# Patient Record
Sex: FEMALE | Race: WHITE | Employment: OTHER | URBAN - NONMETROPOLITAN AREA
[De-identification: names, ages, dates, MRNs, and addresses within clinical notes are randomized per-mention and may not be internally consistent; named-entity substitution may affect disease eponyms.]

---

## 2017-01-09 RX ORDER — ACYCLOVIR 800 MG/1
TABLET ORAL
Qty: 30 TABLET | Refills: 5 | Status: SHIPPED | OUTPATIENT
Start: 2017-01-09 | End: 2017-06-29 | Stop reason: SDUPTHER

## 2017-01-09 RX ORDER — NABUMETONE 500 MG/1
TABLET, FILM COATED ORAL
Qty: 60 TABLET | Refills: 5 | Status: SHIPPED | OUTPATIENT
Start: 2017-01-09 | End: 2017-06-29 | Stop reason: SDUPTHER

## 2017-01-24 ENCOUNTER — HOSPITAL ENCOUNTER (OUTPATIENT)
Dept: PAIN MANAGEMENT | Age: 42
Discharge: HOME | End: 2017-01-24
Payer: MEDICARE

## 2017-01-24 VITALS
HEIGHT: 68 IN | RESPIRATION RATE: 16 BRPM | SYSTOLIC BLOOD PRESSURE: 154 MMHG | OXYGEN SATURATION: 97 % | DIASTOLIC BLOOD PRESSURE: 89 MMHG | TEMPERATURE: 97.9 F | HEART RATE: 91 BPM | WEIGHT: 293 LBS | BODY MASS INDEX: 44.41 KG/M2

## 2017-01-24 DIAGNOSIS — M54.10 BACK PAIN WITH LEFT-SIDED RADICULOPATHY: ICD-10-CM

## 2017-01-24 PROCEDURE — 80307 DRUG TEST PRSMV CHEM ANLYZR: CPT

## 2017-01-24 PROCEDURE — 99214 OFFICE O/P EST MOD 30 MIN: CPT

## 2017-01-24 RX ORDER — OXYCODONE HYDROCHLORIDE 10 MG/1
10 TABLET ORAL 3 TIMES DAILY PRN
Qty: 90 TABLET | Refills: 0 | Status: SHIPPED | OUTPATIENT
Start: 2017-01-28 | End: 2017-03-01 | Stop reason: SDUPTHER

## 2017-01-24 RX ORDER — PREGABALIN 150 MG/1
150 CAPSULE ORAL 3 TIMES DAILY
Qty: 90 CAPSULE | Refills: 2 | Status: SHIPPED | OUTPATIENT
Start: 2017-01-24 | End: 2017-01-24 | Stop reason: CLARIF

## 2017-01-24 RX ORDER — CYCLOBENZAPRINE HCL 10 MG
10 TABLET ORAL 2 TIMES DAILY PRN
Qty: 60 TABLET | Refills: 1 | Status: SHIPPED | OUTPATIENT
Start: 2017-01-24 | End: 2018-03-01 | Stop reason: SDUPTHER

## 2017-01-24 RX ORDER — PREGABALIN 150 MG/1
150 CAPSULE ORAL 3 TIMES DAILY
Qty: 90 CAPSULE | Refills: 2 | Status: SHIPPED | OUTPATIENT
Start: 2017-01-24 | End: 2017-04-20 | Stop reason: SDUPTHER

## 2017-01-24 RX ORDER — CYCLOBENZAPRINE HCL 10 MG
1 TABLET ORAL 2 TIMES DAILY PRN
Refills: 1 | COMMUNITY
Start: 2016-12-17 | End: 2017-01-24 | Stop reason: SDUPTHER

## 2017-01-24 ASSESSMENT — PAIN DESCRIPTION - PROGRESSION: CLINICAL_PROGRESSION: GRADUALLY WORSENING

## 2017-01-24 ASSESSMENT — PAIN DESCRIPTION - DIRECTION: RADIATING_TOWARDS: DOWN RIGHT LEG

## 2017-01-24 ASSESSMENT — PAIN DESCRIPTION - PAIN TYPE: TYPE: CHRONIC PAIN

## 2017-01-24 ASSESSMENT — PAIN SCALES - GENERAL: PAINLEVEL_OUTOF10: 7

## 2017-01-24 ASSESSMENT — PAIN DESCRIPTION - FREQUENCY: FREQUENCY: CONTINUOUS

## 2017-01-24 ASSESSMENT — PAIN DESCRIPTION - LOCATION: LOCATION: BACK;LEG;HIP

## 2017-01-24 ASSESSMENT — PAIN DESCRIPTION - DESCRIPTORS: DESCRIPTORS: SHARP;BURNING;ACHING

## 2017-01-24 ASSESSMENT — PAIN DESCRIPTION - ONSET: ONSET: ON-GOING

## 2017-01-24 ASSESSMENT — ACTIVITIES OF DAILY LIVING (ADL): EFFECT OF PAIN ON DAILY ACTIVITIES: DAILY ACTIVITIES

## 2017-01-24 ASSESSMENT — PAIN DESCRIPTION - ORIENTATION: ORIENTATION: RIGHT;LOWER

## 2017-01-30 ENCOUNTER — OFFICE VISIT (OUTPATIENT)
Dept: OBGYN | Age: 42
End: 2017-01-30
Payer: MEDICARE

## 2017-01-30 VITALS — BODY MASS INDEX: 44.41 KG/M2 | HEIGHT: 68 IN | WEIGHT: 293 LBS

## 2017-01-30 DIAGNOSIS — R10.2 PELVIC PAIN IN FEMALE: ICD-10-CM

## 2017-01-30 DIAGNOSIS — Z87.42 HISTORY OF ENDOMETRIOSIS: ICD-10-CM

## 2017-01-30 DIAGNOSIS — G89.29 CHRONIC BACK PAIN, UNSPECIFIED BACK LOCATION, UNSPECIFIED BACK PAIN LATERALITY: ICD-10-CM

## 2017-01-30 DIAGNOSIS — M54.9 CHRONIC BACK PAIN, UNSPECIFIED BACK LOCATION, UNSPECIFIED BACK PAIN LATERALITY: ICD-10-CM

## 2017-01-30 DIAGNOSIS — N89.8 VAGINAL DISCHARGE: Primary | ICD-10-CM

## 2017-01-30 LAB
AMPHETAMINES, URINE: NEGATIVE NG/ML
BARBITURATES, URINE: NEGATIVE NG/ML
BENZODIAZEPINES, URINE: NEGATIVE NG/ML
CANNABINOIDS, URINE: NEGATIVE NG/ML
COCAINE METABOLITE, URINE: NEGATIVE NG/ML
CREATININE, URINE: 190.1 MG/DL (ref 20–300)
ETHANOL U, QUAN: NEGATIVE %
FENTANYL URINE: NEGATIVE PG/ML
MEPERIDINE, UR: NEGATIVE NG/ML
METHADONE SCREEN, URINE: NEGATIVE NG/ML
OPIATES, URINE: ABNORMAL NG/ML
OPIATES, URINE: NEGATIVE NG/ML
OXYCODONE URINE: POSITIVE
OXYCODONE, UR GC/MS: 1600 NG/ML
OXYCODONE/OXYMORPH, UR: POSITIVE
OXYCODONE/OXYMORPHONE, UR: ABNORMAL NG/ML
OXYMORPHONE, UR GC/MS: 2020 NG/ML
OXYMORPHONE, URINE: POSITIVE
PH, URINE: 5.4 (ref 4.5–8.9)
PHENCYCLIDINE, URINE: NEGATIVE NG/ML
PROPOXYPHENE, URINE: NEGATIVE NG/ML

## 2017-01-30 PROCEDURE — 99213 OFFICE O/P EST LOW 20 MIN: CPT | Performed by: NURSE PRACTITIONER

## 2017-01-30 ASSESSMENT — ENCOUNTER SYMPTOMS
NAUSEA: 0
ABDOMINAL PAIN: 0
SHORTNESS OF BREATH: 0
BACK PAIN: 1
DIARRHEA: 0
CONSTIPATION: 0
WHEEZING: 0
APNEA: 0
TROUBLE SWALLOWING: 0
SORE THROAT: 0

## 2017-01-31 ENCOUNTER — OFFICE VISIT (OUTPATIENT)
Dept: PRIMARY CARE CLINIC | Age: 42
End: 2017-01-31
Payer: MEDICARE

## 2017-01-31 VITALS
HEIGHT: 68 IN | TEMPERATURE: 98.6 F | BODY MASS INDEX: 44.41 KG/M2 | OXYGEN SATURATION: 98 % | HEART RATE: 109 BPM | WEIGHT: 293 LBS | SYSTOLIC BLOOD PRESSURE: 128 MMHG | DIASTOLIC BLOOD PRESSURE: 84 MMHG

## 2017-01-31 DIAGNOSIS — R50.9 FEVER, UNSPECIFIED FEVER CAUSE: ICD-10-CM

## 2017-01-31 DIAGNOSIS — J02.9 SORE THROAT: Primary | ICD-10-CM

## 2017-01-31 DIAGNOSIS — H10.503 BLEPHAROCONJUNCTIVITIS OF BOTH EYES, UNSPECIFIED BLEPHAROCONJUNCTIVITIS TYPE: ICD-10-CM

## 2017-01-31 DIAGNOSIS — J01.20 ACUTE NON-RECURRENT ETHMOIDAL SINUSITIS: ICD-10-CM

## 2017-01-31 DIAGNOSIS — J34.89 SINUS PRESSURE: ICD-10-CM

## 2017-01-31 LAB — S PYO AG THROAT QL: NORMAL

## 2017-01-31 PROCEDURE — 87880 STREP A ASSAY W/OPTIC: CPT | Performed by: PEDIATRICS

## 2017-01-31 PROCEDURE — 99213 OFFICE O/P EST LOW 20 MIN: CPT | Performed by: PEDIATRICS

## 2017-01-31 RX ORDER — TOBRAMYCIN 3 MG/ML
1 SOLUTION/ DROPS OPHTHALMIC EVERY 4 HOURS
Qty: 1 BOTTLE | Refills: 0 | Status: SHIPPED | OUTPATIENT
Start: 2017-01-31 | End: 2017-02-10

## 2017-01-31 RX ORDER — CEFDINIR 300 MG/1
300 CAPSULE ORAL 2 TIMES DAILY
Qty: 20 CAPSULE | Refills: 0 | Status: SHIPPED | OUTPATIENT
Start: 2017-01-31 | End: 2017-02-10

## 2017-01-31 ASSESSMENT — ENCOUNTER SYMPTOMS
DIARRHEA: 0
ABDOMINAL PAIN: 0
VOICE CHANGE: 0
EYE PAIN: 0
WHEEZING: 0
BACK PAIN: 0
NAUSEA: 0
CONSTIPATION: 0
COUGH: 1
VOMITING: 0
SHORTNESS OF BREATH: 0
SORE THROAT: 1
SINUS PRESSURE: 1
EYE DISCHARGE: 1

## 2017-02-03 DIAGNOSIS — A59.01 TRICHOMONAL VAGINITIS: Primary | ICD-10-CM

## 2017-02-03 RX ORDER — METRONIDAZOLE 500 MG/1
TABLET ORAL
Qty: 4 TABLET | Refills: 0 | Status: SHIPPED | OUTPATIENT
Start: 2017-02-03 | End: 2017-03-07 | Stop reason: ALTCHOICE

## 2017-02-03 RX ORDER — FLUCONAZOLE 150 MG/1
TABLET ORAL
Qty: 2 TABLET | Refills: 0 | Status: SHIPPED | OUTPATIENT
Start: 2017-02-03 | End: 2017-03-07 | Stop reason: ALTCHOICE

## 2017-02-17 ENCOUNTER — OFFICE VISIT (OUTPATIENT)
Dept: OBGYN | Age: 42
End: 2017-02-17
Payer: MEDICARE

## 2017-02-17 VITALS
SYSTOLIC BLOOD PRESSURE: 130 MMHG | HEIGHT: 66 IN | WEIGHT: 293 LBS | DIASTOLIC BLOOD PRESSURE: 60 MMHG | BODY MASS INDEX: 47.09 KG/M2

## 2017-02-17 DIAGNOSIS — R10.30 LOWER ABDOMINAL PAIN: Primary | ICD-10-CM

## 2017-02-17 DIAGNOSIS — E66.01 MORBID OBESITY, UNSPECIFIED OBESITY TYPE (HCC): ICD-10-CM

## 2017-02-17 PROCEDURE — 99213 OFFICE O/P EST LOW 20 MIN: CPT | Performed by: OBSTETRICS & GYNECOLOGY

## 2017-02-17 ASSESSMENT — ENCOUNTER SYMPTOMS
EYES NEGATIVE: 1
RESPIRATORY NEGATIVE: 1
ABDOMINAL PAIN: 1
ALLERGIC/IMMUNOLOGIC NEGATIVE: 1

## 2017-02-19 ENCOUNTER — HOSPITAL ENCOUNTER (EMERGENCY)
Age: 42
Discharge: HOME | End: 2017-02-19
Payer: MEDICARE

## 2017-02-19 ENCOUNTER — APPOINTMENT (OUTPATIENT)
Dept: GENERAL RADIOLOGY | Age: 42
End: 2017-02-19
Payer: MEDICARE

## 2017-02-19 VITALS
HEART RATE: 78 BPM | BODY MASS INDEX: 44.41 KG/M2 | DIASTOLIC BLOOD PRESSURE: 78 MMHG | TEMPERATURE: 98 F | HEIGHT: 68 IN | SYSTOLIC BLOOD PRESSURE: 112 MMHG | RESPIRATION RATE: 20 BRPM | OXYGEN SATURATION: 99 % | WEIGHT: 293 LBS

## 2017-02-19 DIAGNOSIS — J40 BRONCHITIS: Primary | ICD-10-CM

## 2017-02-19 PROCEDURE — 99283 EMERGENCY DEPT VISIT LOW MDM: CPT | Performed by: NURSE PRACTITIONER

## 2017-02-19 PROCEDURE — 99283 EMERGENCY DEPT VISIT LOW MDM: CPT

## 2017-02-19 PROCEDURE — 71020 XR CHEST STANDARD TWO VW: CPT

## 2017-02-19 RX ORDER — FLUTICASONE PROPIONATE 50 MCG
2 SPRAY, SUSPENSION (ML) NASAL DAILY
Qty: 1 BOTTLE | Refills: 0 | Status: SHIPPED | OUTPATIENT
Start: 2017-02-19 | End: 2017-03-07

## 2017-02-19 RX ORDER — PREDNISONE 10 MG/1
20 TABLET ORAL 2 TIMES DAILY
Qty: 20 TABLET | Refills: 0 | Status: SHIPPED | OUTPATIENT
Start: 2017-02-19 | End: 2017-02-24

## 2017-02-19 RX ORDER — ALBUTEROL SULFATE 90 UG/1
2 AEROSOL, METERED RESPIRATORY (INHALATION) 4 TIMES DAILY
Qty: 1 INHALER | Refills: 0 | Status: SHIPPED | OUTPATIENT
Start: 2017-02-19 | End: 2017-03-07 | Stop reason: SDUPTHER

## 2017-02-19 RX ORDER — DOXYCYCLINE HYCLATE 100 MG
100 TABLET ORAL 2 TIMES DAILY
Qty: 20 TABLET | Refills: 0 | Status: SHIPPED | OUTPATIENT
Start: 2017-02-19 | End: 2017-03-01

## 2017-02-19 RX ORDER — SUCRALFATE ORAL 1 G/10ML
1 SUSPENSION ORAL 4 TIMES DAILY
Qty: 1200 ML | Refills: 0 | Status: SHIPPED | OUTPATIENT
Start: 2017-02-19 | End: 2017-03-07

## 2017-02-19 RX ORDER — ONDANSETRON 4 MG/1
4 TABLET, ORALLY DISINTEGRATING ORAL EVERY 8 HOURS PRN
Qty: 10 TABLET | Refills: 0 | Status: SHIPPED | OUTPATIENT
Start: 2017-02-19 | End: 2021-09-17

## 2017-02-20 ASSESSMENT — ENCOUNTER SYMPTOMS
EYE DISCHARGE: 0
SORE THROAT: 0
ABDOMINAL PAIN: 0
BACK PAIN: 0
VOMITING: 1
COUGH: 1
SINUS PRESSURE: 1
DIARRHEA: 0
WHEEZING: 0
SHORTNESS OF BREATH: 0
NAUSEA: 0

## 2017-03-02 RX ORDER — OXYCODONE HYDROCHLORIDE 10 MG/1
10 TABLET ORAL 3 TIMES DAILY PRN
Qty: 90 TABLET | Refills: 0 | Status: SHIPPED | OUTPATIENT
Start: 2017-03-03 | End: 2017-03-17 | Stop reason: SDUPTHER

## 2017-03-07 ENCOUNTER — OFFICE VISIT (OUTPATIENT)
Dept: PRIMARY CARE CLINIC | Age: 42
End: 2017-03-07
Payer: MEDICARE

## 2017-03-07 VITALS
OXYGEN SATURATION: 97 % | HEART RATE: 94 BPM | TEMPERATURE: 97.5 F | SYSTOLIC BLOOD PRESSURE: 104 MMHG | BODY MASS INDEX: 44.41 KG/M2 | WEIGHT: 293 LBS | DIASTOLIC BLOOD PRESSURE: 78 MMHG | HEIGHT: 68 IN

## 2017-03-07 DIAGNOSIS — J04.0 LARYNGITIS: ICD-10-CM

## 2017-03-07 DIAGNOSIS — J18.9 PNEUMONIA OF RIGHT LOWER LOBE DUE TO INFECTIOUS ORGANISM: Primary | ICD-10-CM

## 2017-03-07 DIAGNOSIS — J20.9 ACUTE BRONCHITIS, UNSPECIFIED ORGANISM: ICD-10-CM

## 2017-03-07 PROCEDURE — 99213 OFFICE O/P EST LOW 20 MIN: CPT | Performed by: PEDIATRICS

## 2017-03-07 RX ORDER — ALBUTEROL SULFATE 90 UG/1
2 AEROSOL, METERED RESPIRATORY (INHALATION) EVERY 6 HOURS PRN
Qty: 1 INHALER | Refills: 3 | Status: SHIPPED | OUTPATIENT
Start: 2017-03-07 | End: 2019-02-22 | Stop reason: SDUPTHER

## 2017-03-07 RX ORDER — LEVOFLOXACIN 750 MG/1
750 TABLET ORAL DAILY
Qty: 10 TABLET | Refills: 0 | Status: SHIPPED | OUTPATIENT
Start: 2017-03-07 | End: 2017-03-17

## 2017-03-07 ASSESSMENT — ENCOUNTER SYMPTOMS
TROUBLE SWALLOWING: 0
NAUSEA: 0
COUGH: 1
DIARRHEA: 0
VOICE CHANGE: 1
BACK PAIN: 0
SORE THROAT: 1
SINUS PRESSURE: 1
SHORTNESS OF BREATH: 0
ABDOMINAL PAIN: 0
VOMITING: 0
CHEST TIGHTNESS: 0

## 2017-03-10 ENCOUNTER — TELEPHONE (OUTPATIENT)
Dept: PRIMARY CARE CLINIC | Age: 42
End: 2017-03-10

## 2017-03-10 ENCOUNTER — HOSPITAL ENCOUNTER (OUTPATIENT)
Dept: GENERAL RADIOLOGY | Age: 42
Discharge: HOME | End: 2017-03-10
Payer: MEDICARE

## 2017-03-10 ENCOUNTER — OFFICE VISIT (OUTPATIENT)
Dept: PRIMARY CARE CLINIC | Age: 42
End: 2017-03-10
Payer: MEDICARE

## 2017-03-10 VITALS
DIASTOLIC BLOOD PRESSURE: 80 MMHG | OXYGEN SATURATION: 98 % | TEMPERATURE: 96.4 F | WEIGHT: 279 LBS | BODY MASS INDEX: 42.28 KG/M2 | HEART RATE: 120 BPM | SYSTOLIC BLOOD PRESSURE: 118 MMHG | HEIGHT: 68 IN

## 2017-03-10 DIAGNOSIS — R05.9 COUGH: ICD-10-CM

## 2017-03-10 DIAGNOSIS — J18.9 PNEUMONIA DUE TO INFECTIOUS ORGANISM, UNSPECIFIED LATERALITY, UNSPECIFIED PART OF LUNG: ICD-10-CM

## 2017-03-10 DIAGNOSIS — R06.2 WHEEZING: ICD-10-CM

## 2017-03-10 DIAGNOSIS — J40 BRONCHITIS: ICD-10-CM

## 2017-03-10 DIAGNOSIS — J02.9 SORE THROAT: Primary | ICD-10-CM

## 2017-03-10 LAB
INFLUENZA A ANTIBODY: NORMAL
INFLUENZA B ANTIBODY: NORMAL

## 2017-03-10 PROCEDURE — 87804 INFLUENZA ASSAY W/OPTIC: CPT | Performed by: NURSE PRACTITIONER

## 2017-03-10 PROCEDURE — 96372 THER/PROPH/DIAG INJ SC/IM: CPT | Performed by: NURSE PRACTITIONER

## 2017-03-10 PROCEDURE — 71020 XR CHEST STANDARD TWO VW: CPT

## 2017-03-10 PROCEDURE — 99213 OFFICE O/P EST LOW 20 MIN: CPT | Performed by: NURSE PRACTITIONER

## 2017-03-10 RX ORDER — PREDNISONE 10 MG/1
10 TABLET ORAL DAILY
Qty: 42 TABLET | Refills: 0 | Status: SHIPPED | OUTPATIENT
Start: 2017-03-10 | End: 2017-03-20

## 2017-03-10 RX ORDER — DEXAMETHASONE SODIUM PHOSPHATE 4 MG/ML
4 INJECTION, SOLUTION INTRA-ARTICULAR; INTRALESIONAL; INTRAMUSCULAR; INTRAVENOUS; SOFT TISSUE ONCE
Status: COMPLETED | OUTPATIENT
Start: 2017-03-10 | End: 2017-03-10

## 2017-03-10 RX ORDER — NEBULIZER ACCESSORIES
1 KIT MISCELLANEOUS 4 TIMES DAILY PRN
Qty: 1 KIT | Refills: 0 | Status: SHIPPED | OUTPATIENT
Start: 2017-03-10 | End: 2018-01-10

## 2017-03-10 RX ORDER — ALBUTEROL SULFATE 2.5 MG/3ML
2.5 SOLUTION RESPIRATORY (INHALATION) EVERY 6 HOURS PRN
Qty: 120 EACH | Refills: 3 | Status: SHIPPED | OUTPATIENT
Start: 2017-03-10 | End: 2018-01-10

## 2017-03-10 RX ADMIN — DEXAMETHASONE SODIUM PHOSPHATE 4 MG: 4 INJECTION, SOLUTION INTRA-ARTICULAR; INTRALESIONAL; INTRAMUSCULAR; INTRAVENOUS; SOFT TISSUE at 14:26

## 2017-03-10 ASSESSMENT — ENCOUNTER SYMPTOMS
NAUSEA: 0
EYE REDNESS: 0
DIARRHEA: 0
VOMITING: 0
EYE DISCHARGE: 0
ANAL BLEEDING: 0
EYE PAIN: 0
SHORTNESS OF BREATH: 0
SORE THROAT: 1
ABDOMINAL PAIN: 0
CHEST TIGHTNESS: 0
COUGH: 1
CONSTIPATION: 0

## 2017-03-13 ENCOUNTER — TELEPHONE (OUTPATIENT)
Dept: PRIMARY CARE CLINIC | Age: 42
End: 2017-03-13

## 2017-03-13 ASSESSMENT — ENCOUNTER SYMPTOMS
WHEEZING: 1
RHINORRHEA: 1

## 2017-03-17 ENCOUNTER — HOSPITAL ENCOUNTER (OUTPATIENT)
Dept: PAIN MANAGEMENT | Age: 42
Discharge: HOME | End: 2017-03-17
Payer: MEDICARE

## 2017-03-17 VITALS
HEIGHT: 68 IN | TEMPERATURE: 97.6 F | SYSTOLIC BLOOD PRESSURE: 119 MMHG | HEART RATE: 89 BPM | DIASTOLIC BLOOD PRESSURE: 71 MMHG | BODY MASS INDEX: 44.41 KG/M2 | WEIGHT: 293 LBS | RESPIRATION RATE: 18 BRPM | OXYGEN SATURATION: 96 %

## 2017-03-17 DIAGNOSIS — M54.10 BACK PAIN WITH LEFT-SIDED RADICULOPATHY: ICD-10-CM

## 2017-03-17 PROCEDURE — 99214 OFFICE O/P EST MOD 30 MIN: CPT

## 2017-03-17 ASSESSMENT — PAIN SCALES - GENERAL: PAINLEVEL_OUTOF10: 7

## 2017-03-17 ASSESSMENT — PAIN DESCRIPTION - ONSET: ONSET: ON-GOING

## 2017-03-17 ASSESSMENT — PAIN DESCRIPTION - PAIN TYPE: TYPE: CHRONIC PAIN

## 2017-03-17 ASSESSMENT — PAIN DESCRIPTION - LOCATION: LOCATION: BACK

## 2017-03-17 ASSESSMENT — PAIN DESCRIPTION - PROGRESSION: CLINICAL_PROGRESSION: NOT CHANGED

## 2017-03-17 ASSESSMENT — PAIN DESCRIPTION - DESCRIPTORS: DESCRIPTORS: ACHING;CONSTANT

## 2017-03-17 ASSESSMENT — ACTIVITIES OF DAILY LIVING (ADL): EFFECT OF PAIN ON DAILY ACTIVITIES: DAILY CHORES AND ACTIVITIES

## 2017-03-17 ASSESSMENT — PAIN DESCRIPTION - FREQUENCY: FREQUENCY: CONTINUOUS

## 2017-03-17 ASSESSMENT — PAIN DESCRIPTION - ORIENTATION: ORIENTATION: LOWER

## 2017-03-17 ASSESSMENT — PAIN DESCRIPTION - DIRECTION: RADIATING_TOWARDS: DOWN THE RIGHT LEG

## 2017-03-31 DIAGNOSIS — I10 ESSENTIAL HYPERTENSION: ICD-10-CM

## 2017-03-31 RX ORDER — LISINOPRIL 10 MG/1
TABLET ORAL
Qty: 90 TABLET | Refills: 2 | Status: SHIPPED | OUTPATIENT
Start: 2017-03-31 | End: 2017-12-29 | Stop reason: SDUPTHER

## 2017-04-04 ENCOUNTER — OFFICE VISIT (OUTPATIENT)
Dept: GASTROENTEROLOGY | Age: 42
End: 2017-04-04
Payer: MEDICARE

## 2017-04-04 VITALS
BODY MASS INDEX: 44.41 KG/M2 | SYSTOLIC BLOOD PRESSURE: 132 MMHG | HEART RATE: 94 BPM | DIASTOLIC BLOOD PRESSURE: 80 MMHG | OXYGEN SATURATION: 98 % | HEIGHT: 68 IN | WEIGHT: 293 LBS

## 2017-04-04 DIAGNOSIS — R10.31 CHRONIC BILATERAL LOWER ABDOMINAL PAIN: Primary | ICD-10-CM

## 2017-04-04 DIAGNOSIS — K62.5 BRBPR (BRIGHT RED BLOOD PER RECTUM): ICD-10-CM

## 2017-04-04 DIAGNOSIS — M43.22 CERVICAL VERTEBRAL FUSION: ICD-10-CM

## 2017-04-04 DIAGNOSIS — G89.29 CHRONIC BILATERAL LOWER ABDOMINAL PAIN: Primary | ICD-10-CM

## 2017-04-04 DIAGNOSIS — R10.32 CHRONIC BILATERAL LOWER ABDOMINAL PAIN: Primary | ICD-10-CM

## 2017-04-04 DIAGNOSIS — Z80.0 FAMILY HISTORY OF ESOPHAGEAL CANCER: ICD-10-CM

## 2017-04-04 DIAGNOSIS — R13.10 DYSPHAGIA, UNSPECIFIED TYPE: ICD-10-CM

## 2017-04-04 DIAGNOSIS — Z83.71 FAMILY HISTORY OF POLYPS IN THE COLON: ICD-10-CM

## 2017-04-04 PROBLEM — Z83.719 FAMILY HISTORY OF POLYPS IN THE COLON: Status: ACTIVE | Noted: 2017-04-04

## 2017-04-04 PROCEDURE — 99214 OFFICE O/P EST MOD 30 MIN: CPT | Performed by: NURSE PRACTITIONER

## 2017-04-04 ASSESSMENT — ENCOUNTER SYMPTOMS
BACK PAIN: 1
WHEEZING: 0
SHORTNESS OF BREATH: 1
TROUBLE SWALLOWING: 1
VOMITING: 0
NAUSEA: 0
BLOOD IN STOOL: 0
ABDOMINAL DISTENTION: 0
CONSTIPATION: 0
COUGH: 0
ABDOMINAL PAIN: 1
CHOKING: 0
RECTAL PAIN: 0
SORE THROAT: 0
ANAL BLEEDING: 0
PHOTOPHOBIA: 0
DIARRHEA: 0

## 2017-04-10 RX ORDER — TRAZODONE HYDROCHLORIDE 50 MG/1
50 TABLET ORAL NIGHTLY
Qty: 90 TABLET | Refills: 1 | Status: SHIPPED | OUTPATIENT
Start: 2017-04-10 | End: 2017-06-27 | Stop reason: ALTCHOICE

## 2017-04-14 ENCOUNTER — HOSPITAL ENCOUNTER (OUTPATIENT)
Dept: PAIN MANAGEMENT | Age: 42
Discharge: HOME | End: 2017-04-14
Payer: MEDICARE

## 2017-04-14 VITALS — DIASTOLIC BLOOD PRESSURE: 61 MMHG | HEART RATE: 85 BPM | RESPIRATION RATE: 16 BRPM | SYSTOLIC BLOOD PRESSURE: 113 MMHG

## 2017-04-14 VITALS
DIASTOLIC BLOOD PRESSURE: 61 MMHG | WEIGHT: 293 LBS | RESPIRATION RATE: 20 BRPM | SYSTOLIC BLOOD PRESSURE: 147 MMHG | HEART RATE: 88 BPM | TEMPERATURE: 98.4 F | OXYGEN SATURATION: 97 % | HEIGHT: 68 IN | BODY MASS INDEX: 44.41 KG/M2

## 2017-04-14 DIAGNOSIS — M54.10 BACK PAIN WITH LEFT-SIDED RADICULOPATHY: ICD-10-CM

## 2017-04-14 DIAGNOSIS — M51.16 LUMBAR DISC DISEASE WITH RADICULOPATHY: Chronic | ICD-10-CM

## 2017-04-14 DIAGNOSIS — M51.36 LUMBAR DEGENERATIVE DISC DISEASE: ICD-10-CM

## 2017-04-14 PROCEDURE — 3209999900 FLUORO FOR SURGICAL PROCEDURES

## 2017-04-14 PROCEDURE — 62323 NJX INTERLAMINAR LMBR/SAC: CPT

## 2017-04-14 PROCEDURE — 2580000003 HC RX 258

## 2017-04-14 PROCEDURE — 6360000002 HC RX W HCPCS

## 2017-04-14 PROCEDURE — 2500000003 HC RX 250 WO HCPCS

## 2017-04-14 RX ORDER — LIDOCAINE HYDROCHLORIDE 10 MG/ML
INJECTION, SOLUTION EPIDURAL; INFILTRATION; INTRACAUDAL; PERINEURAL
Status: COMPLETED | OUTPATIENT
Start: 2017-04-14 | End: 2017-04-14

## 2017-04-14 RX ORDER — BUPIVACAINE HYDROCHLORIDE 2.5 MG/ML
INJECTION, SOLUTION EPIDURAL; INFILTRATION; INTRACAUDAL
Status: COMPLETED | OUTPATIENT
Start: 2017-04-14 | End: 2017-04-14

## 2017-04-14 RX ORDER — METHYLPREDNISOLONE ACETATE 80 MG/ML
INJECTION, SUSPENSION INTRA-ARTICULAR; INTRALESIONAL; INTRAMUSCULAR; SOFT TISSUE
Status: COMPLETED | OUTPATIENT
Start: 2017-04-14 | End: 2017-04-14

## 2017-04-14 RX ORDER — 0.9 % SODIUM CHLORIDE 0.9 %
VIAL (ML) INJECTION
Status: COMPLETED | OUTPATIENT
Start: 2017-04-14 | End: 2017-04-14

## 2017-04-14 RX ORDER — OXYCODONE HYDROCHLORIDE 10 MG/1
10 TABLET ORAL 2 TIMES DAILY PRN
Qty: 60 TABLET | Refills: 0 | Status: SHIPPED | OUTPATIENT
Start: 2017-05-02 | End: 2017-05-03 | Stop reason: SDUPTHER

## 2017-04-14 RX ADMIN — BUPIVACAINE HYDROCHLORIDE 5 ML: 2.5 INJECTION, SOLUTION EPIDURAL; INFILTRATION; INTRACAUDAL at 11:03

## 2017-04-14 RX ADMIN — Medication 4 ML: at 11:03

## 2017-04-14 RX ADMIN — METHYLPREDNISOLONE ACETATE 80 MG: 80 INJECTION, SUSPENSION INTRA-ARTICULAR; INTRALESIONAL; INTRAMUSCULAR; SOFT TISSUE at 11:03

## 2017-04-14 RX ADMIN — LIDOCAINE HYDROCHLORIDE 3 ML: 10 INJECTION, SOLUTION EPIDURAL; INFILTRATION; INTRACAUDAL; PERINEURAL at 11:02

## 2017-04-14 ASSESSMENT — PAIN - FUNCTIONAL ASSESSMENT
PAIN_FUNCTIONAL_ASSESSMENT: 0-10
PAIN_FUNCTIONAL_ASSESSMENT: 0-10

## 2017-04-14 ASSESSMENT — PAIN DESCRIPTION - DESCRIPTORS: DESCRIPTORS: ACHING;CONSTANT;THROBBING;RADIATING

## 2017-04-14 ASSESSMENT — ACTIVITIES OF DAILY LIVING (ADL): EFFECT OF PAIN ON DAILY ACTIVITIES: DAILY CHORES AND ACTIVITIES

## 2017-04-20 RX ORDER — PREGABALIN 150 MG/1
150 CAPSULE ORAL 3 TIMES DAILY
Qty: 90 CAPSULE | Refills: 2 | Status: SHIPPED | OUTPATIENT
Start: 2017-04-20 | End: 2017-09-06 | Stop reason: SDUPTHER

## 2017-04-27 ENCOUNTER — ANESTHESIA EVENT (OUTPATIENT)
Dept: OPERATING ROOM | Age: 42
End: 2017-04-27

## 2017-05-01 RX ORDER — METOPROLOL SUCCINATE 50 MG/1
50 TABLET, EXTENDED RELEASE ORAL 2 TIMES DAILY
COMMUNITY

## 2017-05-02 ENCOUNTER — ANESTHESIA (OUTPATIENT)
Dept: OPERATING ROOM | Age: 42
End: 2017-05-02
Payer: MEDICARE

## 2017-05-02 ENCOUNTER — HOSPITAL ENCOUNTER (OUTPATIENT)
Age: 42
Setting detail: OUTPATIENT SURGERY
Discharge: HOME | End: 2017-05-02
Attending: INTERNAL MEDICINE | Admitting: INTERNAL MEDICINE
Payer: MEDICARE

## 2017-05-02 ENCOUNTER — HOSPITAL ENCOUNTER (OUTPATIENT)
Age: 42
Setting detail: SPECIMEN
Discharge: HOME | End: 2017-05-02
Payer: MEDICARE

## 2017-05-02 VITALS
DIASTOLIC BLOOD PRESSURE: 63 MMHG | OXYGEN SATURATION: 94 % | HEART RATE: 75 BPM | RESPIRATION RATE: 16 BRPM | TEMPERATURE: 98.3 F | SYSTOLIC BLOOD PRESSURE: 94 MMHG

## 2017-05-02 VITALS — SYSTOLIC BLOOD PRESSURE: 109 MMHG | DIASTOLIC BLOOD PRESSURE: 69 MMHG | OXYGEN SATURATION: 97 %

## 2017-05-02 PROCEDURE — G8907 PT DOC NO EVENTS ON DISCHARG: HCPCS

## 2017-05-02 PROCEDURE — G8918 PT W/O PREOP ORDER IV AB PRO: HCPCS

## 2017-05-02 PROCEDURE — 45378 DIAGNOSTIC COLONOSCOPY: CPT | Performed by: INTERNAL MEDICINE

## 2017-05-02 PROCEDURE — 00810 PR ANESTH,INTESTINE,SCOPE,LOW: CPT | Performed by: NURSE ANESTHETIST, CERTIFIED REGISTERED

## 2017-05-02 PROCEDURE — 88305 TISSUE EXAM BY PATHOLOGIST: CPT

## 2017-05-02 PROCEDURE — 43239 EGD BIOPSY SINGLE/MULTIPLE: CPT | Performed by: INTERNAL MEDICINE

## 2017-05-02 PROCEDURE — 45378 DIAGNOSTIC COLONOSCOPY: CPT

## 2017-05-02 RX ORDER — SODIUM CHLORIDE, SODIUM LACTATE, POTASSIUM CHLORIDE, CALCIUM CHLORIDE 600; 310; 30; 20 MG/100ML; MG/100ML; MG/100ML; MG/100ML
INJECTION, SOLUTION INTRAVENOUS CONTINUOUS
Status: DISCONTINUED | OUTPATIENT
Start: 2017-05-02 | End: 2017-05-02 | Stop reason: HOSPADM

## 2017-05-02 RX ORDER — LIDOCAINE HYDROCHLORIDE 20 MG/ML
INJECTION, SOLUTION EPIDURAL; INFILTRATION; INTRACAUDAL; PERINEURAL PRN
Status: DISCONTINUED | OUTPATIENT
Start: 2017-05-02 | End: 2017-05-02 | Stop reason: SDUPTHER

## 2017-05-02 RX ORDER — OMEPRAZOLE 20 MG/1
20 CAPSULE, DELAYED RELEASE ORAL DAILY
Qty: 30 CAPSULE | Refills: 3 | Status: SHIPPED | OUTPATIENT
Start: 2017-05-02 | End: 2017-05-02 | Stop reason: SDUPTHER

## 2017-05-02 RX ORDER — SODIUM CHLORIDE, SODIUM LACTATE, POTASSIUM CHLORIDE, CALCIUM CHLORIDE 600; 310; 30; 20 MG/100ML; MG/100ML; MG/100ML; MG/100ML
INJECTION, SOLUTION INTRAVENOUS CONTINUOUS PRN
Status: DISCONTINUED | OUTPATIENT
Start: 2017-05-02 | End: 2017-05-02 | Stop reason: SDUPTHER

## 2017-05-02 RX ORDER — PROPOFOL 10 MG/ML
INJECTION, EMULSION INTRAVENOUS PRN
Status: DISCONTINUED | OUTPATIENT
Start: 2017-05-02 | End: 2017-05-02 | Stop reason: SDUPTHER

## 2017-05-02 RX ORDER — OMEPRAZOLE 20 MG/1
CAPSULE, DELAYED RELEASE ORAL
Qty: 90 CAPSULE | Refills: 3 | Status: SHIPPED | OUTPATIENT
Start: 2017-05-02 | End: 2018-06-03 | Stop reason: SDUPTHER

## 2017-05-02 RX ORDER — LIDOCAINE HYDROCHLORIDE 10 MG/ML
1 INJECTION, SOLUTION EPIDURAL; INFILTRATION; INTRACAUDAL; PERINEURAL
Status: DISCONTINUED | OUTPATIENT
Start: 2017-05-02 | End: 2017-05-02 | Stop reason: HOSPADM

## 2017-05-02 RX ADMIN — SODIUM CHLORIDE, SODIUM LACTATE, POTASSIUM CHLORIDE, CALCIUM CHLORIDE: 600; 310; 30; 20 INJECTION, SOLUTION INTRAVENOUS at 11:10

## 2017-05-02 RX ADMIN — SODIUM CHLORIDE, SODIUM LACTATE, POTASSIUM CHLORIDE, CALCIUM CHLORIDE: 600; 310; 30; 20 INJECTION, SOLUTION INTRAVENOUS at 10:47

## 2017-05-02 RX ADMIN — PROPOFOL 200 MG: 10 INJECTION, EMULSION INTRAVENOUS at 11:05

## 2017-05-02 RX ADMIN — PROPOFOL 200 MG: 10 INJECTION, EMULSION INTRAVENOUS at 10:55

## 2017-05-02 RX ADMIN — SODIUM CHLORIDE, SODIUM LACTATE, POTASSIUM CHLORIDE, CALCIUM CHLORIDE: 600; 310; 30; 20 INJECTION, SOLUTION INTRAVENOUS at 09:45

## 2017-05-02 RX ADMIN — LIDOCAINE HYDROCHLORIDE 40 MG: 20 INJECTION, SOLUTION EPIDURAL; INFILTRATION; INTRACAUDAL; PERINEURAL at 10:54

## 2017-05-02 ASSESSMENT — PAIN SCALES - GENERAL
PAINLEVEL_OUTOF10: 0
PAINLEVEL_OUTOF10: 0

## 2017-05-04 RX ORDER — OXYCODONE HYDROCHLORIDE 10 MG/1
10 TABLET ORAL 2 TIMES DAILY PRN
Qty: 60 TABLET | Refills: 0
Start: 2017-05-04 | End: 2017-05-31 | Stop reason: SDUPTHER

## 2017-05-08 ENCOUNTER — APPOINTMENT (OUTPATIENT)
Dept: LAB | Facility: HOSPITAL | Age: 42
End: 2017-05-08

## 2017-05-08 ENCOUNTER — OFFICE VISIT (OUTPATIENT)
Dept: BARIATRICS/WEIGHT MGMT | Facility: CLINIC | Age: 42
End: 2017-05-08

## 2017-05-08 ENCOUNTER — OFFICE VISIT (OUTPATIENT)
Dept: BARIATRICS/WEIGHT MGMT | Facility: HOSPITAL | Age: 42
End: 2017-05-08

## 2017-05-08 VITALS
OXYGEN SATURATION: 99 % | SYSTOLIC BLOOD PRESSURE: 109 MMHG | BODY MASS INDEX: 44.41 KG/M2 | WEIGHT: 293 LBS | HEART RATE: 87 BPM | DIASTOLIC BLOOD PRESSURE: 80 MMHG | HEIGHT: 68 IN | RESPIRATION RATE: 18 BRPM

## 2017-05-08 DIAGNOSIS — R53.83 FATIGUE, UNSPECIFIED TYPE: ICD-10-CM

## 2017-05-08 DIAGNOSIS — R29.818 SUSPECTED SLEEP APNEA: ICD-10-CM

## 2017-05-08 DIAGNOSIS — E66.01 MORBID OBESITY, UNSPECIFIED OBESITY TYPE (HCC): Primary | ICD-10-CM

## 2017-05-08 DIAGNOSIS — I10 HYPERTENSION, WELL CONTROLLED: ICD-10-CM

## 2017-05-08 DIAGNOSIS — Z86.39 HISTORY OF IRON DEFICIENCY: ICD-10-CM

## 2017-05-08 DIAGNOSIS — E55.9 VITAMIN D DEFICIENCY: ICD-10-CM

## 2017-05-08 LAB
25(OH)D3 SERPL-MCNC: 35.9 NG/ML (ref 30–100)
ALBUMIN SERPL-MCNC: 3.7 G/DL (ref 3.5–5)
ALBUMIN/GLOB SERPL: 1.2 G/DL (ref 1.1–2.5)
ALP SERPL-CCNC: 98 U/L (ref 24–120)
ALT SERPL W P-5'-P-CCNC: 44 U/L (ref 0–54)
ANION GAP SERPL CALCULATED.3IONS-SCNC: 11 MMOL/L (ref 4–13)
ARTICHOKE IGE QN: 82 MG/DL (ref 0–99)
AST SERPL-CCNC: 40 U/L (ref 7–45)
BILIRUB SERPL-MCNC: 0.8 MG/DL (ref 0.1–1)
BUN BLD-MCNC: 12 MG/DL (ref 5–21)
BUN/CREAT SERPL: 17.4 (ref 7–25)
CALCIUM SPEC-SCNC: 9.1 MG/DL (ref 8.4–10.4)
CHLORIDE SERPL-SCNC: 105 MMOL/L (ref 98–110)
CHOLEST SERPL-MCNC: 149 MG/DL (ref 130–200)
CO2 SERPL-SCNC: 27 MMOL/L (ref 24–31)
CREAT BLD-MCNC: 0.69 MG/DL (ref 0.5–1.4)
DEPRECATED RDW RBC AUTO: 53.6 FL (ref 40–54)
ERYTHROCYTE [DISTWIDTH] IN BLOOD BY AUTOMATED COUNT: 17.5 % (ref 12–15)
FOLATE SERPL-MCNC: >20 NG/ML
GFR SERPL CREATININE-BSD FRML MDRD: 93 ML/MIN/1.73
GLOBULIN UR ELPH-MCNC: 3 GM/DL
GLUCOSE BLD-MCNC: 121 MG/DL (ref 70–100)
HCT VFR BLD AUTO: 30.9 % (ref 37–47)
HDLC SERPL-MCNC: 44 MG/DL
HGB BLD-MCNC: 10.5 G/DL (ref 12–16)
IRON 24H UR-MRATE: 71 MCG/DL (ref 42–180)
LDLC/HDLC SERPL: 1.91 {RATIO}
MAGNESIUM SERPL-MCNC: 1.7 MG/DL (ref 1.4–2.2)
MCH RBC QN AUTO: 28.7 PG (ref 28–32)
MCHC RBC AUTO-ENTMCNC: 34 G/DL (ref 33–36)
MCV RBC AUTO: 84.4 FL (ref 82–98)
PHOSPHATE SERPL-MCNC: 3.4 MG/DL (ref 2.5–4.5)
PLATELET # BLD AUTO: 275 10*3/MM3 (ref 130–400)
PMV BLD AUTO: 9.8 FL (ref 6–12)
POTASSIUM BLD-SCNC: 3.8 MMOL/L (ref 3.5–5.3)
PROT SERPL-MCNC: 6.7 G/DL (ref 6.3–8.7)
RBC # BLD AUTO: 3.66 10*6/MM3 (ref 4.2–5.4)
SODIUM BLD-SCNC: 143 MMOL/L (ref 135–145)
TRIGL SERPL-MCNC: 105 MG/DL (ref 0–149)
TSH SERPL DL<=0.05 MIU/L-ACNC: 1.68 MIU/ML (ref 0.47–4.68)
VIT B12 BLD-MCNC: 883 PG/ML (ref 239–931)
WBC NRBC COR # BLD: 11.11 10*3/MM3 (ref 4.8–10.8)

## 2017-05-08 PROCEDURE — 80061 LIPID PANEL: CPT | Performed by: NURSE PRACTITIONER

## 2017-05-08 PROCEDURE — 82746 ASSAY OF FOLIC ACID SERUM: CPT | Performed by: NURSE PRACTITIONER

## 2017-05-08 PROCEDURE — 80053 COMPREHEN METABOLIC PANEL: CPT | Performed by: NURSE PRACTITIONER

## 2017-05-08 PROCEDURE — 82306 VITAMIN D 25 HYDROXY: CPT | Performed by: NURSE PRACTITIONER

## 2017-05-08 PROCEDURE — 99204 OFFICE O/P NEW MOD 45 MIN: CPT | Performed by: NURSE PRACTITIONER

## 2017-05-08 PROCEDURE — 85027 COMPLETE CBC AUTOMATED: CPT | Performed by: NURSE PRACTITIONER

## 2017-05-08 PROCEDURE — 36415 COLL VENOUS BLD VENIPUNCTURE: CPT | Performed by: NURSE PRACTITIONER

## 2017-05-08 PROCEDURE — 83735 ASSAY OF MAGNESIUM: CPT | Performed by: NURSE PRACTITIONER

## 2017-05-08 PROCEDURE — 84100 ASSAY OF PHOSPHORUS: CPT | Performed by: NURSE PRACTITIONER

## 2017-05-08 PROCEDURE — 82607 VITAMIN B-12: CPT | Performed by: NURSE PRACTITIONER

## 2017-05-08 PROCEDURE — 83540 ASSAY OF IRON: CPT | Performed by: NURSE PRACTITIONER

## 2017-05-08 PROCEDURE — 84443 ASSAY THYROID STIM HORMONE: CPT | Performed by: NURSE PRACTITIONER

## 2017-05-08 RX ORDER — ONDANSETRON 4 MG/1
TABLET, ORALLY DISINTEGRATING ORAL
Refills: 0 | COMMUNITY
Start: 2017-02-20 | End: 2017-07-13

## 2017-05-08 RX ORDER — OMEPRAZOLE 20 MG/1
20 CAPSULE, DELAYED RELEASE ORAL DAILY
Refills: 3 | COMMUNITY
Start: 2017-05-02

## 2017-05-25 ENCOUNTER — TELEPHONE (OUTPATIENT)
Dept: GASTROENTEROLOGY | Age: 42
End: 2017-05-25

## 2017-05-31 ENCOUNTER — HOSPITAL ENCOUNTER (OUTPATIENT)
Dept: PAIN MANAGEMENT | Age: 42
Discharge: HOME | End: 2017-05-31
Payer: MEDICARE

## 2017-05-31 VITALS
HEIGHT: 67 IN | BODY MASS INDEX: 45.99 KG/M2 | DIASTOLIC BLOOD PRESSURE: 91 MMHG | WEIGHT: 293 LBS | RESPIRATION RATE: 18 BRPM | OXYGEN SATURATION: 97 % | HEART RATE: 84 BPM | SYSTOLIC BLOOD PRESSURE: 132 MMHG | TEMPERATURE: 97.6 F

## 2017-05-31 DIAGNOSIS — M54.10 BACK PAIN WITH LEFT-SIDED RADICULOPATHY: ICD-10-CM

## 2017-05-31 PROCEDURE — G0463 HOSPITAL OUTPT CLINIC VISIT: HCPCS

## 2017-05-31 PROCEDURE — 99213 OFFICE O/P EST LOW 20 MIN: CPT

## 2017-05-31 RX ORDER — OXYCODONE HYDROCHLORIDE 10 MG/1
10 TABLET ORAL 2 TIMES DAILY PRN
Qty: 60 TABLET | Refills: 0 | Status: SHIPPED | OUTPATIENT
Start: 2017-06-02 | End: 2017-06-26 | Stop reason: SDUPTHER

## 2017-05-31 ASSESSMENT — PAIN DESCRIPTION - ORIENTATION: ORIENTATION: RIGHT;LOWER

## 2017-05-31 ASSESSMENT — PAIN DESCRIPTION - ONSET: ONSET: ON-GOING

## 2017-05-31 ASSESSMENT — PAIN DESCRIPTION - LOCATION: LOCATION: BACK;HIP

## 2017-05-31 ASSESSMENT — PAIN SCALES - GENERAL: PAINLEVEL_OUTOF10: 5

## 2017-05-31 ASSESSMENT — PAIN DESCRIPTION - PAIN TYPE: TYPE: CHRONIC PAIN

## 2017-05-31 ASSESSMENT — PAIN DESCRIPTION - FREQUENCY: FREQUENCY: CONTINUOUS

## 2017-05-31 ASSESSMENT — ACTIVITIES OF DAILY LIVING (ADL): EFFECT OF PAIN ON DAILY ACTIVITIES: DAILY ACTIVITIES

## 2017-05-31 ASSESSMENT — PAIN DESCRIPTION - PROGRESSION: CLINICAL_PROGRESSION: NOT CHANGED

## 2017-05-31 ASSESSMENT — PAIN DESCRIPTION - DESCRIPTORS: DESCRIPTORS: ACHING;CONSTANT

## 2017-06-02 ENCOUNTER — RESULTS ENCOUNTER (OUTPATIENT)
Dept: BARIATRICS/WEIGHT MGMT | Facility: CLINIC | Age: 42
End: 2017-06-02

## 2017-06-02 DIAGNOSIS — E66.01 MORBID OBESITY, UNSPECIFIED OBESITY TYPE (HCC): ICD-10-CM

## 2017-06-02 DIAGNOSIS — I10 HYPERTENSION, WELL CONTROLLED: ICD-10-CM

## 2017-06-05 RX ORDER — VENLAFAXINE 75 MG/1
75 TABLET ORAL 2 TIMES DAILY
Qty: 60 TABLET | Refills: 11 | Status: SHIPPED | OUTPATIENT
Start: 2017-06-05 | End: 2018-06-03 | Stop reason: SDUPTHER

## 2017-06-08 ENCOUNTER — OFFICE VISIT (OUTPATIENT)
Dept: BARIATRICS/WEIGHT MGMT | Facility: CLINIC | Age: 42
End: 2017-06-08

## 2017-06-08 VITALS
SYSTOLIC BLOOD PRESSURE: 122 MMHG | BODY MASS INDEX: 48.82 KG/M2 | HEART RATE: 97 BPM | TEMPERATURE: 97.8 F | HEIGHT: 65 IN | HEIGHT: 65 IN | BODY MASS INDEX: 53.88 KG/M2 | DIASTOLIC BLOOD PRESSURE: 75 MMHG | WEIGHT: 293 LBS

## 2017-06-08 DIAGNOSIS — R29.818 SUSPECTED SLEEP APNEA: ICD-10-CM

## 2017-06-08 DIAGNOSIS — Z71.89 PRE-BARIATRIC SURGERY PSYCHOLOGICAL EVALUATION: ICD-10-CM

## 2017-06-08 DIAGNOSIS — E66.01 MORBID OBESITY, UNSPECIFIED OBESITY TYPE (HCC): Primary | ICD-10-CM

## 2017-06-08 DIAGNOSIS — Z86.39 HISTORY OF IRON DEFICIENCY: ICD-10-CM

## 2017-06-08 PROCEDURE — 99213 OFFICE O/P EST LOW 20 MIN: CPT | Performed by: NURSE PRACTITIONER

## 2017-06-08 NOTE — PROGRESS NOTES
"Subjective   Naomi Romo is a 41 y.o. female.     History of Present Illness Naomi Romo is here with morbid obesity and desires to have surgery for weight loss. This is the patient's 2nd visit to the office.  Patient has been given a psych referral today to [facility] for psych clearance prior to bariatric surgery. Patient has been exercising by walking, but not a great amount. She has had a lot of swelling in her legs and feet. She has been hurting in her hips and back as well.  Patient has been making health dietary choices and eating 2 meals per day with protein at each. Patient is unsure of how many protein grams she is getting per day. Patient is drinking 64 ounces of water per day. She has given up sodas over the last month. She had an EGD and colonoscopy on May 2nd with Dr. Gus Valentine, GI, and path report and H. Pylori biopsies were negative. See \"media\" for scanned reports. She is due for a sleep study in July. She will need cardiac and pulmonary (asthma) clearances prior to surgery. Her CBC showed H&H of 10.5 and 30.9. Her iron and folate levels were normal. She does have a history of iron deficiency anemia since the age of 14, but does not see a hematologist. She will have repeat CBC drawn in August and if still low, will send to hematology.         The following portions of the patient's history were reviewed and updated as appropriate: allergies, current medications, past family history, past medical history, past social history, past surgical history and problem list.    Review of Systems   Constitutional: Positive for fatigue. Negative for activity change and appetite change.   Eyes: Negative.    Respiratory: Positive for shortness of breath. Negative for apnea, cough and chest tightness.         Snoring; has sleep study scheduled for July   Cardiovascular: Negative.  Negative for chest pain, palpitations and leg swelling.   Gastrointestinal: Positive for abdominal pain. Negative for anal " bleeding, constipation, nausea and vomiting.   Endocrine: Negative.         Night sweats   Genitourinary: Negative.    Musculoskeletal: Positive for arthralgias, back pain, gait problem, myalgias and neck pain.   Skin: Positive for rash.        In skin folds   Allergic/Immunologic: Negative.    Neurological: Positive for headaches. Negative for seizures and syncope.   Hematological: Bruises/bleeds easily.        Hx of blood transfusion and iron deficiency anemia   Psychiatric/Behavioral: Positive for dysphoric mood and sleep disturbance. Negative for self-injury and suicidal ideas. The patient is nervous/anxious.        Objective   Physical Exam   Constitutional: She is oriented to person, place, and time. Vital signs are normal. She appears well-developed and well-nourished. She is cooperative. No distress.   HENT:   Head: Normocephalic and atraumatic.   Nose: Nose normal.   Mouth/Throat: Oropharynx is clear and moist. No oropharyngeal exudate or tonsillar abscesses.   Eyes: Conjunctivae, EOM and lids are normal. Pupils are equal, round, and reactive to light. Right eye exhibits no discharge. Left eye exhibits no discharge.   Neck: Trachea normal. Neck supple. No JVD present. Carotid bruit is not present. No rigidity. No tracheal deviation present. No thyromegaly present.   Cardiovascular: Normal rate, regular rhythm, S1 normal, S2 normal and normal heart sounds.    Pulmonary/Chest: Effort normal and breath sounds normal. No stridor. No respiratory distress. She has no wheezes. She has no rales.   Abdominal: Soft. Bowel sounds are normal. She exhibits no distension. There is no tenderness.   obese   Musculoskeletal: She exhibits no edema.        Right shoulder: She exhibits normal strength.   Lymphadenopathy:     She has no cervical adenopathy.   Neurological: She is alert and oriented to person, place, and time. She has normal strength. No cranial nerve deficit.   Skin: Skin is warm, dry and intact. No rash noted.    Psychiatric: She has a normal mood and affect. Her speech is normal and behavior is normal.   Alert and oriented x 3   Vitals reviewed.      Assessment/Plan   Naomi was seen today for weight loss, obesity and nutrition counseling.    Diagnoses and all orders for this visit:    Morbid obesity, unspecified obesity type  -     Ambulatory Referral to Psychology    Body mass index 45.0-49.9, adult  -     Ambulatory Referral to Psychology    Suspected sleep apnea    History of iron deficiency    Pre-bariatric surgery psychological evaluation  -     Ambulatory Referral to Psychology          Naomi Romo has struggled some this month. However, she has given up sodas totally.   Patient has gained four pounds.   Handout provided on portion sizes and reading nutrition labels.  Today we discussed healthy changes in lifestyle, diet, and exercise.   They will meet with the dietician today.   Intensive behavioral therapy for obesity was done today.   Goals for this month are: 3 meals per day with protein at each; eat protein first, use smaller plate; exercise as advised.  Referral to psych has been made today. Office will arrange.  Keep sleep study as arranged for next month.   Will need to repeat CBC in August. If H&H still low, will send to hematology.   Will need cardiac and pulmonary clearnaces (asthma) prior to surgery as well. Will make those referrals at next visit.   Follow up in one month for a weight recheck.

## 2017-06-08 NOTE — PATIENT INSTRUCTIONS
Naomi Romo has struggled some this month. However, she has given up sodas totally.   Patient has gained four pounds.   Handout provided on portion sizes and reading nutrition labels.  Today we discussed healthy changes in lifestyle, diet, and exercise.   They will meet with the dietician today.   Intensive behavioral therapy for obesity was done today.   Goals for this month are: 3 meals per day with protein at each; eat protein first, use smaller plate; exercise as advised.  Referral to psych has been made today. Office will arrange.  Keep sleep study as arranged for next month.   Will need to repeat CBC in August. If H&H still low, will send to hematology.   Will need cardiac and pulmonary clearnaces (asthma) prior to surgery as well. Will make those referrals at next visit.   Follow up in one month for a weight recheck.

## 2017-06-14 ENCOUNTER — HOSPITAL ENCOUNTER (OUTPATIENT)
Dept: GENERAL RADIOLOGY | Age: 42
Discharge: HOME | End: 2017-06-14
Payer: MEDICARE

## 2017-06-14 DIAGNOSIS — M51.36 OTHER INTERVERTEBRAL DISC DEGENERATION, LUMBAR REGION: ICD-10-CM

## 2017-06-14 PROCEDURE — 72100 X-RAY EXAM L-S SPINE 2/3 VWS: CPT

## 2017-06-27 ENCOUNTER — OFFICE VISIT (OUTPATIENT)
Dept: PSYCHIATRY | Age: 42
End: 2017-06-27
Payer: MEDICARE

## 2017-06-27 VITALS
SYSTOLIC BLOOD PRESSURE: 133 MMHG | OXYGEN SATURATION: 98 % | WEIGHT: 293 LBS | BODY MASS INDEX: 45.99 KG/M2 | HEIGHT: 67 IN | HEART RATE: 87 BPM | DIASTOLIC BLOOD PRESSURE: 86 MMHG

## 2017-06-27 DIAGNOSIS — F43.20 ADJUSTMENT DISORDER, UNSPECIFIED TYPE: Primary | ICD-10-CM

## 2017-06-27 PROCEDURE — 99214 OFFICE O/P EST MOD 30 MIN: CPT | Performed by: NURSE PRACTITIONER

## 2017-06-27 RX ORDER — NABUMETONE 500 MG/1
500 TABLET, FILM COATED ORAL
COMMUNITY
End: 2017-06-29 | Stop reason: SDUPTHER

## 2017-06-27 RX ORDER — VENLAFAXINE 75 MG/1
75 TABLET ORAL
COMMUNITY
End: 2017-09-06

## 2017-06-27 RX ORDER — OXYCODONE HYDROCHLORIDE 10 MG/1
10 TABLET ORAL 2 TIMES DAILY PRN
Qty: 60 TABLET | Refills: 0 | Status: SHIPPED | OUTPATIENT
Start: 2017-07-02 | End: 2017-07-17 | Stop reason: SDUPTHER

## 2017-06-27 RX ORDER — PREGABALIN 150 MG/1
150 CAPSULE ORAL
COMMUNITY
End: 2017-09-06

## 2017-06-28 DIAGNOSIS — J30.89 ENVIRONMENTAL AND SEASONAL ALLERGIES: Primary | ICD-10-CM

## 2017-06-29 RX ORDER — LEVOCETIRIZINE DIHYDROCHLORIDE 5 MG/1
TABLET, FILM COATED ORAL
Qty: 90 TABLET | Refills: 0 | Status: SHIPPED | OUTPATIENT
Start: 2017-06-29 | End: 2017-10-02 | Stop reason: SDUPTHER

## 2017-06-29 RX ORDER — ACYCLOVIR 800 MG/1
800 TABLET ORAL DAILY
Qty: 30 TABLET | Refills: 2 | Status: SHIPPED | OUTPATIENT
Start: 2017-06-29 | End: 2017-10-09 | Stop reason: SDUPTHER

## 2017-06-29 RX ORDER — NABUMETONE 500 MG/1
500 TABLET, FILM COATED ORAL 2 TIMES DAILY
Qty: 180 TABLET | Refills: 1 | Status: SHIPPED | OUTPATIENT
Start: 2017-06-29 | End: 2018-01-10 | Stop reason: HOSPADM

## 2017-06-29 RX ORDER — NABUMETONE 500 MG/1
500 TABLET, FILM COATED ORAL 2 TIMES DAILY
Qty: 60 TABLET | Refills: 5 | Status: SHIPPED | OUTPATIENT
Start: 2017-06-29 | End: 2017-06-29 | Stop reason: SDUPTHER

## 2017-06-30 ENCOUNTER — RESULTS ENCOUNTER (OUTPATIENT)
Dept: BARIATRICS/WEIGHT MGMT | Facility: CLINIC | Age: 42
End: 2017-06-30

## 2017-06-30 DIAGNOSIS — I10 HYPERTENSION, WELL CONTROLLED: ICD-10-CM

## 2017-06-30 DIAGNOSIS — E66.01 MORBID OBESITY, UNSPECIFIED OBESITY TYPE (HCC): ICD-10-CM

## 2017-07-05 RX ORDER — NORTRIPTYLINE HYDROCHLORIDE 25 MG/1
25 CAPSULE ORAL NIGHTLY
Qty: 90 CAPSULE | Refills: 3 | Status: SHIPPED | OUTPATIENT
Start: 2017-07-05 | End: 2018-01-10 | Stop reason: SDUPTHER

## 2017-07-11 ENCOUNTER — HOSPITAL ENCOUNTER (OUTPATIENT)
Dept: SLEEP MEDICINE | Facility: HOSPITAL | Age: 42
Discharge: HOME | End: 2017-07-11
Admitting: NURSE PRACTITIONER

## 2017-07-11 PROCEDURE — 95810 POLYSOM 6/> YRS 4/> PARAM: CPT

## 2017-07-11 PROCEDURE — 95810 POLYSOM 6/> YRS 4/> PARAM: CPT | Performed by: PSYCHIATRY & NEUROLOGY

## 2017-07-13 ENCOUNTER — OFFICE VISIT (OUTPATIENT)
Dept: BARIATRICS/WEIGHT MGMT | Facility: CLINIC | Age: 42
End: 2017-07-13

## 2017-07-13 VITALS
BODY MASS INDEX: 48.82 KG/M2 | WEIGHT: 293 LBS | RESPIRATION RATE: 18 BRPM | HEART RATE: 95 BPM | DIASTOLIC BLOOD PRESSURE: 86 MMHG | OXYGEN SATURATION: 98 % | HEIGHT: 65 IN | SYSTOLIC BLOOD PRESSURE: 142 MMHG

## 2017-07-13 DIAGNOSIS — I10 HYPERTENSION, WELL CONTROLLED: ICD-10-CM

## 2017-07-13 DIAGNOSIS — Z01.818 PRE-OP EVALUATION: ICD-10-CM

## 2017-07-13 DIAGNOSIS — G47.33 OSA (OBSTRUCTIVE SLEEP APNEA): ICD-10-CM

## 2017-07-13 DIAGNOSIS — E66.01 OBESITY, MORBID, BMI 50 OR HIGHER (HCC): Primary | ICD-10-CM

## 2017-07-13 DIAGNOSIS — J45.909 UNCOMPLICATED ASTHMA, UNSPECIFIED ASTHMA SEVERITY: ICD-10-CM

## 2017-07-13 PROCEDURE — 99213 OFFICE O/P EST LOW 20 MIN: CPT | Performed by: NURSE PRACTITIONER

## 2017-07-13 RX ORDER — LISINOPRIL 10 MG/1
TABLET ORAL
Refills: 2 | COMMUNITY
Start: 2017-06-28 | End: 2018-02-01 | Stop reason: ALTCHOICE

## 2017-07-13 RX ORDER — OXYCODONE HYDROCHLORIDE 10 MG/1
TABLET ORAL
Refills: 0 | COMMUNITY
Start: 2017-07-02 | End: 2017-12-11 | Stop reason: SDUPTHER

## 2017-07-13 RX ORDER — NABUMETONE 500 MG/1
TABLET, FILM COATED ORAL
Refills: 5 | COMMUNITY
Start: 2017-06-29 | End: 2017-11-14

## 2017-07-13 RX ORDER — NORTRIPTYLINE HYDROCHLORIDE 25 MG/1
25 CAPSULE ORAL NIGHTLY
Refills: 3 | COMMUNITY
Start: 2017-07-05

## 2017-07-13 RX ORDER — VENLAFAXINE 75 MG/1
75 TABLET ORAL DAILY
Refills: 11 | COMMUNITY
Start: 2017-07-03

## 2017-07-13 RX ORDER — LEVOCETIRIZINE DIHYDROCHLORIDE 5 MG/1
5 TABLET, FILM COATED ORAL EVERY EVENING
Refills: 0 | COMMUNITY
Start: 2017-06-29 | End: 2021-09-09

## 2017-07-13 RX ORDER — ACYCLOVIR 800 MG/1
TABLET ORAL
Refills: 2 | COMMUNITY
Start: 2017-06-29 | End: 2017-07-13

## 2017-07-13 NOTE — PROGRESS NOTES
"Subjective   Naomi Romo is a 41 y.o. female.     History of Present Illness Naomi Romo is here with morbid obesity and desires to have surgery for weight loss. This is the patient's 3rd visit to the office. Patient has been given a psych referral to [facility] for psych clearance prior to bariatric surgery. She has been seen there, but still need a letter from them. Patient has been execising by walking, lifting her legs and arms for 5 minutes per day. Patient has been making health dietary choices and eating 2 meals per day with protein at each. Patient has been eating \"a lot of salads\".  Patient is unsure of how many protein grams she is getting per day. Patient is drinking 64 ounces of water per day. She had an EGD and colonoscopy on May 2nd with Dr. Gus Valentine, GI, and path report and H. Pylori biopsies were negative. See \"media\" for scanned reports. She has had sleep study done that was positive for DIPAK. She has titration study scheduled for October to obtain sleep machine, etc. She will need cardiac and pulmonary (asthma) clearances prior to surgery. Her CBC showed H&H of 10.5 and 30.9. Her iron and folate levels were normal. She does have a history of iron deficiency anemia since the age of 14, but does not see a hematologist. She will have repeat CBC drawn in August and if still low, will send to hematology.     The following portions of the patient's history were reviewed and updated as appropriate: allergies, current medications, past family history, past medical history, past social history, past surgical history and problem list.    Review of Systems   Constitutional: Positive for appetite change and fatigue. Negative for activity change.   HENT: Positive for sore throat, trouble swallowing and voice change.    Eyes: Negative.    Respiratory: Positive for apnea, cough, shortness of breath and wheezing. Negative for chest tightness.         Hx of asthma; has had positive sleep study; awaiting " titration study in October   Cardiovascular: Positive for leg swelling. Negative for chest pain and palpitations.   Gastrointestinal: Negative.  Negative for abdominal pain, anal bleeding, constipation, nausea and vomiting.        Heartburn   Endocrine: Negative.         Night sweats   Genitourinary: Positive for frequency.   Musculoskeletal: Positive for arthralgias, back pain, myalgias and neck pain.   Skin: Positive for rash.   Allergic/Immunologic: Negative.    Neurological: Positive for numbness and headaches. Negative for seizures and syncope.   Hematological: Bruises/bleeds easily.        Anemia as hx; hx of blood transfusion   Psychiatric/Behavioral: Positive for dysphoric mood and sleep disturbance. Negative for self-injury and suicidal ideas. The patient is nervous/anxious.        Objective   Physical Exam   Constitutional: She is oriented to person, place, and time. Vital signs are normal. She appears well-developed and well-nourished. She is cooperative. No distress.   HENT:   Head: Normocephalic and atraumatic.   Nose: Nose normal.   Mouth/Throat: Oropharynx is clear and moist. No oropharyngeal exudate or tonsillar abscesses.   Eyes: Conjunctivae, EOM and lids are normal. Pupils are equal, round, and reactive to light. Right eye exhibits no discharge. Left eye exhibits no discharge.   Neck: Trachea normal. Neck supple. No JVD present. Carotid bruit is not present. No rigidity. No tracheal deviation present. No thyromegaly present.   Cardiovascular: Normal rate, regular rhythm, S1 normal, S2 normal and normal heart sounds.    Pulmonary/Chest: Effort normal and breath sounds normal. No stridor. No respiratory distress. She has no wheezes. She has no rales.   Abdominal: Soft. Bowel sounds are normal. She exhibits no distension. There is no tenderness.   obese   Musculoskeletal: She exhibits no edema.        Right shoulder: She exhibits normal strength.   Lymphadenopathy:     She has no cervical adenopathy.    Neurological: She is alert and oriented to person, place, and time. She has normal strength. No cranial nerve deficit.   Skin: Skin is warm, dry and intact. No rash noted.   Psychiatric: She has a normal mood and affect. Her speech is normal and behavior is normal.   Alert and oriented x 3   Vitals reviewed.      Assessment/Plan   Naomi was seen today for follow-up, obesity and nutrition counseling.    Diagnoses and all orders for this visit:    Obesity, morbid, BMI 50 or higher  -     Ambulatory Referral to Cardiology  -     Ambulatory Referral to Pulmonology    DIPAK (obstructive sleep apnea)  -     Ambulatory Referral to Pulmonology    Uncomplicated asthma, unspecified asthma severity  -     Ambulatory Referral to Pulmonology    Hypertension, well controlled  -     Ambulatory Referral to Cardiology    Pre-op evaluation  -     Ambulatory Referral to Cardiology  -     Ambulatory Referral to Pulmonology          Naomi Romo has done well this month.   She has given up sodas totally.   Patient has lost 11 pounds.   Handout provided on exercise.   Today we discussed healthy changes in lifestyle, diet, and exercise.   Intensive behavioral therapy for obesity was done today.   Goals for this month are: 3 meals per day with protein at each; eat protein first, use smaller plate; exercise as advised.   Referral to cardiac and pulmonology has been made today.   You have already had a negative CXR in March at [facility].   Keep sleep titration study as arranged for October.  Will need to repeat CBC in August. If H&H still low, will send to hematology.   Will call [facility]anna to obtain psych clearance letter.  Follow up in one month for a weight recheck.

## 2017-07-13 NOTE — PATIENT INSTRUCTIONS
Naomi Romo has done well this month.   She has given up sodas totally.   Patient has lost 11 pounds.   Handout provided on exercise.   Today we discussed healthy changes in lifestyle, diet, and exercise.   Intensive behavioral therapy for obesity was done today.   Goals for this month are: 3 meals per day with protein at each; eat protein first, use smaller plate; exercise as advised.   Referral to cardiac and pulmonology has been made today.   You have already had a negative CXR in March at [facility].   Keep sleep titration study as arranged for October.  Will need to repeat CBC in August. If H&H still low, will send to hematology.   Will call [facility] psych to obtain psych clearance letter.  Follow up in one month for a weight recheck.

## 2017-07-14 ENCOUNTER — TELEPHONE (OUTPATIENT)
Dept: BARIATRICS/WEIGHT MGMT | Facility: CLINIC | Age: 42
End: 2017-07-14

## 2017-07-17 ENCOUNTER — HOSPITAL ENCOUNTER (OUTPATIENT)
Dept: PAIN MANAGEMENT | Age: 42
Discharge: HOME | End: 2017-07-17
Payer: MEDICARE

## 2017-07-17 VITALS
DIASTOLIC BLOOD PRESSURE: 60 MMHG | HEIGHT: 67 IN | RESPIRATION RATE: 20 BRPM | WEIGHT: 293 LBS | SYSTOLIC BLOOD PRESSURE: 99 MMHG | TEMPERATURE: 96 F | BODY MASS INDEX: 45.99 KG/M2 | HEART RATE: 82 BPM | OXYGEN SATURATION: 97 %

## 2017-07-17 DIAGNOSIS — M51.36 LUMBAR DEGENERATIVE DISC DISEASE: ICD-10-CM

## 2017-07-17 DIAGNOSIS — M54.10 BACK PAIN WITH LEFT-SIDED RADICULOPATHY: ICD-10-CM

## 2017-07-17 DIAGNOSIS — M51.16 LUMBAR DISC DISEASE WITH RADICULOPATHY: Chronic | ICD-10-CM

## 2017-07-17 PROCEDURE — 2580000003 HC RX 258

## 2017-07-17 PROCEDURE — 2500000003 HC RX 250 WO HCPCS

## 2017-07-17 PROCEDURE — 62323 NJX INTERLAMINAR LMBR/SAC: CPT

## 2017-07-17 PROCEDURE — 3209999900 FLUORO FOR SURGICAL PROCEDURES

## 2017-07-17 PROCEDURE — 6360000002 HC RX W HCPCS

## 2017-07-17 RX ORDER — BUPIVACAINE HYDROCHLORIDE 2.5 MG/ML
INJECTION, SOLUTION EPIDURAL; INFILTRATION; INTRACAUDAL
Status: COMPLETED | OUTPATIENT
Start: 2017-07-17 | End: 2017-07-17

## 2017-07-17 RX ORDER — 0.9 % SODIUM CHLORIDE 0.9 %
VIAL (ML) INJECTION
Status: COMPLETED | OUTPATIENT
Start: 2017-07-17 | End: 2017-07-17

## 2017-07-17 RX ORDER — LIDOCAINE HYDROCHLORIDE 10 MG/ML
INJECTION, SOLUTION EPIDURAL; INFILTRATION; INTRACAUDAL; PERINEURAL
Status: COMPLETED | OUTPATIENT
Start: 2017-07-17 | End: 2017-07-17

## 2017-07-17 RX ORDER — METHYLPREDNISOLONE ACETATE 80 MG/ML
INJECTION, SUSPENSION INTRA-ARTICULAR; INTRALESIONAL; INTRAMUSCULAR; SOFT TISSUE
Status: COMPLETED | OUTPATIENT
Start: 2017-07-17 | End: 2017-07-17

## 2017-07-17 RX ADMIN — METHYLPREDNISOLONE ACETATE 80 MG: 80 INJECTION, SUSPENSION INTRA-ARTICULAR; INTRALESIONAL; INTRAMUSCULAR; SOFT TISSUE at 13:08

## 2017-07-17 RX ADMIN — Medication 4 ML: at 13:08

## 2017-07-17 RX ADMIN — LIDOCAINE HYDROCHLORIDE 3 ML: 10 INJECTION, SOLUTION EPIDURAL; INFILTRATION; INTRACAUDAL; PERINEURAL at 13:05

## 2017-07-17 RX ADMIN — BUPIVACAINE HYDROCHLORIDE 5 ML: 2.5 INJECTION, SOLUTION EPIDURAL; INFILTRATION; INTRACAUDAL at 13:08

## 2017-07-17 ASSESSMENT — ACTIVITIES OF DAILY LIVING (ADL): EFFECT OF PAIN ON DAILY ACTIVITIES: ADL'S

## 2017-07-17 ASSESSMENT — PAIN - FUNCTIONAL ASSESSMENT: PAIN_FUNCTIONAL_ASSESSMENT: 0-10

## 2017-07-17 ASSESSMENT — PAIN DESCRIPTION - DESCRIPTORS: DESCRIPTORS: ACHING;CONSTANT

## 2017-07-18 ENCOUNTER — TRANSCRIBE ORDERS (OUTPATIENT)
Dept: SLEEP MEDICINE | Facility: HOSPITAL | Age: 42
End: 2017-07-18

## 2017-07-18 DIAGNOSIS — G47.33 OBSTRUCTIVE SLEEP APNEA, ADULT: Primary | ICD-10-CM

## 2017-07-21 ENCOUNTER — OFFICE VISIT (OUTPATIENT)
Dept: CARDIOLOGY | Facility: CLINIC | Age: 42
End: 2017-07-21

## 2017-07-21 VITALS
DIASTOLIC BLOOD PRESSURE: 76 MMHG | OXYGEN SATURATION: 100 % | BODY MASS INDEX: 48.82 KG/M2 | WEIGHT: 293 LBS | SYSTOLIC BLOOD PRESSURE: 110 MMHG | HEIGHT: 65 IN | HEART RATE: 99 BPM

## 2017-07-21 DIAGNOSIS — E66.01 MORBID OBESITY DUE TO EXCESS CALORIES (HCC): Primary | ICD-10-CM

## 2017-07-21 DIAGNOSIS — I10 HYPERTENSION, WELL CONTROLLED: ICD-10-CM

## 2017-07-21 DIAGNOSIS — Z01.818 PREOPERATIVE EVALUATION TO RULE OUT SURGICAL CONTRAINDICATION: ICD-10-CM

## 2017-07-21 PROCEDURE — 93000 ELECTROCARDIOGRAM COMPLETE: CPT | Performed by: INTERNAL MEDICINE

## 2017-07-21 PROCEDURE — 99203 OFFICE O/P NEW LOW 30 MIN: CPT | Performed by: INTERNAL MEDICINE

## 2017-07-21 NOTE — PROGRESS NOTES
Reason for Visit: preoperative evaluation.    HPI:  Naomi Romo is a 41 y.o. female is being seen for consultation today at the request of Dr Cordero.  She is planning on having gastric weight loss surgery in the near future.  She is planning on having the sleeve sometime around December.  She has lost approximately 16 lbs so far.  She has no cardiac history.  She is limited somewhat in her activity due to history of back surgery and other arthritis issues.  She has no cardiac symptoms and denies any chest pain, palpitations, dizziness, syncope, PND, or orthopnea.        Patient Active Problem List   Diagnosis   • Morbid obesity   • Body mass index 45.0-49.9, adult   • Suspected sleep apnea   • Hypertension, well controlled   • Fatigue   • History of iron deficiency   • Vitamin D deficiency       Social History   Substance Use Topics   • Smoking status: Former Smoker     Packs/day: 1.00     Years: 25.00     Types: Cigarettes     Quit date: 2014   • Smokeless tobacco: Never Used   • Alcohol use Yes      Comment: rarely        Family History   Problem Relation Age of Onset   • Diabetes Mother    • Hypertension Mother    • Coronary artery disease Mother    • Cancer Mother    • Cancer Father    • Hypertension Father    • Heart disease Father    • Stroke Father    • Hypertension Sister    • Obesity Sister    • Cancer Brother    • Diabetes Brother    • Diabetes Maternal Grandmother    • Stroke Maternal Grandmother        The following portions of the patient's history were reviewed and updated as appropriate: allergies, current medications, past family history, past medical history, past social history, past surgical history and problem list.      Current Outpatient Prescriptions:   •  ALBUTEROL IN, Inhale., Disp: , Rfl:   •  CALCIUM CARBONATE PO, Take 500 mg by mouth., Disp: , Rfl:   •  carbidopa-levodopa (SINEMET)  MG per tablet, Take 1 tablet by mouth Daily., Disp: , Rfl:   •  Cholecalciferol (VITAMIN D3)  5000 UNITS capsule capsule, Take 5,000 Units by mouth Daily., Disp: , Rfl:   •  CRANBERRY PO, Take  by mouth Daily., Disp: , Rfl:   •  Cyclobenzaprine HCl (FLEXERIL PO), Take 10 mg by mouth., Disp: , Rfl:   •  levocetirizine (XYZAL) 5 MG tablet, TK 1 T PO QPM, Disp: , Rfl: 0  •  lisinopril (PRINIVIL,ZESTRIL) 10 MG tablet, TK 1 T PO QD, Disp: , Rfl: 2  •  metoprolol succinate XL (TOPROL XL) 50 MG 24 hr tablet, Take 50 mg by mouth 2 (Two) Times a Day., Disp: , Rfl:   •  Multiple Vitamin (MULTI VITAMIN PO), Take  by mouth Daily., Disp: , Rfl:   •  nabumetone (RELAFEN) 500 MG tablet, TK 1 T PO BID WF OR MILK, Disp: , Rfl: 5  •  nortriptyline (PAMELOR) 25 MG capsule, TK ONE TO THREE CS PO QHS PRN, Disp: , Rfl: 3  •  omeprazole (priLOSEC) 20 MG capsule, TK ONE C PO QD FIRST THING IN THE MORNING ON  AN EMPTY STOMACH, Disp: , Rfl: 3  •  oxyCODONE (ROXICODONE) 10 MG tablet, TK 1 T PO BID PRN P, Disp: , Rfl: 0  •  Pregabalin (LYRICA PO), Take 150 mg by mouth 2 (Two) Times a Day., Disp: , Rfl:   •  venlafaxine (EFFEXOR) 75 MG tablet, TK 1 T PO BID, Disp: , Rfl: 11    Review of Systems   Constitution: Negative for chills, fever and weight loss.   HENT: Negative for headaches and sore throat.    Eyes: Negative for blurred vision and visual disturbance.   Cardiovascular: Negative for chest pain, dyspnea on exertion, leg swelling, palpitations, paroxysmal nocturnal dyspnea and syncope.   Respiratory: Negative for cough and shortness of breath.    Endocrine: Negative for cold intolerance and polyuria.   Skin: Negative for itching and rash.   Musculoskeletal: Positive for back pain and joint pain. Negative for joint swelling and myalgias.   Gastrointestinal: Negative for abdominal pain, diarrhea and vomiting.   Genitourinary: Negative for dysuria and hematuria.   Neurological: Negative for dizziness and numbness.   Psychiatric/Behavioral: Negative for depression. The patient is not nervous/anxious.    Allergic/Immunologic: Negative  "for hives.       Objective   /76 (BP Location: Left arm, Patient Position: Sitting, Cuff Size: Adult)  Pulse 99  Ht 65\" (165.1 cm)  Wt (!) 308 lb (140 kg)  SpO2 100%  BMI 51.25 kg/m2  Physical Exam   Constitutional: She is oriented to person, place, and time. She appears well-developed and well-nourished.   HENT:   Head: Normocephalic and atraumatic.   Eyes: Conjunctivae and EOM are normal. Pupils are equal, round, and reactive to light.   Neck: Normal range of motion. Neck supple. No JVD present. No thyromegaly present.   Cardiovascular: Normal rate, regular rhythm and normal heart sounds.    No murmur heard.  Pulmonary/Chest: Effort normal and breath sounds normal. She has no wheezes. She has no rales.   Abdominal: Soft. Bowel sounds are normal. She exhibits distension (Obese). There is no tenderness.   Musculoskeletal: Normal range of motion. She exhibits no edema.   Neurological: She is alert and oriented to person, place, and time. Coordination normal.   Skin: Skin is warm and dry. No rash noted.   Psychiatric: She has a normal mood and affect. Her behavior is normal.       ECG 12 Lead  Date/Time: 7/21/2017 10:54 AM  Performed by: ADRIAN SORENSON  Authorized by: ADRIAN SORENSON   Previous ECG: no previous ECG available  Rhythm: sinus rhythm  Rate: normal  Other findings comments: Poor R wave progression  Q waves: III and aVF                ICD-10-CM ICD-9-CM   1. Morbid obesity due to excess calories E66.01 278.01   2. Hypertension, well controlled I10 401.9   3. Preoperative evaluation to rule out surgical contraindication Z01.818 V72.83         Assessment/Plan:  1. Morbid obesity: BMI 51, complaining on having gastric weight loss surgery in the near future.  Continue to  on diet, exercise, weight loss.    2.  Hypertension: Well-controlled on current therapy.    3.  Preoperative evaluation: She is low risk for surgery from a cardiac standpoint.  Functional status is borderline but she has no " cardiac symptoms.  No further testing is indicated prior to surgery.

## 2017-07-28 ENCOUNTER — RESULTS ENCOUNTER (OUTPATIENT)
Dept: BARIATRICS/WEIGHT MGMT | Facility: CLINIC | Age: 42
End: 2017-07-28

## 2017-07-28 DIAGNOSIS — E66.01 MORBID OBESITY, UNSPECIFIED OBESITY TYPE (HCC): ICD-10-CM

## 2017-07-28 DIAGNOSIS — I10 HYPERTENSION, WELL CONTROLLED: ICD-10-CM

## 2017-08-04 ENCOUNTER — OFFICE VISIT (OUTPATIENT)
Dept: NEUROLOGY | Facility: CLINIC | Age: 42
End: 2017-08-04

## 2017-08-04 VITALS
HEIGHT: 65 IN | DIASTOLIC BLOOD PRESSURE: 72 MMHG | HEART RATE: 88 BPM | SYSTOLIC BLOOD PRESSURE: 118 MMHG | WEIGHT: 293 LBS | BODY MASS INDEX: 48.82 KG/M2

## 2017-08-04 DIAGNOSIS — I10 HYPERTENSION, WELL CONTROLLED: ICD-10-CM

## 2017-08-04 DIAGNOSIS — R53.83 FATIGUE, UNSPECIFIED TYPE: ICD-10-CM

## 2017-08-04 DIAGNOSIS — E66.01 MORBID OBESITY DUE TO EXCESS CALORIES (HCC): ICD-10-CM

## 2017-08-04 DIAGNOSIS — G47.61 PERIODIC LIMB MOVEMENT DISORDER (PLMD): ICD-10-CM

## 2017-08-04 DIAGNOSIS — G47.33 OSA (OBSTRUCTIVE SLEEP APNEA): Primary | ICD-10-CM

## 2017-08-04 PROCEDURE — 99213 OFFICE O/P EST LOW 20 MIN: CPT | Performed by: CLINICAL NURSE SPECIALIST

## 2017-08-04 NOTE — PROGRESS NOTES
Subjective     Chief Complaint   Patient presents with   • Sleep Apnea     Not on sleep device yet.        Naomi Bhatia is a 41 y.o. female right handed . She is here for 1st face to face after having polysomnogram. She is undergoing therapy and work up for bariatric surgery. Per patient, hopefully by end of the year. She has history of snoring and daytime somnolence. Garnett score = 16, STOP-BANG= high risk. She is scheduled for titration study in October 2017.     Sleep Apnea   This is a chronic problem. The current episode started more than 1 year ago. The problem occurs daily. The problem has been resolved. Pertinent negatives include no arthralgias, chest pain, fatigue, fever, headaches, myalgias, nausea, vomiting or weakness. Associated symptoms comments: Snores, unrestored sleep, obesity, daytime somnolence. The treatment provided no relief.        Current Outpatient Prescriptions   Medication Sig Dispense Refill   • ALBUTEROL IN Inhale.     • CALCIUM CARBONATE PO Take 500 mg by mouth.     • carbidopa-levodopa (SINEMET)  MG per tablet Take 1 tablet by mouth Daily.     • Cholecalciferol (VITAMIN D3) 5000 UNITS capsule capsule Take 5,000 Units by mouth Daily.     • CRANBERRY PO Take  by mouth Daily.     • Cyclobenzaprine HCl (FLEXERIL PO) Take 10 mg by mouth.     • levocetirizine (XYZAL) 5 MG tablet TK 1 T PO QPM  0   • lisinopril (PRINIVIL,ZESTRIL) 10 MG tablet TK 1 T PO QD  2   • metoprolol succinate XL (TOPROL XL) 50 MG 24 hr tablet Take 50 mg by mouth 2 (Two) Times a Day.     • Multiple Vitamin (MULTI VITAMIN PO) Take  by mouth Daily.     • nabumetone (RELAFEN) 500 MG tablet TK 1 T PO BID WF OR MILK  5   • nortriptyline (PAMELOR) 25 MG capsule TK ONE TO THREE CS PO QHS PRN  3   • omeprazole (priLOSEC) 20 MG capsule TK ONE C PO QD FIRST THING IN THE MORNING ON  AN EMPTY STOMACH  3   • oxyCODONE (ROXICODONE) 10 MG tablet TK 1 T PO BID PRN P  0   • Pregabalin (LYRICA PO) Take 150 mg by mouth 2 (Two)  Times a Day.     • venlafaxine (EFFEXOR) 75 MG tablet TK 1 T PO BID  11     No current facility-administered medications for this visit.        Past Medical History:   Diagnosis Date   • Anemia    • Anxiety    • Arthritis    • Asthma    • Back pain 03/14/2016    with left sided radiculopathy    • DDD (degenerative disc disease), lumbar     Dr. Stuart Zavaleta for pain manangement   • Depression    • Fatigue    • Headache    • History of blood transfusion    • Hypertension    • Joint pain    • Obesity    • RLS (restless legs syndrome)    • Sleep apnea     positive sleep study; titration study in October       Past Surgical History:   Procedure Laterality Date   • ANKLE SURGERY      Right    • APPENDECTOMY  04/19/2015   • BACK SURGERY  2000   • CERVICAL SPINE SURGERY  09/01/2015   • CHOLECYSTECTOMY      1995;lap   • COLONOSCOPY  05/02/2017    normal; do not return until age of 49 Dr. Gus Valentine   • HIP SURGERY  1987    right    • MOUTH SURGERY  1994   • NECK SURGERY     • TOOTH EXTRACTION     • UPPER GASTROINTESTINAL ENDOSCOPY  05/02/2017    Dr. Gus Valentine, negative for h.pylori, negative for Salomon's   • VAGINAL HYSTERECTOMY SALPINGO OOPHORECTOMY  2008    Partial and then had another surgery to remove the rest       family history includes Cancer in her brother, father, and mother; Coronary artery disease in her mother; Diabetes in her brother, maternal grandmother, and mother; Heart disease in her father; Hypertension in her father, mother, and sister; Obesity in her sister; Stroke in her father and maternal grandmother.    Social History   Substance Use Topics   • Smoking status: Former Smoker     Packs/day: 1.00     Years: 25.00     Types: Cigarettes     Quit date: 2014   • Smokeless tobacco: Never Used   • Alcohol use Yes      Comment: rarely        Review of Systems   Constitutional: Negative for fatigue and fever.   HENT: Negative.  Negative for hearing loss, postnasal drip and sneezing.    Eyes: Negative.   "Negative for visual disturbance.   Respiratory: Negative.  Negative for apnea, choking and shortness of breath.    Cardiovascular: Negative.  Negative for chest pain.   Gastrointestinal: Negative.  Negative for constipation, diarrhea, nausea and vomiting.   Endocrine: Negative.    Genitourinary: Negative.  Negative for dysuria and frequency.   Musculoskeletal: Negative for arthralgias, gait problem and myalgias.   Skin: Negative.    Allergic/Immunologic: Negative.    Neurological: Negative.  Negative for dizziness, weakness and headaches.   Hematological: Negative.  Negative for adenopathy.   Psychiatric/Behavioral: Negative.  Negative for agitation, confusion and hallucinations.   All other systems reviewed and are negative.      Objective     /72  Pulse 88  Ht 65\" (165.1 cm)  Wt (!) 303 lb (137 kg)  BMI 50.42 kg/m2, Body mass index is 50.42 kg/(m^2).    Physical Exam   Constitutional: She is oriented to person, place, and time. She appears well-developed and well-nourished.   HENT:   Head: Normocephalic and atraumatic.   Right Ear: Tympanic membrane and external ear normal.   Left Ear: Tympanic membrane and external ear normal.   Nose: Nose normal.   Mouth/Throat: Oropharynx is clear and moist.   Eyes: Conjunctivae, EOM and lids are normal. Pupils are equal, round, and reactive to light.   Neck: Normal range of motion. Neck supple. Carotid bruit is not present.   Cardiovascular: Normal rate, regular rhythm, S1 normal, S2 normal and normal heart sounds.    Pulmonary/Chest: Effort normal and breath sounds normal.   Abdominal: Soft. Bowel sounds are normal.   Musculoskeletal: Normal range of motion.   Neurological: She is alert and oriented to person, place, and time. She has normal strength and normal reflexes. She displays no tremor. No cranial nerve deficit or sensory deficit. She exhibits abnormal muscle tone. She displays a negative Romberg sign. Coordination and gait normal.   Reflex Scores:       " Tricep reflexes are 2+ on the right side and 2+ on the left side.       Bicep reflexes are 2+ on the right side and 2+ on the left side.       Brachioradialis reflexes are 2+ on the right side and 2+ on the left side.       Patellar reflexes are 2+ on the right side and 2+ on the left side.       Achilles reflexes are 2+ on the right side and 2+ on the left side.  Skin: Skin is warm and dry.   Psychiatric: She has a normal mood and affect. Her speech is normal and behavior is normal. Cognition and memory are normal.   Nursing note and vitals reviewed.      Results for orders placed or performed in visit on 05/08/17   CBC (No Diff)   Result Value Ref Range    WBC 11.11 (H) 4.80 - 10.80 10*3/mm3    RBC 3.66 (L) 4.20 - 5.40 10*6/mm3    Hemoglobin 10.5 (L) 12.0 - 16.0 g/dL    Hematocrit 30.9 (L) 37.0 - 47.0 %    MCV 84.4 82.0 - 98.0 fL    MCH 28.7 28.0 - 32.0 pg    MCHC 34.0 33.0 - 36.0 g/dL    RDW 17.5 (H) 12.0 - 15.0 %    RDW-SD 53.6 40.0 - 54.0 fl    MPV 9.8 6.0 - 12.0 fL    Platelets 275 130 - 400 10*3/mm3   Comprehensive Metabolic Panel   Result Value Ref Range    Glucose 121 (H) 70 - 100 mg/dL    BUN 12 5 - 21 mg/dL    Creatinine 0.69 0.50 - 1.40 mg/dL    Sodium 143 135 - 145 mmol/L    Potassium 3.8 3.5 - 5.3 mmol/L    Chloride 105 98 - 110 mmol/L    CO2 27.0 24.0 - 31.0 mmol/L    Calcium 9.1 8.4 - 10.4 mg/dL    Total Protein 6.7 6.3 - 8.7 g/dL    Albumin 3.70 3.50 - 5.00 g/dL    ALT (SGPT) 44 0 - 54 U/L    AST (SGOT) 40 7 - 45 U/L    Alkaline Phosphatase 98 24 - 120 U/L    Total Bilirubin 0.8 0.1 - 1.0 mg/dL    eGFR Non African Amer 93 >60 mL/min/1.73    Globulin 3.0 gm/dL    A/G Ratio 1.2 1.1 - 2.5 g/dL    BUN/Creatinine Ratio 17.4 7.0 - 25.0    Anion Gap 11.0 4.0 - 13.0 mmol/L   Folate   Result Value Ref Range    Folate >20.00 >2.75 ng/mL   Iron   Result Value Ref Range    Iron 71 42 - 180 mcg/dL   Magnesium   Result Value Ref Range    Magnesium 1.7 1.4 - 2.2 mg/dL   Phosphorus   Result Value Ref Range     Phosphorus 3.4 2.5 - 4.5 mg/dL   TSH   Result Value Ref Range    TSH 1.680 0.470 - 4.680 mIU/mL   Vitamin B12   Result Value Ref Range    Vitamin B-12 883 239 - 931 pg/mL   Vitamin D 25 Hydroxy   Result Value Ref Range    25 Hydroxy, Vitamin D 35.9 30.0 - 100.0 ng/ml   Lipid Panel   Result Value Ref Range    Total Cholesterol 149 130 - 200 mg/dL    Triglycerides 105 0 - 149 mg/dL    HDL Cholesterol 44 (L) >=50 mg/dL    LDL Cholesterol  82 0 - 99 mg/dL    LDL/HDL Ratio 1.91       POLYSONOGRAM: IMPRESSION:    Axis A 1: Obstructive sleep apnea G 47.33  Axis A 2: Periodic leg movements G 47.61  Axis B 1: CPAP or BiPAP titration with O2 protocol as indicated.  An in  attended study is preferred.  Axis B 2: Further evaluation and treatment of patient's periodic leg was needs to be watched symptomatically and further defined after use of CPAP or BiPAP  Axis C: Underlying medical problems and medication effects could be contributory.  Weight loss program needs to be reviewed if clinically indicated.  Chronic pain may be contributory.  Patient has possible depression component on questionnaire  Close follow-up with patient's family physician and emphasis on safety to be accomplished.    ASSESSMENT/PLAN    Diagnoses and all orders for this visit:    DIPAK (obstructive sleep apnea)    Morbid obesity due to excess calories    Fatigue, unspecified type    Hypertension, well controlled    Periodic limb movement disorder (PLMD)      MEDICAL DECISION MAKIN. Patient scheduled for titration study in 2017.  2. Counseled on strategies for compliance once she receives equipment.  3. RLS managed by PCP with Sinemet  4. BP managed by PCP  5. ELEVATED bmi -Patient currently being worked up for bariatric surgery and plans to have done by end of the year.  6. FORMER SMOKER, CURRENTLY NOT USING       allergies and all known medications/prescriptions have been reviewed using resources available on this  encounter.    Return in about 4 months (around 12/4/2017).        Ramona Duong, APRN

## 2017-08-04 NOTE — PATIENT INSTRUCTIONS
BMI for Adults  Body mass index (BMI) is a number that is calculated from a person's weight and height. In most adults, the number is used to find how much of an adult's weight is made up of fat. BMI is not as accurate as a direct measure of body fat.  HOW IS BMI CALCULATED?  BMI is calculated by dividing weight in kilograms by height in meters squared. It can also be calculated by dividing weight in pounds by height in inches squared, then multiplying the resulting number by 703. Charts are available to help you find your BMI quickly and easily without doing this calculation.   HOW IS BMI INTERPRETED?  Health care professionals use BMI charts to identify whether an adult is underweight, at a normal weight, or overweight based on the following guidelines:  · Underweight: BMI less than 18.5.  · Normal weight: BMI between 18.5 and 24.9.  · Overweight: BMI between 25 and 29.9.  · Obese: BMI of 30 and above.  BMI is usually interpreted the same for males and females.  Weight includes both fat and muscle, so someone with a muscular build, such as an athlete, may have a BMI that is higher than 24.9. In cases like these, BMI may not accurately depict body fat. To determine if excess body fat is the cause of a BMI of 25 or higher, further assessments may need to be done by a health care provider.  WHY IS BMI A USEFUL TOOL?  BMI is used to identify a possible weight problem that may be related to a medical problem or may increase the risk for medical problems. BMI can also be used to promote changes to reach a healthy weight.     This information is not intended to replace advice given to you by your health care provider. Make sure you discuss any questions you have with your health care provider.     Document Released: 08/29/2005 Document Revised: 01/08/2016 Document Reviewed: 05/15/2015  [employer] Interactive Patient Education ©2017 [employer]    Sleep Apnea  Sleep apnea is a condition that affects breathing. People with  sleep apnea have moments during sleep when their breathing pauses briefly or gets shallow. Sleep apnea can cause these symptoms:  · Trouble staying asleep.  · Sleepiness or tiredness during the day.  · Irritability.  · Loud snoring.  · Morning headaches.  · Trouble concentrating.  · Forgetting things.  · Less interest in sex.  · Being sleepy for no reason.  · Mood swings.  · Personality changes.  · Depression.  · Waking up a lot during the night to pee (urinate).  · Dry mouth.  · Sore throat.  HOME CARE  · Make any changes in your routine that your doctor recommends.  · Eat a healthy, well-balanced diet.  · Take over-the-counter and prescription medicines only as told by your doctor.  · Avoid using alcohol, calming medicines (sedatives), and narcotic medicines.  · Take steps to lose weight if you are overweight.  · If you were given a machine (device) to use while you sleep, use it only as told by your doctor.  · Do not use any tobacco products, such as cigarettes, chewing tobacco, and e-cigarettes. If you need help quitting, ask your doctor.  · Keep all follow-up visits as told by your doctor. This is important.  GET HELF IF:  · The machine that you were given to use during sleep is uncomfortable or does not seem to be working.  · Your symptoms do not get better.  · Your symptoms get worse.  GET HELP RIGHT AWAY IF:  · Your chest hurts.  · You have trouble breathing in enough air (shortness of breath).  · You have an uncomfortable feeling in your back, arms, or stomach.  · You have trouble talking.  · One side of your body feels weak.  · A part of your face is hanging down (drooping).  These symptoms may be an emergency. Do not wait to see if the symptoms will go away. Get medical help right away. Call your local emergency services (911 in the [place] Do not drive yourself to the hospital.     This information is not intended to replace advice given to you by your health care provider. Make sure you discuss any  questions you have with your health care provider.     Document Released: 09/26/2009 Document Revised: 04/10/2017 Document Reviewed: 09/26/2016  [employer] Interactive Patient Education ©2017 [employer]

## 2017-08-07 ENCOUNTER — APPOINTMENT (OUTPATIENT)
Dept: LAB | Facility: HOSPITAL | Age: 42
End: 2017-08-07

## 2017-08-07 ENCOUNTER — OFFICE VISIT (OUTPATIENT)
Dept: BARIATRICS/WEIGHT MGMT | Facility: CLINIC | Age: 42
End: 2017-08-07

## 2017-08-07 ENCOUNTER — TELEPHONE (OUTPATIENT)
Dept: BARIATRICS/WEIGHT MGMT | Facility: CLINIC | Age: 42
End: 2017-08-07

## 2017-08-07 VITALS
HEART RATE: 90 BPM | OXYGEN SATURATION: 99 % | HEIGHT: 65 IN | BODY MASS INDEX: 48.82 KG/M2 | WEIGHT: 293 LBS | DIASTOLIC BLOOD PRESSURE: 84 MMHG | SYSTOLIC BLOOD PRESSURE: 138 MMHG | TEMPERATURE: 97.1 F

## 2017-08-07 DIAGNOSIS — E66.01 OBESITY, MORBID, BMI 50 OR HIGHER (HCC): Primary | ICD-10-CM

## 2017-08-07 DIAGNOSIS — R53.83 FATIGUE, UNSPECIFIED TYPE: ICD-10-CM

## 2017-08-07 DIAGNOSIS — Z86.39 HISTORY OF IRON DEFICIENCY: ICD-10-CM

## 2017-08-07 DIAGNOSIS — D58.2 ABNORMAL HEMOGLOBIN (HCC): Primary | ICD-10-CM

## 2017-08-07 DIAGNOSIS — D58.2 ABNORMAL HEMOGLOBIN (HCC): ICD-10-CM

## 2017-08-07 LAB
DEPRECATED RDW RBC AUTO: 56.8 FL (ref 40–54)
ERYTHROCYTE [DISTWIDTH] IN BLOOD BY AUTOMATED COUNT: 18 % (ref 12–15)
HCT VFR BLD AUTO: 32.5 % (ref 37–47)
HGB BLD-MCNC: 11 G/DL (ref 12–16)
MCH RBC QN AUTO: 29 PG (ref 28–32)
MCHC RBC AUTO-ENTMCNC: 33.8 G/DL (ref 33–36)
MCV RBC AUTO: 85.8 FL (ref 82–98)
PLATELET # BLD AUTO: 290 10*3/MM3 (ref 130–400)
PMV BLD AUTO: 9.9 FL (ref 6–12)
RBC # BLD AUTO: 3.79 10*6/MM3 (ref 4.2–5.4)
WBC NRBC COR # BLD: 10.46 10*3/MM3 (ref 4.8–10.8)

## 2017-08-07 PROCEDURE — 99213 OFFICE O/P EST LOW 20 MIN: CPT | Performed by: NURSE PRACTITIONER

## 2017-08-07 PROCEDURE — 36415 COLL VENOUS BLD VENIPUNCTURE: CPT | Performed by: NURSE PRACTITIONER

## 2017-08-07 PROCEDURE — 85027 COMPLETE CBC AUTOMATED: CPT | Performed by: NURSE PRACTITIONER

## 2017-08-07 RX ORDER — NEOMYCIN/POLYMYXIN B/PRAMOXINE 3.5-10K-1
CREAM (GRAM) TOPICAL DAILY
COMMUNITY
End: 2017-12-21 | Stop reason: HOSPADM

## 2017-08-07 NOTE — TELEPHONE ENCOUNTER
----- Message from IRMA Wilcox sent at 8/7/2017  1:17 PM CDT -----  I tried to call patient, but no answer. I will put a referral into hematology here for further evaluation of her below normal hemoglobin and hematocrit levels. I have made a referral to Dr. Clements, hematologist here at [facility], for further evaluation.  That office should call and arrange. Please, let patient know. Thanks!

## 2017-08-07 NOTE — PATIENT INSTRUCTIONS
Naomi Bhatia has done well this month with healthy changes. Patient has lost 3 pounds. Today we discussed healthy changes in lifestyle, diet, and exercise.   Handout provided on perfect protein. Premier Protein Shake sample provided. May use these as meal replacement.   Intensive behavioral therapy for obesity was done today.   Goals for this month are: 3 meals per day with protein at each; eat protein first, use smaller plate; exercise as advised (try and get 5,000 steps per day and work up to 10,000 steps per day).  Call [facility] for another appt to obtain psych clearance prior to surgery.  Keep appt with pulmonologist for end of this month for clearance.  Keep sleep apnea titration study scheduled for October.  Get CBC re-drawn today. If abnormal, will refer to hematology.   Follow up in one month for a weight recheck.

## 2017-08-07 NOTE — PROGRESS NOTES
"Subjective   Naomi Bhatia is a 41 y.o. female.     History of Present Illness Naomi Romo is here with morbid obesity and desires to have surgery for weight loss. This is the patient's 4th visit to the office. [facility] to [facility]. She has been seen by them, but did not receive clearance from them as of yet. She will need to be re-evaluated and attain clearance prior to surgery. Patient has been execising by walking and doing stretches for  5 minutes per day. However, she just purchased a Fit Bit type device and will begin tracking her steps this month. Her goal is to get at least 5,000 steps per day. Patient has been making health dietary choices and eating 2 meals per day with protein at each. Patient has been eating \"a lot of salads\" with grilled chicken. Patient is unsure of how many protein grams she is getting per day. Patient is drinking 64 ounces of water per day. No soda this month./ She had an EGD and colonoscopy on May 2nd with Dr. Gus Valentine, GI, and path report and H. Pylori biopsies were negative. See \"media\" for scanned reports. She has had sleep study done that was positive for DIPAK. She has titration study scheduled for October to obtain sleep machine, etc. She has had cardiac clearance with Dr. Houston. She is scheduled to see pulmonologist on August 24th.  Her CBC showed H&H of 10.5 and 30.9. Her iron and folate levels were normal. She does have a history of iron deficiency anemia since the age of 14, but does not see a hematologist. She will have repeat CBC done today and if not normal, will refer to hematology for further evaluation.   Vitals:    08/07/17 1105   BP: 138/84   BP Location: Left arm   Patient Position: Sitting   Cuff Size: Adult   Pulse: 90   Temp: 97.1 °F (36.2 °C)   SpO2: 99%   Weight: (!) 309 lb 6.4 oz (140 kg)   Height: 65\" (165.1 cm)       The following portions of the patient's history were reviewed and " updated as appropriate: allergies, current medications, past family history, past medical history, past social history, past surgical history and problem list.    Review of Systems   Constitutional: Positive for fatigue. Negative for activity change and appetite change.   HENT: Positive for trouble swallowing.    Eyes: Negative.    Respiratory: Positive for shortness of breath. Negative for apnea, cough and chest tightness.    Cardiovascular: Positive for leg swelling. Negative for chest pain and palpitations.   Gastrointestinal: Positive for constipation and diarrhea. Negative for abdominal pain, anal bleeding, nausea and vomiting.   Endocrine: Negative.    Genitourinary: Negative.    Musculoskeletal: Positive for arthralgias, back pain, myalgias and neck pain.   Skin: Negative.    Allergic/Immunologic: Negative.    Neurological: Positive for numbness. Negative for seizures and syncope.   Hematological: Bruises/bleeds easily.        Hx of anemia   Psychiatric/Behavioral: Positive for dysphoric mood and sleep disturbance. Negative for self-injury and suicidal ideas. The patient is nervous/anxious.        Objective   Physical Exam   Constitutional: She is oriented to person, place, and time. Vital signs are normal. She appears well-developed and well-nourished. She is cooperative. No distress.   HENT:   Head: Normocephalic and atraumatic.   Nose: Nose normal.   Mouth/Throat: Oropharynx is clear and moist. No oropharyngeal exudate or tonsillar abscesses.   Eyes: Conjunctivae, EOM and lids are normal. Pupils are equal, round, and reactive to light. Right eye exhibits no discharge. Left eye exhibits no discharge.   Neck: Trachea normal. Neck supple. No JVD present. Carotid bruit is not present. No rigidity. No tracheal deviation present. No thyromegaly present.   Cardiovascular: Normal rate, regular rhythm, S1 normal, S2 normal and normal heart sounds.    Pulmonary/Chest: Effort normal and breath sounds normal. No  stridor. No respiratory distress. She has no wheezes. She has no rales.   Abdominal: Soft. Bowel sounds are normal. She exhibits no distension. There is no tenderness.   obese   Musculoskeletal: She exhibits no edema.        Right shoulder: She exhibits normal strength.   Lymphadenopathy:     She has no cervical adenopathy.   Neurological: She is alert and oriented to person, place, and time. She has normal strength. No cranial nerve deficit.   Skin: Skin is warm, dry and intact. No rash noted.   Psychiatric: She has a normal mood and affect. Her speech is normal and behavior is normal.   Alert and oriented x 3   Vitals reviewed.      Assessment/Plan   Naomi was seen today for weight loss and obesity.    Diagnoses and all orders for this visit:    Obesity, morbid, BMI 50 or higher    Abnormal hemoglobin  -     CBC (No Diff)          Naomi Bhatai has done well this month with healthy changes. Patient has lost 3 pounds. Today we discussed healthy changes in lifestyle, diet, and exercise.   Handout provided on perfect protein. Premier Protein Shake sample provided. May use these as meal replacement.   Intensive behavioral therapy for obesity was done today.   Goals for this month are: 3 meals per day with protein at each; eat protein first, use smaller plate; exercise as advised (try and get 5,000 steps per day and work up to 10,000 steps per day).  [facility] to [facility] clearance prior to surgery.  Keep appt with pulmonologist for end of this month for clearance.  Keep sleep apnea titration study scheduled for October.  Get CBC re-drawn today. If abnormal, will refer to hematology.   Follow up in one month for a weight recheck.

## 2017-08-25 ENCOUNTER — RESULTS ENCOUNTER (OUTPATIENT)
Dept: BARIATRICS/WEIGHT MGMT | Facility: CLINIC | Age: 42
End: 2017-08-25

## 2017-08-25 DIAGNOSIS — E66.01 MORBID OBESITY, UNSPECIFIED OBESITY TYPE (HCC): ICD-10-CM

## 2017-08-25 DIAGNOSIS — I10 HYPERTENSION, WELL CONTROLLED: ICD-10-CM

## 2017-08-30 RX ORDER — OXYCODONE HYDROCHLORIDE 10 MG/1
10 TABLET ORAL 2 TIMES DAILY PRN
Qty: 60 TABLET | Refills: 0 | Status: SHIPPED | OUTPATIENT
Start: 2017-08-31 | End: 2017-09-06 | Stop reason: SDUPTHER

## 2017-09-06 ENCOUNTER — HOSPITAL ENCOUNTER (OUTPATIENT)
Dept: PAIN MANAGEMENT | Age: 42
Discharge: HOME | End: 2017-09-06
Payer: MEDICARE

## 2017-09-06 VITALS
RESPIRATION RATE: 18 BRPM | SYSTOLIC BLOOD PRESSURE: 131 MMHG | TEMPERATURE: 95.4 F | HEART RATE: 104 BPM | WEIGHT: 293 LBS | OXYGEN SATURATION: 96 % | DIASTOLIC BLOOD PRESSURE: 89 MMHG | BODY MASS INDEX: 48.82 KG/M2 | HEIGHT: 65 IN

## 2017-09-06 DIAGNOSIS — Z86.39 PERSONAL HISTORY OF NUTRITIONAL DEFICIENCY: Primary | ICD-10-CM

## 2017-09-06 DIAGNOSIS — M54.10 BACK PAIN WITH LEFT-SIDED RADICULOPATHY: ICD-10-CM

## 2017-09-06 PROCEDURE — 99213 OFFICE O/P EST LOW 20 MIN: CPT

## 2017-09-06 RX ORDER — OXYCODONE HYDROCHLORIDE 10 MG/1
10 TABLET ORAL 2 TIMES DAILY PRN
Qty: 60 TABLET | Refills: 0 | Status: SHIPPED | OUTPATIENT
Start: 2017-10-02 | End: 2017-10-19 | Stop reason: SDUPTHER

## 2017-09-06 RX ORDER — PREGABALIN 150 MG/1
150 CAPSULE ORAL 2 TIMES DAILY
Qty: 60 CAPSULE | Refills: 2 | Status: SHIPPED | OUTPATIENT
Start: 2017-09-06 | End: 2018-02-02 | Stop reason: SDUPTHER

## 2017-09-06 ASSESSMENT — PAIN SCALES - GENERAL: PAINLEVEL_OUTOF10: 6

## 2017-09-06 ASSESSMENT — PAIN DESCRIPTION - PAIN TYPE: TYPE: CHRONIC PAIN

## 2017-09-06 ASSESSMENT — PAIN DESCRIPTION - ORIENTATION: ORIENTATION: LOWER;RIGHT

## 2017-09-06 ASSESSMENT — PAIN DESCRIPTION - LOCATION: LOCATION: BACK;HIP

## 2017-09-06 ASSESSMENT — PAIN DESCRIPTION - PROGRESSION: CLINICAL_PROGRESSION: NOT CHANGED

## 2017-09-06 ASSESSMENT — PAIN DESCRIPTION - DIRECTION: RADIATING_TOWARDS: INTO RIGHT HIP

## 2017-09-06 ASSESSMENT — PAIN DESCRIPTION - DESCRIPTORS: DESCRIPTORS: ACHING;CONSTANT

## 2017-09-06 ASSESSMENT — PAIN DESCRIPTION - ONSET: ONSET: ON-GOING

## 2017-09-06 ASSESSMENT — ACTIVITIES OF DAILY LIVING (ADL): EFFECT OF PAIN ON DAILY ACTIVITIES: LIMITS ACTIVITY

## 2017-09-06 ASSESSMENT — PAIN DESCRIPTION - FREQUENCY: FREQUENCY: CONTINUOUS

## 2017-09-07 ENCOUNTER — OFFICE VISIT (OUTPATIENT)
Dept: BARIATRICS/WEIGHT MGMT | Facility: CLINIC | Age: 42
End: 2017-09-07

## 2017-09-07 VITALS
TEMPERATURE: 97.6 F | BODY MASS INDEX: 48.82 KG/M2 | SYSTOLIC BLOOD PRESSURE: 119 MMHG | OXYGEN SATURATION: 100 % | HEIGHT: 65 IN | WEIGHT: 293 LBS | DIASTOLIC BLOOD PRESSURE: 67 MMHG | HEART RATE: 88 BPM

## 2017-09-07 DIAGNOSIS — E66.01 OBESITY, MORBID, BMI 50 OR HIGHER (HCC): Primary | ICD-10-CM

## 2017-09-07 DIAGNOSIS — Z86.39 HISTORY OF IRON DEFICIENCY: ICD-10-CM

## 2017-09-07 DIAGNOSIS — G47.33 OBSTRUCTIVE SLEEP APNEA: ICD-10-CM

## 2017-09-07 PROCEDURE — 99213 OFFICE O/P EST LOW 20 MIN: CPT | Performed by: NURSE PRACTITIONER

## 2017-09-07 NOTE — PATIENT INSTRUCTIONS
Naomi Romo has done okay this month with healthy changes. Patient has gained 4 pounds. Today we discussed healthy changes in lifestyle, diet, and exercise.   Handout provided on weight proofing your home.   Intensive behavioral therapy for obesity was done today.   Goals for this month are: 3 meals per day with protein at each; eat protein first, use smaller plate; exercise as advised.  Keep appt to see Dr. Servando Romo to evaluate anemia/abnormal CBC further.   Try and obtain sleep machine from [employer].  Still need psych clearance either from [facility] or another provider. List provided today.  Follow up in one month for a weight recheck.

## 2017-09-07 NOTE — PROGRESS NOTES
"Subjective   Naomi Romo is a 41 y.o. female.     History of Present Illness Naomi Romo is here with morbid obesity and desires to have surgery for weight loss. This is the patient's 5th visit to the office. Patient has been given a psych referral to [facility] for psych clearance prior to surgery. She has been seen by them, but did not receive clearance from them as of yet. She will need to be re-evaluated and attain clearance prior to surgery. She has not made any headway with that as of yet. Patient has been execising by walking for 15 minutes daily. She has been tracking steps on her Fit Bit. Patient has been making health dietary choices and eating 3 meals per day with protein at each. Patient is getting \"a lot\" of protein per day. Patient is drinking 64 ounces of water per day. No soda in the past 4 months. She had an EGD and colonoscopy on May 2nd with Dr. Gus Valentine, GI, and path report and H. Pylori biopsies were negative. See \"media\" for scanned reports. She has had sleep study done that was positive for DIPAK. She had titration study scheduled for October, but her insurance will not cover the titration study. She is now checking into medical supply company to get a machine possibly in near future.  She has had cardiac clearance with Dr. Houston. She saw pulmonologist at end of last month, but they are telling her that they will not release her clearance until she pays her $40 copay. She has had abnormal CBC and has Hx of anemia. She has been referred to hematology to see them for further evaluation. She has an appt to see them on 9/13/17.     The following portions of the patient's history were reviewed and updated as appropriate: allergies, current medications, past family history, past medical history, past social history, past surgical history and problem list.    Review of Systems   Constitutional: Positive for fatigue. Negative for activity change and appetite change.   HENT: Positive for sore " throat.    Eyes: Negative.    Respiratory: Positive for apnea. Negative for cough, chest tightness and shortness of breath.         Has DIPAK and is trying to obtain sleep machine   Cardiovascular: Negative.  Negative for chest pain, palpitations and leg swelling.   Gastrointestinal: Negative.  Negative for abdominal pain, anal bleeding, constipation, nausea and vomiting.   Endocrine: Negative.    Genitourinary: Negative.    Musculoskeletal: Positive for arthralgias, back pain, myalgias and neck pain.   Skin: Negative.    Allergic/Immunologic: Negative.    Neurological: Positive for numbness and headaches. Negative for seizures and syncope.   Hematological: Bruises/bleeds easily.        Hx of anemia   Psychiatric/Behavioral: Positive for dysphoric mood and sleep disturbance. Negative for self-injury and suicidal ideas. The patient is nervous/anxious.        Objective   Physical Exam   Constitutional: She is oriented to person, place, and time. Vital signs are normal. She appears well-developed and well-nourished. She is cooperative. No distress.   HENT:   Head: Normocephalic and atraumatic.   Nose: Nose normal.   Mouth/Throat: Oropharynx is clear and moist. No oropharyngeal exudate or tonsillar abscesses.   Eyes: Conjunctivae, EOM and lids are normal. Pupils are equal, round, and reactive to light. Right eye exhibits no discharge. Left eye exhibits no discharge.   Neck: Trachea normal. Neck supple. No JVD present. Carotid bruit is not present. No rigidity. No tracheal deviation present. No thyromegaly present.   Cardiovascular: Normal rate, regular rhythm, S1 normal, S2 normal and normal heart sounds.    Pulmonary/Chest: Effort normal and breath sounds normal. No stridor. No respiratory distress. She has no wheezes. She has no rales.   Abdominal: Soft. Bowel sounds are normal. She exhibits no distension. There is no tenderness.   obese   Musculoskeletal: She exhibits no edema.        Right shoulder: She exhibits  normal strength.   Lymphadenopathy:     She has no cervical adenopathy.   Neurological: She is alert and oriented to person, place, and time. She has normal strength. No cranial nerve deficit.   Skin: Skin is warm, dry and intact. No rash noted.   Psychiatric: She has a normal mood and affect. Her speech is normal and behavior is normal.   Alert and oriented x 3   Vitals reviewed.      Assessment/Plan   Naomi was seen today for follow-up, obesity, nutrition counseling and weight loss.    Diagnoses and all orders for this visit:    Obesity, morbid, BMI 50 or higher    History of iron deficiency    Obstructive sleep apnea          Naomi Romo has done okay this month with healthy changes. Patient has gained 4 pounds. Today we discussed healthy changes in lifestyle, diet, and exercise.   Handout provided on weight proofing your home.   Intensive behavioral therapy for obesity was done today.   Goals for this month are: 3 meals per day with protein at each; eat protein first, use smaller plate; exercise as advised.  Keep appt to see Dr. Servando Romo to evaluate anemia/abnormal CBC further.   Try and obtain sleep machine from [facility].  Still need psych clearance either from [facility] or another provider. List provided today.  Follow up in one month for a weight recheck.

## 2017-09-12 PROBLEM — D50.9 IRON DEFICIENCY ANEMIA: Status: ACTIVE | Noted: 2017-09-12

## 2017-09-13 ENCOUNTER — LAB (OUTPATIENT)
Dept: LAB | Facility: HOSPITAL | Age: 42
End: 2017-09-13

## 2017-09-13 ENCOUNTER — CONSULT (OUTPATIENT)
Dept: ONCOLOGY | Facility: CLINIC | Age: 42
End: 2017-09-13

## 2017-09-13 VITALS
HEART RATE: 93 BPM | DIASTOLIC BLOOD PRESSURE: 80 MMHG | SYSTOLIC BLOOD PRESSURE: 128 MMHG | BODY MASS INDEX: 48.82 KG/M2 | WEIGHT: 293 LBS | TEMPERATURE: 98.3 F | RESPIRATION RATE: 18 BRPM | OXYGEN SATURATION: 95 % | HEIGHT: 65 IN

## 2017-09-13 DIAGNOSIS — D64.9 ANEMIA, UNSPECIFIED TYPE: ICD-10-CM

## 2017-09-13 DIAGNOSIS — D64.9 ANEMIA, UNSPECIFIED TYPE: Primary | ICD-10-CM

## 2017-09-13 DIAGNOSIS — Z86.39 PERSONAL HISTORY OF NUTRITIONAL DEFICIENCY: ICD-10-CM

## 2017-09-13 DIAGNOSIS — Z86.39 HISTORY OF IRON DEFICIENCY: Primary | ICD-10-CM

## 2017-09-13 DIAGNOSIS — E66.01 MORBID OBESITY, UNSPECIFIED OBESITY TYPE (HCC): Primary | ICD-10-CM

## 2017-09-13 LAB
ALBUMIN SERPL-MCNC: 3.9 G/DL (ref 3.5–5)
ALBUMIN/GLOB SERPL: 1.3 G/DL (ref 1.1–2.5)
ALP SERPL-CCNC: 94 U/L (ref 24–120)
ALT SERPL W P-5'-P-CCNC: 46 U/L (ref 0–54)
ANION GAP SERPL CALCULATED.3IONS-SCNC: 9 MMOL/L (ref 4–13)
AST SERPL-CCNC: 41 U/L (ref 7–45)
AUTO MIXED CELLS #: 0.4 10*3/MM3 (ref 0.1–2.6)
AUTO MIXED CELLS %: 4.4 % (ref 0.1–24)
BILIRUB SERPL-MCNC: 0.7 MG/DL (ref 0.1–1)
BUN BLD-MCNC: 14 MG/DL (ref 5–21)
BUN/CREAT SERPL: 20
CALCIUM SPEC-SCNC: 8.9 MG/DL (ref 8.4–10.4)
CHLORIDE SERPL-SCNC: 105 MMOL/L (ref 98–110)
CO2 SERPL-SCNC: 27 MMOL/L (ref 24–31)
CREAT BLD-MCNC: 0.7 MG/DL (ref 0.5–1.4)
CYTOLOGIST CVX/VAG CYTO: NORMAL
ERYTHROCYTE [DISTWIDTH] IN BLOOD BY AUTOMATED COUNT: 17.9 % (ref 12–15)
GFR SERPL CREATININE-BSD FRML MDRD: 92 ML/MIN/1.73
GLOBULIN UR ELPH-MCNC: 3 GM/DL
GLUCOSE BLD-MCNC: 141 MG/DL (ref 70–100)
HCT VFR BLD AUTO: 30.4 % (ref 37–47)
HGB BLD-MCNC: 10.8 G/DL (ref 12–16)
HOLD SPECIMEN: NORMAL
IRON 24H UR-MRATE: 53 MCG/DL (ref 42–180)
IRON SATN MFR SERPL: 20 % (ref 20–45)
LYMPHOCYTES # BLD AUTO: 3 10*3/MM3 (ref 0.8–7)
LYMPHOCYTES NFR BLD AUTO: 30.7 % (ref 15–45)
MCH RBC QN AUTO: 29.3 PG (ref 28–32)
MCHC RBC AUTO-ENTMCNC: 35.5 G/DL (ref 33–36)
MCV RBC AUTO: 82.6 FL (ref 82–98)
NEUTROPHILS # BLD AUTO: 6.3 10*3/MM3 (ref 1.5–8.3)
NEUTROPHILS NFR BLD AUTO: 64.9 % (ref 39–78)
PATH INTERP BLD-IMP: NORMAL
PLATELET # BLD AUTO: 257 10*3/MM3 (ref 130–400)
PMV BLD AUTO: 9 FL (ref 6–12)
POTASSIUM BLD-SCNC: 3.9 MMOL/L (ref 3.5–5.3)
PROT SERPL-MCNC: 6.9 G/DL (ref 6.3–8.7)
RBC # BLD AUTO: 3.68 10*6/MM3 (ref 4.2–5.4)
SODIUM BLD-SCNC: 141 MMOL/L (ref 135–145)
TIBC SERPL-MCNC: 271 MCG/DL (ref 225–420)
WBC NRBC COR # BLD: 9.7 10*3/MM3 (ref 4.8–10.8)

## 2017-09-13 PROCEDURE — 82525 ASSAY OF COPPER: CPT | Performed by: INTERNAL MEDICINE

## 2017-09-13 PROCEDURE — 36415 COLL VENOUS BLD VENIPUNCTURE: CPT

## 2017-09-13 PROCEDURE — 82747 ASSAY OF FOLIC ACID RBC: CPT | Performed by: INTERNAL MEDICINE

## 2017-09-13 PROCEDURE — 85025 COMPLETE CBC W/AUTO DIFF WBC: CPT

## 2017-09-13 PROCEDURE — 83550 IRON BINDING TEST: CPT | Performed by: INTERNAL MEDICINE

## 2017-09-13 PROCEDURE — 85060 BLOOD SMEAR INTERPRETATION: CPT | Performed by: INTERNAL MEDICINE

## 2017-09-13 PROCEDURE — 83540 ASSAY OF IRON: CPT | Performed by: INTERNAL MEDICINE

## 2017-09-13 PROCEDURE — 80053 COMPREHEN METABOLIC PANEL: CPT

## 2017-09-13 PROCEDURE — 99205 OFFICE O/P NEW HI 60 MIN: CPT | Performed by: INTERNAL MEDICINE

## 2017-09-13 PROCEDURE — 85014 HEMATOCRIT: CPT | Performed by: INTERNAL MEDICINE

## 2017-09-13 NOTE — PROGRESS NOTES
[facility]  HEMATOLOGY & ONCOLOGY        Subjective     VISIT DIAGNOSIS:   Encounter Diagnosis   Name Primary?   • Anemia, unspecified type Yes       REASON FOR VISIT:     Chief Complaint   Patient presents with   • Anemia     Consults        HEMATOLOGY / ONCOLOGY HISTORY:    No history exists.     [No treatment plan]  Cancer Staging Information:  No matching staging information was found for the patient.      INTERVAL HISTORY  Patient ID: Naomi Bhatia is a 41 y.o. year old female who has had iron deficiency anemia since age 14.  She is currently considering bariatric surgery and  hemoglobin is low.  Hence she is sent to us for evaluation and treatment.  Patient said that she was on iron supplementation in the past, currently has  iron in her multivitamin.  Cause of iron deficiency anemia was not found.  She has had blood transfusion in the past.  Last summer after her spinal surgery she was in the rehabilitation and needed 2 units of packed RBC.  Sometimes notices blood in her stool, she had a colonoscopy 05/02/2017 which was unremarkable and also she had endoscopy April 2017 that showed mild gastritis.  She denies pica symptoms, denies shortness of breath, chest pain, and dizziness.  She does not have her periods anymore she had the total abdominal hysterectomy and bilateral oophorectomy due to fibroid tumor.  Her family history is significant for her mother with anemia which turned into leukemia type unknown.        Review of Systems   Constitutional: Negative.    HENT: Negative.    Eyes: Negative.    Respiratory: Positive for apnea. Negative for cough, choking and shortness of breath.    Cardiovascular: Negative.    Gastrointestinal: Positive for blood in stool. Negative for abdominal distention, abdominal pain, constipation, diarrhea, nausea and vomiting.   Genitourinary: Negative.    Musculoskeletal: Positive for back pain.   Skin: Negative.    Allergic/Immunologic: Negative.     Neurological: Negative.    Hematological: Negative.    Psychiatric/Behavioral: Negative.             Medications:    Current Outpatient Prescriptions   Medication Sig Dispense Refill   • ALBUTEROL IN Inhale.     • CALCIUM CARBONATE PO Take 500 mg by mouth.     • carbidopa-levodopa (SINEMET)  MG per tablet Take 1 tablet by mouth Daily.     • Cholecalciferol (VITAMIN D3) 5000 UNITS capsule capsule Take 5,000 Units by mouth Daily.     • CRANBERRY PO Take  by mouth Daily.     • Cyclobenzaprine HCl (FLEXERIL PO) Take 10 mg by mouth As Needed.     • levocetirizine (XYZAL) 5 MG tablet TK 1 T PO QPM  0   • lisinopril (PRINIVIL,ZESTRIL) 10 MG tablet TK 1 T PO QD  2   • metoprolol succinate XL (TOPROL XL) 50 MG 24 hr tablet Take 50 mg by mouth 2 (Two) Times a Day.     • Multiple Vitamin (MULTI VITAMIN PO) Take  by mouth Daily.     • nabumetone (RELAFEN) 500 MG tablet TK 1 T PO BID WF OR MILK  5   • nortriptyline (PAMELOR) 25 MG capsule TK ONE TO THREE CS PO QHS PRN  3   • Nutritional Supplements (EQ ESTROBLEND MENOPAUSE) tablet Take  by mouth Daily.     • omeprazole (priLOSEC) 20 MG capsule TK ONE C PO QD FIRST THING IN THE MORNING ON  AN EMPTY STOMACH  3   • oxyCODONE (ROXICODONE) 10 MG tablet TK 1 T PO BID PRN P  0   • Pregabalin (LYRICA PO) Take 150 mg by mouth 2 (Two) Times a Day.     • venlafaxine (EFFEXOR) 75 MG tablet TK 1 T PO BID  11     No current facility-administered medications for this visit.        ALLERGIES:    Allergies   Allergen Reactions   • Azithromycin GI Intolerance     Severe Abdominal Pain        Objective      Vitals:    09/13/17 1101   BP: 128/80   Pulse: 93   Resp: 18   Temp: 98.3 °F (36.8 °C)   SpO2: 95%       Current Status 9/13/2017   ECOG score 2       General Appearance: Obese. Patient is awake, alert, oriented and in no acute distress. Patient is welldeveloped, wellnourished, and appears stated age.  HEENT: Normocephalic. Sclerae clear, conjunctiva pink, extraocular movements intact,  pupils, round, reactive to light and  accommodation. Mouth and throat are clear with moist oral mucosa.  NECK: Supple, no jugular venous distention, thyroid not enlarged.  LYMPH: No cervical, supraclavicular, axillary, or inguinal lymphadenopathy.  CHEST: Equal bilateral expansion, AP  diameter normal, resonant percussion note  LUNGS: Good air movement, no rales, rhonchi, rubs or wheezes with auscultation  CARDIO: Regular sinus rhythm, no murmurs, gallops or rubs.  ABDOMEN: Nondistended, soft, No tenderness, no guarding, no rebound, No hepatosplenomegaly. No abdominal masses. Bowel sounds positive. No hernia  GENITALIA: Not examined.  BREASTS: Not examined.  MUSKEL: No joint swelling, decreased motion, or inflammation  EXTREMS: No edema, clubbing, cyanosis, No varicose veins.  NEURO: Grossly nonfocal, Gait is coordinated and smooth, Cognition is preserved.  SKIN: No rashes, no ecchymoses, no petechia.  PSYCH: Oriented to time, place and person. Memory is preserved. Mood and affect appear normal      RECENT LABS:  Lab on 09/13/2017   Component Date Value Ref Range Status   • Glucose 09/13/2017 141* 70 - 100 mg/dL Final   • BUN 09/13/2017 14  5 - 21 mg/dL Final   • Creatinine 09/13/2017 0.70  0.50 - 1.40 mg/dL Final   • Sodium 09/13/2017 141  135 - 145 mmol/L Final   • Potassium 09/13/2017 3.9  3.5 - 5.3 mmol/L Final   • Chloride 09/13/2017 105  98 - 110 mmol/L Final   • CO2 09/13/2017 27.0  24.0 - 31.0 mmol/L Final   • Calcium 09/13/2017 8.9  8.4 - 10.4 mg/dL Final   • Total Protein 09/13/2017 6.9  6.3 - 8.7 g/dL Final   • Albumin 09/13/2017 3.90  3.50 - 5.00 g/dL Final   • ALT (SGPT) 09/13/2017 46  0 - 54 U/L Final   • AST (SGOT) 09/13/2017 41  7 - 45 U/L Final   • Alkaline Phosphatase 09/13/2017 94  24 - 120 U/L Final   • Total Bilirubin 09/13/2017 0.7  0.1 - 1.0 mg/dL Final   • eGFR Non African Amer 09/13/2017 92  >60 mL/min/1.73 Final   • Globulin 09/13/2017 3.0  gm/dL Final   • A/G Ratio 09/13/2017 1.3  1.1 -  2.5 g/dL Final   • BUN/Creatinine Ratio 09/13/2017 20.0    Final   • Anion Gap 09/13/2017 9.0  4.0 - 13.0 mmol/L Final   • WBC 09/13/2017 9.70  4.80 - 10.80 10*3/mm3 Final   • RBC 09/13/2017 3.68* 4.20 - 5.40 10*6/mm3 Final   • Hemoglobin 09/13/2017 10.8* 12.0 - 16.0 g/dL Final   • Hematocrit 09/13/2017 30.4* 37.0 - 47.0 % Final   • MCV 09/13/2017 82.6  82.0 - 98.0 fL Final   • MCH 09/13/2017 29.3  28.0 - 32.0 pg Final   • MCHC 09/13/2017 35.5  33.0 - 36.0 g/dL Final   • RDW 09/13/2017 17.9* 12.0 - 15.0 % Final   • MPV 09/13/2017 9.0  6.0 - 12.0 fL Final   • Platelets 09/13/2017 257  130 - 400 10*3/mm3 Final   • Neutrophil % 09/13/2017 64.9  39.0 - 78.0 % Final   • Lymphocyte % 09/13/2017 30.7  15.0 - 45.0 % Final   • Auto Mixed Cells % 09/13/2017 4.4  0.1 - 24.0 % Final   • Neutrophils, Absolute 09/13/2017 6.30  1.50 - 8.30 10*3/mm3 Final   • Lymphocytes, Absolute 09/13/2017 3.00  0.80 - 7.00 10*3/mm3 Final   • Auto Mixed Cells # 09/13/2017 0.40  0.10 - 2.60 10*3/mm3 Final       RADIOLOGY:  No results found.         Assessment/Plan   41-year-old female history of iron deficiency anemia since age 14, now considering bariatric surgery, she needs her levels optimize prior to surgery.  Patient had recent colonoscopy and EGD negative for active bleed, I am going to check nutritional status, and act accordingly.  Given her family history, will have her undergo bone marrow biopsy to rule out primary bone marrow disease.  Patient given the opportunity to ask questions which was answered to her satisfaction I will see her back in 2 weeks to review.    Time Spent: 60 minutes; greater than  50% of time was spent in patient counseling and care coordination.     Pedro Clements MD    9/13/2017    11:34 AM

## 2017-09-14 LAB
FOLATE BLD-MCNC: >620 NG/ML
FOLATE RBC-MCNC: >2074 NG/ML
HCT VFR BLD AUTO: 29.9 % (ref 34–46.6)

## 2017-09-15 LAB — COPPER SERPL-MCNC: 140 UG/DL (ref 72–166)

## 2017-09-18 ENCOUNTER — HOSPITAL ENCOUNTER (OUTPATIENT)
Dept: CT IMAGING | Facility: HOSPITAL | Age: 42
Discharge: HOME | End: 2017-09-18
Attending: INTERNAL MEDICINE | Admitting: INTERNAL MEDICINE

## 2017-09-18 VITALS
DIASTOLIC BLOOD PRESSURE: 68 MMHG | RESPIRATION RATE: 17 BRPM | SYSTOLIC BLOOD PRESSURE: 111 MMHG | TEMPERATURE: 97 F | BODY MASS INDEX: 48.82 KG/M2 | HEART RATE: 79 BPM | OXYGEN SATURATION: 97 % | WEIGHT: 293 LBS | HEIGHT: 65 IN

## 2017-09-18 DIAGNOSIS — D64.9 ANEMIA, UNSPECIFIED TYPE: ICD-10-CM

## 2017-09-18 DIAGNOSIS — D64.9 ANEMIA: ICD-10-CM

## 2017-09-18 LAB
APTT PPP: 30.3 SECONDS (ref 24.1–34.8)
BASOPHILS # BLD AUTO: 0.04 10*3/MM3 (ref 0–0.2)
BASOPHILS NFR BLD AUTO: 0.4 % (ref 0–2)
DEPRECATED RDW RBC AUTO: 52.9 FL (ref 40–54)
EOSINOPHIL # BLD AUTO: 0.16 10*3/MM3 (ref 0–0.7)
EOSINOPHIL NFR BLD AUTO: 1.6 % (ref 0–4)
ERYTHROCYTE [DISTWIDTH] IN BLOOD BY AUTOMATED COUNT: 17.1 % (ref 12–15)
HCT VFR BLD AUTO: 30.9 % (ref 37–47)
HGB BLD-MCNC: 10.3 G/DL (ref 12–16)
IMM GRANULOCYTES # BLD: 0.04 10*3/MM3 (ref 0–0.03)
IMM GRANULOCYTES NFR BLD: 0.4 % (ref 0–5)
INR PPP: 0.89 (ref 0.91–1.09)
LYMPHOCYTES # BLD AUTO: 3.46 10*3/MM3 (ref 0.72–4.86)
LYMPHOCYTES NFR BLD AUTO: 34.7 % (ref 15–45)
MCH RBC QN AUTO: 28.1 PG (ref 28–32)
MCHC RBC AUTO-ENTMCNC: 33.3 G/DL (ref 33–36)
MCV RBC AUTO: 84.4 FL (ref 82–98)
MONOCYTES # BLD AUTO: 0.51 10*3/MM3 (ref 0.19–1.3)
MONOCYTES NFR BLD AUTO: 5.1 % (ref 4–12)
NEUTROPHILS # BLD AUTO: 5.76 10*3/MM3 (ref 1.87–8.4)
NEUTROPHILS NFR BLD AUTO: 57.8 % (ref 39–78)
PLATELET # BLD AUTO: 238 10*3/MM3 (ref 130–400)
PMV BLD AUTO: 10 FL (ref 6–12)
PROTHROMBIN TIME: 12.3 SECONDS (ref 11.9–14.6)
RBC # BLD AUTO: 3.66 10*6/MM3 (ref 4.2–5.4)
WBC NRBC COR # BLD: 9.97 10*3/MM3 (ref 4.8–10.8)

## 2017-09-18 PROCEDURE — 85025 COMPLETE CBC W/AUTO DIFF WBC: CPT | Performed by: INTERNAL MEDICINE

## 2017-09-18 PROCEDURE — 88264 CHROMOSOME ANALYSIS 20-25: CPT | Performed by: INTERNAL MEDICINE

## 2017-09-18 PROCEDURE — 36415 COLL VENOUS BLD VENIPUNCTURE: CPT

## 2017-09-18 PROCEDURE — 88185 FLOWCYTOMETRY/TC ADD-ON: CPT | Performed by: INTERNAL MEDICINE

## 2017-09-18 PROCEDURE — 88305 TISSUE EXAM BY PATHOLOGIST: CPT | Performed by: INTERNAL MEDICINE

## 2017-09-18 PROCEDURE — 88237 TISSUE CULTURE BONE MARROW: CPT | Performed by: INTERNAL MEDICINE

## 2017-09-18 PROCEDURE — 88184 FLOWCYTOMETRY/ TC 1 MARKER: CPT | Performed by: INTERNAL MEDICINE

## 2017-09-18 PROCEDURE — 88280 CHROMOSOME KARYOTYPE STUDY: CPT | Performed by: INTERNAL MEDICINE

## 2017-09-18 PROCEDURE — 88311 DECALCIFY TISSUE: CPT | Performed by: INTERNAL MEDICINE

## 2017-09-18 PROCEDURE — 85610 PROTHROMBIN TIME: CPT | Performed by: INTERNAL MEDICINE

## 2017-09-18 PROCEDURE — 25010000002 MIDAZOLAM PER 1 MG: Performed by: RADIOLOGY

## 2017-09-18 PROCEDURE — 25010000002 FENTANYL CITRATE (PF) 100 MCG/2ML SOLUTION: Performed by: RADIOLOGY

## 2017-09-18 PROCEDURE — 77012 CT SCAN FOR NEEDLE BIOPSY: CPT

## 2017-09-18 PROCEDURE — 85730 THROMBOPLASTIN TIME PARTIAL: CPT | Performed by: INTERNAL MEDICINE

## 2017-09-18 PROCEDURE — 88313 SPECIAL STAINS GROUP 2: CPT | Performed by: INTERNAL MEDICINE

## 2017-09-18 RX ORDER — FENTANYL CITRATE 50 UG/ML
INJECTION, SOLUTION INTRAMUSCULAR; INTRAVENOUS
Status: COMPLETED | OUTPATIENT
Start: 2017-09-18 | End: 2017-09-18

## 2017-09-18 RX ORDER — SODIUM CHLORIDE 0.9 % (FLUSH) 0.9 %
1-10 SYRINGE (ML) INJECTION AS NEEDED
Status: DISCONTINUED | OUTPATIENT
Start: 2017-09-18 | End: 2017-09-19 | Stop reason: HOSPADM

## 2017-09-18 RX ORDER — LIDOCAINE HYDROCHLORIDE 10 MG/ML
INJECTION, SOLUTION INFILTRATION; PERINEURAL
Status: COMPLETED | OUTPATIENT
Start: 2017-09-18 | End: 2017-09-18

## 2017-09-18 RX ORDER — MIDAZOLAM HYDROCHLORIDE 1 MG/ML
INJECTION INTRAMUSCULAR; INTRAVENOUS
Status: COMPLETED | OUTPATIENT
Start: 2017-09-18 | End: 2017-09-18

## 2017-09-18 RX ADMIN — FENTANYL CITRATE 25 MCG: 50 INJECTION, SOLUTION INTRAMUSCULAR; INTRAVENOUS at 13:29

## 2017-09-18 RX ADMIN — MIDAZOLAM 1 MG: 1 INJECTION INTRAMUSCULAR; INTRAVENOUS at 13:20

## 2017-09-18 RX ADMIN — FENTANYL CITRATE 50 MCG: 50 INJECTION, SOLUTION INTRAMUSCULAR; INTRAVENOUS at 13:20

## 2017-09-18 RX ADMIN — LIDOCAINE HYDROCHLORIDE 10 ML: 10 INJECTION, SOLUTION INFILTRATION; PERINEURAL at 13:21

## 2017-09-18 RX ADMIN — MIDAZOLAM 0.5 MG: 1 INJECTION INTRAMUSCULAR; INTRAVENOUS at 13:29

## 2017-09-21 LAB
CYTO UR: NORMAL
LAB AP CASE REPORT: NORMAL
LAB AP FLOW CYTOMETRY SUMMARY: NORMAL
Lab: NORMAL
PATH REPORT.FINAL DX SPEC: NORMAL
PATH REPORT.GROSS SPEC: NORMAL

## 2017-09-22 ENCOUNTER — RESULTS ENCOUNTER (OUTPATIENT)
Dept: BARIATRICS/WEIGHT MGMT | Facility: CLINIC | Age: 42
End: 2017-09-22

## 2017-09-22 DIAGNOSIS — Z86.39 PERSONAL HISTORY OF NUTRITIONAL DEFICIENCY: Primary | ICD-10-CM

## 2017-09-22 DIAGNOSIS — I10 HYPERTENSION, WELL CONTROLLED: ICD-10-CM

## 2017-09-22 DIAGNOSIS — E66.01 MORBID OBESITY, UNSPECIFIED OBESITY TYPE (HCC): ICD-10-CM

## 2017-09-29 ENCOUNTER — LAB (OUTPATIENT)
Dept: LAB | Facility: HOSPITAL | Age: 42
End: 2017-09-29

## 2017-09-29 ENCOUNTER — OFFICE VISIT (OUTPATIENT)
Dept: ONCOLOGY | Facility: CLINIC | Age: 42
End: 2017-09-29

## 2017-09-29 ENCOUNTER — HOSPITAL ENCOUNTER (OUTPATIENT)
Dept: GENERAL RADIOLOGY | Facility: HOSPITAL | Age: 42
Discharge: HOME | End: 2017-09-29
Attending: INTERNAL MEDICINE | Admitting: INTERNAL MEDICINE

## 2017-09-29 VITALS
HEIGHT: 65 IN | SYSTOLIC BLOOD PRESSURE: 132 MMHG | OXYGEN SATURATION: 97 % | DIASTOLIC BLOOD PRESSURE: 80 MMHG | TEMPERATURE: 98.3 F | BODY MASS INDEX: 48.82 KG/M2 | HEART RATE: 92 BPM | WEIGHT: 293 LBS | RESPIRATION RATE: 16 BRPM

## 2017-09-29 DIAGNOSIS — Z86.39 HISTORY OF IRON DEFICIENCY: Primary | ICD-10-CM

## 2017-09-29 DIAGNOSIS — Z86.39 PERSONAL HISTORY OF NUTRITIONAL DEFICIENCY: ICD-10-CM

## 2017-09-29 DIAGNOSIS — M25.551 PAIN OF RIGHT HIP JOINT: ICD-10-CM

## 2017-09-29 DIAGNOSIS — M25.551 PAIN OF RIGHT HIP JOINT: Primary | ICD-10-CM

## 2017-09-29 PROBLEM — K90.9 MALABSORPTION OF IRON: Status: ACTIVE | Noted: 2017-09-29

## 2017-09-29 PROBLEM — D50.8 IRON DEFICIENCY ANEMIA SECONDARY TO INADEQUATE DIETARY IRON INTAKE: Status: ACTIVE | Noted: 2017-09-12

## 2017-09-29 LAB
ALBUMIN SERPL-MCNC: 4.3 G/DL (ref 3.5–5)
ALBUMIN/GLOB SERPL: 1.4 G/DL (ref 1.1–2.5)
ALP SERPL-CCNC: 102 U/L (ref 24–120)
ALT SERPL W P-5'-P-CCNC: 42 U/L (ref 0–54)
ANION GAP SERPL CALCULATED.3IONS-SCNC: 13 MMOL/L (ref 4–13)
AST SERPL-CCNC: 35 U/L (ref 7–45)
BASOPHILS # BLD AUTO: 0.03 10*3/MM3 (ref 0–0.2)
BASOPHILS NFR BLD AUTO: 0.3 % (ref 0–2)
BILIRUB SERPL-MCNC: 0.6 MG/DL (ref 0.1–1)
BUN BLD-MCNC: 13 MG/DL (ref 5–21)
BUN/CREAT SERPL: 17.3 (ref 7–25)
CALCIUM SPEC-SCNC: 9.2 MG/DL (ref 8.4–10.4)
CHLORIDE SERPL-SCNC: 104 MMOL/L (ref 98–110)
CO2 SERPL-SCNC: 26 MMOL/L (ref 24–31)
CREAT BLD-MCNC: 0.75 MG/DL (ref 0.5–1.4)
DEPRECATED RDW RBC AUTO: 52.7 FL (ref 40–54)
EOSINOPHIL # BLD AUTO: 0.13 10*3/MM3 (ref 0–0.7)
EOSINOPHIL NFR BLD AUTO: 1.3 % (ref 0–4)
ERYTHROCYTE [DISTWIDTH] IN BLOOD BY AUTOMATED COUNT: 17.3 % (ref 12–15)
GFR SERPL CREATININE-BSD FRML MDRD: 85 ML/MIN/1.73
GLOBULIN UR ELPH-MCNC: 3.1 GM/DL
GLUCOSE BLD-MCNC: 117 MG/DL (ref 70–100)
HCT VFR BLD AUTO: 33 % (ref 37–47)
HGB BLD-MCNC: 11.1 G/DL (ref 12–16)
HOLD SPECIMEN: NORMAL
IMM GRANULOCYTES # BLD: 0.06 10*3/MM3 (ref 0–0.03)
IMM GRANULOCYTES NFR BLD: 0.6 % (ref 0–5)
LYMPHOCYTES # BLD AUTO: 3.3 10*3/MM3 (ref 0.72–4.86)
LYMPHOCYTES NFR BLD AUTO: 31.9 % (ref 15–45)
MCH RBC QN AUTO: 28.5 PG (ref 28–32)
MCHC RBC AUTO-ENTMCNC: 33.6 G/DL (ref 33–36)
MCV RBC AUTO: 84.6 FL (ref 82–98)
MONOCYTES # BLD AUTO: 0.55 10*3/MM3 (ref 0.19–1.3)
MONOCYTES NFR BLD AUTO: 5.3 % (ref 4–12)
NEUTROPHILS # BLD AUTO: 6.28 10*3/MM3 (ref 1.87–8.4)
NEUTROPHILS NFR BLD AUTO: 60.6 % (ref 39–78)
PLATELET # BLD AUTO: 287 10*3/MM3 (ref 130–400)
PMV BLD AUTO: 9.8 FL (ref 6–12)
POTASSIUM BLD-SCNC: 4.1 MMOL/L (ref 3.5–5.3)
PROT SERPL-MCNC: 7.4 G/DL (ref 6.3–8.7)
RBC # BLD AUTO: 3.9 10*6/MM3 (ref 4.2–5.4)
SODIUM BLD-SCNC: 143 MMOL/L (ref 135–145)
WBC NRBC COR # BLD: 10.35 10*3/MM3 (ref 4.8–10.8)

## 2017-09-29 PROCEDURE — 73502 X-RAY EXAM HIP UNI 2-3 VIEWS: CPT

## 2017-09-29 PROCEDURE — 85025 COMPLETE CBC W/AUTO DIFF WBC: CPT | Performed by: INTERNAL MEDICINE

## 2017-09-29 PROCEDURE — 99214 OFFICE O/P EST MOD 30 MIN: CPT | Performed by: INTERNAL MEDICINE

## 2017-09-29 PROCEDURE — 80053 COMPREHEN METABOLIC PANEL: CPT | Performed by: INTERNAL MEDICINE

## 2017-09-29 PROCEDURE — 36415 COLL VENOUS BLD VENIPUNCTURE: CPT

## 2017-09-29 RX ORDER — OXYCODONE HYDROCHLORIDE 10 MG/1
TABLET ORAL
COMMUNITY
Start: 2017-10-02 | End: 2017-10-02 | Stop reason: SDUPTHER

## 2017-09-29 RX ORDER — PREGABALIN 150 MG/1
CAPSULE ORAL
COMMUNITY
Start: 2017-09-06 | End: 2017-10-02 | Stop reason: SDUPTHER

## 2017-09-29 NOTE — PROGRESS NOTES
[facility]  HEMATOLOGY & ONCOLOGY    Cancer Staging Information:  No matching staging information was found for the patient.      Subjective     VISIT DIAGNOSIS:   Encounter Diagnosis   Name Primary?   • Pain of right hip joint Yes       REASON FOR VISIT:   No chief complaint on file.       HEMATOLOGY / ONCOLOGY HISTORY:    No history exists.           INTERVAL HISTORY  Patient ID: Naomi Bhatia is a 41 y.o. year old female  with history of iron deficiency anemia since age 14, now considering bariatric surgery, she needs her levels optimize prior to surgery. Patient has been on oral iron for more than a year.  Workup shows iron 53, % saturation of 20, normal folate.  Bone marrow biopsy shows 1+ iron consistent with iron deficiency.  Peripheral blood smear shows microcytic anemia consistent with iron deficiency.  At today's visit she is complaining of right hip pain, she is limping, she has no fever or chills. She broke her right hip when she was 11 years old.    Past Medical History:   Past Medical History:   Diagnosis Date   • Anemia    • Anxiety    • Arthritis    • Asthma    • Back pain 03/14/2016    with left sided radiculopathy    • DDD (degenerative disc disease), lumbar     Dr. Stuart Zavaleta for pain manangement   • Depression    • Fatigue    • Fibromyalgia    • Headache    • History of blood transfusion    • Hypertension    • Iron deficiency anemia 9/12/2017   • Joint pain    • Obesity    • RLS (restless legs syndrome)    • Sleep apnea     positive sleep study; titration study in October     Past Surgical History:   Past Surgical History:   Procedure Laterality Date   • ANKLE SURGERY      Right    • APPENDECTOMY  04/19/2015   • BACK SURGERY  2000   • CERVICAL SPINE SURGERY  09/01/2015   • CHOLECYSTECTOMY      1995;lap   • COLONOSCOPY  05/02/2017    normal; do not return until age of 49 Dr. Gus Valentine   • HIP SURGERY  1987    right    • MOUTH SURGERY  1994   • NECK SURGERY     • TOOTH  EXTRACTION     • UPPER GASTROINTESTINAL ENDOSCOPY  05/02/2017    Dr. Gus Valentine, negative for h.pylori, negative for Salomon's   • VAGINAL HYSTERECTOMY SALPINGO OOPHORECTOMY  2008    Partial and then had another surgery to remove the rest     Social History:   Social History     Social History   • Marital status:      Spouse name: N/A   • Number of children: N/A   • Years of education: N/A     Occupational History   • Not on file.     Social History Main Topics   • Smoking status: Former Smoker     Packs/day: 1.00     Years: 25.00     Types: Cigarettes     Quit date: 2014   • Smokeless tobacco: Never Used   • Alcohol use Yes      Comment: rarely    • Drug use: Yes     Special: Codeine   • Sexual activity: Defer     Other Topics Concern   • Not on file     Social History Narrative     Family History:   Family History   Problem Relation Age of Onset   • Diabetes Mother    • Hypertension Mother    • Coronary artery disease Mother    • Cancer Mother    • Cancer Father    • Hypertension Father    • Heart disease Father    • Stroke Father    • Hypertension Sister    • Obesity Sister    • Cancer Brother    • Diabetes Brother    • Diabetes Maternal Grandmother    • Stroke Maternal Grandmother        Review of Systems   Constitutional: Negative.    HENT: Negative.    Eyes: Negative.    Respiratory: Positive for apnea. Negative for cough, choking and shortness of breath.    Cardiovascular: Negative.    Gastrointestinal: Positive for blood in stool. Negative for abdominal distention, abdominal pain, constipation, diarrhea, nausea and vomiting.   Genitourinary: Negative.    Musculoskeletal: Positive for back pain.        Right hip pain   Skin: Negative.    Allergic/Immunologic: Negative.    Neurological: Negative.    Hematological: Negative.    Psychiatric/Behavioral: Negative.         Performance Status:  Asymptomatic    Medications:    Current Outpatient Prescriptions   Medication Sig Dispense Refill   • ALBUTEROL IN  "Inhale.     • CALCIUM CARBONATE PO Take 500 mg by mouth.     • carbidopa-levodopa (SINEMET)  MG per tablet Take 1 tablet by mouth Daily.     • Cholecalciferol (VITAMIN D3) 5000 UNITS capsule capsule Take 5,000 Units by mouth Daily.     • CRANBERRY PO Take  by mouth Daily.     • Cyclobenzaprine HCl (FLEXERIL PO) Take 10 mg by mouth As Needed.     • levocetirizine (XYZAL) 5 MG tablet TK 1 T PO QPM  0   • lisinopril (PRINIVIL,ZESTRIL) 10 MG tablet TK 1 T PO QD  2   • LYRICA 150 MG capsule TK 1 C PO BID  2   • metoprolol succinate XL (TOPROL XL) 50 MG 24 hr tablet Take 50 mg by mouth 2 (Two) Times a Day.     • Multiple Vitamin (MULTI VITAMIN PO) Take  by mouth Daily.     • nabumetone (RELAFEN) 500 MG tablet TK 1 T PO BID WF OR MILK  5   • nortriptyline (PAMELOR) 25 MG capsule TK ONE TO THREE CS PO QHS PRN  3   • Nutritional Supplements (EQ ESTROBLEND MENOPAUSE) tablet Take  by mouth Daily.     • omeprazole (priLOSEC) 20 MG capsule TK ONE C PO QD FIRST THING IN THE MORNING ON  AN EMPTY STOMACH  3   • oxyCODONE (ROXICODONE) 10 MG tablet TK 1 T PO BID PRN P  0   • [START ON 10/2/2017] oxyCODONE (ROXICODONE) 10 MG tablet Take  by mouth.     • Pregabalin (LYRICA PO) Take 150 mg by mouth 2 (Two) Times a Day.     • pregabalin (LYRICA) 150 MG capsule Take  by mouth.     • venlafaxine (EFFEXOR) 75 MG tablet TK 1 T PO BID  11     No current facility-administered medications for this visit.        ALLERGIES:    Allergies   Allergen Reactions   • Azithromycin GI Intolerance     Severe Abdominal Pain        Objective      Vitals:    09/29/17 1042   BP: 132/80   Pulse: 92   Resp: 16   Temp: 98.3 °F (36.8 °C)   TempSrc: Tympanic   SpO2: 97%   Weight: (!) 316 lb (143 kg)   Height: 65\" (165.1 cm)         Current Status 9/29/2017   ECOG score 0         Physical Exam  General Appearance: Patient is awake, alert, oriented and in no acute distress. Patient is welldeveloped, wellnourished, and appears stated age.  HEENT: Normocephalic. " Sclerae clear, conjunctiva pink, extraocular movements intact, pupils, round, reactive to light and  accommodation. Mouth and throat are clear with moist oral mucosa.  NECK: Supple, no jugular venous distention, thyroid not enlarged.  LYMPH: No cervical, supraclavicular, axillary, or inguinal lymphadenopathy.  CHEST: Equal bilateral expansion, AP  diameter normal, resonant percussion note  LUNGS: Good air movement, no rales, rhonchi, rubs or wheezes with auscultation  CARDIO: Regular sinus rhythm, no murmurs, gallops or rubs.  ABDOMEN: Nondistended, soft, No tenderness, no guarding, no rebound, No hepatosplenomegaly. No abdominal masses. Bowel sounds positive. No hernia  GENITALIA: Not examined.  BREASTS: Not examined.  MUSKEL: No joint swelling, decreased motion, or inflammation  EXTREMS: No edema, clubbing, cyanosis, No varicose veins.  Primary palpation of the right hip medially and anteriorly.  No radiation down the legs of Buttocks.  NEURO: Grossly nonfocal, Gait is coordinated and smooth, Cognition is preserved.  SKIN: No rashes, no ecchymoses, no petechia.  PSYCH: Oriented to time, place and person. Memory is preserved. Mood and affect appear normal  RECENT LABS:  Lab on 09/29/2017   Component Date Value Ref Range Status   • Glucose 09/29/2017 117* 70 - 100 mg/dL Final   • BUN 09/29/2017 13  5 - 21 mg/dL Final   • Creatinine 09/29/2017 0.75  0.50 - 1.40 mg/dL Final   • Sodium 09/29/2017 143  135 - 145 mmol/L Final   • Potassium 09/29/2017 4.1  3.5 - 5.3 mmol/L Final   • Chloride 09/29/2017 104  98 - 110 mmol/L Final   • CO2 09/29/2017 26.0  24.0 - 31.0 mmol/L Final   • Calcium 09/29/2017 9.2  8.4 - 10.4 mg/dL Final   • Total Protein 09/29/2017 7.4  6.3 - 8.7 g/dL Final   • Albumin 09/29/2017 4.30  3.50 - 5.00 g/dL Final   • ALT (SGPT) 09/29/2017 42  0 - 54 U/L Final   • AST (SGOT) 09/29/2017 35  7 - 45 U/L Final   • Alkaline Phosphatase 09/29/2017 102  24 - 120 U/L Final   • Total Bilirubin 09/29/2017 0.6   0.1 - 1.0 mg/dL Final   • eGFR Non African Amer 09/29/2017 85  >60 mL/min/1.73 Final   • Globulin 09/29/2017 3.1  gm/dL Final   • A/G Ratio 09/29/2017 1.4  1.1 - 2.5 g/dL Final   • BUN/Creatinine Ratio 09/29/2017 17.3  7.0 - 25.0 Final   • Anion Gap 09/29/2017 13.0  4.0 - 13.0 mmol/L Final   • WBC 09/29/2017 10.35  4.80 - 10.80 10*3/mm3 Final   • RBC 09/29/2017 3.90* 4.20 - 5.40 10*6/mm3 Final   • Hemoglobin 09/29/2017 11.1* 12.0 - 16.0 g/dL Final   • Hematocrit 09/29/2017 33.0* 37.0 - 47.0 % Final   • MCV 09/29/2017 84.6  82.0 - 98.0 fL Final   • MCH 09/29/2017 28.5  28.0 - 32.0 pg Final   • MCHC 09/29/2017 33.6  33.0 - 36.0 g/dL Final   • RDW 09/29/2017 17.3* 12.0 - 15.0 % Final   • RDW-SD 09/29/2017 52.7  40.0 - 54.0 fl Final   • MPV 09/29/2017 9.8  6.0 - 12.0 fL Final   • Platelets 09/29/2017 287  130 - 400 10*3/mm3 Final   • Neutrophil % 09/29/2017 60.6  39.0 - 78.0 % Final   • Lymphocyte % 09/29/2017 31.9  15.0 - 45.0 % Final   • Monocyte % 09/29/2017 5.3  4.0 - 12.0 % Final   • Eosinophil % 09/29/2017 1.3  0.0 - 4.0 % Final   • Basophil % 09/29/2017 0.3  0.0 - 2.0 % Final   • Immature Grans % 09/29/2017 0.6  0.0 - 5.0 % Final   • Neutrophils, Absolute 09/29/2017 6.28  1.87 - 8.40 10*3/mm3 Final   • Lymphocytes, Absolute 09/29/2017 3.30  0.72 - 4.86 10*3/mm3 Final   • Monocytes, Absolute 09/29/2017 0.55  0.19 - 1.30 10*3/mm3 Final   • Eosinophils, Absolute 09/29/2017 0.13  0.00 - 0.70 10*3/mm3 Final   • Basophils, Absolute 09/29/2017 0.03  0.00 - 0.20 10*3/mm3 Final   • Immature Grans, Absolute 09/29/2017 0.06* 0.00 - 0.03 10*3/mm3 Final       RADIOLOGY:  Ct Guided Biopsy Bone Marrow    Result Date: 9/18/2017  Narrative: CT GUIDED BIOPSY BONE MARROW- 9/18/2017 12:31 PM CDT  HISTORY: anemia since age 14; D64.9-Anemia, unspecified  COMPARISON: None  DOSE LENGTH PRODUCT: 2899 mGy cm. Automated exposure control was also utilized to decrease patient radiation dose.  Procedure: The procedure is discussed with the  patient. Inherent risks of pain, bleeding, infection are described. Patient consents for the CT-guided bone marrow aspiration/biopsy.  She is placed prone on the CT table. The left buttocks is cleansed appropriately. 1% lidocaine is used for local anesthesia. Patient is also administered intravenous conscious sedation with Versed and fentanyl for the procedure. Her vital signs are monitored and remained stable throughout the exam.  The Laurane Vin needle is advanced in the posterior left iliac bone. A bone marrow aspirate is obtained followed by a small bone sample. Patient tolerated procedure well with no immediate post procedure complication.      Impression: 1. CT-guided bone marrow aspiration/biopsy. This report was finalized on 09/18/2017 14:13 by Dr. Brittany Dye MD.           Assessment/Plan  Naomi Bhatia is a 41 y.o. year old female with history of iron deficiency anemia since age 14, now considering bariatric surgery, she needs her levels optimize prior to surgery.     Patient Active Problem List   Diagnosis   • Morbid obesity   • Suspected sleep apnea   • Hypertension, well controlled   • Fatigue   • History of iron deficiency   • Vitamin D deficiency   • Iron deficiency anemia          1.Iron deficiency anemia: Patient has been on oral iron for more than a year.  Workup shows iron 53, % saturation of 20, normal folate.  Bone marrow biopsy shows 1+ iron consistent with iron deficiency.  No sign of leukemia or lymphoma.  Peripheral blood smear shows microcytic anemia consistent with iron deficiency. Obviously the  patient is having difficulty absorbing iron, her bone marrow reserve is very low, her iron is borderline, she will benefit from IV iron therapy.    2.  Right hip pain: Patient limping and also experienced pain on palpation of the hip joint medially and anteriorly.  We will obtain right hip x-ray.        Pedro Clements MD    9/29/2017    11:13 AM

## 2017-09-29 NOTE — PROGRESS NOTES
Please contact patient with result. Normal. She needs to follow-up with PCP for Hip MRI to better evaluate the hip with the pain she is having and her history of screws there.

## 2017-10-02 ENCOUNTER — OFFICE VISIT (OUTPATIENT)
Dept: BARIATRICS/WEIGHT MGMT | Facility: CLINIC | Age: 42
End: 2017-10-02

## 2017-10-02 VITALS
WEIGHT: 293 LBS | OXYGEN SATURATION: 92 % | BODY MASS INDEX: 48.82 KG/M2 | DIASTOLIC BLOOD PRESSURE: 65 MMHG | HEART RATE: 98 BPM | HEIGHT: 65 IN | SYSTOLIC BLOOD PRESSURE: 118 MMHG | TEMPERATURE: 97.1 F

## 2017-10-02 DIAGNOSIS — D50.8 IRON DEFICIENCY ANEMIA SECONDARY TO INADEQUATE DIETARY IRON INTAKE: ICD-10-CM

## 2017-10-02 DIAGNOSIS — E66.01 OBESITY, MORBID, BMI 50 OR HIGHER (HCC): Primary | ICD-10-CM

## 2017-10-02 DIAGNOSIS — G47.33 OSA ON CPAP: ICD-10-CM

## 2017-10-02 DIAGNOSIS — J30.89 ENVIRONMENTAL AND SEASONAL ALLERGIES: ICD-10-CM

## 2017-10-02 DIAGNOSIS — Z99.89 OSA ON CPAP: ICD-10-CM

## 2017-10-02 PROCEDURE — 99213 OFFICE O/P EST LOW 20 MIN: CPT | Performed by: NURSE PRACTITIONER

## 2017-10-02 RX ORDER — LEVOCETIRIZINE DIHYDROCHLORIDE 5 MG/1
TABLET, FILM COATED ORAL
Qty: 90 TABLET | Refills: 1 | Status: SHIPPED | OUTPATIENT
Start: 2017-10-02 | End: 2017-10-03

## 2017-10-02 NOTE — PROGRESS NOTES
"Subjective   Naomi Bhatia is a 41 y.o. female.     History of Present Illness   Naomi Bhatia is here with morbid obesity and desires to have surgery for weight loss. This is the patient's 6th visit to the office.  Patient has been seen by Gemma Teixeira, but has not been cleared as of yet. She will follow up with them or choose a different psych provider to get clearance from. She has seen Dr. Clements, hem/onc, to follow up for abnormal CBC hx. She had a bone marrow aspiration done and patient states that she does not have \"bone marrow cancer\". However, she is starting her on iron infusions twice per week beginning this next week. GI has done her EGD. Patient has had heart clearance. She has seen pulmonologist and letter is in chart. She has now finally gotten her sleep machine and has been using it nightly. Patient has been exercising by walking daily at five minute intervals.  Patient has been making health dietary choices and eating 3 meals per day with protein at each. Patient has been getting \"a lot\" of protein per day. Patient is drinking at least 64 ounces of water per day.   Vitals:    10/02/17 1047   BP: 118/65   BP Location: Right arm   Patient Position: Sitting   Cuff Size: Adult   Pulse: 98   Temp: 97.1 °F (36.2 °C)   SpO2: 92%   Weight: (!) 317 lb (144 kg)   Height: 65\" (165.1 cm)         The following portions of the patient's history were reviewed and updated as appropriate: allergies, current medications, past family history, past medical history, past social history, past surgical history and problem list.    Review of Systems   Constitutional: Positive for unexpected weight change. Negative for activity change, appetite change and fatigue.   Eyes: Negative.    Respiratory: Positive for shortness of breath. Negative for apnea, cough and chest tightness.    Cardiovascular: Positive for leg swelling. Negative for chest pain and palpitations.   Gastrointestinal: Positive for abdominal " pain. Negative for anal bleeding, constipation, nausea and vomiting.   Endocrine: Negative.    Genitourinary: Negative.    Musculoskeletal: Positive for arthralgias, back pain, gait problem and neck pain.   Skin: Negative.    Allergic/Immunologic: Negative.    Neurological: Positive for numbness and headaches. Negative for seizures and syncope.   Hematological: Bruises/bleeds easily.        Hx of anemia and has seen Dr. Clements and will start iron transfusions in near future   Psychiatric/Behavioral: Negative for dysphoric mood, self-injury and sleep disturbance. The patient is nervous/anxious.        Objective   Physical Exam   Constitutional: She is oriented to person, place, and time. Vital signs are normal. She appears well-developed and well-nourished. She is cooperative. No distress.   HENT:   Head: Normocephalic and atraumatic.   Nose: Nose normal.   Mouth/Throat: Oropharynx is clear and moist. No oropharyngeal exudate or tonsillar abscesses.   Eyes: Conjunctivae, EOM and lids are normal. Pupils are equal, round, and reactive to light. Right eye exhibits no discharge. Left eye exhibits no discharge.   Neck: Trachea normal. Neck supple. No JVD present. Carotid bruit is not present. No rigidity. No tracheal deviation present. No thyromegaly present.   Cardiovascular: Normal rate, regular rhythm, S1 normal, S2 normal and normal heart sounds.    Pulmonary/Chest: Effort normal and breath sounds normal. No stridor. No respiratory distress. She has no wheezes. She has no rales.   Abdominal: Soft. Bowel sounds are normal. She exhibits no distension. There is no tenderness.   obese   Musculoskeletal: She exhibits no edema.        Right shoulder: She exhibits normal strength.   Lymphadenopathy:     She has no cervical adenopathy.   Neurological: She is alert and oriented to person, place, and time. She has normal strength. No cranial nerve deficit.   Skin: Skin is warm, dry and intact. No rash noted.   Psychiatric:  She has a normal mood and affect. Her speech is normal and behavior is normal.   Alert and oriented x 3   Vitals reviewed.      Assessment/Plan   Naomi was seen today for weight loss, obesity and nutrition counseling.    Diagnoses and all orders for this visit:    Obesity, morbid, BMI 50 or higher    Iron deficiency anemia secondary to inadequate dietary iron intake    DIPAK on CPAP              Naomi Bhatia has done okay this month with healthy changes. Patient has gained approximately 4 pounds.   Today we discussed healthy changes in lifestyle, diet, and exercise.   Handout provided on Mindful eating.   Intensive behavioral therapy for obesity was done today.   Goals for this month are: 3 meals per day with protein at each; eat protein first, use smaller plate; exercise as advised.   Need to obtain psych clearance prior to seeing bariatric surgeon for potential surgery submission.  Keep scheduled appts for iron deficiency anemia.   Follow up in one month for a weight recheck.

## 2017-10-02 NOTE — PATIENT INSTRUCTIONS
Naomi Bhatia has done okay this month with healthy changes. Patient has gained approximately 4 pounds.   Today we discussed healthy changes in lifestyle, diet, and exercise.   Handout provided on Mindful eating.   Intensive behavioral therapy for obesity was done today.   Goals for this month are: 3 meals per day with protein at each; eat protein first, use smaller plate; exercise as advised.   Need to obtain psych clearance prior to seeing bariatric surgeon for potential surgery submission.  Keep scheduled appts for iron deficiency anemia.   Follow up in one month for a weight recheck.

## 2017-10-03 ENCOUNTER — OFFICE VISIT (OUTPATIENT)
Dept: PRIMARY CARE CLINIC | Age: 42
End: 2017-10-03
Payer: MEDICARE

## 2017-10-03 VITALS
WEIGHT: 293 LBS | SYSTOLIC BLOOD PRESSURE: 128 MMHG | HEIGHT: 65 IN | BODY MASS INDEX: 48.82 KG/M2 | OXYGEN SATURATION: 98 % | HEART RATE: 94 BPM | TEMPERATURE: 97 F | DIASTOLIC BLOOD PRESSURE: 78 MMHG

## 2017-10-03 DIAGNOSIS — M25.551 PAIN OF RIGHT HIP JOINT: ICD-10-CM

## 2017-10-03 DIAGNOSIS — Z87.81 HISTORY OF FRACTURE OF HIP: Primary | ICD-10-CM

## 2017-10-03 PROCEDURE — 99213 OFFICE O/P EST LOW 20 MIN: CPT | Performed by: NURSE PRACTITIONER

## 2017-10-03 ASSESSMENT — ENCOUNTER SYMPTOMS
BLOOD IN STOOL: 0
TROUBLE SWALLOWING: 0
SORE THROAT: 0
EYE DISCHARGE: 0
WHEEZING: 0
RHINORRHEA: 0
DIARRHEA: 0
EYE REDNESS: 0
COUGH: 0
CONSTIPATION: 0
ABDOMINAL PAIN: 0

## 2017-10-03 NOTE — MR AVS SNAPSHOT
your BMI, the greater your risk of heart disease, high blood pressure, type 2 diabetes, stroke, gallstones, arthritis, sleep apnea, and certain cancers. BMI is not perfect. It may overestimate body fat in athletes and people who are more muscular. Even a small weight loss (between 5 and 10 percent of your current weight) by decreasing your calorie intake and becoming more physically active will help lower your risk of developing or worsening diseases associated with obesity. Learn more at: [employer]             Today's Medication Changes          These changes are accurate as of: 10/3/17  4:00 PM.  If you have any questions, ask your nurse or doctor. CHANGE how you take these medications           levocetirizine 5 MG tablet   Commonly known as:  XYZAL   Instructions:  TAKE 1 TABLET BY MOUTH NIGHTLY   Quantity:  90 tablet   Refills:  2   What changed:  Another medication with the same name was removed. Continue taking this medication, and follow the directions you see here. Changed by:  Lilli De Leon, DO               Your Current Medications Are              oxyCODONE HCl (OXY-IR) 10 MG immediate release tablet Take 1 tablet by mouth 2 times daily as needed for Pain (month supply) .  Earliest Fill Date: 10/2/17    pregabalin (LYRICA) 150 MG capsule Take 1 capsule by mouth 2 times daily    nortriptyline (PAMELOR) 25 MG capsule Take 1 capsule by mouth nightly 1-3 tabs at bedtime as needed    acyclovir (ZOVIRAX) 800 MG tablet Take 1 tablet by mouth daily    nabumetone (RELAFEN) 500 MG tablet Take 1 tablet by mouth 2 times daily    venlafaxine (EFFEXOR) 75 MG tablet Take 1 tablet by mouth 2 times daily TAKE 1 TABLET BY MOUTH TWICE DAILY    omeprazole (PRILOSEC) 20 MG delayed release capsule TAKE ONE CAPSULE BY MOUTH EVERY DAY FIRST THING IN THE MORNING ON AN EMPTY STOMACH    lisinopril (PRINIVIL;ZESTRIL) 10 MG tablet TAKE 1 TABLET BY MOUTH EVERY DAY Respiratory Therapy Supplies (NEBULIZER/TUBING/MOUTHPIECE) KIT 1 kit by Does not apply route 4 times daily as needed (cough, wheezing) Dx: J40, J18.9, R05, R06.2    albuterol (PROVENTIL) (2.5 MG/3ML) 0.083% nebulizer solution Take 3 mLs by nebulization every 6 hours as needed for Wheezing    albuterol sulfate HFA (PROVENTIL HFA) 108 (90 BASE) MCG/ACT inhaler Inhale 2 puffs into the lungs every 6 hours as needed for Wheezing    ondansetron (ZOFRAN ODT) 4 MG disintegrating tablet Take 1 tablet by mouth every 8 hours as needed for Nausea or Vomiting    cyclobenzaprine (FLEXERIL) 10 MG tablet Take 1 tablet by mouth 2 times daily as needed for Muscle spasms    carbidopa-levodopa (SINEMET)  MG per tablet Take 1 tablet by mouth nightly as needed (leg spasms)    metoprolol succinate ER (TOPROL-XL) 50 MG XL tablet TAKE 2 TABLETS BY MOUTH DAILY    calcium carbonate (OSCAL) 500 MG TABS tablet Take 500 mg by mouth daily. Multiple Vitamins-Minerals (MULTIVITAMIN & MINERAL PO) Take  by mouth. CRANBERRY Take 500 mg by mouth daily.     Cholecalciferol (VITAMIN D3) 5000 UNITS TABS Take 5,000 Units by mouth daily     levocetirizine (XYZAL) 5 MG tablet TAKE 1 TABLET BY MOUTH NIGHTLY      Allergies              Zithromax [Azithromycin]     Severe abdominal pain          Additional Information        Basic Information     Date Of Birth Sex Race Ethnicity Preferred Language Preferred Written Language    1975 Female White Non-/Non  English English      Problem List as of 10/3/2017  Date Reviewed: 6/27/2017                Lumbar disc disease with radiculopathy (Chronic)    Generalized abdominal pain    Drug-induced constipation    Lumbar disc disease with radiculopathy (Chronic)    Chronic bilateral lower abdominal pain    BRBPR (bright red blood per rectum)    Family history of polyps in the colon    Family history of esophageal cancer    Cervical vertebral fusion Back pain with left-sided radiculopathy (Chronic)    Foot pain    RLS (restless legs syndrome)    Malaise and fatigue    Insomnia      Your Goals as of 10/3/2017 at 4:00 PM                 Lifestyle    Use a nicotine replacement product or medication       Immunizations as of 10/3/2017     Name Date    Influenza Vaccine, unspecified formulation 10/26/2016    Pneumococcal Polysaccharide ([id number]) 11/4/2014      Preventive Care        Date Due    HIV screening is recommended for all people regardless of risk factors  aged 15-65 years at least once (lifetime) who have never been HIV tested. 12/26/1989    Tetanus Combination Vaccine (1 - Tdap) 12/26/1993    Diabetes Screening 12/26/2014    Pap Smear 6/4/2016    Yearly Flu Vaccine (1) 9/1/2017    Cholesterol Screening 3/13/2019    Colonoscopy 5/2/2024            [employer] Signup           Our records indicate that you have an active [employer] account. You can view your After Visit Summary by going to [email or web address] and logging in with your [employer] username and password. If you don't have a [employer] username and password but a parent or guardian has access to your record, the parent or guardian should login with their own [employer] username and password and access your record to view the After Visit Summary. Additional Information  If you have questions, please contact the physician practice where you receive care. Remember, [employer] is NOT to be used for urgent needs. For medical emergencies, dial 911. For questions regarding your [employer] account call [phone]. If you have a clinical question, please call your doctor's office.

## 2017-10-03 NOTE — PROGRESS NOTES
[address]  [place], [address] [place] [address] Rd  Phone [phone]   Fax [phone]      OFFICE VISIT: 10/3/2017    David Douglas is a 42 y.o. female who presents today for her medical conditions/complaints as noted below. David Douglas is c/o of Hip Pain (wants to discuss an MRI)        HPI:     HPI  Hip pain  This is chronic and worsening. She fractured this hip when she was younger. Has pins in it. Pain is increasing and causing decrease rom. Decreased abduction. Had plain xray at [facility] imaging  Result Impression   1. No evidence of acute fracture in the RIGHT hip. 2. Moderate degenerative changes. This report was finalized on 09/29/2017 13:39 by Dr. Frnacoise Rand MD.   Result Narrative   XR HIP W OR WO PELVIS 2-3 VIEW RIGHT- 9/29/2017 12:15 PM CDT    HISTORY: pain in palpation of the medial groin and on walking;  M25.551-Pain in right hip    COMPARISON: None     FINDINGS:   Frontal and lateral views of the right hip were obtained. Cannulated  compression screws are seen in the RIGHT hip. There is no evidence of  acute fracture or dislocation. Moderate joint space narrowing and  subchondral sclerosis are noted. The remaining visualized osseous  structures are intact. No gross soft tissue abnormality is visualized. She is doing work up for gastric surgery. Seeing Dr. Jose Angel Greer for hematology anemia.      Past Medical History:   Diagnosis Date    Anxiety     Arthritis     Back pain with left-sided radiculopathy 3/14/2016    DDD (degenerative disc disease), lumbar     Depression     Esophagitis     Gastritis     Headache     History of blood transfusion     Hypertension     Obesity     RLS (restless legs syndrome)       Past Surgical History:   Procedure Laterality Date    ANKLE SURGERY Right     APPENDECTOMY  4/19/15    BACK SURGERY  2000    CERVICAL SPINE SURGERY N/A 9/1/15    CHOLECYSTECTOMY  1995    HIP SURGERY Right 1987    HYSTERECTOMY      partial 2008, still has

## 2017-10-10 ENCOUNTER — INFUSION (OUTPATIENT)
Dept: ONCOLOGY | Facility: HOSPITAL | Age: 42
End: 2017-10-10

## 2017-10-10 ENCOUNTER — HOSPITAL ENCOUNTER (OUTPATIENT)
Dept: MRI IMAGING | Age: 42
Discharge: HOME | End: 2017-10-10
Payer: MEDICARE

## 2017-10-10 VITALS
BODY MASS INDEX: 45.99 KG/M2 | WEIGHT: 293 LBS | TEMPERATURE: 98.4 F | DIASTOLIC BLOOD PRESSURE: 67 MMHG | HEIGHT: 67 IN | SYSTOLIC BLOOD PRESSURE: 98 MMHG | RESPIRATION RATE: 18 BRPM | HEART RATE: 84 BPM | OXYGEN SATURATION: 96 %

## 2017-10-10 DIAGNOSIS — D50.8 IRON DEFICIENCY ANEMIA SECONDARY TO INADEQUATE DIETARY IRON INTAKE: ICD-10-CM

## 2017-10-10 DIAGNOSIS — Z87.81 HISTORY OF FRACTURE OF HIP: ICD-10-CM

## 2017-10-10 DIAGNOSIS — K90.9 MALABSORPTION OF IRON: Primary | ICD-10-CM

## 2017-10-10 DIAGNOSIS — M25.551 PAIN OF RIGHT HIP JOINT: ICD-10-CM

## 2017-10-10 DIAGNOSIS — K90.9 MALABSORPTION OF IRON: ICD-10-CM

## 2017-10-10 PROCEDURE — 96365 THER/PROPH/DIAG IV INF INIT: CPT

## 2017-10-10 PROCEDURE — 73721 MRI JNT OF LWR EXTRE W/O DYE: CPT

## 2017-10-10 PROCEDURE — 25010000002 IRON SUCROSE PER 1 MG: Performed by: INTERNAL MEDICINE

## 2017-10-10 RX ORDER — FAMOTIDINE 10 MG/ML
20 INJECTION, SOLUTION INTRAVENOUS AS NEEDED
Status: CANCELLED | OUTPATIENT
Start: 2017-10-25

## 2017-10-10 RX ORDER — FAMOTIDINE 10 MG/ML
20 INJECTION, SOLUTION INTRAVENOUS AS NEEDED
Status: CANCELLED | OUTPATIENT
Start: 2017-10-10

## 2017-10-10 RX ORDER — DIPHENHYDRAMINE HYDROCHLORIDE 50 MG/ML
50 INJECTION INTRAMUSCULAR; INTRAVENOUS AS NEEDED
Status: DISCONTINUED | OUTPATIENT
Start: 2017-10-10 | End: 2017-10-10 | Stop reason: HOSPADM

## 2017-10-10 RX ORDER — DIPHENHYDRAMINE HYDROCHLORIDE 50 MG/ML
50 INJECTION INTRAMUSCULAR; INTRAVENOUS AS NEEDED
Status: CANCELLED | OUTPATIENT
Start: 2017-10-24

## 2017-10-10 RX ORDER — DIPHENHYDRAMINE HYDROCHLORIDE 50 MG/ML
50 INJECTION INTRAMUSCULAR; INTRAVENOUS AS NEEDED
Status: CANCELLED | OUTPATIENT
Start: 2017-10-25

## 2017-10-10 RX ORDER — ACYCLOVIR 800 MG/1
800 TABLET ORAL DAILY
COMMUNITY

## 2017-10-10 RX ORDER — SODIUM CHLORIDE 9 MG/ML
250 INJECTION, SOLUTION INTRAVENOUS ONCE
Status: COMPLETED | OUTPATIENT
Start: 2017-10-10 | End: 2017-10-10

## 2017-10-10 RX ORDER — FAMOTIDINE 10 MG/ML
20 INJECTION, SOLUTION INTRAVENOUS AS NEEDED
Status: CANCELLED | OUTPATIENT
Start: 2017-10-18

## 2017-10-10 RX ORDER — SODIUM CHLORIDE 9 MG/ML
250 INJECTION, SOLUTION INTRAVENOUS ONCE
Status: CANCELLED | OUTPATIENT
Start: 2017-10-10

## 2017-10-10 RX ORDER — DIPHENHYDRAMINE HYDROCHLORIDE 50 MG/ML
50 INJECTION INTRAMUSCULAR; INTRAVENOUS AS NEEDED
Status: CANCELLED | OUTPATIENT
Start: 2017-10-11

## 2017-10-10 RX ORDER — FAMOTIDINE 10 MG/ML
20 INJECTION, SOLUTION INTRAVENOUS AS NEEDED
Status: CANCELLED | OUTPATIENT
Start: 2017-11-02

## 2017-10-10 RX ORDER — DIPHENHYDRAMINE HYDROCHLORIDE 50 MG/ML
50 INJECTION INTRAMUSCULAR; INTRAVENOUS AS NEEDED
Status: CANCELLED | OUTPATIENT
Start: 2017-10-18

## 2017-10-10 RX ORDER — FAMOTIDINE 10 MG/ML
20 INJECTION, SOLUTION INTRAVENOUS AS NEEDED
Status: DISCONTINUED | OUTPATIENT
Start: 2017-10-10 | End: 2017-10-10 | Stop reason: HOSPADM

## 2017-10-10 RX ORDER — DIPHENHYDRAMINE HYDROCHLORIDE 50 MG/ML
50 INJECTION INTRAMUSCULAR; INTRAVENOUS AS NEEDED
Status: CANCELLED | OUTPATIENT
Start: 2017-11-02

## 2017-10-10 RX ORDER — FAMOTIDINE 10 MG/ML
20 INJECTION, SOLUTION INTRAVENOUS AS NEEDED
Status: CANCELLED | OUTPATIENT
Start: 2017-10-17

## 2017-10-10 RX ORDER — DIPHENHYDRAMINE HYDROCHLORIDE 50 MG/ML
50 INJECTION INTRAMUSCULAR; INTRAVENOUS AS NEEDED
Status: CANCELLED | OUTPATIENT
Start: 2017-10-31

## 2017-10-10 RX ORDER — SODIUM CHLORIDE 9 MG/ML
250 INJECTION, SOLUTION INTRAVENOUS ONCE
Status: CANCELLED | OUTPATIENT
Start: 2017-10-18

## 2017-10-10 RX ORDER — FAMOTIDINE 10 MG/ML
20 INJECTION, SOLUTION INTRAVENOUS AS NEEDED
Status: CANCELLED | OUTPATIENT
Start: 2017-10-31

## 2017-10-10 RX ORDER — FAMOTIDINE 10 MG/ML
20 INJECTION, SOLUTION INTRAVENOUS AS NEEDED
Status: CANCELLED | OUTPATIENT
Start: 2017-10-11

## 2017-10-10 RX ORDER — ACYCLOVIR 800 MG/1
800 TABLET ORAL DAILY
Qty: 90 TABLET | Refills: 1 | Status: SHIPPED | OUTPATIENT
Start: 2017-10-10 | End: 2018-04-17 | Stop reason: SDUPTHER

## 2017-10-10 RX ORDER — SODIUM CHLORIDE 9 MG/ML
250 INJECTION, SOLUTION INTRAVENOUS ONCE
Status: CANCELLED | OUTPATIENT
Start: 2017-10-11

## 2017-10-10 RX ORDER — SODIUM CHLORIDE 9 MG/ML
250 INJECTION, SOLUTION INTRAVENOUS ONCE
Status: CANCELLED | OUTPATIENT
Start: 2017-11-02

## 2017-10-10 RX ORDER — SODIUM CHLORIDE 9 MG/ML
250 INJECTION, SOLUTION INTRAVENOUS ONCE
Status: CANCELLED | OUTPATIENT
Start: 2017-10-17

## 2017-10-10 RX ORDER — SODIUM CHLORIDE 9 MG/ML
250 INJECTION, SOLUTION INTRAVENOUS ONCE
Status: CANCELLED | OUTPATIENT
Start: 2017-10-24

## 2017-10-10 RX ORDER — SODIUM CHLORIDE 9 MG/ML
250 INJECTION, SOLUTION INTRAVENOUS ONCE
Status: CANCELLED | OUTPATIENT
Start: 2017-10-25

## 2017-10-10 RX ORDER — DIPHENHYDRAMINE HYDROCHLORIDE 50 MG/ML
50 INJECTION INTRAMUSCULAR; INTRAVENOUS AS NEEDED
Status: CANCELLED | OUTPATIENT
Start: 2017-10-17

## 2017-10-10 RX ORDER — DIPHENHYDRAMINE HYDROCHLORIDE 50 MG/ML
50 INJECTION INTRAMUSCULAR; INTRAVENOUS AS NEEDED
Status: CANCELLED | OUTPATIENT
Start: 2017-10-10

## 2017-10-10 RX ORDER — SODIUM CHLORIDE 9 MG/ML
250 INJECTION, SOLUTION INTRAVENOUS ONCE
Status: CANCELLED | OUTPATIENT
Start: 2017-10-31

## 2017-10-10 RX ORDER — FAMOTIDINE 10 MG/ML
20 INJECTION, SOLUTION INTRAVENOUS AS NEEDED
Status: CANCELLED | OUTPATIENT
Start: 2017-10-24

## 2017-10-10 RX ADMIN — SODIUM CHLORIDE 250 ML: 9 INJECTION, SOLUTION INTRAVENOUS at 12:37

## 2017-10-10 RX ADMIN — IRON SUCROSE 200 MG: 20 INJECTION, SOLUTION INTRAVENOUS at 12:37

## 2017-10-11 ENCOUNTER — TELEPHONE (OUTPATIENT)
Dept: PRIMARY CARE CLINIC | Age: 42
End: 2017-10-11

## 2017-10-11 ENCOUNTER — INFUSION (OUTPATIENT)
Dept: ONCOLOGY | Facility: HOSPITAL | Age: 42
End: 2017-10-11

## 2017-10-11 VITALS
OXYGEN SATURATION: 96 % | HEART RATE: 84 BPM | WEIGHT: 293 LBS | DIASTOLIC BLOOD PRESSURE: 81 MMHG | BODY MASS INDEX: 45.99 KG/M2 | TEMPERATURE: 98.4 F | SYSTOLIC BLOOD PRESSURE: 104 MMHG | HEIGHT: 67 IN | RESPIRATION RATE: 20 BRPM

## 2017-10-11 DIAGNOSIS — D50.8 IRON DEFICIENCY ANEMIA SECONDARY TO INADEQUATE DIETARY IRON INTAKE: ICD-10-CM

## 2017-10-11 DIAGNOSIS — M25.551 PAIN OF RIGHT HIP JOINT: Primary | ICD-10-CM

## 2017-10-11 DIAGNOSIS — K90.9 MALABSORPTION OF IRON: Primary | ICD-10-CM

## 2017-10-11 PROCEDURE — 96365 THER/PROPH/DIAG IV INF INIT: CPT

## 2017-10-11 PROCEDURE — 25010000002 IRON SUCROSE PER 1 MG: Performed by: INTERNAL MEDICINE

## 2017-10-11 RX ORDER — DIPHENHYDRAMINE HYDROCHLORIDE 50 MG/ML
50 INJECTION INTRAMUSCULAR; INTRAVENOUS AS NEEDED
Status: DISCONTINUED | OUTPATIENT
Start: 2017-10-11 | End: 2017-10-11 | Stop reason: HOSPADM

## 2017-10-11 RX ORDER — SODIUM CHLORIDE 9 MG/ML
250 INJECTION, SOLUTION INTRAVENOUS ONCE
Status: COMPLETED | OUTPATIENT
Start: 2017-10-11 | End: 2017-10-11

## 2017-10-11 RX ORDER — FAMOTIDINE 10 MG/ML
20 INJECTION, SOLUTION INTRAVENOUS AS NEEDED
Status: DISCONTINUED | OUTPATIENT
Start: 2017-10-11 | End: 2017-10-11 | Stop reason: HOSPADM

## 2017-10-11 RX ADMIN — SODIUM CHLORIDE 250 ML: 9 INJECTION, SOLUTION INTRAVENOUS at 12:05

## 2017-10-11 RX ADMIN — IRON SUCROSE 200 MG: 20 INJECTION, SOLUTION INTRAVENOUS at 12:08

## 2017-10-17 ENCOUNTER — INFUSION (OUTPATIENT)
Dept: ONCOLOGY | Facility: HOSPITAL | Age: 42
End: 2017-10-17

## 2017-10-17 VITALS
TEMPERATURE: 98.3 F | BODY MASS INDEX: 45.99 KG/M2 | WEIGHT: 293 LBS | RESPIRATION RATE: 18 BRPM | SYSTOLIC BLOOD PRESSURE: 112 MMHG | DIASTOLIC BLOOD PRESSURE: 68 MMHG | OXYGEN SATURATION: 98 % | HEART RATE: 83 BPM | HEIGHT: 67 IN

## 2017-10-17 DIAGNOSIS — K90.9 MALABSORPTION OF IRON: Primary | ICD-10-CM

## 2017-10-17 DIAGNOSIS — D50.8 IRON DEFICIENCY ANEMIA SECONDARY TO INADEQUATE DIETARY IRON INTAKE: ICD-10-CM

## 2017-10-17 PROCEDURE — 25010000002 IRON SUCROSE PER 1 MG: Performed by: INTERNAL MEDICINE

## 2017-10-17 PROCEDURE — 96365 THER/PROPH/DIAG IV INF INIT: CPT

## 2017-10-17 RX ORDER — DIPHENHYDRAMINE HYDROCHLORIDE 50 MG/ML
50 INJECTION INTRAMUSCULAR; INTRAVENOUS AS NEEDED
Status: DISCONTINUED | OUTPATIENT
Start: 2017-10-17 | End: 2017-10-17 | Stop reason: HOSPADM

## 2017-10-17 RX ORDER — SODIUM CHLORIDE 9 MG/ML
250 INJECTION, SOLUTION INTRAVENOUS ONCE
Status: COMPLETED | OUTPATIENT
Start: 2017-10-17 | End: 2017-10-17

## 2017-10-17 RX ORDER — FAMOTIDINE 10 MG/ML
20 INJECTION, SOLUTION INTRAVENOUS AS NEEDED
Status: DISCONTINUED | OUTPATIENT
Start: 2017-10-17 | End: 2017-10-17 | Stop reason: HOSPADM

## 2017-10-17 RX ADMIN — IRON SUCROSE 200 MG: 20 INJECTION, SOLUTION INTRAVENOUS at 11:24

## 2017-10-17 RX ADMIN — SODIUM CHLORIDE 250 ML: 0.9 INJECTION, SOLUTION INTRAVENOUS at 11:23

## 2017-10-18 ENCOUNTER — INFUSION (OUTPATIENT)
Dept: ONCOLOGY | Facility: HOSPITAL | Age: 42
End: 2017-10-18

## 2017-10-18 VITALS
RESPIRATION RATE: 18 BRPM | SYSTOLIC BLOOD PRESSURE: 132 MMHG | HEART RATE: 86 BPM | TEMPERATURE: 98.3 F | DIASTOLIC BLOOD PRESSURE: 66 MMHG | BODY MASS INDEX: 45.99 KG/M2 | WEIGHT: 293 LBS | HEIGHT: 67 IN | OXYGEN SATURATION: 98 %

## 2017-10-18 DIAGNOSIS — K90.9 MALABSORPTION OF IRON: Primary | ICD-10-CM

## 2017-10-18 DIAGNOSIS — D50.8 IRON DEFICIENCY ANEMIA SECONDARY TO INADEQUATE DIETARY IRON INTAKE: ICD-10-CM

## 2017-10-18 PROCEDURE — 25010000002 IRON SUCROSE PER 1 MG: Performed by: INTERNAL MEDICINE

## 2017-10-18 PROCEDURE — 96365 THER/PROPH/DIAG IV INF INIT: CPT

## 2017-10-18 RX ORDER — DIPHENHYDRAMINE HYDROCHLORIDE 50 MG/ML
50 INJECTION INTRAMUSCULAR; INTRAVENOUS AS NEEDED
Status: DISCONTINUED | OUTPATIENT
Start: 2017-10-18 | End: 2017-10-18 | Stop reason: HOSPADM

## 2017-10-18 RX ORDER — SODIUM CHLORIDE 9 MG/ML
250 INJECTION, SOLUTION INTRAVENOUS ONCE
Status: COMPLETED | OUTPATIENT
Start: 2017-10-18 | End: 2017-10-18

## 2017-10-18 RX ORDER — FAMOTIDINE 10 MG/ML
20 INJECTION, SOLUTION INTRAVENOUS AS NEEDED
Status: DISCONTINUED | OUTPATIENT
Start: 2017-10-18 | End: 2017-10-18 | Stop reason: HOSPADM

## 2017-10-18 RX ADMIN — IRON SUCROSE 200 MG: 20 INJECTION, SOLUTION INTRAVENOUS at 12:05

## 2017-10-18 RX ADMIN — SODIUM CHLORIDE 250 ML: 9 INJECTION, SOLUTION INTRAVENOUS at 12:04

## 2017-10-19 ENCOUNTER — HOSPITAL ENCOUNTER (OUTPATIENT)
Dept: PAIN MANAGEMENT | Age: 42
Discharge: HOME | End: 2017-10-19
Payer: MEDICARE

## 2017-10-19 VITALS
TEMPERATURE: 96.9 F | HEIGHT: 65 IN | BODY MASS INDEX: 48.82 KG/M2 | HEART RATE: 78 BPM | RESPIRATION RATE: 18 BRPM | WEIGHT: 293 LBS | SYSTOLIC BLOOD PRESSURE: 125 MMHG | DIASTOLIC BLOOD PRESSURE: 73 MMHG | OXYGEN SATURATION: 97 %

## 2017-10-19 VITALS
DIASTOLIC BLOOD PRESSURE: 57 MMHG | HEART RATE: 77 BPM | SYSTOLIC BLOOD PRESSURE: 101 MMHG | OXYGEN SATURATION: 97 % | RESPIRATION RATE: 16 BRPM

## 2017-10-19 DIAGNOSIS — M54.10 BACK PAIN WITH LEFT-SIDED RADICULOPATHY: ICD-10-CM

## 2017-10-19 DIAGNOSIS — M51.16 DISPLACEMENT OF LUMBAR DISC WITH RADICULOPATHY: Chronic | ICD-10-CM

## 2017-10-19 DIAGNOSIS — M51.36 LUMBAR DEGENERATIVE DISC DISEASE: ICD-10-CM

## 2017-10-19 PROCEDURE — 3209999900 FLUORO FOR SURGICAL PROCEDURES

## 2017-10-19 PROCEDURE — 62323 NJX INTERLAMINAR LMBR/SAC: CPT

## 2017-10-19 PROCEDURE — 6360000002 HC RX W HCPCS

## 2017-10-19 PROCEDURE — 2580000003 HC RX 258

## 2017-10-19 PROCEDURE — 2500000003 HC RX 250 WO HCPCS

## 2017-10-19 RX ORDER — LIDOCAINE HYDROCHLORIDE 10 MG/ML
INJECTION, SOLUTION EPIDURAL; INFILTRATION; INTRACAUDAL; PERINEURAL
Status: COMPLETED | OUTPATIENT
Start: 2017-10-19 | End: 2017-10-19

## 2017-10-19 RX ORDER — 0.9 % SODIUM CHLORIDE 0.9 %
VIAL (ML) INJECTION
Status: COMPLETED | OUTPATIENT
Start: 2017-10-19 | End: 2017-10-19

## 2017-10-19 RX ORDER — METHYLPREDNISOLONE ACETATE 80 MG/ML
INJECTION, SUSPENSION INTRA-ARTICULAR; INTRALESIONAL; INTRAMUSCULAR; SOFT TISSUE
Status: COMPLETED | OUTPATIENT
Start: 2017-10-19 | End: 2017-10-19

## 2017-10-19 RX ORDER — BUPIVACAINE HYDROCHLORIDE 2.5 MG/ML
INJECTION, SOLUTION EPIDURAL; INFILTRATION; INTRACAUDAL
Status: COMPLETED | OUTPATIENT
Start: 2017-10-19 | End: 2017-10-19

## 2017-10-19 RX ORDER — OXYCODONE HYDROCHLORIDE 10 MG/1
10 TABLET ORAL 2 TIMES DAILY PRN
Qty: 60 TABLET | Refills: 0 | Status: SHIPPED | OUTPATIENT
Start: 2017-11-01 | End: 2017-11-27 | Stop reason: SDUPTHER

## 2017-10-19 RX ADMIN — Medication 4 ML: at 11:16

## 2017-10-19 RX ADMIN — METHYLPREDNISOLONE ACETATE 80 MG: 80 INJECTION, SUSPENSION INTRA-ARTICULAR; INTRALESIONAL; INTRAMUSCULAR; SOFT TISSUE at 11:17

## 2017-10-19 RX ADMIN — BUPIVACAINE HYDROCHLORIDE 5 ML: 2.5 INJECTION, SOLUTION EPIDURAL; INFILTRATION; INTRACAUDAL at 11:16

## 2017-10-19 RX ADMIN — LIDOCAINE HYDROCHLORIDE 5 ML: 10 INJECTION, SOLUTION EPIDURAL; INFILTRATION; INTRACAUDAL; PERINEURAL at 11:12

## 2017-10-19 ASSESSMENT — PAIN - FUNCTIONAL ASSESSMENT: PAIN_FUNCTIONAL_ASSESSMENT: 0-10

## 2017-10-19 ASSESSMENT — PAIN DESCRIPTION - DESCRIPTORS: DESCRIPTORS: ACHING;CONSTANT;THROBBING;RADIATING;NUMBNESS

## 2017-10-19 ASSESSMENT — ACTIVITIES OF DAILY LIVING (ADL): EFFECT OF PAIN ON DAILY ACTIVITIES: DAILY CHORES AND ACTIVITIES

## 2017-10-19 NOTE — PROGRESS NOTES
Procedure:  Level of Consciousness: [x]Alert [x]Oriented []Disoriented []Lethargic  Anxiety Level: [x]Calm []Anxious []Depressed []Other  Skin: [x]Warm [x]Dry []Cool []Moist []Intact []Other  Cardiovascular: []Palpitations: [x]Never []Occasionally []Frequently  Chest Pain: [x]No []Yes  Respiratory:  [x]Unlabored []Labored []Cough ([] Productive []Unproductive)  HCG Required: [x]No []Yes   Results: []Negative []Positive  Knowledge Level:        [x]Patient/Other verbalized understanding of pre-procedure instructions. [x]Assessment of post-op care needs (transportation, responsible caregiver)        [x]Able to discuss health care problems and how to deal with it.   Factors that Affect Teaching:        Language Barrier: [x]No []Yes - why:        Hearing Loss:        [x]No []Yes            Corrective Device:  []Yes []No        Vision Loss:           [x]No []Yes            Corrective Device:  []Yes []No        Memory Loss:       [x]No []Yes            []Short Term []Long Term  Motivational Level:  [x]Asks Questions                  []Extremely Anxious       [x]Seems Interested               []Seems Uninterested                  [x]Denies need for Education  Risk for Injury:  [x]Patient oriented to person, place and time  []History of frequent falls/loss of balance  Nutritional:  []Change in appetite   []Weight Gain   []Weight Loss  Functional:  []Requires assistance with ADL's      Nursing Admission Record    Current Issues / Kameron Muse / ER Visits:  No    Percentage of Pain Relief after Last Procedure:  75 %    How long lasted:  2 months    Radiology exams received during the last 12 months: Yes       When oct                                              Where [facility]       Imaging on chart: Yes         Imaging records requested: No  MRI exams received in the past 2 years:  Yes  Physical therapy during the last 6 months: No       When: na                                             Where na  Labs during the last 12 months: Yes    Education Provided:  [x] Review of Tyree Hung  [] Agreement Review  [] Compliance Issues Discussed    [] Cognitive Behavior Needs [x] Exercise [] Review of Test [] Financial Issues  [x] Tobacco/Alcohol Use [x] Teaching [] New Patient [] Picture Obtained    Physician Plan:  [] Outgoing Referral  [] Pharmacy Consult  [] Test Ordered   [] Obtained Test Results / Consult Notes  [] UDS due at next visit, verified per EPIC      [] Suspected Physical Abuse or Suicide Risk assessed - IF YES COMPLETE QUESTIONS BELOW    If any of the following questions are answered yes - contact attending physician for referral:    Has been considering harming self to escape stress, pain problems? [] YES  [x] NO  Has a suicide plan? [] YES  [x] NO  Has attempted suicide in the past?   [] YES  [x] NO  Has a close friend or family member who committed suicide? [] YES  [x] NO    Patient Referred To :      Additional Notes:    Assessment Completed by:  Electronically signed by Pierre Underwood RN on 10/19/2017 at 10:46 AM

## 2017-10-19 NOTE — PROCEDURES
DATE: 10/19/2017      REASON FOR VISIT: Lumbar Degenerative Disc Disease, Lumbar Radiculopathy  PROCEDURE: Lumbar Epidural Steroid Injection. [] Moderate Sedation    DESCRIPTION OF PROCEDURE:            After obtaining informed consent, the patient was taken to the procedure room, positioned prone, and sterilely prepped. The procedure was performed under fluoroscopic guidance. First 5 ml of 1% Xylocaine was used at L3 - 4 for local anesthesia. A 19-gauge GEO'Supptead needle was advanced to the epidural space. The was confirmed on the fluoroscope with an injection of [x] 2ml   Contrast.  Then, [x] 5ml of 0.25% Marcaine, [x] 4ml of Normal Saline, and [x] 80 mg of Depo Medrol was gently injected. There were no complications. DIAGNOSES:  [] Low Back Pain  [x] *Lumbar Radiculopathy  [x] *Lumbar Degenerative Disc Disease  [] *Lumbar Spinal Stenosis  [] *Lumbar Postlaminectomy Syndrome  [] Other    MRI LOWER EXTREMITY RIGHT W JT WO CONTRAST 10/10/2017 3:42 PM   HISTORY: Z87.81   COMPARISON: None    TECHNIQUE: Multiplanar, multisequential MRI images of the right hip   were obtained. Additional coronal STIR and T1 images of the pelvis   were obtained.     FINDINGS:     Susceptibility is seen from the dynamic compression screws within the   proximal right femur and right femoral head. This also limits   evaluation of the right hip joint. No marrow signal abnormality is   seen. No definite joint effusion is seen. The sacroiliac joints and pubic   symphysis are normal in appearance. There is moderate atrophy of the pelvic muscles. No abnormal   intramuscular signal is noted. No tears are seen. The remaining soft   tissues are unremarkable. Limited evaluation of the intrapelvic soft tissues demonstrates no   gross abnormality; although note is made that the exam is not tailored   for evaluation of the intrapelvic soft tissues.        Impression   1.  Slightly limited evaluation of the right hip due to metallic

## 2017-10-20 ENCOUNTER — RESULTS ENCOUNTER (OUTPATIENT)
Dept: BARIATRICS/WEIGHT MGMT | Facility: CLINIC | Age: 42
End: 2017-10-20

## 2017-10-20 DIAGNOSIS — E66.01 MORBID OBESITY, UNSPECIFIED OBESITY TYPE (HCC): ICD-10-CM

## 2017-10-20 DIAGNOSIS — I10 HYPERTENSION, WELL CONTROLLED: ICD-10-CM

## 2017-10-24 ENCOUNTER — INFUSION (OUTPATIENT)
Dept: ONCOLOGY | Facility: HOSPITAL | Age: 42
End: 2017-10-24

## 2017-10-24 VITALS
HEART RATE: 86 BPM | BODY MASS INDEX: 45.99 KG/M2 | WEIGHT: 293 LBS | TEMPERATURE: 96.3 F | DIASTOLIC BLOOD PRESSURE: 77 MMHG | SYSTOLIC BLOOD PRESSURE: 124 MMHG | OXYGEN SATURATION: 97 % | HEIGHT: 67 IN | RESPIRATION RATE: 18 BRPM

## 2017-10-24 DIAGNOSIS — K90.9 MALABSORPTION OF IRON: Primary | ICD-10-CM

## 2017-10-24 DIAGNOSIS — D50.8 IRON DEFICIENCY ANEMIA SECONDARY TO INADEQUATE DIETARY IRON INTAKE: ICD-10-CM

## 2017-10-24 PROCEDURE — 96365 THER/PROPH/DIAG IV INF INIT: CPT

## 2017-10-24 PROCEDURE — 25010000002 IRON SUCROSE PER 1 MG: Performed by: INTERNAL MEDICINE

## 2017-10-24 RX ORDER — SODIUM CHLORIDE 9 MG/ML
250 INJECTION, SOLUTION INTRAVENOUS ONCE
Status: COMPLETED | OUTPATIENT
Start: 2017-10-24 | End: 2017-10-24

## 2017-10-24 RX ADMIN — IRON SUCROSE 200 MG: 20 INJECTION, SOLUTION INTRAVENOUS at 11:51

## 2017-10-24 RX ADMIN — SODIUM CHLORIDE 250 ML: 9 INJECTION, SOLUTION INTRAVENOUS at 11:48

## 2017-10-25 ENCOUNTER — INFUSION (OUTPATIENT)
Dept: ONCOLOGY | Facility: HOSPITAL | Age: 42
End: 2017-10-25

## 2017-10-25 VITALS
TEMPERATURE: 97.8 F | RESPIRATION RATE: 18 BRPM | OXYGEN SATURATION: 100 % | WEIGHT: 293 LBS | DIASTOLIC BLOOD PRESSURE: 72 MMHG | HEART RATE: 85 BPM | BODY MASS INDEX: 45.99 KG/M2 | HEIGHT: 67 IN | SYSTOLIC BLOOD PRESSURE: 116 MMHG

## 2017-10-25 DIAGNOSIS — K90.9 MALABSORPTION OF IRON: Primary | ICD-10-CM

## 2017-10-25 DIAGNOSIS — D50.8 IRON DEFICIENCY ANEMIA SECONDARY TO INADEQUATE DIETARY IRON INTAKE: ICD-10-CM

## 2017-10-25 PROCEDURE — 96365 THER/PROPH/DIAG IV INF INIT: CPT

## 2017-10-25 PROCEDURE — 25010000002 IRON SUCROSE PER 1 MG: Performed by: INTERNAL MEDICINE

## 2017-10-25 RX ORDER — DIPHENHYDRAMINE HYDROCHLORIDE 50 MG/ML
50 INJECTION INTRAMUSCULAR; INTRAVENOUS AS NEEDED
Status: DISCONTINUED | OUTPATIENT
Start: 2017-10-25 | End: 2017-10-25 | Stop reason: HOSPADM

## 2017-10-25 RX ORDER — SODIUM CHLORIDE 9 MG/ML
250 INJECTION, SOLUTION INTRAVENOUS ONCE
Status: COMPLETED | OUTPATIENT
Start: 2017-10-25 | End: 2017-10-25

## 2017-10-25 RX ORDER — FAMOTIDINE 10 MG/ML
20 INJECTION, SOLUTION INTRAVENOUS AS NEEDED
Status: DISCONTINUED | OUTPATIENT
Start: 2017-10-25 | End: 2017-10-25 | Stop reason: HOSPADM

## 2017-10-25 RX ADMIN — SODIUM CHLORIDE 250 ML: 9 INJECTION, SOLUTION INTRAVENOUS at 12:51

## 2017-10-25 RX ADMIN — IRON SUCROSE 200 MG: 20 INJECTION, SOLUTION INTRAVENOUS at 12:51

## 2017-10-30 ENCOUNTER — TELEPHONE (OUTPATIENT)
Dept: PRIMARY CARE CLINIC | Age: 42
End: 2017-10-30

## 2017-10-30 ENCOUNTER — OFFICE VISIT (OUTPATIENT)
Dept: BARIATRICS/WEIGHT MGMT | Facility: CLINIC | Age: 42
End: 2017-10-30

## 2017-10-30 ENCOUNTER — HOSPITAL ENCOUNTER (OUTPATIENT)
Dept: WOMENS IMAGING | Age: 42
Discharge: HOME | End: 2017-10-30
Payer: MEDICARE

## 2017-10-30 VITALS
HEIGHT: 65 IN | WEIGHT: 293 LBS | SYSTOLIC BLOOD PRESSURE: 122 MMHG | BODY MASS INDEX: 48.82 KG/M2 | TEMPERATURE: 97.1 F | HEART RATE: 87 BPM | OXYGEN SATURATION: 100 % | DIASTOLIC BLOOD PRESSURE: 80 MMHG

## 2017-10-30 DIAGNOSIS — Z12.31 ENCOUNTER FOR SCREENING MAMMOGRAM FOR MALIGNANT NEOPLASM OF BREAST: ICD-10-CM

## 2017-10-30 DIAGNOSIS — E66.01 OBESITY, MORBID, BMI 50 OR HIGHER (HCC): Primary | ICD-10-CM

## 2017-10-30 DIAGNOSIS — D50.8 IRON DEFICIENCY ANEMIA SECONDARY TO INADEQUATE DIETARY IRON INTAKE: ICD-10-CM

## 2017-10-30 DIAGNOSIS — Z86.39 PERSONAL HISTORY OF NUTRITIONAL DEFICIENCY: Primary | ICD-10-CM

## 2017-10-30 PROBLEM — R29.818 SUSPECTED SLEEP APNEA: Status: RESOLVED | Noted: 2017-05-08 | Resolved: 2017-10-30

## 2017-10-30 PROCEDURE — 77063 BREAST TOMOSYNTHESIS BI: CPT

## 2017-10-30 PROCEDURE — 99212 OFFICE O/P EST SF 10 MIN: CPT | Performed by: NURSE PRACTITIONER

## 2017-10-30 NOTE — PROGRESS NOTES
"Subjective   Naomi Bhatia is a 41 y.o. female.     History of Present Illness   Naomi Bhatia is here with morbid obesity and desires to have surgery for weight loss. This is the patient's 7th visit to the office.  Patient has seen [facility], but needs re-evaluation. She has that appt on November 10th. Patient has not been exercising this past month as she is now needing a right hip replacement. Patient has been making health dietary choices and eating 3 meals per day with protein at each. Patient has been getting sources of protein in every day. Patient is drinking 64 ounces of water per day. She had her EGD done with GI. She has been cleared by cardiology and pulmonary specialists. She is seeing hematologist here at [facility] and she is now receiving iron transfusions.   Vitals:    10/30/17 1301   BP: 122/80   BP Location: Right arm   Patient Position: Sitting   Cuff Size: Adult   Pulse: 87   Temp: 97.1 °F (36.2 °C)   SpO2: 100%   Weight: (!) 320 lb 9.6 oz (145 kg)   Height: 65\" (165.1 cm)         The following portions of the patient's history were reviewed and updated as appropriate: allergies, current medications, past family history, past medical history, past social history, past surgical history and problem list.    Review of Systems   Constitutional: Negative for activity change, appetite change and fatigue.   HENT: Positive for hearing loss and trouble swallowing.    Eyes: Negative.    Respiratory: Negative.  Negative for apnea, cough, chest tightness and shortness of breath.    Cardiovascular: Positive for leg swelling. Negative for chest pain and palpitations.   Gastrointestinal: Negative.  Negative for abdominal pain, anal bleeding, constipation, nausea and vomiting.   Endocrine: Negative.         Night sweats   Genitourinary: Negative.    Musculoskeletal: Positive for arthralgias, back pain, myalgias and neck pain.   Skin: Negative.    Allergic/Immunologic: Negative.    Neurological: " Positive for numbness and headaches. Negative for seizures and syncope.   Hematological: Bruises/bleeds easily.        Hx of anemia and is receiving iron transfusions   Psychiatric/Behavioral: Positive for dysphoric mood. Negative for self-injury, sleep disturbance and suicidal ideas. The patient is nervous/anxious.        Objective   Physical Exam   Constitutional: She is oriented to person, place, and time. Vital signs are normal. She appears well-developed and well-nourished. She is cooperative. No distress.   HENT:   Head: Normocephalic and atraumatic.   Nose: Nose normal.   Mouth/Throat: Oropharynx is clear and moist. No oropharyngeal exudate or tonsillar abscesses.   Eyes: Conjunctivae, EOM and lids are normal. Pupils are equal, round, and reactive to light. Right eye exhibits no discharge. Left eye exhibits no discharge.   Neck: Trachea normal. Neck supple. No JVD present. Carotid bruit is not present. No rigidity. No tracheal deviation present. No thyromegaly present.   Cardiovascular: Normal rate, regular rhythm, S1 normal, S2 normal and normal heart sounds.    Pulmonary/Chest: Effort normal and breath sounds normal. No stridor. No respiratory distress. She has no wheezes. She has no rales.   Abdominal: Soft. Bowel sounds are normal. She exhibits no distension. There is no tenderness.   obese   Musculoskeletal: She exhibits no edema.        Right shoulder: She exhibits normal strength.   Lymphadenopathy:     She has no cervical adenopathy.   Neurological: She is alert and oriented to person, place, and time. She has normal strength. No cranial nerve deficit.   Skin: Skin is warm, dry and intact. No rash noted.   Psychiatric: She has a normal mood and affect. Her speech is normal and behavior is normal.   Alert and oriented x 3   Vitals reviewed.      Assessment/Plan   Naomi was seen today for follow-up, obesity, nutrition counseling and weight loss.    Diagnoses and all orders for this visit:    Obesity,  morbid, BMI 50 or higher    Iron deficiency anemia secondary to inadequate dietary iron intake          Naomi Bhatia has struggled some this month with healthy changes. Patient has gained 3 pounds due to inactivity from right hip pain.  Today we discussed healthy changes in lifestyle, diet, and exercise.   Intensive behavioral therapy for obesity was done today.   Goals for this month are: 3 meals per day with protein at each; eat protein first, use smaller plate; exercise as advised.  Keep psych appt for re-evaluation on 11/10/17.   Keep scheduled appt with hematologist and future iron infusions.  Follow up in one month for a weight recheck.

## 2017-10-31 ENCOUNTER — APPOINTMENT (OUTPATIENT)
Dept: ONCOLOGY | Facility: HOSPITAL | Age: 42
End: 2017-10-31
Attending: INTERNAL MEDICINE

## 2017-10-31 ENCOUNTER — INFUSION (OUTPATIENT)
Dept: ONCOLOGY | Facility: HOSPITAL | Age: 42
End: 2017-10-31

## 2017-10-31 VITALS
BODY MASS INDEX: 45.99 KG/M2 | WEIGHT: 293 LBS | HEIGHT: 67 IN | DIASTOLIC BLOOD PRESSURE: 65 MMHG | RESPIRATION RATE: 20 BRPM | SYSTOLIC BLOOD PRESSURE: 107 MMHG | OXYGEN SATURATION: 97 % | HEART RATE: 88 BPM | TEMPERATURE: 97.6 F

## 2017-10-31 DIAGNOSIS — D50.8 IRON DEFICIENCY ANEMIA SECONDARY TO INADEQUATE DIETARY IRON INTAKE: ICD-10-CM

## 2017-10-31 DIAGNOSIS — K90.9 MALABSORPTION OF IRON: Primary | ICD-10-CM

## 2017-10-31 PROCEDURE — G0463 HOSPITAL OUTPT CLINIC VISIT: HCPCS

## 2017-10-31 RX ORDER — FAMOTIDINE 10 MG/ML
20 INJECTION, SOLUTION INTRAVENOUS AS NEEDED
Status: CANCELLED | OUTPATIENT
Start: 2017-11-01

## 2017-10-31 RX ORDER — DIPHENHYDRAMINE HYDROCHLORIDE 50 MG/ML
50 INJECTION INTRAMUSCULAR; INTRAVENOUS AS NEEDED
Status: CANCELLED | OUTPATIENT
Start: 2017-11-01

## 2017-10-31 RX ORDER — DIPHENHYDRAMINE HYDROCHLORIDE 50 MG/ML
50 INJECTION INTRAMUSCULAR; INTRAVENOUS AS NEEDED
Status: DISCONTINUED | OUTPATIENT
Start: 2017-10-31 | End: 2017-10-31 | Stop reason: HOSPADM

## 2017-10-31 RX ORDER — SODIUM CHLORIDE 9 MG/ML
250 INJECTION, SOLUTION INTRAVENOUS ONCE
Status: DISCONTINUED | OUTPATIENT
Start: 2017-10-31 | End: 2017-10-31 | Stop reason: HOSPADM

## 2017-10-31 RX ORDER — FAMOTIDINE 10 MG/ML
20 INJECTION, SOLUTION INTRAVENOUS AS NEEDED
Status: DISCONTINUED | OUTPATIENT
Start: 2017-10-31 | End: 2017-10-31 | Stop reason: HOSPADM

## 2017-10-31 RX ORDER — SODIUM CHLORIDE 9 MG/ML
250 INJECTION, SOLUTION INTRAVENOUS ONCE
Status: CANCELLED | OUTPATIENT
Start: 2017-11-01

## 2017-11-01 ENCOUNTER — LAB (OUTPATIENT)
Dept: LAB | Facility: HOSPITAL | Age: 42
End: 2017-11-01

## 2017-11-01 ENCOUNTER — TELEPHONE (OUTPATIENT)
Dept: ONCOLOGY | Facility: HOSPITAL | Age: 42
End: 2017-11-01

## 2017-11-01 ENCOUNTER — INFUSION (OUTPATIENT)
Dept: ONCOLOGY | Facility: HOSPITAL | Age: 42
End: 2017-11-01

## 2017-11-01 ENCOUNTER — OFFICE VISIT (OUTPATIENT)
Dept: ONCOLOGY | Facility: CLINIC | Age: 42
End: 2017-11-01

## 2017-11-01 VITALS
OXYGEN SATURATION: 99 % | TEMPERATURE: 98.1 F | WEIGHT: 293 LBS | RESPIRATION RATE: 18 BRPM | BODY MASS INDEX: 45.99 KG/M2 | HEART RATE: 79 BPM | HEIGHT: 67 IN | SYSTOLIC BLOOD PRESSURE: 119 MMHG | DIASTOLIC BLOOD PRESSURE: 77 MMHG

## 2017-11-01 VITALS
HEART RATE: 88 BPM | DIASTOLIC BLOOD PRESSURE: 76 MMHG | SYSTOLIC BLOOD PRESSURE: 118 MMHG | HEIGHT: 67 IN | WEIGHT: 293 LBS | BODY MASS INDEX: 45.99 KG/M2 | OXYGEN SATURATION: 96 % | TEMPERATURE: 98.1 F | RESPIRATION RATE: 16 BRPM

## 2017-11-01 DIAGNOSIS — D50.8 IRON DEFICIENCY ANEMIA SECONDARY TO INADEQUATE DIETARY IRON INTAKE: ICD-10-CM

## 2017-11-01 DIAGNOSIS — D50.8 OTHER IRON DEFICIENCY ANEMIA: Primary | ICD-10-CM

## 2017-11-01 DIAGNOSIS — Z86.39 PERSONAL HISTORY OF NUTRITIONAL DEFICIENCY: ICD-10-CM

## 2017-11-01 DIAGNOSIS — K90.9 MALABSORPTION OF IRON: Primary | ICD-10-CM

## 2017-11-01 DIAGNOSIS — Z86.39 HISTORY OF IRON DEFICIENCY: Primary | ICD-10-CM

## 2017-11-01 LAB
ALBUMIN SERPL-MCNC: 3.9 G/DL (ref 3.5–5)
ALBUMIN/GLOB SERPL: 1.1 G/DL (ref 1.1–2.5)
ALP SERPL-CCNC: 110 U/L (ref 24–120)
ALT SERPL W P-5'-P-CCNC: 57 U/L (ref 0–54)
ANION GAP SERPL CALCULATED.3IONS-SCNC: 8 MMOL/L (ref 4–13)
AST SERPL-CCNC: 40 U/L (ref 7–45)
AUTO MIXED CELLS #: 0.7 10*3/MM3 (ref 0.1–2.6)
AUTO MIXED CELLS %: 5.4 % (ref 0.1–24)
BILIRUB SERPL-MCNC: 0.8 MG/DL (ref 0.1–1)
BUN BLD-MCNC: 18 MG/DL (ref 5–21)
BUN/CREAT SERPL: 25.7
CALCIUM SPEC-SCNC: 9.3 MG/DL (ref 8.4–10.4)
CHLORIDE SERPL-SCNC: 102 MMOL/L (ref 98–110)
CO2 SERPL-SCNC: 29 MMOL/L (ref 24–31)
CREAT BLD-MCNC: 0.7 MG/DL (ref 0.5–1.4)
ERYTHROCYTE [DISTWIDTH] IN BLOOD BY AUTOMATED COUNT: 18.8 % (ref 12–15)
GFR SERPL CREATININE-BSD FRML MDRD: 92 ML/MIN/1.73
GLOBULIN UR ELPH-MCNC: 3.4 GM/DL
GLUCOSE BLD-MCNC: 134 MG/DL (ref 70–100)
HCT VFR BLD AUTO: 33.1 % (ref 37–47)
HGB BLD-MCNC: 11.6 G/DL (ref 12–16)
HOLD SPECIMEN: NORMAL
LYMPHOCYTES # BLD AUTO: 3.3 10*3/MM3 (ref 0.8–7)
LYMPHOCYTES NFR BLD AUTO: 25.3 % (ref 15–45)
MCH RBC QN AUTO: 29.2 PG (ref 28–32)
MCHC RBC AUTO-ENTMCNC: 35 G/DL (ref 33–36)
MCV RBC AUTO: 83.4 FL (ref 82–98)
NEUTROPHILS # BLD AUTO: 8.9 10*3/MM3 (ref 1.5–8.3)
NEUTROPHILS NFR BLD AUTO: 69.3 % (ref 39–78)
PLATELET # BLD AUTO: 260 10*3/MM3 (ref 130–400)
PMV BLD AUTO: 8.7 FL (ref 6–12)
POTASSIUM BLD-SCNC: 4.2 MMOL/L (ref 3.5–5.3)
PROT SERPL-MCNC: 7.3 G/DL (ref 6.3–8.7)
RBC # BLD AUTO: 3.97 10*6/MM3 (ref 4.2–5.4)
SODIUM BLD-SCNC: 139 MMOL/L (ref 135–145)
WBC NRBC COR # BLD: 12.9 10*3/MM3 (ref 4.8–10.8)

## 2017-11-01 PROCEDURE — 80053 COMPREHEN METABOLIC PANEL: CPT

## 2017-11-01 PROCEDURE — 36415 COLL VENOUS BLD VENIPUNCTURE: CPT

## 2017-11-01 PROCEDURE — 25010000002 IRON SUCROSE PER 1 MG: Performed by: INTERNAL MEDICINE

## 2017-11-01 PROCEDURE — 99214 OFFICE O/P EST MOD 30 MIN: CPT | Performed by: INTERNAL MEDICINE

## 2017-11-01 PROCEDURE — 85025 COMPLETE CBC W/AUTO DIFF WBC: CPT

## 2017-11-01 PROCEDURE — 96365 THER/PROPH/DIAG IV INF INIT: CPT

## 2017-11-01 RX ORDER — FAMOTIDINE 10 MG/ML
20 INJECTION, SOLUTION INTRAVENOUS AS NEEDED
Status: ACTIVE | OUTPATIENT
Start: 2017-11-01

## 2017-11-01 RX ORDER — SODIUM CHLORIDE 9 MG/ML
250 INJECTION, SOLUTION INTRAVENOUS ONCE
Status: COMPLETED | OUTPATIENT
Start: 2017-11-01 | End: 2017-11-01

## 2017-11-01 RX ORDER — DIPHENHYDRAMINE HYDROCHLORIDE 50 MG/ML
50 INJECTION INTRAMUSCULAR; INTRAVENOUS AS NEEDED
Status: ACTIVE | OUTPATIENT
Start: 2017-11-01

## 2017-11-01 RX ADMIN — IRON SUCROSE 200 MG: 20 INJECTION, SOLUTION INTRAVENOUS at 12:08

## 2017-11-01 RX ADMIN — SODIUM CHLORIDE 250 ML: 9 INJECTION, SOLUTION INTRAVENOUS at 11:56

## 2017-11-01 NOTE — PROGRESS NOTES
[facility]  HEMATOLOGY & ONCOLOGY    Cancer Staging Information:  No matching staging information was found for the patient.      Subjective     VISIT DIAGNOSIS:   Encounter Diagnosis   Name Primary?   • Other iron deficiency anemia Yes       REASON FOR VISIT:     Chief Complaint   Patient presents with   • Anemia     Follow-up        HEMATOLOGY / ONCOLOGY HISTORY:    No history exists.           INTERVAL HISTORY  Patient ID: Naomi Bhatia is a 41 y.o. year old female on IV iron for anemia that is doing well. They were having difficulty getting IV assess hence she did not get infused yesterday as as originally planned. She reports tiredness improving. Denies BRPPR, Melena, or hematuria.    Past Medical History:   Past Medical History:   Diagnosis Date   • Anemia    • Anxiety    • Arthritis    • Asthma    • Back pain 03/14/2016    with left sided radiculopathy    • DDD (degenerative disc disease), lumbar     Dr. Stuart Zavaleta for pain manangement   • Depression    • Fatigue    • Fibromyalgia    • Headache    • History of blood transfusion    • Hypertension    • Iron deficiency anemia 9/12/2017   • Iron deficiency anemia secondary to inadequate dietary iron intake 9/12/2017   • Joint pain    • Obesity    • RLS (restless legs syndrome)    • Sleep apnea     positive sleep study; titration study in October     Past Surgical History:   Past Surgical History:   Procedure Laterality Date   • ANKLE SURGERY      Right    • APPENDECTOMY  04/19/2015   • BACK SURGERY  2000   • CERVICAL SPINE SURGERY  09/01/2015   • CHOLECYSTECTOMY      1995;lap   • COLONOSCOPY  05/02/2017    normal; do not return until age of 49 Dr. Gus Valentine   • HIP SURGERY  1987    right    • MOUTH SURGERY  1994   • NECK SURGERY     • TOOTH EXTRACTION     • UPPER GASTROINTESTINAL ENDOSCOPY  05/02/2017    Dr. Gus Valentine, negative for h.pylori, negative for Salomon's   • VAGINAL HYSTERECTOMY SALPINGO OOPHORECTOMY  2008    Partial and then  had another surgery to remove the rest     Social History:   Social History     Social History   • Marital status:      Spouse name: N/A   • Number of children: N/A   • Years of education: N/A     Occupational History   • Not on file.     Social History Main Topics   • Smoking status: Former Smoker     Packs/day: 1.00     Years: 25.00     Types: Cigarettes     Quit date: 2014   • Smokeless tobacco: Never Used   • Alcohol use Yes      Comment: rarely    • Drug use: No   • Sexual activity: Defer     Other Topics Concern   • Not on file     Social History Narrative     Family History:   Family History   Problem Relation Age of Onset   • Diabetes Mother    • Hypertension Mother    • Coronary artery disease Mother    • Cancer Mother    • Cancer Father    • Hypertension Father    • Heart disease Father    • Stroke Father    • Hypertension Sister    • Obesity Sister    • Cancer Brother    • Diabetes Brother    • Diabetes Maternal Grandmother    • Stroke Maternal Grandmother        Review of Systems   Constitutional: Negative.    HENT: Negative.    Eyes: Negative.    Respiratory: Positive for apnea. Negative for cough, choking and shortness of breath.    Cardiovascular: Negative.    Gastrointestinal: Positive for blood in stool. Negative for abdominal distention, abdominal pain, constipation, diarrhea, nausea and vomiting.   Genitourinary: Negative.    Musculoskeletal: Positive for back pain.        Right hip pain   Skin: Negative.    Allergic/Immunologic: Negative.    Neurological: Negative.    Hematological: Negative.    Psychiatric/Behavioral: Negative.         Performance Status:  Asymptomatic    Medications:    Current Outpatient Prescriptions   Medication Sig Dispense Refill   • acyclovir (ZOVIRAX) 800 MG tablet Take 800 mg by mouth 2 (Two) Times a Day.     • ALBUTEROL IN Inhale.     • CALCIUM CARBONATE PO Take 500 mg by mouth.     • carbidopa-levodopa (SINEMET)  MG per tablet Take 1 tablet by mouth  "Daily.     • Cholecalciferol (VITAMIN D3) 5000 UNITS capsule capsule Take 5,000 Units by mouth Daily.     • CRANBERRY PO Take  by mouth Daily.     • Cyclobenzaprine HCl (FLEXERIL PO) Take 10 mg by mouth As Needed.     • levocetirizine (XYZAL) 5 MG tablet TK 1 T PO QPM  0   • lisinopril (PRINIVIL,ZESTRIL) 10 MG tablet TK 1 T PO QD  2   • LYRICA 150 MG capsule TK 1 C PO BID  2   • metoprolol succinate XL (TOPROL XL) 50 MG 24 hr tablet Take 50 mg by mouth 2 (Two) Times a Day.     • Multiple Vitamin (MULTI VITAMIN PO) Take  by mouth Daily.     • nabumetone (RELAFEN) 500 MG tablet TK 1 T PO BID WF OR MILK  5   • nortriptyline (PAMELOR) 25 MG capsule TK ONE TO THREE CS PO QHS PRN  3   • Nutritional Supplements (EQ ESTROBLEND MENOPAUSE) tablet Take  by mouth Daily.     • omeprazole (priLOSEC) 20 MG capsule TK ONE C PO QD FIRST THING IN THE MORNING ON  AN EMPTY STOMACH  3   • oxyCODONE (ROXICODONE) 10 MG tablet TK 1 T PO BID PRN P  0   • venlafaxine (EFFEXOR) 75 MG tablet TK 1 T PO BID  11     No current facility-administered medications for this visit.        ALLERGIES:    Allergies   Allergen Reactions   • Azithromycin GI Intolerance     Severe Abdominal Pain        Objective      Vitals:    11/01/17 1051   BP: 118/76   Pulse: 88   Resp: 16   Temp: 98.1 °F (36.7 °C)   TempSrc: Tympanic   SpO2: 96%   Weight: (!) 325 lb 14.4 oz (148 kg)   Height: 67\" (170.2 cm)         Current Status 9/29/2017   ECOG score 0   Some recent data might be hidden         Physical Exam  General Appearance: Patient is awake, alert, oriented and in no acute distress. Patient is welldeveloped, wellnourished, and appears stated age.  HEENT: Normocephalic. Sclerae clear, conjunctiva pink, extraocular movements intact, pupils, round, reactive to light and  accommodation. Mouth and throat are clear with moist oral mucosa.  NECK: Supple, no jugular venous distention, thyroid not enlarged.  LYMPH: No cervical, supraclavicular, axillary, or inguinal " lymphadenopathy.  CHEST: Equal bilateral expansion, AP  diameter normal, resonant percussion note  LUNGS: Good air movement, no rales, rhonchi, rubs or wheezes with auscultation  CARDIO: Regular sinus rhythm, no murmurs, gallops or rubs.  ABDOMEN: Nondistended, soft, No tenderness, no guarding, no rebound, No hepatosplenomegaly. No abdominal masses. Bowel sounds positive. No hernia  GENITALIA: Not examined.  BREASTS: Not examined.  MUSKEL: No joint swelling, decreased motion, or inflammation  EXTREMS: No edema, clubbing, cyanosis, No varicose veins.  NEURO: Grossly nonfocal, Gait is coordinated and smooth, Cognition is preserved.  SKIN: No rashes, no ecchymoses, no petechia.  PSYCH: Oriented to time, place and person. Memory is preserved. Mood and affect appear normal  RECENT LABS:  Lab on 11/01/2017   Component Date Value Ref Range Status   • Glucose 11/01/2017 134* 70 - 100 mg/dL Final   • BUN 11/01/2017 18  5 - 21 mg/dL Final   • Creatinine 11/01/2017 0.70  0.50 - 1.40 mg/dL Final   • Sodium 11/01/2017 139  135 - 145 mmol/L Final   • Potassium 11/01/2017 4.2  3.5 - 5.3 mmol/L Final   • Chloride 11/01/2017 102  98 - 110 mmol/L Final   • CO2 11/01/2017 29.0  24.0 - 31.0 mmol/L Final   • Calcium 11/01/2017 9.3  8.4 - 10.4 mg/dL Final   • Total Protein 11/01/2017 7.3  6.3 - 8.7 g/dL Final   • Albumin 11/01/2017 3.90  3.50 - 5.00 g/dL Final   • ALT (SGPT) 11/01/2017 57* 0 - 54 U/L Final   • AST (SGOT) 11/01/2017 40  7 - 45 U/L Final   • Alkaline Phosphatase 11/01/2017 110  24 - 120 U/L Final   • Total Bilirubin 11/01/2017 0.8  0.1 - 1.0 mg/dL Final   • eGFR Non African Amer 11/01/2017 92  >60 mL/min/1.73 Final   • Globulin 11/01/2017 3.4  gm/dL Final   • A/G Ratio 11/01/2017 1.1  1.1 - 2.5 g/dL Final   • BUN/Creatinine Ratio 11/01/2017 25.7*   Final   • Anion Gap 11/01/2017 8.0  4.0 - 13.0 mmol/L Final   • WBC 11/01/2017 12.90* 4.80 - 10.80 10*3/mm3 Final   • RBC 11/01/2017 3.97* 4.20 - 5.40 10*6/mm3 Final   •  Hemoglobin 11/01/2017 11.6* 12.0 - 16.0 g/dL Final   • Hematocrit 11/01/2017 33.1* 37.0 - 47.0 % Final   • MCV 11/01/2017 83.4  82.0 - 98.0 fL Final   • MCH 11/01/2017 29.2  28.0 - 32.0 pg Final   • MCHC 11/01/2017 35.0  33.0 - 36.0 g/dL Final   • RDW 11/01/2017 18.8* 12.0 - 15.0 % Final   • MPV 11/01/2017 8.7  6.0 - 12.0 fL Final   • Platelets 11/01/2017 260  130 - 400 10*3/mm3 Final   • Neutrophil % 11/01/2017 69.3  39.0 - 78.0 % Final   • Lymphocyte % 11/01/2017 25.3  15.0 - 45.0 % Final   • Auto Mixed Cells % 11/01/2017 5.4  0.1 - 24.0 % Final   • Neutrophils, Absolute 11/01/2017 8.90* 1.50 - 8.30 10*3/mm3 Final   • Lymphocytes, Absolute 11/01/2017 3.30  0.80 - 7.00 10*3/mm3 Final   • Auto Mixed Cells # 11/01/2017 0.70  0.10 - 2.60 10*3/mm3 Final       RADIOLOGY:  No results found.         Assessment/Plan  Naomi Bhatia is a 41 y.o. year old female h/o obesity found to have anemia while being evaluated for bariatric surgery currently on iv iron that is doing well.    Patient Active Problem List   Diagnosis   • Morbid obesity   • Hypertension, well controlled   • Fatigue   • History of iron deficiency   • Vitamin D deficiency   • Iron deficiency anemia secondary to inadequate dietary iron intake   • Malabsorption of iron          1.Anemia: Suspect from heavy menstrual bleed. Patient had recent colonoscopy and EGD negative for active bleed. She will need total of 16 doses of IV venofar.     2. Difficult IV assess: Could not get a vein assess yesterday even with US . Will place a port. Surgical referral made.    3. Obesity: Being considered for bariatrics sx. Leukocytosis is reactive. Anemia due to heavy menstrual bleed. Also pt will get egd/c-scope to r/o gi blood loss.    4. Leukocytosis: Suspect reactive, also obesity could cause elevation in wbc. Bone marrow with no dyspoietic changes and no circulating blasts. She should be good to go for her surgery from hematologic standpoint.    5. Hip pain: I  obtained hip xray which showed no evidence of fracture, however she does have screws in her right hip. Pt will need hip replacement. To be done after her bariatric surgery.             Pedro Clements MD    11/1/2017    11:14 AM

## 2017-11-01 NOTE — PROGRESS NOTES
Subjective     HISTORY OF PRESENT ILLNESS:     History of Present Illness  ***    Past Medical History, Past Surgical History, Social History, Family History have been reviewed and are without significant changes except as mentioned.    Review of Systems   A comprehensive 14 point review of systems was performed and was negative except as mentioned.    Medications:  The current medication list was reviewed in the EMR    ALLERGIES:    Allergies   Allergen Reactions   • Azithromycin GI Intolerance     Severe Abdominal Pain        Objective      There were no vitals filed for this visit.  Current Status 9/29/2017   ECOG score 0   Some recent data might be hidden       Physical Exam  ***    RECENT LABS:  Hematology WBC   Date Value Ref Range Status   09/29/2017 10.35 4.80 - 10.80 10*3/mm3 Final     RBC   Date Value Ref Range Status   09/29/2017 3.90 (L) 4.20 - 5.40 10*6/mm3 Final     Hemoglobin   Date Value Ref Range Status   09/29/2017 11.1 (L) 12.0 - 16.0 g/dL Final     Hematocrit   Date Value Ref Range Status   09/29/2017 33.0 (L) 37.0 - 47.0 % Final     Platelets   Date Value Ref Range Status   09/29/2017 287 130 - 400 10*3/mm3 Final              Assessment/Plan   ***                  11/1/2017      CC:

## 2017-11-01 NOTE — CODE DOCUMENTATION
Spoke with Jen with vascular access team, Nursing staff and vascular access team were unable to start a peripheral line yesterday.  Called Dr. Clements's office and spoke with Cande RN.  Vascular access team unable to place PICC due to poor access.  Suggested a central line or port placement.  Marcelina MARRERO

## 2017-11-02 ENCOUNTER — ANESTHESIA EVENT (OUTPATIENT)
Dept: OPERATING ROOM | Age: 42
End: 2017-11-02

## 2017-11-02 RX ORDER — METOPROLOL SUCCINATE 50 MG/1
TABLET, EXTENDED RELEASE ORAL
Qty: 180 TABLET | Refills: 3 | Status: SHIPPED | OUTPATIENT
Start: 2017-11-02 | End: 2018-01-10 | Stop reason: SDUPTHER

## 2017-11-06 ENCOUNTER — HOSPITAL ENCOUNTER (OUTPATIENT)
Dept: LAB | Age: 42
Discharge: HOME | End: 2017-11-06
Payer: MEDICARE

## 2017-11-06 ENCOUNTER — HOSPITAL ENCOUNTER (OUTPATIENT)
Dept: NON INVASIVE DIAGNOSTICS | Age: 42
Discharge: HOME | End: 2017-11-06
Payer: MEDICARE

## 2017-11-06 ENCOUNTER — HOSPITAL ENCOUNTER (OUTPATIENT)
Dept: GENERAL RADIOLOGY | Age: 42
Discharge: HOME | End: 2017-11-06
Payer: MEDICARE

## 2017-11-06 ENCOUNTER — HOSPITAL ENCOUNTER (OUTPATIENT)
Dept: PREADMISSION TESTING | Age: 42
Setting detail: OUTPATIENT SURGERY
Discharge: HOME | End: 2017-11-06

## 2017-11-06 DIAGNOSIS — D64.9 ANEMIA, UNSPECIFIED TYPE: ICD-10-CM

## 2017-11-06 LAB
ALBUMIN SERPL-MCNC: 4 G/DL (ref 3.5–5.2)
ALP BLD-CCNC: 106 U/L (ref 35–104)
ALT SERPL-CCNC: 30 U/L (ref 5–33)
ANION GAP SERPL CALCULATED.3IONS-SCNC: 14 MMOL/L (ref 7–19)
AST SERPL-CCNC: 29 U/L (ref 5–32)
BASOPHILS ABSOLUTE: 0.1 K/UL (ref 0–0.2)
BASOPHILS RELATIVE PERCENT: 0.4 % (ref 0–1)
BILIRUB SERPL-MCNC: 0.6 MG/DL (ref 0.2–1.2)
BUN BLDV-MCNC: 13 MG/DL (ref 6–20)
CALCIUM SERPL-MCNC: 9 MG/DL (ref 8.6–10)
CHLORIDE BLD-SCNC: 101 MMOL/L (ref 98–111)
CO2: 27 MMOL/L (ref 22–29)
CREAT SERPL-MCNC: 0.7 MG/DL (ref 0.5–0.9)
EOSINOPHILS ABSOLUTE: 0.2 K/UL (ref 0–0.6)
EOSINOPHILS RELATIVE PERCENT: 1.6 % (ref 0–5)
GFR NON-AFRICAN AMERICAN: >60
GLUCOSE BLD-MCNC: 89 MG/DL (ref 74–109)
HCT VFR BLD CALC: 32.6 % (ref 37–47)
HEMOGLOBIN: 10.9 G/DL (ref 12–16)
LYMPHOCYTES ABSOLUTE: 4 K/UL (ref 1.1–4.5)
LYMPHOCYTES RELATIVE PERCENT: 33.9 % (ref 20–40)
MCH RBC QN AUTO: 29.9 PG (ref 27–31)
MCHC RBC AUTO-ENTMCNC: 33.4 G/DL (ref 33–37)
MCV RBC AUTO: 89.3 FL (ref 81–99)
MONOCYTES ABSOLUTE: 0.5 K/UL (ref 0–0.9)
MONOCYTES RELATIVE PERCENT: 4.4 % (ref 0–10)
NEUTROPHILS ABSOLUTE: 6.9 K/UL (ref 1.5–7.5)
NEUTROPHILS RELATIVE PERCENT: 59.1 % (ref 50–65)
PDW BLD-RTO: 18.8 % (ref 11.5–14.5)
PLATELET # BLD: 266 K/UL (ref 130–400)
PMV BLD AUTO: 9.8 FL (ref 9.4–12.3)
POTASSIUM SERPL-SCNC: 3.8 MMOL/L (ref 3.5–5)
RBC # BLD: 3.65 M/UL (ref 4.2–5.4)
SODIUM BLD-SCNC: 142 MMOL/L (ref 136–145)
TOTAL PROTEIN: 7 G/DL (ref 6.6–8.7)
WBC # BLD: 11.7 K/UL (ref 4.8–10.8)

## 2017-11-06 PROCEDURE — 71020 XR CHEST STANDARD TWO VW: CPT

## 2017-11-06 PROCEDURE — 93005 ELECTROCARDIOGRAM TRACING: CPT

## 2017-11-07 ENCOUNTER — HOSPITAL ENCOUNTER (OUTPATIENT)
Age: 42
Setting detail: OUTPATIENT SURGERY
Discharge: HOME | End: 2017-11-07
Attending: SPECIALIST | Admitting: SPECIALIST

## 2017-11-07 ENCOUNTER — ANESTHESIA (OUTPATIENT)
Dept: OPERATING ROOM | Age: 42
End: 2017-11-07
Payer: MEDICARE

## 2017-11-07 ENCOUNTER — HOSPITAL ENCOUNTER (OUTPATIENT)
Dept: GENERAL RADIOLOGY | Age: 42
Discharge: HOME | End: 2017-11-07
Payer: MEDICARE

## 2017-11-07 VITALS
HEIGHT: 67 IN | TEMPERATURE: 98.6 F | OXYGEN SATURATION: 92 % | HEART RATE: 75 BPM | WEIGHT: 293 LBS | RESPIRATION RATE: 16 BRPM | DIASTOLIC BLOOD PRESSURE: 61 MMHG | BODY MASS INDEX: 45.99 KG/M2 | SYSTOLIC BLOOD PRESSURE: 93 MMHG

## 2017-11-07 VITALS — SYSTOLIC BLOOD PRESSURE: 111 MMHG | DIASTOLIC BLOOD PRESSURE: 73 MMHG | OXYGEN SATURATION: 95 %

## 2017-11-07 DIAGNOSIS — D50.9 IRON DEFICIENCY ANEMIA, UNSPECIFIED IRON DEFICIENCY ANEMIA TYPE: ICD-10-CM

## 2017-11-07 DIAGNOSIS — D50.9 IRON DEFICIENCY ANEMIA, UNSPECIFIED IRON DEFICIENCY ANEMIA TYPE: Primary | ICD-10-CM

## 2017-11-07 PROCEDURE — 71010 XR CHEST PORTABLE: CPT

## 2017-11-07 PROCEDURE — C1788 PORT, INDWELLING, IMP: HCPCS | Performed by: SPECIALIST

## 2017-11-07 PROCEDURE — 36561 INSERT TUNNELED CV CATH: CPT

## 2017-11-07 PROCEDURE — G8918 PT W/O PREOP ORDER IV AB PRO: HCPCS

## 2017-11-07 PROCEDURE — 00532 ANES ACCESS CTR VENOUS CRCJ: CPT | Performed by: NURSE ANESTHETIST, CERTIFIED REGISTERED

## 2017-11-07 PROCEDURE — G8907 PT DOC NO EVENTS ON DISCHARG: HCPCS

## 2017-11-07 DEVICE — PORT VASC ACCS SGL LUMN DETACHED SIL CATH 9.6 FR SMRT PRT: Type: IMPLANTABLE DEVICE | Site: CHEST | Status: FUNCTIONAL

## 2017-11-07 RX ORDER — DIPHENHYDRAMINE HYDROCHLORIDE 50 MG/ML
12.5 INJECTION INTRAMUSCULAR; INTRAVENOUS
Status: DISCONTINUED | OUTPATIENT
Start: 2017-11-07 | End: 2017-11-07 | Stop reason: HOSPADM

## 2017-11-07 RX ORDER — MEPERIDINE HYDROCHLORIDE 25 MG/ML
12.5 INJECTION INTRAMUSCULAR; INTRAVENOUS; SUBCUTANEOUS EVERY 5 MIN PRN
Status: DISCONTINUED | OUTPATIENT
Start: 2017-11-07 | End: 2017-11-07 | Stop reason: HOSPADM

## 2017-11-07 RX ORDER — HYDROMORPHONE HCL 110MG/55ML
0.5 PATIENT CONTROLLED ANALGESIA SYRINGE INTRAVENOUS EVERY 5 MIN PRN
Status: DISCONTINUED | OUTPATIENT
Start: 2017-11-07 | End: 2017-11-07 | Stop reason: HOSPADM

## 2017-11-07 RX ORDER — PROMETHAZINE HYDROCHLORIDE 25 MG/ML
12.5 INJECTION, SOLUTION INTRAMUSCULAR; INTRAVENOUS
Status: DISCONTINUED | OUTPATIENT
Start: 2017-11-07 | End: 2017-11-07 | Stop reason: HOSPADM

## 2017-11-07 RX ORDER — CEFAZOLIN SODIUM 1 G/3ML
INJECTION, POWDER, FOR SOLUTION INTRAMUSCULAR; INTRAVENOUS PRN
Status: DISCONTINUED | OUTPATIENT
Start: 2017-11-07 | End: 2017-11-07 | Stop reason: SDUPTHER

## 2017-11-07 RX ORDER — HYDROCODONE BITARTRATE AND ACETAMINOPHEN 7.5; 325 MG/1; MG/1
1 TABLET ORAL EVERY 4 HOURS PRN
Qty: 30 TABLET | Refills: 0
Start: 2017-11-07 | End: 2017-11-14

## 2017-11-07 RX ORDER — OXYCODONE HYDROCHLORIDE AND ACETAMINOPHEN 5; 325 MG/1; MG/1
2 TABLET ORAL PRN
Status: DISCONTINUED | OUTPATIENT
Start: 2017-11-07 | End: 2017-11-07 | Stop reason: HOSPADM

## 2017-11-07 RX ORDER — ONDANSETRON 2 MG/ML
4 INJECTION INTRAMUSCULAR; INTRAVENOUS
Status: DISCONTINUED | OUTPATIENT
Start: 2017-11-07 | End: 2017-11-07 | Stop reason: HOSPADM

## 2017-11-07 RX ORDER — FENTANYL CITRATE 50 UG/ML
50 INJECTION, SOLUTION INTRAMUSCULAR; INTRAVENOUS EVERY 5 MIN PRN
Status: DISCONTINUED | OUTPATIENT
Start: 2017-11-07 | End: 2017-11-07 | Stop reason: HOSPADM

## 2017-11-07 RX ORDER — SODIUM CHLORIDE, SODIUM LACTATE, POTASSIUM CHLORIDE, CALCIUM CHLORIDE 600; 310; 30; 20 MG/100ML; MG/100ML; MG/100ML; MG/100ML
INJECTION, SOLUTION INTRAVENOUS CONTINUOUS
Status: DISCONTINUED | OUTPATIENT
Start: 2017-11-07 | End: 2017-11-07 | Stop reason: HOSPADM

## 2017-11-07 RX ORDER — MIDAZOLAM HYDROCHLORIDE 1 MG/ML
INJECTION INTRAMUSCULAR; INTRAVENOUS PRN
Status: DISCONTINUED | OUTPATIENT
Start: 2017-11-07 | End: 2017-11-07 | Stop reason: SDUPTHER

## 2017-11-07 RX ORDER — FENTANYL CITRATE 50 UG/ML
INJECTION, SOLUTION INTRAMUSCULAR; INTRAVENOUS PRN
Status: DISCONTINUED | OUTPATIENT
Start: 2017-11-07 | End: 2017-11-07 | Stop reason: SDUPTHER

## 2017-11-07 RX ORDER — LIDOCAINE HYDROCHLORIDE 10 MG/ML
INJECTION, SOLUTION INFILTRATION; PERINEURAL PRN
Status: DISCONTINUED | OUTPATIENT
Start: 2017-11-07 | End: 2017-11-07 | Stop reason: HOSPADM

## 2017-11-07 RX ORDER — LABETALOL HYDROCHLORIDE 5 MG/ML
5 INJECTION, SOLUTION INTRAVENOUS EVERY 10 MIN PRN
Status: DISCONTINUED | OUTPATIENT
Start: 2017-11-07 | End: 2017-11-07 | Stop reason: HOSPADM

## 2017-11-07 RX ORDER — PROPOFOL 10 MG/ML
INJECTION, EMULSION INTRAVENOUS PRN
Status: DISCONTINUED | OUTPATIENT
Start: 2017-11-07 | End: 2017-11-07 | Stop reason: SDUPTHER

## 2017-11-07 RX ORDER — LIDOCAINE HYDROCHLORIDE 10 MG/ML
1 INJECTION, SOLUTION EPIDURAL; INFILTRATION; INTRACAUDAL; PERINEURAL
Status: DISCONTINUED | OUTPATIENT
Start: 2017-11-07 | End: 2017-11-07 | Stop reason: HOSPADM

## 2017-11-07 RX ORDER — HYDROMORPHONE HCL 110MG/55ML
0.25 PATIENT CONTROLLED ANALGESIA SYRINGE INTRAVENOUS EVERY 5 MIN PRN
Status: DISCONTINUED | OUTPATIENT
Start: 2017-11-07 | End: 2017-11-07 | Stop reason: HOSPADM

## 2017-11-07 RX ORDER — OXYCODONE HYDROCHLORIDE AND ACETAMINOPHEN 5; 325 MG/1; MG/1
1 TABLET ORAL PRN
Status: DISCONTINUED | OUTPATIENT
Start: 2017-11-07 | End: 2017-11-07 | Stop reason: HOSPADM

## 2017-11-07 RX ORDER — HYDRALAZINE HYDROCHLORIDE 20 MG/ML
5 INJECTION INTRAMUSCULAR; INTRAVENOUS EVERY 10 MIN PRN
Status: DISCONTINUED | OUTPATIENT
Start: 2017-11-07 | End: 2017-11-07 | Stop reason: HOSPADM

## 2017-11-07 RX ADMIN — MIDAZOLAM HYDROCHLORIDE 2 MG: 1 INJECTION INTRAMUSCULAR; INTRAVENOUS at 13:59

## 2017-11-07 RX ADMIN — FENTANYL CITRATE 50 MCG: 50 INJECTION, SOLUTION INTRAMUSCULAR; INTRAVENOUS at 14:13

## 2017-11-07 RX ADMIN — PROPOFOL 200 MG: 10 INJECTION, EMULSION INTRAVENOUS at 14:06

## 2017-11-07 RX ADMIN — FENTANYL CITRATE 50 MCG: 50 INJECTION, SOLUTION INTRAMUSCULAR; INTRAVENOUS at 14:03

## 2017-11-07 RX ADMIN — CEFAZOLIN SODIUM 1000 MG: 1 INJECTION, POWDER, FOR SOLUTION INTRAMUSCULAR; INTRAVENOUS at 13:59

## 2017-11-07 RX ADMIN — PROPOFOL 30 MG: 10 INJECTION, EMULSION INTRAVENOUS at 14:21

## 2017-11-07 ASSESSMENT — PAIN SCALES - GENERAL: PAINLEVEL_OUTOF10: 0

## 2017-11-07 ASSESSMENT — PAIN DESCRIPTION - FREQUENCY: FREQUENCY: CONTINUOUS

## 2017-11-07 NOTE — OP NOTE
Operative Note  [facility]          Pre- op Dx: iron deficiency anemia    Post-op Dx:  Same    Procedure: Port placement    Surgeon:  Jake Thomson M.D. Anesthesia:  MAC        Description of Procedure: The patient was brought to the operating room, positioned supine, prepped and draped sterilely. Lidocaine was injected for local anesthesia. The subclavian vein was accessed, wire passed and fluoro was used to confirm placement. The pocket was developed. The catheter was tunneled, cut to size, secured to the port and port was sutured into place. The sheath was passed, the catheter fed and fluoro confirmed proper positioning. Good flush and backflow confirmed. Wounds were closed with 3-0 and 4-0 vicryl suture. Dressing placed, patient awakened and transferred to recovery room in stable condition. At end of procedure, all counts were correct.         Jake Thomson M.D.

## 2017-11-07 NOTE — ANESTHESIA POSTPROCEDURE EVALUATION
Department of Anesthesiology  Postprocedure Note    Patient: Nano Aponte  MRN: [id number]  YOB: 1975  Date of evaluation: 11/7/2017  Time:  2:41 PM     Procedure Summary     Date:  11/07/17 Room / Location:  [facility] OR  / [facility] OR    Anesthesia Start:  1359 Anesthesia Stop:      Procedure:  PORT INSERTION (Left Chest) Diagnosis:  (ANEMIA)    Surgeon:  Clarice Yancey MD Responsible Provider:  Veena Joshi CRNA    Anesthesia Type:  MAC ASA Status:  3          Anesthesia Type: MAC    Martha Phase I:      Martha Phase II:      Last vitals: Reviewed and per EMR flowsheets.        Anesthesia Post Evaluation    Patient location during evaluation: bedside  Patient participation: complete - patient participated  Level of consciousness: awake and alert  Pain score: 0  Airway patency: patent  Nausea & Vomiting: no nausea and no vomiting  Complications: no  Cardiovascular status: blood pressure returned to baseline  Respiratory status: acceptable  Hydration status: euvolemic

## 2017-11-07 NOTE — ANESTHESIA PRE PROCEDURE
(2.5 MG/3ML) 0.083% nebulizer solution Take 3 mLs by nebulization every 6 hours as needed for Wheezing 3/10/17   Irma BooASHLEE   albuterol sulfate HFA (PROVENTIL HFA) 108 (90 BASE) MCG/ACT inhaler Inhale 2 puffs into the lungs every 6 hours as needed for Wheezing 3/7/17   B Melvin Tamez DO   ondansetron (ZOFRAN ODT) 4 MG disintegrating tablet Take 1 tablet by mouth every 8 hours as needed for Nausea or Vomiting 2/19/17   Yanna CrimesASHLEE   cyclobenzaprine (FLEXERIL) 10 MG tablet Take 1 tablet by mouth 2 times daily as needed for Muscle spasms 1/24/17   Kathleen Donnelly MD   carbidopa-levodopa (SINEMET)  MG per tablet Take 1 tablet by mouth nightly as needed (leg spasms) 1/24/17   Kathleen Donnelly MD   levocetirizine (XYZAL) 5 MG tablet TAKE 1 TABLET BY MOUTH NIGHTLY 9/8/14   OMAR Tamez DO   calcium carbonate (OSCAL) 500 MG TABS tablet Take 500 mg by mouth daily. Historical Provider, MD   Multiple Vitamins-Minerals (MULTIVITAMIN & MINERAL PO) Take  by mouth. Historical Provider, MD   CRANBERRY Take 500 mg by mouth daily. Historical Provider, MD   Cholecalciferol (VITAMIN D3) 5000 UNITS TABS Take 5,000 Units by mouth daily     Historical Provider, MD       Current medications:    Current Facility-Administered Medications   Medication Dose Route Frequency Provider Last Rate Last Dose    lactated ringers infusion   Intravenous Continuous Karmenaspen Newsome CRNA        lidocaine PF 1 % injection 1 mL  1 mL Intradermal Once PRN Stephenie Turk CRNA           Allergies:     Allergies   Allergen Reactions    Zithromax [Azithromycin]      Severe abdominal pain        Problem List:    Patient Active Problem List   Diagnosis Code    Malaise and fatigue R53.81, R53.83    Insomnia G47.00    RLS (restless legs syndrome) G25.81    Foot pain M79.673    DDD (degenerative disc disease), lumbar M51.36    Back pain with left-sided radiculopathy M54.10    Chronic bilateral lower abdominal pain R10.31, G89.29, R10.32    BRBPR (bright red blood per rectum) K62.5    Family history of polyps in the colon Z83.71    Family history of esophageal cancer Z80.0    Cervical vertebral fusion M43.22    Lumbar disc disease with radiculopathy M51.16    Generalized abdominal pain R10.84    Drug-induced constipation K59.03    Lumbar disc disease with radiculopathy M51.16    Displacement of lumbar disc with radiculopathy M51.16       Past Medical History:        Diagnosis Date    Anxiety     Arthritis     Back pain with left-sided radiculopathy 3/14/2016    DDD (degenerative disc disease), lumbar     Depression     Esophagitis     Gastritis     Headache(784.0)     History of blood transfusion     Hypertension     Obesity     RLS (restless legs syndrome)     Sleep apnea        Past Surgical History:        Procedure Laterality Date    ANKLE SURGERY Right     APPENDECTOMY  4/19/15    BACK SURGERY  2000    CERVICAL SPINE SURGERY N/A 9/1/15    CHOLECYSTECTOMY  1995    HIP SURGERY Right 1987    HYSTERECTOMY      partial 2008, still has ovaries and cervix    LITHOTRIPSY  1995/2000    MOUTH SURGERY  1994    NECK SURGERY      MA COLONOSCOPY FLX DX W/COLLJ SPEC WHEN PFRMD N/A 5/2/2017    Dr Tiffanie Mccollum, 7 yr (age 47) recall    MA EGD TRANSORAL BIOPSY SINGLE/MULTIPLE N/A 5/2/2017    Dr ABHIJIT Baltazar-Gastritis/gastropathy    SALPINGO-OOPHORECTOMY      TOOTH EXTRACTION         Social History:    Social History   Substance Use Topics    Smoking status: Former Smoker     Packs/day: 1.00     Years: 25.00     Types: Cigarettes     Quit date: 9/19/2013    Smokeless tobacco: Never Used    Alcohol use Yes      Comment: rarely                                Counseling given: Not Answered      Vital Signs (Current): There were no vitals filed for this visit.                                            BP Readings from Last 3 Encounters:   10/19/17 (!) 101/57   10/19/17 125/73   10/03/17 128/78       NPO Status: Time of last liquid consumption: 2000                        Time of last solid consumption: 2000                        Date of last liquid consumption: 11/06/17                        Date of last solid food consumption: 11/06/17    BMI:   Wt Readings from Last 3 Encounters:   10/19/17 (!) 320 lb (145.2 kg)   10/03/17 (!) 320 lb (145.2 kg)   09/06/17 (!) 309 lb (140.2 kg)     There is no height or weight on file to calculate BMI.    CBC:   Lab Results   Component Value Date    WBC 11.7 11/06/2017    RBC 3.65 11/06/2017    HGB 10.9 11/06/2017    HCT 32.6 11/06/2017    MCV 89.3 11/06/2017    RDW 18.8 11/06/2017     11/06/2017       CMP:   Lab Results   Component Value Date     11/06/2017    K 3.8 11/06/2017     11/06/2017    CO2 27 11/06/2017    BUN 13 11/06/2017    CREATININE 0.7 11/06/2017    LABGLOM >60 11/06/2017    GLUCOSE 89 11/06/2017    PROT 7.0 11/06/2017    CALCIUM 9.0 11/06/2017    BILITOT 0.6 11/06/2017    ALKPHOS 106 11/06/2017    AST 29 11/06/2017    ALT 30 11/06/2017       POC Tests: No results for input(s): POCGLU, POCNA, POCK, POCCL, POCBUN, POCHEMO, POCHCT in the last 72 hours.     Coags:   Lab Results   Component Value Date    PROTIME 12.7 08/17/2015    INR 0.98 08/17/2015       HCG (If Applicable):   Lab Results   Component Value Date    PREGTESTUR NEGATIVE 09/19/2014        ABGs: No results found for: PHART, PO2ART, PAN8OOH, QCZ0LBW, BEART, Q1OYASJL     Type & Screen (If Applicable):  No results found for: LABABO, [address]    Anesthesia Evaluation  Patient summary reviewed and Nursing notes reviewed  Airway:   TM distance: <3 FB   Neck ROM: full  Mouth opening: < 3 FB Dental:    (+) upper dentures      Pulmonary:normal exam    (+) sleep apnea: on CPAP,                             Cardiovascular:    (+) hypertension:,          Beta Blocker:  Dose within 24 Hrs         Neuro/Psych:   (+) neuromuscular disease:, headaches:, psychiatric history:            GI/Hepatic/Renal: (+) GERD: no interval change,           Endo/Other: negative ROS                    Abdominal:           Vascular:                                      Anesthesia Plan      MAC     ASA 3       Induction: intravenous. Anesthetic plan and risks discussed with patient. Plan discussed with CRNA.                   Christos Addison CRNA   11/7/2017

## 2017-11-08 ENCOUNTER — TELEPHONE (OUTPATIENT)
Dept: ONCOLOGY | Facility: CLINIC | Age: 42
End: 2017-11-08

## 2017-11-08 DIAGNOSIS — D50.8 IRON DEFICIENCY ANEMIA SECONDARY TO INADEQUATE DIETARY IRON INTAKE: ICD-10-CM

## 2017-11-08 DIAGNOSIS — K90.9 MALABSORPTION OF IRON: ICD-10-CM

## 2017-11-08 RX ORDER — FAMOTIDINE 10 MG/ML
20 INJECTION, SOLUTION INTRAVENOUS AS NEEDED
Status: CANCELLED | OUTPATIENT
Start: 2017-12-07

## 2017-11-08 RX ORDER — FAMOTIDINE 10 MG/ML
20 INJECTION, SOLUTION INTRAVENOUS AS NEEDED
Status: CANCELLED | OUTPATIENT
Start: 2017-11-14

## 2017-11-08 RX ORDER — FAMOTIDINE 10 MG/ML
20 INJECTION, SOLUTION INTRAVENOUS AS NEEDED
Status: CANCELLED | OUTPATIENT
Start: 2017-12-13

## 2017-11-08 RX ORDER — SODIUM CHLORIDE 9 MG/ML
250 INJECTION, SOLUTION INTRAVENOUS ONCE
Status: CANCELLED | OUTPATIENT
Start: 2017-11-29

## 2017-11-08 RX ORDER — DIPHENHYDRAMINE HYDROCHLORIDE 50 MG/ML
50 INJECTION INTRAMUSCULAR; INTRAVENOUS AS NEEDED
Status: CANCELLED | OUTPATIENT
Start: 2017-11-21

## 2017-11-08 RX ORDER — SODIUM CHLORIDE 9 MG/ML
250 INJECTION, SOLUTION INTRAVENOUS ONCE
Status: CANCELLED | OUTPATIENT
Start: 2017-12-05

## 2017-11-08 RX ORDER — DIPHENHYDRAMINE HYDROCHLORIDE 50 MG/ML
50 INJECTION INTRAMUSCULAR; INTRAVENOUS AS NEEDED
Status: CANCELLED | OUTPATIENT
Start: 2017-11-29

## 2017-11-08 RX ORDER — SODIUM CHLORIDE 9 MG/ML
250 INJECTION, SOLUTION INTRAVENOUS ONCE
Status: CANCELLED | OUTPATIENT
Start: 2017-11-22

## 2017-11-08 RX ORDER — SODIUM CHLORIDE 9 MG/ML
250 INJECTION, SOLUTION INTRAVENOUS ONCE
Status: CANCELLED | OUTPATIENT
Start: 2017-11-15

## 2017-11-08 RX ORDER — DIPHENHYDRAMINE HYDROCHLORIDE 50 MG/ML
50 INJECTION INTRAMUSCULAR; INTRAVENOUS AS NEEDED
Status: CANCELLED | OUTPATIENT
Start: 2017-11-15

## 2017-11-08 RX ORDER — DIPHENHYDRAMINE HYDROCHLORIDE 50 MG/ML
50 INJECTION INTRAMUSCULAR; INTRAVENOUS AS NEEDED
Status: CANCELLED | OUTPATIENT
Start: 2017-11-22

## 2017-11-08 RX ORDER — SODIUM CHLORIDE 9 MG/ML
250 INJECTION, SOLUTION INTRAVENOUS ONCE
Status: CANCELLED | OUTPATIENT
Start: 2017-11-21

## 2017-11-08 RX ORDER — SODIUM CHLORIDE 9 MG/ML
250 INJECTION, SOLUTION INTRAVENOUS ONCE
Status: CANCELLED | OUTPATIENT
Start: 2017-12-13

## 2017-11-08 RX ORDER — DIPHENHYDRAMINE HYDROCHLORIDE 50 MG/ML
50 INJECTION INTRAMUSCULAR; INTRAVENOUS AS NEEDED
Status: CANCELLED | OUTPATIENT
Start: 2017-12-07

## 2017-11-08 RX ORDER — FAMOTIDINE 10 MG/ML
20 INJECTION, SOLUTION INTRAVENOUS AS NEEDED
Status: CANCELLED | OUTPATIENT
Start: 2017-11-28

## 2017-11-08 RX ORDER — FAMOTIDINE 10 MG/ML
20 INJECTION, SOLUTION INTRAVENOUS AS NEEDED
Status: CANCELLED | OUTPATIENT
Start: 2017-11-15

## 2017-11-08 RX ORDER — FAMOTIDINE 10 MG/ML
20 INJECTION, SOLUTION INTRAVENOUS AS NEEDED
Status: CANCELLED | OUTPATIENT
Start: 2017-11-22

## 2017-11-08 RX ORDER — SODIUM CHLORIDE 9 MG/ML
250 INJECTION, SOLUTION INTRAVENOUS ONCE
Status: CANCELLED | OUTPATIENT
Start: 2017-11-28

## 2017-11-08 RX ORDER — DIPHENHYDRAMINE HYDROCHLORIDE 50 MG/ML
50 INJECTION INTRAMUSCULAR; INTRAVENOUS AS NEEDED
Status: CANCELLED | OUTPATIENT
Start: 2017-12-13

## 2017-11-08 RX ORDER — SODIUM CHLORIDE 9 MG/ML
250 INJECTION, SOLUTION INTRAVENOUS ONCE
Status: CANCELLED | OUTPATIENT
Start: 2017-12-07

## 2017-11-08 RX ORDER — DIPHENHYDRAMINE HYDROCHLORIDE 50 MG/ML
50 INJECTION INTRAMUSCULAR; INTRAVENOUS AS NEEDED
Status: CANCELLED | OUTPATIENT
Start: 2017-12-05

## 2017-11-08 RX ORDER — DIPHENHYDRAMINE HYDROCHLORIDE 50 MG/ML
50 INJECTION INTRAMUSCULAR; INTRAVENOUS AS NEEDED
Status: CANCELLED | OUTPATIENT
Start: 2017-11-28

## 2017-11-08 RX ORDER — FAMOTIDINE 10 MG/ML
20 INJECTION, SOLUTION INTRAVENOUS AS NEEDED
Status: CANCELLED | OUTPATIENT
Start: 2017-12-05

## 2017-11-08 RX ORDER — FAMOTIDINE 10 MG/ML
20 INJECTION, SOLUTION INTRAVENOUS AS NEEDED
Status: CANCELLED | OUTPATIENT
Start: 2017-11-29

## 2017-11-08 RX ORDER — FAMOTIDINE 10 MG/ML
20 INJECTION, SOLUTION INTRAVENOUS AS NEEDED
Status: CANCELLED | OUTPATIENT
Start: 2017-11-21

## 2017-11-08 RX ORDER — DIPHENHYDRAMINE HYDROCHLORIDE 50 MG/ML
50 INJECTION INTRAMUSCULAR; INTRAVENOUS AS NEEDED
Status: CANCELLED | OUTPATIENT
Start: 2017-11-14

## 2017-11-08 RX ORDER — SODIUM CHLORIDE 9 MG/ML
250 INJECTION, SOLUTION INTRAVENOUS ONCE
Status: CANCELLED | OUTPATIENT
Start: 2017-11-14

## 2017-11-08 NOTE — TELEPHONE ENCOUNTER
Received call from patient, she reported that her port was placed yesterday and she is now ready to yousuf her Venofer iron infusions. Will yousuf and contact her with time and dates.

## 2017-11-10 ENCOUNTER — OFFICE VISIT (OUTPATIENT)
Dept: PSYCHIATRY | Age: 42
End: 2017-11-10
Payer: MEDICARE

## 2017-11-10 VITALS
BODY MASS INDEX: 45.99 KG/M2 | OXYGEN SATURATION: 96 % | SYSTOLIC BLOOD PRESSURE: 138 MMHG | DIASTOLIC BLOOD PRESSURE: 86 MMHG | WEIGHT: 293 LBS | HEART RATE: 90 BPM | HEIGHT: 67 IN

## 2017-11-10 DIAGNOSIS — E66.01 MORBID OBESITY WITH BMI OF 50.0-59.9, ADULT (HCC): ICD-10-CM

## 2017-11-10 DIAGNOSIS — E66.01 MORBID OBESITY DUE TO EXCESS CALORIES (HCC): Primary | ICD-10-CM

## 2017-11-10 PROCEDURE — 99999 PR OFFICE/OUTPT VISIT,PROCEDURE ONLY: CPT | Performed by: NURSE PRACTITIONER

## 2017-11-10 PROCEDURE — 90832 PSYTX W PT 30 MINUTES: CPT | Performed by: NURSE PRACTITIONER

## 2017-11-10 NOTE — PROGRESS NOTES
11/10/2017 9:57 AM   Progress Note        Eva Mike 1975  Psychotherapy Time Spent: 23 min      Psychotherapy Topics: family, health, financial    Surgery reassessment  Subjective: The patient is a 42 y.o.   female who is here for a routine office visit. Last seen by this writer on 6/27/17 for Bariatric Surgery Reassessment. Dx: Morbid Obesity related to excess calories    Continues in therapy with 7 yo son. Going well. Depression: Decreased depression after divorce. \"I wanted to shout to the [place] I got me back! I got my own last name back too\"  She feels getting saved has also helped her. Anxiety: Denies. Continues with compulsive behaviors with hangers and clothes. Sleep: Better. Using CPAP now. Looks more rested today than at last appt. Appetite: Stopped pop 6 months ago, trying to focus on eating protein first. Has gained 12 lbs since last appt. \"I'm less mobile now. \"  Substance Use: Denies  Pain: 5 out of 10, in pain management with Dr. Bernard Earing [employer] now and is enjoying it also involved in a [employer] activity on Wednesday nights. Financial stressors continue. Has gotten help from her friend, [address] , [place] to [employer], [address]. Has paid off her furniture which has freed up some money. Next month taking a class from [employer] for money management. Once she finishes class she will be able to get help from them. They help pay for utilities and food. She knew about it because she has gotten help from them in the past.     We discussed a big concern at last appt was financial- that the food needed for her new lifestyle can be more expensive than she may be able to afford. She agrees. \"I have kolby. \"    Friend has been cleaning her house. \"I'm not able to do much because of weight and I'm not very mobile. \"  Pt plans to clean more starting in January after surgery. Friend will come in and do heavy cleaning.   \"A new year. A new me. \"    Patient reports side effects as follows: No meds from this office. No evidence of EPS, no cogwheeling or abnormal motor movements. Absent  suicidal ideation. Reports compliance with medications as good . Review of Systems - Iron infusions, port inserted for these, pain management, needs hip replacement      Current Meds:    Prior to Admission medications    Medication Sig Start Date End Date Taking? Authorizing Provider   HYDROcodone-acetaminophen (NORCO) 7.5-325 MG per tablet Take 1 tablet by mouth every 4 hours as needed for Pain . 11/7/17 11/14/17  Oumou Giles MD   metoprolol succinate (TOPROL XL) 50 MG extended release tablet TAKE 2 TABLETS BY MOUTH DAILY 11/2/17   B Saud Allis, DO   oxyCODONE HCl (OXY-IR) 10 MG immediate release tablet Take 1 tablet by mouth 2 times daily as needed for Pain (month supply) .  Earliest Fill Date: 11/1/17 11/1/17   Phillip Ormond, MD   diclofenac 2 % SOLN Place 2 Squirts onto the skin 4 times daily as needed (as needed for right shoulder pain) 10/19/17   Phillip Ormond, MD   acyclovir (ZOVIRAX) 800 MG tablet TAKE 1 TABLET BY MOUTH DAILY 10/10/17   ASHLEE Clark   pregabalin (LYRICA) 150 MG capsule Take 1 capsule by mouth 2 times daily 9/6/17   ASHLEE Herron   nortriptyline (PAMELOR) 25 MG capsule Take 1 capsule by mouth nightly 1-3 tabs at bedtime as needed 7/5/17   ASHLEE Herron   nabumetone (RELAFEN) 500 MG tablet Take 1 tablet by mouth 2 times daily 6/29/17   ASHLEE Moya   venlafaxine (EFFEXOR) 75 MG tablet Take 1 tablet by mouth 2 times daily TAKE 1 TABLET BY MOUTH TWICE DAILY 6/5/17   B Saud Allis, DO   omeprazole (PRILOSEC) 20 MG delayed release capsule TAKE ONE CAPSULE BY MOUTH EVERY DAY FIRST THING IN THE MORNING ON AN EMPTY STOMACH 5/2/17   Nurys Perez MD   lisinopril (PRINIVIL;ZESTRIL) 10 MG tablet TAKE 1 TABLET BY MOUTH EVERY DAY 3/31/17   B Saud Allis, DO   Respiratory Therapy Supplies (NEBULIZER/TUBING/MOUTHPIECE) KIT 1 kit by Does not apply route 4 times daily as needed (cough, wheezing) Dx: Z56, J18.9, R05, R06.2 3/10/17   Therbrien Machuca Boo, APRN   albuterol (PROVENTIL) (2.5 MG/3ML) 0.083% nebulizer solution Take 3 mLs by nebulization every 6 hours as needed for Wheezing 3/10/17   Elkereece Jarrell REECE Boo, APRN   albuterol sulfate HFA (PROVENTIL HFA) 108 (90 BASE) MCG/ACT inhaler Inhale 2 puffs into the lungs every 6 hours as needed for Wheezing 3/7/17   B Love Joanne, DO   ondansetron (ZOFRAN ODT) 4 MG disintegrating tablet Take 1 tablet by mouth every 8 hours as needed for Nausea or Vomiting 2/19/17   ASHLEE Mcdaniel   cyclobenzaprine (FLEXERIL) 10 MG tablet Take 1 tablet by mouth 2 times daily as needed for Muscle spasms 1/24/17   Harsha Boothe MD   carbidopa-levodopa (SINEMET)  MG per tablet Take 1 tablet by mouth nightly as needed (leg spasms) 1/24/17   Harsha Boothe MD   levocetirizine (XYZAL) 5 MG tablet TAKE 1 TABLET BY MOUTH NIGHTLY 9/8/14   B Love Joanne, DO   calcium carbonate (OSCAL) 500 MG TABS tablet Take 500 mg by mouth daily. Historical Provider, MD   Multiple Vitamins-Minerals (MULTIVITAMIN & MINERAL PO) Take  by mouth. Historical Provider, MD   CRANBERRY Take 500 mg by mouth daily. Historical Provider, MD   Cholecalciferol (VITAMIN D3) 5000 UNITS TABS Take 5,000 Units by mouth daily     Historical Provider, MD     MSE:  Patient is  A & O x3. Appearance:  well-appearing appropriately dressed for season and age. Cognition:  Recent memory intact , remote memory intact , good fund of knowledge, average  intelligence level. Speech:  normal  Language: Naming: intact;  Word Finding: intact  Conversation no evidence of delusions, paranoid, persecutory, grandiose, ideas of reference, thought broadcasting, thought insertion and delusions of control  Behavior:  Cooperative and Good eye contact  Mood: happy  Affect: incongruent with mood  Thought Content: no evidence of overt psychosis, delusional thought or suicidal /homicidal ideation or plan  Thought Process: linear, goal directed and coherent  Judgement Insight:  normal and appropriate  Gait and Station: uses cane, slow to rise, difficulty picking up items    Musculoskeletal: WNL    Assesment: See above   Lafirmas Ratel report reviewed per  req. Plan: Pt is stable psychologically for bariatric surgery. Send approval fax to bariatric office. 1. The risks, benefits, side effects, indications, contraindications, and adverse effects of the medications have been discussed. not applicable   2. The pt has verbalized understanding and has capacity to give informed consent. 3. The Raimundos Ratel report has been reviewed according to [facility] regulations. 4. Supportive therapy offered. 5. Follow up: None needed. 6. The patient has been advised to call with any problems.   7. Controlled substance Treatment Plan: No Rx

## 2017-11-14 ENCOUNTER — OFFICE VISIT (OUTPATIENT)
Dept: BARIATRICS/WEIGHT MGMT | Facility: CLINIC | Age: 42
End: 2017-11-14

## 2017-11-14 ENCOUNTER — INFUSION (OUTPATIENT)
Dept: ONCOLOGY | Facility: HOSPITAL | Age: 42
End: 2017-11-14

## 2017-11-14 VITALS
OXYGEN SATURATION: 100 % | WEIGHT: 293 LBS | HEIGHT: 67 IN | RESPIRATION RATE: 18 BRPM | TEMPERATURE: 97.9 F | HEART RATE: 88 BPM | SYSTOLIC BLOOD PRESSURE: 146 MMHG | BODY MASS INDEX: 45.99 KG/M2 | DIASTOLIC BLOOD PRESSURE: 81 MMHG

## 2017-11-14 VITALS
HEART RATE: 85 BPM | OXYGEN SATURATION: 99 % | TEMPERATURE: 98.1 F | WEIGHT: 293 LBS | SYSTOLIC BLOOD PRESSURE: 113 MMHG | BODY MASS INDEX: 48.82 KG/M2 | HEIGHT: 65 IN | DIASTOLIC BLOOD PRESSURE: 83 MMHG

## 2017-11-14 DIAGNOSIS — K90.9 MALABSORPTION OF IRON: Primary | ICD-10-CM

## 2017-11-14 DIAGNOSIS — D50.9 IRON DEFICIENCY ANEMIA, UNSPECIFIED IRON DEFICIENCY ANEMIA TYPE: ICD-10-CM

## 2017-11-14 DIAGNOSIS — D50.8 IRON DEFICIENCY ANEMIA SECONDARY TO INADEQUATE DIETARY IRON INTAKE: ICD-10-CM

## 2017-11-14 DIAGNOSIS — E66.01 MORBID OBESITY (HCC): ICD-10-CM

## 2017-11-14 DIAGNOSIS — K90.9 MALABSORPTION OF IRON: ICD-10-CM

## 2017-11-14 DIAGNOSIS — K90.9 IRON MALABSORPTION: ICD-10-CM

## 2017-11-14 DIAGNOSIS — E66.01 OBESITY, CLASS III, BMI 40-49.9 (MORBID OBESITY) (HCC): Primary | ICD-10-CM

## 2017-11-14 PROBLEM — E66.813 OBESITY, CLASS III, BMI 40-49.9 (MORBID OBESITY): Status: ACTIVE | Noted: 2017-11-14

## 2017-11-14 PROCEDURE — 25010000002 IRON SUCROSE PER 1 MG: Performed by: INTERNAL MEDICINE

## 2017-11-14 PROCEDURE — 96365 THER/PROPH/DIAG IV INF INIT: CPT

## 2017-11-14 PROCEDURE — 25010000002 HEPARIN FLUSH (PORCINE) 100 UNIT/ML SOLUTION: Performed by: INTERNAL MEDICINE

## 2017-11-14 PROCEDURE — 99213 OFFICE O/P EST LOW 20 MIN: CPT | Performed by: SURGERY

## 2017-11-14 RX ORDER — SODIUM CHLORIDE 0.9 % (FLUSH) 0.9 %
10 SYRINGE (ML) INJECTION AS NEEDED
Status: DISCONTINUED | OUTPATIENT
Start: 2017-11-14 | End: 2017-11-14 | Stop reason: HOSPADM

## 2017-11-14 RX ORDER — HYDROCODONE BITARTRATE AND ACETAMINOPHEN 7.5; 325 MG/1; MG/1
1 TABLET ORAL EVERY 6 HOURS PRN
Refills: 0 | COMMUNITY
Start: 2017-11-07 | End: 2017-12-21 | Stop reason: HOSPADM

## 2017-11-14 RX ORDER — DIPHENHYDRAMINE HYDROCHLORIDE 50 MG/ML
50 INJECTION INTRAMUSCULAR; INTRAVENOUS AS NEEDED
Status: DISCONTINUED | OUTPATIENT
Start: 2017-11-14 | End: 2017-11-14 | Stop reason: HOSPADM

## 2017-11-14 RX ORDER — SCOLOPAMINE TRANSDERMAL SYSTEM 1 MG/1
1 PATCH, EXTENDED RELEASE TRANSDERMAL ONCE
Status: CANCELLED | OUTPATIENT
Start: 2017-11-14 | End: 2017-11-14

## 2017-11-14 RX ORDER — SODIUM CHLORIDE 9 MG/ML
250 INJECTION, SOLUTION INTRAVENOUS ONCE
Status: COMPLETED | OUTPATIENT
Start: 2017-11-14 | End: 2017-11-14

## 2017-11-14 RX ORDER — SODIUM CHLORIDE 0.9 % (FLUSH) 0.9 %
10 SYRINGE (ML) INJECTION AS NEEDED
Status: CANCELLED | OUTPATIENT
Start: 2017-11-14

## 2017-11-14 RX ORDER — FAMOTIDINE 10 MG/ML
20 INJECTION, SOLUTION INTRAVENOUS AS NEEDED
Status: DISCONTINUED | OUTPATIENT
Start: 2017-11-14 | End: 2017-11-14 | Stop reason: HOSPADM

## 2017-11-14 RX ADMIN — IRON SUCROSE 200 MG: 20 INJECTION, SOLUTION INTRAVENOUS at 11:43

## 2017-11-14 RX ADMIN — SODIUM CHLORIDE 250 ML: 0.9 INJECTION, SOLUTION INTRAVENOUS at 11:43

## 2017-11-14 RX ADMIN — Medication 500 UNITS: at 12:58

## 2017-11-14 RX ADMIN — Medication 10 ML: at 12:58

## 2017-11-14 NOTE — PROGRESS NOTES
Patient Care Team:  Brett Sharp DO as PCP - General (Pediatrics)  IRMA Wilcox as Referring Physician (Family Medicine)  Barney Houston MD as Cardiologist (Cardiology)    Reason for Visit:  Surgical Weight loss    Subjective     Patient is a 41 y.o. female presents with morbid obesity and her Body mass index is 53.45 kg/(m^2).     She is here for discussion of surgical weight loss options.  She stated she has been with the disease of obesity for year(s).  She stated she suffers from degenerative disc disease, joint pain, hypertension and sleep apnea due to her weight gain.  She stated that weight loss helps alleviate these symptoms.   She stated that she has tried multiple diet regimens including participating in a medically supervised weight loss program to help with weight loss.  She stated that she has attempted these conservative methods for weight loss without maintaining long term success.  Today she would like to discuss surgical weight loss options such as the Laparoscopic Sleeve Gastrectomy or the Laparoscopic R - Y Gastric Bypass.     Review of Systems  General ROS: positive for  - weight gain  Respiratory ROS: positive for - shortness of breath  Cardiovascular ROS: positive for - edema  Gastrointestinal ROS: no abdominal pain, change in bowel habits, or black or bloody stools  Musculoskeletal ROS: positive for - joint pain and pain in back - lower, hip - both sides and leg - lower  Neurological ROS: no TIA or stroke symptoms  positive for - headaches and numbness/tingling    History  Past Medical History:   Diagnosis Date   • Anemia    • Anxiety    • Arthritis    • Asthma    • Back pain 03/14/2016    with left sided radiculopathy    • DDD (degenerative disc disease), lumbar     Dr. Stuart Zavaleta for pain manangement   • Depression    • Fatigue    • Fibromyalgia    • Headache    • History of blood transfusion    • Hypertension    • Iron deficiency anemia 9/12/2017   • Iron deficiency anemia  secondary to inadequate dietary iron intake 9/12/2017   • Joint pain    • Obesity    • RLS (restless legs syndrome)    • Sleep apnea     positive sleep study; titration study in October     Past Surgical History:   Procedure Laterality Date   • ANKLE SURGERY      Right    • APPENDECTOMY  04/19/2015   • BACK SURGERY  2000   • CERVICAL SPINE SURGERY  09/01/2015   • CHOLECYSTECTOMY      1995;lap   • COLONOSCOPY  05/02/2017    normal; do not return until age of 49 Dr. Gus Valentine   • HIP SURGERY  1987    right    • INSERTION CENTRAL VENOUS ACCESS DEVICE W/ SUBCUTANEOUS PORT  11/07/2017    Dr Anel Gamboa (Smart Port-Power injectable Port) Cat [id number] Lot [id number]    • MOUTH SURGERY  1994   • NECK SURGERY     • TOOTH EXTRACTION     • UPPER GASTROINTESTINAL ENDOSCOPY  05/02/2017    Dr. Gus Valentine, negative for h.pylori, negative for Salomon's   • VAGINAL HYSTERECTOMY SALPINGO OOPHORECTOMY  2008    Partial and then had another surgery to remove the rest     Family History   Problem Relation Age of Onset   • Diabetes Mother    • Hypertension Mother    • Coronary artery disease Mother    • Cancer Mother    • Cancer Father    • Hypertension Father    • Heart disease Father    • Stroke Father    • Hypertension Sister    • Obesity Sister    • Cancer Brother    • Diabetes Brother    • Diabetes Maternal Grandmother    • Stroke Maternal Grandmother      Social History   Substance Use Topics   • Smoking status: Former Smoker     Packs/day: 1.00     Years: 25.00     Types: Cigarettes     Quit date: 2014   • Smokeless tobacco: Never Used   • Alcohol use Yes      Comment: rarely        (Not in a hospital admission)  Allergies:  Azithromycin    Objective     Vital Signs  Temp:  [98.1 °F (36.7 °C)] 98.1 °F (36.7 °C)  Heart Rate:  [85] 85  BP: (113)/(83) 113/83  Body mass index is 53.45 kg/(m^2).  Last 3 weights    11/14/17  1016   Weight: (!) 321 lb 3.2 oz (146 kg)       Physical Exam:     HEENT: extra ocular movement  intact  Respiratory: appears well, vitals normal, no respiratory distress, acyanotic, normal RR, chest clear to auscultation bilaterally and left venous port palpable, chest clear, no wheezing, crepitations, rhonchi, normal symmetric air entry  Cardiovascular: Regular rate and rhythm, S1, S2 normal, no murmur, click, rub or gallop  GI: Soft, non-tender, normal bowel sounds; no bruits, organomegaly or masses.  Abnormal shape: obese  Musculoskeletal: inspection - no abnormality  Neurologic: alert, oriented, normal speech, no focal findings or movement disorder noted       Results Review:   I reviewed the patient's new clinical results.        Assessment/Plan   Encounter Diagnoses   Name Primary?   • Obesity, Class III, BMI 40-49.9 (morbid obesity) Yes   • Morbid obesity    • Malabsorption of iron        1.  I believe this patient will be a good candidate for weight loss surgery.    She has chosen laparoscopic sleeve gastrectomy. I agree with this decision.  I have discussed the Adelfo - Y Gastric Bypass, laparoscopic sleeve gastrectomy and the Laparoscopic Gastric Band procedures to provide the alternatives which includes non surgical weight loss options as well.  We discussed the benefits of the surgeries including the benefit of weight loss and the possible reversal of co-morbid conditions associated with morbid obesity.  We discussed the complications and risks which include the risk of perforation, leakage,bleeding, intra-abdominal organ injury, stenosis or ulcerations, the risk of deep vein thrombosis formation leading to possible pulmonary embolisms, the risk of death.  I explained to the patient the possibility that this procedure may not be performed laparoscopically and may require being converted to an opened procedure or aborted due to abnormal anatomy.  Also postoperatively there is a risk of increased GERD symptoms after this procedure.  She had a previous upper endoscopy due to difficulty with swallowing.   Her results were within normal limits.    Upon completion of our discussion and addressing and answering her questions to her satisfation, informed consent was obtained.   She will be scheduled accordingly for a laparoscopic Sleeve gastrectomy procedure.        I discussed the patients findings and my recommendations with patient.     Dr. Yifan Cordero MD [facility]    11/14/17  10:26 AM  Patient Care Team:  Brett Sharp DO as PCP - General (Pediatrics)  IRMA Wilcox as Referring Physician (Family Medicine)  Barney Houston MD as Cardiologist (Cardiology)

## 2017-11-15 ENCOUNTER — INFUSION (OUTPATIENT)
Dept: ONCOLOGY | Facility: HOSPITAL | Age: 42
End: 2017-11-15

## 2017-11-15 VITALS
RESPIRATION RATE: 18 BRPM | BODY MASS INDEX: 45.99 KG/M2 | HEIGHT: 67 IN | OXYGEN SATURATION: 96 % | HEART RATE: 83 BPM | TEMPERATURE: 98.1 F | SYSTOLIC BLOOD PRESSURE: 128 MMHG | DIASTOLIC BLOOD PRESSURE: 70 MMHG | WEIGHT: 293 LBS

## 2017-11-15 DIAGNOSIS — D50.8 IRON DEFICIENCY ANEMIA SECONDARY TO INADEQUATE DIETARY IRON INTAKE: ICD-10-CM

## 2017-11-15 DIAGNOSIS — K90.9 MALABSORPTION OF IRON: Primary | ICD-10-CM

## 2017-11-15 PROCEDURE — 25010000002 HEPARIN FLUSH (PORCINE) 100 UNIT/ML SOLUTION: Performed by: INTERNAL MEDICINE

## 2017-11-15 PROCEDURE — 96365 THER/PROPH/DIAG IV INF INIT: CPT

## 2017-11-15 PROCEDURE — 25010000002 IRON SUCROSE PER 1 MG: Performed by: INTERNAL MEDICINE

## 2017-11-15 RX ORDER — FAMOTIDINE 10 MG/ML
20 INJECTION, SOLUTION INTRAVENOUS AS NEEDED
Status: DISCONTINUED | OUTPATIENT
Start: 2017-11-15 | End: 2017-11-15 | Stop reason: HOSPADM

## 2017-11-15 RX ORDER — SODIUM CHLORIDE 0.9 % (FLUSH) 0.9 %
10 SYRINGE (ML) INJECTION AS NEEDED
Status: DISCONTINUED | OUTPATIENT
Start: 2017-11-15 | End: 2017-11-15 | Stop reason: HOSPADM

## 2017-11-15 RX ORDER — DIPHENHYDRAMINE HYDROCHLORIDE 50 MG/ML
50 INJECTION INTRAMUSCULAR; INTRAVENOUS AS NEEDED
Status: DISCONTINUED | OUTPATIENT
Start: 2017-11-15 | End: 2017-11-15 | Stop reason: HOSPADM

## 2017-11-15 RX ORDER — SODIUM CHLORIDE 9 MG/ML
250 INJECTION, SOLUTION INTRAVENOUS ONCE
Status: COMPLETED | OUTPATIENT
Start: 2017-11-15 | End: 2017-11-15

## 2017-11-15 RX ORDER — SODIUM CHLORIDE 0.9 % (FLUSH) 0.9 %
10 SYRINGE (ML) INJECTION AS NEEDED
Status: CANCELLED | OUTPATIENT
Start: 2017-11-15

## 2017-11-15 RX ADMIN — IRON SUCROSE 200 MG: 20 INJECTION, SOLUTION INTRAVENOUS at 12:39

## 2017-11-15 RX ADMIN — SODIUM CHLORIDE 250 ML: 9 INJECTION, SOLUTION INTRAVENOUS at 12:20

## 2017-11-15 RX ADMIN — Medication 500 UNITS: at 14:11

## 2017-11-15 RX ADMIN — Medication 10 ML: at 14:10

## 2017-11-17 ENCOUNTER — RESULTS ENCOUNTER (OUTPATIENT)
Dept: BARIATRICS/WEIGHT MGMT | Facility: CLINIC | Age: 42
End: 2017-11-17

## 2017-11-17 DIAGNOSIS — E66.01 MORBID OBESITY, UNSPECIFIED OBESITY TYPE (HCC): ICD-10-CM

## 2017-11-17 DIAGNOSIS — I10 HYPERTENSION, WELL CONTROLLED: ICD-10-CM

## 2017-11-21 ENCOUNTER — INFUSION (OUTPATIENT)
Dept: ONCOLOGY | Facility: HOSPITAL | Age: 42
End: 2017-11-21

## 2017-11-21 VITALS
WEIGHT: 293 LBS | RESPIRATION RATE: 20 BRPM | HEIGHT: 67 IN | BODY MASS INDEX: 45.99 KG/M2 | TEMPERATURE: 98.4 F | OXYGEN SATURATION: 99 % | DIASTOLIC BLOOD PRESSURE: 52 MMHG | HEART RATE: 90 BPM | SYSTOLIC BLOOD PRESSURE: 109 MMHG

## 2017-11-21 DIAGNOSIS — D50.8 IRON DEFICIENCY ANEMIA SECONDARY TO INADEQUATE DIETARY IRON INTAKE: ICD-10-CM

## 2017-11-21 DIAGNOSIS — K90.9 MALABSORPTION OF IRON: Primary | ICD-10-CM

## 2017-11-21 PROCEDURE — 25010000002 IRON SUCROSE PER 1 MG: Performed by: INTERNAL MEDICINE

## 2017-11-21 PROCEDURE — 25010000002 HEPARIN FLUSH (PORCINE) 100 UNIT/ML SOLUTION: Performed by: INTERNAL MEDICINE

## 2017-11-21 PROCEDURE — 96365 THER/PROPH/DIAG IV INF INIT: CPT

## 2017-11-21 RX ORDER — SODIUM CHLORIDE 9 MG/ML
250 INJECTION, SOLUTION INTRAVENOUS ONCE
Status: COMPLETED | OUTPATIENT
Start: 2017-11-21 | End: 2017-11-21

## 2017-11-21 RX ORDER — DIPHENHYDRAMINE HYDROCHLORIDE 50 MG/ML
50 INJECTION INTRAMUSCULAR; INTRAVENOUS AS NEEDED
Status: DISCONTINUED | OUTPATIENT
Start: 2017-11-21 | End: 2017-11-21 | Stop reason: HOSPADM

## 2017-11-21 RX ORDER — SODIUM CHLORIDE 0.9 % (FLUSH) 0.9 %
10 SYRINGE (ML) INJECTION AS NEEDED
Status: DISCONTINUED | OUTPATIENT
Start: 2017-11-21 | End: 2017-11-21 | Stop reason: HOSPADM

## 2017-11-21 RX ORDER — FAMOTIDINE 10 MG/ML
20 INJECTION, SOLUTION INTRAVENOUS AS NEEDED
Status: DISCONTINUED | OUTPATIENT
Start: 2017-11-21 | End: 2017-11-21 | Stop reason: HOSPADM

## 2017-11-21 RX ORDER — SODIUM CHLORIDE 0.9 % (FLUSH) 0.9 %
10 SYRINGE (ML) INJECTION AS NEEDED
Status: CANCELLED | OUTPATIENT
Start: 2017-11-21

## 2017-11-21 RX ADMIN — Medication 500 UNITS: at 12:41

## 2017-11-21 RX ADMIN — IRON SUCROSE 200 MG: 20 INJECTION, SOLUTION INTRAVENOUS at 11:14

## 2017-11-21 RX ADMIN — SODIUM CHLORIDE 250 ML: 9 INJECTION, SOLUTION INTRAVENOUS at 11:12

## 2017-11-21 RX ADMIN — Medication 10 ML: at 12:40

## 2017-11-22 ENCOUNTER — INFUSION (OUTPATIENT)
Dept: ONCOLOGY | Facility: HOSPITAL | Age: 42
End: 2017-11-22

## 2017-11-22 VITALS
HEART RATE: 91 BPM | WEIGHT: 293 LBS | SYSTOLIC BLOOD PRESSURE: 141 MMHG | DIASTOLIC BLOOD PRESSURE: 64 MMHG | HEIGHT: 67 IN | BODY MASS INDEX: 45.99 KG/M2 | RESPIRATION RATE: 18 BRPM | TEMPERATURE: 97.8 F | OXYGEN SATURATION: 97 %

## 2017-11-22 DIAGNOSIS — K90.9 MALABSORPTION OF IRON: Primary | ICD-10-CM

## 2017-11-22 DIAGNOSIS — D50.8 IRON DEFICIENCY ANEMIA SECONDARY TO INADEQUATE DIETARY IRON INTAKE: ICD-10-CM

## 2017-11-22 PROCEDURE — 25010000002 HEPARIN FLUSH (PORCINE) 100 UNIT/ML SOLUTION: Performed by: INTERNAL MEDICINE

## 2017-11-22 PROCEDURE — 25010000002 IRON SUCROSE PER 1 MG: Performed by: INTERNAL MEDICINE

## 2017-11-22 PROCEDURE — 96365 THER/PROPH/DIAG IV INF INIT: CPT

## 2017-11-22 RX ORDER — SODIUM CHLORIDE 0.9 % (FLUSH) 0.9 %
10 SYRINGE (ML) INJECTION AS NEEDED
Status: CANCELLED | OUTPATIENT
Start: 2017-11-22

## 2017-11-22 RX ORDER — SODIUM CHLORIDE 0.9 % (FLUSH) 0.9 %
10 SYRINGE (ML) INJECTION AS NEEDED
Status: DISCONTINUED | OUTPATIENT
Start: 2017-11-22 | End: 2017-11-22 | Stop reason: HOSPADM

## 2017-11-22 RX ORDER — DIPHENHYDRAMINE HYDROCHLORIDE 50 MG/ML
50 INJECTION INTRAMUSCULAR; INTRAVENOUS AS NEEDED
Status: DISCONTINUED | OUTPATIENT
Start: 2017-11-22 | End: 2017-11-22 | Stop reason: HOSPADM

## 2017-11-22 RX ORDER — SODIUM CHLORIDE 9 MG/ML
250 INJECTION, SOLUTION INTRAVENOUS ONCE
Status: DISCONTINUED | OUTPATIENT
Start: 2017-11-22 | End: 2017-11-22 | Stop reason: HOSPADM

## 2017-11-22 RX ORDER — FAMOTIDINE 10 MG/ML
20 INJECTION, SOLUTION INTRAVENOUS AS NEEDED
Status: DISCONTINUED | OUTPATIENT
Start: 2017-11-22 | End: 2017-11-22 | Stop reason: HOSPADM

## 2017-11-22 RX ORDER — SODIUM CHLORIDE 9 MG/ML
250 INJECTION, SOLUTION INTRAVENOUS ONCE
Status: COMPLETED | OUTPATIENT
Start: 2017-11-22 | End: 2017-11-22

## 2017-11-22 RX ADMIN — Medication 500 UNITS: at 12:31

## 2017-11-22 RX ADMIN — Medication 10 ML: at 12:31

## 2017-11-22 RX ADMIN — IRON SUCROSE 200 MG: 20 INJECTION, SOLUTION INTRAVENOUS at 11:27

## 2017-11-22 RX ADMIN — SODIUM CHLORIDE 250 ML: 9 INJECTION, SOLUTION INTRAVENOUS at 11:11

## 2017-11-28 ENCOUNTER — INFUSION (OUTPATIENT)
Dept: ONCOLOGY | Facility: HOSPITAL | Age: 42
End: 2017-11-28

## 2017-11-28 VITALS
DIASTOLIC BLOOD PRESSURE: 60 MMHG | HEIGHT: 67 IN | TEMPERATURE: 97.5 F | OXYGEN SATURATION: 98 % | BODY MASS INDEX: 45.99 KG/M2 | WEIGHT: 293 LBS | RESPIRATION RATE: 18 BRPM | SYSTOLIC BLOOD PRESSURE: 114 MMHG | HEART RATE: 82 BPM

## 2017-11-28 DIAGNOSIS — D50.8 IRON DEFICIENCY ANEMIA SECONDARY TO INADEQUATE DIETARY IRON INTAKE: ICD-10-CM

## 2017-11-28 DIAGNOSIS — K90.9 MALABSORPTION OF IRON: Primary | ICD-10-CM

## 2017-11-28 PROCEDURE — 25010000002 HEPARIN FLUSH (PORCINE) 100 UNIT/ML SOLUTION: Performed by: INTERNAL MEDICINE

## 2017-11-28 PROCEDURE — 96365 THER/PROPH/DIAG IV INF INIT: CPT

## 2017-11-28 PROCEDURE — 25010000002 IRON SUCROSE PER 1 MG: Performed by: INTERNAL MEDICINE

## 2017-11-28 RX ORDER — FAMOTIDINE 10 MG/ML
20 INJECTION, SOLUTION INTRAVENOUS AS NEEDED
Status: DISCONTINUED | OUTPATIENT
Start: 2017-11-28 | End: 2017-11-28 | Stop reason: HOSPADM

## 2017-11-28 RX ORDER — SODIUM CHLORIDE 0.9 % (FLUSH) 0.9 %
10 SYRINGE (ML) INJECTION AS NEEDED
Status: DISCONTINUED | OUTPATIENT
Start: 2017-11-28 | End: 2017-11-28 | Stop reason: HOSPADM

## 2017-11-28 RX ORDER — SODIUM CHLORIDE 0.9 % (FLUSH) 0.9 %
10 SYRINGE (ML) INJECTION AS NEEDED
Status: CANCELLED | OUTPATIENT
Start: 2017-11-28

## 2017-11-28 RX ORDER — SODIUM CHLORIDE 9 MG/ML
250 INJECTION, SOLUTION INTRAVENOUS ONCE
Status: COMPLETED | OUTPATIENT
Start: 2017-11-28 | End: 2017-11-28

## 2017-11-28 RX ORDER — DIPHENHYDRAMINE HYDROCHLORIDE 50 MG/ML
50 INJECTION INTRAMUSCULAR; INTRAVENOUS AS NEEDED
Status: DISCONTINUED | OUTPATIENT
Start: 2017-11-28 | End: 2017-11-28 | Stop reason: HOSPADM

## 2017-11-28 RX ADMIN — Medication 10 ML: at 13:14

## 2017-11-28 RX ADMIN — Medication 500 UNITS: at 13:14

## 2017-11-28 RX ADMIN — IRON SUCROSE 200 MG: 20 INJECTION, SOLUTION INTRAVENOUS at 12:04

## 2017-11-28 RX ADMIN — SODIUM CHLORIDE 250 ML: 9 INJECTION, SOLUTION INTRAVENOUS at 11:27

## 2017-11-29 ENCOUNTER — INFUSION (OUTPATIENT)
Dept: ONCOLOGY | Facility: HOSPITAL | Age: 42
End: 2017-11-29

## 2017-11-29 VITALS
TEMPERATURE: 97.4 F | WEIGHT: 293 LBS | SYSTOLIC BLOOD PRESSURE: 120 MMHG | HEART RATE: 83 BPM | RESPIRATION RATE: 18 BRPM | HEIGHT: 67 IN | BODY MASS INDEX: 45.99 KG/M2 | DIASTOLIC BLOOD PRESSURE: 69 MMHG | OXYGEN SATURATION: 99 %

## 2017-11-29 DIAGNOSIS — K90.9 MALABSORPTION OF IRON: Primary | ICD-10-CM

## 2017-11-29 DIAGNOSIS — D50.8 IRON DEFICIENCY ANEMIA SECONDARY TO INADEQUATE DIETARY IRON INTAKE: ICD-10-CM

## 2017-11-29 PROCEDURE — 25010000002 HEPARIN FLUSH (PORCINE) 100 UNIT/ML SOLUTION: Performed by: INTERNAL MEDICINE

## 2017-11-29 PROCEDURE — 96365 THER/PROPH/DIAG IV INF INIT: CPT

## 2017-11-29 PROCEDURE — 25010000002 IRON SUCROSE PER 1 MG: Performed by: INTERNAL MEDICINE

## 2017-11-29 RX ORDER — SODIUM CHLORIDE 0.9 % (FLUSH) 0.9 %
10 SYRINGE (ML) INJECTION AS NEEDED
Status: CANCELLED | OUTPATIENT
Start: 2017-11-29

## 2017-11-29 RX ORDER — SODIUM CHLORIDE 9 MG/ML
250 INJECTION, SOLUTION INTRAVENOUS ONCE
Status: DISCONTINUED | OUTPATIENT
Start: 2017-11-29 | End: 2017-11-29 | Stop reason: HOSPADM

## 2017-11-29 RX ORDER — FAMOTIDINE 10 MG/ML
20 INJECTION, SOLUTION INTRAVENOUS AS NEEDED
Status: DISCONTINUED | OUTPATIENT
Start: 2017-11-29 | End: 2017-11-29 | Stop reason: HOSPADM

## 2017-11-29 RX ORDER — OXYCODONE HYDROCHLORIDE 10 MG/1
10 TABLET ORAL 2 TIMES DAILY PRN
Qty: 45 TABLET | Refills: 0 | Status: SHIPPED | OUTPATIENT
Start: 2017-12-01 | End: 2017-12-14 | Stop reason: SDUPTHER

## 2017-11-29 RX ORDER — DIPHENHYDRAMINE HYDROCHLORIDE 50 MG/ML
50 INJECTION INTRAMUSCULAR; INTRAVENOUS AS NEEDED
Status: DISCONTINUED | OUTPATIENT
Start: 2017-11-29 | End: 2017-11-29 | Stop reason: HOSPADM

## 2017-11-29 RX ORDER — SODIUM CHLORIDE 9 MG/ML
250 INJECTION, SOLUTION INTRAVENOUS ONCE
Status: COMPLETED | OUTPATIENT
Start: 2017-11-29 | End: 2017-11-29

## 2017-11-29 RX ORDER — SODIUM CHLORIDE 0.9 % (FLUSH) 0.9 %
10 SYRINGE (ML) INJECTION AS NEEDED
Status: DISCONTINUED | OUTPATIENT
Start: 2017-11-29 | End: 2017-11-29 | Stop reason: HOSPADM

## 2017-11-29 RX ADMIN — Medication 500 UNITS: at 12:31

## 2017-11-29 RX ADMIN — IRON SUCROSE 200 MG: 20 INJECTION, SOLUTION INTRAVENOUS at 11:20

## 2017-11-29 RX ADMIN — Medication 10 ML: at 12:30

## 2017-11-29 RX ADMIN — SODIUM CHLORIDE 250 ML: 9 INJECTION, SOLUTION INTRAVENOUS at 11:04

## 2017-12-05 ENCOUNTER — APPOINTMENT (OUTPATIENT)
Dept: ONCOLOGY | Facility: HOSPITAL | Age: 42
End: 2017-12-05

## 2017-12-06 ENCOUNTER — INFUSION (OUTPATIENT)
Dept: ONCOLOGY | Facility: HOSPITAL | Age: 42
End: 2017-12-06

## 2017-12-06 VITALS
OXYGEN SATURATION: 99 % | TEMPERATURE: 98.2 F | RESPIRATION RATE: 22 BRPM | DIASTOLIC BLOOD PRESSURE: 70 MMHG | SYSTOLIC BLOOD PRESSURE: 107 MMHG | HEIGHT: 67 IN | HEART RATE: 82 BPM | WEIGHT: 293 LBS | BODY MASS INDEX: 45.99 KG/M2

## 2017-12-06 DIAGNOSIS — K90.9 MALABSORPTION OF IRON: Primary | ICD-10-CM

## 2017-12-06 DIAGNOSIS — D50.8 IRON DEFICIENCY ANEMIA SECONDARY TO INADEQUATE DIETARY IRON INTAKE: ICD-10-CM

## 2017-12-06 PROCEDURE — 96365 THER/PROPH/DIAG IV INF INIT: CPT

## 2017-12-06 PROCEDURE — 25010000002 HEPARIN FLUSH (PORCINE) 100 UNIT/ML SOLUTION: Performed by: INTERNAL MEDICINE

## 2017-12-06 PROCEDURE — 25010000002 IRON SUCROSE PER 1 MG: Performed by: INTERNAL MEDICINE

## 2017-12-06 RX ORDER — SODIUM CHLORIDE 0.9 % (FLUSH) 0.9 %
10 SYRINGE (ML) INJECTION AS NEEDED
Status: CANCELLED | OUTPATIENT
Start: 2017-12-06

## 2017-12-06 RX ORDER — OXYCODONE HYDROCHLORIDE 10 MG/1
5 TABLET ORAL 3 TIMES DAILY PRN
COMMUNITY
Start: 2017-12-01 | End: 2021-02-22

## 2017-12-06 RX ORDER — DIPHENHYDRAMINE HYDROCHLORIDE 50 MG/ML
50 INJECTION INTRAMUSCULAR; INTRAVENOUS AS NEEDED
Status: DISCONTINUED | OUTPATIENT
Start: 2017-12-06 | End: 2017-12-06 | Stop reason: HOSPADM

## 2017-12-06 RX ORDER — SODIUM CHLORIDE 0.9 % (FLUSH) 0.9 %
10 SYRINGE (ML) INJECTION AS NEEDED
Status: DISCONTINUED | OUTPATIENT
Start: 2017-12-06 | End: 2017-12-06 | Stop reason: HOSPADM

## 2017-12-06 RX ORDER — SODIUM CHLORIDE 9 MG/ML
250 INJECTION, SOLUTION INTRAVENOUS ONCE
Status: COMPLETED | OUTPATIENT
Start: 2017-12-06 | End: 2017-12-06

## 2017-12-06 RX ORDER — FAMOTIDINE 10 MG/ML
20 INJECTION, SOLUTION INTRAVENOUS AS NEEDED
Status: DISCONTINUED | OUTPATIENT
Start: 2017-12-06 | End: 2017-12-06 | Stop reason: HOSPADM

## 2017-12-06 RX ADMIN — IRON SUCROSE 200 MG: 20 INJECTION, SOLUTION INTRAVENOUS at 12:01

## 2017-12-06 RX ADMIN — Medication 10 ML: at 13:05

## 2017-12-06 RX ADMIN — Medication 500 UNITS: at 13:06

## 2017-12-06 RX ADMIN — SODIUM CHLORIDE 250 ML: 9 INJECTION, SOLUTION INTRAVENOUS at 12:01

## 2017-12-07 ENCOUNTER — APPOINTMENT (OUTPATIENT)
Dept: ONCOLOGY | Facility: HOSPITAL | Age: 42
End: 2017-12-07

## 2017-12-07 NOTE — DISCHARGE INSTRUCTIONS
DAY OF SURGERY INSTRUCTIONS        YOUR SURGEON: Dr. Yifan Cordero    PROCEDURE: laparoscopic gastric sleeve    DATE OF SURGERY: Wednesday, 12/20/17    ARRIVAL TIME: AS DIRECTED BY OFFICE    DAY OF SURGERY TAKE ONLY THESE MEDICATIONS: Take Metoprolol XL 50 mg ONLY, w/ sip of water;   Bring your CPAP.    DO NOT TAKE LISINOPRIL THE MORNING OF SURGERY            BEFORE YOU COME TO THE HOSPITAL  (Pre-op instructions)  • Do not eat, drink, smoke or chew gum after midnight the night before surgery.  This also includes no mints.  • Morning of surgery take only the medicines you have been instructed with a sip of water unless otherwise instructed  by your physician.  • Do not shave, wear makeup or dark nail polish.  • Remove all jewelry including rings.  • Leave anything you consider valuable at home.  • Leave your suitcase in the car until after your surgery.  • Bring the following with you if applicable:  o Picture ID and insurance, Medicare or Medicaid cards  o Co-pay/deductible required by insurance (cash, check, credit card)  o Copy of advance directive, living will or power-of- documents if not brought to PAT  o CPAP or BIPAP mask and tubing  o Relaxation aids (MP3 player, book, magazine)  • On the day of surgery check in at registration located at the main entrance of the hospital.       Outpatient Surgery Guidelines, Adult  Outpatient procedures are those for which the person having the procedure is allowed to go home the same day as the procedure. Various procedures are done on an outpatient basis. You should follow some general guidelines if you will be having an outpatient procedure.  LET YOUR HEALTH CARE PROVIDER KNOW ABOUT:  · Any allergies you have.  · All medicines you are taking, including vitamins, herbs, eye drops, creams, and over-the-counter medicines.  · Previous problems you or members of your family have had with the use of anesthetics.  · Any blood disorders you have.  · Previous surgeries  you have had.  · Medical conditions you have.  RISKS AND COMPLICATIONS  Your health care provider will discuss possible risks and complications with you before surgery. Common risks and complications include:    · Problems due to the use of anesthetics.  · Blood loss and replacement (does not apply to minor surgical procedures).  · Temporary increase in pain due to surgery.  · Uncorrected pain or problems that the surgery was meant to correct.  · Infection.  · New damage.  BEFORE THE PROCEDURE  · Ask your health care provider about changing or stopping your regular medicines. You may need to stop taking certain medicines in the days or weeks before the procedure.  · Stop smoking at least 2 weeks before surgery. This lowers your risk for complications during and after surgery. Ask your health care provider for help with this if needed.  · Eat your usual meals and a light supper the day before surgery. Continue fluid intake. Do not drink alcohol.  · Do not eat or drink after midnight the night before your surgery.   · Arrange for someone to take you home and to stay with you for 24 hours after the procedure. Medicine given for your procedure may affect your ability to drive or to care for yourself.  · Call your health care provider's office if you develop an illness or problem that may prevent you from safely having your procedure.  AFTER THE PROCEDURE  After surgery, you will be taken to a recovery area, where your progress will be monitored. If there are no complications, you will be allowed to go home when you are awake, stable, and taking fluids well. You may have numbness around the surgical site. Healing will take some time. You will have tenderness at the surgical site and may have some swelling and bruising. You may also have some nausea.  HOME CARE INSTRUCTIONS  · Do not drive for 24 hours, or as directed by your health care provider. Do not drive while taking prescription pain medicines.  · Do not drink  alcohol for 24 hours.  · Do not make important decisions or sign legal documents for 24 hours.  · You may resume a normal diet and activities as directed.  · Do not lift anything heavier than 10 pounds (4.5 kg) or play contact sports until your health care provider says it is okay.  · Change your bandages (dressings) as directed.  · Only take over-the-counter or prescription medicines as directed by your health care provider.  · Follow up with your health care provider as directed.  SEEK MEDICAL CARE IF:  · You have increased bleeding (more than a small spot) from the surgical site.  · You have redness, swelling, or increasing pain in the wound.  · You see pus coming from the wound.  · You have a fever.  · You notice a bad smell coming from the wound or dressing.  · You feel lightheaded or faint.  · You develop a rash.  · You have trouble breathing.  · You develop allergies.  MAKE SURE YOU:  · Understand these instructions.  · Will watch your condition.  · Will get help right away if you are not doing well or get worse.     This information is not intended to replace advice given to you by your health care provider. Make sure you discuss any questions you have with your health care provider.     Document Released: 09/12/2002 Document Revised: 05/03/2016 Document Reviewed: 05/22/2014  [employer] Interactive Patient Education ©2016 [employer]       Fall Prevention in Hospitals, Adult  As a hospital patient, your condition and the treatments you receive can increase your risk for falls. Some additional risk factors for falls in a hospital include:  · Being in an unfamiliar environment.  · Being on bed rest.  · Your surgery.  · Taking certain medicines.  · Your tubing requirements, such as intravenous (IV) therapy or catheters.  It is important that you learn how to decrease fall risks while at the hospital. Below are important tips that can help prevent falls.  SAFETY TIPS FOR PREVENTING FALLS  Talk about your risk of  falling.  · Ask your health care provider why you are at risk for falling. Is it your medicine, illness, tubing placement, or something else?  · Make a plan with your health care provider to keep you safe from falls.  · Ask your health care provider or pharmacist about side effects of your medicines. Some medicines can make you dizzy or affect your coordination.  Ask for help.  · Ask for help before getting out of bed. You may need to press your call button.  · Ask for assistance in getting safely to the toilet.  · Ask for a walker or cane to be put at your bedside. Ask that most of the side rails on your bed be placed up before your health care provider leaves the room.  · Ask family or friends to sit with you.  · Ask for things that are out of your reach, such as your glasses, hearing aids, telephone, bedside table, or call button.  Follow these tips to avoid falling:  · Stay lying or seated, rather than standing, while waiting for help.  · Wear rubber-soled slippers or shoes whenever you walk in the hospital.  · Avoid quick, sudden movements.  ¨ Change positions slowly.  ¨ Sit on the side of your bed before standing.  ¨ Stand up slowly and wait before you start to walk.  · Let your health care provider know if there is a spill on the floor.  · Pay careful attention to the medical equipment, electrical cords, and tubes around you.  · When you need help, use your call button by your bed or in the bathroom. Wait for one of your health care providers to help you.  · If you feel dizzy or unsure of your footing, return to bed and wait for assistance.  · Avoid being distracted by the TV, telephone, or another person in your room.  · Do not lean or support yourself on rolling objects, such as IV poles or bedside tables.     This information is not intended to replace advice given to you by your health care provider. Make sure you discuss any questions you have with your health care provider.     Document Released:  12/15/2001 Document Revised: 01/08/2016 Document Reviewed: 08/25/2013  [employer] Interactive Patient Education ©2016 [employer]   .       [facility]  CHG 4% Patient Instruction Sheet    Preparing the Skin Before Surgery  Preparing or “prepping” skin before surgery can reduce the risk of infection at the surgical site. To make the process easier,[facility] has chosen 4% Chlorhexidine Gluconate (CHG) antiseptic solution.   The steps below outline the prepping process and should be carefully followed.                                                                                                                                                      Prep the skin at the following time(s):                                                      We recommend you shower the night before surgery, and again the morning of surgery with the 4% CHG antiseptic solution using  half of the bottle and a cloth each time.  Dress in clean clothes/sleepwear after showering.  See instructions below for application.          Do not apply any lotions or moisturizers.       Do not shave the area to be prepped for at least 2 days prior to surgery.    Clipping the hair may be done immediately prior to your surgery at the hospital    if needed.    Directions:  Thoroughly rinse your body with water.  Apply 4% CHG to cloth and wash skin gently, paying special attention to the operative site.  Rinse again thoroughly.  Once you have begun using this product do not apply anything else to your skin. If itching or redness persists, rinse affected areas and discontinue use.    When using this product:  • Keep out of eyes, ears, and mouth.  • If solution should contact these areas, rinse out promptly and thoroughly with water.  • For external use only.  • Do not use in genital area, on your face or head.    PATIENT/FAMILY/RESPONSIBLE PARTY VERBALIZES UNDERSTANDING OF ABOVE EDUCATION.  COPY OF PAIN SCALE GIVEN AND REVIEWED WITH VERBALIZED  UNDERSTANDING.

## 2017-12-08 ENCOUNTER — TREATMENT (OUTPATIENT)
Dept: BARIATRICS/WEIGHT MGMT | Facility: CLINIC | Age: 42
End: 2017-12-08

## 2017-12-08 ENCOUNTER — APPOINTMENT (OUTPATIENT)
Dept: PREADMISSION TESTING | Facility: HOSPITAL | Age: 42
End: 2017-12-08

## 2017-12-08 ENCOUNTER — INFUSION (OUTPATIENT)
Dept: ONCOLOGY | Facility: HOSPITAL | Age: 42
End: 2017-12-08

## 2017-12-08 ENCOUNTER — APPOINTMENT (OUTPATIENT)
Dept: LAB | Facility: HOSPITAL | Age: 42
End: 2017-12-08

## 2017-12-08 VITALS — WEIGHT: 293 LBS | BODY MASS INDEX: 48.82 KG/M2 | HEIGHT: 65 IN

## 2017-12-08 VITALS
BODY MASS INDEX: 48.82 KG/M2 | HEIGHT: 65 IN | RESPIRATION RATE: 18 BRPM | OXYGEN SATURATION: 98 % | DIASTOLIC BLOOD PRESSURE: 75 MMHG | HEART RATE: 84 BPM | SYSTOLIC BLOOD PRESSURE: 137 MMHG | WEIGHT: 293 LBS | TEMPERATURE: 98 F

## 2017-12-08 DIAGNOSIS — D50.8 IRON DEFICIENCY ANEMIA SECONDARY TO INADEQUATE DIETARY IRON INTAKE: ICD-10-CM

## 2017-12-08 DIAGNOSIS — K90.9 MALABSORPTION OF IRON: Primary | ICD-10-CM

## 2017-12-08 LAB
ABO GROUP BLD: NORMAL
ALBUMIN SERPL-MCNC: 4.4 G/DL (ref 3.5–5)
ALBUMIN/GLOB SERPL: 1.2 G/DL (ref 1.1–2.5)
ALP SERPL-CCNC: 113 U/L (ref 24–120)
ALT SERPL W P-5'-P-CCNC: 50 U/L (ref 0–54)
ANION GAP SERPL CALCULATED.3IONS-SCNC: 10 MMOL/L (ref 4–13)
APTT PPP: 31.5 SECONDS (ref 24.1–34.8)
AST SERPL-CCNC: 52 U/L (ref 7–45)
AUTO MIXED CELLS #: 0.5 10*3/MM3 (ref 0.1–2.6)
AUTO MIXED CELLS %: 4.6 % (ref 0.1–24)
BILIRUB SERPL-MCNC: 1.3 MG/DL (ref 0.1–1)
BLD GP AB SCN SERPL QL: NEGATIVE
BUN BLD-MCNC: 13 MG/DL (ref 5–21)
BUN/CREAT SERPL: 18.8
CALCIUM SPEC-SCNC: 9.8 MG/DL (ref 8.4–10.4)
CHLORIDE SERPL-SCNC: 101 MMOL/L (ref 98–110)
CO2 SERPL-SCNC: 26 MMOL/L (ref 24–31)
CREAT BLD-MCNC: 0.69 MG/DL (ref 0.5–1.4)
ERYTHROCYTE [DISTWIDTH] IN BLOOD BY AUTOMATED COUNT: 17.4 % (ref 12–15)
GFR SERPL CREATININE-BSD FRML MDRD: 93 ML/MIN/1.73
GLOBULIN UR ELPH-MCNC: 3.6 GM/DL
GLUCOSE BLD-MCNC: 103 MG/DL (ref 70–100)
HCT VFR BLD AUTO: 33.9 % (ref 37–47)
HGB BLD-MCNC: 12 G/DL (ref 12–16)
INR PPP: 0.95 (ref 0.91–1.09)
LYMPHOCYTES # BLD AUTO: 2 10*3/MM3 (ref 0.8–7)
LYMPHOCYTES NFR BLD AUTO: 20.2 % (ref 15–45)
MCH RBC QN AUTO: 30.1 PG (ref 28–32)
MCHC RBC AUTO-ENTMCNC: 35.4 G/DL (ref 33–36)
MCV RBC AUTO: 85 FL (ref 82–98)
NEUTROPHILS # BLD AUTO: 7.4 10*3/MM3 (ref 1.5–8.3)
NEUTROPHILS NFR BLD AUTO: 75.2 % (ref 39–78)
PLATELET # BLD AUTO: 242 10*3/MM3 (ref 130–400)
PMV BLD AUTO: 8.5 FL (ref 6–12)
POTASSIUM BLD-SCNC: 3.7 MMOL/L (ref 3.5–5.3)
PROT SERPL-MCNC: 8 G/DL (ref 6.3–8.7)
PROTHROMBIN TIME: 13 SECONDS (ref 11.9–14.6)
RBC # BLD AUTO: 3.99 10*6/MM3 (ref 4.2–5.4)
RH BLD: POSITIVE
SODIUM BLD-SCNC: 137 MMOL/L (ref 135–145)
WBC NRBC COR # BLD: 9.9 10*3/MM3 (ref 4.8–10.8)

## 2017-12-08 PROCEDURE — 25010000002 HEPARIN FLUSH (PORCINE) 100 UNIT/ML SOLUTION: Performed by: INTERNAL MEDICINE

## 2017-12-08 PROCEDURE — 86901 BLOOD TYPING SEROLOGIC RH(D): CPT | Performed by: SURGERY

## 2017-12-08 PROCEDURE — 96365 THER/PROPH/DIAG IV INF INIT: CPT

## 2017-12-08 PROCEDURE — 85730 THROMBOPLASTIN TIME PARTIAL: CPT | Performed by: SURGERY

## 2017-12-08 PROCEDURE — 36415 COLL VENOUS BLD VENIPUNCTURE: CPT | Performed by: SURGERY

## 2017-12-08 PROCEDURE — 85025 COMPLETE CBC W/AUTO DIFF WBC: CPT | Performed by: SURGERY

## 2017-12-08 PROCEDURE — 86900 BLOOD TYPING SEROLOGIC ABO: CPT | Performed by: SURGERY

## 2017-12-08 PROCEDURE — 25010000002 IRON SUCROSE PER 1 MG: Performed by: INTERNAL MEDICINE

## 2017-12-08 PROCEDURE — 80053 COMPREHEN METABOLIC PANEL: CPT | Performed by: SURGERY

## 2017-12-08 PROCEDURE — 85610 PROTHROMBIN TIME: CPT | Performed by: SURGERY

## 2017-12-08 PROCEDURE — 86850 RBC ANTIBODY SCREEN: CPT | Performed by: SURGERY

## 2017-12-08 RX ORDER — SODIUM CHLORIDE 0.9 % (FLUSH) 0.9 %
10 SYRINGE (ML) INJECTION AS NEEDED
Status: CANCELLED | OUTPATIENT
Start: 2017-12-08

## 2017-12-08 RX ORDER — SODIUM CHLORIDE 9 MG/ML
250 INJECTION, SOLUTION INTRAVENOUS ONCE
Status: COMPLETED | OUTPATIENT
Start: 2017-12-08 | End: 2017-12-08

## 2017-12-08 RX ORDER — SODIUM CHLORIDE 9 MG/ML
250 INJECTION, SOLUTION INTRAVENOUS ONCE
Status: DISCONTINUED | OUTPATIENT
Start: 2017-12-08 | End: 2017-12-08 | Stop reason: HOSPADM

## 2017-12-08 RX ORDER — FAMOTIDINE 10 MG/ML
20 INJECTION, SOLUTION INTRAVENOUS AS NEEDED
Status: DISCONTINUED | OUTPATIENT
Start: 2017-12-08 | End: 2017-12-08 | Stop reason: HOSPADM

## 2017-12-08 RX ORDER — DIPHENHYDRAMINE HYDROCHLORIDE 50 MG/ML
50 INJECTION INTRAMUSCULAR; INTRAVENOUS AS NEEDED
Status: DISCONTINUED | OUTPATIENT
Start: 2017-12-08 | End: 2017-12-08 | Stop reason: HOSPADM

## 2017-12-08 RX ORDER — SODIUM CHLORIDE 0.9 % (FLUSH) 0.9 %
10 SYRINGE (ML) INJECTION AS NEEDED
Status: DISCONTINUED | OUTPATIENT
Start: 2017-12-08 | End: 2017-12-08 | Stop reason: HOSPADM

## 2017-12-08 RX ADMIN — SODIUM CHLORIDE 250 ML: 9 INJECTION, SOLUTION INTRAVENOUS at 12:35

## 2017-12-08 RX ADMIN — IRON SUCROSE 200 MG: 20 INJECTION, SOLUTION INTRAVENOUS at 12:41

## 2017-12-08 RX ADMIN — Medication 500 UNITS: at 14:01

## 2017-12-08 RX ADMIN — Medication 10 ML: at 14:01

## 2017-12-08 NOTE — PROGRESS NOTES
Date: 12-08-17    [facility]   Information and Education Class for Pre and Post     Surgery Care, Diets and Daily Living.       Surgery Type: Sleeve Gastrectomy Consent on File    Weight: 313.2lb      Diet Stages Form given including diet stages and surgery dates/times   Picture taken & Picture Consent on File  Weight Loss Surgery Patient Contract on File      Patient has signed/agreed with the Diet Stage & Medication discontinue sheet:       1) Medications have been review by [facility]-there are no other Medications except for the following that this patient will need to discontinue prior to Bariatric Surgery.      2) Patient informed to stop NSAID one week prior to surgery, No Estrogen two weeks prior to surgery and patient instructed not to take her Lisinopril the morning of surgery.         3) Dr Cordero recommends that this patient take 2 extra strength Tylenol (1000mg) along with 8 ounces of Sugar Free Gatorade, G2 or Powerade Zero (no red or pink in color) the morning of surgery.      4) During Boot camp-Patient also met with Outpatient Surgery Staff regarding Their other medications & prep for surgery.      Patient also received BA Surgery Post 1 week  & 1 month follow up appointment.     [unfilled]  11:14 AM

## 2017-12-11 ENCOUNTER — OFFICE VISIT (OUTPATIENT)
Dept: NEUROLOGY | Facility: CLINIC | Age: 42
End: 2017-12-11

## 2017-12-11 VITALS
WEIGHT: 293 LBS | SYSTOLIC BLOOD PRESSURE: 130 MMHG | HEIGHT: 65 IN | DIASTOLIC BLOOD PRESSURE: 70 MMHG | BODY MASS INDEX: 48.82 KG/M2 | HEART RATE: 78 BPM

## 2017-12-11 DIAGNOSIS — E66.01 OBESITY, CLASS III, BMI 40-49.9 (MORBID OBESITY) (HCC): ICD-10-CM

## 2017-12-11 DIAGNOSIS — I10 HYPERTENSION, WELL CONTROLLED: ICD-10-CM

## 2017-12-11 DIAGNOSIS — G47.33 OSA ON CPAP: Primary | ICD-10-CM

## 2017-12-11 DIAGNOSIS — Z99.89 OSA ON CPAP: Primary | ICD-10-CM

## 2017-12-11 PROCEDURE — 99213 OFFICE O/P EST LOW 20 MIN: CPT | Performed by: CLINICAL NURSE SPECIALIST

## 2017-12-11 NOTE — PROGRESS NOTES
Subjective     Chief Complaint   Patient presents with   • Sleep Apnea       Naomi Bhatia is a 41 y.o. female right handed . She is here fo face to face after having polysomnogram. he has history of snoring and daytime somnolenceShe was last seen 8/2017. She was to have titration study but instead is using autopap. She reports improved quality sleep. She wears full face mask. She reports compliance. She is scheduled for bariatric surgery 12/20/17.   S. Augusta score = 7(improved from 16) , STOP-BANG= intermediate.     Sleep Apnea   This is a chronic problem. The current episode started more than 1 year ago. The problem occurs daily. The problem has been resolved. Pertinent negatives include no arthralgias, chest pain, fatigue, fever, headaches, myalgias, nausea, vomiting or weakness. Associated symptoms comments: Snores, unrestored sleep, obesity, daytime somnolence. The treatment provided no relief.        Current Outpatient Prescriptions   Medication Sig Dispense Refill   • acyclovir (ZOVIRAX) 800 MG tablet Take 800 mg by mouth 2 (Two) Times a Day.     • ALBUTEROL IN Inhale 1 puff As Needed.     • CALCIUM CARBONATE PO Take 500 mg by mouth Daily.     • carbidopa-levodopa (SINEMET)  MG per tablet Take 1 tablet by mouth Daily.     • Cholecalciferol (VITAMIN D3) 5000 UNITS capsule capsule Take 5,000 Units by mouth Daily.     • CRANBERRY PO Take  by mouth Daily.     • Cyclobenzaprine HCl (FLEXERIL PO) Take 10 mg by mouth Daily As Needed.     • HYDROcodone-acetaminophen (NORCO) 7.5-325 MG per tablet Take 1 tablet by mouth Every 6 (Six) Hours As Needed.  0   • levocetirizine (XYZAL) 5 MG tablet TK 1 T PO QPM  0   • lisinopril (PRINIVIL,ZESTRIL) 10 MG tablet TK 1 T PO QD  2   • LYRICA 150 MG capsule TK 1 C PO BID  2   • metoprolol succinate XL (TOPROL XL) 50 MG 24 hr tablet Take 50 mg by mouth 2 (Two) Times a Day.     • Multiple Vitamin (MULTI VITAMIN PO) Take  by mouth Daily.     • NON FORMULARY Iron Infusions  twice a week     • nortriptyline (PAMELOR) 25 MG capsule TK ONE TO THREE CS PO QHS PRN  3   • Nutritional Supplements (EQ ESTROBLEND MENOPAUSE) tablet Take  by mouth Daily.     • omeprazole (priLOSEC) 20 MG capsule TK ONE C PO QD FIRST THING IN THE MORNING ON  AN EMPTY STOMACH  3   • oxyCODONE (ROXICODONE) 10 MG tablet Take 10 mg by mouth 2 (Two) Times a Day As Needed.     • venlafaxine (EFFEXOR) 75 MG tablet TK 1 T PO BID  11     No current facility-administered medications for this visit.      Facility-Administered Medications Ordered in Other Visits   Medication Dose Route Frequency Provider Last Rate Last Dose   • diphenhydrAMINE (BENADRYL) injection 50 mg  50 mg Intravenous PRN Pedro Clements MD       • famotidine (PEPCID) injection 20 mg  20 mg Intravenous PRN Pedro Clements MD       • hydrocortisone sodium succinate (Solu-CORTEF) injection 100 mg  100 mg Intravenous PRN Pedro Clements MD           Past Medical History:   Diagnosis Date   • Anemia    • Anxiety    • Arthritis    • Asthma    • Back pain 03/14/2016    with left sided radiculopathy    • DDD (degenerative disc disease), lumbar     Dr. Stuart Zavaleta for pain manangement   • Depression    • Fatigue    • Fibromyalgia    • GERD (gastroesophageal reflux disease)    • Headache    • History of blood transfusion    • Hypertension    • Iron deficiency anemia 9/12/2017   • Iron deficiency anemia secondary to inadequate dietary iron intake 9/12/2017   • Joint pain    • Obesity    • RLS (restless legs syndrome)    • Sleep apnea     positive sleep study; titration study in October       Past Surgical History:   Procedure Laterality Date   • ANKLE SURGERY      Right    • APPENDECTOMY  04/19/2015   • BACK SURGERY  2000   • CERVICAL SPINE SURGERY  09/01/2015   • CHOLECYSTECTOMY      1995;lap   • COLONOSCOPY  05/02/2017    normal; do not return until age of 49 Dr. Gus Valentine   • HIP SURGERY  1987    right    • INSERTION CENTRAL  "VENOUS ACCESS DEVICE W/ SUBCUTANEOUS PORT  11/07/2017    Dr Anel Gamboa (Smart Port-Power injectable Port) Cat NO#[id number] Lot [id number]    • MOUTH SURGERY  1994   • NECK SURGERY     • TOOTH EXTRACTION     • UPPER GASTROINTESTINAL ENDOSCOPY  05/02/2017    Dr. Gus Valentine, negative for h.pylori, negative for Salomon's   • VAGINAL HYSTERECTOMY SALPINGO OOPHORECTOMY  2008    Partial and then had another surgery to remove the rest       family history includes Cancer in her brother, father, and mother; Coronary artery disease in her mother; Diabetes in her brother, maternal grandmother, and mother; Heart disease in her father; Hypertension in her father, mother, and sister; Obesity in her sister; Stroke in her father and maternal grandmother.    Social History   Substance Use Topics   • Smoking status: Former Smoker     Packs/day: 1.00     Years: 25.00     Types: Cigarettes     Quit date: 2014   • Smokeless tobacco: Never Used   • Alcohol use Yes      Comment: rarely        Review of Systems   Constitutional: Negative for fatigue and fever.   HENT: Negative.  Negative for hearing loss, postnasal drip and sneezing.    Eyes: Negative.  Negative for visual disturbance.   Respiratory: Negative.  Negative for apnea, choking and shortness of breath.    Cardiovascular: Negative.  Negative for chest pain.   Gastrointestinal: Negative.  Negative for constipation, diarrhea, nausea and vomiting.   Endocrine: Negative.    Genitourinary: Negative.  Negative for dysuria and frequency.   Musculoskeletal: Negative for arthralgias, gait problem and myalgias.   Skin: Negative.    Allergic/Immunologic: Negative.    Neurological: Negative.  Negative for dizziness, weakness and headaches.   Hematological: Negative.  Negative for adenopathy.   Psychiatric/Behavioral: Negative.  Negative for agitation, confusion and hallucinations.   All other systems reviewed and are negative.      Objective     /70  Pulse 78  Ht 165.1 cm (65\")  Wt " (!) 142 kg (313 lb)  LMP  (LMP Unknown)  BMI 52.09 kg/m2, Body mass index is 52.09 kg/(m^2).    Physical Exam   Constitutional: She is oriented to person, place, and time. She appears well-developed and well-nourished.   HENT:   Head: Normocephalic and atraumatic.   Right Ear: Tympanic membrane and external ear normal.   Left Ear: Tympanic membrane and external ear normal.   Nose: Nose normal.   Mouth/Throat: Oropharynx is clear and moist.   Eyes: Conjunctivae, EOM and lids are normal. Pupils are equal, round, and reactive to light.   Neck: Normal range of motion. Neck supple. Carotid bruit is not present.   Cardiovascular: Normal rate, regular rhythm, S1 normal, S2 normal and normal heart sounds.    Pulmonary/Chest: Effort normal and breath sounds normal.   Abdominal: Soft. Bowel sounds are normal.   Musculoskeletal: Normal range of motion.   Neurological: She is alert and oriented to person, place, and time. She has normal strength and normal reflexes. She displays no tremor. No cranial nerve deficit or sensory deficit. She exhibits abnormal muscle tone. She displays a negative Romberg sign. Coordination and gait normal.   Reflex Scores:       Tricep reflexes are 2+ on the right side and 2+ on the left side.       Bicep reflexes are 2+ on the right side and 2+ on the left side.       Brachioradialis reflexes are 2+ on the right side and 2+ on the left side.       Patellar reflexes are 2+ on the right side and 2+ on the left side.       Achilles reflexes are 2+ on the right side and 2+ on the left side.  Skin: Skin is warm and dry.   Psychiatric: She has a normal mood and affect. Her speech is normal and behavior is normal. Cognition and memory are normal.   Nursing note and vitals reviewed.      Results for orders placed or performed in visit on 11/14/17   Comprehensive metabolic panel   Result Value Ref Range    Glucose 103 (H) 70 - 100 mg/dL    BUN 13 5 - 21 mg/dL    Creatinine 0.69 0.50 - 1.40 mg/dL    Sodium  137 135 - 145 mmol/L    Potassium 3.7 3.5 - 5.3 mmol/L    Chloride 101 98 - 110 mmol/L    CO2 26.0 24.0 - 31.0 mmol/L    Calcium 9.8 8.4 - 10.4 mg/dL    Total Protein 8.0 6.3 - 8.7 g/dL    Albumin 4.40 3.50 - 5.00 g/dL    ALT (SGPT) 50 0 - 54 U/L    AST (SGOT) 52 (H) 7 - 45 U/L    Alkaline Phosphatase 113 24 - 120 U/L    Total Bilirubin 1.3 (H) 0.1 - 1.0 mg/dL    eGFR Non African Amer 93 >60 mL/min/1.73    Globulin 3.6 gm/dL    A/G Ratio 1.2 1.1 - 2.5 g/dL    BUN/Creatinine Ratio 18.8      Anion Gap 10.0 4.0 - 13.0 mmol/L   APTT   Result Value Ref Range    PTT 31.5 24.1 - 34.8 seconds   Protime-INR   Result Value Ref Range    Protime 13.0 11.9 - 14.6 Seconds    INR 0.95 0.91 - 1.09   CBC Auto Differential   Result Value Ref Range    WBC 9.90 4.80 - 10.80 10*3/mm3    RBC 3.99 (L) 4.20 - 5.40 10*6/mm3    Hemoglobin 12.0 12.0 - 16.0 g/dL    Hematocrit 33.9 (L) 37.0 - 47.0 %    MCV 85.0 82.0 - 98.0 fL    MCH 30.1 28.0 - 32.0 pg    MCHC 35.4 33.0 - 36.0 g/dL    RDW 17.4 (H) 12.0 - 15.0 %    MPV 8.5 6.0 - 12.0 fL    Platelets 242 130 - 400 10*3/mm3    Neutrophil % 75.2 39.0 - 78.0 %    Lymphocyte % 20.2 15.0 - 45.0 %    Auto Mixed Cells % 4.6 0.1 - 24.0 %    Neutrophils, Absolute 7.40 1.50 - 8.30 10*3/mm3    Lymphocytes, Absolute 2.00 0.80 - 7.00 10*3/mm3    Auto Mixed Cells # 0.50 0.10 - 2.60 10*3/mm3   Type and screen   Result Value Ref Range    ABO Type B     RH type Positive     Antibody Screen Negative       POLYSOMNOGRAM:FINDINGS ON STUDY:  Time in bed 400.2 minutes.  Total sleep time 293 minutes.  Sleep efficiency 73%.  AHI 10.2.  PLM index 54.9.  LowSpO2 is 84%.  Sleep latency is 30 minutes.  REM is not achieved.  Stage I 20.6%.  Stage II 74.6%.  Stage III 4.8%.  Patient spent 3.1 minutes less than 90% oxygen saturation.  Patient was supine the entire time.  Mean pulse rate was 83.9 bpm with highest pulse rate during sleep 95 bpm.      IMPRESSION:    Axis A 1: Obstructive sleep apnea G 47.33  Axis A 2: Periodic  leg movements G 47.61  Axis B 1: CPAP or BiPAP titration with O2 protocol as indicated.  An in  attended study is preferred.  Axis B 2: Further evaluation and treatment of patient's periodic leg was needs to be watched symptomatically and further defined after use of CPAP or BiPAP  Axis C: Underlying medical problems and medication effects could be contributory.  Weight loss program needs to be reviewed if clinically indicated.  Chronic pain may be contributory.  Patient has possible depression component on questionnaire  Close follow-up with patient's family physician and emphasis on safety to be accomplished.       ASSESSMENT/PLAN    Diagnoses and all orders for this visit:    DIPAK on CPAP    Obesity, Class III, BMI 40-49.9 (morbid obesity)    Hypertension, well controlled    MEDICAL DECISION MAKIN. Obtain compliance download from [employer]  2. Counseled on strategies for compliance once she receives equipment.  3. RLS managed by PCP with Sinemet  4. BP managed by PCP  5. ELEVATED bmi -Patient scheduled for bariatric surgery 17. Patient's BMI is above normal parameters. Follow-up plan includes:  no follow-up required.  6. FORMER SMOKER, CURRENTLY NOT USING   7. Patient may need to be re-tested for DIPAK after significant weight loss.       allergies and all known medications/prescriptions have been reviewed using resources available on this encounter.    Return in about 3 months (around 3/11/2018).        IRMA Burk

## 2017-12-11 NOTE — PATIENT INSTRUCTIONS
Sleep Apnea  Sleep apnea is a condition that affects breathing. People with sleep apnea have moments during sleep when their breathing pauses briefly or gets shallow. Sleep apnea can cause these symptoms:  · Trouble staying asleep.  · Sleepiness or tiredness during the day.  · Irritability.  · Loud snoring.  · Morning headaches.  · Trouble concentrating.  · Forgetting things.  · Less interest in sex.  · Being sleepy for no reason.  · Mood swings.  · Personality changes.  · Depression.  · Waking up a lot during the night to pee (urinate).  · Dry mouth.  · Sore throat.  HOME CARE  · Make any changes in your routine that your doctor recommends.  · Eat a healthy, well-balanced diet.  · Take over-the-counter and prescription medicines only as told by your doctor.  · Avoid using alcohol, calming medicines (sedatives), and narcotic medicines.  · Take steps to lose weight if you are overweight.  · If you were given a machine (device) to use while you sleep, use it only as told by your doctor.  · Do not use any tobacco products, such as cigarettes, chewing tobacco, and e-cigarettes. If you need help quitting, ask your doctor.  · Keep all follow-up visits as told by your doctor. This is important.  GET HELF IF:  · The machine that you were given to use during sleep is uncomfortable or does not seem to be working.  · Your symptoms do not get better.  · Your symptoms get worse.  GET HELP RIGHT AWAY IF:  · Your chest hurts.  · You have trouble breathing in enough air (shortness of breath).  · You have an uncomfortable feeling in your back, arms, or stomach.  · You have trouble talking.  · One side of your body feels weak.  · A part of your face is hanging down (drooping).  These symptoms may be an emergency. Do not wait to see if the symptoms will go away. Get medical help right away. Call your local emergency services (911 in the [place] Do not drive yourself to the hospital.     This information is not intended to replace  advice given to you by your health care provider. Make sure you discuss any questions you have with your health care provider.     Document Released: 09/26/2009 Document Revised: 04/10/2017 Document Reviewed: 09/26/2016  [employer] Patient Education ©2017 [employer]  BMI for Adults  Body mass index (BMI) is a number that is calculated from a person's weight and height. In most adults, the number is used to find how much of an adult's weight is made up of fat. BMI is not as accurate as a direct measure of body fat.  HOW IS BMI CALCULATED?  BMI is calculated by dividing weight in kilograms by height in meters squared. It can also be calculated by dividing weight in pounds by height in inches squared, then multiplying the resulting number by 703. Charts are available to help you find your BMI quickly and easily without doing this calculation.   HOW IS BMI INTERPRETED?  Health care professionals use BMI charts to identify whether an adult is underweight, at a normal weight, or overweight based on the following guidelines:  · Underweight: BMI less than 18.5.  · Normal weight: BMI between 18.5 and 24.9.  · Overweight: BMI between 25 and 29.9.  · Obese: BMI of 30 and above.  BMI is usually interpreted the same for males and females.  Weight includes both fat and muscle, so someone with a muscular build, such as an athlete, may have a BMI that is higher than 24.9. In cases like these, BMI may not accurately depict body fat. To determine if excess body fat is the cause of a BMI of 25 or higher, further assessments may need to be done by a health care provider.  WHY IS BMI A USEFUL TOOL?  BMI is used to identify a possible weight problem that may be related to a medical problem or may increase the risk for medical problems. BMI can also be used to promote changes to reach a healthy weight.     This information is not intended to replace advice given to you by your health care provider. Make sure you discuss any  questions you have with your health care provider.     Document Released: 08/29/2005 Document Revised: 01/08/2016 Document Reviewed: 05/15/2015  [employer] Interactive Patient Education ©2017 [employer]

## 2017-12-12 ENCOUNTER — APPOINTMENT (OUTPATIENT)
Dept: ONCOLOGY | Facility: HOSPITAL | Age: 42
End: 2017-12-12

## 2017-12-13 ENCOUNTER — TELEPHONE (OUTPATIENT)
Dept: BARIATRICS/WEIGHT MGMT | Facility: CLINIC | Age: 42
End: 2017-12-13

## 2017-12-13 NOTE — TELEPHONE ENCOUNTER
Pt states that she knows that she is to be off of her Estrogen prior to surgery.  However, questions if it is ok for her to take black cohosh?    She is scheduled for a Gastric Sleeve on 12-20-17.     Told patient-I would need to send a note to Dr Cordero to check to see if ok or not.           Patient notified

## 2017-12-14 ENCOUNTER — HOSPITAL ENCOUNTER (OUTPATIENT)
Dept: PAIN MANAGEMENT | Age: 42
Discharge: HOME | End: 2017-12-14
Payer: MEDICARE

## 2017-12-14 VITALS
HEIGHT: 65 IN | TEMPERATURE: 96.3 F | HEART RATE: 104 BPM | RESPIRATION RATE: 18 BRPM | BODY MASS INDEX: 48.82 KG/M2 | DIASTOLIC BLOOD PRESSURE: 82 MMHG | SYSTOLIC BLOOD PRESSURE: 136 MMHG | OXYGEN SATURATION: 98 % | WEIGHT: 293 LBS

## 2017-12-14 DIAGNOSIS — M54.10 BACK PAIN WITH LEFT-SIDED RADICULOPATHY: ICD-10-CM

## 2017-12-14 PROCEDURE — 99213 OFFICE O/P EST LOW 20 MIN: CPT

## 2017-12-14 PROCEDURE — 80307 DRUG TEST PRSMV CHEM ANLYZR: CPT

## 2017-12-14 RX ORDER — OXYCODONE HYDROCHLORIDE 10 MG/1
10 TABLET ORAL 2 TIMES DAILY PRN
Qty: 45 TABLET | Refills: 0 | Status: SHIPPED | OUTPATIENT
Start: 2018-01-03 | End: 2018-02-02 | Stop reason: SDUPTHER

## 2017-12-14 ASSESSMENT — PAIN DESCRIPTION - ORIENTATION: ORIENTATION: LOWER;RIGHT

## 2017-12-14 ASSESSMENT — PAIN DESCRIPTION - LOCATION: LOCATION: BACK;HIP

## 2017-12-14 ASSESSMENT — PAIN DESCRIPTION - PAIN TYPE: TYPE: CHRONIC PAIN

## 2017-12-14 ASSESSMENT — PAIN DESCRIPTION - DESCRIPTORS: DESCRIPTORS: ACHING;CONSTANT;THROBBING

## 2017-12-14 ASSESSMENT — PAIN DESCRIPTION - ONSET: ONSET: ON-GOING

## 2017-12-14 ASSESSMENT — ACTIVITIES OF DAILY LIVING (ADL): EFFECT OF PAIN ON DAILY ACTIVITIES: LIMITS ACTIVITIES

## 2017-12-14 ASSESSMENT — PAIN SCALES - GENERAL: PAINLEVEL_OUTOF10: 7

## 2017-12-14 ASSESSMENT — PAIN DESCRIPTION - PROGRESSION: CLINICAL_PROGRESSION: NOT CHANGED

## 2017-12-14 ASSESSMENT — PAIN DESCRIPTION - DIRECTION: RADIATING_TOWARDS: DOWN RIGHT LEG TO FOOT

## 2017-12-14 ASSESSMENT — PAIN DESCRIPTION - FREQUENCY: FREQUENCY: CONTINUOUS

## 2017-12-14 NOTE — PROGRESS NOTES
Debra Hirsch/Chanel  Patient Pain Assessment  Progress Note      Chief Complaint   Patient presents with    Lower Back Pain     radiates down bilateral legs     Pain Assessment  Pain Assessment: 0-10  Pain Level: 7  Pain Type: Chronic pain  Pain Location: Back, Hip  Pain Orientation: Lower, Right  Pain Radiating Towards: down right leg to foot  Pain Descriptors: Aching, Constant, Throbbing  Pain Frequency: Continuous  Pain Onset: On-going  Clinical Progression: Not changed   Effect of Pain on Daily Activities: limits activities     [x]  Issues of Concern:    Patient reports she is having gastric sleeve surgery on 12/20/17. Patient also planning on having Right Hip replacement in February 2017 with Dr. Lianna Steven in [facility], [address] [place] [address] [address] [x]  Reports current medication is helping, but continues to have on-going pain    [x]  Reports current pain medication increases ability to do activities of daily living     [x]  Current narcotic medications    [x]  Discussed possible medication side effects, risk of tolerance and/or dependence, alternative treatments    [x]  Encouraged to set goals of decreasing daily narcotic intake    [x]  Discussed effects of long term narcotic use      [x]  Injection options discussed; not applicable, surgery planned (see above note)     []Yes [x]No  Current medication side effects, comment if applicable:     []Yes [x]No   Acute bladder or bowel changes    Previous Procedure / Percentage of pain control / Imaging / PT History:  Percentage of Pain Relief after Last Procedure: 75 %  How long lasted: 2 months     Radiology exams received during the last 12 months: Lumbar xray   When June 2017                                              Where [facility]  Imaging on chart: Yes    MRI exams received in the past 2 years: No  Physical therapy during the last 6 months: No    BMI: Body mass index is 53.25 kg/m².     [x]  Nutrient rich, low fat, low carbohydrate diet discussed   [x]  Positive effect of weight includes Cancer in her brother, father, and mother; Colon Polyps in her mother; Diabetes in her mother and another family member; Esophageal Cancer in her brother; Heart Disease in her father and mother; High Blood Pressure in her father, mother, sister, and another family member. Allergies:  Zithromax [azithromycin]     Medications:  Current Outpatient Prescriptions   Medication Sig Dispense Refill    oxyCODONE HCl (OXY-IR) 10 MG immediate release tablet Take 1 tablet by mouth 2 times daily as needed for Pain (month supply - decreased by Dr. Mary Jo Rand) .  Earliest Fill Date: 12/1/17 45 tablet 0    carbidopa-levodopa (SINEMET)  MG per tablet Take 1 tablet by mouth nightly as needed (leg spasms) 90 tablet 1    metoprolol succinate (TOPROL XL) 50 MG extended release tablet TAKE 2 TABLETS BY MOUTH DAILY 180 tablet 3    diclofenac 2 % SOLN Place 2 Squirts onto the skin 4 times daily as needed (as needed for right shoulder pain) 1 Bottle 5    acyclovir (ZOVIRAX) 800 MG tablet TAKE 1 TABLET BY MOUTH DAILY 90 tablet 1    pregabalin (LYRICA) 150 MG capsule Take 1 capsule by mouth 2 times daily 60 capsule 2    nortriptyline (PAMELOR) 25 MG capsule Take 1 capsule by mouth nightly 1-3 tabs at bedtime as needed 90 capsule 3    nabumetone (RELAFEN) 500 MG tablet Take 1 tablet by mouth 2 times daily 180 tablet 1    venlafaxine (EFFEXOR) 75 MG tablet Take 1 tablet by mouth 2 times daily TAKE 1 TABLET BY MOUTH TWICE DAILY 60 tablet 11    omeprazole (PRILOSEC) 20 MG delayed release capsule TAKE ONE CAPSULE BY MOUTH EVERY DAY FIRST THING IN THE MORNING ON AN EMPTY STOMACH 90 capsule 3    lisinopril (PRINIVIL;ZESTRIL) 10 MG tablet TAKE 1 TABLET BY MOUTH EVERY DAY 90 tablet 2    Respiratory Therapy Supplies (NEBULIZER/TUBING/MOUTHPIECE) KIT 1 kit by Does not apply route 4 times daily as needed (cough, wheezing) Dx: J40, J18.9, R05, R06.2 1 kit 0    albuterol (PROVENTIL) (2.5 MG/3ML) 0.083% nebulizer solution Take 3 mLs

## 2017-12-14 NOTE — PROGRESS NOTES
Nursing Admission Record    Current Issues / Falls / ER Visits:  Patient having gastric sleeve surgery on 12/20/17. Patient also planning on having hip replacement surgery on right in February 2017 with Dr. Damian Jaimes in [place], [place]. Percentage of Pain Relief after Last Procedure:  75 %    How long lasted:  2 months    Radiology exams received during the last 12 months: Yes Lumbar xray        When June 2017                                              Where [facility]       Imaging on chart: Yes         Imaging records requested: No  MRI exams received in the past 2 years:  No  Physical therapy during the last 6 months: No       When: na                                             Where  [facility]  Labs during the last 12 months: Yes    Education Provided:  [x] Review of Bettyann Rides  [] Agreement Review  [] Compliance Issues Discussed    [] Cognitive Behavior Needs [x] Exercise [] Review of Test [] Financial Issues  [x] Tobacco/Alcohol Use [x] Teaching [] New Patient [] Picture Obtained    Physician Plan:  [] Outgoing Referral  [] Pharmacy Consult  [] Test Ordered   [] Obtained Test Results / Consult Notes  [] UDS due at next visit, verified per EPIC      [] Suspected Physical Abuse or Suicide Risk assessed - IF YES COMPLETE QUESTIONS BELOW    If any of the following questions are answered yes - contact attending physician for referral:    Has been considering harming self to escape stress, pain problems? [] YES  [] NO  Has a suicide plan? [] YES  [] NO  Has attempted suicide in the past?   [] YES  [] NO  Has a close friend or family member who committed suicide? [] YES  [] NO    Patient Referred To :      Additional Notes:    Assessment Completed by:  Electronically signed by Jose Cruz Jhaveri RN on 12/14/2017 at 11:25 AM

## 2017-12-15 ENCOUNTER — INFUSION (OUTPATIENT)
Dept: ONCOLOGY | Facility: HOSPITAL | Age: 42
End: 2017-12-15

## 2017-12-15 ENCOUNTER — RESULTS ENCOUNTER (OUTPATIENT)
Dept: BARIATRICS/WEIGHT MGMT | Facility: CLINIC | Age: 42
End: 2017-12-15

## 2017-12-15 VITALS
WEIGHT: 293 LBS | RESPIRATION RATE: 20 BRPM | TEMPERATURE: 98.5 F | DIASTOLIC BLOOD PRESSURE: 69 MMHG | BODY MASS INDEX: 45.99 KG/M2 | SYSTOLIC BLOOD PRESSURE: 115 MMHG | OXYGEN SATURATION: 98 % | HEART RATE: 97 BPM | HEIGHT: 67 IN

## 2017-12-15 DIAGNOSIS — D50.8 IRON DEFICIENCY ANEMIA SECONDARY TO INADEQUATE DIETARY IRON INTAKE: ICD-10-CM

## 2017-12-15 DIAGNOSIS — I10 HYPERTENSION, WELL CONTROLLED: ICD-10-CM

## 2017-12-15 DIAGNOSIS — K90.9 MALABSORPTION OF IRON: Primary | ICD-10-CM

## 2017-12-15 DIAGNOSIS — E66.01 MORBID OBESITY, UNSPECIFIED OBESITY TYPE (HCC): ICD-10-CM

## 2017-12-15 PROCEDURE — 25010000002 IRON SUCROSE PER 1 MG: Performed by: INTERNAL MEDICINE

## 2017-12-15 PROCEDURE — 96365 THER/PROPH/DIAG IV INF INIT: CPT

## 2017-12-15 PROCEDURE — 25010000002 HEPARIN FLUSH (PORCINE) 100 UNIT/ML SOLUTION: Performed by: INTERNAL MEDICINE

## 2017-12-15 RX ORDER — SODIUM CHLORIDE 9 MG/ML
250 INJECTION, SOLUTION INTRAVENOUS ONCE
Status: COMPLETED | OUTPATIENT
Start: 2017-12-15 | End: 2017-12-15

## 2017-12-15 RX ORDER — SODIUM CHLORIDE 0.9 % (FLUSH) 0.9 %
10 SYRINGE (ML) INJECTION AS NEEDED
Status: CANCELLED | OUTPATIENT
Start: 2017-12-15

## 2017-12-15 RX ORDER — SODIUM CHLORIDE 0.9 % (FLUSH) 0.9 %
10 SYRINGE (ML) INJECTION AS NEEDED
Status: DISCONTINUED | OUTPATIENT
Start: 2017-12-15 | End: 2017-12-15 | Stop reason: HOSPADM

## 2017-12-15 RX ORDER — DIPHENHYDRAMINE HYDROCHLORIDE 50 MG/ML
50 INJECTION INTRAMUSCULAR; INTRAVENOUS AS NEEDED
Status: DISCONTINUED | OUTPATIENT
Start: 2017-12-15 | End: 2017-12-15 | Stop reason: HOSPADM

## 2017-12-15 RX ORDER — FAMOTIDINE 10 MG/ML
20 INJECTION, SOLUTION INTRAVENOUS AS NEEDED
Status: DISCONTINUED | OUTPATIENT
Start: 2017-12-15 | End: 2017-12-15 | Stop reason: HOSPADM

## 2017-12-15 RX ADMIN — Medication 500 UNITS: at 12:38

## 2017-12-15 RX ADMIN — IRON SUCROSE 200 MG: 20 INJECTION, SOLUTION INTRAVENOUS at 11:39

## 2017-12-15 RX ADMIN — SODIUM CHLORIDE 250 ML: 9 INJECTION, SOLUTION INTRAVENOUS at 11:39

## 2017-12-18 ENCOUNTER — TRANSCRIBE ORDERS (OUTPATIENT)
Dept: ADMINISTRATIVE | Facility: HOSPITAL | Age: 42
End: 2017-12-18

## 2017-12-18 ENCOUNTER — TRANSCRIBE ORDERS (OUTPATIENT)
Dept: BARIATRICS/WEIGHT MGMT | Facility: CLINIC | Age: 42
End: 2017-12-18

## 2017-12-18 ENCOUNTER — HOSPITAL ENCOUNTER (OUTPATIENT)
Dept: GENERAL RADIOLOGY | Facility: HOSPITAL | Age: 42
Discharge: HOME | End: 2017-12-18
Attending: SURGERY

## 2017-12-18 DIAGNOSIS — E66.01 MORBID OBESITY (HCC): Primary | ICD-10-CM

## 2017-12-18 DIAGNOSIS — E66.01 MORBID OBESITY (HCC): ICD-10-CM

## 2017-12-18 PROCEDURE — 71020 HC CHEST PA AND LATERAL: CPT

## 2017-12-19 ENCOUNTER — ANESTHESIA EVENT (OUTPATIENT)
Dept: PERIOP | Facility: HOSPITAL | Age: 42
End: 2017-12-19

## 2017-12-20 ENCOUNTER — ANESTHESIA (OUTPATIENT)
Dept: PERIOP | Facility: HOSPITAL | Age: 42
End: 2017-12-20

## 2017-12-20 ENCOUNTER — HOSPITAL ENCOUNTER (INPATIENT)
Facility: HOSPITAL | Age: 42
LOS: 1 days | Discharge: HOME | End: 2017-12-21
Attending: SURGERY | Admitting: SURGERY

## 2017-12-20 DIAGNOSIS — Z98.84 STATUS POST LAPAROSCOPIC SLEEVE GASTRECTOMY: Primary | ICD-10-CM

## 2017-12-20 DIAGNOSIS — E66.01 OBESITY, CLASS III, BMI 40-49.9 (MORBID OBESITY) (HCC): ICD-10-CM

## 2017-12-20 DIAGNOSIS — E66.01 MORBID OBESITY (HCC): ICD-10-CM

## 2017-12-20 LAB
ABO GROUP BLD: NORMAL
BLD GP AB SCN SERPL QL: NEGATIVE
GLUCOSE BLDC GLUCOMTR-MCNC: 152 MG/DL (ref 70–130)
GLUCOSE BLDC GLUCOMTR-MCNC: 184 MG/DL (ref 70–130)
RH BLD: POSITIVE

## 2017-12-20 PROCEDURE — 88307 TISSUE EXAM BY PATHOLOGIST: CPT | Performed by: SURGERY

## 2017-12-20 PROCEDURE — 43775 LAP SLEEVE GASTRECTOMY: CPT | Performed by: SURGERY

## 2017-12-20 PROCEDURE — 25010000002 ONDANSETRON PER 1 MG: Performed by: ANESTHESIOLOGY

## 2017-12-20 PROCEDURE — 25010000002 CEFAZOLIN PER 500 MG: Performed by: SURGERY

## 2017-12-20 PROCEDURE — 82962 GLUCOSE BLOOD TEST: CPT

## 2017-12-20 PROCEDURE — 25010000002 FENTANYL CITRATE (PF) 100 MCG/2ML SOLUTION: Performed by: ANESTHESIOLOGY

## 2017-12-20 PROCEDURE — 25010000002 FENTANYL CITRATE (PF) 100 MCG/2ML SOLUTION: Performed by: NURSE ANESTHETIST, CERTIFIED REGISTERED

## 2017-12-20 PROCEDURE — 25010000002 NEOSTIGMINE PER 0.5 MG: Performed by: NURSE ANESTHETIST, CERTIFIED REGISTERED

## 2017-12-20 PROCEDURE — 25010000002 HYDROMORPHONE PER 4 MG: Performed by: NURSE ANESTHETIST, CERTIFIED REGISTERED

## 2017-12-20 PROCEDURE — 25010000002 METOCLOPRAMIDE PER 10 MG: Performed by: SURGERY

## 2017-12-20 PROCEDURE — 25010000002 ONDANSETRON PER 1 MG: Performed by: NURSE ANESTHETIST, CERTIFIED REGISTERED

## 2017-12-20 PROCEDURE — 25010000002 ENOXAPARIN PER 10 MG: Performed by: SURGERY

## 2017-12-20 PROCEDURE — 86900 BLOOD TYPING SEROLOGIC ABO: CPT | Performed by: SURGERY

## 2017-12-20 PROCEDURE — 25010000002 METOCLOPRAMIDE PER 10 MG: Performed by: ANESTHESIOLOGY

## 2017-12-20 PROCEDURE — 63710000001 INSULIN LISPRO (HUMAN) PER 5 UNITS: Performed by: SURGERY

## 2017-12-20 PROCEDURE — 25010000002 DEXAMETHASONE PER 1 MG: Performed by: ANESTHESIOLOGY

## 2017-12-20 PROCEDURE — 86901 BLOOD TYPING SEROLOGIC RH(D): CPT | Performed by: SURGERY

## 2017-12-20 PROCEDURE — 25010000002 ONDANSETRON PER 1 MG: Performed by: SURGERY

## 2017-12-20 PROCEDURE — 25010000002 DEXAMETHASONE PER 1 MG: Performed by: NURSE ANESTHETIST, CERTIFIED REGISTERED

## 2017-12-20 PROCEDURE — 25010000002 KETOROLAC TROMETHAMINE PER 15 MG: Performed by: SURGERY

## 2017-12-20 PROCEDURE — 25010000002 MIDAZOLAM PER 1 MG: Performed by: ANESTHESIOLOGY

## 2017-12-20 PROCEDURE — 0DB64Z3 EXCISION OF STOMACH, PERCUTANEOUS ENDOSCOPIC APPROACH, VERTICAL: ICD-10-PCS | Performed by: SURGERY

## 2017-12-20 PROCEDURE — 25010000002 SUCCINYLCHOLINE PER 20 MG: Performed by: NURSE ANESTHETIST, CERTIFIED REGISTERED

## 2017-12-20 PROCEDURE — 25010000002 PROPOFOL 10 MG/ML EMULSION: Performed by: NURSE ANESTHETIST, CERTIFIED REGISTERED

## 2017-12-20 PROCEDURE — 86850 RBC ANTIBODY SCREEN: CPT | Performed by: SURGERY

## 2017-12-20 PROCEDURE — 25010000002 KETOROLAC TROMETHAMINE PER 15 MG: Performed by: NURSE ANESTHETIST, CERTIFIED REGISTERED

## 2017-12-20 PROCEDURE — 25010000003 CEFAZOLIN PER 500 MG: Performed by: SURGERY

## 2017-12-20 DEVICE — MESH STPL LN SEAMGUARD FLX60 BIOABS WHT/BLU/GRN/GLD/BLK: Type: IMPLANTABLE DEVICE | Site: STOMACH | Status: FUNCTIONAL

## 2017-12-20 RX ORDER — NALOXONE HCL 0.4 MG/ML
0.04 VIAL (ML) INJECTION AS NEEDED
Status: DISCONTINUED | OUTPATIENT
Start: 2017-12-20 | End: 2017-12-20 | Stop reason: HOSPADM

## 2017-12-20 RX ORDER — METOCLOPRAMIDE HYDROCHLORIDE 5 MG/ML
10 INJECTION INTRAMUSCULAR; INTRAVENOUS EVERY 8 HOURS
Status: DISCONTINUED | OUTPATIENT
Start: 2017-12-20 | End: 2017-12-21 | Stop reason: HOSPADM

## 2017-12-20 RX ORDER — PHENYLEPHRINE HCL IN 0.9% NACL 0.8MG/10ML
SYRINGE (ML) INTRAVENOUS AS NEEDED
Status: DISCONTINUED | OUTPATIENT
Start: 2017-12-20 | End: 2017-12-20 | Stop reason: SURG

## 2017-12-20 RX ORDER — SODIUM CHLORIDE, SODIUM LACTATE, POTASSIUM CHLORIDE, CALCIUM CHLORIDE 600; 310; 30; 20 MG/100ML; MG/100ML; MG/100ML; MG/100ML
50 INJECTION, SOLUTION INTRAVENOUS CONTINUOUS
Status: DISCONTINUED | OUTPATIENT
Start: 2017-12-20 | End: 2017-12-21 | Stop reason: HOSPADM

## 2017-12-20 RX ORDER — NALOXONE HCL 0.4 MG/ML
0.1 VIAL (ML) INJECTION
Status: DISCONTINUED | OUTPATIENT
Start: 2017-12-20 | End: 2017-12-21 | Stop reason: HOSPADM

## 2017-12-20 RX ORDER — CEFAZOLIN SODIUM 2 G/100ML
2 INJECTION, SOLUTION INTRAVENOUS EVERY 8 HOURS
Status: COMPLETED | OUTPATIENT
Start: 2017-12-20 | End: 2017-12-21

## 2017-12-20 RX ORDER — LEVALBUTEROL 1.25 MG/.5ML
1.25 SOLUTION, CONCENTRATE RESPIRATORY (INHALATION) EVERY 8 HOURS PRN
Status: DISCONTINUED | OUTPATIENT
Start: 2017-12-20 | End: 2017-12-21 | Stop reason: HOSPADM

## 2017-12-20 RX ORDER — SIMETHICONE 80 MG
40 TABLET,CHEWABLE ORAL 4 TIMES DAILY PRN
Status: DISCONTINUED | OUTPATIENT
Start: 2017-12-20 | End: 2017-12-21 | Stop reason: HOSPADM

## 2017-12-20 RX ORDER — HYDROMORPHONE HCL 110MG/55ML
PATIENT CONTROLLED ANALGESIA SYRINGE INTRAVENOUS AS NEEDED
Status: DISCONTINUED | OUTPATIENT
Start: 2017-12-20 | End: 2017-12-20 | Stop reason: SURG

## 2017-12-20 RX ORDER — SODIUM CHLORIDE, SODIUM LACTATE, POTASSIUM CHLORIDE, CALCIUM CHLORIDE 600; 310; 30; 20 MG/100ML; MG/100ML; MG/100ML; MG/100ML
100 INJECTION, SOLUTION INTRAVENOUS CONTINUOUS
Status: DISCONTINUED | OUTPATIENT
Start: 2017-12-20 | End: 2017-12-20 | Stop reason: HOSPADM

## 2017-12-20 RX ORDER — SODIUM CHLORIDE 0.9 % (FLUSH) 0.9 %
1-10 SYRINGE (ML) INJECTION AS NEEDED
Status: DISCONTINUED | OUTPATIENT
Start: 2017-12-20 | End: 2017-12-21 | Stop reason: HOSPADM

## 2017-12-20 RX ORDER — LIDOCAINE HYDROCHLORIDE 20 MG/ML
INJECTION, SOLUTION INFILTRATION; PERINEURAL AS NEEDED
Status: DISCONTINUED | OUTPATIENT
Start: 2017-12-20 | End: 2017-12-20 | Stop reason: SURG

## 2017-12-20 RX ORDER — SODIUM CHLORIDE, SODIUM LACTATE, POTASSIUM CHLORIDE, CALCIUM CHLORIDE 600; 310; 30; 20 MG/100ML; MG/100ML; MG/100ML; MG/100ML
30 INJECTION, SOLUTION INTRAVENOUS CONTINUOUS
Status: DISCONTINUED | OUTPATIENT
Start: 2017-12-20 | End: 2017-12-20 | Stop reason: HOSPADM

## 2017-12-20 RX ORDER — MIDAZOLAM HYDROCHLORIDE 1 MG/ML
2 INJECTION INTRAMUSCULAR; INTRAVENOUS
Status: DISCONTINUED | OUTPATIENT
Start: 2017-12-20 | End: 2017-12-20 | Stop reason: HOSPADM

## 2017-12-20 RX ORDER — GABAPENTIN 250 MG/5ML
250 SOLUTION ORAL EVERY 8 HOURS SCHEDULED
Status: DISCONTINUED | OUTPATIENT
Start: 2017-12-20 | End: 2017-12-21 | Stop reason: HOSPADM

## 2017-12-20 RX ORDER — DEXAMETHASONE SODIUM PHOSPHATE 4 MG/ML
4 INJECTION, SOLUTION INTRA-ARTICULAR; INTRALESIONAL; INTRAMUSCULAR; INTRAVENOUS; SOFT TISSUE ONCE AS NEEDED
Status: COMPLETED | OUTPATIENT
Start: 2017-12-20 | End: 2017-12-20

## 2017-12-20 RX ORDER — SODIUM CHLORIDE 0.9 % (FLUSH) 0.9 %
3 SYRINGE (ML) INJECTION AS NEEDED
Status: DISCONTINUED | OUTPATIENT
Start: 2017-12-20 | End: 2017-12-20 | Stop reason: HOSPADM

## 2017-12-20 RX ORDER — HYDROMORPHONE HCL 110MG/55ML
0.5 PATIENT CONTROLLED ANALGESIA SYRINGE INTRAVENOUS
Status: DISCONTINUED | OUTPATIENT
Start: 2017-12-20 | End: 2017-12-21

## 2017-12-20 RX ORDER — HYDRALAZINE HYDROCHLORIDE 20 MG/ML
5 INJECTION INTRAMUSCULAR; INTRAVENOUS
Status: DISCONTINUED | OUTPATIENT
Start: 2017-12-20 | End: 2017-12-20 | Stop reason: HOSPADM

## 2017-12-20 RX ORDER — ROCURONIUM BROMIDE 10 MG/ML
INJECTION, SOLUTION INTRAVENOUS AS NEEDED
Status: DISCONTINUED | OUTPATIENT
Start: 2017-12-20 | End: 2017-12-20 | Stop reason: SURG

## 2017-12-20 RX ORDER — SODIUM CHLORIDE, SODIUM LACTATE, POTASSIUM CHLORIDE, CALCIUM CHLORIDE 600; 310; 30; 20 MG/100ML; MG/100ML; MG/100ML; MG/100ML
1000 INJECTION, SOLUTION INTRAVENOUS CONTINUOUS
Status: DISCONTINUED | OUTPATIENT
Start: 2017-12-20 | End: 2017-12-20 | Stop reason: HOSPADM

## 2017-12-20 RX ORDER — SODIUM CHLORIDE 0.9 % (FLUSH) 0.9 %
1-10 SYRINGE (ML) INJECTION AS NEEDED
Status: DISCONTINUED | OUTPATIENT
Start: 2017-12-20 | End: 2017-12-20 | Stop reason: HOSPADM

## 2017-12-20 RX ORDER — PROPOFOL 10 MG/ML
VIAL (ML) INTRAVENOUS AS NEEDED
Status: DISCONTINUED | OUTPATIENT
Start: 2017-12-20 | End: 2017-12-20 | Stop reason: SURG

## 2017-12-20 RX ORDER — MEPERIDINE HYDROCHLORIDE 25 MG/ML
12.5 INJECTION INTRAMUSCULAR; INTRAVENOUS; SUBCUTANEOUS
Status: DISCONTINUED | OUTPATIENT
Start: 2017-12-20 | End: 2017-12-20 | Stop reason: HOSPADM

## 2017-12-20 RX ORDER — LABETALOL HYDROCHLORIDE 5 MG/ML
5 INJECTION, SOLUTION INTRAVENOUS
Status: DISCONTINUED | OUTPATIENT
Start: 2017-12-20 | End: 2017-12-20 | Stop reason: HOSPADM

## 2017-12-20 RX ORDER — IPRATROPIUM BROMIDE AND ALBUTEROL SULFATE 2.5; .5 MG/3ML; MG/3ML
3 SOLUTION RESPIRATORY (INHALATION) ONCE AS NEEDED
Status: DISCONTINUED | OUTPATIENT
Start: 2017-12-20 | End: 2017-12-20 | Stop reason: HOSPADM

## 2017-12-20 RX ORDER — SCOLOPAMINE TRANSDERMAL SYSTEM 1 MG/1
1 PATCH, EXTENDED RELEASE TRANSDERMAL ONCE
Status: DISCONTINUED | OUTPATIENT
Start: 2017-12-20 | End: 2017-12-21 | Stop reason: HOSPADM

## 2017-12-20 RX ORDER — METOPROLOL SUCCINATE 50 MG/1
50 TABLET, EXTENDED RELEASE ORAL
Status: DISCONTINUED | OUTPATIENT
Start: 2017-12-21 | End: 2017-12-21 | Stop reason: HOSPADM

## 2017-12-20 RX ORDER — KETOROLAC TROMETHAMINE 30 MG/ML
INJECTION, SOLUTION INTRAMUSCULAR; INTRAVENOUS AS NEEDED
Status: DISCONTINUED | OUTPATIENT
Start: 2017-12-20 | End: 2017-12-20 | Stop reason: SURG

## 2017-12-20 RX ORDER — MORPHINE SULFATE 2 MG/ML
2 INJECTION, SOLUTION INTRAMUSCULAR; INTRAVENOUS AS NEEDED
Status: DISCONTINUED | OUTPATIENT
Start: 2017-12-20 | End: 2017-12-20 | Stop reason: HOSPADM

## 2017-12-20 RX ORDER — ONDANSETRON 2 MG/ML
4 INJECTION INTRAMUSCULAR; INTRAVENOUS EVERY 6 HOURS
Status: DISCONTINUED | OUTPATIENT
Start: 2017-12-20 | End: 2017-12-21 | Stop reason: HOSPADM

## 2017-12-20 RX ORDER — KETOROLAC TROMETHAMINE 30 MG/ML
30 INJECTION, SOLUTION INTRAMUSCULAR; INTRAVENOUS EVERY 6 HOURS PRN
Status: DISCONTINUED | OUTPATIENT
Start: 2017-12-20 | End: 2017-12-21 | Stop reason: HOSPADM

## 2017-12-20 RX ORDER — VASOPRESSIN 20 U/ML
INJECTION PARENTERAL AS NEEDED
Status: DISCONTINUED | OUTPATIENT
Start: 2017-12-20 | End: 2017-12-20 | Stop reason: SURG

## 2017-12-20 RX ORDER — ONDANSETRON 2 MG/ML
INJECTION INTRAMUSCULAR; INTRAVENOUS AS NEEDED
Status: DISCONTINUED | OUTPATIENT
Start: 2017-12-20 | End: 2017-12-20 | Stop reason: SURG

## 2017-12-20 RX ORDER — FAMOTIDINE 10 MG/ML
20 INJECTION, SOLUTION INTRAVENOUS EVERY 12 HOURS SCHEDULED
Status: DISCONTINUED | OUTPATIENT
Start: 2017-12-20 | End: 2017-12-21 | Stop reason: HOSPADM

## 2017-12-20 RX ORDER — SUCCINYLCHOLINE CHLORIDE 20 MG/ML
INJECTION INTRAMUSCULAR; INTRAVENOUS AS NEEDED
Status: DISCONTINUED | OUTPATIENT
Start: 2017-12-20 | End: 2017-12-20 | Stop reason: SURG

## 2017-12-20 RX ORDER — FENTANYL CITRATE 50 UG/ML
50 INJECTION, SOLUTION INTRAMUSCULAR; INTRAVENOUS ONCE
Status: COMPLETED | OUTPATIENT
Start: 2017-12-20 | End: 2017-12-20

## 2017-12-20 RX ORDER — LIDOCAINE HYDROCHLORIDE 40 MG/ML
SOLUTION TOPICAL AS NEEDED
Status: DISCONTINUED | OUTPATIENT
Start: 2017-12-20 | End: 2017-12-20 | Stop reason: SURG

## 2017-12-20 RX ORDER — PROMETHAZINE HYDROCHLORIDE 25 MG/ML
12.5 INJECTION, SOLUTION INTRAMUSCULAR; INTRAVENOUS EVERY 6 HOURS PRN
Status: DISCONTINUED | OUTPATIENT
Start: 2017-12-20 | End: 2017-12-21 | Stop reason: HOSPADM

## 2017-12-20 RX ORDER — LISINOPRIL 10 MG/1
10 TABLET ORAL
Status: DISCONTINUED | OUTPATIENT
Start: 2017-12-20 | End: 2017-12-21 | Stop reason: HOSPADM

## 2017-12-20 RX ORDER — DEXAMETHASONE SODIUM PHOSPHATE 4 MG/ML
INJECTION, SOLUTION INTRA-ARTICULAR; INTRALESIONAL; INTRAMUSCULAR; INTRAVENOUS; SOFT TISSUE AS NEEDED
Status: DISCONTINUED | OUTPATIENT
Start: 2017-12-20 | End: 2017-12-20 | Stop reason: SURG

## 2017-12-20 RX ORDER — ONDANSETRON 2 MG/ML
4 INJECTION INTRAMUSCULAR; INTRAVENOUS AS NEEDED
Status: DISCONTINUED | OUTPATIENT
Start: 2017-12-20 | End: 2017-12-20 | Stop reason: HOSPADM

## 2017-12-20 RX ORDER — FLUMAZENIL 0.1 MG/ML
0.2 INJECTION INTRAVENOUS AS NEEDED
Status: DISCONTINUED | OUTPATIENT
Start: 2017-12-20 | End: 2017-12-20 | Stop reason: HOSPADM

## 2017-12-20 RX ORDER — METOCLOPRAMIDE HYDROCHLORIDE 5 MG/ML
5 INJECTION INTRAMUSCULAR; INTRAVENOUS
Status: DISCONTINUED | OUTPATIENT
Start: 2017-12-20 | End: 2017-12-20 | Stop reason: HOSPADM

## 2017-12-20 RX ORDER — MIDAZOLAM HYDROCHLORIDE 1 MG/ML
1 INJECTION INTRAMUSCULAR; INTRAVENOUS
Status: DISCONTINUED | OUTPATIENT
Start: 2017-12-20 | End: 2017-12-20 | Stop reason: HOSPADM

## 2017-12-20 RX ORDER — HYDRALAZINE HYDROCHLORIDE 20 MG/ML
10 INJECTION INTRAMUSCULAR; INTRAVENOUS EVERY 4 HOURS PRN
Status: DISCONTINUED | OUTPATIENT
Start: 2017-12-20 | End: 2017-12-21 | Stop reason: HOSPADM

## 2017-12-20 RX ORDER — FENTANYL CITRATE 50 UG/ML
INJECTION, SOLUTION INTRAMUSCULAR; INTRAVENOUS AS NEEDED
Status: DISCONTINUED | OUTPATIENT
Start: 2017-12-20 | End: 2017-12-20 | Stop reason: SURG

## 2017-12-20 RX ORDER — GLYCOPYRROLATE 0.2 MG/ML
INJECTION INTRAMUSCULAR; INTRAVENOUS AS NEEDED
Status: DISCONTINUED | OUTPATIENT
Start: 2017-12-20 | End: 2017-12-20 | Stop reason: SURG

## 2017-12-20 RX ADMIN — DEXAMETHASONE SODIUM PHOSPHATE 4 MG: 4 INJECTION, SOLUTION INTRA-ARTICULAR; INTRALESIONAL; INTRAMUSCULAR; INTRAVENOUS; SOFT TISSUE at 11:32

## 2017-12-20 RX ADMIN — VASOPRESSIN 1 UNITS: 20 INJECTION INTRAVENOUS at 13:20

## 2017-12-20 RX ADMIN — PROPOFOL 200 MG: 10 INJECTION, EMULSION INTRAVENOUS at 12:49

## 2017-12-20 RX ADMIN — ONDANSETRON 4 MG: 2 INJECTION INTRAMUSCULAR; INTRAVENOUS at 21:52

## 2017-12-20 RX ADMIN — KETOROLAC TROMETHAMINE 30 MG: 30 INJECTION, SOLUTION INTRAMUSCULAR at 14:20

## 2017-12-20 RX ADMIN — VASOPRESSIN 1 UNITS: 20 INJECTION INTRAVENOUS at 13:25

## 2017-12-20 RX ADMIN — MIDAZOLAM HYDROCHLORIDE 2 MG: 1 INJECTION, SOLUTION INTRAMUSCULAR; INTRAVENOUS at 11:32

## 2017-12-20 RX ADMIN — CEFAZOLIN 3 G: 1 INJECTION, POWDER, FOR SOLUTION INTRAVENOUS at 12:54

## 2017-12-20 RX ADMIN — KETOROLAC TROMETHAMINE 30 MG: 30 INJECTION, SOLUTION INTRAMUSCULAR at 14:19

## 2017-12-20 RX ADMIN — INSULIN LISPRO 2 UNITS: 100 INJECTION, SOLUTION INTRAVENOUS; SUBCUTANEOUS at 21:51

## 2017-12-20 RX ADMIN — Medication 160 MCG: at 13:14

## 2017-12-20 RX ADMIN — ONDANSETRON 4 MG: 2 INJECTION, SOLUTION INTRAMUSCULAR; INTRAVENOUS at 14:55

## 2017-12-20 RX ADMIN — SODIUM CHLORIDE, POTASSIUM CHLORIDE, SODIUM LACTATE AND CALCIUM CHLORIDE 140 ML/HR: 600; 310; 30; 20 INJECTION, SOLUTION INTRAVENOUS at 23:11

## 2017-12-20 RX ADMIN — FAMOTIDINE 20 MG: 10 INJECTION, SOLUTION INTRAVENOUS at 21:40

## 2017-12-20 RX ADMIN — SCOPOLAMINE 1 PATCH: 1 PATCH, EXTENDED RELEASE TRANSDERMAL at 11:30

## 2017-12-20 RX ADMIN — CARBIDOPA AND LEVODOPA 1 TABLET: 25; 100 TABLET ORAL at 16:52

## 2017-12-20 RX ADMIN — FENTANYL CITRATE 50 MCG: 50 INJECTION, SOLUTION INTRAMUSCULAR; INTRAVENOUS at 12:53

## 2017-12-20 RX ADMIN — SODIUM CHLORIDE, POTASSIUM CHLORIDE, SODIUM LACTATE AND CALCIUM CHLORIDE 1000 ML: 600; 310; 30; 20 INJECTION, SOLUTION INTRAVENOUS at 10:48

## 2017-12-20 RX ADMIN — LISINOPRIL 10 MG: 10 TABLET ORAL at 16:52

## 2017-12-20 RX ADMIN — HYDROMORPHONE HYDROCHLORIDE 1 MG: 2 INJECTION, SOLUTION INTRAMUSCULAR; INTRAVENOUS; SUBCUTANEOUS at 13:45

## 2017-12-20 RX ADMIN — SODIUM CHLORIDE, POTASSIUM CHLORIDE, SODIUM LACTATE AND CALCIUM CHLORIDE 30 ML/HR: 600; 310; 30; 20 INJECTION, SOLUTION INTRAVENOUS at 10:46

## 2017-12-20 RX ADMIN — SUCCINYLCHOLINE CHLORIDE 200 MG: 20 INJECTION, SOLUTION INTRAMUSCULAR; INTRAVENOUS at 12:50

## 2017-12-20 RX ADMIN — Medication 3 MG: at 14:00

## 2017-12-20 RX ADMIN — ROCURONIUM BROMIDE 30 MG: 10 INJECTION INTRAVENOUS at 12:57

## 2017-12-20 RX ADMIN — ENOXAPARIN SODIUM 40 MG: 40 INJECTION SUBCUTANEOUS at 11:30

## 2017-12-20 RX ADMIN — ROCURONIUM BROMIDE 10 MG: 10 INJECTION INTRAVENOUS at 12:49

## 2017-12-20 RX ADMIN — LIDOCAINE HYDROCHLORIDE 60 MG: 20 INJECTION, SOLUTION INFILTRATION; PERINEURAL at 12:48

## 2017-12-20 RX ADMIN — KETOROLAC TROMETHAMINE 30 MG: 30 INJECTION, SOLUTION INTRAMUSCULAR at 21:51

## 2017-12-20 RX ADMIN — LIDOCAINE HYDROCHLORIDE 1 EACH: 40 SOLUTION TOPICAL at 12:51

## 2017-12-20 RX ADMIN — METRONIDAZOLE 500 MG: 500 INJECTION, SOLUTION INTRAVENOUS at 11:54

## 2017-12-20 RX ADMIN — ONDANSETRON HYDROCHLORIDE 4 MG: 2 SOLUTION INTRAMUSCULAR; INTRAVENOUS at 13:04

## 2017-12-20 RX ADMIN — ONDANSETRON 4 MG: 2 INJECTION INTRAMUSCULAR; INTRAVENOUS at 16:27

## 2017-12-20 RX ADMIN — GABAPENTIN 250 MG: 250 SOLUTION ORAL at 21:40

## 2017-12-20 RX ADMIN — FENTANYL CITRATE 50 MCG: 50 INJECTION, SOLUTION INTRAMUSCULAR; INTRAVENOUS at 12:43

## 2017-12-20 RX ADMIN — SODIUM CHLORIDE, POTASSIUM CHLORIDE, SODIUM LACTATE AND CALCIUM CHLORIDE 140 ML/HR: 600; 310; 30; 20 INJECTION, SOLUTION INTRAVENOUS at 16:06

## 2017-12-20 RX ADMIN — FENTANYL CITRATE 50 MCG: 50 INJECTION, SOLUTION INTRAMUSCULAR; INTRAVENOUS at 11:53

## 2017-12-20 RX ADMIN — Medication 160 MCG: at 13:09

## 2017-12-20 RX ADMIN — METOCLOPRAMIDE 10 MG: 5 INJECTION, SOLUTION INTRAMUSCULAR; INTRAVENOUS at 16:27

## 2017-12-20 RX ADMIN — GLYCOPYRROLATE 0.4 MG: 0.2 INJECTION, SOLUTION INTRAMUSCULAR; INTRAVENOUS at 14:00

## 2017-12-20 RX ADMIN — METOCLOPRAMIDE 5 MG: 5 INJECTION, SOLUTION INTRAMUSCULAR; INTRAVENOUS at 15:07

## 2017-12-20 RX ADMIN — DEXAMETHASONE SODIUM PHOSPHATE 4 MG: 4 INJECTION, SOLUTION INTRAMUSCULAR; INTRAVENOUS at 13:04

## 2017-12-20 RX ADMIN — FENTANYL CITRATE 100 MCG: 50 INJECTION, SOLUTION INTRAMUSCULAR; INTRAVENOUS at 12:47

## 2017-12-20 RX ADMIN — GABAPENTIN 250 MG: 250 SOLUTION ORAL at 16:27

## 2017-12-20 RX ADMIN — DEXTROSE MONOHYDRATE 2 G: 50 INJECTION, SOLUTION INTRAVENOUS at 21:26

## 2017-12-20 RX ADMIN — HYDROMORPHONE HYDROCHLORIDE 1 MG: 2 INJECTION, SOLUTION INTRAMUSCULAR; INTRAVENOUS; SUBCUTANEOUS at 13:54

## 2017-12-20 RX ADMIN — HYDROCODONE BITARTRATE AND ACETAMINOPHEN 10 ML: 7.5; 325 SOLUTION ORAL at 19:21

## 2017-12-20 RX ADMIN — FENTANYL CITRATE 50 MCG: 50 INJECTION, SOLUTION INTRAMUSCULAR; INTRAVENOUS at 13:07

## 2017-12-20 NOTE — OP NOTE
Laparoscopic Sleeve Gastrectomy     Pre operative diagnosis: Morbid Obesity     Post op diagnosis: as above    Indications: The patient was admitted to the hospital with history of morbid obesity.   she is scheduled for a Laparoscopic Sleeve Gastrectomy.       Surgeon: Yifan Cordero MD     Assistants: Lilly Alcantara Anne    Anesthesia: General endotracheal anesthesia    ASA Class: 4          Procedure Details       After the patient was explained the alternatives, benefits, complications, and risks of the laparoscopic sleeve gastrectomy informed consent was provided.  The patient was brought to the or suite after receiving preoperative antibiotics medications and anticoagulation.  The patient was placed under general anesthesia.  The patient was then prepped and draped in standard fashion.  We performed a surgical Timeout.  A 40 Yi bougie was placed into the oropharynx by anesthesia and guided down into the stomach followed by placement to suction.  I then proceeded to locally anesthetize a left subcostal incision site for placement of a Verse Needle.  I placed saline to confirm position and then connected the tubing to create pneumo.  Once the pressure reached 15 mmHg I proceeded placement of my left 5 mm trocar with camera assist into the abdominal cavity.  I noted adhesions in the periumbilical location and took them down with harmonic.  I the placed in the  periumbilical location a 12 mm incision followed by placement of a 12 mm trocar into the abdominal cavity under direct visualization.    A 15 mm incision in the right upper quadrant was made for placement of a 15 mm trocar under direct visualization.  A 12 mm incision was made inferior and lateral to this site for placement of a 12 mm trocar under direct visualization into the abdominal cavity.  A 5 mm incision was placed in the subxiphoid location for placement of a 5 mm trocar under direct visualization.  I removed this trocar and replaced it with a  Vikki liver retractor.   Note that all skin wounds were locally anesthetized prior to making incisions for placement of the trochars.  I requested the patient be placed in reverse Trendelenburg.      The bougie was then placed to suction after positioning under direct visualization.  I dissected the fat pad near the angle of Hiss away from the diaphragm.   I created a window into the lesser sack bluntly then proceeded with ligating the greater curvature vessels starting at my most distal ligation site 4-6 cm from the pylorus.  I continued proximally along the greater curvature ligating the vessels as well as a short gastrics.  This was all accomplished with the Harmonic device.  Once completion of ligation of the vessels was obtained, I dissected away attachments found posteriorly to the stomach.  Care was made to assure no injury to the pancreas. Completion of my dissection was obtained once visualization of the posterior left crural muscle seen.  The sleeve gastrectomy was then created by firing of the stapler device to create the sleeve gastrectomy.  I utilized seam guard covering over the staplers cartridge.  My most distal staple line was approximately 4-6 cm from the pylorus and I continued proximally along the greater curvature assuring that the width from the angularis incisura was at least 3 cm.  My final proximal stapler was at least 1-2 cm from the GE junction. I then proceeded with my leak test.  This was performed by placing the patient in Trendelenburg and irrigating around the sleeve gastrectomy saline. I assessed for leaks by submerging the staple line under saline and then requested anesthesia to insufflate the bougie with air. I then assessed for air bubbles along the staple line.  Once there was no evidence of leakage. I then requested that the bougie be placed off suction and then to break its seal for easy removal.  The fluid from the abdominal cavity was suctioned away.  The bougie was then  removed under direct visualization off suction and only after the suction seal was broken with air under direct visualization.  I then assessed the staple line to observe for hemostasis.  I placed surgicel snow on the staple line. I placed a simple 3-0 Vicryl imbricating suture at the most proximal portion of the sleeve gastrectomy incorporating omentum with this stitch.  I then proceeded to remove the resected portion of the stomach through the 15 mm trocar/port assist site under direct visualization.  I reassessed for hemostasis, and once this was confirmed proceeded to closing wound site's Fascial with suture.  Closing the 15 mm trocar and the 12 mm supraumbilical trocar sites with a Erick Moore device using an 0 Vicryl suture. The liver retractor was removed under direct visualization as well as the pneumoperitoneum and remaining trochars.  I re-locally anesthetized skin wounds prior to closure.  Skin wounds were closed with 4-0 Monocryl.  Of note prior to closing skin wounds all lap pads and instruments were accounted for at the end of the procedure.    Black staples: 1  Green staples: 6    Findings: adhesions    Estimated Blood Loss:  50 mL           Drains: none           Total IV Fluids: 800 ml           Specimens:   ID Type Source Tests Collected by Time Destination   A : Fundus of Stomach Tissue Stomach TISSUE EXAM Yifan Cordero MD 12/20/2017 1312               Implants:   Implant Name Type Inv. Item Serial No.  Lot No. LRB No. Used   STAPLELINE SEAMGUARD ECHELON FLX [id number] - [id number] Implant STAPLELINE SEAMGUARD ECHELON FLX [id number]   GRACIE SIDHU AND  [id number] N/A 7              Complications:  none           Disposition: PACU - hemodynamically stable.           Condition: stable

## 2017-12-20 NOTE — ANESTHESIA POSTPROCEDURE EVALUATION
Patient: Naomi Bhatia    Procedure Summary     Date Anesthesia Start Anesthesia Stop Room / Location    12/20/17 1243 1430 [facility] OR 04 / [facility]       Procedure Diagnosis Surgeon Provider    GASTRIC SLEEVE LAPAROSCOPIC (N/A Abdomen) Morbid obesity; Obesity, Class III, BMI 40-49.9 (morbid obesity)  (Morbid obesity [E66.01]; Obesity, Class III, BMI 40-49.9 (morbid obesity) [E66.01]) MD Matthias Albright CRNA          Anesthesia Type: general  Last vitals  BP   132/67 (12/20/17 1515)   Temp   97.8 °F (36.6 °C) (12/20/17 1515)   Pulse   80 (12/20/17 1515)   Resp   14 (12/20/17 1515)     SpO2   94 % (12/20/17 1515)     Post Anesthesia Care and Evaluation    Patient location during evaluation: PACU  Patient participation: complete - patient participated  Level of consciousness: awake and alert  Pain management: adequate  Airway patency: patent  Anesthetic complications: No anesthetic complications  PONV Status: controlled  Cardiovascular status: acceptable and hemodynamically stable  Respiratory status: acceptable  Hydration status: acceptable    Comments: Patient discharged from PACU prior to anesthesia evaluation based on Topher Score.  For details, see RN note.     /67 (BP Location: Right arm, Patient Position: Lying)  Pulse 80  Temp 97.8 °F (36.6 °C) (Temporal Artery )   Resp 14  LMP  (LMP Unknown)  SpO2 94%

## 2017-12-20 NOTE — ANESTHESIA PREPROCEDURE EVALUATION
Anesthesia Evaluation     no history of anesthetic complications:  NPO Solid Status: > 8 hours  NPO Liquid Status: > 4 hours     Airway   Mallampati: II  TM distance: >3 FB  Neck ROM: full  Dental    (+) upper dentures        Pulmonary - normal exam    breath sounds clear to auscultation  (+) sleep apnea,   (-) COPD, asthma (Patient denies), recent URI, not a smoker  Cardiovascular   Exercise tolerance: poor (<4 METS) (Limited secondary to pain, SOB.  Denies chest pain.)    ECG reviewed  Patient on routine beta blocker and Beta blocker given within 24 hours of surgery  Rhythm: regular  Rate: normal    (+) hypertension,   (-) pacemaker, past MI, angina, cardiac stents, CABG      Neuro/Psych  (+) psychiatric history Anxiety and Depression,    (-) seizures, TIA, CVA  GI/Hepatic/Renal/Endo    (+) morbid obesity, GERD,   (-) liver disease, no renal disease, diabetes, hypothyroidism, hyperthyroidism    Musculoskeletal     (+) back pain,       ROS comment: DDD  Abdominal   (+) obese,    Substance History      OB/GYN          Other        ROS/Med Hx Other: Restless Leg Syndrome  Fibromyalgia  Iron deficient anemia, receives iron transfusions      Phys Exam Other: Port on upper left chest                                Anesthesia Plan    ASA 3     general     intravenous induction   Anesthetic plan and risks discussed with patient.

## 2017-12-20 NOTE — PLAN OF CARE
Problem: Perioperative Period (Adult)  Goal: Signs and Symptoms of Listed Potential Problems Will be Absent or Manageable (Perioperative Period)  Outcome: Ongoing (interventions implemented as appropriate)   12/20/17 1120   Perioperative Period   Problems Assessed (Perioperative Period) pain;hypothermia;hypoxia/hypoxemia;perioperative injury;situational response   Problems Present (Perioperative Period) situational response

## 2017-12-20 NOTE — ANESTHESIA PROCEDURE NOTES
Airway  Urgency: elective    Airway not difficult    General Information and Staff    Patient location during procedure: OR  CRNA: CHAN RAY    Indications and Patient Condition  Indications for airway management: airway protection    Preoxygenated: yes  Mask difficulty assessment: 1 - vent by mask    Final Airway Details  Final airway type: endotracheal airway      Successful airway: ETT    Successful intubation technique: direct laryngoscopy  Endotracheal tube insertion site: oral  Blade: Eitan  Blade size: 3.5.  ETT size: 7.5 mm  Cormack-Lehane Classification: grade I - full view of glottis  Placement verified by: chest auscultation and capnometry   Measured from: lips  ETT to lips (cm): 21  Number of attempts at approach: 1

## 2017-12-20 NOTE — PLAN OF CARE
Problem: Patient Care Overview (Adult)  Goal: Plan of Care Review  Outcome: Ongoing (interventions implemented as appropriate)   12/20/17 1527   Coping/Psychosocial Response Interventions   Plan Of Care Reviewed With patient   Patient Care Overview   Progress improving   Outcome Evaluation   Outcome Summary/Follow up Plan denies pain, vital signs stable, pacu discharge critera        Problem: Perioperative Period (Adult)  Goal: Signs and Symptoms of Listed Potential Problems Will be Absent or Manageable (Perioperative Period)  Outcome: Ongoing (interventions implemented as appropriate)

## 2017-12-20 NOTE — PLAN OF CARE
Problem: Patient Care Overview (Adult)  Goal: Plan of Care Review  Outcome: Ongoing (interventions implemented as appropriate)   12/20/17 1243   Coping/Psychosocial Response Interventions   Plan Of Care Reviewed With patient   Patient Care Overview   Progress improving   Outcome Evaluation   Outcome Summary/Follow up Plan less anxious

## 2017-12-20 NOTE — H&P
Patient Care Team:  Brett Sharp DO as PCP - General (Pediatrics)  IRMA Wilcox as Referring Physician (Family Medicine)  Barney Houston MD as Cardiologist (Cardiology)    Reason for Visit:  Surgical Weight loss    Subjective     Patient is a 41 y.o. female presents with morbid obesity and her There is no height or weight on file to calculate BMI.     She is here for discussion of surgical weight loss options.  She stated she has been with the disease of obesity for year(s).  She stated she suffers from degenerative disc disease, joint pain, hypertension and sleep apnea due to her weight gain.  She stated that weight loss helps alleviate these symptoms. She stated that she has tried multiple diet regimens including participating in a medically supervised weight loss program to help with weight loss.  She stated that she has attempted these conservative methods for weight loss without maintaining long term success.  Today she would like to discuss surgical weight loss options such as the Laparoscopic Sleeve Gastrectomy.      Review of Systems  General ROS: positive for  - weight gain  Respiratory ROS: positive for - shortness of breath  Cardiovascular ROS: positive for - edema  Gastrointestinal ROS: no abdominal pain, change in bowel habits, or black or bloody stools  Musculoskeletal ROS: positive for - joint pain and pain in back - lower, hip - both sides and leg - lower  Neurological ROS: no TIA or stroke symptoms  positive for - headaches and numbness/tingling      History  Past Medical History:   Diagnosis Date   • Anemia    • Anxiety    • Arthritis    • Asthma    • Back pain 03/14/2016    with left sided radiculopathy    • DDD (degenerative disc disease), lumbar     Dr. Stuart Zavaleta for pain manangement   • Depression    • Fatigue    • Fibromyalgia    • GERD (gastroesophageal reflux disease)    • Headache    • History of blood transfusion    • Hypertension    • Iron deficiency anemia 9/12/2017    • Iron deficiency anemia secondary to inadequate dietary iron intake 9/12/2017   • Joint pain    • Obesity    • RLS (restless legs syndrome)    • Sleep apnea     positive sleep study; titration study in October     Past Surgical History:   Procedure Laterality Date   • ANKLE SURGERY      Right    • APPENDECTOMY  04/19/2015   • BACK SURGERY  2000   • CERVICAL SPINE SURGERY  09/01/2015   • CHOLECYSTECTOMY      1995;lap   • COLONOSCOPY  05/02/2017    normal; do not return until age of 49 Dr. Gus Valentine   • HIP SURGERY  1987    right    • INSERTION CENTRAL VENOUS ACCESS DEVICE W/ SUBCUTANEOUS PORT  11/07/2017    Dr Anel Gamboa (Smart Port-Power injectable Port) Cat [id number] Lot [id number]    • MOUTH SURGERY  1994   • NECK SURGERY     • TOOTH EXTRACTION     • UPPER GASTROINTESTINAL ENDOSCOPY  05/02/2017    Dr. Gus Valentine, negative for h.pylori, negative for Salomon's   • VAGINAL HYSTERECTOMY SALPINGO OOPHORECTOMY  2008    Partial and then had another surgery to remove the rest     Family History   Problem Relation Age of Onset   • Diabetes Mother    • Hypertension Mother    • Coronary artery disease Mother    • Cancer Mother    • Cancer Father    • Hypertension Father    • Heart disease Father    • Stroke Father    • Hypertension Sister    • Obesity Sister    • Cancer Brother    • Diabetes Brother    • Diabetes Maternal Grandmother    • Stroke Maternal Grandmother      Social History   Substance Use Topics   • Smoking status: Former Smoker     Packs/day: 1.00     Years: 25.00     Types: Cigarettes     Quit date: 2014   • Smokeless tobacco: Never Used   • Alcohol use Yes      Comment: rarely      Prescriptions Prior to Admission   Medication Sig Dispense Refill Last Dose   • acyclovir (ZOVIRAX) 800 MG tablet Take 800 mg by mouth 2 (Two) Times a Day.   12/19/2017 at 1930   • ALBUTEROL IN Inhale 1 puff As Needed.   11/22/2017   • CALCIUM CARBONATE PO Take 500 mg by mouth Daily.   12/19/2017 at 1930   •  carbidopa-levodopa (SINEMET)  MG per tablet Take 1 tablet by mouth Daily.   12/19/2017 at 1930   • Cholecalciferol (VITAMIN D3) 5000 UNITS capsule capsule Take 5,000 Units by mouth Daily.   12/19/2017 at 0800   • CRANBERRY PO Take  by mouth Daily.   12/19/2017 at 1930   • Cyclobenzaprine HCl (FLEXERIL PO) Take 10 mg by mouth Daily As Needed.   11/22/2017   • HYDROcodone-acetaminophen (NORCO) 7.5-325 MG per tablet Take 1 tablet by mouth Every 6 (Six) Hours As Needed.  0 11/22/2017   • levocetirizine (XYZAL) 5 MG tablet TK 1 T PO QPM  0 12/19/2017 at 1930   • lisinopril (PRINIVIL,ZESTRIL) 10 MG tablet TK 1 T PO QD  2 12/19/2017 at 1930   • LYRICA 150 MG capsule TK 1 C PO BID  2 12/13/2017   • metoprolol succinate XL (TOPROL XL) 50 MG 24 hr tablet Take 50 mg by mouth 2 (Two) Times a Day.   12/20/2017 at 0730   • Multiple Vitamin (MULTI VITAMIN PO) Take  by mouth Daily.   12/19/2017 at 0800   • NON FORMULARY Iron Infusions twice a week   Taking   • nortriptyline (PAMELOR) 25 MG capsule TK ONE TO THREE CS PO QHS PRN  3 12/13/2017   • Nutritional Supplements (EQ ESTROBLEND MENOPAUSE) tablet Take  by mouth Daily.   12/6/2017   • omeprazole (priLOSEC) 20 MG capsule TK ONE C PO QD FIRST THING IN THE MORNING ON  AN EMPTY STOMACH  3 12/19/2017 at 0800   • oxyCODONE (ROXICODONE) 10 MG tablet Take 10 mg by mouth 2 (Two) Times a Day As Needed.   12/19/2017 at 1200   • venlafaxine (EFFEXOR) 75 MG tablet TK 1 T PO BID  11 12/19/2017 at 1930     Allergies:  Azithromycin and Tegaderm ag mesh [silver]    Objective     Vital Signs  Temp:  [97.9 °F (36.6 °C)] 97.9 °F (36.6 °C)  Heart Rate:  [93] 93  Resp:  [18] 18  BP: (132)/(82) 132/82  There is no height or weight on file to calculate BMI.  There were no vitals filed for this visit.    Physical Exam:     HEENT: extra ocular movement intact  Respiratory: appears well, vitals normal, no respiratory distress, acyanotic, normal RR, chest clear, no wheezing, crepitations, rhonchi,  normal symmetric air entry  GI: Soft, non-tender, normal bowel sounds; no bruits, organomegaly or masses.  Musculoskeletal: inspection - no abnormality  Neurologic: alert, oriented, normal speech, no focal findings or movement disorder noted       Results Review:   I reviewed the patient's new clinical results.        Assessment/Plan   Patient Active Problem List   Diagnosis   • Morbid obesity   • Hypertension, well controlled   • Fatigue   • History of iron deficiency   • Vitamin D deficiency   • Iron deficiency anemia secondary to inadequate dietary iron intake   • Malabsorption of iron   • Obesity, Class III, BMI 40-49.9 (morbid obesity)     I believe this patient will be a good candidate for weight loss surgery.    She has chosen laparoscopic sleeve gastrectomy. I agree with this decision.  I have discussed the Adelfo - Y Gastric Bypass, laparoscopic sleeve gastrectomy and the Laparoscopic Gastric Band procedures to provide the alternatives which includes non surgical weight loss options as well.  We discussed the benefits of the surgeries including the benefit of weight loss and the possible reversal of co-morbid conditions associated with morbid obesity.  We discussed the complications and risks which include the risk of perforation, leakage,bleeding, intra-abdominal organ injury, stenosis or ulcerations, the risk of deep vein thrombosis formation leading to possible pulmonary embolisms, the risk of death.  I explained to the patient the possibility that this procedure may not be performed laparoscopically and may require being converted to an opened procedure or aborted due to abnormal anatomy.  Also postoperatively there is a risk of increased GERD symptoms after this procedure.  Upon completion of our discussion and addressing and answering her questions to her satisfation, informed consent was obtained.   She will be scheduled accordingly for a laparoscopic Sleeve gastrectomy procedure.      I discussed the  patients findings and my recommendations with patient.     Dr. Yifan Cordero MD FACS    12/20/17  11:49 AM  Patient Care Team:  Brett Sharp DO as PCP - General (Pediatrics)  IRMA Wilcox as Referring Physician (Family Medicine)  Barney Houston MD as Cardiologist (Cardiology)

## 2017-12-20 NOTE — BRIEF OP NOTE
GASTRIC SLEEVE LAPAROSCOPIC  Progress Note    Naomi Bhatia  12/20/2017    Pre-op Diagnosis:   Morbid obesity [E66.01]  Obesity, Class III, BMI 40-49.9 (morbid obesity) [E66.01]       Post-Op Diagnosis Codes:     * Morbid obesity [E66.01]     * Obesity, Class III, BMI 40-49.9 (morbid obesity) [E66.01]    Procedure/CPT® Codes:      Procedure(s):  GASTRIC SLEEVE LAPAROSCOPIC    Surgeon(s):  Yifan Cordero MD    Anesthesia: General    Staff:   Circulator: Norm Quintana RN  Scrub Person: Martine Suarez  Assistant: Maricruz Isaac    Estimated Blood Loss: 50 mL    Urine Voided: * No values recorded between 12/20/2017 12:43 PM and 12/20/2017  2:25 PM *    Specimens:                  ID Type Source Tests Collected by Time Destination   A : Fundus of Stomach Tissue Stomach TISSUE EXAM Yifan Cordero MD 12/20/2017 1312           Findings: mild adhesions    Complications: none      Yifan Cordero MD     Date: 12/20/2017  Time: 2:49 PM

## 2017-12-21 ENCOUNTER — TELEPHONE (OUTPATIENT)
Dept: PAIN MANAGEMENT | Age: 42
End: 2017-12-21

## 2017-12-21 VITALS
RESPIRATION RATE: 16 BRPM | WEIGHT: 293 LBS | SYSTOLIC BLOOD PRESSURE: 118 MMHG | TEMPERATURE: 99.6 F | HEART RATE: 102 BPM | OXYGEN SATURATION: 100 % | BODY MASS INDEX: 48.82 KG/M2 | HEIGHT: 65 IN | DIASTOLIC BLOOD PRESSURE: 52 MMHG

## 2017-12-21 PROBLEM — Z98.84 STATUS POST LAPAROSCOPIC SLEEVE GASTRECTOMY: Status: ACTIVE | Noted: 2017-12-20

## 2017-12-21 LAB
GLUCOSE BLDC GLUCOMTR-MCNC: 121 MG/DL (ref 70–130)
GLUCOSE BLDC GLUCOMTR-MCNC: 125 MG/DL (ref 70–130)

## 2017-12-21 PROCEDURE — 25010000002 KETOROLAC TROMETHAMINE PER 15 MG: Performed by: SURGERY

## 2017-12-21 PROCEDURE — 25010000002 METOCLOPRAMIDE PER 10 MG: Performed by: SURGERY

## 2017-12-21 PROCEDURE — 25010000002 DIPHENHYDRAMINE PER 50 MG: Performed by: NURSE PRACTITIONER

## 2017-12-21 PROCEDURE — 25010000002 ENOXAPARIN PER 10 MG: Performed by: SURGERY

## 2017-12-21 PROCEDURE — 82962 GLUCOSE BLOOD TEST: CPT

## 2017-12-21 PROCEDURE — 25010000002 CEFAZOLIN PER 500 MG: Performed by: SURGERY

## 2017-12-21 PROCEDURE — 25010000002 ONDANSETRON PER 1 MG: Performed by: SURGERY

## 2017-12-21 RX ORDER — DIPHENHYDRAMINE HYDROCHLORIDE 50 MG/ML
12.5 INJECTION INTRAMUSCULAR; INTRAVENOUS EVERY 4 HOURS PRN
Status: DISCONTINUED | OUTPATIENT
Start: 2017-12-21 | End: 2017-12-21 | Stop reason: HOSPADM

## 2017-12-21 RX ORDER — ONDANSETRON HYDROCHLORIDE 4 MG/5ML
4 SOLUTION ORAL ONCE
Qty: 100 ML | Refills: 0 | Status: SHIPPED | OUTPATIENT
Start: 2017-12-21 | End: 2017-12-21

## 2017-12-21 RX ORDER — SIMETHICONE 80 MG
40 TABLET,CHEWABLE ORAL EVERY 6 HOURS PRN
Qty: 30 TABLET | Refills: 1 | Status: SHIPPED | OUTPATIENT
Start: 2017-12-21 | End: 2019-10-25

## 2017-12-21 RX ADMIN — ENOXAPARIN SODIUM 40 MG: 40 INJECTION SUBCUTANEOUS at 08:29

## 2017-12-21 RX ADMIN — FAMOTIDINE 20 MG: 10 INJECTION, SOLUTION INTRAVENOUS at 08:29

## 2017-12-21 RX ADMIN — CARBIDOPA AND LEVODOPA 1 TABLET: 25; 100 TABLET ORAL at 08:28

## 2017-12-21 RX ADMIN — ONDANSETRON 4 MG: 2 INJECTION INTRAMUSCULAR; INTRAVENOUS at 03:53

## 2017-12-21 RX ADMIN — KETOROLAC TROMETHAMINE 30 MG: 30 INJECTION, SOLUTION INTRAMUSCULAR at 15:16

## 2017-12-21 RX ADMIN — SODIUM CHLORIDE, POTASSIUM CHLORIDE, SODIUM LACTATE AND CALCIUM CHLORIDE 140 ML/HR: 600; 310; 30; 20 INJECTION, SOLUTION INTRAVENOUS at 05:07

## 2017-12-21 RX ADMIN — DEXTROSE MONOHYDRATE 2 G: 50 INJECTION, SOLUTION INTRAVENOUS at 05:07

## 2017-12-21 RX ADMIN — METOCLOPRAMIDE 10 MG: 5 INJECTION, SOLUTION INTRAMUSCULAR; INTRAVENOUS at 08:29

## 2017-12-21 RX ADMIN — GABAPENTIN 250 MG: 250 SOLUTION ORAL at 05:07

## 2017-12-21 RX ADMIN — ONDANSETRON 4 MG: 2 INJECTION INTRAMUSCULAR; INTRAVENOUS at 09:40

## 2017-12-21 RX ADMIN — HYDROCODONE BITARTRATE AND ACETAMINOPHEN 10 ML: 7.5; 325 SOLUTION ORAL at 00:21

## 2017-12-21 RX ADMIN — DIPHENHYDRAMINE HYDROCHLORIDE 12.5 MG: 50 INJECTION, SOLUTION INTRAMUSCULAR; INTRAVENOUS at 15:12

## 2017-12-21 RX ADMIN — DIPHENHYDRAMINE HYDROCHLORIDE 12.5 MG: 50 INJECTION, SOLUTION INTRAMUSCULAR; INTRAVENOUS at 03:54

## 2017-12-21 RX ADMIN — METOCLOPRAMIDE 10 MG: 5 INJECTION, SOLUTION INTRAMUSCULAR; INTRAVENOUS at 00:14

## 2017-12-21 NOTE — TELEPHONE ENCOUNTER
Call received from Dr. Sofie Lam stating that he has done a bariatric surgery on the patient and would like to prescribe Hydrocodone elixir 7.5/325 in 15 ml for a total of 350 ml with no refill. Spoke with Dr. Shahid Capellan and he states ok for patient to fill script.   Call returned to Dr. Arcelia Green

## 2017-12-21 NOTE — DISCHARGE SUMMARY
"  Date of Discharge:  12/21/2017    Discharge Diagnosis: Morbid obesity [E66.01]  Obesity, Class III, BMI 40-49.9 (morbid obesity) [E66.01]  Obesity, Class III, BMI 40-49.9 (morbid obesity) [E66.01]    Presenting Problem/History of Present Illness  Morbid obesity [E66.01]  Obesity, Class III, BMI 40-49.9 (morbid obesity) [E66.01]  Obesity, Class III, BMI 40-49.9 (morbid obesity) [E66.01]       Hospital Course  Patient is a 41 y.o. female presented with morbid obesity.  She status post lap scopic sleeve gastrectomy.  Postoperatively the patient has done well.  She is been ambulating without difficulty.  Her pain was adequately controlled.  She continued to tolerate ice chips and sips.  Her nausea improved throughout her hospital course.  She was advanced to stage I without difficulty.  She continued to ambulate and void.  She is been using the incentive spirometer as well.  She was discharged home on postoperative day 1 in stable condition.     Procedures Performed  Procedure(s):  GASTRIC SLEEVE LAPAROSCOPIC       Consults:   Consults     No orders found for last 30 day(s).            Condition on Discharge:  Stable    Vital Signs  /65 (BP Location: Right arm, Patient Position: Lying)  Pulse 98  Temp 99.4 °F (37.4 °C) (Oral)   Resp 16  Ht 165.1 cm (65\")  Wt (!) 146 kg (321 lb)  LMP  (LMP Unknown)  SpO2 100%  BMI 53.42 kg/m2    Physical Exam:  General Appearance: alert, appears stated age and cooperative  Lungs: clear to auscultation, respirations regular, respirations even and respirations unlabored  Heart: regular rhythm & normal rate, normal S1, S2, no murmur, no fermin, no rub and no click  Abdomen: normal bowel sounds, no masses, no hepatomegaly, no splenomegaly, no guarding and no rebound tenderness  Extremities: moves extremities well, no edema, no cyanosis and no redness    Discharge Disposition  Home or Self Care    Discharge Medications   Naomi Bhatia   Home Medication Instructions " [facility]:[id number]    Printed on:12/21/17 4796   Medication Information                      acyclovir (ZOVIRAX) 800 MG tablet  Take 800 mg by mouth 2 (Two) Times a Day.             ALBUTEROL IN  Inhale 1 puff As Needed.             carbidopa-levodopa (SINEMET)  MG per tablet  Take 1 tablet by mouth Daily.             CRANBERRY PO  Take  by mouth Daily.             Cyclobenzaprine HCl (FLEXERIL PO)  Take 10 mg by mouth Daily As Needed.             HYDROcodone-acetaminophen (HYCET) 7.5-325 MG/15ML solution  Take 10 mL by mouth Every 6 (Six) Hours As Needed for Moderate Pain  for up to 9 days.             levocetirizine (XYZAL) 5 MG tablet  TK 1 T PO QPM             lisinopril (PRINIVIL,ZESTRIL) 10 MG tablet  TK 1 T PO QD             LYRICA 150 MG capsule  TK 1 C PO BID             metoprolol succinate XL (TOPROL XL) 50 MG 24 hr tablet  Take 50 mg by mouth 2 (Two) Times a Day.             nortriptyline (PAMELOR) 25 MG capsule  TK ONE TO THREE CS PO QHS PRN             omeprazole (priLOSEC) 20 MG capsule  TK ONE C PO QD FIRST THING IN THE MORNING ON  AN EMPTY STOMACH             ondansetron (ZOFRAN) 4 MG/5ML solution  Take 5 mL by mouth 1 (One) Time for 1 dose.             oxyCODONE (ROXICODONE) 10 MG tablet  Take 10 mg by mouth 2 (Two) Times a Day As Needed.             simethicone (GAS-X) 80 MG chewable tablet  Chew 0.5 tablets Every 6 (Six) Hours As Needed for Flatulence (belching).             venlafaxine (EFFEXOR) 75 MG tablet  TK 1 T PO BID                 Discharge Diet:   Diet Instructions     Diet:       Diet Texture / Consistency:  Bariatric   Select stage:  Stage 1 Clear Liq. Sugar Free (no carbonation, no straw)                 Activity at Discharge:   Activity Instructions     Ambulate at Least 4 Times Per Day, Increase Distance Daily           Discharge Activity: Driving Restriction       No Driving for 10 days and no longer taking narcotics.       No Lifting, Pushing, Pulling Tugging More Than 25  lbs. For 3 Weeks       No lifting greater than 15 pounds for 4 weeks                 Follow-up Appointments  Future Appointments  Date Time Provider Department Center   12/28/2017 9:45 AM Yifan Cordero MD [facility] None   1/3/2018 9:30 AM  [facility]  [facility]   1/3/2018 10:00 AM Pedro Clements MD [facility]   1/22/2018 9:30 AM Yifan Cordero MD [facility] None   3/13/2018 10:40 AM IRMA [facility] None     Additional Instructions for the Follow-ups that You Need to Schedule     Bathing Instructions    As directed    Patient May Shower on Post-Op Day 2.  No Tub Bath for 2 Weeks.   Order Comments:  Patient May Shower on Post-Op Day 2. No Tub Bath for 2 Weeks.            Discharge Follow-up with Specified Provider:    As directed    Follow Up Details:  1-2 Weeks                     Test Results Pending at Discharge   Order Current Status    Tissue Pathology Exam - Tissue, Stomach In process           Yifan Cordero MD  12/21/17  2:33 PM

## 2017-12-21 NOTE — PLAN OF CARE
Problem: Patient Care Overview (Adult)  Goal: Plan of Care Review  Outcome: Ongoing (interventions implemented as appropriate)   12/21/17 0344   Coping/Psychosocial Response Interventions   Plan Of Care Reviewed With patient   Patient Care Overview   Progress no change   Outcome Evaluation   Outcome Summary/Follow up Plan Pt c/o abd pain. Medicated with prn meds. Walked in hallway this shift. Sleeps with c-pap. NPO x sips/chips Cont. pulse ox in progress. Lap x 5 with dermabond and abd binder.in place. C/o itching. Notified  Received order for benadryl. Continue to moniter.        Problem: Perioperative Period (Adult)  Goal: Signs and Symptoms of Listed Potential Problems Will be Absent or Manageable (Perioperative Period)  Outcome: Ongoing (interventions implemented as appropriate)

## 2017-12-22 ENCOUNTER — TELEPHONE (OUTPATIENT)
Dept: BARIATRICS/WEIGHT MGMT | Facility: CLINIC | Age: 42
End: 2017-12-22

## 2017-12-22 LAB
CYTO UR: NORMAL
LAB AP CASE REPORT: NORMAL
Lab: NORMAL
PATH REPORT.FINAL DX SPEC: NORMAL
PATH REPORT.GROSS SPEC: NORMAL

## 2017-12-22 NOTE — TELEPHONE ENCOUNTER
PH: [phone]    Patient doing: pretty good       PROCEDURE INFORMATION:   Date of Procedure: 12-20-17  Follow up appointment: 12-28-17    1. Are you tolerating liquids? yes  Reason:     2. How many ounces of liquid are you getting per day? About 32 ounces so far     3. Are you ambulating well? yes    4. Do you have nausea or vomiting? Ok after taking med as needed     5. How are your wounds? Had friend check them-look good     6. Do you have any concerns? None       Reminder weight restrictions 10 lb for week 1-2 & 15 lbs for weeks 3-4 (this includes any type of pushing, pulling, tugging or lifting).      Dr Cordero notified           [unfilled]  1:51 PM

## 2017-12-22 NOTE — PAYOR COMM NOTE
"DC HOME 12-21-17  [id number]  UR PHONE       Isa Bhatia (41 y.o. Female)     Date of Birth Social Security Number Address Home Phone MRN    1975  [address] [place] [address] [phone] [id number]    Congregational Marital Status          Rastafarian        Admission Date Admission Type Admitting Provider Attending Provider Department, Room/Bed    12/20/17 Elective Yifan Cordero MD  [facility] 3C, 371/1    Discharge Date Discharge Disposition Discharge Destination        12/21/2017 Home or Self Care             Attending Provider: (none)    Allergies:  Azithromycin, Tegaderm Ag Mesh [Silver]    Isolation:  None   Infection:  None   Code Status:  Prior    Ht:  165.1 cm (65\")   Wt:  146 kg (321 lb)    Admission Cmt:  None   Principal Problem:  None                Active Insurance as of 12/20/2017     Primary Coverage     Payor Plan Insurance Group Employer/Plan Group    [facility] MEDICARE REPLACEMENT [facility] MEDICARE ADVANTAGE [id number]     Payor Plan Address Payor Plan Phone Number Effective From Effective To    [address] [phone] 3/1/2016     [place] [address]       Subscriber Name Subscriber Birth Date Member ID       ISA BHATIA 1975 [id number]                 Emergency Contacts      (Rel.) Home Phone Work Phone Mobile Phone    Amaya Mcguire (Daughter) [phone] -- --               Operative/Procedure Notes (all)      Yifan Cordero MD at 12/20/2017  2:49 PM  Version 1 of 1         GASTRIC SLEEVE LAPAROSCOPIC  Progress Note    Isa Bhatia  12/20/2017    Pre-op Diagnosis:   Morbid obesity [E66.01]  Obesity, Class III, BMI 40-49.9 (morbid obesity) [E66.01]       Post-Op Diagnosis Codes:     * Morbid obesity [E66.01]     * Obesity, Class III, BMI 40-49.9 (morbid obesity) [E66.01]    Procedure/CPT® Codes:      Procedure(s):  GASTRIC SLEEVE LAPAROSCOPIC    Surgeon(s):  Yifan Cordero, " MD    Anesthesia: General    Staff:   Circulator: Norm Quintana RN  Scrub Person: Martine Ashraf; Lilly Suarez  Assistant: Maricruz Isaac    Estimated Blood Loss: 50 mL    Urine Voided: * No values recorded between 12/20/2017 12:43 PM and 12/20/2017  2:25 PM *    Specimens:                  ID Type Source Tests Collected by Time Destination   A : Fundus of Stomach Tissue Stomach TISSUE EXAM Yifan Cordero MD 12/20/2017 1312           Findings: mild adhesions    Complications: none      Yifan Cordero MD     Date: 12/20/2017  Time: 2:49 PM         Electronically signed by Yifan Cordero MD at 12/20/2017  2:49 PM      Yifan Cordero MD at 12/20/2017  2:49 PM  Version 1 of 1         Laparoscopic Sleeve Gastrectomy     Pre operative diagnosis: Morbid Obesity     Post op diagnosis: as above    Indications: The patient was admitted to the hospital with history of morbid obesity.   she is scheduled for a Laparoscopic Sleeve Gastrectomy.       Surgeon: Yifan Cordero MD     Assistants: Lilly Alcantara Anne    Anesthesia: General endotracheal anesthesia    ASA Class: 4          Procedure Details       After the patient was explained the alternatives, benefits, complications, and risks of the laparoscopic sleeve gastrectomy informed consent was provided.  The patient was brought to the or suite after receiving preoperative antibiotics medications and anticoagulation.  The patient was placed under general anesthesia.  The patient was then prepped and draped in standard fashion.  We performed a surgical Timeout.  A 40 Welsh bougie was placed into the oropharynx by anesthesia and guided down into the stomach followed by placement to suction.  I then proceeded to locally anesthetize a left subcostal incision site for placement of a Verse Needle.  I placed saline to confirm position and then connected the tubing to create pneumo.  Once the pressure reached 15 mmHg I proceeded placement of my left 5 mm trocar  with camera assist into the abdominal cavity.  I noted adhesions in the periumbilical location and took them down with harmonic.  I the placed in the  periumbilical location a 12 mm incision followed by placement of a 12 mm trocar into the abdominal cavity under direct visualization.    A 15 mm incision in the right upper quadrant was made for placement of a 15 mm trocar under direct visualization.  A 12 mm incision was made inferior and lateral to this site for placement of a 12 mm trocar under direct visualization into the abdominal cavity.  A 5 mm incision was placed in the subxiphoid location for placement of a 5 mm trocar under direct visualization.  I removed this trocar and replaced it with a Vikki liver retractor.   Note that all skin wounds were locally anesthetized prior to making incisions for placement of the trochars.  I requested the patient be placed in reverse Trendelenburg.      The bougie was then placed to suction after positioning under direct visualization.  I dissected the fat pad near the angle of Hiss away from the diaphragm.   I created a window into the lesser sack bluntly then proceeded with ligating the greater curvature vessels starting at my most distal ligation site 4-6 cm from the pylorus.  I continued proximally along the greater curvature ligating the vessels as well as a short gastrics.  This was all accomplished with the Harmonic device.  Once completion of ligation of the vessels was obtained, I dissected away attachments found posteriorly to the stomach.  Care was made to assure no injury to the pancreas. Completion of my dissection was obtained once visualization of the posterior left crural muscle seen.  The sleeve gastrectomy was then created by firing of the stapler device to create the sleeve gastrectomy.  I utilized seam guard covering over the staplers cartridge.  My most distal staple line was approximately 4-6 cm from the pylorus and I continued proximally along the  greater curvature assuring that the width from the angularis incisura was at least 3 cm.  My final proximal stapler was at least 1-2 cm from the GE junction. I then proceeded with my leak test.  This was performed by placing the patient in Trendelenburg and irrigating around the sleeve gastrectomy saline. I assessed for leaks by submerging the staple line under saline and then requested anesthesia to insufflate the bougie with air. I then assessed for air bubbles along the staple line.  Once there was no evidence of leakage. I then requested that the bougie be placed off suction and then to break its seal for easy removal.  The fluid from the abdominal cavity was suctioned away.  The bougie was then removed under direct visualization off suction and only after the suction seal was broken with air under direct visualization.  I then assessed the staple line to observe for hemostasis.  I placed surgicel snow on the staple line. I placed a simple 3-0 Vicryl imbricating suture at the most proximal portion of the sleeve gastrectomy incorporating omentum with this stitch.  I then proceeded to remove the resected portion of the stomach through the 15 mm trocar/port assist site under direct visualization.  I reassessed for hemostasis, and once this was confirmed proceeded to closing wound site's Fascial with suture.  Closing the 15 mm trocar and the 12 mm supraumbilical trocar sites with a Erick Moore device using an 0 Vicryl suture. The liver retractor was removed under direct visualization as well as the pneumoperitoneum and remaining trochars.  I re-locally anesthetized skin wounds prior to closure.  Skin wounds were closed with 4-0 Monocryl.  Of note prior to closing skin wounds all lap pads and instruments were accounted for at the end of the procedure.    Black staples: 1  Green staples: 6    Findings: adhesions    Estimated Blood Loss:  50 mL           Drains: none           Total IV Fluids: 800 ml            Specimens:   ID Type Source Tests Collected by Time Destination   A : Fundus of Stomach Tissue Stomach TISSUE EXAM Yifan Cordero MD 12/20/2017 1312               Implants:   Implant Name Type Inv. Item Serial No.  Lot No. LRB No. Used   STAPLELINE SEAMGUARD ECHELON FLX [id number] - [id number] Implant STAPLELINE SEAMGUARD ECHELON FLX [id number]   [employer] [id number] N/A 7              Complications:  none           Disposition: PACU - hemodynamically stable.           Condition: stable     Electronically signed by Yifan Cordero MD at 12/20/2017  2:51 PM           Discharge Summary      Yifan Cordero MD at 12/21/2017  2:33 PM            Date of Discharge:  12/21/2017    Discharge Diagnosis: Morbid obesity [E66.01]  Obesity, Class III, BMI 40-49.9 (morbid obesity) [E66.01]  Obesity, Class III, BMI 40-49.9 (morbid obesity) [E66.01]    Presenting Problem/History of Present Illness  Morbid obesity [E66.01]  Obesity, Class III, BMI 40-49.9 (morbid obesity) [E66.01]  Obesity, Class III, BMI 40-49.9 (morbid obesity) [E66.01]       Hospital Course  Patient is a 41 y.o. female presented with morbid obesity.  She status post lap scopic sleeve gastrectomy.  Postoperatively the patient has done well.  She is been ambulating without difficulty.  Her pain was adequately controlled.  She continued to tolerate ice chips and sips.  Her nausea improved throughout her hospital course.  She was advanced to stage I without difficulty.  She continued to ambulate and void.  She is been using the incentive spirometer as well.  She was discharged home on postoperative day 1 in stable condition.     Procedures Performed  Procedure(s):  GASTRIC SLEEVE LAPAROSCOPIC       Consults:   Consults     No orders found for last 30 day(s).            Condition on Discharge:  Stable    Vital Signs  /65 (BP Location: Right arm, Patient Position: Lying)  Pulse 98  Temp 99.4 °F (37.4 °C) (Oral)   Resp 16  Ht  "165.1 cm (65\")  Wt (!) 146 kg (321 lb)  LMP  (LMP Unknown)  SpO2 100%  BMI 53.42 kg/m2    Physical Exam:  General Appearance: alert, appears stated age and cooperative  Lungs: clear to auscultation, respirations regular, respirations even and respirations unlabored  Heart: regular rhythm & normal rate, normal S1, S2, no murmur, no fermin, no rub and no click  Abdomen: normal bowel sounds, no masses, no hepatomegaly, no splenomegaly, no guarding and no rebound tenderness  Extremities: moves extremities well, no edema, no cyanosis and no redness    Discharge Disposition  Home or Self Care    Discharge Medications   Naomi Bhatia   Home Medication Instructions LAURA:[id number]    Printed on:12/21/17 7220   Medication Information                      acyclovir (ZOVIRAX) 800 MG tablet  Take 800 mg by mouth 2 (Two) Times a Day.             ALBUTEROL IN  Inhale 1 puff As Needed.             carbidopa-levodopa (SINEMET)  MG per tablet  Take 1 tablet by mouth Daily.             CRANBERRY PO  Take  by mouth Daily.             Cyclobenzaprine HCl (FLEXERIL PO)  Take 10 mg by mouth Daily As Needed.             HYDROcodone-acetaminophen (HYCET) 7.5-325 MG/15ML solution  Take 10 mL by mouth Every 6 (Six) Hours As Needed for Moderate Pain  for up to 9 days.             levocetirizine (XYZAL) 5 MG tablet  TK 1 T PO QPM             lisinopril (PRINIVIL,ZESTRIL) 10 MG tablet  TK 1 T PO QD             LYRICA 150 MG capsule  TK 1 C PO BID             metoprolol succinate XL (TOPROL XL) 50 MG 24 hr tablet  Take 50 mg by mouth 2 (Two) Times a Day.             nortriptyline (PAMELOR) 25 MG capsule  TK ONE TO THREE CS PO QHS PRN             omeprazole (priLOSEC) 20 MG capsule  TK ONE C PO QD FIRST THING IN THE MORNING ON  AN EMPTY STOMACH             ondansetron (ZOFRAN) 4 MG/5ML solution  Take 5 mL by mouth 1 (One) Time for 1 dose.             oxyCODONE (ROXICODONE) 10 MG tablet  Take 10 mg by mouth 2 (Two) Times a Day " As Needed.             simethicone (GAS-X) 80 MG chewable tablet  Chew 0.5 tablets Every 6 (Six) Hours As Needed for Flatulence (belching).             venlafaxine (EFFEXOR) 75 MG tablet  TK 1 T PO BID                 Discharge Diet:   Diet Instructions     Diet:       Diet Texture / Consistency:  Bariatric   Select stage:  Stage 1 Clear Liq. Sugar Free (no carbonation, no straw)                 Activity at Discharge:   Activity Instructions     Ambulate at Least 4 Times Per Day, Increase Distance Daily           Discharge Activity: Driving Restriction       No Driving for 10 days and no longer taking narcotics.       No Lifting, Pushing, Pulling Tugging More Than 25 lbs. For 3 Weeks       No lifting greater than 15 pounds for 4 weeks                 Follow-up Appointments  Future Appointments  Date Time Provider Department Center   12/28/2017 9:45 AM Yifan Cordero MD [facility] None   1/3/2018 9:30 AM [facility] LAB [facility] PAD   1/3/2018 10:00 AM Pedro Clements MD [facility] PAD PAD   1/22/2018 9:30 AM Yifan Cordero MD [facility] None   3/13/2018 10:40 AM [facility] P[facility] None     Additional Instructions for the Follow-ups that You Need to Schedule     Bathing Instructions    As directed    Patient May Shower on Post-Op Day 2.  No Tub Bath for 2 Weeks.   Order Comments:  Patient May Shower on Post-Op Day 2. No Tub Bath for 2 Weeks.            Discharge Follow-up with Specified Provider:    As directed    Follow Up Details:  1-2 Weeks                     Test Results Pending at Discharge   Order Current Status    Tissue Pathology Exam - Tissue, Stomach In process           Yifan Cordero MD  12/21/17  2:33 PM             Electronically signed by Yifan Cordero MD at 12/21/2017  2:34 PM

## 2017-12-27 LAB
AMPHETAMINES, URINE: NEGATIVE NG/ML
BARBITURATES, URINE: NEGATIVE NG/ML
BENZODIAZEPINES, URINE: NEGATIVE NG/ML
CANNABINOIDS, URINE: NEGATIVE NG/ML
COCAINE METABOLITE, URINE: NEGATIVE NG/ML
CREATININE, URINE: 213.8 MG/DL (ref 20–300)
ETHANOL U, QUAN: NEGATIVE %
FENTANYL URINE: NEGATIVE PG/ML
MEPERIDINE, UR: NEGATIVE NG/ML
METHADONE SCREEN, URINE: NEGATIVE NG/ML
OPIATES, URINE: NEGATIVE NG/ML
OXYCODONE URINE: POSITIVE
OXYCODONE, UR GC/MS: 266 NG/ML
OXYCODONE/OXYMORPH, UR: POSITIVE
OXYCODONE/OXYMORPHONE, UR: ABNORMAL NG/ML
OXYMORPHONE, UR GC/MS: 886 NG/ML
OXYMORPHONE, URINE: POSITIVE
PH, URINE: 6 (ref 4.5–8.9)
PHENCYCLIDINE, URINE: NEGATIVE NG/ML
PROPOXYPHENE, URINE: NEGATIVE NG/ML

## 2017-12-28 ENCOUNTER — OFFICE VISIT (OUTPATIENT)
Dept: BARIATRICS/WEIGHT MGMT | Facility: CLINIC | Age: 42
End: 2017-12-28

## 2017-12-28 VITALS
HEIGHT: 65 IN | HEART RATE: 96 BPM | TEMPERATURE: 97.7 F | OXYGEN SATURATION: 97 % | DIASTOLIC BLOOD PRESSURE: 84 MMHG | SYSTOLIC BLOOD PRESSURE: 125 MMHG | BODY MASS INDEX: 48.72 KG/M2 | WEIGHT: 292.4 LBS

## 2017-12-28 DIAGNOSIS — E66.01 OBESITY, CLASS III, BMI 40-49.9 (MORBID OBESITY) (HCC): ICD-10-CM

## 2017-12-28 DIAGNOSIS — Z98.84 STATUS POST LAPAROSCOPIC SLEEVE GASTRECTOMY: Primary | ICD-10-CM

## 2017-12-28 PROCEDURE — 99024 POSTOP FOLLOW-UP VISIT: CPT | Performed by: SURGERY

## 2017-12-28 NOTE — PROGRESS NOTES
"Subjective   Naomi Bhatia is a 42 y.o. female.     Naomi is post op one week from her sleeve gastrectomy procedure.  She is currently on her stage II diet.  She states she is consuming approximately 60 ounces of fluid daily.  She is having 3 meals a day as well.  She is walking regularly.  She denies vomiting.  And she is been tolerating advancement of her diet as well as protein shakes    Review Of Systems:  General ROS: positive for  - night sweats and sleep disturbance  Respiratory ROS: no cough, shortness of breath, or wheezing  Cardiovascular ROS: no chest pain or dyspnea on exertion  Gastrointestinal ROS: no abdominal pain, change in bowel habits, or black or bloody stools  positive for - nausea     The following portions of the patient's history were reviewed and updated as appropriate: allergies, current medications, past medical history, past surgical history and problem list.    Objective   /84 (BP Location: Right arm, Patient Position: Sitting, Cuff Size: Adult)  Pulse 96  Temp 97.7 °F (36.5 °C)  Ht 165.1 cm (65\")  Wt 133 kg (292 lb 6.4 oz)  LMP  (LMP Unknown)  SpO2 97%  BMI 48.66 kg/m2    General Appearance:  awake, alert, oriented, in no acute distress  Lungs:  Normal expansion.  Clear to auscultation.  No rales, rhonchi, or wheezing.  Heart:  Heart sounds are normal.  Regular rate and rhythm without murmur, gallop or rub.  Abdomen:  Soft, non-tender, normal bowel sounds; no bruits, organomegaly or masses.  Abnormal shape: obese  Extremities: Extremities warm to touch, pink, with no edema.  Wounds: clean, dry, intact    Assessment/Plan     Encounter Diagnoses   Name Primary?   • Status post laparoscopic sleeve gastrectomy Yes   • Obesity, Class III, BMI 40-49.9 (morbid obesity)      1.  Status post lap scopic sleeve gastrectomy postop 1 week - The patient and I discussed their weight restrictions which is defined as avoid lifting greater than 15 lbs for at least 4 weeks to allow " healing of her tissue.  I also discussed the avoidance of driving or operating a motor vehicle if she requires, needs taking pain medication, or has a distracting injury or pain because of the risk of injuring herself or others.  I told advance her diet slowly and according to our recommendations.  She is to follow-up with our program in 3 weeks or as needed.    12/28/17  5:39 PM  Patient Care Team:  Brett Sharp DO as PCP - General (Pediatrics)  [facility] as Referring Physician (Family Medicine)  Barney Houston MD as Cardiologist (Cardiology)  Yifan Cordero MD FACS

## 2017-12-29 DIAGNOSIS — I10 ESSENTIAL HYPERTENSION: ICD-10-CM

## 2017-12-29 RX ORDER — LISINOPRIL 10 MG/1
TABLET ORAL
Qty: 90 TABLET | Refills: 3 | Status: SHIPPED | OUTPATIENT
Start: 2017-12-29 | End: 2018-01-10

## 2018-01-02 RX ORDER — NABUMETONE 500 MG/1
500 TABLET, FILM COATED ORAL 2 TIMES DAILY
Qty: 180 TABLET | Refills: 3 | Status: SHIPPED | OUTPATIENT
Start: 2018-01-02 | End: 2018-01-10

## 2018-01-02 NOTE — TELEPHONE ENCOUNTER
Patient last seen 12/14/17    Requested Prescriptions     Pending Prescriptions Disp Refills    nabumetone (RELAFEN) 500 MG tablet [Pharmacy Med Name: NABUMETONE 500MG TABLETS] 180 tablet 3     Sig: Take 1 tablet by mouth 2 times daily

## 2018-01-03 ENCOUNTER — OFFICE VISIT (OUTPATIENT)
Dept: ONCOLOGY | Facility: CLINIC | Age: 43
End: 2018-01-03

## 2018-01-03 ENCOUNTER — LAB (OUTPATIENT)
Dept: LAB | Facility: HOSPITAL | Age: 43
End: 2018-01-03

## 2018-01-03 VITALS
WEIGHT: 289.9 LBS | OXYGEN SATURATION: 93 % | DIASTOLIC BLOOD PRESSURE: 78 MMHG | HEIGHT: 65 IN | TEMPERATURE: 97.5 F | HEART RATE: 106 BPM | RESPIRATION RATE: 18 BRPM | SYSTOLIC BLOOD PRESSURE: 130 MMHG | BODY MASS INDEX: 48.3 KG/M2

## 2018-01-03 DIAGNOSIS — Z86.39 HISTORY OF IRON DEFICIENCY: Primary | ICD-10-CM

## 2018-01-03 DIAGNOSIS — E53.8 B12 DEFICIENCY: ICD-10-CM

## 2018-01-03 DIAGNOSIS — D50.8 OTHER IRON DEFICIENCY ANEMIA: Primary | ICD-10-CM

## 2018-01-03 DIAGNOSIS — D50.9 IRON DEFICIENCY ANEMIA, UNSPECIFIED IRON DEFICIENCY ANEMIA TYPE: Primary | ICD-10-CM

## 2018-01-03 LAB
ALBUMIN SERPL-MCNC: 4.2 G/DL (ref 3.5–5)
ALBUMIN/GLOB SERPL: 1.3 G/DL (ref 1.1–2.5)
ALP SERPL-CCNC: 90 U/L (ref 24–120)
ALT SERPL W P-5'-P-CCNC: 80 U/L (ref 0–54)
ANION GAP SERPL CALCULATED.3IONS-SCNC: 10 MMOL/L (ref 4–13)
AST SERPL-CCNC: 71 U/L (ref 7–45)
AUTO MIXED CELLS #: 0.6 10*3/MM3 (ref 0.1–2.6)
AUTO MIXED CELLS %: 7.2 % (ref 0.1–24)
BILIRUB SERPL-MCNC: 1.4 MG/DL (ref 0.1–1)
BUN BLD-MCNC: 7 MG/DL (ref 5–21)
BUN/CREAT SERPL: 10
CALCIUM SPEC-SCNC: 9.9 MG/DL (ref 8.4–10.4)
CHLORIDE SERPL-SCNC: 100 MMOL/L (ref 98–110)
CO2 SERPL-SCNC: 28 MMOL/L (ref 24–31)
CREAT BLD-MCNC: 0.7 MG/DL (ref 0.5–1.4)
ERYTHROCYTE [DISTWIDTH] IN BLOOD BY AUTOMATED COUNT: 17.2 % (ref 12–15)
GFR SERPL CREATININE-BSD FRML MDRD: 91 ML/MIN/1.73
GLOBULIN UR ELPH-MCNC: 3.3 GM/DL
GLUCOSE BLD-MCNC: 127 MG/DL (ref 70–100)
HCT VFR BLD AUTO: 35.4 % (ref 37–47)
HGB BLD-MCNC: 12.7 G/DL (ref 12–16)
HOLD SPECIMEN: NORMAL
IRON 24H UR-MRATE: 69 MCG/DL (ref 42–180)
IRON SATN MFR SERPL: 29 % (ref 20–45)
LYMPHOCYTES # BLD AUTO: 2.5 10*3/MM3 (ref 0.8–7)
LYMPHOCYTES NFR BLD AUTO: 28.3 % (ref 15–45)
MCH RBC QN AUTO: 30.9 PG (ref 28–32)
MCHC RBC AUTO-ENTMCNC: 35.9 G/DL (ref 33–36)
MCV RBC AUTO: 86.1 FL (ref 82–98)
NEUTROPHILS # BLD AUTO: 5.9 10*3/MM3 (ref 1.5–8.3)
NEUTROPHILS NFR BLD AUTO: 64.5 % (ref 39–78)
PLATELET # BLD AUTO: 331 10*3/MM3 (ref 130–400)
PMV BLD AUTO: 9.3 FL (ref 6–12)
POTASSIUM BLD-SCNC: 3.8 MMOL/L (ref 3.5–5.3)
PROT SERPL-MCNC: 7.5 G/DL (ref 6.3–8.7)
RBC # BLD AUTO: 4.11 10*6/MM3 (ref 4.2–5.4)
SODIUM BLD-SCNC: 138 MMOL/L (ref 135–145)
TIBC SERPL-MCNC: 235 MCG/DL (ref 225–420)
VIT B12 BLD-MCNC: 923 PG/ML (ref 239–931)
WBC NRBC COR # BLD: 9 10*3/MM3 (ref 4.8–10.8)

## 2018-01-03 PROCEDURE — 83550 IRON BINDING TEST: CPT | Performed by: INTERNAL MEDICINE

## 2018-01-03 PROCEDURE — 82607 VITAMIN B-12: CPT | Performed by: INTERNAL MEDICINE

## 2018-01-03 PROCEDURE — 83540 ASSAY OF IRON: CPT | Performed by: INTERNAL MEDICINE

## 2018-01-03 PROCEDURE — 85025 COMPLETE CBC W/AUTO DIFF WBC: CPT | Performed by: INTERNAL MEDICINE

## 2018-01-03 PROCEDURE — 80053 COMPREHEN METABOLIC PANEL: CPT | Performed by: INTERNAL MEDICINE

## 2018-01-03 PROCEDURE — 36415 COLL VENOUS BLD VENIPUNCTURE: CPT

## 2018-01-03 PROCEDURE — 99213 OFFICE O/P EST LOW 20 MIN: CPT | Performed by: INTERNAL MEDICINE

## 2018-01-03 NOTE — PROGRESS NOTES
[facility]  HEMATOLOGY & ONCOLOGY    Cancer Staging Information:  No matching staging information was found for the patient.      Subjective     VISIT DIAGNOSIS:   Encounter Diagnoses   Name Primary?   • Iron deficiency anemia, unspecified iron deficiency anemia type Yes   • B12 deficiency        REASON FOR VISIT:     Chief Complaint   Patient presents with   • Anemia      She is here for f/u visit today. She has had Gatric Sleeve procedure prformed since last visit per Dr Cordero Dec 2017        HEMATOLOGY / ONCOLOGY HISTORY:    No history exists.           INTERVAL HISTORY  Patient ID: Naomi Bhatia is a 42 y.o. year old female with her obesity, anemia status post Venofer infusions, appreciate to follow-up.   -- Underwent gastric sleeve on 12/20/2017 doing well.  --No issues or consent today.        Past Medical History:   Past Medical History:   Diagnosis Date   • Anemia    • Anxiety    • Arthritis    • Asthma    • Back pain 03/14/2016    with left sided radiculopathy    • DDD (degenerative disc disease), lumbar     Dr. Stuart Zavaleta for pain manangement   • Depression    • Fatigue    • Fibromyalgia    • GERD (gastroesophageal reflux disease)    • Headache    • History of blood transfusion    • Hypertension    • Iron deficiency anemia 9/12/2017   • Iron deficiency anemia secondary to inadequate dietary iron intake 9/12/2017   • Joint pain    • Obesity    • RLS (restless legs syndrome)    • Sleep apnea     positive sleep study; titration study in October     Past Surgical History:   Past Surgical History:   Procedure Laterality Date   • ANKLE SURGERY      Right    • APPENDECTOMY  04/19/2015   • BACK SURGERY  2000   • CERVICAL SPINE SURGERY  09/01/2015   • CHOLECYSTECTOMY      1995;lap   • COLONOSCOPY  05/02/2017    normal; do not return until age of 49 Dr. Gus Valentine   • GASTRIC SLEEVE LAPAROSCOPIC N/A 12/20/2017    Procedure: GASTRIC SLEEVE LAPAROSCOPIC;  Surgeon: Yifan Cordero MD;   Location: [facility] OR;  Service:    • HIP SURGERY  1987    right    • INSERTION CENTRAL VENOUS ACCESS DEVICE W/ SUBCUTANEOUS PORT  11/07/2017    Dr Anel Gamboa (Smart Port-Power injectable Port) Cat NO#[id number] Lot [id number]    • MOUTH SURGERY  1994   • NECK SURGERY     • TOOTH EXTRACTION     • UPPER GASTROINTESTINAL ENDOSCOPY  05/02/2017    Dr. Gus Valentine, negative for h.pylori, negative for Salomon's   • VAGINAL HYSTERECTOMY SALPINGO OOPHORECTOMY  2008    Partial and then had another surgery to remove the rest     Social History:   Social History     Social History   • Marital status:      Spouse name: N/A   • Number of children: N/A   • Years of education: N/A     Occupational History   • Not on file.     Social History Main Topics   • Smoking status: Former Smoker     Packs/day: 1.00     Years: 25.00     Types: Cigarettes     Quit date: 2014   • Smokeless tobacco: Never Used   • Alcohol use Yes      Comment: rarely    • Drug use: No      Comment: Codeine in Prescribed Medications only    • Sexual activity: Defer     Other Topics Concern   • Not on file     Social History Narrative     Family History:   Family History   Problem Relation Age of Onset   • Diabetes Mother    • Hypertension Mother    • Coronary artery disease Mother    • Cancer Mother    • Cancer Father    • Hypertension Father    • Heart disease Father    • Stroke Father    • Hypertension Sister    • Obesity Sister    • Cancer Brother    • Diabetes Brother    • Diabetes Maternal Grandmother    • Stroke Maternal Grandmother        Review of Systems   Constitutional: Negative.    HENT: Negative.    Eyes: Negative.    Respiratory: Positive for apnea. Negative for cough, choking and shortness of breath.    Cardiovascular: Negative.    Gastrointestinal: Negative for abdominal distention, abdominal pain, blood in stool, constipation, diarrhea, nausea and vomiting.   Genitourinary: Negative.    Musculoskeletal: Positive for back pain.        Right  hip pain   Skin: Negative.    Allergic/Immunologic: Negative.    Neurological: Negative.    Hematological: Negative.    Psychiatric/Behavioral: Negative.         Performance Status:  Asymptomatic    Medications:    Current Outpatient Prescriptions   Medication Sig Dispense Refill   • acyclovir (ZOVIRAX) 800 MG tablet Take 800 mg by mouth Daily.     • ALBUTEROL IN Inhale 1 puff As Needed.     • carbidopa-levodopa (SINEMET)  MG per tablet Take 1 tablet by mouth Daily.     • CRANBERRY PO Take  by mouth Daily.     • Cyclobenzaprine HCl (FLEXERIL PO) Take 10 mg by mouth Daily As Needed.     • levocetirizine (XYZAL) 5 MG tablet TK 1 T PO QPM  0   • LYRICA 150 MG capsule TK 1 C PO BID  2   • metoprolol succinate XL (TOPROL XL) 50 MG 24 hr tablet Take 50 mg by mouth 2 (Two) Times a Day.     • nortriptyline (PAMELOR) 25 MG capsule TK ONE TO THREE CS PO QHS PRN  3   • omeprazole (priLOSEC) 20 MG capsule TK ONE C PO QD FIRST THING IN THE MORNING ON  AN EMPTY STOMACH  3   • oxyCODONE (ROXICODONE) 10 MG tablet Take 10 mg by mouth 2 (Two) Times a Day As Needed.     • simethicone (GAS-X) 80 MG chewable tablet Chew 0.5 tablets Every 6 (Six) Hours As Needed for Flatulence (belching). 30 tablet 1   • venlafaxine (EFFEXOR) 75 MG tablet daily  11   • lisinopril (PRINIVIL,ZESTRIL) 10 MG tablet TK 1 T PO QD  2     No current facility-administered medications for this visit.      Facility-Administered Medications Ordered in Other Visits   Medication Dose Route Frequency Provider Last Rate Last Dose   • diphenhydrAMINE (BENADRYL) injection 50 mg  50 mg Intravenous PRN Pedro Clements MD       • famotidine (PEPCID) injection 20 mg  20 mg Intravenous PRN Pedro Clements MD       • hydrocortisone sodium succinate (Solu-CORTEF) injection 100 mg  100 mg Intravenous PRN Pedro Celments MD           ALLERGIES:    Allergies   Allergen Reactions   • Azithromycin GI Intolerance     Severe Abdominal Pain    •  "Tegaderm Ag Mesh [Silver] Other (See Comments)     Redness, blisters       Objective      Vitals:    01/03/18 0952   BP: 130/78   Pulse: 106   Resp: 18   Temp: 97.5 °F (36.4 °C)   TempSrc: Tympanic   SpO2: 93%   Weight: 131 kg (289 lb 14.4 oz)   Height: 165.1 cm (65\")         Current Status 1/3/2018   ECOG score 2         Physical Exam    General Appearance: Patient is awake, alert, oriented and in no acute distress. Patient is welldeveloped, wellnourished, and appears stated age.  HEENT: Normocephalic. Sclerae clear, conjunctiva pink, extraocular movements intact, pupils, round, reactive to light and  accommodation. Mouth and throat are clear with moist oral mucosa.  NECK: Supple, no jugular venous distention, thyroid not enlarged.  LYMPH: No cervical, supraclavicular, axillary, or inguinal lymphadenopathy.  CHEST: Equal bilateral expansion, AP  diameter normal, resonant percussion note  LUNGS: Good air movement, no rales, rhonchi, rubs or wheezes with auscultation  CARDIO: Regular sinus rhythm, no murmurs, gallops or rubs.  ABDOMEN: Nondistended, soft, No tenderness, no guarding, no rebound, No hepatosplenomegaly. No abdominal masses. Bowel sounds positive. No hernia  GENITALIA: Not examined.  BREASTS: Not examined.  MUSKEL: No joint swelling, decreased motion, or inflammation  EXTREMS: No edema, clubbing, cyanosis, No varicose veins.  NEURO: Grossly nonfocal, Gait is coordinated and smooth, Cognition is preserved.  SKIN: No rashes, no ecchymoses, no petechia.  PSYCH: Oriented to time, place and person. Memory is preserved. Mood and affect appear normal  RECENT LABS:  Office Visit on 01/03/2018   Component Date Value Ref Range Status   • Iron 01/03/2018 69  42 - 180 mcg/dL Final   • TIBC 01/03/2018 235  225 - 420 mcg/dL Final   • Iron Saturation 01/03/2018 29  20 - 45 % Final   • Vitamin B-12 01/03/2018 923  239 - 931 pg/mL Final   Lab on 01/03/2018   Component Date Value Ref Range Status   • Extra Tube 01/03/2018 " Hold for add-ons.   Final    Auto resulted.   Orders Only on 01/03/2018   Component Date Value Ref Range Status   • Glucose 01/03/2018 127* 70 - 100 mg/dL Final   • BUN 01/03/2018 7  5 - 21 mg/dL Final   • Creatinine 01/03/2018 0.70  0.50 - 1.40 mg/dL Final   • Sodium 01/03/2018 138  135 - 145 mmol/L Final   • Potassium 01/03/2018 3.8  3.5 - 5.3 mmol/L Final   • Chloride 01/03/2018 100  98 - 110 mmol/L Final   • CO2 01/03/2018 28.0  24.0 - 31.0 mmol/L Final   • Calcium 01/03/2018 9.9  8.4 - 10.4 mg/dL Final   • Total Protein 01/03/2018 7.5  6.3 - 8.7 g/dL Final   • Albumin 01/03/2018 4.20  3.50 - 5.00 g/dL Final   • ALT (SGPT) 01/03/2018 80* 0 - 54 U/L Final   • AST (SGOT) 01/03/2018 71* 7 - 45 U/L Final   • Alkaline Phosphatase 01/03/2018 90  24 - 120 U/L Final   • Total Bilirubin 01/03/2018 1.4* 0.1 - 1.0 mg/dL Final   • eGFR Non African Amer 01/03/2018 91  >60 mL/min/1.73 Final   • Globulin 01/03/2018 3.3  gm/dL Final   • A/G Ratio 01/03/2018 1.3  1.1 - 2.5 g/dL Final   • BUN/Creatinine Ratio 01/03/2018 10.0    Final   • Anion Gap 01/03/2018 10.0  4.0 - 13.0 mmol/L Final   • WBC 01/03/2018 9.00  4.80 - 10.80 10*3/mm3 Final   • RBC 01/03/2018 4.11* 4.20 - 5.40 10*6/mm3 Final   • Hemoglobin 01/03/2018 12.7  12.0 - 16.0 g/dL Final   • Hematocrit 01/03/2018 35.4* 37.0 - 47.0 % Final   • MCV 01/03/2018 86.1  82.0 - 98.0 fL Final   • MCH 01/03/2018 30.9  28.0 - 32.0 pg Final   • MCHC 01/03/2018 35.9  33.0 - 36.0 g/dL Final   • RDW 01/03/2018 17.2* 12.0 - 15.0 % Final   • MPV 01/03/2018 9.3  6.0 - 12.0 fL Final   • Platelets 01/03/2018 331  130 - 400 10*3/mm3 Final   • Neutrophil % 01/03/2018 64.5  39.0 - 78.0 % Final   • Lymphocyte % 01/03/2018 28.3  15.0 - 45.0 % Final   • Auto Mixed Cells % 01/03/2018 7.2  0.1 - 24.0 % Final   • Neutrophils, Absolute 01/03/2018 5.90  1.50 - 8.30 10*3/mm3 Final   • Lymphocytes, Absolute 01/03/2018 2.50  0.80 - 7.00 10*3/mm3 Final   • Auto Mixed Cells # 01/03/2018 0.60  0.10 - 2.60  10*3/mm3 Final       RADIOLOGY:  Xr Chest 2 Vw    Result Date: 12/18/2017  Narrative: EXAMINATION: XR CHEST 2 VW- 12/18/2017 13:08 CST  HISTORY: Preoperative evaluation for bariatric procedure, hypertension  Comparison: None  Findings: A left approach Port-A-Cath is in place with tip in the mid SVC. Heart and mediastinal contours appear normal. There is elevation the right hemidiaphragm. The lungs appear clear without focal consolidation or effusion. No pneumothorax. Pulmonary vasculature appears normal. Surgical clips are seen in the right upper quadrant. Postsurgical changes are seen in the lower cervical spine.      Impression: Impression: No evidence of acute cardiopulmonary process. This report was finalized on 12/18/2017 13:09 by Dr. Lj Montelongo MD.           Assessment/Plan  Naomi Bhatia is a 42 y.o. year old female h/o obesity found to have anemia while being evaluated for bariatric surgery , As per cc seen    Patient Active Problem List   Diagnosis   • Morbid obesity   • Hypertension, well controlled   • Fatigue   • History of iron deficiency   • Vitamin D deficiency   • Iron deficiency anemia secondary to inadequate dietary iron intake   • Malabsorption of iron   • Obesity, Class III, BMI 40-49.9 (morbid obesity)   • Status post laparoscopic sleeve gastrectomy          1.Anemia: Has had hysterectomy. Patient had recent colonoscopy and EGD negative for active bleed.   --Status post venofer infusion, hemoglobin 12.7 today. Advices ferrous sulfateoral is able to tolerate. RTC in 3 months with cbc, iron panel and ferritin.    2.  Obesity: s/p gastric sleeve doing well.    3. Leukocytosis: Suspect reactive, also obesity could cause elevation in wbc. Bone marrow with no dyspoietic changes and no circulating blasts.     5. Hip pain: I obtained hip xray which showed no evidence of fracture, however she does have screws in her right hip. Pt will need hip replacement. To be done after her bariatric  surgery.             Pedro Clements MD    1/3/2018    11:55 AM

## 2018-01-08 ENCOUNTER — OFFICE VISIT (OUTPATIENT)
Dept: BARIATRICS/WEIGHT MGMT | Facility: CLINIC | Age: 43
End: 2018-01-08

## 2018-01-08 ENCOUNTER — LAB REQUISITION (OUTPATIENT)
Dept: LAB | Facility: HOSPITAL | Age: 43
End: 2018-01-08

## 2018-01-08 VITALS
WEIGHT: 287 LBS | HEART RATE: 94 BPM | BODY MASS INDEX: 47.82 KG/M2 | RESPIRATION RATE: 18 BRPM | OXYGEN SATURATION: 98 % | HEIGHT: 65 IN | TEMPERATURE: 98.2 F | DIASTOLIC BLOOD PRESSURE: 75 MMHG | SYSTOLIC BLOOD PRESSURE: 123 MMHG

## 2018-01-08 DIAGNOSIS — Z98.84 STATUS POST LAPAROSCOPIC SLEEVE GASTRECTOMY: Primary | ICD-10-CM

## 2018-01-08 DIAGNOSIS — Z00.00 ENCOUNTER FOR GENERAL ADULT MEDICAL EXAMINATION WITHOUT ABNORMAL FINDINGS: ICD-10-CM

## 2018-01-08 DIAGNOSIS — L08.9 LOCALIZED BACTERIAL SKIN INFECTION: ICD-10-CM

## 2018-01-08 DIAGNOSIS — B96.89 LOCALIZED BACTERIAL SKIN INFECTION: ICD-10-CM

## 2018-01-08 PROCEDURE — 87070 CULTURE OTHR SPECIMN AEROBIC: CPT | Performed by: NURSE PRACTITIONER

## 2018-01-08 PROCEDURE — 87205 SMEAR GRAM STAIN: CPT | Performed by: NURSE PRACTITIONER

## 2018-01-08 PROCEDURE — 87147 CULTURE TYPE IMMUNOLOGIC: CPT | Performed by: NURSE PRACTITIONER

## 2018-01-08 PROCEDURE — 99024 POSTOP FOLLOW-UP VISIT: CPT | Performed by: NURSE PRACTITIONER

## 2018-01-08 RX ORDER — SULFAMETHOXAZOLE AND TRIMETHOPRIM 800; 160 MG/1; MG/1
1 TABLET ORAL 2 TIMES DAILY
Qty: 14 TABLET | Refills: 0 | Status: SHIPPED | OUTPATIENT
Start: 2018-01-08 | End: 2018-02-01 | Stop reason: ALTCHOICE

## 2018-01-08 RX ORDER — NABUMETONE 500 MG/1
TABLET, FILM COATED ORAL
Refills: 1 | COMMUNITY
Start: 2017-12-31 | End: 2018-02-01 | Stop reason: ALTCHOICE

## 2018-01-08 RX ORDER — CYCLOBENZAPRINE HCL 10 MG
TABLET ORAL
Refills: 1 | COMMUNITY
Start: 2018-01-03 | End: 2018-02-01 | Stop reason: SDUPTHER

## 2018-01-08 RX ORDER — FLUCONAZOLE 150 MG/1
150 TABLET ORAL DAILY
Qty: 2 TABLET | Refills: 0 | Status: SHIPPED | OUTPATIENT
Start: 2018-01-08 | End: 2018-02-01 | Stop reason: ALTCHOICE

## 2018-01-08 NOTE — PROGRESS NOTES
"Subjective   Naomi Bhatia is a 42 y.o. female.     History of Present Illness   Naomi Bhatia is post sleeve gastrectomy bariatric surgery on 12/20/17 with Dr. Yifan Cordero. She is 3 weeks post op. She is here today to have one of her incisions assessed. The very top incision (just beneath bra line) is red, irritated, and has \"green yuck\" coming out of it. She states that this started a few days ago and the green drainage started yesterday. She does not believe that she has had any fever. Patient is able to get 64 ounces of water in per day. Patient is able to get 20 grams of protein in per day. Patient does not have any complaints of reflux, dysphagia, vomiting, or abdominal pain.   Vitals:    01/08/18 1048   BP: 123/75   BP Location: Right arm   Patient Position: Sitting   Cuff Size: Adult   Pulse: 94   Resp: 18   Temp: 98.2 °F (36.8 °C)   TempSrc: Oral   SpO2: 98%   Weight: 130 kg (287 lb)   Height: 165.1 cm (65\")         The following portions of the patient's history were reviewed and updated as appropriate: allergies, current medications, past family history, past medical history, past social history, past surgical history and problem list.    Review of Systems   Constitutional: Negative for activity change, appetite change and fatigue.   Eyes: Negative.    Respiratory: Negative.  Negative for apnea, cough, chest tightness and shortness of breath.    Cardiovascular: Negative.  Negative for chest pain, palpitations and leg swelling.   Gastrointestinal: Positive for nausea. Negative for abdominal pain, anal bleeding, constipation and vomiting.   Endocrine: Negative.    Genitourinary: Negative.    Musculoskeletal: Positive for arthralgias and neck pain. Negative for back pain.   Skin: Positive for wound.   Allergic/Immunologic: Negative.    Neurological: Positive for headaches. Negative for seizures and syncope.   Hematological: Negative.  Does not bruise/bleed easily.        Hx of anemia "   Psychiatric/Behavioral: Positive for dysphoric mood and sleep disturbance. Negative for self-injury and suicidal ideas. The patient is nervous/anxious.        Objective   Physical Exam   Constitutional: She is oriented to person, place, and time. Vital signs are normal. She appears well-developed and well-nourished. She is cooperative. No distress.   HENT:   Head: Normocephalic and atraumatic.   Nose: Nose normal.   Mouth/Throat: Oropharynx is clear and moist. No oropharyngeal exudate or tonsillar abscesses.   Eyes: Conjunctivae, EOM and lids are normal. Pupils are equal, round, and reactive to light. Right eye exhibits no discharge. Left eye exhibits no discharge.   Neck: Trachea normal. Neck supple. No JVD present. Carotid bruit is not present. No rigidity. No tracheal deviation present. No thyromegaly present.   Cardiovascular: Normal rate, regular rhythm, S1 normal, S2 normal and normal heart sounds.    Pulmonary/Chest: Effort normal and breath sounds normal. No stridor. No respiratory distress. She has no wheezes. She has no rales.   Abdominal: Soft. Bowel sounds are normal. She exhibits no distension. There is no tenderness. There is no rebound and no guarding. No hernia.   obese   Musculoskeletal: She exhibits no edema.        Right shoulder: She exhibits normal strength.   Lymphadenopathy:     She has no cervical adenopathy.   Neurological: She is alert and oriented to person, place, and time. She has normal strength. No cranial nerve deficit.   Skin: Skin is warm, dry and intact. No rash noted. There is erythema.   Very top post op incision (wear bra line crosses) to abdomen with erythema and yellow/green drainage noted; no warmth; wound was cultured and sent to lab for processing; area cleaned and dressing applied.    Psychiatric: She has a normal mood and affect. Her speech is normal and behavior is normal.   Alert and oriented x 3   Vitals reviewed.      Assessment/Plan   Naomi was seen today for  wound check and obesity.    Diagnoses and all orders for this visit:    Status post laparoscopic sleeve gastrectomy  -     Wound Culture - Wound, Abdominal Wall    Localized bacterial skin infection  -     Wound Culture - Wound, Abdominal Wall    Other orders  -     sulfamethoxazole-trimethoprim (BACTRIM DS,SEPTRA DS) 800-160 MG per tablet; Take 1 tablet by mouth 2 (Two) Times a Day.  -     mupirocin (BACTROBAN) 2 % ointment; Apply  topically 3 (Three) Times a Day.  -     fluconazole (DIFLUCAN) 150 MG tablet; Take 1 tablet by mouth Daily.              For localized skin infection of upper post op incision, take the Bactrim antibiotic as directed. Take twice daily with plenty of water for next 7 days. Apply antibiotic ointment as directed. Diflucan has been prescribed due to hx of yeast infection with any antibiotic usage. Take as directed.   Wound culture has been performed and will be sent to lab for processing. Will call you with results once they are back. Keep appt with Dr. Cordero for next week as all ready scheduled. Call office if needs anything prior.

## 2018-01-08 NOTE — PATIENT INSTRUCTIONS
For localized skin infection of upper post op incision, take the Bactrim antibiotic as directed. Take twice daily with plenty of water for next 7 days. Apply antibiotic ointment as directed. Diflucan has been prescribed due to hx of yeast infection with any antibiotic usage. Take as directed.   Wound culture has been performed and will be sent to lab for processing. Will call you with results once they are back. Keep appt with Dr. Cordero for next week as all ready scheduled. Call office if needs anything prior.

## 2018-01-10 ENCOUNTER — OFFICE VISIT (OUTPATIENT)
Dept: PRIMARY CARE CLINIC | Age: 43
End: 2018-01-10
Payer: MEDICARE

## 2018-01-10 VITALS
HEART RATE: 95 BPM | SYSTOLIC BLOOD PRESSURE: 136 MMHG | OXYGEN SATURATION: 96 % | DIASTOLIC BLOOD PRESSURE: 86 MMHG | BODY MASS INDEX: 44.89 KG/M2 | WEIGHT: 286 LBS | HEIGHT: 67 IN | TEMPERATURE: 96.1 F

## 2018-01-10 DIAGNOSIS — I10 ESSENTIAL HYPERTENSION: ICD-10-CM

## 2018-01-10 DIAGNOSIS — G47.00 INSOMNIA, UNSPECIFIED TYPE: ICD-10-CM

## 2018-01-10 DIAGNOSIS — Z98.890 HISTORY OF GASTRIC SURGERY: Primary | ICD-10-CM

## 2018-01-10 PROCEDURE — 1111F DSCHRG MED/CURRENT MED MERGE: CPT | Performed by: NURSE PRACTITIONER

## 2018-01-10 PROCEDURE — 99214 OFFICE O/P EST MOD 30 MIN: CPT | Performed by: NURSE PRACTITIONER

## 2018-01-10 RX ORDER — NORTRIPTYLINE HYDROCHLORIDE 25 MG/1
25-50 CAPSULE ORAL NIGHTLY
Qty: 180 CAPSULE | Refills: 0 | Status: SHIPPED | OUTPATIENT
Start: 2018-01-10 | End: 2018-02-26

## 2018-01-10 RX ORDER — METOPROLOL SUCCINATE 50 MG/1
75 TABLET, EXTENDED RELEASE ORAL DAILY
Qty: 180 TABLET | Refills: 3
Start: 2018-01-10 | End: 2018-08-06

## 2018-01-10 NOTE — PROGRESS NOTES
Complaint   Patient presents with    Follow-Up from Hospital     here for follow up after gastric sleeve. has lost 44lbs since surgery on 12/20/17. History of Present illness - Follow up of Hospital diagnosis(es):   Pt seeing Dr. Salvador Gross at [facility] for her anemia  Patient states is doing well since surgery, does have an incision she is concerned about was cultured 2 days ago. This was done by Dr Jennifer Davalos. Needs to discuss Relafen. Labs drawn on 01/03/2018 at [facility] [place]   Hemoglobin and hematocrit 12.7/35.4, WBC 9, RBCs 4.11, platelet count 952  Chemistry panel shows glucose of 127, BUN 7, creatinine 0.70, sodium 138, potassium 3.8. ALT 80 AST 71 with a total bilirubin of 1.4.  Alkaline phosphatase normal 90  B-12 923       Non face to face  following discharge, date last encounter closed (first attempt may have been earlier): *No documented post hospital discharge outreach found in the last 14 days *No documented post hospital discharge outreach found in the last 14 days    Call initiated 2 business days of discharge: *No response recorded in the last 14 days     Interval history/Current status:     Physical Exam:  General Appearance: alert and oriented to person, place and time, well developed and well- nourished, in no acute distress  Skin: warm and dry, no rash or erythema  Head: normocephalic and atraumatic  Eyes: pupils equal, round, and reactive to light, extraocular eye movements intact, conjunctivae normal  ENT: tympanic membrane, external ear and ear canal normal bilaterally, nose without deformity, nasal mucosa and turbinates normal without polyps  Neck: supple and non-tender without mass, no thyromegaly or thyroid nodules, no cervical lymphadenopathy  Pulmonary/Chest: clear to auscultation bilaterally- no wheezes, rales or rhonchi, normal air movement, no respiratory distress  Cardiovascular: normal rate, regular rhythm, normal S1 and S2, no murmurs, rubs, clicks, or gallops, distal pulses intact, no carotid bruits  Abdomen: soft, non-tender, non-distended, normal bowel sounds, no masses or organomegaly  5 incisions on abdomen. Small midline incision with mild erythema. No drainage. Extremities: no cyanosis, clubbing or edema  Musculoskeletal: normal range of motion, no joint swelling, deformity or tenderness  Neurologic: reflexes normal and symmetric, no cranial nerve deficit, gait, coordination and speech normal    Assessment/Plan:  Evelyn STANTON was seen today for follow-up from hospital.    Diagnoses and all orders for this visit:    History of gastric surgery    Class 3 obesity due to excess calories with body mass index (BMI) of 40.0 to 44.9 in adult, unspecified whether serious comorbidity present (HCC)    Insomnia, unspecified type    Essential hypertension  -     metoprolol succinate (TOPROL XL) 50 MG extended release tablet; Take 1.5 tablets by mouth daily    Other orders  -     nortriptyline (PAMELOR) 25 MG capsule;  Take 1-2 capsules by mouth nightly          Medical Decision Making: straightforward

## 2018-01-11 RX ORDER — FERROUS SULFATE 325(65) MG
325 TABLET ORAL
Qty: 30 TABLET | Refills: 2 | Status: SHIPPED | OUTPATIENT
Start: 2018-01-11 | End: 2018-09-17 | Stop reason: ALTCHOICE

## 2018-01-12 ENCOUNTER — RESULTS ENCOUNTER (OUTPATIENT)
Dept: BARIATRICS/WEIGHT MGMT | Facility: CLINIC | Age: 43
End: 2018-01-12

## 2018-01-12 DIAGNOSIS — I10 HYPERTENSION, WELL CONTROLLED: ICD-10-CM

## 2018-01-12 DIAGNOSIS — E66.01 MORBID OBESITY, UNSPECIFIED OBESITY TYPE (HCC): ICD-10-CM

## 2018-01-13 LAB
BACTERIA SPEC AEROBE CULT: ABNORMAL
BACTERIA SPEC AEROBE CULT: ABNORMAL
GRAM STN SPEC: ABNORMAL
GRAM STN SPEC: ABNORMAL

## 2018-01-17 ENCOUNTER — TELEPHONE (OUTPATIENT)
Dept: ONCOLOGY | Facility: CLINIC | Age: 43
End: 2018-01-17

## 2018-01-17 DIAGNOSIS — I10 ESSENTIAL HYPERTENSION: ICD-10-CM

## 2018-01-17 NOTE — TELEPHONE ENCOUNTER
Received call from patient, she states she had been reading material regarding flushing her port. She states that it has been 6 weeks since her last iron infusion, and she feels that she is needing a port flush now. I did inform her that we do flush the ports q 8 weeks, and we would be more than happy to gilbert her for a port flush in the next couple of weeks. The call was transferred to  to gilbert the apt.

## 2018-02-01 ENCOUNTER — OFFICE VISIT (OUTPATIENT)
Dept: BARIATRICS/WEIGHT MGMT | Facility: CLINIC | Age: 43
End: 2018-02-01

## 2018-02-01 VITALS
BODY MASS INDEX: 46.52 KG/M2 | OXYGEN SATURATION: 98 % | WEIGHT: 279.2 LBS | HEART RATE: 89 BPM | DIASTOLIC BLOOD PRESSURE: 73 MMHG | TEMPERATURE: 98.7 F | SYSTOLIC BLOOD PRESSURE: 127 MMHG | HEIGHT: 65 IN

## 2018-02-01 DIAGNOSIS — E66.01 OBESITY, CLASS III, BMI 40-49.9 (MORBID OBESITY) (HCC): ICD-10-CM

## 2018-02-01 DIAGNOSIS — E66.01 MORBID OBESITY (HCC): ICD-10-CM

## 2018-02-01 DIAGNOSIS — Z98.84 STATUS POST LAPAROSCOPIC SLEEVE GASTRECTOMY: Primary | ICD-10-CM

## 2018-02-01 PROCEDURE — 99024 POSTOP FOLLOW-UP VISIT: CPT | Performed by: SURGERY

## 2018-02-01 NOTE — PROGRESS NOTES
"Subjective   Naomi Bhatia is a 42 y.o. female.     Naomi is post op 1 month from her sleeve gastrectomy procedure.  She is currently on her regular diet.  She states she is consuming at least 64 ounces of fluid and 30 g protein daily.  Her portion sizes of less than a half a cup and she consumes 4 portions daily.  She is walking regularly.    Review Of Systems:  General ROS: positive for  - fatigue  Respiratory ROS: no cough, shortness of breath, or wheezing  Cardiovascular ROS: no chest pain or dyspnea on exertion  Gastrointestinal ROS: no abdominal pain, change in bowel habits, or black or bloody stools    The following portions of the patient's history were reviewed and updated as appropriate: allergies, current medications, past medical history, past surgical history and problem list.    Objective   /73 (BP Location: Left arm, Patient Position: Sitting, Cuff Size: Adult)  Pulse 89  Temp 98.7 °F (37.1 °C)  Ht 165.1 cm (65\")  Wt 127 kg (279 lb 3.2 oz)  LMP  (LMP Unknown)  SpO2 98%  BMI 46.46 kg/m2    General Appearance:  awake, alert, oriented, in no acute distress  Lungs:  Normal expansion.  Clear to auscultation.  No rales, rhonchi, or wheezing.  Heart:  Heart sounds are normal.  Regular rate and rhythm without murmur, gallop or rub.  Abdomen:  Soft, non-tender, normal bowel sounds; no bruits, organomegaly or masses.  Abnormal shape: obese  Extremities: Extremities warm to touch, pink, with no edema.  Wounds: clean, dry, intact    Assessment/Plan     Encounter Diagnoses   Name Primary?   • Status post laparoscopic sleeve gastrectomy Yes   • Obesity, Class III, BMI 40-49.9 (morbid obesity)    • Morbid obesity        The patient and I discussed the importance of changing behavior for consistent and successful weight loss.  We first reviewed again the definition of a meal which is defined as portion sizes less than a half a cup and those portions incorporating a protein.  Specifically the " protein should fill half of that volume.  I also explained that the patient should be attempting to consume 4-5 meals as defined above.  This should also be mindful of adequate hydration and incorporation of a regular exercise regimen.  I recommended that they begin their multivitamins as directed.    02/01/18  10:55 AM  Patient Care Team:  Brett Sharp DO as PCP - General (Pediatrics)  IRMA Wilcox as Referring Physician (Family Medicine)  Barney Houston MD as Cardiologist (Cardiology)  Yifan Cordero MD FACS

## 2018-02-02 ENCOUNTER — HOSPITAL ENCOUNTER (OUTPATIENT)
Dept: PAIN MANAGEMENT | Age: 43
Discharge: HOME | End: 2018-02-02
Payer: MEDICARE

## 2018-02-02 VITALS
HEART RATE: 70 BPM | RESPIRATION RATE: 20 BRPM | SYSTOLIC BLOOD PRESSURE: 125 MMHG | HEIGHT: 67 IN | OXYGEN SATURATION: 98 % | TEMPERATURE: 96.7 F | DIASTOLIC BLOOD PRESSURE: 72 MMHG | BODY MASS INDEX: 44.1 KG/M2 | WEIGHT: 281 LBS

## 2018-02-02 DIAGNOSIS — M51.16 LUMBAR DISC DISEASE WITH RADICULOPATHY: Chronic | ICD-10-CM

## 2018-02-02 DIAGNOSIS — M51.36 LUMBAR DEGENERATIVE DISC DISEASE: ICD-10-CM

## 2018-02-02 DIAGNOSIS — M54.10 BACK PAIN WITH LEFT-SIDED RADICULOPATHY: ICD-10-CM

## 2018-02-02 PROCEDURE — 3209999900 FLUORO FOR SURGICAL PROCEDURES

## 2018-02-02 PROCEDURE — 6360000002 HC RX W HCPCS

## 2018-02-02 PROCEDURE — 62323 NJX INTERLAMINAR LMBR/SAC: CPT

## 2018-02-02 PROCEDURE — 2500000003 HC RX 250 WO HCPCS

## 2018-02-02 PROCEDURE — 2580000003 HC RX 258

## 2018-02-02 RX ORDER — LIDOCAINE HYDROCHLORIDE 10 MG/ML
INJECTION, SOLUTION EPIDURAL; INFILTRATION; INTRACAUDAL; PERINEURAL
Status: COMPLETED | OUTPATIENT
Start: 2018-02-02 | End: 2018-02-02

## 2018-02-02 RX ORDER — BUPIVACAINE HYDROCHLORIDE 2.5 MG/ML
INJECTION, SOLUTION EPIDURAL; INFILTRATION; INTRACAUDAL
Status: COMPLETED | OUTPATIENT
Start: 2018-02-02 | End: 2018-02-02

## 2018-02-02 RX ORDER — OXYCODONE HYDROCHLORIDE 10 MG/1
10 TABLET ORAL 2 TIMES DAILY PRN
Qty: 45 TABLET | Refills: 0 | Status: SHIPPED | OUTPATIENT
Start: 2018-02-02 | End: 2018-03-01 | Stop reason: SDUPTHER

## 2018-02-02 RX ORDER — 0.9 % SODIUM CHLORIDE 0.9 %
VIAL (ML) INJECTION
Status: COMPLETED | OUTPATIENT
Start: 2018-02-02 | End: 2018-02-02

## 2018-02-02 RX ORDER — METHYLPREDNISOLONE ACETATE 80 MG/ML
INJECTION, SUSPENSION INTRA-ARTICULAR; INTRALESIONAL; INTRAMUSCULAR; SOFT TISSUE
Status: COMPLETED | OUTPATIENT
Start: 2018-02-02 | End: 2018-02-02

## 2018-02-02 RX ORDER — PREGABALIN 150 MG/1
150 CAPSULE ORAL 2 TIMES DAILY
Qty: 60 CAPSULE | Refills: 2 | Status: SHIPPED | OUTPATIENT
Start: 2018-02-02 | End: 2018-05-10 | Stop reason: SDUPTHER

## 2018-02-02 RX ADMIN — BUPIVACAINE HYDROCHLORIDE 5 ML: 2.5 INJECTION, SOLUTION EPIDURAL; INFILTRATION; INTRACAUDAL at 13:35

## 2018-02-02 RX ADMIN — LIDOCAINE HYDROCHLORIDE 5 ML: 10 INJECTION, SOLUTION EPIDURAL; INFILTRATION; INTRACAUDAL; PERINEURAL at 13:34

## 2018-02-02 RX ADMIN — Medication 4 ML: at 13:36

## 2018-02-02 RX ADMIN — METHYLPREDNISOLONE ACETATE 80 MG: 80 INJECTION, SUSPENSION INTRA-ARTICULAR; INTRALESIONAL; INTRAMUSCULAR; SOFT TISSUE at 13:36

## 2018-02-02 ASSESSMENT — PAIN DESCRIPTION - DESCRIPTORS: DESCRIPTORS: ACHING;CONSTANT;THROBBING

## 2018-02-02 ASSESSMENT — PAIN - FUNCTIONAL ASSESSMENT: PAIN_FUNCTIONAL_ASSESSMENT: 0-10

## 2018-02-02 ASSESSMENT — ACTIVITIES OF DAILY LIVING (ADL): EFFECT OF PAIN ON DAILY ACTIVITIES: DAILY CHORES AND ACTIVITIES

## 2018-02-02 NOTE — PROCEDURES
[]Extremely Anxious       [x]Seems Interested               []Seems Uninterested                  [x]Denies need for Education  Risk for Injury:  [x]Patient oriented to person, place and time  []History of frequent falls/loss of balance  Nutritional:  []Change in appetite   []Weight Gain   []Weight Loss  Functional:      Nursing Admission Record   Current Issues / Falls / ER Visits:  No   Percentage of Pain Relief after Last Procedure:  80 %    How long lasted:  2 months   Radiology exams received during the last 12 months: Yes       [facility]       Imaging on chart: Yes         Imaging records requested: Yes  MRI exams received in the past 2 years:  No  Physical therapy during the last 6 months: No       Labs during the last 12 months: Yes       COMMENTS:  Pt c/o low back pain that radiates down bilateral legs to the feet. Pain score today is 7/10. Patient feels that she gained 80% relief from last injections that lasted her 2 months. Patient recently underwent Gastric Sleeve and has lost 53 pounds. Patient has been scheduled for hip arthroplasty in April, informed patient about the risk of receiving cortisone injections close to her having an Arthroplasty. Informed patient about Cortisone slowing down the healing process. Discussed the importance of designated daily exercise with a focus on torso strengthening. Patient states that she plans on beginning to walk after joint replacement. Informed patient that low impact exercise will be more beneficial for her after joint replacement. Discussed the importance of decreasing daily narcotics. Patient states that she has stopped drinking pop and drinks only flavored water. Will continue with the lumbar epidural today. Discussed risks and complications with the patient.            PLAN:  [x] Will return to office in  3 month(s)for  [] Planned Procedure  [] Office Visit  [x] Prescriptions were given today   [] No prescriptions needed today  [x] Patient is to call with any questions or concerns which may arise prior to the next office visit. [x] LESI  []   []   []   []   []               [x] Over 50% of today's appointment was given to discussion, evaluation and counseling.

## 2018-02-08 ENCOUNTER — OFFICE VISIT (OUTPATIENT)
Dept: PRIMARY CARE CLINIC | Age: 43
End: 2018-02-08
Payer: MEDICARE

## 2018-02-08 ENCOUNTER — INFUSION (OUTPATIENT)
Dept: ONCOLOGY | Facility: CLINIC | Age: 43
End: 2018-02-08

## 2018-02-08 VITALS
HEIGHT: 67 IN | BODY MASS INDEX: 44.26 KG/M2 | SYSTOLIC BLOOD PRESSURE: 120 MMHG | WEIGHT: 282 LBS | TEMPERATURE: 98 F | HEART RATE: 85 BPM | DIASTOLIC BLOOD PRESSURE: 82 MMHG | OXYGEN SATURATION: 98 %

## 2018-02-08 DIAGNOSIS — F51.01 PRIMARY INSOMNIA: ICD-10-CM

## 2018-02-08 DIAGNOSIS — I10 ESSENTIAL HYPERTENSION: Primary | ICD-10-CM

## 2018-02-08 DIAGNOSIS — K90.9 MALABSORPTION OF IRON: Primary | ICD-10-CM

## 2018-02-08 DIAGNOSIS — D50.8 IRON DEFICIENCY ANEMIA SECONDARY TO INADEQUATE DIETARY IRON INTAKE: ICD-10-CM

## 2018-02-08 PROCEDURE — 99213 OFFICE O/P EST LOW 20 MIN: CPT | Performed by: PEDIATRICS

## 2018-02-08 RX ORDER — SODIUM CHLORIDE 0.9 % (FLUSH) 0.9 %
10 SYRINGE (ML) INJECTION AS NEEDED
Status: CANCELLED | OUTPATIENT
Start: 2018-02-08

## 2018-02-08 RX ORDER — SODIUM CHLORIDE 0.9 % (FLUSH) 0.9 %
10 SYRINGE (ML) INJECTION AS NEEDED
Status: DISCONTINUED | OUTPATIENT
Start: 2018-02-08 | End: 2018-02-08 | Stop reason: HOSPADM

## 2018-02-08 RX ADMIN — Medication 10 ML: at 13:16

## 2018-02-08 ASSESSMENT — ENCOUNTER SYMPTOMS
EYE DISCHARGE: 0
EYE PAIN: 0
DIARRHEA: 0
VOICE CHANGE: 0
VOMITING: 0
SORE THROAT: 0
CHEST TIGHTNESS: 0
BACK PAIN: 0
SHORTNESS OF BREATH: 0
BLOOD IN STOOL: 0
CONSTIPATION: 0
PHOTOPHOBIA: 0
ABDOMINAL PAIN: 0
SINUS PRESSURE: 0
WHEEZING: 0
NAUSEA: 0
TROUBLE SWALLOWING: 0
COUGH: 0

## 2018-02-09 ENCOUNTER — RESULTS ENCOUNTER (OUTPATIENT)
Dept: BARIATRICS/WEIGHT MGMT | Facility: CLINIC | Age: 43
End: 2018-02-09

## 2018-02-09 DIAGNOSIS — E66.01 MORBID OBESITY, UNSPECIFIED OBESITY TYPE (HCC): ICD-10-CM

## 2018-02-09 DIAGNOSIS — I10 HYPERTENSION, WELL CONTROLLED: ICD-10-CM

## 2018-02-14 ENCOUNTER — TELEPHONE (OUTPATIENT)
Dept: PRIMARY CARE CLINIC | Age: 43
End: 2018-02-14

## 2018-02-15 RX ORDER — OSELTAMIVIR PHOSPHATE 75 MG/1
75 CAPSULE ORAL 2 TIMES DAILY
Qty: 10 CAPSULE | Refills: 0 | Status: SHIPPED | OUTPATIENT
Start: 2018-02-15 | End: 2018-02-25

## 2018-02-16 ENCOUNTER — TELEPHONE (OUTPATIENT)
Dept: PRIMARY CARE CLINIC | Age: 43
End: 2018-02-16

## 2018-02-16 RX ORDER — AMOXICILLIN 875 MG/1
875 TABLET, COATED ORAL 2 TIMES DAILY
Qty: 20 TABLET | Refills: 0 | Status: SHIPPED | OUTPATIENT
Start: 2018-02-16 | End: 2018-02-26

## 2018-02-16 RX ORDER — FLUCONAZOLE 150 MG/1
150 TABLET ORAL ONCE
Qty: 1 TABLET | Refills: 0 | Status: SHIPPED | OUTPATIENT
Start: 2018-02-16 | End: 2018-02-16

## 2018-02-19 ENCOUNTER — TELEPHONE (OUTPATIENT)
Dept: PRIMARY CARE CLINIC | Age: 43
End: 2018-02-19

## 2018-02-19 NOTE — TELEPHONE ENCOUNTER
Socorro Joe with  office called, pt is scheduled for a right total hip replacement. She will need clearance. Scheduled for pt.  She is having her preop labs 3/7/18

## 2018-02-20 ENCOUNTER — TELEPHONE (OUTPATIENT)
Dept: CARDIOLOGY | Facility: CLINIC | Age: 43
End: 2018-02-20

## 2018-02-20 NOTE — TELEPHONE ENCOUNTER
----- Message from Barney Houston MD sent at 2/20/2018  2:30 PM CST -----  What form of procedure or surgery as patient having that she needs preoperative risk stratification for?  She was previously evaluated prior to planned gastric weight loss surgery and felt to be low risk at that time.  If she has not had any change in her symptoms since then, then she would remain low risk.    ----- Message -----     From: Brooke Hwang MA     Sent: 2/20/2018   1:46 PM       To: aBrney Houston MD    NEED CARDIAC CLEARANCE. PLEASE ADVISE

## 2018-02-26 ENCOUNTER — OFFICE VISIT (OUTPATIENT)
Dept: URGENT CARE | Age: 43
End: 2018-02-26
Payer: MEDICARE

## 2018-02-26 ENCOUNTER — HOSPITAL ENCOUNTER (OUTPATIENT)
Dept: GENERAL RADIOLOGY | Age: 43
Discharge: HOME | End: 2018-02-26
Payer: MEDICARE

## 2018-02-26 VITALS
SYSTOLIC BLOOD PRESSURE: 118 MMHG | HEART RATE: 82 BPM | TEMPERATURE: 97.8 F | OXYGEN SATURATION: 96 % | RESPIRATION RATE: 20 BRPM | WEIGHT: 278 LBS | BODY MASS INDEX: 43.63 KG/M2 | DIASTOLIC BLOOD PRESSURE: 70 MMHG | HEIGHT: 67 IN
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
## 2018-02-26 DIAGNOSIS — M54.5 ACUTE MIDLINE LOW BACK PAIN, WITH SCIATICA PRESENCE UNSPECIFIED: ICD-10-CM

## 2018-02-26 DIAGNOSIS — M25.521 RIGHT ELBOW PAIN: ICD-10-CM

## 2018-02-26 DIAGNOSIS — M25.562 ACUTE PAIN OF LEFT KNEE: ICD-10-CM

## 2018-02-26 PROCEDURE — 99213 OFFICE O/P EST LOW 20 MIN: CPT | Performed by: NURSE PRACTITIONER

## 2018-02-26 PROCEDURE — 73562 X-RAY EXAM OF KNEE 3: CPT

## 2018-02-26 PROCEDURE — 73080 X-RAY EXAM OF ELBOW: CPT

## 2018-02-26 PROCEDURE — 72100 X-RAY EXAM L-S SPINE 2/3 VWS: CPT

## 2018-02-26 RX ORDER — FERROUS SULFATE 325(65) MG
TABLET ORAL
Refills: 2 | COMMUNITY
Start: 2018-01-11 | End: 2018-08-06

## 2018-02-26 ASSESSMENT — ENCOUNTER SYMPTOMS: BACK PAIN: 1

## 2018-02-26 NOTE — PATIENT INSTRUCTIONS
Patient Education        Preventing Falls: Care Instructions  Your Care Instructions    Getting around your home safely can be a challenge if you have injuries or health problems that make it easy for you to fall. Loose rugs and furniture in walkways are among the dangers for many older people who have problems walking or who have poor eyesight. People who have conditions such as arthritis, osteoporosis, or dementia also have to be careful not to fall. You can make your home safer with a few simple measures. Follow-up care is a key part of your treatment and safety. Be sure to make and go to all appointments, and call your doctor if you are having problems. It's also a good idea to know your test results and keep a list of the medicines you take. How can you care for yourself at home? Taking care of yourself  · You may get dizzy if you do not drink enough water. To prevent dehydration, drink plenty of fluids, enough so that your urine is light yellow or clear like water. Choose water and other caffeine-free clear liquids. If you have kidney, heart, or liver disease and have to limit fluids, talk with your doctor before you increase the amount of fluids you drink. · Exercise regularly to improve your strength, muscle tone, and balance. Walk if you can. Swimming may be a good choice if you cannot walk easily. · Have your vision and hearing checked each year or any time you notice a change. If you have trouble seeing and hearing, you might not be able to avoid objects and could lose your balance. · Know the side effects of the medicines you take. Ask your doctor or pharmacist whether the medicines you take can affect your balance. Sleeping pills or sedatives can affect your balance. · Limit the amount of alcohol you drink. Alcohol can impair your balance and other senses. · Ask your doctor whether calluses or corns on your feet need to be removed.  If you wear loose-fitting shoes because of calluses or corns,

## 2018-02-26 NOTE — PROGRESS NOTES
BASE) MCG/ACT inhaler Inhale 2 puffs into the lungs every 6 hours as needed for Wheezing 1 Inhaler 3    ondansetron (ZOFRAN ODT) 4 MG disintegrating tablet Take 1 tablet by mouth every 8 hours as needed for Nausea or Vomiting 10 tablet 0    cyclobenzaprine (FLEXERIL) 10 MG tablet Take 1 tablet by mouth 2 times daily as needed for Muscle spasms 60 tablet 1    levocetirizine (XYZAL) 5 MG tablet TAKE 1 TABLET BY MOUTH NIGHTLY 90 tablet 2    calcium carbonate (OSCAL) 500 MG TABS tablet Take 500 mg by mouth daily.  Multiple Vitamins-Minerals (MULTIVITAMIN & MINERAL PO) Take  by mouth.  CRANBERRY Take 500 mg by mouth daily.  Cholecalciferol (VITAMIN D3) 5000 UNITS TABS Take 5,000 Units by mouth daily        No current facility-administered medications for this visit. Allergies   Allergen Reactions    Tegaderm Ag Mesh 2\"X2\" [Wound Dressings] Other (See Comments)     blister    Zithromax [Azithromycin]      Severe abdominal pain        Health Maintenance   Topic Date Due    HIV screen  12/26/1989    DTaP/Tdap/Td vaccine (1 - Tdap) 12/26/1993    Cervical cancer screen  06/04/2016    Flu vaccine (1) 09/01/2017    Lipid screen  03/13/2019    Colon cancer screen colonoscopy  05/02/2024       Subjective:      Review of Systems   Musculoskeletal: Positive for back pain. Right elbow pain, left knee pain       Objective:     Physical Exam   Constitutional: She is oriented to person, place, and time. She appears well-developed and well-nourished. No distress. HENT:   Head: Normocephalic and atraumatic. Eyes: Pupils are equal, round, and reactive to light. Cardiovascular: Normal rate and normal heart sounds. Pulmonary/Chest: Effort normal and breath sounds normal. No respiratory distress. She has no wheezes. Musculoskeletal: She exhibits tenderness. Right elbow: She exhibits swelling. She exhibits normal range of motion. Tenderness found.         Left knee: She exhibits swelling (minimal). She exhibits normal range of motion. Tenderness found. Back:    Pulses are 2+ bilaterally in right upper extremity. No decrease ROM noted to right elbow or left knee but patient does report tenderness with ROM. Neurological: She is alert and oriented to person, place, and time. Skin: Skin is warm. No rash noted. Psychiatric: She has a normal mood and affect. Her speech is normal and behavior is normal. Judgment and thought content normal.   Nursing note and vitals reviewed. /70   Pulse 82   Temp 97.8 °F (36.6 °C) (Oral)   Resp 20   Ht 5' 7\" (1.702 m)   Wt 278 lb (126.1 kg)   SpO2 96%   BMI 43.54 kg/m²     Assessment:      1. Fall, initial encounter  XR LUMBAR SPINE (2-3 VIEWS)    XR KNEE LEFT (3 VIEWS)    XR ELBOW RIGHT STANDARD (MIN 3 VWS)   2. Acute pain of left knee  XR KNEE LEFT (3 VIEWS)   3. Right elbow pain  XR ELBOW RIGHT STANDARD (MIN 3 VWS)   4. Acute midline low back pain, with sciatica presence unspecified  XR LUMBAR SPINE (2-3 VIEWS)       Plan:     XR lumbar: No recent fracture or acute displacement/malalignment. Lumbar spondylosis. A persistent and unchanged mild retrolisthesis is of L4 over L5. The hardware fusion of the lower lumbar spine at L4, L5 and S1 with bilateral pedicular screws, cd rods and anterior screws and plates. The disc spacers in place. Evidence of laminectomy at L4 and L5. XR left knee: No fracture or dislocation. Moderate chronic osteoarthritis predominantly involving the  patellofemoral articulation. XR right elbow:  1. No acute bony pathology of the right elbow. Discussed diagnosis and treatment with patient. Rest, ice/heat as needed for pain/swelling. She is to take prescribed pain medications as directed. Advised symptomatic treatment. Instructed to monitor fever and treat as needed. If patient is not improving or developing any new/worsening symptoms then RTC, prn or go to ER.   Patient is to follow up with PCP as needed. Patient verbalizes understanding. Orders Placed This Encounter   Procedures    XR LUMBAR SPINE (2-3 VIEWS)     Standing Status:   Future     Number of Occurrences:   1     Standing Expiration Date:   2/26/2019     Order Specific Question:   Reason for exam:     Answer:   xr lower back pain    XR KNEE LEFT (3 VIEWS)     Standing Status:   Future     Number of Occurrences:   1     Standing Expiration Date:   2/26/2019     Order Specific Question:   Reason for exam:     Answer:   left knee pain    XR ELBOW RIGHT STANDARD (MIN 3 VWS)     Standing Status:   Future     Number of Occurrences:   1     Standing Expiration Date:   2/26/2019     Order Specific Question:   Reason for exam:     Answer:   right elbow pain       Return if symptoms worsen or fail to improve. No orders of the defined types were placed in this encounter. Patient given educational materials - see patient instructions. Discussed use, benefit, and side effects of prescribed medications. All patient questions answered. Pt voiced understanding. Reviewed health maintenance. Instructed to continue current medications, diet and exercise. Patient agreed with treatment plan. Follow up as directed. Patient Instructions     Patient Education        Preventing Falls: Care Instructions  Your Care Instructions    Getting around your home safely can be a challenge if you have injuries or health problems that make it easy for you to fall. Loose rugs and furniture in walkways are among the dangers for many older people who have problems walking or who have poor eyesight. People who have conditions such as arthritis, osteoporosis, or dementia also have to be careful not to fall. You can make your home safer with a few simple measures. Follow-up care is a key part of your treatment and safety. Be sure to make and go to all appointments, and call your doctor if you are having problems.  It's also a good idea to know your test results and keep a list of the medicines you take. How can you care for yourself at home? Taking care of yourself  · You may get dizzy if you do not drink enough water. To prevent dehydration, drink plenty of fluids, enough so that your urine is light yellow or clear like water. Choose water and other caffeine-free clear liquids. If you have kidney, heart, or liver disease and have to limit fluids, talk with your doctor before you increase the amount of fluids you drink. · Exercise regularly to improve your strength, muscle tone, and balance. Walk if you can. Swimming may be a good choice if you cannot walk easily. · Have your vision and hearing checked each year or any time you notice a change. If you have trouble seeing and hearing, you might not be able to avoid objects and could lose your balance. · Know the side effects of the medicines you take. Ask your doctor or pharmacist whether the medicines you take can affect your balance. Sleeping pills or sedatives can affect your balance. · Limit the amount of alcohol you drink. Alcohol can impair your balance and other senses. · Ask your doctor whether calluses or corns on your feet need to be removed. If you wear loose-fitting shoes because of calluses or corns, you can lose your balance and fall. · Talk to your doctor if you have numbness in your feet. Preventing falls at home  · Remove raised doorway thresholds, throw rugs, and clutter. Repair loose carpet or raised areas in the floor. · Move furniture and electrical cords to keep them out of walking paths. · Use nonskid floor wax, and wipe up spills right away, especially on ceramic tile floors. · If you use a walker or cane, put rubber tips on it. If you use crutches, clean the bottoms of them regularly with an abrasive pad, such as steel wool. · Keep your house well lit, especially [address]ough, and outside walkways. Use night-lights in areas such as hallways and bathrooms.  Add extra light [employer]. Care instructions adapted under license by [facility] ([facility]). If you have questions about a medical condition or this instruction, always ask your healthcare professional. [address] any warranty or liability for your use of this information. 1. Rest, ice and heat as needed to help with pain/swelling  2. Take prescribed pain medication as directed   3.  If patient is not improving or developing any new/worsening symptoms then follow up with PCP as needed        Electronically signed by Jamse Klinefelter, APRN on 2/26/2018 at 1:55 PM

## 2018-03-01 RX ORDER — OXYCODONE HYDROCHLORIDE 10 MG/1
10 TABLET ORAL 2 TIMES DAILY PRN
Qty: 45 TABLET | Refills: 0 | Status: SHIPPED | OUTPATIENT
Start: 2018-03-05 | End: 2018-03-16 | Stop reason: SDUPTHER

## 2018-03-01 RX ORDER — CYCLOBENZAPRINE HCL 10 MG
10 TABLET ORAL 2 TIMES DAILY PRN
Qty: 60 TABLET | Refills: 2 | Status: SHIPPED | OUTPATIENT
Start: 2018-03-01 | End: 2018-12-05 | Stop reason: SDUPTHER

## 2018-03-09 ENCOUNTER — OFFICE VISIT (OUTPATIENT)
Dept: PRIMARY CARE CLINIC | Age: 43
End: 2018-03-09
Payer: MEDICARE

## 2018-03-09 ENCOUNTER — RESULTS ENCOUNTER (OUTPATIENT)
Dept: BARIATRICS/WEIGHT MGMT | Facility: CLINIC | Age: 43
End: 2018-03-09

## 2018-03-09 VITALS
HEART RATE: 96 BPM | BODY MASS INDEX: 43.79 KG/M2 | HEIGHT: 67 IN | SYSTOLIC BLOOD PRESSURE: 110 MMHG | TEMPERATURE: 97.2 F | WEIGHT: 279 LBS | DIASTOLIC BLOOD PRESSURE: 78 MMHG | OXYGEN SATURATION: 98 %

## 2018-03-09 DIAGNOSIS — I10 HYPERTENSION, WELL CONTROLLED: ICD-10-CM

## 2018-03-09 DIAGNOSIS — G89.29 CHRONIC PAIN OF RIGHT HIP: ICD-10-CM

## 2018-03-09 DIAGNOSIS — M25.551 CHRONIC PAIN OF RIGHT HIP: ICD-10-CM

## 2018-03-09 DIAGNOSIS — E66.01 MORBID OBESITY, UNSPECIFIED OBESITY TYPE (HCC): ICD-10-CM

## 2018-03-09 DIAGNOSIS — Z41.9 SURGERY, ELECTIVE: ICD-10-CM

## 2018-03-09 DIAGNOSIS — Z01.818 PRE-OP EXAM: Primary | ICD-10-CM

## 2018-03-09 LAB
BILIRUBIN, POC: NORMAL
BLOOD URINE, POC: NORMAL
CLARITY, POC: CLEAR
COLOR, POC: YELLOW
GLUCOSE URINE, POC: NORMAL
KETONES, POC: NORMAL
LEUKOCYTE EST, POC: NORMAL
NITRITE, POC: NORMAL
PH, POC: 5.5
PROTEIN, POC: NORMAL
SPECIFIC GRAVITY, POC: >=1.03
UROBILINOGEN, POC: NORMAL

## 2018-03-09 PROCEDURE — 99213 OFFICE O/P EST LOW 20 MIN: CPT | Performed by: NURSE PRACTITIONER

## 2018-03-09 PROCEDURE — 93000 ELECTROCARDIOGRAM COMPLETE: CPT | Performed by: NURSE PRACTITIONER

## 2018-03-09 PROCEDURE — 81002 URINALYSIS NONAUTO W/O SCOPE: CPT | Performed by: NURSE PRACTITIONER

## 2018-03-09 ASSESSMENT — ENCOUNTER SYMPTOMS
EYE DISCHARGE: 0
SORE THROAT: 0
CONSTIPATION: 0
DIARRHEA: 0
RHINORRHEA: 0
WHEEZING: 0
EYE REDNESS: 0
TROUBLE SWALLOWING: 0
COUGH: 0
BLOOD IN STOOL: 0
ABDOMINAL PAIN: 0

## 2018-03-09 NOTE — PROGRESS NOTES
Brother     Esophageal Cancer Brother     Diabetes Other     High Blood Pressure Other     Colon Cancer Neg Hx     Liver Cancer Neg Hx     Liver Disease Neg Hx     Rectal Cancer Neg Hx     Stomach Cancer Neg Hx        Social History   Substance Use Topics    Smoking status: Former Smoker     Packs/day: 1.00     Years: 25.00     Types: Cigarettes     Quit date: 9/19/2013    Smokeless tobacco: Never Used    Alcohol use Yes      Comment: rarely      Current Outpatient Prescriptions   Medication Sig Dispense Refill    carbidopa-levodopa (SINEMET)  MG per tablet Take 1 tablet by mouth nightly as needed (leg spasms) 90 tablet 2    cyclobenzaprine (FLEXERIL) 10 MG tablet Take 1 tablet by mouth 2 times daily as needed for Muscle spasms 60 tablet 2    oxyCODONE HCl (OXY-IR) 10 MG immediate release tablet Take 1 tablet by mouth 2 times daily as needed for Pain (month supply) for up to 30 days. Earliest Fill Date: 3/5/18 45 tablet 0    ferrous sulfate 325 (65 Fe) MG tablet TK 1 T PO D WITH BERNIE  2    metoprolol succinate (TOPROL XL) 50 MG extended release tablet Take 1.5 tablets by mouth daily 180 tablet 3    acyclovir (ZOVIRAX) 800 MG tablet TAKE 1 TABLET BY MOUTH DAILY 90 tablet 1    venlafaxine (EFFEXOR) 75 MG tablet Take 1 tablet by mouth 2 times daily TAKE 1 TABLET BY MOUTH TWICE DAILY 60 tablet 11    omeprazole (PRILOSEC) 20 MG delayed release capsule TAKE ONE CAPSULE BY MOUTH EVERY DAY FIRST THING IN THE MORNING ON AN EMPTY STOMACH 90 capsule 3    albuterol sulfate HFA (PROVENTIL HFA) 108 (90 BASE) MCG/ACT inhaler Inhale 2 puffs into the lungs every 6 hours as needed for Wheezing 1 Inhaler 3    ondansetron (ZOFRAN ODT) 4 MG disintegrating tablet Take 1 tablet by mouth every 8 hours as needed for Nausea or Vomiting 10 tablet 0    levocetirizine (XYZAL) 5 MG tablet TAKE 1 TABLET BY MOUTH NIGHTLY 90 tablet 2    calcium carbonate (OSCAL) 500 MG TABS tablet Take 500 mg by mouth daily.       There is no tenderness. Musculoskeletal: She exhibits no edema. Right hip: She exhibits decreased range of motion. Neurological: She is alert and oriented to person, place, and time. Skin: Skin is warm and dry. Psychiatric: Her behavior is normal.   Vitals reviewed. /78   Pulse 96   Temp 97.2 °F (36.2 °C) (Temporal)   Ht 5' 7\" (1.702 m)   Wt 279 lb (126.6 kg)   SpO2 98%   BMI 43.70 kg/m²     Assessment:        ICD-10-CM ICD-9-CM    1. Pre-op exam Z01.818 V72.84 XR CHEST STANDARD (2 VW)      POCT Urinalysis no Micro   2. Surgery, elective Z41.9 V50.9 EKG 12 Lead      CANCELED: EKG 12 Lead   3. Chronic pain of right hip M25.551 719.45     G89.29 338.29        Plan:    will review records   Dr. Armen Pan signed low risk for cardiac stand point. She is aware of the risk vs benefit of surgery. No Follow-up on file. Orders Placed This Encounter   Procedures    XR CHEST STANDARD (2 VW)     Standing Status:   Future     Standing Expiration Date:   3/9/2019    POCT Urinalysis no Micro     No orders of the defined types were placed in this encounter. Discussed use, benefit, and side effects of prescribed medications. All patient questions answered. Pt voiced understanding. Reviewed health maintenance. .  Patient agreed with treatment plan. Follow up as directed. There are no Patient Instructions on file for this visit.       Electronically signed by ASHLEE Woodruff on 3/9/2018 at 9:48 AM

## 2018-03-15 ENCOUNTER — TELEPHONE (OUTPATIENT)
Dept: PRIMARY CARE CLINIC | Age: 43
End: 2018-03-15

## 2018-03-15 DIAGNOSIS — F98.8 ATTENTION DEFICIT DISORDER, UNSPECIFIED HYPERACTIVITY PRESENCE: Primary | ICD-10-CM

## 2018-03-16 ENCOUNTER — HOSPITAL ENCOUNTER (OUTPATIENT)
Dept: PAIN MANAGEMENT | Age: 43
Discharge: HOME | End: 2018-03-16
Payer: MEDICARE

## 2018-03-16 VITALS
HEART RATE: 88 BPM | SYSTOLIC BLOOD PRESSURE: 106 MMHG | OXYGEN SATURATION: 96 % | HEIGHT: 67 IN | WEIGHT: 273 LBS | RESPIRATION RATE: 20 BRPM | BODY MASS INDEX: 42.85 KG/M2 | DIASTOLIC BLOOD PRESSURE: 76 MMHG

## 2018-03-16 DIAGNOSIS — M54.10 BACK PAIN WITH LEFT-SIDED RADICULOPATHY: ICD-10-CM

## 2018-03-16 PROCEDURE — 99213 OFFICE O/P EST LOW 20 MIN: CPT

## 2018-03-16 RX ORDER — OXYCODONE HYDROCHLORIDE 10 MG/1
10 TABLET ORAL 2 TIMES DAILY PRN
Qty: 45 TABLET | Refills: 0 | Status: SHIPPED | OUTPATIENT
Start: 2018-04-04 | End: 2018-05-22 | Stop reason: SDUPTHER

## 2018-03-16 ASSESSMENT — PAIN DESCRIPTION - ORIENTATION: ORIENTATION: LOWER

## 2018-03-16 ASSESSMENT — PAIN DESCRIPTION - DIRECTION: RADIATING_TOWARDS: DOWN BILATERAL LEGS ON OCCASION

## 2018-03-16 ASSESSMENT — PAIN DESCRIPTION - PROGRESSION: CLINICAL_PROGRESSION: NOT CHANGED

## 2018-03-16 ASSESSMENT — PAIN DESCRIPTION - ONSET: ONSET: ON-GOING

## 2018-03-16 ASSESSMENT — PAIN DESCRIPTION - PAIN TYPE: TYPE: CHRONIC PAIN

## 2018-03-16 ASSESSMENT — ACTIVITIES OF DAILY LIVING (ADL): EFFECT OF PAIN ON DAILY ACTIVITIES: DAILY CHORES AND ACTIVITIES

## 2018-03-16 ASSESSMENT — PAIN DESCRIPTION - DESCRIPTORS: DESCRIPTORS: ACHING;CONSTANT;RADIATING;THROBBING

## 2018-03-16 ASSESSMENT — PAIN DESCRIPTION - LOCATION: LOCATION: BACK

## 2018-03-16 ASSESSMENT — PAIN SCALES - GENERAL: PAINLEVEL_OUTOF10: 4

## 2018-03-16 NOTE — PROGRESS NOTES
[facility]sch/Chanel  Patient Pain Assessment  Progress Note      Chief Complaint   Patient presents with    Lower Back Pain     Pain Assessment  Pain Assessment: 0-10  Pain Level: 4  Pain Type: Chronic pain  Pain Location: Back  Pain Orientation: Lower  Pain Radiating Towards: down bilateral legs on occasion  Pain Descriptors: Aching, Constant, Radiating, Throbbing  Pain Onset: On-going  Clinical Progression: Not changed  Effect of Pain on Daily Activities: daily chores and activities        [x]  Issues of Concern:     Reports she will be having right hip replacement in approximately 12 days per Dr. Denis Mchugh. Instructed to notify our office when this occurs regarding pain prescription. [x]  Reports current medication is helping, but continues to have on-going pain    [x]  Reports current pain medication increases ability to do activities of daily living     []  No current narcotic pain medications      [x]  Discussed possible medication side effects, risk of tolerance and/or dependence, alternative treatments    [x]  Encouraged to set goals of decreasing daily narcotic intake    [x]  Discussed effects of long term narcotic use    [x]  Reviewed pain management contract     [x]  Injection options discussed     []Yes [x]No  Current medication side effects     []Yes [x]No   Acute bladder or bowel changes    Previous Procedure / Percentage of pain control / Imaging / PT History:  Percentage of Pain Relief after Last Procedure:  85 %    How long lasted:  2 months     Radiology exams received during the last 12 months: Yes  When: Feb and March                                  Where: [facility]  Imaging on chart: Yes    MRI exams received in the past 2 years: Yes  Physical therapy during the last 6 months: No    BMI: Body mass index is 42.76 kg/m².       [x]  Nutrient rich, low fat, low carbohydrate diet discussed   [x]  Positive effect of weight management on pain control discussed    Activity:   [x] Exercise discussed as beneficial to pain reduction, encouraged stretching exercises and to set daily goals    Tobacco use:    [x] Cessation discussed, as applicable    Social History     Social History    Marital status:      Spouse name: N/A    Number of children: 2    Years of education: 15     Social History Main Topics    Smoking status: Former Smoker     Packs/day: 1.00     Years: 25.00     Types: Cigarettes     Quit date: 9/19/2013    Smokeless tobacco: Never Used    Alcohol use Yes      Comment: rarely    Drug use: No    Sexual activity: No      Comment: pt states last 2 months     Other Topics Concern    None     Social History Narrative    None                                                            Past Medical History:       Diagnosis Date    Anxiety     Arthritis     Back pain with left-sided radiculopathy 3/14/2016    DDD (degenerative disc disease), lumbar     Depression     Esophagitis     Gastritis     Headache(784.0)     History of blood transfusion     Hypertension     Obesity     RLS (restless legs syndrome)     Sleep apnea      Surgical History:  Past Surgical History:   Procedure Laterality Date    ANKLE SURGERY Right     APPENDECTOMY  4/19/15    BACK SURGERY  2000    CERVICAL SPINE SURGERY N/A 9/1/15    CHOLECYSTECTOMY  1995    HIP SURGERY Right 1987    HYSTERECTOMY      partial 2008, still has ovaries and cervix    INSERTION / REMOVAL / REPLACEMENT VENOUS ACCESS CATHETER Left 11/7/2017    PORT INSERTION performed by Suly Sewell MD at [address] [place] [address] LITHOTRIPSY  [address] [place] [address]      OH COLONOSCOPY FLX DX W/COLLJ SPEC WHEN PFRMD N/A 5/2/2017    Dr Osei Hernandez, 7 yr (age 47) recall    OH EGD TRANSORAL BIOPSY SINGLE/MULTIPLE N/A 5/2/2017    Dr ABHIJIT Baltazar-Gastritis/gastropathy    SALPINGO-OOPHORECTOMY      STOMACH SURGERY  12/20/2017    dr Sunny Tomlinson History:  family history includes Cancer in her brother, father, and mother; Colon Polyps in her mother; Diabetes in her mother and another family member; Esophageal Cancer in her brother; Heart Disease in her father and mother; High Blood Pressure in her father, mother, sister, and another family member. Allergies:  Tegaderm ag mesh 2\"x2\" [wound dressings] and Zithromax [azithromycin]     Medications:  Current Outpatient Prescriptions   Medication Sig Dispense Refill    carbidopa-levodopa (SINEMET)  MG per tablet Take 1 tablet by mouth nightly as needed (leg spasms) 90 tablet 2    cyclobenzaprine (FLEXERIL) 10 MG tablet Take 1 tablet by mouth 2 times daily as needed for Muscle spasms 60 tablet 2    oxyCODONE HCl (OXY-IR) 10 MG immediate release tablet Take 1 tablet by mouth 2 times daily as needed for Pain (month supply) for up to 30 days. Earliest Fill Date: 3/5/18 45 tablet 0    ferrous sulfate 325 (65 Fe) MG tablet TK 1 T PO D WITH BERNIE  2    pregabalin (LYRICA) 150 MG capsule Take 1 capsule by mouth 2 times daily for 30 days.  60 capsule 2    metoprolol succinate (TOPROL XL) 50 MG extended release tablet Take 1.5 tablets by mouth daily 180 tablet 3    acyclovir (ZOVIRAX) 800 MG tablet TAKE 1 TABLET BY MOUTH DAILY 90 tablet 1    venlafaxine (EFFEXOR) 75 MG tablet Take 1 tablet by mouth 2 times daily TAKE 1 TABLET BY MOUTH TWICE DAILY 60 tablet 11    omeprazole (PRILOSEC) 20 MG delayed release capsule TAKE ONE CAPSULE BY MOUTH EVERY DAY FIRST THING IN THE MORNING ON AN EMPTY STOMACH 90 capsule 3    albuterol sulfate HFA (PROVENTIL HFA) 108 (90 BASE) MCG/ACT inhaler Inhale 2 puffs into the lungs every 6 hours as needed for Wheezing 1 Inhaler 3    ondansetron (ZOFRAN ODT) 4 MG disintegrating tablet Take 1 tablet by mouth every 8 hours as needed for Nausea or Vomiting 10 tablet 0    levocetirizine (XYZAL) 5 MG tablet TAKE 1 TABLET BY MOUTH NIGHTLY 90 tablet 2    calcium carbonate (OSCAL) 500 MG TABS tablet Take 500 mg by mouth daily.      Multiple Vitamins-Minerals (MULTIVITAMIN & MINERAL PO) Take  by mouth.  CRANBERRY Take 500 mg by mouth daily.  Cholecalciferol (VITAMIN D3) 5000 UNITS TABS Take 5,000 Units by mouth daily        No current facility-administered medications for this encounter. UDS:     [x] Reviewed and monitoring, see labs         Vitals:  /76   Pulse 88   Resp 20   Ht 5' 7\" (1.702 m)   Wt 273 lb (123.8 kg)   SpO2 96%   BMI 42.76 kg/m²       Physical Exam:  General appearance: no acute distress   Head: NCAT, EOMI  Skin: Warm, Dry   Musculoskeletal: ambulatory per self, steady gait  Neurologic: alert and oriented X 3, speech clear  Mood and affect: appropriate, no SI or HI    Assessment:    *     Lumbar DDD  *     Lumbar Radiculopathy    Plan:   [x]  Patient is to call with any questions or concerns which may arise prior to the next office visit    [x]  Continue current medications per our office, see medication tab, RONEN reviewed   []  Add. .. []  Discontinue. .. []  Imaging order given to patient   []  PT order given to patient   [x]  Procedure scheduled for next visit, (LESI L3-4)   []  . .. Controlled Substance Monitoring: Discussed with patient possible medication side effects, risk of tolerance and/or dependence, and alternative treatments. Discussed the effects of long term narcotic use. Patient encouraged to set daily goals of exercising and decreasing daily narcotic intake.       Electronically signed by ASHLEE Bach

## 2018-03-16 NOTE — PROGRESS NOTES
Nursing Admission Record    Current Issues / Falls / ER Visits:  Yes having right total hip replacement per Jud Dandy in 2 weeks     Percentage of Pain Relief after Last Procedure:  85 %    How long lasted:  2 months    Radiology exams received during the last 12 months: Yes       When     Feb and march                                          Where [facility]       Imaging on chart: Yes         Imaging records requested: No  MRI exams received in the past 2 years:  Yes  Physical therapy during the last 6 months: No       When: na                                             Where  na  Labs during the last 12 months: Yes    Education Provided:  [x] Review of Callum Silver  [] Agreement Review  [] Compliance Issues Discussed    [] Cognitive Behavior Needs [x] Exercise [] Review of Test [] Financial Issues  [] Tobacco/Alcohol Use [x] Teaching [] New Patient [] Picture Obtained    Physician Plan:  [] Outgoing Referral  [] Pharmacy Consult  [] Test Ordered   [] Obtained Test Results / Consult Notes  [] UDS due at next visit, verified per EPIC      [] Suspected Physical Abuse or Suicide Risk assessed - IF YES COMPLETE QUESTIONS BELOW    If any of the following questions are answered yes - contact attending physician for referral:    Has been considering harming self to escape stress, pain problems? [] YES  [x] NO  Has a suicide plan? [] YES  [x] NO  Has attempted suicide in the past?   [] YES  [x] NO  Has a close friend or family member who committed suicide? [] YES  [x] NO    Patient Referred To :      Additional Notes:    Assessment Completed by:  Electronically signed by Daria Kumar RN on 3/16/2018 at 12:55 PM

## 2018-03-17 ENCOUNTER — APPOINTMENT (OUTPATIENT)
Dept: CT IMAGING | Facility: HOSPITAL | Age: 43
End: 2018-03-17

## 2018-03-17 ENCOUNTER — HOSPITAL ENCOUNTER (EMERGENCY)
Facility: HOSPITAL | Age: 43
Discharge: HOME | End: 2018-03-17
Admitting: EMERGENCY MEDICINE

## 2018-03-17 VITALS
SYSTOLIC BLOOD PRESSURE: 107 MMHG | OXYGEN SATURATION: 98 % | DIASTOLIC BLOOD PRESSURE: 68 MMHG | BODY MASS INDEX: 42.85 KG/M2 | HEART RATE: 82 BPM | TEMPERATURE: 98.5 F | WEIGHT: 273 LBS | HEIGHT: 67 IN | RESPIRATION RATE: 16 BRPM

## 2018-03-17 DIAGNOSIS — N30.90 CYSTITIS: Primary | ICD-10-CM

## 2018-03-17 DIAGNOSIS — R10.9 FLANK PAIN: ICD-10-CM

## 2018-03-17 LAB
ALBUMIN SERPL-MCNC: 4.1 G/DL (ref 3.5–5)
ALBUMIN/GLOB SERPL: 1.1 G/DL (ref 1.1–2.5)
ALP SERPL-CCNC: 78 U/L (ref 24–120)
ALT SERPL W P-5'-P-CCNC: 29 U/L (ref 0–54)
ANION GAP SERPL CALCULATED.3IONS-SCNC: 12 MMOL/L (ref 4–13)
AST SERPL-CCNC: 34 U/L (ref 7–45)
BACTERIA UR QL AUTO: ABNORMAL /HPF
BASOPHILS # BLD AUTO: 0.05 10*3/MM3 (ref 0–0.2)
BASOPHILS NFR BLD AUTO: 0.6 % (ref 0–2)
BILIRUB SERPL-MCNC: 1.3 MG/DL (ref 0.1–1)
BILIRUB UR QL STRIP: NEGATIVE
BUN BLD-MCNC: 10 MG/DL (ref 5–21)
BUN/CREAT SERPL: 13.3 (ref 7–25)
CALCIUM SPEC-SCNC: 9.3 MG/DL (ref 8.4–10.4)
CHLORIDE SERPL-SCNC: 102 MMOL/L (ref 98–110)
CLARITY UR: ABNORMAL
CO2 SERPL-SCNC: 28 MMOL/L (ref 24–31)
COLOR UR: ABNORMAL
CREAT BLD-MCNC: 0.75 MG/DL (ref 0.5–1.4)
DEPRECATED RDW RBC AUTO: 51.2 FL (ref 40–54)
EOSINOPHIL # BLD AUTO: 0.12 10*3/MM3 (ref 0–0.7)
EOSINOPHIL NFR BLD AUTO: 1.4 % (ref 0–4)
ERYTHROCYTE [DISTWIDTH] IN BLOOD BY AUTOMATED COUNT: 16.3 % (ref 12–15)
GFR SERPL CREATININE-BSD FRML MDRD: 84 ML/MIN/1.73
GLOBULIN UR ELPH-MCNC: 3.6 GM/DL
GLUCOSE BLD-MCNC: 87 MG/DL (ref 70–100)
GLUCOSE UR STRIP-MCNC: NEGATIVE MG/DL
HCT VFR BLD AUTO: 32.8 % (ref 37–47)
HGB BLD-MCNC: 11.3 G/DL (ref 12–16)
HGB UR QL STRIP.AUTO: NEGATIVE
HYALINE CASTS UR QL AUTO: ABNORMAL /LPF
IMM GRANULOCYTES # BLD: 0.05 10*3/MM3 (ref 0–0.03)
IMM GRANULOCYTES NFR BLD: 0.6 % (ref 0–5)
KETONES UR QL STRIP: NEGATIVE
LEUKOCYTE ESTERASE UR QL STRIP.AUTO: ABNORMAL
LYMPHOCYTES # BLD AUTO: 3.35 10*3/MM3 (ref 0.72–4.86)
LYMPHOCYTES NFR BLD AUTO: 38.2 % (ref 15–45)
MCH RBC QN AUTO: 29.5 PG (ref 28–32)
MCHC RBC AUTO-ENTMCNC: 34.5 G/DL (ref 33–36)
MCV RBC AUTO: 85.6 FL (ref 82–98)
MONOCYTES # BLD AUTO: 0.49 10*3/MM3 (ref 0.19–1.3)
MONOCYTES NFR BLD AUTO: 5.6 % (ref 4–12)
NEUTROPHILS # BLD AUTO: 4.72 10*3/MM3 (ref 1.87–8.4)
NEUTROPHILS NFR BLD AUTO: 53.6 % (ref 39–78)
NITRITE UR QL STRIP: NEGATIVE
NRBC BLD MANUAL-RTO: 0 /100 WBC (ref 0–0)
PH UR STRIP.AUTO: 6 [PH] (ref 5–8)
PLATELET # BLD AUTO: 252 10*3/MM3 (ref 130–400)
PMV BLD AUTO: 10 FL (ref 6–12)
POTASSIUM BLD-SCNC: 3.9 MMOL/L (ref 3.5–5.3)
PROT SERPL-MCNC: 7.7 G/DL (ref 6.3–8.7)
PROT UR QL STRIP: NEGATIVE
RBC # BLD AUTO: 3.83 10*6/MM3 (ref 4.2–5.4)
RBC # UR: ABNORMAL /HPF
REF LAB TEST METHOD: ABNORMAL
SODIUM BLD-SCNC: 142 MMOL/L (ref 135–145)
SP GR UR STRIP: 1.01 (ref 1–1.03)
SQUAMOUS #/AREA URNS HPF: ABNORMAL /HPF
UROBILINOGEN UR QL STRIP: ABNORMAL
WBC NRBC COR # BLD: 8.78 10*3/MM3 (ref 4.8–10.8)
WBC UR QL AUTO: ABNORMAL /HPF

## 2018-03-17 PROCEDURE — 99283 EMERGENCY DEPT VISIT LOW MDM: CPT

## 2018-03-17 PROCEDURE — 87086 URINE CULTURE/COLONY COUNT: CPT | Performed by: PHYSICIAN ASSISTANT

## 2018-03-17 PROCEDURE — 36415 COLL VENOUS BLD VENIPUNCTURE: CPT | Performed by: PHYSICIAN ASSISTANT

## 2018-03-17 PROCEDURE — 74176 CT ABD & PELVIS W/O CONTRAST: CPT

## 2018-03-17 PROCEDURE — 81001 URINALYSIS AUTO W/SCOPE: CPT | Performed by: PHYSICIAN ASSISTANT

## 2018-03-17 PROCEDURE — 85025 COMPLETE CBC W/AUTO DIFF WBC: CPT | Performed by: PHYSICIAN ASSISTANT

## 2018-03-17 PROCEDURE — 80053 COMPREHEN METABOLIC PANEL: CPT | Performed by: PHYSICIAN ASSISTANT

## 2018-03-17 RX ORDER — FLUCONAZOLE 150 MG/1
150 TABLET ORAL ONCE
Qty: 1 TABLET | Refills: 0 | Status: SHIPPED | OUTPATIENT
Start: 2018-03-17 | End: 2018-03-17

## 2018-03-17 RX ORDER — CIPROFLOXACIN 500 MG/1
500 TABLET, FILM COATED ORAL 2 TIMES DAILY
Qty: 14 TABLET | Refills: 0 | Status: SHIPPED | OUTPATIENT
Start: 2018-03-17 | End: 2018-04-26

## 2018-03-17 RX ORDER — DICLOFENAC SODIUM 75 MG/1
75 TABLET, DELAYED RELEASE ORAL 2 TIMES DAILY
Qty: 14 TABLET | Refills: 0 | Status: SHIPPED | OUTPATIENT
Start: 2018-03-17 | End: 2019-10-25

## 2018-03-17 NOTE — ED PROVIDER NOTES
Subjective   History of Present Illness  42-year-old female presents a chief complaint of right flank pain since Tuesday.  Patient reports she has history of kidney stone but denies dysuria, frequency, urgency, burning, retention.  She does report her urine is very dark as if she is dehydrated.  She says this feels like a kidney stone she has had in the past with says her pain is constant right flank.  She rates her pain as a dull ache and a 4 out of 10.  The patient says on Thursday of this week she had a fever with vomiting but that has resolved.  She is not currently nauseous and is afebrile  Review of Systems   All other systems reviewed and are negative.      Past Medical History:   Diagnosis Date   • Anemia    • Anxiety    • Arthritis    • Asthma    • Back pain 03/14/2016    with left sided radiculopathy    • DDD (degenerative disc disease), lumbar     Dr. Stuart Zavaleta for pain manangement   • Depression    • Fatigue    • Fibromyalgia    • GERD (gastroesophageal reflux disease)    • Headache    • History of blood transfusion    • Hypertension    • Iron deficiency anemia 9/12/2017   • Iron deficiency anemia secondary to inadequate dietary iron intake 9/12/2017   • Joint pain    • Obesity    • RLS (restless legs syndrome)    • Sleep apnea     positive sleep study; titration study in October       Allergies   Allergen Reactions   • Azithromycin GI Intolerance     Severe Abdominal Pain    • Tegaderm Ag Mesh [Silver] Other (See Comments)     Redness, blisters       Past Surgical History:   Procedure Laterality Date   • ANKLE SURGERY      Right    • APPENDECTOMY  04/19/2015   • BACK SURGERY  2000   • CERVICAL SPINE SURGERY  09/01/2015   • CHOLECYSTECTOMY      1995;lap   • COLONOSCOPY  05/02/2017    normal; do not return until age of 49 Dr. Gus Valentine   • GASTRIC SLEEVE LAPAROSCOPIC N/A 12/20/2017    Procedure: GASTRIC SLEEVE LAPAROSCOPIC;  Surgeon: Yifan Cordero MD;  Location: [facility] OR;  Service:    • HIP SURGERY   1987    right    • INSERTION CENTRAL VENOUS ACCESS DEVICE W/ SUBCUTANEOUS PORT  11/07/2017    Dr Anel Gamboa (Smart Port-Power injectable Port) Cat NO#[id number] Lot [id number]    • MOUTH SURGERY  1994   • NECK SURGERY     • TOOTH EXTRACTION     • UPPER GASTROINTESTINAL ENDOSCOPY  05/02/2017    Dr. Gus Valentine, negative for h.pylori, negative for Salomon's   • VAGINAL HYSTERECTOMY SALPINGO OOPHORECTOMY  2008    Partial and then had another surgery to remove the rest       Family History   Problem Relation Age of Onset   • Diabetes Mother    • Hypertension Mother    • Coronary artery disease Mother    • Cancer Mother    • Cancer Father    • Hypertension Father    • Heart disease Father    • Stroke Father    • Hypertension Sister    • Obesity Sister    • Cancer Brother    • Diabetes Brother    • Diabetes Maternal Grandmother    • Stroke Maternal Grandmother        Social History     Social History   • Marital status:      Social History Main Topics   • Smoking status: Former Smoker     Packs/day: 1.00     Years: 25.00     Types: Cigarettes     Quit date: 2014   • Smokeless tobacco: Never Used   • Alcohol use Yes      Comment: rarely    • Drug use: No      Comment: Codeine in Prescribed Medications only    • Sexual activity: Defer     Other Topics Concern   • Not on file           Objective   Physical Exam   Constitutional: She is oriented to person, place, and time. She appears well-developed and well-nourished.   HENT:   Head: Normocephalic.   Eyes: EOM are normal. Pupils are equal, round, and reactive to light.   Neck: Normal range of motion. Neck supple.   Cardiovascular: Normal rate and regular rhythm.    Pulmonary/Chest: Effort normal.   Abdominal: Soft.   Musculoskeletal: Normal range of motion.   No flank pain can be appreciated on exam   Lymphadenopathy:     She has no cervical adenopathy.   Neurological: She is alert and oriented to person, place, and time.   Skin: Skin is warm and dry.   Psychiatric:  She has a normal mood and affect. Her behavior is normal.   Nursing note and vitals reviewed.      Procedures         ED Course  ED Course                  MDM  Number of Diagnoses or Management Options  Diagnosis management comments: Stable, negative CT, will treat for UTI and dc in stable condition        Amount and/or Complexity of Data Reviewed  Clinical lab tests: reviewed and ordered  Tests in the radiology section of CPT®: ordered and reviewed  Tests in the medicine section of CPT®: ordered and reviewed    Risk of Complications, Morbidity, and/or Mortality  Presenting problems: moderate  Diagnostic procedures: moderate  Management options: moderate    Patient Progress  Patient progress: stable      Final diagnoses:   Cystitis   Flank pain            Marcio Mondragon PA-C  03/17/18 1946

## 2018-03-17 NOTE — ED NOTES
Rt side flank pain since Tuesday. Pt reports a hx of kidney stones, Pt denies pain with urination. Pt reports dark colored urine.     Liz Spivey  03/17/18 [id number]

## 2018-03-19 LAB
BACTERIA SPEC AEROBE CULT: ABNORMAL
BACTERIA SPEC AEROBE CULT: ABNORMAL

## 2018-03-20 ENCOUNTER — OFFICE VISIT (OUTPATIENT)
Dept: BARIATRICS/WEIGHT MGMT | Facility: CLINIC | Age: 43
End: 2018-03-20

## 2018-03-20 VITALS
OXYGEN SATURATION: 100 % | SYSTOLIC BLOOD PRESSURE: 127 MMHG | BODY MASS INDEX: 46.42 KG/M2 | HEIGHT: 65 IN | TEMPERATURE: 98.4 F | WEIGHT: 278.6 LBS | DIASTOLIC BLOOD PRESSURE: 61 MMHG | HEART RATE: 84 BPM

## 2018-03-20 DIAGNOSIS — Z98.84 STATUS POST LAPAROSCOPIC SLEEVE GASTRECTOMY: Primary | ICD-10-CM

## 2018-03-20 DIAGNOSIS — M54.50 ACUTE RIGHT-SIDED LOW BACK PAIN WITHOUT SCIATICA: ICD-10-CM

## 2018-03-20 PROCEDURE — 99024 POSTOP FOLLOW-UP VISIT: CPT | Performed by: SURGERY

## 2018-03-20 NOTE — PROGRESS NOTES
"Subjective   Naomi Bhatia is a 42 y.o. female.     Naomi is post op 3 months from her sleeve gastrectomy.  She is currently on her regular diet.  She states in terms of her GI status she is doing well however she has developed acute onset of right lower back/flank pain that radiates to the left.  She went to the emergency department and was worked up and found to have a UTI.  CT scan showed no acute process.    Review Of Systems:  General ROS: positive for  - night sweats  Respiratory ROS: no cough, shortness of breath, or wheezing  Cardiovascular ROS: no chest pain or dyspnea on exertion  Gastrointestinal ROS: no abdominal pain, change in bowel habits, or black or bloody stools  positive for - diarrhea and nausea/vomiting  Musculoskeletal ROS: positive for - joint pain and pain in back - both sides, lower  Neurological ROS: no TIA or stroke symptoms  positive for - headaches    The following portions of the patient's history were reviewed and updated as appropriate: allergies, current medications, past medical history, past surgical history and problem list.    Objective   /61 (BP Location: Right arm, Patient Position: Sitting, Cuff Size: Adult)   Pulse 84   Temp 98.4 °F (36.9 °C)   Ht 165.1 cm (65\")   Wt 126 kg (278 lb 9.6 oz)   LMP  (LMP Unknown)   SpO2 100%   BMI 46.36 kg/m²     General Appearance:  awake, alert, oriented, in no acute distress  Back:  positive findings: Tenderness in the right lumbar region and mid left lumbar region  Lungs:  Normal expansion.  Clear to auscultation.  No rales, rhonchi, or wheezing.  Heart:  Heart sounds are normal.  Regular rate and rhythm without murmur, gallop or rub.  Abdomen:  Soft, non-tender, normal bowel sounds; no bruits, organomegaly or masses.  Abnormal shape: obese  Extremities: Extremities warm to touch, pink, with no edema.  Wounds: clean, dry, intact    Assessment/Plan     Encounter Diagnoses   Name Primary?   • Status post laparoscopic " sleeve gastrectomy Yes   • Acute right-sided low back pain without sciatica      1.  Acute lower back pain without symptoms of sciatica - I recommended that she follow-up with her spine doctor.  She is also scheduled to have a hip replaced this month and I have recommended that she inform her orthopedic surgeon of her recent UTI and current symptoms of lower back pain.  She is been given a course of antibiotics for UTI of note.  Have also recommended that she may take her pain medication as recommended by her ER doctor as needed during this acute incident.  However long-term of explained the use of NSAIDs can increase the risk of ulcerations and gastric problems.          03/20/18  12:35 PM  Patient Care Team:  Brett Sharp DO as PCP - General (Pediatrics)  IRMA Wilcox as Referring Physician (Family Medicine)  Barney Houston MD as Cardiologist (Cardiology)  Yifan Cordero MD FACS

## 2018-03-23 DIAGNOSIS — D50.8 OTHER IRON DEFICIENCY ANEMIA: Primary | ICD-10-CM

## 2018-03-26 ENCOUNTER — LAB (OUTPATIENT)
Dept: LAB | Facility: HOSPITAL | Age: 43
End: 2018-03-26

## 2018-03-26 ENCOUNTER — OFFICE VISIT (OUTPATIENT)
Dept: ONCOLOGY | Facility: CLINIC | Age: 43
End: 2018-03-26

## 2018-03-26 VITALS
OXYGEN SATURATION: 96 % | SYSTOLIC BLOOD PRESSURE: 138 MMHG | BODY MASS INDEX: 47.13 KG/M2 | TEMPERATURE: 98 F | WEIGHT: 282.9 LBS | RESPIRATION RATE: 18 BRPM | HEART RATE: 81 BPM | DIASTOLIC BLOOD PRESSURE: 78 MMHG | HEIGHT: 65 IN

## 2018-03-26 DIAGNOSIS — J30.89 ENVIRONMENTAL AND SEASONAL ALLERGIES: ICD-10-CM

## 2018-03-26 DIAGNOSIS — D50.8 OTHER IRON DEFICIENCY ANEMIA: Primary | ICD-10-CM

## 2018-03-26 DIAGNOSIS — D50.9 IRON DEFICIENCY ANEMIA, UNSPECIFIED IRON DEFICIENCY ANEMIA TYPE: ICD-10-CM

## 2018-03-26 DIAGNOSIS — D72.829 LEUKOCYTOSIS, UNSPECIFIED TYPE: ICD-10-CM

## 2018-03-26 DIAGNOSIS — D50.8 OTHER IRON DEFICIENCY ANEMIA: ICD-10-CM

## 2018-03-26 LAB
ALBUMIN SERPL-MCNC: 3.8 G/DL (ref 3.5–5)
ALBUMIN/GLOB SERPL: 1.2 G/DL (ref 1.1–2.5)
ALP SERPL-CCNC: 69 U/L (ref 24–120)
ALT SERPL W P-5'-P-CCNC: 25 U/L (ref 0–54)
ANION GAP SERPL CALCULATED.3IONS-SCNC: 8 MMOL/L (ref 4–13)
AST SERPL-CCNC: 33 U/L (ref 7–45)
BASOPHILS # BLD AUTO: 0.05 10*3/MM3 (ref 0–0.2)
BASOPHILS NFR BLD AUTO: 0.7 % (ref 0–2)
BILIRUB SERPL-MCNC: 0.5 MG/DL (ref 0.1–1)
BUN BLD-MCNC: 14 MG/DL (ref 5–21)
BUN/CREAT SERPL: 18.4 (ref 7–25)
CALCIUM SPEC-SCNC: 9 MG/DL (ref 8.4–10.4)
CHLORIDE SERPL-SCNC: 103 MMOL/L (ref 98–110)
CO2 SERPL-SCNC: 33 MMOL/L (ref 24–31)
CREAT BLD-MCNC: 0.76 MG/DL (ref 0.5–1.4)
DEPRECATED RDW RBC AUTO: 54.1 FL (ref 40–54)
EOSINOPHIL # BLD AUTO: 0.08 10*3/MM3 (ref 0–0.7)
EOSINOPHIL NFR BLD AUTO: 1.1 % (ref 0–4)
ERYTHROCYTE [DISTWIDTH] IN BLOOD BY AUTOMATED COUNT: 16.6 % (ref 12–15)
FERRITIN SERPL-MCNC: 918 NG/ML (ref 6.24–137)
GFR SERPL CREATININE-BSD FRML MDRD: 83 ML/MIN/1.73
GLOBULIN UR ELPH-MCNC: 3.3 GM/DL
GLUCOSE BLD-MCNC: 80 MG/DL (ref 70–100)
HCT VFR BLD AUTO: 31.5 % (ref 37–47)
HGB BLD-MCNC: 10.9 G/DL (ref 12–16)
HOLD SPECIMEN: NORMAL
IMM GRANULOCYTES # BLD: 0.04 10*3/MM3 (ref 0–0.03)
IMM GRANULOCYTES NFR BLD: 0.5 % (ref 0–5)
IRON 24H UR-MRATE: 75 MCG/DL (ref 42–180)
IRON SATN MFR SERPL: 28 % (ref 20–45)
LYMPHOCYTES # BLD AUTO: 2.26 10*3/MM3 (ref 0.72–4.86)
LYMPHOCYTES NFR BLD AUTO: 29.9 % (ref 15–45)
MCH RBC QN AUTO: 30.7 PG (ref 28–32)
MCHC RBC AUTO-ENTMCNC: 34.6 G/DL (ref 33–36)
MCV RBC AUTO: 88.7 FL (ref 82–98)
MONOCYTES # BLD AUTO: 0.4 10*3/MM3 (ref 0.19–1.3)
MONOCYTES NFR BLD AUTO: 5.3 % (ref 4–12)
NEUTROPHILS # BLD AUTO: 4.72 10*3/MM3 (ref 1.87–8.4)
NEUTROPHILS NFR BLD AUTO: 62.5 % (ref 39–78)
NRBC BLD MANUAL-RTO: 0 /100 WBC (ref 0–0)
PLATELET # BLD AUTO: 248 10*3/MM3 (ref 130–400)
PMV BLD AUTO: 9.9 FL (ref 6–12)
POTASSIUM BLD-SCNC: 3.6 MMOL/L (ref 3.5–5.3)
PROT SERPL-MCNC: 7.1 G/DL (ref 6.3–8.7)
RBC # BLD AUTO: 3.55 10*6/MM3 (ref 4.2–5.4)
SODIUM BLD-SCNC: 144 MMOL/L (ref 135–145)
TIBC SERPL-MCNC: 267 MCG/DL (ref 225–420)
WBC NRBC COR # BLD: 7.55 10*3/MM3 (ref 4.8–10.8)

## 2018-03-26 PROCEDURE — 36415 COLL VENOUS BLD VENIPUNCTURE: CPT

## 2018-03-26 PROCEDURE — 83550 IRON BINDING TEST: CPT | Performed by: INTERNAL MEDICINE

## 2018-03-26 PROCEDURE — 99214 OFFICE O/P EST MOD 30 MIN: CPT | Performed by: INTERNAL MEDICINE

## 2018-03-26 PROCEDURE — 83540 ASSAY OF IRON: CPT | Performed by: INTERNAL MEDICINE

## 2018-03-26 PROCEDURE — 80053 COMPREHEN METABOLIC PANEL: CPT | Performed by: INTERNAL MEDICINE

## 2018-03-26 PROCEDURE — 82728 ASSAY OF FERRITIN: CPT | Performed by: INTERNAL MEDICINE

## 2018-03-26 PROCEDURE — 85025 COMPLETE CBC W/AUTO DIFF WBC: CPT | Performed by: INTERNAL MEDICINE

## 2018-03-26 NOTE — PROGRESS NOTES
[facility]  HEMATOLOGY & ONCOLOGY    Cancer Staging Information:  No matching staging information was found for the patient.      Subjective     VISIT DIAGNOSIS:   Encounter Diagnoses   Name Primary?   • Other iron deficiency anemia Yes   • Leukocytosis, unspecified type        REASON FOR VISIT:     Chief Complaint   Patient presents with   • Follow-up     Anemia: She is here for f/u visit, and to review her lab work. She has planned right hip replacement on Wed this week per Dr Mcclain, [facility].         HEMATOLOGY / ONCOLOGY HISTORY:    No history exists.           INTERVAL HISTORY  Patient ID: Naomi Bhatia is a 42 y.o. year old female with her obesity, anemia status post Venofer infusions, appreciate to follow-up.   -- Underwent gastric sleeve on 12/20/2017 doing well.  --No issues or consent today.        Past Medical History:   Past Medical History:   Diagnosis Date   • Anemia    • Anxiety    • Arthritis    • Asthma    • Back pain 03/14/2016    with left sided radiculopathy    • DDD (degenerative disc disease), lumbar     Dr. Staurt Zavaleta for pain manangement   • Depression    • Fatigue    • Fibromyalgia    • GERD (gastroesophageal reflux disease)    • Headache    • History of blood transfusion    • Hypertension    • Iron deficiency anemia 9/12/2017   • Iron deficiency anemia secondary to inadequate dietary iron intake 9/12/2017   • Joint pain    • Obesity    • RLS (restless legs syndrome)    • Sleep apnea     positive sleep study; titration study in October     Past Surgical History:   Past Surgical History:   Procedure Laterality Date   • ANKLE SURGERY      Right    • APPENDECTOMY  04/19/2015   • BACK SURGERY  2000   • CERVICAL SPINE SURGERY  09/01/2015   • CHOLECYSTECTOMY      1995;lap   • COLONOSCOPY  05/02/2017    normal; do not return until age of 49 Dr. Gus Valentine   • GASTRIC SLEEVE LAPAROSCOPIC N/A 12/20/2017    Procedure: GASTRIC SLEEVE LAPAROSCOPIC;  Surgeon: Yifan Cordero MD;   Location: [facility] OR;  Service:    • HIP SURGERY  1987    right    • INSERTION CENTRAL VENOUS ACCESS DEVICE W/ SUBCUTANEOUS PORT  11/07/2017    Dr Anel Gamboa (Smart Port-Power injectable Port) Cat NO#[id number] Lot [id number]    • MOUTH SURGERY  1994   • NECK SURGERY     • TOOTH EXTRACTION     • UPPER GASTROINTESTINAL ENDOSCOPY  05/02/2017    Dr. Gus Valentine, negative for h.pylori, negative for Salomon's   • VAGINAL HYSTERECTOMY SALPINGO OOPHORECTOMY  2008    Partial and then had another surgery to remove the rest     Social History:   Social History     Social History   • Marital status:      Spouse name: N/A   • Number of children: N/A   • Years of education: N/A     Occupational History   • Not on file.     Social History Main Topics   • Smoking status: Former Smoker     Packs/day: 1.00     Years: 25.00     Types: Cigarettes     Quit date: 2014   • Smokeless tobacco: Never Used   • Alcohol use Yes      Comment: rarely    • Drug use: No      Comment: Codeine in Prescribed Medications only    • Sexual activity: Defer     Other Topics Concern   • Not on file     Social History Narrative   • No narrative on file     Family History:   Family History   Problem Relation Age of Onset   • Diabetes Mother    • Hypertension Mother    • Coronary artery disease Mother    • Cancer Mother    • Cancer Father    • Hypertension Father    • Heart disease Father    • Stroke Father    • Hypertension Sister    • Obesity Sister    • Cancer Brother    • Diabetes Brother    • Diabetes Maternal Grandmother    • Stroke Maternal Grandmother        Review of Systems   Constitutional: Negative.    HENT: Negative.    Eyes: Negative.    Respiratory: Positive for apnea. Negative for cough, choking and shortness of breath.    Cardiovascular: Negative.    Gastrointestinal: Negative for abdominal distention, abdominal pain, blood in stool, constipation, diarrhea, nausea and vomiting.   Genitourinary: Negative.    Musculoskeletal: Positive for  back pain.        Right hip pain   Skin: Negative.    Allergic/Immunologic: Negative.    Neurological: Negative.    Hematological: Negative.    Psychiatric/Behavioral: Negative.         Performance Status:  Asymptomatic    Medications:    Current Outpatient Prescriptions   Medication Sig Dispense Refill   • acyclovir (ZOVIRAX) 800 MG tablet Take 800 mg by mouth Daily.     • ALBUTEROL IN Inhale 1 puff As Needed.     • Calcium Citrate-Vitamin D (CALCIUM CITRATE + D3 PO) Take  by mouth Daily.     • carbidopa-levodopa (SINEMET)  MG per tablet Take 1 tablet by mouth Daily.     • ciprofloxacin (CIPRO) 500 MG tablet Take 1 tablet by mouth 2 (Two) Times a Day. 14 tablet 0   • CRANBERRY PO Take  by mouth Daily.     • Cyanocobalamin (VITAMIN B-12 CR PO) Take  by mouth Daily.     • Cyclobenzaprine HCl (FLEXERIL PO) Take 10 mg by mouth Daily As Needed.     • diclofenac (VOLTAREN) 75 MG EC tablet Take 1 tablet by mouth 2 (Two) Times a Day. 14 tablet 0   • ferrous sulfate 325 (65 FE) MG tablet Take 1 tablet by mouth Daily With Breakfast. 30 tablet 2   • levocetirizine (XYZAL) 5 MG tablet TK 1 T PO QPM  0   • LYRICA 150 MG capsule TK 1 C PO BID  2   • metoprolol succinate XL (TOPROL XL) 50 MG 24 hr tablet Take 50 mg by mouth 2 (Two) Times a Day.     • Multiple Vitamin (MULTI VITAMIN PO) Take  by mouth Daily.     • nortriptyline (PAMELOR) 25 MG capsule 2 tablets at night as needed  3   • omeprazole (priLOSEC) 20 MG capsule TK ONE C PO QD FIRST THING IN THE MORNING ON  AN EMPTY STOMACH  3   • oxyCODONE (ROXICODONE) 10 MG tablet Take 10 mg by mouth 2 (Two) Times a Day As Needed.     • simethicone (GAS-X) 80 MG chewable tablet Chew 0.5 tablets Every 6 (Six) Hours As Needed for Flatulence (belching). 30 tablet 1   • venlafaxine (EFFEXOR) 75 MG tablet bid  11     No current facility-administered medications for this visit.      Facility-Administered Medications Ordered in Other Visits   Medication Dose Route Frequency Provider  "Last Rate Last Dose   • diphenhydrAMINE (BENADRYL) injection 50 mg  50 mg Intravenous PRN Pedro Clements MD       • famotidine (PEPCID) injection 20 mg  20 mg Intravenous PRN Pedro Clements MD       • hydrocortisone sodium succinate (Solu-CORTEF) injection 100 mg  100 mg Intravenous PRN Pedro Clements MD           ALLERGIES:    Allergies   Allergen Reactions   • Azithromycin GI Intolerance     Severe Abdominal Pain    • Tegaderm Ag Mesh [Silver] Other (See Comments)     Redness, blisters       Objective      Vitals:    03/26/18 0919   BP: 138/78   Pulse: 81   Resp: 18   Temp: 98 °F (36.7 °C)   TempSrc: Tympanic   SpO2: 96%   Weight: 128 kg (282 lb 14.4 oz)   Height: 165.1 cm (65\")         Current Status 3/26/2018   ECOG score 1         Physical Exam    General Appearance: Patient is awake, alert, oriented and in no acute distress. Patient is welldeveloped, wellnourished, and appears stated age.  HEENT: Normocephalic. Sclerae clear, conjunctiva pink, extraocular movements intact, pupils, round, reactive to light and  accommodation. Mouth and throat are clear with moist oral mucosa.  NECK: Supple, no jugular venous distention, thyroid not enlarged.  LYMPH: No cervical, supraclavicular, axillary, or inguinal lymphadenopathy.  CHEST: Equal bilateral expansion, AP  diameter normal, resonant percussion note  LUNGS: Good air movement, no rales, rhonchi, rubs or wheezes with auscultation  CARDIO: Regular sinus rhythm, no murmurs, gallops or rubs.  ABDOMEN: Nondistended, soft, No tenderness, no guarding, no rebound, No hepatosplenomegaly. No abdominal masses. Bowel sounds positive. No hernia  GENITALIA: Not examined.  BREASTS: Not examined.  MUSKEL: No joint swelling, decreased motion, or inflammation  EXTREMS: No edema, clubbing, cyanosis, No varicose veins.  NEURO: Grossly nonfocal, Gait is coordinated and smooth, Cognition is preserved.  SKIN: No rashes, no ecchymoses, no " petechia.  PSYCH: Oriented to time, place and person. Memory is preserved. Mood and affect appear normal  RECENT LABS:  Lab on 03/26/2018   Component Date Value Ref Range Status   • Glucose 03/26/2018 80  70 - 100 mg/dL Final   • BUN 03/26/2018 14  5 - 21 mg/dL Final   • Creatinine 03/26/2018 0.76  0.50 - 1.40 mg/dL Final   • Sodium 03/26/2018 144  135 - 145 mmol/L Final   • Potassium 03/26/2018 3.6  3.5 - 5.3 mmol/L Final   • Chloride 03/26/2018 103  98 - 110 mmol/L Final   • CO2 03/26/2018 33.0* 24.0 - 31.0 mmol/L Final   • Calcium 03/26/2018 9.0  8.4 - 10.4 mg/dL Final   • Total Protein 03/26/2018 7.1  6.3 - 8.7 g/dL Final   • Albumin 03/26/2018 3.80  3.50 - 5.00 g/dL Final   • ALT (SGPT) 03/26/2018 25  0 - 54 U/L Final   • AST (SGOT) 03/26/2018 33  7 - 45 U/L Final   • Alkaline Phosphatase 03/26/2018 69  24 - 120 U/L Final   • Total Bilirubin 03/26/2018 0.5  0.1 - 1.0 mg/dL Final   • eGFR Non African Amer 03/26/2018 83  >60 mL/min/1.73 Final   • Globulin 03/26/2018 3.3  gm/dL Final   • A/G Ratio 03/26/2018 1.2  1.1 - 2.5 g/dL Final   • BUN/Creatinine Ratio 03/26/2018 18.4  7.0 - 25.0 Final   • Anion Gap 03/26/2018 8.0  4.0 - 13.0 mmol/L Final   • WBC 03/26/2018 7.55  4.80 - 10.80 10*3/mm3 Final   • RBC 03/26/2018 3.55* 4.20 - 5.40 10*6/mm3 Final   • Hemoglobin 03/26/2018 10.9* 12.0 - 16.0 g/dL Final   • Hematocrit 03/26/2018 31.5* 37.0 - 47.0 % Final   • MCV 03/26/2018 88.7  82.0 - 98.0 fL Final   • MCH 03/26/2018 30.7  28.0 - 32.0 pg Final   • MCHC 03/26/2018 34.6  33.0 - 36.0 g/dL Final   • RDW 03/26/2018 16.6* 12.0 - 15.0 % Final   • RDW-SD 03/26/2018 54.1* 40.0 - 54.0 fl Final   • MPV 03/26/2018 9.9  6.0 - 12.0 fL Final   • Platelets 03/26/2018 248  130 - 400 10*3/mm3 Final   • Neutrophil % 03/26/2018 62.5  39.0 - 78.0 % Final   • Lymphocyte % 03/26/2018 29.9  15.0 - 45.0 % Final   • Monocyte % 03/26/2018 5.3  4.0 - 12.0 % Final   • Eosinophil % 03/26/2018 1.1  0.0 - 4.0 % Final   • Basophil % 03/26/2018 0.7   0.0 - 2.0 % Final   • Immature Grans % 03/26/2018 0.5  0.0 - 5.0 % Final   • Neutrophils, Absolute 03/26/2018 4.72  1.87 - 8.40 10*3/mm3 Final   • Lymphocytes, Absolute 03/26/2018 2.26  0.72 - 4.86 10*3/mm3 Final   • Monocytes, Absolute 03/26/2018 0.40  0.19 - 1.30 10*3/mm3 Final   • Eosinophils, Absolute 03/26/2018 0.08  0.00 - 0.70 10*3/mm3 Final   • Basophils, Absolute 03/26/2018 0.05  0.00 - 0.20 10*3/mm3 Final   • Immature Grans, Absolute 03/26/2018 0.04* 0.00 - 0.03 10*3/mm3 Final   • nRBC 03/26/2018 0.0  0.0 - 0.0 /100 WBC Final       RADIOLOGY:  Ct Abdomen Pelvis Without Contrast    Result Date: 3/17/2018  Narrative: EXAMINATION: CT ABDOMEN PELVIS WO CONTRAST-  3/17/2018 12:07 PM CDT  HISTORY: Right flank pain  COMPARISON:None  TECHNIQUE:  Radiation dose equals DLP 1152 mGy-cm.  Automated exposure control dose reduction technique was implemented.  Thin section axial imaging was obtained. 2-D sagittal and coronal reconstruction images were generated.  Intravenous contrast was not administered.  Oral contrast was not ingested.  FINDINGS:  There is right hemidiaphragm elevation with linear atelectatic change in the right lung base. Lung bases are otherwise clear.  The gallbladder surgically absent. There are no focal hepatic lesions. There is no biliary ductal dilatation.  There is mild to moderate splenomegaly. The spleen measures 15 cm in [id number] length.  There are no adrenal masses.  There are no pancreatic lesions.  There there is a 2 to 3 mm nonobstructing calculus identified in the lower pole left kidney. No additional urinary tract calculi observed. Evaluation of the left kidney lower pole is suboptimal with streak artifact from lumbar spine hardware obscuring detail.  There is no pelvocaliectasis or hydroureter. There is no ureterolithiasis.  Urinary bladder is minimally distended. There is no focal bladder wall abnormality.  Hysterectomy changes observed. There are no adnexal masses.  Stool and  gas identified in the colon which is not dilated. There is no CT evidence of diverticular disease. Appendix is surgically absent.  Small bowel is not dilated. The duodenal sweep imaged appropriately.  Changes from gastric surgery identified. Stomach is not dilated.  There are small para-aortic and mesenteric lymph nodes. There are no pathologically enlarged nodes.  There is no ascites or pneumoperitoneum.  Changes from posterior and interbody fusion observed at L4-L5 and L5-S1. Changes from right femoral neck pinning observed.      Impression: 1. No CT evidence of acute intra-abdominal/pelvic pathological process. 2. 2 to 3 mm nonobstructing calculus lower pole left kidney. 3. Splenomegaly. 4. Nonacute findings, described above. This report was finalized on 03/17/2018 12:13 by Dr. Juanjose Leiva MD.           Assessment/Plan  aNomi Bhatia is a 42 y.o. year old female h/o obesity found to have anemia while being evaluated for bariatric surgery , As per cc seen    Patient Active Problem List   Diagnosis   • Morbid obesity   • Hypertension, well controlled   • Fatigue   • History of iron deficiency   • Vitamin D deficiency   • Iron deficiency anemia secondary to inadequate dietary iron intake   • Malabsorption of iron   • Obesity, Class III, BMI 40-49.9 (morbid obesity)   • Status post laparoscopic sleeve gastrectomy   • Acute low back pain without sciatica          1.Anemia: Has had hysterectomy. Patient had recent colonoscopy and EGD negative for active bleed.   --Status post venofer infusion, hemoglobin 12.7 today. Advices ferrous sulfateoral is able to tolerate. RTC in -- takinf oral iron. Hg down. Will resume venofer after her surgery.  2.  Obesity: s/p gastric sleeve doing well. She has lost 50lbs    3. Leukocytosis: Suspect obesity/reactive, also obesity could cause elevation in wbc. Bone marrow with no dyspoietic changes and no circulating blasts.   4. UTI: recently seen in the er for back pain. UA with  2+ leuk esterase, wbc though not clean catch. On antibiotics    5. Hip pain: scheduled for hip sx in 2 days             Pedro Clements MD    3/26/2018    9:44 AM

## 2018-03-27 RX ORDER — LEVOCETIRIZINE DIHYDROCHLORIDE 5 MG/1
5 TABLET, FILM COATED ORAL NIGHTLY
Qty: 90 TABLET | Refills: 3 | Status: SHIPPED | OUTPATIENT
Start: 2018-03-27 | End: 2019-03-18 | Stop reason: SDUPTHER

## 2018-04-06 ENCOUNTER — RESULTS ENCOUNTER (OUTPATIENT)
Dept: BARIATRICS/WEIGHT MGMT | Facility: CLINIC | Age: 43
End: 2018-04-06

## 2018-04-06 DIAGNOSIS — I10 HYPERTENSION, WELL CONTROLLED: ICD-10-CM

## 2018-04-06 DIAGNOSIS — E66.01 MORBID OBESITY, UNSPECIFIED OBESITY TYPE (HCC): ICD-10-CM

## 2018-04-07 LAB
ANION GAP SERPL CALCULATED.3IONS-SCNC: 10 MMOL/L (ref 7–19)
BASOPHILS ABSOLUTE: 0.1 K/UL (ref 0–0.2)
BASOPHILS RELATIVE PERCENT: 0.7 % (ref 0–1)
BUN BLDV-MCNC: 15 MG/DL (ref 6–20)
CALCIUM SERPL-MCNC: 9.1 MG/DL (ref 8.6–10)
CHLORIDE BLD-SCNC: 100 MMOL/L (ref 98–111)
CO2: 28 MMOL/L (ref 22–29)
CREAT SERPL-MCNC: 0.7 MG/DL (ref 0.5–0.9)
EOSINOPHILS ABSOLUTE: 0.3 K/UL (ref 0–0.6)
EOSINOPHILS RELATIVE PERCENT: 3.2 % (ref 0–5)
GFR NON-AFRICAN AMERICAN: >60
GLUCOSE BLD-MCNC: 76 MG/DL (ref 74–109)
HCT VFR BLD CALC: 27.9 % (ref 37–47)
HEMOGLOBIN: 8.9 G/DL (ref 12–16)
LYMPHOCYTES ABSOLUTE: 3.7 K/UL (ref 1.1–4.5)
LYMPHOCYTES RELATIVE PERCENT: 37.6 % (ref 20–40)
MCH RBC QN AUTO: 29.9 PG (ref 27–31)
MCHC RBC AUTO-ENTMCNC: 31.9 G/DL (ref 33–37)
MCV RBC AUTO: 93.6 FL (ref 81–99)
MONOCYTES ABSOLUTE: 0.6 K/UL (ref 0–0.9)
MONOCYTES RELATIVE PERCENT: 5.8 % (ref 0–10)
NEUTROPHILS ABSOLUTE: 5.1 K/UL (ref 1.5–7.5)
NEUTROPHILS RELATIVE PERCENT: 52.1 % (ref 50–65)
PDW BLD-RTO: 17.9 % (ref 11.5–14.5)
PLATELET # BLD: 294 K/UL (ref 130–400)
PMV BLD AUTO: 9.1 FL (ref 9.4–12.3)
POTASSIUM SERPL-SCNC: 4.1 MMOL/L (ref 3.5–5)
RBC # BLD: 2.98 M/UL (ref 4.2–5.4)
SODIUM BLD-SCNC: 138 MMOL/L (ref 136–145)
WBC # BLD: 9.7 K/UL (ref 4.8–10.8)

## 2018-04-09 DIAGNOSIS — K90.9 MALABSORPTION OF IRON: ICD-10-CM

## 2018-04-09 DIAGNOSIS — D50.8 IRON DEFICIENCY ANEMIA SECONDARY TO INADEQUATE DIETARY IRON INTAKE: ICD-10-CM

## 2018-04-11 RX ORDER — SODIUM CHLORIDE 9 MG/ML
250 INJECTION, SOLUTION INTRAVENOUS ONCE
Status: CANCELLED | OUTPATIENT
Start: 2018-04-30

## 2018-04-11 RX ORDER — DIPHENHYDRAMINE HYDROCHLORIDE 50 MG/ML
50 INJECTION INTRAMUSCULAR; INTRAVENOUS AS NEEDED
Status: CANCELLED | OUTPATIENT
Start: 2018-04-20

## 2018-04-11 RX ORDER — DIPHENHYDRAMINE HYDROCHLORIDE 50 MG/ML
50 INJECTION INTRAMUSCULAR; INTRAVENOUS AS NEEDED
Status: CANCELLED | OUTPATIENT
Start: 2018-04-30

## 2018-04-11 RX ORDER — DIPHENHYDRAMINE HYDROCHLORIDE 50 MG/ML
50 INJECTION INTRAMUSCULAR; INTRAVENOUS AS NEEDED
Status: CANCELLED | OUTPATIENT
Start: 2018-05-07

## 2018-04-11 RX ORDER — MEPERIDINE HYDROCHLORIDE 50 MG/ML
25 INJECTION INTRAMUSCULAR; INTRAVENOUS; SUBCUTANEOUS
Status: CANCELLED | OUTPATIENT
Start: 2018-04-30

## 2018-04-11 RX ORDER — SODIUM CHLORIDE 9 MG/ML
250 INJECTION, SOLUTION INTRAVENOUS ONCE
Status: CANCELLED | OUTPATIENT
Start: 2018-04-13

## 2018-04-11 RX ORDER — DIPHENHYDRAMINE HYDROCHLORIDE 50 MG/ML
50 INJECTION INTRAMUSCULAR; INTRAVENOUS AS NEEDED
Status: CANCELLED | OUTPATIENT
Start: 2018-05-04

## 2018-04-11 RX ORDER — MEPERIDINE HYDROCHLORIDE 50 MG/ML
25 INJECTION INTRAMUSCULAR; INTRAVENOUS; SUBCUTANEOUS
Status: CANCELLED | OUTPATIENT
Start: 2018-04-13

## 2018-04-11 RX ORDER — SODIUM CHLORIDE 9 MG/ML
250 INJECTION, SOLUTION INTRAVENOUS ONCE
Status: CANCELLED | OUTPATIENT
Start: 2018-04-23

## 2018-04-11 RX ORDER — SODIUM CHLORIDE 9 MG/ML
250 INJECTION, SOLUTION INTRAVENOUS ONCE
Status: CANCELLED | OUTPATIENT
Start: 2018-04-20

## 2018-04-11 RX ORDER — SODIUM CHLORIDE 9 MG/ML
250 INJECTION, SOLUTION INTRAVENOUS ONCE
Status: CANCELLED | OUTPATIENT
Start: 2018-05-07

## 2018-04-11 RX ORDER — FAMOTIDINE 10 MG/ML
20 INJECTION, SOLUTION INTRAVENOUS AS NEEDED
Status: CANCELLED | OUTPATIENT
Start: 2018-04-23

## 2018-04-11 RX ORDER — FAMOTIDINE 10 MG/ML
20 INJECTION, SOLUTION INTRAVENOUS AS NEEDED
Status: CANCELLED | OUTPATIENT
Start: 2018-04-30

## 2018-04-11 RX ORDER — FAMOTIDINE 10 MG/ML
20 INJECTION, SOLUTION INTRAVENOUS AS NEEDED
Status: CANCELLED | OUTPATIENT
Start: 2018-04-20

## 2018-04-11 RX ORDER — FAMOTIDINE 10 MG/ML
20 INJECTION, SOLUTION INTRAVENOUS AS NEEDED
Status: CANCELLED | OUTPATIENT
Start: 2018-04-16

## 2018-04-11 RX ORDER — FAMOTIDINE 10 MG/ML
20 INJECTION, SOLUTION INTRAVENOUS AS NEEDED
Status: CANCELLED | OUTPATIENT
Start: 2018-05-07

## 2018-04-11 RX ORDER — FAMOTIDINE 10 MG/ML
20 INJECTION, SOLUTION INTRAVENOUS AS NEEDED
Status: CANCELLED | OUTPATIENT
Start: 2018-04-26

## 2018-04-11 RX ORDER — MEPERIDINE HYDROCHLORIDE 50 MG/ML
25 INJECTION INTRAMUSCULAR; INTRAVENOUS; SUBCUTANEOUS
Status: CANCELLED | OUTPATIENT
Start: 2018-04-26

## 2018-04-11 RX ORDER — DIPHENHYDRAMINE HYDROCHLORIDE 50 MG/ML
50 INJECTION INTRAMUSCULAR; INTRAVENOUS AS NEEDED
Status: CANCELLED | OUTPATIENT
Start: 2018-04-13

## 2018-04-11 RX ORDER — SODIUM CHLORIDE 9 MG/ML
250 INJECTION, SOLUTION INTRAVENOUS ONCE
Status: CANCELLED | OUTPATIENT
Start: 2018-04-26

## 2018-04-11 RX ORDER — MEPERIDINE HYDROCHLORIDE 50 MG/ML
25 INJECTION INTRAMUSCULAR; INTRAVENOUS; SUBCUTANEOUS
Status: CANCELLED | OUTPATIENT
Start: 2018-05-04

## 2018-04-11 RX ORDER — MEPERIDINE HYDROCHLORIDE 50 MG/ML
25 INJECTION INTRAMUSCULAR; INTRAVENOUS; SUBCUTANEOUS
Status: CANCELLED | OUTPATIENT
Start: 2018-04-20

## 2018-04-11 RX ORDER — FAMOTIDINE 10 MG/ML
20 INJECTION, SOLUTION INTRAVENOUS AS NEEDED
Status: CANCELLED | OUTPATIENT
Start: 2018-04-13

## 2018-04-11 RX ORDER — DIPHENHYDRAMINE HYDROCHLORIDE 50 MG/ML
50 INJECTION INTRAMUSCULAR; INTRAVENOUS AS NEEDED
Status: CANCELLED | OUTPATIENT
Start: 2018-04-23

## 2018-04-11 RX ORDER — MEPERIDINE HYDROCHLORIDE 50 MG/ML
25 INJECTION INTRAMUSCULAR; INTRAVENOUS; SUBCUTANEOUS
Status: CANCELLED | OUTPATIENT
Start: 2018-04-16

## 2018-04-11 RX ORDER — MEPERIDINE HYDROCHLORIDE 50 MG/ML
25 INJECTION INTRAMUSCULAR; INTRAVENOUS; SUBCUTANEOUS
Status: CANCELLED | OUTPATIENT
Start: 2018-05-07

## 2018-04-11 RX ORDER — DIPHENHYDRAMINE HYDROCHLORIDE 50 MG/ML
50 INJECTION INTRAMUSCULAR; INTRAVENOUS AS NEEDED
Status: CANCELLED | OUTPATIENT
Start: 2018-04-16

## 2018-04-11 RX ORDER — FAMOTIDINE 10 MG/ML
20 INJECTION, SOLUTION INTRAVENOUS AS NEEDED
Status: CANCELLED | OUTPATIENT
Start: 2018-05-04

## 2018-04-11 RX ORDER — SODIUM CHLORIDE 9 MG/ML
250 INJECTION, SOLUTION INTRAVENOUS ONCE
Status: CANCELLED | OUTPATIENT
Start: 2018-05-04

## 2018-04-11 RX ORDER — SODIUM CHLORIDE 9 MG/ML
250 INJECTION, SOLUTION INTRAVENOUS ONCE
Status: CANCELLED | OUTPATIENT
Start: 2018-04-16

## 2018-04-11 RX ORDER — DIPHENHYDRAMINE HYDROCHLORIDE 50 MG/ML
50 INJECTION INTRAMUSCULAR; INTRAVENOUS AS NEEDED
Status: CANCELLED | OUTPATIENT
Start: 2018-04-26

## 2018-04-11 RX ORDER — MEPERIDINE HYDROCHLORIDE 50 MG/ML
25 INJECTION INTRAMUSCULAR; INTRAVENOUS; SUBCUTANEOUS
Status: CANCELLED | OUTPATIENT
Start: 2018-04-23

## 2018-04-13 ENCOUNTER — INFUSION (OUTPATIENT)
Dept: ONCOLOGY | Facility: HOSPITAL | Age: 43
End: 2018-04-13

## 2018-04-13 VITALS
RESPIRATION RATE: 18 BRPM | TEMPERATURE: 98.2 F | WEIGHT: 283 LBS | SYSTOLIC BLOOD PRESSURE: 107 MMHG | DIASTOLIC BLOOD PRESSURE: 69 MMHG | HEIGHT: 67 IN | BODY MASS INDEX: 44.42 KG/M2 | OXYGEN SATURATION: 97 % | HEART RATE: 87 BPM

## 2018-04-13 DIAGNOSIS — D50.8 IRON DEFICIENCY ANEMIA SECONDARY TO INADEQUATE DIETARY IRON INTAKE: ICD-10-CM

## 2018-04-13 DIAGNOSIS — K90.9 MALABSORPTION OF IRON: Primary | ICD-10-CM

## 2018-04-13 PROCEDURE — 25010000002 IRON SUCROSE PER 1 MG: Performed by: INTERNAL MEDICINE

## 2018-04-13 PROCEDURE — 25010000002 HEPARIN FLUSH (PORCINE) 100 UNIT/ML SOLUTION: Performed by: INTERNAL MEDICINE

## 2018-04-13 PROCEDURE — 96365 THER/PROPH/DIAG IV INF INIT: CPT

## 2018-04-13 RX ORDER — MEPERIDINE HYDROCHLORIDE 25 MG/ML
25 INJECTION INTRAMUSCULAR; INTRAVENOUS; SUBCUTANEOUS
Status: DISCONTINUED | OUTPATIENT
Start: 2018-04-13 | End: 2018-04-13 | Stop reason: HOSPADM

## 2018-04-13 RX ORDER — SODIUM CHLORIDE 9 MG/ML
250 INJECTION, SOLUTION INTRAVENOUS ONCE
Status: COMPLETED | OUTPATIENT
Start: 2018-04-13 | End: 2018-04-13

## 2018-04-13 RX ORDER — SODIUM CHLORIDE 0.9 % (FLUSH) 0.9 %
10 SYRINGE (ML) INJECTION AS NEEDED
Status: CANCELLED | OUTPATIENT
Start: 2018-04-13

## 2018-04-13 RX ORDER — SODIUM CHLORIDE 0.9 % (FLUSH) 0.9 %
10 SYRINGE (ML) INJECTION AS NEEDED
Status: DISCONTINUED | OUTPATIENT
Start: 2018-04-13 | End: 2018-04-13 | Stop reason: HOSPADM

## 2018-04-13 RX ORDER — FAMOTIDINE 10 MG/ML
20 INJECTION, SOLUTION INTRAVENOUS AS NEEDED
Status: DISCONTINUED | OUTPATIENT
Start: 2018-04-13 | End: 2018-04-13 | Stop reason: HOSPADM

## 2018-04-13 RX ORDER — DIPHENHYDRAMINE HYDROCHLORIDE 50 MG/ML
50 INJECTION INTRAMUSCULAR; INTRAVENOUS AS NEEDED
Status: DISCONTINUED | OUTPATIENT
Start: 2018-04-13 | End: 2018-04-13 | Stop reason: HOSPADM

## 2018-04-13 RX ADMIN — Medication 500 UNITS: at 11:31

## 2018-04-13 RX ADMIN — SODIUM CHLORIDE 250 ML: 9 INJECTION, SOLUTION INTRAVENOUS at 10:42

## 2018-04-13 RX ADMIN — Medication 10 ML: at 11:31

## 2018-04-13 RX ADMIN — IRON SUCROSE 200 MG: 20 INJECTION, SOLUTION INTRAVENOUS at 10:42

## 2018-04-16 ENCOUNTER — INFUSION (OUTPATIENT)
Dept: ONCOLOGY | Facility: HOSPITAL | Age: 43
End: 2018-04-16

## 2018-04-16 VITALS
HEART RATE: 79 BPM | WEIGHT: 287 LBS | DIASTOLIC BLOOD PRESSURE: 57 MMHG | HEIGHT: 67 IN | SYSTOLIC BLOOD PRESSURE: 108 MMHG | TEMPERATURE: 97.5 F | BODY MASS INDEX: 45.04 KG/M2 | RESPIRATION RATE: 18 BRPM | OXYGEN SATURATION: 100 %

## 2018-04-16 DIAGNOSIS — D50.8 IRON DEFICIENCY ANEMIA SECONDARY TO INADEQUATE DIETARY IRON INTAKE: ICD-10-CM

## 2018-04-16 DIAGNOSIS — K90.9 MALABSORPTION OF IRON: Primary | ICD-10-CM

## 2018-04-16 PROCEDURE — 25010000002 IRON SUCROSE PER 1 MG: Performed by: INTERNAL MEDICINE

## 2018-04-16 PROCEDURE — 25010000002 HEPARIN FLUSH (PORCINE) 100 UNIT/ML SOLUTION: Performed by: INTERNAL MEDICINE

## 2018-04-16 PROCEDURE — 96365 THER/PROPH/DIAG IV INF INIT: CPT

## 2018-04-16 RX ORDER — DIPHENHYDRAMINE HYDROCHLORIDE 50 MG/ML
50 INJECTION INTRAMUSCULAR; INTRAVENOUS AS NEEDED
Status: DISCONTINUED | OUTPATIENT
Start: 2018-04-16 | End: 2018-04-16 | Stop reason: HOSPADM

## 2018-04-16 RX ORDER — SODIUM CHLORIDE 0.9 % (FLUSH) 0.9 %
10 SYRINGE (ML) INJECTION AS NEEDED
Status: CANCELLED | OUTPATIENT
Start: 2018-04-16

## 2018-04-16 RX ORDER — SODIUM CHLORIDE 9 MG/ML
250 INJECTION, SOLUTION INTRAVENOUS ONCE
Status: COMPLETED | OUTPATIENT
Start: 2018-04-16 | End: 2018-04-16

## 2018-04-16 RX ORDER — NORTRIPTYLINE HYDROCHLORIDE 25 MG/1
CAPSULE ORAL
Qty: 180 CAPSULE | Refills: 0 | OUTPATIENT
Start: 2018-04-16

## 2018-04-16 RX ORDER — SODIUM CHLORIDE 0.9 % (FLUSH) 0.9 %
10 SYRINGE (ML) INJECTION AS NEEDED
Status: DISCONTINUED | OUTPATIENT
Start: 2018-04-16 | End: 2018-04-16 | Stop reason: HOSPADM

## 2018-04-16 RX ORDER — FAMOTIDINE 10 MG/ML
20 INJECTION, SOLUTION INTRAVENOUS AS NEEDED
Status: DISCONTINUED | OUTPATIENT
Start: 2018-04-16 | End: 2018-04-16 | Stop reason: HOSPADM

## 2018-04-16 RX ORDER — MEPERIDINE HYDROCHLORIDE 25 MG/ML
25 INJECTION INTRAMUSCULAR; INTRAVENOUS; SUBCUTANEOUS
Status: DISCONTINUED | OUTPATIENT
Start: 2018-04-16 | End: 2018-04-16 | Stop reason: HOSPADM

## 2018-04-16 RX ADMIN — Medication 500 UNITS: at 11:46

## 2018-04-16 RX ADMIN — SODIUM CHLORIDE 250 ML: 9 INJECTION, SOLUTION INTRAVENOUS at 10:55

## 2018-04-16 RX ADMIN — IRON SUCROSE 200 MG: 20 INJECTION, SOLUTION INTRAVENOUS at 10:59

## 2018-04-16 RX ADMIN — Medication 10 ML: at 11:46

## 2018-04-17 ENCOUNTER — OFFICE VISIT (OUTPATIENT)
Dept: PSYCHIATRY | Age: 43
End: 2018-04-17
Payer: MEDICARE

## 2018-04-17 VITALS
OXYGEN SATURATION: 99 % | HEART RATE: 88 BPM | BODY MASS INDEX: 44.8 KG/M2 | DIASTOLIC BLOOD PRESSURE: 71 MMHG | SYSTOLIC BLOOD PRESSURE: 101 MMHG | WEIGHT: 285.4 LBS | HEIGHT: 67 IN

## 2018-04-17 DIAGNOSIS — F41.9 ANXIETY: Primary | ICD-10-CM

## 2018-04-17 PROCEDURE — 90791 PSYCH DIAGNOSTIC EVALUATION: CPT | Performed by: NURSE PRACTITIONER

## 2018-04-17 PROCEDURE — 99999 PR OFFICE/OUTPT VISIT,PROCEDURE ONLY: CPT | Performed by: NURSE PRACTITIONER

## 2018-04-17 RX ORDER — ASPIRIN 325 MG
325 TABLET, DELAYED RELEASE (ENTERIC COATED) ORAL 2 TIMES DAILY
Refills: 0 | COMMUNITY
Start: 2018-03-30 | End: 2018-08-06

## 2018-04-17 RX ORDER — OXYCODONE AND ACETAMINOPHEN 10; 325 MG/1; MG/1
10-325 TABLET ORAL 3 TIMES DAILY
Refills: 0 | COMMUNITY
Start: 2018-04-06 | End: 2018-08-06

## 2018-04-17 RX ORDER — ACYCLOVIR 800 MG/1
800 TABLET ORAL DAILY
Qty: 90 TABLET | Refills: 1 | Status: SHIPPED | OUTPATIENT
Start: 2018-04-17 | End: 2018-06-03 | Stop reason: SDUPTHER

## 2018-04-17 RX ORDER — DOCUSATE SODIUM 100 MG/1
100 CAPSULE ORAL 2 TIMES DAILY
Refills: 0 | COMMUNITY
Start: 2018-03-30 | End: 2018-08-21 | Stop reason: ALTCHOICE

## 2018-04-17 RX ORDER — POLYETHYLENE GLYCOL 3350 17 G/17G
527 POWDER, FOR SOLUTION ORAL DAILY
Refills: 0 | COMMUNITY
Start: 2018-03-30 | End: 2019-02-06 | Stop reason: SDUPTHER

## 2018-04-19 ENCOUNTER — TELEPHONE (OUTPATIENT)
Dept: PAIN MANAGEMENT | Age: 43
End: 2018-04-19

## 2018-04-19 ENCOUNTER — TELEPHONE (OUTPATIENT)
Dept: PRIMARY CARE CLINIC | Age: 43
End: 2018-04-19

## 2018-04-20 ENCOUNTER — INFUSION (OUTPATIENT)
Dept: ONCOLOGY | Facility: HOSPITAL | Age: 43
End: 2018-04-20

## 2018-04-20 VITALS
HEIGHT: 67 IN | BODY MASS INDEX: 45.08 KG/M2 | DIASTOLIC BLOOD PRESSURE: 75 MMHG | TEMPERATURE: 97.6 F | HEART RATE: 77 BPM | SYSTOLIC BLOOD PRESSURE: 117 MMHG | RESPIRATION RATE: 18 BRPM | WEIGHT: 287.2 LBS | OXYGEN SATURATION: 100 %

## 2018-04-20 DIAGNOSIS — K90.9 MALABSORPTION OF IRON: Primary | ICD-10-CM

## 2018-04-20 DIAGNOSIS — D50.8 IRON DEFICIENCY ANEMIA SECONDARY TO INADEQUATE DIETARY IRON INTAKE: ICD-10-CM

## 2018-04-20 PROCEDURE — 25010000002 HEPARIN FLUSH (PORCINE) 100 UNIT/ML SOLUTION: Performed by: INTERNAL MEDICINE

## 2018-04-20 PROCEDURE — 96365 THER/PROPH/DIAG IV INF INIT: CPT

## 2018-04-20 PROCEDURE — 25010000002 IRON SUCROSE PER 1 MG: Performed by: INTERNAL MEDICINE

## 2018-04-20 RX ORDER — FAMOTIDINE 10 MG/ML
20 INJECTION, SOLUTION INTRAVENOUS AS NEEDED
Status: DISCONTINUED | OUTPATIENT
Start: 2018-04-20 | End: 2018-04-20 | Stop reason: HOSPADM

## 2018-04-20 RX ORDER — SODIUM CHLORIDE 0.9 % (FLUSH) 0.9 %
10 SYRINGE (ML) INJECTION AS NEEDED
Status: CANCELLED | OUTPATIENT
Start: 2018-04-20

## 2018-04-20 RX ORDER — SODIUM CHLORIDE 0.9 % (FLUSH) 0.9 %
10 SYRINGE (ML) INJECTION AS NEEDED
Status: DISCONTINUED | OUTPATIENT
Start: 2018-04-20 | End: 2018-04-20 | Stop reason: HOSPADM

## 2018-04-20 RX ORDER — MEPERIDINE HYDROCHLORIDE 25 MG/ML
25 INJECTION INTRAMUSCULAR; INTRAVENOUS; SUBCUTANEOUS
Status: DISCONTINUED | OUTPATIENT
Start: 2018-04-20 | End: 2018-04-20 | Stop reason: HOSPADM

## 2018-04-20 RX ORDER — NORTRIPTYLINE HYDROCHLORIDE 25 MG/1
25-50 CAPSULE ORAL NIGHTLY
Qty: 180 CAPSULE | Refills: 3 | Status: SHIPPED | OUTPATIENT
Start: 2018-04-20 | End: 2019-04-21 | Stop reason: SDUPTHER

## 2018-04-20 RX ORDER — SODIUM CHLORIDE 9 MG/ML
250 INJECTION, SOLUTION INTRAVENOUS ONCE
Status: COMPLETED | OUTPATIENT
Start: 2018-04-20 | End: 2018-04-20

## 2018-04-20 RX ORDER — DIPHENHYDRAMINE HYDROCHLORIDE 50 MG/ML
50 INJECTION INTRAMUSCULAR; INTRAVENOUS AS NEEDED
Status: DISCONTINUED | OUTPATIENT
Start: 2018-04-20 | End: 2018-04-20 | Stop reason: HOSPADM

## 2018-04-20 RX ADMIN — Medication 500 UNITS: at 12:15

## 2018-04-20 RX ADMIN — Medication 10 ML: at 12:15

## 2018-04-20 RX ADMIN — IRON SUCROSE 200 MG: 20 INJECTION, SOLUTION INTRAVENOUS at 11:09

## 2018-04-20 RX ADMIN — SODIUM CHLORIDE 250 ML: 9 INJECTION, SOLUTION INTRAVENOUS at 11:09

## 2018-04-23 ENCOUNTER — INFUSION (OUTPATIENT)
Dept: ONCOLOGY | Facility: HOSPITAL | Age: 43
End: 2018-04-23

## 2018-04-23 VITALS
HEIGHT: 67 IN | SYSTOLIC BLOOD PRESSURE: 119 MMHG | TEMPERATURE: 98.1 F | DIASTOLIC BLOOD PRESSURE: 60 MMHG | RESPIRATION RATE: 18 BRPM | HEART RATE: 76 BPM | BODY MASS INDEX: 44.57 KG/M2 | WEIGHT: 284 LBS | OXYGEN SATURATION: 100 %

## 2018-04-23 DIAGNOSIS — K90.9 MALABSORPTION OF IRON: Primary | ICD-10-CM

## 2018-04-23 DIAGNOSIS — D50.9 IRON DEFICIENCY ANEMIA, UNSPECIFIED IRON DEFICIENCY ANEMIA TYPE: Primary | ICD-10-CM

## 2018-04-23 DIAGNOSIS — D50.8 IRON DEFICIENCY ANEMIA SECONDARY TO INADEQUATE DIETARY IRON INTAKE: ICD-10-CM

## 2018-04-23 PROCEDURE — 25010000002 IRON SUCROSE PER 1 MG: Performed by: INTERNAL MEDICINE

## 2018-04-23 PROCEDURE — 96365 THER/PROPH/DIAG IV INF INIT: CPT

## 2018-04-23 PROCEDURE — 25010000002 HEPARIN FLUSH (PORCINE) 100 UNIT/ML SOLUTION: Performed by: INTERNAL MEDICINE

## 2018-04-23 RX ORDER — DIPHENHYDRAMINE HYDROCHLORIDE 50 MG/ML
50 INJECTION INTRAMUSCULAR; INTRAVENOUS AS NEEDED
Status: DISCONTINUED | OUTPATIENT
Start: 2018-04-23 | End: 2018-04-23 | Stop reason: HOSPADM

## 2018-04-23 RX ORDER — SODIUM CHLORIDE 9 MG/ML
250 INJECTION, SOLUTION INTRAVENOUS ONCE
Status: COMPLETED | OUTPATIENT
Start: 2018-04-23 | End: 2018-04-23

## 2018-04-23 RX ORDER — MEPERIDINE HYDROCHLORIDE 25 MG/ML
25 INJECTION INTRAMUSCULAR; INTRAVENOUS; SUBCUTANEOUS
Status: DISCONTINUED | OUTPATIENT
Start: 2018-04-23 | End: 2018-04-23 | Stop reason: HOSPADM

## 2018-04-23 RX ORDER — FAMOTIDINE 10 MG/ML
20 INJECTION, SOLUTION INTRAVENOUS AS NEEDED
Status: DISCONTINUED | OUTPATIENT
Start: 2018-04-23 | End: 2018-04-23 | Stop reason: HOSPADM

## 2018-04-23 RX ORDER — SODIUM CHLORIDE 0.9 % (FLUSH) 0.9 %
10 SYRINGE (ML) INJECTION AS NEEDED
Status: CANCELLED | OUTPATIENT
Start: 2018-04-23

## 2018-04-23 RX ORDER — SODIUM CHLORIDE 0.9 % (FLUSH) 0.9 %
10 SYRINGE (ML) INJECTION AS NEEDED
Status: DISCONTINUED | OUTPATIENT
Start: 2018-04-23 | End: 2018-04-23 | Stop reason: HOSPADM

## 2018-04-23 RX ADMIN — SODIUM CHLORIDE 250 ML: 9 INJECTION, SOLUTION INTRAVENOUS at 10:31

## 2018-04-23 RX ADMIN — Medication 500 UNITS: at 11:16

## 2018-04-23 RX ADMIN — Medication 10 ML: at 11:16

## 2018-04-23 RX ADMIN — IRON SUCROSE 200 MG: 20 INJECTION, SOLUTION INTRAVENOUS at 10:36

## 2018-04-25 ENCOUNTER — TELEPHONE (OUTPATIENT)
Dept: ONCOLOGY | Facility: CLINIC | Age: 43
End: 2018-04-25

## 2018-04-25 NOTE — TELEPHONE ENCOUNTER
Received call from patient, requesting to move her [facility] txt apt from Friday to tomorrow when she does have a f/u apt with Dr Clements to discuss her progress and review her lab work. She request to make apt's on same day due to the cost of gas.   Call placed and checked with Luc  [facility], she was able to move the txt apt from Friday 4/27/18 to Thursday tomorrow 4/26/18.  Patient notified we were able to make her apts on the same day. Verified apt times with patient, she v/u.

## 2018-04-26 ENCOUNTER — LAB (OUTPATIENT)
Dept: LAB | Facility: HOSPITAL | Age: 43
End: 2018-04-26

## 2018-04-26 ENCOUNTER — INFUSION (OUTPATIENT)
Dept: ONCOLOGY | Facility: HOSPITAL | Age: 43
End: 2018-04-26

## 2018-04-26 ENCOUNTER — TELEPHONE (OUTPATIENT)
Dept: ONCOLOGY | Facility: CLINIC | Age: 43
End: 2018-04-26

## 2018-04-26 ENCOUNTER — OFFICE VISIT (OUTPATIENT)
Dept: ONCOLOGY | Facility: CLINIC | Age: 43
End: 2018-04-26

## 2018-04-26 VITALS
DIASTOLIC BLOOD PRESSURE: 70 MMHG | WEIGHT: 275 LBS | TEMPERATURE: 97 F | RESPIRATION RATE: 16 BRPM | SYSTOLIC BLOOD PRESSURE: 141 MMHG | HEART RATE: 95 BPM | BODY MASS INDEX: 43.16 KG/M2 | HEIGHT: 67 IN | OXYGEN SATURATION: 99 %

## 2018-04-26 VITALS
WEIGHT: 274.7 LBS | BODY MASS INDEX: 43.11 KG/M2 | OXYGEN SATURATION: 93 % | HEIGHT: 67 IN | HEART RATE: 92 BPM | SYSTOLIC BLOOD PRESSURE: 142 MMHG | RESPIRATION RATE: 16 BRPM | TEMPERATURE: 97.4 F | DIASTOLIC BLOOD PRESSURE: 84 MMHG

## 2018-04-26 DIAGNOSIS — D50.8 IRON DEFICIENCY ANEMIA SECONDARY TO INADEQUATE DIETARY IRON INTAKE: ICD-10-CM

## 2018-04-26 DIAGNOSIS — D50.8 IRON DEFICIENCY ANEMIA SECONDARY TO INADEQUATE DIETARY IRON INTAKE: Primary | ICD-10-CM

## 2018-04-26 DIAGNOSIS — E55.9 VITAMIN D DEFICIENCY: Primary | ICD-10-CM

## 2018-04-26 DIAGNOSIS — D50.9 IRON DEFICIENCY ANEMIA, UNSPECIFIED IRON DEFICIENCY ANEMIA TYPE: ICD-10-CM

## 2018-04-26 DIAGNOSIS — K90.9 MALABSORPTION OF IRON: Primary | ICD-10-CM

## 2018-04-26 DIAGNOSIS — K90.9 MALABSORPTION OF IRON: ICD-10-CM

## 2018-04-26 LAB
ALBUMIN SERPL-MCNC: 4.4 G/DL (ref 3.5–5)
ALBUMIN/GLOB SERPL: 1.3 G/DL (ref 1.1–2.5)
ALP SERPL-CCNC: 103 U/L (ref 24–120)
ALT SERPL W P-5'-P-CCNC: 21 U/L (ref 0–54)
ANION GAP SERPL CALCULATED.3IONS-SCNC: 10 MMOL/L (ref 4–13)
AST SERPL-CCNC: 46 U/L (ref 7–45)
BASOPHILS # BLD AUTO: 0.05 10*3/MM3 (ref 0–0.2)
BASOPHILS NFR BLD AUTO: 0.7 % (ref 0–2)
BILIRUB SERPL-MCNC: 1 MG/DL (ref 0.1–1)
BUN BLD-MCNC: 11 MG/DL (ref 5–21)
BUN/CREAT SERPL: 13.9 (ref 7–25)
CALCIUM SPEC-SCNC: 9.7 MG/DL (ref 8.4–10.4)
CHLORIDE SERPL-SCNC: 103 MMOL/L (ref 98–110)
CO2 SERPL-SCNC: 30 MMOL/L (ref 24–31)
CREAT BLD-MCNC: 0.79 MG/DL (ref 0.5–1.4)
DEPRECATED RDW RBC AUTO: 51.3 FL (ref 40–54)
EOSINOPHIL # BLD AUTO: 0.07 10*3/MM3 (ref 0–0.7)
EOSINOPHIL NFR BLD AUTO: 0.9 % (ref 0–4)
ERYTHROCYTE [DISTWIDTH] IN BLOOD BY AUTOMATED COUNT: 16.6 % (ref 12–15)
GFR SERPL CREATININE-BSD FRML MDRD: 79 ML/MIN/1.73
GLOBULIN UR ELPH-MCNC: 3.3 GM/DL
GLUCOSE BLD-MCNC: 97 MG/DL (ref 70–100)
HCT VFR BLD AUTO: 32.6 % (ref 37–47)
HGB BLD-MCNC: 11.5 G/DL (ref 12–16)
HOLD SPECIMEN: NORMAL
HOLD SPECIMEN: NORMAL
IMM GRANULOCYTES # BLD: 0.03 10*3/MM3 (ref 0–0.03)
IMM GRANULOCYTES NFR BLD: 0.4 % (ref 0–5)
LYMPHOCYTES # BLD AUTO: 1.98 10*3/MM3 (ref 0.72–4.86)
LYMPHOCYTES NFR BLD AUTO: 26.1 % (ref 15–45)
MCH RBC QN AUTO: 29.9 PG (ref 28–32)
MCHC RBC AUTO-ENTMCNC: 35.3 G/DL (ref 33–36)
MCV RBC AUTO: 84.7 FL (ref 82–98)
MONOCYTES # BLD AUTO: 0.31 10*3/MM3 (ref 0.19–1.3)
MONOCYTES NFR BLD AUTO: 4.1 % (ref 4–12)
NEUTROPHILS # BLD AUTO: 5.14 10*3/MM3 (ref 1.87–8.4)
NEUTROPHILS NFR BLD AUTO: 67.8 % (ref 39–78)
NRBC BLD MANUAL-RTO: 0 /100 WBC (ref 0–0)
PLATELET # BLD AUTO: 243 10*3/MM3 (ref 130–400)
PMV BLD AUTO: 8.8 FL (ref 6–12)
POTASSIUM BLD-SCNC: 3.6 MMOL/L (ref 3.5–5.3)
PROT SERPL-MCNC: 7.7 G/DL (ref 6.3–8.7)
RBC # BLD AUTO: 3.85 10*6/MM3 (ref 4.2–5.4)
SODIUM BLD-SCNC: 143 MMOL/L (ref 135–145)
WBC NRBC COR # BLD: 7.58 10*3/MM3 (ref 4.8–10.8)

## 2018-04-26 PROCEDURE — 25010000002 HEPARIN FLUSH (PORCINE) 100 UNIT/ML SOLUTION: Performed by: INTERNAL MEDICINE

## 2018-04-26 PROCEDURE — 96365 THER/PROPH/DIAG IV INF INIT: CPT

## 2018-04-26 PROCEDURE — 85025 COMPLETE CBC W/AUTO DIFF WBC: CPT

## 2018-04-26 PROCEDURE — 25010000002 IRON SUCROSE PER 1 MG: Performed by: INTERNAL MEDICINE

## 2018-04-26 PROCEDURE — 80053 COMPREHEN METABOLIC PANEL: CPT

## 2018-04-26 PROCEDURE — 36415 COLL VENOUS BLD VENIPUNCTURE: CPT

## 2018-04-26 PROCEDURE — 99214 OFFICE O/P EST MOD 30 MIN: CPT | Performed by: INTERNAL MEDICINE

## 2018-04-26 RX ORDER — SODIUM CHLORIDE 9 MG/ML
250 INJECTION, SOLUTION INTRAVENOUS ONCE
Status: CANCELLED | OUTPATIENT
Start: 2018-06-01

## 2018-04-26 RX ORDER — FAMOTIDINE 10 MG/ML
20 INJECTION, SOLUTION INTRAVENOUS AS NEEDED
Status: CANCELLED | OUTPATIENT
Start: 2018-05-14

## 2018-04-26 RX ORDER — MEPERIDINE HYDROCHLORIDE 50 MG/ML
25 INJECTION INTRAMUSCULAR; INTRAVENOUS; SUBCUTANEOUS
Status: CANCELLED | OUTPATIENT
Start: 2018-06-04

## 2018-04-26 RX ORDER — DIPHENHYDRAMINE HYDROCHLORIDE 50 MG/ML
50 INJECTION INTRAMUSCULAR; INTRAVENOUS AS NEEDED
Status: CANCELLED | OUTPATIENT
Start: 2018-06-01

## 2018-04-26 RX ORDER — FAMOTIDINE 10 MG/ML
20 INJECTION, SOLUTION INTRAVENOUS AS NEEDED
Status: CANCELLED | OUTPATIENT
Start: 2018-05-11

## 2018-04-26 RX ORDER — FAMOTIDINE 10 MG/ML
20 INJECTION, SOLUTION INTRAVENOUS AS NEEDED
Status: CANCELLED | OUTPATIENT
Start: 2018-06-01

## 2018-04-26 RX ORDER — FAMOTIDINE 10 MG/ML
20 INJECTION, SOLUTION INTRAVENOUS AS NEEDED
Status: CANCELLED | OUTPATIENT
Start: 2018-05-25

## 2018-04-26 RX ORDER — SODIUM CHLORIDE 0.9 % (FLUSH) 0.9 %
10 SYRINGE (ML) INJECTION AS NEEDED
Status: CANCELLED | OUTPATIENT
Start: 2018-04-26

## 2018-04-26 RX ORDER — DIPHENHYDRAMINE HYDROCHLORIDE 50 MG/ML
50 INJECTION INTRAMUSCULAR; INTRAVENOUS AS NEEDED
Status: CANCELLED | OUTPATIENT
Start: 2018-05-14

## 2018-04-26 RX ORDER — FAMOTIDINE 10 MG/ML
20 INJECTION, SOLUTION INTRAVENOUS AS NEEDED
Status: CANCELLED | OUTPATIENT
Start: 2018-05-18

## 2018-04-26 RX ORDER — SODIUM CHLORIDE 0.9 % (FLUSH) 0.9 %
10 SYRINGE (ML) INJECTION AS NEEDED
Status: DISCONTINUED | OUTPATIENT
Start: 2018-04-26 | End: 2018-04-26 | Stop reason: HOSPADM

## 2018-04-26 RX ORDER — MEPERIDINE HYDROCHLORIDE 50 MG/ML
25 INJECTION INTRAMUSCULAR; INTRAVENOUS; SUBCUTANEOUS
Status: CANCELLED | OUTPATIENT
Start: 2018-06-01

## 2018-04-26 RX ORDER — SODIUM CHLORIDE 9 MG/ML
250 INJECTION, SOLUTION INTRAVENOUS ONCE
Status: COMPLETED | OUTPATIENT
Start: 2018-04-26 | End: 2018-04-26

## 2018-04-26 RX ORDER — DIPHENHYDRAMINE HYDROCHLORIDE 50 MG/ML
50 INJECTION INTRAMUSCULAR; INTRAVENOUS AS NEEDED
Status: CANCELLED | OUTPATIENT
Start: 2018-05-18

## 2018-04-26 RX ORDER — FAMOTIDINE 10 MG/ML
20 INJECTION, SOLUTION INTRAVENOUS AS NEEDED
Status: CANCELLED | OUTPATIENT
Start: 2018-06-04

## 2018-04-26 RX ORDER — SODIUM CHLORIDE 9 MG/ML
250 INJECTION, SOLUTION INTRAVENOUS ONCE
Status: CANCELLED | OUTPATIENT
Start: 2018-05-25

## 2018-04-26 RX ORDER — FAMOTIDINE 10 MG/ML
20 INJECTION, SOLUTION INTRAVENOUS AS NEEDED
Status: DISCONTINUED | OUTPATIENT
Start: 2018-04-26 | End: 2018-04-26 | Stop reason: HOSPADM

## 2018-04-26 RX ORDER — SODIUM CHLORIDE 9 MG/ML
250 INJECTION, SOLUTION INTRAVENOUS ONCE
Status: CANCELLED | OUTPATIENT
Start: 2018-05-30

## 2018-04-26 RX ORDER — MEPERIDINE HYDROCHLORIDE 50 MG/ML
25 INJECTION INTRAMUSCULAR; INTRAVENOUS; SUBCUTANEOUS
Status: CANCELLED | OUTPATIENT
Start: 2018-05-21

## 2018-04-26 RX ORDER — MEPERIDINE HYDROCHLORIDE 25 MG/ML
25 INJECTION INTRAMUSCULAR; INTRAVENOUS; SUBCUTANEOUS
Status: DISCONTINUED | OUTPATIENT
Start: 2018-04-26 | End: 2018-04-26 | Stop reason: HOSPADM

## 2018-04-26 RX ORDER — DIPHENHYDRAMINE HYDROCHLORIDE 50 MG/ML
50 INJECTION INTRAMUSCULAR; INTRAVENOUS AS NEEDED
Status: DISCONTINUED | OUTPATIENT
Start: 2018-04-26 | End: 2018-04-26 | Stop reason: HOSPADM

## 2018-04-26 RX ORDER — DIPHENHYDRAMINE HYDROCHLORIDE 50 MG/ML
50 INJECTION INTRAMUSCULAR; INTRAVENOUS AS NEEDED
Status: CANCELLED | OUTPATIENT
Start: 2018-05-25

## 2018-04-26 RX ORDER — MEPERIDINE HYDROCHLORIDE 50 MG/ML
25 INJECTION INTRAMUSCULAR; INTRAVENOUS; SUBCUTANEOUS
Status: CANCELLED | OUTPATIENT
Start: 2018-05-30

## 2018-04-26 RX ORDER — SODIUM CHLORIDE 9 MG/ML
250 INJECTION, SOLUTION INTRAVENOUS ONCE
Status: CANCELLED | OUTPATIENT
Start: 2018-05-18

## 2018-04-26 RX ORDER — DIPHENHYDRAMINE HYDROCHLORIDE 50 MG/ML
50 INJECTION INTRAMUSCULAR; INTRAVENOUS AS NEEDED
Status: CANCELLED | OUTPATIENT
Start: 2018-06-04

## 2018-04-26 RX ORDER — DIPHENHYDRAMINE HYDROCHLORIDE 50 MG/ML
50 INJECTION INTRAMUSCULAR; INTRAVENOUS AS NEEDED
Status: CANCELLED | OUTPATIENT
Start: 2018-05-30

## 2018-04-26 RX ORDER — DIPHENHYDRAMINE HYDROCHLORIDE 50 MG/ML
50 INJECTION INTRAMUSCULAR; INTRAVENOUS AS NEEDED
Status: CANCELLED | OUTPATIENT
Start: 2018-05-11

## 2018-04-26 RX ORDER — SODIUM CHLORIDE 9 MG/ML
250 INJECTION, SOLUTION INTRAVENOUS ONCE
Status: CANCELLED | OUTPATIENT
Start: 2018-05-11

## 2018-04-26 RX ORDER — SODIUM CHLORIDE 9 MG/ML
250 INJECTION, SOLUTION INTRAVENOUS ONCE
Status: CANCELLED | OUTPATIENT
Start: 2018-05-14

## 2018-04-26 RX ORDER — MEPERIDINE HYDROCHLORIDE 50 MG/ML
25 INJECTION INTRAMUSCULAR; INTRAVENOUS; SUBCUTANEOUS
Status: CANCELLED | OUTPATIENT
Start: 2018-05-18

## 2018-04-26 RX ORDER — SODIUM CHLORIDE 9 MG/ML
250 INJECTION, SOLUTION INTRAVENOUS ONCE
Status: CANCELLED | OUTPATIENT
Start: 2018-05-21

## 2018-04-26 RX ORDER — MEPERIDINE HYDROCHLORIDE 50 MG/ML
25 INJECTION INTRAMUSCULAR; INTRAVENOUS; SUBCUTANEOUS
Status: CANCELLED | OUTPATIENT
Start: 2018-05-11

## 2018-04-26 RX ORDER — MEPERIDINE HYDROCHLORIDE 50 MG/ML
25 INJECTION INTRAMUSCULAR; INTRAVENOUS; SUBCUTANEOUS
Status: CANCELLED | OUTPATIENT
Start: 2018-05-14

## 2018-04-26 RX ORDER — SODIUM CHLORIDE 9 MG/ML
250 INJECTION, SOLUTION INTRAVENOUS ONCE
Status: CANCELLED | OUTPATIENT
Start: 2018-06-04

## 2018-04-26 RX ORDER — FAMOTIDINE 10 MG/ML
20 INJECTION, SOLUTION INTRAVENOUS AS NEEDED
Status: CANCELLED | OUTPATIENT
Start: 2018-05-30

## 2018-04-26 RX ORDER — DIPHENHYDRAMINE HYDROCHLORIDE 50 MG/ML
50 INJECTION INTRAMUSCULAR; INTRAVENOUS AS NEEDED
Status: CANCELLED | OUTPATIENT
Start: 2018-05-21

## 2018-04-26 RX ORDER — MEPERIDINE HYDROCHLORIDE 50 MG/ML
25 INJECTION INTRAMUSCULAR; INTRAVENOUS; SUBCUTANEOUS
Status: CANCELLED | OUTPATIENT
Start: 2018-05-25

## 2018-04-26 RX ORDER — FAMOTIDINE 10 MG/ML
20 INJECTION, SOLUTION INTRAVENOUS AS NEEDED
Status: CANCELLED | OUTPATIENT
Start: 2018-05-21

## 2018-04-26 RX ADMIN — Medication 10 ML: at 10:32

## 2018-04-26 RX ADMIN — Medication 500 UNITS: at 10:34

## 2018-04-26 RX ADMIN — IRON SUCROSE 200 MG: 20 INJECTION, SOLUTION INTRAVENOUS at 09:13

## 2018-04-26 RX ADMIN — SODIUM CHLORIDE 250 ML: 9 INJECTION, SOLUTION INTRAVENOUS at 09:13

## 2018-04-26 NOTE — PROGRESS NOTES
[facility]  HEMATOLOGY & ONCOLOGY    Cancer Staging Information:  No matching staging information was found for the patient.      Subjective     VISIT DIAGNOSIS:   No diagnosis found.    REASON FOR VISIT:     No chief complaint on file.       HEMATOLOGY / ONCOLOGY HISTORY:    No history exists.           INTERVAL HISTORY  Patient ID: Naomi Bhatia is a 42 y.o. year old female with her obesity, anemia status post Venofer infusions, appreciate to follow-up.   -- Underwent gastric sleeve on 12/20/2017 doing well.  --had right hip sx, it was not replaced. She has to wait 4 weeks and re-eval for the possibility of right hip replacement  --her mood is a little down due to her mother is admitted for pneumonia and she has bone cancer.        Past Medical History:   Past Medical History:   Diagnosis Date   • Anemia    • Anxiety    • Arthritis    • Asthma    • Back pain 03/14/2016    with left sided radiculopathy    • DDD (degenerative disc disease), lumbar     Dr. Stuart Zavaleta for pain manangement   • Depression    • Fatigue    • Fibromyalgia    • GERD (gastroesophageal reflux disease)    • Headache    • History of blood transfusion    • Hypertension    • Iron deficiency anemia 9/12/2017   • Iron deficiency anemia secondary to inadequate dietary iron intake 9/12/2017   • Joint pain    • Obesity    • RLS (restless legs syndrome)    • Sleep apnea     positive sleep study; titration study in October     Past Surgical History:   Past Surgical History:   Procedure Laterality Date   • ANKLE SURGERY      Right    • APPENDECTOMY  04/19/2015   • BACK SURGERY  2000   • CERVICAL SPINE SURGERY  09/01/2015   • CHOLECYSTECTOMY      1995;lap   • COLONOSCOPY  05/02/2017    normal; do not return until age of 49 Dr. Gus Valentine   • GASTRIC SLEEVE LAPAROSCOPIC N/A 12/20/2017    Procedure: GASTRIC SLEEVE LAPAROSCOPIC;  Surgeon: Yifan Cordero MD;  Location: [facility];  Service:    • HIP SURGERY  1987    right    •  INSERTION CENTRAL VENOUS ACCESS DEVICE W/ SUBCUTANEOUS PORT  11/07/2017    Dr Anel Gamboa (Smart Port-Power injectable Port) Cat NO#[id number] Lot [id number]    • MOUTH SURGERY  1994   • NECK SURGERY     • TOOTH EXTRACTION     • UPPER GASTROINTESTINAL ENDOSCOPY  05/02/2017    Dr. Gus Valentine, negative for h.pylori, negative for Salomon's   • VAGINAL HYSTERECTOMY SALPINGO OOPHORECTOMY  2008    Partial and then had another surgery to remove the rest     Social History:   Social History     Social History   • Marital status:      Spouse name: N/A   • Number of children: N/A   • Years of education: N/A     Occupational History   • Not on file.     Social History Main Topics   • Smoking status: Former Smoker     Packs/day: 1.00     Years: 25.00     Types: Cigarettes     Quit date: 2014   • Smokeless tobacco: Never Used   • Alcohol use Yes      Comment: rarely    • Drug use: No      Comment: Codeine in Prescribed Medications only    • Sexual activity: Defer     Other Topics Concern   • Not on file     Social History Narrative   • No narrative on file     Family History:   Family History   Problem Relation Age of Onset   • Diabetes Mother    • Hypertension Mother    • Coronary artery disease Mother    • Cancer Mother    • Cancer Father    • Hypertension Father    • Heart disease Father    • Stroke Father    • Hypertension Sister    • Obesity Sister    • Cancer Brother    • Diabetes Brother    • Diabetes Maternal Grandmother    • Stroke Maternal Grandmother        Review of Systems   Constitutional: Negative.    HENT: Negative.    Eyes: Negative.    Respiratory: Positive for apnea. Negative for cough, choking and shortness of breath.    Cardiovascular: Negative.    Gastrointestinal: Negative for abdominal distention, abdominal pain, blood in stool, constipation, diarrhea, nausea and vomiting.   Genitourinary: Negative.    Musculoskeletal: Positive for back pain.        Right hip pain   Skin: Negative.     Allergic/Immunologic: Negative.    Neurological: Negative.    Hematological: Negative.    Psychiatric/Behavioral: Negative.         Performance Status:  Asymptomatic    Medications:    Current Outpatient Prescriptions   Medication Sig Dispense Refill   • acyclovir (ZOVIRAX) 800 MG tablet Take 800 mg by mouth Daily.     • ALBUTEROL IN Inhale 1 puff As Needed.     • Calcium Citrate-Vitamin D (CALCIUM CITRATE + D3 PO) Take  by mouth Daily.     • carbidopa-levodopa (SINEMET)  MG per tablet Take 1 tablet by mouth Daily.     • CRANBERRY PO Take  by mouth Daily.     • Cyanocobalamin (VITAMIN B-12 CR PO) Take  by mouth Daily.     • Cyclobenzaprine HCl (FLEXERIL PO) Take 10 mg by mouth Daily As Needed.     • diclofenac (VOLTAREN) 75 MG EC tablet Take 1 tablet by mouth 2 (Two) Times a Day. 14 tablet 0   • ferrous sulfate 325 (65 FE) MG tablet Take 1 tablet by mouth Daily With Breakfast. 30 tablet 2   • levocetirizine (XYZAL) 5 MG tablet TK 1 T PO QPM  0   • LYRICA 150 MG capsule TK 1 C PO BID  2   • metoprolol succinate XL (TOPROL XL) 50 MG 24 hr tablet Take 50 mg by mouth 2 (Two) Times a Day.     • Multiple Vitamin (MULTI VITAMIN PO) Take  by mouth Daily.     • nortriptyline (PAMELOR) 25 MG capsule 2 tablets at night as needed  3   • omeprazole (priLOSEC) 20 MG capsule TK ONE C PO QD FIRST THING IN THE MORNING ON  AN EMPTY STOMACH  3   • oxyCODONE (ROXICODONE) 10 MG tablet Take 10 mg by mouth 2 (Two) Times a Day As Needed.     • simethicone (GAS-X) 80 MG chewable tablet Chew 0.5 tablets Every 6 (Six) Hours As Needed for Flatulence (belching). 30 tablet 1   • venlafaxine (EFFEXOR) 75 MG tablet bid  11     No current facility-administered medications for this visit.      Facility-Administered Medications Ordered in Other Visits   Medication Dose Route Frequency Provider Last Rate Last Dose   • diphenhydrAMINE (BENADRYL) injection 50 mg  50 mg Intravenous PRN Pedro Clements MD       • diphenhydrAMINE  "(BENADRYL) injection 50 mg  50 mg Intravenous PRN Pedro Clements MD       • famotidine (PEPCID) injection 20 mg  20 mg Intravenous PRN Pedro Clements MD       • famotidine (PEPCID) injection 20 mg  20 mg Intravenous PRN Pedro Clements MD       • heparin flush (porcine) 100 UNIT/ML injection 500 Units  500 Units Intravenous PRN Pedro Clements MD   500 Units at 04/26/18 1034   • hydrocortisone sodium succinate (Solu-CORTEF) injection 100 mg  100 mg Intravenous PRN Pedro Clements MD       • hydrocortisone sodium succinate (Solu-CORTEF) injection 100 mg  100 mg Intravenous PRN Pedro Clements MD       • meperidine (DEMEROL) injection 25 mg  25 mg Intravenous Q20 Min PRN Pedro Clements MD       • sodium chloride 0.9 % flush 10 mL  10 mL Intravenous PRN Pedro Clements MD   10 mL at 04/26/18 1032       ALLERGIES:    Allergies   Allergen Reactions   • Azithromycin GI Intolerance     Severe Abdominal Pain    • Tegaderm Ag Mesh [Silver] Other (See Comments)     Redness, blisters       Objective      Vitals:    04/26/18 0824   BP: 142/84   Pulse: 92   Resp: 16   Temp: 97.4 °F (36.3 °C)   TempSrc: Tympanic   SpO2: 93%   Weight: 125 kg (274 lb 11.2 oz)   Height: 170.2 cm (67.01\")         Current Status 4/26/2018   ECOG score 0         Physical Exam    General Appearance: Patient is awake, alert, oriented and in no acute distress. Patient is welldeveloped, wellnourished, and appears stated age.  HEENT: Normocephalic. Sclerae clear, conjunctiva pink, extraocular movements intact, pupils, round, reactive to light and  accommodation. Mouth and throat are clear with moist oral mucosa.  NECK: Supple, no jugular venous distention, thyroid not enlarged.  LYMPH: No cervical, supraclavicular, axillary, or inguinal lymphadenopathy.  CHEST: Equal bilateral expansion, AP  diameter normal, resonant percussion note  LUNGS: Good air movement, no " rales, rhonchi, rubs or wheezes with auscultation  CARDIO: Regular sinus rhythm, no murmurs, gallops or rubs.  ABDOMEN: Nondistended, soft, No tenderness, no guarding, no rebound, No hepatosplenomegaly. No abdominal masses. Bowel sounds positive. No hernia  GENITALIA: Not examined.  BREASTS: Not examined.  MUSKEL: No joint swelling, decreased motion, or inflammation  EXTREMS: No edema, clubbing, cyanosis, No varicose veins.  NEURO: Grossly nonfocal, Gait is coordinated and smooth, Cognition is preserved.  SKIN: No rashes, no ecchymoses, no petechia.  PSYCH: Oriented to time, place and person. Memory is preserved. Mood and affect appear normal  RECENT LABS:  Lab on 04/26/2018   Component Date Value Ref Range Status   • Glucose 04/26/2018 97  70 - 100 mg/dL Final   • BUN 04/26/2018 11  5 - 21 mg/dL Final   • Creatinine 04/26/2018 0.79  0.50 - 1.40 mg/dL Final   • Sodium 04/26/2018 143  135 - 145 mmol/L Final   • Potassium 04/26/2018 3.6  3.5 - 5.3 mmol/L Final   • Chloride 04/26/2018 103  98 - 110 mmol/L Final   • CO2 04/26/2018 30.0  24.0 - 31.0 mmol/L Final   • Calcium 04/26/2018 9.7  8.4 - 10.4 mg/dL Final   • Total Protein 04/26/2018 7.7  6.3 - 8.7 g/dL Final   • Albumin 04/26/2018 4.40  3.50 - 5.00 g/dL Final   • ALT (SGPT) 04/26/2018 21  0 - 54 U/L Final   • AST (SGOT) 04/26/2018 46* 7 - 45 U/L Final   • Alkaline Phosphatase 04/26/2018 103  24 - 120 U/L Final   • Total Bilirubin 04/26/2018 1.0  0.1 - 1.0 mg/dL Final   • eGFR Non African Amer 04/26/2018 79  >60 mL/min/1.73 Final   • Globulin 04/26/2018 3.3  gm/dL Final   • A/G Ratio 04/26/2018 1.3  1.1 - 2.5 g/dL Final   • BUN/Creatinine Ratio 04/26/2018 13.9  7.0 - 25.0 Final   • Anion Gap 04/26/2018 10.0  4.0 - 13.0 mmol/L Final   • WBC 04/26/2018 7.58  4.80 - 10.80 10*3/mm3 Final   • RBC 04/26/2018 3.85* 4.20 - 5.40 10*6/mm3 Final   • Hemoglobin 04/26/2018 11.5* 12.0 - 16.0 g/dL Final   • Hematocrit 04/26/2018 32.6* 37.0 - 47.0 % Final   • MCV 04/26/2018 84.7   82.0 - 98.0 fL Final   • MCH 04/26/2018 29.9  28.0 - 32.0 pg Final   • MCHC 04/26/2018 35.3  33.0 - 36.0 g/dL Final   • RDW 04/26/2018 16.6* 12.0 - 15.0 % Final   • RDW-SD 04/26/2018 51.3  40.0 - 54.0 fl Final   • MPV 04/26/2018 8.8  6.0 - 12.0 fL Final   • Platelets 04/26/2018 243  130 - 400 10*3/mm3 Final   • Neutrophil % 04/26/2018 67.8  39.0 - 78.0 % Final   • Lymphocyte % 04/26/2018 26.1  15.0 - 45.0 % Final   • Monocyte % 04/26/2018 4.1  4.0 - 12.0 % Final   • Eosinophil % 04/26/2018 0.9  0.0 - 4.0 % Final   • Basophil % 04/26/2018 0.7  0.0 - 2.0 % Final   • Immature Grans % 04/26/2018 0.4  0.0 - 5.0 % Final   • Neutrophils, Absolute 04/26/2018 5.14  1.87 - 8.40 10*3/mm3 Final   • Lymphocytes, Absolute 04/26/2018 1.98  0.72 - 4.86 10*3/mm3 Final   • Monocytes, Absolute 04/26/2018 0.31  0.19 - 1.30 10*3/mm3 Final   • Eosinophils, Absolute 04/26/2018 0.07  0.00 - 0.70 10*3/mm3 Final   • Basophils, Absolute 04/26/2018 0.05  0.00 - 0.20 10*3/mm3 Final   • Immature Grans, Absolute 04/26/2018 0.03  0.00 - 0.03 10*3/mm3 Final   • nRBC 04/26/2018 0.0  0.0 - 0.0 /100 WBC Final   • Extra Tube 04/26/2018 Hold for add-ons.   Final   • Extra Tube 04/26/2018 Hold for add-ons.   Final       RADIOLOGY:  No results found.         Assessment/Plan  Naomi Bhatia is a 42 y.o. year old female h/o obesity found to have anemia while being evaluated for bariatric surgery , As per cc seen    Patient Active Problem List   Diagnosis   • Morbid obesity   • Hypertension, well controlled   • Fatigue   • History of iron deficiency   • Vitamin D deficiency   • Iron deficiency anemia secondary to inadequate dietary iron intake   • Malabsorption of iron   • Obesity, Class III, BMI 40-49.9 (morbid obesity)   • Status post laparoscopic sleeve gastrectomy   • Acute low back pain without sciatica          1.Anemia: Has had hysterectomy. Patient had recent colonoscopy and EGD negative for active bleed.   --Status post venofer infusion,  hemoglobin 12.7 today. Advices ferrous sulfateoral is able to tolerate. RTC in -- takinf oral iron. Hg down. Will resume venofer Hg 11.5 today    2.  Obesity: s/p gastric sleeve doing well. She has lost 50lbs    3. Leukocytosis: Suspect obesity/reactive, also obesity could cause elevation in wbc. Bone marrow with no dyspoietic changes and no circulating blasts.   --currently resolved with iron infusion    4. HTN: on metoprolol    5. Hip pain: s/p sx and screw removal. Possible right hip replacement planned in future  6. Chronic pain syn: on flexiril, lyrica  7. Gerd: on omeprazole  8. Depression: on effexor, nortriptyline               Markiaivettei Keyla Clements MD    4/26/2018    11:08 AM

## 2018-04-26 NOTE — TELEPHONE ENCOUNTER
----- Message from Pedro Clements MD sent at 4/26/2018  8:44 AM CDT -----  She will get additional 8 doses after 5/4 infusion. tnx

## 2018-04-26 NOTE — TELEPHONE ENCOUNTER
Notified Naomi that Dr. Clements wants her to get 8 more infusions added on to what is already scheduled.  Patient verbalized understanding.

## 2018-04-27 ENCOUNTER — APPOINTMENT (OUTPATIENT)
Dept: ONCOLOGY | Facility: HOSPITAL | Age: 43
End: 2018-04-27

## 2018-04-30 ENCOUNTER — INFUSION (OUTPATIENT)
Dept: ONCOLOGY | Facility: HOSPITAL | Age: 43
End: 2018-04-30

## 2018-04-30 VITALS
DIASTOLIC BLOOD PRESSURE: 68 MMHG | TEMPERATURE: 98 F | HEART RATE: 91 BPM | WEIGHT: 265.5 LBS | HEIGHT: 67 IN | OXYGEN SATURATION: 99 % | SYSTOLIC BLOOD PRESSURE: 114 MMHG | RESPIRATION RATE: 20 BRPM | BODY MASS INDEX: 41.67 KG/M2

## 2018-04-30 DIAGNOSIS — D50.8 IRON DEFICIENCY ANEMIA SECONDARY TO INADEQUATE DIETARY IRON INTAKE: ICD-10-CM

## 2018-04-30 DIAGNOSIS — K90.9 MALABSORPTION OF IRON: Primary | ICD-10-CM

## 2018-04-30 PROCEDURE — 96365 THER/PROPH/DIAG IV INF INIT: CPT

## 2018-04-30 PROCEDURE — 25010000002 IRON SUCROSE PER 1 MG: Performed by: INTERNAL MEDICINE

## 2018-04-30 PROCEDURE — 25010000002 HEPARIN FLUSH (PORCINE) 100 UNIT/ML SOLUTION: Performed by: INTERNAL MEDICINE

## 2018-04-30 RX ORDER — FAMOTIDINE 10 MG/ML
20 INJECTION, SOLUTION INTRAVENOUS AS NEEDED
Status: DISCONTINUED | OUTPATIENT
Start: 2018-04-30 | End: 2018-04-30 | Stop reason: HOSPADM

## 2018-04-30 RX ORDER — SODIUM CHLORIDE 9 MG/ML
250 INJECTION, SOLUTION INTRAVENOUS ONCE
Status: COMPLETED | OUTPATIENT
Start: 2018-04-30 | End: 2018-04-30

## 2018-04-30 RX ORDER — SODIUM CHLORIDE 0.9 % (FLUSH) 0.9 %
10 SYRINGE (ML) INJECTION AS NEEDED
Status: CANCELLED | OUTPATIENT
Start: 2018-04-30

## 2018-04-30 RX ORDER — MEPERIDINE HYDROCHLORIDE 50 MG/ML
25 INJECTION INTRAMUSCULAR; INTRAVENOUS; SUBCUTANEOUS
Status: DISCONTINUED | OUTPATIENT
Start: 2018-04-30 | End: 2018-04-30 | Stop reason: HOSPADM

## 2018-04-30 RX ORDER — DIPHENHYDRAMINE HYDROCHLORIDE 50 MG/ML
50 INJECTION INTRAMUSCULAR; INTRAVENOUS AS NEEDED
Status: DISCONTINUED | OUTPATIENT
Start: 2018-04-30 | End: 2018-04-30 | Stop reason: HOSPADM

## 2018-04-30 RX ORDER — SODIUM CHLORIDE 0.9 % (FLUSH) 0.9 %
10 SYRINGE (ML) INJECTION AS NEEDED
Status: DISCONTINUED | OUTPATIENT
Start: 2018-04-30 | End: 2018-04-30 | Stop reason: HOSPADM

## 2018-04-30 RX ADMIN — IRON SUCROSE 200 MG: 20 INJECTION, SOLUTION INTRAVENOUS at 12:39

## 2018-04-30 RX ADMIN — Medication 500 UNITS: at 13:19

## 2018-04-30 RX ADMIN — SODIUM CHLORIDE 250 ML: 9 INJECTION, SOLUTION INTRAVENOUS at 12:35

## 2018-05-02 ENCOUNTER — HOSPITAL ENCOUNTER (OUTPATIENT)
Dept: CT IMAGING | Age: 43
Discharge: HOME | End: 2018-05-02
Payer: MEDICARE

## 2018-05-02 DIAGNOSIS — M16.11 PRIMARY OSTEOARTHRITIS OF RIGHT HIP: ICD-10-CM

## 2018-05-02 PROCEDURE — 73700 CT LOWER EXTREMITY W/O DYE: CPT

## 2018-05-04 ENCOUNTER — INFUSION (OUTPATIENT)
Dept: ONCOLOGY | Facility: HOSPITAL | Age: 43
End: 2018-05-04

## 2018-05-04 VITALS
RESPIRATION RATE: 20 BRPM | BODY MASS INDEX: 41.75 KG/M2 | WEIGHT: 266 LBS | SYSTOLIC BLOOD PRESSURE: 143 MMHG | HEIGHT: 67 IN | OXYGEN SATURATION: 96 % | HEART RATE: 86 BPM | DIASTOLIC BLOOD PRESSURE: 81 MMHG | TEMPERATURE: 98.5 F

## 2018-05-04 DIAGNOSIS — K90.9 MALABSORPTION OF IRON: Primary | ICD-10-CM

## 2018-05-04 DIAGNOSIS — D50.8 IRON DEFICIENCY ANEMIA SECONDARY TO INADEQUATE DIETARY IRON INTAKE: ICD-10-CM

## 2018-05-04 PROCEDURE — 25010000002 HEPARIN FLUSH (PORCINE) 100 UNIT/ML SOLUTION: Performed by: INTERNAL MEDICINE

## 2018-05-04 PROCEDURE — 25010000002 IRON SUCROSE PER 1 MG: Performed by: INTERNAL MEDICINE

## 2018-05-04 PROCEDURE — 96365 THER/PROPH/DIAG IV INF INIT: CPT

## 2018-05-04 RX ORDER — SODIUM CHLORIDE 0.9 % (FLUSH) 0.9 %
10 SYRINGE (ML) INJECTION AS NEEDED
Status: DISCONTINUED | OUTPATIENT
Start: 2018-05-04 | End: 2018-05-04 | Stop reason: HOSPADM

## 2018-05-04 RX ORDER — FAMOTIDINE 10 MG/ML
20 INJECTION, SOLUTION INTRAVENOUS AS NEEDED
Status: DISCONTINUED | OUTPATIENT
Start: 2018-05-04 | End: 2018-05-04 | Stop reason: HOSPADM

## 2018-05-04 RX ORDER — MEPERIDINE HYDROCHLORIDE 25 MG/ML
25 INJECTION INTRAMUSCULAR; INTRAVENOUS; SUBCUTANEOUS
Status: DISCONTINUED | OUTPATIENT
Start: 2018-05-04 | End: 2018-05-04 | Stop reason: HOSPADM

## 2018-05-04 RX ORDER — SODIUM CHLORIDE 9 MG/ML
250 INJECTION, SOLUTION INTRAVENOUS ONCE
Status: COMPLETED | OUTPATIENT
Start: 2018-05-04 | End: 2018-05-04

## 2018-05-04 RX ORDER — SODIUM CHLORIDE 0.9 % (FLUSH) 0.9 %
10 SYRINGE (ML) INJECTION AS NEEDED
Status: CANCELLED | OUTPATIENT
Start: 2018-05-04

## 2018-05-04 RX ORDER — DIPHENHYDRAMINE HYDROCHLORIDE 50 MG/ML
50 INJECTION INTRAMUSCULAR; INTRAVENOUS AS NEEDED
Status: DISCONTINUED | OUTPATIENT
Start: 2018-05-04 | End: 2018-05-04 | Stop reason: HOSPADM

## 2018-05-04 RX ADMIN — SODIUM CHLORIDE 250 ML: 9 INJECTION, SOLUTION INTRAVENOUS at 11:00

## 2018-05-04 RX ADMIN — Medication 500 UNITS: at 11:49

## 2018-05-04 RX ADMIN — IRON SUCROSE 200 MG: 20 INJECTION, SOLUTION INTRAVENOUS at 11:05

## 2018-05-07 ENCOUNTER — INFUSION (OUTPATIENT)
Dept: ONCOLOGY | Facility: HOSPITAL | Age: 43
End: 2018-05-07

## 2018-05-07 VITALS
BODY MASS INDEX: 41.75 KG/M2 | TEMPERATURE: 97.6 F | HEIGHT: 67 IN | RESPIRATION RATE: 20 BRPM | OXYGEN SATURATION: 99 % | HEART RATE: 85 BPM | SYSTOLIC BLOOD PRESSURE: 106 MMHG | WEIGHT: 266 LBS | DIASTOLIC BLOOD PRESSURE: 66 MMHG

## 2018-05-07 DIAGNOSIS — D50.8 IRON DEFICIENCY ANEMIA SECONDARY TO INADEQUATE DIETARY IRON INTAKE: ICD-10-CM

## 2018-05-07 DIAGNOSIS — K90.9 MALABSORPTION OF IRON: Primary | ICD-10-CM

## 2018-05-07 PROCEDURE — 96365 THER/PROPH/DIAG IV INF INIT: CPT

## 2018-05-07 PROCEDURE — 25010000002 HEPARIN FLUSH (PORCINE) 100 UNIT/ML SOLUTION: Performed by: INTERNAL MEDICINE

## 2018-05-07 PROCEDURE — 25010000002 IRON SUCROSE PER 1 MG: Performed by: INTERNAL MEDICINE

## 2018-05-07 RX ORDER — FAMOTIDINE 10 MG/ML
20 INJECTION, SOLUTION INTRAVENOUS AS NEEDED
Status: DISCONTINUED | OUTPATIENT
Start: 2018-05-07 | End: 2018-05-07 | Stop reason: HOSPADM

## 2018-05-07 RX ORDER — DIPHENHYDRAMINE HYDROCHLORIDE 50 MG/ML
50 INJECTION INTRAMUSCULAR; INTRAVENOUS AS NEEDED
Status: DISCONTINUED | OUTPATIENT
Start: 2018-05-07 | End: 2018-05-07 | Stop reason: HOSPADM

## 2018-05-07 RX ORDER — SODIUM CHLORIDE 0.9 % (FLUSH) 0.9 %
10 SYRINGE (ML) INJECTION AS NEEDED
Status: DISCONTINUED | OUTPATIENT
Start: 2018-05-07 | End: 2018-05-07 | Stop reason: HOSPADM

## 2018-05-07 RX ORDER — SODIUM CHLORIDE 9 MG/ML
250 INJECTION, SOLUTION INTRAVENOUS ONCE
Status: COMPLETED | OUTPATIENT
Start: 2018-05-07 | End: 2018-05-07

## 2018-05-07 RX ORDER — MEPERIDINE HYDROCHLORIDE 25 MG/ML
25 INJECTION INTRAMUSCULAR; INTRAVENOUS; SUBCUTANEOUS
Status: DISCONTINUED | OUTPATIENT
Start: 2018-05-07 | End: 2018-05-07 | Stop reason: HOSPADM

## 2018-05-07 RX ORDER — SODIUM CHLORIDE 0.9 % (FLUSH) 0.9 %
10 SYRINGE (ML) INJECTION AS NEEDED
Status: CANCELLED | OUTPATIENT
Start: 2018-05-07

## 2018-05-07 RX ADMIN — IRON SUCROSE 200 MG: 20 INJECTION, SOLUTION INTRAVENOUS at 11:35

## 2018-05-07 RX ADMIN — SODIUM CHLORIDE 250 ML: 9 INJECTION, SOLUTION INTRAVENOUS at 11:35

## 2018-05-07 RX ADMIN — Medication 500 UNITS: at 12:19

## 2018-05-07 NOTE — ACP (ADVANCE CARE PLANNING)
Date of [facility] ACP interview: 5/7/2018  Location of interview: Outpatient Oncology  First Steps ACP Facilitator: Veto De Anda Jr.  Referral Source: Patient request  Present for facilitation: Patient    SUMMARY OF ADVANCE CARE PLANNING DISCUSSION:  Naomi presents for [facility] facilitation. We reviewed purpose and goals for advance care planning.    I reviewed with Naomi qualities to consider when choosing a healthcare agent. Naomi had selected Amaya Mcguire as her healthcare agent. I encouraged Naomi to discuss her preferences for future care with healthcare agents and others close to her.    Naomi describes past experiences dealing with friends or family who have been seriously ill: Mother From these experiences Naomi learned to get better prepared through planning for unforeseen events in life.    Naomi identified the following as most important to living well: if her medical status reduced to a point that controlling pain would be key.    Goals of medical care for severe, permanent brain injury were explored: Yes     Education materials were provided on: Advance Care Planning    Each section of the advance care/living will document was reviewed and discussed.    Advance care/living will document finished during this facilitation? yes    Time spent on preparation, facilitation and documentation was under 30 minutes.    RECOMMENDATIONS/FOLLOW-UP:  None at this time    CONSULT/NOTE ROUTED  Copy of AD was given to nurse for HMI and patient chart    Veto De Anda Jr.

## 2018-05-11 ENCOUNTER — INFUSION (OUTPATIENT)
Dept: ONCOLOGY | Facility: HOSPITAL | Age: 43
End: 2018-05-11

## 2018-05-11 VITALS
TEMPERATURE: 97.6 F | HEIGHT: 67 IN | WEIGHT: 262 LBS | BODY MASS INDEX: 41.12 KG/M2 | OXYGEN SATURATION: 96 % | RESPIRATION RATE: 18 BRPM | HEART RATE: 97 BPM | SYSTOLIC BLOOD PRESSURE: 116 MMHG | DIASTOLIC BLOOD PRESSURE: 60 MMHG

## 2018-05-11 DIAGNOSIS — D50.8 IRON DEFICIENCY ANEMIA SECONDARY TO INADEQUATE DIETARY IRON INTAKE: ICD-10-CM

## 2018-05-11 DIAGNOSIS — K90.9 MALABSORPTION OF IRON: Primary | ICD-10-CM

## 2018-05-11 PROCEDURE — 25010000002 HEPARIN FLUSH (PORCINE) 100 UNIT/ML SOLUTION: Performed by: INTERNAL MEDICINE

## 2018-05-11 PROCEDURE — 25010000002 IRON SUCROSE PER 1 MG: Performed by: INTERNAL MEDICINE

## 2018-05-11 PROCEDURE — 96365 THER/PROPH/DIAG IV INF INIT: CPT

## 2018-05-11 RX ORDER — MEPERIDINE HYDROCHLORIDE 50 MG/ML
25 INJECTION INTRAMUSCULAR; INTRAVENOUS; SUBCUTANEOUS
Status: DISCONTINUED | OUTPATIENT
Start: 2018-05-11 | End: 2018-05-11 | Stop reason: HOSPADM

## 2018-05-11 RX ORDER — SODIUM CHLORIDE 9 MG/ML
250 INJECTION, SOLUTION INTRAVENOUS ONCE
Status: COMPLETED | OUTPATIENT
Start: 2018-05-11 | End: 2018-05-11

## 2018-05-11 RX ORDER — PREGABALIN 150 MG/1
150 CAPSULE ORAL 2 TIMES DAILY
Qty: 60 CAPSULE | Refills: 2 | OUTPATIENT
Start: 2018-05-11 | End: 2018-06-05 | Stop reason: SDUPTHER

## 2018-05-11 RX ORDER — SODIUM CHLORIDE 0.9 % (FLUSH) 0.9 %
10 SYRINGE (ML) INJECTION AS NEEDED
Status: CANCELLED | OUTPATIENT
Start: 2018-05-11

## 2018-05-11 RX ORDER — DIPHENHYDRAMINE HYDROCHLORIDE 50 MG/ML
50 INJECTION INTRAMUSCULAR; INTRAVENOUS AS NEEDED
Status: DISCONTINUED | OUTPATIENT
Start: 2018-05-11 | End: 2018-05-11 | Stop reason: HOSPADM

## 2018-05-11 RX ORDER — FAMOTIDINE 10 MG/ML
20 INJECTION, SOLUTION INTRAVENOUS AS NEEDED
Status: DISCONTINUED | OUTPATIENT
Start: 2018-05-11 | End: 2018-05-11 | Stop reason: HOSPADM

## 2018-05-11 RX ORDER — SODIUM CHLORIDE 0.9 % (FLUSH) 0.9 %
10 SYRINGE (ML) INJECTION AS NEEDED
Status: DISCONTINUED | OUTPATIENT
Start: 2018-05-11 | End: 2018-05-11 | Stop reason: HOSPADM

## 2018-05-11 RX ADMIN — SODIUM CHLORIDE 250 ML: 9 INJECTION, SOLUTION INTRAVENOUS at 10:40

## 2018-05-11 RX ADMIN — IRON SUCROSE 200 MG: 20 INJECTION, SOLUTION INTRAVENOUS at 10:42

## 2018-05-11 RX ADMIN — Medication 500 UNITS: at 11:31

## 2018-05-11 RX ADMIN — Medication 10 ML: at 11:31

## 2018-05-14 ENCOUNTER — INFUSION (OUTPATIENT)
Dept: ONCOLOGY | Facility: HOSPITAL | Age: 43
End: 2018-05-14

## 2018-05-14 VITALS
TEMPERATURE: 98.6 F | OXYGEN SATURATION: 100 % | SYSTOLIC BLOOD PRESSURE: 104 MMHG | RESPIRATION RATE: 18 BRPM | WEIGHT: 269 LBS | DIASTOLIC BLOOD PRESSURE: 67 MMHG | BODY MASS INDEX: 42.22 KG/M2 | HEART RATE: 83 BPM | HEIGHT: 67 IN

## 2018-05-14 DIAGNOSIS — D50.8 IRON DEFICIENCY ANEMIA SECONDARY TO INADEQUATE DIETARY IRON INTAKE: ICD-10-CM

## 2018-05-14 DIAGNOSIS — K90.9 MALABSORPTION OF IRON: Primary | ICD-10-CM

## 2018-05-14 PROCEDURE — 25010000002 IRON SUCROSE PER 1 MG: Performed by: INTERNAL MEDICINE

## 2018-05-14 PROCEDURE — 25010000002 HEPARIN FLUSH (PORCINE) 100 UNIT/ML SOLUTION: Performed by: INTERNAL MEDICINE

## 2018-05-14 PROCEDURE — 96365 THER/PROPH/DIAG IV INF INIT: CPT

## 2018-05-14 RX ORDER — SODIUM CHLORIDE 0.9 % (FLUSH) 0.9 %
10 SYRINGE (ML) INJECTION AS NEEDED
Status: CANCELLED | OUTPATIENT
Start: 2018-05-14

## 2018-05-14 RX ORDER — FAMOTIDINE 10 MG/ML
20 INJECTION, SOLUTION INTRAVENOUS AS NEEDED
Status: DISCONTINUED | OUTPATIENT
Start: 2018-05-14 | End: 2018-05-14 | Stop reason: HOSPADM

## 2018-05-14 RX ORDER — SODIUM CHLORIDE 9 MG/ML
250 INJECTION, SOLUTION INTRAVENOUS ONCE
Status: COMPLETED | OUTPATIENT
Start: 2018-05-14 | End: 2018-05-14

## 2018-05-14 RX ORDER — SODIUM CHLORIDE 0.9 % (FLUSH) 0.9 %
10 SYRINGE (ML) INJECTION AS NEEDED
Status: DISCONTINUED | OUTPATIENT
Start: 2018-05-14 | End: 2018-05-14 | Stop reason: HOSPADM

## 2018-05-14 RX ORDER — MEPERIDINE HYDROCHLORIDE 50 MG/ML
25 INJECTION INTRAMUSCULAR; INTRAVENOUS; SUBCUTANEOUS
Status: DISCONTINUED | OUTPATIENT
Start: 2018-05-14 | End: 2018-05-14 | Stop reason: HOSPADM

## 2018-05-14 RX ORDER — DIPHENHYDRAMINE HYDROCHLORIDE 50 MG/ML
50 INJECTION INTRAMUSCULAR; INTRAVENOUS AS NEEDED
Status: DISCONTINUED | OUTPATIENT
Start: 2018-05-14 | End: 2018-05-14 | Stop reason: HOSPADM

## 2018-05-14 RX ADMIN — Medication 500 UNITS: at 11:49

## 2018-05-14 RX ADMIN — Medication 10 ML: at 11:49

## 2018-05-14 RX ADMIN — IRON SUCROSE 200 MG: 20 INJECTION, SOLUTION INTRAVENOUS at 10:39

## 2018-05-14 RX ADMIN — SODIUM CHLORIDE 250 ML: 9 INJECTION, SOLUTION INTRAVENOUS at 10:35

## 2018-05-18 ENCOUNTER — INFUSION (OUTPATIENT)
Dept: ONCOLOGY | Facility: HOSPITAL | Age: 43
End: 2018-05-18

## 2018-05-18 VITALS
DIASTOLIC BLOOD PRESSURE: 64 MMHG | RESPIRATION RATE: 18 BRPM | BODY MASS INDEX: 42.35 KG/M2 | TEMPERATURE: 96.7 F | HEIGHT: 67 IN | HEART RATE: 80 BPM | WEIGHT: 269.8 LBS | SYSTOLIC BLOOD PRESSURE: 107 MMHG | OXYGEN SATURATION: 98 %

## 2018-05-18 DIAGNOSIS — D50.8 IRON DEFICIENCY ANEMIA SECONDARY TO INADEQUATE DIETARY IRON INTAKE: ICD-10-CM

## 2018-05-18 DIAGNOSIS — K90.9 MALABSORPTION OF IRON: Primary | ICD-10-CM

## 2018-05-18 PROCEDURE — 25010000002 IRON SUCROSE PER 1 MG: Performed by: INTERNAL MEDICINE

## 2018-05-18 PROCEDURE — 25010000002 HEPARIN FLUSH (PORCINE) 100 UNIT/ML SOLUTION: Performed by: INTERNAL MEDICINE

## 2018-05-18 PROCEDURE — 96365 THER/PROPH/DIAG IV INF INIT: CPT

## 2018-05-18 RX ORDER — SODIUM CHLORIDE 9 MG/ML
250 INJECTION, SOLUTION INTRAVENOUS ONCE
Status: COMPLETED | OUTPATIENT
Start: 2018-05-18 | End: 2018-05-18

## 2018-05-18 RX ORDER — SODIUM CHLORIDE 0.9 % (FLUSH) 0.9 %
10 SYRINGE (ML) INJECTION AS NEEDED
Status: DISCONTINUED | OUTPATIENT
Start: 2018-05-18 | End: 2018-05-18 | Stop reason: HOSPADM

## 2018-05-18 RX ORDER — MEPERIDINE HYDROCHLORIDE 50 MG/ML
25 INJECTION INTRAMUSCULAR; INTRAVENOUS; SUBCUTANEOUS
Status: DISCONTINUED | OUTPATIENT
Start: 2018-05-18 | End: 2018-05-18 | Stop reason: HOSPADM

## 2018-05-18 RX ORDER — SODIUM CHLORIDE 0.9 % (FLUSH) 0.9 %
10 SYRINGE (ML) INJECTION AS NEEDED
Status: CANCELLED | OUTPATIENT
Start: 2018-05-18

## 2018-05-18 RX ORDER — FAMOTIDINE 10 MG/ML
20 INJECTION, SOLUTION INTRAVENOUS AS NEEDED
Status: DISCONTINUED | OUTPATIENT
Start: 2018-05-18 | End: 2018-05-18 | Stop reason: HOSPADM

## 2018-05-18 RX ORDER — DIPHENHYDRAMINE HYDROCHLORIDE 50 MG/ML
50 INJECTION INTRAMUSCULAR; INTRAVENOUS AS NEEDED
Status: DISCONTINUED | OUTPATIENT
Start: 2018-05-18 | End: 2018-05-18 | Stop reason: HOSPADM

## 2018-05-18 RX ADMIN — IRON SUCROSE 200 MG: 20 INJECTION, SOLUTION INTRAVENOUS at 10:36

## 2018-05-18 RX ADMIN — SODIUM CHLORIDE 250 ML: 9 INJECTION, SOLUTION INTRAVENOUS at 10:36

## 2018-05-18 RX ADMIN — Medication 500 UNITS: at 11:38

## 2018-05-18 RX ADMIN — Medication 10 ML: at 11:38

## 2018-05-21 ENCOUNTER — INFUSION (OUTPATIENT)
Dept: ONCOLOGY | Facility: HOSPITAL | Age: 43
End: 2018-05-21

## 2018-05-21 VITALS
DIASTOLIC BLOOD PRESSURE: 66 MMHG | OXYGEN SATURATION: 100 % | TEMPERATURE: 96.8 F | BODY MASS INDEX: 42.69 KG/M2 | RESPIRATION RATE: 20 BRPM | HEART RATE: 89 BPM | SYSTOLIC BLOOD PRESSURE: 119 MMHG | HEIGHT: 67 IN | WEIGHT: 272 LBS

## 2018-05-21 DIAGNOSIS — K90.9 MALABSORPTION OF IRON: Primary | ICD-10-CM

## 2018-05-21 DIAGNOSIS — D50.8 IRON DEFICIENCY ANEMIA SECONDARY TO INADEQUATE DIETARY IRON INTAKE: ICD-10-CM

## 2018-05-21 PROCEDURE — 25010000002 HEPARIN FLUSH (PORCINE) 100 UNIT/ML SOLUTION: Performed by: INTERNAL MEDICINE

## 2018-05-21 PROCEDURE — 96365 THER/PROPH/DIAG IV INF INIT: CPT

## 2018-05-21 PROCEDURE — 25010000002 IRON SUCROSE PER 1 MG: Performed by: INTERNAL MEDICINE

## 2018-05-21 RX ORDER — SODIUM CHLORIDE 0.9 % (FLUSH) 0.9 %
10 SYRINGE (ML) INJECTION AS NEEDED
Status: DISCONTINUED | OUTPATIENT
Start: 2018-05-21 | End: 2018-05-21 | Stop reason: HOSPADM

## 2018-05-21 RX ORDER — MEPERIDINE HYDROCHLORIDE 50 MG/ML
25 INJECTION INTRAMUSCULAR; INTRAVENOUS; SUBCUTANEOUS
Status: DISCONTINUED | OUTPATIENT
Start: 2018-05-21 | End: 2018-05-21 | Stop reason: HOSPADM

## 2018-05-21 RX ORDER — FAMOTIDINE 10 MG/ML
20 INJECTION, SOLUTION INTRAVENOUS AS NEEDED
Status: DISCONTINUED | OUTPATIENT
Start: 2018-05-21 | End: 2018-05-21 | Stop reason: HOSPADM

## 2018-05-21 RX ORDER — DIPHENHYDRAMINE HYDROCHLORIDE 50 MG/ML
50 INJECTION INTRAMUSCULAR; INTRAVENOUS AS NEEDED
Status: DISCONTINUED | OUTPATIENT
Start: 2018-05-21 | End: 2018-05-21 | Stop reason: HOSPADM

## 2018-05-21 RX ORDER — SODIUM CHLORIDE 0.9 % (FLUSH) 0.9 %
10 SYRINGE (ML) INJECTION AS NEEDED
Status: CANCELLED | OUTPATIENT
Start: 2018-05-21

## 2018-05-21 RX ORDER — SODIUM CHLORIDE 9 MG/ML
250 INJECTION, SOLUTION INTRAVENOUS ONCE
Status: COMPLETED | OUTPATIENT
Start: 2018-05-21 | End: 2018-05-21

## 2018-05-21 RX ADMIN — IRON SUCROSE 200 MG: 20 INJECTION, SOLUTION INTRAVENOUS at 10:55

## 2018-05-21 RX ADMIN — SODIUM CHLORIDE 250 ML: 9 INJECTION, SOLUTION INTRAVENOUS at 10:54

## 2018-05-21 RX ADMIN — Medication 500 UNITS: at 11:37

## 2018-05-21 RX ADMIN — Medication 10 ML: at 11:37

## 2018-05-21 NOTE — PROGRESS NOTES
Patient arrives for Venofer.  Patient infusion initiated, infused per order/protocol, and patient tolerated well w/o incidence.  Port flushed well w/o incidence and removed following tx.  Patient discharged in stable condition.  ADONAY Barros RN

## 2018-05-22 RX ORDER — OXYCODONE HYDROCHLORIDE 10 MG/1
10 TABLET ORAL 2 TIMES DAILY PRN
Qty: 45 TABLET | Refills: 0 | Status: SHIPPED | OUTPATIENT
Start: 2018-05-23 | End: 2018-06-19 | Stop reason: SDUPTHER

## 2018-05-25 ENCOUNTER — INFUSION (OUTPATIENT)
Dept: ONCOLOGY | Facility: HOSPITAL | Age: 43
End: 2018-05-25

## 2018-05-25 VITALS
HEART RATE: 80 BPM | BODY MASS INDEX: 43 KG/M2 | DIASTOLIC BLOOD PRESSURE: 66 MMHG | RESPIRATION RATE: 18 BRPM | HEIGHT: 67 IN | OXYGEN SATURATION: 100 % | SYSTOLIC BLOOD PRESSURE: 102 MMHG | TEMPERATURE: 97.7 F | WEIGHT: 274 LBS

## 2018-05-25 DIAGNOSIS — K90.9 MALABSORPTION OF IRON: Primary | ICD-10-CM

## 2018-05-25 DIAGNOSIS — D50.8 IRON DEFICIENCY ANEMIA SECONDARY TO INADEQUATE DIETARY IRON INTAKE: ICD-10-CM

## 2018-05-25 PROCEDURE — 25010000002 IRON SUCROSE PER 1 MG: Performed by: INTERNAL MEDICINE

## 2018-05-25 PROCEDURE — 25010000002 HEPARIN FLUSH (PORCINE) 100 UNIT/ML SOLUTION: Performed by: INTERNAL MEDICINE

## 2018-05-25 PROCEDURE — 96365 THER/PROPH/DIAG IV INF INIT: CPT

## 2018-05-25 RX ORDER — DIPHENHYDRAMINE HYDROCHLORIDE 50 MG/ML
50 INJECTION INTRAMUSCULAR; INTRAVENOUS AS NEEDED
Status: DISCONTINUED | OUTPATIENT
Start: 2018-05-25 | End: 2018-05-25 | Stop reason: HOSPADM

## 2018-05-25 RX ORDER — SODIUM CHLORIDE 9 MG/ML
250 INJECTION, SOLUTION INTRAVENOUS ONCE
Status: COMPLETED | OUTPATIENT
Start: 2018-05-25 | End: 2018-05-25

## 2018-05-25 RX ORDER — FAMOTIDINE 10 MG/ML
20 INJECTION, SOLUTION INTRAVENOUS AS NEEDED
Status: DISCONTINUED | OUTPATIENT
Start: 2018-05-25 | End: 2018-05-25 | Stop reason: HOSPADM

## 2018-05-25 RX ORDER — SODIUM CHLORIDE 0.9 % (FLUSH) 0.9 %
10 SYRINGE (ML) INJECTION AS NEEDED
Status: CANCELLED | OUTPATIENT
Start: 2018-05-25

## 2018-05-25 RX ORDER — MEPERIDINE HYDROCHLORIDE 50 MG/ML
25 INJECTION INTRAMUSCULAR; INTRAVENOUS; SUBCUTANEOUS
Status: DISCONTINUED | OUTPATIENT
Start: 2018-05-25 | End: 2018-05-25 | Stop reason: HOSPADM

## 2018-05-25 RX ORDER — SODIUM CHLORIDE 0.9 % (FLUSH) 0.9 %
10 SYRINGE (ML) INJECTION AS NEEDED
Status: DISCONTINUED | OUTPATIENT
Start: 2018-05-25 | End: 2018-05-25 | Stop reason: HOSPADM

## 2018-05-25 RX ADMIN — Medication 500 UNITS: at 11:36

## 2018-05-25 RX ADMIN — Medication 10 ML: at 11:36

## 2018-05-25 RX ADMIN — IRON SUCROSE 200 MG: 20 INJECTION, SOLUTION INTRAVENOUS at 10:39

## 2018-05-25 RX ADMIN — SODIUM CHLORIDE 250 ML: 9 INJECTION, SOLUTION INTRAVENOUS at 10:28

## 2018-05-29 ENCOUNTER — DOCUMENTATION (OUTPATIENT)
Dept: ONCOLOGY | Facility: CLINIC | Age: 43
End: 2018-05-29

## 2018-05-29 ENCOUNTER — APPOINTMENT (OUTPATIENT)
Dept: ONCOLOGY | Facility: HOSPITAL | Age: 43
End: 2018-05-29

## 2018-05-30 ENCOUNTER — INFUSION (OUTPATIENT)
Dept: ONCOLOGY | Facility: HOSPITAL | Age: 43
End: 2018-05-30

## 2018-05-30 VITALS
TEMPERATURE: 98.4 F | BODY MASS INDEX: 42.85 KG/M2 | SYSTOLIC BLOOD PRESSURE: 111 MMHG | OXYGEN SATURATION: 100 % | RESPIRATION RATE: 20 BRPM | WEIGHT: 273 LBS | HEIGHT: 67 IN | DIASTOLIC BLOOD PRESSURE: 65 MMHG | HEART RATE: 95 BPM

## 2018-05-30 DIAGNOSIS — D50.8 IRON DEFICIENCY ANEMIA SECONDARY TO INADEQUATE DIETARY IRON INTAKE: ICD-10-CM

## 2018-05-30 DIAGNOSIS — K90.9 MALABSORPTION OF IRON: Primary | ICD-10-CM

## 2018-05-30 PROCEDURE — 25010000002 IRON SUCROSE PER 1 MG: Performed by: INTERNAL MEDICINE

## 2018-05-30 PROCEDURE — 96365 THER/PROPH/DIAG IV INF INIT: CPT

## 2018-05-30 PROCEDURE — 25010000002 HEPARIN FLUSH (PORCINE) 100 UNIT/ML SOLUTION: Performed by: INTERNAL MEDICINE

## 2018-05-30 RX ORDER — SODIUM CHLORIDE 9 MG/ML
250 INJECTION, SOLUTION INTRAVENOUS ONCE
Status: COMPLETED | OUTPATIENT
Start: 2018-05-30 | End: 2018-05-30

## 2018-05-30 RX ORDER — DIPHENHYDRAMINE HYDROCHLORIDE 50 MG/ML
50 INJECTION INTRAMUSCULAR; INTRAVENOUS AS NEEDED
Status: DISCONTINUED | OUTPATIENT
Start: 2018-05-30 | End: 2018-05-30 | Stop reason: HOSPADM

## 2018-05-30 RX ORDER — SODIUM CHLORIDE 0.9 % (FLUSH) 0.9 %
10 SYRINGE (ML) INJECTION AS NEEDED
Status: CANCELLED | OUTPATIENT
Start: 2018-05-30

## 2018-05-30 RX ORDER — SODIUM CHLORIDE 0.9 % (FLUSH) 0.9 %
10 SYRINGE (ML) INJECTION AS NEEDED
Status: DISCONTINUED | OUTPATIENT
Start: 2018-05-30 | End: 2018-05-30 | Stop reason: HOSPADM

## 2018-05-30 RX ORDER — FAMOTIDINE 10 MG/ML
20 INJECTION, SOLUTION INTRAVENOUS AS NEEDED
Status: DISCONTINUED | OUTPATIENT
Start: 2018-05-30 | End: 2018-05-30 | Stop reason: HOSPADM

## 2018-05-30 RX ORDER — MEPERIDINE HYDROCHLORIDE 50 MG/ML
25 INJECTION INTRAMUSCULAR; INTRAVENOUS; SUBCUTANEOUS
Status: DISCONTINUED | OUTPATIENT
Start: 2018-05-30 | End: 2018-05-30 | Stop reason: HOSPADM

## 2018-05-30 RX ADMIN — SODIUM CHLORIDE 250 ML: 9 INJECTION, SOLUTION INTRAVENOUS at 15:00

## 2018-05-30 RX ADMIN — Medication 500 UNITS: at 15:51

## 2018-05-30 RX ADMIN — IRON SUCROSE 200 MG: 20 INJECTION, SOLUTION INTRAVENOUS at 15:01

## 2018-06-01 ENCOUNTER — INFUSION (OUTPATIENT)
Dept: ONCOLOGY | Facility: HOSPITAL | Age: 43
End: 2018-06-01

## 2018-06-01 VITALS
TEMPERATURE: 96.2 F | OXYGEN SATURATION: 100 % | DIASTOLIC BLOOD PRESSURE: 64 MMHG | HEART RATE: 87 BPM | RESPIRATION RATE: 20 BRPM | HEIGHT: 67 IN | SYSTOLIC BLOOD PRESSURE: 101 MMHG | WEIGHT: 272 LBS | BODY MASS INDEX: 42.69 KG/M2

## 2018-06-01 DIAGNOSIS — K90.9 MALABSORPTION OF IRON: Primary | ICD-10-CM

## 2018-06-01 DIAGNOSIS — D50.8 IRON DEFICIENCY ANEMIA SECONDARY TO INADEQUATE DIETARY IRON INTAKE: ICD-10-CM

## 2018-06-01 PROCEDURE — 25010000002 IRON SUCROSE PER 1 MG: Performed by: INTERNAL MEDICINE

## 2018-06-01 PROCEDURE — 96365 THER/PROPH/DIAG IV INF INIT: CPT

## 2018-06-01 PROCEDURE — 25010000002 HEPARIN FLUSH (PORCINE) 100 UNIT/ML SOLUTION: Performed by: INTERNAL MEDICINE

## 2018-06-01 RX ORDER — MEPERIDINE HYDROCHLORIDE 50 MG/ML
25 INJECTION INTRAMUSCULAR; INTRAVENOUS; SUBCUTANEOUS
Status: DISCONTINUED | OUTPATIENT
Start: 2018-06-01 | End: 2018-06-01 | Stop reason: HOSPADM

## 2018-06-01 RX ORDER — FAMOTIDINE 10 MG/ML
20 INJECTION, SOLUTION INTRAVENOUS AS NEEDED
Status: DISCONTINUED | OUTPATIENT
Start: 2018-06-01 | End: 2018-06-01 | Stop reason: HOSPADM

## 2018-06-01 RX ORDER — SODIUM CHLORIDE 0.9 % (FLUSH) 0.9 %
10 SYRINGE (ML) INJECTION AS NEEDED
Status: DISCONTINUED | OUTPATIENT
Start: 2018-06-01 | End: 2018-06-01 | Stop reason: HOSPADM

## 2018-06-01 RX ORDER — SODIUM CHLORIDE 0.9 % (FLUSH) 0.9 %
10 SYRINGE (ML) INJECTION AS NEEDED
Status: CANCELLED | OUTPATIENT
Start: 2018-06-01

## 2018-06-01 RX ORDER — SODIUM CHLORIDE 9 MG/ML
250 INJECTION, SOLUTION INTRAVENOUS ONCE
Status: COMPLETED | OUTPATIENT
Start: 2018-06-01 | End: 2018-06-01

## 2018-06-01 RX ORDER — DIPHENHYDRAMINE HYDROCHLORIDE 50 MG/ML
50 INJECTION INTRAMUSCULAR; INTRAVENOUS AS NEEDED
Status: DISCONTINUED | OUTPATIENT
Start: 2018-06-01 | End: 2018-06-01 | Stop reason: HOSPADM

## 2018-06-01 RX ADMIN — SODIUM CHLORIDE 250 ML: 9 INJECTION, SOLUTION INTRAVENOUS at 10:40

## 2018-06-01 RX ADMIN — Medication 500 UNITS: at 11:27

## 2018-06-01 RX ADMIN — IRON SUCROSE 200 MG: 20 INJECTION, SOLUTION INTRAVENOUS at 10:43

## 2018-06-04 ENCOUNTER — INFUSION (OUTPATIENT)
Dept: ONCOLOGY | Facility: HOSPITAL | Age: 43
End: 2018-06-04

## 2018-06-04 ENCOUNTER — OFFICE VISIT (OUTPATIENT)
Dept: BARIATRICS/WEIGHT MGMT | Facility: CLINIC | Age: 43
End: 2018-06-04

## 2018-06-04 VITALS
TEMPERATURE: 97.4 F | HEART RATE: 84 BPM | WEIGHT: 272 LBS | BODY MASS INDEX: 42.69 KG/M2 | DIASTOLIC BLOOD PRESSURE: 66 MMHG | SYSTOLIC BLOOD PRESSURE: 118 MMHG | HEIGHT: 67 IN | RESPIRATION RATE: 18 BRPM | OXYGEN SATURATION: 100 %

## 2018-06-04 VITALS
WEIGHT: 274 LBS | SYSTOLIC BLOOD PRESSURE: 115 MMHG | HEART RATE: 79 BPM | HEIGHT: 65 IN | DIASTOLIC BLOOD PRESSURE: 76 MMHG | BODY MASS INDEX: 45.65 KG/M2 | OXYGEN SATURATION: 100 % | TEMPERATURE: 98.4 F

## 2018-06-04 DIAGNOSIS — D50.8 IRON DEFICIENCY ANEMIA SECONDARY TO INADEQUATE DIETARY IRON INTAKE: ICD-10-CM

## 2018-06-04 DIAGNOSIS — Z98.84 STATUS POST LAPAROSCOPIC SLEEVE GASTRECTOMY: ICD-10-CM

## 2018-06-04 DIAGNOSIS — K90.9 MALABSORPTION OF IRON: Primary | ICD-10-CM

## 2018-06-04 DIAGNOSIS — E66.01 OBESITY, CLASS III, BMI 40-49.9 (MORBID OBESITY) (HCC): Primary | ICD-10-CM

## 2018-06-04 PROCEDURE — 96365 THER/PROPH/DIAG IV INF INIT: CPT

## 2018-06-04 PROCEDURE — 99213 OFFICE O/P EST LOW 20 MIN: CPT | Performed by: SURGERY

## 2018-06-04 PROCEDURE — 25010000002 HEPARIN FLUSH (PORCINE) 100 UNIT/ML SOLUTION: Performed by: INTERNAL MEDICINE

## 2018-06-04 PROCEDURE — 25010000002 IRON SUCROSE PER 1 MG: Performed by: INTERNAL MEDICINE

## 2018-06-04 RX ORDER — MEPERIDINE HYDROCHLORIDE 50 MG/ML
25 INJECTION INTRAMUSCULAR; INTRAVENOUS; SUBCUTANEOUS
Status: DISCONTINUED | OUTPATIENT
Start: 2018-06-04 | End: 2018-06-04 | Stop reason: HOSPADM

## 2018-06-04 RX ORDER — DIPHENHYDRAMINE HYDROCHLORIDE 50 MG/ML
50 INJECTION INTRAMUSCULAR; INTRAVENOUS AS NEEDED
Status: DISCONTINUED | OUTPATIENT
Start: 2018-06-04 | End: 2018-06-04 | Stop reason: HOSPADM

## 2018-06-04 RX ORDER — FAMOTIDINE 10 MG/ML
20 INJECTION, SOLUTION INTRAVENOUS AS NEEDED
Status: DISCONTINUED | OUTPATIENT
Start: 2018-06-04 | End: 2018-06-04 | Stop reason: HOSPADM

## 2018-06-04 RX ORDER — SODIUM CHLORIDE 0.9 % (FLUSH) 0.9 %
10 SYRINGE (ML) INJECTION AS NEEDED
Status: DISCONTINUED | OUTPATIENT
Start: 2018-06-04 | End: 2018-06-04 | Stop reason: HOSPADM

## 2018-06-04 RX ORDER — SODIUM CHLORIDE 9 MG/ML
250 INJECTION, SOLUTION INTRAVENOUS ONCE
Status: COMPLETED | OUTPATIENT
Start: 2018-06-04 | End: 2018-06-04

## 2018-06-04 RX ORDER — METOPROLOL SUCCINATE 50 MG/1
50 TABLET, EXTENDED RELEASE ORAL DAILY
Qty: 180 TABLET | Refills: 3 | Status: SHIPPED | OUTPATIENT
Start: 2018-06-04 | End: 2018-09-24 | Stop reason: SDUPTHER

## 2018-06-04 RX ORDER — ACYCLOVIR 800 MG/1
800 TABLET ORAL DAILY
Qty: 90 TABLET | Refills: 1 | Status: SHIPPED | OUTPATIENT
Start: 2018-06-04 | End: 2019-01-07 | Stop reason: SDUPTHER

## 2018-06-04 RX ORDER — SODIUM CHLORIDE 0.9 % (FLUSH) 0.9 %
10 SYRINGE (ML) INJECTION AS NEEDED
Status: CANCELLED | OUTPATIENT
Start: 2018-06-04

## 2018-06-04 RX ORDER — VENLAFAXINE 75 MG/1
75 TABLET ORAL 2 TIMES DAILY
Qty: 60 TABLET | Refills: 11 | Status: SHIPPED | OUTPATIENT
Start: 2018-06-04 | End: 2018-09-14 | Stop reason: SDUPTHER

## 2018-06-04 RX ADMIN — Medication 500 UNITS: at 11:51

## 2018-06-04 RX ADMIN — SODIUM CHLORIDE 250 ML: 9 INJECTION, SOLUTION INTRAVENOUS at 10:51

## 2018-06-04 RX ADMIN — IRON SUCROSE 200 MG: 20 INJECTION, SOLUTION INTRAVENOUS at 10:51

## 2018-06-04 RX ADMIN — Medication 10 ML: at 11:51

## 2018-06-04 NOTE — PROGRESS NOTES
"Subjective   Naomi Bhatia is a 42 y.o. female.     Naomi is post op 6 months from her sleeve gastrectomy procedure.  She is currently on her regular diet.  She states she is doing fine overall.  She is consuming 60+ ounces of fluid daily and eating 4 meals a day.  She is measuring these meals and be portion sizes of less than a half a cup.  She is incorporating protein with each of those meals.    Review Of Systems:  General ROS: negative  Gastrointestinal ROS: no abdominal pain, change in bowel habits, or black or bloody stools    The following portions of the patient's history were reviewed and updated as appropriate: allergies, current medications, past medical history, past surgical history and problem list.    Objective   /76 (BP Location: Right arm, Patient Position: Sitting, Cuff Size: Adult)   Pulse 79   Temp 98.4 °F (36.9 °C)   Ht 165.1 cm (65\")   Wt 124 kg (274 lb)   LMP  (LMP Unknown)   SpO2 100%   BMI 45.60 kg/m²     General Appearance:  awake, alert, oriented, in no acute distress  Abdomen:  Soft, non-tender, normal bowel sounds; no bruits, organomegaly or masses.  Abnormal shape: obese  Wounds: clean, dry, intact    Assessment/Plan     Encounter Diagnoses   Name Primary?   • Obesity, Class III, BMI 40-49.9 (morbid obesity) Yes   • Status post laparoscopic sleeve gastrectomy        Naomi Bhatia and I discussed the importance of changing behavior for consistent and successful weight loss.  We first reviewed again the definition of a meal which is defined as portion sizes less than a half a cup and those portions incorporating a protein.  Specifically the protein should fill half of that volume.  I also explained that she should attempt to consume 4 meals as defined above daily and to avoid snacking or grazing.  She should also be mindful of adequate hydration by consuming at least 64 oz of water daily and incorporation of a regular exercise regimen.   I discussed the " importance of taking her multivitamins as directed.  She is to return to our office 6 months or as needed.      06/04/18  4:30 PM  Patient Care Team:  Brett Sharp DO as PCP - General (Pediatrics)  IRMA Wilcox as Referring Physician (Family Medicine)  Barney Houston MD as Cardiologist (Cardiology)  Yifan Cordero MD FACS

## 2018-06-05 ENCOUNTER — HOSPITAL ENCOUNTER (OUTPATIENT)
Dept: PAIN MANAGEMENT | Age: 43
Discharge: HOME | End: 2018-06-05
Payer: MEDICARE

## 2018-06-05 VITALS
HEIGHT: 67 IN | OXYGEN SATURATION: 95 % | TEMPERATURE: 97.2 F | DIASTOLIC BLOOD PRESSURE: 56 MMHG | RESPIRATION RATE: 20 BRPM | BODY MASS INDEX: 43 KG/M2 | SYSTOLIC BLOOD PRESSURE: 100 MMHG | HEART RATE: 83 BPM | WEIGHT: 274 LBS

## 2018-06-05 DIAGNOSIS — M51.36 LUMBAR DEGENERATIVE DISC DISEASE: ICD-10-CM

## 2018-06-05 DIAGNOSIS — M54.10 BACK PAIN WITH LEFT-SIDED RADICULOPATHY: ICD-10-CM

## 2018-06-05 DIAGNOSIS — M51.16 LUMBAR DISC DISEASE WITH RADICULOPATHY: Primary | ICD-10-CM

## 2018-06-05 DIAGNOSIS — M51.16 LUMBAR DISC DISEASE WITH RADICULOPATHY: Chronic | ICD-10-CM

## 2018-06-05 PROCEDURE — 2500000003 HC RX 250 WO HCPCS

## 2018-06-05 PROCEDURE — 62323 NJX INTERLAMINAR LMBR/SAC: CPT

## 2018-06-05 PROCEDURE — 3209999900 FLUORO FOR SURGICAL PROCEDURES

## 2018-06-05 PROCEDURE — 2580000003 HC RX 258

## 2018-06-05 PROCEDURE — 6360000002 HC RX W HCPCS

## 2018-06-05 RX ORDER — LIDOCAINE HYDROCHLORIDE 10 MG/ML
INJECTION, SOLUTION EPIDURAL; INFILTRATION; INTRACAUDAL; PERINEURAL
Status: COMPLETED | OUTPATIENT
Start: 2018-06-05 | End: 2018-06-05

## 2018-06-05 RX ORDER — BUPIVACAINE HYDROCHLORIDE 2.5 MG/ML
INJECTION, SOLUTION EPIDURAL; INFILTRATION; INTRACAUDAL
Status: COMPLETED | OUTPATIENT
Start: 2018-06-05 | End: 2018-06-05

## 2018-06-05 RX ORDER — 0.9 % SODIUM CHLORIDE 0.9 %
VIAL (ML) INJECTION
Status: COMPLETED | OUTPATIENT
Start: 2018-06-05 | End: 2018-06-05

## 2018-06-05 RX ORDER — METHYLPREDNISOLONE ACETATE 80 MG/ML
INJECTION, SUSPENSION INTRA-ARTICULAR; INTRALESIONAL; INTRAMUSCULAR; SOFT TISSUE
Status: COMPLETED | OUTPATIENT
Start: 2018-06-05 | End: 2018-06-05

## 2018-06-05 RX ADMIN — METHYLPREDNISOLONE ACETATE 80 MG: 80 INJECTION, SUSPENSION INTRA-ARTICULAR; INTRALESIONAL; INTRAMUSCULAR; SOFT TISSUE at 08:42

## 2018-06-05 RX ADMIN — LIDOCAINE HYDROCHLORIDE 3 ML: 10 INJECTION, SOLUTION EPIDURAL; INFILTRATION; INTRACAUDAL; PERINEURAL at 08:39

## 2018-06-05 RX ADMIN — BUPIVACAINE HYDROCHLORIDE 5 ML: 2.5 INJECTION, SOLUTION EPIDURAL; INFILTRATION; INTRACAUDAL at 08:41

## 2018-06-05 RX ADMIN — Medication 4 ML: at 08:42

## 2018-06-05 ASSESSMENT — ACTIVITIES OF DAILY LIVING (ADL): EFFECT OF PAIN ON DAILY ACTIVITIES: DAILY CHORES AND ACTIVITIES

## 2018-06-05 ASSESSMENT — PAIN - FUNCTIONAL ASSESSMENT
PAIN_FUNCTIONAL_ASSESSMENT: 0-10

## 2018-06-05 ASSESSMENT — PAIN DESCRIPTION - DESCRIPTORS: DESCRIPTORS: ACHING;CONSTANT;RADIATING;THROBBING

## 2018-06-06 RX ORDER — PREGABALIN 150 MG/1
150 CAPSULE ORAL 2 TIMES DAILY
Qty: 180 CAPSULE | Refills: 0 | OUTPATIENT
Start: 2018-06-06 | End: 2018-09-12 | Stop reason: SDUPTHER

## 2018-06-08 ENCOUNTER — LAB (OUTPATIENT)
Dept: LAB | Facility: HOSPITAL | Age: 43
End: 2018-06-08

## 2018-06-08 DIAGNOSIS — D50.8 IRON DEFICIENCY ANEMIA SECONDARY TO INADEQUATE DIETARY IRON INTAKE: ICD-10-CM

## 2018-06-08 DIAGNOSIS — Z86.39 HISTORY OF IRON DEFICIENCY: Primary | ICD-10-CM

## 2018-06-08 DIAGNOSIS — D50.8 IRON DEFICIENCY ANEMIA SECONDARY TO INADEQUATE DIETARY IRON INTAKE: Primary | ICD-10-CM

## 2018-06-08 LAB
BASOPHILS # BLD AUTO: 0.08 10*3/MM3 (ref 0–0.2)
BASOPHILS NFR BLD AUTO: 0.5 % (ref 0–2)
DEPRECATED RDW RBC AUTO: 57.5 FL (ref 40–54)
EOSINOPHIL # BLD AUTO: 0.07 10*3/MM3 (ref 0–0.7)
EOSINOPHIL NFR BLD AUTO: 0.5 % (ref 0–4)
ERYTHROCYTE [DISTWIDTH] IN BLOOD BY AUTOMATED COUNT: 18.1 % (ref 12–15)
FERRITIN SERPL-MCNC: 1590 NG/ML (ref 6.24–137)
HCT VFR BLD AUTO: 35.5 % (ref 37–47)
HGB BLD-MCNC: 12.6 G/DL (ref 12–16)
HOLD SPECIMEN: NORMAL
HOLD SPECIMEN: NORMAL
IMM GRANULOCYTES # BLD: 0.14 10*3/MM3 (ref 0–0.03)
IMM GRANULOCYTES NFR BLD: 0.9 % (ref 0–5)
IRON 24H UR-MRATE: 104 MCG/DL (ref 42–180)
IRON SATN MFR SERPL: 38 % (ref 20–45)
LYMPHOCYTES # BLD AUTO: 3.94 10*3/MM3 (ref 0.72–4.86)
LYMPHOCYTES NFR BLD AUTO: 25.7 % (ref 15–45)
MCH RBC QN AUTO: 30.7 PG (ref 28–32)
MCHC RBC AUTO-ENTMCNC: 35.5 G/DL (ref 33–36)
MCV RBC AUTO: 86.6 FL (ref 82–98)
MONOCYTES # BLD AUTO: 0.84 10*3/MM3 (ref 0.19–1.3)
MONOCYTES NFR BLD AUTO: 5.5 % (ref 4–12)
NEUTROPHILS # BLD AUTO: 10.28 10*3/MM3 (ref 1.87–8.4)
NEUTROPHILS NFR BLD AUTO: 66.9 % (ref 39–78)
NRBC BLD MANUAL-RTO: 0 /100 WBC (ref 0–0)
PLATELET # BLD AUTO: 259 10*3/MM3 (ref 130–400)
PMV BLD AUTO: 9.6 FL (ref 6–12)
RBC # BLD AUTO: 4.1 10*6/MM3 (ref 4.2–5.4)
TIBC SERPL-MCNC: 277 MCG/DL (ref 225–420)
WBC NRBC COR # BLD: 15.35 10*3/MM3 (ref 4.8–10.8)

## 2018-06-08 PROCEDURE — 85025 COMPLETE CBC W/AUTO DIFF WBC: CPT

## 2018-06-08 PROCEDURE — 83550 IRON BINDING TEST: CPT

## 2018-06-08 PROCEDURE — 82728 ASSAY OF FERRITIN: CPT

## 2018-06-08 PROCEDURE — 83540 ASSAY OF IRON: CPT

## 2018-06-08 PROCEDURE — 36415 COLL VENOUS BLD VENIPUNCTURE: CPT

## 2018-06-19 ENCOUNTER — OFFICE VISIT (OUTPATIENT)
Dept: PSYCHIATRY | Age: 43
End: 2018-06-19
Payer: MEDICARE

## 2018-06-19 VITALS
HEART RATE: 81 BPM | BODY MASS INDEX: 42.11 KG/M2 | SYSTOLIC BLOOD PRESSURE: 103 MMHG | WEIGHT: 268.3 LBS | HEIGHT: 67 IN | DIASTOLIC BLOOD PRESSURE: 70 MMHG | OXYGEN SATURATION: 100 %

## 2018-06-19 DIAGNOSIS — M51.16 LUMBAR DISC DISEASE WITH RADICULOPATHY: Primary | ICD-10-CM

## 2018-06-19 DIAGNOSIS — F32.89 OTHER DEPRESSION: Primary | ICD-10-CM

## 2018-06-19 PROBLEM — F32.A DEPRESSION: Status: ACTIVE | Noted: 2018-06-19

## 2018-06-19 PROCEDURE — 90791 PSYCH DIAGNOSTIC EVALUATION: CPT | Performed by: PSYCHOLOGIST

## 2018-06-20 RX ORDER — OXYCODONE HYDROCHLORIDE 10 MG/1
10 TABLET ORAL 2 TIMES DAILY PRN
Qty: 45 TABLET | Refills: 0 | Status: SHIPPED | OUTPATIENT
Start: 2018-06-22 | End: 2018-07-16 | Stop reason: SDUPTHER

## 2018-06-25 DIAGNOSIS — E66.01 MORBID OBESITY (HCC): Primary | ICD-10-CM

## 2018-06-26 ENCOUNTER — LAB (OUTPATIENT)
Dept: LAB | Facility: HOSPITAL | Age: 43
End: 2018-06-26

## 2018-06-26 ENCOUNTER — OFFICE VISIT (OUTPATIENT)
Dept: ONCOLOGY | Facility: CLINIC | Age: 43
End: 2018-06-26

## 2018-06-26 VITALS
OXYGEN SATURATION: 95 % | WEIGHT: 273.4 LBS | DIASTOLIC BLOOD PRESSURE: 70 MMHG | RESPIRATION RATE: 18 BRPM | HEIGHT: 65 IN | HEART RATE: 86 BPM | TEMPERATURE: 97.7 F | BODY MASS INDEX: 45.55 KG/M2 | SYSTOLIC BLOOD PRESSURE: 120 MMHG

## 2018-06-26 DIAGNOSIS — D50.8 OTHER IRON DEFICIENCY ANEMIA: ICD-10-CM

## 2018-06-26 DIAGNOSIS — D50.8 OTHER IRON DEFICIENCY ANEMIA: Primary | ICD-10-CM

## 2018-06-26 DIAGNOSIS — D50.8 IRON DEFICIENCY ANEMIA SECONDARY TO INADEQUATE DIETARY IRON INTAKE: Primary | ICD-10-CM

## 2018-06-26 DIAGNOSIS — D72.829 LEUKOCYTOSIS, UNSPECIFIED TYPE: ICD-10-CM

## 2018-06-26 LAB
ALBUMIN SERPL-MCNC: 4.1 G/DL (ref 3.5–5)
ALBUMIN/GLOB SERPL: 1.3 G/DL (ref 1.1–2.5)
ALP SERPL-CCNC: 91 U/L (ref 24–120)
ALT SERPL W P-5'-P-CCNC: 27 U/L (ref 0–54)
ANION GAP SERPL CALCULATED.3IONS-SCNC: 10 MMOL/L (ref 4–13)
AST SERPL-CCNC: 29 U/L (ref 7–45)
BASOPHILS # BLD AUTO: 0.05 10*3/MM3 (ref 0–0.2)
BASOPHILS NFR BLD AUTO: 0.6 % (ref 0–2)
BILIRUB SERPL-MCNC: 0.8 MG/DL (ref 0.1–1)
BUN BLD-MCNC: 15 MG/DL (ref 5–21)
BUN/CREAT SERPL: 20.3 (ref 7–25)
CALCIUM SPEC-SCNC: 9.4 MG/DL (ref 8.4–10.4)
CHLORIDE SERPL-SCNC: 102 MMOL/L (ref 98–110)
CO2 SERPL-SCNC: 30 MMOL/L (ref 24–31)
CREAT BLD-MCNC: 0.74 MG/DL (ref 0.5–1.4)
DEPRECATED RDW RBC AUTO: 56.9 FL (ref 40–54)
EOSINOPHIL # BLD AUTO: 0.13 10*3/MM3 (ref 0–0.7)
EOSINOPHIL NFR BLD AUTO: 1.4 % (ref 0–4)
ERYTHROCYTE [DISTWIDTH] IN BLOOD BY AUTOMATED COUNT: 17.5 % (ref 12–15)
GFR SERPL CREATININE-BSD FRML MDRD: 86 ML/MIN/1.73
GLOBULIN UR ELPH-MCNC: 3.1 GM/DL
GLUCOSE BLD-MCNC: 133 MG/DL (ref 70–100)
HCT VFR BLD AUTO: 34.1 % (ref 37–47)
HGB BLD-MCNC: 11.9 G/DL (ref 12–16)
HOLD SPECIMEN: NORMAL
HOLD SPECIMEN: NORMAL
IMM GRANULOCYTES # BLD: 0.07 10*3/MM3 (ref 0–0.03)
IMM GRANULOCYTES NFR BLD: 0.8 % (ref 0–5)
IRON 24H UR-MRATE: 107 MCG/DL (ref 42–180)
IRON SATN MFR SERPL: 40 % (ref 20–45)
LYMPHOCYTES # BLD AUTO: 2.52 10*3/MM3 (ref 0.72–4.86)
LYMPHOCYTES NFR BLD AUTO: 28.1 % (ref 15–45)
MCH RBC QN AUTO: 31 PG (ref 28–32)
MCHC RBC AUTO-ENTMCNC: 34.9 G/DL (ref 33–36)
MCV RBC AUTO: 88.8 FL (ref 82–98)
MONOCYTES # BLD AUTO: 0.34 10*3/MM3 (ref 0.19–1.3)
MONOCYTES NFR BLD AUTO: 3.8 % (ref 4–12)
NEUTROPHILS # BLD AUTO: 5.87 10*3/MM3 (ref 1.87–8.4)
NEUTROPHILS NFR BLD AUTO: 65.3 % (ref 39–78)
NRBC BLD MANUAL-RTO: 0 /100 WBC (ref 0–0)
PLATELET # BLD AUTO: 222 10*3/MM3 (ref 130–400)
PMV BLD AUTO: 9.3 FL (ref 6–12)
POTASSIUM BLD-SCNC: 3.9 MMOL/L (ref 3.5–5.3)
PROT SERPL-MCNC: 7.2 G/DL (ref 6.3–8.7)
RBC # BLD AUTO: 3.84 10*6/MM3 (ref 4.2–5.4)
SODIUM BLD-SCNC: 142 MMOL/L (ref 135–145)
TIBC SERPL-MCNC: 266 MCG/DL (ref 225–420)
WBC NRBC COR # BLD: 8.98 10*3/MM3 (ref 4.8–10.8)

## 2018-06-26 PROCEDURE — 85025 COMPLETE CBC W/AUTO DIFF WBC: CPT | Performed by: INTERNAL MEDICINE

## 2018-06-26 PROCEDURE — 83540 ASSAY OF IRON: CPT

## 2018-06-26 PROCEDURE — 99214 OFFICE O/P EST MOD 30 MIN: CPT | Performed by: INTERNAL MEDICINE

## 2018-06-26 PROCEDURE — 36415 COLL VENOUS BLD VENIPUNCTURE: CPT

## 2018-06-26 PROCEDURE — 80053 COMPREHEN METABOLIC PANEL: CPT | Performed by: INTERNAL MEDICINE

## 2018-06-26 PROCEDURE — 83550 IRON BINDING TEST: CPT

## 2018-06-26 NOTE — PROGRESS NOTES
[facility]  HEMATOLOGY & ONCOLOGY    Cancer Staging Information:  No matching staging information was found for the patient.      Subjective     VISIT DIAGNOSIS:   Encounter Diagnosis   Name Primary?   • Other iron deficiency anemia Yes       REASON FOR VISIT:     Chief Complaint   Patient presents with   • Follow-up     MOY: She is here for f/u visit today post her completion of her gilbert IV iron txts. She c/o feeling very drained, weak and tired today. Patient presents to office in w/c today due to fatigue and weakness.         HEMATOLOGY / ONCOLOGY HISTORY:    No history exists.           INTERVAL HISTORY  Patient ID: Naomi Bhatia is a 42 y.o. year old female with her obesity, anemia status post Venofer infusions, appreciate to follow-up.   -- Underwent gastric sleeve on 12/20/2017 doing well.  --had right hip sx, it was not replaced. She has to wait 4 weeks and re-eval for the possibility of right hip replacement  --her mood is a little down due to her mother is admitted for pneumonia and she has bone cancer.  --6/26/18: since the lat time she was seen, her mom passed away an she broke he hip. She will be getting total hip replacement.        Past Medical History:   Past Medical History:   Diagnosis Date   • Anemia    • Anxiety    • Arthritis    • Asthma    • Back pain 03/14/2016    with left sided radiculopathy    • DDD (degenerative disc disease), lumbar     Dr. Stuart Zavaleta for pain manangement   • Depression    • Fatigue    • Fibromyalgia    • GERD (gastroesophageal reflux disease)    • Headache    • History of blood transfusion    • Hypertension    • Iron deficiency anemia 9/12/2017   • Iron deficiency anemia secondary to inadequate dietary iron intake 9/12/2017   • Joint pain    • Obesity    • RLS (restless legs syndrome)    • Sleep apnea     positive sleep study; titration study in October     Past Surgical History:   Past Surgical History:   Procedure Laterality Date   • ANKLE  SURGERY      Right    • APPENDECTOMY  04/19/2015   • BACK SURGERY  2000   • CERVICAL SPINE SURGERY  09/01/2015   • CHOLECYSTECTOMY      1995;lap   • COLONOSCOPY  05/02/2017    normal; do not return until age of 49 Dr. Gus Valentine   • GASTRIC SLEEVE LAPAROSCOPIC N/A 12/20/2017    Procedure: GASTRIC SLEEVE LAPAROSCOPIC;  Surgeon: Yifan Cordero MD;  Location: [facility];  Service:    • HIP SURGERY  1987    right    • INSERTION CENTRAL VENOUS ACCESS DEVICE W/ SUBCUTANEOUS PORT  11/07/2017    Dr Anel Gamboa (Smart Port-Power injectable Port) Cat NO#[id number] Lot [id number]    • MOUTH SURGERY  1994   • NECK SURGERY     • TOOTH EXTRACTION     • UPPER GASTROINTESTINAL ENDOSCOPY  05/02/2017    Dr. Gus Valentine, negative for h.pylori, negative for Salomon's   • VAGINAL HYSTERECTOMY SALPINGO OOPHORECTOMY  2008    Partial and then had another surgery to remove the rest     Social History:   Social History     Social History   • Marital status:      Spouse name: N/A   • Number of children: N/A   • Years of education: N/A     Occupational History   • Not on file.     Social History Main Topics   • Smoking status: Former Smoker     Packs/day: 1.00     Years: 25.00     Types: Cigarettes     Quit date: 2014   • Smokeless tobacco: Never Used   • Alcohol use Yes      Comment: rarely    • Drug use: No      Comment: Codeine in Prescribed Medications only    • Sexual activity: Defer     Other Topics Concern   • Not on file     Social History Narrative   • No narrative on file     Family History:   Family History   Problem Relation Age of Onset   • Diabetes Mother    • Hypertension Mother    • Coronary artery disease Mother    • Cancer Mother    • Cancer Father    • Hypertension Father    • Heart disease Father    • Stroke Father    • Hypertension Sister    • Obesity Sister    • Cancer Brother    • Diabetes Brother    • Diabetes Maternal Grandmother    • Stroke Maternal Grandmother        Review of Systems   Constitutional:  Negative.    HENT: Negative.    Eyes: Negative.    Respiratory: Positive for apnea. Negative for cough, choking and shortness of breath.    Cardiovascular: Negative.    Gastrointestinal: Negative for abdominal distention, abdominal pain, blood in stool, constipation, diarrhea, nausea and vomiting.   Genitourinary: Negative.    Musculoskeletal: Positive for back pain.        Right hip pain   Skin: Negative.    Allergic/Immunologic: Negative.    Neurological: Negative.    Hematological: Negative.    Psychiatric/Behavioral: Negative.         Performance Status:  Asymptomatic    Medications:    Current Outpatient Prescriptions   Medication Sig Dispense Refill   • acyclovir (ZOVIRAX) 800 MG tablet Take 800 mg by mouth Daily.     • ALBUTEROL IN Inhale 1 puff As Needed.     • Calcium Citrate-Vitamin D (CALCIUM CITRATE + D3 PO) Take  by mouth Daily.     • carbidopa-levodopa (SINEMET)  MG per tablet Take 1 tablet by mouth Daily.     • CRANBERRY PO Take  by mouth Daily.     • Cyanocobalamin (VITAMIN B-12 CR PO) Take  by mouth Daily.     • Cyclobenzaprine HCl (FLEXERIL PO) Take 10 mg by mouth Daily As Needed.     • levocetirizine (XYZAL) 5 MG tablet TK 1 T PO QPM  0   • LYRICA 150 MG capsule TK 1 C PO BID  2   • metoprolol succinate XL (TOPROL XL) 50 MG 24 hr tablet Take 50 mg by mouth 2 (Two) Times a Day.     • Multiple Vitamin (MULTI VITAMIN PO) Take  by mouth Daily.     • nortriptyline (PAMELOR) 25 MG capsule 2 tablets at night as needed  3   • omeprazole (priLOSEC) 20 MG capsule TK ONE C PO QD FIRST THING IN THE MORNING ON  AN EMPTY STOMACH  3   • oxyCODONE (ROXICODONE) 10 MG tablet Take 10 mg by mouth 2 (Two) Times a Day As Needed.     • simethicone (GAS-X) 80 MG chewable tablet Chew 0.5 tablets Every 6 (Six) Hours As Needed for Flatulence (belching). 30 tablet 1   • venlafaxine (EFFEXOR) 75 MG tablet bid  11   • diclofenac (VOLTAREN) 75 MG EC tablet Take 1 tablet by mouth 2 (Two) Times a Day. 14 tablet 0   •  "ferrous sulfate 325 (65 FE) MG tablet Take 1 tablet by mouth Daily With Breakfast. 30 tablet 2     No current facility-administered medications for this visit.      Facility-Administered Medications Ordered in Other Visits   Medication Dose Route Frequency Provider Last Rate Last Dose   • diphenhydrAMINE (BENADRYL) injection 50 mg  50 mg Intravenous PRN Pedro Clements MD       • famotidine (PEPCID) injection 20 mg  20 mg Intravenous PRN Pedro Clements MD       • hydrocortisone sodium succinate (Solu-CORTEF) injection 100 mg  100 mg Intravenous PRN Pedro Clements MD           ALLERGIES:    Allergies   Allergen Reactions   • Azithromycin GI Intolerance     Severe Abdominal Pain    • Tegaderm Ag Mesh [Silver] Other (See Comments)     Redness, blisters       Objective      Vitals:    06/26/18 0826   BP: 120/70   Pulse: 86   Resp: 18   Temp: 97.7 °F (36.5 °C)   TempSrc: Tympanic   SpO2: 95%   Weight: 124 kg (273 lb 6.4 oz)   Height: 165.1 cm (65\")         Current Status 4/26/2018   ECOG score 0         Physical Exam    General Appearance: Patient is awake, alert, oriented and in no acute distress. Patient is welldeveloped, wellnourished, and appears stated age.  HEENT: Normocephalic. Sclerae clear, conjunctiva pink, extraocular movements intact, pupils, round, reactive to light and  accommodation. Mouth and throat are clear with moist oral mucosa.  NECK: Supple, no jugular venous distention, thyroid not enlarged.  LYMPH: No cervical, supraclavicular, axillary, or inguinal lymphadenopathy.  CHEST: Equal bilateral expansion, AP  diameter normal, resonant percussion note  LUNGS: Good air movement, no rales, rhonchi, rubs or wheezes with auscultation  CARDIO: Regular sinus rhythm, no murmurs, gallops or rubs.  ABDOMEN: Nondistended, soft, No tenderness, no guarding, no rebound, No hepatosplenomegaly. No abdominal masses. Bowel sounds positive. No hernia  GENITALIA: Not examined.  BREASTS: " Not examined.  MUSKEL: No joint swelling, decreased motion, or inflammation  EXTREMS: No edema, clubbing, cyanosis, No varicose veins.  NEURO: Grossly nonfocal, Gait is coordinated and smooth, Cognition is preserved.  SKIN: No rashes, no ecchymoses, no petechia.  PSYCH: Oriented to time, place and person. Memory is preserved. Mood and affect appear normal  RECENT LABS:  Orders Only on 06/25/2018   Component Date Value Ref Range Status   • Glucose 06/26/2018 133* 70 - 100 mg/dL Final   • BUN 06/26/2018 15  5 - 21 mg/dL Final   • Creatinine 06/26/2018 0.74  0.50 - 1.40 mg/dL Final   • Sodium 06/26/2018 142  135 - 145 mmol/L Final   • Potassium 06/26/2018 3.9  3.5 - 5.3 mmol/L Final   • Chloride 06/26/2018 102  98 - 110 mmol/L Final   • CO2 06/26/2018 30.0  24.0 - 31.0 mmol/L Final   • Calcium 06/26/2018 9.4  8.4 - 10.4 mg/dL Final   • Total Protein 06/26/2018 7.2  6.3 - 8.7 g/dL Final   • Albumin 06/26/2018 4.10  3.50 - 5.00 g/dL Final   • ALT (SGPT) 06/26/2018 27  0 - 54 U/L Final   • AST (SGOT) 06/26/2018 29  7 - 45 U/L Final   • Alkaline Phosphatase 06/26/2018 91  24 - 120 U/L Final   • Total Bilirubin 06/26/2018 0.8  0.1 - 1.0 mg/dL Final   • eGFR Non African Amer 06/26/2018 86  >60 mL/min/1.73 Final   • Globulin 06/26/2018 3.1  gm/dL Final   • A/G Ratio 06/26/2018 1.3  1.1 - 2.5 g/dL Final   • BUN/Creatinine Ratio 06/26/2018 20.3  7.0 - 25.0 Final   • Anion Gap 06/26/2018 10.0  4.0 - 13.0 mmol/L Final   • WBC 06/26/2018 8.98  4.80 - 10.80 10*3/mm3 Final   • RBC 06/26/2018 3.84* 4.20 - 5.40 10*6/mm3 Final   • Hemoglobin 06/26/2018 11.9* 12.0 - 16.0 g/dL Final   • Hematocrit 06/26/2018 34.1* 37.0 - 47.0 % Final   • MCV 06/26/2018 88.8  82.0 - 98.0 fL Final   • MCH 06/26/2018 31.0  28.0 - 32.0 pg Final   • MCHC 06/26/2018 34.9  33.0 - 36.0 g/dL Final   • RDW 06/26/2018 17.5* 12.0 - 15.0 % Final   • RDW-SD 06/26/2018 56.9* 40.0 - 54.0 fl Final   • MPV 06/26/2018 9.3  6.0 - 12.0 fL Final   • Platelets 06/26/2018 222   130 - 400 10*3/mm3 Final   • Neutrophil % 06/26/2018 65.3  39.0 - 78.0 % Final   • Lymphocyte % 06/26/2018 28.1  15.0 - 45.0 % Final   • Monocyte % 06/26/2018 3.8* 4.0 - 12.0 % Final   • Eosinophil % 06/26/2018 1.4  0.0 - 4.0 % Final   • Basophil % 06/26/2018 0.6  0.0 - 2.0 % Final   • Immature Grans % 06/26/2018 0.8  0.0 - 5.0 % Final   • Neutrophils, Absolute 06/26/2018 5.87  1.87 - 8.40 10*3/mm3 Final   • Lymphocytes, Absolute 06/26/2018 2.52  0.72 - 4.86 10*3/mm3 Final   • Monocytes, Absolute 06/26/2018 0.34  0.19 - 1.30 10*3/mm3 Final   • Eosinophils, Absolute 06/26/2018 0.13  0.00 - 0.70 10*3/mm3 Final   • Basophils, Absolute 06/26/2018 0.05  0.00 - 0.20 10*3/mm3 Final   • Immature Grans, Absolute 06/26/2018 0.07* 0.00 - 0.03 10*3/mm3 Final   • nRBC 06/26/2018 0.0  0.0 - 0.0 /100 WBC Final       RADIOLOGY:  No results found.         Assessment/Plan  Naomi Bhatia is a 42 y.o. year old female h/o obesity found to have anemia while being evaluated for bariatric surgery , As per cc seen    Patient Active Problem List   Diagnosis   • Morbid obesity   • Hypertension, well controlled   • Fatigue   • History of iron deficiency   • Vitamin D deficiency   • Iron deficiency anemia secondary to inadequate dietary iron intake   • Malabsorption of iron   • Obesity, Class III, BMI 40-49.9 (morbid obesity)   • Status post laparoscopic sleeve gastrectomy   • Acute low back pain without sciatica          1.Anemia: Has had hysterectomy. Patient had recent colonoscopy and EGD negative for active bleed.   --Status post venofer infusion, hemoglobin 11.9. Iron on 6/8/was 104, ferritin, 1590..  --rtc in 6months    2.  Obesity: s/p gastric sleeve doing well. She has lost 50lbs    3. Leukocytosis: Suspect obesity/reactive, also obesity could cause elevation in wbc. Bone marrow with no dyspoietic changes and no circulating blasts.   --currently resolved with iron infusion    4. HTN: on metoprolol    5. Hip pain: s/p sx and  screw removal, now broken right hip total replacement planned in future  6. Chronic pain syn: on flexiril, lyrica  7. Gerd: on omeprazole  8. Depression: on effexor, nortriptyline               Pedro Clements MD    6/26/2018    9:02 AM

## 2018-07-16 ENCOUNTER — OFFICE VISIT (OUTPATIENT)
Dept: BARIATRICS/WEIGHT MGMT | Facility: CLINIC | Age: 43
End: 2018-07-16

## 2018-07-16 VITALS
HEART RATE: 86 BPM | TEMPERATURE: 98.7 F | WEIGHT: 277.4 LBS | HEIGHT: 65 IN | OXYGEN SATURATION: 96 % | DIASTOLIC BLOOD PRESSURE: 71 MMHG | BODY MASS INDEX: 46.22 KG/M2 | SYSTOLIC BLOOD PRESSURE: 116 MMHG

## 2018-07-16 DIAGNOSIS — Z98.84 STATUS POST LAPAROSCOPIC SLEEVE GASTRECTOMY: ICD-10-CM

## 2018-07-16 DIAGNOSIS — M51.16 LUMBAR DISC DISEASE WITH RADICULOPATHY: ICD-10-CM

## 2018-07-16 DIAGNOSIS — E66.01 OBESITY, CLASS III, BMI 40-49.9 (MORBID OBESITY) (HCC): Primary | ICD-10-CM

## 2018-07-16 PROCEDURE — 99213 OFFICE O/P EST LOW 20 MIN: CPT | Performed by: SURGERY

## 2018-07-16 NOTE — PROGRESS NOTES
"Subjective   Naomi Bhatia is a 42 y.o. female.     Naomi is post op 7 months from her sleeve gastrectomy procedure.  She is currently on her regular diet.  States she is doing well overall.  She has struggled with food options in the past few days.  She is measuring between a half a cup and a cup portion sizes depending on types of food.  She eats 3-4 times a day not consistently.  She drinks at least 64 ounces of fluid and 40 g protein daily.  She exercises 2 times a week in the form of swimming or walking.    Review Of Systems:  General ROS: positive for  - fatigue, sleep disturbance and weight gain  Respiratory ROS: positive for - cough  Cardiovascular ROS: no chest pain or dyspnea on exertion  Gastrointestinal ROS: positive for - abdominal pain, diarrhea and it appears from her description the diarrhea causes the abdominal pain    The following portions of the patient's history were reviewed and updated as appropriate: allergies, current medications, past medical history, past surgical history and problem list.    Objective   /71 (BP Location: Left arm, Patient Position: Sitting, Cuff Size: Adult)   Pulse 86   Temp 98.7 °F (37.1 °C)   Ht 165.1 cm (65\")   Wt 126 kg (277 lb 6.4 oz)   LMP  (LMP Unknown)   SpO2 96%   BMI 46.16 kg/m²     General Appearance:  awake, alert, oriented, in no acute distress  Abdomen:  Soft, non-tender, normal bowel sounds; no bruits, organomegaly or masses.  Abnormal shape: obese  Extremities: Extremities warm to touch, pink, with no edema.  Wounds: clean, dry, intact    Assessment/Plan     Encounter Diagnoses   Name Primary?   • Obesity, Class III, BMI 40-49.9 (morbid obesity) (CMS/HCC) Yes   • Status post laparoscopic sleeve gastrectomy        Naomi Bhatia and I discussed the importance of changing behavior for consistent and successful weight loss.  We first reviewed again the definition of a meal which is defined as portion sizes less than a half a cup " and those portions incorporating a protein.  Specifically the protein should fill half of that volume.  I also explained that she should attempt to consume 4 meals as defined above daily and to avoid snacking or grazing.  She should also be mindful of adequate hydration by consuming at least 64 oz of water daily and incorporation of a regular exercise regimen.   I discussed the importance of taking her multivitamins as directed.  She is to return to our office 3 or 6 months or as needed.        07/16/18  2:49 PM  Patient Care Team:  Brett Sharp DO as PCP - General (Pediatrics)  IRMA Wilcox as Referring Physician (Family Medicine)  Barney Houston MD as Cardiologist (Cardiology)  Yifan Cordero MD FACS

## 2018-07-17 RX ORDER — OXYCODONE HYDROCHLORIDE 10 MG/1
10 TABLET ORAL 2 TIMES DAILY PRN
Qty: 45 TABLET | Refills: 0 | Status: SHIPPED | OUTPATIENT
Start: 2018-07-22 | End: 2018-08-06 | Stop reason: SDUPTHER

## 2018-07-24 ENCOUNTER — OFFICE VISIT (OUTPATIENT)
Dept: PSYCHIATRY | Age: 43
End: 2018-07-24
Payer: MEDICARE

## 2018-07-24 VITALS
OXYGEN SATURATION: 99 % | DIASTOLIC BLOOD PRESSURE: 82 MMHG | WEIGHT: 276 LBS | HEART RATE: 89 BPM | SYSTOLIC BLOOD PRESSURE: 125 MMHG | BODY MASS INDEX: 43.32 KG/M2 | HEIGHT: 67 IN

## 2018-07-24 DIAGNOSIS — F32.1 MODERATE SINGLE CURRENT EPISODE OF MAJOR DEPRESSIVE DISORDER (HCC): Primary | ICD-10-CM

## 2018-07-24 PROCEDURE — 96101 PR PSYCHOLOGIC TESTING BY PSYCH/PHYS: CPT | Performed by: PSYCHOLOGIST

## 2018-07-24 NOTE — PROGRESS NOTES
Behavioral Health Consultation  Korey Alexander Psy.D.  2018  3:14 PM      Time spent with Patient:  2.5 hours in office, additional 2.5 hours scoring, interpreting, writing report   This is patient's second neuropsych appointment  Reason for Consult:  depression, anxiety and possible ADHD   Referring Provider: No referring provider defined for this encounter. Feedback given to PCP. S:  Patient presented for testing appointment. All psych and neuropsych measures were completed. Patient was reminded about purpose of test result appointment which was scheduled for next month. Patient reports significant physical pain related to hip and is possibly having total hip replacement next month. She also reports significant depression and is grieving the sudden death of her mother who  in May. O:  MSE:    Appearance    alert, cooperative  Appetite normal  Sleep disturbance No  Fatigue Yes  Loss of pleasure Yes  Impulsive behavior No  Speech    normal rate and normal volume  Mood    Euthymic   Affect    normal affect  Thought Content    intact  Thought Process    linear  Associations    logical connections  Attention/Concentration    intact  Morbid ideation No  Suicide Assessment    no suicidal ideation      A:  Full report to follow, but this profile does not seem consistent with ADHD diagnosis. Patient does have slightly slowed processing speed (PSI = 85) but this could be related to her significant physical pain, depression, or pain medication. Her working memory ([address] = 105) and most executive functioning skills are Average. Patient does report significant depression that was present before the sudden death of her mother, so I do not believe depressive symptoms are explained alone by bereavement. She was very tearful and emotional today discussing related symptoms.      Diagnosis:    Major depressive disorder; recurrent and moderate      Diagnosis Date    Anxiety     Arthritis     Back pain with left-sided radiculopathy 3/14/2016    DDD (degenerative disc disease), lumbar     Depression     Esophagitis     Gastritis     Headache(784.0)     History of blood transfusion     Hypertension     Obesity     RLS (restless legs syndrome)     Sleep apnea      Problems with primary support group and Problems with access to health care services    Plan:  Pt interventions:  Completed all assessment measures, scheduled test result appointment       Pt Behavioral Change Plan:

## 2018-08-06 ENCOUNTER — HOSPITAL ENCOUNTER (OUTPATIENT)
Dept: PAIN MANAGEMENT | Age: 43
Discharge: HOME | End: 2018-08-06
Payer: MEDICARE

## 2018-08-06 VITALS
SYSTOLIC BLOOD PRESSURE: 115 MMHG | BODY MASS INDEX: 43.32 KG/M2 | DIASTOLIC BLOOD PRESSURE: 71 MMHG | OXYGEN SATURATION: 96 % | WEIGHT: 276 LBS | HEIGHT: 67 IN | TEMPERATURE: 98.6 F | RESPIRATION RATE: 18 BRPM | HEART RATE: 79 BPM

## 2018-08-06 DIAGNOSIS — M79.18 MYOFACIAL MUSCLE PAIN: ICD-10-CM

## 2018-08-06 DIAGNOSIS — Z98.1 HISTORY OF LUMBAR SPINAL FUSION: ICD-10-CM

## 2018-08-06 DIAGNOSIS — M51.16 LUMBAR DISC DISEASE WITH RADICULOPATHY: ICD-10-CM

## 2018-08-06 DIAGNOSIS — M54.10 BACK PAIN WITH LEFT-SIDED RADICULOPATHY: ICD-10-CM

## 2018-08-06 DIAGNOSIS — Z79.899 HIGH RISK MEDICATIONS (NOT ANTICOAGULANTS) LONG-TERM USE: ICD-10-CM

## 2018-08-06 PROCEDURE — 99214 OFFICE O/P EST MOD 30 MIN: CPT | Performed by: NURSE PRACTITIONER

## 2018-08-06 PROCEDURE — 99214 OFFICE O/P EST MOD 30 MIN: CPT

## 2018-08-06 ASSESSMENT — PAIN DESCRIPTION - ONSET: ONSET: ON-GOING

## 2018-08-06 ASSESSMENT — ENCOUNTER SYMPTOMS
BACK PAIN: 1
BLURRED VISION: 0
SORE THROAT: 0
WHEEZING: 0
SHORTNESS OF BREATH: 0
CONSTIPATION: 0

## 2018-08-06 ASSESSMENT — PAIN DESCRIPTION - LOCATION: LOCATION: BACK

## 2018-08-06 ASSESSMENT — PAIN DESCRIPTION - PROGRESSION: CLINICAL_PROGRESSION: NOT CHANGED

## 2018-08-06 ASSESSMENT — PAIN DESCRIPTION - FREQUENCY: FREQUENCY: CONTINUOUS

## 2018-08-06 ASSESSMENT — PAIN DESCRIPTION - ORIENTATION: ORIENTATION: LOWER

## 2018-08-06 ASSESSMENT — PAIN SCALES - GENERAL: PAINLEVEL_OUTOF10: 7

## 2018-08-06 ASSESSMENT — PAIN DESCRIPTION - PAIN TYPE: TYPE: CHRONIC PAIN

## 2018-08-06 ASSESSMENT — PAIN DESCRIPTION - DIRECTION: RADIATING_TOWARDS: RADIATES INTO BILATERAL LOWER EXTREMITIES

## 2018-08-06 ASSESSMENT — ACTIVITIES OF DAILY LIVING (ADL): EFFECT OF PAIN ON DAILY ACTIVITIES: LIMITS ACTIVITY

## 2018-08-06 NOTE — PROGRESS NOTES
1994    NECK SURGERY      GA COLONOSCOPY FLX DX W/COLLJ SPEC WHEN PFRMD N/A 5/2/2017    Dr Percilla Harada, 7 yr (age 47) recall    GA EGD TRANSORAL BIOPSY SINGLE/MULTIPLE N/A 5/2/2017    Dr ABHIJIT Baltazar-Gastritis/gastropathy    SALPINGO-OOPHORECTOMY      STOMACH SURGERY  12/20/2017    dr Ching Massey History  family history includes Cancer in her brother, father, and mother; Colon Polyps in her mother; Diabetes in her mother and another family member; Esophageal Cancer in her brother; Heart Disease in her father and mother; High Blood Pressure in her father, mother, sister, and another family member. Social History    Social History   Substance Use Topics    Smoking status: Former Smoker     Packs/day: 1.00     Years: 25.00     Types: Cigarettes     Quit date: 9/19/2013    Smokeless tobacco: Never Used    Alcohol use Yes      Comment: rarely       Allergies  Tegaderm ag mesh 2\"x2\" [wound dressings] and Zithromax [azithromycin]     Current Medications  Current Outpatient Prescriptions   Medication Sig Dispense Refill    Cobalamine Combinations (FOLTRATE PO) vitamin B12-folic acid   daily      oxyCODONE HCl (OXY-IR) 10 MG immediate release tablet Take 1 tablet by mouth 2 times daily as needed for Pain (month supply) for up to 30 days. Nadene Moritz Date: 7/22/18 45 tablet 0    pregabalin (LYRICA) 150 MG capsule Take 1 capsule by mouth 2 times daily for 90 days. 90 day supply.  180 capsule 0    omeprazole (PRILOSEC) 20 MG delayed release capsule TAKE 1 CAPSULE BY MOUTH EVERY DAY IN MORNING ON AN EMPTY STOMACH 30 capsule 1    venlafaxine (EFFEXOR) 75 MG tablet Take 1 tablet by mouth 2 times daily 60 tablet 11    metoprolol succinate (TOPROL XL) 50 MG extended release tablet Take 1 tablet by mouth daily 180 tablet 3    acyclovir (ZOVIRAX) 800 MG tablet Take 1 tablet by mouth daily 90 tablet 1    nortriptyline (PAMELOR) 25 MG capsule Take 1-2 capsules by mouth nightly 180 capsule 3    polyethylene glycol (GLYCOLAX) powder Take 527 g by mouth daily  0    DOCQLACE 100 MG capsule Take 100 mg by mouth 2 times daily  0    levocetirizine (XYZAL) 5 MG tablet Take 1 tablet by mouth nightly 90 tablet 3    carbidopa-levodopa (SINEMET)  MG per tablet Take 1 tablet by mouth nightly as needed (leg spasms) 90 tablet 2    cyclobenzaprine (FLEXERIL) 10 MG tablet Take 1 tablet by mouth 2 times daily as needed for Muscle spasms 60 tablet 2    albuterol sulfate HFA (PROVENTIL HFA) 108 (90 BASE) MCG/ACT inhaler Inhale 2 puffs into the lungs every 6 hours as needed for Wheezing 1 Inhaler 3    ondansetron (ZOFRAN ODT) 4 MG disintegrating tablet Take 1 tablet by mouth every 8 hours as needed for Nausea or Vomiting 10 tablet 0    calcium carbonate (OSCAL) 500 MG TABS tablet Take 500 mg by mouth daily.  Multiple Vitamins-Minerals (MULTIVITAMIN & MINERAL PO) Take  by mouth.  CRANBERRY Take 500 mg by mouth daily.  Cholecalciferol (VITAMIN D3) 5000 UNITS TABS Take 5,000 Units by mouth daily        No current facility-administered medications for this encounter. Review of Systems:  Review of Systems   Constitutional: Negative for chills and fever. HENT: Negative for nosebleeds and sore throat. Eyes: Negative for blurred vision. Respiratory: Negative for shortness of breath and wheezing. Cardiovascular: Negative for chest pain. Gastrointestinal: Negative for constipation. Genitourinary: Negative for dysuria. Musculoskeletal: Positive for back pain and myalgias. Skin: Negative for rash. Neurological: Negative for seizures and loss of consciousness. Endo/Heme/Allergies: Does not bruise/bleed easily. Psychiatric/Behavioral: Negative for depression ( currently seeing mental health- no SI ideas noted) and suicidal ideas.        14 point ROS negative besides that noted in HPI    Physical Exam:    Vitals:    08/06/18 1335   BP: 115/71   Pulse: 79   Resp: 18

## 2018-08-07 RX ORDER — OXYCODONE HYDROCHLORIDE 10 MG/1
10 TABLET ORAL 2 TIMES DAILY PRN
Qty: 45 TABLET | Refills: 0 | Status: SHIPPED | OUTPATIENT
Start: 2018-08-24 | End: 2018-09-12 | Stop reason: SDUPTHER

## 2018-08-21 ENCOUNTER — OFFICE VISIT (OUTPATIENT)
Dept: PRIMARY CARE CLINIC | Age: 43
End: 2018-08-21
Payer: MEDICARE

## 2018-08-21 VITALS
TEMPERATURE: 97.4 F | HEART RATE: 85 BPM | WEIGHT: 275 LBS | DIASTOLIC BLOOD PRESSURE: 76 MMHG | BODY MASS INDEX: 43.16 KG/M2 | HEIGHT: 67 IN | OXYGEN SATURATION: 98 % | SYSTOLIC BLOOD PRESSURE: 110 MMHG

## 2018-08-21 DIAGNOSIS — F33.1 MODERATE EPISODE OF RECURRENT MAJOR DEPRESSIVE DISORDER (HCC): ICD-10-CM

## 2018-08-21 DIAGNOSIS — L82.1 SEBORRHEIC KERATOSIS: ICD-10-CM

## 2018-08-21 DIAGNOSIS — K21.9 GASTROESOPHAGEAL REFLUX DISEASE, ESOPHAGITIS PRESENCE NOT SPECIFIED: Primary | ICD-10-CM

## 2018-08-21 PROCEDURE — 99214 OFFICE O/P EST MOD 30 MIN: CPT | Performed by: PEDIATRICS

## 2018-08-21 RX ORDER — OMEPRAZOLE 20 MG/1
20 CAPSULE, DELAYED RELEASE ORAL DAILY
Qty: 90 CAPSULE | Refills: 3 | Status: SHIPPED | OUTPATIENT
Start: 2018-08-21 | End: 2019-10-09 | Stop reason: SDUPTHER

## 2018-08-21 ASSESSMENT — ENCOUNTER SYMPTOMS
WHEEZING: 0
EYE DISCHARGE: 0
ABDOMINAL PAIN: 0
CONSTIPATION: 0
NAUSEA: 0
RESPIRATORY NEGATIVE: 1
SORE THROAT: 0
SINUS PRESSURE: 0
ALLERGIC/IMMUNOLOGIC NEGATIVE: 1
BACK PAIN: 1
EYES NEGATIVE: 1
SHORTNESS OF BREATH: 0
GASTROINTESTINAL NEGATIVE: 1
CHEST TIGHTNESS: 0
VOMITING: 0
COUGH: 0
DIARRHEA: 0

## 2018-08-21 NOTE — PROGRESS NOTES
delayed release capsule TAKE 1 CAPSULE BY MOUTH EVERY DAY IN MORNING ON AN EMPTY STOMACH 30 capsule 1    venlafaxine (EFFEXOR) 75 MG tablet Take 1 tablet by mouth 2 times daily 60 tablet 11    metoprolol succinate (TOPROL XL) 50 MG extended release tablet Take 1 tablet by mouth daily (Patient taking differently: Take 50 mg by mouth 2 times daily ) 180 tablet 3    acyclovir (ZOVIRAX) 800 MG tablet Take 1 tablet by mouth daily 90 tablet 1    nortriptyline (PAMELOR) 25 MG capsule Take 1-2 capsules by mouth nightly 180 capsule 3    polyethylene glycol (GLYCOLAX) powder Take 527 g by mouth daily  0    levocetirizine (XYZAL) 5 MG tablet Take 1 tablet by mouth nightly 90 tablet 3    carbidopa-levodopa (SINEMET)  MG per tablet Take 1 tablet by mouth nightly as needed (leg spasms) 90 tablet 2    cyclobenzaprine (FLEXERIL) 10 MG tablet Take 1 tablet by mouth 2 times daily as needed for Muscle spasms 60 tablet 2    albuterol sulfate HFA (PROVENTIL HFA) 108 (90 BASE) MCG/ACT inhaler Inhale 2 puffs into the lungs every 6 hours as needed for Wheezing 1 Inhaler 3    ondansetron (ZOFRAN ODT) 4 MG disintegrating tablet Take 1 tablet by mouth every 8 hours as needed for Nausea or Vomiting 10 tablet 0    calcium carbonate (OSCAL) 500 MG TABS tablet Take 500 mg by mouth daily.  Multiple Vitamins-Minerals (MULTIVITAMIN & MINERAL PO) Take  by mouth.  CRANBERRY Take 500 mg by mouth daily.  Cholecalciferol (VITAMIN D3) 5000 UNITS TABS Take 5,000 Units by mouth daily        No current facility-administered medications for this visit.         Allergies: Tegaderm ag mesh 2\"x2\" [wound dressings] and Zithromax [azithromycin]     Past Medical History:   Diagnosis Date    Anxiety     Arthritis     Back pain with left-sided radiculopathy 3/14/2016    DDD (degenerative disc disease), lumbar     Depression     Esophagitis     Gastritis     Headache(784.0)     History of blood transfusion     Hypertension     swelling and myalgias. Skin: Negative. Negative for rash. Allergic/Immunologic: Negative. Neurological: Negative. Negative for dizziness, weakness and headaches. Hematological: Negative. Negative for adenopathy. Does not bruise/bleed easily. Psychiatric/Behavioral: Positive for decreased concentration, dysphoric mood and sleep disturbance. Negative for suicidal ideas. The patient is not nervous/anxious. Physical Exam   Constitutional: She is oriented to person, place, and time. She appears well-developed and well-nourished. HENT:   Head: Normocephalic. Right Ear: Tympanic membrane normal.   Left Ear: Tympanic membrane normal.   Eyes: Pupils are equal, round, and reactive to light. Conjunctivae are normal.   Neck: Normal range of motion. Neck supple. Cardiovascular: Normal rate and regular rhythm. Pulmonary/Chest: Effort normal.   Abdominal: Soft. Bowel sounds are normal. There is no tenderness. Musculoskeletal:        Right hip: She exhibits decreased range of motion, decreased strength and tenderness. Neurological: She is alert and oriented to person, place, and time. She has normal reflexes. Skin: Skin is warm and dry. There are several sk lesions on arms bilaterally. Psychiatric: Her behavior is normal.   Vitals reviewed. ASSESSMENT      ICD-10-CM ICD-9-CM    1. Gastroesophageal reflux disease, esophagitis presence not specified K21.9 530.81 omeprazole (PRILOSEC) 20 MG delayed release capsule   2. Moderate episode of recurrent major depressive disorder (HCC) F33.1 296.32    3. Seborrheic keratosis L82.1 702.19        PLAN      ICD-10-CM ICD-9-CM    1. Gastroesophageal reflux disease, esophagitis presence not specified K21.9 530.81 omeprazole (PRILOSEC) 20 MG delayed release capsule   2.  Moderate episode of recurrent major depressive disorder (HCC) F33.1 296.32 Discussed her medications  Recommended that she follow up with psych and if they do not accomplish control

## 2018-08-24 ENCOUNTER — INFUSION (OUTPATIENT)
Dept: ONCOLOGY | Facility: CLINIC | Age: 43
End: 2018-08-24

## 2018-08-24 DIAGNOSIS — K90.9 MALABSORPTION OF IRON: Primary | ICD-10-CM

## 2018-08-24 DIAGNOSIS — D50.8 IRON DEFICIENCY ANEMIA SECONDARY TO INADEQUATE DIETARY IRON INTAKE: ICD-10-CM

## 2018-08-24 DIAGNOSIS — D50.9 IRON DEFICIENCY ANEMIA, UNSPECIFIED IRON DEFICIENCY ANEMIA TYPE: Primary | ICD-10-CM

## 2018-08-24 RX ORDER — SODIUM CHLORIDE 0.9 % (FLUSH) 0.9 %
10 SYRINGE (ML) INJECTION AS NEEDED
Status: CANCELLED | OUTPATIENT
Start: 2018-08-24

## 2018-08-24 RX ORDER — SODIUM CHLORIDE 0.9 % (FLUSH) 0.9 %
10 SYRINGE (ML) INJECTION AS NEEDED
Status: DISCONTINUED | OUTPATIENT
Start: 2018-08-24 | End: 2018-08-24 | Stop reason: HOSPADM

## 2018-08-24 RX ADMIN — Medication 10 ML: at 08:36

## 2018-08-27 ENCOUNTER — LAB (OUTPATIENT)
Dept: LAB | Facility: HOSPITAL | Age: 43
End: 2018-08-27

## 2018-08-27 DIAGNOSIS — D50.9 IRON DEFICIENCY ANEMIA, UNSPECIFIED IRON DEFICIENCY ANEMIA TYPE: ICD-10-CM

## 2018-08-27 PROCEDURE — 83540 ASSAY OF IRON: CPT

## 2018-08-27 PROCEDURE — 82728 ASSAY OF FERRITIN: CPT

## 2018-08-27 PROCEDURE — 83550 IRON BINDING TEST: CPT

## 2018-08-27 PROCEDURE — 36415 COLL VENOUS BLD VENIPUNCTURE: CPT

## 2018-09-06 ENCOUNTER — TELEPHONE (OUTPATIENT)
Dept: NEUROLOGY | Facility: CLINIC | Age: 43
End: 2018-09-06

## 2018-09-12 ENCOUNTER — HOSPITAL ENCOUNTER (OUTPATIENT)
Dept: PAIN MANAGEMENT | Age: 43
Discharge: HOME | End: 2018-09-12
Payer: MEDICARE

## 2018-09-12 VITALS
HEART RATE: 72 BPM | SYSTOLIC BLOOD PRESSURE: 118 MMHG | RESPIRATION RATE: 20 BRPM | TEMPERATURE: 97.2 F | DIASTOLIC BLOOD PRESSURE: 73 MMHG | OXYGEN SATURATION: 99 %

## 2018-09-12 DIAGNOSIS — M51.16 LUMBAR DISC DISEASE WITH RADICULOPATHY: ICD-10-CM

## 2018-09-12 DIAGNOSIS — M51.36 LUMBAR DEGENERATIVE DISC DISEASE: ICD-10-CM

## 2018-09-12 DIAGNOSIS — M54.10 BACK PAIN WITH LEFT-SIDED RADICULOPATHY: ICD-10-CM

## 2018-09-12 PROCEDURE — 3209999900 FLUORO FOR SURGICAL PROCEDURES

## 2018-09-12 PROCEDURE — 2500000003 HC RX 250 WO HCPCS

## 2018-09-12 PROCEDURE — 62323 NJX INTERLAMINAR LMBR/SAC: CPT

## 2018-09-12 PROCEDURE — 6360000002 HC RX W HCPCS

## 2018-09-12 PROCEDURE — 2580000003 HC RX 258

## 2018-09-12 RX ORDER — 0.9 % SODIUM CHLORIDE 0.9 %
VIAL (ML) INJECTION
Status: COMPLETED | OUTPATIENT
Start: 2018-09-12 | End: 2018-09-12

## 2018-09-12 RX ORDER — PREGABALIN 150 MG/1
150 CAPSULE ORAL 2 TIMES DAILY
Qty: 180 CAPSULE | Refills: 0 | Status: SHIPPED | OUTPATIENT
Start: 2018-09-12 | End: 2018-12-05 | Stop reason: SDUPTHER

## 2018-09-12 RX ORDER — LIDOCAINE HYDROCHLORIDE 10 MG/ML
INJECTION, SOLUTION EPIDURAL; INFILTRATION; INTRACAUDAL; PERINEURAL
Status: COMPLETED | OUTPATIENT
Start: 2018-09-12 | End: 2018-09-12

## 2018-09-12 RX ORDER — METHYLPREDNISOLONE ACETATE 80 MG/ML
INJECTION, SUSPENSION INTRA-ARTICULAR; INTRALESIONAL; INTRAMUSCULAR; SOFT TISSUE
Status: COMPLETED | OUTPATIENT
Start: 2018-09-12 | End: 2018-09-12

## 2018-09-12 RX ADMIN — METHYLPREDNISOLONE ACETATE 80 MG: 80 INJECTION, SUSPENSION INTRA-ARTICULAR; INTRALESIONAL; INTRAMUSCULAR; SOFT TISSUE at 09:43

## 2018-09-12 RX ADMIN — Medication 5 ML: at 09:43

## 2018-09-12 RX ADMIN — LIDOCAINE HYDROCHLORIDE 5 ML: 10 INJECTION, SOLUTION EPIDURAL; INFILTRATION; INTRACAUDAL; PERINEURAL at 09:43

## 2018-09-12 ASSESSMENT — PAIN - FUNCTIONAL ASSESSMENT
PAIN_FUNCTIONAL_ASSESSMENT: 0-10
PAIN_FUNCTIONAL_ASSESSMENT: 0-10

## 2018-09-12 NOTE — PROGRESS NOTES
Procedure:  Level of Consciousness: [x]Alert []Oriented []Disoriented []Lethargic  Anxiety Level: [x]Calm []Anxious []Depressed []Other  Skin: [x]Warm [x]Dry []Cool []Moist []Intact []Other  Cardiovascular: []Palpitations: [x]Never []Occasionally []Frequently  Chest Pain: [x]No []Yes  Respiratory:  [x]Unlabored []Labored []Cough ([] Productive []Unproductive)  HCG Required: [x]No []Yes   Results: []Negative []Positive  Knowledge Level:        [x]Patient/Other verbalized understanding of pre-procedure instructions. [x]Assessment of post-op care needs (transportation, responsible caregiver)        [x]Able to discuss health care problems and how to deal with it.   Factors that Affect Teaching:        Language Barrier: [x]No []Yes - why:        Hearing Loss:        [x]No []Yes            Corrective Device:  []Yes []No        Vision Loss:           [x]No []Yes            Corrective Device:  []Yes []No        Memory Loss:       [x]No []Yes            []Short Term []Long Term  Motivational Level:  [x]Asks Questions                  []Extremely Anxious       [x]Seems Interested               []Seems Uninterested                  [x]Denies need for Education  Risk for Injury:  [x]Patient oriented to person, place and time  []History of frequent falls/loss of balance  Nutritional:  []Change in appetite   []Weight Gain   []Weight Loss  Functional:  []Requires assistance with ADL's

## 2018-09-13 RX ORDER — OXYCODONE HYDROCHLORIDE 10 MG/1
10 TABLET ORAL 2 TIMES DAILY PRN
Qty: 45 TABLET | Refills: 0 | Status: SHIPPED | OUTPATIENT
Start: 2018-09-23 | End: 2018-10-22 | Stop reason: SDUPTHER

## 2018-09-14 ENCOUNTER — OFFICE VISIT (OUTPATIENT)
Dept: PSYCHIATRY | Age: 43
End: 2018-09-14
Payer: MEDICARE

## 2018-09-14 ENCOUNTER — TELEPHONE (OUTPATIENT)
Dept: BARIATRICS/WEIGHT MGMT | Facility: CLINIC | Age: 43
End: 2018-09-14

## 2018-09-14 ENCOUNTER — TELEPHONE (OUTPATIENT)
Dept: PAIN MANAGEMENT | Age: 43
End: 2018-09-14

## 2018-09-14 VITALS
WEIGHT: 283.3 LBS | DIASTOLIC BLOOD PRESSURE: 78 MMHG | OXYGEN SATURATION: 100 % | HEART RATE: 73 BPM | SYSTOLIC BLOOD PRESSURE: 123 MMHG | BODY MASS INDEX: 44.46 KG/M2 | HEIGHT: 67 IN

## 2018-09-14 DIAGNOSIS — F33.1 MAJOR DEPRESSIVE DISORDER, RECURRENT EPISODE, MODERATE (HCC): Primary | ICD-10-CM

## 2018-09-14 PROCEDURE — 99214 OFFICE O/P EST MOD 30 MIN: CPT | Performed by: NURSE PRACTITIONER

## 2018-09-14 RX ORDER — VENLAFAXINE 75 MG/1
75 TABLET ORAL 3 TIMES DAILY
Qty: 90 TABLET | Refills: 1 | Status: SHIPPED | OUTPATIENT
Start: 2018-09-14 | End: 2018-11-14 | Stop reason: SDUPTHER

## 2018-09-14 NOTE — TELEPHONE ENCOUNTER
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 Surgeon will not do hip surgery until she looses more weight. Was told to call to see about getting a prescription for medication to curb her appetite. She is just not in a good place and is really wanting to know about getting rx to help her. Says she knows she is screwing up but she is depressed and doesn't need someone pointing their finger at her and telling her everything she does is wrong.

## 2018-09-14 NOTE — PATIENT INSTRUCTIONS
rinse your eyes with water. Ask your doctor or pharmacist how to safely handle and dispose of a broken capsule. While taking atomoxetine, your doctor will need to check your progress at regular visits. Your heart rate, blood pressure, height and weight may also need to be checked often. Store at room temperature away from moisture and heat. What happens if I miss a dose? Take the missed dose as soon as you remember. Skip the missed dose if it is almost time for your next scheduled dose. Do not take extra medicine to make up the missed dose. What happens if I overdose? Seek emergency medical attention or call the Poison Help line at 1-194.660.3156. Overdose symptoms may include drowsiness, dizziness, stomach problems, tremors, or unusual behavior. What should I avoid while taking atomoxetine? Avoid using or handling an open or broken capsule. If the medicine from inside the capsule gets in your eyes, rinse them thoroughly with water and call your doctor. Atomoxetine may impair your thinking or reactions. Be careful if you drive or do anything that requires you to be alert. What are the possible side effects of atomoxetine? Get emergency medical help if you have signs of an allergic reaction: hives; difficult breathing; swelling of your face, lips, tongue, or throat. Report any new or worsening symptoms to your doctor, such as: anxiety, panic attacks, trouble sleeping, or if you feel impulsive, irritable, agitated, hostile, aggressive, restless, hyperactive (mentally or physically), depressed, or have thoughts about suicide or hurting yourself. Atomoxetine can affect growth in children. Tell your doctor if your child is not growing at a normal rate while using this medicine.   Stop using this medicine and call your doctor at once if you have:  · signs of heart problems --chest pain, trouble breathing, feeling like you might pass out;  · signs of psychosis --hallucinations (seeing or hearing things that outside of the [place] are appropriate, unless specifically indicated otherwise. [facility] drug information does not endorse drugs, diagnose patients or recommend therapy. [facility] drug information is an informational resource designed to assist licensed healthcare practitioners in caring for their patients and/or to serve consumers viewing this service as a supplement to, and not a substitute for, the expertise, skill, knowledge and judgment of healthcare practitioners. The absence of a warning for a given drug or drug combination in no way should be construed to indicate that the drug or drug combination is safe, effective or appropriate for any given patient. [facility] does not assume any responsibility for any aspect of healthcare administered with the aid of information [facility] provides. The information contained herein is not intended to cover all possible uses, directions, precautions, warnings, drug interactions, allergic reactions, or adverse effects. If you have questions about the drugs you are taking, check with your doctor, nurse or pharmacist.  Copyright 5968-9797 [address]: 12.01. Revision date: 9/1/2017. Care instructions adapted under license by [facility] ([facility]). If you have questions about a medical condition or this instruction, always ask your healthcare professional. [place] [address] any warranty or liability for your use of this information.

## 2018-09-14 NOTE — PROGRESS NOTES
Progress Note    9/14/2018          Gilma Kapadia 1975     Time in: 1110   Time out: 5480       Chief Complaint   Patient presents with    Psychiatric Evaluation     \"I just cannot stay focused on my diet and I struggle with my son. \"    Depression     \"I need to be seen for ADHD medications. \"       History obtained via chart review Patient    Subjective:  Patient is a 36 y.o. Year old female who presents needing medication management for the problems of   Patient Active Problem List    Diagnosis Date Noted    Myofacial muscle pain 08/06/2018    History of lumbar spinal fusion 08/06/2018    High risk medications (not anticoagulants) long-term use 08/06/2018    Moderate episode of recurrent major depressive disorder ([facility] 06/19/2018    Lumbar disc disease with radiculopathy 06/05/2018    Lumbar disc disease with radiculopathy 02/02/2018    Displacement of lumbar disc with radiculopathy 10/19/2017    Lumbar disc disease with radiculopathy 07/17/2017    Generalized abdominal pain     Drug-induced constipation     Lumbar disc disease with radiculopathy 04/14/2017    Chronic bilateral lower abdominal pain 04/04/2017    BRBPR (bright red blood per rectum) 04/04/2017    Family history of polyps in the colon 04/04/2017    Family history of esophageal cancer 04/04/2017    Cervical vertebral fusion 04/04/2017    Back pain with left-sided radiculopathy 03/14/2016    DDD (degenerative disc disease), lumbar     Foot pain 04/21/2015    RLS (restless legs syndrome)     Malaise and fatigue 09/05/2013    Insomnia 09/05/2013   . Honeykate Keith is not reporting any side effects from prescribed medications and is  medication complaint. Today Evelyn ROMY states, \"I wake up every night and all I take is the Amitriptyline. I don't have nightmares. I don't remember my dreams any more. I no longer have the energy to fight with my son. \"  Depression \"10\"  Anxiety: \"10. \"     \"I would like to not have so much time worry about everything and I want to lose weight. \"     Has son who is 15years old. He sees a counselor. Psychosocial Stressors: At risk for anxiety/stress issues, At risk for depression, At risk for social isolation and At risk for pain issues    Review of Systems - Review of Systems - Negative except listed  History obtained from chart review  General ROS: positive for  - fatigue, malaise, sleep disturbance and weight gain  Musculoskeletal ROS: positive for - muscle pain    Objective:     Family History   Problem Relation Age of Onset    Diabetes Mother     High Blood Pressure Mother     Colon Polyps Mother     Heart Disease Mother     Cancer Mother     Depression Mother     Cancer Father     High Blood Pressure Father     Heart Disease Father     Stroke Father     High Blood Pressure Sister     Depression Sister     Anxiety Disorder Sister     Cancer Brother     Esophageal Cancer Brother     Anxiety Disorder Brother     Diabetes Brother     ADHD Brother     Depression Brother     Other Other         has history of suicide in family maternal great uncle     Social History     Social History    Marital status:      Spouse name: N/A    Number of children: 2    Years of education: 15     Occupational History     Disabled          Social History Main Topics    Smoking status: Former Smoker     Packs/day: 1.00     Years: 25.00     Types: Cigarettes     Quit date: 9/19/2013    Smokeless tobacco: Never Used      Comment: quite 3 years ago    Alcohol use Yes      Comment: rarely    Drug use: No    Sexual activity: No      Comment: pt states last 2 months     Other Topics Concern    None     Social History Narrative        Has been seen at [facility] by Barbara Qureshi NP this past year of medication assessment.         She has seen a therapist in the past.         She had ADHD tested by Braulio Molina, PsyD  This past year        Has been on Lexapro, Wellbutrin, Cymbalta-this was bad. Labs: reviewed No results for input(s): WBC, HGB, HCT, MCV, PLT in the last 720 hours. No results found for: LABA1C  No results found for: EAG     Lab Results   Component Value Date     04/07/2018    K 4.1 04/07/2018     04/07/2018    CO2 28 04/07/2018    BUN 15 04/07/2018    CREATININE 0.7 04/07/2018    GLUCOSE 76 04/07/2018    CALCIUM 9.1 04/07/2018    PROT 7.0 11/06/2017    LABALBU 4.0 11/06/2017    BILITOT 0.6 11/06/2017    ALKPHOS 106 (H) 11/06/2017    AST 29 11/06/2017    ALT 30 11/06/2017    LABGLOM >60 04/07/2018         /78 (Site: Left Lower Arm, Position: Sitting, Cuff Size: Medium Adult)   Pulse 73   Ht 5' 7\" (1.702 m)   Wt 283 lb 4.8 oz (128.5 kg)   SpO2 100%   BMI 44.37 kg/m²       Current Meds:    Current Outpatient Prescriptions   Medication Sig Dispense Refill    [START ON 9/23/2018] oxyCODONE HCl (OXY-IR) 10 MG immediate release tablet Take 1 tablet by mouth 2 times daily as needed for Pain for up to 30 days. . 45 tablet 0    pregabalin (LYRICA) 150 MG capsule Take 1 capsule by mouth 2 times daily for 90 days. 90 day supply.  180 capsule 0    omeprazole (PRILOSEC) 20 MG delayed release capsule Take 1 capsule by mouth Daily 90 capsule 3    Cobalamine Combinations (FOLTRATE PO) vitamin B12-folic acid   daily      venlafaxine (EFFEXOR) 75 MG tablet Take 1 tablet by mouth 2 times daily 60 tablet 11    metoprolol succinate (TOPROL XL) 50 MG extended release tablet Take 1 tablet by mouth daily (Patient taking differently: Take 50 mg by mouth 2 times daily ) 180 tablet 3    acyclovir (ZOVIRAX) 800 MG tablet Take 1 tablet by mouth daily 90 tablet 1    nortriptyline (PAMELOR) 25 MG capsule Take 1-2 capsules by mouth nightly 180 capsule 3    polyethylene glycol (GLYCOLAX) powder Take 527 g by mouth daily  0    levocetirizine (XYZAL) 5 MG tablet Take 1 tablet by mouth nightly 90 tablet 3  carbidopa-levodopa (SINEMET)  MG per tablet Take 1 tablet by mouth nightly as needed (leg spasms) 90 tablet 2    cyclobenzaprine (FLEXERIL) 10 MG tablet Take 1 tablet by mouth 2 times daily as needed for Muscle spasms 60 tablet 2    albuterol sulfate HFA (PROVENTIL HFA) 108 (90 BASE) MCG/ACT inhaler Inhale 2 puffs into the lungs every 6 hours as needed for Wheezing 1 Inhaler 3    ondansetron (ZOFRAN ODT) 4 MG disintegrating tablet Take 1 tablet by mouth every 8 hours as needed for Nausea or Vomiting 10 tablet 0    calcium carbonate (OSCAL) 500 MG TABS tablet Take 500 mg by mouth daily.  Multiple Vitamins-Minerals (MULTIVITAMIN & MINERAL PO) Take  by mouth.  CRANBERRY Take 500 mg by mouth daily.  Cholecalciferol (VITAMIN D3) 5000 UNITS TABS Take 5,000 Units by mouth daily        No current facility-administered medications for this visit. MSE:  General Appearance: positive findings: overweight, moderate distress, pale. Appears older than stated age.      Mood & Affect: sad  and depressed, crying at times    Orientation:  person, place, time/date, situation, day of week, month of year and year    Speech: Slow, Monotonous and Soft    Thought Process: concrete    Thought Content: normal    Hallucinations: Absent    Delusions: Absent    Suicidal: denies suicidal ideation    Homicidal: Negative for homicidal ideation       Memory: recent:  good and remote:  good    Estimated Intelligence: average    Attention/Concentration: 5-letter word backwards    Judgment (everyday activities & social situations): limited  Insight (concerning psychiatric condition): limited    Gait and station: uses cane assist.       Assessment:   Patient Active Problem List    Diagnosis Date Noted    Myofacial muscle pain 08/06/2018    History of lumbar spinal fusion 08/06/2018    High risk medications (not anticoagulants) long-term use 08/06/2018    [facility] of [facility] 06/19/2018    Lumbar disc disease with radiculopathy 06/05/2018    Lumbar disc disease with radiculopathy 02/02/2018    Displacement of lumbar disc with radiculopathy 10/19/2017    Lumbar disc disease with radiculopathy 07/17/2017    Generalized abdominal pain     Drug-induced constipation     Lumbar disc disease with radiculopathy 04/14/2017    Chronic bilateral lower abdominal pain 04/04/2017    BRBPR (bright red blood per rectum) 04/04/2017    Family history of polyps in the colon 04/04/2017    Family history of esophageal cancer 04/04/2017    Cervical vertebral fusion 04/04/2017    Back pain with left-sided radiculopathy 03/14/2016    DDD (degenerative disc disease), lumbar     Foot pain 04/21/2015    RLS (restless legs syndrome)     Malaise and fatigue 09/05/2013    Insomnia 09/05/2013         Plan:  1. The risks, benefits, side effects, indications, contraindications, and adverse effects of the medications have been discussed. 2. Soto STANTON  has verbalized understanding and has capacity to give informed consent. 3. The Sujey Tripp report has been reviewed according to [facility] regulations. 4. Would benefit from individual therapy  5. Follow up in 1 month  6. The patient has been advised to call with any problems. 7.The above listed medications have been continued, changes in meds and other orders/labs as follows:     Start Effexor 75 mg daily to equal 225 mg daily.

## 2018-09-17 ENCOUNTER — OFFICE VISIT (OUTPATIENT)
Dept: BARIATRICS/WEIGHT MGMT | Facility: CLINIC | Age: 43
End: 2018-09-17

## 2018-09-17 ENCOUNTER — OFFICE VISIT (OUTPATIENT)
Dept: PSYCHIATRY | Age: 43
End: 2018-09-17
Payer: MEDICARE

## 2018-09-17 VITALS
HEART RATE: 82 BPM | SYSTOLIC BLOOD PRESSURE: 123 MMHG | WEIGHT: 283.6 LBS | HEIGHT: 65 IN | TEMPERATURE: 98.3 F | OXYGEN SATURATION: 100 % | DIASTOLIC BLOOD PRESSURE: 79 MMHG | BODY MASS INDEX: 47.25 KG/M2

## 2018-09-17 DIAGNOSIS — F33.1 MODERATE EPISODE OF RECURRENT MAJOR DEPRESSIVE DISORDER (HCC): Primary | ICD-10-CM

## 2018-09-17 DIAGNOSIS — E66.01 OBESITY, CLASS III, BMI 40-49.9 (MORBID OBESITY) (HCC): Primary | ICD-10-CM

## 2018-09-17 PROCEDURE — 90832 PSYTX W PT 30 MINUTES: CPT | Performed by: PSYCHOLOGIST

## 2018-09-17 PROCEDURE — 99213 OFFICE O/P EST LOW 20 MIN: CPT | Performed by: SURGERY

## 2018-09-17 NOTE — TELEPHONE ENCOUNTER
Is important that the patient understands that she will not be judged.  She should follow-up with our program accordingly to help accomplish continued weight loss.  I would like to see her as soon as possible to help get her back on track so that she can achieve her goals of weight loss and eventual orthopedic intervention.

## 2018-09-17 NOTE — TELEPHONE ENCOUNTER
Called 11:09am left message for the patient to return a call to our office.    Patient returned call expressed that Dr Cordero would like to see her to discuss weight loss.  Explained that our program is here to help the patients in anyway that we can and that we do not by any means  patients due to weight loss issues.     Patient will try to  come in today at 3pm.     Patient expressed that her daughter also had surgery and has lost 90 lbs and that a lot of people are telling her that she needs to do the same thing that she is doing.  Patient mentioned that she is not able to exercise and stuff due to needing her hip repair.  She also mentioned that she had also lost a family member in March.      Explained to patient that it is not right that others  or compare her to anyone as that everyone's medical issues, history and DNA make up are different.

## 2018-09-17 NOTE — PROGRESS NOTES
"Subjective   Naomi Bhatia is a 42 y.o. female.     Naomi is post op 8 months from her sleeve gastrectomy procedure.  She is currently on her regular diet.  She states she eats off and on all day and does not measure her meals size.  It can be between a cup to more at a time.  She does state she consumes 64 ounces of fluid and 40-60 g of protein daily.  She is really exercising at this time.  She is concerned because she is gaining weight and her behavior choices have not been consistent with our recommendations.  She is not consuming multivitamins as well.  She is hoping to have medications such as phentermine to be added to her daily routine due to her difficulty with weight loss.    Review Of Systems:  General ROS: positive for  - fatigue and weight gain  Gastrointestinal ROS: positive for - diarrhea  negative for - abdominal pain, gas/bloating, heartburn or nausea/vomiting    The following portions of the patient's history were reviewed and updated as appropriate: allergies, current medications, past medical history, past surgical history and problem list.    Objective   /79 (BP Location: Right arm, Patient Position: Sitting, Cuff Size: Adult)   Pulse 82   Temp 98.3 °F (36.8 °C)   Ht 165.1 cm (65\")   Wt 129 kg (283 lb 9.6 oz)   LMP  (LMP Unknown)   SpO2 100%   BMI 47.19 kg/m²     General Appearance:  awake, alert, oriented, in no acute distress  Abdomen:  Soft, non-tender, normal bowel sounds; no bruits, organomegaly or masses.  Abnormal shape: obese  Wounds: clean, dry, intact    Assessment/Plan     Encounter Diagnoses   Name Primary?   • Obesity, Class III, BMI 40-49.9 (morbid obesity) (CMS/HCC) Yes       Naomi Bhatia and I discussed the importance of changing behavior for consistent and successful weight loss.  We first reviewed again the definition of a meal which is defined as portion sizes less than a half a cup and those portions incorporating a protein.  Specifically the " protein should fill half of that volume.  I also explained that she should attempt to consume 4 meals as defined above daily and to avoid snacking or grazing.  She should also be mindful of adequate hydration by consuming at least 64 oz of water daily and incorporation of a regular exercise regimen.   With regards to medical supplementation I recommended that the first thing she should do is applied the appropriate behavior as described above before consideration of alternative medications.  She admits to being depressed and is currently seeing a psychologist for this at this time.  I discussed the importance of taking her multivitamins as directed.  She is to return to our office in one month or as needed.    09/17/18  4:17 PM  Patient Care Team:  Brett Sharp DO as PCP - General (Pediatrics)  Naomi Braun APRN as Referring Physician (Family Medicine)  Barney Houston MD as Cardiologist (Cardiology)  Yifan Cordero MD FACS

## 2018-09-17 NOTE — TELEPHONE ENCOUNTER
S/P Sleeve 12-20-17  Last seen in office on 07-16-18 by Dr Cordero  Next appointment is on 10-18-18 with MEDHAT SOLIS

## 2018-09-17 NOTE — PROGRESS NOTES
0    omeprazole (PRILOSEC) 20 MG delayed release capsule Take 1 capsule by mouth Daily 90 capsule 3    Cobalamine Combinations (FOLTRATE PO) vitamin B12-folic acid   daily      metoprolol succinate (TOPROL XL) 50 MG extended release tablet Take 1 tablet by mouth daily (Patient taking differently: Take 50 mg by mouth 2 times daily ) 180 tablet 3    acyclovir (ZOVIRAX) 800 MG tablet Take 1 tablet by mouth daily 90 tablet 1    nortriptyline (PAMELOR) 25 MG capsule Take 1-2 capsules by mouth nightly 180 capsule 3    polyethylene glycol (GLYCOLAX) powder Take 527 g by mouth daily  0    levocetirizine (XYZAL) 5 MG tablet Take 1 tablet by mouth nightly 90 tablet 3    carbidopa-levodopa (SINEMET)  MG per tablet Take 1 tablet by mouth nightly as needed (leg spasms) 90 tablet 2    cyclobenzaprine (FLEXERIL) 10 MG tablet Take 1 tablet by mouth 2 times daily as needed for Muscle spasms 60 tablet 2    albuterol sulfate HFA (PROVENTIL HFA) 108 (90 BASE) MCG/ACT inhaler Inhale 2 puffs into the lungs every 6 hours as needed for Wheezing 1 Inhaler 3    ondansetron (ZOFRAN ODT) 4 MG disintegrating tablet Take 1 tablet by mouth every 8 hours as needed for Nausea or Vomiting 10 tablet 0    calcium carbonate (OSCAL) 500 MG TABS tablet Take 500 mg by mouth daily.  Multiple Vitamins-Minerals (MULTIVITAMIN & MINERAL PO) Take  by mouth.  CRANBERRY Take 500 mg by mouth daily.  Cholecalciferol (VITAMIN D3) 5000 UNITS TABS Take 5,000 Units by mouth daily        No current facility-administered medications for this visit.         Social History:   Social History     Social History    Marital status:      Spouse name: N/A    Number of children: 2    Years of education: 15     Occupational History     Disabled          Social History Main Topics    Smoking status: Former Smoker     Packs/day: 1.00     Years: 25.00     Types: Cigarettes     Quit date: 9/19/2013    Smokeless

## 2018-09-24 ENCOUNTER — OFFICE VISIT (OUTPATIENT)
Dept: NEUROLOGY | Facility: CLINIC | Age: 43
End: 2018-09-24

## 2018-09-24 VITALS
SYSTOLIC BLOOD PRESSURE: 128 MMHG | BODY MASS INDEX: 46.48 KG/M2 | WEIGHT: 279 LBS | HEIGHT: 65 IN | DIASTOLIC BLOOD PRESSURE: 78 MMHG | HEART RATE: 80 BPM

## 2018-09-24 DIAGNOSIS — G47.33 OSA ON CPAP: Primary | ICD-10-CM

## 2018-09-24 DIAGNOSIS — R53.83 FATIGUE, UNSPECIFIED TYPE: ICD-10-CM

## 2018-09-24 DIAGNOSIS — E66.01 MORBID OBESITY (HCC): ICD-10-CM

## 2018-09-24 DIAGNOSIS — Z99.89 OSA ON CPAP: Primary | ICD-10-CM

## 2018-09-24 DIAGNOSIS — Z98.84 STATUS POST LAPAROSCOPIC SLEEVE GASTRECTOMY: ICD-10-CM

## 2018-09-24 PROCEDURE — 99213 OFFICE O/P EST LOW 20 MIN: CPT | Performed by: NURSE PRACTITIONER

## 2018-09-24 RX ORDER — METOPROLOL SUCCINATE 50 MG/1
50 TABLET, EXTENDED RELEASE ORAL 2 TIMES DAILY
Qty: 180 TABLET | Refills: 3 | Status: SHIPPED | OUTPATIENT
Start: 2018-09-24 | End: 2019-07-16 | Stop reason: SDUPTHER
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
## 2018-09-24 NOTE — PATIENT INSTRUCTIONS

## 2018-10-18 ENCOUNTER — INFUSION (OUTPATIENT)
Dept: ONCOLOGY | Facility: CLINIC | Age: 43
End: 2018-10-18

## 2018-10-18 DIAGNOSIS — K90.9 MALABSORPTION OF IRON: Primary | ICD-10-CM

## 2018-10-18 DIAGNOSIS — D50.8 IRON DEFICIENCY ANEMIA SECONDARY TO INADEQUATE DIETARY IRON INTAKE: ICD-10-CM

## 2018-10-18 RX ORDER — SODIUM CHLORIDE 0.9 % (FLUSH) 0.9 %
10 SYRINGE (ML) INJECTION AS NEEDED
Status: CANCELLED | OUTPATIENT
Start: 2018-10-18

## 2018-10-18 RX ORDER — SODIUM CHLORIDE 0.9 % (FLUSH) 0.9 %
10 SYRINGE (ML) INJECTION AS NEEDED
Status: DISCONTINUED | OUTPATIENT
Start: 2018-10-18 | End: 2018-10-18 | Stop reason: HOSPADM

## 2018-10-18 RX ADMIN — Medication 10 ML: at 09:43

## 2018-10-22 DIAGNOSIS — M51.16 LUMBAR DISC DISEASE WITH RADICULOPATHY: ICD-10-CM

## 2018-10-23 RX ORDER — OXYCODONE HYDROCHLORIDE 10 MG/1
10 TABLET ORAL 2 TIMES DAILY PRN
Qty: 45 TABLET | Refills: 0 | Status: SHIPPED | OUTPATIENT
Start: 2018-10-24 | End: 2018-11-20 | Stop reason: SDUPTHER

## 2018-11-14 RX ORDER — VENLAFAXINE 75 MG/1
TABLET ORAL
Qty: 90 TABLET | Refills: 5 | Status: SHIPPED | OUTPATIENT
Start: 2018-11-14 | End: 2019-02-18 | Stop reason: SDUPTHER

## 2018-11-20 DIAGNOSIS — M51.16 LUMBAR DISC DISEASE WITH RADICULOPATHY: ICD-10-CM

## 2018-11-21 RX ORDER — OXYCODONE HYDROCHLORIDE 10 MG/1
10 TABLET ORAL 2 TIMES DAILY PRN
Qty: 45 TABLET | Refills: 0 | Status: SHIPPED | OUTPATIENT
Start: 2018-11-23 | End: 2018-12-05 | Stop reason: SDUPTHER

## 2018-12-05 ENCOUNTER — HOSPITAL ENCOUNTER (OUTPATIENT)
Dept: PAIN MANAGEMENT | Age: 43
Discharge: HOME | End: 2018-12-05
Payer: MEDICARE

## 2018-12-05 VITALS
HEART RATE: 89 BPM | DIASTOLIC BLOOD PRESSURE: 76 MMHG | TEMPERATURE: 98.8 F | RESPIRATION RATE: 18 BRPM | BODY MASS INDEX: 45.52 KG/M2 | HEIGHT: 67 IN | OXYGEN SATURATION: 97 % | WEIGHT: 290 LBS | SYSTOLIC BLOOD PRESSURE: 104 MMHG

## 2018-12-05 DIAGNOSIS — M51.16 LUMBAR DISC DISEASE WITH RADICULOPATHY: ICD-10-CM

## 2018-12-05 DIAGNOSIS — M54.10 BACK PAIN WITH LEFT-SIDED RADICULOPATHY: ICD-10-CM

## 2018-12-05 PROCEDURE — 99213 OFFICE O/P EST LOW 20 MIN: CPT

## 2018-12-05 PROCEDURE — 99214 OFFICE O/P EST MOD 30 MIN: CPT | Performed by: NURSE PRACTITIONER

## 2018-12-05 ASSESSMENT — PAIN SCALES - GENERAL: PAINLEVEL_OUTOF10: 6

## 2018-12-05 ASSESSMENT — ENCOUNTER SYMPTOMS
CONSTIPATION: 0
BOWEL INCONTINENCE: 0
WHEEZING: 0
SORE THROAT: 0
SHORTNESS OF BREATH: 0
BACK PAIN: 1

## 2018-12-05 ASSESSMENT — PAIN DESCRIPTION - LOCATION: LOCATION: BACK

## 2018-12-05 ASSESSMENT — PAIN DESCRIPTION - DIRECTION: RADIATING_TOWARDS: INTO BILATERAL LOWER EXTREMITIES

## 2018-12-05 ASSESSMENT — PAIN DESCRIPTION - PROGRESSION: CLINICAL_PROGRESSION: NOT CHANGED

## 2018-12-05 ASSESSMENT — PAIN DESCRIPTION - FREQUENCY: FREQUENCY: CONTINUOUS

## 2018-12-05 ASSESSMENT — PAIN DESCRIPTION - PAIN TYPE: TYPE: CHRONIC PAIN

## 2018-12-05 ASSESSMENT — ACTIVITIES OF DAILY LIVING (ADL): EFFECT OF PAIN ON DAILY ACTIVITIES: LIMITS ACTIVITY

## 2018-12-05 ASSESSMENT — PAIN DESCRIPTION - ONSET: ONSET: ON-GOING

## 2018-12-05 ASSESSMENT — PAIN DESCRIPTION - ORIENTATION: ORIENTATION: LOWER

## 2018-12-05 NOTE — PROGRESS NOTES
are 2+ on the right side and 2+ on the left side. Patellar reflexes are 2+ on the right side and 2+ on the left side. Achilles reflexes are 2+ on the right side and 2+ on the left side. Bilateral upper arm strength 5/5. Bilateral lower leg strength 4/5. Negative foot drop   Skin: Skin is warm and dry. She is not diaphoretic. Psychiatric: She has a normal mood and affect. Her behavior is normal. Judgment and thought content normal.   Nursing note and vitals reviewed. Recent Imaging:  EXAM#     TYPE/EXAM                       RESULT                                 [id number] MRI/MRI-SPINE LUMBAR W/O   W/ C                                                EXAMINATION:  MRI-SPINE LUMBAR WITHOUT AND WITH CONTRAST 4/7/2015 7:09       AM               HISTORY: ] Low back pain that radiates proximally and distally.       Bilateral leg pain and paresthesia.              COMPARISON: 4/19/2012.               TECHNIQUE: Multiplanar imaging was performed.               FINDINGS:  The bone marrow is not fat replaced which may be due to       cigarette smoking or other causes for chronic anemia. 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               At L5-S1, there is severe disc narrowing and disc degeneration with       diffuse disc bulging partially covered by spurring. The disc bulging       is more prominent centrally and to the left of midline. There is       moderate facet arthropathy left greater than right at this level.       There is no significant central spinal canal narrowing. There is       severe left-sided foraminal stenosis.              At L4-5, there is disc narrowing and disc degeneration with moderate       disc bulging. There is diffuse endplate spurring. There is moderate       facet arthropathy. These factors produce mild to moderate central       spinal canal narrowing. There is moderate bilateral inferior recess       foraminal stenosis.                At L3-4, the disc is well maintained. There is mild to moderate facet       arthropathy. There is no spinal or foraminal narrowing.               At L2-3, the disc is well maintained. There is mild facet arthropathy.       There is no spinal or foraminal narrowing.               At L1-L2, there is disc narrowing with mild to moderate disc bulging.       There is mild facet arthropathy. There is mild central spinal canal       narrowing. There is mild disc bulging extending into the inferior       recess region of the foramina bilaterally.               The T11-T12 disc space is narrowed. There is disc bulging. There is a       probable central and left paracentral disc herniation at this level.       There were no axial images obtained. There is a hemangioma at T11.               There is no abnormal enhancement. There appears to have been prior       laminotomy at L5.                     PAGE 1               Signed Report                    (CONTINUED)                                               Name: [facility]                          Phys: Kelly VAUGHN MD                 [id number] [facility]                        : 1975 Age: 35      Sex: Kena Sawant [id number]                         Acct: [de-identified]      Loc: CLINICAL                                                 Exam Date: 2015 Status: REG REF  [phone]                             Radiology No: [id number]                                                  Unit No: [id number]                 EXAM#     TYPE/EXAM                       RESULT                                 [id number] MRI/MRI-SPINE LUMBAR W/O   W/ C                                                IMPRESSION:         1. Degenerative changes at multiple levels, as described.       2.  The bone marrow is not fat replaced which may be due to cigarette       smoking or other causes for chronic anemia.               Edited by Brennon Rajput                                      ** REPORT SIGNED IN OTHER VENDOR SYSTEM 04/07/2015 **                      Reported ByKatiana Ferrari MD         Active Problems:    DDD (degenerative disc disease), lumbar    Lumbar disc disease with radiculopathy    Myofascial muscle pain    History of lumbar spinal fusion    High risk medications (not anticoagulants) long-term use  Resolved Problems:    * No resolved hospital problems. *      Plan:  1. Continue OXY IR 10 mg #45 for 1 month and Lyrica 150 twice a day- via with Dr. Patience Burns- oxycodone may be short-term and/or changed in future visits, patient is made aware and agrees to proceed forward. 2. Schedule LESI L2-3 Dr. Patience Burns without sedation. 3. Continue in follow-up by Dr. Ezequiel Christiansen for gastric sleeve surgery recovery- Dec 2017 recovering  4. Continue home stretches and exercises  5. Continue following up with mental health  6. Discussed ROMERO and any change in neurological status may need emergency treatment. 7. Weight loss is a goal.     [x] Will return to the office in   [] 1 month   [] 4 - 6 weeks   [] 2 months   [] 3 months for:  [] Planned Procedure   [x] Procedure Follow-up   [] Office Visit  [] Medication Changes    Discussion: Discussed examfindings and plan of care with patient. Patient agreed with POC and questions were asked and answered. Patient continues with low back pain and radicular right greater than left pain. History of \"360\" fusion Dr. Jag Corbin. Patient uses OxyIR 10 mg #45 and Lyrica 150 mg twice a day for daily activities and range of motion. She continues to seen mental health. Continues following Dr. Ezequiel Christiansen for gastric sleeve surgery 2017. Current weight 290 pounds weight loss is ago. Patient needs right hip surgery at some point in time    Activity: discussed exercise as beneficial to pain reduction, encouraged stretching exercisewith a focus on torso strengthening.     Education Provided:  [x] Review of Mendez Burton [x] Agreement Review [] ORT Score  [x] Review of Test  [x]

## 2018-12-06 RX ORDER — CYCLOBENZAPRINE HCL 10 MG
10 TABLET ORAL 2 TIMES DAILY PRN
Qty: 60 TABLET | Refills: 2 | Status: SHIPPED | OUTPATIENT
Start: 2018-12-06 | End: 2019-04-11 | Stop reason: SDUPTHER

## 2018-12-06 RX ORDER — OXYCODONE HYDROCHLORIDE 10 MG/1
10 TABLET ORAL 2 TIMES DAILY PRN
Qty: 45 TABLET | Refills: 0 | Status: SHIPPED | OUTPATIENT
Start: 2018-12-24 | End: 2019-01-22 | Stop reason: SDUPTHER

## 2018-12-06 RX ORDER — PREGABALIN 150 MG/1
150 CAPSULE ORAL 2 TIMES DAILY
Qty: 180 CAPSULE | Refills: 0 | Status: SHIPPED | OUTPATIENT
Start: 2018-12-12 | End: 2019-03-11 | Stop reason: SDUPTHER

## 2018-12-21 ENCOUNTER — HOSPITAL ENCOUNTER (OUTPATIENT)
Dept: PAIN MANAGEMENT | Age: 43
Discharge: HOME | End: 2018-12-21
Payer: MEDICARE

## 2018-12-21 VITALS
TEMPERATURE: 97.3 F | BODY MASS INDEX: 45.52 KG/M2 | SYSTOLIC BLOOD PRESSURE: 101 MMHG | HEART RATE: 78 BPM | WEIGHT: 290 LBS | RESPIRATION RATE: 18 BRPM | DIASTOLIC BLOOD PRESSURE: 62 MMHG | OXYGEN SATURATION: 99 % | HEIGHT: 67 IN

## 2018-12-21 DIAGNOSIS — M47.816 LUMBAR FACET JOINT SYNDROME: ICD-10-CM

## 2018-12-21 PROCEDURE — 3209999900 FLUORO FOR SURGICAL PROCEDURES

## 2018-12-21 PROCEDURE — 62323 NJX INTERLAMINAR LMBR/SAC: CPT

## 2018-12-21 PROCEDURE — 2500000003 HC RX 250 WO HCPCS

## 2018-12-21 PROCEDURE — 2580000003 HC RX 258

## 2018-12-21 PROCEDURE — 6360000002 HC RX W HCPCS

## 2018-12-21 RX ORDER — LIDOCAINE HYDROCHLORIDE 10 MG/ML
INJECTION, SOLUTION EPIDURAL; INFILTRATION; INTRACAUDAL; PERINEURAL
Status: COMPLETED | OUTPATIENT
Start: 2018-12-21 | End: 2018-12-21

## 2018-12-21 RX ORDER — METHYLPREDNISOLONE ACETATE 80 MG/ML
INJECTION, SUSPENSION INTRA-ARTICULAR; INTRALESIONAL; INTRAMUSCULAR; SOFT TISSUE
Status: COMPLETED | OUTPATIENT
Start: 2018-12-21 | End: 2018-12-21

## 2018-12-21 RX ORDER — 0.9 % SODIUM CHLORIDE 0.9 %
VIAL (ML) INJECTION
Status: COMPLETED | OUTPATIENT
Start: 2018-12-21 | End: 2018-12-21

## 2018-12-21 RX ADMIN — Medication 5 ML: at 09:52

## 2018-12-21 RX ADMIN — METHYLPREDNISOLONE ACETATE 80 MG: 80 INJECTION, SUSPENSION INTRA-ARTICULAR; INTRALESIONAL; INTRAMUSCULAR; SOFT TISSUE at 09:52

## 2018-12-21 RX ADMIN — LIDOCAINE HYDROCHLORIDE 5 ML: 10 INJECTION, SOLUTION EPIDURAL; INFILTRATION; INTRACAUDAL; PERINEURAL at 09:51

## 2018-12-21 ASSESSMENT — PAIN DESCRIPTION - PAIN TYPE: TYPE: CHRONIC PAIN

## 2018-12-21 ASSESSMENT — PAIN DESCRIPTION - PROGRESSION: CLINICAL_PROGRESSION: NOT CHANGED

## 2018-12-21 ASSESSMENT — PAIN SCALES - GENERAL: PAINLEVEL_OUTOF10: 7

## 2018-12-21 ASSESSMENT — PAIN DESCRIPTION - ONSET: ONSET: ON-GOING

## 2018-12-21 ASSESSMENT — PAIN DESCRIPTION - FREQUENCY: FREQUENCY: CONTINUOUS

## 2018-12-21 ASSESSMENT — PAIN - FUNCTIONAL ASSESSMENT: PAIN_FUNCTIONAL_ASSESSMENT: 0-10

## 2018-12-21 ASSESSMENT — PAIN DESCRIPTION - LOCATION: LOCATION: BACK;LEG

## 2018-12-21 ASSESSMENT — PAIN DESCRIPTION - DIRECTION: RADIATING_TOWARDS: LOWER BACK PAIN THAT RADIATES DOWN THE LEGS.

## 2018-12-21 ASSESSMENT — PAIN DESCRIPTION - ORIENTATION: ORIENTATION: RIGHT;LEFT;LOWER

## 2018-12-27 ENCOUNTER — APPOINTMENT (OUTPATIENT)
Dept: LAB | Facility: HOSPITAL | Age: 43
End: 2018-12-27

## 2018-12-27 ENCOUNTER — OFFICE VISIT (OUTPATIENT)
Dept: ONCOLOGY | Facility: CLINIC | Age: 43
End: 2018-12-27

## 2018-12-27 VITALS
HEIGHT: 65 IN | WEIGHT: 290.4 LBS | TEMPERATURE: 97.8 F | RESPIRATION RATE: 18 BRPM | HEART RATE: 93 BPM | OXYGEN SATURATION: 97 % | DIASTOLIC BLOOD PRESSURE: 74 MMHG | SYSTOLIC BLOOD PRESSURE: 134 MMHG | BODY MASS INDEX: 48.38 KG/M2

## 2018-12-27 DIAGNOSIS — K90.9 MALABSORPTION OF IRON: Primary | ICD-10-CM

## 2018-12-27 DIAGNOSIS — D50.8 IRON DEFICIENCY ANEMIA SECONDARY TO INADEQUATE DIETARY IRON INTAKE: Primary | ICD-10-CM

## 2018-12-27 DIAGNOSIS — D58.2 ABNORMAL HEMOGLOBIN (HCC): ICD-10-CM

## 2018-12-27 LAB
ALBUMIN SERPL-MCNC: 4 G/DL (ref 3.5–5)
ALBUMIN/GLOB SERPL: 1.3 G/DL (ref 1.1–2.5)
ALP SERPL-CCNC: 85 U/L (ref 24–120)
ALT SERPL W P-5'-P-CCNC: 16 U/L (ref 0–54)
ANION GAP SERPL CALCULATED.3IONS-SCNC: 10 MMOL/L (ref 4–13)
AST SERPL-CCNC: 24 U/L (ref 7–45)
BASOPHILS # BLD AUTO: 0.07 10*3/MM3 (ref 0–0.2)
BASOPHILS NFR BLD AUTO: 0.6 % (ref 0–2)
BILIRUB SERPL-MCNC: 0.5 MG/DL (ref 0.1–1)
BUN BLD-MCNC: 9 MG/DL (ref 5–21)
BUN/CREAT SERPL: 16.1 (ref 7–25)
CALCIUM SPEC-SCNC: 8.9 MG/DL (ref 8.4–10.4)
CHLORIDE SERPL-SCNC: 101 MMOL/L (ref 98–110)
CO2 SERPL-SCNC: 29 MMOL/L (ref 24–31)
CREAT BLD-MCNC: 0.56 MG/DL (ref 0.5–1.4)
DEPRECATED RDW RBC AUTO: 50.7 FL (ref 40–54)
EOSINOPHIL # BLD AUTO: 0.1 10*3/MM3 (ref 0–0.7)
EOSINOPHIL NFR BLD AUTO: 0.9 % (ref 0–4)
ERYTHROCYTE [DISTWIDTH] IN BLOOD BY AUTOMATED COUNT: 16.3 % (ref 12–15)
FERRITIN SERPL-MCNC: 977 NG/ML (ref 6.24–137)
GFR SERPL CREATININE-BSD FRML MDRD: 118 ML/MIN/1.73
GLOBULIN UR ELPH-MCNC: 3.2 GM/DL
GLUCOSE BLD-MCNC: 152 MG/DL (ref 70–100)
HCT VFR BLD AUTO: 33.5 % (ref 37–47)
HGB BLD-MCNC: 11.7 G/DL (ref 12–16)
HOLD SPECIMEN: NORMAL
HOLD SPECIMEN: NORMAL
IMM GRANULOCYTES # BLD AUTO: 0.1 10*3/MM3 (ref 0–0.03)
IMM GRANULOCYTES NFR BLD AUTO: 0.9 % (ref 0–5)
IRON 24H UR-MRATE: 69 MCG/DL (ref 42–180)
IRON SATN MFR SERPL: 29 % (ref 20–45)
LYMPHOCYTES # BLD AUTO: 2.51 10*3/MM3 (ref 0.72–4.86)
LYMPHOCYTES NFR BLD AUTO: 23.2 % (ref 15–45)
MCH RBC QN AUTO: 30.2 PG (ref 28–32)
MCHC RBC AUTO-ENTMCNC: 34.9 G/DL (ref 33–36)
MCV RBC AUTO: 86.6 FL (ref 82–98)
MONOCYTES # BLD AUTO: 0.55 10*3/MM3 (ref 0.19–1.3)
MONOCYTES NFR BLD AUTO: 5.1 % (ref 4–12)
NEUTROPHILS # BLD AUTO: 7.51 10*3/MM3 (ref 1.87–8.4)
NEUTROPHILS NFR BLD AUTO: 69.3 % (ref 39–78)
NRBC BLD AUTO-RTO: 0 /100 WBC (ref 0–0)
PLATELET # BLD AUTO: 226 10*3/MM3 (ref 130–400)
PMV BLD AUTO: 9.5 FL (ref 6–12)
POTASSIUM BLD-SCNC: 3.8 MMOL/L (ref 3.5–5.3)
PROT SERPL-MCNC: 7.2 G/DL (ref 6.3–8.7)
RBC # BLD AUTO: 3.87 10*6/MM3 (ref 4.2–5.4)
SODIUM BLD-SCNC: 140 MMOL/L (ref 135–145)
TIBC SERPL-MCNC: 234 MCG/DL (ref 225–420)
WBC NRBC COR # BLD: 10.84 10*3/MM3 (ref 4.8–10.8)

## 2018-12-27 PROCEDURE — 85025 COMPLETE CBC W/AUTO DIFF WBC: CPT | Performed by: INTERNAL MEDICINE

## 2018-12-27 PROCEDURE — 80053 COMPREHEN METABOLIC PANEL: CPT | Performed by: INTERNAL MEDICINE

## 2018-12-27 PROCEDURE — 82728 ASSAY OF FERRITIN: CPT | Performed by: INTERNAL MEDICINE

## 2018-12-27 PROCEDURE — 36415 COLL VENOUS BLD VENIPUNCTURE: CPT | Performed by: INTERNAL MEDICINE

## 2018-12-27 PROCEDURE — 83550 IRON BINDING TEST: CPT | Performed by: INTERNAL MEDICINE

## 2018-12-27 PROCEDURE — 99214 OFFICE O/P EST MOD 30 MIN: CPT | Performed by: INTERNAL MEDICINE

## 2018-12-27 PROCEDURE — 83540 ASSAY OF IRON: CPT | Performed by: INTERNAL MEDICINE

## 2018-12-27 NOTE — PROGRESS NOTES
[facility]  HEMATOLOGY & ONCOLOGY    Cancer Staging Information:  No matching staging information was found for the patient.      Subjective     VISIT DIAGNOSIS:   No diagnosis found.    REASON FOR VISIT:     Chief Complaint   Patient presents with   • Anemia     She is here for 6 month f/u visit today and to review her lab work   • Fatigue     She has c/o constant fatigue and weakness, last IV iron txts were over 6 months ago, she is currently using MV with Iron.        HEMATOLOGY / ONCOLOGY HISTORY:    No history exists.           INTERVAL HISTORY  Patient ID: Naomi Bhatia is a 43 y.o. year old female with her obesity, anemia status post Venofer infusions, appreciate to follow-up.   -- Underwent gastric sleeve on 12/20/2017 doing well.  --had right hip sx, it was not replaced. She has to wait 4 weeks and re-eval for the possibility of right hip replacement  --her mood is a little down due to her mother is admitted for pneumonia and she has bone cancer.  --6/26/18: since the lat time she was seen, her mom passed away an she broke he hip. She will be getting total hip replacement.        Past Medical History:   Past Medical History:   Diagnosis Date   • Anemia    • Anxiety    • Arthritis    • Asthma    • Back pain 03/14/2016    with left sided radiculopathy    • DDD (degenerative disc disease), lumbar     Dr. Stuart Zavaleta for pain manangement   • Depression    • Fatigue    • Fibromyalgia    • GERD (gastroesophageal reflux disease)    • Headache    • History of blood transfusion    • Hypertension    • Iron deficiency anemia 9/12/2017   • Iron deficiency anemia secondary to inadequate dietary iron intake 9/12/2017   • Joint pain    • Obesity    • RLS (restless legs syndrome)    • Sleep apnea     positive sleep study; titration study in October     Past Surgical History:   Past Surgical History:   Procedure Laterality Date   • ANKLE SURGERY      Right    • APPENDECTOMY  04/19/2015   • BACK  SURGERY     • CERVICAL SPINE SURGERY  2015   • CHOLECYSTECTOMY      ;lap   • COLONOSCOPY  2017    normal; do not return until age of 49 Dr. Gus Valentine   • GASTRIC SLEEVE LAPAROSCOPIC N/A 2017    Procedure: GASTRIC SLEEVE LAPAROSCOPIC;  Surgeon: Yifan Cordero MD;  Location: [facility];  Service:    • HIP SURGERY  1987    right    • INSERTION CENTRAL VENOUS ACCESS DEVICE W/ SUBCUTANEOUS PORT  2017    Dr Anel Gamboa (Smart Port-Power injectable Port) Cat [id number] Lot [id number]    • MOUTH SURGERY     • NECK SURGERY     • TOOTH EXTRACTION     • UPPER GASTROINTESTINAL ENDOSCOPY  2017    Dr. Gus Valentine, negative for h.pylori, negative for Salomon's   • VAGINAL HYSTERECTOMY SALPINGO OOPHORECTOMY  2008    Partial and then had another surgery to remove the rest     Social History:   Social History     Socioeconomic History   • Marital status:      Spouse name: Not on file   • Number of children: Not on file   • Years of education: Not on file   • Highest education level: Not on file   Social Needs   • Financial resource strain: Not on file   • Food insecurity - worry: Not on file   • Food insecurity - inability: Not on file   • Transportation needs - medical: Not on file   • Transportation needs - non-medical: Not on file   Occupational History   • Not on file   Tobacco Use   • Smoking status: Former Smoker     Packs/day: 1.00     Years: 25.00     Pack years: 25.00     Types: Cigarettes     Last attempt to quit: 2014     Years since quittin.9   • Smokeless tobacco: Never Used   Substance and Sexual Activity   • Alcohol use: Yes     Comment: rarely    • Drug use: No     Comment: Codeine in Prescribed Medications only    • Sexual activity: Defer     Birth control/protection: Surgical   Other Topics Concern   • Not on file   Social History Narrative   • Not on file     Family History:   Family History   Problem Relation Age of Onset   • Diabetes Mother    •  Hypertension Mother    • Coronary artery disease Mother    • Cancer Mother    • Cancer Father    • Hypertension Father    • Heart disease Father    • Stroke Father    • Hypertension Sister    • Obesity Sister    • Cancer Brother    • Diabetes Brother    • Diabetes Maternal Grandmother    • Stroke Maternal Grandmother        Review of Systems   Constitutional: Negative.    HENT: Negative.    Eyes: Negative.    Respiratory: Positive for apnea. Negative for cough, choking and shortness of breath.    Cardiovascular: Negative.    Gastrointestinal: Negative for abdominal distention, abdominal pain, blood in stool, constipation, diarrhea, nausea and vomiting.   Genitourinary: Negative.    Musculoskeletal: Positive for back pain.        Right hip pain   Skin: Negative.    Allergic/Immunologic: Negative.    Neurological: Negative.    Hematological: Negative.    Psychiatric/Behavioral: Negative.         Performance Status:  Asymptomatic    Medications:    Current Outpatient Medications   Medication Sig Dispense Refill   • acyclovir (ZOVIRAX) 800 MG tablet Take 800 mg by mouth Daily.     • ALBUTEROL IN Inhale 1 puff As Needed.     • Calcium Citrate-Vitamin D (CALCIUM CITRATE + D3 PO) Take  by mouth Daily.     • carbidopa-levodopa (SINEMET)  MG per tablet Take 1 tablet by mouth Daily.     • CRANBERRY PO Take  by mouth Daily.     • Cyanocobalamin (VITAMIN B-12 CR PO) Take  by mouth Daily.     • Cyclobenzaprine HCl (FLEXERIL PO) Take 10 mg by mouth Daily As Needed.     • levocetirizine (XYZAL) 5 MG tablet TK 1 T PO QPM  0   • LYRICA 150 MG capsule TK 1 C PO BID  2   • metoprolol succinate XL (TOPROL XL) 50 MG 24 hr tablet Take 50 mg by mouth 2 (Two) Times a Day.     • Multiple Vitamin (MULTI VITAMIN PO) Take  by mouth Daily.     • nortriptyline (PAMELOR) 25 MG capsule 2 tablets at night as needed  3   • omeprazole (priLOSEC) 20 MG capsule TK ONE C PO QD FIRST THING IN THE MORNING ON  AN EMPTY STOMACH  3   • oxyCODONE  "(ROXICODONE) 10 MG tablet Take 10 mg by mouth 2 (Two) Times a Day As Needed.     • simethicone (GAS-X) 80 MG chewable tablet Chew 0.5 tablets Every 6 (Six) Hours As Needed for Flatulence (belching). 30 tablet 1   • venlafaxine (EFFEXOR) 75 MG tablet three  times daily  11   • diclofenac (VOLTAREN) 75 MG EC tablet Take 1 tablet by mouth 2 (Two) Times a Day. 14 tablet 0     No current facility-administered medications for this visit.      Facility-Administered Medications Ordered in Other Visits   Medication Dose Route Frequency Provider Last Rate Last Dose   • diphenhydrAMINE (BENADRYL) injection 50 mg  50 mg Intravenous PRN Pedro Clements MD       • famotidine (PEPCID) injection 20 mg  20 mg Intravenous PRN Pedro Clements MD       • hydrocortisone sodium succinate (Solu-CORTEF) injection 100 mg  100 mg Intravenous PRN Pedro Clements MD           ALLERGIES:    Allergies   Allergen Reactions   • Azithromycin GI Intolerance     Severe Abdominal Pain    • Tegaderm Ag Mesh [Silver] Other (See Comments)     Redness, blisters       Objective      Vitals:    12/27/18 0901   BP: 134/74   Pulse: 93   Resp: 18   Temp: 97.8 °F (36.6 °C)   TempSrc: Tympanic   SpO2: 97%   Weight: 132 kg (290 lb 6.4 oz)   Height: 165.1 cm (65\")   PainSc:   4   PainLoc: Generalized  Comment: c/o over all body pain associated with fibromyalgi         Current Status 12/27/2018   ECOG score 2         Physical Exam    General Appearance: Patient is awake, alert, oriented and in no acute distress. Patient is welldeveloped, wellnourished, and appears stated age.  HEENT: Normocephalic. Sclerae clear, conjunctiva pink, extraocular movements intact, pupils, round, reactive to light and  accommodation. Mouth and throat are clear with moist oral mucosa.  NECK: Supple, no jugular venous distention, thyroid not enlarged.  LYMPH: No cervical, supraclavicular, axillary, or inguinal lymphadenopathy.  CHEST: Equal bilateral " expansion, AP  diameter normal, resonant percussion note  LUNGS: Good air movement, no rales, rhonchi, rubs or wheezes with auscultation  CARDIO: Regular sinus rhythm, no murmurs, gallops or rubs.  ABDOMEN: Nondistended, soft, No tenderness, no guarding, no rebound, No hepatosplenomegaly. No abdominal masses. Bowel sounds positive. No hernia  GENITALIA: Not examined.  BREASTS: Not examined.  MUSKEL: No joint swelling, decreased motion, or inflammation  EXTREMS: No edema, clubbing, cyanosis, No varicose veins.  NEURO: Grossly nonfocal, Gait is coordinated and smooth, Cognition is preserved.  SKIN: No rashes, no ecchymoses, no petechia.  PSYCH: Oriented to time, place and person. Memory is preserved. Mood and affect appear normal  RECENT LABS:  Orders Only on 12/27/2018   Component Date Value Ref Range Status   • Glucose 12/27/2018 152* 70 - 100 mg/dL Final   • BUN 12/27/2018 9  5 - 21 mg/dL Final   • Creatinine 12/27/2018 0.56  0.50 - 1.40 mg/dL Final   • Sodium 12/27/2018 140  135 - 145 mmol/L Final   • Potassium 12/27/2018 3.8  3.5 - 5.3 mmol/L Final   • Chloride 12/27/2018 101  98 - 110 mmol/L Final   • CO2 12/27/2018 29.0  24.0 - 31.0 mmol/L Final   • Calcium 12/27/2018 8.9  8.4 - 10.4 mg/dL Final   • Total Protein 12/27/2018 7.2  6.3 - 8.7 g/dL Final   • Albumin 12/27/2018 4.00  3.50 - 5.00 g/dL Final   • ALT (SGPT) 12/27/2018 16  0 - 54 U/L Final   • AST (SGOT) 12/27/2018 24  7 - 45 U/L Final   • Alkaline Phosphatase 12/27/2018 85  24 - 120 U/L Final   • Total Bilirubin 12/27/2018 0.5  0.1 - 1.0 mg/dL Final   • eGFR Non African Amer 12/27/2018 118  >60 mL/min/1.73 Final   • Globulin 12/27/2018 3.2  gm/dL Final   • A/G Ratio 12/27/2018 1.3  1.1 - 2.5 g/dL Final   • BUN/Creatinine Ratio 12/27/2018 16.1  7.0 - 25.0 Final   • Anion Gap 12/27/2018 10.0  4.0 - 13.0 mmol/L Final   • WBC 12/27/2018 10.84* 4.80 - 10.80 10*3/mm3 Final   • RBC 12/27/2018 3.87* 4.20 - 5.40 10*6/mm3 Final   • Hemoglobin 12/27/2018 11.7*  12.0 - 16.0 g/dL Final   • Hematocrit 12/27/2018 33.5* 37.0 - 47.0 % Final   • MCV 12/27/2018 86.6  82.0 - 98.0 fL Final   • MCH 12/27/2018 30.2  28.0 - 32.0 pg Final   • MCHC 12/27/2018 34.9  33.0 - 36.0 g/dL Final   • RDW 12/27/2018 16.3* 12.0 - 15.0 % Final   • RDW-SD 12/27/2018 50.7  40.0 - 54.0 fl Final   • MPV 12/27/2018 9.5  6.0 - 12.0 fL Final   • Platelets 12/27/2018 226  130 - 400 10*3/mm3 Final   • Neutrophil % 12/27/2018 69.3  39.0 - 78.0 % Final   • Lymphocyte % 12/27/2018 23.2  15.0 - 45.0 % Final   • Monocyte % 12/27/2018 5.1  4.0 - 12.0 % Final   • Eosinophil % 12/27/2018 0.9  0.0 - 4.0 % Final   • Basophil % 12/27/2018 0.6  0.0 - 2.0 % Final   • Immature Grans % 12/27/2018 0.9  0.0 - 5.0 % Final   • Neutrophils, Absolute 12/27/2018 7.51  1.87 - 8.40 10*3/mm3 Final   • Lymphocytes, Absolute 12/27/2018 2.51  0.72 - 4.86 10*3/mm3 Final   • Monocytes, Absolute 12/27/2018 0.55  0.19 - 1.30 10*3/mm3 Final   • Eosinophils, Absolute 12/27/2018 0.10  0.00 - 0.70 10*3/mm3 Final   • Basophils, Absolute 12/27/2018 0.07  0.00 - 0.20 10*3/mm3 Final   • Immature Grans, Absolute 12/27/2018 0.10* 0.00 - 0.03 10*3/mm3 Final   • nRBC 12/27/2018 0.0  0.0 - 0.0 /100 WBC Final       RADIOLOGY:  No results found.         Assessment/Plan  Naomi Bhatia is a 43 y.o. year old female h/o obesity found to have anemia while being evaluated for bariatric surgery , As per cc seen    Patient Active Problem List   Diagnosis   • Morbid obesity (CMS/HCC)   • Hypertension, well controlled   • Fatigue   • History of iron deficiency   • Vitamin D deficiency   • Iron deficiency anemia secondary to inadequate dietary iron intake   • Malabsorption of iron   • Obesity, Class III, BMI 40-49.9 (morbid obesity) (CMS/HCC)   • Status post laparoscopic sleeve gastrectomy   • Acute low back pain without sciatica   • DIPAK on CPAP          1.Anemia: Has had hysterectomy. Patient had recent colonoscopy and EGD negative for active bleed.    --Status post venofer infusion, hemoglobin 11.7. Iron profile pending  --advice pt to have hg and iron profile checked at  PCP in 3 months and have the result faxed to me.  --rtc in 6months    2.  Obesity: s/p gastric sleeve doing well. She has lost 50lbs    3. Leukocytosis: Suspect obesity/reactive, also obesity could cause elevation in wbc. Bone marrow with no dyspoietic changes and no circulating blasts.   --currently resolved with iron infusion    4. HTN: on metoprolol    5. Hip pain: s/p sx and screw removal, now broken right hip total replacement planned in future. She was told to lose 50lbs before the sx.    6. Chronic pain syn: on flexiril, lyrica  7. Gerd: on omeprazole  8. Depression: on effexor, nortriptyline               Markiajonathon Clements MD    12/27/2018    9:25 AM

## 2019-01-03 ENCOUNTER — TELEPHONE (OUTPATIENT)
Dept: PAIN MANAGEMENT | Age: 44
End: 2019-01-03

## 2019-01-03 DIAGNOSIS — M54.10 BACK PAIN WITH LEFT-SIDED RADICULOPATHY: ICD-10-CM

## 2019-01-04 ENCOUNTER — TELEPHONE (OUTPATIENT)
Dept: ONCOLOGY | Facility: CLINIC | Age: 44
End: 2019-01-04

## 2019-01-04 NOTE — TELEPHONE ENCOUNTER
Discussed iron study results with Dr. Clements and Naomi's continued complaints of being tired all the time.  Dr. Clements stated that Naomi does not need any iron at this time lab work was good but if she is staying tired to have her go to  her PCP and have her thyroid level checked.  Notified Naomi of this.

## 2019-01-07 RX ORDER — ACYCLOVIR 800 MG/1
800 TABLET ORAL DAILY
Qty: 90 TABLET | Refills: 1 | Status: SHIPPED | OUTPATIENT
Start: 2019-01-07 | End: 2019-07-16 | Stop reason: SDUPTHER

## 2019-01-18 ENCOUNTER — INFUSION (OUTPATIENT)
Dept: ONCOLOGY | Facility: CLINIC | Age: 44
End: 2019-01-18

## 2019-01-18 DIAGNOSIS — K90.9 MALABSORPTION OF IRON: Primary | ICD-10-CM

## 2019-01-18 DIAGNOSIS — D50.8 IRON DEFICIENCY ANEMIA SECONDARY TO INADEQUATE DIETARY IRON INTAKE: ICD-10-CM

## 2019-01-18 RX ORDER — SODIUM CHLORIDE 0.9 % (FLUSH) 0.9 %
10 SYRINGE (ML) INJECTION AS NEEDED
Status: DISCONTINUED | OUTPATIENT
Start: 2019-01-18 | End: 2019-01-18 | Stop reason: HOSPADM

## 2019-01-18 RX ORDER — SODIUM CHLORIDE 0.9 % (FLUSH) 0.9 %
10 SYRINGE (ML) INJECTION AS NEEDED
Status: CANCELLED | OUTPATIENT
Start: 2019-01-18

## 2019-01-18 RX ADMIN — Medication 10 ML: at 09:12

## 2019-01-22 ENCOUNTER — TELEPHONE (OUTPATIENT)
Dept: BARIATRICS/WEIGHT MGMT | Facility: CLINIC | Age: 44
End: 2019-01-22

## 2019-01-22 DIAGNOSIS — M51.16 LUMBAR DISC DISEASE WITH RADICULOPATHY: ICD-10-CM

## 2019-01-22 RX ORDER — OXYCODONE HYDROCHLORIDE 10 MG/1
10 TABLET ORAL 2 TIMES DAILY PRN
Qty: 45 TABLET | Refills: 0 | Status: SHIPPED | OUTPATIENT
Start: 2019-01-25 | End: 2019-02-26 | Stop reason: SDUPTHER

## 2019-01-22 NOTE — TELEPHONE ENCOUNTER
Patient returned a call regarding scheduling a followup appointment. She said she has stopped coming because she is not losing weight and needs guidance. However, appt was made for February to see Dr. Cordero.

## 2019-01-22 NOTE — TELEPHONE ENCOUNTER
BA Appointment     Called left message re rescheduling patients appointment from 10/2018.  She is currently 13 months S/P Gastric Sleeve.     Patient called back scheduled apt for 02-18-19

## 2019-02-06 ENCOUNTER — HOSPITAL ENCOUNTER (OUTPATIENT)
Dept: PAIN MANAGEMENT | Age: 44
Discharge: HOME | End: 2019-02-06
Payer: MEDICARE

## 2019-02-06 VITALS
TEMPERATURE: 97.4 F | SYSTOLIC BLOOD PRESSURE: 122 MMHG | HEART RATE: 84 BPM | RESPIRATION RATE: 16 BRPM | DIASTOLIC BLOOD PRESSURE: 84 MMHG | OXYGEN SATURATION: 96 %

## 2019-02-06 DIAGNOSIS — G56.03 CARPAL TUNNEL SYNDROME ON BOTH SIDES: ICD-10-CM

## 2019-02-06 DIAGNOSIS — K59.00 CONSTIPATION, UNSPECIFIED CONSTIPATION TYPE: Primary | ICD-10-CM

## 2019-02-06 PROCEDURE — 99213 OFFICE O/P EST LOW 20 MIN: CPT

## 2019-02-06 PROCEDURE — 99214 OFFICE O/P EST MOD 30 MIN: CPT | Performed by: NURSE PRACTITIONER

## 2019-02-06 ASSESSMENT — ENCOUNTER SYMPTOMS
CONSTIPATION: 0
BACK PAIN: 1
WHEEZING: 0
SHORTNESS OF BREATH: 0
BOWEL INCONTINENCE: 0
SORE THROAT: 0

## 2019-02-06 ASSESSMENT — PAIN DESCRIPTION - DESCRIPTORS: DESCRIPTORS: BURNING;ACHING;SHARP

## 2019-02-06 ASSESSMENT — PAIN DESCRIPTION - LOCATION: LOCATION: HIP;NECK

## 2019-02-06 ASSESSMENT — PAIN DESCRIPTION - FREQUENCY: FREQUENCY: CONTINUOUS

## 2019-02-06 ASSESSMENT — PAIN - FUNCTIONAL ASSESSMENT: PAIN_FUNCTIONAL_ASSESSMENT: PREVENTS OR INTERFERES SOME ACTIVE ACTIVITIES AND ADLS

## 2019-02-06 ASSESSMENT — PAIN DESCRIPTION - PROGRESSION: CLINICAL_PROGRESSION: GRADUALLY WORSENING

## 2019-02-06 ASSESSMENT — PAIN SCALES - GENERAL: PAINLEVEL_OUTOF10: 6

## 2019-02-06 ASSESSMENT — PAIN DESCRIPTION - PAIN TYPE: TYPE: CHRONIC PAIN

## 2019-02-06 ASSESSMENT — PAIN DESCRIPTION - ONSET: ONSET: ON-GOING

## 2019-02-07 ASSESSMENT — ENCOUNTER SYMPTOMS
NAUSEA: 1
EYE PAIN: 0
PHOTOPHOBIA: 0
EYE REDNESS: 0

## 2019-02-12 RX ORDER — POLYETHYLENE GLYCOL 3350 17 G/17G
17 POWDER, FOR SOLUTION ORAL DAILY
Qty: 1 BOTTLE | Refills: 1 | Status: SHIPPED | OUTPATIENT
Start: 2019-02-12 | End: 2020-09-24

## 2019-02-18 DIAGNOSIS — M51.16 LUMBAR DISC DISEASE WITH RADICULOPATHY: ICD-10-CM

## 2019-02-18 RX ORDER — VENLAFAXINE 75 MG/1
TABLET ORAL
Qty: 90 TABLET | Refills: 11 | Status: SHIPPED | OUTPATIENT
Start: 2019-02-18 | End: 2020-01-13

## 2019-02-22 ENCOUNTER — OFFICE VISIT (OUTPATIENT)
Dept: PRIMARY CARE CLINIC | Age: 44
End: 2019-02-22
Payer: MEDICARE

## 2019-02-22 ENCOUNTER — TELEPHONE (OUTPATIENT)
Dept: PRIMARY CARE CLINIC | Age: 44
End: 2019-02-22

## 2019-02-22 VITALS
HEIGHT: 67 IN | BODY MASS INDEX: 45.99 KG/M2 | WEIGHT: 293 LBS | DIASTOLIC BLOOD PRESSURE: 68 MMHG | SYSTOLIC BLOOD PRESSURE: 118 MMHG | HEART RATE: 87 BPM | TEMPERATURE: 98.2 F

## 2019-02-22 DIAGNOSIS — F33.1 MODERATE EPISODE OF RECURRENT MAJOR DEPRESSIVE DISORDER (HCC): ICD-10-CM

## 2019-02-22 DIAGNOSIS — G47.33 OSA ON CPAP: ICD-10-CM

## 2019-02-22 DIAGNOSIS — M15.9 PRIMARY OSTEOARTHRITIS INVOLVING MULTIPLE JOINTS: ICD-10-CM

## 2019-02-22 DIAGNOSIS — Z01.818 PRE-OP EXAM: Primary | ICD-10-CM

## 2019-02-22 DIAGNOSIS — I10 ESSENTIAL HYPERTENSION: ICD-10-CM

## 2019-02-22 DIAGNOSIS — E61.1 IRON DEFICIENCY: ICD-10-CM

## 2019-02-22 DIAGNOSIS — J45.20 MILD INTERMITTENT ASTHMA WITHOUT COMPLICATION: ICD-10-CM

## 2019-02-22 DIAGNOSIS — R53.83 FATIGUE, UNSPECIFIED TYPE: ICD-10-CM

## 2019-02-22 DIAGNOSIS — Z99.89 OSA ON CPAP: ICD-10-CM

## 2019-02-22 DIAGNOSIS — Z01.818 PRE-OP EXAM: ICD-10-CM

## 2019-02-22 LAB
ALBUMIN SERPL-MCNC: 4 G/DL (ref 3.5–5.2)
ALP BLD-CCNC: 106 U/L (ref 35–104)
ALT SERPL-CCNC: 17 U/L (ref 5–33)
ANION GAP SERPL CALCULATED.3IONS-SCNC: 12 MMOL/L (ref 7–19)
AST SERPL-CCNC: 20 U/L (ref 5–32)
BASOPHILS ABSOLUTE: 0.1 K/UL (ref 0–0.2)
BASOPHILS RELATIVE PERCENT: 0.7 % (ref 0–1)
BILIRUB SERPL-MCNC: 0.5 MG/DL (ref 0.2–1.2)
BUN BLDV-MCNC: 14 MG/DL (ref 6–20)
CALCIUM SERPL-MCNC: 9.4 MG/DL (ref 8.6–10)
CHLORIDE BLD-SCNC: 100 MMOL/L (ref 98–111)
CHOLESTEROL, TOTAL: 183 MG/DL (ref 160–199)
CO2: 28 MMOL/L (ref 22–29)
CREAT SERPL-MCNC: 0.6 MG/DL (ref 0.5–0.9)
CREATININE URINE: 118 MG/DL (ref 4.2–622)
EOSINOPHILS ABSOLUTE: 0.1 K/UL (ref 0–0.6)
EOSINOPHILS RELATIVE PERCENT: 1 % (ref 0–5)
FERRITIN: 1697 NG/ML (ref 13–150)
GFR NON-AFRICAN AMERICAN: >60
GLUCOSE BLD-MCNC: 103 MG/DL (ref 74–109)
HCT VFR BLD CALC: 38 % (ref 37–47)
HDLC SERPL-MCNC: 47 MG/DL (ref 65–121)
HEMOGLOBIN: 12 G/DL (ref 12–16)
IRON SATURATION: 28 % (ref 14–50)
IRON: 63 UG/DL (ref 37–145)
LDL CHOLESTEROL CALCULATED: 116 MG/DL
LYMPHOCYTES ABSOLUTE: 2.6 K/UL (ref 1.1–4.5)
LYMPHOCYTES RELATIVE PERCENT: 21.5 % (ref 20–40)
MCH RBC QN AUTO: 30.4 PG (ref 27–31)
MCHC RBC AUTO-ENTMCNC: 31.6 G/DL (ref 33–37)
MCV RBC AUTO: 96.2 FL (ref 81–99)
MICROALBUMIN UR-MCNC: <1.2 MG/DL (ref 0–19)
MICROALBUMIN/CREAT UR-RTO: NORMAL MG/G
MONOCYTES ABSOLUTE: 0.6 K/UL (ref 0–0.9)
MONOCYTES RELATIVE PERCENT: 5.2 % (ref 0–10)
NEUTROPHILS ABSOLUTE: 8.5 K/UL (ref 1.5–7.5)
NEUTROPHILS RELATIVE PERCENT: 70.9 % (ref 50–65)
PDW BLD-RTO: 17.4 % (ref 11.5–14.5)
PLATELET # BLD: 267 K/UL (ref 130–400)
PMV BLD AUTO: 9.5 FL (ref 9.4–12.3)
POTASSIUM SERPL-SCNC: 4.2 MMOL/L (ref 3.5–5)
RBC # BLD: 3.95 M/UL (ref 4.2–5.4)
SODIUM BLD-SCNC: 140 MMOL/L (ref 136–145)
T4 FREE: 1.1 NG/DL (ref 0.9–1.7)
TOTAL IRON BINDING CAPACITY: 222 UG/DL (ref 250–400)
TOTAL PROTEIN: 7.6 G/DL (ref 6.6–8.7)
TRIGL SERPL-MCNC: 100 MG/DL (ref 0–149)
TSH SERPL DL<=0.05 MIU/L-ACNC: 2.54 UIU/ML (ref 0.27–4.2)
WBC # BLD: 11.9 K/UL (ref 4.8–10.8)

## 2019-02-22 PROCEDURE — 93000 ELECTROCARDIOGRAM COMPLETE: CPT | Performed by: PEDIATRICS

## 2019-02-22 PROCEDURE — 99214 OFFICE O/P EST MOD 30 MIN: CPT | Performed by: PEDIATRICS

## 2019-02-22 RX ORDER — ALBUTEROL SULFATE 90 UG/1
2 AEROSOL, METERED RESPIRATORY (INHALATION) EVERY 6 HOURS PRN
Qty: 1 INHALER | Refills: 3 | Status: SHIPPED | OUTPATIENT
Start: 2019-02-22 | End: 2021-05-13 | Stop reason: SDUPTHER

## 2019-02-22 ASSESSMENT — ENCOUNTER SYMPTOMS
ALLERGIC/IMMUNOLOGIC NEGATIVE: 1
SINUS PRESSURE: 0
EYE DISCHARGE: 0
CONSTIPATION: 0
DIARRHEA: 0
VOMITING: 0
CHEST TIGHTNESS: 0
RESPIRATORY NEGATIVE: 1
EYES NEGATIVE: 1
BACK PAIN: 1
COUGH: 0
SHORTNESS OF BREATH: 0
WHEEZING: 0
NAUSEA: 0
GASTROINTESTINAL NEGATIVE: 1
SORE THROAT: 0
ABDOMINAL PAIN: 0

## 2019-02-26 DIAGNOSIS — M51.16 LUMBAR DISC DISEASE WITH RADICULOPATHY: ICD-10-CM

## 2019-02-28 ENCOUNTER — TELEPHONE (OUTPATIENT)
Dept: PRIMARY CARE CLINIC | Age: 44
End: 2019-02-28

## 2019-02-28 ENCOUNTER — OFFICE VISIT (OUTPATIENT)
Dept: URGENT CARE | Age: 44
End: 2019-02-28
Payer: MEDICARE

## 2019-02-28 VITALS
WEIGHT: 293 LBS | TEMPERATURE: 96.4 F | HEIGHT: 68 IN | HEART RATE: 102 BPM | OXYGEN SATURATION: 99 % | DIASTOLIC BLOOD PRESSURE: 81 MMHG | RESPIRATION RATE: 18 BRPM | BODY MASS INDEX: 44.41 KG/M2 | SYSTOLIC BLOOD PRESSURE: 138 MMHG

## 2019-02-28 DIAGNOSIS — H66.91 RIGHT ACUTE OTITIS MEDIA: ICD-10-CM

## 2019-02-28 DIAGNOSIS — J02.9 SORE THROAT: ICD-10-CM

## 2019-02-28 DIAGNOSIS — R09.81 HEAD CONGESTION: ICD-10-CM

## 2019-02-28 DIAGNOSIS — J06.9 VIRAL URI WITH COUGH: Primary | ICD-10-CM

## 2019-02-28 LAB
INFLUENZA A ANTIBODY: NEGATIVE
INFLUENZA B ANTIBODY: NEGATIVE
S PYO AG THROAT QL: NORMAL

## 2019-02-28 PROCEDURE — 87880 STREP A ASSAY W/OPTIC: CPT | Performed by: NURSE PRACTITIONER

## 2019-02-28 PROCEDURE — 87804 INFLUENZA ASSAY W/OPTIC: CPT | Performed by: NURSE PRACTITIONER

## 2019-02-28 PROCEDURE — 99213 OFFICE O/P EST LOW 20 MIN: CPT | Performed by: NURSE PRACTITIONER

## 2019-02-28 RX ORDER — AMOXICILLIN 875 MG/1
875 TABLET, COATED ORAL 2 TIMES DAILY
Qty: 20 TABLET | Refills: 0 | Status: SHIPPED | OUTPATIENT
Start: 2019-02-28 | End: 2019-03-10

## 2019-02-28 RX ORDER — FLUCONAZOLE 150 MG/1
150 TABLET ORAL ONCE
Qty: 1 TABLET | Refills: 0 | Status: SHIPPED | OUTPATIENT
Start: 2019-02-28 | End: 2019-02-28

## 2019-02-28 RX ORDER — OXYCODONE HYDROCHLORIDE 10 MG/1
10 TABLET ORAL 2 TIMES DAILY PRN
Qty: 45 TABLET | Refills: 0 | Status: SHIPPED | OUTPATIENT
Start: 2019-02-28 | End: 2019-04-18 | Stop reason: ALTCHOICE

## 2019-02-28 RX ORDER — BENZONATATE 100 MG/1
100 CAPSULE ORAL 3 TIMES DAILY PRN
Qty: 21 CAPSULE | Refills: 0 | Status: SHIPPED | OUTPATIENT
Start: 2019-02-28 | End: 2019-03-07

## 2019-02-28 ASSESSMENT — ENCOUNTER SYMPTOMS
VOMITING: 0
SORE THROAT: 1
NAUSEA: 0
COUGH: 1
SINUS PAIN: 1
DIARRHEA: 0

## 2019-03-11 DIAGNOSIS — M51.16 LUMBAR DISC DISEASE WITH RADICULOPATHY: ICD-10-CM

## 2019-03-12 RX ORDER — PREGABALIN 150 MG/1
150 CAPSULE ORAL 2 TIMES DAILY
Qty: 180 CAPSULE | Refills: 0 | Status: SHIPPED | OUTPATIENT
Start: 2019-03-12 | End: 2019-08-21 | Stop reason: DRUGHIGH

## 2019-03-18 DIAGNOSIS — J30.89 ENVIRONMENTAL AND SEASONAL ALLERGIES: ICD-10-CM

## 2019-03-18 RX ORDER — LEVOCETIRIZINE DIHYDROCHLORIDE 5 MG/1
5 TABLET, FILM COATED ORAL NIGHTLY
Qty: 90 TABLET | Refills: 3 | Status: SHIPPED | OUTPATIENT
Start: 2019-03-18 | End: 2019-04-11 | Stop reason: SDUPTHER

## 2019-04-02 ENCOUNTER — LAB REQUISITION (OUTPATIENT)
Dept: LAB | Facility: HOSPITAL | Age: 44
End: 2019-04-02

## 2019-04-02 DIAGNOSIS — Z00.00 ENCOUNTER FOR GENERAL ADULT MEDICAL EXAMINATION WITHOUT ABNORMAL FINDINGS: ICD-10-CM

## 2019-04-02 LAB
25(OH)D3 SERPL-MCNC: 30.3 NG/ML (ref 30–100)
ALBUMIN SERPL-MCNC: 3.2 G/DL (ref 3.5–5)
ALBUMIN/GLOB SERPL: 1.2 G/DL (ref 1.1–2.5)
ALP SERPL-CCNC: 91 U/L (ref 24–120)
ALT SERPL W P-5'-P-CCNC: 20 U/L (ref 0–54)
ANION GAP SERPL CALCULATED.3IONS-SCNC: 9 MMOL/L (ref 4–13)
ARTICHOKE IGE QN: 97 MG/DL (ref 0–99)
AST SERPL-CCNC: 23 U/L (ref 7–45)
BASOPHILS # BLD AUTO: 0.05 10*3/MM3 (ref 0–0.2)
BASOPHILS NFR BLD AUTO: 0.8 % (ref 0–2)
BILIRUB SERPL-MCNC: 0.6 MG/DL (ref 0.1–1)
BUN BLD-MCNC: 16 MG/DL (ref 5–21)
BUN/CREAT SERPL: 21.1 (ref 7–25)
CALCIUM SPEC-SCNC: 8.7 MG/DL (ref 8.4–10.4)
CHLORIDE SERPL-SCNC: 103 MMOL/L (ref 98–110)
CHOLEST SERPL-MCNC: 154 MG/DL (ref 130–200)
CO2 SERPL-SCNC: 28 MMOL/L (ref 24–31)
CREAT BLD-MCNC: 0.76 MG/DL (ref 0.5–1.4)
DEPRECATED RDW RBC AUTO: 58.5 FL (ref 40–54)
EOSINOPHIL # BLD AUTO: 0.35 10*3/MM3 (ref 0–0.7)
EOSINOPHIL NFR BLD AUTO: 5.9 % (ref 0–4)
ERYTHROCYTE [DISTWIDTH] IN BLOOD BY AUTOMATED COUNT: 17.4 % (ref 12–15)
GFR SERPL CREATININE-BSD FRML MDRD: 83 ML/MIN/1.73
GLOBULIN UR ELPH-MCNC: 2.6 GM/DL
GLUCOSE BLD-MCNC: 84 MG/DL (ref 70–100)
HBA1C MFR BLD: 4.1 %
HCT VFR BLD AUTO: 28.1 % (ref 37–47)
HDLC SERPL-MCNC: 42 MG/DL
HGB BLD-MCNC: 9 G/DL (ref 12–16)
IMM GRANULOCYTES # BLD AUTO: 0.04 10*3/MM3 (ref 0–0.05)
IMM GRANULOCYTES NFR BLD AUTO: 0.7 % (ref 0–5)
LDLC/HDLC SERPL: 2.11 {RATIO}
LYMPHOCYTES # BLD AUTO: 2.05 10*3/MM3 (ref 0.72–4.86)
LYMPHOCYTES NFR BLD AUTO: 34.3 % (ref 15–45)
MAGNESIUM SERPL-MCNC: 1.9 MG/DL (ref 1.4–2.2)
MCH RBC QN AUTO: 29.5 PG (ref 28–32)
MCHC RBC AUTO-ENTMCNC: 32 G/DL (ref 33–36)
MCV RBC AUTO: 92.1 FL (ref 82–98)
MONOCYTES # BLD AUTO: 0.33 10*3/MM3 (ref 0.19–1.3)
MONOCYTES NFR BLD AUTO: 5.5 % (ref 4–12)
NEUTROPHILS # BLD AUTO: 3.16 10*3/MM3 (ref 1.87–8.4)
NEUTROPHILS NFR BLD AUTO: 52.8 % (ref 39–78)
NRBC BLD AUTO-RTO: 0 /100 WBC (ref 0–0)
PLATELET # BLD AUTO: 282 10*3/MM3 (ref 130–400)
PMV BLD AUTO: 9.9 FL (ref 6–12)
POTASSIUM BLD-SCNC: 4 MMOL/L (ref 3.5–5.3)
PREALB SERPL-MCNC: 14.1 MG/DL (ref 18–36)
PROT SERPL-MCNC: 5.8 G/DL (ref 6.3–8.7)
RBC # BLD AUTO: 3.05 10*6/MM3 (ref 4.2–5.4)
SODIUM BLD-SCNC: 140 MMOL/L (ref 135–145)
T4 FREE SERPL-MCNC: 1.37 NG/DL (ref 0.78–2.19)
TRIGL SERPL-MCNC: 116 MG/DL (ref 0–149)
TSH SERPL DL<=0.05 MIU/L-ACNC: 4.95 MIU/ML (ref 0.47–4.68)
VIT B12 BLD-MCNC: 922 PG/ML (ref 239–931)
WBC NRBC COR # BLD: 5.98 10*3/MM3 (ref 4.8–10.8)

## 2019-04-02 PROCEDURE — 84134 ASSAY OF PREALBUMIN: CPT | Performed by: INTERNAL MEDICINE

## 2019-04-02 PROCEDURE — 84439 ASSAY OF FREE THYROXINE: CPT | Performed by: INTERNAL MEDICINE

## 2019-04-02 PROCEDURE — 80061 LIPID PANEL: CPT | Performed by: INTERNAL MEDICINE

## 2019-04-02 PROCEDURE — 84443 ASSAY THYROID STIM HORMONE: CPT | Performed by: INTERNAL MEDICINE

## 2019-04-02 PROCEDURE — 85025 COMPLETE CBC W/AUTO DIFF WBC: CPT | Performed by: INTERNAL MEDICINE

## 2019-04-02 PROCEDURE — 83735 ASSAY OF MAGNESIUM: CPT | Performed by: INTERNAL MEDICINE

## 2019-04-02 PROCEDURE — 36415 COLL VENOUS BLD VENIPUNCTURE: CPT | Performed by: INTERNAL MEDICINE

## 2019-04-02 PROCEDURE — 82607 VITAMIN B-12: CPT | Performed by: INTERNAL MEDICINE

## 2019-04-02 PROCEDURE — 82306 VITAMIN D 25 HYDROXY: CPT | Performed by: INTERNAL MEDICINE

## 2019-04-02 PROCEDURE — 80053 COMPREHEN METABOLIC PANEL: CPT | Performed by: INTERNAL MEDICINE

## 2019-04-02 PROCEDURE — 83036 HEMOGLOBIN GLYCOSYLATED A1C: CPT | Performed by: INTERNAL MEDICINE

## 2019-04-08 ENCOUNTER — TELEPHONE (OUTPATIENT)
Dept: PRIMARY CARE CLINIC | Age: 44
End: 2019-04-08

## 2019-04-11 ENCOUNTER — PATIENT MESSAGE (OUTPATIENT)
Dept: PRIMARY CARE CLINIC | Age: 44
End: 2019-04-11

## 2019-04-11 DIAGNOSIS — J30.89 ENVIRONMENTAL AND SEASONAL ALLERGIES: ICD-10-CM

## 2019-04-11 RX ORDER — LEVOCETIRIZINE DIHYDROCHLORIDE 5 MG/1
5 TABLET, FILM COATED ORAL NIGHTLY
Qty: 90 TABLET | Refills: 3 | Status: SHIPPED | OUTPATIENT
Start: 2019-04-11 | End: 2020-01-06

## 2019-04-11 NOTE — TELEPHONE ENCOUNTER
Patient called nurse line requesting that she have a refill for her Oxy IR. She stated that she had a total hip replacement and she did not fill the medication from the surgeon because what she receives from pain management works better. She was given a script for Oxy IR 10 mg #45 tablets at the end of February. Chart note indicates that this script is for one time. Spoke with patient and she states that she has not been released from her surgeon yet. I let her know that Elvie wants the surgeon to manage acute pain from the surgery. She will check with the surgeon for further medication at this time. She is scheduled to return to this office in May. If needing to some in for sooner appointment after surgery follow up, she can be scheduled for this if opening available.

## 2019-04-11 NOTE — TELEPHONE ENCOUNTER
Received fax from pharmacy requesting refill on pts medication(s). Pt was last seen in office on 2/22/2019  and has a follow up scheduled for 4/15/2019. Will send request to  Dr. Clint Dean  for authorization.      Requested Prescriptions     Pending Prescriptions Disp Refills    levocetirizine (XYZAL) 5 MG tablet 90 tablet 3     Sig: Take 1 tablet by mouth nightly

## 2019-04-12 ENCOUNTER — INFUSION (OUTPATIENT)
Dept: ONCOLOGY | Facility: CLINIC | Age: 44
End: 2019-04-12

## 2019-04-12 DIAGNOSIS — K90.9 MALABSORPTION OF IRON: Primary | ICD-10-CM

## 2019-04-12 DIAGNOSIS — D50.8 IRON DEFICIENCY ANEMIA SECONDARY TO INADEQUATE DIETARY IRON INTAKE: ICD-10-CM

## 2019-04-12 RX ORDER — SODIUM CHLORIDE 0.9 % (FLUSH) 0.9 %
10 SYRINGE (ML) INJECTION AS NEEDED
Status: DISCONTINUED | OUTPATIENT
Start: 2019-04-12 | End: 2019-04-12 | Stop reason: HOSPADM

## 2019-04-12 RX ORDER — CYCLOBENZAPRINE HCL 10 MG
10 TABLET ORAL 2 TIMES DAILY PRN
Qty: 60 TABLET | Refills: 2 | Status: SHIPPED | OUTPATIENT
Start: 2019-04-12 | End: 2019-07-08 | Stop reason: SDUPTHER

## 2019-04-12 RX ORDER — SODIUM CHLORIDE 0.9 % (FLUSH) 0.9 %
10 SYRINGE (ML) INJECTION AS NEEDED
Status: CANCELLED | OUTPATIENT
Start: 2019-04-12

## 2019-04-12 RX ADMIN — Medication 10 ML: at 09:56

## 2019-04-15 ENCOUNTER — TELEPHONE (OUTPATIENT)
Dept: PAIN MANAGEMENT | Age: 44
End: 2019-04-15

## 2019-04-15 NOTE — TELEPHONE ENCOUNTER
Patient called stating that she had spoken with her surgeon regarding pain med management until she is released. She stated that the surgeon will only write a supplemental amount of medication and will not write any further pain meds for her. He has not released her but wants pain management to resume her pain scripts. Had spoken with Husam Dias the other day and will try to bring patient in for a sooner appointment and get her some resolution to the problem.

## 2019-04-16 ENCOUNTER — TELEPHONE (OUTPATIENT)
Dept: PAIN MANAGEMENT | Age: 44
End: 2019-04-16

## 2019-04-18 ENCOUNTER — HOSPITAL ENCOUNTER (OUTPATIENT)
Dept: PAIN MANAGEMENT | Age: 44
Discharge: HOME | End: 2019-04-18
Payer: MEDICARE

## 2019-04-18 VITALS
HEART RATE: 90 BPM | DIASTOLIC BLOOD PRESSURE: 78 MMHG | BODY MASS INDEX: 44.41 KG/M2 | WEIGHT: 293 LBS | HEIGHT: 68 IN | RESPIRATION RATE: 18 BRPM | TEMPERATURE: 97.8 F | SYSTOLIC BLOOD PRESSURE: 123 MMHG | OXYGEN SATURATION: 97 %

## 2019-04-18 DIAGNOSIS — Z98.890 STATUS POST HIP SURGERY: ICD-10-CM

## 2019-04-18 DIAGNOSIS — M25.551 PAIN OF RIGHT HIP JOINT: Primary | ICD-10-CM

## 2019-04-18 DIAGNOSIS — Z51.89 ENCOUNTER FOR MEDICATION ADJUSTMENT: ICD-10-CM

## 2019-04-18 DIAGNOSIS — M54.10 BACK PAIN WITH LEFT-SIDED RADICULOPATHY: ICD-10-CM

## 2019-04-18 PROCEDURE — 99214 OFFICE O/P EST MOD 30 MIN: CPT | Performed by: NURSE PRACTITIONER

## 2019-04-18 PROCEDURE — 99213 OFFICE O/P EST LOW 20 MIN: CPT

## 2019-04-18 RX ORDER — MELOXICAM 15 MG/1
15 TABLET ORAL DAILY
COMMUNITY
End: 2019-06-05 | Stop reason: SDUPTHER

## 2019-04-18 RX ORDER — OXYCODONE HYDROCHLORIDE 5 MG/1
5 TABLET ORAL 3 TIMES DAILY PRN
Qty: 9 TABLET | Refills: 0 | Status: SHIPPED | OUTPATIENT
Start: 2019-04-18 | End: 2019-04-18 | Stop reason: SDUPTHER

## 2019-04-18 ASSESSMENT — PAIN - FUNCTIONAL ASSESSMENT: PAIN_FUNCTIONAL_ASSESSMENT: PREVENTS OR INTERFERES SOME ACTIVE ACTIVITIES AND ADLS

## 2019-04-18 ASSESSMENT — PAIN DESCRIPTION - PROGRESSION
CLINICAL_PROGRESSION: GRADUALLY IMPROVING
CLINICAL_PROGRESSION_2: NOT CHANGED

## 2019-04-18 ASSESSMENT — ENCOUNTER SYMPTOMS
CONSTIPATION: 0
CHEST TIGHTNESS: 0
RECTAL PAIN: 0
SORE THROAT: 0
BACK PAIN: 1
EYE REDNESS: 0
WHEEZING: 0
SHORTNESS OF BREATH: 0
EYE PAIN: 0

## 2019-04-18 ASSESSMENT — ACTIVITIES OF DAILY LIVING (ADL): EFFECT OF PAIN ON DAILY ACTIVITIES: LIMITS ACTIVITY

## 2019-04-18 ASSESSMENT — PAIN DESCRIPTION - ONSET
ONSET: ON-GOING
ONSET_2: ON-GOING

## 2019-04-18 ASSESSMENT — PAIN DESCRIPTION - PAIN TYPE
TYPE_2: CHRONIC PAIN
TYPE: SURGICAL PAIN;ACUTE PAIN

## 2019-04-18 ASSESSMENT — PAIN DESCRIPTION - ORIENTATION
ORIENTATION: RIGHT
ORIENTATION_2: LOWER

## 2019-04-18 ASSESSMENT — PAIN DESCRIPTION - LOCATION
LOCATION_2: BACK
LOCATION: HIP

## 2019-04-18 ASSESSMENT — PAIN DESCRIPTION - DESCRIPTORS
DESCRIPTORS: CONSTANT;DULL
DESCRIPTORS_2: CONSTANT;ACHING

## 2019-04-18 ASSESSMENT — PAIN DESCRIPTION - DIRECTION: RADIATING_TOWARDS_2: INTO BILATERAL LOWER EXTREMITIES

## 2019-04-18 ASSESSMENT — PAIN DESCRIPTION - INTENSITY: RATING_2: 8

## 2019-04-18 ASSESSMENT — PAIN DESCRIPTION - FREQUENCY: FREQUENCY: CONTINUOUS

## 2019-04-18 ASSESSMENT — PAIN SCALES - GENERAL: PAINLEVEL_OUTOF10: 8

## 2019-04-18 ASSESSMENT — PAIN DESCRIPTION - DURATION: DURATION_2: CONTINUOUS

## 2019-04-18 NOTE — PROGRESS NOTES
[facility] SURGICAL [facility] Physical & Pain Medicine  Office Note    Patient Name: Marissa Montaño    MR #: [id number]    Account #: [de-identified]    : 1975    Age: 39 y.o. Sex: female    Date: 2019    PCP: Hayes Miranda DO    Chief Complaint:   Chief Complaint   Patient presents with    Lower Back Pain    Hip Pain     Right       History of Present Illness:     Marissa Montaño is a 39 y.o. female who presents to the office for to discuss medication. Patient had a right hip total replacement by Dr. Tiffanie Ng-  - total posterior hip ( hospital for 2 days).  pt bent over on the toilet and felt a pull-went to ER and images done, admitted for 2 weeks. Pt was sent to [facility] rehab and now currently on home health. Patient received a prescription for Tramadol (supplement for 5 days) and Roxicodone ( current Rx 5mg 1-2 tabs PO q 4 hr- #40) for post surgical pain. Patient called phone nurse stating surgeon will only write a supplemental amount of medication and will not write any further pain medications. Patient filled Tramadol prescription but did not fill Roxicodone. Patient brought in prescription today of post op Roxicodone to be destroyed. I discussed this with Dr. Lael Fothergill reports still seen in surgeon. I discussed we will continue writing pain management medications at this time no change-extremely possible at titration of current chronic pain medication may be possible. Past Visit HPI:  2019  Marissa Montaño is a 39 y.o. female who presents to the office for f/u LESI L2-L3 on 18. She states that Procedure helped her 75% and it is still working.  Patient's main complaint today is her rt hip ( has seen Dr. Florencio Henson, she fell last year fx rt hip, seeing a specialist Monday), Pt reports primary pain is chronic bilateral carpal tunnel pain- \"bilateral wrist pain- hx of Carpal Tunnel\"- she is also having neck pain (neck surgery - [facility]- arthritis), and pain in lateral ankles. 2018- past visit. Rafaela Duran is a 36 y.o. female who presents to the office for a follow up procedure. Patient had a lumbar epidural L2-3 injection in 2018. Patient reports she received 75% relief for 2 months from injection. Patient brought her pain medication in original bottle to appointment today. Pt remains taking OxyIR 10mg BID PRN #45 and Lyrica 150mg bid. Pt reports has tried Norco, Percocet, Tramadol, and Gabapentin with no effectiveness. Pt reports current med regimen and injections allows daily activity and ROMs. Opioids Prescribed? Oxycodone  10mg BID PRN #45    Was medication brought to appointment today? Yes    Current PE    Past PE    Annual Screens:    ORT Score: 3    PHQ-9 Score: 11    Current Pain Assessment  Pain Assessment  Pain Assessment: 0-10  Pain Level: 8  Patient's Stated Pain Goal: No pain  Pain Type: Surgical pain, Acute pain  Pain Location: Hip  Pain Orientation: Right  Pain Radiating Towards: radiates into right groin  Pain Descriptors: Constant, Dull  Pain Frequency: Continuous  Pain Onset: On-going  Clinical Progression: Gradually improving  Effect of Pain on Daily Activities: limits activity  Functional Pain Assessment: Prevents or interferes some active activities and ADLs  Non-Pharmaceutical Pain Intervention(s): Repositioned, Rest  Multiple Pain Sites: Yes    Employment: Disabled    Previous Injury: Yes 2000    Previous Physical Therapy In the last 6 months? Yes    Did Physical Therapy make the pain better? Yes     MRI in the two last year? No  OF:  see images below    CT scan in last 12 months? Yes CT Lower Etremity Right WO Contrast 2018    X-ray last 12 months? No    Nerve Conduction Study / EMG? No    Injections in the past? Yes    Did the injections help relieve the pain?  Yes    Past Medical Histoy  Past Medical History:   Diagnosis Date    Anemia     has had iron infusion    Anxiety     Arthritis     Back pain with left-sided radiculopathy 3/14/2016    DDD (degenerative disc disease), lumbar     Depression     Esophagitis     Fibromyalgia     Gastritis     Headache(784.0)     History of blood transfusion     Hypertension     Obesity     RLS (restless legs syndrome)     Sleep apnea     uses CPAP machine       Surgery History  Past Surgical History:   Procedure Laterality Date    ANKLE SURGERY Right     APPENDECTOMY  4/19/15    BACK SURGERY  2000    CERVICAL SPINE SURGERY N/A 9/1/15    CHOLECYSTECTOMY  1995    HIP SURGERY Right 1987    HIP SURGERY  2018    HYSTERECTOMY      partial , still has ovaries and cervix    INSERTION / REMOVAL / REPLACEMENT VENOUS ACCESS CATHETER Left 2017    PORT INSERTION performed by Jeane Yadav MD at [address] [place] [address] LITHOTRIPSY  [address] [place] [address]      with hardware placed    NC COLONOSCOPY FLX DX W/COLLJ SPEC WHEN PFRMD N/A 2017    Dr Andrei Martinez, 7 yr (age 47) recall    NC EGD TRANSORAL BIOPSY SINGLE/MULTIPLE N/A 2017    Dr ABHIJIT Baltazar-Gastritis/gastropathy    SALPINGO-OOPHORECTOMY      STOMACH SURGERY  2017    dr Irma Kat Right         Family History  family history includes ADHD in her brother; Anxiety Disorder in her brother and sister; Cancer in her brother, father, and mother; Colon Polyps in her mother; Depression in her brother, mother, and sister; Diabetes in her brother and mother; Esophageal Cancer in her brother; Heart Disease in her father and mother; High Blood Pressure in her father, mother, and sister; Other in an other family member; Stroke in her father.     Social History    Social History     Tobacco Use    Smoking status: Former Smoker     Packs/day: 1.00     Years: 25.00     Pack years: 25.00     Types: Cigarettes     Last attempt to quit: 2013     Years since quittin.5    Smokeless tobacco: Never Used   Substance Use Topics    Alcohol use: Yes     Comment: rarely       Allergies  Silver; Tegaderm ag mesh 2\"x2\" [wound dressings]; and Zithromax [azithromycin]     Current Medications  Current Outpatient Medications   Medication Sig Dispense Refill    Calcium-Vitamin D 600-200 MG-UNIT TABS Take 1 tablet by mouth daily      meloxicam (MOBIC) 15 MG tablet Take 15 mg by mouth daily      [START ON 4/22/2019] oxyCODONE (ROXICODONE) 5 MG immediate release tablet Take 1 tablet by mouth 3 times daily as needed for Pain for up to 30 days. 90 tablet 0    cyclobenzaprine (FLEXERIL) 10 MG tablet Take 1 tablet by mouth 2 times daily as needed for Muscle spasms 60 tablet 2    levocetirizine (XYZAL) 5 MG tablet Take 1 tablet by mouth nightly 90 tablet 3    pregabalin (LYRICA) 150 MG capsule Take 1 capsule by mouth 2 times daily for 90 days.  90 day supply 180 capsule 0    carbidopa-levodopa (SINEMET)  MG per tablet Take 1 tablet by mouth nightly as needed (leg spasms) 90 tablet 2    albuterol sulfate HFA (PROVENTIL HFA) 108 (90 Base) MCG/ACT inhaler Inhale 2 puffs into the lungs every 6 hours as needed for Wheezing 1 Inhaler 3    venlafaxine (EFFEXOR) 75 MG tablet 1 QAM and 2 QHS 90 tablet 11    polyethylene glycol (GLYCOLAX) powder Take 17 g by mouth daily 1 Bottle 1    acyclovir (ZOVIRAX) 800 MG tablet Take 1 tablet by mouth daily 90 tablet 1    metoprolol succinate (TOPROL XL) 50 MG extended release tablet Take 1 tablet by mouth 2 times daily 180 tablet 3    omeprazole (PRILOSEC) 20 MG delayed release capsule Take 1 capsule by mouth Daily 90 capsule 3    Cobalamine Combinations (FOLTRATE PO) vitamin B12-folic acid   daily      nortriptyline (PAMELOR) 25 MG capsule Take 1-2 capsules by mouth nightly 180 capsule 3    ondansetron (ZOFRAN ODT) 4 MG disintegrating tablet Take 1 tablet by mouth every 8 hours as needed for Nausea or Vomiting 10 tablet 0    Multiple Vitamins-Minerals (MULTIVITAMIN & MINERAL PO) Take  by mouth.  CRANBERRY Take 500 mg by mouth daily.  Cholecalciferol (VITAMIN D3) 5000 UNITS TABS Take 5,000 Units by mouth daily        No current facility-administered medications for this encounter. Review of Systems:  Review of Systems   Constitutional: Negative for chills, diaphoresis and fever. HENT: Negative for nosebleeds and sore throat. Eyes: Negative for pain and redness. Respiratory: Negative for chest tightness, shortness of breath and wheezing. Cardiovascular: Negative for chest pain. Gastrointestinal: Negative for constipation and rectal pain. Genitourinary: Negative for dysuria and frequency. Musculoskeletal: Positive for arthralgias ( Right hip surgical scar healing), back pain, gait problem ( rolling walker) and myalgias. Skin: Negative for rash. Neurological: Negative for tremors and seizures. Psychiatric/Behavioral: Negative for hallucinations and suicidal ideas. The patient is not hyperactive. 14 point ROS negative besides that noted in HPI    Vitals:    04/18/19 1356   BP: 123/78   Pulse: 90   Resp: 18   Temp: 97.8 °F (36.6 °C)   TempSrc: Oral   SpO2: 97%   Weight: (!) 318 lb (144.2 kg)   Height: 5' 8\" (1.727 m)       Body mass index is 48.35 kg/m². Physical Exam   Constitutional: She is oriented to person, place, and time. She appears well-developed and well-nourished. No distress. HENT:   Head: Normocephalic and atraumatic. Right Ear: External ear normal.   Left Ear: External ear normal.   Nose: Nose normal.   Eyes: Pupils are equal, round, and reactive to light. Conjunctivae and EOM are normal. Right eye exhibits no discharge. Left eye exhibits no discharge. Neck: Normal range of motion. Neck supple. Cardiovascular: Normal rate and regular rhythm. Pulmonary/Chest: Effort normal and breath sounds normal. No stridor. She has no wheezes. Abdominal: Soft. Bowel sounds are normal. There is no tenderness. mild retrolisthesis is of L4 over L5 which is  stable since the previous study. The vertebral body heights are normal. The alignment from L1 through  L4 is normal.  There is hardware fusion of the L4, L5 and S1 with bilateral pedicular  screws, cd rods and the anterior screws and plates. There are disc  spacers in place. There is evidence of laminectomy at L4-L5. The remaining posterior  processes are normal.  Chronic degenerative changes are seen in the form of osteophytes and  narrowing of the disc spaces.     Impression  No recent fracture or acute displacement/malalignment. Lumbar spondylosis. A persistent and unchanged mild retrolisthesis is of L4 over L5. The hardware fusion of the lower lumbar spine at L4, L5 and S1 with  bilateral pedicular screws, cd rods and anterior screws and plates. The disc spacers in place. Evidence of laminectomy at L4 and L5. The above findings are recorded on a digital voice clip in PACS. Signed by Dr Claudette Chua on 2/26/2018 1:29 PM              Assessment:  Principal Problem:    Encounter for medication adjustment  Active Problems:    Lumbar disc disease with radiculopathy    History of lumbar spinal fusion    High risk medications (not anticoagulants) long-term use    Status post hip surgery  Resolved Problems:    * No resolved hospital problems. *      Plan:  1.restart OXY IR 10 mg #45 and refill Lyrica 150 twice a day when due- via with Dr. Blossom Díaz  2. Will discuss repeat  LESI L2-3 when released from surgery  3. Continue  Dr. Lara for gastric sleeve surgery recovery- Dec 2017 recovering  4. Continue home stretches and exercises- Home health for post right surgery  5. Continue following up with mental health- Depression- no suicide  6. Discussed ROMERO and any change in neurological status may need emergency     treatment. 7. Weight loss is a goal-318 today.    8. Bilateral wrist injections- L>R injections when released from surgery       Discussion: Discussed transcription/translation of spoken language to printed tach. Electronic translation of spoken language may be erroneous, or at times, nonsensical words or phrases may be inadvertently transcribed.  Although, I have reviewed the note for such errors, some may still exist.    Education Provided by Nurse  Review of [place] / Agreement Review / PEG Calculated / PHQ-9 Reviewed / Compliance Issues Discussed / Cognitive Behavior Needs / Exercise / Review of Test / Financial Issues / Teaching / Smoking Cessation / Tobacco/Alcohol Use   Electronically signed by Yash Bartlett RN on 4/18/19 at 2:50 PM    Providers Plan   Outgoing Referral / Pharmacy Consult / Test ordered / Obtained Test Results / Consults    New/HP Patient Picture Obtained / Prescriptions ordered-1

## 2019-04-19 RX ORDER — OXYCODONE HYDROCHLORIDE 5 MG/1
5 TABLET ORAL 3 TIMES DAILY PRN
Qty: 90 TABLET | Refills: 0 | Status: SHIPPED | OUTPATIENT
Start: 2019-04-22 | End: 2019-05-24 | Stop reason: SDUPTHER

## 2019-04-21 PROBLEM — Z51.89 ENCOUNTER FOR MEDICATION ADJUSTMENT: Status: ACTIVE | Noted: 2019-04-21

## 2019-04-22 RX ORDER — NORTRIPTYLINE HYDROCHLORIDE 25 MG/1
CAPSULE ORAL
Qty: 180 CAPSULE | Refills: 3 | Status: SHIPPED | OUTPATIENT
Start: 2019-04-22 | End: 2020-01-13

## 2019-05-14 ENCOUNTER — TELEPHONE (OUTPATIENT)
Dept: PAIN MANAGEMENT | Age: 44
End: 2019-05-14

## 2019-05-16 ENCOUNTER — TELEPHONE (OUTPATIENT)
Dept: PAIN MANAGEMENT | Age: 44
End: 2019-05-16

## 2019-05-16 NOTE — TELEPHONE ENCOUNTER
Spoke with patient regarding scheduling her for lumbar epidural.  Patient states that she has not been completely discharged from her surgeon. I let her know that Lauryn Jordan will not schedule her unless cleared by the surgeon. I instructed her to call the surgeon and ask if it is medically feasible for her to have an epidural injection and that if the surgeon will provide documentation stating that she is cleared for injections, we can schedule her. If we do not receive a clearance from him, injections will not be scheduled. Patient verbalized understanding.

## 2019-05-24 DIAGNOSIS — M25.551 PAIN OF RIGHT HIP JOINT: ICD-10-CM

## 2019-05-28 RX ORDER — OXYCODONE HYDROCHLORIDE 5 MG/1
5 TABLET ORAL 3 TIMES DAILY PRN
Qty: 90 TABLET | Refills: 0 | Status: SHIPPED | OUTPATIENT
Start: 2019-05-28 | End: 2019-06-21 | Stop reason: SDUPTHER

## 2019-06-05 ENCOUNTER — OFFICE VISIT (OUTPATIENT)
Dept: PRIMARY CARE CLINIC | Age: 44
End: 2019-06-05
Payer: MEDICARE

## 2019-06-05 VITALS
HEIGHT: 67 IN | WEIGHT: 293 LBS | DIASTOLIC BLOOD PRESSURE: 80 MMHG | TEMPERATURE: 98.3 F | HEART RATE: 96 BPM | OXYGEN SATURATION: 98 % | SYSTOLIC BLOOD PRESSURE: 130 MMHG | BODY MASS INDEX: 45.99 KG/M2

## 2019-06-05 DIAGNOSIS — T14.8XXA BLISTER: ICD-10-CM

## 2019-06-05 DIAGNOSIS — K59.00 CONSTIPATION, UNSPECIFIED CONSTIPATION TYPE: ICD-10-CM

## 2019-06-05 DIAGNOSIS — M15.9 PRIMARY OSTEOARTHRITIS INVOLVING MULTIPLE JOINTS: Primary | ICD-10-CM

## 2019-06-05 PROCEDURE — 99213 OFFICE O/P EST LOW 20 MIN: CPT | Performed by: PEDIATRICS

## 2019-06-05 RX ORDER — TRIAMCINOLONE ACETONIDE 1 MG/G
CREAM TOPICAL
Qty: 80 G | Refills: 3 | Status: SHIPPED | OUTPATIENT
Start: 2019-06-05 | End: 2021-04-26

## 2019-06-05 RX ORDER — POLYETHYLENE GLYCOL 3350 17 G/17G
17 POWDER, FOR SOLUTION ORAL DAILY
Qty: 3 BOTTLE | Refills: 3 | Status: SHIPPED | OUTPATIENT
Start: 2019-06-05 | End: 2019-07-05

## 2019-06-05 RX ORDER — POLYETHYLENE GLYCOL 3350 17 G/17G
17 POWDER, FOR SOLUTION ORAL DAILY
Qty: 1 BOTTLE | Refills: 2 | Status: CANCELLED | OUTPATIENT
Start: 2019-06-05

## 2019-06-05 RX ORDER — MELOXICAM 15 MG/1
15 TABLET ORAL DAILY
Qty: 90 TABLET | Refills: 3 | Status: SHIPPED | OUTPATIENT
Start: 2019-06-05 | End: 2020-05-28

## 2019-06-05 ASSESSMENT — ENCOUNTER SYMPTOMS
ALLERGIC/IMMUNOLOGIC NEGATIVE: 1
SINUS PRESSURE: 0
COUGH: 0
SHORTNESS OF BREATH: 0
VOMITING: 0
NAUSEA: 0
WHEEZING: 0
EYES NEGATIVE: 1
EYE DISCHARGE: 0
DIARRHEA: 0
CONSTIPATION: 0
ABDOMINAL PAIN: 0
SORE THROAT: 0
RESPIRATORY NEGATIVE: 1
CHEST TIGHTNESS: 0
BACK PAIN: 1
GASTROINTESTINAL NEGATIVE: 1

## 2019-06-05 NOTE — PATIENT INSTRUCTIONS
Patient Education        Constipation: Care Instructions  Your Care Instructions    Constipation means that you have a hard time passing stools (bowel movements). People pass stools from 3 times a day to once every 3 days. What is normal for you may be different. Constipation may occur with pain in the rectum and cramping. The pain may get worse when you try to pass stools. Sometimes there are small amounts of bright red blood on toilet paper or the surface of stools. This is because of enlarged veins near the rectum (hemorrhoids). A few changes in your diet and lifestyle may help you avoid ongoing constipation. Your doctor may also prescribe medicine to help loosen your stool. Some medicines can cause constipation. These include pain medicines and antidepressants. Tell your doctor about all the medicines you take. Your doctor may want to make a medicine change to ease your symptoms. Follow-up care is a key part of your treatment and safety. Be sure to make and go to all appointments, and call your doctor if you are having problems. It's also a good idea to know your test results and keep a list of the medicines you take. How can you care for yourself at home? · Drink plenty of fluids, enough so that your urine is light yellow or clear like water. If you have kidney, heart, or liver disease and have to limit fluids, talk with your doctor before you increase the amount of fluids you drink. · Include high-fiber foods in your diet each day. These include fruits, vegetables, beans, and whole grains. · Get at least 30 minutes of exercise on most days of the week. Walking is a good choice. You also may want to do other activities, such as running, swimming, cycling, or playing tennis or team sports. · Take a fiber supplement, such as Citrucel or Metamucil, every day. Read and follow all instructions on the label. · Schedule time each day for a bowel movement. A daily routine may help.  Take your time having your (Feldene). Take NSAIDS exactly as prescribed. Call your doctor if you think you are having a problem with your medicine. If you are taking over-the-counter medicine, read and follow all instructions on the label. Follow-up care is a key part of your treatment and safety. Be sure to make and go to all appointments, and call your doctor if you are having problems. It's also a good idea to know your test results and keep a list of the medicines you take. What should you know about NSAIDs? · Do not use an over-the-counter NSAID for longer than 10 days. Talk to your doctor first.  · The most common side effects from NSAIDs are stomachaches, heartburn, and nausea. NSAIDs may irritate the stomach lining. If the medicine upsets your stomach, you can try taking it with food. But if that doesn't help, talk with your doctor to make sure it's not a more serious problem, such as a stomach ulcer or bleeding in the stomach or intestines. · Using NSAIDs may:  ? Lead to high blood pressure. ? Make symptoms of heart failure worse. ? Raise the risk of heart attack, stroke, kidney damage, and skin reactions. · Your risks are greater if you take NSAIDs at higher doses or for longer than the label says. People who are older than 72 or who have heart, stomach, or intestinal disease have a higher risk for problems. Aspirin  Aspirin is not like other NSAIDs. It can help certain people lower their risk of a heart attack or stroke. But taking aspirin isn't right for everyone, because it can cause serious bleeding. Talk to your doctor before you start taking aspirin every day. You and your doctor can decide if aspirin is a good choice for you based on your risk of a heart attack or stroke and your risk of serious bleeding. If you have a low risk of a heart attack or stroke, the benefits of aspirin probably won't outweigh the risk of bleeding.   · If you use other NSAIDs a lot, aspirin may not work as well to prevent heart attack and stroke. · If you take aspirin every day for your heart, talk with your doctor before you take other NSAIDs. · Do not give aspirin to anyone younger than 20. It has been linked to Reye syndrome, a serious illness. When should you call for help? Call 911 anytime you think you may need emergency care. For example, call if:    · You passed out (lost consciousness).     · You vomit blood or what looks like coffee grounds.     · You pass maroon or very bloody stools.    Call your doctor now or seek immediate medical care if:    · Your stools are black and tarlike or have streaks of blood.    Watch closely for changes in your health, and be sure to contact your doctor if you have any problems. Where can you learn more? Go to https://[employer]. org and sign in to your LoveIt account. Enter A328 in the "[employer] box to learn more about \"Nonsteroidal Anti-Inflammatory Drugs (NSAIDs): Care Instructions. \"     If you do not have an account, please click on the \"Sign Up Now\" link. Current as of: Skylar 3, 2018  Content Version: 12.0  © 4504-8498 [employer]wise, Incorporated. Care instructions adapted under license by [facility] ([facility]). If you have questions about a medical condition or this instruction, always ask your healthcare professional. [address] any warranty or liability for your use of this information.

## 2019-06-05 NOTE — PROGRESS NOTES
[address] [place] [address], [address]  Phone [phone]   Fax [phone]      OFFICE VISIT: 2019    Ivette Moore-: 1975      HPI  Reason For Visit:  Ivette Ambriz is a 39 y.o. Health Maintenance    Other (Left middle toe- Patient states she has a \"growth\" on that toe and it is bothering her) and Medication Refill (Miralax and Patient wants you to refill her Mobic if you will. Her surgeon in [place] normally fills that)    Patient presents with complaints of her left middle toe having a growth on the toe. She states this is bothering her. She would like this evaluated and removed if possible. She also complains of osteoarthritis type pains. She typically is prescribed meloxicam by her [address]. She would like for us to prescribe this if possible  Most recent BUN and creatinine are 14 and 0.6 from 2019 with a GFR greater than 60. No history of gastric ulcers or GI bleeding. She just had a hip replacement this past march. She also has trouble with chronic constipation. She takes MiraLAX for this. She would like prescription for this as well     height is 5' 7\" (1.702 m) and weight is 311 lb 12.8 oz (141.4 kg) (abnormal). Her temporal temperature is 98.3 °F (36.8 °C). Her blood pressure is 130/80 and her pulse is 96. Her oxygen saturation is 98%. Body mass index is 48.83 kg/m². I have reviewed the following with the Ms. Moore   Lab Review  Office Visit on 2019   Component Date Value    Strep A Ag 2019 None Detected     Influenza A Ab 2019 negative     Influenza B Ab 2019 negative    Orders Only on 2019   Component Date Value    WBC 2019 11.9*    RBC 2019 3.95*    Hemoglobin 2019 12.0     Hematocrit 2019 38.0     MCV 2019 96.2     MCH 2019 30.4     MCHC 2019 31.6*    RDW 2019 17.4*    Platelets  267     MPV 2019 9.5  Neutrophils % 02/22/2019 70.9*    Lymphocytes % 02/22/2019 21.5     Monocytes % 02/22/2019 5.2     Eosinophils % 02/22/2019 1.0     Basophils % 02/22/2019 0.7     Neutrophils # 02/22/2019 8.5*    Lymphocytes # 02/22/2019 2.6     Monocytes # 02/22/2019 0.60     Eosinophils # 02/22/2019 0.10     Basophils # 02/22/2019 0.10     Sodium 02/22/2019 140     Potassium 02/22/2019 4.2     Chloride 02/22/2019 100     CO2 02/22/2019 28     Anion Gap 02/22/2019 12     Glucose 02/22/2019 103     BUN 02/22/2019 14     CREATININE 02/22/2019 0.6     GFR Non- 02/22/2019 >60     Calcium 02/22/2019 9.4     Total Protein 02/22/2019 7.6     Alb 02/22/2019 4.0     Total Bilirubin 02/22/2019 0.5     Alkaline Phosphatase 02/22/2019 106*    ALT 02/22/2019 17     AST 02/22/2019 20     Microalbumin, Random Uri* 02/22/2019 <1.20     Creatinine, Ur 02/22/2019 118.0     Microalbumin Creatinine * 02/22/2019 see below     T4 Free 02/22/2019 1.1     TSH 02/22/2019 2.540     Cholesterol, Total 02/22/2019 183     Triglycerides 02/22/2019 100     HDL 02/22/2019 47*    LDL Calculated 02/22/2019 116     Iron 02/22/2019 63     TIBC 02/22/2019 222*    Iron Saturation 02/22/2019 28     Ferritin 02/22/2019 1,697.0*     Copies of these are in the chart. Current Outpatient Medications   Medication Sig Dispense Refill    meloxicam (MOBIC) 15 MG tablet Take 1 tablet by mouth daily 90 tablet 3    polyethylene glycol (GLYCOLAX) powder Take 17 g by mouth daily 3 Bottle 3    triamcinolone (KENALOG) 0.1 % cream Apply topically 2 times daily. 80 g 3    oxyCODONE (ROXICODONE) 5 MG immediate release tablet Take 1 tablet by mouth 3 times daily as needed for Pain for up to 30 days.  90 tablet 0    nortriptyline (PAMELOR) 25 MG capsule TAKE 1 TO 2 CAPSULES BY MOUTH EVERY NIGHT 180 capsule 3    Calcium-Vitamin D 600-200 MG-UNIT TABS Take 1 tablet by mouth daily      cyclobenzaprine (FLEXERIL) 10 MG tablet chills, fatigue and fever. HENT: Negative. Negative for congestion, ear discharge, ear pain, postnasal drip, sinus pressure and sore throat. Eyes: Negative. Negative for discharge and visual disturbance. Respiratory: Negative. Negative for cough, chest tightness, shortness of breath and wheezing. Cardiovascular: Negative. Negative for chest pain, palpitations and leg swelling. Gastrointestinal: Negative. Negative for abdominal pain, constipation, diarrhea, nausea and vomiting. GERD is well controlled on meds   Endocrine: Negative. Genitourinary: Negative. Negative for difficulty urinating, dysuria and menstrual problem. Musculoskeletal: Positive for arthralgias (right hip. better since surgery. going through PT) and back pain. Negative for joint swelling and myalgias. Skin: Negative. Negative for rash. Allergic/Immunologic: Negative. Neurological: Negative. Negative for dizziness, weakness and headaches. Hematological: Negative. Negative for adenopathy. Does not bruise/bleed easily. Psychiatric/Behavioral: Positive for decreased concentration, dysphoric mood and sleep disturbance. Negative for suicidal ideas. The patient is not nervous/anxious. Physical Exam   Constitutional: She is oriented to person, place, and time. She appears well-developed and well-nourished. HENT:   Head: Normocephalic. Right Ear: Tympanic membrane normal.   Left Ear: Tympanic membrane normal.   Eyes: Pupils are equal, round, and reactive to light. Conjunctivae are normal.   Neck: Normal range of motion. Neck supple. Cardiovascular: Normal rate and regular rhythm. Pulmonary/Chest: Effort normal.   Abdominal: Soft. Bowel sounds are normal. There is no tenderness. Musculoskeletal:        Right hip: She exhibits decreased range of motion, decreased strength and tenderness. Neurological: She is alert and oriented to person, place, and time. She has normal reflexes.    Skin: Skin is

## 2019-06-06 ENCOUNTER — TELEPHONE (OUTPATIENT)
Dept: PAIN MANAGEMENT | Age: 44
End: 2019-06-06

## 2019-06-06 NOTE — TELEPHONE ENCOUNTER
Call placed to patient to schedule for lumbar epidural.  Spoke to [facility] and he stated for L2-L3. Patient scheduled for 6/28/19.   Will see Elvie in the office on 6/21/19

## 2019-06-21 ENCOUNTER — HOSPITAL ENCOUNTER (OUTPATIENT)
Dept: PAIN MANAGEMENT | Age: 44
Discharge: HOME | End: 2019-06-21
Payer: MEDICARE

## 2019-06-21 VITALS
BODY MASS INDEX: 45.99 KG/M2 | DIASTOLIC BLOOD PRESSURE: 74 MMHG | RESPIRATION RATE: 18 BRPM | HEIGHT: 67 IN | WEIGHT: 293 LBS | TEMPERATURE: 97.7 F | OXYGEN SATURATION: 98 % | SYSTOLIC BLOOD PRESSURE: 119 MMHG | HEART RATE: 85 BPM

## 2019-06-21 DIAGNOSIS — M54.16 CHRONIC LUMBAR RADICULOPATHY: Primary | ICD-10-CM

## 2019-06-21 DIAGNOSIS — Z98.1 HISTORY OF LUMBAR SPINAL FUSION: Primary | ICD-10-CM

## 2019-06-21 DIAGNOSIS — M51.16 LUMBAR DISC DISEASE WITH RADICULOPATHY: Chronic | ICD-10-CM

## 2019-06-21 DIAGNOSIS — Z98.890 HISTORY OF HIP SURGERY: ICD-10-CM

## 2019-06-21 DIAGNOSIS — M25.551 PAIN OF RIGHT HIP JOINT: ICD-10-CM

## 2019-06-21 PROCEDURE — 99213 OFFICE O/P EST LOW 20 MIN: CPT

## 2019-06-21 PROCEDURE — 99214 OFFICE O/P EST MOD 30 MIN: CPT | Performed by: NURSE PRACTITIONER

## 2019-06-21 RX ORDER — PREGABALIN 100 MG/1
100 CAPSULE ORAL 3 TIMES DAILY
Qty: 180 CAPSULE | Refills: 0 | Status: SHIPPED | OUTPATIENT
Start: 2019-06-14 | End: 2019-09-11 | Stop reason: SDUPTHER

## 2019-06-21 ASSESSMENT — PAIN DESCRIPTION - FREQUENCY: FREQUENCY: CONTINUOUS

## 2019-06-21 ASSESSMENT — ENCOUNTER SYMPTOMS
WHEEZING: 0
ABDOMINAL PAIN: 1
SORE THROAT: 0
BACK PAIN: 1
SHORTNESS OF BREATH: 0
EYE REDNESS: 0
EYE PAIN: 0
CONSTIPATION: 0

## 2019-06-21 ASSESSMENT — ACTIVITIES OF DAILY LIVING (ADL): EFFECT OF PAIN ON DAILY ACTIVITIES: LIMITS ACTIVITIES

## 2019-06-21 ASSESSMENT — PAIN DESCRIPTION - PROGRESSION: CLINICAL_PROGRESSION: NOT CHANGED

## 2019-06-21 ASSESSMENT — PAIN DESCRIPTION - PAIN TYPE: TYPE: CHRONIC PAIN

## 2019-06-21 ASSESSMENT — PAIN DESCRIPTION - DESCRIPTORS: DESCRIPTORS: ACHING;CONSTANT

## 2019-06-21 ASSESSMENT — PAIN DESCRIPTION - ONSET: ONSET: ON-GOING

## 2019-06-21 ASSESSMENT — PAIN - FUNCTIONAL ASSESSMENT: PAIN_FUNCTIONAL_ASSESSMENT: PREVENTS OR INTERFERES SOME ACTIVE ACTIVITIES AND ADLS

## 2019-06-21 ASSESSMENT — PAIN DESCRIPTION - ORIENTATION: ORIENTATION: RIGHT;LEFT

## 2019-06-21 ASSESSMENT — PAIN SCALES - GENERAL: PAINLEVEL_OUTOF10: 8

## 2019-06-21 ASSESSMENT — PAIN DESCRIPTION - LOCATION: LOCATION: BACK;KNEE;FOOT

## 2019-06-21 NOTE — PROGRESS NOTES
[facility] Physical & Pain Medicine  Office Note    Patient Name: Maryjo Uribe    MR #: [id number]    Account #: [de-identified]    : 1975    Age: 39 y.o. Sex: female    Date: 2019    PCP: Steven Peters [place],[address], DO    Chief Complaint:   Chief Complaint   Patient presents with    Lower Back Pain     radiates down bilateral legs    Foot Pain     bilateral    Knee Pain     bilateral       History of Present Illness:     Maryjo Uribe is a 39 y.o. female who presents to the office for follow up chronic lower back pain and bilateral radicular leg pain. Pt with upcoming LESI Friday. Pt reports medications oxy IR and lyrica provides enough relief for ADLs and ROMs. NO rolling walker today. Pt has been released from orthopedics- right hip surgery. Pt is eager for LESI and would like bilateral wrist injections for bilateral carpal tunnel. Pt with hx chronic bilateral knee pain- wears left knee brace. May need knee injections in future. Current weight 325- this is a 7 lb weight gain. I have counseled on primary goal of weight loss. Pt discussed decrease lyrica - 100mg tid- for efficacy and possible weight loss.      Past Visit HPI: I have read last pain management note    Current PE.6    Past PE    ORT Score: 8    PHQ-9 Score: 3    Current Pain Assessment:   Pain Assessment  Pain Assessment: 0-10  Pain Level: 8  Patient's Stated Pain Goal: No pain  Pain Type: Chronic pain  Pain Location: Back, Knee, Foot  Pain Orientation: Right, Left  Pain Radiating Towards: low back down bilateral legs and feet  Pain Descriptors: Aching, Constant  Pain Frequency: Continuous  Pain Onset: On-going  Clinical Progression: Not changed  Effect of Pain on Daily Activities: limits activities  Functional Pain Assessment: Prevents or interferes some active activities and ADLs  Non-Pharmaceutical Pain Intervention(s): Rest  Response to Pain Intervention: Patient Satisfied  Multiple Pain Sites: Yes    Employment: na    Past Medical Histoy  Past Medical History:   Diagnosis Date    Anemia     has had iron infusion    Anxiety     Arthritis     Back pain with left-sided radiculopathy 3/14/2016    DDD (degenerative disc disease), lumbar     Depression     Esophagitis     Fibromyalgia     Gastritis     Headache(784.0)     History of blood transfusion     Hypertension     Obesity     RLS (restless legs syndrome)     Sleep apnea     uses CPAP machine       Surgery History  Past Surgical History:   Procedure Laterality Date    ANKLE SURGERY Right     APPENDECTOMY  4/19/15    BACK SURGERY  2000    CERVICAL SPINE SURGERY N/A 9/1/15    CHOLECYSTECTOMY  1995    HIP SURGERY Right 1987    HIP SURGERY  03/28/2018    HYSTERECTOMY      partial 2008, still has ovaries and cervix    INSERTION / REMOVAL / REPLACEMENT VENOUS ACCESS CATHETER Left 11/7/2017    PORT INSERTION performed by Chriss Mcknight MD at [address] [place] [address] LITHOTRIPSY  [address] [place] [address]      with hardware placed    TX COLONOSCOPY FLX DX W/COLLJ SPEC WHEN PFRMD N/A 5/2/2017    Dr Dev Pat, 7 yr (age 47) recall    TX EGD TRANSORAL BIOPSY SINGLE/MULTIPLE N/A 5/2/2017    Dr ABHIJIT Baltazar-Gastritis/gastropathy    SALPINGO-OOPHORECTOMY      STOMACH SURGERY  12/20/2017    dr Estefany Pinto Right 2019        Family History  family history includes ADHD in her brother; Anxiety Disorder in her brother and sister; Cancer in her brother, father, and mother; Colon Polyps in her mother; Depression in her brother, mother, and sister; Diabetes in her brother and mother; Esophageal Cancer in her brother; Heart Disease in her father and mother; High Blood Pressure in her father, mother, and sister; Other in an other family member; Stroke in her father.     Social History    Social History     Tobacco Use    Smoking status: Former Smoker     Packs/day: 1.00     Years: 25.00     Pack years: 25.00     Types: Cigarettes     Last attempt to quit: 2013     Years since quittin.7    Smokeless tobacco: Never Used   Substance Use Topics    Alcohol use: Yes     Comment: rarely       Allergies  Silver; Tegaderm ag mesh 2\"x2\" [wound dressings]; and Zithromax [azithromycin]     Current Medications  Current Outpatient Medications   Medication Sig Dispense Refill    pregabalin (LYRICA) 100 MG capsule Take 1 capsule by mouth 3 times daily for 180 days. 180 capsule 0    oxyCODONE (ROXICODONE) 5 MG immediate release tablet Take 1 tablet by mouth 3 times daily as needed for Pain for up to 30 days. May fill 19 90 tablet 0    meloxicam (MOBIC) 15 MG tablet Take 1 tablet by mouth daily 90 tablet 3    polyethylene glycol (GLYCOLAX) powder Take 17 g by mouth daily 3 Bottle 3    triamcinolone (KENALOG) 0.1 % cream Apply topically 2 times daily. 80 g 3    nortriptyline (PAMELOR) 25 MG capsule TAKE 1 TO 2 CAPSULES BY MOUTH EVERY NIGHT 180 capsule 3    Calcium-Vitamin D 600-200 MG-UNIT TABS Take 1 tablet by mouth daily      cyclobenzaprine (FLEXERIL) 10 MG tablet Take 1 tablet by mouth 2 times daily as needed for Muscle spasms 60 tablet 2    levocetirizine (XYZAL) 5 MG tablet Take 1 tablet by mouth nightly 90 tablet 3    pregabalin (LYRICA) 150 MG capsule Take 1 capsule by mouth 2 times daily for 90 days.  90 day supply 180 capsule 0    carbidopa-levodopa (SINEMET)  MG per tablet Take 1 tablet by mouth nightly as needed (leg spasms) 90 tablet 2    albuterol sulfate HFA (PROVENTIL HFA) 108 (90 Base) MCG/ACT inhaler Inhale 2 puffs into the lungs every 6 hours as needed for Wheezing 1 Inhaler 3    venlafaxine (EFFEXOR) 75 MG tablet 1 QAM and 2 QHS 90 tablet 11    polyethylene glycol (GLYCOLAX) powder Take 17 g by mouth daily 1 Bottle 1    acyclovir (ZOVIRAX) 800 MG tablet Take 1 tablet by mouth daily 90 tablet 1    metoprolol succinate (TOPROL XL) 50 MG extended release normal. Right eye exhibits no discharge. Left eye exhibits no discharge. Neck: Normal range of motion. Neck supple. Cardiovascular: Normal rate and regular rhythm. Pulmonary/Chest: Effort normal and breath sounds normal. No stridor. She has no wheezes. Abdominal: Soft. Bowel sounds are normal. There is no tenderness. There is no guarding. Musculoskeletal: She exhibits tenderness. Right wrist: She exhibits tenderness. Left wrist: She exhibits tenderness. Right hip: She exhibits tenderness. Lumbar back: She exhibits decreased range of motion ( Lumbar flexion extension tenderness) and tenderness. Back:    Neurological: She is alert and oriented to person, place, and time. She exhibits normal muscle tone. She displays no seizure activity. Gait abnormal. Coordination normal.   Reflex Scores:       Tricep reflexes are 2+ on the right side and 2+ on the left side. Bicep reflexes are 2+ on the right side and 2+ on the left side. Brachioradialis reflexes are 2+ on the right side and 2+ on the left side. Patellar reflexes are 2+ on the left side. Achilles reflexes are 2+ on the left side. Right hip surgical procedure healing    Skin: Skin is warm and dry. She is not diaphoretic. No erythema. No pallor. Psychiatric: She has a normal mood and affect. Her behavior is normal. Judgment and thought content normal. She is not aggressive and not hyperactive. She expresses no homicidal and no suicidal ideation. Nursing note and vitals reviewed.       LAST UDS: 12/6/2018 compliant    Labs:  Lab Results   Component Value Date     02/22/2019    K 4.2 02/22/2019     02/22/2019    CO2 28 02/22/2019    BUN 14 02/22/2019    CREATININE 0.6 02/22/2019    GLUCOSE 103 02/22/2019    CALCIUM 9.4 02/22/2019        Lab Results   Component Value Date    WBC 11.9 (H) 02/22/2019    HGB 12.0 02/22/2019    HCT 38.0 02/22/2019    MCV 96.2 02/22/2019     02/22/2019 Recent Imaging:    Examination. XR LUMBAR SPINE (2-3 VIEWS)  History: The patient fell and complains of back pain. The frontal and lateral views of the lumbar spine are compared with  the previous study dated 6/14/2017. There is no evidence of recent fracture or compression. There is a persistent mild retrolisthesis is of L4 over L5 which is  stable since the previous study. The vertebral body heights are normal. The alignment from L1 through  L4 is normal.  There is hardware fusion of the L4, L5 and S1 with bilateral pedicular  screws, cd rods and the anterior screws and plates. There are disc  spacers in place. There is evidence of laminectomy at L4-L5. The remaining posterior  processes are normal.  Chronic degenerative changes are seen in the form of osteophytes and  narrowing of the disc spaces.     Impression  No recent fracture or acute displacement/malalignment. Lumbar spondylosis. A persistent and unchanged mild retrolisthesis is of L4 over L5. The hardware fusion of the lower lumbar spine at L4, L5 and S1 with  bilateral pedicular screws, cd rods and anterior screws and plates. The disc spacers in place. Evidence of laminectomy at L4 and L5. The above findings are recorded on a digital voice clip in PACS. Signed by Dr Armando Koch on 2/26/2018 1:29 PM        Principal Problem:    Lumbar disc disease with radiculopathy  Active Problems:    Myofascial muscle pain    History of lumbar spinal fusion    Carpal tunnel syndrome on both sides    History of hip surgery  Resolved Problems:    * No resolved hospital problems. *      Plan:  1. Weight loss remains primary goal- currently 325lbs  2. Gastric sleeve surgery 2017 Dr. Beryle Overland- may need follow up  3. Continue mental health follow-up  4. LESI 2-3 Friday upcoming. 5. Would like Bilateral wrist injections- hx Carpal tunnel. Discussed and ordered with information  6 continue OxY IR 5mg TID #90- pt with effectiveness ROMs and ADLs.  Continue Lyrica 100mg TID. Discussed weight loss is primary goal- weight currently 325- recovering from right hip surgery. 7. We discussed weight loss exercises and BMI. 8. Pt would like to research PT for weight loss. 9. Pt instructed to seek medical attention for any change in neurological status. Discussion: Discussed exam findings and plan of care with patient. Patient agreed with POC and questions were asked and answered. Activity: discussed exercise as beneficial to pain reduction, encouraged stretching exercisewith a focus on torso strengthening. Education Provided by Provider:  Review of [place] / Agreement Review / ORT Calculated / PHQ-9 Reviewed / Compliance Issues Discussed / Cognitive Behavior Needs / Exercise / Review of Test / Financial Issues / Teaching / Smoking Cessation / Tobacco/Alcohol Use    Controlled Substance Monitoring:   Discussed with patient possible medication side effects, risk of tolerance, dependenceand alternative treatments. Discussed the growing epidemic in the [place] with the overprescribing and at times the abuse of narcotics. Discussed the detrimental effects of long term narcotic use in younger patients. Patientencouraged to set daily goals of exercising and if on narcotics decreasing daily narcotic intake. Discussed with the patient about the development of hyperalgesia with long term narcotic intake. CC:  DO Diandra Moran, ASHLEE, 6/27/2019 at 9:25 AM    EMR dragon/transcription disclaimer: Much of this encounter note is electronic transcription/translation of spoken language to printed tach. Electronic translation of spoken language may be erroneous, or at times, nonsensical words or phrases may be inadvertently transcribed.  Although, I have reviewed the note for such errors, some may still exist.

## 2019-06-21 NOTE — PROGRESS NOTES
Nursing Admission Record    Current Issues / Falls / ER Visits:  No    Percentage of Pain Relief after Last Procedure:  na %    How long lasted:  na     Opioids Prescribed: Yes Oxycodone 5mg TID PRN #90    Was Medication Brought to Appt: Yes  Counted tablets #21 tablets to today's count, Filled on 5/28/19    Hx of any Neck/Back Surgeries? Yes neck and back    Is Patient currently taking a blood thinner?  no    MRI exams received in the past 2 years:  Yes Right hip       When 2017                           Where [address] on chart: Yes         Imaging records requested: No    CT exam received during the last 12 months: No        X-ray exam received during the last 12 months: No         Nerve Conduction Study/EMG:  Yes upper and lower extremities       [facility] No       Imaging records requested: No    Physical therapy during the last 6 months: Yes       When: Current               Where [facility] during the last 12 months: Yes       When: 02/2019                 Where [facility]    PEG Score: 7.6    Last PEG Score: 8    Annual ORT Score: 3    Annual PHQ Score: 11    Last UDS Results: 12/6/18 compliant    Education Provided:  [x] Review of [place]  [] Agreement Review  [x] PEG Score Calculated [] PHQ Score Calculated [] ORT Score Calculated    [] Compliance Issues Discussed [] Cognitive Behavior Needs [x] Exercise [] Review of Test [] Financial Issues  [x] Tobacco/Alcohol Use Reviewed [x] Teaching [] New Patient [] Picture Obtained    Physician Plan:  [] Outgoing Referral  [] Pharmacy Consult  [] Test Ordered [x] Prescription Ordered/Changed [] Blood Thinner Request Form  [] Obtained Test Results / Consult Notes  [] UDS due at next visit, verified per EPIC      [] Suspected Physical Abuse or Suicide Risk assessed - IF YES COMPLETE QUESTIONS BELOW    If any of the following questions are answered yes - contact attending physician for referral:    Has been considering harming self to escape stress, pain problems? [] YES  [] NO  Has a suicide plan? [] YES  [] NO  Has attempted suicide in the past?   [] YES  [] NO  Has a close friend or family member who committed suicide?   [] YES  [] NO    Assessment Completed by:  Electronically signed by Judithe Mcburney, RN on 6/21/2019 at 1:24 PM

## 2019-06-25 RX ORDER — OXYCODONE HYDROCHLORIDE 5 MG/1
5 TABLET ORAL 3 TIMES DAILY PRN
Qty: 90 TABLET | Refills: 0 | Status: SHIPPED | OUTPATIENT
Start: 2019-06-27 | End: 2019-07-22 | Stop reason: SDUPTHER

## 2019-06-26 DIAGNOSIS — D50.8 IRON DEFICIENCY ANEMIA SECONDARY TO INADEQUATE DIETARY IRON INTAKE: Primary | ICD-10-CM

## 2019-06-27 ENCOUNTER — APPOINTMENT (OUTPATIENT)
Dept: LAB | Facility: HOSPITAL | Age: 44
End: 2019-06-27

## 2019-06-27 ENCOUNTER — TELEPHONE (OUTPATIENT)
Dept: PRIMARY CARE CLINIC | Age: 44
End: 2019-06-27

## 2019-06-27 DIAGNOSIS — M15.9 PRIMARY OSTEOARTHRITIS INVOLVING MULTIPLE JOINTS: Primary | ICD-10-CM

## 2019-06-28 ENCOUNTER — HOSPITAL ENCOUNTER (OUTPATIENT)
Dept: PAIN MANAGEMENT | Age: 44
Discharge: HOME | End: 2019-06-28
Payer: MEDICARE

## 2019-06-28 VITALS
HEART RATE: 74 BPM | DIASTOLIC BLOOD PRESSURE: 80 MMHG | RESPIRATION RATE: 18 BRPM | OXYGEN SATURATION: 93 % | SYSTOLIC BLOOD PRESSURE: 139 MMHG | TEMPERATURE: 97 F

## 2019-06-28 DIAGNOSIS — M47.816 LUMBAR FACET JOINT SYNDROME: ICD-10-CM

## 2019-06-28 DIAGNOSIS — M15.9 PRIMARY OSTEOARTHRITIS INVOLVING MULTIPLE JOINTS: ICD-10-CM

## 2019-06-28 LAB
RHEUMATOID FACTOR: 13 IU/ML
SEDIMENTATION RATE, ERYTHROCYTE: 28 MM/HR (ref 0–20)

## 2019-06-28 PROCEDURE — 3209999900 FLUORO FOR SURGICAL PROCEDURES

## 2019-06-28 PROCEDURE — 6360000002 HC RX W HCPCS

## 2019-06-28 PROCEDURE — 2580000003 HC RX 258

## 2019-06-28 PROCEDURE — 2500000003 HC RX 250 WO HCPCS

## 2019-06-28 PROCEDURE — 62323 NJX INTERLAMINAR LMBR/SAC: CPT

## 2019-06-28 RX ORDER — 0.9 % SODIUM CHLORIDE 0.9 %
VIAL (ML) INJECTION
Status: COMPLETED | OUTPATIENT
Start: 2019-06-28 | End: 2019-06-28

## 2019-06-28 RX ORDER — LIDOCAINE HYDROCHLORIDE 10 MG/ML
INJECTION, SOLUTION EPIDURAL; INFILTRATION; INTRACAUDAL; PERINEURAL
Status: COMPLETED | OUTPATIENT
Start: 2019-06-28 | End: 2019-06-28

## 2019-06-28 RX ORDER — METHYLPREDNISOLONE ACETATE 80 MG/ML
INJECTION, SUSPENSION INTRA-ARTICULAR; INTRALESIONAL; INTRAMUSCULAR; SOFT TISSUE
Status: COMPLETED | OUTPATIENT
Start: 2019-06-28 | End: 2019-06-28

## 2019-06-28 RX ADMIN — METHYLPREDNISOLONE ACETATE 80 MG: 80 INJECTION, SUSPENSION INTRA-ARTICULAR; INTRALESIONAL; INTRAMUSCULAR; SOFT TISSUE at 11:47

## 2019-06-28 RX ADMIN — LIDOCAINE HYDROCHLORIDE 5 ML: 10 INJECTION, SOLUTION EPIDURAL; INFILTRATION; INTRACAUDAL; PERINEURAL at 11:47

## 2019-06-28 RX ADMIN — Medication 5 ML: at 11:47

## 2019-06-28 ASSESSMENT — PAIN SCALES - GENERAL: PAINLEVEL_OUTOF10: 5

## 2019-06-28 ASSESSMENT — PAIN - FUNCTIONAL ASSESSMENT: PAIN_FUNCTIONAL_ASSESSMENT: 0-10

## 2019-06-28 NOTE — PROGRESS NOTES
Procedure:  Level of Consciousness: []Alert [x]Oriented []Disoriented []Lethargic  Anxiety Level: [x]Calm []Anxious []Depressed []Other  Skin: [x]Warm [x]Dry []Cool []Moist []Intact []Other  Cardiovascular: [x]Palpitations: [x]Never []Occasionally []Frequently  Chest Pain: [x]No []Yes  Respiratory:  [x]Unlabored []Labored []Cough ([] Productive []Unproductive)  HCG Required: [x]No []Yes   Results: []Negative []Positive  Knowledge Level:        [x]Patient/Other verbalized understanding of pre-procedure instructions. [x]Assessment of post-op care needs (transportation, responsible caregiver)        [x]Able to discuss health care problems and how to deal with it.   Factors that Affect Teaching:        Language Barrier: [x]No []Yes - why:        Hearing Loss:        [x]No []Yes            Corrective Device:  []Yes [x]No        Vision Loss:           [x]No []Yes            Corrective Device:  []Yes [x]No        Memory Loss:       [x]No []Yes            []Short Term []Long Term  Motivational Level:  [x]Asks Questions                  []Extremely Anxious       [x]Seems Interested               []Seems Uninterested                  []Denies need for Education  Risk for Injury:  [x]Patient oriented to person, place and time  []History of frequent falls/loss of balance  Nutritional:  []Change in appetite   []Weight Gain   []Weight Loss  Functional:  []Requires assistance with ADL's

## 2019-06-30 LAB — ANA IGG, ELISA: NORMAL

## 2019-07-01 ENCOUNTER — TELEPHONE (OUTPATIENT)
Dept: PRIMARY CARE CLINIC | Age: 44
End: 2019-07-01

## 2019-07-01 DIAGNOSIS — R70.0 ELEVATED SEDIMENTATION RATE: Primary | ICD-10-CM

## 2019-07-01 NOTE — TELEPHONE ENCOUNTER
----- Message from ASHLEE Huffman sent at 7/1/2019  8:20 AM CDT -----  Please call patient and let them know results.    Negative JACQUES

## 2019-07-02 ENCOUNTER — TELEPHONE (OUTPATIENT)
Dept: PAIN MANAGEMENT | Age: 44
End: 2019-07-02

## 2019-07-09 RX ORDER — CYCLOBENZAPRINE HCL 10 MG
10 TABLET ORAL 2 TIMES DAILY PRN
Qty: 60 TABLET | Refills: 2 | Status: SHIPPED | OUTPATIENT
Start: 2019-07-09 | End: 2020-05-14 | Stop reason: SDUPTHER

## 2019-07-10 ENCOUNTER — APPOINTMENT (OUTPATIENT)
Dept: LAB | Facility: HOSPITAL | Age: 44
End: 2019-07-10

## 2019-07-16 ENCOUNTER — OFFICE VISIT (OUTPATIENT)
Dept: ONCOLOGY | Facility: CLINIC | Age: 44
End: 2019-07-16

## 2019-07-16 ENCOUNTER — APPOINTMENT (OUTPATIENT)
Dept: LAB | Facility: HOSPITAL | Age: 44
End: 2019-07-16

## 2019-07-16 ENCOUNTER — TELEPHONE (OUTPATIENT)
Dept: PRIMARY CARE CLINIC | Age: 44
End: 2019-07-16

## 2019-07-16 VITALS
HEART RATE: 83 BPM | OXYGEN SATURATION: 96 % | TEMPERATURE: 98.6 F | SYSTOLIC BLOOD PRESSURE: 128 MMHG | WEIGHT: 293 LBS | DIASTOLIC BLOOD PRESSURE: 78 MMHG | BODY MASS INDEX: 48.82 KG/M2 | HEIGHT: 65 IN | RESPIRATION RATE: 18 BRPM

## 2019-07-16 DIAGNOSIS — D50.8 IRON DEFICIENCY ANEMIA SECONDARY TO INADEQUATE DIETARY IRON INTAKE: ICD-10-CM

## 2019-07-16 DIAGNOSIS — K90.9 MALABSORPTION OF IRON: Primary | ICD-10-CM

## 2019-07-16 DIAGNOSIS — D50.8 IRON DEFICIENCY ANEMIA SECONDARY TO INADEQUATE DIETARY IRON INTAKE: Primary | ICD-10-CM

## 2019-07-16 LAB
ALBUMIN SERPL-MCNC: 4.4 G/DL (ref 3.5–5)
ALBUMIN/GLOB SERPL: 1.3 G/DL (ref 1.1–2.5)
ALP SERPL-CCNC: 106 U/L (ref 24–120)
ALT SERPL W P-5'-P-CCNC: 28 U/L (ref 0–54)
ANION GAP SERPL CALCULATED.3IONS-SCNC: 10 MMOL/L (ref 4–13)
AST SERPL-CCNC: 31 U/L (ref 7–45)
BASOPHILS # BLD AUTO: 0.06 10*3/MM3 (ref 0–0.2)
BASOPHILS NFR BLD AUTO: 0.7 % (ref 0–2)
BILIRUB SERPL-MCNC: 0.8 MG/DL (ref 0.1–1)
BUN BLD-MCNC: 12 MG/DL (ref 5–21)
BUN/CREAT SERPL: 17.4 (ref 7–25)
CALCIUM SPEC-SCNC: 9.6 MG/DL (ref 8.4–10.4)
CHLORIDE SERPL-SCNC: 105 MMOL/L (ref 98–110)
CO2 SERPL-SCNC: 27 MMOL/L (ref 24–31)
CREAT BLD-MCNC: 0.69 MG/DL (ref 0.5–1.4)
DEPRECATED RDW RBC AUTO: 53 FL (ref 40–54)
EOSINOPHIL # BLD AUTO: 0.14 10*3/MM3 (ref 0–0.7)
EOSINOPHIL NFR BLD AUTO: 1.5 % (ref 0–4)
ERYTHROCYTE [DISTWIDTH] IN BLOOD BY AUTOMATED COUNT: 16.8 % (ref 12–15)
FERRITIN SERPL-MCNC: 957 NG/ML (ref 6.24–137)
FOLATE SERPL-MCNC: >20 NG/ML (ref 4.78–24.2)
GFR SERPL CREATININE-BSD FRML MDRD: 92 ML/MIN/1.73
GLOBULIN UR ELPH-MCNC: 3.4 GM/DL
GLUCOSE BLD-MCNC: 137 MG/DL (ref 70–100)
HCT VFR BLD AUTO: 35.2 % (ref 37–47)
HGB BLD-MCNC: 12.2 G/DL (ref 12–16)
HOLD SPECIMEN: NORMAL
IMM GRANULOCYTES # BLD AUTO: 0.05 10*3/MM3 (ref 0–0.05)
IMM GRANULOCYTES NFR BLD AUTO: 0.5 % (ref 0–5)
IRON 24H UR-MRATE: 76 MCG/DL (ref 42–180)
IRON SATN MFR SERPL: 32 % (ref 20–45)
LYMPHOCYTES # BLD AUTO: 1.75 10*3/MM3 (ref 0.72–4.86)
LYMPHOCYTES NFR BLD AUTO: 19.2 % (ref 15–45)
MCH RBC QN AUTO: 29.7 PG (ref 28–32)
MCHC RBC AUTO-ENTMCNC: 34.7 G/DL (ref 33–36)
MCV RBC AUTO: 85.6 FL (ref 82–98)
MONOCYTES # BLD AUTO: 0.46 10*3/MM3 (ref 0.19–1.3)
MONOCYTES NFR BLD AUTO: 5 % (ref 4–12)
NEUTROPHILS # BLD AUTO: 6.65 10*3/MM3 (ref 1.87–8.4)
NEUTROPHILS NFR BLD AUTO: 73.1 % (ref 39–78)
NRBC BLD AUTO-RTO: 0 /100 WBC (ref 0–0.2)
PLATELET # BLD AUTO: 238 10*3/MM3 (ref 130–400)
PMV BLD AUTO: 9.3 FL (ref 6–12)
POTASSIUM BLD-SCNC: 3.9 MMOL/L (ref 3.5–5.3)
PROT SERPL-MCNC: 7.8 G/DL (ref 6.3–8.7)
RBC # BLD AUTO: 4.11 10*6/MM3 (ref 4.2–5.4)
SODIUM BLD-SCNC: 142 MMOL/L (ref 135–145)
TIBC SERPL-MCNC: 237 MCG/DL (ref 225–420)
VIT B12 BLD-MCNC: 891 PG/ML (ref 239–931)
WBC NRBC COR # BLD: 9.11 10*3/MM3 (ref 4.8–10.8)

## 2019-07-16 PROCEDURE — 83540 ASSAY OF IRON: CPT | Performed by: INTERNAL MEDICINE

## 2019-07-16 PROCEDURE — 80053 COMPREHEN METABOLIC PANEL: CPT | Performed by: INTERNAL MEDICINE

## 2019-07-16 PROCEDURE — 82607 VITAMIN B-12: CPT | Performed by: INTERNAL MEDICINE

## 2019-07-16 PROCEDURE — 82728 ASSAY OF FERRITIN: CPT | Performed by: INTERNAL MEDICINE

## 2019-07-16 PROCEDURE — 83550 IRON BINDING TEST: CPT | Performed by: INTERNAL MEDICINE

## 2019-07-16 PROCEDURE — 82746 ASSAY OF FOLIC ACID SERUM: CPT | Performed by: INTERNAL MEDICINE

## 2019-07-16 PROCEDURE — 36415 COLL VENOUS BLD VENIPUNCTURE: CPT | Performed by: INTERNAL MEDICINE

## 2019-07-16 PROCEDURE — 85025 COMPLETE CBC W/AUTO DIFF WBC: CPT | Performed by: INTERNAL MEDICINE

## 2019-07-16 PROCEDURE — 99214 OFFICE O/P EST MOD 30 MIN: CPT | Performed by: INTERNAL MEDICINE

## 2019-07-16 RX ORDER — OXYCODONE HYDROCHLORIDE AND ACETAMINOPHEN 5; 325 MG/1; MG/1
1 TABLET ORAL EVERY 8 HOURS PRN
COMMUNITY
End: 2019-10-25

## 2019-07-16 RX ORDER — METOPROLOL SUCCINATE 50 MG/1
50 TABLET, EXTENDED RELEASE ORAL 2 TIMES DAILY
Qty: 180 TABLET | Refills: 3 | Status: SHIPPED | OUTPATIENT
Start: 2019-07-16 | End: 2020-07-06

## 2019-07-16 RX ORDER — ACYCLOVIR 800 MG/1
800 TABLET ORAL DAILY
Qty: 90 TABLET | Refills: 3 | Status: SHIPPED | OUTPATIENT
Start: 2019-07-16 | End: 2020-07-06

## 2019-07-16 RX ORDER — SODIUM CHLORIDE 0.9 % (FLUSH) 0.9 %
10 SYRINGE (ML) INJECTION AS NEEDED
Status: DISCONTINUED | OUTPATIENT
Start: 2019-07-16 | End: 2019-07-16 | Stop reason: HOSPADM

## 2019-07-16 RX ORDER — SODIUM CHLORIDE 0.9 % (FLUSH) 0.9 %
10 SYRINGE (ML) INJECTION AS NEEDED
Status: CANCELLED | OUTPATIENT
Start: 2019-07-16

## 2019-07-16 RX ADMIN — Medication 10 ML: at 09:09

## 2019-07-16 NOTE — PROGRESS NOTES
[facility]  HEMATOLOGY & ONCOLOGY    Cancer Staging Information:  No matching staging information was found for the patient.      Subjective     VISIT DIAGNOSIS:   No diagnosis found.    REASON FOR VISIT:     Chief Complaint   Patient presents with   • Anemia     She is here for f/u visit today to review her lab work, she has had right hip replacement since last visit (provider in [place], [place])   • DEPRESSION SCREENING     Initial Depression Screening  (see chart)        HEMATOLOGY / ONCOLOGY HISTORY:    No history exists.           INTERVAL HISTORY  Patient ID: Naomi Bhatia is a 43 y.o. year old female with her obesity, anemia status post Venofer infusions, appreciate to follow-up.   -- Underwent gastric sleeve on 12/20/2017 doing well.  --had right hip sx, it was not replaced. She has to wait 4 weeks and re-eval for the possibility of right hip replacement  --her mood is a little down due to her mother is admitted for pneumonia and she has bone cancer.  --6/26/18: since the lat time she was seen, her mom passed away an she broke he hip. She will be getting total hip replacement.  7/16/19: here for f/u. No issues. S/p hip sx with good recovery. Does not f/u with bariatric surgeon. She is not on any vitamins. denies sob, cp, dizziness, n/v, abdominal pain, dizziness, focal deficit.         Past Medical History:   Past Medical History:   Diagnosis Date   • Anemia    • Anxiety    • Arthritis    • Asthma    • Back pain 03/14/2016    with left sided radiculopathy    • DDD (degenerative disc disease), lumbar     Dr. Stuart Zavaleta for pain manangement   • Depression    • Fatigue    • Fibromyalgia    • GERD (gastroesophageal reflux disease)    • Headache    • History of blood transfusion    • Hypertension    • Iron deficiency anemia 9/12/2017   • Iron deficiency anemia secondary to inadequate dietary iron intake 9/12/2017   • Joint pain    • Obesity    • RLS (restless legs syndrome)    • Sleep  apnea     positive sleep study; titration study in October     Past Surgical History:   Past Surgical History:   Procedure Laterality Date   • ANKLE SURGERY      Right    • APPENDECTOMY  2015   • BACK SURGERY     • CERVICAL SPINE SURGERY  2015   • CHOLECYSTECTOMY      ;lap   • COLONOSCOPY  2017    normal; do not return until age of 49 Dr. Gus Valentine   • GASTRIC SLEEVE LAPAROSCOPIC N/A 2017    Procedure: GASTRIC SLEEVE LAPAROSCOPIC;  Surgeon: Yifan Cordero MD;  Location: [facility];  Service:    • HIP ARTHROPLASTY      Right  2019   • HIP SURGERY  1987    right    • INSERTION CENTRAL VENOUS ACCESS DEVICE W/ SUBCUTANEOUS PORT  2017    Dr Anel Gamboa (Smart Port-Power injectable Port) Cat NO#[id number] Lot [id number]    • MOUTH SURGERY     • NECK SURGERY     • TOOTH EXTRACTION     • UPPER GASTROINTESTINAL ENDOSCOPY  2017    Dr. Gus Valentine, negative for h.pylori, negative for Salomon's   • VAGINAL HYSTERECTOMY SALPINGO OOPHORECTOMY  2008    Partial and then had another surgery to remove the rest     Social History:   Social History     Socioeconomic History   • Marital status:      Spouse name: Not on file   • Number of children: Not on file   • Years of education: Not on file   • Highest education level: Not on file   Tobacco Use   • Smoking status: Former Smoker     Packs/day: 1.00     Years: 25.00     Pack years: 25.00     Types: Cigarettes     Last attempt to quit:      Years since quittin.5   • Smokeless tobacco: Never Used   Substance and Sexual Activity   • Alcohol use: Yes     Comment: rarely    • Drug use: No     Types: Codeine     Comment: Codeine in Prescribed Medications only    • Sexual activity: Defer     Birth control/protection: Surgical     Family History:   Family History   Problem Relation Age of Onset   • Diabetes Mother    • Hypertension Mother    • Coronary artery disease Mother    • Cancer Mother    • Cancer Father    • Hypertension  Father    • Heart disease Father    • Stroke Father    • Hypertension Sister    • Obesity Sister    • Cancer Brother    • Diabetes Brother    • Diabetes Maternal Grandmother    • Stroke Maternal Grandmother        Review of Systems   Constitutional: Negative.    HENT: Negative.    Eyes: Negative.    Respiratory: Positive for apnea. Negative for cough, choking and shortness of breath.    Cardiovascular: Negative.    Gastrointestinal: Negative for abdominal distention, abdominal pain, blood in stool, constipation, diarrhea, nausea and vomiting.   Genitourinary: Negative.    Musculoskeletal: Positive for back pain.        Right hip pain   Skin: Negative.    Allergic/Immunologic: Negative.    Neurological: Negative.    Hematological: Negative.    Psychiatric/Behavioral: Negative.         Performance Status:  Asymptomatic    Medications:    Current Outpatient Medications   Medication Sig Dispense Refill   • acyclovir (ZOVIRAX) 800 MG tablet Take 800 mg by mouth Daily.     • ALBUTEROL IN Inhale 1 puff As Needed.     • Calcium Citrate-Vitamin D (CALCIUM CITRATE + D3 PO) Take  by mouth Daily.     • carbidopa-levodopa (SINEMET)  MG per tablet Take 1 tablet by mouth Daily.     • CRANBERRY PO Take  by mouth Daily.     • Cyanocobalamin (VITAMIN B-12 CR PO) Take  by mouth Daily.     • Cyclobenzaprine HCl (FLEXERIL PO) Take 10 mg by mouth Daily As Needed.     • levocetirizine (XYZAL) 5 MG tablet TK 1 T PO QPM  0   • LYRICA 150 MG capsule TK 1 C PO BID  2   • metoprolol succinate XL (TOPROL XL) 50 MG 24 hr tablet Take 50 mg by mouth 2 (Two) Times a Day.     • Multiple Vitamin (MULTI VITAMIN PO) Take  by mouth Daily.     • nortriptyline (PAMELOR) 25 MG capsule 2 tablets at night as needed  3   • omeprazole (priLOSEC) 20 MG capsule TK ONE C PO QD FIRST THING IN THE MORNING ON  AN EMPTY STOMACH  3   • oxyCODONE-acetaminophen (PERCOCET) 5-325 MG per tablet Take 1 tablet by mouth Every 8 (Eight) Hours As Needed.     • simethicone  "(GAS-X) 80 MG chewable tablet Chew 0.5 tablets Every 6 (Six) Hours As Needed for Flatulence (belching). 30 tablet 1   • venlafaxine (EFFEXOR) 75 MG tablet three  times daily  11   • diclofenac (VOLTAREN) 75 MG EC tablet Take 1 tablet by mouth 2 (Two) Times a Day. 14 tablet 0   • oxyCODONE (ROXICODONE) 10 MG tablet Take 10 mg by mouth 2 (Two) Times a Day As Needed.       No current facility-administered medications for this visit.      Facility-Administered Medications Ordered in Other Visits   Medication Dose Route Frequency Provider Last Rate Last Dose   • diphenhydrAMINE (BENADRYL) injection 50 mg  50 mg Intravenous PRN Pedro Clements MD       • famotidine (PEPCID) injection 20 mg  20 mg Intravenous PRN Pedro Clements MD       • hydrocortisone sodium succinate (Solu-CORTEF) injection 100 mg  100 mg Intravenous PRN Pedro Clements MD           ALLERGIES:    Allergies   Allergen Reactions   • Azithromycin GI Intolerance     Severe Abdominal Pain    • Tegaderm Ag Mesh [Silver] Other (See Comments)     Redness, blisters       Objective      Vitals:    07/16/19 0829   BP: 128/78   Pulse: 83   Resp: 18   Temp: 98.6 °F (37 °C)   TempSrc: Tympanic   SpO2: 96%   Weight: (!) 143 kg (314 lb 9.6 oz)   Height: 165.1 cm (65\")   PainSc: 6  Comment: generalized, back, shoulders, neck         Current Status 7/16/2019   ECOG score 2         Physical Examremains the same  General Appearance: Obese. Patient is awake, alert, oriented and in no acute distress. Patient is welldeveloped, wellnourished, and appears stated age.  HEENT: Normocephalic. Sclerae clear, conjunctiva pink, extraocular movements intact, pupils, round, reactive to light and  accommodation. Mouth and throat are clear with moist oral mucosa.  NECK: Supple, no jugular venous distention, thyroid not enlarged.  LYMPH: No cervical, supraclavicular, axillary, or inguinal lymphadenopathy.  CHEST: Equal bilateral expansion, AP  diameter " normal, resonant percussion note  LUNGS: Good air movement, no rales, rhonchi, rubs or wheezes with auscultation  CARDIO: Regular sinus rhythm, no murmurs, gallops or rubs.  ABDOMEN: Nondistended, soft, No tenderness, no guarding, no rebound, No hepatosplenomegaly. No abdominal masses. Bowel sounds positive. No hernia  GENITALIA: Not examined.  BREASTS: Not examined.  MUSKEL: No joint swelling, decreased motion, or inflammation  EXTREMS: No edema, clubbing, cyanosis, No varicose veins.  NEURO: Grossly nonfocal, Gait is coordinated and smooth, Cognition is preserved.  SKIN: No rashes, no ecchymoses, no petechia.  PSYCH: Oriented to time, place and person. Memory is preserved. Mood and affect appear normal  RECENT LABS:  No visits with results within 7 Day(s) from this visit.   Latest known visit with results is:   Orders Only on 06/26/2019   Component Date Value Ref Range Status   • Glucose 07/16/2019 137* 70 - 100 mg/dL Final   • BUN 07/16/2019 12  5 - 21 mg/dL Final   • Creatinine 07/16/2019 0.69  0.50 - 1.40 mg/dL Final   • Sodium 07/16/2019 142  135 - 145 mmol/L Final   • Potassium 07/16/2019 3.9  3.5 - 5.3 mmol/L Final   • Chloride 07/16/2019 105  98 - 110 mmol/L Final   • CO2 07/16/2019 27.0  24.0 - 31.0 mmol/L Final   • Calcium 07/16/2019 9.6  8.4 - 10.4 mg/dL Final   • Total Protein 07/16/2019 7.8  6.3 - 8.7 g/dL Final   • Albumin 07/16/2019 4.40  3.50 - 5.00 g/dL Final   • ALT (SGPT) 07/16/2019 28  0 - 54 U/L Final   • AST (SGOT) 07/16/2019 31  7 - 45 U/L Final   • Alkaline Phosphatase 07/16/2019 106  24 - 120 U/L Final   • Total Bilirubin 07/16/2019 0.8  0.1 - 1.0 mg/dL Final   • eGFR Non African Amer 07/16/2019 92  >60 mL/min/1.73 Final   • Globulin 07/16/2019 3.4  gm/dL Final   • A/G Ratio 07/16/2019 1.3  1.1 - 2.5 g/dL Final   • BUN/Creatinine Ratio 07/16/2019 17.4  7.0 - 25.0 Final   • Anion Gap 07/16/2019 10.0  4.0 - 13.0 mmol/L Final   • WBC 07/16/2019 9.11  4.80 - 10.80 10*3/mm3 Final   • RBC  07/16/2019 4.11* 4.20 - 5.40 10*6/mm3 Final   • Hemoglobin 07/16/2019 12.2  12.0 - 16.0 g/dL Final   • Hematocrit 07/16/2019 35.2* 37.0 - 47.0 % Final   • MCV 07/16/2019 85.6  82.0 - 98.0 fL Final   • MCH 07/16/2019 29.7  28.0 - 32.0 pg Final   • MCHC 07/16/2019 34.7  33.0 - 36.0 g/dL Final   • RDW 07/16/2019 16.8* 12.0 - 15.0 % Final   • RDW-SD 07/16/2019 53.0  40.0 - 54.0 fl Final   • MPV 07/16/2019 9.3  6.0 - 12.0 fL Final   • Platelets 07/16/2019 238  130 - 400 10*3/mm3 Final   • Neutrophil % 07/16/2019 73.1  39.0 - 78.0 % Final   • Lymphocyte % 07/16/2019 19.2  15.0 - 45.0 % Final   • Monocyte % 07/16/2019 5.0  4.0 - 12.0 % Final   • Eosinophil % 07/16/2019 1.5  0.0 - 4.0 % Final   • Basophil % 07/16/2019 0.7  0.0 - 2.0 % Final   • Immature Grans % 07/16/2019 0.5  0.0 - 5.0 % Final   • Neutrophils, Absolute 07/16/2019 6.65  1.87 - 8.40 10*3/mm3 Final   • Lymphocytes, Absolute 07/16/2019 1.75  0.72 - 4.86 10*3/mm3 Final   • Monocytes, Absolute 07/16/2019 0.46  0.19 - 1.30 10*3/mm3 Final   • Eosinophils, Absolute 07/16/2019 0.14  0.00 - 0.70 10*3/mm3 Final   • Basophils, Absolute 07/16/2019 0.06  0.00 - 0.20 10*3/mm3 Final   • Immature Grans, Absolute 07/16/2019 0.05  0.00 - 0.05 10*3/mm3 Final   • nRBC 07/16/2019 0.0  0.0 - 0.2 /100 WBC Final       RADIOLOGY:  No results found.         Assessment/Plan  Naomi Chica Sriram is a 43 y.o. year old female h/o obesity found to have anemia while being evaluated for bariatric surgery ,here for f/u that is stable.    Patient Active Problem List   Diagnosis   • Morbid obesity (CMS/HCC)   • Hypertension, well controlled   • Fatigue   • History of iron deficiency   • Vitamin D deficiency   • Iron deficiency anemia secondary to inadequate dietary iron intake   • Malabsorption of iron   • Obesity, Class III, BMI 40-49.9 (morbid obesity) (CMS/HCC)   • Status post laparoscopic sleeve gastrectomy   • Acute low back pain without sciatica   • DIPAK on CPAP   • Abnormal hemoglobin  (CMS/HCC)          1.Anemia: Has had hysterectomy. Patient had recent colonoscopy and EGD negative for active bleed.   --Status post venofer infusion,  --labs reviewed with pt wbc 9.11, Hg 12.2, plt 238.  --rtc in 3months with cbc, cmp, iron profile, b12, folate    2.  Obesity: s/p gastric sleeve doing well. She has lost 50lbs    3. Leukocytosis: Suspect obesity/reactive, also obesity could cause elevation in wbc. Bone marrow with no dyspoietic changes and no circulating blasts.   --currently resolved with iron infusion    4. HTN: on metoprolol    5. Hip pain: s/p sx and screw removal, now broken right hip total replacement planned in future. She was told to lose 50lbs before the sx.    6. Chronic pain syn: on flexiril, lyrica  7. Gerd: on omeprazole  8. Depression: on effexor, nortriptyline         Markiageli Keyla Clements MD    7/16/2019    8:50 AM

## 2019-07-16 NOTE — TELEPHONE ENCOUNTER
Received fax from pharmacy requesting refill on pts medication(s). Pt was last seen in office on 6/5/2019  and has a follow up scheduled for 8/21/2019. Will send request to  Dr. Keli Tripp  for patient.      Requested Prescriptions     Pending Prescriptions Disp Refills    acyclovir (ZOVIRAX) 800 MG tablet [Pharmacy Med Name: ACYCLOVIR 800MG TABLETS] 90 tablet 0     Sig: TAKE 1 TABLET BY MOUTH DAILY

## 2019-07-17 ENCOUNTER — HOSPITAL ENCOUNTER (OUTPATIENT)
Dept: PAIN MANAGEMENT | Age: 44
Discharge: HOME | End: 2019-07-17
Payer: MEDICARE

## 2019-07-17 VITALS
DIASTOLIC BLOOD PRESSURE: 81 MMHG | OXYGEN SATURATION: 96 % | RESPIRATION RATE: 16 BRPM | HEART RATE: 75 BPM | TEMPERATURE: 97.2 F | SYSTOLIC BLOOD PRESSURE: 139 MMHG

## 2019-07-17 DIAGNOSIS — M54.10 BACK PAIN WITH LEFT-SIDED RADICULOPATHY: ICD-10-CM

## 2019-07-17 PROCEDURE — 20526 THER INJECTION CARP TUNNEL: CPT | Performed by: NURSE PRACTITIONER

## 2019-07-17 PROCEDURE — 6360000002 HC RX W HCPCS

## 2019-07-17 PROCEDURE — 20526 THER INJECTION CARP TUNNEL: CPT

## 2019-07-17 PROCEDURE — 2500000003 HC RX 250 WO HCPCS

## 2019-07-17 PROCEDURE — 76942 ECHO GUIDE FOR BIOPSY: CPT | Performed by: NURSE PRACTITIONER

## 2019-07-17 RX ORDER — TRIAMCINOLONE ACETONIDE 40 MG/ML
80 INJECTION, SUSPENSION INTRA-ARTICULAR; INTRAMUSCULAR ONCE
Status: DISCONTINUED | OUTPATIENT
Start: 2019-07-17 | End: 2019-07-19 | Stop reason: HOSPADM

## 2019-07-17 RX ORDER — BUPIVACAINE HYDROCHLORIDE 5 MG/ML
2 INJECTION, SOLUTION EPIDURAL; INTRACAUDAL ONCE
Status: DISCONTINUED | OUTPATIENT
Start: 2019-07-17 | End: 2019-07-19 | Stop reason: HOSPADM

## 2019-07-17 RX ORDER — LIDOCAINE HYDROCHLORIDE 10 MG/ML
2 INJECTION, SOLUTION EPIDURAL; INFILTRATION; INTRACAUDAL; PERINEURAL ONCE
Status: DISCONTINUED | OUTPATIENT
Start: 2019-07-17 | End: 2019-07-19 | Stop reason: HOSPADM

## 2019-07-17 NOTE — TELEPHONE ENCOUNTER
I will call in the am and check status of this referral. Their office was closed for the whole week and I make sure she has an appointment.

## 2019-07-18 ENCOUNTER — TELEPHONE (OUTPATIENT)
Dept: PRIMARY CARE CLINIC | Age: 44
End: 2019-07-18

## 2019-07-18 DIAGNOSIS — F41.9 ANXIETY: Primary | ICD-10-CM

## 2019-07-18 DIAGNOSIS — F32.A DEPRESSION, UNSPECIFIED DEPRESSION TYPE: ICD-10-CM

## 2019-07-18 NOTE — TELEPHONE ENCOUNTER
Patient wants a referral to someone for her anxiety and depression. She does not want 4 Chris. What about Dr. Trevin Suárez practice or do you have anyone else in mind? Routed to Dr. Samantha Pollock to approve.

## 2019-07-22 ENCOUNTER — TELEPHONE (OUTPATIENT)
Dept: PAIN MANAGEMENT | Age: 44
End: 2019-07-22

## 2019-07-22 DIAGNOSIS — M54.16 CHRONIC LUMBAR RADICULOPATHY: ICD-10-CM

## 2019-07-23 RX ORDER — OXYCODONE HYDROCHLORIDE 5 MG/1
5 TABLET ORAL 3 TIMES DAILY PRN
Qty: 90 TABLET | Refills: 0 | Status: SHIPPED | OUTPATIENT
Start: 2019-07-27 | End: 2019-08-29 | Stop reason: SDUPTHER

## 2019-08-21 ENCOUNTER — OFFICE VISIT (OUTPATIENT)
Dept: PRIMARY CARE CLINIC | Age: 44
End: 2019-08-21
Payer: MEDICARE

## 2019-08-21 VITALS
WEIGHT: 293 LBS | SYSTOLIC BLOOD PRESSURE: 130 MMHG | BODY MASS INDEX: 45.99 KG/M2 | TEMPERATURE: 98.7 F | HEART RATE: 74 BPM | DIASTOLIC BLOOD PRESSURE: 70 MMHG | OXYGEN SATURATION: 98 % | HEIGHT: 67 IN

## 2019-08-21 DIAGNOSIS — M17.12 PRIMARY OSTEOARTHRITIS OF LEFT KNEE: ICD-10-CM

## 2019-08-21 DIAGNOSIS — E66.01 MORBID OBESITY (HCC): ICD-10-CM

## 2019-08-21 DIAGNOSIS — Z98.1 HISTORY OF LUMBAR SPINAL FUSION: ICD-10-CM

## 2019-08-21 DIAGNOSIS — M54.2 CERVICALGIA: Primary | ICD-10-CM

## 2019-08-21 DIAGNOSIS — Z00.00 VISIT FOR PREVENTIVE HEALTH EXAMINATION: ICD-10-CM

## 2019-08-21 DIAGNOSIS — M17.11 PRIMARY OSTEOARTHRITIS OF RIGHT KNEE: ICD-10-CM

## 2019-08-21 DIAGNOSIS — M54.10 BACK PAIN WITH LEFT-SIDED RADICULOPATHY: Chronic | ICD-10-CM

## 2019-08-21 DIAGNOSIS — R63.5 WEIGHT GAIN: ICD-10-CM

## 2019-08-21 PROCEDURE — 99214 OFFICE O/P EST MOD 30 MIN: CPT | Performed by: PEDIATRICS

## 2019-08-21 ASSESSMENT — ENCOUNTER SYMPTOMS
RESPIRATORY NEGATIVE: 1
VOMITING: 0
NAUSEA: 0
GASTROINTESTINAL NEGATIVE: 1
CONSTIPATION: 0
WHEEZING: 0
EYES NEGATIVE: 1
DIARRHEA: 0
SINUS PRESSURE: 0
SHORTNESS OF BREATH: 0
SORE THROAT: 0
ALLERGIC/IMMUNOLOGIC NEGATIVE: 1
CHEST TIGHTNESS: 0
BACK PAIN: 1
EYE DISCHARGE: 0
ABDOMINAL PAIN: 0
COUGH: 0

## 2019-08-21 NOTE — PROGRESS NOTES
and weight is 328 lb 12.8 oz (149.1 kg) (abnormal). Her temporal temperature is 98.7 °F (37.1 °C). Her blood pressure is 130/70 and her pulse is 74. Her oxygen saturation is 98%. Body mass index is 51.5 kg/m². I have reviewed the following with the Ms. Moore   Lab Review  Orders Only on 06/28/2019   Component Date Value    JACQUES Ab, IgG MARIA A 06/28/2019 None Detected     Rheumatoid Factor 06/28/2019 13.0     Sed Rate 06/28/2019 28*   Office Visit on 02/28/2019   Component Date Value    Strep A Ag 02/28/2019 None Detected     Influenza A Ab 02/28/2019 negative     Influenza B Ab 02/28/2019 negative    Orders Only on 02/22/2019   Component Date Value    WBC 02/22/2019 11.9*    RBC 02/22/2019 3.95*    Hemoglobin 02/22/2019 12.0     Hematocrit 02/22/2019 38.0     MCV 02/22/2019 96.2     MCH 02/22/2019 30.4     MCHC 02/22/2019 31.6*    RDW 02/22/2019 17.4*    Platelets 16/67/4023 267     MPV 02/22/2019 9.5     Neutrophils % 02/22/2019 70.9*    Lymphocytes % 02/22/2019 21.5     Monocytes % 02/22/2019 5.2     Eosinophils % 02/22/2019 1.0     Basophils % 02/22/2019 0.7     Neutrophils # 02/22/2019 8.5*    Lymphocytes # 02/22/2019 2.6     Monocytes # 02/22/2019 0.60     Eosinophils # 02/22/2019 0.10     Basophils # 02/22/2019 0.10     Sodium 02/22/2019 140     Potassium 02/22/2019 4.2     Chloride 02/22/2019 100     CO2 02/22/2019 28     Anion Gap 02/22/2019 12     Glucose 02/22/2019 103     BUN 02/22/2019 14     CREATININE 02/22/2019 0.6     GFR Non- 02/22/2019 >60     Calcium 02/22/2019 9.4     Total Protein 02/22/2019 7.6     Alb 02/22/2019 4.0     Total Bilirubin 02/22/2019 0.5     Alkaline Phosphatase 02/22/2019 106*    ALT 02/22/2019 17     AST 02/22/2019 20     Microalbumin, Random Uri* 02/22/2019 <1.20     Creatinine, Ur 02/22/2019 118.0     Microalbumin Creatinine * 02/22/2019 see below     T4 Free 02/22/2019 1.1     TSH 02/22/2019 2.540     cervix    INSERTION / REMOVAL / REPLACEMENT VENOUS ACCESS CATHETER Left 2017    PORT INSERTION performed by Kai Fay MD at [address] [place] [address] LITHOTRIPSY  [address] [place] [address]      with hardware placed    IN COLONOSCOPY FLX DX W/COLLJ SPEC WHEN PFRMD N/A 2017    Dr Juan C Salas, 7 yr (age 47) recall    IN EGD TRANSORAL BIOPSY SINGLE/MULTIPLE N/A 2017    Dr ABHIJIT Baltazar-Gastritis/gastropathy    SALPINGO-OOPHORECTOMY      STOMACH SURGERY  2017    dr Natalia Ortiz Right 2019       Social History     Tobacco Use    Smoking status: Former Smoker     Packs/day: 1.00     Years: 25.00     Pack years: 25.00     Types: Cigarettes     Last attempt to quit: 2013     Years since quittin.9    Smokeless tobacco: Never Used   Substance Use Topics    Alcohol use: Yes     Comment: rarely        Review of Systems   Constitutional: Negative. Negative for appetite change, chills, fatigue and fever. HENT: Negative. Negative for congestion, ear discharge, ear pain, postnasal drip, sinus pressure and sore throat. Eyes: Negative. Negative for discharge and visual disturbance. Respiratory: Negative. Negative for cough, chest tightness, shortness of breath and wheezing. Cardiovascular: Negative. Negative for chest pain, palpitations and leg swelling. Gastrointestinal: Negative. Negative for abdominal pain, constipation, diarrhea, nausea and vomiting. GERD is well controlled on meds   Endocrine: Negative. Genitourinary: Negative. Negative for difficulty urinating, dysuria and menstrual problem. Musculoskeletal: Positive for arthralgias (right hip. better since surgery. going through PT) and back pain. Negative for joint swelling and myalgias. Skin: Negative. Negative for rash. Allergic/Immunologic: Negative. Neurological: Negative. Negative for dizziness, weakness and headaches.

## 2019-08-26 ENCOUNTER — OFFICE VISIT (OUTPATIENT)
Dept: URGENT CARE | Age: 44
End: 2019-08-26
Payer: MEDICARE

## 2019-08-26 VITALS
DIASTOLIC BLOOD PRESSURE: 86 MMHG | SYSTOLIC BLOOD PRESSURE: 125 MMHG | TEMPERATURE: 97.6 F | WEIGHT: 293 LBS | RESPIRATION RATE: 18 BRPM | HEIGHT: 67 IN | OXYGEN SATURATION: 98 % | HEART RATE: 96 BPM | BODY MASS INDEX: 45.99 KG/M2

## 2019-08-26 DIAGNOSIS — J02.9 SORE THROAT: Primary | ICD-10-CM

## 2019-08-26 DIAGNOSIS — J06.9 URI, ACUTE: ICD-10-CM

## 2019-08-26 LAB — S PYO AG THROAT QL: NORMAL

## 2019-08-26 PROCEDURE — 87880 STREP A ASSAY W/OPTIC: CPT | Performed by: NURSE PRACTITIONER

## 2019-08-26 PROCEDURE — 99213 OFFICE O/P EST LOW 20 MIN: CPT | Performed by: NURSE PRACTITIONER

## 2019-08-26 RX ORDER — FLUTICASONE PROPIONATE 50 MCG
1 SPRAY, SUSPENSION (ML) NASAL DAILY
Qty: 2 BOTTLE | Refills: 1 | Status: SHIPPED | OUTPATIENT
Start: 2019-08-26 | End: 2021-03-30

## 2019-08-26 ASSESSMENT — ENCOUNTER SYMPTOMS
STRIDOR: 0
SHORTNESS OF BREATH: 0
SINUS PRESSURE: 0
TROUBLE SWALLOWING: 0
CHEST TIGHTNESS: 0
VOICE CHANGE: 0
WHEEZING: 0
RHINORRHEA: 0
SORE THROAT: 0
EYES NEGATIVE: 1
GASTROINTESTINAL NEGATIVE: 1
CHOKING: 0
ALLERGIC/IMMUNOLOGIC NEGATIVE: 1
COUGH: 1
COLOR CHANGE: 0

## 2019-08-26 NOTE — PROGRESS NOTES
Trachea normal and normal range of motion. Carotid bruit is not present. No thyroid mass present. Cardiovascular: Normal rate, regular rhythm and normal heart sounds. Pulmonary/Chest: Effort normal and breath sounds normal.   Abdominal: Soft. Normal appearance and bowel sounds are normal.   Musculoskeletal: Normal range of motion. Lymphadenopathy:        Head (right side): No submental, no submandibular, no tonsillar, no preauricular and no occipital adenopathy present. Head (left side): No submental, no submandibular, no tonsillar, no preauricular, no posterior auricular and no occipital adenopathy present. She has no cervical adenopathy. Neurological: She is alert and oriented to person, place, and time. She has normal strength. She displays a negative Romberg sign. Skin: Skin is warm and intact. Capillary refill takes less than 2 seconds. Psychiatric: She has a normal mood and affect. Her speech is normal and behavior is normal. Judgment and thought content normal. Cognition and memory are normal.   Nursing note and vitals reviewed. /86   Pulse 96   Temp 97.6 °F (36.4 °C) (Oral)   Resp 18   Ht 5' 7\" (1.702 m)   Wt (!) 320 lb (145.2 kg)   SpO2 98%   BMI 50.12 kg/m²     Assessment:          Diagnosis Orders   1. Sore throat  POCT rapid strep A   2. URI, acute         Plan:      Orders Placed This Encounter   Procedures    POCT rapid strep A        No follow-ups on file. Orders Placed This Encounter   Procedures    POCT rapid strep A     Orders Placed This Encounter   Medications    fluticasone (FLONASE) 50 MCG/ACT nasal spray     Si spray by Each Nostril route daily     Dispense:  2 Bottle     Refill:  1       Patient given educationalmaterials - see patient instructions. Discussed use, benefit, and side effectsof prescribed medications. All patient questions answered. Pt voiced understanding. Reviewed health maintenance.   Instructed to continue current

## 2019-08-27 DIAGNOSIS — Z00.00 VISIT FOR PREVENTIVE HEALTH EXAMINATION: ICD-10-CM

## 2019-08-27 LAB
ALBUMIN SERPL-MCNC: 4.2 G/DL (ref 3.5–5.2)
ALP BLD-CCNC: 101 U/L (ref 35–104)
ALT SERPL-CCNC: 19 U/L (ref 5–33)
ANION GAP SERPL CALCULATED.3IONS-SCNC: 17 MMOL/L (ref 7–19)
AST SERPL-CCNC: 26 U/L (ref 5–32)
BASOPHILS ABSOLUTE: 0.1 K/UL (ref 0–0.2)
BASOPHILS RELATIVE PERCENT: 0.7 % (ref 0–1)
BILIRUB SERPL-MCNC: 0.9 MG/DL (ref 0.2–1.2)
BUN BLDV-MCNC: 15 MG/DL (ref 6–20)
CALCIUM SERPL-MCNC: 9.9 MG/DL (ref 8.6–10)
CHLORIDE BLD-SCNC: 101 MMOL/L (ref 98–111)
CHOLESTEROL, TOTAL: 166 MG/DL (ref 160–199)
CO2: 22 MMOL/L (ref 22–29)
CREAT SERPL-MCNC: 0.8 MG/DL (ref 0.5–0.9)
CREATININE URINE: 325.4 MG/DL (ref 4.2–622)
EOSINOPHILS ABSOLUTE: 0.1 K/UL (ref 0–0.6)
EOSINOPHILS RELATIVE PERCENT: 1.3 % (ref 0–5)
GFR NON-AFRICAN AMERICAN: >60
GLUCOSE BLD-MCNC: 109 MG/DL (ref 74–109)
HBA1C MFR BLD: 4.1 % (ref 4–6)
HCT VFR BLD CALC: 35.7 % (ref 37–47)
HDLC SERPL-MCNC: 57 MG/DL (ref 65–121)
HEMOGLOBIN: 11.9 G/DL (ref 12–16)
IMMATURE GRANULOCYTES #: 0.1 K/UL
LDL CHOLESTEROL CALCULATED: 94 MG/DL
LYMPHOCYTES ABSOLUTE: 1.7 K/UL (ref 1.1–4.5)
LYMPHOCYTES RELATIVE PERCENT: 18.2 % (ref 20–40)
MCH RBC QN AUTO: 31.1 PG (ref 27–31)
MCHC RBC AUTO-ENTMCNC: 33.3 G/DL (ref 33–37)
MCV RBC AUTO: 93.2 FL (ref 81–99)
MICROALBUMIN UR-MCNC: 4.9 MG/DL (ref 0–19)
MICROALBUMIN/CREAT UR-RTO: 15.1 MG/G
MONOCYTES ABSOLUTE: 0.5 K/UL (ref 0–0.9)
MONOCYTES RELATIVE PERCENT: 4.9 % (ref 0–10)
NEUTROPHILS ABSOLUTE: 6.9 K/UL (ref 1.5–7.5)
NEUTROPHILS RELATIVE PERCENT: 74.2 % (ref 50–65)
PDW BLD-RTO: 17.4 % (ref 11.5–14.5)
PLATELET # BLD: 263 K/UL (ref 130–400)
PMV BLD AUTO: 9.7 FL (ref 9.4–12.3)
POTASSIUM SERPL-SCNC: 4.1 MMOL/L (ref 3.5–5)
RBC # BLD: 3.83 M/UL (ref 4.2–5.4)
SODIUM BLD-SCNC: 140 MMOL/L (ref 136–145)
T4 FREE: 1.4 NG/DL (ref 0.9–1.7)
TOTAL PROTEIN: 7.9 G/DL (ref 6.6–8.7)
TRIGL SERPL-MCNC: 77 MG/DL (ref 0–149)
TSH SERPL DL<=0.05 MIU/L-ACNC: 2.56 UIU/ML (ref 0.27–4.2)
WBC # BLD: 9.2 K/UL (ref 4.8–10.8)

## 2019-08-28 ENCOUNTER — TELEPHONE (OUTPATIENT)
Dept: PRIMARY CARE CLINIC | Age: 44
End: 2019-08-28

## 2019-08-29 DIAGNOSIS — M54.16 CHRONIC LUMBAR RADICULOPATHY: ICD-10-CM

## 2019-08-30 RX ORDER — OXYCODONE HYDROCHLORIDE 5 MG/1
5 TABLET ORAL 3 TIMES DAILY PRN
Qty: 90 TABLET | Refills: 0 | Status: SHIPPED | OUTPATIENT
Start: 2019-08-30 | End: 2019-09-11 | Stop reason: SDUPTHER

## 2019-09-06 ENCOUNTER — OFFICE VISIT (OUTPATIENT)
Dept: ONCOLOGY | Facility: CLINIC | Age: 44
End: 2019-09-06

## 2019-09-06 DIAGNOSIS — K90.9 MALABSORPTION OF IRON: Primary | ICD-10-CM

## 2019-09-06 DIAGNOSIS — D50.8 IRON DEFICIENCY ANEMIA SECONDARY TO INADEQUATE DIETARY IRON INTAKE: ICD-10-CM

## 2019-09-06 PROCEDURE — 96523 IRRIG DRUG DELIVERY DEVICE: CPT | Performed by: INTERNAL MEDICINE

## 2019-09-06 RX ORDER — SODIUM CHLORIDE 0.9 % (FLUSH) 0.9 %
10 SYRINGE (ML) INJECTION AS NEEDED
Status: CANCELLED | OUTPATIENT
Start: 2019-09-06

## 2019-09-06 RX ORDER — SODIUM CHLORIDE 0.9 % (FLUSH) 0.9 %
10 SYRINGE (ML) INJECTION AS NEEDED
Status: DISCONTINUED | OUTPATIENT
Start: 2019-09-06 | End: 2019-10-16 | Stop reason: HOSPADM

## 2019-09-06 RX ADMIN — Medication 10 ML: at 11:20

## 2019-09-11 ENCOUNTER — HOSPITAL ENCOUNTER (OUTPATIENT)
Dept: PAIN MANAGEMENT | Age: 44
Discharge: HOME | End: 2019-09-11
Payer: MEDICARE

## 2019-09-11 VITALS
HEART RATE: 86 BPM | SYSTOLIC BLOOD PRESSURE: 140 MMHG | TEMPERATURE: 97.2 F | OXYGEN SATURATION: 99 % | BODY MASS INDEX: 44.41 KG/M2 | WEIGHT: 293 LBS | DIASTOLIC BLOOD PRESSURE: 92 MMHG | RESPIRATION RATE: 20 BRPM | HEIGHT: 68 IN

## 2019-09-11 DIAGNOSIS — M54.16 CHRONIC LUMBAR RADICULOPATHY: ICD-10-CM

## 2019-09-11 DIAGNOSIS — M51.16 LUMBAR DISC DISEASE WITH RADICULOPATHY: Primary | Chronic | ICD-10-CM

## 2019-09-11 PROCEDURE — 99214 OFFICE O/P EST MOD 30 MIN: CPT | Performed by: NURSE PRACTITIONER

## 2019-09-11 PROCEDURE — 99213 OFFICE O/P EST LOW 20 MIN: CPT

## 2019-09-11 RX ORDER — PREGABALIN 100 MG/1
100 CAPSULE ORAL 2 TIMES DAILY
Qty: 180 CAPSULE | Refills: 0 | Status: SHIPPED | OUTPATIENT
Start: 2019-09-20 | End: 2020-03-11

## 2019-09-11 ASSESSMENT — ENCOUNTER SYMPTOMS
SHORTNESS OF BREATH: 0
CONSTIPATION: 0
BACK PAIN: 1
EYE PAIN: 0
EYE REDNESS: 0
SORE THROAT: 0
ABDOMINAL PAIN: 0
WHEEZING: 0

## 2019-09-11 ASSESSMENT — PAIN DESCRIPTION - ONSET: ONSET: ON-GOING

## 2019-09-11 ASSESSMENT — PAIN DESCRIPTION - LOCATION: LOCATION: BACK;HEAD

## 2019-09-11 ASSESSMENT — PAIN - FUNCTIONAL ASSESSMENT: PAIN_FUNCTIONAL_ASSESSMENT: PREVENTS OR INTERFERES SOME ACTIVE ACTIVITIES AND ADLS

## 2019-09-11 ASSESSMENT — PAIN SCALES - GENERAL: PAINLEVEL_OUTOF10: 7

## 2019-09-11 ASSESSMENT — PAIN DESCRIPTION - PAIN TYPE: TYPE: CHRONIC PAIN

## 2019-09-11 ASSESSMENT — PAIN DESCRIPTION - DESCRIPTORS: DESCRIPTORS: ACHING

## 2019-09-11 ASSESSMENT — PAIN DESCRIPTION - FREQUENCY: FREQUENCY: CONTINUOUS

## 2019-09-11 ASSESSMENT — PAIN DESCRIPTION - PROGRESSION: CLINICAL_PROGRESSION: GRADUALLY IMPROVING

## 2019-09-11 NOTE — PROGRESS NOTES
Surgical History:   Procedure Laterality Date    ANKLE SURGERY Right     APPENDECTOMY  4/19/15    BACK SURGERY      CERVICAL SPINE SURGERY N/A 9/1/15    CHOLECYSTECTOMY  1995    HIP SURGERY Right 1987    HIP SURGERY  2018    HYSTERECTOMY      partial , still has ovaries and cervix    INSERTION / REMOVAL / REPLACEMENT VENOUS ACCESS CATHETER Left 2017    PORT INSERTION performed by Jose Francisco Haro MD at [address] [place] [address] LITHOTRIPSY  [address] [place] [address]      with hardware placed    TX COLONOSCOPY FLX DX W/COLLJ SPEC WHEN PFRMD N/A 2017    Dr Kameron Sewell, 7 yr (age 47) recall    TX EGD TRANSORAL BIOPSY SINGLE/MULTIPLE N/A 2017    Dr ABHIJIT Baltazar-Gastritis/gastropathy    SALPINGO-OOPHORECTOMY      STOMACH SURGERY  2017    dr Michelle Clark Right         Family History  family history includes ADHD in her brother; Anxiety Disorder in her brother and sister; Cancer in her brother, father, and mother; Colon Polyps in her mother; Depression in her brother, mother, and sister; Diabetes in her brother and mother; Esophageal Cancer in her brother; Heart Disease in her father and mother; High Blood Pressure in her father, mother, and sister; Other in an other family member; Stroke in her father.     Social History    Social History     Tobacco Use    Smoking status: Former Smoker     Packs/day: 1.00     Years: 25.00     Pack years: 25.00     Types: Cigarettes     Last attempt to quit: 2013     Years since quittin.9    Smokeless tobacco: Never Used   Substance Use Topics    Alcohol use: Yes     Comment: rarely       Allergies  Silver; Tegaderm ag mesh 2\"x2\" [wound dressings]; and Zithromax [azithromycin]     Current Medications  Current Outpatient Medications   Medication Sig Dispense Refill    [START ON 2019] pregabalin (LYRICA) 100 MG capsule Take 1 capsule by mouth 2 times daily for 90 Musculoskeletal: She exhibits tenderness. Right wrist: She exhibits tenderness. Left wrist: She exhibits tenderness. Lumbar back: She exhibits decreased range of motion and tenderness. Back:    Neurological: She is alert and oriented to person, place, and time. She exhibits normal muscle tone. She displays no seizure activity. Gait abnormal. Coordination normal.   Reflex Scores:       Tricep reflexes are 2+ on the right side and 2+ on the left side. Bicep reflexes are 2+ on the right side and 2+ on the left side. Brachioradialis reflexes are 2+ on the right side and 2+ on the left side. Patellar reflexes are 2+ on the right side and 2+ on the left side. Achilles reflexes are 2+ on the right side and 2+ on the left side. Bilateral lower leg strength essentially equal recovering from right hip surgery  Diminished L2-3 dermatome bilateral leg pain   Skin: Skin is warm and dry. She is not diaphoretic. Psychiatric: She has a normal mood and affect. Her behavior is normal. Judgment and thought content normal. She is not aggressive and not hyperactive. She expresses no homicidal and no suicidal ideation. Alert and verbal   Nursing note and vitals reviewed. LAST UDS: 12/06/2018 compliant    Labs:  Lab Results   Component Value Date     08/27/2019    K 4.1 08/27/2019     08/27/2019    CO2 22 08/27/2019    BUN 15 08/27/2019    CREATININE 0.8 08/27/2019    GLUCOSE 109 08/27/2019    CALCIUM 9.9 08/27/2019        Lab Results   Component Value Date    WBC 9.2 08/27/2019    HGB 11.9 (L) 08/27/2019    HCT 35.7 (L) 08/27/2019    MCV 93.2 08/27/2019     08/27/2019       Recent Imaging:  Examination. XR LUMBAR SPINE (2-3 VIEWS)   History: The patient fell and complains of back pain. The frontal and lateral views of the lumbar spine are compared with   the previous study dated 6/14/2017. There is no evidence of recent fracture or compression.    There

## 2019-09-13 RX ORDER — OXYCODONE HYDROCHLORIDE 5 MG/1
5 TABLET ORAL 3 TIMES DAILY PRN
Qty: 90 TABLET | Refills: 0 | Status: SHIPPED | OUTPATIENT
Start: 2019-09-29 | End: 2019-10-23 | Stop reason: SDUPTHER

## 2019-10-09 DIAGNOSIS — K21.9 GASTROESOPHAGEAL REFLUX DISEASE, ESOPHAGITIS PRESENCE NOT SPECIFIED: ICD-10-CM

## 2019-10-09 RX ORDER — OMEPRAZOLE 20 MG/1
20 CAPSULE, DELAYED RELEASE ORAL DAILY
Qty: 90 CAPSULE | Refills: 3 | Status: SHIPPED | OUTPATIENT
Start: 2019-10-09 | End: 2020-10-01

## 2019-10-10 ENCOUNTER — HOSPITAL ENCOUNTER (OUTPATIENT)
Dept: WOMENS IMAGING | Age: 44
Discharge: HOME | End: 2019-10-10
Payer: MEDICARE

## 2019-10-10 ENCOUNTER — TELEPHONE (OUTPATIENT)
Dept: PRIMARY CARE CLINIC | Age: 44
End: 2019-10-10

## 2019-10-10 DIAGNOSIS — Z12.31 ENCOUNTER FOR SCREENING MAMMOGRAM FOR MALIGNANT NEOPLASM OF BREAST: ICD-10-CM

## 2019-10-10 PROCEDURE — 77063 BREAST TOMOSYNTHESIS BI: CPT

## 2019-10-11 ENCOUNTER — HOSPITAL ENCOUNTER (OUTPATIENT)
Dept: PAIN MANAGEMENT | Age: 44
Discharge: HOME | End: 2019-10-11
Payer: MEDICARE

## 2019-10-11 VITALS
HEART RATE: 72 BPM | WEIGHT: 293 LBS | SYSTOLIC BLOOD PRESSURE: 123 MMHG | BODY MASS INDEX: 44.41 KG/M2 | HEIGHT: 68 IN | DIASTOLIC BLOOD PRESSURE: 76 MMHG | OXYGEN SATURATION: 96 % | TEMPERATURE: 96.4 F | RESPIRATION RATE: 16 BRPM

## 2019-10-11 DIAGNOSIS — R52 PAIN MANAGEMENT: ICD-10-CM

## 2019-10-11 PROCEDURE — 2500000003 HC RX 250 WO HCPCS

## 2019-10-11 PROCEDURE — 2580000003 HC RX 258

## 2019-10-11 PROCEDURE — 62323 NJX INTERLAMINAR LMBR/SAC: CPT

## 2019-10-11 PROCEDURE — 6360000002 HC RX W HCPCS

## 2019-10-11 PROCEDURE — 3209999900 FLUORO FOR SURGICAL PROCEDURES

## 2019-10-11 RX ORDER — LIDOCAINE HYDROCHLORIDE 10 MG/ML
INJECTION, SOLUTION EPIDURAL; INFILTRATION; INTRACAUDAL; PERINEURAL
Status: COMPLETED | OUTPATIENT
Start: 2019-10-11 | End: 2019-10-11

## 2019-10-11 RX ORDER — 0.9 % SODIUM CHLORIDE 0.9 %
VIAL (ML) INJECTION
Status: COMPLETED | OUTPATIENT
Start: 2019-10-11 | End: 2019-10-11

## 2019-10-11 RX ORDER — METHYLPREDNISOLONE ACETATE 80 MG/ML
INJECTION, SUSPENSION INTRA-ARTICULAR; INTRALESIONAL; INTRAMUSCULAR; SOFT TISSUE
Status: COMPLETED | OUTPATIENT
Start: 2019-10-11 | End: 2019-10-11

## 2019-10-11 RX ADMIN — Medication 5 ML: at 10:54

## 2019-10-11 RX ADMIN — METHYLPREDNISOLONE ACETATE 80 MG: 80 INJECTION, SUSPENSION INTRA-ARTICULAR; INTRALESIONAL; INTRAMUSCULAR; SOFT TISSUE at 10:54

## 2019-10-11 RX ADMIN — LIDOCAINE HYDROCHLORIDE 5 ML: 10 INJECTION, SOLUTION EPIDURAL; INFILTRATION; INTRACAUDAL; PERINEURAL at 10:53

## 2019-10-11 ASSESSMENT — PAIN - FUNCTIONAL ASSESSMENT
PAIN_FUNCTIONAL_ASSESSMENT: 0-10
PAIN_FUNCTIONAL_ASSESSMENT: 0-10
PAIN_FUNCTIONAL_ASSESSMENT: PREVENTS OR INTERFERES SOME ACTIVE ACTIVITIES AND ADLS

## 2019-10-11 ASSESSMENT — PAIN DESCRIPTION - DESCRIPTORS: DESCRIPTORS: ACHING;CONSTANT;RADIATING

## 2019-10-15 ENCOUNTER — TELEPHONE (OUTPATIENT)
Dept: PRIMARY CARE CLINIC | Age: 44
End: 2019-10-15

## 2019-10-15 DIAGNOSIS — M54.10 BACK PAIN WITH LEFT-SIDED RADICULOPATHY: ICD-10-CM

## 2019-10-15 DIAGNOSIS — M54.2 CERVICALGIA: Primary | ICD-10-CM

## 2019-10-18 DIAGNOSIS — K90.9 MALABSORPTION OF IRON: Primary | ICD-10-CM

## 2019-10-23 ENCOUNTER — HOSPITAL ENCOUNTER (OUTPATIENT)
Dept: PAIN MANAGEMENT | Age: 44
Discharge: HOME | End: 2019-10-23
Payer: MEDICARE

## 2019-10-23 VITALS
TEMPERATURE: 96.8 F | RESPIRATION RATE: 18 BRPM | DIASTOLIC BLOOD PRESSURE: 95 MMHG | HEART RATE: 78 BPM | SYSTOLIC BLOOD PRESSURE: 148 MMHG | OXYGEN SATURATION: 98 %

## 2019-10-23 DIAGNOSIS — M54.16 CHRONIC LUMBAR RADICULOPATHY: ICD-10-CM

## 2019-10-23 PROCEDURE — 20526 THER INJECTION CARP TUNNEL: CPT

## 2019-10-23 PROCEDURE — 6360000002 HC RX W HCPCS

## 2019-10-23 PROCEDURE — 20526 THER INJECTION CARP TUNNEL: CPT | Performed by: NURSE PRACTITIONER

## 2019-10-23 PROCEDURE — 2500000003 HC RX 250 WO HCPCS

## 2019-10-23 RX ORDER — BUPIVACAINE HYDROCHLORIDE 5 MG/ML
2 INJECTION, SOLUTION EPIDURAL; INTRACAUDAL ONCE
Status: DISCONTINUED | OUTPATIENT
Start: 2019-10-23 | End: 2019-10-25 | Stop reason: HOSPADM

## 2019-10-23 RX ORDER — TRIAMCINOLONE ACETONIDE 40 MG/ML
80 INJECTION, SUSPENSION INTRA-ARTICULAR; INTRAMUSCULAR ONCE
Status: DISCONTINUED | OUTPATIENT
Start: 2019-10-23 | End: 2019-10-25 | Stop reason: HOSPADM

## 2019-10-23 RX ORDER — LIDOCAINE HYDROCHLORIDE 10 MG/ML
2 INJECTION, SOLUTION EPIDURAL; INFILTRATION; INTRACAUDAL; PERINEURAL ONCE
Status: DISCONTINUED | OUTPATIENT
Start: 2019-10-23 | End: 2019-10-25 | Stop reason: HOSPADM

## 2019-10-25 ENCOUNTER — TELEPHONE (OUTPATIENT)
Dept: PAIN MANAGEMENT | Age: 44
End: 2019-10-25

## 2019-10-25 ENCOUNTER — APPOINTMENT (OUTPATIENT)
Dept: LAB | Facility: HOSPITAL | Age: 44
End: 2019-10-25

## 2019-10-25 ENCOUNTER — OFFICE VISIT (OUTPATIENT)
Dept: ONCOLOGY | Facility: CLINIC | Age: 44
End: 2019-10-25

## 2019-10-25 VITALS
TEMPERATURE: 97.3 F | HEIGHT: 65 IN | BODY MASS INDEX: 48.82 KG/M2 | DIASTOLIC BLOOD PRESSURE: 79 MMHG | WEIGHT: 293 LBS | RESPIRATION RATE: 16 BRPM | OXYGEN SATURATION: 98 % | HEART RATE: 80 BPM | SYSTOLIC BLOOD PRESSURE: 122 MMHG

## 2019-10-25 DIAGNOSIS — D50.8 IRON DEFICIENCY ANEMIA SECONDARY TO INADEQUATE DIETARY IRON INTAKE: Primary | ICD-10-CM

## 2019-10-25 DIAGNOSIS — D50.8 IRON DEFICIENCY ANEMIA SECONDARY TO INADEQUATE DIETARY IRON INTAKE: ICD-10-CM

## 2019-10-25 DIAGNOSIS — K90.9 MALABSORPTION OF IRON: ICD-10-CM

## 2019-10-25 DIAGNOSIS — M54.10 BACK PAIN WITH LEFT-SIDED RADICULOPATHY: ICD-10-CM

## 2019-10-25 DIAGNOSIS — E53.8 B12 DEFICIENCY: Primary | ICD-10-CM

## 2019-10-25 PROBLEM — M51.16 HERNIATION OF LUMBAR INTERVERTEBRAL DISC WITH RADICULOPATHY: Status: ACTIVE | Noted: 2017-04-14

## 2019-10-25 PROBLEM — M79.7 FIBROMYALGIA: Status: ACTIVE | Noted: 2018-05-07

## 2019-10-25 PROBLEM — Z79.899 HIGH RISK MEDICATIONS (NOT ANTICOAGULANTS) LONG-TERM USE: Status: ACTIVE | Noted: 2018-08-06

## 2019-10-25 PROBLEM — Z98.1 HISTORY OF LUMBAR SPINAL FUSION: Status: ACTIVE | Noted: 2018-08-06

## 2019-10-25 PROBLEM — M79.18 MYOFASCIAL MUSCLE PAIN: Status: ACTIVE | Noted: 2018-08-06

## 2019-10-25 PROBLEM — G56.03 CARPAL TUNNEL SYNDROME ON BOTH SIDES: Status: ACTIVE | Noted: 2019-02-06

## 2019-10-25 PROBLEM — Z98.890 OTHER SPECIFIED POSTPROCEDURAL STATES: Status: ACTIVE | Noted: 2019-04-18

## 2019-10-25 PROBLEM — I10 HYPERTENSIVE DISORDER: Status: ACTIVE | Noted: 2018-05-07

## 2019-10-25 PROBLEM — Z83.719 FAMILY HISTORY OF POLYPS IN THE COLON: Status: ACTIVE | Noted: 2017-04-04

## 2019-10-25 PROBLEM — D64.9 ANEMIA: Status: ACTIVE | Noted: 2018-05-07

## 2019-10-25 PROBLEM — Z80.0 FAMILY HISTORY OF ESOPHAGEAL CANCER: Status: ACTIVE | Noted: 2017-04-04

## 2019-10-25 PROBLEM — E66.01 MORBID OBESITY: Status: ACTIVE | Noted: 2018-05-07

## 2019-10-25 PROBLEM — F33.1 MODERATE EPISODE OF RECURRENT MAJOR DEPRESSIVE DISORDER (HCC): Status: ACTIVE | Noted: 2018-06-19

## 2019-10-25 PROBLEM — K62.5 BRBPR (BRIGHT RED BLOOD PER RECTUM): Status: ACTIVE | Noted: 2017-04-04

## 2019-10-25 PROBLEM — M43.22 CERVICAL VERTEBRAL FUSION: Status: ACTIVE | Noted: 2017-04-04

## 2019-10-25 PROBLEM — Z51.89 ENCOUNTER FOR MEDICATION ADJUSTMENT: Status: ACTIVE | Noted: 2019-04-21

## 2019-10-25 PROBLEM — M51.369 DDD (DEGENERATIVE DISC DISEASE), LUMBAR: Status: ACTIVE | Noted: 2019-10-25

## 2019-10-25 PROBLEM — R10.84 GENERALIZED ABDOMINAL PAIN: Status: ACTIVE | Noted: 2017-04-04

## 2019-10-25 PROBLEM — G89.4 CHRONIC PAIN DISORDER: Status: ACTIVE | Noted: 2018-05-07

## 2019-10-25 PROBLEM — K59.03 DRUG-INDUCED CONSTIPATION: Status: ACTIVE | Noted: 2019-10-25

## 2019-10-25 PROBLEM — F39 MOOD DISORDER (HCC): Status: ACTIVE | Noted: 2018-05-07

## 2019-10-25 PROBLEM — Z83.71 FAMILY HISTORY OF POLYPS IN THE COLON: Status: ACTIVE | Noted: 2017-04-04

## 2019-10-25 PROBLEM — G25.81 RLS (RESTLESS LEGS SYNDROME): Status: ACTIVE | Noted: 2019-10-25

## 2019-10-25 PROBLEM — M51.36 DDD (DEGENERATIVE DISC DISEASE), LUMBAR: Status: ACTIVE | Noted: 2019-10-25

## 2019-10-25 LAB
ALBUMIN SERPL-MCNC: 4.4 G/DL (ref 3.5–5.2)
ALBUMIN/GLOB SERPL: 1.5 G/DL
ALP SERPL-CCNC: 84 U/L (ref 39–117)
ALT SERPL W P-5'-P-CCNC: 37 U/L (ref 1–33)
ANION GAP SERPL CALCULATED.3IONS-SCNC: 12 MMOL/L (ref 5–15)
AST SERPL-CCNC: 24 U/L (ref 1–32)
BASOPHILS # BLD AUTO: 0.04 10*3/MM3 (ref 0–0.2)
BASOPHILS NFR BLD AUTO: 0.4 % (ref 0–1.5)
BILIRUB SERPL-MCNC: 0.6 MG/DL (ref 0.2–1.2)
BUN BLD-MCNC: 15 MG/DL (ref 6–20)
BUN/CREAT SERPL: 25.9 (ref 7–25)
CALCIUM SPEC-SCNC: 9.6 MG/DL (ref 8.6–10.5)
CHLORIDE SERPL-SCNC: 101 MMOL/L (ref 98–107)
CO2 SERPL-SCNC: 29 MMOL/L (ref 22–29)
CREAT BLD-MCNC: 0.58 MG/DL (ref 0.57–1)
DEPRECATED RDW RBC AUTO: 52.5 FL (ref 37–54)
EOSINOPHIL # BLD AUTO: 0.03 10*3/MM3 (ref 0–0.4)
EOSINOPHIL NFR BLD AUTO: 0.3 % (ref 0.3–6.2)
ERYTHROCYTE [DISTWIDTH] IN BLOOD BY AUTOMATED COUNT: 16.1 % (ref 12.3–15.4)
FERRITIN SERPL-MCNC: 1469 NG/ML (ref 13–150)
GFR SERPL CREATININE-BSD FRML MDRD: 113 ML/MIN/1.73
GLOBULIN UR ELPH-MCNC: 3 GM/DL
GLUCOSE BLD-MCNC: 101 MG/DL (ref 65–99)
HCT VFR BLD AUTO: 36.3 % (ref 34–46.6)
HGB BLD-MCNC: 12.3 G/DL (ref 12–15.9)
HOLD SPECIMEN: NORMAL
IMM GRANULOCYTES # BLD AUTO: 0.07 10*3/MM3 (ref 0–0.05)
IMM GRANULOCYTES NFR BLD AUTO: 0.7 % (ref 0–0.5)
IRON 24H UR-MRATE: 102 MCG/DL (ref 37–145)
IRON SATN MFR SERPL: 38 % (ref 20–50)
LYMPHOCYTES # BLD AUTO: 1.66 10*3/MM3 (ref 0.7–3.1)
LYMPHOCYTES NFR BLD AUTO: 15.6 % (ref 19.6–45.3)
MCH RBC QN AUTO: 30 PG (ref 26.6–33)
MCHC RBC AUTO-ENTMCNC: 33.9 G/DL (ref 31.5–35.7)
MCV RBC AUTO: 88.5 FL (ref 79–97)
MONOCYTES # BLD AUTO: 0.53 10*3/MM3 (ref 0.1–0.9)
MONOCYTES NFR BLD AUTO: 5 % (ref 5–12)
NEUTROPHILS # BLD AUTO: 8.32 10*3/MM3 (ref 1.7–7)
NEUTROPHILS NFR BLD AUTO: 78 % (ref 42.7–76)
NRBC BLD AUTO-RTO: 0 /100 WBC (ref 0–0.2)
PLATELET # BLD AUTO: 279 10*3/MM3 (ref 140–450)
PMV BLD AUTO: 9.8 FL (ref 6–12)
POTASSIUM BLD-SCNC: 4 MMOL/L (ref 3.5–5.2)
PROT SERPL-MCNC: 7.4 G/DL (ref 6–8.5)
RBC # BLD AUTO: 4.1 10*6/MM3 (ref 3.77–5.28)
SODIUM BLD-SCNC: 142 MMOL/L (ref 136–145)
TIBC SERPL-MCNC: 270 MCG/DL (ref 298–536)
TRANSFERRIN SERPL-MCNC: 181 MG/DL (ref 200–360)
WBC NRBC COR # BLD: 10.65 10*3/MM3 (ref 3.4–10.8)

## 2019-10-25 PROCEDURE — 83540 ASSAY OF IRON: CPT | Performed by: INTERNAL MEDICINE

## 2019-10-25 PROCEDURE — 82728 ASSAY OF FERRITIN: CPT | Performed by: INTERNAL MEDICINE

## 2019-10-25 PROCEDURE — 99214 OFFICE O/P EST MOD 30 MIN: CPT | Performed by: INTERNAL MEDICINE

## 2019-10-25 PROCEDURE — 80053 COMPREHEN METABOLIC PANEL: CPT | Performed by: INTERNAL MEDICINE

## 2019-10-25 PROCEDURE — 85025 COMPLETE CBC W/AUTO DIFF WBC: CPT | Performed by: INTERNAL MEDICINE

## 2019-10-25 PROCEDURE — 84466 ASSAY OF TRANSFERRIN: CPT | Performed by: INTERNAL MEDICINE

## 2019-10-25 PROCEDURE — 36415 COLL VENOUS BLD VENIPUNCTURE: CPT | Performed by: INTERNAL MEDICINE

## 2019-10-25 RX ORDER — SODIUM CHLORIDE 0.9 % (FLUSH) 0.9 %
10 SYRINGE (ML) INJECTION AS NEEDED
Status: DISCONTINUED | OUTPATIENT
Start: 2019-10-25 | End: 2020-01-31

## 2019-10-25 RX ORDER — SODIUM CHLORIDE 0.9 % (FLUSH) 0.9 %
10 SYRINGE (ML) INJECTION AS NEEDED
Status: CANCELLED | OUTPATIENT
Start: 2019-10-25

## 2019-10-25 RX ADMIN — Medication 10 ML: at 11:27

## 2019-10-25 NOTE — PROGRESS NOTES
[facility]  HEMATOLOGY & ONCOLOGY    Cancer Staging Information:  No matching staging information was found for the patient.      Subjective     VISIT DIAGNOSIS:   No diagnosis found.    REASON FOR VISIT:     Chief Complaint   Patient presents with   • iron deficiency anemia     3 mo follow up         HEMATOLOGY / ONCOLOGY HISTORY:    No history exists.           INTERVAL HISTORY  Patient ID: Naomi Bhatia is a 43 y.o. year old female with her obesity, anemia status post Venofer infusions, appreciate to follow-up.   -- Underwent gastric sleeve on 12/20/2017 doing well.  --had right hip sx, it was not replaced. She has to wait 4 weeks and re-eval for the possibility of right hip replacement  --her mood is a little down due to her mother is admitted for pneumonia and she has bone cancer.  --6/26/18: since the lat time she was seen, her mom passed away an she broke he hip. She will be getting total hip replacement.  10/25/19: here for f/u. No issues. Need port flushed today  - Does not f/u with bariatric surgeon. She is not on any vitamins. denies sob, cp, dizziness, n/v, abdominal pain, dizziness, focal deficit.         Past Medical History:   Past Medical History:   Diagnosis Date   • Anemia    • Anxiety    • Arthritis    • Asthma    • Back pain 03/14/2016    with left sided radiculopathy    • DDD (degenerative disc disease), lumbar     Dr. Stuart Zavaleta for pain manangement   • Depression    • Fatigue    • Fibromyalgia    • GERD (gastroesophageal reflux disease)    • Headache    • History of blood transfusion    • Hypertension    • Iron deficiency anemia 9/12/2017   • Iron deficiency anemia secondary to inadequate dietary iron intake 9/12/2017   • Joint pain    • Obesity    • RLS (restless legs syndrome)    • Sleep apnea     positive sleep study; titration study in October     Past Surgical History:   Past Surgical History:   Procedure Laterality Date   • ANKLE SURGERY      Right    •  APPENDECTOMY  2015   • BACK SURGERY     • CERVICAL SPINE SURGERY  2015   • CHOLECYSTECTOMY      ;lap   • COLONOSCOPY  2017    normal; do not return until age of 49 Dr. Gus Valentine   • GASTRIC SLEEVE LAPAROSCOPIC N/A 2017    Procedure: GASTRIC SLEEVE LAPAROSCOPIC;  Surgeon: Yifan Cordero MD;  Location: [facility];  Service:    • HIP ARTHROPLASTY      Right  2019   • HIP SURGERY  1987    right    • INSERTION CENTRAL VENOUS ACCESS DEVICE W/ SUBCUTANEOUS PORT  2017    Dr Anel Gamboa (Smart Port-Power injectable Port) Cat [id number] Lot [id number]    • MOUTH SURGERY     • NECK SURGERY     • TOOTH EXTRACTION     • UPPER GASTROINTESTINAL ENDOSCOPY  2017    Dr. Gus Valentine, negative for h.pylori, negative for Salomon's   • VAGINAL HYSTERECTOMY SALPINGO OOPHORECTOMY      Partial and then had another surgery to remove the rest     Social History:   Social History     Socioeconomic History   • Marital status: Single     Spouse name: Not on file   • Number of children: Not on file   • Years of education: Not on file   • Highest education level: Not on file   Tobacco Use   • Smoking status: Former Smoker     Packs/day: 1.00     Years: 25.00     Pack years: 25.00     Types: Cigarettes     Last attempt to quit:      Years since quittin.8   • Smokeless tobacco: Never Used   Substance and Sexual Activity   • Alcohol use: Yes     Comment: rarely    • Drug use: No     Types: Codeine     Comment: Codeine in Prescribed Medications only    • Sexual activity: Defer     Birth control/protection: Surgical     Family History:   Family History   Problem Relation Age of Onset   • Diabetes Mother    • Hypertension Mother    • Coronary artery disease Mother    • Cancer Mother    • Cancer Father    • Hypertension Father    • Heart disease Father    • Stroke Father    • Hypertension Sister    • Obesity Sister    • Cancer Brother    • Diabetes Brother    • Diabetes Maternal Grandmother     • Stroke Maternal Grandmother        Review of Systems   Constitutional: Negative.    HENT: Negative.    Eyes: Negative.    Respiratory: Positive for apnea. Negative for cough, choking and shortness of breath.    Cardiovascular: Negative.    Gastrointestinal: Negative for abdominal distention, abdominal pain, blood in stool, constipation, diarrhea, nausea and vomiting.   Genitourinary: Negative.    Musculoskeletal: Positive for back pain.        Right hip pain   Skin: Negative.    Allergic/Immunologic: Negative.    Neurological: Negative.    Hematological: Negative.    Psychiatric/Behavioral: Negative.         Performance Status:  Asymptomatic    Medications:    Current Outpatient Medications   Medication Sig Dispense Refill   • acyclovir (ZOVIRAX) 800 MG tablet Take 800 mg by mouth Daily.     • ALBUTEROL IN Inhale 1 puff As Needed.     • Calcium Citrate-Vitamin D (CALCIUM CITRATE + D3 PO) Take  by mouth Daily.     • carbidopa-levodopa (SINEMET)  MG per tablet Take 1 tablet by mouth Daily.     • CRANBERRY PO Take  by mouth Daily.     • Cyanocobalamin (VITAMIN B-12 CR PO) Take  by mouth Daily.     • Cyclobenzaprine HCl (FLEXERIL PO) Take 10 mg by mouth Daily As Needed.     • levocetirizine (XYZAL) 5 MG tablet TK 1 T PO QPM  0   • LYRICA 150 MG capsule TK 1 C PO BID  2   • metoprolol succinate XL (TOPROL XL) 50 MG 24 hr tablet Take 50 mg by mouth 2 (Two) Times a Day.     • Multiple Vitamin (MULTI VITAMIN PO) Take  by mouth Daily.     • nortriptyline (PAMELOR) 25 MG capsule 2 tablets at night as needed  3   • omeprazole (priLOSEC) 20 MG capsule TK ONE C PO QD FIRST THING IN THE MORNING ON  AN EMPTY STOMACH  3   • oxyCODONE (ROXICODONE) 10 MG tablet Take 10 mg by mouth 2 (Two) Times a Day As Needed.     • venlafaxine (EFFEXOR) 75 MG tablet three  times daily  11     No current facility-administered medications for this visit.      Facility-Administered Medications Ordered in Other Visits   Medication Dose Route  "Frequency Provider Last Rate Last Dose   • diphenhydrAMINE (BENADRYL) injection 50 mg  50 mg Intravenous PRN Pedro Clements MD       • famotidine (PEPCID) injection 20 mg  20 mg Intravenous PRN Pedro Clements MD       • hydrocortisone sodium succinate (Solu-CORTEF) injection 100 mg  100 mg Intravenous PRN Pedro Clements MD           ALLERGIES:    Allergies   Allergen Reactions   • Azithromycin GI Intolerance     Severe Abdominal Pain   Severe abdominal pain    • Silver Other (See Comments)     Redness, blisters  blister  Redness, blisters         Objective      Vitals:    10/25/19 1002   BP: 122/79   Pulse: 80   Resp: 16   Temp: 97.3 °F (36.3 °C)   SpO2: 98%   Weight: (!) 141 kg (311 lb 4.8 oz)   Height: 165.1 cm (65\")   PainSc:   6   PainLoc: Generalized  Comment: patient said all over body, chronic pain         Current Status 10/25/2019   ECOG score 0         Physical Examremains the same  General Appearance: Obese. Patient is awake, alert, oriented and in no acute distress. Patient is welldeveloped, wellnourished, and appears stated age.  HEENT: Normocephalic. Sclerae clear, conjunctiva pink, extraocular movements intact, pupils, round, reactive to light and  accommodation. Mouth and throat are clear with moist oral mucosa.  NECK: Supple, no jugular venous distention, thyroid not enlarged.  LYMPH: No cervical, supraclavicular, axillary, or inguinal lymphadenopathy.  CHEST: Equal bilateral expansion, AP  diameter normal, resonant percussion note  LUNGS: Good air movement, no rales, rhonchi, rubs or wheezes with auscultation  CARDIO: Regular sinus rhythm, no murmurs, gallops or rubs.  ABDOMEN: Nondistended, soft, No tenderness, no guarding, no rebound, No hepatosplenomegaly. No abdominal masses. Bowel sounds positive. No hernia  GENITALIA: Not examined.  BREASTS: Not examined.  MUSKEL: No joint swelling, decreased motion, or inflammation  EXTREMS: No edema, clubbing, cyanosis, " No varicose veins.  NEURO: Grossly nonfocal, Gait is coordinated and smooth, Cognition is preserved.  SKIN: No rashes, no ecchymoses, no petechia.  PSYCH: Oriented to time, place and person. Memory is preserved. Mood and affect appear normal  RECENT LABS:  No visits with results within 7 Day(s) from this visit.   Latest known visit with results is:   Orders Only on 10/18/2019   Component Date Value Ref Range Status   • WBC 10/25/2019 10.65  3.40 - 10.80 10*3/mm3 Final   • RBC 10/25/2019 4.10  3.77 - 5.28 10*6/mm3 Final   • Hemoglobin 10/25/2019 12.3  12.0 - 15.9 g/dL Final   • Hematocrit 10/25/2019 36.3  34.0 - 46.6 % Final   • MCV 10/25/2019 88.5  79.0 - 97.0 fL Final   • MCH 10/25/2019 30.0  26.6 - 33.0 pg Final   • MCHC 10/25/2019 33.9  31.5 - 35.7 g/dL Final   • RDW 10/25/2019 16.1* 12.3 - 15.4 % Final   • RDW-SD 10/25/2019 52.5  37.0 - 54.0 fl Final   • MPV 10/25/2019 9.8  6.0 - 12.0 fL Final   • Platelets 10/25/2019 279  140 - 450 10*3/mm3 Final   • Neutrophil % 10/25/2019 78.0* 42.7 - 76.0 % Final   • Lymphocyte % 10/25/2019 15.6* 19.6 - 45.3 % Final   • Monocyte % 10/25/2019 5.0  5.0 - 12.0 % Final   • Eosinophil % 10/25/2019 0.3  0.3 - 6.2 % Final   • Basophil % 10/25/2019 0.4  0.0 - 1.5 % Final   • Immature Grans % 10/25/2019 0.7* 0.0 - 0.5 % Final   • Neutrophils, Absolute 10/25/2019 8.32* 1.70 - 7.00 10*3/mm3 Final   • Lymphocytes, Absolute 10/25/2019 1.66  0.70 - 3.10 10*3/mm3 Final   • Monocytes, Absolute 10/25/2019 0.53  0.10 - 0.90 10*3/mm3 Final   • Eosinophils, Absolute 10/25/2019 0.03  0.00 - 0.40 10*3/mm3 Final   • Basophils, Absolute 10/25/2019 0.04  0.00 - 0.20 10*3/mm3 Final   • Immature Grans, Absolute 10/25/2019 0.07* 0.00 - 0.05 10*3/mm3 Final   • nRBC 10/25/2019 0.0  0.0 - 0.2 /100 WBC Final       RADIOLOGY:  No results found.         Assessment/Plan  Naomi Bhatia is a 43 y.o. year old female h/o obesity found to have anemia while being evaluated for bariatric surgery ,here for f/u  that is stable.    Patient Active Problem List   Diagnosis   • Morbid obesity (CMS/HCC)   • Hypertension, well controlled   • Fatigue   • History of iron deficiency   • Vitamin D deficiency   • Iron deficiency anemia secondary to inadequate dietary iron intake   • Malabsorption of iron   • Obesity, Class III, BMI 40-49.9 (morbid obesity) (CMS/HCC)   • Status post laparoscopic sleeve gastrectomy   • Acute low back pain without sciatica   • DIPAK on CPAP   • Abnormal hemoglobin (CMS/HCC)   • Anemia   • BRBPR (bright red blood per rectum)   • Carpal tunnel syndrome on both sides   • Cervical vertebral fusion   • Generalized abdominal pain   • Cobalamin deficiency   • DDD (degenerative disc disease), lumbar   • Drug-induced constipation   • Encounter for medication adjustment   • Family history of esophageal cancer   • Family history of polyps in the colon   • Back pain with left-sided radiculopathy   • Chronic pain disorder   • Fibromyalgia   • Foot pain   • Herniation of lumbar intervertebral disc with radiculopathy   • High risk medications (not anticoagulants) long-term use   • Other specified postprocedural states   • History of lumbar spinal fusion   • Hypertensive disorder   • Insomnia   • Malaise and fatigue   • Moderate episode of recurrent major depressive disorder (CMS/HCC)   • Mood disorder (CMS/HCC)   • Myofascial muscle pain   • RLS (restless legs syndrome)   • Morbid obesity (CMS/HCC)   • Folic acid deficiency          1.Anemia: Has had hysterectomy. Patient had recent colonoscopy and EGD negative for active bleed.   --Status post venofer infusion,  --labs reviewed with pt wbc 10.65, Hg 12.3, plt 279.  --rtc in 3months with cbc, cmp, iron profile, b12, folate    2.  Obesity: s/p gastric sleeve doing well. She has lost 50lbs    3. Leukocytosis: Suspect obesity/reactive, also obesity could cause elevation in wbc. Bone marrow with no dyspoietic changes and no circulating blasts.   --currently resolved with  iron infusion    4. HTN: on metoprolol    5. Hip pain: s/p sx and screw removal, now broken right hip total replacement planned in future. She was told to lose 50lbs before the sx.    6. Chronic pain syn: on flexiril, lyrica  7. Gerd: on omeprazole  8. Depression: on effexor, nortriptyline         Pedro Clements MD    10/25/2019    10:20 AM

## 2019-10-27 RX ORDER — OXYCODONE HYDROCHLORIDE 5 MG/1
5 TABLET ORAL 3 TIMES DAILY PRN
Qty: 90 TABLET | Refills: 0 | Status: SHIPPED | OUTPATIENT
Start: 2019-10-29 | End: 2019-11-25 | Stop reason: SDUPTHER

## 2019-10-30 ENCOUNTER — TELEPHONE (OUTPATIENT)
Dept: PRIMARY CARE CLINIC | Age: 44
End: 2019-10-30

## 2019-10-30 DIAGNOSIS — M54.10 BACK PAIN WITH LEFT-SIDED RADICULOPATHY: Primary | ICD-10-CM

## 2019-11-25 DIAGNOSIS — M54.16 CHRONIC LUMBAR RADICULOPATHY: ICD-10-CM

## 2019-11-26 RX ORDER — OXYCODONE HYDROCHLORIDE 5 MG/1
5 TABLET ORAL 3 TIMES DAILY PRN
Qty: 90 TABLET | Refills: 0 | Status: SHIPPED | OUTPATIENT
Start: 2019-12-28 | End: 2020-01-28 | Stop reason: SDUPTHER

## 2019-11-26 RX ORDER — OXYCODONE HYDROCHLORIDE 5 MG/1
5 TABLET ORAL 3 TIMES DAILY PRN
Qty: 90 TABLET | Refills: 0 | Status: SHIPPED | OUTPATIENT
Start: 2019-11-28 | End: 2020-01-24

## 2019-12-09 ENCOUNTER — HOSPITAL ENCOUNTER (OUTPATIENT)
Dept: PAIN MANAGEMENT | Age: 44
Discharge: HOME | End: 2019-12-09
Payer: MEDICARE

## 2019-12-09 VITALS
OXYGEN SATURATION: 98 % | SYSTOLIC BLOOD PRESSURE: 116 MMHG | HEIGHT: 68 IN | HEART RATE: 98 BPM | RESPIRATION RATE: 20 BRPM | BODY MASS INDEX: 44.41 KG/M2 | WEIGHT: 293 LBS | DIASTOLIC BLOOD PRESSURE: 81 MMHG | TEMPERATURE: 97.7 F

## 2019-12-09 DIAGNOSIS — Z98.1 HISTORY OF LUMBAR SPINAL FUSION: Primary | ICD-10-CM

## 2019-12-09 DIAGNOSIS — R52 PAIN MANAGEMENT: ICD-10-CM

## 2019-12-09 PROCEDURE — 99214 OFFICE O/P EST MOD 30 MIN: CPT | Performed by: NURSE PRACTITIONER

## 2019-12-09 PROCEDURE — 99213 OFFICE O/P EST LOW 20 MIN: CPT

## 2019-12-09 RX ORDER — PREGABALIN 100 MG/1
100 CAPSULE ORAL 2 TIMES DAILY
Qty: 180 CAPSULE | Refills: 0 | Status: SHIPPED | OUTPATIENT
Start: 2019-12-09 | End: 2020-05-14

## 2019-12-09 ASSESSMENT — PAIN DESCRIPTION - FREQUENCY: FREQUENCY: CONTINUOUS

## 2019-12-09 ASSESSMENT — ENCOUNTER SYMPTOMS
BACK PAIN: 1
TROUBLE SWALLOWING: 0
DIARRHEA: 0
CONSTIPATION: 0
EYE ITCHING: 1
WHEEZING: 0
COUGH: 1
EYE DISCHARGE: 1
SORE THROAT: 0
SHORTNESS OF BREATH: 0

## 2019-12-09 ASSESSMENT — PAIN SCALES - GENERAL: PAINLEVEL_OUTOF10: 5

## 2019-12-09 ASSESSMENT — PAIN DESCRIPTION - ONSET: ONSET: ON-GOING

## 2019-12-09 ASSESSMENT — PAIN DESCRIPTION - PROGRESSION: CLINICAL_PROGRESSION: GRADUALLY IMPROVING

## 2019-12-09 ASSESSMENT — PAIN DESCRIPTION - DESCRIPTORS: DESCRIPTORS: ACHING

## 2019-12-09 ASSESSMENT — PAIN - FUNCTIONAL ASSESSMENT: PAIN_FUNCTIONAL_ASSESSMENT: PREVENTS OR INTERFERES SOME ACTIVE ACTIVITIES AND ADLS

## 2019-12-09 ASSESSMENT — PAIN DESCRIPTION - LOCATION: LOCATION: BACK

## 2019-12-09 ASSESSMENT — PAIN DESCRIPTION - PAIN TYPE: TYPE: CHRONIC PAIN

## 2019-12-18 ENCOUNTER — CLINICAL SUPPORT (OUTPATIENT)
Dept: ONCOLOGY | Facility: CLINIC | Age: 44
End: 2019-12-18

## 2019-12-18 DIAGNOSIS — K90.9 MALABSORPTION OF IRON: Primary | ICD-10-CM

## 2019-12-18 DIAGNOSIS — D50.8 IRON DEFICIENCY ANEMIA SECONDARY TO INADEQUATE DIETARY IRON INTAKE: ICD-10-CM

## 2019-12-18 PROCEDURE — 99211 OFF/OP EST MAY X REQ PHY/QHP: CPT | Performed by: INTERNAL MEDICINE

## 2019-12-18 RX ORDER — HEPARIN SODIUM (PORCINE) LOCK FLUSH IV SOLN 100 UNIT/ML 100 UNIT/ML
500 SOLUTION INTRAVENOUS AS NEEDED
Status: CANCELLED | OUTPATIENT
Start: 2019-12-18

## 2019-12-18 RX ORDER — SODIUM CHLORIDE 0.9 % (FLUSH) 0.9 %
10 SYRINGE (ML) INJECTION AS NEEDED
Status: DISCONTINUED | OUTPATIENT
Start: 2019-12-18 | End: 2019-12-18 | Stop reason: HOSPADM

## 2019-12-18 RX ORDER — SODIUM CHLORIDE 0.9 % (FLUSH) 0.9 %
10 SYRINGE (ML) INJECTION AS NEEDED
Status: CANCELLED | OUTPATIENT
Start: 2019-12-18

## 2019-12-18 RX ADMIN — Medication 10 ML: at 10:15

## 2019-12-20 ENCOUNTER — APPOINTMENT (OUTPATIENT)
Dept: GENERAL RADIOLOGY | Age: 44
End: 2019-12-20
Payer: MEDICARE

## 2019-12-20 ENCOUNTER — HOSPITAL ENCOUNTER (EMERGENCY)
Age: 44
Discharge: HOME | End: 2019-12-20
Payer: MEDICARE

## 2019-12-20 VITALS
HEART RATE: 85 BPM | WEIGHT: 293 LBS | RESPIRATION RATE: 19 BRPM | OXYGEN SATURATION: 94 % | BODY MASS INDEX: 45.99 KG/M2 | SYSTOLIC BLOOD PRESSURE: 128 MMHG | HEIGHT: 67 IN | DIASTOLIC BLOOD PRESSURE: 89 MMHG | TEMPERATURE: 98 F

## 2019-12-20 DIAGNOSIS — M25.551 RIGHT HIP PAIN: Primary | ICD-10-CM

## 2019-12-20 PROCEDURE — 6370000000 HC RX 637 (ALT 250 FOR IP): Performed by: NURSE PRACTITIONER

## 2019-12-20 PROCEDURE — 73502 X-RAY EXAM HIP UNI 2-3 VIEWS: CPT

## 2019-12-20 PROCEDURE — 99283 EMERGENCY DEPT VISIT LOW MDM: CPT

## 2019-12-20 RX ORDER — OXYCODONE AND ACETAMINOPHEN 7.5; 325 MG/1; MG/1
1 TABLET ORAL ONCE
Status: COMPLETED | OUTPATIENT
Start: 2019-12-20 | End: 2019-12-20

## 2019-12-20 RX ADMIN — OXYCODONE HYDROCHLORIDE AND ACETAMINOPHEN 1 TABLET: 7.5; 325 TABLET ORAL at 19:12

## 2019-12-20 ASSESSMENT — PAIN DESCRIPTION - ORIENTATION: ORIENTATION: RIGHT

## 2019-12-20 ASSESSMENT — PAIN SCALES - GENERAL
PAINLEVEL_OUTOF10: 8
PAINLEVEL_OUTOF10: 8

## 2019-12-20 ASSESSMENT — PAIN DESCRIPTION - LOCATION: LOCATION: HIP

## 2020-01-06 NOTE — TELEPHONE ENCOUNTER
Received fax from pharmacy requesting refill on pts medication(s). Pt was last seen in office on 8/21/2019  and has a follow up scheduled for 2/21/2020. Will send request to  Dr. Eron Mills  for patient.      Requested Prescriptions     Pending Prescriptions Disp Refills    levocetirizine (XYZAL) 5 MG tablet [Pharmacy Med Name: LEVOCETIRIZINE 5MG TABLETS] 90 tablet 3     Sig: TAKE 1 TABLET BY MOUTH EVERY NIGHT

## 2020-01-07 RX ORDER — LEVOCETIRIZINE DIHYDROCHLORIDE 5 MG/1
5 TABLET, FILM COATED ORAL NIGHTLY
Qty: 90 TABLET | Refills: 3 | Status: SHIPPED | OUTPATIENT
Start: 2020-01-07 | End: 2020-12-30 | Stop reason: SDUPTHER

## 2020-01-09 ENCOUNTER — OFFICE VISIT (OUTPATIENT)
Dept: PRIMARY CARE CLINIC | Age: 45
End: 2020-01-09
Payer: MEDICARE

## 2020-01-09 VITALS
HEART RATE: 99 BPM | WEIGHT: 293 LBS | DIASTOLIC BLOOD PRESSURE: 89 MMHG | BODY MASS INDEX: 45.99 KG/M2 | OXYGEN SATURATION: 98 % | SYSTOLIC BLOOD PRESSURE: 138 MMHG | HEIGHT: 67 IN | TEMPERATURE: 98.6 F

## 2020-01-09 PROCEDURE — 99213 OFFICE O/P EST LOW 20 MIN: CPT | Performed by: NURSE PRACTITIONER

## 2020-01-09 ASSESSMENT — ENCOUNTER SYMPTOMS
COUGH: 0
SINUS PRESSURE: 0
SHORTNESS OF BREATH: 0
NAUSEA: 0
SORE THROAT: 0
DIARRHEA: 0
CONSTIPATION: 0
VOMITING: 0
BACK PAIN: 1
RHINORRHEA: 0
TROUBLE SWALLOWING: 0
ABDOMINAL PAIN: 0

## 2020-01-09 NOTE — PATIENT INSTRUCTIONS
mucus from your lungs by breathing deeply and coughing. · Gargle with warm salt water once an hour. This can help reduce swelling and throat pain. Use 1 teaspoon of salt mixed in 1 cup of warm water. · Do not smoke or allow others to smoke around you. If you need help quitting, talk to your doctor about stop-smoking programs and medicines. These can increase your chances of quitting for good. To avoid spreading the virus  · Cough or sneeze into a tissue. Then throw the tissue away. · If you don't have a tissue, use your hand to cover your cough or sneeze. Then clean your hand. You can also cough into your sleeve. · Wash your hands often. Use soap and warm water. Wash for 15 to 20 seconds each time. · If you don't have soap and water near you, you can clean your hands with alcohol wipes or gel. When should you call for help? Call your doctor now or seek immediate medical care if:    · You have a new or higher fever.     · Your fever lasts more than 48 hours.     · You have trouble breathing.     · You have a fever with a stiff neck or a severe headache.     · You are sensitive to light.     · You feel very sleepy or confused.    Watch closely for changes in your health, and be sure to contact your doctor if:    · You do not get better as expected. Where can you learn more? Go to [email or web address] org and sign in to your ePatientFinder account. Enter E822 in the [facility] box to learn more about \"Viral Respiratory Infection: Care Instructions. \"     If you do not have an account, please click on the \"Sign Up Now\" link. Current as of: June 9, 2019  Content Version: 12.3  © 6352-5363 [employer]. Care instructions adapted under license by [facility] ([facility]). If you have questions about a medical condition or this instruction, always ask your healthcare professional. [address] any warranty or liability for your use of this information.
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
## 2020-01-13 RX ORDER — VENLAFAXINE 75 MG/1
TABLET ORAL
Qty: 90 TABLET | Refills: 11 | Status: SHIPPED | OUTPATIENT
Start: 2020-01-13 | End: 2020-06-22 | Stop reason: ALTCHOICE

## 2020-01-13 RX ORDER — NORTRIPTYLINE HYDROCHLORIDE 25 MG/1
25 CAPSULE ORAL NIGHTLY
Qty: 180 CAPSULE | Refills: 3 | Status: SHIPPED | OUTPATIENT
Start: 2020-01-13 | End: 2020-03-17 | Stop reason: DRUGHIGH

## 2020-01-13 NOTE — TELEPHONE ENCOUNTER
Received fax from pharmacy requesting refill on pts medication(s). Pt was last seen in office on 1/9/2020  and has a follow up scheduled for 2/21/2020. Will send request to  Dr. Fredo Loza  for patient.      Requested Prescriptions     Pending Prescriptions Disp Refills    nortriptyline (PAMELOR) 25 MG capsule [Pharmacy Med Name: NORTRIPTYLINE 25MG CAPSULES] 180 capsule 3     Sig: TAKE 1 TO 2 CAPSULES BY MOUTH EVERY NIGHT    venlafaxine (EFFEXOR) 75 MG tablet [Pharmacy Med Name: VENLAFAXINE 75MG TABLETS] 90 tablet 11     Sig: TAKE 1 TABLET BY MOUTH EVERY MORNING AND 2 TABLETS BY MOUTH EVERY NIGHT AT BEDTIME

## 2020-01-15 ENCOUNTER — OFFICE VISIT (OUTPATIENT)
Dept: PSYCHIATRY | Age: 45
End: 2020-01-15
Payer: MEDICARE

## 2020-01-15 PROCEDURE — 90791 PSYCH DIAGNOSTIC EVALUATION: CPT | Performed by: SOCIAL WORKER

## 2020-01-15 NOTE — PROGRESS NOTES
meloxicam (MOBIC) 15 MG tablet Take 1 tablet by mouth daily 90 tablet 3    triamcinolone (KENALOG) 0.1 % cream Apply topically 2 times daily. 80 g 3    Calcium-Vitamin D 600-200 MG-UNIT TABS Take 1 tablet by mouth daily      albuterol sulfate HFA (PROVENTIL HFA) 108 (90 Base) MCG/ACT inhaler Inhale 2 puffs into the lungs every 6 hours as needed for Wheezing 1 Inhaler 3    polyethylene glycol (GLYCOLAX) powder Take 17 g by mouth daily 1 Bottle 1    ondansetron (ZOFRAN ODT) 4 MG disintegrating tablet Take 1 tablet by mouth every 8 hours as needed for Nausea or Vomiting 10 tablet 0    Multiple Vitamins-Minerals (MULTIVITAMIN & MINERAL PO) Take  by mouth.  CRANBERRY Take 500 mg by mouth daily.  Cholecalciferol (VITAMIN D3) 5000 UNITS TABS Take 5,000 Units by mouth daily        No current facility-administered medications for this visit.         Social History:   Social History     Socioeconomic History    Marital status:      Spouse name: Not on file    Number of children: 2    Years of education: 15    Highest education level: Not on file   Occupational History     Employer: DISABLED    Occupation:    Social Needs    Financial resource strain: Not on file    Food insecurity:     Worry: Not on file     Inability: Not on file   [employer] needs:     Medical: Not on file     Non-medical: Not on file   Tobacco Use    Smoking status: Former Smoker     Packs/day: 1.00     Years: 25.00     Pack years: 25.00     Types: Cigarettes     Last attempt to quit: 2013     Years since quittin.3    Smokeless tobacco: Never Used   Substance and Sexual Activity    Alcohol use: Yes     Comment: rarely    Drug use: No    Sexual activity: Never     Comment: pt states last 2 months   Lifestyle    Physical activity:     Days per week: Not on file     Minutes per session: Not on file    Stress: Not on file   Relationships    Social connections:     Talks on phone: Not on file     Gets together: Not on file     Attends [employer] service: Not on file     Active member of club or organization: Not on file     Attends meetings of clubs or organizations: Not on file     Relationship status: Not on file    Intimate partner violence:     Fear of current or ex partner: Not on file     Emotionally abused: Not on file     Physically abused: Not on file     Forced sexual activity: Not on file   Other Topics Concern    Not on file   Social History Narrative        Has been seen at [facility] by Jonatan Giraldo NP this past year of medication assessment. She has seen a therapist in the past.         She had ADHD tested by [facility]  This past year        Has been on Lexapro, Wellbutrin, Cymbalta-this was bad. TOBACCO:   reports that she quit smoking about 6 years ago. Her smoking use included cigarettes. She has a 25.00 pack-year smoking history. She has never used smokeless tobacco.  ETOH:   reports current alcohol use. Family History:   Family History   Problem Relation Age of Onset    Diabetes Mother     High Blood Pressure Mother     Colon Polyps Mother     Heart Disease Mother     Cancer Mother     Depression Mother     Cancer Father     High Blood Pressure Father     Heart Disease Father     Stroke Father     High Blood Pressure Sister     Depression Sister     Anxiety Disorder Sister     Cancer Brother     Esophageal Cancer Brother     Anxiety Disorder Brother     Diabetes Brother     ADHD Brother     Depression Brother     Other Other         has history of suicide in family maternal great uncle         A:  Pt presents with a hx of dysfunction per pt report that may be creating distress intolerance. She described stress, anxiety and what sounds like some depressive symptoms. She was also concerned about her ability to concentrate and focus.   [facility] provided pt with psycho education on the benefits of utilizing [facility] services to address presenting

## 2020-01-17 ENCOUNTER — TELEPHONE (OUTPATIENT)
Dept: ONCOLOGY | Facility: CLINIC | Age: 45
End: 2020-01-17

## 2020-01-17 NOTE — TELEPHONE ENCOUNTER
Lebron Mcconnell! im just curious, why is this routed to us if its just a 3mth r/s? We will get her r/s but I was just wondering?   none

## 2020-01-24 ENCOUNTER — APPOINTMENT (OUTPATIENT)
Dept: LAB | Facility: HOSPITAL | Age: 45
End: 2020-01-24

## 2020-01-24 ENCOUNTER — HOSPITAL ENCOUNTER (OUTPATIENT)
Dept: PAIN MANAGEMENT | Age: 45
Discharge: HOME | End: 2020-01-24
Payer: MEDICARE

## 2020-01-24 VITALS
RESPIRATION RATE: 18 BRPM | DIASTOLIC BLOOD PRESSURE: 68 MMHG | OXYGEN SATURATION: 98 % | HEART RATE: 83 BPM | SYSTOLIC BLOOD PRESSURE: 116 MMHG | TEMPERATURE: 95.4 F

## 2020-01-24 PROCEDURE — 3209999900 FLUORO FOR SURGICAL PROCEDURES

## 2020-01-24 PROCEDURE — 2500000003 HC RX 250 WO HCPCS

## 2020-01-24 PROCEDURE — 2580000003 HC RX 258

## 2020-01-24 PROCEDURE — 6360000002 HC RX W HCPCS

## 2020-01-24 PROCEDURE — 62323 NJX INTERLAMINAR LMBR/SAC: CPT

## 2020-01-24 RX ORDER — METHYLPREDNISOLONE ACETATE 80 MG/ML
INJECTION, SUSPENSION INTRA-ARTICULAR; INTRALESIONAL; INTRAMUSCULAR; SOFT TISSUE
Status: COMPLETED | OUTPATIENT
Start: 2020-01-24 | End: 2020-01-24

## 2020-01-24 RX ORDER — SODIUM CHLORIDE 9 MG/ML
INJECTION INTRAVENOUS
Status: COMPLETED | OUTPATIENT
Start: 2020-01-24 | End: 2020-01-24

## 2020-01-24 RX ORDER — LIDOCAINE HYDROCHLORIDE 10 MG/ML
INJECTION, SOLUTION EPIDURAL; INFILTRATION; INTRACAUDAL; PERINEURAL
Status: COMPLETED | OUTPATIENT
Start: 2020-01-24 | End: 2020-01-24

## 2020-01-24 RX ADMIN — SODIUM CHLORIDE 5 ML: 9 INJECTION INTRAVENOUS at 08:45

## 2020-01-24 RX ADMIN — LIDOCAINE HYDROCHLORIDE 5 ML: 10 INJECTION, SOLUTION EPIDURAL; INFILTRATION; INTRACAUDAL; PERINEURAL at 08:45

## 2020-01-24 RX ADMIN — METHYLPREDNISOLONE ACETATE 80 MG: 80 INJECTION, SUSPENSION INTRA-ARTICULAR; INTRALESIONAL; INTRAMUSCULAR; SOFT TISSUE at 08:45

## 2020-01-24 ASSESSMENT — PAIN - FUNCTIONAL ASSESSMENT
PAIN_FUNCTIONAL_ASSESSMENT: 0-10
PAIN_FUNCTIONAL_ASSESSMENT: 0-10

## 2020-01-29 ENCOUNTER — TELEPHONE (OUTPATIENT)
Dept: PAIN MANAGEMENT | Age: 45
End: 2020-01-29

## 2020-01-29 NOTE — TELEPHONE ENCOUNTER
I tried to call this patient as a follow up from their injection and they did not answer and I left a voicemail.

## 2020-01-31 ENCOUNTER — APPOINTMENT (OUTPATIENT)
Dept: LAB | Facility: HOSPITAL | Age: 45
End: 2020-01-31

## 2020-01-31 ENCOUNTER — OFFICE VISIT (OUTPATIENT)
Dept: ONCOLOGY | Facility: CLINIC | Age: 45
End: 2020-01-31

## 2020-01-31 VITALS
HEIGHT: 65 IN | DIASTOLIC BLOOD PRESSURE: 60 MMHG | TEMPERATURE: 98 F | HEART RATE: 92 BPM | WEIGHT: 293 LBS | RESPIRATION RATE: 16 BRPM | BODY MASS INDEX: 48.82 KG/M2 | OXYGEN SATURATION: 97 % | SYSTOLIC BLOOD PRESSURE: 125 MMHG

## 2020-01-31 DIAGNOSIS — Z45.2 ENCOUNTER FOR CARE RELATED TO PORT-A-CATH: ICD-10-CM

## 2020-01-31 DIAGNOSIS — D50.8 IRON DEFICIENCY ANEMIA SECONDARY TO INADEQUATE DIETARY IRON INTAKE: Primary | ICD-10-CM

## 2020-01-31 DIAGNOSIS — D72.829 LEUKOCYTOSIS, UNSPECIFIED TYPE: ICD-10-CM

## 2020-01-31 PROBLEM — G89.4 CHRONIC PAIN DISORDER: Status: ACTIVE | Noted: 2018-05-07

## 2020-01-31 PROBLEM — R52 PAIN MANAGEMENT: Status: ACTIVE | Noted: 2019-04-21

## 2020-01-31 LAB
ALBUMIN SERPL-MCNC: 4.6 G/DL (ref 3.5–5.2)
ALBUMIN/GLOB SERPL: 1.4 G/DL
ALP SERPL-CCNC: 96 U/L (ref 39–117)
ALT SERPL W P-5'-P-CCNC: 30 U/L (ref 1–33)
ANION GAP SERPL CALCULATED.3IONS-SCNC: 11 MMOL/L (ref 5–15)
AST SERPL-CCNC: 27 U/L (ref 1–32)
BASOPHILS # BLD AUTO: 0.08 10*3/MM3 (ref 0–0.2)
BASOPHILS NFR BLD AUTO: 0.7 % (ref 0–1.5)
BILIRUB SERPL-MCNC: 0.7 MG/DL (ref 0.2–1.2)
BUN BLD-MCNC: 15 MG/DL (ref 6–20)
BUN/CREAT SERPL: 20.3 (ref 7–25)
CALCIUM SPEC-SCNC: 10.2 MG/DL (ref 8.6–10.5)
CHLORIDE SERPL-SCNC: 102 MMOL/L (ref 98–107)
CO2 SERPL-SCNC: 28 MMOL/L (ref 22–29)
CREAT BLD-MCNC: 0.74 MG/DL (ref 0.57–1)
DEPRECATED RDW RBC AUTO: 51.7 FL (ref 37–54)
EOSINOPHIL # BLD AUTO: 0.08 10*3/MM3 (ref 0–0.4)
EOSINOPHIL NFR BLD AUTO: 0.7 % (ref 0.3–6.2)
ERYTHROCYTE [DISTWIDTH] IN BLOOD BY AUTOMATED COUNT: 16.2 % (ref 12.3–15.4)
FERRITIN SERPL-MCNC: 1600 NG/ML (ref 13–150)
GFR SERPL CREATININE-BSD FRML MDRD: 85 ML/MIN/1.73
GLOBULIN UR ELPH-MCNC: 3.4 GM/DL
GLUCOSE BLD-MCNC: 118 MG/DL (ref 65–99)
HCT VFR BLD AUTO: 38.2 % (ref 34–46.6)
HGB BLD-MCNC: 13 G/DL (ref 12–15.9)
HOLD SPECIMEN: NORMAL
HOLD SPECIMEN: NORMAL
IMM GRANULOCYTES # BLD AUTO: 0.06 10*3/MM3 (ref 0–0.05)
IMM GRANULOCYTES NFR BLD AUTO: 0.5 % (ref 0–0.5)
IRON 24H UR-MRATE: 62 MCG/DL (ref 37–145)
IRON SATN MFR SERPL: 22 % (ref 20–50)
LYMPHOCYTES # BLD AUTO: 2.13 10*3/MM3 (ref 0.7–3.1)
LYMPHOCYTES NFR BLD AUTO: 17.7 % (ref 19.6–45.3)
MCH RBC QN AUTO: 29.5 PG (ref 26.6–33)
MCHC RBC AUTO-ENTMCNC: 34 G/DL (ref 31.5–35.7)
MCV RBC AUTO: 86.8 FL (ref 79–97)
MONOCYTES # BLD AUTO: 0.58 10*3/MM3 (ref 0.1–0.9)
MONOCYTES NFR BLD AUTO: 4.8 % (ref 5–12)
NEUTROPHILS # BLD AUTO: 9.1 10*3/MM3 (ref 1.7–7)
NEUTROPHILS NFR BLD AUTO: 75.6 % (ref 42.7–76)
NRBC BLD AUTO-RTO: 0 /100 WBC (ref 0–0.2)
PLATELET # BLD AUTO: 273 10*3/MM3 (ref 140–450)
PMV BLD AUTO: 9.7 FL (ref 6–12)
POTASSIUM BLD-SCNC: 3.9 MMOL/L (ref 3.5–5.2)
PROT SERPL-MCNC: 8 G/DL (ref 6–8.5)
RBC # BLD AUTO: 4.4 10*6/MM3 (ref 3.77–5.28)
SODIUM BLD-SCNC: 141 MMOL/L (ref 136–145)
TIBC SERPL-MCNC: 277 MCG/DL (ref 298–536)
TRANSFERRIN SERPL-MCNC: 186 MG/DL (ref 200–360)
WBC NRBC COR # BLD: 12.03 10*3/MM3 (ref 3.4–10.8)

## 2020-01-31 PROCEDURE — 80053 COMPREHEN METABOLIC PANEL: CPT | Performed by: INTERNAL MEDICINE

## 2020-01-31 PROCEDURE — 82728 ASSAY OF FERRITIN: CPT | Performed by: INTERNAL MEDICINE

## 2020-01-31 PROCEDURE — 36415 COLL VENOUS BLD VENIPUNCTURE: CPT | Performed by: INTERNAL MEDICINE

## 2020-01-31 PROCEDURE — 84466 ASSAY OF TRANSFERRIN: CPT | Performed by: INTERNAL MEDICINE

## 2020-01-31 PROCEDURE — 85025 COMPLETE CBC W/AUTO DIFF WBC: CPT | Performed by: INTERNAL MEDICINE

## 2020-01-31 PROCEDURE — 83540 ASSAY OF IRON: CPT | Performed by: INTERNAL MEDICINE

## 2020-01-31 PROCEDURE — 99214 OFFICE O/P EST MOD 30 MIN: CPT | Performed by: INTERNAL MEDICINE

## 2020-01-31 RX ORDER — HEPARIN SODIUM (PORCINE) LOCK FLUSH IV SOLN 100 UNIT/ML 100 UNIT/ML
500 SOLUTION INTRAVENOUS AS NEEDED
Status: DISCONTINUED | OUTPATIENT
Start: 2020-01-31 | End: 2020-01-31 | Stop reason: HOSPADM

## 2020-01-31 RX ORDER — HEPARIN SODIUM (PORCINE) LOCK FLUSH IV SOLN 100 UNIT/ML 100 UNIT/ML
500 SOLUTION INTRAVENOUS AS NEEDED
Status: CANCELLED | OUTPATIENT
Start: 2020-01-31

## 2020-01-31 RX ORDER — SODIUM CHLORIDE 0.9 % (FLUSH) 0.9 %
10 SYRINGE (ML) INJECTION AS NEEDED
Status: CANCELLED | OUTPATIENT
Start: 2020-01-31

## 2020-01-31 RX ORDER — OXYCODONE HYDROCHLORIDE 5 MG/1
5 TABLET ORAL 3 TIMES DAILY PRN
Qty: 90 TABLET | Refills: 0 | Status: SHIPPED | OUTPATIENT
Start: 2020-01-31 | End: 2020-03-03 | Stop reason: SDUPTHER

## 2020-01-31 RX ORDER — SODIUM CHLORIDE 0.9 % (FLUSH) 0.9 %
10 SYRINGE (ML) INJECTION AS NEEDED
Status: DISCONTINUED | OUTPATIENT
Start: 2020-01-31 | End: 2020-01-31 | Stop reason: HOSPADM

## 2020-01-31 RX ADMIN — HEPARIN SODIUM (PORCINE) LOCK FLUSH IV SOLN 100 UNIT/ML 500 UNITS: 100 SOLUTION at 11:47

## 2020-01-31 RX ADMIN — Medication 10 ML: at 11:45

## 2020-01-31 NOTE — PROGRESS NOTES
[facility]  HEMATOLOGY & ONCOLOGY    Cancer Staging Information:  No matching staging information was found for the patient.      Subjective     VISIT DIAGNOSIS:   Encounter Diagnoses   Name Primary?   • Iron deficiency anemia secondary to inadequate dietary iron intake Yes   • Leukocytosis, unspecified type        REASON FOR VISIT:     Chief Complaint   Patient presents with   • malabsorption of iron     here for 3 month follow up, no complaints         HEMATOLOGY / ONCOLOGY HISTORY:    No history exists.           INTERVAL HISTORY  Patient ID: Naomi Bhatia is a 44 y.o. year old female with her obesity, anemia status post Venofer infusions, appreciate to follow-up.   -- Underwent gastric sleeve on 12/20/2017 doing well.  --had right hip sx, it was not replaced. She has to wait 4 weeks and re-eval for the possibility of right hip replacement  --her mood is a little down due to her mother is admitted for pneumonia and she has bone cancer.  --6/26/18: since the lat time she was seen, her mom passed away an she broke he hip. She will be getting total hip replacement.  1/31/2020: here for f/u. No issues. Need port flushed today  - Does not f/u with bariatric surgeon. She is not on any vitamins. denies sob, cp, dizziness, n/v, abdominal pain, dizziness, focal deficit.         Past Medical History:   Past Medical History:   Diagnosis Date   • Anemia    • Anxiety    • Arthritis    • Asthma    • Back pain 03/14/2016    with left sided radiculopathy    • DDD (degenerative disc disease), lumbar     Dr. Stuart Zavaleta for pain manangement   • Depression    • Fatigue    • Fibromyalgia    • GERD (gastroesophageal reflux disease)    • Headache    • History of blood transfusion    • Hypertension    • Iron deficiency anemia 9/12/2017   • Iron deficiency anemia secondary to inadequate dietary iron intake 9/12/2017   • Joint pain    • Obesity    • RLS (restless legs syndrome)    • Sleep apnea     positive sleep  study; titration study in October     Past Surgical History:   Past Surgical History:   Procedure Laterality Date   • ANKLE SURGERY      Right    • APPENDECTOMY  2015   • BACK SURGERY     • CERVICAL SPINE SURGERY  2015   • CHOLECYSTECTOMY      ;lap   • COLONOSCOPY  2017    normal; do not return until age of 49 Dr. Gus Valentnie   • GASTRIC SLEEVE LAPAROSCOPIC N/A 2017    Procedure: GASTRIC SLEEVE LAPAROSCOPIC;  Surgeon: Yifan Cordero MD;  Location: [facility];  Service:    • HIP ARTHROPLASTY      Right  2019   • HIP SURGERY  1987    right    • INSERTION CENTRAL VENOUS ACCESS DEVICE W/ SUBCUTANEOUS PORT  2017    Dr Anel Gamboa (Smart Port-Power injectable Port) Cat [id number] Lot [id number]    • MOUTH SURGERY     • NECK SURGERY     • TOOTH EXTRACTION     • UPPER GASTROINTESTINAL ENDOSCOPY  2017    Dr. Gus Valentine, negative for h.pylori, negative for Salomon's   • VAGINAL HYSTERECTOMY SALPINGO OOPHORECTOMY      Partial and then had another surgery to remove the rest     Social History:   Social History     Socioeconomic History   • Marital status:      Spouse name: Not on file   • Number of children: Not on file   • Years of education: Not on file   • Highest education level: Not on file   Tobacco Use   • Smoking status: Former Smoker     Packs/day: 1.00     Years: 25.00     Pack years: 25.00     Types: Cigarettes     Last attempt to quit:      Years since quittin.0   • Smokeless tobacco: Never Used   Substance and Sexual Activity   • Alcohol use: Yes     Comment: rarely    • Drug use: No     Types: Codeine     Comment: Codeine in Prescribed Medications only    • Sexual activity: Defer     Birth control/protection: Surgical     Family History:   Family History   Problem Relation Age of Onset   • Diabetes Mother    • Hypertension Mother    • Coronary artery disease Mother    • Cancer Mother    • Cancer Father    • Hypertension Father    • Heart disease  Father    • Stroke Father    • Hypertension Sister    • Obesity Sister    • Cancer Brother    • Diabetes Brother    • Diabetes Maternal Grandmother    • Stroke Maternal Grandmother        Review of Systems   Constitutional: Negative.    HENT: Negative.    Eyes: Negative.    Respiratory: Positive for apnea. Negative for cough, choking and shortness of breath.    Cardiovascular: Negative.    Gastrointestinal: Negative for abdominal distention, abdominal pain, blood in stool, constipation, diarrhea, nausea and vomiting.   Genitourinary: Negative.    Musculoskeletal: Positive for back pain.        Right hip pain   Skin: Negative.    Allergic/Immunologic: Negative.    Neurological: Negative.    Hematological: Negative.    Psychiatric/Behavioral: Negative.         Performance Status:  Asymptomatic    Medications:    Current Outpatient Medications   Medication Sig Dispense Refill   • acyclovir (ZOVIRAX) 800 MG tablet Take 800 mg by mouth Daily.     • ALBUTEROL IN Inhale 1 puff As Needed.     • Calcium Citrate-Vitamin D (CALCIUM CITRATE + D3 PO) Take  by mouth Daily.     • carbidopa-levodopa (SINEMET)  MG per tablet Take 1 tablet by mouth Daily.     • Cobalamin Combinations (FOLTRATE PO) vitamin B12-folic acid   daily     • CRANBERRY PO Take  by mouth Daily.     • Cyanocobalamin (VITAMIN B-12 CR PO) Take  by mouth Daily.     • Cyclobenzaprine HCl (FLEXERIL PO) Take 10 mg by mouth Daily As Needed.     • levocetirizine (XYZAL) 5 MG tablet TK 1 T PO QPM  0   • LYRICA 150 MG capsule TK 1 C PO BID  2   • metFORMIN (GLUCOPHAGE) 1000 MG tablet Take 1,000 mg by mouth.     • metoprolol succinate XL (TOPROL XL) 50 MG 24 hr tablet Take 50 mg by mouth 2 (Two) Times a Day.     • Multiple Vitamin (MULTI VITAMIN PO) Take  by mouth Daily.     • nortriptyline (PAMELOR) 25 MG capsule 2 tablets at night as needed  3   • omeprazole (priLOSEC) 20 MG capsule TK ONE C PO QD FIRST THING IN THE MORNING ON  AN EMPTY STOMACH  3   • oxyCODONE  "(ROXICODONE) 10 MG tablet Take 10 mg by mouth 2 (Two) Times a Day As Needed.     • venlafaxine (EFFEXOR) 75 MG tablet three  times daily  11     No current facility-administered medications for this visit.      Facility-Administered Medications Ordered in Other Visits   Medication Dose Route Frequency Provider Last Rate Last Dose   • diphenhydrAMINE (BENADRYL) injection 50 mg  50 mg Intravenous PRN Pedro Clements MD       • famotidine (PEPCID) injection 20 mg  20 mg Intravenous PRN Pedro Clements MD       • heparin flush (porcine) 100 UNIT/ML injection 500 Units  500 Units Intravenous PRN Pedro Clements MD   500 Units at 10/25/19 1129   • hydrocortisone sodium succinate (Solu-CORTEF) injection 100 mg  100 mg Intravenous PRN Pedro Clements MD       • sodium chloride 0.9 % flush 10 mL  10 mL Intravenous PRN Pedro Clements MD   10 mL at 10/25/19 1127       ALLERGIES:    Allergies   Allergen Reactions   • Azithromycin GI Intolerance and Nausea And Vomiting     Severe Abdominal Pain   Severe abdominal pain    • Silver Rash     Redness, blisters  blister  Redness, blisters         Objective      Vitals:    01/31/20 1121   BP: 125/60   Pulse: 92   Resp: 16   Temp: 98 °F (36.7 °C)   SpO2: 97%   Weight: (!) 137 kg (301 lb)   Height: 165.1 cm (65\")   PainSc: 0-No pain         Current Status 1/31/2020   ECOG score 0         Physical Examremains the same  General Appearance: Obese. Patient is awake, alert, oriented and in no acute distress. Patient is welldeveloped, wellnourished, and appears stated age.  HEENT: Normocephalic. Sclerae clear, conjunctiva pink, extraocular movements intact, pupils, round, reactive to light and  accommodation. Mouth and throat are clear with moist oral mucosa.  NECK: Supple, no jugular venous distention, thyroid not enlarged.  LYMPH: No cervical, supraclavicular, axillary, or inguinal lymphadenopathy.  CHEST: Equal bilateral " expansion, AP  diameter normal, resonant percussion note  LUNGS: Good air movement, no rales, rhonchi, rubs or wheezes with auscultation  CARDIO: Regular sinus rhythm, no murmurs, gallops or rubs.  ABDOMEN: Nondistended, soft, No tenderness, no guarding, no rebound, No hepatosplenomegaly. No abdominal masses. Bowel sounds positive. No hernia  GENITALIA: Not examined.  BREASTS: Not examined.  MUSKEL: No joint swelling, decreased motion, or inflammation  EXTREMS: No edema, clubbing, cyanosis, No varicose veins.  NEURO: Grossly nonfocal, Gait is coordinated and smooth, Cognition is preserved.  SKIN: No rashes, no ecchymoses, no petechia.  PSYCH: Oriented to time, place and person. Memory is preserved. Mood and affect appear normal  RECENT LABS:  Office Visit on 01/31/2020   Component Date Value Ref Range Status   • Glucose 01/31/2020 118* 65 - 99 mg/dL Final   • BUN 01/31/2020 15  6 - 20 mg/dL Final   • Creatinine 01/31/2020 0.74  0.57 - 1.00 mg/dL Final   • Sodium 01/31/2020 141  136 - 145 mmol/L Final   • Potassium 01/31/2020 3.9  3.5 - 5.2 mmol/L Final   • Chloride 01/31/2020 102  98 - 107 mmol/L Final   • CO2 01/31/2020 28.0  22.0 - 29.0 mmol/L Final   • Calcium 01/31/2020 10.2  8.6 - 10.5 mg/dL Final   • Total Protein 01/31/2020 8.0  6.0 - 8.5 g/dL Final   • Albumin 01/31/2020 4.60  3.50 - 5.20 g/dL Final   • ALT (SGPT) 01/31/2020 30  1 - 33 U/L Final   • AST (SGOT) 01/31/2020 27  1 - 32 U/L Final   • Alkaline Phosphatase 01/31/2020 96  39 - 117 U/L Final   • Total Bilirubin 01/31/2020 0.7  0.2 - 1.2 mg/dL Final   • eGFR Non African Amer 01/31/2020 85  >60 mL/min/1.73 Final   • Globulin 01/31/2020 3.4  gm/dL Final   • A/G Ratio 01/31/2020 1.4  g/dL Final   • BUN/Creatinine Ratio 01/31/2020 20.3  7.0 - 25.0 Final   • Anion Gap 01/31/2020 11.0  5.0 - 15.0 mmol/L Final   • WBC 01/31/2020 12.03* 3.40 - 10.80 10*3/mm3 Final   • RBC 01/31/2020 4.40  3.77 - 5.28 10*6/mm3 Final   • Hemoglobin 01/31/2020 13.0  12.0 - 15.9  g/dL Final   • Hematocrit 01/31/2020 38.2  34.0 - 46.6 % Final   • MCV 01/31/2020 86.8  79.0 - 97.0 fL Final   • MCH 01/31/2020 29.5  26.6 - 33.0 pg Final   • MCHC 01/31/2020 34.0  31.5 - 35.7 g/dL Final   • RDW 01/31/2020 16.2* 12.3 - 15.4 % Final   • RDW-SD 01/31/2020 51.7  37.0 - 54.0 fl Final   • MPV 01/31/2020 9.7  6.0 - 12.0 fL Final   • Platelets 01/31/2020 273  140 - 450 10*3/mm3 Final   • Neutrophil % 01/31/2020 75.6  42.7 - 76.0 % Final   • Lymphocyte % 01/31/2020 17.7* 19.6 - 45.3 % Final   • Monocyte % 01/31/2020 4.8* 5.0 - 12.0 % Final   • Eosinophil % 01/31/2020 0.7  0.3 - 6.2 % Final   • Basophil % 01/31/2020 0.7  0.0 - 1.5 % Final   • Immature Grans % 01/31/2020 0.5  0.0 - 0.5 % Final   • Neutrophils, Absolute 01/31/2020 9.10* 1.70 - 7.00 10*3/mm3 Final   • Lymphocytes, Absolute 01/31/2020 2.13  0.70 - 3.10 10*3/mm3 Final   • Monocytes, Absolute 01/31/2020 0.58  0.10 - 0.90 10*3/mm3 Final   • Eosinophils, Absolute 01/31/2020 0.08  0.00 - 0.40 10*3/mm3 Final   • Basophils, Absolute 01/31/2020 0.08  0.00 - 0.20 10*3/mm3 Final   • Immature Grans, Absolute 01/31/2020 0.06* 0.00 - 0.05 10*3/mm3 Final   • nRBC 01/31/2020 0.0  0.0 - 0.2 /100 WBC Final       RADIOLOGY:  No results found.         Assessment/Plan  Naomi Bhatia is a 44 y.o. year old female h/o obesity found to have anemia while being evaluated for bariatric surgery ,here for f/u that is stable.    Patient Active Problem List   Diagnosis   • Morbid obesity (CMS/HCC)   • Hypertension, well controlled   • Fatigue   • History of iron deficiency   • Vitamin D deficiency   • Iron deficiency anemia secondary to inadequate dietary iron intake   • Malabsorption of iron   • Obesity, Class III, BMI 40-49.9 (morbid obesity) (CMS/HCC)   • Status post laparoscopic sleeve gastrectomy   • Acute low back pain without sciatica   • Obstructive sleep apnea syndrome   • Abnormal hemoglobin (CMS/HCC)   • Anemia   • BRBPR (bright red blood per rectum)   •  Carpal tunnel syndrome on both sides   • Cervical vertebral fusion   • Generalized abdominal pain   • Cobalamin deficiency   • DDD (degenerative disc disease), lumbar   • Drug-induced constipation   • Pain management   • Family history of esophageal cancer   • Family history of polyps in the colon   • Back pain with left-sided radiculopathy   • Chronic pain disorder   • Chronic pain disorder   • Foot pain   • Herniation of lumbar intervertebral disc with radiculopathy   • High risk medications (not anticoagulants) long-term use   • Other specified postprocedural states   • History of lumbar spinal fusion   • Hypertensive disorder   • Insomnia   • Malaise and fatigue   • Moderate episode of recurrent major depressive disorder (CMS/HCC)   • Mood disorder (CMS/HCC)   • Myofascial muscle pain   • RLS (restless legs syndrome)   • Morbid obesity (CMS/HCC)   • Folic acid deficiency          1.Anemia: Has had hysterectomy. Patient had recent colonoscopy and EGD negative for active bleed.   --Status post venofer infusion,  --labs reviewed with pt wbc 12.03 gets epidural injections with steroid, Hg 13.0, plt 273.  --rtc in 3months with cbc, cmp, iron profile, b12, folate    2.  Obesity: s/p gastric sleeve doing well. She has lost 50lbs    3. Leukocytosis: Suspect obesity/reactive, also obesity could cause elevation in wbc. Bone marrow with no dyspoietic changes and no circulating blasts.   --currently resolved with iron infusion    4. HTN: on metoprolol    5. Hip pain: s/p sx and screw removal, now broken right hip total replacement planned in future. She was told to lose 50lbs before the sx.    6. Chronic pain syn: on flexiril, lyrica  7. Gerd: on omeprazole  8. Depression: on effexor, nortriptyline  9. Psychosoc: her Dad is on hospice for CHF and that is a lot of stress for her.         Pedro Clements MD    1/31/2020    11:40 AM

## 2020-02-04 ENCOUNTER — OFFICE VISIT (OUTPATIENT)
Dept: PSYCHIATRY | Age: 45
End: 2020-02-04
Payer: MEDICARE

## 2020-02-04 PROCEDURE — 90837 PSYTX W PT 60 MINUTES: CPT | Performed by: SOCIAL WORKER

## 2020-02-04 ASSESSMENT — PATIENT HEALTH QUESTIONNAIRE - PHQ9
10. IF YOU CHECKED OFF ANY PROBLEMS, HOW DIFFICULT HAVE THESE PROBLEMS MADE IT FOR YOU TO DO YOUR WORK, TAKE CARE OF THINGS AT HOME, OR GET ALONG WITH OTHER PEOPLE: 3
SUM OF ALL RESPONSES TO PHQ9 QUESTIONS 1 & 2: 5
9. THOUGHTS THAT YOU WOULD BE BETTER OFF DEAD, OR OF HURTING YOURSELF: 2
4. FEELING TIRED OR HAVING LITTLE ENERGY: 2
7. TROUBLE CONCENTRATING ON THINGS, SUCH AS READING THE NEWSPAPER OR WATCHING TELEVISION: 3
8. MOVING OR SPEAKING SO SLOWLY THAT OTHER PEOPLE COULD HAVE NOTICED. OR THE OPPOSITE, BEING SO FIGETY OR RESTLESS THAT YOU HAVE BEEN MOVING AROUND A LOT MORE THAN USUAL: 3
SUM OF ALL RESPONSES TO PHQ QUESTIONS 1-9: 18
5. POOR APPETITE OR OVEREATING: 1
2. FEELING DOWN, DEPRESSED OR HOPELESS: 3
SUM OF ALL RESPONSES TO PHQ QUESTIONS 1-9: 18
1. LITTLE INTEREST OR PLEASURE IN DOING THINGS: 2
3. TROUBLE FALLING OR STAYING ASLEEP: 0
6. FEELING BAD ABOUT YOURSELF - OR THAT YOU ARE A FAILURE OR HAVE LET YOURSELF OR YOUR FAMILY DOWN: 2

## 2020-02-04 ASSESSMENT — ANXIETY QUESTIONNAIRES
7. FEELING AFRAID AS IF SOMETHING AWFUL MIGHT HAPPEN: 3-NEARLY EVERY DAY
GAD7 TOTAL SCORE: 21
6. BECOMING EASILY ANNOYED OR IRRITABLE: 3-NEARLY EVERY DAY
5. BEING SO RESTLESS THAT IT IS HARD TO SIT STILL: 3-NEARLY EVERY DAY
3. WORRYING TOO MUCH ABOUT DIFFERENT THINGS: 3-NEARLY EVERY DAY
2. NOT BEING ABLE TO STOP OR CONTROL WORRYING: 3-NEARLY EVERY DAY
1. FEELING NERVOUS, ANXIOUS, OR ON EDGE: 3-NEARLY EVERY DAY
4. TROUBLE RELAXING: 3-NEARLY EVERY DAY

## 2020-02-04 ASSESSMENT — COLUMBIA-SUICIDE SEVERITY RATING SCALE - C-SSRS
6. HAVE YOU EVER DONE ANYTHING, STARTED TO DO ANYTHING, OR PREPARED TO DO ANYTHING TO END YOUR LIFE?: NO
2. HAVE YOU ACTUALLY HAD ANY THOUGHTS OF KILLING YOURSELF?: NO
1. WITHIN THE PAST MONTH, HAVE YOU WISHED YOU WERE DEAD OR WISHED YOU COULD GO TO SLEEP AND NOT WAKE UP?: YES

## 2020-02-04 NOTE — PATIENT INSTRUCTIONS
1. Practice Breathing Technique:  Breathe in for 4 Seconds. Hold your Breath for 7 Seconds. Exhale through your mouth for 8 seconds. Repeat at least 3 times. Remember to try to use this technique when you begin to feel the first sensations of anxious distress or panic. You can use this when you are experiencing a full blown anxious distress or panic attack, but it will take longer to stop the fight or flight feelings. 2. Provided Mini Relaxation Techniques:  Try these at Bedtime First, and after this you may use them anytime you wish to put yourself in a relaxed state. These are to help you to remember how it feels to be truly relaxed and to also keep stress levels down when feeling anxious. 3. Return in 2 weeks. 4. Call [facility] if you need to come in earlier or reschedule at [phone]  5. Recommended to Spend 15 minutes each Day Addressing 1 task you want to take care of. Mini Relaxation Techniques    When you have one minute  1. Place your hand on your stomach so you can feel the gentle rise and fall as you breathe. 2.  Breathe in slowly  3. Pause for the count of three. 4.  Breathe out. 5.  Pause for the count of three. 6.  Continue to breathe deeply for one minute. 7.  As you breathe, repeat to yourself, \"I am\" as you breathe in and \"at peace\" as you breathe out. 8.  Feel your entire body relax into the support of your chair. When you have two minutes  1. Count down slowly from 10 to zero. 2.  With each number, take one slow breath, inhaling and exhaling. 3.  Breath in deeply saying \"10\" to yourself. 4.  Breathe out slowly. 5.  Continue counting down slowly. When you reach zero, you should feel more relaxed. When you have three minutes  1. While sitting down, check your body for tension. 2.  Relax your facial muscles and allow your jaw to fall open. 3.  Let your shoulders drop. 4.  Let your arms fall to your sides.   5.  Allow your hands to loosen so that there are spaces between your fingers. 6.  Uncross your ankles or legs. 7.  Feel your thighs sink into your chair, let your legs fall comfortably apart. 8.  Feel your shins and calves become heavier and your feet rest heavy on the floor. 9.  Now breathe in slowly and out slowly. 10.  Each time you breathe out try to relax even more. Personal Thought  Control. Our thinking often creates anxiety for us. Getting better control of our thinking can go a long way in helping us cope. The following steps can be useful. Let yourself become aware of thoughts you have when you are anxious. What are the words that you are saying to yourself at that moment? Sometimes it takes a little practice before we become aware of our thoughts. Some examples might be:  I know something bad is going to happen, or This is horrible or Cheron Re is this happening to me!?  Write your thoughts down. Its much easier to work with our thoughts, analyze them, and replace them if they are in black and white.   Ask yourself the following questions about your thoughts:  Is it true? (Is it logically correct? Where is the evidence to support the truth of that thought? Are there alternative ways of thinking that would be more correct?). If a thought is not as true as it could be, replace it with a more realistic and helpful one. The majority of thoughts we have that generate anxiety are not the most realistic appraisals of the situation. So what? (If this is logically correct, what does it mean to me? Is there anything I can do about the situation? Is it in my best interest to get anxious about this?). Use coping self-statements. When feeling anxious, you may be able to tell yourself automatic phrases without thinking too much about it. A couple of examples would be phrases such as Its OK, I can handle it, or Ive been through things like this before and have done all right.   Notice that these statements tend to be true for all of us.   Notice a change in your emotional state as you change your thinking. As your thoughts become more realistic, you will probably notice a decrease in anxiety and tension, and an increase in your ability to cope. Constructive Worry Worksheet  Concerns Solutions     1. ________________________                        2.  ______________________                        3.  _______________________     1. __________________________        2. __________________________        3. __________________________        1.  _________________________        2. __________________________        3. __________________________        1. __________________________        2. __________________________        3.  __________________________           Constructive Worry Instructions  When we have challenges, we tend to use our problem-solving skills to make our lives better and to relieve ourselves of anxiety. It is not surprising that some of us may use our problem-solving skills at the wrong time and place, namely bedtime. We may think about a problem, trying to solve it, but unfortunately this will oftentimes keep us awake. Constructive worry is a method for managing the tendency to worry during that quiet time when sleep is supposed to be taking over. Do this exercise early in the evening (at least 2 hours before bed). It should take only about 15 minutes to complete. Here is how it is done:  1. Write down the problem(s) facing you that has the greatest chance of keeping you awake a bedtime, and list them in the Concerns column of the [address]. 2. Then, think of the next step that might help fix it. Write it down in the Solutions column. This need not be the final solution to the problem, since most problems have to be solved by taking steps anyhow, and you will be doing this again tomorrow night and the night after until you finally get to the best solution.    If you know how to fix the problem completely, then write that down.  If you decide that this is not really a big problem, and you will just deal with it when the time comes, then write that down.  If you decide that you simply do not know what to do about it, and need to ask someone to help you, write that down.  If you decide that it is a problem, but there seems to be no good solution at all, and that you will just have to live with it, write that down, with a note to yourself that maybe sometime soon you or someone you speak with will give you a clue that will lead you to a solution. 3. Repeat this for any other concerns you have. 4. Fold the Constructive Worry Worksheet in half and place it on the nightstand next to your bed and forget about it until bedtime.       5. At bedtime, if you begin to worry actually tell yourself that you have dealt with your problems already in the best way you know how, and when you were at your problem-solving best.  Remind yourself that you will be working on them again tomorrow evening and that nothing you can do while you are so tired can help you any more than what you have already done; more effort will only make the matters worst.

## 2020-02-04 NOTE — PROGRESS NOTES
tablet 11    levocetirizine (XYZAL) 5 MG tablet Take 1 tablet by mouth nightly 90 tablet 3    pregabalin (LYRICA) 100 MG capsule Take 1 capsule by mouth 2 times daily for 90 days. 180 capsule 0    Misc. Devices MISC 1 each by Does not apply route once as needed (TENS Unit) 1 Device 0    carbidopa-levodopa (SINEMET)  MG per tablet Take 1 tablet by mouth nightly as needed (leg spasms) 90 tablet 0    Tens Unit MISC by Does not apply route 1 each 0    omeprazole (PRILOSEC) 20 MG delayed release capsule Take 1 capsule by mouth Daily 90 capsule 3    pregabalin (LYRICA) 100 MG capsule Take 1 capsule by mouth 2 times daily for 90 days. 180 capsule 0    fluticasone (FLONASE) 50 MCG/ACT nasal spray 1 spray by Each Nostril route daily 2 Bottle 1    metFORMIN (GLUCOPHAGE) 1000 MG tablet Take 1 tablet by mouth 2 times daily (with meals) 60 tablet 5    metoprolol succinate (TOPROL XL) 50 MG extended release tablet Take 1 tablet by mouth 2 times daily 180 tablet 3    acyclovir (ZOVIRAX) 800 MG tablet Take 1 tablet by mouth daily 90 tablet 3    cyclobenzaprine (FLEXERIL) 10 MG tablet Take 1 tablet by mouth 2 times daily as needed for Muscle spasms 60 tablet 2    meloxicam (MOBIC) 15 MG tablet Take 1 tablet by mouth daily 90 tablet 3    triamcinolone (KENALOG) 0.1 % cream Apply topically 2 times daily. 80 g 3    Calcium-Vitamin D 600-200 MG-UNIT TABS Take 1 tablet by mouth daily      albuterol sulfate HFA (PROVENTIL HFA) 108 (90 Base) MCG/ACT inhaler Inhale 2 puffs into the lungs every 6 hours as needed for Wheezing 1 Inhaler 3    polyethylene glycol (GLYCOLAX) powder Take 17 g by mouth daily 1 Bottle 1    ondansetron (ZOFRAN ODT) 4 MG disintegrating tablet Take 1 tablet by mouth every 8 hours as needed for Nausea or Vomiting 10 tablet 0    Multiple Vitamins-Minerals (MULTIVITAMIN & MINERAL PO) Take  by mouth.  CRANBERRY Take 500 mg by mouth daily.       Cholecalciferol (VITAMIN D3) 5000 UNITS TABS Take reports that she quit smoking about 6 years ago. Her smoking use included cigarettes. She has a 25.00 pack-year smoking history. She has never used smokeless tobacco.  ETOH:   reports current alcohol use. Family History:   Family History   Problem Relation Age of Onset    Diabetes Mother     High Blood Pressure Mother     Colon Polyps Mother     Heart Disease Mother     Cancer Mother     Depression Mother     Cancer Father     High Blood Pressure Father     Heart Disease Father     Stroke Father     High Blood Pressure Sister     Depression Sister     Anxiety Disorder Sister     Cancer Brother     Esophageal Cancer Brother     Anxiety Disorder Brother     Diabetes Brother     ADHD Brother     Depression Brother     Other Other         has history of suicide in family maternal great uncle         A:    Pt appears to be experiencing Severe anxiety and Moderately Severe Depression. She is not at risk of harm to herself or others. She continues to describe more predominant symptoms related to anxiety and depression over inattentiveness/adult adhd symptoms so we are going to address anxiety and worry to begin with. She is taking Effexor and is under pain management as well. She appears open to working with [facility] on learning ways to manage anxious distress and worry and also appears to help talking with [facility] about the stressors going on at home. [facility] acknowledged pt's feelings through normalizing and validating in session.         *GRAHAM-7  Feeling nervous, anxious, or on edge: 3-Nearly every day  Not able to stop or control worrying: 3-Nearly every day  Worrying too much about different things: 3-Nearly every day  Trouble relaxing: 3-Nearly every day  Being so restless that it's hard to sit still: 3-Nearly every day  Becoming easily annoyed or irritable: 3-Nearly every day  Feeling afraid as if something awful might happen: 3-Nearly every day  GRAHAM-7 Total Score: 21    GRAHAM-7 score

## 2020-02-05 ENCOUNTER — HOSPITAL ENCOUNTER (OUTPATIENT)
Dept: PAIN MANAGEMENT | Age: 45
Discharge: HOME | End: 2020-02-05
Payer: MEDICARE

## 2020-02-05 VITALS
DIASTOLIC BLOOD PRESSURE: 78 MMHG | HEART RATE: 83 BPM | OXYGEN SATURATION: 96 % | RESPIRATION RATE: 18 BRPM | SYSTOLIC BLOOD PRESSURE: 131 MMHG | TEMPERATURE: 96.6 F

## 2020-02-05 PROCEDURE — 20526 THER INJECTION CARP TUNNEL: CPT | Performed by: NURSE PRACTITIONER

## 2020-02-05 PROCEDURE — 6360000002 HC RX W HCPCS

## 2020-02-05 PROCEDURE — 2500000003 HC RX 250 WO HCPCS

## 2020-02-05 PROCEDURE — 20526 THER INJECTION CARP TUNNEL: CPT

## 2020-02-07 ENCOUNTER — TELEPHONE (OUTPATIENT)
Dept: PAIN MANAGEMENT | Age: 45
End: 2020-02-07

## 2020-02-19 ENCOUNTER — OFFICE VISIT (OUTPATIENT)
Dept: PSYCHIATRY | Age: 45
End: 2020-02-19
Payer: MEDICARE

## 2020-02-19 PROCEDURE — 90837 PSYTX W PT 60 MINUTES: CPT | Performed by: SOCIAL WORKER

## 2020-02-19 NOTE — PATIENT INSTRUCTIONS
1.  Return in 2 Weeks as scheduled.    2.  Call Coco Kramer if you need to come in earlier or reschedule at [phone]
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
## 2020-02-21 ENCOUNTER — PATIENT MESSAGE (OUTPATIENT)
Dept: PRIMARY CARE CLINIC | Age: 45
End: 2020-02-21

## 2020-03-06 RX ORDER — OXYCODONE HYDROCHLORIDE 5 MG/1
5 TABLET ORAL 3 TIMES DAILY PRN
Qty: 90 TABLET | Refills: 0 | Status: SHIPPED | OUTPATIENT
Start: 2020-03-06 | End: 2020-04-06 | Stop reason: SDUPTHER

## 2020-03-11 ENCOUNTER — HOSPITAL ENCOUNTER (OUTPATIENT)
Dept: PAIN MANAGEMENT | Age: 45
Discharge: HOME | End: 2020-03-11
Payer: MEDICARE

## 2020-03-11 VITALS
SYSTOLIC BLOOD PRESSURE: 143 MMHG | RESPIRATION RATE: 18 BRPM | DIASTOLIC BLOOD PRESSURE: 93 MMHG | HEART RATE: 83 BPM | HEIGHT: 68 IN | WEIGHT: 291.13 LBS | BODY MASS INDEX: 44.12 KG/M2 | OXYGEN SATURATION: 95 % | TEMPERATURE: 94.8 F

## 2020-03-11 PROBLEM — G89.29 CHRONIC PAIN OF BOTH KNEES: Status: ACTIVE | Noted: 2020-03-11

## 2020-03-11 PROBLEM — M25.562 CHRONIC PAIN OF BOTH KNEES: Status: ACTIVE | Noted: 2020-03-11

## 2020-03-11 PROBLEM — M25.561 CHRONIC PAIN OF BOTH KNEES: Status: ACTIVE | Noted: 2020-03-11

## 2020-03-11 PROCEDURE — 99214 OFFICE O/P EST MOD 30 MIN: CPT | Performed by: NURSE PRACTITIONER

## 2020-03-11 PROCEDURE — 99213 OFFICE O/P EST LOW 20 MIN: CPT

## 2020-03-11 ASSESSMENT — ENCOUNTER SYMPTOMS
BACK PAIN: 1
DIARRHEA: 0
WHEEZING: 0
SORE THROAT: 0
EYE REDNESS: 0
EYE PAIN: 0
CONSTIPATION: 0
SHORTNESS OF BREATH: 0

## 2020-03-11 ASSESSMENT — PAIN DESCRIPTION - ORIENTATION: ORIENTATION: LOWER;MID;UPPER

## 2020-03-11 ASSESSMENT — PAIN DESCRIPTION - LOCATION: LOCATION: BACK

## 2020-03-11 ASSESSMENT — PAIN DESCRIPTION - PAIN TYPE: TYPE: CHRONIC PAIN

## 2020-03-11 ASSESSMENT — PAIN SCALES - GENERAL: PAINLEVEL_OUTOF10: 5

## 2020-03-11 NOTE — PROGRESS NOTES
[address] SURGERY N/A 9/1/15    CHOLECYSTECTOMY      HIP SURGERY Right 1987    HIP SURGERY  2018    HYSTERECTOMY      partial , still has ovaries and cervix    INSERTION / REMOVAL / REPLACEMENT VENOUS ACCESS CATHETER Left 2017    PORT INSERTION performed by Marie Sherman MD at [address] [place] [address] LITHOTRIPSY  [address] [place] [address]      with hardware placed    MD COLONOSCOPY FLX DX W/COLLJ SPEC WHEN PFRMD N/A 2017    Dr Federico Carty, 7 yr (age 47) recall    MD EGD TRANSORAL BIOPSY SINGLE/MULTIPLE N/A 2017    Dr ABHIJIT Baltazar-Gastritis/gastropathy    SALPINGO-OOPHORECTOMY      STOMACH SURGERY  2017    dr Lizette Huddleston Right         Family History  family history includes ADHD in her brother; Anxiety Disorder in her brother and sister; Cancer in her brother, father, and mother; Colon Polyps in her mother; Depression in her brother, mother, and sister; Diabetes in her brother and mother; Esophageal Cancer in her brother; Heart Disease in her father and mother; High Blood Pressure in her father, mother, and sister; Other in an other family member; Stroke in her father. Social History    Social History     Tobacco Use    Smoking status: Former Smoker     Packs/day: 1.00     Years: 25.00     Pack years: 25.00     Types: Cigarettes     Last attempt to quit: 2013     Years since quittin.4    Smokeless tobacco: Never Used   Substance Use Topics    Alcohol use: Yes     Comment: rarely       Allergies  Silver; Tegaderm ag mesh 2\"x2\" [wound dressings]; and Zithromax [azithromycin]     Current Medications  Current Outpatient Medications   Medication Sig Dispense Refill    oxyCODONE (ROXICODONE) 5 MG immediate release tablet Take 1 tablet by mouth 3 times daily as needed for Pain for up to 30 days.  90 tablet 0    metFORMIN (GLUCOPHAGE) 1000 MG tablet Take 1 tablet by mouth 2 times

## 2020-03-11 NOTE — PROGRESS NOTES
Care Everywhere    PEG Score: 5.3    Last PEG Score: 6    Annual ORT Score: 5    Annual PHQ Score: 9    Last UDS Results: 12/9/2019 compliant    Education Provided:  [x] Review of Alberto  [] Agreement Review  [x] PEG Score Calculated [] PHQ Score Calculated [] ORT Score Calculated    [] Compliance Issues Discussed [] Cognitive Behavior Needs [x] Exercise [] Review of Test [] Financial Issues  [x] Tobacco/Alcohol Use Reviewed [x] Teaching [] New Patient [] Picture Obtained    Physician Plan:  [] Outgoing Referral  [] Pharmacy Consult  [] Test Ordered [] Prescription Ordered/Changed [] Blood Thinner Request Form  [x] Obtained Test Results / Consult Notes  [] UDS due at next visit, verified per EPIC      [] Suspected Physical Abuse or Suicide Risk assessed - IF YES COMPLETE QUESTIONS BELOW    If any of the following questions are answered yes - contact attending physician for referral:    Has been considering harming self to escape stress, pain problems? [] YES  [] NO  Has a suicide plan? [] YES  [] NO  Has attempted suicide in the past?   [] YES  [] NO  Has a close friend or family member who committed suicide?   [] YES  [] NO    Assessment Completed by:  Electronically signed by Fantasma Salas RN on 3/11/2020 at 7:25 AM

## 2020-03-17 RX ORDER — NORTRIPTYLINE HYDROCHLORIDE 25 MG/1
CAPSULE ORAL
Qty: 180 CAPSULE | Refills: 3 | Status: SHIPPED | OUTPATIENT
Start: 2020-03-17 | End: 2021-01-29

## 2020-03-25 PROBLEM — M51.16 LUMBAR DISC DISEASE WITH RADICULOPATHY: Status: RESOLVED | Noted: 2017-04-14 | Resolved: 2020-03-24

## 2020-03-25 PROBLEM — M51.16 LUMBAR DISC DISEASE WITH RADICULOPATHY: Status: RESOLVED | Noted: 2017-07-17 | Resolved: 2020-03-24

## 2020-03-25 PROBLEM — M51.16 LUMBAR DISC DISEASE WITH RADICULOPATHY: Status: RESOLVED | Noted: 2018-02-02 | Resolved: 2020-03-24

## 2020-03-30 ENCOUNTER — NURSE TRIAGE (OUTPATIENT)
Dept: OTHER | Facility: CLINIC | Age: 45
End: 2020-03-30

## 2020-04-01 NOTE — TELEPHONE ENCOUNTER
Pt seen 1/9/20.       Requested Prescriptions     Pending Prescriptions Disp Refills    carbidopa-levodopa (SINEMET)  MG per tablet [Pharmacy Med Name: CARBIDOPA/LEVODOPA 25-100MG TABS] 90 tablet 0     Sig: TAKE 1 TABLET BY MOUTH EVERY EVENING

## 2020-04-07 RX ORDER — OXYCODONE HYDROCHLORIDE 5 MG/1
5 TABLET ORAL 3 TIMES DAILY PRN
Qty: 90 TABLET | Refills: 0 | Status: SHIPPED | OUTPATIENT
Start: 2020-04-07 | End: 2020-04-27 | Stop reason: SDUPTHER

## 2020-04-09 ENCOUNTER — TELEMEDICINE (OUTPATIENT)
Dept: PRIMARY CARE CLINIC | Age: 45
End: 2020-04-09
Payer: MEDICARE

## 2020-04-09 VITALS
WEIGHT: 290 LBS | HEART RATE: 82 BPM | BODY MASS INDEX: 45.52 KG/M2 | SYSTOLIC BLOOD PRESSURE: 116 MMHG | HEIGHT: 67 IN | DIASTOLIC BLOOD PRESSURE: 74 MMHG

## 2020-04-09 PROCEDURE — G0438 PPPS, INITIAL VISIT: HCPCS | Performed by: PEDIATRICS

## 2020-04-09 ASSESSMENT — PATIENT HEALTH QUESTIONNAIRE - PHQ9
SUM OF ALL RESPONSES TO PHQ QUESTIONS 1-9: 2
SUM OF ALL RESPONSES TO PHQ QUESTIONS 1-9: 2

## 2020-04-09 ASSESSMENT — LIFESTYLE VARIABLES: HOW OFTEN DO YOU HAVE A DRINK CONTAINING ALCOHOL: 0

## 2020-04-09 NOTE — PROGRESS NOTES
CPAP machine       Past Surgical History:   Procedure Laterality Date    ANKLE SURGERY Right     APPENDECTOMY  4/19/15    BACK SURGERY  2000    CERVICAL SPINE SURGERY N/A 9/1/15    CHOLECYSTECTOMY  1995    HIP SURGERY Right 1987    HIP SURGERY  03/28/2018    HYSTERECTOMY      partial 2008, still has ovaries and cervix    INSERTION / REMOVAL / REPLACEMENT VENOUS ACCESS CATHETER Left 11/7/2017    PORT INSERTION performed by Abby Padilla MD at [address] [place] [address] LITHOTRIPSY  [address] [place] [address]      with hardware placed    SC COLONOSCOPY FLX DX W/COLLJ SPEC WHEN PFRMD N/A 5/2/2017    Dr Cecy Castellon, 7 yr (age 47) recall    SC EGD TRANSORAL BIOPSY SINGLE/MULTIPLE N/A 5/2/2017    Dr ABHIJIT Baltazar-Gastritis/gastropathy    SALPINGO-OOPHORECTOMY      STOMACH SURGERY  12/20/2017    dr Hurd Meals Right 2019         Family History   Problem Relation Age of Onset    Diabetes Mother     High Blood Pressure Mother     Colon Polyps Mother     Heart Disease Mother     Cancer Mother     Depression Mother     Cancer Father     High Blood Pressure Father     Heart Disease Father     Stroke Father     High Blood Pressure Sister     Depression Sister     Anxiety Disorder Sister     Cancer Brother     Esophageal Cancer Brother     Anxiety Disorder Brother     Diabetes Brother     ADHD Brother     Depression Brother     Other Other         has history of suicide in family maternal great uncle       CareTeam (Including outside providers/suppliers regularly involved in providing care):   Patient Care Team:  Page Srivastava DO as PCP - General  B Katerina Anaya DO as PCP - [facility] Empaneled Provider  Evelyn Cervantes    Wt Readings from Last 3 Encounters:   04/09/20 290 lb (131.5 kg)   03/11/20 291 lb 2 oz (132.1 kg)   01/09/20 (!) 313 lb (142 kg)     Vitals:    04/09/20 1110   BP: 116/74   Pulse: 82   Weight: 290 lb (131.5 kg)

## 2020-04-09 NOTE — PATIENT INSTRUCTIONS
heart-healthy diet is one that limits sodium , certain types of fat , and cholesterol . This type of diet is recommended for:   People with any form of cardiovascular disease (eg, coronary heart disease , peripheral vascular disease , previous heart attack , previous stroke )   People with risk factors for cardiovascular disease, such as high blood pressure , high cholesterol , or diabetes   Anyone who wants to lower their risk of developing cardiovascular disease   Sodium    Sodium is a mineral found in many foods. In general, most people consume much more sodium than they need. Diets high in sodium can increase blood pressure and lead to edema (water retention). On a heart-healthy diet, you should consume no more than 2,300 mg (milligrams) of sodium per dayabout the amount in one teaspoon of table salt. The foods highest in sodium include table salt (about 50% sodium), processed foods, convenience foods, and preserved foods. Cholesterol    Cholesterol is a fat-like, waxy substance in your blood. Our bodies make some cholesterol. It is also found in animal products, with the highest amounts in fatty meat, egg yolks, whole milk, cheese, shellfish, and organ meats. On a heart-healthy diet, you should limit your cholesterol intake to less than 200 mg per day. It is normal and important to have some cholesterol in your bloodstream. But too much cholesterol can cause plaque to build up within your arteries, which can eventually lead to a heart attack or stroke. The two types of cholesterol that are most commonly referred to are:   Low-density lipoprotein (LDL) cholesterol  Also known as bad cholesterol, this is the cholesterol that tends to build up along your arteries. Bad cholesterol levels are increased by eating fats that are saturated or hydrogenated. Optimal level of this cholesterol is less than 100. Over 130 starts to get risky for heart disease.    High-density lipoprotein (HDL) cholesterol  Also known as and cholesterol amounts. For products low in sodium, look for sodium free, very low sodium, low sodium, no added salt, and unsalted   Skip the salt when cooking or at the table; if food needs more flavor, get creative and try out different herbs and spices. Garlic and onion also add substantial flavor to foods. Trim any visible fat off meat and poultry before cooking, and drain the fat off after mixon. Use cooking methods that require little or no added fat, such as grilling, boiling, baking, poaching, broiling, roasting, steaming, stir-frying, and sauting. Avoid fast food and convenience food. They tend to be high in saturated and trans fat and have a lot of added salt. Talk to a registered dietitian for individualized diet advice. Last Reviewed: March 2011 Sondra Hsieh MS, MPH, RD   Updated: 3/29/2011   ·     Heart-Healthy Diet   Sodium, Fat, and Cholesterol Controlled Diet       What Is a Heart Healthy Diet? A heart-healthy diet is one that limits sodium , certain types of fat , and cholesterol . This type of diet is recommended for:   People with any form of cardiovascular disease (eg, coronary heart disease , peripheral vascular disease , previous heart attack , previous stroke )   People with risk factors for cardiovascular disease, such as high blood pressure , high cholesterol , or diabetes   Anyone who wants to lower their risk of developing cardiovascular disease   Sodium    Sodium is a mineral found in many foods. In general, most people consume much more sodium than they need. Diets high in sodium can increase blood pressure and lead to edema (water retention). On a heart-healthy diet, you should consume no more than 2,300 mg (milligrams) of sodium per dayabout the amount in one teaspoon of table salt. The foods highest in sodium include table salt (about 50% sodium), processed foods, convenience foods, and preserved foods.    Cholesterol    Cholesterol is a fat-like, waxy and LDL cholesterol   Omega-3 fatty acids  particularly those found in fatty fish (such as salmon, trout, tuna, mackerel, herring, and sardines); can decrease risk of arrhythmias, decrease triglyceride levels, and slightly lower blood pressure   The fats that you want to limit are:   Saturated fat  found in animal products, many fast foods, and a few vegetables; increases total blood cholesterol, including LDL levels   Animal fats that are saturated include: butter, lard, whole-milk dairy products, meat fat, and poultry skin   Vegetable fats that are saturated include: hydrogenated shortening, palm oil, coconut oil, cocoa butter   Hydrogenated or trans fat  found in margarine and vegetable shortening, most shelf stable snack foods, and fried foods; increases LDL and decreases HDL     It is generally recommended that you limit your total fat for the day to less than 30% of your total calories. If you follow an 1800-calorie heart healthy diet, for example, this would mean 60 grams of fat or less per day. Saturated fat and trans fat in your diet raises your blood cholesterol the most, much more than dietary cholesterol does. For this reason, on a heart-healthy diet, less than 7% of your calories should come from saturated fat and ideally 0% from trans fat. On an 1800-calorie diet, this translates into less than 14 grams of saturated fat per day, leaving 46 grams of fat to come from mono- and polyunsaturated fats.    Food Choices on a Heart Healthy Diet   Food Category   Foods Recommended   Foods to Avoid   Grains   Breads and rolls without salted tops Most dry and cooked cereals Unsalted crackers and breadsticks Low-sodium or homemade breadcrumbs or stuffing All rice and pastas   Breads, rolls, and crackers with salted tops High-fat baked goods (eg, muffins, donuts, pastries) Quick breads, self-rising flour, and biscuit mixes Regular bread crumbs Instant hot cereals Commercially prepared rice, pasta, or stuffing mixes Salted snack foods Canned olives Meat tenderizers, seasoning salt, and most flavored vinegars   Beverages   Low-sodium carbonated beverages Tea and coffee in moderation Soy milk   Commercially softened water   Suggestions   Make whole grains, fruits, and vegetables the base of your diet. Choose heart-healthy fats such as canola, olive, and flaxseed oil, and foods high in heart-healthy fats, such as nuts, seeds, soybeans, tofu, and fish. Eat fish at least twice per week; the fish highest in omega-3 fatty acids and lowest in mercury include salmon, herring, mackerel, sardines, and canned chunk light tuna. If you eat fish less than twice per week or have high triglycerides, talk to your doctor about taking fish oil supplements. Read food labels. For products low in fat and cholesterol, look for fat free, low-fat, cholesterol free, saturated fat free, and trans fat freeAlso scan the Nutrition Facts Label, which lists saturated fat, trans fat, and cholesterol amounts. For products low in sodium, look for sodium free, very low sodium, low sodium, no added salt, and unsalted   Skip the salt when cooking or at the table; if food needs more flavor, get creative and try out different herbs and spices. Garlic and onion also add substantial flavor to foods. Trim any visible fat off meat and poultry before cooking, and drain the fat off after mixon. Use cooking methods that require little or no added fat, such as grilling, boiling, baking, poaching, broiling, roasting, steaming, stir-frying, and sauting. Avoid fast food and convenience food. They tend to be high in saturated and trans fat and have a lot of added salt. Talk to a registered dietitian for individualized diet advice.       Last Reviewed: March 2011 Ernestina Henry MS, MPH, RD   Updated: 3/29/2011   ·   Patient information: Weight loss treatments    INTRODUCTION -- Obesity is a major international problem, and Americans are among the heaviest result, you may eat more cake. A positive thought for the same event could be, \"Well, I ate cake when it was not on my plan. Now I should do something to get back on track. \" A positive approach is much more likely to be successful than a negative one. Reduce stress -- Although stress is a part of everyday life, it can trigger uncontrolled eating in some people. It is important to find a way to get through these difficult times without eating or by eating low-calorie food, like raw vegetables. It may be helpful to imagine a relaxing place that allows you to temporarily escape from stress. With deep breaths and closed eyes, you can imagine this relaxing place for a few minutes. Self-help programs -- Self-help programs like [employer]®, Overeaters Anonymous®, and [employer] [place] [employer]© work for some people. As with all weight loss programs, you are most likely to be successful with these plans if you make long-term changes in how you eat. CHOOSING A DIET -- A calorie is a unit of energy found in food. Your body needs calories to function. The goal of any diet is to burn up more calories than you eat. How quickly you lose weight depends upon several factors, such as your age, gender, and starting weight. Older people have a slower metabolism than young people, so they lose weight more slowly. Men lose more weight than women of similar height and weight when dieting because they use more energy. People who are extremely overweight lose weight more quickly than those who are only mildly overweight. Try not to drink alcohol or drinks with added sugar, and most sweets (candy, cakes, cookies), since they rarely contain important nutrients. Portion-controlled diets -- One simple way to diet is to buy packaged foods, like frozen low-calorie meals or meal-replacement canned drinks.  A typical meal plan for one day may include:  A meal-replacement drink or breakfast bar for breakfast   A meal-replacement drink or a frozen low-calorie (250 to 350 calories) meal for lunch   A frozen low-calorie meal or other prepackaged, calorie-controlled meal, along with extra vegetables for dinner  This would give you 1000 to 1500 calories per day. Low-fat diet -- To reduce the amount of fat in your diet, you can:  Eat low-fat foods. Low-fat foods are those that contain less than 30 percent of calories from fat. Fat is listed on the food facts label (figure 1). Count fat grams. For a 1500 calorie diet, this would mean about 45 g or fewer of fat per day. Low-carbohydrate diet -- Low- and very-low-carbohydrate diets (eg, Atkins diet, [employer]) have become popular ways to lose weight quickly. With a very-low-carbohydrate diet, you eat between 0 and 60 grams of carbohydrates per day (a standard diet contains 200 to 300 grams of carbohydrates)   With a low-carbohydrate diet, you eat between 60 and 130 grams of carbohydrates per day  Carbohydrates are found in fruits, vegetables, and grains (including breads, rice, pasta, and cereal), alcoholic beverages, and in dairy products. Meat and fish do not contain carbohydrates. Side effects of very-low-carbohydrate diets can include constipation, headache, bad breath, muscle cramps, diarrhea, and weakness. Mediterranean diet -- The term \"Mediterranean diet\" refers to a way of eating that is common in olive[place]growing regions around the [facility]. Although there is some variation in Mediterranean diets, there are some similarities. Most Mediterranean diets include:  A high level of monounsaturated fats (from olive or canola oil, walnuts, pecans, almonds) and a low level of saturated fats (from butter)   A high amount of vegetables, fruits, legumes, and grains (7 to 10 servings of fruits and vegetables per day)   A moderate amount of milk and dairy products, mostly in the form of cheese. Use low-fat dairy products (skim milk, fat-free yogurt, low-fat cheese). leakage of stool, or oily stools. These problems are more likely when you take orlistat with a high-fat meal (if more than 30 percent of calories in the meal are from fat). Side effects usually improve as you learn to avoid high-fat foods. Severe liver injury has been reported rarely in patients taking orlistat, but it is not known if orlistat caused the liver problems. Diet supplements -- Diet supplements are widely used by people who are trying to lose weight, although the safety and efficacy of these supplements are often unproven. A few of the more common diet supplements are discussed below; none of these are recommended because they have not been studied carefully, and there is no proof they are safe or effective. Chitosan and wheat dextrin are ineffective for weight loss, and their use is not recommended. Ephedra, a compound related to ephedrine, is no longer available in the [place] due to safety concerns. Many nonprescription diet pills previously contained ephedra. Although some studies have shown that ephedra helps with weight loss, there can be serious side effects (psychiatric symptoms, palpitations, and stomach upset), including death. There are not enough data about the safety and efficacy of chromium, ginseng, glucomannan, green tea, hydroxycitric acid, L carnitine, psyllium, pyruvate supplements, Laurie wort, and conjugated linoleic acid. Two supplements from [facility], [address] [place] (also known as the Sonny Galvan 15 pill) and Herbathin dietary supplement, have been shown to contain prescription drugs. Hoodia gordonii is a dietary supplement derived from a plant in [place]. It is not recommended because there is no proof that it is safe or effective. Bitter orange (Citrus aurantium) can increase your heart rate and blood pressure and is not recommended.   SHOULD I HAVE SURGERY TO LOSE WEIGHT? -- Weight loss surgery is recommended ONLY for people with one of the banding (LAGB), or lap banding, is a surgery that uses an adjustable band around the opening to the stomach (figure 1). This reduces the amount of food that you can eat at one time. Lap banding is done through small incisions, with a laparoscope. The band can be adjusted after surgery, allowing you to eat more or less food. Adjustments to the size and tightness of the band are made by using a needle to add or remove fluid from a port (a small container under the skin that is connected to the band). Adding fluid to the band makes it tighter which restricts the amount of food you can eat and may help you to lose more weight. Lap banding is a popular choice because it is relatively simple to perform, can be adjusted or removed, and has a low risk of serious complications immediately after surgery. However, weight loss with the lap band depends on your ability to follow the program closely. You will need to prepare nutritious meals that [facility] with\" the band, not against it. For example, the lap band will not work well if you eat or drink a large amount of liquid calories (like ice cream). The band will not help you to feel full when you eat/drink liquid calories. Weight loss ranges from 45 to 75 percent after two years. As an example, a person who is 120 pounds overweight could expect to lose approximately 54 to 90 pounds in the two years after lap banding. Gastric bypass -- Jody-en-Y gastric bypass, also called gastric bypass, helps you to lose weight by reducing the amount of food you can eat and reducing the number of calories and nutrients you absorb from the food you eat. To perform gastric bypass, a surgeon creates a small stomach pouch by dividing the stomach and attaching it to the small intestine. This helps you to lose weight in two ways: The smaller stomach can hold less food than before surgery. This causes you to feel full after eating a very small amount of food or liquid.  Over time, the pouch might problems you had before surgery. Some of the more common early surgical complications (one to six weeks after surgery) include:  Bleeding   Infection   Blockage or tear in the bowels   Need for further surgery  Important medical complications after surgery can include blood clots in the legs or lungs, heart attack, pneumonia, and urinary tract infection. Complications are less likely when surgery is performed in centers that are experienced in weight loss surgery. In general, centers with experience in weight loss surgery have:  Board-certified doctors and surgeons   A team of support staff (dietitians, counselors, nurses)   Long-term follow-up after surgery   Hospital staff experienced with the care of weight loss patients. This includes nurses who are trained in the care of patients immediately after surgery and anesthesiologists who are experienced in caring for the morbidly obese. EFFECTIVENESS OF WEIGHT LOSS SURGERY -- The goal of weight loss surgery is to reduce the risk of illness or death associated with obesity. Weight loss surgery can also help you to feel and look better, reduce the amount of money you spend on medicines, and cut down on sick days. As an example, weight loss surgery can improve health problems related to obesity (diabetes, high blood pressure, high cholesterol, sleep apnea) to the point that you need less or no medicine. Finally, weight loss surgery might reduce your risk of developing heart disease, cancer, and certain infections. AFTER WEIGHT LOSS SURGERY -- You will need to stay in the hospital until your team feels that it is safe for you to leave (on average, one to three days). Do not drive if you are taking prescription pain medicine. Begin exercising as soon as possible once you have healed; most weight loss centers will design an exercise program for you. Once you are home, it is important to eat and drink exactly what your doctor and dietitian recommend.  You will see your doctor, nurse, and dietitian on a regular basis after surgery to monitor your health, diet, and weight loss. You will be able to slowly increase how much you eat over time, although it will always be important to:  Eat small, frequent meals and not skip meals   Chew your food slowly and completely   Avoid eating while \"distracted\" (such as eating while watching TV)   Stop eating when you feel full   Drink liquids at least 30 minutes before or after eating   Avoid foods high in fat or sugar   Take vitamin supplements, as recommended  It can take several months to learn to listen to your body so that you know when you are hungry and when you are full. You may dislike foods you previously loved, and you may begin to prefer new foods. This can be a frustrating process for some people, so talk to your dietitian if you are having trouble. It usually takes between one and two years to lose weight after surgery. After reaching their goal weight, some people have plastic surgery (called \"body contouring\") to remove excess skin from the body, particularly in the abdominal area. Before you decide to have weight loss surgery, you must commit to staying healthy for life. This includes following up with your healthcare team, exercising most days of the week, and eating a sensible diet every day. It can be difficult to develop new eating and exercise habits after weight loss surgery, and you will have to work hard to stick to your goals. Recovering from surgery and losing weight can be stressful and emotional, and it is important to have the support of family and friends. Working with a , therapist, or support group can help you through the ups and downs. WHERE TO GET MORE INFORMATION -- Your healthcare provider is the best source of information for questions and concerns related to your medical problem.   This article will be updated as needed every four months on our Web site

## 2020-04-15 ENCOUNTER — INFUSION (OUTPATIENT)
Dept: ONCOLOGY | Facility: CLINIC | Age: 45
End: 2020-04-15

## 2020-04-15 DIAGNOSIS — Z45.2 ENCOUNTER FOR CARE RELATED TO PORT-A-CATH: Primary | ICD-10-CM

## 2020-04-15 RX ORDER — SODIUM CHLORIDE 0.9 % (FLUSH) 0.9 %
10 SYRINGE (ML) INJECTION AS NEEDED
Status: DISCONTINUED | OUTPATIENT
Start: 2020-04-15 | End: 2020-04-15 | Stop reason: HOSPADM

## 2020-04-15 RX ORDER — HEPARIN SODIUM (PORCINE) LOCK FLUSH IV SOLN 100 UNIT/ML 100 UNIT/ML
500 SOLUTION INTRAVENOUS AS NEEDED
Status: CANCELLED | OUTPATIENT
Start: 2020-04-15

## 2020-04-15 RX ORDER — HEPARIN SODIUM (PORCINE) LOCK FLUSH IV SOLN 100 UNIT/ML 100 UNIT/ML
500 SOLUTION INTRAVENOUS AS NEEDED
Status: DISCONTINUED | OUTPATIENT
Start: 2020-04-15 | End: 2020-04-15 | Stop reason: HOSPADM

## 2020-04-15 RX ORDER — SODIUM CHLORIDE 0.9 % (FLUSH) 0.9 %
10 SYRINGE (ML) INJECTION AS NEEDED
Status: CANCELLED | OUTPATIENT
Start: 2020-04-15

## 2020-04-15 RX ADMIN — Medication 10 ML: at 11:43

## 2020-04-15 RX ADMIN — HEPARIN SODIUM (PORCINE) LOCK FLUSH IV SOLN 100 UNIT/ML 500 UNITS: 100 SOLUTION at 11:43

## 2020-04-27 RX ORDER — OXYCODONE HYDROCHLORIDE 5 MG/1
5 TABLET ORAL 3 TIMES DAILY PRN
Qty: 90 TABLET | Refills: 0 | Status: SHIPPED | OUTPATIENT
Start: 2020-05-07 | End: 2020-05-14 | Stop reason: SDUPTHER

## 2020-05-01 ENCOUNTER — OFFICE VISIT (OUTPATIENT)
Dept: ONCOLOGY | Facility: CLINIC | Age: 45
End: 2020-05-01

## 2020-05-01 ENCOUNTER — TELEPHONE (OUTPATIENT)
Dept: ONCOLOGY | Facility: CLINIC | Age: 45
End: 2020-05-01

## 2020-05-01 ENCOUNTER — LAB (OUTPATIENT)
Dept: LAB | Facility: HOSPITAL | Age: 45
End: 2020-05-01

## 2020-05-01 VITALS
HEIGHT: 65 IN | WEIGHT: 293 LBS | TEMPERATURE: 96.4 F | HEART RATE: 86 BPM | RESPIRATION RATE: 16 BRPM | SYSTOLIC BLOOD PRESSURE: 128 MMHG | BODY MASS INDEX: 48.82 KG/M2 | OXYGEN SATURATION: 98 % | DIASTOLIC BLOOD PRESSURE: 82 MMHG

## 2020-05-01 DIAGNOSIS — D50.8 IRON DEFICIENCY ANEMIA SECONDARY TO INADEQUATE DIETARY IRON INTAKE: Primary | ICD-10-CM

## 2020-05-01 DIAGNOSIS — M43.22 CERVICAL VERTEBRAL FUSION: ICD-10-CM

## 2020-05-01 DIAGNOSIS — K90.9 MALABSORPTION OF IRON: Primary | ICD-10-CM

## 2020-05-01 DIAGNOSIS — D50.8 IRON DEFICIENCY ANEMIA SECONDARY TO INADEQUATE DIETARY IRON INTAKE: ICD-10-CM

## 2020-05-01 LAB
ALBUMIN SERPL-MCNC: 4.5 G/DL (ref 3.5–5.2)
ALBUMIN/GLOB SERPL: 1.6 G/DL
ALP SERPL-CCNC: 85 U/L (ref 39–117)
ALT SERPL W P-5'-P-CCNC: 27 U/L (ref 1–33)
ANION GAP SERPL CALCULATED.3IONS-SCNC: 12 MMOL/L (ref 5–15)
AST SERPL-CCNC: 31 U/L (ref 1–32)
BASOPHILS # BLD AUTO: 0.07 10*3/MM3 (ref 0–0.2)
BASOPHILS NFR BLD AUTO: 0.7 % (ref 0–1.5)
BILIRUB SERPL-MCNC: 0.7 MG/DL (ref 0.2–1.2)
BUN BLD-MCNC: 12 MG/DL (ref 6–20)
BUN/CREAT SERPL: 18.8 (ref 7–25)
CALCIUM SPEC-SCNC: 10 MG/DL (ref 8.6–10.5)
CHLORIDE SERPL-SCNC: 97 MMOL/L (ref 98–107)
CO2 SERPL-SCNC: 29 MMOL/L (ref 22–29)
CREAT BLD-MCNC: 0.64 MG/DL (ref 0.57–1)
DEPRECATED RDW RBC AUTO: 54.7 FL (ref 37–54)
EOSINOPHIL # BLD AUTO: 0.13 10*3/MM3 (ref 0–0.4)
EOSINOPHIL NFR BLD AUTO: 1.2 % (ref 0.3–6.2)
ERYTHROCYTE [DISTWIDTH] IN BLOOD BY AUTOMATED COUNT: 16.8 % (ref 12.3–15.4)
FERRITIN SERPL-MCNC: 1461 NG/ML (ref 13–150)
GFR SERPL CREATININE-BSD FRML MDRD: 100 ML/MIN/1.73
GLOBULIN UR ELPH-MCNC: 2.8 GM/DL
GLUCOSE BLD-MCNC: 100 MG/DL (ref 65–99)
HCT VFR BLD AUTO: 33.2 % (ref 34–46.6)
HGB BLD-MCNC: 11.6 G/DL (ref 12–15.9)
HOLD SPECIMEN: NORMAL
HOLD SPECIMEN: NORMAL
IMM GRANULOCYTES # BLD AUTO: 0.11 10*3/MM3 (ref 0–0.05)
IMM GRANULOCYTES NFR BLD AUTO: 1 % (ref 0–0.5)
IRON 24H UR-MRATE: 74 MCG/DL (ref 37–145)
IRON SATN MFR SERPL: 28 % (ref 20–50)
LYMPHOCYTES # BLD AUTO: 2.05 10*3/MM3 (ref 0.7–3.1)
LYMPHOCYTES NFR BLD AUTO: 19.5 % (ref 19.6–45.3)
MCH RBC QN AUTO: 31 PG (ref 26.6–33)
MCHC RBC AUTO-ENTMCNC: 34.9 G/DL (ref 31.5–35.7)
MCV RBC AUTO: 88.8 FL (ref 79–97)
MONOCYTES # BLD AUTO: 0.55 10*3/MM3 (ref 0.1–0.9)
MONOCYTES NFR BLD AUTO: 5.2 % (ref 5–12)
NEUTROPHILS # BLD AUTO: 7.6 10*3/MM3 (ref 1.7–7)
NEUTROPHILS NFR BLD AUTO: 72.4 % (ref 42.7–76)
NRBC BLD AUTO-RTO: 0 /100 WBC (ref 0–0.2)
PLATELET # BLD AUTO: 285 10*3/MM3 (ref 140–450)
PMV BLD AUTO: 9 FL (ref 6–12)
POTASSIUM BLD-SCNC: 4.3 MMOL/L (ref 3.5–5.2)
PROT SERPL-MCNC: 7.3 G/DL (ref 6–8.5)
RBC # BLD AUTO: 3.74 10*6/MM3 (ref 3.77–5.28)
SODIUM BLD-SCNC: 138 MMOL/L (ref 136–145)
TIBC SERPL-MCNC: 265 MCG/DL (ref 298–536)
TRANSFERRIN SERPL-MCNC: 178 MG/DL (ref 200–360)
WBC NRBC COR # BLD: 10.51 10*3/MM3 (ref 3.4–10.8)

## 2020-05-01 PROCEDURE — 85025 COMPLETE CBC W/AUTO DIFF WBC: CPT

## 2020-05-01 PROCEDURE — 80053 COMPREHEN METABOLIC PANEL: CPT

## 2020-05-01 PROCEDURE — 36415 COLL VENOUS BLD VENIPUNCTURE: CPT

## 2020-05-01 PROCEDURE — 99214 OFFICE O/P EST MOD 30 MIN: CPT | Performed by: INTERNAL MEDICINE

## 2020-05-01 PROCEDURE — 82728 ASSAY OF FERRITIN: CPT

## 2020-05-01 PROCEDURE — 83540 ASSAY OF IRON: CPT

## 2020-05-01 PROCEDURE — 84466 ASSAY OF TRANSFERRIN: CPT

## 2020-05-01 RX ORDER — MELOXICAM 15 MG/1
15 TABLET ORAL DAILY
COMMUNITY
End: 2021-02-02

## 2020-05-01 NOTE — PROGRESS NOTES
[facility]  HEMATOLOGY & ONCOLOGY    Cancer Staging Information:  No matching staging information was found for the patient.      Subjective     VISIT DIAGNOSIS:   No diagnosis found.    REASON FOR VISIT:     Chief Complaint   Patient presents with   • MOY     Here for f/u        HEMATOLOGY / ONCOLOGY HISTORY:    No history exists.           INTERVAL HISTORY  Patient ID: Naomi Bhatia is a 44 y.o. year old female with her obesity, anemia status post Venofer infusions, appreciate to follow-up.   -- Underwent gastric sleeve on 12/20/2017 doing well.  --had right hip sx, it was not replaced. She has to wait 4 weeks and re-eval for the possibility of right hip replacement  --her mood is a little down due to her mother is admitted for pneumonia and she has bone cancer.  --6/26/18: since the lat time she was seen, her mom passed away an she broke he hip. She will be getting total hip replacement.  5/1/2020: here for f/u. Her Dad is still in Hospice, not worse.  - Does not f/u with bariatric surgeon. She is not on any vitamins. denies sob, cp, dizziness, n/v, abdominal pain, dizziness, focal deficit.         Past Medical History:   Past Medical History:   Diagnosis Date   • Anemia    • Anxiety    • Arthritis    • Asthma    • Back pain 03/14/2016    with left sided radiculopathy    • DDD (degenerative disc disease), lumbar     Dr. Stuart Zavaleta for pain manangement   • Depression    • Encounter for care related to Port-a-Cath 1/31/2020   • Fatigue    • Fibromyalgia    • GERD (gastroesophageal reflux disease)    • Headache    • History of blood transfusion    • Hypertension    • Iron deficiency anemia 9/12/2017   • Iron deficiency anemia secondary to inadequate dietary iron intake 9/12/2017   • Joint pain    • Obesity    • RLS (restless legs syndrome)    • Sleep apnea     positive sleep study; titration study in October     Past Surgical History:   Past Surgical History:   Procedure Laterality Date   •  ANKLE SURGERY      Right    • APPENDECTOMY  2015   • BACK SURGERY     • CERVICAL SPINE SURGERY  2015   • CHOLECYSTECTOMY      ;lap   • COLONOSCOPY  2017    normal; do not return until age of 49 Dr. Gus Valentnie   • GASTRIC SLEEVE LAPAROSCOPIC N/A 2017    Procedure: GASTRIC SLEEVE LAPAROSCOPIC;  Surgeon: Yifan Cordero MD;  Location: [facility];  Service:    • HIP ARTHROPLASTY      Right  2019   • HIP SURGERY  1987    right    • INSERTION CENTRAL VENOUS ACCESS DEVICE W/ SUBCUTANEOUS PORT  2017    Dr Anel Gamboa (Smart Port-Power injectable Port) Cat NO#[id number] Lot [id number]    • MOUTH SURGERY     • NECK SURGERY     • TOOTH EXTRACTION     • UPPER GASTROINTESTINAL ENDOSCOPY  2017    Dr. Gus Valentine, negative for h.pylori, negative for Salomon's   • VAGINAL HYSTERECTOMY SALPINGO OOPHORECTOMY  2008    Partial and then had another surgery to remove the rest     Social History:   Social History     Socioeconomic History   • Marital status:      Spouse name: Not on file   • Number of children: Not on file   • Years of education: Not on file   • Highest education level: Not on file   Tobacco Use   • Smoking status: Former Smoker     Packs/day: 1.00     Years: 25.00     Pack years: 25.00     Types: Cigarettes     Last attempt to quit:      Years since quittin.3   • Smokeless tobacco: Never Used   Substance and Sexual Activity   • Alcohol use: Yes     Comment: rarely    • Drug use: No     Types: Codeine     Comment: Codeine in Prescribed Medications only    • Sexual activity: Defer     Birth control/protection: Surgical     Family History:   Family History   Problem Relation Age of Onset   • Diabetes Mother    • Hypertension Mother    • Coronary artery disease Mother    • Cancer Mother    • Cancer Father    • Hypertension Father    • Heart disease Father    • Stroke Father    • Hypertension Sister    • Obesity Sister    • Cancer Brother    • Diabetes Brother    •  Diabetes Maternal Grandmother    • Stroke Maternal Grandmother        Review of Systems   Constitutional: Negative.    HENT: Negative.    Eyes: Negative.    Respiratory: Positive for apnea. Negative for cough, choking and shortness of breath.    Cardiovascular: Negative.    Gastrointestinal: Negative for abdominal distention, abdominal pain, blood in stool, constipation, diarrhea, nausea and vomiting.   Genitourinary: Negative.    Musculoskeletal: Positive for back pain.        Right hip pain   Skin: Negative.    Allergic/Immunologic: Negative.    Neurological: Negative.    Hematological: Negative.    Psychiatric/Behavioral: Negative.         Performance Status:  Asymptomatic    Medications:    Current Outpatient Medications   Medication Sig Dispense Refill   • acyclovir (ZOVIRAX) 800 MG tablet Take 800 mg by mouth Daily.     • ALBUTEROL IN Inhale 1 puff As Needed.     • Calcium Citrate-Vitamin D (CALCIUM CITRATE + D3 PO) Take  by mouth Daily.     • carbidopa-levodopa (SINEMET)  MG per tablet Take 1 tablet by mouth Daily.     • Cobalamin Combinations (FOLTRATE PO) vitamin B12-folic acid   daily     • CRANBERRY PO Take  by mouth Daily.     • Cyanocobalamin (VITAMIN B-12 CR PO) Take  by mouth Daily.     • Cyclobenzaprine HCl (FLEXERIL PO) Take 10 mg by mouth Daily As Needed.     • levocetirizine (XYZAL) 5 MG tablet TK 1 T PO QPM  0   • LYRICA 150 MG capsule TK 1 C PO BID  2   • meloxicam (MOBIC) 15 MG tablet Take 15 mg by mouth Daily.     • metFORMIN (GLUCOPHAGE) 1000 MG tablet Take 1,000 mg by mouth.     • metoprolol succinate XL (TOPROL XL) 50 MG 24 hr tablet Take 50 mg by mouth 2 (Two) Times a Day.     • Multiple Vitamin (MULTI VITAMIN PO) Take  by mouth Daily.     • nortriptyline (PAMELOR) 25 MG capsule 2 tablets at night as needed  3   • omeprazole (priLOSEC) 20 MG capsule TK ONE C PO QD FIRST THING IN THE MORNING ON  AN EMPTY STOMACH  3   • oxyCODONE (ROXICODONE) 10 MG tablet Take 10 mg by mouth 2 (Two)  "Times a Day As Needed.     • venlafaxine (EFFEXOR) 75 MG tablet three  times daily  11     No current facility-administered medications for this visit.      Facility-Administered Medications Ordered in Other Visits   Medication Dose Route Frequency Provider Last Rate Last Dose   • diphenhydrAMINE (BENADRYL) injection 50 mg  50 mg Intravenous PRN Pedro Clements MD       • famotidine (PEPCID) injection 20 mg  20 mg Intravenous PRN Pedro Clements MD       • hydrocortisone sodium succinate (Solu-CORTEF) injection 100 mg  100 mg Intravenous PRN Pedro Clements MD           ALLERGIES:    Allergies   Allergen Reactions   • Azithromycin GI Intolerance and Nausea And Vomiting     Severe Abdominal Pain   Severe abdominal pain    • Silver Rash     Redness, blisters  blister  Redness, blisters         Objective      Vitals:    05/01/20 1002   BP: 128/82   Pulse: 86   Resp: 16   Temp: 96.4 °F (35.8 °C)   TempSrc: Temporal   SpO2: 98%   Weight: 133 kg (293 lb 4.8 oz)   Height: 165.1 cm (65\")   PainSc:   6   PainLoc: Back         Current Status 5/1/2020   ECOG score 0         Physical Examremains the same  General Appearance: Obese. Patient is awake, alert, oriented and in no acute distress. Patient is welldeveloped, wellnourished, and appears stated age.  HEENT: Normocephalic. Sclerae clear, conjunctiva pink, extraocular movements intact, pupils, round, reactive to light and  accommodation. Mouth and throat are clear with moist oral mucosa.  NECK: Supple, no jugular venous distention, thyroid not enlarged.  LYMPH: No cervical, supraclavicular, axillary, or inguinal lymphadenopathy.  CHEST: Equal bilateral expansion, AP  diameter normal, resonant percussion note  LUNGS: Good air movement, no rales, rhonchi, rubs or wheezes with auscultation  CARDIO: Regular sinus rhythm, no murmurs, gallops or rubs.  ABDOMEN: Nondistended, soft, No tenderness, no guarding, no rebound, No hepatosplenomegaly. No " abdominal masses. Bowel sounds positive. No hernia  GENITALIA: Not examined.  BREASTS: Not examined.  MUSKEL: No joint swelling, decreased motion, or inflammation  EXTREMS: No edema, clubbing, cyanosis, No varicose veins.  NEURO: Grossly nonfocal, Gait is coordinated and smooth, Cognition is preserved.  SKIN: No rashes, no ecchymoses, no petechia.  PSYCH: Oriented to time, place and person. Memory is preserved. Mood and affect appear normal  RECENT LABS:  Lab on 05/01/2020   Component Date Value Ref Range Status   • WBC 05/01/2020 10.51  3.40 - 10.80 10*3/mm3 Final   • RBC 05/01/2020 3.74* 3.77 - 5.28 10*6/mm3 Final   • Hemoglobin 05/01/2020 11.6* 12.0 - 15.9 g/dL Final   • Hematocrit 05/01/2020 33.2* 34.0 - 46.6 % Final   • MCV 05/01/2020 88.8  79.0 - 97.0 fL Final   • MCH 05/01/2020 31.0  26.6 - 33.0 pg Final   • MCHC 05/01/2020 34.9  31.5 - 35.7 g/dL Final   • RDW 05/01/2020 16.8* 12.3 - 15.4 % Final   • RDW-SD 05/01/2020 54.7* 37.0 - 54.0 fl Final   • MPV 05/01/2020 9.0  6.0 - 12.0 fL Final   • Platelets 05/01/2020 285  140 - 450 10*3/mm3 Final   • Neutrophil % 05/01/2020 72.4  42.7 - 76.0 % Final   • Lymphocyte % 05/01/2020 19.5* 19.6 - 45.3 % Final   • Monocyte % 05/01/2020 5.2  5.0 - 12.0 % Final   • Eosinophil % 05/01/2020 1.2  0.3 - 6.2 % Final   • Basophil % 05/01/2020 0.7  0.0 - 1.5 % Final   • Immature Grans % 05/01/2020 1.0* 0.0 - 0.5 % Final   • Neutrophils, Absolute 05/01/2020 7.60* 1.70 - 7.00 10*3/mm3 Final   • Lymphocytes, Absolute 05/01/2020 2.05  0.70 - 3.10 10*3/mm3 Final   • Monocytes, Absolute 05/01/2020 0.55  0.10 - 0.90 10*3/mm3 Final   • Eosinophils, Absolute 05/01/2020 0.13  0.00 - 0.40 10*3/mm3 Final   • Basophils, Absolute 05/01/2020 0.07  0.00 - 0.20 10*3/mm3 Final   • Immature Grans, Absolute 05/01/2020 0.11* 0.00 - 0.05 10*3/mm3 Final   • nRBC 05/01/2020 0.0  0.0 - 0.2 /100 WBC Final       RADIOLOGY:  No results found.         Assessment/Plan  Naomi Bhatia is a 44 y.o. year  old female h/o obesity found to have anemia while being evaluated for bariatric surgery ,here for f/u that is stable.    Patient Active Problem List   Diagnosis   • Morbid obesity (CMS/HCC)   • Hypertension, well controlled   • Fatigue   • History of iron deficiency   • Vitamin D deficiency   • Iron deficiency anemia secondary to inadequate dietary iron intake   • Malabsorption of iron   • Obesity, Class III, BMI 40-49.9 (morbid obesity) (CMS/HCC)   • Status post laparoscopic sleeve gastrectomy   • Acute low back pain without sciatica   • Obstructive sleep apnea syndrome   • Abnormal hemoglobin (CMS/HCC)   • Anemia   • BRBPR (bright red blood per rectum)   • Carpal tunnel syndrome on both sides   • Cervical vertebral fusion   • Generalized abdominal pain   • Cobalamin deficiency   • DDD (degenerative disc disease), lumbar   • Drug-induced constipation   • Pain management   • Family history of esophageal cancer   • Family history of polyps in the colon   • Back pain with left-sided radiculopathy   • Chronic pain disorder   • Chronic pain disorder   • Foot pain   • Herniation of lumbar intervertebral disc with radiculopathy   • High risk medications (not anticoagulants) long-term use   • Other specified postprocedural states   • History of lumbar spinal fusion   • Hypertensive disorder   • Insomnia   • Malaise and fatigue   • Moderate episode of recurrent major depressive disorder (CMS/HCC)   • Mood disorder (CMS/HCC)   • Myofascial muscle pain   • RLS (restless legs syndrome)   • Morbid obesity (CMS/HCC)   • Folic acid deficiency   • Encounter for care related to Port-a-Cath          1.Anemia: Has had hysterectomy. Patient had recent colonoscopy and EGD negative for active bleed.   --Status post venofer infusion,  --labs reviewed with pt wbc 10.51, Hg 11.6, plt 285. Iron profile pending, will act accordingly  --rtc in 3months with cbc, cmp, iron profile, ferritin    2.  Obesity: s/p gastric sleeve doing well. She has  lost 50lbs    3. Leukocytosis: Suspect obesity/reactive, also obesity could cause elevation in wbc. Bone marrow with no dyspoietic changes and no circulating blasts.   --currently resolved with iron infusion    4. HTN: on metoprolol    5. Hip pain: s/p sx and screw removal, now broken right hip total replacement planned in future. She was told to lose 50lbs before the sx.    6. Chronic pain syn: on flexiril, lyrica  7. Gerd: on omeprazole  8. Depression: on effexor, nortriptyline  9. Psychosoc: her Dad is on hospice for CHF and that is a lot of stress for her.         Pedro Clements MD    5/1/2020    10:26

## 2020-05-01 NOTE — TELEPHONE ENCOUNTER
Called and instructed patient on iron saturation level on 28% and that she does not need any IV iron at this time.  Patient v/u.

## 2020-05-01 NOTE — TELEPHONE ENCOUNTER
----- Message from Pedro Clements MD sent at 5/1/2020 10:57 AM CDT -----  Please contact patient with result. Does not need iron therapy

## 2020-05-14 ENCOUNTER — HOSPITAL ENCOUNTER (OUTPATIENT)
Dept: PAIN MANAGEMENT | Age: 45
Discharge: HOME | End: 2020-05-14
Payer: MEDICARE

## 2020-05-14 VITALS
SYSTOLIC BLOOD PRESSURE: 141 MMHG | TEMPERATURE: 96.2 F | DIASTOLIC BLOOD PRESSURE: 89 MMHG | BODY MASS INDEX: 44.1 KG/M2 | HEIGHT: 68 IN | WEIGHT: 291 LBS | RESPIRATION RATE: 18 BRPM | HEART RATE: 87 BPM | OXYGEN SATURATION: 98 %

## 2020-05-14 PROCEDURE — 99213 OFFICE O/P EST LOW 20 MIN: CPT | Performed by: NURSE PRACTITIONER

## 2020-05-14 PROCEDURE — 99215 OFFICE O/P EST HI 40 MIN: CPT

## 2020-05-14 RX ORDER — PREGABALIN 100 MG/1
100 CAPSULE ORAL 2 TIMES DAILY
Qty: 60 CAPSULE | Refills: 2 | Status: SHIPPED | OUTPATIENT
Start: 2020-05-14 | End: 2020-08-18 | Stop reason: SDUPTHER

## 2020-05-14 RX ORDER — CYCLOBENZAPRINE HCL 10 MG
10 TABLET ORAL 2 TIMES DAILY PRN
Qty: 60 TABLET | Refills: 2 | Status: SHIPPED | OUTPATIENT
Start: 2020-05-14 | End: 2020-08-18 | Stop reason: SDUPTHER

## 2020-05-14 ASSESSMENT — PAIN DESCRIPTION - ORIENTATION: ORIENTATION: LOWER

## 2020-05-14 ASSESSMENT — ENCOUNTER SYMPTOMS
BACK PAIN: 1
CONSTIPATION: 0
BOWEL INCONTINENCE: 0

## 2020-05-14 ASSESSMENT — PAIN DESCRIPTION - PAIN TYPE: TYPE: CHRONIC PAIN

## 2020-05-14 ASSESSMENT — PAIN SCALES - GENERAL: PAINLEVEL_OUTOF10: 6

## 2020-05-14 ASSESSMENT — PAIN DESCRIPTION - LOCATION: LOCATION: BACK

## 2020-05-14 NOTE — H&P
needed for Muscle spasms 60 tablet 2    pregabalin (LYRICA) 100 MG capsule Take 1 capsule by mouth 2 times daily for 90 days. 60 capsule 2    oxyCODONE (ROXICODONE) 5 MG immediate release tablet Take 1 tablet by mouth 3 times daily as needed for Pain for up to 30 days. (may fill 5/7/20) 90 tablet 0    carbidopa-levodopa (SINEMET)  MG per tablet Take 1 tablet by mouth nightly 90 tablet 3    nortriptyline (PAMELOR) 25 MG capsule TAKE 1 TO 2 CAPSULES BY MOUTH EVERY NIGHT 180 capsule 3    metFORMIN (GLUCOPHAGE) 1000 MG tablet Take 1 tablet by mouth 2 times daily (with meals) 60 tablet 11    venlafaxine (EFFEXOR) 75 MG tablet TAKE 1 TABLET BY MOUTH EVERY MORNING AND 2 TABLETS BY MOUTH EVERY NIGHT AT BEDTIME 90 tablet 11    levocetirizine (XYZAL) 5 MG tablet Take 1 tablet by mouth nightly 90 tablet 3    Misc. Devices MISC 1 each by Does not apply route once as needed (TENS Unit) 1 Device 0    Tens Unit MISC by Does not apply route 1 each 0    omeprazole (PRILOSEC) 20 MG delayed release capsule Take 1 capsule by mouth Daily 90 capsule 3    fluticasone (FLONASE) 50 MCG/ACT nasal spray 1 spray by Each Nostril route daily (Patient not taking: Reported on 4/9/2020) 2 Bottle 1    metoprolol succinate (TOPROL XL) 50 MG extended release tablet Take 1 tablet by mouth 2 times daily 180 tablet 3    acyclovir (ZOVIRAX) 800 MG tablet Take 1 tablet by mouth daily 90 tablet 3    meloxicam (MOBIC) 15 MG tablet Take 1 tablet by mouth daily 90 tablet 3    triamcinolone (KENALOG) 0.1 % cream Apply topically 2 times daily.  (Patient not taking: Reported on 4/9/2020) 80 g 3    Calcium-Vitamin D 600-200 MG-UNIT TABS Take 1 tablet by mouth daily      albuterol sulfate HFA (PROVENTIL HFA) 108 (90 Base) MCG/ACT inhaler Inhale 2 puffs into the lungs every 6 hours as needed for Wheezing 1 Inhaler 3    polyethylene glycol (GLYCOLAX) powder Take 17 g by mouth daily 1 Bottle 1    ondansetron (ZOFRAN ODT) 4 MG disintegrating distension. Tenderness: There is no abdominal tenderness. Musculoskeletal:      Lumbar back: She exhibits decreased range of motion, tenderness, pain and spasm. Skin:     General: Skin is warm and dry. Neurological:      Mental Status: She is alert and oriented to person, place, and time. Cranial Nerves: No cranial nerve deficit. Psychiatric:         Behavior: Behavior normal.         Thought Content: Thought content normal.         Judgment: Judgment normal.         Diagnostics:    MRI exams received in the past 2 years:  No       When na                                              Where na       Imaging on chart: No         Imaging records requested: No     CT exam received during the last 12 months: No       When na                                              Where na       Imaging on chart: No         Imaging records requested: No     X-ray exam received during the last 12 months: Yes XR Right Hip/Pelvis       When 12/2019                                              Where [facility]       Imaging on chart: Yes         Imaging records requested: No     Nerve Conduction Study/EMG:  Yes       When 2015                                              Where [facility]       Imaging on chart: Yes         Imaging records requested: No     Physical therapy: Yes       When: 2020                                                      Where [address] [facility],[address]     Behavior Health No       When: na                                               Where  na     Labs  Yes       When: 1/2020                                             Where  [facility]    Most recent imaging, testing, and response to counseling and/or rehabilitation were reviewed with patient.   [x] Yes  [] No    LABS:     Lab Results   Component Value Date     08/27/2019    K 4.1 08/27/2019     08/27/2019    CO2 22 08/27/2019    BUN 15 08/27/2019    CREATININE 0.8 08/27/2019    GLUCOSE 109 08/27/2019    CALCIUM 9.9 08/27/2019        Lab Results   Component Value Date    WBC 9.2 08/27/2019    HGB 11.9 (L) 08/27/2019    HCT 35.7 (L) 08/27/2019    MCV 93.2 08/27/2019     08/27/2019       Assessment:                                                                                                                                        Principal Problem:    Back pain with left-sided radiculopathy  Active Problems:    Displacement of lumbar disc with radiculopathy    History of lumbar spinal fusion    Carpal tunnel syndrome on both sides    Chronic pain of both knees  Resolved Problems:    * No resolved hospital problems. *      PLAN:  1. Continue Flexeril myofascial pain and Lyrica bar radiculopathy with refill sent at the time of the appointment  2. Schedule LESI L2-L3 under fluoroscopy with Dr. Armin Winchester as patient has had good results from this in the past  3. It is okay with this provider if patient contacts PCP regarding something for anxiety (benzodiazepine) due to the stress of dying father who is on hospice. 4. Patient to follow-up post procedure or sooner if needed    Discussion: Discussed exam findings and plan of care with patient. Patient agreed with POC and questions were asked and answered. Activity: discussed exercise as beneficial to pain reduction, encouraged stretching exercise with a focus on torso strengthening. Goals:  Pain Management Goals of Therapy:   [] Resolution in pain  [x] Decrease in pain level  [x] Improvement in ADL's  [x] Increase in activities with less pain  [] Decrease in medication      Controlled substance monitoring:    [x] Discussed medication side effects, risk of tolerance and/or dependence, and/or alternative treatment  [] Discussed the detrimental effects of long term narcotic use in younger patients especially women of childbearing years.   [x] No signs and symptoms of potential drug abuse or diversion were identified  [] Signs of potential drug abuse or diversion were

## 2020-05-15 RX ORDER — OXYCODONE HYDROCHLORIDE 5 MG/1
5 TABLET ORAL 3 TIMES DAILY PRN
Qty: 90 TABLET | Refills: 0 | Status: SHIPPED | OUTPATIENT
Start: 2020-06-06 | End: 2020-07-08 | Stop reason: SDUPTHER

## 2020-05-22 ENCOUNTER — HOSPITAL ENCOUNTER (OUTPATIENT)
Dept: PAIN MANAGEMENT | Age: 45
Discharge: HOME | End: 2020-05-22
Payer: MEDICARE

## 2020-05-22 VITALS
DIASTOLIC BLOOD PRESSURE: 86 MMHG | OXYGEN SATURATION: 99 % | HEART RATE: 90 BPM | RESPIRATION RATE: 18 BRPM | SYSTOLIC BLOOD PRESSURE: 150 MMHG | TEMPERATURE: 98.6 F

## 2020-05-22 VITALS
HEART RATE: 81 BPM | OXYGEN SATURATION: 100 % | RESPIRATION RATE: 18 BRPM | SYSTOLIC BLOOD PRESSURE: 111 MMHG | DIASTOLIC BLOOD PRESSURE: 70 MMHG

## 2020-05-22 PROCEDURE — 2580000003 HC RX 258

## 2020-05-22 PROCEDURE — 62323 NJX INTERLAMINAR LMBR/SAC: CPT

## 2020-05-22 PROCEDURE — 6360000002 HC RX W HCPCS

## 2020-05-22 PROCEDURE — 3209999900 FLUORO FOR SURGICAL PROCEDURES

## 2020-05-22 PROCEDURE — 2500000003 HC RX 250 WO HCPCS

## 2020-05-22 RX ORDER — METHYLPREDNISOLONE ACETATE 80 MG/ML
INJECTION, SUSPENSION INTRA-ARTICULAR; INTRALESIONAL; INTRAMUSCULAR; SOFT TISSUE
Status: COMPLETED | OUTPATIENT
Start: 2020-05-22 | End: 2020-05-22

## 2020-05-22 RX ORDER — LIDOCAINE HYDROCHLORIDE 10 MG/ML
INJECTION, SOLUTION EPIDURAL; INFILTRATION; INTRACAUDAL; PERINEURAL
Status: COMPLETED | OUTPATIENT
Start: 2020-05-22 | End: 2020-05-22

## 2020-05-22 RX ORDER — SODIUM CHLORIDE 9 MG/ML
INJECTION INTRAVENOUS
Status: COMPLETED | OUTPATIENT
Start: 2020-05-22 | End: 2020-05-22

## 2020-05-22 RX ADMIN — SODIUM CHLORIDE 5 ML: 9 INJECTION INTRAVENOUS at 10:15

## 2020-05-22 RX ADMIN — METHYLPREDNISOLONE ACETATE 80 MG: 80 INJECTION, SUSPENSION INTRA-ARTICULAR; INTRALESIONAL; INTRAMUSCULAR; SOFT TISSUE at 10:16

## 2020-05-22 RX ADMIN — LIDOCAINE HYDROCHLORIDE 5 ML: 10 INJECTION, SOLUTION EPIDURAL; INFILTRATION; INTRACAUDAL; PERINEURAL at 10:15

## 2020-05-22 NOTE — INTERVAL H&P NOTE
Update History & Physical    The patient's History and Physical  was reviewed with the patient and I examined the patient. There was  NO CHANGE:[id number]}. The surgical site was confirmed by the patient and me. Plan: The risks, benefits, expected outcome, and alternative to the recommended procedure have been discussed with the patient. Patient understands and wants to proceed with the procedure.      Electronically signed by Miguel Olivarez on 5/22/2020 at 10:11 AM

## 2020-05-27 ENCOUNTER — TELEPHONE (OUTPATIENT)
Dept: PAIN MANAGEMENT | Age: 45
End: 2020-05-27

## 2020-05-28 RX ORDER — MELOXICAM 15 MG/1
15 TABLET ORAL DAILY
Qty: 90 TABLET | Refills: 3 | Status: SHIPPED | OUTPATIENT
Start: 2020-05-28 | End: 2021-02-25

## 2020-05-28 NOTE — TELEPHONE ENCOUNTER
Received fax from pharmacy requesting refill on pts medication(s). Pt was last seen in office on 1/9/2020  and has a follow up scheduled for Visit date not found. Will send request to  Dr. Stefani Means  for patient.      Requested Prescriptions     Pending Prescriptions Disp Refills    meloxicam (MOBIC) 15 MG tablet [Pharmacy Med Name: MELOXICAM 15MG TABLETS] 90 tablet 3     Sig: TAKE 1 TABLET BY MOUTH DAILY

## 2020-05-29 ENCOUNTER — APPOINTMENT (OUTPATIENT)
Dept: GENERAL RADIOLOGY | Age: 45
End: 2020-05-29
Payer: MEDICARE

## 2020-05-29 ENCOUNTER — HOSPITAL ENCOUNTER (EMERGENCY)
Age: 45
Discharge: HOME | End: 2020-05-29
Attending: EMERGENCY MEDICINE
Payer: MEDICARE

## 2020-05-29 VITALS
WEIGHT: 291 LBS | BODY MASS INDEX: 44.1 KG/M2 | HEIGHT: 68 IN | TEMPERATURE: 98.7 F | RESPIRATION RATE: 20 BRPM | DIASTOLIC BLOOD PRESSURE: 76 MMHG | SYSTOLIC BLOOD PRESSURE: 123 MMHG | HEART RATE: 78 BPM | OXYGEN SATURATION: 98 %

## 2020-05-29 PROCEDURE — 99283 EMERGENCY DEPT VISIT LOW MDM: CPT

## 2020-05-29 PROCEDURE — 73610 X-RAY EXAM OF ANKLE: CPT

## 2020-05-29 PROCEDURE — 73590 X-RAY EXAM OF LOWER LEG: CPT

## 2020-05-29 PROCEDURE — 73552 X-RAY EXAM OF FEMUR 2/>: CPT

## 2020-05-29 PROCEDURE — 73630 X-RAY EXAM OF FOOT: CPT

## 2020-05-29 PROCEDURE — 73502 X-RAY EXAM HIP UNI 2-3 VIEWS: CPT

## 2020-05-29 PROCEDURE — 73560 X-RAY EXAM OF KNEE 1 OR 2: CPT

## 2020-05-29 PROCEDURE — 29515 APPLICATION SHORT LEG SPLINT: CPT

## 2020-05-29 ASSESSMENT — ENCOUNTER SYMPTOMS
EYE PAIN: 0
ABDOMINAL PAIN: 0
BACK PAIN: 0
SHORTNESS OF BREATH: 0

## 2020-05-29 NOTE — ED PROVIDER NOTES
 Esophagitis     Fibromyalgia     Gastritis     Headache(784.0)     History of blood transfusion     Hypertension     Obesity     RLS (restless legs syndrome)     Sleep apnea     uses CPAP machine         SURGICAL HISTORY       Past Surgical History:   Procedure Laterality Date    ANKLE SURGERY Right     APPENDECTOMY  4/19/15    BACK SURGERY  2000    CERVICAL SPINE SURGERY N/A 9/1/15    CHOLECYSTECTOMY  1995    HIP SURGERY Right 1987    HIP SURGERY  03/28/2018    HYSTERECTOMY      partial 2008, still has ovaries and cervix    INSERTION / REMOVAL / REPLACEMENT VENOUS ACCESS CATHETER Left 11/7/2017    PORT INSERTION performed by Sabrina Alejandro MD at [address] [place] [address] LITHOTRIPSY  [address] [place] [address]      with hardware placed    [facility] COLONOSCOPY FLX DX W/COLLJ SPEC WHEN PFRMD N/A 5/2/2017    Dr Ashlyn Mccracken, 7 yr (age 47) recall    [facility] EGD TRANSORAL BIOPSY SINGLE/MULTIPLE N/A 5/2/2017    Dr ABHIJIT Baltazar-Gastritis/gastropathy    SALPINGO-OOPHORECTOMY      STOMACH SURGERY  12/20/2017    dr Peacock Plan Right 2019         CURRENT MEDICATIONS       Previous Medications    ACYCLOVIR (ZOVIRAX) 800 MG TABLET    Take 1 tablet by mouth daily    ALBUTEROL SULFATE HFA (PROVENTIL HFA) 108 (90 BASE) MCG/ACT INHALER    Inhale 2 puffs into the lungs every 6 hours as needed for Wheezing    CALCIUM-VITAMIN D 600-200 MG-UNIT TABS    Take 1 tablet by mouth daily    CARBIDOPA-LEVODOPA (SINEMET)  MG PER TABLET    Take 1 tablet by mouth nightly    CHOLECALCIFEROL (VITAMIN D3) 5000 UNITS TABS    Take 5,000 Units by mouth daily     CRANBERRY    Take 500 mg by mouth daily.     CYCLOBENZAPRINE (FLEXERIL) 10 MG TABLET    Take 1 tablet by mouth 2 times daily as needed for Muscle spasms    FLUTICASONE (FLONASE) 50 MCG/ACT NASAL SPRAY    1 spray by Each Nostril route daily    LEVOCETIRIZINE (XYZAL) 5 MG TABLET    Take 1 tablet by mouth nightly person, place, and time. Psychiatric:         Behavior: Behavior normal.         DIAGNOSTIC RESULTS     RADIOLOGY:   Non-plain film images such as CT, Ultrasound and MRI are read by the radiologist. [facility] images are visualized and preliminarily interpreted by the emergency physician with the below findings:      Interpretation per the Radiologist below, if available at the time of this note:    XR FOOT RIGHT (MIN 3 VIEWS)   Final Result   1. Transversely oriented acute nondisplaced fracture of the right   proximal fifth metatarsal.    2. Moderate calcaneal spurring. Arthritic change of the midfoot. Signed by Dr Bree Ayala on 5/29/2020 2:00 PM      XR ANKLE RIGHT (MIN 3 VIEWS)   Final Result   1. Acute fracture at the base of the fifth metatarsal, nondisplaced. 2. No evidence of acute ankle fracture. Old fracture of the lateral   malleolus with plate fixation and corticated screw fixation. 3. Calcaneal spurring and enthesopathy. 4. Soft tissue swelling of the lower leg, ankle and foot. Signed by Dr Liudmila Brito on 5/29/2020 1:57 PM      XR TIBIA FIBULA RIGHT (2 VIEWS)   Final Result   1. Old injuries of the right fibula with postoperative changes of the   distal fibula. No acute osseous pathology identified. Signed by Dr Bree Ayala on 5/29/2020 1:56 PM      XR KNEE RIGHT (1-2 VIEWS)   Final Result   Negative AP and lateral right knee   Signed by Dr Sheryle Sola on 5/29/2020 2:07 PM      XR FEMUR RIGHT (MIN 2 VIEWS)   Final Result   1. Right hip prosthesis appropriate alignment. No acute bony injury of   the right femur. Lucency at the tip of the femoral component of the   hip prosthesis could be postoperative or may indicate early hardware   loosening. Signed by Dr Bree Aayla on 5/29/2020 1:58 PM      XR HIP 2-3 VW W PELVIS RIGHT   Final Result   Right hip prosthesis is present and is intact.    Signed by Dr Sheryle Sola on 5/29/2020 1:56 PM            EMERGENCY DEPARTMENT

## 2020-06-01 ENCOUNTER — TELEPHONE (OUTPATIENT)
Dept: PRIMARY CARE CLINIC | Age: 45
End: 2020-06-01

## 2020-06-01 ENCOUNTER — PATIENT MESSAGE (OUTPATIENT)
Dept: PRIMARY CARE CLINIC | Age: 45
End: 2020-06-01

## 2020-06-01 NOTE — TELEPHONE ENCOUNTER
From: Sanjiv Duran  To: [address], DO  Sent: 6/1/2020 10:17 AM CDT  Subject: Visit Follow-Up Question    I'm so sorry to bother you again, but a knee walker isn't covered by Medicare or Medicaid. So, I guess we should change it to a wheelchair with the attachments that adjust up and down when I need to elevate my legs.

## 2020-06-04 ENCOUNTER — TELEPHONE (OUTPATIENT)
Dept: PAIN MANAGEMENT | Age: 45
End: 2020-06-04

## 2020-06-04 DIAGNOSIS — M54.16 CHRONIC LUMBAR RADICULOPATHY: ICD-10-CM

## 2020-06-15 ENCOUNTER — INFUSION (OUTPATIENT)
Dept: ONCOLOGY | Facility: CLINIC | Age: 45
End: 2020-06-15

## 2020-06-15 DIAGNOSIS — Z45.2 ENCOUNTER FOR CARE RELATED TO PORT-A-CATH: Primary | ICD-10-CM

## 2020-06-15 RX ORDER — HEPARIN SODIUM (PORCINE) LOCK FLUSH IV SOLN 100 UNIT/ML 100 UNIT/ML
500 SOLUTION INTRAVENOUS AS NEEDED
Status: CANCELLED | OUTPATIENT
Start: 2020-06-15

## 2020-06-15 RX ORDER — SODIUM CHLORIDE 0.9 % (FLUSH) 0.9 %
10 SYRINGE (ML) INJECTION AS NEEDED
Status: DISCONTINUED | OUTPATIENT
Start: 2020-06-15 | End: 2020-06-15 | Stop reason: HOSPADM

## 2020-06-15 RX ORDER — HEPARIN SODIUM (PORCINE) LOCK FLUSH IV SOLN 100 UNIT/ML 100 UNIT/ML
500 SOLUTION INTRAVENOUS AS NEEDED
Status: DISCONTINUED | OUTPATIENT
Start: 2020-06-15 | End: 2020-06-15 | Stop reason: HOSPADM

## 2020-06-15 RX ORDER — SODIUM CHLORIDE 0.9 % (FLUSH) 0.9 %
10 SYRINGE (ML) INJECTION AS NEEDED
Status: CANCELLED | OUTPATIENT
Start: 2020-06-15

## 2020-06-15 RX ADMIN — Medication 10 ML: at 11:27

## 2020-06-15 RX ADMIN — HEPARIN SODIUM (PORCINE) LOCK FLUSH IV SOLN 100 UNIT/ML 500 UNITS: 100 SOLUTION at 11:28

## 2020-06-22 ENCOUNTER — TELEMEDICINE (OUTPATIENT)
Dept: PRIMARY CARE CLINIC | Age: 45
End: 2020-06-22
Payer: MEDICARE

## 2020-06-22 PROCEDURE — 99213 OFFICE O/P EST LOW 20 MIN: CPT | Performed by: PEDIATRICS

## 2020-06-22 RX ORDER — HYDROXYZINE HYDROCHLORIDE 25 MG/1
25 TABLET, FILM COATED ORAL EVERY 8 HOURS PRN
Qty: 60 TABLET | Refills: 0 | Status: SHIPPED | OUTPATIENT
Start: 2020-06-22 | End: 2020-07-22

## 2020-06-22 RX ORDER — VENLAFAXINE HYDROCHLORIDE 150 MG/1
150 CAPSULE, EXTENDED RELEASE ORAL DAILY
Qty: 30 CAPSULE | Refills: 5 | Status: SHIPPED | OUTPATIENT
Start: 2020-06-22 | End: 2020-10-15

## 2020-06-22 ASSESSMENT — ENCOUNTER SYMPTOMS
DIARRHEA: 0
ABDOMINAL PAIN: 0
EYE DISCHARGE: 0
COUGH: 0
SINUS PRESSURE: 0
CONSTIPATION: 0
BACK PAIN: 0
GASTROINTESTINAL NEGATIVE: 1
NAUSEA: 0
SHORTNESS OF BREATH: 0
RESPIRATORY NEGATIVE: 1
VOMITING: 0
WHEEZING: 0
SORE THROAT: 0
EYES NEGATIVE: 1
ALLERGIC/IMMUNOLOGIC NEGATIVE: 1
CHEST TIGHTNESS: 0

## 2020-06-22 NOTE — PATIENT INSTRUCTIONS
Patient Education        Learning About Anxiety Disorders  What are anxiety disorders? Anxiety disorders are a type of medical problem. They cause severe anxiety. When you feel anxious, you feel that something bad is about to happen. This feeling interferes with your life. These disorders include:  · Generalized anxiety disorder. You feel worried and stressed about many everyday events and activities. This goes on for several months and disrupts your life on most days. · Panic disorder. You have repeated panic attacks. A panic attack is a sudden, intense fear or anxiety. It may make you feel short of breath. Your heart may pound. · Social anxiety disorder. You feel very anxious about what you will say or do in front of people. For example, you may be scared to talk or eat in public. This problem affects your daily life. · Phobias. You are very scared of a specific object, situation, or activity. For example, you may fear spiders, high places, or small spaces. What are the symptoms? Generalized anxiety disorder  Symptoms may include:  · Feeling worried and stressed about many things almost every day. · Feeling tired or irritable. You may have a hard time concentrating. · Having headaches or muscle aches. · Having a hard time getting to sleep or staying asleep. Panic disorder  You may have repeated panic attacks when there is no reason for feeling afraid. You may change your daily activities because you worry that you will have another attack. Symptoms may include:  · Intense fear, terror, or anxiety. · Trouble breathing or very fast breathing. · Chest pain or tightness. · A heartbeat that races or is not regular. Social anxiety disorder  Symptoms may include:  · Fear about a social situation, such as eating in front of others or speaking in public. You may worry a lot. Or you may be afraid that something bad will happen. · Anxiety that can cause you to blush, sweat, and feel shaky.   · A heartbeat that is faster than normal.  · A hard time focusing. Phobias  Symptoms may include:  · More fear than most people of being around an object, being in a situation, or doing an activity. You might also be stressed about the chance of being around the thing you fear. · Worry about losing control, panicking, fainting, or having physical symptoms like a faster heartbeat when you are around the situation or object. How are these disorders treated? Anxiety disorders can be treated with medicines or counseling. A combination of both may be used. Medicines may include:  · Antidepressants. These may help your symptoms by keeping chemicals in your brain in balance. · Benzodiazepines. These may give you short-term relief of your symptoms. Some people use cognitive-behavioral therapy. A therapist helps you learn to change stressful or bad thoughts into helpful thoughts. Lead a healthy lifestyle  A healthy lifestyle may help you feel better. · Get at least 30 minutes of exercise on most days of the week. Walking is a good choice. · Eat a healthy diet. Include fruits, vegetables, lean proteins, and whole grains in your diet each day. · Try to go to bed at the same time every night. Try for 8 hours of sleep a night. · Find ways to manage stress. Try relaxation exercises. · Avoid alcohol and illegal drugs. Follow-up care is a key part of your treatment and safety. Be sure to make and go to all appointments, and call your doctor if you are having problems. It's also a good idea to know your test results and keep a list of the medicines you take. Where can you learn more? Go to https://rick.Fugoo. org and sign in to your [employer] account. Enter R077 in the [facility] box to learn more about \"Learning About Anxiety Disorders. \"     If you do not have an account, please click on the \"Sign Up Now\" link.   Current as of: January 31, 2020               Content Version: 12.5  © 6263-6136 Healthwise, Incorporated. Care instructions adapted under license by [facility] ([facility]). If you have questions about a medical condition or this instruction, always ask your healthcare professional. [address] any warranty or liability for your use of this information. Patient Education        Anxiety Disorder: Care Instructions  Your Care Instructions     Anxiety is a normal reaction to stress. Difficult situations can cause you to have symptoms such as sweaty palms and a nervous feeling. In an anxiety disorder, the symptoms are far more severe. Constant worry, muscle tension, trouble sleeping, nausea and diarrhea, and other symptoms can make normal daily activities difficult or impossible. These symptoms may occur for no reason, and they can affect your work, school, or social life. Medicines, counseling, and self-care can all help. Follow-up care is a key part of your treatment and safety. Be sure to make and go to all appointments, and call your doctor if you are having problems. It's also a good idea to know your test results and keep a list of the medicines you take. How can you care for yourself at home? · Take medicines exactly as directed. Call your doctor if you think you are having a problem with your medicine. · Go to your counseling sessions and follow-up appointments. · Recognize and accept your anxiety. Then, when you are in a situation that makes you anxious, say to yourself, \"This is not an emergency. I feel uncomfortable, but I am not in danger. I can keep going even if I feel anxious. \"  · Be kind to your body:  ? Relieve tension with exercise or a massage. ? Get enough rest.  ? Avoid alcohol, caffeine, nicotine, and illegal drugs. They can increase your anxiety level and cause sleep problems. ? Learn and do relaxation techniques. See below for more about these techniques. · Engage your mind. Get out and do something you enjoy.  Go to a funny movie, or take a information. Patient Education        Learning About Guided Imagery for Stress  What are guided imagery and stress? Stress is what you feel when you have to handle more than you are used to. A lot of things can cause stress. You may feel stress when you go on a job interview, take a test, or run a race. This kind of short-term stress is normal and even useful. It can help you if you need to work hard or react quickly. Stress also can last a long time. Long-term stress is caused by stressful situations or events. Examples of long-term stress include long-term health problems, ongoing problems at work, and conflicts in your family. Long-term stress can harm your health. Guided imagery is a technique of directed thoughts and suggestions that guide your mind toward a relaxed, focused state. This technique helps you use your mind to take you to a calm, peaceful place. You can use it to relax, which can lower blood pressure and reduce other problems related to stress. How does guided imagery help to relieve stress? Because of the way the mind and body are connected, guided imagery can make you feel like you are experiencing something just by imagining it. You can achieve a relaxed state when you imagine all the details of a safe, comfortable place, such as a beach or a garden. This relaxed state may help you feel more in control of your emotions and thought processes. Feeling in control may improve your attitude, health, and sense of well-being. How do you do guided imagery? You can use a smartphone luis enrique or a video to lead you through the process. You use all of your senses in guided imagery. For example, if you want a tropical setting, you can imagine the warm breeze on your skin, the bright blue of the water, the sound of the surf, the sweet scent of tropical flowers, and the taste of coconut. Imagining those things can make you actually feel like you're there.   But you don't have to imagine the tropics

## 2020-06-22 NOTE — PROGRESS NOTES
[address] [facility], [address]  Phone [phone]   Fax [phone]      OFFICE VISIT: 2020    Jayesh Moore-: 1975      HPI  Reason For Visit:  Jayesh Maradiaga is a 38 y.o. Anxiety    She is presenting via video conference via [address] [place] [address] 95. She notes that she is having problems with anxiety. She is wanting something to help.       vitals were not taken for this visit. There is no height or weight on file to calculate BMI. I have reviewed the following with the Ms. Gordon Conklin   Lab Review  No visits with results within 6 Month(s) from this visit.    Latest known visit with results is:   Orders Only on 2019   Component Date Value    WBC 2019 9.2     RBC 2019 3.83*    Hemoglobin 2019 11.9*    Hematocrit 2019 35.7*    MCV 2019 93.2     MCH 2019 31.1*    MCHC 2019 33.3     RDW 2019 17.4*    Platelets  263     MPV 2019 9.7     Neutrophils % 2019 74.2*    Lymphocytes % 2019 18.2*    Monocytes % 2019 4.9     Eosinophils % 2019 1.3     Basophils % 2019 0.7     Neutrophils Absolute 2019 6.9     Immature Granulocytes # 2019 0.1     Lymphocytes Absolute 2019 1.7     Monocytes Absolute 2019 0.50     Eosinophils Absolute 2019 0.10     Basophils Absolute 2019 0.10     Sodium 2019 140     Potassium 2019 4.1     Chloride 2019 101     CO2 2019 22     Anion Gap 2019 17     Glucose 2019 109     BUN 2019 15     CREATININE 2019 0.8     GFR Non- 2019 >60     Calcium 2019 9.9     Total Protein 2019 7.9     Alb 2019 4.2     Total Bilirubin 2019 0.9     Alkaline Phosphatase 2019 101     ALT 2019 19     AST 2019 26     Microalbumin, Random Uri* 2019 4.90     Creatinine, Ur 2019 325.4     Microalbumin Creatinine * 08/27/2019 15.1     T4 Free 08/27/2019 1.4     TSH 08/27/2019 2.560     Cholesterol, Total 08/27/2019 166     Triglycerides 08/27/2019 77     HDL 08/27/2019 57*    LDL Calculated 08/27/2019 94     Hemoglobin A1C 08/27/2019 4.1      Copies of these are in the chart. Current Outpatient Medications   Medication Sig Dispense Refill    venlafaxine (EFFEXOR XR) 150 MG extended release capsule Take 1 capsule by mouth daily 30 capsule 5    hydrOXYzine (ATARAX) 25 MG tablet Take 1 tablet by mouth every 8 hours as needed for Anxiety 60 tablet 0    Misc. Devices MISC 1 each by Does not apply route once as needed (Portable Knee Walker) 1 Device 0    meloxicam (MOBIC) 15 MG tablet TAKE 1 TABLET BY MOUTH DAILY 90 tablet 3    oxyCODONE (ROXICODONE) 5 MG immediate release tablet Take 1 tablet by mouth 3 times daily as needed for Pain for up to 30 days. (may fill 6/6/20) 90 tablet 0    cyclobenzaprine (FLEXERIL) 10 MG tablet Take 1 tablet by mouth 2 times daily as needed for Muscle spasms 60 tablet 2    pregabalin (LYRICA) 100 MG capsule Take 1 capsule by mouth 2 times daily for 90 days. 60 capsule 2    carbidopa-levodopa (SINEMET)  MG per tablet Take 1 tablet by mouth nightly 90 tablet 3    nortriptyline (PAMELOR) 25 MG capsule TAKE 1 TO 2 CAPSULES BY MOUTH EVERY NIGHT 180 capsule 3    metFORMIN (GLUCOPHAGE) 1000 MG tablet Take 1 tablet by mouth 2 times daily (with meals) 60 tablet 11    levocetirizine (XYZAL) 5 MG tablet Take 1 tablet by mouth nightly 90 tablet 3    Misc.  Devices MISC 1 each by Does not apply route once as needed (TENS Unit) 1 Device 0    Tens Unit MISC by Does not apply route 1 each 0    omeprazole (PRILOSEC) 20 MG delayed release capsule Take 1 capsule by mouth Daily 90 capsule 3    fluticasone (FLONASE) 50 MCG/ACT nasal spray 1 spray by Each Nostril route daily (Patient not taking: Reported on 4/9/2020) 2 Bottle 1    metoprolol succinate (TOPROL XL) 50 MG extended release tablet Take 1 tablet by mouth 2 times daily 180 tablet 3    acyclovir (ZOVIRAX) 800 MG tablet Take 1 tablet by mouth daily 90 tablet 3    triamcinolone (KENALOG) 0.1 % cream Apply topically 2 times daily. (Patient not taking: Reported on 4/9/2020) 80 g 3    Calcium-Vitamin D 600-200 MG-UNIT TABS Take 1 tablet by mouth daily      albuterol sulfate HFA (PROVENTIL HFA) 108 (90 Base) MCG/ACT inhaler Inhale 2 puffs into the lungs every 6 hours as needed for Wheezing 1 Inhaler 3    polyethylene glycol (GLYCOLAX) powder Take 17 g by mouth daily 1 Bottle 1    ondansetron (ZOFRAN ODT) 4 MG disintegrating tablet Take 1 tablet by mouth every 8 hours as needed for Nausea or Vomiting 10 tablet 0    Multiple Vitamins-Minerals (MULTIVITAMIN & MINERAL PO) Take  by mouth.  CRANBERRY Take 500 mg by mouth daily.  Cholecalciferol (VITAMIN D3) 5000 UNITS TABS Take 5,000 Units by mouth daily        No current facility-administered medications for this visit.         Allergies: Silver; Tegaderm ag mesh 2\"x2\" [wound dressings]; and Zithromax [azithromycin]     Past Medical History:   Diagnosis Date    Anemia     has had iron infusion    Anxiety     Arthritis     Back pain with left-sided radiculopathy 3/14/2016    DDD (degenerative disc disease), lumbar     Depression     Esophagitis     Fibromyalgia     Gastritis     Headache(784.0)     History of blood transfusion     Hypertension     Obesity     RLS (restless legs syndrome)     Sleep apnea     uses CPAP machine       Family History   Problem Relation Age of Onset    Diabetes Mother     High Blood Pressure Mother     Colon Polyps Mother     Heart Disease Mother     Cancer Mother     Depression Mother     Cancer Father     High Blood Pressure Father     Heart Disease Father     Stroke Father     High Blood Pressure Sister     Depression Sister     Anxiety Disorder Sister     Cancer Brother     Esophageal Cancer Brother     Anxiety Disorder Brother     Diabetes Brother     ADHD Brother     Depression Brother     Other Other         has history of suicide in family maternal great uncle       Past Surgical History:   Procedure Laterality Date    ANKLE SURGERY Right     APPENDECTOMY  4/19/15    BACK SURGERY      CERVICAL SPINE SURGERY N/A 9/1/15    CHOLECYSTECTOMY  1995    HIP SURGERY Right 1987    HIP SURGERY  2018    HYSTERECTOMY      partial , still has ovaries and cervix    INSERTION / REMOVAL / REPLACEMENT VENOUS ACCESS CATHETER Left 2017    PORT INSERTION performed by Jaylyn Gaitan MD at [address] [place] [address] LITHOTRIPSY  [address] [place] [address]      with hardware placed    OK COLONOSCOPY FLX DX W/COLLJ SPEC WHEN PFRMD N/A 2017    Dr Nay Styles, 7 yr (age 47) recall    OK EGD TRANSORAL BIOPSY SINGLE/MULTIPLE N/A 2017    Dr ABHIJIT Baltazar-Gastritis/gastropathy    SALPINGO-OOPHORECTOMY     [facility]  2017    dr Marin Sickle Right 2019       Social History     Tobacco Use    Smoking status: Former Smoker     Packs/day: 1.00     Years: 25.00     Pack years: 25.00     Types: Cigarettes     Last attempt to quit: 2013     Years since quittin.7    Smokeless tobacco: Never Used   Substance Use Topics    Alcohol use: Yes     Comment: rarely        Review of Systems   Constitutional: Negative. Negative for appetite change, chills, fatigue and fever. HENT: Negative. Negative for congestion, ear discharge, ear pain, postnasal drip, sinus pressure and sore throat. Eyes: Negative. Negative for discharge and visual disturbance. Respiratory: Negative. Negative for cough, chest tightness, shortness of breath and wheezing. Cardiovascular: Negative. Negative for chest pain, palpitations and leg swelling. Gastrointestinal: Negative.   Negative for abdominal pain, constipation, diarrhea, nausea and concerns as mentioned above. A caregiver was present when appropriate. Due to this being a TeleHealth encounter (During [facility]- public health emergency), evaluation of the following organ systems was limited: Vitals/Constitutional/EENT/Resp/CV/GI//MS/Neuro/Skin/Heme-Lymph-Imm. Pursuant to the emergency declaration under the [address] [place] [address], [address] [place] [address] authority and the [employer] Act, this Virtual Visit was conducted with patient's (and/or legal guardian's) consent, to reduce the patient's risk of exposure to COVID-19 and provide necessary medical care. The patient (and/or legal guardian) has also been advised to contact this office for worsening conditions or problems, and seek emergency medical treatment and/or call 911 if deemed necessary. Patient identification was verified at the start of the visit: Yes    Total time spent for this encounter: 15m    Services were provided through a video synchronous discussion virtually to substitute for in-person clinic visit. Patient and provider were located at their individual homes. --MORA Munguia DO on 6/22/2020 at 3:01 PM    An electronic signature was used to authenticate this note.

## 2020-06-30 ENCOUNTER — APPOINTMENT (OUTPATIENT)
Dept: CT IMAGING | Age: 45
End: 2020-06-30
Payer: MEDICARE

## 2020-06-30 ENCOUNTER — APPOINTMENT (OUTPATIENT)
Dept: GENERAL RADIOLOGY | Age: 45
End: 2020-06-30
Payer: MEDICARE

## 2020-06-30 ENCOUNTER — HOSPITAL ENCOUNTER (EMERGENCY)
Age: 45
Discharge: HOME | End: 2020-06-30
Payer: MEDICARE

## 2020-06-30 VITALS
SYSTOLIC BLOOD PRESSURE: 121 MMHG | DIASTOLIC BLOOD PRESSURE: 84 MMHG | OXYGEN SATURATION: 95 % | TEMPERATURE: 96.9 F | RESPIRATION RATE: 18 BRPM | HEIGHT: 68 IN | BODY MASS INDEX: 44.1 KG/M2 | WEIGHT: 291 LBS | HEART RATE: 103 BPM

## 2020-06-30 PROCEDURE — 73502 X-RAY EXAM HIP UNI 2-3 VIEWS: CPT

## 2020-06-30 PROCEDURE — 99284 EMERGENCY DEPT VISIT MOD MDM: CPT

## 2020-06-30 PROCEDURE — 72131 CT LUMBAR SPINE W/O DYE: CPT

## 2020-06-30 PROCEDURE — 72125 CT NECK SPINE W/O DYE: CPT

## 2020-06-30 PROCEDURE — 96372 THER/PROPH/DIAG INJ SC/IM: CPT

## 2020-06-30 PROCEDURE — 6360000002 HC RX W HCPCS: Performed by: PHYSICIAN ASSISTANT

## 2020-06-30 PROCEDURE — 70450 CT HEAD/BRAIN W/O DYE: CPT

## 2020-06-30 PROCEDURE — 72128 CT CHEST SPINE W/O DYE: CPT

## 2020-06-30 RX ORDER — HYDROMORPHONE HYDROCHLORIDE 1 MG/ML
1 INJECTION, SOLUTION INTRAMUSCULAR; INTRAVENOUS; SUBCUTANEOUS ONCE
Status: COMPLETED | OUTPATIENT
Start: 2020-06-30 | End: 2020-06-30

## 2020-06-30 RX ADMIN — HYDROMORPHONE HYDROCHLORIDE 1 MG: 1 INJECTION, SOLUTION INTRAMUSCULAR; INTRAVENOUS; SUBCUTANEOUS at 12:36

## 2020-06-30 ASSESSMENT — ENCOUNTER SYMPTOMS
BACK PAIN: 1
ABDOMINAL PAIN: 0
COLOR CHANGE: 0
EYE DISCHARGE: 0
COUGH: 0
SORE THROAT: 0
APNEA: 0
RHINORRHEA: 0
SHORTNESS OF BREATH: 0
PHOTOPHOBIA: 0
NAUSEA: 0
ABDOMINAL DISTENTION: 0
EYE PAIN: 0

## 2020-06-30 ASSESSMENT — PAIN SCALES - GENERAL: PAINLEVEL_OUTOF10: 9

## 2020-06-30 NOTE — ED PROVIDER NOTES
[address] [facility]  eMERGENCYdEPARTMENT eNCOUnter      Pt Name: Carlton Wakefield  MRN: [id number]  [place] 1975  Date of evaluation: 6/30/2020  Provider:ZULLY Flanagan    CHIEF COMPLAINT       Chief Complaint   Patient presents with    Fall     fell backwards in w/c yesterday and c/o HA and back pain         HISTORY OF PRESENT ILLNESS  (Location/Symptom, Timing/Onset, Context/Setting, Quality, Duration, Modifying Factors, Severity.)   Carlton Wakefield is a 38 y.o. female who presents to the emergency department with fall yesterday she has a right distal fifth metatarsal fracture in a boot is in a wheelchair currently she was reclining backward and fell back out of her wheelchair. She denies LOC this happened around 9 AM witnessed by her 10year-old granddaughter she has chronic pain problems degenerative disc disease history of prior cervical fusion and cage around lumbar spine this is back in 2013 she is postmenopausal and prone to osteopenia she denies any LOC or vision changes. She goes to pain management here with Yuri Olivia is pending. Prosthesis compromise fracture and brain bleed are rule outs today. Jair Astudillo request #[id number]    Active cumulative morphine equivalent 23    Looks like she takes pregabalin oxycodone 5 baseline    HPI    Nursing Notes were reviewed and I agree. REVIEW OF SYSTEMS    (2-9 systems for level 4, 10 or more for level 5)     Review of Systems   Constitutional: Negative for activity change, appetite change, chills and fever. HENT: Negative for congestion, postnasal drip, rhinorrhea and sore throat. Eyes: Negative for photophobia, pain, discharge and visual disturbance. Respiratory: Negative for apnea, cough and shortness of breath. Cardiovascular: Negative for chest pain and leg swelling. Gastrointestinal: Negative for abdominal distention, abdominal pain and nausea. Genitourinary: Negative for vaginal bleeding.    Musculoskeletal: Positive for arthralgias, back pain and gait problem. Negative for joint swelling, neck pain and neck stiffness. Skin: Negative for color change and rash. Neurological: Positive for headaches. Negative for dizziness, syncope and facial asymmetry. Hematological: Negative for adenopathy. Does not bruise/bleed easily. Psychiatric/Behavioral: Negative for agitation, behavioral problems and confusion. Except as noted above the remainder of the review of systems was reviewed and negative.        PAST MEDICAL HISTORY     Past Medical History:   Diagnosis Date    Anemia     has had iron infusion    Anxiety     Arthritis     Back pain with left-sided radiculopathy 3/14/2016    DDD (degenerative disc disease), lumbar     Depression     Esophagitis     Fibromyalgia     Gastritis     Headache(784.0)     History of blood transfusion     Hypertension     Obesity     RLS (restless legs syndrome)     Sleep apnea     uses CPAP machine         SURGICAL HISTORY       Past Surgical History:   Procedure Laterality Date    ANKLE SURGERY Right     APPENDECTOMY  4/19/15    BACK SURGERY  2000    CERVICAL SPINE SURGERY N/A 9/1/15    CHOLECYSTECTOMY  1995    HIP SURGERY Right 1987    HIP SURGERY  03/28/2018    HYSTERECTOMY      partial 2008, still has ovaries and cervix    INSERTION / REMOVAL / REPLACEMENT VENOUS ACCESS CATHETER Left 11/7/2017    PORT INSERTION performed by Alejandrina Johnston MD at [address] [place] [address] LITHOTRIPSY  1995/2000 [facility]      with hardware placed    SC COLONOSCOPY FLX DX W/COLLJ SPEC WHEN PFRMD N/A 5/2/2017    Dr Penelope Escalante, 7 yr (age 47) recall    SC EGD TRANSORAL BIOPSY SINGLE/MULTIPLE N/A 5/2/2017    Dr ABHIJIT Baltazar-Gastritis/gastropathy    SALPINGO-OOPHORECTOMY      STOMACH SURGERY  12/20/2017    dr Adeel Bee Right 2019         CURRENT MEDICATIONS       Discharge Medication List as of 6/30/2020 12:37 PM CONTINUE these medications which have NOT CHANGED    Details   venlafaxine (EFFEXOR XR) 150 MG extended release capsule Take 1 capsule by mouth daily, Disp-30 capsule, R-5Normal      hydrOXYzine (ATARAX) 25 MG tablet Take 1 tablet by mouth every 8 hours as needed for Anxiety, Disp-60 tablet, R-0Normal      Misc. Devices MISC ONCE PRN Starting Mon 6/1/2020, Until Mon 6/1/2020, For 1 dose, Disp-1 Device, R-0, Print      meloxicam (MOBIC) 15 MG tablet TAKE 1 TABLET BY MOUTH DAILY, Disp-90 tablet, R-3Normal      oxyCODONE (ROXICODONE) 5 MG immediate release tablet Take 1 tablet by mouth 3 times daily as needed for Pain for up to 30 days. (may fill 6/6/20), Disp-90 tablet, R-0Normal      cyclobenzaprine (FLEXERIL) 10 MG tablet Take 1 tablet by mouth 2 times daily as needed for Muscle spasms, Disp-60 tablet, R-2Normal      pregabalin (LYRICA) 100 MG capsule Take 1 capsule by mouth 2 times daily for 90 days. , Disp-60 capsule, R-2Normal      carbidopa-levodopa (SINEMET)  MG per tablet Take 1 tablet by mouth nightly, Disp-90 tablet, R-3Normal      nortriptyline (PAMELOR) 25 MG capsule TAKE 1 TO 2 CAPSULES BY MOUTH EVERY NIGHT, Disp-180 capsule, R-3Normal      metFORMIN (GLUCOPHAGE) 1000 MG tablet Take 1 tablet by mouth 2 times daily (with meals), Disp-60 tablet, R-11Normal      levocetirizine (XYZAL) 5 MG tablet Take 1 tablet by mouth nightly, Disp-90 tablet, R-3Normal      Misc.  Devices MISC ONCE PRN Starting Wed 10/30/2019, Until Thu 1/9/2020, For 1 dose, Disp-1 Device, R-0, Print      Tens Unit MISC Starting Tue 10/15/2019, Disp-1 each, R-0, NO PRINT      omeprazole (PRILOSEC) 20 MG delayed release capsule Take 1 capsule by mouth Daily, Disp-90 capsule, R-3Normal      fluticasone (FLONASE) 50 MCG/ACT nasal spray 1 spray by Each Nostril route daily, Disp-2 Bottle, R-1Normal      metoprolol succinate (TOPROL XL) 50 MG extended release tablet Take 1 tablet by mouth 2 times daily, Disp-180 tablet, R-3Normal acyclovir (ZOVIRAX) 800 MG tablet Take 1 tablet by mouth daily, Disp-90 tablet, R-3Normal      triamcinolone (KENALOG) 0.1 % cream Apply topically 2 times daily. , Disp-80 g, R-3, Normal      Calcium-Vitamin D 600-200 MG-UNIT TABS Take 1 tablet by mouth dailyHistorical Med      albuterol sulfate HFA (PROVENTIL HFA) 108 (90 Base) MCG/ACT inhaler Inhale 2 puffs into the lungs every 6 hours as needed for Wheezing, Disp-1 Inhaler, R-3Normal      polyethylene glycol (GLYCOLAX) powder Take 17 g by mouth daily, Disp-1 Bottle, R-1Normal      ondansetron (ZOFRAN ODT) 4 MG disintegrating tablet Take 1 tablet by mouth every 8 hours as needed for Nausea or Vomiting, Disp-10 tablet, R-0      Multiple Vitamins-Minerals (MULTIVITAMIN & MINERAL PO) Take  by mouth. CRANBERRY Take 500 mg by mouth daily.       Cholecalciferol (VITAMIN D3) 5000 UNITS TABS Take 5,000 Units by mouth daily              ALLERGIES     Silver; Tegaderm ag mesh 2\"x2\" [wound dressings]; and Zithromax [azithromycin]    FAMILY HISTORY       Family History   Problem Relation Age of Onset    Diabetes Mother     High Blood Pressure Mother     Colon Polyps Mother     Heart Disease Mother     Cancer Mother     Depression Mother     Cancer Father     High Blood Pressure Father     Heart Disease Father     Stroke Father     High Blood Pressure Sister     Depression Sister     Anxiety Disorder Sister     Cancer Brother     Esophageal Cancer Brother     Anxiety Disorder Brother     Diabetes Brother     ADHD Brother     Depression Brother     Other Other         has history of suicide in family maternal great uncle          SOCIAL HISTORY       Social History     Socioeconomic History    Marital status:      Spouse name: None    Number of children: 2    Years of education: 15    Highest education level: None   Occupational History     Employer: DISABLED    Occupation:    Social Needs    Financial resource strain: None    Food insecurity     Worry: None     Inability: None    Transportation needs     Medical: None     Non-medical: None   Tobacco Use    Smoking status: Former Smoker     Packs/day: 1.00     Years: 25.00     Pack years: 25.00     Types: Cigarettes     Last attempt to quit: 2013     Years since quittin.7    Smokeless tobacco: Never Used   Substance and Sexual Activity    Alcohol use: Yes     Comment: rarely    Drug use: No    Sexual activity: Never     Comment: pt states last 2 months   Lifestyle    Physical activity     Days per week: None     Minutes per session: None    Stress: None   Relationships    Social connections     Talks on phone: None     Gets together: None     Attends Yarsani service: None     Active member of club or organization: None     Attends meetings of clubs or organizations: None     Relationship status: None    Intimate partner violence     Fear of current or ex partner: None     Emotionally abused: None     Physically abused: None     Forced sexual activity: None   Other Topics Concern    None   Social History Narrative        Has been [facility] at [facility] by Edita Cr NP this past year of medication assessment. She has seen a therapist in the past.         She had ADHD tested by [facility] Valencia  This past year        Has been on Lexapro, Wellbutrin, Cymbalta-this was bad. SCREENINGS    [place] Coma Scale  Eye Opening: Spontaneous  Best Verbal Response: Oriented  Best Motor Response: Obeys commands  [place] Coma Scale Score: 15      PHYSICAL EXAM    (up to 7 forlevel 4, 8 or more for level 5)     ED Triage Vitals [20 1028]   BP Temp Temp src Pulse Resp SpO2 Height Weight   121/84 96.9 °F (36.1 °C) -- 103 18 95 % 5' 8\" (1.727 m) 291 lb (132 kg)       Physical Exam  Vitals signs and nursing note reviewed. Constitutional:       General: She is in acute distress. Appearance: She is well-developed. She is obese. She is not diaphoretic. HENT:      Head: Normocephalic. Right Ear: Tympanic membrane, ear canal and external ear normal.      Left Ear: Tympanic membrane, ear canal and external ear normal.      Nose: Nose normal.      Mouth/Throat:      Mouth: Mucous membranes are moist.      Pharynx: No oropharyngeal exudate. Eyes:      General:         Right eye: No discharge. Left eye: No discharge. Pupils: Pupils are equal, round, and reactive to light. Neck:      Musculoskeletal: Normal range of motion and neck supple. Thyroid: No thyromegaly. Cardiovascular:      Rate and Rhythm: Normal rate and regular rhythm. Pulses: Normal pulses. Heart sounds: Normal heart sounds. No murmur. No friction rub. Pulmonary:      Effort: Pulmonary effort is normal. No respiratory distress. Breath sounds: Normal breath sounds. No stridor. No wheezing. Abdominal:      General: Abdomen is flat. Bowel sounds are normal. There is no distension. Palpations: Abdomen is soft. Tenderness: There is no abdominal tenderness. Musculoskeletal:         General: Tenderness and signs of injury present. Arms:         Legs:    Skin:     General: Skin is warm and dry. Capillary Refill: Capillary refill takes less than 2 seconds. Findings: No rash. Neurological:      General: No focal deficit present. Mental Status: She is alert and oriented to person, place, and time. Mental status is at baseline. Cranial Nerves: No cranial nerve deficit. Sensory: No sensory deficit. Coordination: Coordination normal.   Psychiatric:         Mood and Affect: Mood normal.         Behavior: Behavior normal.         Thought Content:  Thought content normal.         Judgment: Judgment normal.           DIAGNOSTIC RESULTS     RADIOLOGY:   Non-plain film images such as CT, Ultrasound and MRI are read by the radiologist. Plain radiographic images are visualized and preliminarilyinterpreted by No att. providers found with the below findings:      Interpretation per the Radiologist below, if available at the time of this note:    XR HIP 2-3 VW W PELVIS RIGHT   Final Result   Impression:    1. No visualized fracture or joint subluxation. 2. Right hip arthroplasty without hardware complication. Signed by Dr Syd Boyce on 6/30/2020 12:14 PM      CT Lumbar Spine WO Contrast   Final Result   1. Degenerative and postsurgical changes with no evidence of acute   fracture. Signed by Dr Alea Thornton on 6/30/2020 11:54 AM      CT Thoracic Spine WO Contrast   Final Result   No evidence of acute osseous injury in the thoracic spine. Signed by Dr Syd Boyce on 6/30/2020 11:54 AM      CT Cervical Spine WO Contrast   Final Result   1. No acute bony abnormality. Signed by Dr Alea Thornton on 6/30/2020 11:44 AM      CT Head WO Contrast   Final Result   1. No acute intracranial process. Signed by Dr Jannie Turner on 6/30/2020 11:44 AM          LABS:  Labs Reviewed - No data to display    All other labs were within normal range or notreturned as of this dictation. RE-ASSESSMENT        EMERGENCY DEPARTMENT COURSE and DIFFERENTIAL DIAGNOSIS/MDM:   Vitals:    Vitals:    06/30/20 1028 06/30/20 1135   BP: 121/84    Pulse: 103 103   Resp: 18    Temp: 96.9 °F (36.1 °C)    SpO2: 95%    Weight: 291 lb (132 kg)    Height: 5' 8\" (1.727 m)        MDM  No acute findings today this patient has chronic pain which we will attempt to treat I would advise her to follow-up with her PCP and pain management for any persistent issues at this time there is no emergency for surgery no indication for orthopedic consult initially the trauma was last night typically we give 72 hours before we encourage referral.  She is to return to ER if anything should change. PROCEDURES:    Procedures      FINAL IMPRESSION      1. Fall, initial encounter    2.  Closed head injury, initial encounter          DISPOSITION/PLAN   DISPOSITION Decision To Discharge 06/30/2020 12:20:26 PM      PATIENT REFERRED TO:  [facility]  [facility]  [phone]    If symptoms worsen    B Matilde Ortiz DO  [address] [facility]       As needed      DISCHARGE MEDICATIONS:  Discharge Medication List as of 6/30/2020 12:37 PM          (Please note that portions of this note were completed with a voice recognition program.  Efforts were made to edit the dictations but occasionallywords are mis-transcribed.)    Agusto Ibarra [address], [place]  07/01/20 1214

## 2020-07-05 ENCOUNTER — PATIENT MESSAGE (OUTPATIENT)
Dept: PRIMARY CARE CLINIC | Age: 45
End: 2020-07-05

## 2020-07-06 RX ORDER — METOPROLOL SUCCINATE 50 MG/1
50 TABLET, EXTENDED RELEASE ORAL 2 TIMES DAILY
Qty: 180 TABLET | Refills: 3 | Status: SHIPPED | OUTPATIENT
Start: 2020-07-06 | End: 2021-06-14

## 2020-07-06 RX ORDER — ACYCLOVIR 800 MG/1
800 TABLET ORAL DAILY
Qty: 90 TABLET | Refills: 3 | Status: SHIPPED | OUTPATIENT
Start: 2020-07-06 | End: 2020-09-24

## 2020-07-06 NOTE — TELEPHONE ENCOUNTER
Effexor XR 75 mg  Tab 1 p.o. daily in addition to 150 mg to make up  225 mg daily  Dispense #30 with 11 refills

## 2020-07-06 NOTE — TELEPHONE ENCOUNTER
From: Sheila Crum  To: Alejandrina Ho DO  Sent: 7/5/2020 2:58 PM CDT  Subject: Prescription Question    Hello,  I ran out of my Venlafaxine 75mg tablets. I was taking one every morning with my 150 mg capsule because the 150 mg capsule is not enough. I need a script sent to [facility] on [address] please. Thank you.

## 2020-07-06 NOTE — TELEPHONE ENCOUNTER
Received fax from pharmacy requesting refill on pts medication(s). Pt was last seen in office on 1/9/2020  and has a follow up scheduled for 7/22/2020. Will send request to  Dr. Kylah Subramanian  for authorization.      Requested Prescriptions     Pending Prescriptions Disp Refills    metoprolol succinate (TOPROL XL) 50 MG extended release tablet [Pharmacy Med Name: METOPROLOL ER SUCCINATE 50MG TABS] 180 tablet 3     Sig: Take 1 tablet by mouth 2 times daily    acyclovir (ZOVIRAX) 800 MG tablet [Pharmacy Med Name: ACYCLOVIR 800MG TABLETS] 90 tablet 3     Sig: Take 1 tablet by mouth daily

## 2020-07-08 RX ORDER — VENLAFAXINE HYDROCHLORIDE 75 MG/1
75 CAPSULE, EXTENDED RELEASE ORAL DAILY
Qty: 30 CAPSULE | Refills: 11 | Status: SHIPPED | OUTPATIENT
Start: 2020-07-08 | End: 2021-06-14

## 2020-07-10 RX ORDER — OXYCODONE HYDROCHLORIDE 5 MG/1
5 TABLET ORAL 3 TIMES DAILY PRN
Qty: 90 TABLET | Refills: 0 | Status: SHIPPED | OUTPATIENT
Start: 2020-07-10 | End: 2020-08-10 | Stop reason: SDUPTHER

## 2020-07-30 DIAGNOSIS — D50.8 IRON DEFICIENCY ANEMIA SECONDARY TO INADEQUATE DIETARY IRON INTAKE: Primary | ICD-10-CM

## 2020-07-31 ENCOUNTER — APPOINTMENT (OUTPATIENT)
Dept: LAB | Facility: HOSPITAL | Age: 45
End: 2020-07-31

## 2020-08-05 ENCOUNTER — TELEPHONE (OUTPATIENT)
Dept: ONCOLOGY | Facility: CLINIC | Age: 45
End: 2020-08-05

## 2020-08-11 RX ORDER — OXYCODONE HYDROCHLORIDE 5 MG/1
5 TABLET ORAL 3 TIMES DAILY PRN
Qty: 90 TABLET | Refills: 0 | Status: SHIPPED | OUTPATIENT
Start: 2020-08-11 | End: 2020-08-18 | Stop reason: SDUPTHER

## 2020-08-17 ENCOUNTER — PATIENT MESSAGE (OUTPATIENT)
Dept: PRIMARY CARE CLINIC | Age: 45
End: 2020-08-17

## 2020-08-18 ENCOUNTER — HOSPITAL ENCOUNTER (OUTPATIENT)
Dept: PAIN MANAGEMENT | Age: 45
Discharge: HOME | End: 2020-08-18
Payer: MEDICARE

## 2020-08-18 ENCOUNTER — TELEPHONE (OUTPATIENT)
Dept: PRIMARY CARE CLINIC | Age: 45
End: 2020-08-18

## 2020-08-18 ENCOUNTER — HOSPITAL ENCOUNTER (OUTPATIENT)
Dept: GENERAL RADIOLOGY | Age: 45
Discharge: HOME | End: 2020-08-18
Payer: MEDICARE

## 2020-08-18 VITALS
WEIGHT: 292 LBS | TEMPERATURE: 98.8 F | SYSTOLIC BLOOD PRESSURE: 144 MMHG | OXYGEN SATURATION: 98 % | BODY MASS INDEX: 44.25 KG/M2 | HEART RATE: 88 BPM | DIASTOLIC BLOOD PRESSURE: 92 MMHG | HEIGHT: 68 IN
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
## 2020-08-18 PROCEDURE — 73130 X-RAY EXAM OF HAND: CPT

## 2020-08-18 PROCEDURE — 99214 OFFICE O/P EST MOD 30 MIN: CPT | Performed by: NURSE PRACTITIONER

## 2020-08-18 PROCEDURE — 99214 OFFICE O/P EST MOD 30 MIN: CPT

## 2020-08-18 PROCEDURE — 70260 X-RAY EXAM OF SKULL: CPT

## 2020-08-18 PROCEDURE — 73030 X-RAY EXAM OF SHOULDER: CPT

## 2020-08-18 RX ORDER — PREGABALIN 100 MG/1
100 CAPSULE ORAL 2 TIMES DAILY
Qty: 60 CAPSULE | Refills: 2 | Status: SHIPPED | OUTPATIENT
Start: 2020-08-18 | End: 2020-10-23 | Stop reason: SDUPTHER

## 2020-08-18 RX ORDER — CYCLOBENZAPRINE HCL 10 MG
10 TABLET ORAL 2 TIMES DAILY PRN
Qty: 60 TABLET | Refills: 2 | Status: SHIPPED | OUTPATIENT
Start: 2020-08-18 | End: 2021-03-31

## 2020-08-18 ASSESSMENT — ENCOUNTER SYMPTOMS
CONSTIPATION: 0
BACK PAIN: 1
BOWEL INCONTINENCE: 0

## 2020-08-18 ASSESSMENT — PAIN SCALES - GENERAL: PAINLEVEL_OUTOF10: 9

## 2020-08-18 ASSESSMENT — PAIN DESCRIPTION - PAIN TYPE: TYPE: CHRONIC PAIN

## 2020-08-18 ASSESSMENT — PAIN DESCRIPTION - LOCATION: LOCATION: BACK;NECK

## 2020-08-18 ASSESSMENT — PAIN DESCRIPTION - ORIENTATION: ORIENTATION: LOWER;MID;UPPER

## 2020-08-18 NOTE — PROGRESS NOTES
Nursing Admission Record    Current Issues / Falls / ER Visits: Follow up procedure    Was Percentage of Pain Relief after Lumbar Epidural L2-3 on 5/22/2020:  60 %    How long lasted:  6 weeks Yes  Were you able to decrease pain medication after treatment? Yes  Were you able to increase activity after treatment? Yes  Did you have any adverse reactions to treatment? No    Opioids Prescribed: No    Was Medication Brought to Appt: N/A    Hx of any Neck/Back Surgeries? Yes, Neck Surgery & Back Surgery    Is Patient currently taking a blood thinner? None    MRI exams received in the past 2 years:  No       When na                                              Where na       Imaging on chart: No         Imaging records requested: No    CT exam received during the last 12 months: Yes CT lumbar, Thoracic, Cervical Spine       When 6/2020                                              Where [facility]       Imaging on chart: Yes         Imaging records requested: No    X-ray exam received during the last 12 months: Yes XR Right Hip/Pelvis       When 6/2020                                              Where [facility]       Imaging on chart: Yes         Imaging records requested: No    Nerve Conduction Study/EMG:  Yes       When 2015                                              Where [facility]       Imaging on chart: Yes         Imaging records requested:  No     Physical therapy: Yes       When: 2020                                                      Where  [facility] [address] [facility] No       When: na                                               Where  na    Labs  Yes       When: 8/2019                                             Where  [facility]    PEG Score: 9    Last PEG Score: 8    Annual ORT Score: 3    Annual PHQ Score: 15    Last UDS Results: 5/14/2020 compliant    Education Provided:  [x] Review of Kailey Bautista  [] Agreement Review  [x] PEG Score Calculated [] PHQ Score Calculated [] ORT Score Calculated [] Compliance Issues Discussed [] Cognitive Behavior Needs [x] Exercise [] Review of Test [] Financial Issues  [x] Tobacco/Alcohol Use Reviewed [x] Teaching [] New Patient [] Picture Obtained    Physician Plan:  [x] Outgoing Referral  [] Pharmacy Consult  [] Test Ordered [x] Prescription Ordered/Changed [] Blood Thinner Request Form  [] Obtained Test Results / Consult Notes  [] UDS due at next visit, verified per EPIC      [] Suspected Physical Abuse or Suicide Risk assessed - IF YES COMPLETE QUESTIONS BELOW    If any of the following questions are answered yes - contact attending physician for referral:    Has been considering harming self to escape stress, pain problems? [] YES  [] NO  Has a suicide plan? [] YES  [] NO  Has attempted suicide in the past?   [] YES  [] NO  Has a close friend or family member who committed suicide?   [] YES  [] NO    Assessment Completed by:  Electronically signed by Sin Peña RN on 8/18/2020 at 8:00 AM

## 2020-08-18 NOTE — TELEPHONE ENCOUNTER
----- Message from ASHLEE Guerrero sent at 8/18/2020  4:47 PM CDT -----  Xray shoulder no acute injury  Mild arthritis noted
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
## 2020-08-19 ENCOUNTER — TELEPHONE (OUTPATIENT)
Dept: PRIMARY CARE CLINIC | Age: 45
End: 2020-08-19

## 2020-08-19 NOTE — TELEPHONE ENCOUNTER
----- Message from Letty De Santiago, DO sent at 8/18/2020  6:30 PM CDT -----  There is a bony abnormality in the wrist, but this does not appear to be a fracture. If you continue to have wrist pain, we may consider doing a CT scan of the wrist to better delineate.   If this is not painful, we do not need to pursue further

## 2020-08-19 NOTE — TELEPHONE ENCOUNTER
Call placed to pt with results. Pt wanted to know if she would go ahead and get an xray done of her neck because she has been doing Physical Therapy but they won't touch her now until she finds out if her hardware in her neck from previous surgery is all in tact. Pt was also wanting an xray of her left knee. She would like to go ahead and do the CT of her right wrist because it is very bothersome to her. I will send this to Dr Ingrid Motta for authorization. I also scheduled pt a VV for Friday morning with Dr Ingrid Motta regarding these falls.

## 2020-08-21 ENCOUNTER — TELEMEDICINE (OUTPATIENT)
Dept: PRIMARY CARE CLINIC | Age: 45
End: 2020-08-21
Payer: MEDICARE

## 2020-08-21 PROCEDURE — 99214 OFFICE O/P EST MOD 30 MIN: CPT | Performed by: PEDIATRICS

## 2020-08-21 ASSESSMENT — ENCOUNTER SYMPTOMS
ABDOMINAL PAIN: 0
EYE DISCHARGE: 0
SINUS PRESSURE: 0
ALLERGIC/IMMUNOLOGIC NEGATIVE: 1
VOMITING: 0
GASTROINTESTINAL NEGATIVE: 1
CONSTIPATION: 0
NAUSEA: 0
SHORTNESS OF BREATH: 0
WHEEZING: 0
DIARRHEA: 0
RESPIRATORY NEGATIVE: 1
SORE THROAT: 0
EYES NEGATIVE: 1
CHEST TIGHTNESS: 0
COUGH: 0
BACK PAIN: 0

## 2020-08-21 NOTE — PROGRESS NOTES
[address] [facility], [address]  Phone [phone]   Fax [phone]      OFFICE VISIT: 2020    Mary Lujan Oscar-: 1975      HPI  Reason For Visit:  Mary Lujan is a 38 y.o. Dizziness    Pt presents via [facility] video conference  She is complaining of dizzy spells  She notes that she feels numb. (mentally numb)  It is hard to describe. She fell down stairs. It felt like she was moving in slow motion. She was some lightheaded before the fall. It was a weird feeling. She can't really explain it . Like I'm in a cloud or something. \"I start falling and I think oh well, here I go again. \"    Medication Review:  No significant changes in medications. No appreciated rhythm changes. She does not monitor her BP, but she goes to Dr. Seb Escudero often. She does not check blood sugar. She does have chronic pain and is on chronic narcotic medications. She is taking some otc vitamins, but not anything out of ordinary or suspicious. vitals were not taken for this visit. There is no height or weight on file to calculate BMI. I have reviewed the following with the Ms[facility]n Tara Fair   Lab Review  No visits with results within 6 Month(s) from this visit.    Latest known visit with results is:   Orders Only on 2019   Component Date Value    WBC 2019 9.2     RBC 2019 3.83*    Hemoglobin 2019 11.9*    Hematocrit 2019 35.7*    MCV 2019 93.2     MCH 2019 31.1*    MCHC 2019 33.3     RDW 2019 17.4*    Platelets  263     MPV 2019 9.7     Neutrophils % 2019 74.2*    Lymphocytes % 2019 18.2*    Monocytes % 2019 4.9     Eosinophils % 2019 1.3     Basophils % 2019 0.7     Neutrophils Absolute 2019 6.9     Immature Granulocytes # 2019 0.1     Lymphocytes Absolute 2019 1.7     Monocytes Absolute 2019 0.50     Eosinophils Absolute 08/27/2019 0.10     Basophils Absolute 08/27/2019 0.10     Sodium 08/27/2019 140     Potassium 08/27/2019 4.1     Chloride 08/27/2019 101     CO2 08/27/2019 22     Anion Gap 08/27/2019 17     Glucose 08/27/2019 109     BUN 08/27/2019 15     CREATININE 08/27/2019 0.8     GFR Non- 08/27/2019 >60     Calcium 08/27/2019 9.9     Total Protein 08/27/2019 7.9     Alb 08/27/2019 4.2     Total Bilirubin 08/27/2019 0.9     Alkaline Phosphatase 08/27/2019 101     ALT 08/27/2019 19     AST 08/27/2019 26     Microalbumin, Random Uri* 08/27/2019 4.90     Creatinine, Ur 08/27/2019 325.4     Microalbumin Creatinine * 08/27/2019 15.1     T4 Free 08/27/2019 1.4     TSH 08/27/2019 2.560     Cholesterol, Total 08/27/2019 166     Triglycerides 08/27/2019 77     HDL 08/27/2019 57*    LDL Calculated 08/27/2019 94     Hemoglobin A1C 08/27/2019 4.1      Copies of these are in the chart. Current Outpatient Medications   Medication Sig Dispense Refill    cyclobenzaprine (FLEXERIL) 10 MG tablet Take 1 tablet by mouth 2 times daily as needed for Muscle spasms 60 tablet 2    pregabalin (LYRICA) 100 MG capsule Take 1 capsule by mouth 2 times daily for 90 days. 60 capsule 2    oxyCODONE (ROXICODONE) 5 MG immediate release tablet Take 1 tablet by mouth 3 times daily as needed for Pain for up to 30 days. 90 tablet 0    venlafaxine (EFFEXOR XR) 75 MG extended release capsule Take 1 capsule by mouth daily Take 1 capsule daily in addition to 150 mg to make up  225 mg daily 30 capsule 11    metoprolol succinate (TOPROL XL) 50 MG extended release tablet Take 1 tablet by mouth 2 times daily 180 tablet 3    acyclovir (ZOVIRAX) 800 MG tablet Take 1 tablet by mouth daily 90 tablet 3    venlafaxine (EFFEXOR XR) 150 MG extended release capsule Take 1 capsule by mouth daily 30 capsule 5    Misc.  Devices MISC 1 each by Does not apply route once as needed (Portable Knee Walker) 1 Device 0    meloxicam (MOBIC) 15 MG tablet TAKE 1 TABLET BY MOUTH DAILY 90 tablet 3    carbidopa-levodopa (SINEMET)  MG per tablet Take 1 tablet by mouth nightly 90 tablet 3    nortriptyline (PAMELOR) 25 MG capsule TAKE 1 TO 2 CAPSULES BY MOUTH EVERY NIGHT 180 capsule 3    metFORMIN (GLUCOPHAGE) 1000 MG tablet Take 1 tablet by mouth 2 times daily (with meals) 60 tablet 11    levocetirizine (XYZAL) 5 MG tablet Take 1 tablet by mouth nightly 90 tablet 3    Misc. Devices MISC 1 each by Does not apply route once as needed (TENS Unit) 1 Device 0    Tens Unit MISC by Does not apply route 1 each 0    omeprazole (PRILOSEC) 20 MG delayed release capsule Take 1 capsule by mouth Daily 90 capsule 3    fluticasone (FLONASE) 50 MCG/ACT nasal spray 1 spray by Each Nostril route daily (Patient not taking: Reported on 4/9/2020) 2 Bottle 1    triamcinolone (KENALOG) 0.1 % cream Apply topically 2 times daily. (Patient not taking: Reported on 4/9/2020) 80 g 3    Calcium-Vitamin D 600-200 MG-UNIT TABS Take 1 tablet by mouth daily      albuterol sulfate HFA (PROVENTIL HFA) 108 (90 Base) MCG/ACT inhaler Inhale 2 puffs into the lungs every 6 hours as needed for Wheezing 1 Inhaler 3    polyethylene glycol (GLYCOLAX) powder Take 17 g by mouth daily 1 Bottle 1    ondansetron (ZOFRAN ODT) 4 MG disintegrating tablet Take 1 tablet by mouth every 8 hours as needed for Nausea or Vomiting 10 tablet 0    Multiple Vitamins-Minerals (MULTIVITAMIN & MINERAL PO) Take  by mouth.  CRANBERRY Take 500 mg by mouth daily.  Cholecalciferol (VITAMIN D3) 5000 UNITS TABS Take 5,000 Units by mouth daily        No current facility-administered medications for this visit.         Allergies: Silver; Tegaderm ag mesh 2\"x2\" [wound dressings]; and Zithromax [azithromycin]     Past Medical History:   Diagnosis Date    Anemia     has had iron infusion    Anxiety     Arthritis     Back pain with left-sided radiculopathy 3/14/2016    DDD (degenerative disc disease), lumbar     Depression     Esophagitis     Fibromyalgia     Gastritis     Headache(784.0)     History of blood transfusion     Hypertension     Obesity     RLS (restless legs syndrome)     Sleep apnea     uses CPAP machine       Family History   Problem Relation Age of Onset    Diabetes Mother     High Blood Pressure Mother     Colon Polyps Mother     Heart Disease Mother     Cancer Mother     Depression Mother     Cancer Father     High Blood Pressure Father     Heart Disease Father     Stroke Father     High Blood Pressure Sister     Depression Sister     Anxiety Disorder Sister     Cancer Brother     Esophageal Cancer Brother     Anxiety Disorder Brother     Diabetes Brother     ADHD Brother     Depression Brother     Other Other         has history of suicide in family maternal great uncle       Past Surgical History:   Procedure Laterality Date    ANKLE SURGERY Right     APPENDECTOMY  4/19/15    BACK SURGERY      CERVICAL SPINE SURGERY N/A 9/1/15    CHOLECYSTECTOMY  1995    HIP SURGERY Right 1987    HIP SURGERY  2018    HYSTERECTOMY      partial , still has ovaries and cervix    INSERTION / REMOVAL / REPLACEMENT VENOUS ACCESS CATHETER Left 2017    PORT INSERTION performed by Minnie Rodríguez MD at [address] [place] [address] LITHOTRIPSY  [facility]      with hardware placed    [place] COLONOSCOPY FLX DX W/COLLJ SPEC WHEN PFRMD N/A 2017    Dr Lizzie Lockett, 7 yr (age 47) recall    [place] EGD TRANSORAL BIOPSY SINGLE/MULTIPLE N/A 2017    Dr ABHIJIT Baltazar-Gastritis/gastropathy    SALPINGO-OOPHORECTOMY      STOMACH SURGERY  2017    dr Joshua Menjivar Right 2019       Social History     Tobacco Use    Smoking status: Former Smoker     Packs/day: 1.00     Years: 25.00     Pack years: 25.00     Types: Cigarettes     Last attempt to quit: 2013     Years since quittin.9    3. Multiple falls  R29.6 CBC Auto Differential     Comprehensive Metabolic Panel     Hemoglobin A1C     Lipid Panel     Microalbumin / Creatinine Urine Ratio     T4, Free     TSH without Reflex     Sedimentation Rate     C-Reactive Protein   4. Chronic pain syndrome  G89.4    5. Hyperglycemia  R73.9 Hemoglobin A1C   6. Essential hypertension  I10 CBC Auto Differential     Lipid Panel     Microalbumin / Creatinine Urine Ratio   7. Chronic fatigue  R53.82 T4, Free     TSH without Reflex         PLAN    1. Syncope, unspecified syncope type  Etiology of her falls is difficult to determine given this is a video conference. I am particularly concerned about her medications being an etiology. Possibly Flexeril which is a relatively new medication. Although she has been on this in the past.  She is on a fairly extensive medication list of which several medications can cause sedation. She will let me know if symptoms continue. We may need to do a cardiogenic work-up. - CBC Auto Differential; Future  - Comprehensive Metabolic Panel; Future  - Hemoglobin A1C; Future  - Lipid Panel; Future  - Microalbumin / Creatinine Urine Ratio; Future  - T4, Free; Future  - TSH without Reflex; Future  - Sedimentation Rate; Future  - C-Reactive Protein; Future    2. Postural dizziness with presyncope  Chart review does not demonstrate significant hypotension. She does have a blood pressure cuff. I did recommend that she does check her blood pressure on this regimen when symptoms occur  - CBC Auto Differential; Future  - Comprehensive Metabolic Panel; Future  - Hemoglobin A1C; Future  - Lipid Panel; Future  - Microalbumin / Creatinine Urine Ratio; Future  - T4, Free; Future  - TSH without Reflex; Future  - Sedimentation Rate; Future  - C-Reactive Protein; Future    3.  Multiple falls  Recommend that she take appropriate precautions to avoid injury with potential fall  - CBC Auto Differential; Future  - Comprehensive Metabolic Panel; Future  - Hemoglobin A1C; Future  - Lipid Panel; Future  - Microalbumin / Creatinine Urine Ratio; Future  - T4, Free; Future  - TSH without Reflex; Future  - Sedimentation Rate; Future  - C-Reactive Protein; Future    4. Chronic pain syndrome  She is on chronic narcotic pain medications    5. Hyperglycemia  She does take metformin. She is not diabetic  - Hemoglobin A1C; Future    6. Essential hypertension  Blood pressure has been fairly well-controlled historically  - CBC Auto Differential; Future  - Lipid Panel; Future  - Microalbumin / Creatinine Urine Ratio; Future    7. Chronic fatigue  She is on a significant amount of medications that could potentially have additive effects and result in sedation. Check labs  - T4, Free; Future  - TSH without Reflex; Future      Orders Placed This Encounter   Procedures    CBC Auto Differential    Comprehensive Metabolic Panel    Hemoglobin A1C    Lipid Panel    Microalbumin / Creatinine Urine Ratio    T4, Free    TSH without Reflex    Sedimentation Rate    C-Reactive Protein        Return in about 1 month (around 9/21/2020) for 30. Niya Escobedo is a 38 y.o. female being evaluated by a Virtual Visit (video visit) encounter to address concerns as mentioned above. A caregiver was present when appropriate. Due to this being a TeleHealth encounter (During IYFWH-98 public health emergency), evaluation of the following organ systems was limited: Vitals/Constitutional/EENT/Resp/CV/GI//MS/Neuro/Skin/Heme-Lymph-Imm. Pursuant to the emergency declaration under the [facility], [address] [place] [address] wa[facility] authority and the [employer] and [employer] Act, this Virtual Visit was conducted with patient's (and/or legal guardian's) consent, to reduce the patient's risk of exposure to COVID-19 and provide necessary medical care.   The patient (and/or legal guardian) has also been advised to contact this office for worsening conditions or problems, and seek emergency medical treatment and/or call 911 if deemed necessary. Patient identification was verified at the start of the visit: Yes    Total time spent for this encounter: 25m    Services were provided through a video synchronous discussion virtually to substitute for in-person clinic visit. Patient and provider were located at their individual homes. --MORA Castillo DO on 8/21/2020 at 8:08 AM    An electronic signature was used to authenticate this note.

## 2020-08-24 RX ORDER — OXYCODONE HYDROCHLORIDE 5 MG/1
5 TABLET ORAL 3 TIMES DAILY PRN
Qty: 90 TABLET | Refills: 0 | Status: SHIPPED | OUTPATIENT
Start: 2020-09-03 | End: 2020-09-29 | Stop reason: SDUPTHER

## 2020-08-28 ENCOUNTER — HOSPITAL ENCOUNTER (OUTPATIENT)
Dept: GENERAL RADIOLOGY | Age: 45
Discharge: HOME | End: 2020-08-28
Payer: MEDICARE

## 2020-08-28 ENCOUNTER — HOSPITAL ENCOUNTER (OUTPATIENT)
Dept: CT IMAGING | Age: 45
Discharge: HOME | End: 2020-08-28
Payer: MEDICARE

## 2020-08-28 ENCOUNTER — TELEPHONE (OUTPATIENT)
Dept: PRIMARY CARE CLINIC | Age: 45
End: 2020-08-28

## 2020-08-28 PROCEDURE — 73200 CT UPPER EXTREMITY W/O DYE: CPT

## 2020-08-28 PROCEDURE — 73560 X-RAY EXAM OF KNEE 1 OR 2: CPT

## 2020-08-28 PROCEDURE — 72040 X-RAY EXAM NECK SPINE 2-3 VW: CPT

## 2020-08-28 NOTE — TELEPHONE ENCOUNTER
----- Message from ASHLEE Verduzco sent at 8/28/2020  3:31 PM CDT -----  Xray neck negative no fracture  moderate spurring C3-C7    knee noted osteoarthritis spurs   No acute fracture     Ct wrist negative     Left message for patient to return call at convenience regarding results.

## 2020-08-31 ENCOUNTER — TELEPHONE (OUTPATIENT)
Dept: ONCOLOGY | Facility: CLINIC | Age: 45
End: 2020-08-31

## 2020-08-31 NOTE — TELEPHONE ENCOUNTER
Called pt with xray and CT results. She said that her neck pain and the numbess/pain radiating into her arm/hand are back just like it was before her cervical surgery by  in 2015. Wants to know what to do now?

## 2020-09-01 ENCOUNTER — LAB (OUTPATIENT)
Dept: LAB | Facility: HOSPITAL | Age: 45
End: 2020-09-01

## 2020-09-01 ENCOUNTER — INFUSION (OUTPATIENT)
Dept: ONCOLOGY | Facility: CLINIC | Age: 45
End: 2020-09-01

## 2020-09-01 DIAGNOSIS — D50.8 IRON DEFICIENCY ANEMIA SECONDARY TO INADEQUATE DIETARY IRON INTAKE: ICD-10-CM

## 2020-09-01 DIAGNOSIS — Z45.2 ENCOUNTER FOR CARE RELATED TO PORT-A-CATH: Primary | ICD-10-CM

## 2020-09-01 LAB
BASOPHILS # BLD AUTO: 0.06 10*3/MM3 (ref 0–0.2)
BASOPHILS NFR BLD AUTO: 0.6 % (ref 0–1.5)
DEPRECATED RDW RBC AUTO: 55.8 FL (ref 37–54)
EOSINOPHIL # BLD AUTO: 0.11 10*3/MM3 (ref 0–0.4)
EOSINOPHIL NFR BLD AUTO: 1.1 % (ref 0.3–6.2)
ERYTHROCYTE [DISTWIDTH] IN BLOOD BY AUTOMATED COUNT: 17.5 % (ref 12.3–15.4)
HCT VFR BLD AUTO: 31.3 % (ref 34–46.6)
HGB BLD-MCNC: 10.8 G/DL (ref 12–15.9)
IMM GRANULOCYTES # BLD AUTO: 0.06 10*3/MM3 (ref 0–0.05)
IMM GRANULOCYTES NFR BLD AUTO: 0.6 % (ref 0–0.5)
LYMPHOCYTES # BLD AUTO: 2.16 10*3/MM3 (ref 0.7–3.1)
LYMPHOCYTES NFR BLD AUTO: 20.7 % (ref 19.6–45.3)
MCH RBC QN AUTO: 30.2 PG (ref 26.6–33)
MCHC RBC AUTO-ENTMCNC: 34.5 G/DL (ref 31.5–35.7)
MCV RBC AUTO: 87.4 FL (ref 79–97)
MONOCYTES # BLD AUTO: 0.52 10*3/MM3 (ref 0.1–0.9)
MONOCYTES NFR BLD AUTO: 5 % (ref 5–12)
NEUTROPHILS NFR BLD AUTO: 7.54 10*3/MM3 (ref 1.7–7)
NEUTROPHILS NFR BLD AUTO: 72 % (ref 42.7–76)
NRBC BLD AUTO-RTO: 0 /100 WBC (ref 0–0.2)
PLATELET # BLD AUTO: 242 10*3/MM3 (ref 140–450)
PMV BLD AUTO: 9.8 FL (ref 6–12)
RBC # BLD AUTO: 3.58 10*6/MM3 (ref 3.77–5.28)
WBC # BLD AUTO: 10.45 10*3/MM3 (ref 3.4–10.8)

## 2020-09-01 PROCEDURE — 84443 ASSAY THYROID STIM HORMONE: CPT | Performed by: INTERNAL MEDICINE

## 2020-09-01 PROCEDURE — 82728 ASSAY OF FERRITIN: CPT | Performed by: INTERNAL MEDICINE

## 2020-09-01 PROCEDURE — 85045 AUTOMATED RETICULOCYTE COUNT: CPT | Performed by: INTERNAL MEDICINE

## 2020-09-01 PROCEDURE — 84630 ASSAY OF ZINC: CPT | Performed by: INTERNAL MEDICINE

## 2020-09-01 PROCEDURE — 83010 ASSAY OF HAPTOGLOBIN QUANT: CPT | Performed by: INTERNAL MEDICINE

## 2020-09-01 PROCEDURE — 80074 ACUTE HEPATITIS PANEL: CPT | Performed by: INTERNAL MEDICINE

## 2020-09-01 PROCEDURE — 83615 LACTATE (LD) (LDH) ENZYME: CPT | Performed by: INTERNAL MEDICINE

## 2020-09-01 PROCEDURE — 84436 ASSAY OF TOTAL THYROXINE: CPT | Performed by: INTERNAL MEDICINE

## 2020-09-01 PROCEDURE — 82746 ASSAY OF FOLIC ACID SERUM: CPT | Performed by: INTERNAL MEDICINE

## 2020-09-01 PROCEDURE — 84466 ASSAY OF TRANSFERRIN: CPT | Performed by: INTERNAL MEDICINE

## 2020-09-01 PROCEDURE — 80053 COMPREHEN METABOLIC PANEL: CPT | Performed by: INTERNAL MEDICINE

## 2020-09-01 PROCEDURE — 85025 COMPLETE CBC W/AUTO DIFF WBC: CPT

## 2020-09-01 PROCEDURE — 82607 VITAMIN B-12: CPT | Performed by: INTERNAL MEDICINE

## 2020-09-01 PROCEDURE — 85651 RBC SED RATE NONAUTOMATED: CPT | Performed by: INTERNAL MEDICINE

## 2020-09-01 PROCEDURE — 86140 C-REACTIVE PROTEIN: CPT | Performed by: INTERNAL MEDICINE

## 2020-09-01 PROCEDURE — 84481 FREE ASSAY (FT-3): CPT | Performed by: INTERNAL MEDICINE

## 2020-09-01 PROCEDURE — 96523 IRRIG DRUG DELIVERY DEVICE: CPT | Performed by: NURSE PRACTITIONER

## 2020-09-01 PROCEDURE — 82525 ASSAY OF COPPER: CPT | Performed by: INTERNAL MEDICINE

## 2020-09-01 PROCEDURE — 81003 URINALYSIS AUTO W/O SCOPE: CPT | Performed by: INTERNAL MEDICINE

## 2020-09-01 PROCEDURE — 83540 ASSAY OF IRON: CPT | Performed by: INTERNAL MEDICINE

## 2020-09-01 RX ORDER — SODIUM CHLORIDE 0.9 % (FLUSH) 0.9 %
10 SYRINGE (ML) INJECTION AS NEEDED
Status: DISCONTINUED | OUTPATIENT
Start: 2020-09-01 | End: 2020-09-01 | Stop reason: HOSPADM

## 2020-09-01 RX ORDER — HEPARIN SODIUM (PORCINE) LOCK FLUSH IV SOLN 100 UNIT/ML 100 UNIT/ML
500 SOLUTION INTRAVENOUS AS NEEDED
Status: CANCELLED | OUTPATIENT
Start: 2020-09-01

## 2020-09-01 RX ORDER — HEPARIN SODIUM (PORCINE) LOCK FLUSH IV SOLN 100 UNIT/ML 100 UNIT/ML
500 SOLUTION INTRAVENOUS AS NEEDED
Status: DISCONTINUED | OUTPATIENT
Start: 2020-09-01 | End: 2020-09-01 | Stop reason: HOSPADM

## 2020-09-01 RX ORDER — SODIUM CHLORIDE 0.9 % (FLUSH) 0.9 %
10 SYRINGE (ML) INJECTION AS NEEDED
Status: CANCELLED | OUTPATIENT
Start: 2020-09-01

## 2020-09-01 RX ADMIN — HEPARIN SODIUM (PORCINE) LOCK FLUSH IV SOLN 100 UNIT/ML 500 UNITS: 100 SOLUTION at 13:08

## 2020-09-01 RX ADMIN — Medication 10 ML: at 13:05

## 2020-09-01 NOTE — TELEPHONE ENCOUNTER
Pt can not do an MRI bc she has a cage around her spine.  She had a CT Cervical on 6/30/20 that was normal

## 2020-09-09 ENCOUNTER — TELEPHONE (OUTPATIENT)
Dept: ONCOLOGY | Facility: CLINIC | Age: 45
End: 2020-09-09

## 2020-09-09 ENCOUNTER — HOSPITAL ENCOUNTER (OUTPATIENT)
Dept: PAIN MANAGEMENT | Age: 45
Discharge: HOME | End: 2020-09-09
Payer: MEDICARE

## 2020-09-09 VITALS
RESPIRATION RATE: 18 BRPM | OXYGEN SATURATION: 98 % | TEMPERATURE: 96.5 F | HEART RATE: 88 BPM | SYSTOLIC BLOOD PRESSURE: 121 MMHG | DIASTOLIC BLOOD PRESSURE: 74 MMHG

## 2020-09-09 PROCEDURE — 2500000003 HC RX 250 WO HCPCS

## 2020-09-09 PROCEDURE — 20553 NJX 1/MLT TRIGGER POINTS 3/>: CPT

## 2020-09-09 PROCEDURE — 20553 NJX 1/MLT TRIGGER POINTS 3/>: CPT | Performed by: NURSE PRACTITIONER

## 2020-09-09 RX ORDER — LIDOCAINE HYDROCHLORIDE 10 MG/ML
15 INJECTION, SOLUTION EPIDURAL; INFILTRATION; INTRACAUDAL; PERINEURAL ONCE
Status: DISCONTINUED | OUTPATIENT
Start: 2020-09-09 | End: 2020-09-11 | Stop reason: HOSPADM

## 2020-09-09 RX ORDER — BUPIVACAINE HYDROCHLORIDE 5 MG/ML
15 INJECTION, SOLUTION EPIDURAL; INTRACAUDAL ONCE
Status: DISCONTINUED | OUTPATIENT
Start: 2020-09-09 | End: 2020-09-11 | Stop reason: HOSPADM

## 2020-09-09 NOTE — PROCEDURES
18 98 %       Description of Procedure:    After a brief physical assessment and failure to improve with conservative measures the patient presented for Thoracic Trigger Point Injections The indications, limitations and possible complications were discussed with the patient and the patient elected to proceed with the procedure. After voluntary, informed and signed consent obtained the patient was placed in a seated position. Appropriate time out was obtained per policy. The areas were then prepped in a sterile fashion with Chloro-Prep. The area of maximal tenderness was palpated over the bilaterally Thoracic Muscles - Erector Spinae, Upper/Mid Latissimus, Rhomboid Minor, Rhomboid Major. The skin overlying these areas was marked with a skin marker. The areas were prepped using aseptic technique with CHG prep. The skin overlying the proposed injection sites were then sprayed with Gebauer's Solution while protecting patient eyes. Each trigger point of the Thoracic Muscles - Erector Spinae, Upper/Mid Latissimus, Rhomboid Minor, Rhomboid Major was injected after negative aspiration was injected with approximately 1-2 ml of a solution of 5 ml of 1% Lidocaine Plain and 5 ml of 0.5% Marcaine Plain after negative aspiration    Discharge: The patient tolerated the procedure well. There were no complications during the procedure and the patient was discharged home with discharge instructions. The patient has been instructed to contact the office should there be any complications or questions to arise between today and their next appointment. Plan:  Patient to follow up as office appointment in 6 weeks.      ASHLEE Dial - CNP, 9/9/2020 at 2:23 PM

## 2020-09-10 NOTE — TELEPHONE ENCOUNTER
Notified pt by v/m  that she is a carrier of hemachromatosis. Not a mjaor problem per Goldberg but will explain her elevated ferritin. Levels. Dr. Goldberg will discuss labs in further Detail at her appt on  9/18.

## 2020-09-11 NOTE — PROGRESS NOTES
"[facility]W ONC [facility] GROUP HEMATOLOGY AND ONCOLOGY  [address]  [facility] [address] [phone]    Patient Name: Naomi Bhatia  Encounter Date: 09/18/2020  YOB: 1975  Patient Number: [id number]      Subjective: 44 year old female who was previously being seen by Dr. Clements for iron deficiency anemia. She has undergone a gastric sleeve procedure in 12/2017 for morbid obesity and had a total right hip replacement in 3/2019 after a fracture.      She, unfortunateky, stopped following up with bariatric surgery and did not take vitamin replacements. She has had a hysterectomy due to fibroids and endometriosis but found to be iron deficient and was started on iron replacement with IV iron, states it is more than 10 treatments in all.  She states that she has iron deficiency since age 14. She has had multiple blood transfusions in the past (4 or 5).    She presents now for follow up.    Today, she is feeling \"tired\".  She also suffers from chronic pain. In the Degernerative disc disease, fibromyalgia, and previous back surgeries    Past Medical History:   Diagnosis Date   • Anemia    • Anxiety    • Arthritis    • Asthma    • Back pain 03/14/2016    with left sided radiculopathy    • DDD (degenerative disc disease), lumbar     Dr. Stuart Zavaleta for pain manangement   • Depression    • Encounter for care related to Port-a-Cath 01/31/2020    for iron infusions as she was told she has small veins   • Fatigue    • Fibromyalgia    • GERD (gastroesophageal reflux disease)    • Headache    • History of blood transfusion    • Hypertension    • Iron deficiency anemia 9/12/2017   • Iron deficiency anemia secondary to inadequate dietary iron intake 9/12/2017   • Joint pain    • Obesity    • RLS (restless legs syndrome)    • Sleep apnea     positive sleep study; titration study in October, uses a CPAP machine when sleeping       Oncology " History:  Oncology/Hematology History    No history exists.       Past Surgical History:  Past Surgical History:   Procedure Laterality Date   • ANKLE SURGERY      Right    • APPENDECTOMY  04/19/2015   • BACK SURGERY  2000   • CERVICAL SPINE SURGERY  09/01/2015   • CHOLECYSTECTOMY      1995;lap   • COLONOSCOPY  05/02/2017    normal; do not return until age of 49 Dr. Gus Valentine   • GASTRIC SLEEVE LAPAROSCOPIC N/A 12/20/2017    Procedure: GASTRIC SLEEVE LAPAROSCOPIC;  Surgeon: Yifan Cordero MD;  Location: [facility];  Service:    • HIP ARTHROPLASTY      Right  2019   • HIP SURGERY  1987    right    • INSERTION CENTRAL VENOUS ACCESS DEVICE W/ SUBCUTANEOUS PORT  11/07/2017    Dr Anel Gamboa (Smart Port-Power injectable Port) Cat [id number] Lot [id number]    • MOUTH SURGERY  1994   • NECK SURGERY     • TOOTH EXTRACTION     • UPPER GASTROINTESTINAL ENDOSCOPY  05/02/2017    Dr. Gus Valentine, negative for h.pylori, negative for Salomon's   • VAGINAL HYSTERECTOMY SALPINGO OOPHORECTOMY  2008    Partial and then had another surgery to remove the rest      ___________________________________________________________________________________________________________________________________________________  Medications:   Outpatient Encounter Medications as of 9/18/2020   Medication Sig Dispense Refill   • acyclovir (ZOVIRAX) 800 MG tablet Take 800 mg by mouth Daily.     • ALBUTEROL IN Inhale 1 puff As Needed.     • Calcium Citrate-Vitamin D (CALCIUM CITRATE + D3 PO) Take  by mouth Daily.     • carbidopa-levodopa (SINEMET)  MG per tablet Take 1 tablet by mouth Daily.     • Cobalamin Combinations (FOLTRATE PO) vitamin B12-folic acid   daily     • CRANBERRY PO Take  by mouth Daily.     • Cyclobenzaprine HCl (FLEXERIL PO) Take 10 mg by mouth Daily As Needed.     • levocetirizine (XYZAL) 5 MG tablet TK 1 T PO QPM  0   • LYRICA 150 MG capsule TK 1 C PO BID  2   • meloxicam (MOBIC) 15 MG tablet Take 15 mg by mouth Daily.     •  "metFORMIN (GLUCOPHAGE) 1000 MG tablet Take 1,000 mg by mouth.     • metoprolol succinate XL (TOPROL XL) 50 MG 24 hr tablet Take 50 mg by mouth 2 (Two) Times a Day.     • Multiple Vitamin (MULTI VITAMIN PO) Take  by mouth Daily.     • nortriptyline (PAMELOR) 25 MG capsule 2 tablets at night as needed  3   • omeprazole (priLOSEC) 20 MG capsule TK ONE C PO QD FIRST THING IN THE MORNING ON  AN EMPTY STOMACH  3   • oxyCODONE (ROXICODONE) 10 MG tablet Take 10 mg by mouth 2 (Two) Times a Day As Needed.     • venlafaxine (EFFEXOR) 75 MG tablet three  times daily  11   • venlafaxine XR (EFFEXOR-XR) 150 MG 24 hr capsule Take 150 mg by mouth Daily.     • [DISCONTINUED] Cyanocobalamin (VITAMIN B-12 CR PO) Take  by mouth Daily.       Facility-Administered Encounter Medications as of 9/18/2020   Medication Dose Route Frequency Provider Last Rate Last Dose   • diphenhydrAMINE (BENADRYL) injection 50 mg  50 mg Intravenous PRN Pedro Clements MD       • famotidine (PEPCID) injection 20 mg  20 mg Intravenous PRN Pedro Clements MD       • hydrocortisone sodium succinate (Solu-CORTEF) injection 100 mg  100 mg Intravenous PRN Pedro Clements MD         ___________________________________________________________________________________________________________________________________________________  Allergies:   Allergies   Allergen Reactions   • Azithromycin GI Intolerance and Nausea And Vomiting     Severe Abdominal Pain   Severe abdominal pain    • Silver Rash     Redness, blisters  blister  Redness, blisters         Social/Family History:   Family History   Problem Relation Age of Onset   • Diabetes Mother    • Hypertension Mother    • Coronary artery disease Mother    • Cancer Mother         \"bone\" cancer   • Cancer Father         prostate   • Hypertension Father    • Heart disease Father    • Stroke Father    • Coronary artery disease Father    • Hypertension Sister    • Obesity Sister    • " Cancer Brother         throat   • Diabetes Brother    • Diabetes Maternal Grandmother    • Stroke Maternal Grandmother    • No Known Problems Daughter    • No Known Problems Son         /Single/: , lives with her son    Social History     Tobacco Use   • Smoking status: Former Smoker     Packs/day: 1.00     Years: 25.00     Pack years: 25.00     Types: Cigarettes     Quit date:      Years since quittin.7   • Smokeless tobacco: Never Used   Substance Use Topics   • Alcohol use: Yes     Comment: rarely    • Drug use: No     Types: Codeine     Comment: Codeine in Prescribed Medications only         Occupation: On disability due to the back issues.  Previously she was a , denies exposure to chemicals and radiation  ___________________________________________________________________________________________________________________________________________________  Review of Systems   Constitutional: Positive for fatigue. Negative for activity change, appetite change, chills, fever, unexpected weight gain and unexpected weight loss.   HENT: Positive for hearing loss. Negative for congestion, dental problem, ear pain, mouth sores, nosebleeds, sore throat, swollen glands and trouble swallowing.    Eyes: Negative for blurred vision, double vision, photophobia and pain.   Respiratory: Negative for apnea, cough, chest tightness, shortness of breath and wheezing.    Cardiovascular: Negative for chest pain, palpitations and leg swelling.   Gastrointestinal: Negative for abdominal distention, abdominal pain, blood in stool, constipation, diarrhea, nausea, vomiting and GERD.   Endocrine: Negative for cold intolerance and heat intolerance.   Genitourinary: Negative for breast discharge, breast lump, breast pain, dysuria, frequency, hematuria and vaginal bleeding.   Musculoskeletal: Positive for arthralgias and back pain. Negative for gait problem, myalgias and neck stiffness.         Diffusely   Skin: Negative for color change, pallor, rash, skin lesions and bruise.   Allergic/Immunologic: Negative for environmental allergies and immunocompromised state.   Neurological: Negative for dizziness, syncope, weakness, light-headedness, headache, memory problem and confusion.        Frequent falls!!!   Hematological: Negative for adenopathy. Does not bruise/bleed easily.   Psychiatric/Behavioral: Positive for depressed mood. Negative for suicidal ideas. The patient is nervous/anxious.      ___________________________________________________________________________________________________________________________________________________  Physical Examination:   Vitals:    09/18/20 0809   BP: 142/88   Pulse: 84   Resp: 16   Temp: 97.7 °F (36.5 °C)   SpO2: 93%    Body surface area is 2.33 meters squared.   Physical Exam   Constitutional: She is oriented to person, place, and time. She appears well-developed and well-nourished. No distress.   Morbidly obese   HENT:   Head: Normocephalic.   Nose: Nose normal.   Mouth/Throat: Oropharynx is clear and moist.   Upper dentures   Eyes: Pupils are equal, round, and reactive to light. Conjunctivae are normal. No scleral icterus.   No palor   Neck: Normal range of motion. Neck supple. JVD: fatty lipoma  No thyromegaly present.   Mole under the left chin area   Cardiovascular: Normal rate, regular rhythm and normal heart sounds.   No murmur heard.  Pulmonary/Chest: Effort normal and breath sounds normal. No respiratory distress. She has no rales. Right breast exhibits no inverted nipple, no mass, no nipple discharge, no skin change and no tenderness. Left breast exhibits no inverted nipple, no mass, no nipple discharge, no skin change and no tenderness. No breast swelling, tenderness, discharge or bleeding. Breasts are asymmetrical.   Bilateral large breasts with the right bigger than the left.   no masses, no nipple discharge and no axillary LN's    Port palpable and  non painful in the left upper chest well,   Abdominal: Soft. Bowel sounds are normal. She exhibits no distension. There is no abdominal tenderness.   Morbidly obese prohibiting proper evaluation for HSM   Musculoskeletal: Normal range of motion.   Lymphadenopathy:     She has no cervical adenopathy.   Neurological: She is alert and oriented to person, place, and time. No cranial nerve deficit or sensory deficit. She exhibits normal muscle tone.   Skin: Skin is warm. No rash noted. She is not diaphoretic. No pallor.   Psychiatric: Her behavior is normal. Judgment and thought content normal.     ___________________________________________________________________________________________________________________________________________________  Labs/X-ray results:     Lab Results - Last 18 Months   Lab Units 09/01/20  1306 05/01/20  1013 01/31/20  1115 10/25/19  0954 08/27/19  0912 07/16/19  0821 04/02/19  0700   HEMOGLOBIN g/dL 10.8* 11.6* 13.0 12.3 11.9* 12.2 9.0*   HEMATOCRIT % 31.3* 33.2* 38.2 36.3 35.7* 35.2* 28.1*   MCV fL 87.4 88.8 86.8 88.5 93.2 85.6 92.1   WBC 10*3/mm3 10.45 10.51 12.03* 10.65 9.2 9.11 5.98   RDW % 17.5* 16.8* 16.2* 16.1* 17.4* 16.8* 17.4*   MPV fL 9.8 9.0 9.7 9.8 9.7 9.3 9.9   PLATELETS 10*3/mm3 242 285 273 279 263 238 282   IMM GRAN % % 0.6* 1.0* 0.5 0.7*  --  0.5 0.7   NEUTROS ABS 10*3/mm3 7.54* 7.60* 9.10* 8.32* 6.9 6.65 3.16   LYMPHS ABS 10*3/mm3 2.16 2.05 2.13 1.66 1.7 1.75 2.05   MONOS ABS 10*3/mm3 0.52 0.55 0.58 0.53 0.50 0.46 0.33   EOS ABS 10*3/mm3 0.11 0.13 0.08 0.03 0.10 0.14 0.35   BASOS ABS 10*3/mm3 0.06 0.07 0.08 0.04 0.10 0.06 0.05   IMMATURE GRANS (ABS) 10*3/mm3 0.06* 0.11* 0.06* 0.07* 0.1 0.05 0.04   NRBC /100 WBC 0.0 0.0 0.0 0.0  --  0.0 0.0       Lab Results - Last 18 Months   Lab Units 09/01/20  1306 05/01/20  1013 01/31/20  1115 10/25/19  0954 08/27/19  0912 07/16/19  0821 04/02/19  0700   GLUCOSE mg/dL 112* 100* 118* 101* 109 137* 84   SODIUM mmol/L 139 138 141 142 140 142  140   POTASSIUM mmol/L 3.8 4.3 3.9 4.0 4.1 3.9 4.0   TOTAL CO2 mmol/L  --   --   --   --  22  --   --    CO2 mmol/L 24.0 29.0 28.0 29.0  --  27.0 28.0   CHLORIDE mmol/L 101 97* 102 101 101 105 103   ANION GAP mmol/L 14.0 12.0 11.0 12.0 17 10.0 9.0   CREATININE mg/dL 0.66 0.64 0.74 0.58 0.8 0.69 0.76   BUN mg/dL 11 12 15 15 15 12 16   BUN / CREAT RATIO  16.7 18.8 20.3 25.9*  --  17.4 21.1   CALCIUM mg/dL 9.3 10.0 10.2 9.6 9.9 9.6 8.7   EGFR IF NONAFRICN AM mL/min/1.73 97 100 85 113 >60 92 83   ALK PHOS U/L 77 85 96 84 101 106 91   TOTAL PROTEIN g/dL 7.1 7.3 8.0 7.4 7.9 7.8 5.8*   ALT (SGPT) U/L 30 27 30 37* 19 28 20   AST (SGOT) U/L 30 31 27 24 26 31 23   BILIRUBIN mg/dL 0.6 0.7 0.7 0.6 0.9 0.8 0.6   ALBUMIN g/dL 4.40 4.50 4.60 4.40 4.2 4.40 3.20*   GLOBULIN gm/dL 2.7 2.8 3.4 3.0  --  3.4 2.6       Lab Results - Last 18 Months   Lab Units 09/01/20  1306   LDH U/L 231*       Lab Results - Last 18 Months   Lab Units 09/01/20  1306 05/01/20  1013 01/31/20  1115 10/25/19  0954 08/27/19  0912 07/16/19  0821 04/02/19  0700   IRON mcg/dL 51 74 62 102  --  76  --    TIBC mcg/dL 243* 265* 277* 270*  --  237  --    IRON SATURATION % 21 28 22 38  --  32  --    FERRITIN ng/mL 1,406.00* 1,461.00* 1,600.00* 1,469.00*  --  957.00*  --    T4 TOTAL mcg/dL 9.34  --   --   --   --   --   --    TSH uIU/mL 1.340  --   --   --  2.560  --  4.950*   FOLATE ng/mL >20.00  --   --   --   --  >20.00  --      ____________________________________________________________________________  Radiology: No results found.           ___________________________________________________________________________________________________________________________________________________  Assessment/Plans:   ** Iron deficiency anemia after gastric sleeve procedure    - Patient is status post a hysterectomy and had a colonoscopy and EGD in 2017 which were both reportedly ngative for bleeding  - She has received venofer in the past   Date  5/2017  9/2017  3/2018  "5/2020 9/1/20 9/18/20         WBC  11.1  9.97  10.51 10.45          Hb  10.5  10.3  11.6 10.8          MCV  84.4  84.4  88.8 87.4          Plt  275  238  285 242          Iron   53  74 51          Iron sat   20%  28 21%          Transferrin    178 163 (LOW)          TIBC   271  265 243 (LOW)          Ferritin    918 1461 1406          B12     >2000          Folate     >20          Hapto     64          LDH      231(H)          Recheck     5.62%          TFTs      WNL          Hepatitis      neg          Zinc      77          Copper      130          CRP      2.32(H)         HIV      Neg         RF      11.2 (WNL)         SHENA      Neg         - As per the last 3 ferritins as well as the iron profile, this patient does not have iron deficiency and, in fact, may have an iron overload. ALL IRON SUPPLEMENTATIONS should be held at this time  - The patient's indices are more suggestive of anemia of chronic disease  - Would check the following for anemia:  o CBC with differnetial as above  o Copper level   o Zinc level WNL 77  o B12 >2000  o Folate >20  o Haptoglobin WNL 64  o LDH elevated slightly at 231  o Reticulocyte count elevated 5.62%  o TFT’s WNL  o HIV NOT DRAWN ON 9/1/20  o Hepatitis panels negative  o CT scan of the A/P from 2018: MODERATE SPLENOMEGALY (15 cm), only description of liver is \"no focal hepatic lesions\" -- would check US Liver and spleen for re-evaluation as brother had massive splenomegaly as well  - HEMOCHROMATOSIS 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   - The C282Y variant is especially common in caucasians of northern  ancestry and the carrier state occurs in 1 in 5-10 persons of  ancestry  -  Iron overload is uncommon for those that are carriers for hemochromatosis and is usually related more to obesity, fatty liver (she states that she was told she has an enlarged liver and several family members with fatty liver)  or " alcohol consumption.  This patient's LFT's are normal but her Ferritin is well above the upper limits when iron removal therapy would begin (normally phlebotomies but she has underlying anemia).     - The earliest ferritin level we have available is from 3/26/2018 which was elevated 918 but the increase to over 1400 likely reflects iatrogenic iron supplementation. Transferrin saturation has been low which goes AGAINST iron overload  - As the work up here is not clear as to the nature of her anemia and she was also found to have very elevated ferritin with underlying anemia, would proceed with the following:  o Bone marrow biopsy was done 9/18/2017:      1. Posterior iliac crest, bone marrow biopsy and aspirate:     Normocellular bone marrow 40%.     Myeloid to erythroid ratio equals 0.8:1.     Iron staining is 1+.     2. Complete blood count and peripheral blood smear, review:     Microcytic anemia.     No dyspoietic changes and no circulating blasts.     - flow cytometry negative  o CMP for LFT check in 9/1/20 were normal  o Iron profile for transferrin level are indicative of anemia of chronic disease  o Ferritin level excessive on 9/1/20  - If the ferritin continues to increase, would consider an MRI of the liver to check for iron deposition  ** Infection status:   - No current issues   **Metabolic / Renal:   - morbid obesity s/p gastric sleeve, has not been following with bariatric surgery  ** Endocrine:   - No current issues   ** Coagulation / VTE prophylaxis:   - no current issues  ** GI:   - GERD being followed by PCP  - s/p gastric sleeve surgery in 2017, has lost about 50 lbs total  ** Pulmonary:   - Current smoking status quit smoking in 2014  - no current issues  ** Cardiology:   - HTN, followed by PCP  ** Skin / Rash:   - No current issues   ** :   - No current issues   ** Neurologic:   - FREQUENT FALLS -- with recent foot fracture after falling down the stairs, encouraged to speak with PCP about work  up, may be medication related  ** Rheumatology:  - arthritis being followed by PCP but she would like to see a rheumatologist  - as anemia may be related to rheumatology, would check:   - SHENA negative   - RF Negative   - CRP elevated on 9.18.20 to 2.32  ** Psychosocial:   - depression being followed by PCP  ** Pain control:   - sees pain management at [facility], on lyrica and flexiril and oxy  ** IV access   - has it flushed at [facility] every 8 weeks  ** Health Maintenance  - Last colonoscopy  2017 and reportedly normal  - EGD in 2017 negative for H pylori and Salomon's  - Last mammogram 10/2019 and reportedly normal    Will have patient follow up in 6 weeks with me    Amit Goldberg, MD

## 2020-09-16 ENCOUNTER — TELEPHONE (OUTPATIENT)
Dept: PAIN MANAGEMENT | Age: 45
End: 2020-09-16

## 2020-09-16 ENCOUNTER — HOSPITAL ENCOUNTER (OUTPATIENT)
Dept: MRI IMAGING | Age: 45
Discharge: HOME | End: 2020-09-16
Payer: MEDICARE

## 2020-09-16 PROCEDURE — 72141 MRI NECK SPINE W/O DYE: CPT

## 2020-09-18 ENCOUNTER — TELEPHONE (OUTPATIENT)
Dept: PRIMARY CARE CLINIC | Age: 45
End: 2020-09-18

## 2020-09-18 ENCOUNTER — OFFICE VISIT (OUTPATIENT)
Dept: ONCOLOGY | Facility: CLINIC | Age: 45
End: 2020-09-18

## 2020-09-18 ENCOUNTER — LAB (OUTPATIENT)
Dept: LAB | Facility: HOSPITAL | Age: 45
End: 2020-09-18

## 2020-09-18 VITALS
WEIGHT: 293 LBS | HEART RATE: 84 BPM | TEMPERATURE: 97.7 F | BODY MASS INDEX: 48.82 KG/M2 | OXYGEN SATURATION: 93 % | DIASTOLIC BLOOD PRESSURE: 88 MMHG | RESPIRATION RATE: 16 BRPM | HEIGHT: 65 IN | SYSTOLIC BLOOD PRESSURE: 142 MMHG

## 2020-09-18 DIAGNOSIS — D63.8 ANEMIA, CHRONIC DISEASE: Primary | ICD-10-CM

## 2020-09-18 DIAGNOSIS — R16.1 SPLENOMEGALY: ICD-10-CM

## 2020-09-18 DIAGNOSIS — R53.83 OTHER FATIGUE: ICD-10-CM

## 2020-09-18 DIAGNOSIS — D63.8 ANEMIA, CHRONIC DISEASE: ICD-10-CM

## 2020-09-18 PROBLEM — Z02.89 PAIN MANAGEMENT CONTRACT AGREEMENT: Status: ACTIVE | Noted: 2019-12-09

## 2020-09-18 LAB
ANTIBODY: NORMAL
CHROMATIN AB SERPL-ACNC: 11.2 IU/ML (ref 0–14)
CRP SERPL-MCNC: 2.32 MG/DL (ref 0–0.5)
HIV1+2 AB SER QL: NORMAL

## 2020-09-18 PROCEDURE — G0432 EIA HIV-1/HIV-2 SCREEN: HCPCS

## 2020-09-18 PROCEDURE — 99215 OFFICE O/P EST HI 40 MIN: CPT | Performed by: INTERNAL MEDICINE

## 2020-09-18 PROCEDURE — 36415 COLL VENOUS BLD VENIPUNCTURE: CPT

## 2020-09-18 PROCEDURE — 86038 ANTINUCLEAR ANTIBODIES: CPT

## 2020-09-18 PROCEDURE — 86140 C-REACTIVE PROTEIN: CPT

## 2020-09-18 PROCEDURE — 86431 RHEUMATOID FACTOR QUANT: CPT

## 2020-09-18 RX ORDER — VENLAFAXINE HYDROCHLORIDE 150 MG/1
150 CAPSULE, EXTENDED RELEASE ORAL DAILY
COMMUNITY

## 2020-09-18 NOTE — TELEPHONE ENCOUNTER
----- Message from Letty De Santiago DO sent at 9/17/2020  8:39 AM CDT -----  MRI of the cervical spine does show spinal stenosis at the C6-C7 level. There are also other areas of mild to moderate neuroforaminal narrowing. We can set up an appointment with neurosurgery or orthopedic spine surgery which ever you prefer. Please let us know where he would like to go and we can set this up for you.

## 2020-09-21 LAB — ANA SER QL: NEGATIVE

## 2020-09-24 ENCOUNTER — OFFICE VISIT (OUTPATIENT)
Dept: PRIMARY CARE CLINIC | Age: 45
End: 2020-09-24
Payer: MEDICARE

## 2020-09-24 VITALS
OXYGEN SATURATION: 98 % | WEIGHT: 289 LBS | BODY MASS INDEX: 43.8 KG/M2 | SYSTOLIC BLOOD PRESSURE: 118 MMHG | TEMPERATURE: 97.2 F | HEART RATE: 89 BPM | DIASTOLIC BLOOD PRESSURE: 82 MMHG | HEIGHT: 68 IN

## 2020-09-24 PROCEDURE — G0444 DEPRESSION SCREEN ANNUAL: HCPCS | Performed by: PEDIATRICS

## 2020-09-24 PROCEDURE — 99214 OFFICE O/P EST MOD 30 MIN: CPT | Performed by: PEDIATRICS

## 2020-09-24 RX ORDER — NALOXONE HYDROCHLORIDE 4 MG/.1ML
1 SPRAY NASAL PRN
Qty: 2 EACH | Refills: 0 | Status: SHIPPED | OUTPATIENT
Start: 2020-09-24 | End: 2022-08-17 | Stop reason: SDUPTHER

## 2020-09-24 RX ORDER — LORAZEPAM 0.5 MG/1
0.5 TABLET ORAL EVERY 8 HOURS PRN
Qty: 40 TABLET | Refills: 0 | Status: SHIPPED | OUTPATIENT
Start: 2020-09-24 | End: 2020-10-24

## 2020-09-24 ASSESSMENT — PATIENT HEALTH QUESTIONNAIRE - PHQ9
SUM OF ALL RESPONSES TO PHQ9 QUESTIONS 1 & 2: 3
5. POOR APPETITE OR OVEREATING: 0
2. FEELING DOWN, DEPRESSED OR HOPELESS: 2
3. TROUBLE FALLING OR STAYING ASLEEP: 0
SUM OF ALL RESPONSES TO PHQ QUESTIONS 1-9: 5
9. THOUGHTS THAT YOU WOULD BE BETTER OFF DEAD, OR OF HURTING YOURSELF: 0
1. LITTLE INTEREST OR PLEASURE IN DOING THINGS: 1
4. FEELING TIRED OR HAVING LITTLE ENERGY: 1
SUM OF ALL RESPONSES TO PHQ QUESTIONS 1-9: 5
8. MOVING OR SPEAKING SO SLOWLY THAT OTHER PEOPLE COULD HAVE NOTICED. OR THE OPPOSITE, BEING SO FIGETY OR RESTLESS THAT YOU HAVE BEEN MOVING AROUND A LOT MORE THAN USUAL: 0
7. TROUBLE CONCENTRATING ON THINGS, SUCH AS READING THE NEWSPAPER OR WATCHING TELEVISION: 1
10. IF YOU CHECKED OFF ANY PROBLEMS, HOW DIFFICULT HAVE THESE PROBLEMS MADE IT FOR YOU TO DO YOUR WORK, TAKE CARE OF THINGS AT HOME, OR GET ALONG WITH OTHER PEOPLE: 1
6. FEELING BAD ABOUT YOURSELF - OR THAT YOU ARE A FAILURE OR HAVE LET YOURSELF OR YOUR FAMILY DOWN: 0

## 2020-09-24 ASSESSMENT — ENCOUNTER SYMPTOMS
ALLERGIC/IMMUNOLOGIC NEGATIVE: 1
NAUSEA: 0
CHEST TIGHTNESS: 0
VOMITING: 0
RESPIRATORY NEGATIVE: 1
ABDOMINAL PAIN: 0
WHEEZING: 0
SINUS PRESSURE: 0
BACK PAIN: 0
COUGH: 0
GASTROINTESTINAL NEGATIVE: 1
DIARRHEA: 0
SHORTNESS OF BREATH: 0
SORE THROAT: 0
EYES NEGATIVE: 1
EYE DISCHARGE: 0
CONSTIPATION: 0

## 2020-09-24 NOTE — PROGRESS NOTES
[address] [facility], [address]  Phone [phone]   Fax [phone]      OFFICE VISIT: 2020    Roberto Alcala Oscar-: 1975      HPI  Reason For Visit:  Roberto Alcala is a 38 y.o. Anxiety (father passed away last week, she is feeling overwhelmed. ) and Discuss Labs (has labs done 20at [facility] oncology would like to discuss)    Patient presents for routine annual wellness evaluation. Present concerns:  Anxiety and depression  She states that her father passed away last week and she is having difficulty dealing with his death. She is presently taking Effexor  mg daily. She does not have anything for breakthrough symptoms. She has tried hydroxyzine, but this did not help at all. The medication is helping some, but family issues since her fathers death have made life more difficult. This has helped and she does not want to change. She does follow with pain management. She is on Chronic opioid therapy. Active cumulative morphine equivalent is 23  She does not notice any sleepiness at all with these medications. I will agree to Rx some lorazepam if she is very cautious with it   We discussed the additive effects of the medication with opiates and respiratory suppression. She does not have a Narcan Rx. We will give her one today. RONEN was reviewed today per office protocol. Report shows No discrepancies. Fill pattern is consistent from single provider(s) at single pharmacy(s). Request #[id number]    We reviewed her extensive studies performed at [facility] [place].  She is pending a liver US. He is carrier for hemachromatosis. Iron levels are normal  She is anemic. She has not been checked for blood in stools. We will give her a fit test today. height is 5' 8\" (1.727 m) and weight is 289 lb (131.1 kg). Her temporal temperature is 97.2 °F (36.2 °C). Her blood pressure is 118/82 and her pulse is 89. Her oxygen saturation is 98%.       Body mass index is 43.94 kg/m². I have reviewed the following with the Ms. Gab Mcmahon   Lab Review  No visits with results within 6 Month(s) from this visit. Latest known visit with results is:   Orders Only on 08/27/2019   Component Date Value    WBC 08/27/2019 9.2     RBC 08/27/2019 3.83*    Hemoglobin 08/27/2019 11.9*    Hematocrit 08/27/2019 35.7*    MCV 08/27/2019 93.2     MCH 08/27/2019 31.1*    MCHC 08/27/2019 33.3     RDW 08/27/2019 17.4*    Platelets 20/52/0757 263     MPV 08/27/2019 9.7     Neutrophils % 08/27/2019 74.2*    Lymphocytes % 08/27/2019 18.2*    Monocytes % 08/27/2019 4.9     Eosinophils % 08/27/2019 1.3     Basophils % 08/27/2019 0.7     Neutrophils Absolute 08/27/2019 6.9     Immature Granulocytes # 08/27/2019 0.1     Lymphocytes Absolute 08/27/2019 1.7     Monocytes Absolute 08/27/2019 0.50     Eosinophils Absolute 08/27/2019 0.10     Basophils Absolute 08/27/2019 0.10     Sodium 08/27/2019 140     Potassium 08/27/2019 4.1     Chloride 08/27/2019 101     CO2 08/27/2019 22     Anion Gap 08/27/2019 17     Glucose 08/27/2019 109     BUN 08/27/2019 15     CREATININE 08/27/2019 0.8     GFR Non- 08/27/2019 >60     Calcium 08/27/2019 9.9     Total Protein 08/27/2019 7.9     Alb 08/27/2019 4.2     Total Bilirubin 08/27/2019 0.9     Alkaline Phosphatase 08/27/2019 101     ALT 08/27/2019 19     AST 08/27/2019 26     Microalbumin, Random Uri* 08/27/2019 4.90     Creatinine, Ur 08/27/2019 325.4     Microalbumin Creatinine * 08/27/2019 15.1     T4 Free 08/27/2019 1.4     TSH 08/27/2019 2.560     Cholesterol, Total 08/27/2019 166     Triglycerides 08/27/2019 77     HDL 08/27/2019 57*    LDL Calculated 08/27/2019 94     Hemoglobin A1C 08/27/2019 4.1      Copies of these are in the chart.     Current Outpatient Medications   Medication Sig Dispense Refill    LORazepam (ATIVAN) 0.5 MG tablet Take 1 tablet by mouth every 8 hours as needed for Anxiety for up to 30 days. 40 tablet 0    naloxone (NARCAN) 4 MG/0.1ML LIQD nasal spray 1 spray by Nasal route as needed for Opioid Reversal 2 each 0    oxyCODONE (ROXICODONE) 5 MG immediate release tablet Take 1 tablet by mouth 3 times daily as needed for Pain for up to 30 days. May fill 9/3/2020- early refill due to fall 90 tablet 0    cyclobenzaprine (FLEXERIL) 10 MG tablet Take 1 tablet by mouth 2 times daily as needed for Muscle spasms 60 tablet 2    pregabalin (LYRICA) 100 MG capsule Take 1 capsule by mouth 2 times daily for 90 days. 60 capsule 2    venlafaxine (EFFEXOR XR) 75 MG extended release capsule Take 1 capsule by mouth daily Take 1 capsule daily in addition to 150 mg to make up  225 mg daily 30 capsule 11    metoprolol succinate (TOPROL XL) 50 MG extended release tablet Take 1 tablet by mouth 2 times daily 180 tablet 3    venlafaxine (EFFEXOR XR) 150 MG extended release capsule Take 1 capsule by mouth daily 30 capsule 5    meloxicam (MOBIC) 15 MG tablet TAKE 1 TABLET BY MOUTH DAILY 90 tablet 3    carbidopa-levodopa (SINEMET)  MG per tablet Take 1 tablet by mouth nightly 90 tablet 3    nortriptyline (PAMELOR) 25 MG capsule TAKE 1 TO 2 CAPSULES BY MOUTH EVERY NIGHT 180 capsule 3    metFORMIN (GLUCOPHAGE) 1000 MG tablet Take 1 tablet by mouth 2 times daily (with meals) 60 tablet 11    levocetirizine (XYZAL) 5 MG tablet Take 1 tablet by mouth nightly 90 tablet 3    Tens Unit MISC by Does not apply route 1 each 0    omeprazole (PRILOSEC) 20 MG delayed release capsule Take 1 capsule by mouth Daily 90 capsule 3    fluticasone (FLONASE) 50 MCG/ACT nasal spray 1 spray by Each Nostril route daily 2 Bottle 1    triamcinolone (KENALOG) 0.1 % cream Apply topically 2 times daily.  80 g 3    Calcium-Vitamin D 600-200 MG-UNIT TABS Take 1 tablet by mouth daily      albuterol sulfate HFA (PROVENTIL HFA) 108 (90 Base) MCG/ACT inhaler Inhale 2 puffs into the lungs every 6 hours as needed for PORT INSERTION performed by Asif Beal MD at [address] [place] [address] LITHOTRIPSY  [facility]      with hardware placed    [place] COLONOSCOPY FLX DX W/COLLJ SPEC WHEN PFRMD N/A 2017    Dr Bobby Prado, 7 yr (age 47) recall    [place] EGD TRANSORAL BIOPSY SINGLE/MULTIPLE N/A 2017    Dr ABHIJIT Baltazar-Gastritis/gastropathy    SALPINGO-OOPHORECTOMY      STOMACH SURGERY  2017    dr Alisha Moreno Right 2019       Social History     Tobacco Use    Smoking status: Former Smoker     Packs/day: 1.00     Years: 25.00     Pack years: 25.00     Types: Cigarettes     Last attempt to quit: 2013     Years since quittin.0    Smokeless tobacco: Never Used   Substance Use Topics    Alcohol use: Yes     Comment: rarely        Review of Systems   Constitutional: Negative. Negative for appetite change, chills, fatigue and fever. HENT: Negative. Negative for congestion, ear discharge, ear pain, postnasal drip, sinus pressure and sore throat. Eyes: Negative. Negative for discharge and visual disturbance. Respiratory: Negative. Negative for cough, chest tightness, shortness of breath and wheezing. Cardiovascular: Negative. Negative for chest pain, palpitations and leg swelling. Gastrointestinal: Negative. Negative for abdominal pain, constipation, diarrhea, nausea and vomiting. GERD is well controlled on meds   Endocrine: Negative. Genitourinary: Negative. Negative for difficulty urinating, dysuria and menstrual problem. Musculoskeletal: Negative for arthralgias ( ), back pain, joint swelling and myalgias. Skin: Negative. Negative for rash. Allergic/Immunologic: Negative. Neurological: Negative. Negative for dizziness, weakness and headaches. Hematological: Negative. Negative for adenopathy. Does not bruise/bleed easily.    Psychiatric/Behavioral: Positive for decreased concentration, dysphoric mood and sleep disturbance. Negative for suicidal ideas. The patient is nervous/anxious (much worse since her father's death). Physical Exam  Vitals signs reviewed. Constitutional:       Appearance: She is well-developed. She is obese. HENT:      Head: Normocephalic. Right Ear: Tympanic membrane, ear canal and external ear normal.      Left Ear: Tympanic membrane, ear canal and external ear normal.      Nose: Nose normal.      Mouth/Throat:      Mouth: Mucous membranes are moist.      Pharynx: Oropharynx is clear. Eyes:      Extraocular Movements: Extraocular movements intact. Conjunctiva/sclera: Conjunctivae normal.      Pupils: Pupils are equal, round, and reactive to light. Neck:      Musculoskeletal: Normal range of motion and neck supple. Cardiovascular:      Rate and Rhythm: Normal rate and regular rhythm. Pulmonary:      Effort: Pulmonary effort is normal.   Abdominal:      General: Bowel sounds are normal.      Palpations: Abdomen is soft. Tenderness: There is no abdominal tenderness. Musculoskeletal:      Right hip: She exhibits decreased range of motion, decreased strength and tenderness. Skin:     General: Skin is warm and dry. Neurological:      Mental Status: She is alert and oriented to person, place, and time. Deep Tendon Reflexes: Reflexes are normal and symmetric. Psychiatric:         Behavior: Behavior normal.             ASSESSMENT      ICD-10-CM    1. Chronic, continuous use of opioids  F11.90 naloxone (NARCAN) 4 MG/0.1ML LIQD nasal spray   2. Anxiety  F41.9 LORazepam (ATIVAN) 0.5 MG tablet     naloxone (NARCAN) 4 MG/0.1ML LIQD nasal spray   3. Moderate episode of recurrent major depressive disorder (HCC)  F33.1    4. Anemia, unspecified type  D64.9    5. Primary insomnia  F51.01          PLAN    1.  Chronic, continuous use of opioids  I am going to treat her with some as needed lorazepam.  We did discuss that this may potentially interact with her

## 2020-09-24 NOTE — PATIENT INSTRUCTIONS
Patient Education        naloxone (nasal)  Pronunciation:  na LOX one  Brand:  Narcan  What is the most important information I should know about naloxone nasal?  In an emergency situation it may not be possible to tell your caregivers about your health conditions. Make sure any doctor caring for you afterward knows you have received this medicine. What is naloxone nasal?  Naloxone blocks or reverses the effects of opioid medication, including extreme drowsiness, slowed breathing, or loss of consciousness. An opioid is sometimes called a narcotic. Naloxone nasal (for use in the nose) is used to treat a narcotic overdose in an emergency situation. This medicine should not be used in place of emergency medical care for an overdose. Naloxone nasal is also used to help diagnose whether a person has used an overdose of an opioid. Naloxone nasal may also be used for purposes not listed in this medication guide. What should I discuss with my healthcare provider before using naloxone nasal?  You should not be treated with naloxone if you are allergic to it. If possible before you receive naloxone, tell your doctor if you have heart disease. It is not known whether this medicine will harm an unborn baby. Tell your doctor if you are pregnant. It is not known whether naloxone nasal passes into breast milk or if it could harm a nursing baby. Tell your doctor if you are breast-feeding a baby. In an emergency situation it may not be possible to tell your caregivers if you are pregnant or breast-feeding. Make sure any doctor caring for your pregnancy or your baby knows you have received this medicine. How should I use naloxone nasal?  Follow all directions on your prescription label. Do not use this medicine in larger or smaller amounts or for longer than recommended.   This medicine may be given by a healthcare provider, emergency medical provider, or a family member or caregiver who is trained to properly give naloxone nasal.  Naloxone nasal should be sprayed into the nose while the person is lying on his or her back. If you are a caregiver or family member giving naloxone nasal to another person, read all instructions when you first get this medicine. Read all caregiver information, medication guides, and instruction sheets provided with this medicine. Ask your doctor or pharmacist if you have any questions. Be sure you know how to recognize the signs of an opioid overdose in the person you are caring for. Overdose symptoms may include:   · slowed breathing, or no breathing;  · very small or pinpoint pupils in the eyes;  · slow heartbeats; or  · extreme drowsiness, especially if you are unable to wake the person from sleep. Even if you are not sure an opioid overdose has occurred, if the person is not breathing or is unresponsive, give naloxone nasal right away and then seek emergency medical care. Do not assume that an overdose episode has ended if symptoms improve. You must get emergency help after giving naloxone nasal. You may need to perform CPR (cardiopulmonary resuscitation) on the person while you are waiting for emergency help to arrive. After giving this medicine, stay with the person and watch for continued signs of overdose. You may need to give another dose every 2 to 3 minutes until emergency help arrives. Follow all medication instructions carefully. Each single-use spray pump contains enough naloxone nasal for one use only. Throw away after one use, even if there is still some medicine left in the pump after using a dose. Store at room temperature away from moisture and heat. Keep each spray pump in the box until you are ready to give a dose. Do not use this medicine if the expiration date on the label has passed. What happens if I miss a dose? Because you will receive naloxone in an emergency situation, you are not likely to miss a dose. What happens if I overdose?   Since each spray pump contains only enough naloxone nasal for one dose, an overdose is unlikely to occur. What should I avoid while taking naloxone nasal?  Avoid leaving a person alone after giving him or her a dose of naloxone nasal. An overdose can impair a person's thinking or reactions. What are the possible side effects of naloxone nasal?  Get emergency medical help if you have signs of an allergic reaction: hives; difficult breathing; swelling of your face, lips, tongue, or throat. Because naloxone nasal reverses opioid effects, this medicine may cause sudden withdrawal symptoms such as:  · nausea, vomiting, diarrhea, stomach pain;  · fever, sweating, body aches, weakness;  · tremors or shivering, fast heart rate, pounding heartbeats, increased blood pressure;  · feeling nervous, restless, or irritable;  · goosebumps, shivering;  · runny nose, yawning; or  · (in babies younger than 3weeks old) seizures, crying, stiffness, overactive reflexes. This is not a complete list of side effects and others may occur. Call your doctor for medical advice about side effects. You may report side effects to FDA at 4-407-FDA-7944. What other drugs will affect naloxone nasal?  Other drugs may interact with naloxone, including prescription and over-the-counter medicines, vitamins, and herbal products. Tell your doctor about all your current medicines and any medicine you start or stop using. Where can I get more information? Your pharmacist can provide more information about naloxone nasal.  Remember, keep this and all other medicines out of the reach of children, never share your medicines with others, and use this medication only for the indication prescribed. Every effort has been made to ensure that the information provided by [address] is accurate, up-to-date, and complete, but no guarantee is made to that effect. Drug information contained herein may be time sensitive.  [facility] information has been compiled for use overdose, it can save your life. Naloxone comes in a rescue kit you can carry with you. You may hear it called a Narcan kit for short. The rescue kit may contain:  · The medicine. · Syringes and needles. · A nasal adapter for the syringes. · A separate nasal spray device. Your doctor can give you a prescription for a rescue kit and show you how to use it. In some places you can buy Narcan kits without a prescription. When is naloxone used? Naloxone is used when a person shows signs of an opioid overdose. A person may have overdosed if he or she is:  · Sleepy or hard to wake up. · Confused. · Not breathing normally. Make sure your family and friends know about these signs of an overdose. If someone appears to have overdosed, Hermes Liu. A drug overdose is an emergency. How do you use it? If you overdose, you may not be able to give yourself the medicine. So it's very important that your friends and family know how and when to give it to you. Rescue kits come with instructions. There are two ways to give the medicine. Many rescue kits can be used for either method. The methods are:  · Injection with needle and syringe. ? Training may be needed in order to use this method correctly. ? Some rescue kits come with automatic injectors that don't require special training. ? The medicine can be injected through clothing. · Nasal spray. ? Many rescue kits come with a nasal adapter. It attaches to the syringe and turns the medicine into a mist.  ? The mist is sprayed into the nose of a person who has overdosed. The person needs to be lying down when the mist is sprayed. Overdose symptoms may return a few minutes after the first dose from the rescue kit. If that happens, a second dose is needed. Rescue kits come with two doses for that reason. Keep your rescue kit with you always. You never know when you might need it.   If you think you or someone else may have overdosed but you're not sure, use the kit anyway. Aside from going through withdrawal, which may be uncomfortable, there is no downside to using this medicine. Always go to the emergency room after using naloxone. Doctors will want to make sure the overdose has been reversed. Follow-up care is a key part of your treatment and safety. Be sure to make and go to all appointments, and call your doctor if you are having problems. It's also a good idea to know your test results and keep a list of the medicines you take. Where can you learn more? Go to https://WiseStamppepiceweb.Trunk Show. org and sign in to your [email or web address] account. Enter G710 in the uMix.TV box to learn more about \"Learning About Naloxone for Opioid Overdose. \"     If you do not have an account, please click on the \"Sign Up Now\" link. Current as of: August 22, 2019               Content Version: 12.5  © 2006-2020 [employer]. Care instructions adapted under license by [employer] ([facility]). If you have questions about a medical condition or this instruction, always ask your healthcare professional. [address] any warranty or liability for your use of this information. Patient Education         How and When to Give Naloxone (03:11)  Your health professional recommends that you watch this short online health video. Naloxone can stop an opioid overdose. Here's how to give it--just in case. How to watch the video    Scan the QR code   OR Visit the website    https://hwi. se/r/K6r18g6lhc9xs   Current as of: August 22, 2019               Content Version: 12.5  © 2006-2020 [employer]. Care instructions adapted under license by [employer] [facility]). If you have questions about a medical condition or this instruction, always ask your healthcare professional. [address] any warranty or liability for your use of this information.

## 2020-09-30 ENCOUNTER — HOSPITAL ENCOUNTER (OUTPATIENT)
Dept: NEUROLOGY | Age: 45
Discharge: HOME | End: 2020-09-30
Payer: MEDICARE

## 2020-09-30 PROCEDURE — 95909 NRV CNDJ TST 5-6 STUDIES: CPT

## 2020-09-30 PROCEDURE — 95886 MUSC TEST DONE W/N TEST COMP: CPT | Performed by: PSYCHIATRY & NEUROLOGY

## 2020-09-30 PROCEDURE — 95909 NRV CNDJ TST 5-6 STUDIES: CPT | Performed by: PSYCHIATRY & NEUROLOGY

## 2020-09-30 PROCEDURE — 95886 MUSC TEST DONE W/N TEST COMP: CPT

## 2020-10-01 RX ORDER — OMEPRAZOLE 20 MG/1
20 CAPSULE, DELAYED RELEASE ORAL DAILY
Qty: 90 CAPSULE | Refills: 3 | Status: SHIPPED | OUTPATIENT
Start: 2020-10-01 | End: 2021-07-26

## 2020-10-01 NOTE — TELEPHONE ENCOUNTER
Received fax from pharmacy requesting refill on pts medication(s). Pt was last seen in office on 9/24/2020  and has a follow up scheduled for 10/23/2020. Will send request to  Dr. Neal Cee  for authorization.      Requested Prescriptions     Pending Prescriptions Disp Refills    omeprazole (PRILOSEC) 20 MG delayed release capsule [Pharmacy Med Name: OMEPRAZOLE 20MG CAPSULES] 90 capsule 3     Sig: Take 1 capsule by mouth Daily

## 2020-10-01 NOTE — PROGRESS NOTES
Let her know that she has carpal tunnel syndrome of the right wrist.  We can refer her to orthopedic surgery for definitive treatment if she likes

## 2020-10-02 ENCOUNTER — TELEPHONE (OUTPATIENT)
Dept: PRIMARY CARE CLINIC | Age: 45
End: 2020-10-02

## 2020-10-02 RX ORDER — OXYCODONE HYDROCHLORIDE 5 MG/1
5 TABLET ORAL 3 TIMES DAILY PRN
Qty: 90 TABLET | Refills: 0 | Status: SHIPPED | OUTPATIENT
Start: 2020-10-03 | End: 2020-10-27 | Stop reason: SDUPTHER

## 2020-10-02 NOTE — TELEPHONE ENCOUNTER
Called patient, spoke with: Patient regarding the results of the patients most recent EMG. I advised Patient of Dr. Sachi Singleton recommendations. Patient did voice understanding.     Requested referral to be sent to  Dr. Doris Ramirez [facility]  Fax: [phone]

## 2020-10-02 NOTE — TELEPHONE ENCOUNTER
likely constipated as she has not had enough oral intake.  Advise MiraLAX 17 g daily with increasing water intake   Progress Notes   B Marlene Valente DO (Physician) [facility]      Let her know that she has carpal tunnel syndrome of the right wrist.  We can refer her to orthopedic surgery for definitive treatment if she likes

## 2020-10-02 NOTE — TELEPHONE ENCOUNTER
----- Message from [address] [facility], DO sent at 10/1/2020  6:28 PM CDT -----      ----- Message -----  From: Nitin Chiang  Sent: 10/1/2020  11:26 AM CDT  To: [address] [facility], DO

## 2020-10-08 ENCOUNTER — TELEPHONE (OUTPATIENT)
Dept: PRIMARY CARE CLINIC | Age: 45
End: 2020-10-08

## 2020-10-08 NOTE — TELEPHONE ENCOUNTER
Pt called and Lm that she would like Carpal Tunnel wrist braces to At [address] for both wrists. She states that she has been having a lot of pain and numbness. They only did the NCS on her right wrist, but she said that she knows she has it in both. She is awaiting surgery on the right one.

## 2020-10-09 NOTE — TELEPHONE ENCOUNTER
LM for pt that braces were ordered and sent to At [facility] through [address] to [phone]

## 2020-10-12 ENCOUNTER — HOSPITAL ENCOUNTER (OUTPATIENT)
Dept: ULTRASOUND IMAGING | Facility: HOSPITAL | Age: 45
Discharge: HOME | End: 2020-10-12
Admitting: INTERNAL MEDICINE

## 2020-10-12 DIAGNOSIS — R16.1 SPLENOMEGALY: ICD-10-CM

## 2020-10-12 PROCEDURE — 76700 US EXAM ABDOM COMPLETE: CPT

## 2020-10-15 RX ORDER — VENLAFAXINE HYDROCHLORIDE 150 MG/1
150 CAPSULE, EXTENDED RELEASE ORAL DAILY
Qty: 90 CAPSULE | Refills: 3 | Status: SHIPPED | OUTPATIENT
Start: 2020-10-15 | End: 2021-07-26

## 2020-10-15 NOTE — TELEPHONE ENCOUNTER
Received fax from pharmacy requesting refill on pts medication(s). Pt was last seen in office on 9/24/2020  and has a follow up scheduled for 10/23/2020. Will send request to  Dr. Bry Clark  for patient.      Requested Prescriptions     Pending Prescriptions Disp Refills    venlafaxine (EFFEXOR XR) 150 MG extended release capsule [Pharmacy Med Name: VENLAFAXINE 150MG ER CAPSULES] 90 capsule      Sig: TAKE 1 CAPSULE BY MOUTH DAILY

## 2020-10-23 ENCOUNTER — VIRTUAL VISIT (OUTPATIENT)
Dept: PRIMARY CARE CLINIC | Age: 45
End: 2020-10-23
Payer: MEDICARE

## 2020-10-23 PROCEDURE — 99213 OFFICE O/P EST LOW 20 MIN: CPT | Performed by: PEDIATRICS

## 2020-10-23 RX ORDER — PREGABALIN 100 MG/1
100 CAPSULE ORAL 2 TIMES DAILY
Qty: 60 CAPSULE | Refills: 2 | Status: SHIPPED | OUTPATIENT
Start: 2020-10-23 | End: 2021-03-08 | Stop reason: SDUPTHER

## 2020-10-23 ASSESSMENT — ENCOUNTER SYMPTOMS
SHORTNESS OF BREATH: 0
VOMITING: 0
EYES NEGATIVE: 1
WHEEZING: 0
SINUS PRESSURE: 0
SORE THROAT: 0
CHEST TIGHTNESS: 0
COUGH: 0
EYE DISCHARGE: 0
ALLERGIC/IMMUNOLOGIC NEGATIVE: 1
DIARRHEA: 0
GASTROINTESTINAL NEGATIVE: 1
NAUSEA: 0
BACK PAIN: 0
ABDOMINAL PAIN: 0
CONSTIPATION: 0
RESPIRATORY NEGATIVE: 1

## 2020-10-23 NOTE — PROGRESS NOTES
[address] [facility], [address]  Phone [phone]   Fax [phone]      OFFICE VISIT: 10/23/2020    Sandra Moore-: 1975      HPI  Reason For Visit:  Sandra Snowden is a 38 y.o. Anxiety and Insomnia    Carpal Tunnel  She is scheduled to have surgery on R hand. She did have a shot in her wrists. This did not help much at all. She believes that she is still taking Lyrica  She is also taking meloxicam 15 mg daily. She also has some oxycodone 5 mg prescribed by Dr. Demian Franco  She is also wearing a cock-up splint bilaterally      Insomnia:  She notes that her carpal tunnel syndrome is keeping her awake at night. She does take Flexeril at bedtime. She did have a rough night last night due to being hot and cold throughout the night. Anxiety and Depression:  She is still taking Effexor  mg daily. She has not had to take any of the lorazepam as of yet. vitals were not taken for this visit. There is no height or weight on file to calculate BMI. I have reviewed the following with the Ms. Daryl Cheng   Lab Review  No visits with results within 6 Month(s) from this visit.    Latest known visit with results is:   Orders Only on 2019   Component Date Value    WBC 2019 9.2     RBC 2019 3.83*    Hemoglobin 2019 11.9*    Hematocrit 2019 35.7*    MCV 2019 93.2     MCH 2019 31.1*    MCHC 2019 33.3     RDW 2019 17.4*    Platelets  263     MPV 2019 9.7     Neutrophils % 2019 74.2*    Lymphocytes % 2019 18.2*    Monocytes % 2019 4.9     Eosinophils % 2019 1.3     Basophils % 2019 0.7     Neutrophils Absolute 2019 6.9     Immature Granulocytes # 2019 0.1     Lymphocytes Absolute 2019 1.7     Monocytes Absolute 2019 0.50     Eosinophils Absolute 2019 0.10     Basophils Absolute 2019 0.10     Sodium 08/27/2019 140     Potassium 08/27/2019 4.1     Chloride 08/27/2019 101     CO2 08/27/2019 22     Anion Gap 08/27/2019 17     Glucose 08/27/2019 109     BUN 08/27/2019 15     CREATININE 08/27/2019 0.8     GFR Non- 08/27/2019 >60     Calcium 08/27/2019 9.9     Total Protein 08/27/2019 7.9     Alb 08/27/2019 4.2     Total Bilirubin 08/27/2019 0.9     Alkaline Phosphatase 08/27/2019 101     ALT 08/27/2019 19     AST 08/27/2019 26     Microalbumin, Random Uri* 08/27/2019 4.90     Creatinine, Ur 08/27/2019 325.4     Microalbumin Creatinine * 08/27/2019 15.1     T4 Free 08/27/2019 1.4     TSH 08/27/2019 2.560     Cholesterol, Total 08/27/2019 166     Triglycerides 08/27/2019 77     HDL 08/27/2019 57*    LDL Calculated 08/27/2019 94     Hemoglobin A1C 08/27/2019 4.1      Copies of these are in the chart. Current Outpatient Medications   Medication Sig Dispense Refill    venlafaxine (EFFEXOR XR) 150 MG extended release capsule Take 1 capsule by mouth daily 90 capsule 3    Misc. Devices (WRIST BRACE) MISC Left and right carpal tunnel braces DX: Marlon Carpal Tunnel 2 each 0    oxyCODONE (ROXICODONE) 5 MG immediate release tablet Take 1 tablet by mouth 3 times daily as needed for Pain for up to 30 days. May fill 10/3/20 90 tablet 0    omeprazole (PRILOSEC) 20 MG delayed release capsule Take 1 capsule by mouth Daily 90 capsule 3    LORazepam (ATIVAN) 0.5 MG tablet Take 1 tablet by mouth every 8 hours as needed for Anxiety for up to 30 days. 40 tablet 0    naloxone (NARCAN) 4 MG/0.1ML LIQD nasal spray 1 spray by Nasal route as needed for Opioid Reversal 2 each 0    cyclobenzaprine (FLEXERIL) 10 MG tablet Take 1 tablet by mouth 2 times daily as needed for Muscle spasms 60 tablet 2    pregabalin (LYRICA) 100 MG capsule Take 1 capsule by mouth 2 times daily for 90 days.  60 capsule 2    venlafaxine (EFFEXOR XR) 75 MG extended release capsule Take 1 capsule by mouth daily Take 1 capsule daily in addition to 150 mg to make up  225 mg daily 30 capsule 11    metoprolol succinate (TOPROL XL) 50 MG extended release tablet Take 1 tablet by mouth 2 times daily 180 tablet 3    meloxicam (MOBIC) 15 MG tablet TAKE 1 TABLET BY MOUTH DAILY 90 tablet 3    carbidopa-levodopa (SINEMET)  MG per tablet Take 1 tablet by mouth nightly 90 tablet 3    nortriptyline (PAMELOR) 25 MG capsule TAKE 1 TO 2 CAPSULES BY MOUTH EVERY NIGHT 180 capsule 3    metFORMIN (GLUCOPHAGE) 1000 MG tablet Take 1 tablet by mouth 2 times daily (with meals) 60 tablet 11    levocetirizine (XYZAL) 5 MG tablet Take 1 tablet by mouth nightly 90 tablet 3    Tens Unit MISC by Does not apply route 1 each 0    fluticasone (FLONASE) 50 MCG/ACT nasal spray 1 spray by Each Nostril route daily 2 Bottle 1    triamcinolone (KENALOG) 0.1 % cream Apply topically 2 times daily. 80 g 3    Calcium-Vitamin D 600-200 MG-UNIT TABS Take 1 tablet by mouth daily      albuterol sulfate HFA (PROVENTIL HFA) 108 (90 Base) MCG/ACT inhaler Inhale 2 puffs into the lungs every 6 hours as needed for Wheezing 1 Inhaler 3    ondansetron (ZOFRAN ODT) 4 MG disintegrating tablet Take 1 tablet by mouth every 8 hours as needed for Nausea or Vomiting 10 tablet 0    Multiple Vitamins-Minerals (MULTIVITAMIN & MINERAL PO) Take  by mouth.  CRANBERRY Take 500 mg by mouth daily.  Cholecalciferol (VITAMIN D3) 5000 UNITS TABS Take 5,000 Units by mouth daily        No current facility-administered medications for this visit.         Allergies: Silver; Tegaderm ag mesh 2\"x2\" [wound dressings]; and Zithromax [azithromycin]     Past Medical History:   Diagnosis Date    Anemia     has had iron infusion    Anxiety     Arthritis     Back pain with left-sided radiculopathy 3/14/2016    DDD (degenerative disc disease), lumbar     Depression     Esophagitis     Fibromyalgia     Gastritis     Headache(784.0)     History of blood transfusion     Hypertension     Obesity     RLS (restless legs syndrome)     Sleep apnea     uses CPAP machine       Family History   Problem Relation Age of Onset    Diabetes Mother     High Blood Pressure Mother     Colon Polyps Mother     Heart Disease Mother     Cancer Mother     Depression Mother     Cancer Father     High Blood Pressure Father     Heart Disease Father     Stroke Father     High Blood Pressure Sister     Depression Sister     Anxiety Disorder Sister     Cancer Brother     Esophageal Cancer Brother     Anxiety Disorder Brother     Diabetes Brother     ADHD Brother     Depression Brother     Other Other         has history of suicide in family maternal great uncle       Past Surgical History:   Procedure Laterality Date    ANKLE SURGERY Right     APPENDECTOMY  4/19/15    BACK SURGERY      CERVICAL SPINE SURGERY N/A 9/1/15    CHOLECYSTECTOMY  1995    HIP SURGERY Right 1987    HIP SURGERY  2018    HYSTERECTOMY      partial , still has ovaries and cervix    INSERTION / REMOVAL / REPLACEMENT VENOUS ACCESS CATHETER Left 2017    PORT INSERTION [facility] by Tyron Lesch[facility] at [facility] OR    LITHOTRIPSY  [facility]      with hardware placed    [place] COLONOSCOPY FLX DX W/COLLJ SPEC WHEN PFRMD N/A 2017    Dr Maddison Raya, 7 yr (age 47) recall    [place] EGD TRANSORAL BIOPSY SINGLE/MULTIPLE N/A 2017    Dr ABHIJIT Baltazar-Gastritis/gastropathy    SALPINGO-OOPHORECTOMY      STOMACH SURGERY  2017    dr Nigel Farias Right 2019       Social History     Tobacco Use    Smoking status: Former Smoker     Packs/day: 1.00     Years: 25.00     Pack years: 25.00     Types: Cigarettes     Last attempt to quit: 2013     Years since quittin.0    Smokeless tobacco: Never Used   Substance Use Topics    Alcohol use: Yes     Comment: rarely        Review of Systems   Constitutional: needed from an insomnia standpoint. She can also take Flexeril as this has made her sleepy in the past as well. If we treat the underlying carpal tunnel syndrome, this may be the only thing that we really have to do to really facilitate her to sleep better as this is the primary inhibitor of her sleep      3. Bilateral carpal tunnel syndrome  She is going to check and see if she still has Lyrica 100 mg. If she does, she will begin taking this on a twice daily schedule. This may at least take some of the edge off of her carpal tunnel syndrome and allow her to sleep at night      No orders of the defined types were placed in this encounter. Return in about 3 months (around 1/23/2021) for 30. Tad Navarro is a 38 y.o. female being evaluated by a Virtual Visit (video visit) encounter to address concerns as mentioned above. A caregiver was present when appropriate. Due to this being a TeleHealth encounter ([facility]), evaluation of the following organ systems was limited: Vitals/Constitutional/EENT/Resp/CV/GI//MS/Neuro/Skin/Heme-Lymph-Imm. Pursuant to the emergency declaration under the [facility], [address] [place] [address] authority and the [employer] Act, this Virtual Visit was conducted with patient's (and/or legal guardian's) consent, to reduce the patient's risk of exposure to COVID-19 and provide necessary medical care. The patient (and/or legal guardian) has also been advised to contact this office for worsening conditions or problems, and seek emergency medical treatment and/or call 911 if deemed necessary. Patient identification was verified at the start of the visit: Yes    Total time spent for this encounter: 15m    Services were provided through a video synchronous discussion virtually to substitute for in-person clinic visit.  Patient and provider were located at their

## 2020-10-23 NOTE — TELEPHONE ENCOUNTER
Requested Prescriptions     Pending Prescriptions Disp Refills    pregabalin (LYRICA) 100 MG capsule 60 capsule 2     Sig: Take 1 capsule by mouth 2 times daily for 90 days.

## 2020-10-26 ENCOUNTER — TELEPHONE (OUTPATIENT)
Dept: PAIN MANAGEMENT | Age: 45
End: 2020-10-26

## 2020-10-26 NOTE — TELEPHONE ENCOUNTER
Patient called nurse line regarding upcoming surgery. She is going to have surgery on 11/3/20 at [facility] in [facility] for carpal tunnel to her right wrist.  She will later on have the left wrist done. She is asking about pain medication post op. I let her know that she can fill any scripts from the surgeon, and to let us know what medication she has received from them. She is aware that any pain medication prescribed by pain management will be held at that time while she is on post op meds.

## 2020-10-27 NOTE — TELEPHONE ENCOUNTER
Pt having surgery 11/3/20 pt informed to hold this script while taking meds from surgeon. Will add days to refill on next script. Pt informed if she does not fill script from surgeon she must take ours as prescribed. details… detailed exam normal strength/no calf tenderness

## 2020-10-30 RX ORDER — OXYCODONE HYDROCHLORIDE 5 MG/1
5 TABLET ORAL 3 TIMES DAILY PRN
Qty: 90 TABLET | Refills: 0 | Status: SHIPPED | OUTPATIENT
Start: 2020-11-02 | End: 2020-12-02 | Stop reason: SDUPTHER

## 2020-11-02 ENCOUNTER — HOSPITAL ENCOUNTER (OUTPATIENT)
Dept: PHYSICAL THERAPY | Age: 45
Setting detail: THERAPIES SERIES
Discharge: HOME | End: 2020-11-02
Payer: MEDICARE

## 2020-11-02 PROCEDURE — 97162 PT EVAL MOD COMPLEX 30 MIN: CPT

## 2020-11-02 NOTE — PROGRESS NOTES
Initial Assessment  Date: 2020  Patient Name: Emma Mcgrath  MRN: [id number]  : 1975     Treatment Diagnosis: impaired balance, possible BPPV right horizontal canal         Subjective   General  Chart Reviewed: Yes  Patient assessed for rehabilitation services?: Yes  Response To Previous Treatment: Not applicable  Family / Caregiver Present: No  Referring Practitioner: River Erwin  Referral Date : 20  Diagnosis: W10. 8XXS    Fall on from stairs, comments for vestibular therapy  General Comment  Comments: Patient states she has not been prescribed any medication for the dizziness. She is scheduled to CTS tomorrow and advised her to notify MD that she is receiving vestibular therapy. PT Visit Information  Onset Date: 20  PT Insurance Information: [facility] Medicare, [id number] Medicaid  Total # of Visits Approved: 1  Total # of Visits to Date: 1  Progress Note Due Date: 20  Subjective  Subjective: Patient states she had a fall in August and she was going to go down her stairs and she turned from closing a baby gait and she fell. She says that she had what felt like \"out of body experience. \"  She says that she didn't have any major injuries from the fall. She says she has no fractures from the event. She says she doesn't recall any dizziness during the fall, but she says that she does have intermitten dizziness. She says that she has decreased balance and dizziness sometimes with increased activity including rolling in bed both directions. She says forward bending and then sitting up it increases her pain. She says that she is not currently having therapy at any other facility.   Pain Screening  Patient Currently in Pain: Yes(she has chronic pain in her neck and back and has seen pain management for these, currently 7/10 in neck)  Vital Signs  Patient Currently in Pain: Yes(she has chronic pain in her neck and back and has seen pain management for these, currently 710 in neck) Orientation  Orientation  Overall Orientation Status: Within Normal Limits    Social/Functional History  Social/Functional History  Lives With: Son(17 year old child)  ADL Assistance: Independent(increased dizziness standing on one leg and has to sit to get dressed)  Homemaking Assistance: (has home health aid that comes 3x/week to help with household tasks)  Active : Yes  Occupation: On disability  IADL Comments: Says that she avoids looking out the window due to increased dizziness possible with this activity, uses a scooter at grocery    Objective          AROM RLE (degrees)  RLE AROM: WFL  AROM LLE (degrees)  LLE AROM : WFL  Spine  Cervical: flex 100%, ext 100%, sidebending 100% bilaterally, rotaiton 50% bilaterally *pain with all motions    Strength RLE  Comment: 5/5 throughout  Strength LLE  Comment: 5/5 throughout  Strength RUE  Comment: 4-4+/5  Strength LUE  Comment: 4- 4+/5        Sensation  Overall Sensation Status: WNL        Subjective  Patient experiences spells of vertigo?: (reports dizziness and imbalance, but no spinning sensation reported)  Symptoms worse with:  Movement of the visual environment;Self motion  Oculomotor Examination  Oculomotor Examination: Yes  Gaze Holding Nystagmus: No  Smooth Pursuits: WNL  Positional Testing  Right [place]: Negative  Left [place]: Negative  Right Roll Test: Vertigo(reported slight dizziness for about 5-10 seconds, no nystagmus noted)  Left Roll Test: Negative          Exercises  Exercise 1: cone weaves  Exercise 2: figure 8's  Exercise 3: box step  Exercise 4: ambulation with horizontal and vertical head turns  Exercise 5: 360 degree circles cw/ccw  Exercise 6: forward bend with cone weave  Exercise 7: seated horizontal and vertical gaze stabilizaiton  Exercise 8: seated horizontal and vertical advanced gaze stabilizaiton  Exercise 9: static standing with crom in front of fish picture horizontal and vertical  Exercise 10: static stance on foam pads  Exercise 11: pertebations in all directions  Exercise 12: canalith for right horizontal canal                      Assessment   Conditions Requiring Skilled Therapeutic Intervention  Body structures, Functions, Activity limitations: Decreased functional mobility ; Decreased ADL status; Vestibular Impairment;Decreased high-level IADLs;Decreased balance  Assessment: Patient has regular occurrance of dizzines and imbalance reported with recent fall in August.  She has chronic neck and back pain as well. Patient did have dizziness reported slightly with right horizontal canal testing, but no nystagmus present and no dizziness or nystagmus in any other test position. She has decreased balance and is in fall risk category. She will benefit from skilled PT to increase balance and function. Treatment Diagnosis: impaired balance, possible BPPV right horizontal canal  Prognosis: Good  Decision Making: Medium Complexity  History: fibromyalgia, chronic neck and back pain  Exam: decreased balance, fall risk,         Plan   Plan  Times per week: 2x/week  Plan weeks: 6 weeks  Current Treatment Recommendations: Strengthening, Balance Training, Functional Mobility Training, Positioning, Modalities, Manual Therapy - Joint Manipulation, Endurance Training, Safety Education & Training, Manual Therapy - Soft Tissue Mobilization, Neuromuscular Re-education, Home Exercise Program            Goals  Short term goals  Time Frame for Short term goals: 4 Weeks  Short term goal 1:  Increase balance to have no sway with static stance  Short term goal 2: Patient to have no lob with pertebations in all directions  Short term goal 3: Patient to have no feeling of dizzines with rigiht horizontal positional testing  Short term goal 4: Patient to be independent with HEP  Long term goals  Time Frame for Long term goals : 6 Weeks  Long term goal 1: Patient to have decreased report of dizziness by 50%  Long term goal 2: Patient to report decreased dizziness with rolling in bed  Long term goal 3: Patient to demo increased function by scoring <= 30% impairment on Dizziness Handicap Inventory  Long term goal 4: Patient to demo increased balance by scoring >= 19/24 on DGI  Patient Goals   Patient goals : decrease dizziness and increase balance       Therapy Time   Individual Concurrent Group Co-treatment   Time In 1253         Time Out 1335         Minutes [facility], PT      Electronically signed by Flavio Brweer PT on 11/2/2020 at 2:40 PM

## 2020-11-05 ENCOUNTER — HOSPITAL ENCOUNTER (OUTPATIENT)
Dept: PHYSICAL THERAPY | Age: 45
Discharge: HOME | End: 2020-11-05

## 2020-11-06 ENCOUNTER — TELEPHONE (OUTPATIENT)
Dept: ONCOLOGY | Facility: CLINIC | Age: 45
End: 2020-11-06

## 2020-11-06 ENCOUNTER — LAB (OUTPATIENT)
Dept: LAB | Facility: HOSPITAL | Age: 45
End: 2020-11-06

## 2020-11-06 ENCOUNTER — OFFICE VISIT (OUTPATIENT)
Dept: ONCOLOGY | Facility: CLINIC | Age: 45
End: 2020-11-06

## 2020-11-06 VITALS
OXYGEN SATURATION: 98 % | TEMPERATURE: 98.6 F | BODY MASS INDEX: 48.57 KG/M2 | RESPIRATION RATE: 16 BRPM | SYSTOLIC BLOOD PRESSURE: 138 MMHG | HEART RATE: 80 BPM | HEIGHT: 65 IN | WEIGHT: 291.5 LBS | DIASTOLIC BLOOD PRESSURE: 78 MMHG

## 2020-11-06 DIAGNOSIS — D64.9 ANEMIA, UNSPECIFIED TYPE: Primary | ICD-10-CM

## 2020-11-06 DIAGNOSIS — M19.90 ARTHRITIS: ICD-10-CM

## 2020-11-06 DIAGNOSIS — D64.9 ANEMIA, UNSPECIFIED TYPE: ICD-10-CM

## 2020-11-06 LAB
BASOPHILS # BLD AUTO: 0.05 10*3/MM3 (ref 0–0.2)
BASOPHILS NFR BLD AUTO: 0.5 % (ref 0–1.5)
DEPRECATED RDW RBC AUTO: 50.9 FL (ref 37–54)
EOSINOPHIL # BLD AUTO: 0.12 10*3/MM3 (ref 0–0.4)
EOSINOPHIL NFR BLD AUTO: 1.3 % (ref 0.3–6.2)
ERYTHROCYTE [DISTWIDTH] IN BLOOD BY AUTOMATED COUNT: 16 % (ref 12.3–15.4)
FERRITIN SERPL-MCNC: 1793 NG/ML (ref 13–150)
HCT VFR BLD AUTO: 34.9 % (ref 34–46.6)
HGB BLD-MCNC: 12 G/DL (ref 12–15.9)
IMM GRANULOCYTES # BLD AUTO: 0.05 10*3/MM3 (ref 0–0.05)
IMM GRANULOCYTES NFR BLD AUTO: 0.5 % (ref 0–0.5)
IRON 24H UR-MRATE: 50 MCG/DL (ref 37–145)
IRON SATN MFR SERPL: 20 % (ref 20–50)
LYMPHOCYTES # BLD AUTO: 2.21 10*3/MM3 (ref 0.7–3.1)
LYMPHOCYTES NFR BLD AUTO: 23.4 % (ref 19.6–45.3)
MCH RBC QN AUTO: 29.9 PG (ref 26.6–33)
MCHC RBC AUTO-ENTMCNC: 34.4 G/DL (ref 31.5–35.7)
MCV RBC AUTO: 86.8 FL (ref 79–97)
MONOCYTES # BLD AUTO: 0.48 10*3/MM3 (ref 0.1–0.9)
MONOCYTES NFR BLD AUTO: 5.1 % (ref 5–12)
NEUTROPHILS NFR BLD AUTO: 6.55 10*3/MM3 (ref 1.7–7)
NEUTROPHILS NFR BLD AUTO: 69.2 % (ref 42.7–76)
NRBC BLD AUTO-RTO: 0 /100 WBC (ref 0–0.2)
PLATELET # BLD AUTO: 280 10*3/MM3 (ref 140–450)
PMV BLD AUTO: 9.6 FL (ref 6–12)
RBC # BLD AUTO: 4.02 10*6/MM3 (ref 3.77–5.28)
TIBC SERPL-MCNC: 249 MCG/DL (ref 298–536)
TRANSFERRIN SERPL-MCNC: 167 MG/DL (ref 200–360)
WBC # BLD AUTO: 9.46 10*3/MM3 (ref 3.4–10.8)

## 2020-11-06 PROCEDURE — 83540 ASSAY OF IRON: CPT

## 2020-11-06 PROCEDURE — 85025 COMPLETE CBC W/AUTO DIFF WBC: CPT

## 2020-11-06 PROCEDURE — 82728 ASSAY OF FERRITIN: CPT

## 2020-11-06 PROCEDURE — 36415 COLL VENOUS BLD VENIPUNCTURE: CPT

## 2020-11-06 PROCEDURE — 99214 OFFICE O/P EST MOD 30 MIN: CPT | Performed by: INTERNAL MEDICINE

## 2020-11-06 PROCEDURE — 84466 ASSAY OF TRANSFERRIN: CPT

## 2020-11-06 NOTE — TELEPHONE ENCOUNTER
Called patient to discuss the findings of the increasing ferritin and the need for phlebotomy.  The patient has just now recovered from mild anemia and therefore we will do half units phlebotomy at a time with check a ferritin and CBC 2 weeks after the phlebotomy.  The patient understands and agrees with the treatment plan

## 2020-11-10 ENCOUNTER — OFFICE VISIT (OUTPATIENT)
Dept: PRIMARY CARE CLINIC | Age: 45
End: 2020-11-10
Payer: MEDICARE

## 2020-11-10 VITALS
BODY MASS INDEX: 43.8 KG/M2 | TEMPERATURE: 98 F | HEART RATE: 76 BPM | DIASTOLIC BLOOD PRESSURE: 80 MMHG | WEIGHT: 289 LBS | OXYGEN SATURATION: 98 % | HEIGHT: 68 IN | SYSTOLIC BLOOD PRESSURE: 132 MMHG

## 2020-11-10 PROCEDURE — 99214 OFFICE O/P EST MOD 30 MIN: CPT | Performed by: NURSE PRACTITIONER

## 2020-11-10 ASSESSMENT — ENCOUNTER SYMPTOMS
DIARRHEA: 0
CONSTIPATION: 0
BLOOD IN STOOL: 0
SORE THROAT: 1
EYE DISCHARGE: 0
COUGH: 1
TROUBLE SWALLOWING: 0
WHEEZING: 0
ABDOMINAL PAIN: 0
EYE REDNESS: 0
RHINORRHEA: 1

## 2020-11-10 NOTE — PROGRESS NOTES
[address], [address]  Phone [phone]   Fax [phone]      OFFICE VISIT: 11/10/2020    Lizabeth Lowery is a 38 y.o. female who presents today for her medical conditions/complaints as noted below. Lizabeth Lowery isc/o of Covid Testing (daughter positive); Pharyngitis; and Nasal Congestion        :     HPI  Covid Testing (daughter positive); Pharyngitis; and Nasal Congestion  No increase in sob. Symptoms started yesterday.      Past Medical History:   Diagnosis Date    Anemia     has had iron infusion    Anxiety     Arthritis     Back pain with left-sided radiculopathy 3/14/2016    DDD (degenerative disc disease), lumbar     Depression     Esophagitis     Fibromyalgia     Gastritis     Headache(784.0)     History of blood transfusion     Hypertension     Obesity     RLS (restless legs syndrome)     Sleep apnea     uses CPAP machine      Past Surgical History:   Procedure Laterality Date    ANKLE SURGERY Right     APPENDECTOMY  4/19/15    BACK SURGERY  2000    CERVICAL SPINE SURGERY N/A 9/1/15    CHOLECYSTECTOMY  1995    HIP SURGERY Right 1987    HIP SURGERY  03/28/2018    HYSTERECTOMY      partial 2008, still has ovaries and cervix    INSERTION / REMOVAL / REPLACEMENT VENOUS ACCESS CATHETER Left 11/7/2017    PORT INSERTION performed by Yvette Talamantes MD at [address] [place] [address] LITHOTRIPSY  1995/2000 [facility]      with hardware placed    MT COLONOSCOPY FLX DX W/COLLJ SPEC WHEN PFRMD N/A 5/2/2017    Dr Audra Lee, 7 yr (age 47) recall    MT EGD TRANSORAL BIOPSY SINGLE/MULTIPLE N/A 5/2/2017    Dr ABHIJIT Baltazar-Gastritis/gastropathy    SALPINGO-OOPHORECTOMY      STOMACH SURGERY  12/20/2017    dr Dulce Peres Right 2019       Family History   Problem Relation Age of Onset    Diabetes Mother     High Blood Pressure Mother     Colon Polyps Mother     Heart Disease Mother     Cancer Mother     Depression Mother     Cancer Father     High Blood Pressure Father     Heart Disease Father     Stroke Father     High Blood Pressure Sister     Depression Sister     Anxiety Disorder Sister     Cancer Brother     Esophageal Cancer Brother     Anxiety Disorder Brother     Diabetes Brother     ADHD Brother     Depression Brother     Other Other         has history of suicide in family maternal great uncle       Social History     Tobacco Use    Smoking status: Former Smoker     Packs/day: 1.00     Years: 25.00     Pack years: 25.00     Types: Cigarettes     Last attempt to quit: 2013     Years since quittin.1    Smokeless tobacco: Never Used   Substance Use Topics    Alcohol use: Yes     Comment: rarely      Current Outpatient Medications   Medication Sig Dispense Refill    oxyCODONE (ROXICODONE) 5 MG immediate release tablet Take 1 tablet by mouth 3 times daily as needed for Pain for up to 30 days. May fill 20 90 tablet 0    pregabalin (LYRICA) 100 MG capsule Take 1 capsule by mouth 2 times daily for 90 days. 60 capsule 2    venlafaxine (EFFEXOR XR) 150 MG extended release capsule Take 1 capsule by mouth daily 90 capsule 3    Misc. Devices (WRIST BRACE) MISC Left and right carpal tunnel braces DX:  Marlon Carpal Tunnel 2 each 0    omeprazole (PRILOSEC) 20 MG delayed release capsule Take 1 capsule by mouth Daily 90 capsule 3    naloxone (NARCAN) 4 MG/0.1ML LIQD nasal spray 1 spray by Nasal route as needed for Opioid Reversal 2 each 0    cyclobenzaprine (FLEXERIL) 10 MG tablet Take 1 tablet by mouth 2 times daily as needed for Muscle spasms 60 tablet 2    venlafaxine (EFFEXOR XR) 75 MG extended release capsule Take 1 capsule by mouth daily Take 1 capsule daily in addition to 150 mg to make up  225 mg daily 30 capsule 11    metoprolol succinate (TOPROL XL) 50 MG extended release tablet Take 1 tablet by mouth 2 times daily 180 tablet 3    meloxicam (MOBIC) 15 MG tablet TAKE 1 TABLET BY MOUTH DAILY 90 tablet 3    carbidopa-levodopa (SINEMET)  MG per tablet Take 1 tablet by mouth nightly 90 tablet 3    nortriptyline (PAMELOR) 25 MG capsule TAKE 1 TO 2 CAPSULES BY MOUTH EVERY NIGHT 180 capsule 3    metFORMIN (GLUCOPHAGE) 1000 MG tablet Take 1 tablet by mouth 2 times daily (with meals) 60 tablet 11    levocetirizine (XYZAL) 5 MG tablet Take 1 tablet by mouth nightly 90 tablet 3    Tens Unit MISC by Does not apply route 1 each 0    fluticasone (FLONASE) 50 MCG/ACT nasal spray 1 spray by Each Nostril route daily 2 Bottle 1    triamcinolone (KENALOG) 0.1 % cream Apply topically 2 times daily. 80 g 3    Calcium-Vitamin D 600-200 MG-UNIT TABS Take 1 tablet by mouth daily      albuterol sulfate HFA (PROVENTIL HFA) 108 (90 Base) MCG/ACT inhaler Inhale 2 puffs into the lungs every 6 hours as needed for Wheezing 1 Inhaler 3    ondansetron (ZOFRAN ODT) 4 MG disintegrating tablet Take 1 tablet by mouth every 8 hours as needed for Nausea or Vomiting 10 tablet 0    Multiple Vitamins-Minerals (MULTIVITAMIN & MINERAL PO) Take  by mouth.  CRANBERRY Take 500 mg by mouth daily.  Cholecalciferol (VITAMIN D3) 5000 UNITS TABS Take 5,000 Units by mouth daily        No current facility-administered medications for this visit.       Allergies   Allergen Reactions    Silver Other (See Comments)     Redness, blisters    Tegaderm Ag Mesh 2\"X2\" [Wound Dressings] Other (See Comments)     blister    Zithromax [Azithromycin]      Severe abdominal pain        Health Maintenance   Topic Date Due    HIV screen  12/26/1989    DTaP/Tdap/Td vaccine (1 - Tdap) 12/26/1993    Cervical cancer screen  06/04/2016    Flu vaccine (1) 09/01/2020    Annual Wellness Visit (AWV)  04/10/2021    Diabetes screen  08/27/2022    Colon cancer screen colonoscopy  05/02/2024    Lipid screen  08/27/2024    Hepatitis A vaccine  Aged Out    Hepatitis B vaccine  Aged Out    Hib vaccine  Aged C/ Brent Nilda 19 Meningococcal (ACWY) vaccine  Aged Out    Pneumococcal 0-64 years Vaccine  Aged Out        :     Review of Systems   Constitutional: Positive for appetite change, fatigue and fever. Negative for unexpected weight change. HENT: Positive for congestion, rhinorrhea and sore throat. Negative for trouble swallowing. Eyes: Negative for discharge and redness. Respiratory: Positive for cough. Negative for wheezing. Cardiovascular: Negative for chest pain. Gastrointestinal: Negative for abdominal pain, blood in stool, constipation and diarrhea. Genitourinary: Negative for dysuria. Skin: Negative for rash. Neurological: Negative for weakness. Hematological: Negative for adenopathy.       :     Physical Exam  Vitals signs reviewed. Constitutional:       Appearance: She is well-developed. HENT:      Head: Normocephalic. Mouth/Throat:      Pharynx: Posterior oropharyngeal erythema present. Eyes:      Conjunctiva/sclera: Conjunctivae normal.   Neck:      Musculoskeletal: Normal range of motion and neck supple. Cardiovascular:      Rate and Rhythm: Normal rate and regular rhythm. Heart sounds: Normal heart sounds. Pulmonary:      Effort: Pulmonary effort is normal.      Breath sounds: Normal breath sounds. Abdominal:      General: Bowel sounds are normal.      Palpations: Abdomen is soft. Tenderness: There is no abdominal tenderness. Musculoskeletal: Normal range of motion. Skin:     General: Skin is warm and dry. Neurological:      Mental Status: She is alert and oriented to person, place, and time. Psychiatric:         Behavior: Behavior normal.       /80 (Site: Right Upper Arm, Position: Sitting, Cuff Size: Large Adult)   Pulse 76   Temp 98 °F (36.7 °C) (Temporal)   Ht 5' 8\" (1.727 m)   Wt 289 lb (131.1 kg)   SpO2 98%   BMI 43.94 kg/m²     :        ICD-10-CM    1. Exposure to COVID-19 virus  Z20.828 COVID-19   2.  Pharyngitis, unspecified etiology  J02.9 COVID-19   3. Viral upper respiratory tract infection  J06.9 COVID-19   4. Fever, unspecified fever cause  R50.9 COVID-19       :    strep neg    Return if symptoms worsen or fail to improve. Orders Placed This Encounter   Procedures    COVID-19     Scheduling Instructions:      1) Due to current limited availability of the COVID-19 test, tests will be prioritized based on responses to questions above. Testing may be delayed due to volume. 2) Print and instruct patient to adhere to CDC home isolation program. (Link Above)              3) Set up or refer patient for a monitoring program.              4) Have patient sign up for and leverage [facility] (if not previously done). Order Specific Question:   Is this test for diagnosis or screening? Answer:   Diagnosis of ill patient     Order Specific Question:   Symptomatic for COVID-19 as defined by CDC? Answer:   Yes     Order Specific Question:   Date of Symptom Onset     Answer:   11/8/2020     Order Specific Question:   Hospitalized for COVID-19? Answer:   No     Order Specific Question:   Admitted to ICU for COVID-19? Answer:   No     Order Specific Question:   Employed in healthcare setting? Answer:   No     Order Specific Question:   Resident in a congregate (group) care setting? Answer:   No     Order Specific Question:   Pregnant? Answer:   No    COVID-19    COVID-19     No orders of the defined types were placed in this encounter. Discussed use, benefit, and side effectsof prescribed medications. All patient questions answered. Pt voiced understanding. Reviewed health maintenance. .  Patient agreed with treatment plan. Follow up asdirected. Patient Instructions   Advance Care Planning  People with COVID-19 may have no symptoms, mild symptoms, such as fever, cough, and shortness of breath or they may have more severe illness, developing severe and fatal pneumonia.   As a result, Advance Care becoming sick with COVID-19, it is still recommended that people sick with COVID-19 limit contact with animals until more information is known about the virus. When possible, have another member of your household care for your animals while you are sick. If you are sick with COVID-19, avoid contact with your pet, including petting, snuggling, being kissed or licked, and sharing food. If you must care for your pet or be around animals while you are sick, wash your hands before and after you interact with pets and wear a facemask. Call ahead before visiting your doctor  If you have a medical appointment, call the healthcare provider and tell them that you have or may have COVID-19. This will help the healthcare providers office take steps to keep other people from getting infected or exposed. Wear a facemask  You should wear a facemask when you are around other people (e.g., sharing a room or vehicle) or pets and before you enter a healthcare providers office. If you are not able to wear a facemask (for example, because it causes trouble breathing), then people who live with you should not stay in the same room with you, or they should wear a facemask if they enter your room. Cover your coughs and sneezes  Cover your mouth and nose with a tissue when you cough or sneeze. Throw used tissues in a lined trash can. Immediately wash your hands with soap and water for at least 20 seconds or, if soap and water are not available, clean your hands with an alcohol-based hand  that contains at least 60% alcohol. Clean your hands often  Wash your hands often with soap and water for at least 20 seconds, especially after blowing your nose, coughing, or sneezing; going to the bathroom; and before eating or preparing food. If soap and water are not readily available, use an alcohol-based hand  with at least 60% alcohol, covering all surfaces of your hands and rubbing them together until they feel dry.   Soap and water are the best option if hands are visibly dirty. Avoid touching your eyes, nose, and mouth with unwashed hands. Avoid sharing personal household items  You should not share dishes, drinking glasses, cups, eating utensils, towels, or bedding with other people or pets in your home. After using these items, they should be washed thoroughly with soap and water. Clean all high-touch surfaces everyday  High touch surfaces include counters, tabletops, doorknobs, bathroom fixtures, toilets, phones, keyboards, tablets, and bedside tables. Also, clean any surfaces that may have blood, stool, or body fluids on them. Use a household cleaning spray or wipe, according to the label instructions. Labels contain instructions for safe and effective use of the cleaning product including precautions you should take when applying the product, such as wearing gloves and making sure you have good ventilation during use of the product. Monitor your symptoms  Seek prompt medical attention if your illness is worsening (e.g., difficulty breathing). Before seeking care, call your healthcare provider and tell them that you have, or are being evaluated for, COVID-19. Put on a facemask before you enter the facility. These steps will help the healthcare providers office to keep other people in the office or waiting room from getting infected or exposed. Ask your healthcare provider to call the local or state health department. Persons who are placed under active monitoring or facilitated self-monitoring should follow instructions provided by their local health department or occupational health professionals, as appropriate. When working with your local health department check their available hours. If you have a medical emergency and need to call 911, notify the dispatch personnel that you have, or are being evaluated for COVID-19. If possible, put on a facemask before emergency medical services arrive.   Discontinuing home isolation  Patients with confirmed COVID-19 should remain under home isolation precautions until the risk of secondary transmission to others is thought to be low. The decision to discontinue home isolation precautions should be made on a case-by-case basis, in consultation with healthcare providers and state and local health departments.               Electronically signed by ASHLEE Vaz on11/10/2020 at 4:50 PM

## 2020-11-10 NOTE — PATIENT INSTRUCTIONS

## 2020-11-14 LAB — SARS-COV-2, NAA: NOT DETECTED

## 2020-11-16 ENCOUNTER — TELEPHONE (OUTPATIENT)
Dept: PRIMARY CARE CLINIC | Age: 45
End: 2020-11-16

## 2020-11-16 NOTE — TELEPHONE ENCOUNTER
Called patient, spoke with: Patient regarding the results of the patients most recent lab. I advised Patient of Be Guerita recommendations.    Patient did voice understanding

## 2020-11-20 ENCOUNTER — CLINICAL SUPPORT (OUTPATIENT)
Dept: ONCOLOGY | Facility: CLINIC | Age: 45
End: 2020-11-20

## 2020-11-20 ENCOUNTER — LAB (OUTPATIENT)
Dept: LAB | Facility: HOSPITAL | Age: 45
End: 2020-11-20

## 2020-11-20 VITALS
HEART RATE: 83 BPM | RESPIRATION RATE: 16 BRPM | BODY MASS INDEX: 48.51 KG/M2 | SYSTOLIC BLOOD PRESSURE: 148 MMHG | OXYGEN SATURATION: 96 % | HEIGHT: 65 IN | DIASTOLIC BLOOD PRESSURE: 84 MMHG

## 2020-11-20 DIAGNOSIS — Z45.2 ENCOUNTER FOR CARE RELATED TO PORT-A-CATH: Primary | ICD-10-CM

## 2020-11-20 DIAGNOSIS — D64.9 ANEMIA, UNSPECIFIED TYPE: ICD-10-CM

## 2020-11-20 LAB
BASOPHILS # BLD AUTO: 0.06 10*3/MM3 (ref 0–0.2)
BASOPHILS NFR BLD AUTO: 0.7 % (ref 0–1.5)
DEPRECATED RDW RBC AUTO: 50.8 FL (ref 37–54)
EOSINOPHIL # BLD AUTO: 0.1 10*3/MM3 (ref 0–0.4)
EOSINOPHIL NFR BLD AUTO: 1.2 % (ref 0.3–6.2)
ERYTHROCYTE [DISTWIDTH] IN BLOOD BY AUTOMATED COUNT: 15.9 % (ref 12.3–15.4)
FERRITIN SERPL-MCNC: 1663 NG/ML (ref 13–150)
HCT VFR BLD AUTO: 34.1 % (ref 34–46.6)
HGB BLD-MCNC: 11.9 G/DL (ref 12–15.9)
IMM GRANULOCYTES # BLD AUTO: 0.04 10*3/MM3 (ref 0–0.05)
IMM GRANULOCYTES NFR BLD AUTO: 0.5 % (ref 0–0.5)
IRON 24H UR-MRATE: 60 MCG/DL (ref 37–145)
IRON SATN MFR SERPL: 24 % (ref 20–50)
LYMPHOCYTES # BLD AUTO: 2.08 10*3/MM3 (ref 0.7–3.1)
LYMPHOCYTES NFR BLD AUTO: 25.2 % (ref 19.6–45.3)
MCH RBC QN AUTO: 30.5 PG (ref 26.6–33)
MCHC RBC AUTO-ENTMCNC: 34.9 G/DL (ref 31.5–35.7)
MCV RBC AUTO: 87.4 FL (ref 79–97)
MONOCYTES # BLD AUTO: 0.34 10*3/MM3 (ref 0.1–0.9)
MONOCYTES NFR BLD AUTO: 4.1 % (ref 5–12)
NEUTROPHILS NFR BLD AUTO: 5.62 10*3/MM3 (ref 1.7–7)
NEUTROPHILS NFR BLD AUTO: 68.3 % (ref 42.7–76)
NRBC BLD AUTO-RTO: 0 /100 WBC (ref 0–0.2)
PLATELET # BLD AUTO: 265 10*3/MM3 (ref 140–450)
PMV BLD AUTO: 10 FL (ref 6–12)
RBC # BLD AUTO: 3.9 10*6/MM3 (ref 3.77–5.28)
TIBC SERPL-MCNC: 253 MCG/DL (ref 298–536)
TRANSFERRIN SERPL-MCNC: 170 MG/DL (ref 200–360)
WBC # BLD AUTO: 8.24 10*3/MM3 (ref 3.4–10.8)

## 2020-11-20 PROCEDURE — 82728 ASSAY OF FERRITIN: CPT

## 2020-11-20 PROCEDURE — 84466 ASSAY OF TRANSFERRIN: CPT

## 2020-11-20 PROCEDURE — 85025 COMPLETE CBC W/AUTO DIFF WBC: CPT

## 2020-11-20 PROCEDURE — 36415 COLL VENOUS BLD VENIPUNCTURE: CPT

## 2020-11-20 PROCEDURE — 99195 PHLEBOTOMY: CPT | Performed by: INTERNAL MEDICINE

## 2020-11-20 PROCEDURE — 83540 ASSAY OF IRON: CPT

## 2020-11-20 RX ORDER — HEPARIN SODIUM (PORCINE) LOCK FLUSH IV SOLN 100 UNIT/ML 100 UNIT/ML
500 SOLUTION INTRAVENOUS AS NEEDED
Status: CANCELLED | OUTPATIENT
Start: 2020-11-20

## 2020-11-20 RX ORDER — SODIUM CHLORIDE 0.9 % (FLUSH) 0.9 %
10 SYRINGE (ML) INJECTION AS NEEDED
Status: DISCONTINUED | OUTPATIENT
Start: 2020-11-20 | End: 2020-11-20 | Stop reason: HOSPADM

## 2020-11-20 RX ORDER — SODIUM CHLORIDE 0.9 % (FLUSH) 0.9 %
10 SYRINGE (ML) INJECTION AS NEEDED
Status: CANCELLED | OUTPATIENT
Start: 2020-11-20

## 2020-11-20 RX ORDER — HEPARIN SODIUM (PORCINE) LOCK FLUSH IV SOLN 100 UNIT/ML 100 UNIT/ML
500 SOLUTION INTRAVENOUS AS NEEDED
Status: DISCONTINUED | OUTPATIENT
Start: 2020-11-20 | End: 2020-11-20 | Stop reason: HOSPADM

## 2020-11-20 RX ADMIN — HEPARIN SODIUM (PORCINE) LOCK FLUSH IV SOLN 100 UNIT/ML 500 UNITS: 100 SOLUTION at 11:23

## 2020-11-20 RX ADMIN — Medication 10 ML: at 11:22

## 2020-11-20 NOTE — PROGRESS NOTES
I have reviewed the notes, assessments, and/or procedures performed by Heavenly Kohler, I concur with her/his documentation of Naomi Bhatia.

## 2020-11-20 NOTE — PROGRESS NOTES
Patient here for 250 ml phlebotomy for elevated ferritin level per Dr. Stevenson's order.  Patient denies any problems today.  Skin warm, dry, and color WNL.  Discussed with Naomi that she needs to make sure she eats before coming in to have a phlebotomy done.  Stated that she has not eaten anything today.  Given water to drink and patient is eating a candy bar.  250 ml phlebotomy performed.  Patient stated that she felt a little weak.  Transported by wheelchair to meet her daughter at her vehicle.  Patient will return in 2 weeks for repeat labs.

## 2020-12-04 ENCOUNTER — LAB (OUTPATIENT)
Dept: LAB | Facility: HOSPITAL | Age: 45
End: 2020-12-04

## 2020-12-04 DIAGNOSIS — D64.9 ANEMIA, UNSPECIFIED TYPE: ICD-10-CM

## 2020-12-04 LAB
BASOPHILS # BLD AUTO: 0.06 10*3/MM3 (ref 0–0.2)
BASOPHILS NFR BLD AUTO: 0.7 % (ref 0–1.5)
DEPRECATED RDW RBC AUTO: 50.5 FL (ref 37–54)
EOSINOPHIL # BLD AUTO: 0.11 10*3/MM3 (ref 0–0.4)
EOSINOPHIL NFR BLD AUTO: 1.3 % (ref 0.3–6.2)
ERYTHROCYTE [DISTWIDTH] IN BLOOD BY AUTOMATED COUNT: 15.9 % (ref 12.3–15.4)
FERRITIN SERPL-MCNC: 1489 NG/ML (ref 13–150)
HCT VFR BLD AUTO: 33.7 % (ref 34–46.6)
HGB BLD-MCNC: 11.4 G/DL (ref 12–15.9)
IMM GRANULOCYTES # BLD AUTO: 0.05 10*3/MM3 (ref 0–0.05)
IMM GRANULOCYTES NFR BLD AUTO: 0.6 % (ref 0–0.5)
IRON 24H UR-MRATE: 57 MCG/DL (ref 37–145)
IRON SATN MFR SERPL: 23 % (ref 20–50)
LYMPHOCYTES # BLD AUTO: 1.69 10*3/MM3 (ref 0.7–3.1)
LYMPHOCYTES NFR BLD AUTO: 20.6 % (ref 19.6–45.3)
MCH RBC QN AUTO: 30 PG (ref 26.6–33)
MCHC RBC AUTO-ENTMCNC: 33.8 G/DL (ref 31.5–35.7)
MCV RBC AUTO: 88.7 FL (ref 79–97)
MONOCYTES # BLD AUTO: 0.37 10*3/MM3 (ref 0.1–0.9)
MONOCYTES NFR BLD AUTO: 4.5 % (ref 5–12)
NEUTROPHILS NFR BLD AUTO: 5.93 10*3/MM3 (ref 1.7–7)
NEUTROPHILS NFR BLD AUTO: 72.3 % (ref 42.7–76)
NRBC BLD AUTO-RTO: 0 /100 WBC (ref 0–0.2)
PLATELET # BLD AUTO: 261 10*3/MM3 (ref 140–450)
PMV BLD AUTO: 9.9 FL (ref 6–12)
RBC # BLD AUTO: 3.8 10*6/MM3 (ref 3.77–5.28)
TIBC SERPL-MCNC: 250 MCG/DL (ref 298–536)
TRANSFERRIN SERPL-MCNC: 168 MG/DL (ref 200–360)
WBC # BLD AUTO: 8.21 10*3/MM3 (ref 3.4–10.8)

## 2020-12-04 PROCEDURE — 82728 ASSAY OF FERRITIN: CPT

## 2020-12-04 PROCEDURE — 85025 COMPLETE CBC W/AUTO DIFF WBC: CPT

## 2020-12-04 PROCEDURE — 36415 COLL VENOUS BLD VENIPUNCTURE: CPT

## 2020-12-04 PROCEDURE — 83540 ASSAY OF IRON: CPT

## 2020-12-04 PROCEDURE — 84466 ASSAY OF TRANSFERRIN: CPT

## 2020-12-04 RX ORDER — OXYCODONE HYDROCHLORIDE 5 MG/1
5 TABLET ORAL 3 TIMES DAILY PRN
Qty: 90 TABLET | Refills: 0 | Status: SHIPPED | OUTPATIENT
Start: 2020-12-04 | End: 2020-12-30 | Stop reason: SDUPTHER

## 2020-12-08 NOTE — PROGRESS NOTES
Goals  Short term goals  Time Frame for Short term goals: 4 Weeks  Short term goal 1:  Increase balance to have no sway with static stance- NOT MET  Short term goal 2: Patient to have no lob with pertebations in all directions- NOT MET  Short term goal 3: Patient to have no feeling of dizzines with rigiht horizontal positional testing- NOT MET  Short term goal 4: Patient to be independent with HEP- NOT MET    Long term goals  Time Frame for Long term goals : 6 Weeks  Long term goal 1: Patient to have decreased report of dizziness by 50%- NOT MET  Long term goal 2: Patient to report decreased dizziness with rolling in bed- NOT MET  Long term goal 3: Patient to demo increased function by scoring <= 30% impairment on Dizziness Handicap Inventory- NOT MET  Long term goal 4: Patient to demo increased balance by scoring >= 19/24 on DGI- NOT MET         Electronically signed by Colonel Bon PT on 12/8/2020 at 7:47 AM

## 2020-12-15 ENCOUNTER — TELEPHONE (OUTPATIENT)
Dept: ONCOLOGY | Facility: CLINIC | Age: 45
End: 2020-12-15

## 2020-12-15 ENCOUNTER — PATIENT MESSAGE (OUTPATIENT)
Dept: PRIMARY CARE CLINIC | Age: 45
End: 2020-12-15

## 2020-12-15 DIAGNOSIS — K08.129 LOSS OF TEETH DUE TO PERIODONTAL DISEASE, UNSPECIFIED EDENTULISM CLASS: Primary | ICD-10-CM

## 2020-12-15 NOTE — TELEPHONE ENCOUNTER
Reviewed lab results with Wendy SOLIS.  Will not do phlebotomy at this time since she is becoming anemic.  Notified Naomi of this and will recheck lab work at next appointment on 1/5/2020.

## 2020-12-28 NOTE — TELEPHONE ENCOUNTER
Received fax from pharmacy requesting refill on pts medication(s). Pt was last seen in office on 11/10/2020  and has a follow up scheduled for 1/22/2021. Will send request to  Dr. Lenin Andrade  for authorization.      Requested Prescriptions     Pending Prescriptions Disp Refills    carbidopa-levodopa (SINEMET)  MG per tablet [Pharmacy Med Name: CARBIDOPA/LEVODOPA 25-100MG TABS] 90 tablet 3     Sig: TAKE 1 TABLET BY MOUTH EVERY NIGHT

## 2020-12-30 DIAGNOSIS — M54.16 CHRONIC LUMBAR RADICULOPATHY: ICD-10-CM

## 2020-12-30 RX ORDER — LEVOCETIRIZINE DIHYDROCHLORIDE 5 MG/1
5 TABLET, FILM COATED ORAL NIGHTLY
Qty: 90 TABLET | Refills: 3 | Status: SHIPPED | OUTPATIENT
Start: 2020-12-30 | End: 2021-02-22

## 2020-12-30 NOTE — TELEPHONE ENCOUNTER
Received fax from pharmacy requesting refill on pts medication(s). Pt was last seen in office on 11/10/2020  and has a follow up scheduled for 1/22/2021. Will send request to  Dr. Simone La  for patient.      Requested Prescriptions     Pending Prescriptions Disp Refills    levocetirizine (XYZAL) 5 MG tablet 90 tablet 3     Sig: Take 1 tablet by mouth nightly

## 2021-01-05 ENCOUNTER — LAB (OUTPATIENT)
Dept: LAB | Facility: HOSPITAL | Age: 46
End: 2021-01-05

## 2021-01-05 ENCOUNTER — OFFICE VISIT (OUTPATIENT)
Dept: ONCOLOGY | Facility: CLINIC | Age: 46
End: 2021-01-05

## 2021-01-05 VITALS
OXYGEN SATURATION: 98 % | TEMPERATURE: 98 F | RESPIRATION RATE: 16 BRPM | DIASTOLIC BLOOD PRESSURE: 84 MMHG | SYSTOLIC BLOOD PRESSURE: 144 MMHG | HEIGHT: 65 IN | BODY MASS INDEX: 48.82 KG/M2 | HEART RATE: 80 BPM | WEIGHT: 293 LBS

## 2021-01-05 DIAGNOSIS — E83.110 HEMOCHROMATOSIS, HEREDITARY (HCC): ICD-10-CM

## 2021-01-05 DIAGNOSIS — D64.9 ANEMIA, UNSPECIFIED TYPE: Primary | ICD-10-CM

## 2021-01-05 DIAGNOSIS — E83.19 IRON OVERLOAD: ICD-10-CM

## 2021-01-05 LAB
ALBUMIN SERPL-MCNC: 4.1 G/DL (ref 3.5–5.2)
ALBUMIN/GLOB SERPL: 1.3 G/DL
ALP SERPL-CCNC: 97 U/L (ref 39–117)
ALT SERPL W P-5'-P-CCNC: 20 U/L (ref 1–33)
ANION GAP SERPL CALCULATED.3IONS-SCNC: 11 MMOL/L (ref 5–15)
AST SERPL-CCNC: 28 U/L (ref 1–32)
BASOPHILS # BLD AUTO: 0.06 10*3/MM3 (ref 0–0.2)
BASOPHILS NFR BLD AUTO: 0.7 % (ref 0–1.5)
BILIRUB SERPL-MCNC: 0.6 MG/DL (ref 0–1.2)
BUN SERPL-MCNC: 9 MG/DL (ref 6–20)
BUN/CREAT SERPL: 14.5 (ref 7–25)
CALCIUM SPEC-SCNC: 9.3 MG/DL (ref 8.6–10.5)
CHLORIDE SERPL-SCNC: 102 MMOL/L (ref 98–107)
CO2 SERPL-SCNC: 28 MMOL/L (ref 22–29)
CREAT SERPL-MCNC: 0.62 MG/DL (ref 0.57–1)
CRP SERPL-MCNC: 2.21 MG/DL (ref 0–0.5)
DEPRECATED RDW RBC AUTO: 50.8 FL (ref 37–54)
EOSINOPHIL # BLD AUTO: 0.09 10*3/MM3 (ref 0–0.4)
EOSINOPHIL NFR BLD AUTO: 1 % (ref 0.3–6.2)
ERYTHROCYTE [DISTWIDTH] IN BLOOD BY AUTOMATED COUNT: 15.9 % (ref 12.3–15.4)
FERRITIN SERPL-MCNC: 1355 NG/ML (ref 13–150)
GFR SERPL CREATININE-BSD FRML MDRD: 104 ML/MIN/1.73
GLOBULIN UR ELPH-MCNC: 3.2 GM/DL
GLUCOSE SERPL-MCNC: 155 MG/DL (ref 65–99)
HCT VFR BLD AUTO: 33.5 % (ref 34–46.6)
HGB BLD-MCNC: 11.3 G/DL (ref 12–15.9)
HOLD SPECIMEN: NORMAL
IMM GRANULOCYTES # BLD AUTO: 0.05 10*3/MM3 (ref 0–0.05)
IMM GRANULOCYTES NFR BLD AUTO: 0.5 % (ref 0–0.5)
IRON 24H UR-MRATE: 54 MCG/DL (ref 37–145)
IRON SATN MFR SERPL: 23 % (ref 20–50)
LYMPHOCYTES # BLD AUTO: 1.58 10*3/MM3 (ref 0.7–3.1)
LYMPHOCYTES NFR BLD AUTO: 17.4 % (ref 19.6–45.3)
MCH RBC QN AUTO: 29.4 PG (ref 26.6–33)
MCHC RBC AUTO-ENTMCNC: 33.7 G/DL (ref 31.5–35.7)
MCV RBC AUTO: 87.2 FL (ref 79–97)
MONOCYTES # BLD AUTO: 0.38 10*3/MM3 (ref 0.1–0.9)
MONOCYTES NFR BLD AUTO: 4.2 % (ref 5–12)
NEUTROPHILS NFR BLD AUTO: 6.94 10*3/MM3 (ref 1.7–7)
NEUTROPHILS NFR BLD AUTO: 76.2 % (ref 42.7–76)
NRBC BLD AUTO-RTO: 0 /100 WBC (ref 0–0.2)
PLATELET # BLD AUTO: 239 10*3/MM3 (ref 140–450)
PMV BLD AUTO: 9.4 FL (ref 6–12)
POTASSIUM SERPL-SCNC: 3.6 MMOL/L (ref 3.5–5.2)
PROT SERPL-MCNC: 7.3 G/DL (ref 6–8.5)
RBC # BLD AUTO: 3.84 10*6/MM3 (ref 3.77–5.28)
RETICS # AUTO: 0.17 10*6/MM3 (ref 0.02–0.13)
RETICS/RBC NFR AUTO: 4.53 % (ref 0.7–1.9)
SODIUM SERPL-SCNC: 141 MMOL/L (ref 136–145)
TIBC SERPL-MCNC: 235 MCG/DL (ref 298–536)
TRANSFERRIN SERPL-MCNC: 158 MG/DL (ref 200–360)
WBC # BLD AUTO: 9.1 10*3/MM3 (ref 3.4–10.8)

## 2021-01-05 PROCEDURE — 85045 AUTOMATED RETICULOCYTE COUNT: CPT

## 2021-01-05 PROCEDURE — 84466 ASSAY OF TRANSFERRIN: CPT

## 2021-01-05 PROCEDURE — 83540 ASSAY OF IRON: CPT

## 2021-01-05 PROCEDURE — 86140 C-REACTIVE PROTEIN: CPT

## 2021-01-05 PROCEDURE — 36415 COLL VENOUS BLD VENIPUNCTURE: CPT

## 2021-01-05 PROCEDURE — 80053 COMPREHEN METABOLIC PANEL: CPT

## 2021-01-05 PROCEDURE — 99213 OFFICE O/P EST LOW 20 MIN: CPT | Performed by: INTERNAL MEDICINE

## 2021-01-05 PROCEDURE — 82728 ASSAY OF FERRITIN: CPT

## 2021-01-05 PROCEDURE — 85025 COMPLETE CBC W/AUTO DIFF WBC: CPT

## 2021-01-05 PROCEDURE — 99195 PHLEBOTOMY: CPT | Performed by: INTERNAL MEDICINE

## 2021-01-05 RX ORDER — OXYCODONE HYDROCHLORIDE 5 MG/1
5 TABLET ORAL 3 TIMES DAILY PRN
Qty: 90 TABLET | Refills: 0 | Status: SHIPPED | OUTPATIENT
Start: 2021-01-05 | End: 2021-01-27 | Stop reason: SDUPTHER

## 2021-01-11 NOTE — TELEPHONE ENCOUNTER
From: Juanito Santos  To: [address] [place],[address], [place]  Sent: 12/15/2020 4:55 PM CST  Subject: Prescription Question    Hello, I hope you all are doing well! I'm needing to get a prescription written for me to get dentures please. The only way my Medicaid Waiver insurance will pay for them is if my PCP writes a script saying I need them. I was seen at [facility] last week and was diagnosed with gum disease. I need to have the 8 remaining teeth extracted and have a full upper and lower denture. Please call me about this when you get this. Thank you, and God bless!

## 2021-01-19 ENCOUNTER — CLINICAL SUPPORT (OUTPATIENT)
Dept: ONCOLOGY | Facility: CLINIC | Age: 46
End: 2021-01-19

## 2021-01-19 ENCOUNTER — LAB (OUTPATIENT)
Dept: LAB | Facility: HOSPITAL | Age: 46
End: 2021-01-19

## 2021-01-19 VITALS
DIASTOLIC BLOOD PRESSURE: 88 MMHG | OXYGEN SATURATION: 97 % | RESPIRATION RATE: 16 BRPM | SYSTOLIC BLOOD PRESSURE: 142 MMHG | HEART RATE: 72 BPM | TEMPERATURE: 98.5 F

## 2021-01-19 DIAGNOSIS — E83.110 HEMOCHROMATOSIS, HEREDITARY (HCC): ICD-10-CM

## 2021-01-19 DIAGNOSIS — D64.9 ANEMIA, UNSPECIFIED TYPE: ICD-10-CM

## 2021-01-19 DIAGNOSIS — E83.110 HEREDITARY HEMOCHROMATOSIS (HCC): ICD-10-CM

## 2021-01-19 DIAGNOSIS — E83.19 IRON OVERLOAD: ICD-10-CM

## 2021-01-19 LAB
BASOPHILS # BLD AUTO: 0.07 10*3/MM3 (ref 0–0.2)
BASOPHILS NFR BLD AUTO: 0.8 % (ref 0–1.5)
DEPRECATED RDW RBC AUTO: 51.4 FL (ref 37–54)
EOSINOPHIL # BLD AUTO: 0.1 10*3/MM3 (ref 0–0.4)
EOSINOPHIL NFR BLD AUTO: 1.2 % (ref 0.3–6.2)
ERYTHROCYTE [DISTWIDTH] IN BLOOD BY AUTOMATED COUNT: 16.4 % (ref 12.3–15.4)
FERRITIN SERPL-MCNC: 1259 NG/ML (ref 13–150)
HCT VFR BLD AUTO: 33 % (ref 34–46.6)
HGB BLD-MCNC: 11.6 G/DL (ref 12–15.9)
IMM GRANULOCYTES # BLD AUTO: 0.05 10*3/MM3 (ref 0–0.05)
IMM GRANULOCYTES NFR BLD AUTO: 0.6 % (ref 0–0.5)
LYMPHOCYTES # BLD AUTO: 1.88 10*3/MM3 (ref 0.7–3.1)
LYMPHOCYTES NFR BLD AUTO: 21.6 % (ref 19.6–45.3)
MCH RBC QN AUTO: 30.2 PG (ref 26.6–33)
MCHC RBC AUTO-ENTMCNC: 35.2 G/DL (ref 31.5–35.7)
MCV RBC AUTO: 85.9 FL (ref 79–97)
MONOCYTES # BLD AUTO: 0.43 10*3/MM3 (ref 0.1–0.9)
MONOCYTES NFR BLD AUTO: 4.9 % (ref 5–12)
NEUTROPHILS NFR BLD AUTO: 6.16 10*3/MM3 (ref 1.7–7)
NEUTROPHILS NFR BLD AUTO: 70.9 % (ref 42.7–76)
NRBC BLD AUTO-RTO: 0 /100 WBC (ref 0–0.2)
PLATELET # BLD AUTO: 275 10*3/MM3 (ref 140–450)
PMV BLD AUTO: 9.9 FL (ref 6–12)
RBC # BLD AUTO: 3.84 10*6/MM3 (ref 3.77–5.28)
WBC # BLD AUTO: 8.69 10*3/MM3 (ref 3.4–10.8)

## 2021-01-19 PROCEDURE — 85025 COMPLETE CBC W/AUTO DIFF WBC: CPT

## 2021-01-19 PROCEDURE — 82728 ASSAY OF FERRITIN: CPT

## 2021-01-19 PROCEDURE — 99195 PHLEBOTOMY: CPT | Performed by: NURSE PRACTITIONER

## 2021-01-19 PROCEDURE — 36415 COLL VENOUS BLD VENIPUNCTURE: CPT

## 2021-01-19 NOTE — PROGRESS NOTES
Patient here for lab evaluation and possible phlebotomy due to hemochromatosis.  States that she is feeling good today.  No c/o voiced.  Skin w/d to touch.  Color wnl.  Eating and drinking well.  Parameters set for 250ml phlebotomy for Hgb >11 until ferritin <500 per Dr Goldberg's orders.  Reviewed labs with patient, Hgb 11.6, Hct 33, Ferritin 1259.  250ml phlebotomy done per left CVAD without difficulty.  Patient tolerated well.  Denies any weakness or dizziness upon standing.  Will return in two weeks for same.  Instructed to call with any problems.  Patient v/u.

## 2021-01-22 ENCOUNTER — OFFICE VISIT (OUTPATIENT)
Dept: PRIMARY CARE CLINIC | Age: 46
End: 2021-01-22
Payer: MEDICARE

## 2021-01-22 VITALS
HEIGHT: 68 IN | BODY MASS INDEX: 44.41 KG/M2 | SYSTOLIC BLOOD PRESSURE: 122 MMHG | DIASTOLIC BLOOD PRESSURE: 80 MMHG | HEART RATE: 87 BPM | OXYGEN SATURATION: 99 % | WEIGHT: 293 LBS | TEMPERATURE: 96.9 F

## 2021-01-22 DIAGNOSIS — R53.83 FATIGUE, UNSPECIFIED TYPE: ICD-10-CM

## 2021-01-22 DIAGNOSIS — H10.501 BLEPHAROCONJUNCTIVITIS OF RIGHT EYE, UNSPECIFIED BLEPHAROCONJUNCTIVITIS TYPE: Primary | ICD-10-CM

## 2021-01-22 DIAGNOSIS — H10.13 ALLERGIC CONJUNCTIVITIS OF BOTH EYES: ICD-10-CM

## 2021-01-22 DIAGNOSIS — F51.01 PRIMARY INSOMNIA: ICD-10-CM

## 2021-01-22 DIAGNOSIS — I10 ESSENTIAL HYPERTENSION: ICD-10-CM

## 2021-01-22 DIAGNOSIS — F32.A ANXIETY AND DEPRESSION: ICD-10-CM

## 2021-01-22 DIAGNOSIS — G56.01 CARPAL TUNNEL SYNDROME, RIGHT: ICD-10-CM

## 2021-01-22 DIAGNOSIS — F41.9 ANXIETY AND DEPRESSION: ICD-10-CM

## 2021-01-22 DIAGNOSIS — R79.89 ELEVATED FERRITIN: ICD-10-CM

## 2021-01-22 PROCEDURE — G8427 DOCREV CUR MEDS BY ELIG CLIN: HCPCS | Performed by: PEDIATRICS

## 2021-01-22 PROCEDURE — G8484 FLU IMMUNIZE NO ADMIN: HCPCS | Performed by: PEDIATRICS

## 2021-01-22 PROCEDURE — 99214 OFFICE O/P EST MOD 30 MIN: CPT | Performed by: PEDIATRICS

## 2021-01-22 PROCEDURE — G8417 CALC BMI ABV UP PARAM F/U: HCPCS | Performed by: PEDIATRICS

## 2021-01-22 PROCEDURE — 1036F TOBACCO NON-USER: CPT | Performed by: PEDIATRICS

## 2021-01-22 RX ORDER — TRIAMCINOLONE ACETONIDE 0.25 MG/G
CREAM TOPICAL
Qty: 30 G | Refills: 1 | Status: SHIPPED | OUTPATIENT
Start: 2021-01-22 | End: 2021-03-30

## 2021-01-22 RX ORDER — OLOPATADINE HYDROCHLORIDE 2 MG/ML
1 SOLUTION/ DROPS OPHTHALMIC DAILY
Qty: 1 BOTTLE | Refills: 2 | Status: SHIPPED | OUTPATIENT
Start: 2021-01-22

## 2021-01-22 ASSESSMENT — ENCOUNTER SYMPTOMS
COUGH: 0
CHEST TIGHTNESS: 0
VOMITING: 0
RESPIRATORY NEGATIVE: 1
SORE THROAT: 0
ABDOMINAL PAIN: 0
DIARRHEA: 0
SINUS PRESSURE: 0
NAUSEA: 0
GASTROINTESTINAL NEGATIVE: 1
EYES NEGATIVE: 1
EYE DISCHARGE: 0
SHORTNESS OF BREATH: 0
WHEEZING: 0
BACK PAIN: 0
CONSTIPATION: 0
ALLERGIC/IMMUNOLOGIC NEGATIVE: 1

## 2021-01-22 NOTE — PROGRESS NOTES
[facility]  [address] [place], [address]  Phone [phone]   Fax [phone]      OFFICE VISIT: 2021    Eddie Luis Oscar-: 1975      HPI  Reason For Visit:  Eddie Luis is a 54 y.o.     3 Month Follow-Up and Eye Drainage (eye lashes are matted on right eye every morning, a think liquid come from her eye)    Patient presents on routine treatment follow-up for multiple health issues. This is an in-house visit. Present concerns:  She is having some eye drainage in her right eye. This is been going on for several days. She notes that she has matting in the mornings. There are no other family members who have similar symptoms. The sclera of her eye is not overly affected      Carpal tunnel syndrome right hand:  She has been seeing Dr. Zackery Nelson for this. She was also scheduled for surgery on her carpal tunnel at last evaluation on 10/23/2020. This is doing better. Insomnia:  Medications   Nortriptyline 50mg nightly      Anxiety and depression:  Medication:   Effexor  mg daily   Nortriptyline 25 mg 2 tablets at bedtime  Symptoms: this regimen is effective at managing her anxiety. Polycythemia:  She is getting serial blood draws/ phlebotomy. She is following with Dr. Taylor Marley at [facility]  She has no elevation of liver enzymes. She did have a hemochromatosis gene assay and she was noted to be a carrier of gene. She should not have symptoms with this genetic abnormality. She had a liver US and this showed fatty infiltration. She had been on iron infusions in the past and now she is told that she has too much iron. She was getting these iron infusions 2-3x per week.   Iron levels are normal.      Hypertension:   BP today was   BP Readings from Last 1 Encounters:   21 122/80      Recent BP readings:    BP Readings from Last 3 Encounters:   21 122/80   11/10/20 132/80   20 118/82     Medication   toprol XL 50mg daily  Medication Muscle spasms 60 tablet 2    venlafaxine (EFFEXOR XR) 75 MG extended release capsule Take 1 capsule by mouth daily Take 1 capsule daily in addition to 150 mg to make up  225 mg daily 30 capsule 11    metoprolol succinate (TOPROL XL) 50 MG extended release tablet Take 1 tablet by mouth 2 times daily 180 tablet 3    meloxicam (MOBIC) 15 MG tablet TAKE 1 TABLET BY MOUTH DAILY 90 tablet 3    nortriptyline (PAMELOR) 25 MG capsule TAKE 1 TO 2 CAPSULES BY MOUTH EVERY NIGHT 180 capsule 3    metFORMIN (GLUCOPHAGE) 1000 MG tablet Take 1 tablet by mouth 2 times daily (with meals) 60 tablet 11    Tens Unit MISC by Does not apply route 1 each 0    fluticasone (FLONASE) 50 MCG/ACT nasal spray 1 spray by Each Nostril route daily 2 Bottle 1    triamcinolone (KENALOG) 0.1 % cream Apply topically 2 times daily. 80 g 3    Calcium-Vitamin D 600-200 MG-UNIT TABS Take 1 tablet by mouth daily      albuterol sulfate HFA (PROVENTIL HFA) 108 (90 Base) MCG/ACT inhaler Inhale 2 puffs into the lungs every 6 hours as needed for Wheezing 1 Inhaler 3    ondansetron (ZOFRAN ODT) 4 MG disintegrating tablet Take 1 tablet by mouth every 8 hours as needed for Nausea or Vomiting 10 tablet 0    Multiple Vitamins-Minerals (MULTIVITAMIN & MINERAL PO) Take  by mouth.  CRANBERRY Take 500 mg by mouth daily.  Cholecalciferol (VITAMIN D3) 5000 UNITS TABS Take 5,000 Units by mouth daily        No current facility-administered medications for this visit.         Allergies: Silver, Tegaderm ag mesh 2\"x2\" [wound dressings], and Zithromax [azithromycin]     Past Medical History:   Diagnosis Date    Anemia     has had iron infusion    Anxiety     Arthritis     Back pain with left-sided radiculopathy 3/14/2016    DDD (degenerative disc disease), lumbar     Depression     Esophagitis     Fibromyalgia     Gastritis     Headache(784.0)     History of blood transfusion     Hypertension     Obesity     RLS (restless legs syndrome)     Sleep apnea     uses CPAP machine       Family History   Problem Relation Age of Onset    Diabetes Mother     High Blood Pressure Mother     Colon Polyps Mother     Heart Disease Mother     Cancer Mother     Depression Mother     Cancer Father     High Blood Pressure Father     Heart Disease Father     Stroke Father     High Blood Pressure Sister     Depression Sister     Anxiety Disorder Sister     Cancer Brother     Esophageal Cancer Brother     Anxiety Disorder Brother     Diabetes Brother     ADHD Brother     Depression Brother     Other Other         has history of suicide in family maternal great uncle       Past Surgical History:   Procedure Laterality Date    ANKLE SURGERY Right     APPENDECTOMY  4/19/15    BACK SURGERY      CERVICAL SPINE SURGERY N/A 9/1/15    CHOLECYSTECTOMY      HIP SURGERY Right 1987    HIP SURGERY  2018    HYSTERECTOMY      partial , still has ovaries and cervix    INSERTION / REMOVAL / REPLACEMENT VENOUS ACCESS CATHETER Left 2017    PORT INSERTION performed by Amalia [facility]o [facility] at [facility]    LITHOTRIPSY  2001 [facility]      with hardware placed    NV COLONOSCOPY FLX DX W/COLLJ SPEC WHEN PFRMD N/A 2017    Dr Alessandra Sahni, 7 yr (age 47) recall    NV EGD TRANSORAL BIOPSY SINGLE/MULTIPLE N/A 2017    Dr ABHIJIT Baltazar-Gastritis/gastropathy    SALPINGO-OOPHORECTOMY      STOMACH SURGERY  2017    dr Iven Barthel Right 2019       Social History     Tobacco Use    Smoking status: Former Smoker     Packs/day: 1.00     Years: 25.00     Pack years: 25.00     Types: Cigarettes     Quit date: 2013     Years since quittin.3    Smokeless tobacco: Never Used   Substance Use Topics    Alcohol use: Yes     Comment: rarely        Review of Systems   Constitutional: Negative. Negative for appetite change, chills, fatigue and fever.    HENT: Negative. Negative for congestion, ear discharge, ear pain, postnasal drip, sinus pressure and sore throat. Eyes: Negative. Negative for discharge and visual disturbance. Respiratory: Negative. Negative for cough, chest tightness, shortness of breath and wheezing. Cardiovascular: Negative. Negative for chest pain, palpitations and leg swelling. Gastrointestinal: Negative. Negative for abdominal pain, constipation, diarrhea, nausea and vomiting. GERD is well controlled on meds   Endocrine: Negative. Genitourinary: Negative. Negative for difficulty urinating, dysuria and menstrual problem. Musculoskeletal: Negative for arthralgias ( ), back pain, joint swelling and myalgias. Skin: Negative. Negative for rash. Allergic/Immunologic: Negative. Neurological: Negative. Negative for dizziness, weakness and headaches. Hematological: Negative. Negative for adenopathy. Does not bruise/bleed easily. Psychiatric/Behavioral: Positive for decreased concentration, dysphoric mood ( under a lot of stress recently) and sleep disturbance. Negative for suicidal ideas. The patient is nervous/anxious (much worse since her father's death). Physical Exam  Vitals signs reviewed. Constitutional:       Appearance: She is well-developed. She is obese. HENT:      Head: Normocephalic. Right Ear: Tympanic membrane, ear canal and external ear normal.      Left Ear: Tympanic membrane, ear canal and external ear normal.      Nose: Nose normal.      Mouth/Throat:      Mouth: Mucous membranes are moist.      Pharynx: Oropharynx is clear. Eyes:      Extraocular Movements: Extraocular movements intact. Conjunctiva/sclera: Conjunctivae normal.      Pupils: Pupils are equal, round, and reactive to light. Neck:      Musculoskeletal: Normal range of motion and neck supple. Cardiovascular:      Rate and Rhythm: Normal rate and regular rhythm.    Pulmonary:      Effort: Pulmonary effort is normal.   Abdominal:      General: Bowel sounds are normal.      Palpations: Abdomen is soft. Tenderness: There is no abdominal tenderness. Musculoskeletal: Normal range of motion. Right hip: She exhibits decreased strength and tenderness. Skin:     General: Skin is warm and dry. Capillary Refill: Capillary refill takes less than 2 seconds. Neurological:      General: No focal deficit present. Mental Status: She is alert and oriented to person, place, and time. Deep Tendon Reflexes: Reflexes are normal and symmetric. Psychiatric:         Mood and Affect: Mood normal.         Behavior: Behavior normal.             ASSESSMENT      ICD-10-CM    1. Blepharoconjunctivitis of right eye, unspecified blepharoconjunctivitis type  H10.501 triamcinolone (KENALOG) 0.025 % cream   2. Carpal tunnel syndrome, right  G56.01    3. Primary insomnia  F51.01    4. Anxiety and depression  F41.9     F32.9    5. BMI 45.0-49.9, adult ([facility])  Z68.42 Hemoglobin A1C     TSH without Reflex     T4, Free   6. Fatigue, unspecified type  R53.83 CBC Auto Differential     TSH without Reflex     T4, Free   7. Elevated ferritin  R79.89 CBC Auto Differential     Ferritin     C-Reactive Protein     Sedimentation Rate     Iron and TIBC   8. Essential hypertension  I10 CBC Auto Differential     Lipid Panel     Microalbumin / Creatinine Urine Ratio     Comprehensive Metabolic Panel   9. Allergic conjunctivitis of both eyes  H10.13 olopatadine (PATADAY) 0.2 % SOLN ophthalmic solution         PLAN    1. Blepharoconjunctivitis of right eye, unspecified blepharoconjunctivitis type  We are going to use a low potency steroid on her eyelids and periorbital region. She will be careful not to get this in her eyes. She will also use a mild/moisturizing soap such as Dove or problems in order to prevent drying of the skin around her eyes.   We are also going to treat her ocular allergies which is also likely contributing to this problem. - triamcinolone (KENALOG) 0.025 % cream; Apply topically 2 times daily. Dispense: 30 g; Refill: 1    2. Carpal tunnel syndrome, right  Now doing much better. Continue the same    3. Primary insomnia  She is doing fairly well on nortriptyline. She does take occasional Flexeril at bedtime as well. 4. Anxiety and depression  This is controlled well enough on present regimen. Continue the same    5. BMI 45.0-49.9, adult ([address]  Continue with Metformin. She will also try to watch her diet and exercise as tolerated  - Hemoglobin A1C; Future  - TSH without Reflex; Future  - T4, Free; Future    6. Fatigue, unspecified type  Check labs to evaluate for etiology  - CBC Auto Differential; Future  - TSH without Reflex; Future  - T4, Free; Future    7. Elevated ferritin  She is getting serial phlebotomy. In the past she had had multiple iron infusions. She will may very well be iron overloaded as an explanation of her elevated ferritin. Sed rate is mildly elevated but not to the point to explain the ferritin level. We will continue to follow her iron levels with serial phlebotomy. As long as her iron levels remain normal, I am in agreement with the present plan of treatment. She does not have hemochromatosis based upon genetic testing but she is a carrier. She should not have symptoms based upon that carrier status. Liver enzymes are normal.  - CBC Auto Differential; Future  - Ferritin; Future  - C-Reactive Protein; Future  - Sedimentation Rate; Future  - Iron and TIBC; Future    8. Essential hypertension  Blood pressure is appropriately controlled on present medication regimen. Continue the same  - CBC Auto Differential; Future  - Lipid Panel; Future  - Microalbumin / Creatinine Urine Ratio; Future  - Comprehensive Metabolic Panel; Future    9.  Allergic conjunctivitis of both eyes  Treat ocular allergies with Pataday 0.2% solution 1 drop in each eye daily  - olopatadine (PATADAY) 0.2 % SOLN ophthalmic solution; Place 1 drop into both eyes daily  Dispense: 1 Bottle; Refill: 2      Orders Placed This Encounter   Procedures    CBC Auto Differential    Ferritin    C-Reactive Protein    Sedimentation Rate    Iron and TIBC    Hemoglobin A1C    Lipid Panel    Microalbumin / Creatinine Urine Ratio    TSH without Reflex    T4, Free    Comprehensive Metabolic Panel        Return in about 3 months (around 4/22/2021) for 30.        This was an in-house visit

## 2021-01-27 DIAGNOSIS — M54.16 CHRONIC LUMBAR RADICULOPATHY: ICD-10-CM

## 2021-01-29 ENCOUNTER — HOSPITAL ENCOUNTER (OUTPATIENT)
Dept: GENERAL RADIOLOGY | Age: 46
Discharge: HOME | End: 2021-01-29
Payer: MEDICARE

## 2021-01-29 DIAGNOSIS — M25.50 ARTHRALGIA OF MULTIPLE JOINTS: ICD-10-CM

## 2021-01-29 PROCEDURE — 73130 X-RAY EXAM OF HAND: CPT

## 2021-01-29 PROCEDURE — 73630 X-RAY EXAM OF FOOT: CPT

## 2021-01-29 RX ORDER — NORTRIPTYLINE HYDROCHLORIDE 25 MG/1
CAPSULE ORAL
Qty: 180 CAPSULE | Refills: 3 | Status: SHIPPED | OUTPATIENT
Start: 2021-01-29 | End: 2022-01-03

## 2021-01-29 NOTE — TELEPHONE ENCOUNTER
Received fax from pharmacy requesting refill on pts medication(s). Pt was last seen in office on 1/22/2021  and has a follow up scheduled for 4/22/2021. Will send request to  Dr. Merary Kennedy  for patient.      Requested Prescriptions     Pending Prescriptions Disp Refills    nortriptyline (PAMELOR) 25 MG capsule [Pharmacy Med Name: NORTRIPTYLINE 25MG CAPSULES] 180 capsule 3     Sig: TAKE 1 TO 2 CAPSULES BY MOUTH EVERY NIGHT

## 2021-02-02 ENCOUNTER — LAB (OUTPATIENT)
Dept: LAB | Facility: HOSPITAL | Age: 46
End: 2021-02-02

## 2021-02-02 ENCOUNTER — OFFICE VISIT (OUTPATIENT)
Dept: ONCOLOGY | Facility: CLINIC | Age: 46
End: 2021-02-02

## 2021-02-02 VITALS
TEMPERATURE: 97.5 F | BODY MASS INDEX: 47.97 KG/M2 | HEART RATE: 84 BPM | WEIGHT: 287.9 LBS | RESPIRATION RATE: 16 BRPM | HEIGHT: 65 IN | SYSTOLIC BLOOD PRESSURE: 124 MMHG | OXYGEN SATURATION: 98 % | DIASTOLIC BLOOD PRESSURE: 78 MMHG

## 2021-02-02 DIAGNOSIS — E83.110 HEREDITARY HEMOCHROMATOSIS (HCC): ICD-10-CM

## 2021-02-02 DIAGNOSIS — F39 MOOD DISORDER (HCC): ICD-10-CM

## 2021-02-02 DIAGNOSIS — E83.19 IRON OVERLOAD: ICD-10-CM

## 2021-02-02 DIAGNOSIS — E83.110 HEMOCHROMATOSIS, HEREDITARY (HCC): Primary | ICD-10-CM

## 2021-02-02 DIAGNOSIS — I10 HYPERTENSION, WELL CONTROLLED: Primary | ICD-10-CM

## 2021-02-02 DIAGNOSIS — D64.9 ANEMIA, UNSPECIFIED TYPE: ICD-10-CM

## 2021-02-02 LAB
BASOPHILS # BLD AUTO: 0.07 10*3/MM3 (ref 0–0.2)
BASOPHILS NFR BLD AUTO: 0.8 % (ref 0–1.5)
DEPRECATED RDW RBC AUTO: 49.8 FL (ref 37–54)
EOSINOPHIL # BLD AUTO: 0.09 10*3/MM3 (ref 0–0.4)
EOSINOPHIL NFR BLD AUTO: 1 % (ref 0.3–6.2)
ERYTHROCYTE [DISTWIDTH] IN BLOOD BY AUTOMATED COUNT: 16.1 % (ref 12.3–15.4)
HCT VFR BLD AUTO: 33.5 % (ref 34–46.6)
HGB BLD-MCNC: 11.7 G/DL (ref 12–15.9)
HOLD SPECIMEN: NORMAL
IMM GRANULOCYTES # BLD AUTO: 0.05 10*3/MM3 (ref 0–0.05)
IMM GRANULOCYTES NFR BLD AUTO: 0.6 % (ref 0–0.5)
LYMPHOCYTES # BLD AUTO: 1.75 10*3/MM3 (ref 0.7–3.1)
LYMPHOCYTES NFR BLD AUTO: 19.9 % (ref 19.6–45.3)
MCH RBC QN AUTO: 29.5 PG (ref 26.6–33)
MCHC RBC AUTO-ENTMCNC: 34.9 G/DL (ref 31.5–35.7)
MCV RBC AUTO: 84.4 FL (ref 79–97)
MONOCYTES # BLD AUTO: 0.39 10*3/MM3 (ref 0.1–0.9)
MONOCYTES NFR BLD AUTO: 4.4 % (ref 5–12)
NEUTROPHILS NFR BLD AUTO: 6.44 10*3/MM3 (ref 1.7–7)
NEUTROPHILS NFR BLD AUTO: 73.3 % (ref 42.7–76)
NRBC BLD AUTO-RTO: 0 /100 WBC (ref 0–0.2)
PLATELET # BLD AUTO: 284 10*3/MM3 (ref 140–450)
PMV BLD AUTO: 9.5 FL (ref 6–12)
RBC # BLD AUTO: 3.97 10*6/MM3 (ref 3.77–5.28)
WBC # BLD AUTO: 8.79 10*3/MM3 (ref 3.4–10.8)

## 2021-02-02 PROCEDURE — 36415 COLL VENOUS BLD VENIPUNCTURE: CPT

## 2021-02-02 PROCEDURE — 99195 PHLEBOTOMY: CPT | Performed by: NURSE PRACTITIONER

## 2021-02-02 PROCEDURE — 85025 COMPLETE CBC W/AUTO DIFF WBC: CPT

## 2021-02-02 PROCEDURE — 99214 OFFICE O/P EST MOD 30 MIN: CPT | Performed by: NURSE PRACTITIONER

## 2021-02-02 RX ORDER — OXYCODONE HYDROCHLORIDE 5 MG/1
5 TABLET ORAL 3 TIMES DAILY PRN
Qty: 90 TABLET | Refills: 0 | Status: SHIPPED | OUTPATIENT
Start: 2021-02-04 | End: 2021-02-04

## 2021-02-02 NOTE — PROGRESS NOTES
"[facility]  HEMATOLOGY & ONCOLOGY    [facility] [place] [facility] HEMATOLOGY AND ONCOLOGY  [address]  [place] [address] [phone]    Patient Name: Naomi Bhatia  Encounter Date: 02/02/2021  YOB: 1975  Patient Number: [id number]    Chief Complaint   Patient presents with   • Anemia     Here for f/u   • Fatigue       REASON FOR VISIT: Naomi Bhatia is a pleasant 45-year-old female that is here for follow-up of hereditary hemochromatosis, and iron deficiency anemia.  Patient has had high ferritin levels and low hemoglobin levels.  Has been undergoing small volume phlebotomies of 250 cc every 2 weeks with a goal of a ferritin level less than 500.  Ferritin level on 1/19/2021 was 1259.00.  Last phlebotomy was on 2/2/2021.  Patient to have phlebotomy if hemoglobin greater than 11.  Hemoglobin on today's labs is 11.7.  Patient qualifies for phlebotomy of 250 cc today.    Patient had several complaints at this visit.  She states she had nausea x2 days last week, and cannot eat much.  She does states she has very high anxiety as her van has broken down, and she is very stressed as it is hard to get herself and her son to places they need to go.  She is also taking college classes for business law, and also recently lost her father.  She is emotional while explaining her anxiety.  She states she is having trouble sleeping and has been having \" brain fog\" and memory issues.  She denies starting any new medications.  Encouraged patient to discuss her memory issues with her PCP Dr. Sharp.  She states she does have depression.  She complains of back pain, and states that she sees pain management for injections.  She states she missed a follow-up appointment due to transportation issues, and had to reschedule her appointment which now is not till the end of March. She denies vomiting, abdominal pain, chills, fever, " "unexpected weight loss.        Dr. Goldberg's notes:    Subjective: 44 year old female who was previously being seen by Dr. Clements for iron deficiency anemia. She has undergone a gastric sleeve procedure in 2017 for morbid obesity and had a total right hip replacement in 3/2019 after a fracture.       She, unfortunateky, stopped following up with bariatric surgery and did not take vitamin replacements. She has had a hysterectomy due to fibroids and endometriosis but found to be iron deficient and was started on iron replacement with IV iron, states it is more than 10 treatments in all.  She states that she has iron deficiency since age 14. She has had multiple blood transfusions in the past (4 or 5).     She presents now for follow up.     Today, she is feeling \"tired\".  She also suffers from chronic pain. In the Degernerative disc disease, fibromyalgia, and previous back surgeries     On follow up on 20: Had right Carpal Tunnel Surgery on 11.3.20 and has  Her arm in a sling today.  Otherwise, she is feeling \"exhausted\" because dad, who was on hospice,  about a month ago.  She has not slept well due to the depression and the pain in the arm was also keeping her up.      On follow up on 21: Upset that her car has broken down with expensive repairs which is causing distress.  Physically, she is tired, no shortness of breath, denies bleeding    PAST MEDICAL HISTORY:  ALLERGIES:  Allergies   Allergen Reactions   • Azithromycin GI Intolerance and Nausea And Vomiting     Severe Abdominal Pain   Severe abdominal pain    • Silver Rash     Redness, blisters  blister  Redness, blisters         CURRENT MEDICATIONS:  Outpatient Encounter Medications as of 2021   Medication Sig Dispense Refill   • acyclovir (ZOVIRAX) 800 MG tablet Take 800 mg by mouth Daily.     • ALBUTEROL IN Inhale 1 puff As Needed.     • Calcium Citrate-Vitamin D (CALCIUM CITRATE + D3 PO) Take  by mouth Daily.     • carbidopa-levodopa " (SINEMET)  MG per tablet Take 1 tablet by mouth Daily.     • Cobalamin Combinations (FOLTRATE PO) vitamin B12-folic acid   daily     • CRANBERRY PO Take  by mouth Daily.     • Cyclobenzaprine HCl (FLEXERIL PO) Take 10 mg by mouth Daily As Needed.     • levocetirizine (XYZAL) 5 MG tablet TK 1 T PO QPM  0   • LYRICA 150 MG capsule TK 1 C PO BID  2   • metFORMIN (GLUCOPHAGE) 1000 MG tablet Take 1,000 mg by mouth.     • metoprolol succinate XL (TOPROL XL) 50 MG 24 hr tablet Take 50 mg by mouth 2 (Two) Times a Day.     • Multiple Vitamin (MULTI VITAMIN PO) Take  by mouth Daily.     • NABUMETONE PO Take 500 mg by mouth.     • nortriptyline (PAMELOR) 25 MG capsule 2 tablets at night as needed  3   • omeprazole (priLOSEC) 20 MG capsule TK ONE C PO QD FIRST THING IN THE MORNING ON  AN EMPTY STOMACH  3   • oxyCODONE (ROXICODONE) 10 MG tablet Take 10 mg by mouth 2 (Two) Times a Day As Needed.     • venlafaxine (EFFEXOR) 75 MG tablet three  times daily  11   • venlafaxine XR (EFFEXOR-XR) 150 MG 24 hr capsule Take 150 mg by mouth Daily.     • [DISCONTINUED] meloxicam (MOBIC) 15 MG tablet Take 15 mg by mouth Daily.       Facility-Administered Encounter Medications as of 2/2/2021   Medication Dose Route Frequency Provider Last Rate Last Admin   • diphenhydrAMINE (BENADRYL) injection 50 mg  50 mg Intravenous PRN Pedro Clements MD       • famotidine (PEPCID) injection 20 mg  20 mg Intravenous PRN Pedro Clements MD       • hydrocortisone sodium succinate (Solu-CORTEF) injection 100 mg  100 mg Intravenous PRN Pedro Clements MD           ADULT ILLNESSES:  Patient Active Problem List   Diagnosis Code   • Morbid obesity (CMS/HCC) E66.01   • Hypertension, well controlled I10   • Fatigue R53.83   • History of iron deficiency Z86.39   • Vitamin D deficiency E55.9   • Iron deficiency anemia secondary to inadequate dietary iron intake D50.8   • Malabsorption of iron K90.9   • Obesity, Class III,  BMI 40-49.9 (morbid obesity) (CMS/HCC) E66.01   • Status post laparoscopic sleeve gastrectomy Z98.84   • Acute low back pain without sciatica M54.5   • Obstructive sleep apnea syndrome G47.33   • Abnormal hemoglobin (CMS/HCC) D58.2   • Anemia D64.9   • BRBPR (bright red blood per rectum) K62.5   • Carpal tunnel syndrome on both sides G56.03   • Cervical vertebral fusion M43.22   • Generalized abdominal pain R10.84   • Cobalamin deficiency E53.8   • DDD (degenerative disc disease), lumbar M51.36   • Drug-induced constipation K59.03   • Pain management R52   • Family history of esophageal cancer Z80.0   • Family history of polyps in the colon Z83.71   • Back pain with left-sided radiculopathy M54.10   • Chronic pain disorder G89.4   • Chronic pain disorder G89.4   • Foot pain M79.673   • Herniation of lumbar intervertebral disc with radiculopathy M51.16   • High risk medications (not anticoagulants) long-term use Z79.899   • Other specified postprocedural states Z98.890   • History of lumbar spinal fusion Z98.1   • Hypertensive disorder I10   • Insomnia G47.00   • Malaise and fatigue R53.81, R53.83   • Moderate episode of recurrent major depressive disorder (CMS/HCC) F33.1   • Mood disorder (CMS/HCC) F39   • Myofascial muscle pain M79.18   • RLS (restless legs syndrome) G25.81   • Morbid obesity (CMS/HCC) E66.01   • Folic acid deficiency E53.8   • Encounter for care related to Port-a-Cath Z45.2   • Hereditary hemochromatosis (CMS/HCC) E83.110       SURGERIES:  Past Surgical History:   Procedure Laterality Date   • ANKLE SURGERY      Right    • APPENDECTOMY  04/19/2015   • BACK SURGERY  2000   • CERVICAL SPINE SURGERY  09/01/2015   • CHOLECYSTECTOMY      1995;lap   • COLONOSCOPY  05/02/2017    normal; do not return until age of 49 Dr. Gus Valentine   • GASTRIC SLEEVE LAPAROSCOPIC N/A 12/20/2017    Procedure: GASTRIC SLEEVE LAPAROSCOPIC;  Surgeon: Yifan Cordero MD;  Location: [facility] OR;  Service:    • HIP ARTHROPLASTY    "   Right  2019   • HIP SURGERY  1987    right    • INSERTION CENTRAL VENOUS ACCESS DEVICE W/ SUBCUTANEOUS PORT  2017    Dr Anel Gamboa (Smart Port-Power injectable Port) Cat [id number] Lot [id number]    • MOUTH SURGERY  1994   • NECK SURGERY     • TOOTH EXTRACTION     • UPPER GASTROINTESTINAL ENDOSCOPY  2017    Dr. Gus Valentine, negative for h.pylori, negative for Salomon's   • VAGINAL HYSTERECTOMY SALPINGO OOPHORECTOMY  2008    Partial and then had another surgery to remove the rest       HEALTH MAINTENANCE ITEMS:  <no information>  Last Completed Colonoscopy       Status Date      COLONOSCOPY Done 2017 Ext Proc: TX COLONOSCOPY FLX DX W/COLLJ SPEC WHEN PFRMD          Last Completed Mammogram     10/10/2019 - normal - repeat yearly          FAMILY HISTORY:  Family History   Problem Relation Age of Onset   • Diabetes Mother    • Hypertension Mother    • Coronary artery disease Mother    • Cancer Mother         \"bone\" cancer   • Cancer Father         prostate   • Hypertension Father    • Heart disease Father    • Stroke Father    • Coronary artery disease Father    • Hypertension Sister    • Obesity Sister    • Cancer Brother         throat   • Diabetes Brother    • Diabetes Maternal Grandmother    • Stroke Maternal Grandmother    • No Known Problems Daughter    • No Known Problems Son        SOCIAL HISTORY:  Social History     Socioeconomic History   • Marital status:      Spouse name: Not on file   • Number of children: Not on file   • Years of education: Not on file   • Highest education level: Not on file   Tobacco Use   • Smoking status: Former Smoker     Packs/day: 1.00     Years: 25.00     Pack years: 25.00     Types: Cigarettes     Quit date:      Years since quittin.0   • Smokeless tobacco: Never Used   Substance and Sexual Activity   • Alcohol use: Yes     Comment: rarely    • Drug use: No     Types: Codeine     Comment: Codeine in Prescribed Medications only    • Sexual " "activity: Defer     Birth control/protection: Surgical       REVIEW OF SYSTEMS:  Review of Systems   Constitutional: Positive for appetite change (unable to eat much 2 days last week due to nausea) and fatigue. Negative for activity change, chills, diaphoresis, fever and unexpected weight loss.   HENT: Negative for ear pain, nosebleeds, sinus pressure, sore throat and voice change.    Eyes: Positive for discharge (clear thick discharge, eye drops given from PCP). Negative for blurred vision, double vision, pain and visual disturbance.   Respiratory: Negative for cough and shortness of breath.    Cardiovascular: Positive for leg swelling (left foot, saw rheumatology). Negative for chest pain and palpitations.   Gastrointestinal: Positive for nausea (last week ) and indigestion (this week, takes Prilosec daily). Negative for abdominal pain, anal bleeding, blood in stool, constipation, diarrhea and vomiting.   Endocrine: Negative for heat intolerance, polydipsia and polyuria.   Genitourinary: Negative for dysuria, frequency, hematuria, urgency and urinary incontinence.   Musculoskeletal: Positive for back pain, gait problem (\"sometimes\"), joint swelling (hands) and neck pain. Negative for arthralgias and myalgias.   Skin: Negative for rash and skin lesions.   Neurological: Positive for dizziness and memory problem. Negative for tremors, seizures, syncope, speech difficulty, weakness and headache.   Hematological: Negative for adenopathy. Bruises/bleeds easily.   Psychiatric/Behavioral: Positive for sleep disturbance, depressed mood and stress. Negative for dysphoric mood and suicidal ideas. The patient is nervous/anxious.        /78   Pulse 84   Temp 97.5 °F (36.4 °C) (Temporal)   Resp 16   Ht 165.1 cm (65\")   Wt 131 kg (287 lb 14.4 oz)   LMP  (LMP Unknown)   SpO2 98%   Breastfeeding No   BMI 47.91 kg/m²  Body surface area is 2.31 meters squared.    Physical Exam:  Physical Exam  Constitutional:       " Appearance: Normal appearance. She is well-developed. She is obese.   HENT:      Head: Normocephalic and atraumatic.   Eyes:      Pupils: Pupils are equal, round, and reactive to light.   Neck:      Musculoskeletal: Normal range of motion and neck supple.      Trachea: Trachea normal.   Cardiovascular:      Rate and Rhythm: Normal rate and regular rhythm.      Pulses: Normal pulses.      Heart sounds: Normal heart sounds. No murmur. No friction rub. No gallop.    Pulmonary:      Effort: Pulmonary effort is normal.      Breath sounds: Normal breath sounds. No wheezing, rhonchi or rales.   Abdominal:      Palpations: Abdomen is soft.      Tenderness: There is no abdominal tenderness. There is no guarding.   Musculoskeletal: Normal range of motion.   Lymphadenopathy:      Cervical: No cervical adenopathy.      Right cervical: No superficial or posterior cervical adenopathy.     Left cervical: No superficial or posterior cervical adenopathy.      Upper Body:      Right upper body: No supraclavicular adenopathy.      Left upper body: No supraclavicular adenopathy.   Skin:     General: Skin is warm and dry.   Neurological:      General: No focal deficit present.      Mental Status: She is alert and oriented to person, place, and time.      Sensory: No sensory deficit.   Psychiatric:         Mood and Affect: Mood is anxious.         Behavior: Behavior normal.         Thought Content: Thought content normal.         Judgment: Judgment normal.      Comments: Patient very emotional, and anxious.       Patient's Body mass index is 47.91 kg/m². BMI is above normal parameters. Recommendations include: referral to primary care.      LABS    Lab Results - Last 18 Months   Lab Units 02/02/21  0910 01/19/21  0934 01/05/21  0847 12/04/20  0942 11/20/20  1015 11/06/20  0956   HEMOGLOBIN g/dL 11.7* 11.6* 11.3* 11.4* 11.9* 12.0   HEMATOCRIT % 33.5* 33.0* 33.5* 33.7* 34.1 34.9   MCV fL 84.4 85.9 87.2 88.7 87.4 86.8   WBC 10*3/mm3 8.79  8.69 9.10 8.21 8.24 9.46   RDW % 16.1* 16.4* 15.9* 15.9* 15.9* 16.0*   MPV fL 9.5 9.9 9.4 9.9 10.0 9.6   PLATELETS 10*3/mm3 284 275 239 261 265 280   IMM GRAN % % 0.6* 0.6* 0.5 0.6* 0.5 0.5   NEUTROS ABS 10*3/mm3 6.44 6.16 6.94 5.93 5.62 6.55   LYMPHS ABS 10*3/mm3 1.75 1.88 1.58 1.69 2.08 2.21   MONOS ABS 10*3/mm3 0.39 0.43 0.38 0.37 0.34 0.48   EOS ABS 10*3/mm3 0.09 0.10 0.09 0.11 0.10 0.12   BASOS ABS 10*3/mm3 0.07 0.07 0.06 0.06 0.06 0.05   IMMATURE GRANS (ABS) 10*3/mm3 0.05 0.05 0.05 0.05 0.04 0.05   NRBC /100 WBC 0.0 0.0 0.0 0.0 0.0 0.0       Lab Results - Last 18 Months   Lab Units 01/05/21  0847 09/01/20  1306 05/01/20  1013 01/31/20  1115 10/25/19  0954 08/27/19  0912   GLUCOSE mg/dL 155* 112* 100* 118* 101* 109   SODIUM mmol/L 141 139 138 141 142 140   POTASSIUM mmol/L 3.6 3.8 4.3 3.9 4.0 4.1   TOTAL CO2 mmol/L  --   --   --   --   --  22   CO2 mmol/L 28.0 24.0 29.0 28.0 29.0  --    CHLORIDE mmol/L 102 101 97* 102 101 101   ANION GAP mmol/L 11.0 14.0 12.0 11.0 12.0 17   CREATININE mg/dL 0.62 0.66 0.64 0.74 0.58 0.8   BUN mg/dL 9 11 12 15 15 15   BUN / CREAT RATIO  14.5 16.7 18.8 20.3 25.9*  --    CALCIUM mg/dL 9.3 9.3 10.0 10.2 9.6 9.9   EGFR IF NONAFRICN AM mL/min/1.73 104 97 100 85 113 >60   ALK PHOS U/L 97 77 85 96 84 101   TOTAL PROTEIN g/dL 7.3 7.1 7.3 8.0 7.4 7.9   ALT (SGPT) U/L 20 30 27 30 37* 19   AST (SGOT) U/L 28 30 31 27 24 26   BILIRUBIN mg/dL 0.6 0.6 0.7 0.7 0.6 0.9   ALBUMIN g/dL 4.10 4.40 4.50 4.60 4.40 4.2   GLOBULIN gm/dL 3.2 2.7 2.8 3.4 3.0  --        Lab Results - Last 18 Months   Lab Units 09/01/20  1306   LDH U/L 231*       Lab Results - Last 18 Months   Lab Units 01/19/21  0934 01/05/21  0847 12/04/20  0942 11/20/20  1015 11/06/20  0956 09/01/20  1306 05/01/20  1013  08/27/19  0912   IRON mcg/dL  --  54 57 60 50 51 74   < >  --    TIBC mcg/dL  --  235* 250* 253* 249* 243* 265*   < >  --    IRON SATURATION %  --  23 23 24 20 21 28   < >  --    FERRITIN ng/mL 1,259.00* 1,355.00*  1,489.00* 1,663.00* 1,793.00* 1,406.00* 1,461.00*   < >  --    T4 TOTAL mcg/dL  --   --   --   --   --  9.34  --   --   --    TSH uIU/mL  --   --   --   --   --  1.340  --   --  2.560   FOLATE ng/mL  --   --   --   --   --  >20.00  --   --   --     < > = values in this interval not displayed.     Assessment/plan    1.  Hereditary hemochromatosis-today's labs shared with patient.  Hemoglobin 11.7, hematocrit 33.5, platelets 284,000.  Per Dr. Goldberg's notes patient is to have phlebotomy 250 cc if hemoglobin greater than 11.  Patient to have phlebotomy today based on hemoglobin 11.7.  Patient agreeable to this plan.  She is to come back on 2/16/2021 for CBC and possible phlebotomy, and is also scheduled on 3/11/2021 for follow-up with Dr. Goldberg, CBC, and possible phlebotomy.    2.  Hypertension-blood pressure in office today 124/78.  Patient currently taking Toprol XL.  Managed by primary care physician.    3.  Depression/anxiety/mood disorder -Patient currently taking Effexor XR and nortriptyline.  Patient very anxious and tearful at times while discussing personal issues.  Patient denies suicidal ideations.  Managed by primary care physician.    I have spent a total of 25 minutes in face-to-face encounter with the patient, out of which more than 50% was counseling the patient guarding coordination of care.  Counseling included but not limited to time spent reviewing labs, treatment plan as well as answering questions.    IRMA Carlos  02/02/2021  16:03 CST    I have reviewed the notes, assessments, and/or procedures performed by IRMA Carlos, I concur with her/his documentation of Naomi Bhatia.    IRMA Torres  02/02/2021

## 2021-02-03 DIAGNOSIS — R79.89 ELEVATED FERRITIN: ICD-10-CM

## 2021-02-03 DIAGNOSIS — I10 ESSENTIAL HYPERTENSION: ICD-10-CM

## 2021-02-03 DIAGNOSIS — R53.83 FATIGUE, UNSPECIFIED TYPE: ICD-10-CM

## 2021-02-03 LAB
ALBUMIN SERPL-MCNC: 4.5 G/DL (ref 3.5–5.2)
ALP BLD-CCNC: 96 U/L (ref 35–104)
ALT SERPL-CCNC: 31 U/L (ref 5–33)
ANION GAP SERPL CALCULATED.3IONS-SCNC: 13 MMOL/L (ref 7–19)
AST SERPL-CCNC: 30 U/L (ref 5–32)
BASOPHILS ABSOLUTE: 0.1 K/UL (ref 0–0.2)
BASOPHILS RELATIVE PERCENT: 0.5 % (ref 0–1)
BILIRUB SERPL-MCNC: 0.8 MG/DL (ref 0.2–1.2)
BUN BLDV-MCNC: 10 MG/DL (ref 6–20)
C-REACTIVE PROTEIN: 3.19 MG/DL (ref 0–0.5)
CALCIUM SERPL-MCNC: 9.4 MG/DL (ref 8.6–10)
CHLORIDE BLD-SCNC: 102 MMOL/L (ref 98–111)
CHOLESTEROL, TOTAL: 142 MG/DL (ref 160–199)
CO2: 24 MMOL/L (ref 22–29)
CREAT SERPL-MCNC: 0.7 MG/DL (ref 0.5–0.9)
CREATININE URINE: 258.3 MG/DL (ref 4.2–622)
EOSINOPHILS ABSOLUTE: 0.1 K/UL (ref 0–0.6)
EOSINOPHILS RELATIVE PERCENT: 0.9 % (ref 0–5)
FERRITIN: 1274 NG/ML (ref 13–150)
GFR AFRICAN AMERICAN: >59
GFR NON-AFRICAN AMERICAN: >60
GLUCOSE BLD-MCNC: 123 MG/DL (ref 74–109)
HBA1C MFR BLD: 4.3 % (ref 4–6)
HCT VFR BLD CALC: 33.4 % (ref 37–47)
HDLC SERPL-MCNC: 52 MG/DL (ref 65–121)
HEMOGLOBIN: 11.4 G/DL (ref 12–16)
IMMATURE GRANULOCYTES #: 0 K/UL
IRON SATURATION: 21 % (ref 14–50)
IRON: 44 UG/DL (ref 37–145)
LDL CHOLESTEROL CALCULATED: 71 MG/DL
LYMPHOCYTES ABSOLUTE: 1.9 K/UL (ref 1.1–4.5)
LYMPHOCYTES RELATIVE PERCENT: 19.4 % (ref 20–40)
MCH RBC QN AUTO: 29.3 PG (ref 27–31)
MCHC RBC AUTO-ENTMCNC: 34.1 G/DL (ref 33–37)
MCV RBC AUTO: 85.9 FL (ref 81–99)
MICROALBUMIN UR-MCNC: 2.7 MG/DL (ref 0–19)
MICROALBUMIN/CREAT UR-RTO: 10.5 MG/G
MONOCYTES ABSOLUTE: 0.5 K/UL (ref 0–0.9)
MONOCYTES RELATIVE PERCENT: 4.5 % (ref 0–10)
NEUTROPHILS ABSOLUTE: 7.4 K/UL (ref 1.5–7.5)
NEUTROPHILS RELATIVE PERCENT: 74.3 % (ref 50–65)
PDW BLD-RTO: 16.6 % (ref 11.5–14.5)
PLATELET # BLD: 280 K/UL (ref 130–400)
PMV BLD AUTO: 9.7 FL (ref 9.4–12.3)
POTASSIUM SERPL-SCNC: 3.6 MMOL/L (ref 3.5–5)
RBC # BLD: 3.89 M/UL (ref 4.2–5.4)
SEDIMENTATION RATE, ERYTHROCYTE: 29 MM/HR (ref 0–20)
SODIUM BLD-SCNC: 139 MMOL/L (ref 136–145)
T4 FREE: 1.49 NG/DL (ref 0.93–1.7)
TOTAL IRON BINDING CAPACITY: 213 UG/DL (ref 250–400)
TOTAL PROTEIN: 6.9 G/DL (ref 6.6–8.7)
TRIGL SERPL-MCNC: 97 MG/DL (ref 0–149)
TSH SERPL DL<=0.05 MIU/L-ACNC: 1.59 UIU/ML (ref 0.27–4.2)
WBC # BLD: 10 K/UL (ref 4.8–10.8)

## 2021-02-04 ENCOUNTER — TELEPHONE (OUTPATIENT)
Dept: PRIMARY CARE CLINIC | Age: 46
End: 2021-02-04

## 2021-02-04 DIAGNOSIS — M54.16 CHRONIC LUMBAR RADICULOPATHY: ICD-10-CM

## 2021-02-04 NOTE — TELEPHONE ENCOUNTER
----- Message from [address] [facility],[address], DO sent at 2/3/2021  7:39 PM CST -----  Hemoglobin A1c is 4.3 which indicates excellent blood sugar control the past 3 months. Thyroid values normal.  C-reactive protein is elevated indicating inflammatory process. This is a nonspecific value   Your metabolic profile is normal.  This includes kidney and liver functions as well as electrolytes. Blood sugar was 123 at the time of the lab draw. Lipids are controlled. Ferritin is significantly elevated. This may be due to iron stores or could possible be an acute phase reactant due to inflammation somewhere. Erythrocyte sedimentation rate is also elevated. Again this is an inflammatory marker. Iron levels are normal.  It does not appear that you need any further phlebotomy to reduce iron. CBC is stable. There is no significant protein excretion in the urine.

## 2021-02-04 NOTE — TELEPHONE ENCOUNTER
Patient's pharmacy called and stated that the script we sent for her roxicodone with a fill date of 2/4/21 was cancelled in their system. I am routing a new script to Dr. Romina Méndez to sign and send to the pharmacy. I spoke with the patient earlier on the phone and let her know that if the pharmacy did not have her script I would route a new script to the doctor but the soonest it would be signed off and sent to the pharmacy would be tomorrow.

## 2021-02-05 RX ORDER — OXYCODONE HYDROCHLORIDE 5 MG/1
5 TABLET ORAL 3 TIMES DAILY PRN
Qty: 90 TABLET | Refills: 0 | Status: SHIPPED | OUTPATIENT
Start: 2021-02-05 | End: 2021-03-04 | Stop reason: SDUPTHER

## 2021-02-16 ENCOUNTER — APPOINTMENT (OUTPATIENT)
Dept: LAB | Facility: HOSPITAL | Age: 46
End: 2021-02-16

## 2021-02-20 ENCOUNTER — NURSE TRIAGE (OUTPATIENT)
Dept: OTHER | Facility: CLINIC | Age: 46
End: 2021-02-20

## 2021-02-20 DIAGNOSIS — J30.89 ENVIRONMENTAL AND SEASONAL ALLERGIES: ICD-10-CM

## 2021-02-20 NOTE — TELEPHONE ENCOUNTER
Call received on [address]. Brief description of triage:  Patient calling for concerns about exposure to COVID-19 family member and symptoms. Triage indicates for patient to call PCP when office is open. Care advice provided. Recommended using local department of health website for testing locations and up to date information. Caller verbalizes understanding, denies any other questions or concerns; instructed to call back for any new or worsening symptoms. Reason for Disposition   [1] COVID-19 infection suspected by caller or triager AND [2] mild symptoms (cough, fever, or others) AND [8] no complications or SOB    Answer Assessment - Initial Assessment Questions  1. COVID-19 DIAGNOSIS: \"Who made your Coronavirus (COVID-19) diagnosis? \" \"Was it confirmed by a positive lab test?\" If not diagnosed by a HCP, ask \"Are there lots of cases (community spread) where you live? \" (See public health department website, if unsure)      Not tested yet    2. COVID-19 EXPOSURE: \"Was there any known exposure to COVID before the symptoms began? \" CDC Definition of close contact: within 6 feet (2 meters) for a total of 15 minutes or more over a 24-hour period. 02/12/202    3. ONSET: \"When did the COVID-19 symptoms start?\"       02/15/2021    4. WORST SYMPTOM: \"What is your worst symptom? \" (e.g., cough, fever, shortness of breath, muscle aches)      Headache    5. COUGH: \"Do you have a cough? \" If so, ask: \"How bad is the cough? \"        Yes, at night it starts up    6. FEVER: \"Do you have a fever? \" If so, ask: \"What is your temperature, how was it measured, and when did it start? \"      No    7. RESPIRATORY STATUS: \"Describe your breathing? \" (e.g., shortness of breath, wheezing, unable to speak)      \"Having a little bit of challenging moments now and then\":  Reading something and felt \"winded\". 8. BETTER-SAME-WORSE: \"Are you getting better, staying the same or getting worse compared to yesterday? \"  If getting worse, ask, \"In what way? \"      Same    9. HIGH RISK DISEASE: \"Do you have any chronic medical problems? \" (e.g., asthma, heart or lung disease, weak immune system, obesity, etc.)      Sleep apnea, fatty liver disease, asthma    10. PREGNANCY: \"Is there any chance you are pregnant? \" \"When was your last menstrual period? \"        No    11. OTHER SYMPTOMS: \"Do you have any other symptoms? \"  (e.g., chills, fatigue, headache, loss of smell or taste, muscle pain, sore throat; new loss of smell or taste especially support the diagnosis of COVID-19)        Nasal congestion, sore throat, tired    Protocols used: CORONAVIRUS (COVID-19) DIAGNOSED OR SUSPECTED-[facility]

## 2021-02-22 ENCOUNTER — LAB (OUTPATIENT)
Dept: LAB | Facility: HOSPITAL | Age: 46
End: 2021-02-22

## 2021-02-22 ENCOUNTER — TELEMEDICINE (OUTPATIENT)
Dept: FAMILY MEDICINE CLINIC | Facility: CLINIC | Age: 46
End: 2021-02-22

## 2021-02-22 ENCOUNTER — TELEPHONE (OUTPATIENT)
Dept: FAMILY MEDICINE CLINIC | Facility: CLINIC | Age: 46
End: 2021-02-22

## 2021-02-22 VITALS
HEIGHT: 68 IN | BODY MASS INDEX: 43.5 KG/M2 | SYSTOLIC BLOOD PRESSURE: 110 MMHG | WEIGHT: 287 LBS | DIASTOLIC BLOOD PRESSURE: 70 MMHG | TEMPERATURE: 98.3 F

## 2021-02-22 DIAGNOSIS — J01.00 ACUTE NON-RECURRENT MAXILLARY SINUSITIS: ICD-10-CM

## 2021-02-22 DIAGNOSIS — Z20.822 SUSPECTED COVID-19 VIRUS INFECTION: Primary | ICD-10-CM

## 2021-02-22 DIAGNOSIS — J06.9 UPPER RESPIRATORY TRACT INFECTION, UNSPECIFIED TYPE: ICD-10-CM

## 2021-02-22 DIAGNOSIS — Z20.822 EXPOSURE TO COVID-19 VIRUS: ICD-10-CM

## 2021-02-22 PROBLEM — E66.01 MORBID OBESITY: Status: RESOLVED | Noted: 2017-05-08 | Resolved: 2021-02-22

## 2021-02-22 PROBLEM — G89.4 CHRONIC PAIN DISORDER: Status: RESOLVED | Noted: 2018-05-07 | Resolved: 2021-02-22

## 2021-02-22 PROBLEM — Z79.899 HIGH RISK MEDICATIONS (NOT ANTICOAGULANTS) LONG-TERM USE: Status: RESOLVED | Noted: 2018-08-06 | Resolved: 2021-02-22

## 2021-02-22 PROBLEM — M79.18 MYOFASCIAL MUSCLE PAIN: Status: RESOLVED | Noted: 2018-08-06 | Resolved: 2021-02-22

## 2021-02-22 PROBLEM — M54.50 ACUTE LOW BACK PAIN WITHOUT SCIATICA: Status: RESOLVED | Noted: 2018-03-20 | Resolved: 2021-02-22

## 2021-02-22 PROBLEM — R10.84 GENERALIZED ABDOMINAL PAIN: Status: RESOLVED | Noted: 2017-04-04 | Resolved: 2021-02-22

## 2021-02-22 PROBLEM — K62.5 BRBPR (BRIGHT RED BLOOD PER RECTUM): Status: RESOLVED | Noted: 2017-04-04 | Resolved: 2021-02-22

## 2021-02-22 PROBLEM — I10 HYPERTENSION, WELL CONTROLLED: Status: RESOLVED | Noted: 2017-05-08 | Resolved: 2021-02-22

## 2021-02-22 PROBLEM — Z98.890 OTHER SPECIFIED POSTPROCEDURAL STATES: Status: RESOLVED | Noted: 2019-04-18 | Resolved: 2021-02-22

## 2021-02-22 PROBLEM — E66.01 MORBID OBESITY (HCC): Status: RESOLVED | Noted: 2018-05-07 | Resolved: 2021-02-22

## 2021-02-22 LAB — SARS-COV-2 RNA PNL SPEC NAA+PROBE: NOT DETECTED

## 2021-02-22 PROCEDURE — C9803 HOPD COVID-19 SPEC COLLECT: HCPCS | Performed by: NURSE PRACTITIONER

## 2021-02-22 PROCEDURE — 99213 OFFICE O/P EST LOW 20 MIN: CPT | Performed by: NURSE PRACTITIONER

## 2021-02-22 PROCEDURE — 87635 SARS-COV-2 COVID-19 AMP PRB: CPT | Performed by: NURSE PRACTITIONER

## 2021-02-22 RX ORDER — OXYCODONE HYDROCHLORIDE 5 MG/1
5 TABLET ORAL
COMMUNITY
Start: 2021-02-05 | End: 2021-03-07

## 2021-02-22 RX ORDER — CEFUROXIME AXETIL 500 MG/1
500 TABLET ORAL 2 TIMES DAILY
Qty: 20 TABLET | Refills: 0 | Status: SHIPPED | OUTPATIENT
Start: 2021-02-22 | End: 2021-06-21

## 2021-02-22 RX ORDER — FLUCONAZOLE 150 MG/1
TABLET ORAL
Qty: 2 TABLET | Refills: 0 | Status: SHIPPED | OUTPATIENT
Start: 2021-02-22 | End: 2021-09-09

## 2021-02-22 RX ORDER — LEVOCETIRIZINE DIHYDROCHLORIDE 5 MG/1
5 TABLET, FILM COATED ORAL NIGHTLY
Qty: 90 TABLET | Refills: 3 | Status: SHIPPED | OUTPATIENT
Start: 2021-02-22 | End: 2022-04-04

## 2021-02-22 NOTE — TELEPHONE ENCOUNTER
Received fax from pharmacy requesting refill on pts medication(s). Pt was last seen in office on 1/22/2021  and has a follow up scheduled for 4/22/2021. Will send request to  Dr. Saniya Wise  for patient.      Requested Prescriptions     Pending Prescriptions Disp Refills    levocetirizine (XYZAL) 5 MG tablet [Pharmacy Med Name: LEVOCETIRIZINE 5MG TABLETS] 90 tablet 3     Sig: TAKE 1 TABLET BY MOUTH EVERY NIGHT

## 2021-02-22 NOTE — TELEPHONE ENCOUNTER
----- Message from IRMA Pino sent at 2/22/2021  3:07 PM CST -----  Covid negative. PTs sons covid was positive. She still needs to quarantine with son.

## 2021-02-22 NOTE — PROGRESS NOTES
"You have chosen to receive care through a telehealth visit.  Do you consent to use a video/audio connection for your medical care today? Yes     Chief Complaint  URI    Subjective    History of Present Illness      Patient presents to [facility] for   Pt c/o headache, chest and nasal congestion, runny nose, dry cough worse at night.  Pt states symptoms started last Monday 2/15/21.     URI   This is a new problem. The current episode started in the past 7 days. The problem has been gradually worsening. There has been no fever. Associated symptoms include congestion, coughing, headaches, rhinorrhea, a sore throat and wheezing. She has tried nothing for the symptoms. The treatment provided no relief.        Review of Systems   Constitutional: Positive for fatigue.   HENT: Positive for congestion, ear discharge, rhinorrhea, sinus pressure and sore throat.    Respiratory: Positive for cough, shortness of breath and wheezing.    Neurological: Positive for headache.   All other systems reviewed and are negative.      I have reviewed and agree with the HPI and ROS information as above.  Damaris Leung, APRN     Objective   Vital Signs:   /70   Temp 98.3 °F (36.8 °C)   Ht 172.7 cm (68\")   Wt 130 kg (287 lb)   BMI 43.64 kg/m²       Physical Exam  Constitutional:       Appearance: Normal appearance. She is well-developed.   HENT:      Head: Normocephalic and atraumatic.      Nose: No congestion.      Mouth/Throat:      Lips: Pink. No lesions.   Eyes:      General: Lids are normal. Vision grossly intact.      Conjunctiva/sclera: Conjunctivae normal.      Right eye: Right conjunctiva is not injected.      Left eye: Left conjunctiva is not injected.   Neck:      Musculoskeletal: Full passive range of motion without pain, normal range of motion and neck supple.   Pulmonary:      Effort: Pulmonary effort is normal.   Musculoskeletal: Normal range of motion.   Skin:     General: " Skin is warm and dry.   Neurological:      Mental Status: She is alert and oriented to person, place, and time.      Motor: Motor function is intact.   Psychiatric:         Mood and Affect: Mood and affect normal.         Judgment: Judgment normal.          Result Review  Data Reviewed:                   Assessment and Plan    Problem List Items Addressed This Visit     None      Visit Diagnoses     Suspected COVID-19 virus infection    -  Primary    Relevant Orders    COVID PRE-OP / PRE-PROCEDURE SCREENING ORDER (NO ISOLATION) - Swab, Nasal Cavity    Exposure to COVID-19 virus        Upper respiratory tract infection, unspecified type        Relevant Medications    cefuroxime (CEFTIN) 500 MG tablet    Acute non-recurrent maxillary sinusitis          Patient was exposed by her sister to Covid on February 12.  She started having symptoms on February 15.  She continues to have symptoms 1 week later today.  Patient denies significant shortness of breath.  She does have mild asthma.  Patient denies fever.  Patient request antibiotic as above.  Patient also requests Diflucan.  To call with worsening symptoms.  Will come for Covid test and we will call with that result.  Did discuss the quarantine recommendations. Covid negative.           Follow Up   Return if symptoms worsen or fail to improve.  Patient was given instructions and counseling regarding her condition or for health maintenance advice. Please see specific information pulled into the AVS if appropriate.

## 2021-02-23 ENCOUNTER — TELEPHONE (OUTPATIENT)
Dept: ONCOLOGY | Facility: CLINIC | Age: 46
End: 2021-02-23

## 2021-02-23 DIAGNOSIS — E66.01 MORBID OBESITY (HCC): ICD-10-CM

## 2021-02-23 DIAGNOSIS — R63.5 WEIGHT GAIN: ICD-10-CM

## 2021-02-23 NOTE — TELEPHONE ENCOUNTER
DR GOLDBERG PT: Left vm to call back. Per Crystal we need to r/s 2/24/21 appt due to covid quarantine.

## 2021-02-24 ENCOUNTER — APPOINTMENT (OUTPATIENT)
Dept: LAB | Facility: HOSPITAL | Age: 46
End: 2021-02-24

## 2021-02-25 DIAGNOSIS — M15.9 PRIMARY OSTEOARTHRITIS INVOLVING MULTIPLE JOINTS: ICD-10-CM

## 2021-02-25 RX ORDER — MELOXICAM 15 MG/1
15 TABLET ORAL DAILY
Qty: 90 TABLET | Refills: 3 | Status: SHIPPED | OUTPATIENT
Start: 2021-02-25 | End: 2021-03-30

## 2021-02-25 NOTE — TELEPHONE ENCOUNTER
Received fax from pharmacy requesting refill on pts medication(s). Pt was last seen in office on 1/22/2021  and has a follow up scheduled for 4/22/2021. Will send request to  Dr. Gerardo Mahan  for patient.      Requested Prescriptions     Pending Prescriptions Disp Refills    meloxicam (MOBIC) 15 MG tablet [Pharmacy Med Name: MELOXICAM 15MG TABLETS] 90 tablet 3     Sig: TAKE 1 TABLET BY MOUTH DAILY

## 2021-03-03 ENCOUNTER — TELEPHONE (OUTPATIENT)
Dept: PRIMARY CARE CLINIC | Age: 46
End: 2021-03-03

## 2021-03-03 NOTE — TELEPHONE ENCOUNTER
Pt called and Lm that she would like you to write her a rx for gravity defy shoes since she has such bad neuropathy in her feet and back pain. If she gets an rx, then medicare should pay for them.

## 2021-03-04 DIAGNOSIS — M54.16 CHRONIC LUMBAR RADICULOPATHY: ICD-10-CM

## 2021-03-06 ENCOUNTER — PATIENT MESSAGE (OUTPATIENT)
Dept: PRIMARY CARE CLINIC | Age: 46
End: 2021-03-06

## 2021-03-06 DIAGNOSIS — M54.16 LUMBAR RADICULOPATHY: ICD-10-CM

## 2021-03-06 NOTE — PROGRESS NOTES
"[facility] GROUP HEMATOLOGY AND ONCOLOGY  [address]  [facility] [address] [phone]    Patient Name: Naomi Bhatia  Encounter Date: 2021  YOB: 1975  Patient Number: [id number]      Subjective: 44 year old female who was previously being seen by Dr. Clements for iron deficiency anemia. She has undergone a gastric sleeve procedure in 2017 for morbid obesity and had a total right hip replacement in 3/2019 after a fracture.      She, unfortunateky, stopped following up with bariatric surgery and did not take vitamin replacements. She has had a hysterectomy due to fibroids and endometriosis but found to be iron deficient and was started on iron replacement with IV iron, states it is more than 10 treatments in all.  She states that she has iron deficiency since age 14. She has had multiple blood transfusions in the past (4 or 5).    She presents now for follow up.    Today, she is feeling \"tired\".  She also suffers from chronic pain. In the Degernerative disc disease, fibromyalgia, and previous back surgeries    On follow up on 20: Had right Carpal Tunnel Surgery on 11.3.20 and has  Her arm in a sling today.  Otherwise, she is feeling \"exhausted\" because dad, who was on hospice,  about a month ago.  She has not slept well due to the depression and the pain in the arm was also keeping her up.     On follow up on 21: Upset that her car has broken down with expensive repairs which is causing distress.  Physically, she is tired, no shortness of breath, denies bleeding    On follow up on 3.11.21: Feeling \"OK and hanging in there\".  Her son had Covid 3 weeks ago but patient tested negative although she states that she had all the symptoms and may have had a false negative test.     Past Medical History:   Diagnosis Date   • Anemia    • Anxiety    • Arthritis    • Asthma    • Back pain 2016    with left sided " radiculopathy    • DDD (degenerative disc disease), lumbar     Dr. Stuart Zavaleta for pain manangement   • Depression    • Encounter for care related to Port-a-Cath 01/31/2020    for iron infusions as she was told she has small veins   • Fatigue    • Fibromyalgia    • GERD (gastroesophageal reflux disease)    • H/O cold sores    • Headache    • Hereditary hemochromatosis (CMS/HCC) 1/19/2021   • History of blood transfusion    • Hypertension    • Iron deficiency anemia 9/12/2017   • Iron deficiency anemia secondary to inadequate dietary iron intake 9/12/2017   • Joint pain    • Obesity    • RLS (restless legs syndrome)    • Sleep apnea     positive sleep study; titration study in October, uses a CPAP machine when sleeping       Oncology History:  Oncology/Hematology History    No history exists.       Past Surgical History:  Past Surgical History:   Procedure Laterality Date   • ANKLE SURGERY      Right    • APPENDECTOMY  04/19/2015   • BACK SURGERY  2000   • CERVICAL SPINE SURGERY  09/01/2015   • CHOLECYSTECTOMY      1995;lap   • COLONOSCOPY  05/02/2017    normal; do not return until age of 49 Dr. Gus Valentine   • GASTRIC SLEEVE LAPAROSCOPIC N/A 12/20/2017    Procedure: GASTRIC SLEEVE LAPAROSCOPIC;  Surgeon: Yifan Cordero MD;  Location: [facility];  Service:    • HIP ARTHROPLASTY      Right  2019   • HIP SURGERY  1987    right    • INSERTION CENTRAL VENOUS ACCESS DEVICE W/ SUBCUTANEOUS PORT  11/07/2017    Dr Anel Gamboa (Smart Port-Power injectable Port) Cat [id number] Lot [id number]    • MOUTH SURGERY  1994   • NECK SURGERY     • TOOTH EXTRACTION     • UPPER GASTROINTESTINAL ENDOSCOPY  05/02/2017    Dr. Gus Valentine, negative for h.pylori, negative for Salomon's   • VAGINAL HYSTERECTOMY SALPINGO OOPHORECTOMY  2008    Partial and then had another surgery to remove the rest       ___________________________________________________________________________________________________________________________________________________  Medications:   Outpatient Encounter Medications as of 3/11/2021   Medication Sig Dispense Refill   • acyclovir (ZOVIRAX) 800 MG tablet Take 800 mg by mouth Daily.     • ALBUTEROL IN Inhale 1 puff As Needed.     • Calcium Citrate-Vitamin D (CALCIUM CITRATE + D3 PO) Take  by mouth Daily.     • carbidopa-levodopa (SINEMET)  MG per tablet Take 1 tablet by mouth Daily.     • cefuroxime (CEFTIN) 500 MG tablet Take 1 tablet by mouth 2 (Two) Times a Day. 20 tablet 0   • CRANBERRY PO Take  by mouth Daily.     • Cyclobenzaprine HCl (FLEXERIL PO) Take 10 mg by mouth Daily As Needed.     • fluconazole (Diflucan) 150 MG tablet Take 1 now, repeat 1 week 2 tablet 0   • levocetirizine (XYZAL) 5 MG tablet TK 1 T PO QPM  0   • LYRICA 150 MG capsule TK 1 C PO BID  2   • metFORMIN (GLUCOPHAGE) 1000 MG tablet Take 1,000 mg by mouth Daily With Breakfast.     • metoprolol succinate XL (TOPROL XL) 50 MG 24 hr tablet Take 50 mg by mouth 2 (Two) Times a Day.     • Multiple Vitamin (MULTI VITAMIN PO) Take  by mouth Daily.     • NABUMETONE PO Take 500 mg by mouth 2 (two) times a day.     • nortriptyline (PAMELOR) 25 MG capsule 2 tablets at night as needed  3   • omeprazole (priLOSEC) 20 MG capsule TK ONE C PO QD FIRST THING IN THE MORNING ON  AN EMPTY STOMACH  3   • oxyCODONE (ROXICODONE) 5 MG immediate release tablet Take 5 mg by mouth.     • venlafaxine (EFFEXOR) 75 MG tablet three  times daily  11   • venlafaxine XR (EFFEXOR-XR) 150 MG 24 hr capsule Take 150 mg by mouth Daily.     • [DISCONTINUED] Cobalamin Combinations (FOLTRATE PO) vitamin B12-folic acid   daily     • [DISCONTINUED] meloxicam (MOBIC) 15 MG tablet Take 15 mg by mouth Daily.     • [DISCONTINUED] oxyCODONE (ROXICODONE) 10 MG tablet Take 5 mg by mouth 3 (Three) Times a Day As Needed for Moderate Pain .    "    Facility-Administered Encounter Medications as of 3/11/2021   Medication Dose Route Frequency Provider Last Rate Last Admin   • diphenhydrAMINE (BENADRYL) injection 50 mg  50 mg Intravenous PRN Pedro Clements MD       • famotidine (PEPCID) injection 20 mg  20 mg Intravenous PRN Pedro Clements MD       • hydrocortisone sodium succinate (Solu-CORTEF) injection 100 mg  100 mg Intravenous PRN Pedro Clements MD         ___________________________________________________________________________________________________________________________________________________  Allergies:   Allergies   Allergen Reactions   • Azithromycin GI Intolerance and Nausea And Vomiting     Severe Abdominal Pain   Severe abdominal pain    • Silver Rash     Redness, blisters  blister  Redness, blisters         Social/Family History:   Family History   Problem Relation Age of Onset   • Diabetes Mother    • Hypertension Mother    • Coronary artery disease Mother    • Cancer Mother         \"bone\" cancer   • Cancer Father         prostate   • Hypertension Father    • Heart disease Father    • Stroke Father    • Coronary artery disease Father    • Hypertension Sister    • Obesity Sister    • Cancer Brother         throat   • Diabetes Brother    • Diabetes Maternal Grandmother    • Stroke Maternal Grandmother    • No Known Problems Daughter    • No Known Problems Son         /Single/: , lives with her son    Social History     Tobacco Use   • Smoking status: Former Smoker     Packs/day: 1.00     Years: 25.00     Pack years: 25.00     Types: Cigarettes     Quit date:      Years since quittin.1   • Smokeless tobacco: Never Used   Substance Use Topics   • Alcohol use: Yes     Comment: rarely    • Drug use: No     Types: Codeine     Comment: Codeine in Prescribed Medications only         Occupation: On disability due to the back issues.  Previously she was a , denies " exposure to chemicals and radiation  ___________________________________________________________________________________________________________________________________________________  I reviewed the ROS as documented here and confirmed the accuracy of it with the patient today. 3/6/2021     Review of Systems   Constitutional: Positive for fatigue. Negative for activity change, appetite change, chills, fever, unexpected weight gain and unexpected weight loss.   HENT: Positive for hearing loss. Negative for congestion, dental problem, ear pain, mouth sores, nosebleeds, sore throat, swollen glands and trouble swallowing.    Eyes: Negative for blurred vision, double vision, photophobia and pain.   Respiratory: Negative for apnea, cough, chest tightness, shortness of breath and wheezing.    Cardiovascular: Negative for chest pain, palpitations and leg swelling.   Gastrointestinal: Negative for abdominal distention, abdominal pain, blood in stool, constipation, diarrhea, nausea, vomiting and GERD.   Endocrine: Negative for cold intolerance and heat intolerance.   Genitourinary: Negative for breast discharge, breast lump, breast pain, dysuria, frequency, hematuria and vaginal bleeding.   Musculoskeletal: Positive for arthralgias and back pain. Negative for gait problem, myalgias and neck stiffness.        Diffusely   Skin: Negative for color change, pallor, rash, skin lesions and bruise.   Allergic/Immunologic: Negative for environmental allergies and immunocompromised state.   Neurological: Negative for dizziness, syncope, weakness, light-headedness, headache, memory problem and confusion.        Frequent falls which have improved as she is holding on to things when she walks, sometimes gets lightheaded before the fall, never losses consciousness,   Hematological: Negative for adenopathy. Does not bruise/bleed easily.   Psychiatric/Behavioral: Positive for depressed mood. Negative for suicidal ideas. The patient is  nervous/anxious.         Father passed away in 10/2020 and she is feeling down     ___________________________________________________________________________________________________________________________________________________  I have reexamined the patient and the results are consistent with the previously documented exam. Amit Goldberg, MD   Physical Examination:   There were no vitals filed for this visit. There is no height or weight on file to calculate BSA.   Physical Exam   Constitutional: She is oriented to person, place, and time. She appears well-developed and well-nourished. No distress.   Morbidly obese   HENT:   Head: Normocephalic.   Nose: Nose normal.   Mouth/Throat: Oropharynx is clear and moist.   Upper dentures   Eyes: Pupils are equal, round, and reactive to light. Conjunctivae are normal. No scleral icterus.   No palor   Neck: Normal range of motion. Neck supple. JVD: fatty lipoma  No thyromegaly present.   Mole under the left chin area   Cardiovascular: Normal rate, regular rhythm and normal heart sounds.   No murmur heard.  Pulmonary/Chest: Effort normal and breath sounds normal. No respiratory distress. She has no rales. Right breast exhibits no inverted nipple, no mass, no nipple discharge, no skin change and no tenderness. Left breast exhibits no inverted nipple, no mass, no nipple discharge, no skin change and no tenderness. No breast swelling, tenderness, discharge or bleeding. Breasts are asymmetrical.   Bilateral large breasts with the right bigger than the left.   no masses, no nipple discharge and no axillary LN's  Port palpable and non painful in the left upper chest well,   Abdominal: Soft. Bowel sounds are normal. She exhibits no distension. There is no abdominal tenderness.   Morbidly obese prohibiting proper evaluation for HSM   Musculoskeletal: Normal range of motion.      Comments: Right arm in a sling on 11.6.20 nut not on 1.5.21  Lymphadenopathy:     She has no cervical  adenopathy.   Neurological: She is alert and oriented to person, place, and time. No cranial nerve deficit or sensory deficit. She exhibits normal muscle tone.   Skin: Skin is warm. No rash noted. She is not diaphoretic. No pallor.   Psychiatric: Her behavior is normal. Judgment and thought content normal.     ___________________________________________________________________________________________________________________________________________________  Labs/X-ray results:     Lab Results - Last 18 Months   Lab Units 02/03/21  0937 02/02/21  0910 01/19/21  0934 01/05/21  0847 12/04/20  0942 11/20/20  1015 11/06/20  0956   HEMOGLOBIN g/dL 11.4* 11.7* 11.6* 11.3* 11.4* 11.9* 12.0   HEMATOCRIT % 33.4* 33.5* 33.0* 33.5* 33.7* 34.1 34.9   MCV fL 85.9 84.4 85.9 87.2 88.7 87.4 86.8   WBC K/uL 10.0 8.79 8.69 9.10 8.21 8.24 9.46   RDW % 16.6* 16.1* 16.4* 15.9* 15.9* 15.9* 16.0*   MPV fL 9.7 9.5 9.9 9.4 9.9 10.0 9.6   PLATELETS K/uL 280 284 275 239 261 265 280   IMM GRAN % %  --  0.6* 0.6* 0.5 0.6* 0.5 0.5   NEUTROS ABS K/uL 7.4 6.44 6.16 6.94 5.93 5.62 6.55   LYMPHS ABS K/uL 1.9 1.75 1.88 1.58 1.69 2.08 2.21   MONOS ABS K/uL 0.50 0.39 0.43 0.38 0.37 0.34 0.48   EOS ABS K/uL 0.10 0.09 0.10 0.09 0.11 0.10 0.12   BASOS ABS K/uL 0.10 0.07 0.07 0.06 0.06 0.06 0.05   IMMATURE GRANS (ABS) K/uL 0.0 0.05 0.05 0.05 0.05 0.04 0.05   NRBC /100 WBC  --  0.0 0.0 0.0 0.0 0.0 0.0       Lab Results - Last 18 Months   Lab Units 02/03/21  0937 01/05/21  0847 09/01/20  1306 05/01/20  1013 01/31/20  1115 10/25/19  0954   GLUCOSE mg/dL 123* 155* 112* 100* 118* 101*   SODIUM mmol/L 139 141 139 138 141 142   POTASSIUM mmol/L 3.6 3.6 3.8 4.3 3.9 4.0   TOTAL CO2 mmol/L 24  --   --   --   --   --    CO2 mmol/L  --  28.0 24.0 29.0 28.0 29.0   CHLORIDE mmol/L 102 102 101 97* 102 101   ANION GAP mmol/L 13 11.0 14.0 12.0 11.0 12.0   CREATININE mg/dL 0.7 0.62 0.66 0.64 0.74 0.58   BUN mg/dL 10 9 11 12 15 15   BUN / CREAT RATIO   --  14.5 16.7 18.8 20.3  25.9*   CALCIUM mg/dL 9.4 9.3 9.3 10.0 10.2 9.6   EGFR IF NONAFRICN AM  >60 104 97 100 85 113   ALK PHOS U/L 96 97 77 85 96 84   TOTAL PROTEIN g/dL 6.9 7.3 7.1 7.3 8.0 7.4   ALT (SGPT) U/L 31 20 30 27 30 37*   AST (SGOT) U/L 30 28 30 31 27 24   BILIRUBIN mg/dL 0.8 0.6 0.6 0.7 0.7 0.6   ALBUMIN g/dL 4.5 4.10 4.40 4.50 4.60 4.40   GLOBULIN gm/dL  --  3.2 2.7 2.8 3.4 3.0       Lab Results - Last 18 Months   Lab Units 09/01/20  1306   LDH U/L 231*       Lab Results - Last 18 Months   Lab Units 02/03/21  0937 01/19/21  0934 01/05/21  0847 12/04/20  0942 11/20/20  1015 11/06/20  0956 09/01/20  1306   IRON ug/dL 44  --  54 57 60 50 51   TIBC ug/dL 213*  --  235* 250* 253* 249* 243*   IRON SATURATION % 21  --  23 23 24 20 21   FERRITIN ng/mL 1,274.0* 1,259.00* 1,355.00* 1,489.00* 1,663.00* 1,793.00* 1,406.00*   T4 TOTAL mcg/dL  --   --   --   --   --   --  9.34   TSH uIU/mL 1.590  --   --   --   --   --  1.340   FOLATE ng/mL  --   --   --   --   --   --  >20.00     ____________________________________________________________________________  Radiology: No results found.           ___________________________________________________________________________________________________________________________________________________  Assessment/Plans:   -   -     Date  5/2017  9/2017  3/2018 5/2020 9/1/20 9/18/20 11.6.20 11.20.20 12.4.20 1.5.21 2.2.21 3.11.21   WBC  11.1  9.97  10.51 10.45  9.46 8.24 8.21 9.10 8.79 9.62   Hb  10.5  10.3  11.6 10.8  12.0 11.9 11.4 11.3 11.7 12.6   MCV  84.4  84.4  88.8 87.4  86.8 87.4 88.7 87.2 84.4 84.6   Plt  275  238  285 242  280 265 261 239 284 313   Iron   53  74 51  50 60 57 54 44  61   Iron sat   20%  28 21%  20% 24% 23% 23% 21%  23%   Transferrin    178 163 (LOW)  167 (L) 170 (L) 250 (L) 158 (L)  177 (L)   TIBC   271  265 243 (LOW)  249 (L) 253 (L) 168 (L) 235 (L) 213 (L)_ 264 (L)   Ferritin    918 1461 1406  1793 !!! 1663 1489 1355 1274 1375   B12     >2000          Folate     >20         "  Hapto     64          LDH      231(H)          Retic     5.62%     4.53%  4.47%   TFTs      WNL          Hepatitis      neg          Zinc      77          Copper      130          CRP      2.32(H)    2.21 3.19 2.33   HIV      Neg         RF      11.2 (WNL)         SHENA      Neg         Glucose          155 123  115   Creatinine          0.62 0.7  0.8   ALT          20 31  32   AST          28 30  36   Alk phos          97 96  102   ESR           29                                                                ** Iron deficiency anemia after gastric sleeve procedure    - Patient is status post a hysterectomy and had a colonoscopy and EGD in 2017 which were both reportedly ngative for bleeding  - She has received venofer in the past prior to start of follow up with me (total of 1400 mg IV venofer):  5/14/18 200 mg Venofer   5/18/18 200 mg Venofer   5/21/18 200 mg Venofer   5/25/18 200 mg Venofer   5/30/18 200 mg Venofer   6/1/18 200 mg Venofer   6/4/18 200 mg Venofer   - As per the last 3 ferritins as well as the iron profile, this patient does not have iron deficiency and, in fact, may have an iron overload. ALL IRON SUPPLEMENTATIONS should be held at this time  - The patient's indices are more suggestive of anemia of chronic disease  - Would check the following for anemia:  o CBC with differnetial as above  o Copper level   o Zinc level WNL 77  o B12 >2000  o Folate >20  o Haptoglobin WNL 64  o LDH elevated slightly at 231  o Reticulocyte count elevated 5.62% --> down to 4.53% on 1.5.21 --> down to 4.47% on 3.11.21  o TFT’s WNL  o HIV NOT DRAWN ON 9/1/20  o Hepatitis panels negative  - CT scan of the A/P from 2018: MODERATE SPLENOMEGALY (15 cm), only description of liver is \"no focal hepatic lesions\" -- would check US Liver and spleen for re-evaluation as brother had massive splenomegaly as well: 10/12/20: LIVER: There is mild diffuse enlargement of the liver with diffuse increased echogenicity suggesting " fatty infiltration. No evidence of focal hepatic mass. BILIARY: The gallbladder has been surgically removed. The common bile duct measures 0.5 cm in diameter. There is no evidence of intrahepatic ductal dilatation.   KIDNEYS: The right and left kidneys are normal in size, shape, orientation, and position measuring 9.9 cm and 10.8 cm, respectively. The corticomedullary differentiation is maintained. There is no evidence of nephrolithiasis, hydronephrosis or perinephric fluid collection. No renal mass is seen. No hydroureter is seen. PANCREAS: No focal lesion or abnormality. SPLEEN: The spleen is enlarged measuring 14.6 cm in [id number] length. OTHER: No ascites is present. The visualized IVC and aorta are normal in appearance. Scanning through the pelvis reveals anechoic urine partially distending the bladder. There are no free fluid collections. IMPRESSION:1. Hepatosplenomegaly. There is diffuse increased echogenicity of the liver parenchyma suggesting steatosis of the liver.2. The gallbladder has been surgically removed. No evidence of intra or extrahepatic biliary dilatation. otherwise normal abdominal ultrasound.  - splenomegaly may be related to the hepatic dysfunction and enlargement  -On 1/5/2020, normal LFTs. Iron studies are consistent with anemia of chronic disease    **HEMOCHROMATOSIS 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   - The C282Y variant is especially common in caucasians of northern  ancestry and the carrier state occurs in 1 in 5-10 persons of  ancestry  -  Iron overload is uncommon for those that are carriers for hemochromatosis and is usually related more to obesity, fatty liver (she states that she was told she has an enlarged liver and several family members with fatty liver)  or alcohol consumption.  This patient's LFT's are normal but her Ferritin is well above the upper limits when iron removal therapy would begin (normally  phlebotomies but she has underlying anemia).     - The earliest ferritin level we have available is from 3/26/2018 which was elevated 918 but the increase to over 1400 likely reflects iatrogenic iron supplementation. Iron profile is consistent with iron overload  - As the work up here is not clear as to the nature of her anemia and she was also found to have very elevated ferritin with underlying anemia,proceeded with the following:  o Bone marrow biopsy (was done 9/18/2017):      1. Posterior iliac crest, bone marrow biopsy and aspirate:     Normocellular bone marrow 40%.     Myeloid to erythroid ratio equals 0.8:1.     Iron staining is 1+.     2. Complete blood count and peripheral blood smear, review:     Microcytic anemia.     No dyspoietic changes and no circulating blasts.     - flow cytometry negative  o CMP for LFT check in 9/1/20 were normal  o Iron profile for transferrin level are indicative of anemia of chronic disease  o Ferritin level excessive on 9/1/20  - As the ferritin remain elevated, would consider an MRI of the liver to check for iron deposition -- she has a LOT of implanted metal in her body but she believes she had an MRI done in 2020 but there is no record of it in the Epic chart. She has a record of it in her phone of MRI C spine from 9.17.2020 from [facility].  Will refer her to the same radiology location and have them evaluate oif she is indeed safe for a MRI of the liver    - on 11.6.20 patient is noted to have an INCREASING ferritin to the point where she likely needs to have phlebotomy. As we recently were able to get her Hb up, would do 1/2 unit (250 cc) at a time.  Start with 250 cc blood phlebotomy and will recheck ferritin and Hb 2 weeks after.   - on 1.5.21: options for iron depletion are (1) slow and small volume phlebotomy (250 cc every 2-4 weeks until Ferritin <500 at least) if Hb >11 or (2) iron chelation with Exjade or Jadenu  - on 3.11.21 phlebotomies have been on hold for the  last month due to family member with covid and patient not feeling well. Should resume especially now that hb has imporved and ferritin is increased to 1375   -Phlebotomies to date:  11/20/2020  250 cc   1/5/21  250 cc   1.19.21 250 cc   2.2.21 250 cc                   ** Infection status:   - possible Covid infection in February 2021 (had all the symptoms when son was sick with covid but patient tested negative)  **Metabolic / Renal:   - morbid obesity s/p gastric sleeve, has not been following with bariatric surgery  ** Endocrine:   -Noted to have an elevated glucose on 1/5/2021, unclear if the patient was fasting but may require further work-up through PCP  ** Coagulation / VTE prophylaxis:   - no current issues  ** GI:   - GERD being followed by PCP  - s/p gastric sleeve surgery in 2017, has lost about 50 lbs total  ** Pulmonary:   - Current smoking status quit smoking in 2014  - no current issues  ** Cardiology:   - HTN, followed by PCP  ** Skin / Rash:   - No current issues   ** :   - No current issues   ** Neurologic:   - FREQUENT FALLS -- with recent foot fracture after falling down the stairs, encouraged to speak with PCP about work up, may be medication related  ** Rheumatology:  - arthritis being followed by PCP but she would like to see a rheumatologist.  She is requesting to be referred and so will refer to Dr. Jesus Caal  - as anemia may be related to rheumatology, would check:   - SHENA negative   - RF Negative   - CRP elevated on 9.18.20 to 2.32, again elevated at 2.21 on 1/5/2021 --> stable at 2.33 on March 11, 2020  ** Psychosocial:   - depression being followed by PCP  ** Pain control:   - sees pain management at [facility], on lyrica and flexiril and oxy  ** IV access   - has it flushed at [facility] every 8 weeks  ** Health Maintenance  - Last colonoscopy  2017 and reportedly normal  - EGD in 2017 negative for H pylori and Salomon's  - Last mammogram 10/2019 and reportedly normal    - Follow up  with me in June 2021 and at the end of April with one of the APRN's    Amit Goldberg, MD

## 2021-03-08 RX ORDER — PREGABALIN 100 MG/1
100 CAPSULE ORAL 2 TIMES DAILY
Qty: 60 CAPSULE | Refills: 2 | Status: SHIPPED | OUTPATIENT
Start: 2021-03-08 | End: 2021-06-03

## 2021-03-08 NOTE — TELEPHONE ENCOUNTER
From: Carlos Lofton  To: [address] [facility],[address], DO  Sent: 3/6/2021 6:27 PM CST  Subject: Prescription Question    Hello, I hope you all are doing well. I was touching base with you to request my refills for Lyrica to be escribed to [facility] on [address], [address] if possible. Thank you, and God bless you!

## 2021-03-09 RX ORDER — OXYCODONE HYDROCHLORIDE 5 MG/1
5 TABLET ORAL 3 TIMES DAILY PRN
Qty: 90 TABLET | Refills: 0 | Status: SHIPPED | OUTPATIENT
Start: 2021-03-09 | End: 2021-03-31 | Stop reason: SDUPTHER

## 2021-03-11 ENCOUNTER — TELEPHONE (OUTPATIENT)
Dept: PRIMARY CARE CLINIC | Age: 46
End: 2021-03-11

## 2021-03-11 ENCOUNTER — LAB (OUTPATIENT)
Dept: LAB | Facility: HOSPITAL | Age: 46
End: 2021-03-11

## 2021-03-11 ENCOUNTER — OFFICE VISIT (OUTPATIENT)
Dept: ONCOLOGY | Facility: CLINIC | Age: 46
End: 2021-03-11

## 2021-03-11 VITALS
WEIGHT: 287 LBS | HEART RATE: 103 BPM | HEIGHT: 65 IN | SYSTOLIC BLOOD PRESSURE: 120 MMHG | DIASTOLIC BLOOD PRESSURE: 70 MMHG | TEMPERATURE: 96.9 F | RESPIRATION RATE: 18 BRPM | BODY MASS INDEX: 47.82 KG/M2 | OXYGEN SATURATION: 98 %

## 2021-03-11 DIAGNOSIS — E83.110 HEREDITARY HEMOCHROMATOSIS (HCC): ICD-10-CM

## 2021-03-11 DIAGNOSIS — D50.8 IRON DEFICIENCY ANEMIA SECONDARY TO INADEQUATE DIETARY IRON INTAKE: Primary | ICD-10-CM

## 2021-03-11 DIAGNOSIS — D64.9 ANEMIA, UNSPECIFIED TYPE: ICD-10-CM

## 2021-03-11 DIAGNOSIS — E83.19 IRON OVERLOAD: ICD-10-CM

## 2021-03-11 DIAGNOSIS — D50.8 IRON DEFICIENCY ANEMIA SECONDARY TO INADEQUATE DIETARY IRON INTAKE: ICD-10-CM

## 2021-03-11 DIAGNOSIS — E83.110 HEMOCHROMATOSIS, HEREDITARY (HCC): ICD-10-CM

## 2021-03-11 LAB
ALBUMIN SERPL-MCNC: 4.3 G/DL (ref 3.5–5.2)
ALBUMIN/GLOB SERPL: 1.3 G/DL
ALP SERPL-CCNC: 102 U/L (ref 39–117)
ALT SERPL W P-5'-P-CCNC: 32 U/L (ref 1–33)
ANION GAP SERPL CALCULATED.3IONS-SCNC: 15 MMOL/L (ref 5–15)
AST SERPL-CCNC: 36 U/L (ref 1–32)
BASOPHILS # BLD AUTO: 0.07 10*3/MM3 (ref 0–0.2)
BASOPHILS NFR BLD AUTO: 0.7 % (ref 0–1.5)
BILIRUB SERPL-MCNC: 0.6 MG/DL (ref 0–1.2)
BUN SERPL-MCNC: 9 MG/DL (ref 6–20)
BUN/CREAT SERPL: 11.3 (ref 7–25)
CALCIUM SPEC-SCNC: 9.4 MG/DL (ref 8.6–10.5)
CHLORIDE SERPL-SCNC: 102 MMOL/L (ref 98–107)
CO2 SERPL-SCNC: 23 MMOL/L (ref 22–29)
CREAT SERPL-MCNC: 0.8 MG/DL (ref 0.57–1)
CRP SERPL-MCNC: 2.33 MG/DL (ref 0–0.5)
DEPRECATED RDW RBC AUTO: 51.9 FL (ref 37–54)
EOSINOPHIL # BLD AUTO: 0.1 10*3/MM3 (ref 0–0.4)
EOSINOPHIL NFR BLD AUTO: 1 % (ref 0.3–6.2)
ERYTHROCYTE [DISTWIDTH] IN BLOOD BY AUTOMATED COUNT: 16.8 % (ref 12.3–15.4)
FERRITIN SERPL-MCNC: 1375 NG/ML (ref 13–150)
GFR SERPL CREATININE-BSD FRML MDRD: 77 ML/MIN/1.73
GLOBULIN UR ELPH-MCNC: 3.3 GM/DL
GLUCOSE SERPL-MCNC: 115 MG/DL (ref 65–99)
HCT VFR BLD AUTO: 35.2 % (ref 34–46.6)
HGB BLD-MCNC: 12.6 G/DL (ref 12–15.9)
IMM GRANULOCYTES # BLD AUTO: 0.04 10*3/MM3 (ref 0–0.05)
IMM GRANULOCYTES NFR BLD AUTO: 0.4 % (ref 0–0.5)
IRON 24H UR-MRATE: 61 MCG/DL (ref 37–145)
IRON SATN MFR SERPL: 23 % (ref 20–50)
LYMPHOCYTES # BLD AUTO: 2.44 10*3/MM3 (ref 0.7–3.1)
LYMPHOCYTES NFR BLD AUTO: 25.4 % (ref 19.6–45.3)
MCH RBC QN AUTO: 30.3 PG (ref 26.6–33)
MCHC RBC AUTO-ENTMCNC: 35.8 G/DL (ref 31.5–35.7)
MCV RBC AUTO: 84.6 FL (ref 79–97)
MONOCYTES # BLD AUTO: 0.45 10*3/MM3 (ref 0.1–0.9)
MONOCYTES NFR BLD AUTO: 4.7 % (ref 5–12)
NEUTROPHILS NFR BLD AUTO: 6.52 10*3/MM3 (ref 1.7–7)
NEUTROPHILS NFR BLD AUTO: 67.8 % (ref 42.7–76)
NRBC BLD AUTO-RTO: 0 /100 WBC (ref 0–0.2)
PLATELET # BLD AUTO: 313 10*3/MM3 (ref 140–450)
PMV BLD AUTO: 9.6 FL (ref 6–12)
POTASSIUM SERPL-SCNC: 3.3 MMOL/L (ref 3.5–5.2)
PROT SERPL-MCNC: 7.6 G/DL (ref 6–8.5)
RBC # BLD AUTO: 4.16 10*6/MM3 (ref 3.77–5.28)
RETICS # AUTO: 0.19 10*6/MM3 (ref 0.02–0.13)
RETICS/RBC NFR AUTO: 4.47 % (ref 0.7–1.9)
SODIUM SERPL-SCNC: 140 MMOL/L (ref 136–145)
TIBC SERPL-MCNC: 264 MCG/DL (ref 298–536)
TRANSFERRIN SERPL-MCNC: 177 MG/DL (ref 200–360)
WBC # BLD AUTO: 9.62 10*3/MM3 (ref 3.4–10.8)

## 2021-03-11 PROCEDURE — 85045 AUTOMATED RETICULOCYTE COUNT: CPT

## 2021-03-11 PROCEDURE — 99195 PHLEBOTOMY: CPT | Performed by: INTERNAL MEDICINE

## 2021-03-11 PROCEDURE — 85025 COMPLETE CBC W/AUTO DIFF WBC: CPT

## 2021-03-11 PROCEDURE — 84466 ASSAY OF TRANSFERRIN: CPT

## 2021-03-11 PROCEDURE — 80053 COMPREHEN METABOLIC PANEL: CPT

## 2021-03-11 PROCEDURE — 83540 ASSAY OF IRON: CPT

## 2021-03-11 PROCEDURE — 82728 ASSAY OF FERRITIN: CPT

## 2021-03-11 PROCEDURE — 36415 COLL VENOUS BLD VENIPUNCTURE: CPT

## 2021-03-11 PROCEDURE — 86140 C-REACTIVE PROTEIN: CPT

## 2021-03-11 PROCEDURE — 99213 OFFICE O/P EST LOW 20 MIN: CPT | Performed by: INTERNAL MEDICINE

## 2021-03-20 ENCOUNTER — PATIENT MESSAGE (OUTPATIENT)
Dept: PRIMARY CARE CLINIC | Age: 46
End: 2021-03-20

## 2021-03-22 RX ORDER — AMLODIPINE BESYLATE 5 MG/1
5 TABLET ORAL DAILY
Qty: 30 TABLET | Refills: 3 | Status: SHIPPED | OUTPATIENT
Start: 2021-03-22 | End: 2021-07-20

## 2021-03-22 NOTE — TELEPHONE ENCOUNTER
From: Sarika Howard  ToDeon March, DO  Sent: 3/20/2021 3:38 PM CDT  Subject: Test Results Question    I'm taking my Metoprolol twice daily, as prescribed, but my blood pressure has been running crazy high.

## 2021-03-24 DIAGNOSIS — E83.110 HEREDITARY HEMOCHROMATOSIS (HCC): Primary | ICD-10-CM

## 2021-03-25 ENCOUNTER — CLINICAL SUPPORT (OUTPATIENT)
Dept: ONCOLOGY | Facility: CLINIC | Age: 46
End: 2021-03-25

## 2021-03-25 ENCOUNTER — LAB (OUTPATIENT)
Dept: LAB | Facility: HOSPITAL | Age: 46
End: 2021-03-25

## 2021-03-25 DIAGNOSIS — E83.110 HEREDITARY HEMOCHROMATOSIS (HCC): ICD-10-CM

## 2021-03-25 LAB
BASOPHILS # BLD AUTO: 0.06 10*3/MM3 (ref 0–0.2)
BASOPHILS NFR BLD AUTO: 0.8 % (ref 0–1.5)
DEPRECATED RDW RBC AUTO: 53.3 FL (ref 37–54)
EOSINOPHIL # BLD AUTO: 0.19 10*3/MM3 (ref 0–0.4)
EOSINOPHIL NFR BLD AUTO: 2.6 % (ref 0.3–6.2)
ERYTHROCYTE [DISTWIDTH] IN BLOOD BY AUTOMATED COUNT: 16.9 % (ref 12.3–15.4)
HCT VFR BLD AUTO: 31 % (ref 34–46.6)
HGB BLD-MCNC: 10.6 G/DL (ref 12–15.9)
IMM GRANULOCYTES # BLD AUTO: 0.04 10*3/MM3 (ref 0–0.05)
IMM GRANULOCYTES NFR BLD AUTO: 0.6 % (ref 0–0.5)
LYMPHOCYTES # BLD AUTO: 1.55 10*3/MM3 (ref 0.7–3.1)
LYMPHOCYTES NFR BLD AUTO: 21.6 % (ref 19.6–45.3)
MCH RBC QN AUTO: 30.3 PG (ref 26.6–33)
MCHC RBC AUTO-ENTMCNC: 34.2 G/DL (ref 31.5–35.7)
MCV RBC AUTO: 88.6 FL (ref 79–97)
MONOCYTES # BLD AUTO: 0.37 10*3/MM3 (ref 0.1–0.9)
MONOCYTES NFR BLD AUTO: 5.2 % (ref 5–12)
NEUTROPHILS NFR BLD AUTO: 4.96 10*3/MM3 (ref 1.7–7)
NEUTROPHILS NFR BLD AUTO: 69.2 % (ref 42.7–76)
NRBC BLD AUTO-RTO: 0 /100 WBC (ref 0–0.2)
PLATELET # BLD AUTO: 234 10*3/MM3 (ref 140–450)
PMV BLD AUTO: 10 FL (ref 6–12)
RBC # BLD AUTO: 3.5 10*6/MM3 (ref 3.77–5.28)
WBC # BLD AUTO: 7.17 10*3/MM3 (ref 3.4–10.8)

## 2021-03-25 PROCEDURE — 36415 COLL VENOUS BLD VENIPUNCTURE: CPT

## 2021-03-25 PROCEDURE — 99211 OFF/OP EST MAY X REQ PHY/QHP: CPT | Performed by: INTERNAL MEDICINE

## 2021-03-25 PROCEDURE — 85025 COMPLETE CBC W/AUTO DIFF WBC: CPT

## 2021-03-25 NOTE — PROGRESS NOTES
Patient here for lab evaluation for possible 250 ml phlebotomy if hemoglobin >11 for hemachromatosis.  Labs reviewed with patient hemoglobin 10.6.  Does not meet parameters for phlebotomy today.  Will return in 2 weeks for repeat labs.

## 2021-03-26 ENCOUNTER — TELEPHONE (OUTPATIENT)
Dept: PRIMARY CARE CLINIC | Age: 46
End: 2021-03-26

## 2021-03-26 DIAGNOSIS — Z12.31 SCREENING MAMMOGRAM, ENCOUNTER FOR: Primary | ICD-10-CM

## 2021-03-30 ENCOUNTER — HOSPITAL ENCOUNTER (OUTPATIENT)
Dept: PAIN MANAGEMENT | Age: 46
Discharge: HOME | End: 2021-03-30
Payer: MEDICARE

## 2021-03-30 VITALS
OXYGEN SATURATION: 95 % | RESPIRATION RATE: 18 BRPM | SYSTOLIC BLOOD PRESSURE: 135 MMHG | BODY MASS INDEX: 45.99 KG/M2 | HEIGHT: 67 IN | TEMPERATURE: 97.4 F | HEART RATE: 104 BPM | DIASTOLIC BLOOD PRESSURE: 90 MMHG | WEIGHT: 293 LBS

## 2021-03-30 DIAGNOSIS — M79.18 MYOFASCIAL MUSCLE PAIN: ICD-10-CM

## 2021-03-30 DIAGNOSIS — M54.10 BACK PAIN WITH LEFT-SIDED RADICULOPATHY: ICD-10-CM
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
## 2021-03-30 PROCEDURE — 99214 OFFICE O/P EST MOD 30 MIN: CPT

## 2021-03-30 PROCEDURE — 99213 OFFICE O/P EST LOW 20 MIN: CPT | Performed by: NURSE PRACTITIONER

## 2021-03-30 RX ORDER — CYCLOBENZAPRINE HCL 10 MG
10 TABLET ORAL 3 TIMES DAILY PRN
Qty: 90 TABLET | Refills: 2 | Status: SHIPPED | OUTPATIENT
Start: 2021-03-30 | End: 2021-09-27 | Stop reason: SDUPTHER

## 2021-03-30 ASSESSMENT — ENCOUNTER SYMPTOMS
BOWEL INCONTINENCE: 0
BACK PAIN: 1
CONSTIPATION: 0

## 2021-03-30 ASSESSMENT — PAIN DESCRIPTION - PAIN TYPE: TYPE: CHRONIC PAIN

## 2021-03-30 NOTE — PROGRESS NOTES
Clinic Documentation      Education Provided:  [x] Review of Núñez Gram  [] Agreement Review  [x] PEG Score Calculated [] PHQ Score Calculated [] ORT Score Calculated    [] Compliance Issues Discussed [] Cognitive Behavior Needs [x] Exercise [] Review of Test [] Financial Issues  [x] Tobacco/Alcohol Use Reviewed [x] Teaching [] New Patient [] Picture Obtained    Physician Plan:  [x] Outgoing Referral  [] Pharmacy Consult  [] Test Ordered [x] Prescription Ordered/Changed   [] Obtained Test Results / Consult Notes        Complete if patient is withholding blood thinner for procedure     Blood Thinner Patient is currently taking:      [] Plavix (Hold for 7 days)  [] Aspirin (Hold for 5 days)     [] Pletal (Hold for 2 days)  [] Pradaxa (Hold for 3 days)    [] Effient (Hold for 7 days)  [] Xarelto (Hold for 2 days)    [] Eliquis (Hold for 2 days)  [] Brilinta (Hold for 7 days)    [] Coumadin (Hold for 5 days) - (INR needs to be drawn the day prior to procedure- INR < 2.0)    [] Aggrenox (Hold for 7 days)        [] Patient will stop medication on their own.    [] Blood Thinner Form Faxed for approval to hold.    Provider form faxed to:     Assessment Completed by:  Electronically signed by Belinda Thompson RN on 3/30/2021 at 3:05 PM
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
## 2021-03-31 ENCOUNTER — OFFICE VISIT (OUTPATIENT)
Dept: PRIMARY CARE CLINIC | Age: 46
End: 2021-03-31
Payer: MEDICARE

## 2021-03-31 VITALS
TEMPERATURE: 96.4 F | BODY MASS INDEX: 44.41 KG/M2 | HEART RATE: 86 BPM | DIASTOLIC BLOOD PRESSURE: 84 MMHG | OXYGEN SATURATION: 98 % | SYSTOLIC BLOOD PRESSURE: 126 MMHG | WEIGHT: 293 LBS | HEIGHT: 68 IN

## 2021-03-31 DIAGNOSIS — M54.16 CHRONIC LUMBAR RADICULOPATHY: ICD-10-CM

## 2021-03-31 DIAGNOSIS — F41.9 ANXIETY AND DEPRESSION: Primary | ICD-10-CM

## 2021-03-31 DIAGNOSIS — G56.03 CARPAL TUNNEL SYNDROME ON BOTH SIDES: ICD-10-CM

## 2021-03-31 DIAGNOSIS — I10 ESSENTIAL HYPERTENSION: ICD-10-CM

## 2021-03-31 DIAGNOSIS — D75.1 POLYCYTHEMIA: ICD-10-CM

## 2021-03-31 DIAGNOSIS — F32.A ANXIETY AND DEPRESSION: Primary | ICD-10-CM

## 2021-03-31 PROCEDURE — 99214 OFFICE O/P EST MOD 30 MIN: CPT | Performed by: PEDIATRICS

## 2021-03-31 PROCEDURE — G8417 CALC BMI ABV UP PARAM F/U: HCPCS | Performed by: PEDIATRICS

## 2021-03-31 PROCEDURE — 1036F TOBACCO NON-USER: CPT | Performed by: PEDIATRICS

## 2021-03-31 PROCEDURE — G8427 DOCREV CUR MEDS BY ELIG CLIN: HCPCS | Performed by: PEDIATRICS

## 2021-03-31 PROCEDURE — 90471 IMMUNIZATION ADMIN: CPT | Performed by: PEDIATRICS

## 2021-03-31 PROCEDURE — 90715 TDAP VACCINE 7 YRS/> IM: CPT | Performed by: PEDIATRICS

## 2021-03-31 PROCEDURE — G8484 FLU IMMUNIZE NO ADMIN: HCPCS | Performed by: PEDIATRICS

## 2021-03-31 ASSESSMENT — ENCOUNTER SYMPTOMS
DIARRHEA: 0
SHORTNESS OF BREATH: 0
CONSTIPATION: 0
EYE DISCHARGE: 0
NAUSEA: 0
VOMITING: 0
EYES NEGATIVE: 1
CHEST TIGHTNESS: 0
SORE THROAT: 0
ALLERGIC/IMMUNOLOGIC NEGATIVE: 1
COUGH: 0
GASTROINTESTINAL NEGATIVE: 1
WHEEZING: 0
RESPIRATORY NEGATIVE: 1
ABDOMINAL PAIN: 0
SINUS PRESSURE: 0
BACK PAIN: 0

## 2021-03-31 NOTE — PROGRESS NOTES
Tdap (Boostrix, Adacel)    Date Status Dose VIS Date Route Site  Lot# Given By Verified By   3/31/2021 Given 0.5 mL 4/1/2020 IM left delt [facility] [address], [place] --   Exp. Date [facility] # Product Time Location External Comment   10/28/2022 40669-739-74 Boostrix --  --    Question Answer Comment   Patient received vaccine counseling? YES    Are you sick today with a moderate to severe illness (e.g. fever) NO    Have you ever had a serious reaction to any vaccine in the past? NO    VIS/EUA Given Date 3/31/2021    Updated by: Aleksandar Lacey MA on 3/31/2021  3:04 PM        Tdap (Boostrix, Adacel)    Date Status Dose VIS Date Route Site  Lot# Given By Verified By   3/31/2021 Incomplete 0.5 mL 4/1/2020 IM left [address] -- -- --   Exp.  Date [facility] # Product Time Location External Comment   --   --  --    Updated by: Aleksandar Lacey MA on 3/31/2021  3:04 PM

## 2021-03-31 NOTE — PROGRESS NOTES
[address] [facility], [address]  Phone [phone]   Fax [phone]      OFFICE VISIT: 3/31/2021    Aleyda Queen Oscar-: 1975      HPI  Reason For Visit:  Aleyda Queen is a 54 y.o. Swelling (swelling in hands and feet since starting amlodpine, BP has been a little better since starting but not a huge change. ) and Laceration (cut right hand but her thumb with a knife, would like tdap )    Patient presents with edema in her hands and feet. This is been going on since she started amlodipine. She is taking amlodipine 5 mg daily. This was added on 3/22/2021 by her pain management provider  She also takes Lyrica 100 mg twice daily which could also cause some edema. She is not on any diuretic therapy. Hypertension:   BP today was   BP Readings from Last 1 Encounters:   21 126/84      Recent BP readings:    BP Readings from Last 3 Encounters:   21 126/84   21 (!) 135/90   21 122/80     Medication   toprol xl 50mg bid   norvasc 5 mg daily  Medication compliance:  compliant most of the time  Home blood pressure monitoring: No.    She is not adherent to a low sodium diet. Symptoms: none  Laboratory:  Lab Results   Component Value Date    BUN 10 2021    CREATININE 0.7 2021       Anxiety and depression:  Medication:              Effexor  mg daily              Nortriptyline 25 mg 2 tablets at bedtime  Symptoms: this regimen is effective at managing her anxiety.        Polycythemia:  She is getting serial blood draws/ phlebotomy. She is following with Dr. Cedric Ortiz at [facility]  She has no elevation of liver enzymes. She did have a hemochromatosis gene assay and she was noted to be a carrier of gene. She should not have symptoms with this genetic abnormality. She had a liver US and this showed fatty infiltration. She had been on iron infusions in the past and now she is told that she has too much iron.   She was getting these iron infusions 2-3x per week. Iron levels are normal.         height is 5' 8\" (1.727 m) and weight is 301 lb (136.5 kg) (abnormal). Her temporal temperature is 96.4 °F (35.8 °C). Her blood pressure is 126/84 and her pulse is 86. Her oxygen saturation is 98%. Body mass index is 45.77 kg/m². I have reviewed the following with the Ms. Moore   Lab Review  Orders Only on 02/03/2021   Component Date Value    Microalbumin, Random Uri* 02/03/2021 2.70     Creatinine, Ur 02/03/2021 258. 3     Microalbumin Creatinine * 02/03/2021 10.5     Sodium 02/03/2021 139     Potassium 02/03/2021 3.6     Chloride 02/03/2021 102     CO2 02/03/2021 24     Anion Gap 02/03/2021 13     Glucose 02/03/2021 123*    BUN 02/03/2021 10     CREATININE 02/03/2021 0.7     GFR Non- 02/03/2021 >60     GFR  02/03/2021 >59     Calcium 02/03/2021 9.4     Total Protein 02/03/2021 6.9     Albumin 02/03/2021 4.5     Total Bilirubin 02/03/2021 0.8     Alkaline Phosphatase 02/03/2021 96     ALT 02/03/2021 31     AST 02/03/2021 30     T4 Free 02/03/2021 1.49     TSH 02/03/2021 1.590     Cholesterol, Total 02/03/2021 142*    Triglycerides 02/03/2021 97     HDL 02/03/2021 52*    LDL Calculated 02/03/2021 71     Hemoglobin A1C 02/03/2021 4.3     Iron 02/03/2021 44     TIBC 02/03/2021 213*    Iron Saturation 02/03/2021 21     Sed Rate 02/03/2021 29*    CRP 02/03/2021 3.19*    Ferritin 02/03/2021 1,274.0*    WBC 02/03/2021 10.0     RBC 02/03/2021 3.89*    Hemoglobin 02/03/2021 11.4*    Hematocrit 02/03/2021 33.4*    MCV 02/03/2021 85.9     MCH 02/03/2021 29.3     MCHC 02/03/2021 34.1     RDW 02/03/2021 16.6*    Platelets 82/00/9632 280     MPV 02/03/2021 9.7     Neutrophils % 02/03/2021 74.3*    Lymphocytes % 02/03/2021 19.4*    Monocytes % 02/03/2021 4.5     Eosinophils % 02/03/2021 0.9     Basophils % 02/03/2021 0.5     Neutrophils Absolute 02/03/2021 7.4     Immature Granulocytes # 02/03/2021 0.0     Lymphocytes Absolute 02/03/2021 1.9     Monocytes Absolute 02/03/2021 0.50     Eosinophils Absolute 02/03/2021 0.10     Basophils Absolute 02/03/2021 0.10    Office Visit on 11/10/2020   Component Date Value    SARS-CoV-2, ARMANI 11/10/2020 NOT DETECTED      Copies of these are in the chart. Current Outpatient Medications   Medication Sig Dispense Refill    cyclobenzaprine (FLEXERIL) 10 MG tablet Take 1 tablet by mouth 3 times daily as needed for Muscle spasms 90 tablet 2    amLODIPine (NORVASC) 5 MG tablet Take 1 tablet by mouth daily 30 tablet 3    oxyCODONE (ROXICODONE) 5 MG immediate release tablet Take 1 tablet by mouth 3 times daily as needed for Pain for up to 30 days. (may fill 3/7/21) 90 tablet 0    pregabalin (LYRICA) 100 MG capsule Take 1 capsule by mouth 2 times daily for 90 days. 60 capsule 2    metFORMIN (GLUCOPHAGE) 1000 MG tablet Take 1 tablet by mouth 2 times daily (with meals) 180 tablet 3    levocetirizine (XYZAL) 5 MG tablet Take 1 tablet by mouth nightly 90 tablet 3    nortriptyline (PAMELOR) 25 MG capsule TAKE 1 TO 2 CAPSULES BY MOUTH EVERY NIGHT 180 capsule 3    olopatadine (PATADAY) 0.2 % SOLN ophthalmic solution Place 1 drop into both eyes daily 1 Bottle 2    carbidopa-levodopa (SINEMET)  MG per tablet TAKE 1 TABLET BY MOUTH EVERY NIGHT 90 tablet 3    venlafaxine (EFFEXOR XR) 150 MG extended release capsule Take 1 capsule by mouth daily 90 capsule 3    Misc. Devices (WRIST BRACE) MISC Left and right carpal tunnel braces DX:  Marlon Carpal Tunnel 2 each 0    omeprazole (PRILOSEC) 20 MG delayed release capsule Take 1 capsule by mouth Daily 90 capsule 3    naloxone (NARCAN) 4 MG/0.1ML LIQD nasal spray 1 spray by Nasal route as needed for Opioid Reversal 2 each 0    venlafaxine (EFFEXOR XR) 75 MG extended release capsule Take 1 capsule by mouth daily Take 1 capsule daily in addition to 150 mg to make up  225 mg daily 30 capsule 11    metoprolol succinate (TOPROL XL) 50 MG extended release tablet Take 1 tablet by mouth 2 times daily 180 tablet 3    Tens Unit MISC by Does not apply route 1 each 0    triamcinolone (KENALOG) 0.1 % cream Apply topically 2 times daily. 80 g 3    Calcium-Vitamin D 600-200 MG-UNIT TABS Take 1 tablet by mouth daily      albuterol sulfate HFA (PROVENTIL HFA) 108 (90 Base) MCG/ACT inhaler Inhale 2 puffs into the lungs every 6 hours as needed for Wheezing 1 Inhaler 3    ondansetron (ZOFRAN ODT) 4 MG disintegrating tablet Take 1 tablet by mouth every 8 hours as needed for Nausea or Vomiting 10 tablet 0    Multiple Vitamins-Minerals (MULTIVITAMIN & MINERAL PO) Take  by mouth.  CRANBERRY Take 500 mg by mouth daily.  Cholecalciferol (VITAMIN D3) 5000 UNITS TABS Take 5,000 Units by mouth daily        No current facility-administered medications for this visit.         Allergies: Silver, Tegaderm ag mesh 2\"x2\" [wound dressings], and Zithromax [azithromycin]     Past Medical History:   Diagnosis Date    Anemia     has had iron infusion    Anxiety     Arthritis     Back pain with left-sided radiculopathy 3/14/2016    DDD (degenerative disc disease), lumbar     Depression     Esophagitis     Fibromyalgia     Gastritis     Headache(784.0)     History of blood transfusion     Hypertension     Obesity     RLS (restless legs syndrome)     Sleep apnea     uses CPAP machine       Family History   Problem Relation Age of Onset    Diabetes Mother     High Blood Pressure Mother     Colon Polyps Mother     Heart Disease Mother     Cancer Mother     Depression Mother     Cancer Father     High Blood Pressure Father     Heart Disease Father     Stroke Father     High Blood Pressure Sister     Depression Sister     Anxiety Disorder Sister     Cancer Brother     Esophageal Cancer Brother     Anxiety Disorder Brother     Diabetes Brother     ADHD Brother     Depression Brother     Other Other urinating, dysuria and menstrual problem. Musculoskeletal: Negative for arthralgias ( ), back pain, joint swelling and myalgias. Skin: Negative. Negative for rash. Allergic/Immunologic: Negative. Neurological: Negative. Negative for dizziness, weakness and headaches. Hematological: Negative. Negative for adenopathy. Does not bruise/bleed easily. Psychiatric/Behavioral: Positive for decreased concentration, dysphoric mood ( under a lot of stress recently) and sleep disturbance. Negative for suicidal ideas. The patient is nervous/anxious (much worse since her father's death). Physical Exam  Vitals signs reviewed. Constitutional:       Appearance: She is well-developed. She is obese. HENT:      Head: Normocephalic. Right Ear: Tympanic membrane, ear canal and external ear normal.      Left Ear: Tympanic membrane, ear canal and external ear normal.      Nose: Nose normal.      Mouth/Throat:      Mouth: Mucous membranes are moist.      Pharynx: Oropharynx is clear. Eyes:      Extraocular Movements: Extraocular movements intact. Conjunctiva/sclera: Conjunctivae normal.      Pupils: Pupils are equal, round, and reactive to light. Neck:      Musculoskeletal: Normal range of motion and neck supple. Cardiovascular:      Rate and Rhythm: Normal rate and regular rhythm. Pulmonary:      Effort: Pulmonary effort is normal.   Abdominal:      General: Bowel sounds are normal.      Palpations: Abdomen is soft. Tenderness: There is no abdominal tenderness. Musculoskeletal: Normal range of motion. Right hip: She exhibits decreased strength and tenderness. Skin:     General: Skin is warm and dry. Capillary Refill: Capillary refill takes less than 2 seconds. Neurological:      General: No focal deficit present. Mental Status: She is alert and oriented to person, place, and time. Deep Tendon Reflexes: Reflexes are normal and symmetric.    Psychiatric:         Mood and Affect: Mood normal.         Behavior: Behavior normal.         ASSESSMENT      ICD-10-CM    1. Anxiety and depression  F41.9     F32.9    2. Essential hypertension  I10    3. Polycythemia  D75.1          PLAN    1. Anxiety and depression  She feels that she is doing fairly well on Effexor Exar 225 mg daily. She does not want to make any changes at this time. We will keep this the same    2. Essential hypertension  Blood pressure is much better controlled. She does not have any significant edema at all either in her hands or ankles or any visible location. Her assessment of edema is based upon her weight which is increased and being attributed to \"fluid\". We will continue with amlodipine and metoprolol as present    3. Polycythemia  She is following with hematology. She has been undergoing serial phlebotomy. She is now anemic. Her phlebotomy is based upon her ferritin levels. Hematology oncology notes are very difficult to decipher due to having information in the notes from multiple visits. There is no mention of any additional medications that were going to be prescribed however Ms. Fred Morocho states that they are going to start her on a new medication that could result in blindness and deafness. We will try to get the records and she will let me know what this medication is we can  her in this regard. She does carry a hemochromatosis gene, however this is not typically prone to iron overload in the heterozygous form that she possesses. We may want to consider a liver biopsy. I do absolutely agree with liver MRI. No orders of the defined types were placed in this encounter. Return in about 3 months (around 6/30/2021) for 30. This was an in-house visit.

## 2021-04-05 ENCOUNTER — HOSPITAL ENCOUNTER (OUTPATIENT)
Dept: WOMENS IMAGING | Age: 46
Discharge: HOME | End: 2021-04-05
Payer: MEDICARE

## 2021-04-05 DIAGNOSIS — Z12.31 SCREENING MAMMOGRAM, ENCOUNTER FOR: ICD-10-CM

## 2021-04-05 PROCEDURE — 77067 SCR MAMMO BI INCL CAD: CPT

## 2021-04-05 RX ORDER — OXYCODONE HYDROCHLORIDE 5 MG/1
5 TABLET ORAL 3 TIMES DAILY PRN
Qty: 90 TABLET | Refills: 0 | Status: SHIPPED | OUTPATIENT
Start: 2021-04-08 | End: 2021-05-06 | Stop reason: SDUPTHER

## 2021-04-06 ENCOUNTER — HOSPITAL ENCOUNTER (OUTPATIENT)
Dept: PAIN MANAGEMENT | Age: 46
Discharge: HOME | End: 2021-04-06
Payer: MEDICARE

## 2021-04-06 VITALS
HEART RATE: 74 BPM | SYSTOLIC BLOOD PRESSURE: 112 MMHG | OXYGEN SATURATION: 100 % | DIASTOLIC BLOOD PRESSURE: 68 MMHG | RESPIRATION RATE: 18 BRPM | TEMPERATURE: 96.2 F

## 2021-04-06 DIAGNOSIS — R52 PAIN MANAGEMENT: ICD-10-CM

## 2021-04-06 PROCEDURE — 6360000002 HC RX W HCPCS

## 2021-04-06 PROCEDURE — 2500000003 HC RX 250 WO HCPCS

## 2021-04-06 PROCEDURE — 2580000003 HC RX 258

## 2021-04-06 PROCEDURE — 62323 NJX INTERLAMINAR LMBR/SAC: CPT

## 2021-04-06 PROCEDURE — 3209999900 FLUORO FOR SURGICAL PROCEDURES

## 2021-04-06 RX ORDER — SODIUM CHLORIDE 9 MG/ML
INJECTION INTRAVENOUS
Status: COMPLETED | OUTPATIENT
Start: 2021-04-06 | End: 2021-04-06

## 2021-04-06 RX ORDER — METHYLPREDNISOLONE ACETATE 80 MG/ML
INJECTION, SUSPENSION INTRA-ARTICULAR; INTRALESIONAL; INTRAMUSCULAR; SOFT TISSUE
Status: COMPLETED | OUTPATIENT
Start: 2021-04-06 | End: 2021-04-06

## 2021-04-06 RX ORDER — LIDOCAINE HYDROCHLORIDE 10 MG/ML
INJECTION, SOLUTION EPIDURAL; INFILTRATION; INTRACAUDAL; PERINEURAL
Status: COMPLETED | OUTPATIENT
Start: 2021-04-06 | End: 2021-04-06

## 2021-04-06 RX ADMIN — METHYLPREDNISOLONE ACETATE 80 MG: 80 INJECTION, SUSPENSION INTRA-ARTICULAR; INTRALESIONAL; INTRAMUSCULAR; SOFT TISSUE at 12:22

## 2021-04-06 RX ADMIN — LIDOCAINE HYDROCHLORIDE 5 ML: 10 INJECTION, SOLUTION EPIDURAL; INFILTRATION; INTRACAUDAL; PERINEURAL at 12:22

## 2021-04-06 RX ADMIN — SODIUM CHLORIDE 5 ML: 9 INJECTION INTRAVENOUS at 12:22

## 2021-04-06 NOTE — PROGRESS NOTES
Procedure:  Level of Consciousness: [x]Alert [x]Oriented []Disoriented []Lethargic  Anxiety Level: [x]Calm []Anxious []Depressed []Other  Skin: [x]Warm [x]Dry []Cool []Moist []Intact []Other  Cardiovascular: [x]Palpitations: [x]Never []Occasionally []Frequently  Chest Pain: [x]No []Yes  Respiratory:  [x]Unlabored []Labored []Cough ([] Productive []Unproductive)  HCG Required: [x]No []Yes   Results: []Negative []Positive  Knowledge Level:        [x]Patient/Other verbalized understanding of pre-procedure instructions. [x]Assessment of post-op care needs (transportation, responsible caregiver)        []Able to discuss health care problems and how to deal with it.   Factors that Affect Teaching:        Language Barrier: [x]No []Yes - why:        Hearing Loss:        [x]No []Yes            Corrective Device:  []Yes [x]No        Vision Loss:           [x]No []Yes            Corrective Device:  []Yes [x]No        Memory Loss:       [x]No []Yes            []Short Term []Long Term  Motivational Level:  [x]Asks Questions                  []Extremely Anxious       [x]Seems Interested               []Seems Uninterested                  []Denies need for Education  Risk for Injury:  [x]Patient oriented to person, place and time  []History of frequent falls/loss of balance  Nutritional:  []Change in appetite   []Weight Gain   []Weight Loss  Functional:  []Requires assistance with ADL's

## 2021-04-08 ENCOUNTER — LAB (OUTPATIENT)
Dept: LAB | Facility: HOSPITAL | Age: 46
End: 2021-04-08

## 2021-04-08 ENCOUNTER — CLINICAL SUPPORT (OUTPATIENT)
Dept: ONCOLOGY | Facility: CLINIC | Age: 46
End: 2021-04-08

## 2021-04-08 VITALS
BODY MASS INDEX: 47.76 KG/M2 | OXYGEN SATURATION: 98 % | RESPIRATION RATE: 16 BRPM | HEART RATE: 83 BPM | DIASTOLIC BLOOD PRESSURE: 80 MMHG | SYSTOLIC BLOOD PRESSURE: 132 MMHG | HEIGHT: 65 IN

## 2021-04-08 DIAGNOSIS — Z86.39 HISTORY OF IRON DEFICIENCY: Primary | ICD-10-CM

## 2021-04-08 DIAGNOSIS — E83.110 HEREDITARY HEMOCHROMATOSIS (HCC): ICD-10-CM

## 2021-04-08 DIAGNOSIS — Z45.2 ENCOUNTER FOR CARE RELATED TO PORT-A-CATH: Primary | ICD-10-CM

## 2021-04-08 DIAGNOSIS — Z86.39 HISTORY OF IRON DEFICIENCY: ICD-10-CM

## 2021-04-08 LAB
BASOPHILS # BLD AUTO: 0.1 10*3/MM3 (ref 0–0.2)
BASOPHILS NFR BLD AUTO: 1 % (ref 0–1.5)
DEPRECATED RDW RBC AUTO: 54.9 FL (ref 37–54)
EOSINOPHIL # BLD AUTO: 0.11 10*3/MM3 (ref 0–0.4)
EOSINOPHIL NFR BLD AUTO: 1.1 % (ref 0.3–6.2)
ERYTHROCYTE [DISTWIDTH] IN BLOOD BY AUTOMATED COUNT: 16.9 % (ref 12.3–15.4)
FERRITIN SERPL-MCNC: 1249 NG/ML (ref 13–150)
HCT VFR BLD AUTO: 33.6 % (ref 34–46.6)
HGB BLD-MCNC: 11.4 G/DL (ref 12–15.9)
IMM GRANULOCYTES # BLD AUTO: 0.11 10*3/MM3 (ref 0–0.05)
IMM GRANULOCYTES NFR BLD AUTO: 1.1 % (ref 0–0.5)
LYMPHOCYTES # BLD AUTO: 2.57 10*3/MM3 (ref 0.7–3.1)
LYMPHOCYTES NFR BLD AUTO: 26 % (ref 19.6–45.3)
MCH RBC QN AUTO: 30.5 PG (ref 26.6–33)
MCHC RBC AUTO-ENTMCNC: 33.9 G/DL (ref 31.5–35.7)
MCV RBC AUTO: 89.8 FL (ref 79–97)
MONOCYTES # BLD AUTO: 0.56 10*3/MM3 (ref 0.1–0.9)
MONOCYTES NFR BLD AUTO: 5.7 % (ref 5–12)
NEUTROPHILS NFR BLD AUTO: 6.43 10*3/MM3 (ref 1.7–7)
NEUTROPHILS NFR BLD AUTO: 65.1 % (ref 42.7–76)
NRBC BLD AUTO-RTO: 0 /100 WBC (ref 0–0.2)
PLATELET # BLD AUTO: 257 10*3/MM3 (ref 140–450)
PMV BLD AUTO: 9.6 FL (ref 6–12)
RBC # BLD AUTO: 3.74 10*6/MM3 (ref 3.77–5.28)
WBC # BLD AUTO: 9.88 10*3/MM3 (ref 3.4–10.8)

## 2021-04-08 PROCEDURE — 99195 PHLEBOTOMY: CPT | Performed by: INTERNAL MEDICINE

## 2021-04-08 PROCEDURE — 36415 COLL VENOUS BLD VENIPUNCTURE: CPT

## 2021-04-08 PROCEDURE — 82728 ASSAY OF FERRITIN: CPT

## 2021-04-08 PROCEDURE — 85025 COMPLETE CBC W/AUTO DIFF WBC: CPT

## 2021-04-08 RX ORDER — HEPARIN SODIUM (PORCINE) LOCK FLUSH IV SOLN 100 UNIT/ML 100 UNIT/ML
500 SOLUTION INTRAVENOUS AS NEEDED
Status: DISCONTINUED | OUTPATIENT
Start: 2021-04-08 | End: 2021-04-08 | Stop reason: HOSPADM

## 2021-04-08 RX ORDER — SODIUM CHLORIDE 0.9 % (FLUSH) 0.9 %
10 SYRINGE (ML) INJECTION AS NEEDED
Status: CANCELLED | OUTPATIENT
Start: 2021-04-08

## 2021-04-08 RX ORDER — HEPARIN SODIUM (PORCINE) LOCK FLUSH IV SOLN 100 UNIT/ML 100 UNIT/ML
500 SOLUTION INTRAVENOUS AS NEEDED
Status: CANCELLED | OUTPATIENT
Start: 2021-04-08

## 2021-04-08 RX ORDER — SODIUM CHLORIDE 0.9 % (FLUSH) 0.9 %
10 SYRINGE (ML) INJECTION AS NEEDED
Status: DISCONTINUED | OUTPATIENT
Start: 2021-04-08 | End: 2021-04-08 | Stop reason: HOSPADM

## 2021-04-08 RX ADMIN — HEPARIN SODIUM (PORCINE) LOCK FLUSH IV SOLN 100 UNIT/ML 500 UNITS: 100 SOLUTION at 11:08

## 2021-04-08 RX ADMIN — Medication 10 ML: at 11:07

## 2021-04-08 NOTE — PROGRESS NOTES
Patient here for lab evaluation for possible 250 ml phlebotomy for hemachromatosis if Hgb >11 for goal ferritin <500.  Patient denies any problems.  Skin warm, dry, and color WNL.  Labs reviewed with patient Hgb 11.4 and ferritin 1,249.  250 ml phlebotomy performed today per left mediport.  Patient tolerated without any difficulty.  Denies being lightheaded or dizzy upon standing.  Will return in 2 weeks for repeat labs and follow up appointment with Wendy SOLIS.

## 2021-04-15 ENCOUNTER — TELEPHONE (OUTPATIENT)
Dept: PAIN MANAGEMENT | Age: 46
End: 2021-04-15

## 2021-04-15 ASSESSMENT — PATIENT HEALTH QUESTIONNAIRE - PHQ9
4. FEELING TIRED OR HAVING LITTLE ENERGY: 3
8. MOVING OR SPEAKING SO SLOWLY THAT OTHER PEOPLE COULD HAVE NOTICED. OR THE OPPOSITE, BEING SO FIGETY OR RESTLESS THAT YOU HAVE BEEN MOVING AROUND A LOT MORE THAN USUAL: 1
SUM OF ALL RESPONSES TO PHQ9 QUESTIONS 1 & 2: 5
9. THOUGHTS THAT YOU WOULD BE BETTER OFF DEAD, OR OF HURTING YOURSELF: 0
SUM OF ALL RESPONSES TO PHQ QUESTIONS 1-9: 15
2. FEELING DOWN, DEPRESSED OR HOPELESS: 3
7. TROUBLE CONCENTRATING ON THINGS, SUCH AS READING THE NEWSPAPER OR WATCHING TELEVISION: 1
6. FEELING BAD ABOUT YOURSELF - OR THAT YOU ARE A FAILURE OR HAVE LET YOURSELF OR YOUR FAMILY DOWN: 2

## 2021-04-15 ASSESSMENT — LIFESTYLE VARIABLES
AUDIT TOTAL SCORE: 1
HOW OFTEN DO YOU HAVE A DRINK CONTAINING ALCOHOL: 1
HOW OFTEN DURING THE LAST YEAR HAVE YOU FAILED TO DO WHAT WAS NORMALLY EXPECTED FROM YOU BECAUSE OF DRINKING: 0
HOW MANY STANDARD DRINKS CONTAINING ALCOHOL DO YOU HAVE ON A TYPICAL DAY: 0
AUDIT-C TOTAL SCORE: 1

## 2021-04-15 NOTE — TELEPHONE ENCOUNTER
I CALLED THE PATIENT ON 4/14/2021 AT 1309  AS A FOLLOW UP FROM HER LUMBAR EPIDURAL STEROID INJECTION AT THE LEVEL OF L2/3. SHE DID NOT ANSWER SO I LEFT A MESSAGE.

## 2021-04-21 ENCOUNTER — LAB (OUTPATIENT)
Dept: LAB | Facility: HOSPITAL | Age: 46
End: 2021-04-21

## 2021-04-21 ENCOUNTER — CLINICAL SUPPORT (OUTPATIENT)
Dept: ONCOLOGY | Facility: CLINIC | Age: 46
End: 2021-04-21

## 2021-04-21 VITALS
DIASTOLIC BLOOD PRESSURE: 80 MMHG | HEART RATE: 97 BPM | RESPIRATION RATE: 16 BRPM | SYSTOLIC BLOOD PRESSURE: 142 MMHG | OXYGEN SATURATION: 98 %

## 2021-04-21 DIAGNOSIS — Z45.2 ENCOUNTER FOR CARE RELATED TO PORT-A-CATH: Primary | ICD-10-CM

## 2021-04-21 DIAGNOSIS — E83.110 HEREDITARY HEMOCHROMATOSIS (HCC): ICD-10-CM

## 2021-04-21 DIAGNOSIS — Z86.39 HISTORY OF IRON DEFICIENCY: ICD-10-CM

## 2021-04-21 LAB
BASOPHILS # BLD AUTO: 0.09 10*3/MM3 (ref 0–0.2)
BASOPHILS NFR BLD AUTO: 0.8 % (ref 0–1.5)
DEPRECATED RDW RBC AUTO: 57.1 FL (ref 37–54)
EOSINOPHIL # BLD AUTO: 0.14 10*3/MM3 (ref 0–0.4)
EOSINOPHIL NFR BLD AUTO: 1.3 % (ref 0.3–6.2)
ERYTHROCYTE [DISTWIDTH] IN BLOOD BY AUTOMATED COUNT: 17.2 % (ref 12.3–15.4)
FERRITIN SERPL-MCNC: 1238 NG/ML (ref 13–150)
HCT VFR BLD AUTO: 34.4 % (ref 34–46.6)
HGB BLD-MCNC: 11.5 G/DL (ref 12–15.9)
IMM GRANULOCYTES # BLD AUTO: 0.08 10*3/MM3 (ref 0–0.05)
IMM GRANULOCYTES NFR BLD AUTO: 0.7 % (ref 0–0.5)
LYMPHOCYTES # BLD AUTO: 2.27 10*3/MM3 (ref 0.7–3.1)
LYMPHOCYTES NFR BLD AUTO: 21.1 % (ref 19.6–45.3)
MCH RBC QN AUTO: 30.3 PG (ref 26.6–33)
MCHC RBC AUTO-ENTMCNC: 33.4 G/DL (ref 31.5–35.7)
MCV RBC AUTO: 90.8 FL (ref 79–97)
MONOCYTES # BLD AUTO: 0.59 10*3/MM3 (ref 0.1–0.9)
MONOCYTES NFR BLD AUTO: 5.5 % (ref 5–12)
NEUTROPHILS NFR BLD AUTO: 7.61 10*3/MM3 (ref 1.7–7)
NEUTROPHILS NFR BLD AUTO: 70.6 % (ref 42.7–76)
NRBC BLD AUTO-RTO: 0 /100 WBC (ref 0–0.2)
PLATELET # BLD AUTO: 268 10*3/MM3 (ref 140–450)
PMV BLD AUTO: 9.9 FL (ref 6–12)
RBC # BLD AUTO: 3.79 10*6/MM3 (ref 3.77–5.28)
WBC # BLD AUTO: 10.78 10*3/MM3 (ref 3.4–10.8)

## 2021-04-21 PROCEDURE — 99195 PHLEBOTOMY: CPT | Performed by: NURSE PRACTITIONER

## 2021-04-21 PROCEDURE — 85025 COMPLETE CBC W/AUTO DIFF WBC: CPT

## 2021-04-21 PROCEDURE — 36415 COLL VENOUS BLD VENIPUNCTURE: CPT

## 2021-04-21 PROCEDURE — 82728 ASSAY OF FERRITIN: CPT

## 2021-04-21 RX ORDER — HEPARIN SODIUM (PORCINE) LOCK FLUSH IV SOLN 100 UNIT/ML 100 UNIT/ML
500 SOLUTION INTRAVENOUS AS NEEDED
Status: CANCELLED | OUTPATIENT
Start: 2021-04-21

## 2021-04-21 RX ORDER — SODIUM CHLORIDE 0.9 % (FLUSH) 0.9 %
10 SYRINGE (ML) INJECTION AS NEEDED
Status: CANCELLED | OUTPATIENT
Start: 2021-04-21

## 2021-04-21 RX ORDER — HEPARIN SODIUM (PORCINE) LOCK FLUSH IV SOLN 100 UNIT/ML 100 UNIT/ML
500 SOLUTION INTRAVENOUS AS NEEDED
Status: DISCONTINUED | OUTPATIENT
Start: 2021-04-21 | End: 2021-04-21 | Stop reason: HOSPADM

## 2021-04-21 RX ORDER — SODIUM CHLORIDE 0.9 % (FLUSH) 0.9 %
10 SYRINGE (ML) INJECTION AS NEEDED
Status: DISCONTINUED | OUTPATIENT
Start: 2021-04-21 | End: 2021-04-21 | Stop reason: HOSPADM

## 2021-04-21 RX ADMIN — Medication 10 ML: at 12:22

## 2021-04-21 RX ADMIN — HEPARIN SODIUM (PORCINE) LOCK FLUSH IV SOLN 100 UNIT/ML 500 UNITS: 100 SOLUTION at 12:23

## 2021-04-21 NOTE — PROGRESS NOTES
Patient here for lab evaluation for possible 250 ml phlebotomy for hemachromatosis if Hgb >11 and goal ferritin <500.  Patient denies any problems today.  Skin war, dry, and color WNL.  Labs reviewed Hgb 11.5 and ferritin 1,238.  250 ml phlebotomy performed today without difficulty per left port.  Patient tolerated without any difficulty.  Will return in 2 weeks for repeat labs and possible phlebotomy.

## 2021-04-22 ENCOUNTER — HOSPITAL ENCOUNTER (OUTPATIENT)
Dept: MRI IMAGING | Facility: HOSPITAL | Age: 46
Discharge: HOME | End: 2021-04-22
Admitting: INTERNAL MEDICINE

## 2021-04-22 DIAGNOSIS — E83.110 HEREDITARY HEMOCHROMATOSIS (HCC): ICD-10-CM

## 2021-04-22 DIAGNOSIS — D50.8 IRON DEFICIENCY ANEMIA SECONDARY TO INADEQUATE DIETARY IRON INTAKE: ICD-10-CM

## 2021-04-22 PROCEDURE — 74181 MRI ABDOMEN W/O CONTRAST: CPT

## 2021-04-22 ASSESSMENT — LIFESTYLE VARIABLES
HOW OFTEN DO YOU HAVE A DRINK CONTAINING ALCOHOL: MONTHLY OR LESS
HOW MANY STANDARD DRINKS CONTAINING ALCOHOL DO YOU HAVE ON A TYPICAL DAY: ONE OR TWO
AUDIT TOTAL SCORE: 0
HAS A RELATIVE, FRIEND, DOCTOR, OR ANOTHER HEALTH PROFESSIONAL EXPRESSED CONCERN ABOUT YOUR DRINKING OR SUGGESTED YOU CUT DOWN: NO
HOW OFTEN DURING THE LAST YEAR HAVE YOU HAD A FEELING OF GUILT OR REMORSE AFTER DRINKING: NEVER
HOW OFTEN DURING THE LAST YEAR HAVE YOU FAILED TO DO WHAT WAS NORMALLY EXPECTED FROM YOU BECAUSE OF DRINKING: NEVER
HAVE YOU OR SOMEONE ELSE BEEN INJURED AS A RESULT OF YOUR DRINKING: NO
AUDIT-C TOTAL SCORE: 0
HOW OFTEN DO YOU HAVE SIX OR MORE DRINKS ON ONE OCCASION: NEVER
HOW OFTEN DURING THE LAST YEAR HAVE YOU BEEN UNABLE TO REMEMBER WHAT HAPPENED THE NIGHT BEFORE BECAUSE YOU HAD BEEN DRINKING: NEVER
HOW OFTEN DURING THE LAST YEAR HAVE YOU NEEDED AN ALCOHOLIC DRINK FIRST THING IN THE MORNING TO GET YOURSELF GOING AFTER A NIGHT OF HEAVY DRINKING: NEVER
HOW OFTEN DURING THE LAST YEAR HAVE YOU FOUND THAT YOU WERE NOT ABLE TO STOP DRINKING ONCE YOU HAD STARTED: NEVER

## 2021-04-23 ENCOUNTER — TELEPHONE (OUTPATIENT)
Dept: ONCOLOGY | Facility: CLINIC | Age: 46
End: 2021-04-23

## 2021-04-23 NOTE — TELEPHONE ENCOUNTER
Patient has MRI liver which shows the followin.  Slight liver iron overload.   2.  Strong hepatic steatosis.   3.  Splenic iron overload.      Given that she has hemochromatosis single mutation with ferritin >1000 and signs of iron overload not only in the liver but also the spleen, it seems fitting to begin her on iron chelation with exjade or Jadenu with the appropriate baseline testing and continued testing as per medication guideline as well as to get a baseline hearing and eye exam (and yearly thereafter)    Exjade:    Initial: 20 mg/kg once daily    Maintenance: Adjust dose every 3 to 6 months based on serum ferritin trends; adjust by 5 or 10 mg/kg/day; titrate to individual response and treatment goals. In patients not adequately controlled with 30 mg/kg/day, doses up to 40 mg/kg/day may be considered for serum ferritin levels persistently >2,500 mcg/L and not decreasing over time (doses >40 mg/kg/day are not recommended). If serum ferritin falls to <1,000 mcg/L at 2 consecutive visits, consider dose reduction (especially if dose is >25 mg/kg/day). If serum ferritin falls to <500 mcg/L, interrupt therapy and continue monitoring monthly.    Jadenu:    Initial: 14 mg/kg once daily    Maintenance: Adjust dose every 3 to 6 months based on serum ferritin trends; adjust by 3.5 or 7 mg/kg/day; titrate to individual response and treatment goals. In patients not adequately controlled with 21 mg/kg/day, doses up to 28 mg/kg/day may be considered for serum ferritin levels persistently >2,500 mcg/L and not decreasing over time (doses >28 mg/kg/day are not recommended). If serum ferritin falls to <1,000 mcg/L at 2 consecutive visits, consider dose reduction (especially if dose is >17.5 mg/kg/day). If serum ferritin falls to <500 mcg/L, interrupt therapy and continue monitoring monthly.    Ju regarding dosing adjustment and holding the drug as per  guidelines    Asked the office to speak with the  patient and have her come in to discuss the drug.  If drug is started, can stop phlebotomies

## 2021-04-26 ENCOUNTER — OFFICE VISIT (OUTPATIENT)
Dept: PRIMARY CARE CLINIC | Age: 46
End: 2021-04-26
Payer: MEDICARE

## 2021-04-26 VITALS
HEART RATE: 115 BPM | SYSTOLIC BLOOD PRESSURE: 136 MMHG | TEMPERATURE: 97.4 F | OXYGEN SATURATION: 97 % | BODY MASS INDEX: 44.41 KG/M2 | HEIGHT: 68 IN | DIASTOLIC BLOOD PRESSURE: 84 MMHG | WEIGHT: 293 LBS

## 2021-04-26 DIAGNOSIS — G89.29 CHRONIC LEFT-SIDED LOW BACK PAIN, UNSPECIFIED WHETHER SCIATICA PRESENT: ICD-10-CM

## 2021-04-26 DIAGNOSIS — I10 ESSENTIAL HYPERTENSION: ICD-10-CM

## 2021-04-26 DIAGNOSIS — E83.110 HEREDITARY HEMOCHROMATOSIS (HCC): ICD-10-CM

## 2021-04-26 DIAGNOSIS — E66.01 MORBID OBESITY (HCC): ICD-10-CM

## 2021-04-26 DIAGNOSIS — M54.50 CHRONIC LEFT-SIDED LOW BACK PAIN, UNSPECIFIED WHETHER SCIATICA PRESENT: ICD-10-CM

## 2021-04-26 DIAGNOSIS — Z00.00 ROUTINE GENERAL MEDICAL EXAMINATION AT A HEALTH CARE FACILITY: Primary | ICD-10-CM

## 2021-04-26 DIAGNOSIS — D75.1 POLYCYTHEMIA: ICD-10-CM

## 2021-04-26 DIAGNOSIS — F33.1 MODERATE EPISODE OF RECURRENT MAJOR DEPRESSIVE DISORDER (HCC): ICD-10-CM

## 2021-04-26 PROCEDURE — 1036F TOBACCO NON-USER: CPT | Performed by: NURSE PRACTITIONER

## 2021-04-26 PROCEDURE — 99214 OFFICE O/P EST MOD 30 MIN: CPT | Performed by: NURSE PRACTITIONER

## 2021-04-26 PROCEDURE — G8427 DOCREV CUR MEDS BY ELIG CLIN: HCPCS | Performed by: NURSE PRACTITIONER

## 2021-04-26 PROCEDURE — G0438 PPPS, INITIAL VISIT: HCPCS | Performed by: NURSE PRACTITIONER

## 2021-04-26 PROCEDURE — G8417 CALC BMI ABV UP PARAM F/U: HCPCS | Performed by: NURSE PRACTITIONER

## 2021-04-26 ASSESSMENT — PATIENT HEALTH QUESTIONNAIRE - PHQ9
7. TROUBLE CONCENTRATING ON THINGS, SUCH AS READING THE NEWSPAPER OR WATCHING TELEVISION: 2
SUM OF ALL RESPONSES TO PHQ QUESTIONS 1-9: 14
10. IF YOU CHECKED OFF ANY PROBLEMS, HOW DIFFICULT HAVE THESE PROBLEMS MADE IT FOR YOU TO DO YOUR WORK, TAKE CARE OF THINGS AT HOME, OR GET ALONG WITH OTHER PEOPLE: 1
8. MOVING OR SPEAKING SO SLOWLY THAT OTHER PEOPLE COULD HAVE NOTICED. OR THE OPPOSITE, BEING SO FIGETY OR RESTLESS THAT YOU HAVE BEEN MOVING AROUND A LOT MORE THAN USUAL: 1
9. THOUGHTS THAT YOU WOULD BE BETTER OFF DEAD, OR OF HURTING YOURSELF: 0
4. FEELING TIRED OR HAVING LITTLE ENERGY: 3
SUM OF ALL RESPONSES TO PHQ9 QUESTIONS 1 & 2: 3
2. FEELING DOWN, DEPRESSED OR HOPELESS: 3
5. POOR APPETITE OR OVEREATING: 3

## 2021-04-26 ASSESSMENT — ENCOUNTER SYMPTOMS
SORE THROAT: 0
ABDOMINAL PAIN: 0
SINUS PRESSURE: 0
DIARRHEA: 0
TROUBLE SWALLOWING: 0
NAUSEA: 0
RHINORRHEA: 0
COUGH: 0
CONSTIPATION: 0
SHORTNESS OF BREATH: 0
VOMITING: 0

## 2021-04-26 NOTE — PATIENT INSTRUCTIONS
Dietician Yamil Osuna at [address] ([facility]) [phone]  Exercise goal 200 minutes a week. Weight loss goal 15 lbs in 3 months. Body Mass Index: Care Instructions  Your Care Instructions     Body mass index (BMI) can help you see if your weight is raising your risk for health problems. It uses a formula to compare how much you weigh with how tall you are. · A BMI lower than 18.5 is considered underweight. · A BMI between 18.5 and 24.9 is considered healthy. · A BMI between 25 and 29.9 is considered overweight. A BMI of 30 or higher is considered obese. If your BMI is in the normal range, it means that you have a lower risk for weight-related health problems. If your BMI is in the overweight or obese range, you may be at increased risk for weight-related health problems, such as high blood pressure, heart disease, stroke, arthritis or joint pain, and diabetes. If your BMI is in the underweight range, you may be at increased risk for health problems such as fatigue, lower protection (immunity) against illness, muscle loss, bone loss, hair loss, and hormone problems. BMI is just one measure of your risk for weight-related health problems. You may be at higher risk for health problems if you are not active, you eat an unhealthy diet, or you drink too much alcohol or use tobacco products. Follow-up care is a key part of your treatment and safety. Be sure to make and go to all appointments, and call your doctor if you are having problems. It's also a good idea to know your test results and keep a list of the medicines you take. How can you care for yourself at home? · Practice healthy eating habits. This includes eating plenty of fruits, vegetables, whole grains, lean protein, and low-fat dairy. · If your doctor recommends it, get more exercise. Walking is a good choice. Bit by bit, increase the amount you walk every day. Try for at least 30 minutes on most days of the week. · Do not smoke.  Smoking can increase your risk for health problems. If you need help quitting, talk to your doctor about stop-smoking programs and medicines. These can increase your chances of quitting for good. · Limit alcohol to 2 drinks a day for men and 1 drink a day for women. Too much alcohol can cause health problems. If you have a BMI higher than 25  · Your doctor may do other tests to check your risk for weight-related health problems. This may include measuring the distance around your waist. A waist measurement of more than 40 inches in men or 35 inches in women can increase the risk of weight-related health problems. · Talk with your doctor about steps you can take to stay healthy or improve your health. You may need to make lifestyle changes to lose weight and stay healthy, such as changing your diet and getting regular exercise. If you have a BMI lower than 18.5  · Your doctor may do other tests to check your risk for health problems. · Talk with your doctor about steps you can take to stay healthy or improve your health. You may need to make lifestyle changes to gain or maintain weight and stay healthy, such as getting more healthy foods in your diet and doing exercises to build muscle. Where can you learn more? Go to https://Century Hospicepetayler.[email or web address] org and sign in to your Waffle account. Enter S176 in the [facility] box to learn more about \"Body Mass Index: Care Instructions. \"     If you do not have an account, please click on the \"Sign Up Now\" link. Current as of: September 23, 2020               Content Version: 12.8  © 2006-2021 Healthwise, Incorporated. Care instructions adapted under license by [facility] ([facility]). If you have questions about a medical condition or this instruction, always ask your healthcare professional. [place] [address] any warranty or liability for your use of this information. Learning About Cutting Calories  How do calories affect your weight?   Food gives your body energy. Energy from the food you eat is measured in calories. This energy keeps your heart beating, your brain active, and your muscles working. Your body needs a certain number of calories each day. After your body uses the calories it needs, it stores extra calories as fat. To lose weight safely, you have to eat fewer calories while eating in a healthy way. How many calories do you need each day? The more active you are, the more calories you need. When you are less active, you need fewer calories. How many calories you need each day also depends on several things, including your age and whether you are male or female. Here are some general guidelines for adults:  · Less active women and older adults need 1,600 to 2,000 calories each day. · Active women and less active men need 2,000 to 2,400 calories each day. · Active men need 2,400 to 3,000 calories each day. How can you cut calories and eat healthy meals? Whole grains, vegetables and fruits, and dried beans are good lower-calorie foods. They give you lots of nutrients and fiber. And they fill you up. Sweets, energy drinks, and soda pop are high in calories. They give you few nutrients and no fiber. Try to limit soda pop, fruit juice, and energy drinks. Drink water instead. Some fats can be part of a healthy diet. But cutting back on fats from highly processed foods like fast foods and many snack foods is a good way to lower the calories in your diet. Also, use smaller amounts of fats like butter, margarine, salad dressing, and mayonnaise. Add fresh garlic, lemon, or herbs to your meals to add flavor without adding fat. Meats and dairy products can be a big source of hidden fats. Try to choose lean or low-fat versions of these products. Fat-free cookies, candies, chips, and frozen treats can still be high in sugar and calories. Some fat-free foods have more calories than regular ones.  Eat fat-free treats in moderation, as you would other foods. If your favorite foods are high in fat, salt, sugar, or calories, limit how often you eat them. Eat smaller servings, or look for healthy substitutes. Fill up on fruits, vegetables, and whole grains. Eating at home  · Use meat as a side dish instead of as the main part of your meal.  · Try main dishes that use whole wheat pasta, brown rice, dried beans, or vegetables. · Find ways to cook with little or no fat, such as broiling, steaming, or grilling. · Use cooking spray instead of oil. If you use oil, use a monounsaturated oil, such as canola or olive oil. · Trim fat from meats before you cook them. · Drain off fat after you brown the meat or while you roast it. · Chill soups and stews after you cook them. Then skim the fat off the top after it hardens. Eating out  · Order foods that are broiled or poached rather than fried or breaded. · Cut back on the amount of butter or margarine that you use on bread. · Order sauces, gravies, and salad dressings on the side, and use only a little. · When you order pasta, choose tomato sauce rather than cream sauce. · Ask for salsa with your baked potato instead of sour cream, butter, cheese, or carvalho. · Order meals in a small size instead of upgrading to a large. · Share an entree, or take part of your food home to eat as another meal.  · Share appetizers and desserts. Where can you learn more? Go to https://YChartsdona.Antuit. org and sign in to your SNSplus account. Enter E151 in the [facility] box to learn more about \"Learning About Cutting Calories. \"     If you do not have an account, please click on the \"Sign Up Now\" link. Current as of: December 17, 2020               Content Version: 12.8  © 2804-3267 Healthwise, Incorporated. Care instructions adapted under license by [facility] ([facility]).  If you have questions about a medical condition or this instruction, always ask your healthcare professional. Azul Barros to 50: Care Instructions  Overview     Well visits can help you stay healthy. Your doctor has checked your overall health and may have suggested ways to take good care of yourself. Your doctor also may have recommended tests. At home, you can help prevent illness with healthy eating, regular exercise, and other steps. Follow-up care is a key part of your treatment and safety. Be sure to make and go to all appointments, and call your doctor if you are having problems. It's also a good idea to know your test results and keep a list of the medicines you take. How can you care for yourself at home? Get screening tests that you and your doctor decide on. Screening helps find diseases before any symptoms appear. Eat healthy foods. Choose fruits, vegetables, whole grains, protein, and low-fat dairy foods. Limit fat, especially saturated fat. Reduce salt in your diet. Limit alcohol. If you are a man, have no more than 2 drinks a day or 14 drinks a week. If you are a woman, have no more than 1 drink a day or 7 drinks a week. Get at least 30 minutes of physical activity on most days of the week. Walking is a good choice. You also may want to do other activities, such as running, swimming, cycling, or playing tennis or team sports. Discuss any changes in your exercise program with your doctor. Reach and stay at a healthy weight. This will lower your risk for many problems, such as obesity, diabetes, heart disease, and high blood pressure. Do not smoke or allow others to smoke around you. If you need help quitting, talk to your doctor about stop-smoking programs and medicines. These can increase your chances of quitting for good. Care for your mental health. It is easy to get weighed down by worry and stress. Learn strategies to manage stress, like deep breathing and mindfulness, and stay connected with your family and community. If you find you often feel sad or hopeless, talk with your doctor.  Treatment can help.  Talk to your doctor about whether you have any risk factors for sexually transmitted infections (STIs). You can help prevent STIs if you wait to have sex with a new partner (or partners) until you've each been tested for STIs. It also helps if you use condoms (male or female condoms) and if you limit your sex partners to one person who only has sex with you. Vaccines are available for some STIs, such as HPV. Use birth control if it's important to you to prevent pregnancy. Talk with your doctor about the choices available and what might be best for you. If you think you may have a problem with alcohol or drug use, talk to your doctor. This includes prescription medicines (such as amphetamines and opioids) and illegal drugs (such as cocaine and methamphetamine). Your doctor can help you figure out what type of treatment is best for you. Protect your skin from too much sun. When you're outdoors from 10 a.m. to 4 p.m., stay in the shade or cover up with clothing and a hat with a wide brim. Wear sunglasses that block UV rays. Even when it's cloudy, put broad-spectrum sunscreen (SPF 30 or higher) on any exposed skin. See a dentist one or two times a year for checkups and to have your teeth cleaned. Wear a seat belt in the car. When should you call for help? Watch closely for changes in your health, and be sure to contact your doctor if you have any problems or symptoms that concern you. Where can you learn more? Go to [email or web address] [email or web address] and sign in to your [email or web address] account. Enter P072 in the [facility] box to learn more about \"Well Visit, Ages 25 to 48: Care Instructions. \"     If you do not have an account, please click on the \"Sign Up Now\" link. Current as of: May 27, 2020               Content Version: 12.8  © 3734-4342 Healthwise, Incorporated. Care instructions adapted under license by [facility] ([facility]).  If you have questions about a medical condition or this

## 2021-04-26 NOTE — PROGRESS NOTES
Woodrow Borrego (:  1975) is a 54 y.o. female,Established patient, here for evaluation of the following chief complaint(s):  Medicare AWV (here for yearly physical. has been following up with dr Jey Gipson with her iron issues. )      ASSESSMENT/PLAN:  1. Routine general medical examination at a health care facility  2. Moderate episode of recurrent major depressive disorder (HCC)  Stable but reactive per patient report    3. Essential hypertension  The current medical regimen is effective;  continue present plan and medications. 4. Polycythemia  Followed by hematology at [facility]    5. Morbid obesity ([facility] [address]  Discussed weight loss, set goals. Advised to see dietitian    6. Hereditary hemochromatosis ([facility]   Followed by hematology at [facility] -according to Dr. Georgina Jordan note he is most likely going to start a chelating agent like exjade or jadenu    7. Chronic left-sided low back pain  Followed by pain management    Return in 3 months (on 2021) for Medicare Annual Wellness Visit in 1 year. SUBJECTIVE/OBJECTIVE:  HPI  Here for f/u on polycythemia  She is getting serial blood draws/ phlebotomy. She is following with [facility]iny [facility]on [facility]  She did have a hemochromatosis gene assay and she was noted to be a carrier of gene. She should not have symptoms with this genetic abnormality. She had a liver US and this showed fatty infiltration. She had been on iron infusions in the past and now she is told that she has too much iron. She was getting these iron infusions 2-3x per week.   Iron levels are normal.  Dr Destiny Pinon per record - is going to start on exjade or jadenu - if she starts medicine she will be able to stop therapeutic phlebotomy     Labs per Dr Destiny Pinon reviewed:  Lab Results - Last 18 Months   Lab Units 21  0937 21  0934 21  0847 20  0942 20  1015 20  0956 20  1306   IRON ug/dL 44 -- 54 57 60 50 51   TIBC ug/dL 213* -- 235* 250* 253* 249* 243* IRON SATURATION % 21 -- 23 23 24 20 21   FERRITIN ng/mL 1,274.0* 1,259.00* 1,355.00* 1,489.00* 1,663.00* 1,793.00* 1,406.00*   T4 TOTAL mcg/dL -- -- -- -- -- -- 9.34   TSH uIU/mL 1.590 -- -- -- -- -- 1.340   FOLATE ng/mL -- -- -- -- -- -- >20.00      Had MRI abdomen w/o 04/22/2021  Result Impression   1.  Slight liver iron overload. 2.  Strong hepatic steatosis. 3.  Splenic iron overload.      This report was finalized on 04/22/2021 13:32 by Dr. Minor Hope MD.     Obesity  Has history of gastric sleeve 2018 done by Dr. Talia Medel  Weight is going up, 11 pounds from November 10 of 2020    HTN  BP controlled on medicine  No HA or CP    Review of Systems   Constitutional: Negative for activity change, appetite change, fatigue, fever and unexpected weight change. HENT: Negative for congestion, hearing loss, rhinorrhea, sinus pressure, sore throat and trouble swallowing. Eyes: Negative for visual disturbance. Respiratory: Negative for cough and shortness of breath. Cardiovascular: Negative for chest pain, palpitations and leg swelling. Gastrointestinal: Negative for abdominal pain, constipation, diarrhea, nausea and vomiting. Endocrine: Negative for cold intolerance and heat intolerance. Genitourinary: Negative for flank pain, menstrual problem, pelvic pain, urgency and vaginal discharge. Musculoskeletal: Negative for arthralgias. Skin: Negative for rash. Neurological: Negative for headaches. Psychiatric/Behavioral: Negative for dysphoric mood and sleep disturbance. The patient is not nervous/anxious. Physical Exam  Vitals signs reviewed. Constitutional:       Appearance: Normal appearance. HENT:      Head: Normocephalic and atraumatic. Nose:      Comments: masked     Mouth/Throat:      Comments: masked  Neck:      Musculoskeletal: Normal range of motion and neck supple. Cardiovascular:      Rate and Rhythm: Normal rate and regular rhythm. Pulses: Normal pulses. Jacquie, DO   pregabalin (LYRICA) 100 MG capsule Take 1 capsule by mouth 2 times daily for 90 days. Yes OMAR Munoz, DO   metFORMIN (GLUCOPHAGE) 1000 MG tablet Take 1 tablet by mouth 2 times daily (with meals) Yes OMAR Munoz DO   levocetirizine (XYZAL) 5 MG tablet Take 1 tablet by mouth nightly Yes OMAR Munoz DO   nortriptyline (PAMELOR) 25 MG capsule TAKE 1 TO 2 CAPSULES BY MOUTH EVERY NIGHT Yes OMAR Munoz DO   olopatadine (PATADAY) 0.2 % SOLN ophthalmic solution Place 1 drop into both eyes daily Yes OMAR Munoz DO   carbidopa-levodopa (SINEMET)  MG per tablet TAKE 1 TABLET BY MOUTH EVERY NIGHT Yes OMAR Munoz DO   venlafaxine (EFFEXOR XR) 150 MG extended release capsule Take 1 capsule by mouth daily Yes OMAR Munoz DO   omeprazole (PRILOSEC) 20 MG delayed release capsule Take 1 capsule by mouth Daily Yes OMAR Munoz DO   naloxone (NARCAN) 4 MG/0.1ML LIQD nasal spray 1 spray by Nasal route as needed for Opioid Reversal Yes OMAR Munoz DO   venlafaxine (EFFEXOR XR) 75 MG extended release capsule Take 1 capsule by mouth daily Take 1 capsule daily in addition to 150 mg to make up  225 mg daily Yes OMAR Munoz DO   metoprolol succinate (TOPROL XL) 50 MG extended release tablet Take 1 tablet by mouth 2 times daily Yes OMAR Munoz DO   Calcium-Vitamin D 600-200 MG-UNIT TABS Take 1 tablet by mouth daily Yes Historical Provider, MD   albuterol sulfate HFA (PROVENTIL HFA) 108 (90 Base) MCG/ACT inhaler Inhale 2 puffs into the lungs every 6 hours as needed for Wheezing Yes OMAR Munoz, DO   ondansetron (ZOFRAN ODT) 4 MG disintegrating tablet Take 1 tablet by mouth every 8 hours as needed for Nausea or Vomiting Yes Isis Pardo, APRN   Multiple Vitamins-Minerals (MULTIVITAMIN & MINERAL PO) Take  by mouth. Yes Historical Provider, MD   CRANBERRY Take 500 mg by mouth daily.  Yes Historical Provider, MD Brother     Other Other         has history of suicide in family maternal great uncle       CareTeam (Including outside providers/suppliers regularly involved in providing care):   Patient Care Team:  Arnaldo Gonzalez DO as PCP - General  OMAR Moffett DO as PCP - [facility] Empaneled Provider  Evelyn Cervantes    Wt Readings from Last 3 Encounters:   04/26/21 300 lb (136.1 kg)   03/31/21 (!) 301 lb (136.5 kg)   03/30/21 300 lb (136.1 kg)     Vitals:    04/26/21 1423   BP: 136/84   Pulse: 115   Temp: 97.4 °F (36.3 °C)   TempSrc: Temporal   SpO2: 97%   Weight: 300 lb (136.1 kg)   Height: 5' 8\" (1.727 m)     Body mass index is 45.61 kg/m². Based upon direct observation of the patient, evaluation of cognition reveals recent and remote memory intact. Patient's complete Health Risk Assessment and screening values have been reviewed and are found in Flowsheets. The following problems were reviewed today and where indicated follow up appointments were made and/or referrals ordered. Positive Risk Factor Screenings with Interventions:     Fall Risk:  Timed Up and Go Test > 12 seconds? (Complete if either Fall Risk answers are Yes): no  2 or more falls in past year?: (!) yes  Fall with injury in past year?: (!) yes  Fall Risk Interventions:    · Home safety tips provided - has been evaluated by Physical therapy. Go again on April 30th. Depression:  PHQ-2 Score: 3  PHQ-9 Total Score: 14    Severity:1-4 = minimal depression, 5-9 = mild depression, 10-14 = moderate depression, 15-19 = moderately severe depression, 20-27 = severe depression  Depression Interventions:  · Pt states she has alot going on - her health issues, best friend may have cervical cancer and is going thru a divorce. Denies any SI. General Health and ACP:  General  In general, how would you say your health is?: Fair  In the past 7 days, have you experienced any of the following?  New or Increased Pain, New or Increased Fatigue, Loneliness, Social Isolation, Stress or Anger?: (!) New or Increased Pain, New or Increased Fatigue, Loneliness, Social Isolation, Stress, Anger  Do you get the social and emotional support that you need?: (!) No  Do you have a Living Will?: Yes(daughter - shaw ralph)  Advance Directives     Power of  Living Will ACP-Advance Directive ACP-Power of     Not on File Not on File Not on File Not on File      General Health Risk Interventions:  · as above     Health Habits/Nutrition:  Health Habits/Nutrition  Do you exercise for at least 20 minutes 2-3 times per week?: (!) No  Have you lost any weight without trying in the past 3 months?: No  Do you eat only one meal per day?: (!) Yes  Have you seen the dentist within the past year?: Yes  Body mass index: (!) 45.61  Health Habits/Nutrition Interventions:   · Inadequate physical activity:  educational materials provided to promote increased physical activity    Hearing/Vision:  No exam data present  Hearing/Vision  Do you or your family notice any trouble with your hearing that hasn't been managed with hearing aids?: No  Do you have difficulty driving, watching TV, or doing any of your daily activities because of your eyesight?: No  Have you had an eye exam within the past year?: (!) No  Hearing/Vision Interventions:  · Vision concerns:  patient encouraged to make appointment with his/her eye specialist     ADL:  ADLs  In the past 7 days, did you need help from others to perform any of the following everyday activities? Eating, dressing, grooming, bathing, toileting, or walking/balance?: (!) Walking/Balance  In the past 7 days, did you need help from others to take care of any of the following?  Laundry, housekeeping, banking/finances, shopping, telephone use, food preparation, transportation, or taking medications?: [employer], Housekeeping, Banking/Finances, Shopping, Food Preparation  ADL Interventions:  · she is going to therapy - has appt [facility].    Personalized Preventive Plan   Current Health Maintenance Status  Immunization History   Administered Date(s) Administered    Influenza Vaccine, unspecified formulation 10/26/2016    Influenza Virus Vaccine 12/19/2019, 12/31/2020    Influenza, MDCK Quadv, IM, PF (Flucelvax 4 yrs and older) 12/19/2019    Influenza, Quadv, IM, PF (6 mo and older Fluzone, Flulaval, Fluarix, and 3 yrs and older Afluria) 10/12/2018    Pneumococcal Polysaccharide ([id number]) 11/04/2014    Tdap (Boostrix, Adacel) 03/31/2021        Health Maintenance   Topic Date Due    COVID-19 Vaccine (1) Never done    Cervical cancer screen  06/04/2016    Annual Wellness Visit (AWV)  04/10/2021    Diabetes screen  02/03/2024    Colon cancer screen colonoscopy  05/02/2024    Lipid screen  02/03/2026    DTaP/Tdap/Td vaccine (2 - Td) 03/31/2031    Flu vaccine  Completed    Hepatitis C screen  Completed    HIV screen  Completed    Hepatitis A vaccine  Aged Out    Hepatitis B vaccine  Aged Out    Hib vaccine  Aged Out    Meningococcal (ACWY) vaccine  Aged Out    Pneumococcal 0-64 years Vaccine  Aged Out     Recommendations for Skipo Due: see orders and patient instructions/AVS.  . Recommended screening schedule for the next 5-10 years is provided to the patient in written form: see Patient Instructions/AVS.    Hallievero Gómez was seen today for medicare awv.     Diagnoses and all orders for this visit:    Routine general medical examination at a health care facility    Moderate episode of recurrent major depressive disorder ([address]    Essential hypertension    Polycythemia    Morbid obesity ([address]    Hereditary hemochromatosis ([address]    Chronic left-sided low back pain, unspecified whether sciatica present

## 2021-04-28 ENCOUNTER — LAB (OUTPATIENT)
Dept: LAB | Facility: HOSPITAL | Age: 46
End: 2021-04-28

## 2021-04-28 ENCOUNTER — OFFICE VISIT (OUTPATIENT)
Dept: ONCOLOGY | Facility: CLINIC | Age: 46
End: 2021-04-28

## 2021-04-28 VITALS
DIASTOLIC BLOOD PRESSURE: 92 MMHG | BODY MASS INDEX: 44.41 KG/M2 | OXYGEN SATURATION: 96 % | TEMPERATURE: 98 F | HEART RATE: 106 BPM | SYSTOLIC BLOOD PRESSURE: 144 MMHG | HEIGHT: 68 IN | WEIGHT: 293 LBS

## 2021-04-28 DIAGNOSIS — Z86.39 HISTORY OF IRON DEFICIENCY: ICD-10-CM

## 2021-04-28 DIAGNOSIS — D64.9 ANEMIA, UNSPECIFIED TYPE: ICD-10-CM

## 2021-04-28 DIAGNOSIS — E83.110 HEREDITARY HEMOCHROMATOSIS (HCC): Primary | ICD-10-CM

## 2021-04-28 DIAGNOSIS — Z01.10 ENCOUNTER FOR HEARING EXAMINATION, UNSPECIFIED WHETHER ABNORMAL FINDINGS: ICD-10-CM

## 2021-04-28 DIAGNOSIS — I10 ESSENTIAL HYPERTENSION: ICD-10-CM

## 2021-04-28 DIAGNOSIS — E83.110 HEREDITARY HEMOCHROMATOSIS (HCC): ICD-10-CM

## 2021-04-28 LAB
BASOPHILS # BLD AUTO: 0.09 10*3/MM3 (ref 0–0.2)
BASOPHILS NFR BLD AUTO: 0.9 % (ref 0–1.5)
DEPRECATED RDW RBC AUTO: 53.8 FL (ref 37–54)
EOSINOPHIL # BLD AUTO: 0.14 10*3/MM3 (ref 0–0.4)
EOSINOPHIL NFR BLD AUTO: 1.3 % (ref 0.3–6.2)
ERYTHROCYTE [DISTWIDTH] IN BLOOD BY AUTOMATED COUNT: 16.6 % (ref 12.3–15.4)
FERRITIN SERPL-MCNC: 1245 NG/ML (ref 13–150)
HCT VFR BLD AUTO: 32 % (ref 34–46.6)
HGB BLD-MCNC: 11.2 G/DL (ref 12–15.9)
IMM GRANULOCYTES # BLD AUTO: 0.08 10*3/MM3 (ref 0–0.05)
IMM GRANULOCYTES NFR BLD AUTO: 0.8 % (ref 0–0.5)
LYMPHOCYTES # BLD AUTO: 2.55 10*3/MM3 (ref 0.7–3.1)
LYMPHOCYTES NFR BLD AUTO: 24.5 % (ref 19.6–45.3)
MCH RBC QN AUTO: 31.1 PG (ref 26.6–33)
MCHC RBC AUTO-ENTMCNC: 35 G/DL (ref 31.5–35.7)
MCV RBC AUTO: 88.9 FL (ref 79–97)
MONOCYTES # BLD AUTO: 0.47 10*3/MM3 (ref 0.1–0.9)
MONOCYTES NFR BLD AUTO: 4.5 % (ref 5–12)
NEUTROPHILS NFR BLD AUTO: 68 % (ref 42.7–76)
NEUTROPHILS NFR BLD AUTO: 7.07 10*3/MM3 (ref 1.7–7)
NRBC BLD AUTO-RTO: 0 /100 WBC (ref 0–0.2)
PLATELET # BLD AUTO: 260 10*3/MM3 (ref 140–450)
PMV BLD AUTO: 9.4 FL (ref 6–12)
RBC # BLD AUTO: 3.6 10*6/MM3 (ref 3.77–5.28)
WBC # BLD AUTO: 10.4 10*3/MM3 (ref 3.4–10.8)

## 2021-04-28 PROCEDURE — 36415 COLL VENOUS BLD VENIPUNCTURE: CPT

## 2021-04-28 PROCEDURE — 82728 ASSAY OF FERRITIN: CPT

## 2021-04-28 PROCEDURE — 85025 COMPLETE CBC W/AUTO DIFF WBC: CPT

## 2021-04-28 PROCEDURE — 99214 OFFICE O/P EST MOD 30 MIN: CPT | Performed by: NURSE PRACTITIONER

## 2021-04-30 DIAGNOSIS — D64.9 ANEMIA, UNSPECIFIED TYPE: Primary | ICD-10-CM

## 2021-04-30 DIAGNOSIS — E83.110 HEREDITARY HEMOCHROMATOSIS (HCC): ICD-10-CM

## 2021-05-04 RX ORDER — DEFERASIROX 360 MG/1
5 TABLET, FILM COATED ORAL
Qty: 150 TABLET | Refills: 5 | Status: SHIPPED | OUTPATIENT
Start: 2021-05-04 | End: 2021-08-10

## 2021-05-04 RX ORDER — DEFERASIROX 90 MG/1
1 TABLET, FILM COATED ORAL DAILY
Qty: 30 TABLET | Refills: 5 | Status: SHIPPED | OUTPATIENT
Start: 2021-05-04 | End: 2021-08-10

## 2021-05-06 DIAGNOSIS — M54.16 CHRONIC LUMBAR RADICULOPATHY: ICD-10-CM

## 2021-05-10 RX ORDER — OXYCODONE HYDROCHLORIDE 5 MG/1
5 TABLET ORAL 3 TIMES DAILY PRN
Qty: 90 TABLET | Refills: 0 | Status: SHIPPED | OUTPATIENT
Start: 2021-05-10 | End: 2021-06-03 | Stop reason: SDUPTHER

## 2021-05-12 ENCOUNTER — CLINICAL SUPPORT (OUTPATIENT)
Dept: ONCOLOGY | Facility: CLINIC | Age: 46
End: 2021-05-12

## 2021-05-12 ENCOUNTER — LAB (OUTPATIENT)
Dept: LAB | Facility: HOSPITAL | Age: 46
End: 2021-05-12

## 2021-05-12 VITALS
RESPIRATION RATE: 16 BRPM | OXYGEN SATURATION: 99 % | DIASTOLIC BLOOD PRESSURE: 76 MMHG | SYSTOLIC BLOOD PRESSURE: 132 MMHG | HEART RATE: 88 BPM

## 2021-05-12 DIAGNOSIS — E83.110 HEREDITARY HEMOCHROMATOSIS (HCC): ICD-10-CM

## 2021-05-12 DIAGNOSIS — Z86.39 HISTORY OF IRON DEFICIENCY: ICD-10-CM

## 2021-05-12 LAB
BASOPHILS # BLD AUTO: 0.07 10*3/MM3 (ref 0–0.2)
BASOPHILS NFR BLD AUTO: 0.9 % (ref 0–1.5)
DEPRECATED RDW RBC AUTO: 53.1 FL (ref 37–54)
EOSINOPHIL # BLD AUTO: 0.1 10*3/MM3 (ref 0–0.4)
EOSINOPHIL NFR BLD AUTO: 1.2 % (ref 0.3–6.2)
ERYTHROCYTE [DISTWIDTH] IN BLOOD BY AUTOMATED COUNT: 16.2 % (ref 12.3–15.4)
FERRITIN SERPL-MCNC: 1140 NG/ML (ref 13–150)
HCT VFR BLD AUTO: 34.4 % (ref 34–46.6)
HGB BLD-MCNC: 11.6 G/DL (ref 12–15.9)
IMM GRANULOCYTES # BLD AUTO: 0.06 10*3/MM3 (ref 0–0.05)
IMM GRANULOCYTES NFR BLD AUTO: 0.7 % (ref 0–0.5)
LYMPHOCYTES # BLD AUTO: 2.1 10*3/MM3 (ref 0.7–3.1)
LYMPHOCYTES NFR BLD AUTO: 26.1 % (ref 19.6–45.3)
MCH RBC QN AUTO: 30.2 PG (ref 26.6–33)
MCHC RBC AUTO-ENTMCNC: 33.7 G/DL (ref 31.5–35.7)
MCV RBC AUTO: 89.6 FL (ref 79–97)
MONOCYTES # BLD AUTO: 0.43 10*3/MM3 (ref 0.1–0.9)
MONOCYTES NFR BLD AUTO: 5.3 % (ref 5–12)
NEUTROPHILS NFR BLD AUTO: 5.28 10*3/MM3 (ref 1.7–7)
NEUTROPHILS NFR BLD AUTO: 65.8 % (ref 42.7–76)
NRBC BLD AUTO-RTO: 0 /100 WBC (ref 0–0.2)
PLATELET # BLD AUTO: 295 10*3/MM3 (ref 140–450)
PMV BLD AUTO: 9.4 FL (ref 6–12)
RBC # BLD AUTO: 3.84 10*6/MM3 (ref 3.77–5.28)
WBC # BLD AUTO: 8.04 10*3/MM3 (ref 3.4–10.8)

## 2021-05-12 PROCEDURE — 82728 ASSAY OF FERRITIN: CPT

## 2021-05-12 PROCEDURE — 36415 COLL VENOUS BLD VENIPUNCTURE: CPT

## 2021-05-12 PROCEDURE — 99195 PHLEBOTOMY: CPT | Performed by: NURSE PRACTITIONER

## 2021-05-12 PROCEDURE — 85025 COMPLETE CBC W/AUTO DIFF WBC: CPT

## 2021-05-12 NOTE — PROGRESS NOTES
Patient here for lab evaluation for possible 250 ml phlebotomy for hemachromatosis if Hgb >11.  Patient denies any problems.  Skin warm, dry, and color WNL.  Labs reviewed Hgb 11.6 ferritin 1,140.  250 ml phlebotomy performed today per left mediport.  Patient tolerated without any difficulty.  Denies being light headed or dizzy upon standing.  She had her eye exam on 5/10/21 and is scheduled for her hearing test on 5/17/21 and if she has no issues she will start the Jadenu on Tuesday 5/18/21.  Patient to bring results of hearing exam by the office on 5/17/21.

## 2021-05-13 DIAGNOSIS — J45.20 MILD INTERMITTENT ASTHMA WITHOUT COMPLICATION: ICD-10-CM

## 2021-05-14 RX ORDER — ALBUTEROL SULFATE 90 UG/1
2 AEROSOL, METERED RESPIRATORY (INHALATION) EVERY 6 HOURS PRN
Qty: 3 INHALER | Refills: 3 | Status: SHIPPED | OUTPATIENT
Start: 2021-05-14 | End: 2022-01-13 | Stop reason: SDUPTHER

## 2021-05-14 NOTE — TELEPHONE ENCOUNTER
Received fax from pharmacy requesting refill on pts medication(s). Pt was last seen in office on 4/26/2021  and has a follow up scheduled for 5/18/2021. Will send request to  Dr. Kristina Kumari  for patient.      Requested Prescriptions     Pending Prescriptions Disp Refills    albuterol sulfate HFA (PROVENTIL HFA) 108 (90 Base) MCG/ACT inhaler 1 Inhaler 3     Sig: Inhale 2 puffs into the lungs every 6 hours as needed for Wheezing

## 2021-05-18 ENCOUNTER — OFFICE VISIT (OUTPATIENT)
Dept: PRIMARY CARE CLINIC | Age: 46
End: 2021-05-18
Payer: MEDICARE

## 2021-05-18 VITALS
BODY MASS INDEX: 45.99 KG/M2 | TEMPERATURE: 96.9 F | HEART RATE: 78 BPM | OXYGEN SATURATION: 97 % | SYSTOLIC BLOOD PRESSURE: 126 MMHG | WEIGHT: 293 LBS | HEIGHT: 67 IN | DIASTOLIC BLOOD PRESSURE: 74 MMHG

## 2021-05-18 DIAGNOSIS — R26.89 BALANCE PROBLEM: ICD-10-CM

## 2021-05-18 DIAGNOSIS — E66.01 MORBID OBESITY (HCC): ICD-10-CM

## 2021-05-18 DIAGNOSIS — G89.29 CHRONIC LEFT-SIDED LOW BACK PAIN, UNSPECIFIED WHETHER SCIATICA PRESENT: ICD-10-CM

## 2021-05-18 DIAGNOSIS — M54.50 CHRONIC LEFT-SIDED LOW BACK PAIN, UNSPECIFIED WHETHER SCIATICA PRESENT: ICD-10-CM

## 2021-05-18 DIAGNOSIS — M79.7 FIBROMYALGIA: Primary | ICD-10-CM

## 2021-05-18 PROCEDURE — G8417 CALC BMI ABV UP PARAM F/U: HCPCS | Performed by: NURSE PRACTITIONER

## 2021-05-18 PROCEDURE — 1036F TOBACCO NON-USER: CPT | Performed by: NURSE PRACTITIONER

## 2021-05-18 PROCEDURE — 99214 OFFICE O/P EST MOD 30 MIN: CPT | Performed by: NURSE PRACTITIONER

## 2021-05-18 PROCEDURE — G8427 DOCREV CUR MEDS BY ELIG CLIN: HCPCS | Performed by: NURSE PRACTITIONER

## 2021-05-18 RX ORDER — DEFERASIROX 360 MG/1
5 TABLET, FILM COATED ORAL
COMMUNITY
Start: 2021-05-04 | End: 2022-05-12

## 2021-05-18 SDOH — ECONOMIC STABILITY: FOOD INSECURITY: WITHIN THE PAST 12 MONTHS, YOU WORRIED THAT YOUR FOOD WOULD RUN OUT BEFORE YOU GOT MONEY TO BUY MORE.: OFTEN TRUE

## 2021-05-18 ASSESSMENT — ENCOUNTER SYMPTOMS
SORE THROAT: 0
NAUSEA: 0
CONSTIPATION: 0
TROUBLE SWALLOWING: 0
SHORTNESS OF BREATH: 0
COUGH: 0
RHINORRHEA: 0
DIARRHEA: 0
VOMITING: 0
SINUS PRESSURE: 0
BACK PAIN: 1
ABDOMINAL PAIN: 0

## 2021-05-18 ASSESSMENT — SOCIAL DETERMINANTS OF HEALTH (SDOH): HOW HARD IS IT FOR YOU TO PAY FOR THE VERY BASICS LIKE FOOD, HOUSING, MEDICAL CARE, AND HEATING?: SOMEWHAT HARD
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
## 2021-05-21 ENCOUNTER — TELEPHONE (OUTPATIENT)
Dept: ONCOLOGY | Facility: CLINIC | Age: 46
End: 2021-05-21

## 2021-05-21 NOTE — TELEPHONE ENCOUNTER
Caller: Naomi Bhatia    Relationship to patient: Self    Best call back number: [phone]    Chief complaint: PATIENT WOULD LIKE TO RESCHEDULE APPT. FROM 5/27/21 TO 5/26/21 AS HER DAUGHTER NEEDS TO WORK ON THE 27TH    Type of visit: LAB/NURSE VISIT    Requested date: 5/26/21    If rescheduling, when is the original appointment: 5/27/21    PATIENT IS AVAILABLE ANYTIME TODAY. PLEASE CALL TO RESCHEDULE

## 2021-05-26 ENCOUNTER — LAB (OUTPATIENT)
Dept: LAB | Facility: HOSPITAL | Age: 46
End: 2021-05-26

## 2021-05-26 ENCOUNTER — CLINICAL SUPPORT (OUTPATIENT)
Dept: ONCOLOGY | Facility: CLINIC | Age: 46
End: 2021-05-26

## 2021-05-26 DIAGNOSIS — E83.110 HEREDITARY HEMOCHROMATOSIS (HCC): ICD-10-CM

## 2021-05-26 DIAGNOSIS — Z86.39 HISTORY OF IRON DEFICIENCY: Primary | ICD-10-CM

## 2021-05-26 LAB
BASOPHILS # BLD AUTO: 0.09 10*3/MM3 (ref 0–0.2)
BASOPHILS NFR BLD AUTO: 1 % (ref 0–1.5)
DEPRECATED RDW RBC AUTO: 54.8 FL (ref 37–54)
EOSINOPHIL # BLD AUTO: 0.17 10*3/MM3 (ref 0–0.4)
EOSINOPHIL NFR BLD AUTO: 1.9 % (ref 0.3–6.2)
ERYTHROCYTE [DISTWIDTH] IN BLOOD BY AUTOMATED COUNT: 17 % (ref 12.3–15.4)
FERRITIN SERPL-MCNC: 883.7 NG/ML (ref 13–150)
HCT VFR BLD AUTO: 31.5 % (ref 34–46.6)
HGB BLD-MCNC: 10.9 G/DL (ref 12–15.9)
HOLD SPECIMEN: NORMAL
IMM GRANULOCYTES # BLD AUTO: 0.09 10*3/MM3 (ref 0–0.05)
IMM GRANULOCYTES NFR BLD AUTO: 1 % (ref 0–0.5)
LYMPHOCYTES # BLD AUTO: 2.21 10*3/MM3 (ref 0.7–3.1)
LYMPHOCYTES NFR BLD AUTO: 25.1 % (ref 19.6–45.3)
MCH RBC QN AUTO: 31 PG (ref 26.6–33)
MCHC RBC AUTO-ENTMCNC: 34.6 G/DL (ref 31.5–35.7)
MCV RBC AUTO: 89.5 FL (ref 79–97)
MONOCYTES # BLD AUTO: 0.45 10*3/MM3 (ref 0.1–0.9)
MONOCYTES NFR BLD AUTO: 5.1 % (ref 5–12)
NEUTROPHILS NFR BLD AUTO: 5.8 10*3/MM3 (ref 1.7–7)
NEUTROPHILS NFR BLD AUTO: 65.9 % (ref 42.7–76)
NRBC BLD AUTO-RTO: 0 /100 WBC (ref 0–0.2)
PLATELET # BLD AUTO: 270 10*3/MM3 (ref 140–450)
PMV BLD AUTO: 9.7 FL (ref 6–12)
RBC # BLD AUTO: 3.52 10*6/MM3 (ref 3.77–5.28)
WBC # BLD AUTO: 8.81 10*3/MM3 (ref 3.4–10.8)

## 2021-05-26 PROCEDURE — 36415 COLL VENOUS BLD VENIPUNCTURE: CPT

## 2021-05-26 PROCEDURE — 85025 COMPLETE CBC W/AUTO DIFF WBC: CPT

## 2021-05-26 PROCEDURE — 82728 ASSAY OF FERRITIN: CPT

## 2021-05-26 PROCEDURE — 99211 OFF/OP EST MAY X REQ PHY/QHP: CPT | Performed by: NURSE PRACTITIONER

## 2021-05-26 NOTE — PROGRESS NOTES
Patient here for lab evaluation for possible 250 ml phlebotomy for hemachromatosis if Hgb >11.  Patient states that she has been very tired.  Labs reviewed Hgb 10.9 and ferritin 883.70.  She does not need a phlebotomy today.  Started Tammy on May 18, 2021.  Will recheck labs in 2 weeks.

## 2021-05-27 ENCOUNTER — APPOINTMENT (OUTPATIENT)
Dept: LAB | Facility: HOSPITAL | Age: 46
End: 2021-05-27

## 2021-06-03 ENCOUNTER — APPOINTMENT (OUTPATIENT)
Dept: CT IMAGING | Age: 46
End: 2021-06-03
Payer: MEDICARE

## 2021-06-03 ENCOUNTER — TELEPHONE (OUTPATIENT)
Dept: PRIMARY CARE CLINIC | Age: 46
End: 2021-06-03

## 2021-06-03 ENCOUNTER — HOSPITAL ENCOUNTER (OUTPATIENT)
Dept: PHYSICAL THERAPY | Age: 46
Setting detail: THERAPIES SERIES
Discharge: HOME | End: 2021-06-03
Payer: MEDICARE

## 2021-06-03 ENCOUNTER — APPOINTMENT (OUTPATIENT)
Dept: GENERAL RADIOLOGY | Age: 46
End: 2021-06-03
Payer: MEDICARE

## 2021-06-03 ENCOUNTER — HOSPITAL ENCOUNTER (EMERGENCY)
Age: 46
Discharge: HOME | End: 2021-06-03
Payer: MEDICARE

## 2021-06-03 VITALS
BODY MASS INDEX: 44.41 KG/M2 | RESPIRATION RATE: 20 BRPM | DIASTOLIC BLOOD PRESSURE: 77 MMHG | SYSTOLIC BLOOD PRESSURE: 120 MMHG | HEART RATE: 88 BPM | WEIGHT: 293 LBS | OXYGEN SATURATION: 99 % | HEIGHT: 68 IN | TEMPERATURE: 97.2 F
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
## 2021-06-03 DIAGNOSIS — M54.16 LUMBAR RADICULOPATHY: ICD-10-CM

## 2021-06-03 DIAGNOSIS — T07.XXXA MULTIPLE CONTUSIONS: ICD-10-CM

## 2021-06-03 DIAGNOSIS — S09.90XA CLOSED HEAD INJURY, INITIAL ENCOUNTER: Primary | ICD-10-CM

## 2021-06-03 DIAGNOSIS — M54.16 CHRONIC LUMBAR RADICULOPATHY: ICD-10-CM

## 2021-06-03 LAB
ALBUMIN SERPL-MCNC: 4.2 G/DL (ref 3.5–5.2)
ALP BLD-CCNC: 112 U/L (ref 35–104)
ALT SERPL-CCNC: 22 U/L (ref 5–33)
ANION GAP SERPL CALCULATED.3IONS-SCNC: 12 MMOL/L (ref 7–19)
AST SERPL-CCNC: 35 U/L (ref 5–32)
BASOPHILS ABSOLUTE: 0.1 K/UL (ref 0–0.2)
BASOPHILS RELATIVE PERCENT: 1 % (ref 0–1)
BILIRUB SERPL-MCNC: 0.8 MG/DL (ref 0.2–1.2)
BILIRUBIN URINE: NEGATIVE
BLOOD, URINE: NEGATIVE
BUN BLDV-MCNC: 14 MG/DL (ref 6–20)
CALCIUM SERPL-MCNC: 9 MG/DL (ref 8.6–10)
CHLORIDE BLD-SCNC: 102 MMOL/L (ref 98–111)
CLARITY: CLEAR
CO2: 24 MMOL/L (ref 22–29)
COLOR: YELLOW
CREAT SERPL-MCNC: 0.8 MG/DL (ref 0.5–0.9)
EOSINOPHILS ABSOLUTE: 0.1 K/UL (ref 0–0.6)
EOSINOPHILS RELATIVE PERCENT: 1.6 % (ref 0–5)
GFR AFRICAN AMERICAN: >59
GFR NON-AFRICAN AMERICAN: >60
GLUCOSE BLD-MCNC: 118 MG/DL (ref 74–109)
GLUCOSE URINE: NEGATIVE MG/DL
HCT VFR BLD CALC: 30.6 % (ref 37–47)
HEMOGLOBIN: 10.4 G/DL (ref 12–16)
IMMATURE GRANULOCYTES #: 0 K/UL
KETONES, URINE: NEGATIVE MG/DL
LEUKOCYTE ESTERASE, URINE: NEGATIVE
LYMPHOCYTES ABSOLUTE: 2.3 K/UL (ref 1.1–4.5)
LYMPHOCYTES RELATIVE PERCENT: 29.3 % (ref 20–40)
MCH RBC QN AUTO: 31.7 PG (ref 27–31)
MCHC RBC AUTO-ENTMCNC: 34 G/DL (ref 33–37)
MCV RBC AUTO: 93.3 FL (ref 81–99)
MONOCYTES ABSOLUTE: 0.4 K/UL (ref 0–0.9)
MONOCYTES RELATIVE PERCENT: 5.4 % (ref 0–10)
NEUTROPHILS ABSOLUTE: 5 K/UL (ref 1.5–7.5)
NEUTROPHILS RELATIVE PERCENT: 62.2 % (ref 50–65)
NITRITE, URINE: NEGATIVE
PDW BLD-RTO: 17 % (ref 11.5–14.5)
PH UA: 5.5 (ref 5–8)
PLATELET # BLD: 187 K/UL (ref 130–400)
PMV BLD AUTO: 10 FL (ref 9.4–12.3)
POTASSIUM REFLEX MAGNESIUM: 3.7 MMOL/L (ref 3.5–5)
PROTEIN UA: NEGATIVE MG/DL
RBC # BLD: 3.28 M/UL (ref 4.2–5.4)
SODIUM BLD-SCNC: 138 MMOL/L (ref 136–145)
SPECIFIC GRAVITY UA: 1.01 (ref 1–1.03)
TOTAL PROTEIN: 6.4 G/DL (ref 6.6–8.7)
TROPONIN: <0.01 NG/ML (ref 0–0.03)
UROBILINOGEN, URINE: 0.2 E.U./DL
WBC # BLD: 8 K/UL (ref 4.8–10.8)

## 2021-06-03 PROCEDURE — 36415 COLL VENOUS BLD VENIPUNCTURE: CPT

## 2021-06-03 PROCEDURE — 71101 X-RAY EXAM UNILAT RIBS/CHEST: CPT

## 2021-06-03 PROCEDURE — 73090 X-RAY EXAM OF FOREARM: CPT

## 2021-06-03 PROCEDURE — 96372 THER/PROPH/DIAG INJ SC/IM: CPT

## 2021-06-03 PROCEDURE — 97163 PT EVAL HIGH COMPLEX 45 MIN: CPT

## 2021-06-03 PROCEDURE — 70450 CT HEAD/BRAIN W/O DYE: CPT

## 2021-06-03 PROCEDURE — 85025 COMPLETE CBC W/AUTO DIFF WBC: CPT

## 2021-06-03 PROCEDURE — 6360000002 HC RX W HCPCS: Performed by: NURSE PRACTITIONER

## 2021-06-03 PROCEDURE — 81003 URINALYSIS AUTO W/O SCOPE: CPT

## 2021-06-03 PROCEDURE — 80053 COMPREHEN METABOLIC PANEL: CPT

## 2021-06-03 PROCEDURE — 72125 CT NECK SPINE W/O DYE: CPT

## 2021-06-03 PROCEDURE — 84484 ASSAY OF TROPONIN QUANT: CPT

## 2021-06-03 PROCEDURE — 73502 X-RAY EXAM HIP UNI 2-3 VIEWS: CPT

## 2021-06-03 PROCEDURE — 93005 ELECTROCARDIOGRAM TRACING: CPT | Performed by: NURSE PRACTITIONER

## 2021-06-03 PROCEDURE — 99283 EMERGENCY DEPT VISIT LOW MDM: CPT

## 2021-06-03 PROCEDURE — 6360000002 HC RX W HCPCS

## 2021-06-03 PROCEDURE — 72131 CT LUMBAR SPINE W/O DYE: CPT

## 2021-06-03 RX ORDER — PREGABALIN 100 MG/1
100 CAPSULE ORAL 2 TIMES DAILY
Qty: 60 CAPSULE | Refills: 5 | Status: SHIPPED | OUTPATIENT
Start: 2021-06-03 | End: 2021-12-15

## 2021-06-03 RX ORDER — HEPARIN SODIUM (PORCINE) LOCK FLUSH IV SOLN 100 UNIT/ML 100 UNIT/ML
SOLUTION INTRAVENOUS
Status: COMPLETED
Start: 2021-06-03 | End: 2021-06-03

## 2021-06-03 RX ORDER — KETOROLAC TROMETHAMINE 30 MG/ML
30 INJECTION, SOLUTION INTRAMUSCULAR; INTRAVENOUS ONCE
Status: COMPLETED | OUTPATIENT
Start: 2021-06-03 | End: 2021-06-03

## 2021-06-03 RX ADMIN — KETOROLAC TROMETHAMINE 30 MG: 30 INJECTION, SOLUTION INTRAMUSCULAR at 18:16

## 2021-06-03 RX ADMIN — HEPARIN 300 UNITS: 100 SYRINGE at 21:41

## 2021-06-03 ASSESSMENT — PAIN SCALES - GENERAL
PAINLEVEL_OUTOF10: 3
PAINLEVEL_OUTOF10: 8
PAINLEVEL_OUTOF10: 5

## 2021-06-03 ASSESSMENT — ENCOUNTER SYMPTOMS
BACK PAIN: 1
SHORTNESS OF BREATH: 0
ABDOMINAL PAIN: 0

## 2021-06-03 ASSESSMENT — PAIN DESCRIPTION - ORIENTATION: ORIENTATION: RIGHT

## 2021-06-03 ASSESSMENT — PAIN DESCRIPTION - PAIN TYPE: TYPE: ACUTE PAIN

## 2021-06-03 NOTE — ED PROVIDER NOTES
[facility]  eMERGENCY dEPARTMENT eNCOUnter      Pt Name: Adwoa Sarah  MRN: [id number]  [place] 1975  Date of evaluation: 6/3/2021  Provider: Franki Goff, [address]       Chief Complaint   Patient presents with    Fall     right side pain         HISTORY OF PRESENT ILLNESS   (Location/Symptom, Timing/Onset,Context/Setting, Quality, Duration, Modifying Factors, Severity)  Note limiting factors. Adwoa Sarah a 54 y.o. female who presents to the emergency department for evaluation of fall. Pt tells me that she slipped and fell yesterday outside at home. She relates that she fell injuring her neck, low back, right forearm, right ribs and right hip. She tells me that she has had generalized headache since falling. She denies loss of consciousness, fevers as well as recent illness. She tells me that she has had problems with frequent falling over past year relating difficulties with balance currently receiving PT. HPI    Nursing Notes were reviewed. REVIEW OF SYSTEMS    (2-9 systems for level 4, 10 or more for level 5)     Review of Systems   Constitutional: Negative for fever. Respiratory: Negative for shortness of breath. Cardiovascular: Positive for chest pain (right lateral chest wall). Gastrointestinal: Negative for abdominal pain. Musculoskeletal: Positive for arthralgias (right forearm/right hip), back pain and neck pain. Neurological: Positive for headaches. All other systems reviewed and are negative. A complete review of systems was performed and is negative except as noted above in the HPI.        PAST MEDICAL HISTORY     Past Medical History:   Diagnosis Date    Anemia     has had iron infusion    Anxiety     Arthritis     Back pain with left-sided radiculopathy 3/14/2016    DDD (degenerative disc disease), lumbar     Depression     Esophagitis     Fibromyalgia     Gastritis     Headache(784.0)     History of blood transfusion     Hypertension     Obesity     RLS (restless legs syndrome)     Sleep apnea     uses CPAP machine         SURGICAL HISTORY       Past Surgical History:   Procedure Laterality Date    ANKLE SURGERY Right     APPENDECTOMY  4/19/15    BACK SURGERY  2000    CERVICAL SPINE SURGERY N/A 9/1/15    CHOLECYSTECTOMY  1995    HIP SURGERY Right 1987    HIP SURGERY  03/28/2018    HYSTERECTOMY      partial 2008, still has ovaries and cervix    INSERTION / REMOVAL / REPLACEMENT VENOUS ACCESS CATHETER Left 11/7/2017    PORT INSERTION performed by Zohra Laboy MD at [address] [place] [address] LITHOTRIPSY  [address] [place] [address]      with hardware placed    ND COLONOSCOPY FLX DX W/COLLJ SPEC WHEN PFRMD N/A 5/2/2017    Dr Minnie Motley, 7 yr (age 47) recall    ND EGD TRANSORAL BIOPSY SINGLE/MULTIPLE N/A 5/2/2017    Dr ABHIJIT Baltazar-Gastritis/gastropathy    SALPINGO-OOPHORECTOMY     [facility]  12/20/2017    dr Romayne Schneider Right 2019         CURRENT MEDICATIONS       Discharge Medication List as of 6/3/2021  9:33 PM      CONTINUE these medications which have NOT CHANGED    Details   deferasirox (JADENU) 360 MG tablet Take 5 tablets by mouth every morning (before breakfast)Historical Med      albuterol sulfate HFA (PROVENTIL HFA) 108 (90 Base) MCG/ACT inhaler Inhale 2 puffs into the lungs every 6 hours as needed for Wheezing, Disp-3 Inhaler, R-3Normal      oxyCODONE (ROXICODONE) 5 MG immediate release tablet Take 1 tablet by mouth 3 times daily as needed for Pain for up to 30 days. , Disp-90 tablet, R-0Normal      cyclobenzaprine (FLEXERIL) 10 MG tablet Take 1 tablet by mouth 3 times daily as needed for Muscle spasms, Disp-90 tablet, R-2Normal      amLODIPine (NORVASC) 5 MG tablet Take 1 tablet by mouth daily, Disp-30 tablet, R-3Normal      metFORMIN (GLUCOPHAGE) 1000 MG tablet Take 1 tablet by mouth 2 times daily (with meals), Disp-180 tablet, R-3Normal levocetirizine (XYZAL) 5 MG tablet Take 1 tablet by mouth nightly, Disp-90 tablet, R-3Normal      nortriptyline (PAMELOR) 25 MG capsule TAKE 1 TO 2 CAPSULES BY MOUTH EVERY NIGHT, Disp-180 capsule, R-3**Patient requests 90 days supply**Normal      olopatadine (PATADAY) 0.2 % SOLN ophthalmic solution Place 1 drop into both eyes daily, Disp-1 Bottle, R-2Normal      carbidopa-levodopa (SINEMET)  MG per tablet TAKE 1 TABLET BY MOUTH EVERY NIGHT, Disp-90 tablet, R-3Normal      !! venlafaxine (EFFEXOR XR) 150 MG extended release capsule Take 1 capsule by mouth daily, Disp-90 capsule,R-3**Patient requests 90 days supply**Normal      omeprazole (PRILOSEC) 20 MG delayed release capsule Take 1 capsule by mouth Daily, Disp-90 capsule,R-3Normal      naloxone (NARCAN) 4 MG/0.1ML LIQD nasal spray 1 spray by Nasal route as needed for Opioid Reversal, Disp-2 each,R-0Normal      !! venlafaxine (EFFEXOR XR) 75 MG extended release capsule Take 1 capsule by mouth daily Take 1 capsule daily in addition to 150 mg to make up  225 mg daily, Disp-30 capsule,R-11Normal      metoprolol succinate (TOPROL XL) 50 MG extended release tablet Take 1 tablet by mouth 2 times daily, Disp-180 tablet, R-3Normal      Calcium-Vitamin D 600-200 MG-UNIT TABS Take 1 tablet by mouth dailyHistorical Med      ondansetron (ZOFRAN ODT) 4 MG disintegrating tablet Take 1 tablet by mouth every 8 hours as needed for Nausea or Vomiting, Disp-10 tablet, R-0      Multiple Vitamins-Minerals (MULTIVITAMIN & MINERAL PO) Take  by mouth. CRANBERRY Take 500 mg by mouth daily. Cholecalciferol (VITAMIN D3) 5000 UNITS TABS Take 5,000 Units by mouth daily        ! ! - Potential duplicate medications found. Please discuss with provider.           ALLERGIES     Silver, Tegaderm ag mesh 2\"x2\" [wound dressings], and Zithromax [azithromycin]    FAMILY HISTORY       Family History   Problem Relation Age of Onset    Diabetes Mother     High Blood Pressure Mother     Colon Polyps Mother     Heart Disease Mother     Cancer Mother     Depression Mother     Cancer Father     High Blood Pressure Father     Heart Disease Father     Stroke Father     High Blood Pressure Sister     Depression Sister     Anxiety Disorder Sister     Cancer Brother     Esophageal Cancer Brother     Anxiety Disorder Brother     Diabetes Brother     ADHD Brother     Depression Brother     Other Other         has history of suicide in family maternal great uncle          SOCIAL HISTORY       Social History     Socioeconomic History    Marital status:      Spouse name: None    Number of children: 2    Years of education: 15    Highest education level: None   Occupational History     Employer: DISABLED    Occupation:    Tobacco Use    Smoking status: Former Smoker     Packs/day: 1.00     Years: 25.00     Pack years: 25.00     Types: Cigarettes     Quit date: 2013     Years since quittin.7    Smokeless tobacco: Never Used   Vaping Use    Vaping Use: Former    Substances: Always   Substance and Sexual Activity    Alcohol use: Yes     Comment: rarely    Drug use: No    Sexual activity: Never     Comment: pt states last 2 months   Other Topics Concern    None   Social History Narrative        Has been seen at [facility] by Connor Guzman NP this past year of medication assessment. She has seen a therapist in the past.         She had ADHD tested by Ling Hughes  This past year        Has been on Lexapro, Wellbutrin, Cymbalta-this was bad. Social Determinants of Health     Financial Resource Strain: Medium Risk    Difficulty of Paying Living Expenses: Somewhat hard   Food Insecurity: Food Insecurity Present    Worried About Running Out of Food in the Last Year: Often true    Clarence of Food in the Last Year: Often true   Transportation Needs:     Lack of Transportation (Medical):      Lack of Transportation (Non-Medical):    Physical Activity:     Days of Exercise per Week:     Minutes of Exercise per Session:    Stress:     Feeling of Stress :    Social Connections:     Frequency of Communication with Friends and Family:     Frequency of Social Gatherings with Friends and Family:     Attends [employer] Services:     Active Member of Clubs or Organizations:     Attends Club or Organization Meetings:     Marital Status:    Intimate Partner Violence:     Fear of Current or Ex-Partner:     Emotionally Abused:     Physically Abused:     Sexually Abused:        SCREENINGS             PHYSICAL EXAM    (up to 7 for level 4, 8 or more for level 5)     ED Triage Vitals [06/03/21 1346]   BP Temp Temp src Pulse Resp SpO2 Height Weight   114/82 97.2 °F (36.2 °C) -- 88 20 95 % 5' 8\" (1.727 m) 299 lb (135.6 kg)       Physical Exam  Vitals reviewed. HENT:      Head: Normocephalic. Right Ear: External ear normal.      Left Ear: External ear normal.   Eyes:      Conjunctiva/sclera: Conjunctivae normal.      Pupils: Pupils are equal, round, and reactive to light. Neck:      Comments: Mild ttp right lateral posterior cervical paraspinal muscles  Cardiovascular:      Rate and Rhythm: Normal rate and regular rhythm. Heart sounds: Normal heart sounds. Pulmonary:      Effort: Pulmonary effort is normal.      Breath sounds: Normal breath sounds. Abdominal:      General: Bowel sounds are normal.      Palpations: Abdomen is soft. Musculoskeletal:         General: Normal range of motion. Cervical back: Normal range of motion. Comments: No direct ttp thoracic or lumbar spine  Mild ttp right lower lumbar paraspinal muscles  Painful flexion of right hip  Right mid lateral forearm with 2cm area of erythema without abscess, abrasion or laceration  CMS intact bilateral upper/lower extremities  No direct ttp right shoulder, right elbow, right wrist   Skin:     General: Skin is warm and dry.    Neurological:      Mental Status: She is alert and oriented to person, place, and time. DIAGNOSTIC RESULTS     EKG: All EKG's are interpreted by the Emergency Department Physician who either signs or Co-signs this chart in the absence of acardiologist.    There is a regular rate and rhythm. SR hr 73b/min Normal OR interval and normal P waves. Normal QRS interval. Normal QT interval. No obvious ST elevation or ST depression. RADIOLOGY:   Non-plain film images such as CT, Ultrasound andMRI are read by the radiologist. Plain radiographic images are visualized and preliminarily interpreted by the emergency physician with the below findings:        Interpretation per the Radiologist below, if available at the time of this note:    [address]   Final Result   1. No acute intracranial abnormality is seen. Signed by Dr Deena Ott on 6/3/2021 6:40 PM      CT Cervical Spine WO Contrast   Final Result   1. No acute fracture. Signed by Dr Deena Ott on 6/3/2021 6:53 PM      CT Lumbar Spine WO Contrast   Final Result   1. Stable appearance of degenerative and postsurgical changes. 2. No evidence of fracture. Signed by Dr Deena Ott on 6/3/2021 7:00 PM      XR RIBS RIGHT INCLUDE CHEST (MIN 3 VIEWS)   Final Result   1. No acute lung abnormality and no visible right rib fracture. Signed by Dr Deena Ott on 6/3/2021 7:21 PM      XR RADIUS ULNA RIGHT (2 VIEWS)   Final Result   1. No acute bony abnormality is seen. Signed by Dr Deena Ott on 6/3/2021 7:21 PM      XR HIP 2-3 VW W PELVIS RIGHT   Final Result   1. No acute fracture is seen.    Signed by Dr Deena Ott on 6/3/2021 7:22 PM            ED BEDSIDE ULTRASOUND:   Performed by ED Physician - none    LABS:  Labs Reviewed   CBC WITH AUTO DIFFERENTIAL - Abnormal; Notable for the following components:       Result Value    RBC 3.28 (*)     Hemoglobin 10.4 (*)     Hematocrit 30.6 (*)     MCH 31.7 (*)     RDW 17.0 (*)     All other components within normal limits   COMPREHENSIVE METABOLIC PANEL W/ REFLEX TO MG FOR LOW K - Abnormal; Notable for the following components:    Glucose 118 (*)     Total Protein 6.4 (*)     Alkaline Phosphatase 112 (*)     AST 35 (*)     All other components within normal limits   URINE RT REFLEX TO CULTURE   TROPONIN       All other labs were within normal range or not returned as of this dictation. RE-ASSESSMENT     Pt is ambulatory and in no distress at discharge. EMERGENCY DEPARTMENT COURSE and DIFFERENTIALDIAGNOSIS/MDM:   Vitals:    Vitals:    06/03/21 1346 06/03/21 1821 06/03/21 1831 06/03/21 1914   BP: 114/82 113/85 120/77    Pulse: 88      Resp: 20      Temp: 97.2 °F (36.2 °C)      SpO2: 95% 100% 98% 99%   Weight: 299 lb (135.6 kg)      Height: 5' 8\" (1.727 m)          MDM      CONSULTS:  None    PROCEDURES:  Unless otherwise notedbelow, none     Procedures    FINAL IMPRESSION     1. Closed head injury, initial encounter    2. Multiple contusions    3. Fall, initial encounter          DISPOSITION/PLAN   DISPOSITION        PATIENT REFERRED Jonny Brunner DO  [address] [facility] [address]  [phone]    Schedule an appointment as soon as possible for a visit   As needed      DISCHARGE MEDICATIONS:       Discharge Medication List as of 6/3/2021  9:33 PM      CONTINUE these medications which have CHANGED    Details   pregabalin (LYRICA) 100 MG capsule Take 1 capsule by mouth 2 times daily for 180 days. , Disp-60 capsule, R-5Normal             (Pleasenote that portions of this note were completed with a voice recognition program.  Efforts were made to edit the dictations but occasionally words are mis-transcribed.)              Mariea Gaucher, APRN  06/03/21 [id number]

## 2021-06-03 NOTE — TELEPHONE ENCOUNTER
Emiliana with [employer] called, she needs mobility exam faxed to [phone]    Faxed through [facility]

## 2021-06-03 NOTE — PROGRESS NOTES
Physical Therapy  Initial Assessment  Date: 6/3/2021  Patient Name: Woodrow Borrego  MRN: [id number]  : 1975     Treatment Diagnosis: impaired balance, possible BPPV right horizontal canal         Subjective   General  Chart Reviewed: Yes  Patient assessed for rehabilitation services?: Yes  Response To Previous Treatment: Not applicable  Family / Caregiver Present: No  Referring Practitioner: Fantasma Pinon  Referral Date : 21  Diagnosis: W10. 8XXS  General Comment  Comments: Patient states she has not been prescribed any medication for the dizziness, she had a fall last night walking outside at daughters house and has increased neck, back and right hip pain, advised to call MD if pain continues  PT Visit Information  Onset Date: 21  PT Insurance Information: [facility] [place], [place] Medicaid  Total # of Visits Approved: 1  Total # of Visits to Date: 1  Progress Note Due Date: 21  Subjective  Subjective: Patient states she is still having regular falls and says it is like she has a \"rush. \"  She says she doesn't really feel dizzy when it happens, but it causes her to fall. She says that she falls about once every 2 weeks. She says she was diagnosed with hereditary hemachromatosis and is having to take several medications for this and just wonders if her blood issues are causing falls. She says that she is also taking a medication that could cause blindness and deafness. She says that she is alos have issues because of iron overload. She says she has some dizziness when she turns too quickly, rolls over.   Pain Screening  Patient Currently in Pain: Yes (she has chronic pain in her neck and back and has seen pain management for these, currently 8/10 in neck and back)  Vital Signs  BP:  (seated 127/82, stanidng 119/81)  Patient Currently in Pain: Yes (she has chronic pain in her neck and back and has seen pain management for these, currently 8/10 in neck and back) Orientation  Orientation  Overall Orientation Status: Within Normal Limits    Social/Functional History  Social/Functional History  Lives With: Son (17 year old child)  ADL Assistance: Independent (increased dizziness standing on one leg and has to sit to get dressed)  Homemaking Assistance:  (has home health aid that comes 4x/week to help with household tasks, but has not been coming lately due to staffing issues)  Active : Yes  Occupation: On disability  IADL Comments: uses a scooter at grocery, gets more exhaustion with grocery shopping, but no dizziness with this activity    Objective     Observation/Palpation  Posture: Fair (forward shoulders and head)    AROM RLE (degrees)  RLE AROM: WFL  AROM LLE (degrees)  LLE AROM : WFL  Spine  Cervical: flex 50%, ext 50%, sidebending 50% bilaterally, rotaiton 50% bilaterally *pain with all motions- says she is more sore from a bad fall last night    Strength RLE  R Hip Flexion: 4/5  R Hip ABduction: 5/5  R Hip ADduction: 5/5  R Knee Flexion: 4+/5  R Knee Extension: 4+/5  R Ankle Dorsiflexion: 5/5  R Ankle Plantar flexion: 5/5  Strength LLE  L Hip Flexion: 4/5  L Hip ABduction: 5/5  L Hip ADduction: 5/5  L Knee Flexion: 4+/5  L Knee Extension: 4+/5  L Ankle Dorsiflexion: 5/5  L Ankle Plantar Flexion: 5/5     Additional Measures  Other: Dizziness Handicap Inventory 70% impairment  Sensation  Overall Sensation Status: WNL (patient states that sometimes she has intermitten numbness in lower extremeties)        Subjective  Patient experiences spells of vertigo?:  (reports dizziness and imbalance, but no spinning sensation reported)  Symptoms worse with: Self motion  Oculomotor Examination  Oculomotor Examination: Yes  Gaze Holding Nystagmus: No  Smooth Pursuits: WNL  VOR (Cancellation):  (unable to test due to neck pain)  Positional Testing  Vestebral artery test in sitting position: Negative (limited position due to neck pain)  Right [place] Hallpike: Negative (all positions slightly limited due to neck pain)  Left [place] Hallpike: Negative  Right Roll Test: Negative  Left Roll Test: Negative    Ambulation  Ambulation?: Yes  Ambulation 1  Surface: level tile  Device: No Device  Assistance: Independent  Gait Deviations: Slow Torie;Decreased step height;Deviated path  Distance: 40 feet     Exercises  Exercise 1: cone weaves                   *use gait belt with all activities  Exercise 2: figure 8's  Exercise 3: box step  Exercise 4: ambulation with horizontal and vertical head turns  Exercise 5: 360 degree circles cw/ccw  Exercise 6: forward bend with cone weave  Exercise 7: seated horizontal and vertical gaze stabilizaiton  Exercise 8: seated horizontal and vertical advanced gaze stabilizaiton  Exercise 9: static standing with crom in front of fish picture horizontal and vertical  Exercise 10: static stance on foam pads  Exercise 11: pertebations in all directions  Exercise 12: standing marching  Exercise 13: standing fwd and lat step-ups  Exercise 14: standing stair tapping  Exercise 15: sls  Exercise 16: tandemn stance  Exercise 20: If she is having increased dizziness with spinning have PT assess                      Assessment   Conditions Requiring Skilled Therapeutic Intervention  Body structures, Functions, Activity limitations: Decreased functional mobility ; Decreased ADL status; Vestibular Impairment;Decreased high-level IADLs;Decreased balance  Assessment: Patient has regular occurrance of dizzines and imbalance regular falls. She has chronic neck and back pain as well. She has decreased balance and is in fall risk category as well as decreased le strength and limited neck rom. She will benefit from skilled PT to increase balance and function.   Treatment Diagnosis: impaired balance, possible BPPV right horizontal canal  Prognosis: Fair  Decision Making: Medium Complexity  History: fibromyalgia, chronic neck and back pain, hereditary hemachromatosis  Exam: decreased balance, fall risk, decreased strength, decreased rom  Clinical Presentation: unstable  REQUIRES PT FOLLOW UP: Yes         Plan   Plan  Times per week: 2x/week  Plan weeks: 6 weeks  Current Treatment Recommendations: Strengthening, Balance Training, Functional Mobility Training, Positioning, Modalities, Manual Therapy - Joint Manipulation, Endurance Training, Safety Education & Training, Manual Therapy - Soft Tissue Mobilization, Neuromuscular Re-education, Home Exercise Program, Vestibular Rehab    Goals  Short term goals  Time Frame for Short term goals: 4 Weeks  Short term goal 1:  Increase balance to have no sway with static stance  Short term goal 2: Patient to have no lob with pertebations in all directions  Short term goal 3: Patient to have hip flex strength 4+/5, quad and hs 5/5  Short term goal 4: Patient to be independent with HEP  Long term goals  Time Frame for Long term goals : 6 Weeks  Long term goal 1: Patient to have decreased report of dizziness/imbalance by 50%  Long term goal 2: Patient to ambulate with more normalized gait with increased step length and height  Long term goal 3: Patient to demo increased function by scoring <= 30% impairment on Dizziness Handicap Inventory  Long term goal 4: Patient to demo increased balance by scoring >= 19/24 on DGI  Patient Goals   Patient goals : decrease dizziness and increase balance       Therapy Time   Individual Concurrent Group Co-treatment   Time In 1009         Time Out 1100         Minutes 51            Electronically signed by Mary Kay Mckee PT on 6/3/2021 at 11:30 AM

## 2021-06-04 RX ORDER — OXYCODONE HYDROCHLORIDE 5 MG/1
5 TABLET ORAL 3 TIMES DAILY PRN
Qty: 90 TABLET | Refills: 0 | Status: SHIPPED | OUTPATIENT
Start: 2021-06-09 | End: 2021-07-06 | Stop reason: SDUPTHER

## 2021-06-06 LAB
EKG P AXIS: 42 DEGREES
EKG P-R INTERVAL: 156 MS
EKG Q-T INTERVAL: 398 MS
EKG QRS DURATION: 86 MS
EKG QTC CALCULATION (BAZETT): 420 MS
EKG T AXIS: 41 DEGREES

## 2021-06-06 PROCEDURE — 93010 ELECTROCARDIOGRAM REPORT: CPT | Performed by: INTERNAL MEDICINE

## 2021-06-10 ENCOUNTER — TELEPHONE (OUTPATIENT)
Dept: ONCOLOGY | Facility: CLINIC | Age: 46
End: 2021-06-10

## 2021-06-10 NOTE — TELEPHONE ENCOUNTER
Caller:  ISA     Relationship: SELF     Best call back number [phone]    PATIENT APOLOGIZED FOR MISSING THIS  MORNINGS VISIT 6/10   SHE IS FEELING WEAK AND DOESN'T KNOW IF SHE SHOULD R/S. SHE IS HAVE A CARPAL TUNNEL SURGERY TOMORROW   PLEASE CALL AND ADVISE   THANK YOU

## 2021-06-10 NOTE — TELEPHONE ENCOUNTER
Notified patient that she can reschedule after her surgery.  Stated that she is having diarrhea.  Discussed with her that it is ok to take immodium for the diarrhea.

## 2021-06-14 ENCOUNTER — HOSPITAL ENCOUNTER (OUTPATIENT)
Dept: PAIN MANAGEMENT | Age: 46
Discharge: HOME | End: 2021-06-14
Payer: MEDICARE

## 2021-06-14 VITALS
TEMPERATURE: 98.3 F | HEIGHT: 68 IN | BODY MASS INDEX: 44.41 KG/M2 | HEART RATE: 96 BPM | RESPIRATION RATE: 18 BRPM | DIASTOLIC BLOOD PRESSURE: 75 MMHG | OXYGEN SATURATION: 98 % | SYSTOLIC BLOOD PRESSURE: 128 MMHG | WEIGHT: 293 LBS

## 2021-06-14 DIAGNOSIS — M54.10 BACK PAIN WITH LEFT-SIDED RADICULOPATHY: ICD-10-CM

## 2021-06-14 PROCEDURE — 99213 OFFICE O/P EST LOW 20 MIN: CPT

## 2021-06-14 PROCEDURE — 99214 OFFICE O/P EST MOD 30 MIN: CPT | Performed by: NURSE PRACTITIONER

## 2021-06-14 RX ORDER — METOPROLOL SUCCINATE 50 MG/1
TABLET, EXTENDED RELEASE ORAL
Qty: 180 TABLET | Refills: 3 | Status: SHIPPED | OUTPATIENT
Start: 2021-06-14 | End: 2022-05-12 | Stop reason: SDUPTHER

## 2021-06-14 RX ORDER — ACYCLOVIR 800 MG/1
800 TABLET ORAL DAILY
Qty: 90 TABLET | Refills: 3 | Status: SHIPPED | OUTPATIENT
Start: 2021-06-14 | End: 2022-05-12 | Stop reason: SDUPTHER

## 2021-06-14 RX ORDER — VENLAFAXINE HYDROCHLORIDE 75 MG/1
CAPSULE, EXTENDED RELEASE ORAL
Qty: 30 CAPSULE | Refills: 11 | Status: SHIPPED | OUTPATIENT
Start: 2021-06-14 | End: 2022-05-12 | Stop reason: SDUPTHER

## 2021-06-14 ASSESSMENT — PAIN DESCRIPTION - PAIN TYPE: TYPE: CHRONIC PAIN

## 2021-06-14 ASSESSMENT — PAIN SCALES - GENERAL: PAINLEVEL_OUTOF10: 5

## 2021-06-14 ASSESSMENT — ENCOUNTER SYMPTOMS
BOWEL INCONTINENCE: 0
CONSTIPATION: 0
BACK PAIN: 1

## 2021-06-14 ASSESSMENT — PAIN DESCRIPTION - LOCATION: LOCATION: BACK;NECK

## 2021-06-14 NOTE — PROGRESS NOTES
Clinic Documentation      Education Provided:  [x] Review of Fayne Specking  [] Agreement Review  [x] PEG Score Calculated [] PHQ Score Calculated [] ORT Score Calculated    [] Compliance Issues Discussed [] Cognitive Behavior Needs [x] Exercise [] Review of Test [] Financial Issues  [x] Tobacco/Alcohol Use Reviewed [x] Teaching [] New Patient [] Picture Obtained    Physician Plan:  [] Outgoing Referral  [] Pharmacy Consult  [] Test Ordered [x] Prescription Ordered/Changed   [] Obtained Test Results / Consult Notes        Complete if patient is withholding blood thinner for procedure     Blood Thinner Patient is currently taking:      [] Plavix (Hold for 7 days)  [] Aspirin (Hold for 5 days)     [] Pletal (Hold for 2 days)  [] Pradaxa (Hold for 3 days)    [] Effient (Hold for 7 days)  [] Xarelto (Hold for 2 days)    [] Eliquis (Hold for 2 days)  [] Brilinta (Hold for 7 days)    [] Coumadin (Hold for 5 days) - (INR needs to be drawn the day prior to procedure- INR < 2.0)    [] Aggrenox (Hold for 7 days)        [] Patient will stop medication on their own.    [] Blood Thinner Form Faxed for approval to hold.    Provider form faxed to:     Assessment Completed by:  Electronically signed by Shari Durbin RN on 6/14/2021 at 9:51 AM

## 2021-06-14 NOTE — PROGRESS NOTES
apnea     uses CPAP machine       Medications  Current Outpatient Medications   Medication Sig Dispense Refill    oxyCODONE (ROXICODONE) 5 MG immediate release tablet Take 1 tablet by mouth 3 times daily as needed for Pain for up to 30 days. (may fill 6/9/21) 90 tablet 0    pregabalin (LYRICA) 100 MG capsule Take 1 capsule by mouth 2 times daily for 180 days.  60 capsule 5    deferasirox (JADENU) 360 MG tablet Take 5 tablets by mouth every morning (before breakfast)      albuterol sulfate HFA (PROVENTIL HFA) 108 (90 Base) MCG/ACT inhaler Inhale 2 puffs into the lungs every 6 hours as needed for Wheezing 3 Inhaler 3    cyclobenzaprine (FLEXERIL) 10 MG tablet Take 1 tablet by mouth 3 times daily as needed for Muscle spasms 90 tablet 2    amLODIPine (NORVASC) 5 MG tablet Take 1 tablet by mouth daily 30 tablet 3    metFORMIN (GLUCOPHAGE) 1000 MG tablet Take 1 tablet by mouth 2 times daily (with meals) 180 tablet 3    levocetirizine (XYZAL) 5 MG tablet Take 1 tablet by mouth nightly 90 tablet 3    nortriptyline (PAMELOR) 25 MG capsule TAKE 1 TO 2 CAPSULES BY MOUTH EVERY NIGHT 180 capsule 3    olopatadine (PATADAY) 0.2 % SOLN ophthalmic solution Place 1 drop into both eyes daily 1 Bottle 2    carbidopa-levodopa (SINEMET)  MG per tablet TAKE 1 TABLET BY MOUTH EVERY NIGHT 90 tablet 3    venlafaxine (EFFEXOR XR) 150 MG extended release capsule Take 1 capsule by mouth daily 90 capsule 3    omeprazole (PRILOSEC) 20 MG delayed release capsule Take 1 capsule by mouth Daily 90 capsule 3    naloxone (NARCAN) 4 MG/0.1ML LIQD nasal spray 1 spray by Nasal route as needed for Opioid Reversal 2 each 0    venlafaxine (EFFEXOR XR) 75 MG extended release capsule Take 1 capsule by mouth daily Take 1 capsule daily in addition to 150 mg to make up  225 mg daily 30 capsule 11    metoprolol succinate (TOPROL XL) 50 MG extended release tablet Take 1 tablet by mouth 2 times daily 180 tablet 3    Calcium-Vitamin D 600-200 MG-UNIT TABS Take 1 tablet by mouth daily      ondansetron (ZOFRAN ODT) 4 MG disintegrating tablet Take 1 tablet by mouth every 8 hours as needed for Nausea or Vomiting 10 tablet 0    Multiple Vitamins-Minerals (MULTIVITAMIN & MINERAL PO) Take  by mouth.  CRANBERRY Take 500 mg by mouth daily.  Cholecalciferol (VITAMIN D3) 5000 UNITS TABS Take 5,000 Units by mouth daily        No current facility-administered medications for this encounter. Allergies  Silver, Tegaderm ag mesh 2\"x2\" [wound dressings], and Zithromax [azithromycin]    Current Medications  Current Outpatient Medications   Medication Sig Dispense Refill    oxyCODONE (ROXICODONE) 5 MG immediate release tablet Take 1 tablet by mouth 3 times daily as needed for Pain for up to 30 days. (may fill 6/9/21) 90 tablet 0    pregabalin (LYRICA) 100 MG capsule Take 1 capsule by mouth 2 times daily for 180 days.  60 capsule 5    deferasirox (JADENU) 360 MG tablet Take 5 tablets by mouth every morning (before breakfast)      albuterol sulfate HFA (PROVENTIL HFA) 108 (90 Base) MCG/ACT inhaler Inhale 2 puffs into the lungs every 6 hours as needed for Wheezing 3 Inhaler 3    cyclobenzaprine (FLEXERIL) 10 MG tablet Take 1 tablet by mouth 3 times daily as needed for Muscle spasms 90 tablet 2    amLODIPine (NORVASC) 5 MG tablet Take 1 tablet by mouth daily 30 tablet 3    metFORMIN (GLUCOPHAGE) 1000 MG tablet Take 1 tablet by mouth 2 times daily (with meals) 180 tablet 3    levocetirizine (XYZAL) 5 MG tablet Take 1 tablet by mouth nightly 90 tablet 3    nortriptyline (PAMELOR) 25 MG capsule TAKE 1 TO 2 CAPSULES BY MOUTH EVERY NIGHT 180 capsule 3    olopatadine (PATADAY) 0.2 % SOLN ophthalmic solution Place 1 drop into both eyes daily 1 Bottle 2    carbidopa-levodopa (SINEMET)  MG per tablet TAKE 1 TABLET BY MOUTH EVERY NIGHT 90 tablet 3    venlafaxine (EFFEXOR XR) 150 MG extended release capsule Take 1 capsule by mouth daily 90 capsule 3    omeprazole (PRILOSEC) 20 MG delayed release capsule Take 1 capsule by mouth Daily 90 capsule 3    naloxone (NARCAN) 4 MG/0.1ML LIQD nasal spray 1 spray by Nasal route as needed for Opioid Reversal 2 each 0    venlafaxine (EFFEXOR XR) 75 MG extended release capsule Take 1 capsule by mouth daily Take 1 capsule daily in addition to 150 mg to make up  225 mg daily 30 capsule 11    metoprolol succinate (TOPROL XL) 50 MG extended release tablet Take 1 tablet by mouth 2 times daily 180 tablet 3    Calcium-Vitamin D 600-200 MG-UNIT TABS Take 1 tablet by mouth daily      ondansetron (ZOFRAN ODT) 4 MG disintegrating tablet Take 1 tablet by mouth every 8 hours as needed for Nausea or Vomiting 10 tablet 0    Multiple Vitamins-Minerals (MULTIVITAMIN & MINERAL PO) Take  by mouth.  CRANBERRY Take 500 mg by mouth daily.  Cholecalciferol (VITAMIN D3) 5000 UNITS TABS Take 5,000 Units by mouth daily        No current facility-administered medications for this encounter. Social History    Social History     Socioeconomic History    Marital status:      Spouse name: None    Number of children: 2    Years of education: 15    Highest education level: None   Occupational History     Employer: DISABLED    Occupation:    Tobacco Use    Smoking status: Former Smoker     Packs/day: 1.00     Years: 25.00     Pack years: 25.00     Types: Cigarettes     Quit date: 2013     Years since quittin.7    Smokeless tobacco: Never Used   Vaping Use    Vaping Use: Former    Substances: Always   Substance and Sexual Activity    Alcohol use: Yes     Comment: rarely    Drug use: No    Sexual activity: Never     Comment: pt states last 2 months   Other Topics Concern    None   Social History Narrative        Has been seen at [facility] by Teresa Bolaños NP this past year of medication assessment.         She has seen a therapist in the past.         She had ADHD tested by Ricki Juan This past year        Has been on Lexapro, Wellbutrin, Cymbalta-this was bad. Social Determinants of Health     Financial Resource Strain: Medium Risk    Difficulty of Paying Living Expenses: Somewhat hard   Food Insecurity: Food Insecurity Present    Worried About Running Out of Food in the Last Year: Often true    Clarence of Food in the Last Year: Often true   Transportation Needs:     Lack of Transportation (Medical):  Lack of Transportation (Non-Medical):    Physical Activity:     Days of Exercise per Week:     Minutes of Exercise per Session:    Stress:     Feeling of Stress :    Social Connections:     Frequency of Communication with Friends and Family:     Frequency of Social Gatherings with Friends and Family:     Attends Sabianism Services:     Active Member of Clubs or Organizations:     Attends Club or Organization Meetings:     Marital Status:    Intimate Partner Violence:     Fear of Current or Ex-Partner:     Emotionally Abused:     Physically Abused:     Sexually Abused:          Family History  family history includes ADHD in her brother; Anxiety Disorder in her brother and sister; Cancer in her brother, father, and mother; Colon Polyps in her mother; Depression in her brother, mother, and sister; Diabetes in her brother and mother; Esophageal Cancer in her brother; Heart Disease in her father and mother; High Blood Pressure in her father, mother, and sister; Other in an other family member; Stroke in her father. Review of Systems:  Review of Systems   Constitutional: Negative for activity change. Gastrointestinal: Negative for bowel incontinence and constipation. Genitourinary: Negative for bladder incontinence. Musculoskeletal: Positive for arthralgias, back pain, myalgias, neck pain and neck stiffness. Neurological: Negative for weakness and numbness. Psychiatric/Behavioral: Negative for agitation, self-injury and suicidal ideas.         14 point ROS negative besides that noted in HPI    Physical exam:     Patient Vitals for the past 24 hrs:   BP Temp Temp src Pulse Resp SpO2 Height Weight   06/14/21 1000 128/75 98.3 °F (36.8 °C) Temporal 96 18 98 % 5' 8\" (1.727 m) (!) 304 lb (137.9 kg)       Body mass index is 46.22 kg/m². Physical Exam  Vitals and nursing note reviewed. Constitutional:       General: She is not in acute distress. Appearance: She is well-developed. HENT:      Head: Normocephalic. Right Ear: External ear normal.      Left Ear: External ear normal.      Nose: Nose normal.   Eyes:      Conjunctiva/sclera: Conjunctivae normal.      Pupils: Pupils are equal, round, and reactive to light. Neck:      Vascular: No JVD. Trachea: No tracheal deviation. Cardiovascular:      Rate and Rhythm: Normal rate. Pulmonary:      Effort: Pulmonary effort is normal.   Abdominal:      General: There is no distension. Tenderness: There is no abdominal tenderness. Musculoskeletal:      Lumbar back: Spasms and tenderness present. Decreased range of motion. Negative right straight leg raise test and negative left straight leg raise test.      Comments: Continues. bilaterally Trigger points - tender palpable knots noted in the cervical spine    - hoffmans   Skin:     General: Skin is warm and dry. Neurological:      Mental Status: She is alert and oriented to person, place, and time. Cranial Nerves: No cranial nerve deficit. Psychiatric:         Behavior: Behavior normal.         Thought Content:  Thought content normal.         Judgment: Judgment normal.       LABS:     Lab Results   Component Value Date     06/03/2021    K 3.7 06/03/2021     06/03/2021    CO2 24 06/03/2021    BUN 14 06/03/2021    CREATININE 0.8 06/03/2021    GLUCOSE 118 06/03/2021    CALCIUM 9.0 06/03/2021        Lab Results   Component Value Date    WBC 8.0 06/03/2021    HGB 10.4 (L) 06/03/2021    HCT 30.6 (L) 06/03/2021    MCV 93.3 06/03/2021     06/03/2021       Assessment:                                                                                                                                        Active Problems:    DDD (degenerative disc disease), lumbar    Back pain with left-sided radiculopathy    Cervical vertebral fusion    Displacement of lumbar disc with radiculopathy    Lumbar disc disease with radiculopathy    Myofascial muscle pain    History of lumbar spinal fusion    Pain management contract agreement    Chronic pain of both knees  Resolved Problems:    * No resolved hospital problems. *      PLAN:    Myofascial muscle pain  Continue - cyclobenzaprine (FLEXERIL) 10 MG tablet; Take 1 tablet by mouth 3 times daily as needed for Muscle spasms  Dispense: 90 tablet; Refill: 2    Repeat LESI of L2/L3 as patient has had good results from past injections. Continue with refill sent on 6/9/2021 Oxy IR 5 mg TID prn # 90 to be routed to Dr. Linwood Koyanagi. Continue pregabalin as prescribed by PCP     Order placed for NexWave 3 in 1 device. Patient suffers from chronic neck and back. Patient given product information at appointment. The NexWave is interferential, TENS and NMES. These 3 clinically proven modalities are used to help reduce pain symptoms, to help retrain and strengthen muscles and to help reduce the need for pain (opioid) medications. [x] Follow up    [] 4 weeks   [x] 6-8 weeks   [] 10-12 weeks   [] 3 months  [x] Post procedure to evaluate effectiveness of treatment  [] To evaluate medications changes made at office visit. [] To review diagnostics ordered at last visit. [x] To evaluate response to therapy    [x] For management of controlled substance  [x] Random UDS as indicated by ORT score or if indicated by abberent behaviors    Discussion: Discussed exam findings and plan of care with patient. Patient agreed with POC and questions were asked and answered.      Activity: discussed exercise as beneficial to pain reduction, encouraged stretching exercise with a focus on torso strengthening. Goals:  Pain Management Goals of Therapy:   [] Resolution in pain  [x] Decrease in pain level  [x] Improvement in ADL's  [x] Increase in activities with less pain  [] Decrease in medication      Controlled substance monitoring:    [x] Discussed medication side effects, risk of tolerance and/or dependence, and/or alternative treatment  [] Discussed the detrimental effects of long term narcotic use in younger patients especially women of childbearing years. [x] No signs and symptoms of potential drug abuse or diversion were identified  [] Signs of potential drug abuse or diversion were identified   [] ORT Score   [] UDS non-compliant   [] See Note  [] Random urine drug screen sent today  [x] Random urine drug screen not completed today   [] Deferred New Patient  [x] Compliant  3/30/2021  [] Not Compliant see note  [x] Medication agreement with provider signed today 5/14/2020  [] Medication agreement with provider on file under media   [x] Medication regimen effective and continued   [] New patient continuing current medication while developing POC   [] On going assessment and evaluation of medication regimen  [] Medication regimen not effective see plan for changes  [x] Sue Pardo reviewed & on file under media     CC:  DO Savita Gauthier, ASHLEE - CNP, 6/14/2021 at 10:54 AM    EMR dragon/transcription disclaimer: Much of this encounter note is electronic transcription/translation of spoken language to printed tach. Electronic translation of spoken language may be erroneous, or at times, nonsensical words or phrases may be inadvertently transcribed.  Although, I have reviewed the note for such errors, some may still exist.

## 2021-06-15 ENCOUNTER — HOSPITAL ENCOUNTER (OUTPATIENT)
Dept: PHYSICAL THERAPY | Age: 46
Setting detail: THERAPIES SERIES
End: 2021-06-15
Payer: MEDICARE

## 2021-06-18 PROBLEM — E83.19 IRON OVERLOAD: Status: ACTIVE | Noted: 2021-06-18

## 2021-06-21 ENCOUNTER — LAB (OUTPATIENT)
Dept: LAB | Facility: HOSPITAL | Age: 46
End: 2021-06-21

## 2021-06-21 ENCOUNTER — OFFICE VISIT (OUTPATIENT)
Dept: ONCOLOGY | Facility: CLINIC | Age: 46
End: 2021-06-21

## 2021-06-21 VITALS
HEIGHT: 68 IN | HEART RATE: 96 BPM | WEIGHT: 293 LBS | BODY MASS INDEX: 44.41 KG/M2 | OXYGEN SATURATION: 99 % | DIASTOLIC BLOOD PRESSURE: 76 MMHG | TEMPERATURE: 97.7 F | SYSTOLIC BLOOD PRESSURE: 118 MMHG | RESPIRATION RATE: 16 BRPM

## 2021-06-21 DIAGNOSIS — D64.9 ANEMIA, UNSPECIFIED TYPE: ICD-10-CM

## 2021-06-21 DIAGNOSIS — Z86.39 HISTORY OF IRON DEFICIENCY: Primary | ICD-10-CM

## 2021-06-21 DIAGNOSIS — E83.19 IRON OVERLOAD: ICD-10-CM

## 2021-06-21 DIAGNOSIS — D64.9 ANEMIA, UNSPECIFIED TYPE: Primary | ICD-10-CM

## 2021-06-21 DIAGNOSIS — E83.110 HEREDITARY HEMOCHROMATOSIS (HCC): ICD-10-CM

## 2021-06-21 LAB
ALBUMIN SERPL-MCNC: 4.3 G/DL (ref 3.5–5.2)
ALBUMIN/GLOB SERPL: 1.5 G/DL
ALP SERPL-CCNC: 124 U/L (ref 39–117)
ALT SERPL W P-5'-P-CCNC: 16 U/L (ref 1–33)
ANION GAP SERPL CALCULATED.3IONS-SCNC: 9 MMOL/L (ref 5–15)
AST SERPL-CCNC: 25 U/L (ref 1–32)
BASOPHILS # BLD AUTO: 0.11 10*3/MM3 (ref 0–0.2)
BASOPHILS NFR BLD AUTO: 0.6 % (ref 0–1.5)
BILIRUB SERPL-MCNC: 0.8 MG/DL (ref 0–1.2)
BUN SERPL-MCNC: 18 MG/DL (ref 6–20)
BUN/CREAT SERPL: 17.5 (ref 7–25)
CALCIUM SPEC-SCNC: 9.7 MG/DL (ref 8.6–10.5)
CHLORIDE SERPL-SCNC: 107 MMOL/L (ref 98–107)
CO2 SERPL-SCNC: 21 MMOL/L (ref 22–29)
CREAT SERPL-MCNC: 1.03 MG/DL (ref 0.57–1)
CRP SERPL-MCNC: 0.65 MG/DL (ref 0–0.5)
DEPRECATED RDW RBC AUTO: 55.1 FL (ref 37–54)
EOSINOPHIL # BLD AUTO: 0.01 10*3/MM3 (ref 0–0.4)
EOSINOPHIL NFR BLD AUTO: 0.1 % (ref 0.3–6.2)
ERYTHROCYTE [DISTWIDTH] IN BLOOD BY AUTOMATED COUNT: 16.7 % (ref 12.3–15.4)
FERRITIN SERPL-MCNC: 459.8 NG/ML (ref 13–150)
GFR SERPL CREATININE-BSD FRML MDRD: 58 ML/MIN/1.73
GLOBULIN UR ELPH-MCNC: 2.9 GM/DL
GLUCOSE SERPL-MCNC: 120 MG/DL (ref 65–99)
HCT VFR BLD AUTO: 38.9 % (ref 34–46.6)
HGB BLD-MCNC: 13 G/DL (ref 12–15.9)
HOLD SPECIMEN: NORMAL
IMM GRANULOCYTES # BLD AUTO: 0.36 10*3/MM3 (ref 0–0.05)
IMM GRANULOCYTES NFR BLD AUTO: 1.9 % (ref 0–0.5)
IRON 24H UR-MRATE: 159 MCG/DL (ref 37–145)
IRON SATN MFR SERPL: 47 % (ref 20–50)
LYMPHOCYTES # BLD AUTO: 1.95 10*3/MM3 (ref 0.7–3.1)
LYMPHOCYTES NFR BLD AUTO: 10.3 % (ref 19.6–45.3)
MCH RBC QN AUTO: 30.3 PG (ref 26.6–33)
MCHC RBC AUTO-ENTMCNC: 33.4 G/DL (ref 31.5–35.7)
MCV RBC AUTO: 90.7 FL (ref 79–97)
MONOCYTES # BLD AUTO: 0.92 10*3/MM3 (ref 0.1–0.9)
MONOCYTES NFR BLD AUTO: 4.9 % (ref 5–12)
NEUTROPHILS NFR BLD AUTO: 15.54 10*3/MM3 (ref 1.7–7)
NEUTROPHILS NFR BLD AUTO: 82.2 % (ref 42.7–76)
NRBC BLD AUTO-RTO: 0 /100 WBC (ref 0–0.2)
PLATELET # BLD AUTO: 305 10*3/MM3 (ref 140–450)
PMV BLD AUTO: 9.7 FL (ref 6–12)
POTASSIUM SERPL-SCNC: 3.9 MMOL/L (ref 3.5–5.2)
PROT SERPL-MCNC: 7.2 G/DL (ref 6–8.5)
RBC # BLD AUTO: 4.29 10*6/MM3 (ref 3.77–5.28)
SODIUM SERPL-SCNC: 137 MMOL/L (ref 136–145)
TIBC SERPL-MCNC: 340 MCG/DL (ref 298–536)
TRANSFERRIN SERPL-MCNC: 228 MG/DL (ref 200–360)
WBC # BLD AUTO: 18.89 10*3/MM3 (ref 3.4–10.8)

## 2021-06-21 PROCEDURE — 80053 COMPREHEN METABOLIC PANEL: CPT

## 2021-06-21 PROCEDURE — 83540 ASSAY OF IRON: CPT

## 2021-06-21 PROCEDURE — 36415 COLL VENOUS BLD VENIPUNCTURE: CPT

## 2021-06-21 PROCEDURE — 85025 COMPLETE CBC W/AUTO DIFF WBC: CPT

## 2021-06-21 PROCEDURE — 82728 ASSAY OF FERRITIN: CPT

## 2021-06-21 PROCEDURE — 86140 C-REACTIVE PROTEIN: CPT

## 2021-06-21 PROCEDURE — 99213 OFFICE O/P EST LOW 20 MIN: CPT | Performed by: INTERNAL MEDICINE

## 2021-06-21 PROCEDURE — 84466 ASSAY OF TRANSFERRIN: CPT

## 2021-06-21 RX ORDER — DEXAMETHASONE 4 MG/1
4 TABLET ORAL 2 TIMES DAILY WITH MEALS
COMMUNITY
End: 2021-08-10

## 2021-06-23 ENCOUNTER — HOSPITAL ENCOUNTER (OUTPATIENT)
Dept: PHYSICAL THERAPY | Age: 46
Setting detail: THERAPIES SERIES
Discharge: HOME | End: 2021-06-23
Payer: MEDICARE

## 2021-06-23 PROCEDURE — 97110 THERAPEUTIC EXERCISES: CPT

## 2021-06-23 PROCEDURE — 97112 NEUROMUSCULAR REEDUCATION: CPT

## 2021-06-23 ASSESSMENT — PAIN DESCRIPTION - DESCRIPTORS: DESCRIPTORS: THROBBING;TIGHTNESS;CONSTANT

## 2021-06-23 ASSESSMENT — PAIN DESCRIPTION - LOCATION: LOCATION: BACK;NECK

## 2021-06-23 ASSESSMENT — PAIN SCALES - GENERAL: PAINLEVEL_OUTOF10: 5

## 2021-06-23 ASSESSMENT — PAIN DESCRIPTION - ORIENTATION: ORIENTATION: LOWER;MID;UPPER

## 2021-06-23 ASSESSMENT — PAIN DESCRIPTION - PAIN TYPE: TYPE: CHRONIC PAIN

## 2021-06-23 NOTE — PROGRESS NOTES
Physical Therapy  Daily Treatment Note  Date: 2021  Patient Name: Tramaine Bowles  MRN: [id number]     :   1975    Subjective:   General  Referring Practitioner: Clyde Sanchez  Onset Date: 21  PT Insurance Information: [employer], [id number] Medicaid  Total # of Visits Approved: 5  Total # of Visits to Date: 2  Progress Note Due Date: 21  Progress Note Counter: meidcare auth 4 visits, medicaid auth 8 units each [id number]  Subjective: I've fallen a few times since i was here last but i don't know how many, I,m falling and on the ground before i know it  Patient Currently in Pain: Yes  Pain Level: 5  Pain Type: Chronic pain  Pain Location: Back;Neck  Pain Orientation: Lower;Mid;Upper  Pain Descriptors: Throbbing;Tightness; Constant       Treatment Activities:       Exercises  Exercise 1: cone weaves   x 3               *use gait belt with all activities  Exercise 2: figure 8's  x  5  Exercise 3: box step  5/5  Exercise 4: ambulation with horizontal and vertical head turns  120' x 1 ea  Exercise 5: 360 degree circles cw/ccw  x 2 ea  Exercise 6: forward bend with cone weave  x 5  Exercise 7: seated horizontal and vertical gaze stabilizaiton  x 10 ea  Exercise 8: seated horizontal and vertical advanced gaze stabilizaiton  x 10 ea  Exercise 9: static standing with crom in front of fish picture horizontal and vertical  x 10 ea  Exercise 10: static stance on foam pads  x 1'  Exercise 11: pertebations in all directions  x 1' min  Exercise 12: standing marching  x 10  Exercise 13: standing fwd and lat step-ups  4\"  x 10 ea  Exercise 14: standing stair tapping 6\"  x 10 RUE on rail  Exercise 15: tandemn stance  x 30\" ea  Exercise 16: sls  x 0  Exercise 20: If she is having increased dizziness with spinning have PT assess        Assessment:   Conditions Requiring Skilled Therapeutic Intervention  Body structures, Functions, Activity limitations: Decreased functional mobility ; Decreased ADL 6/23/2021 at 5:12 PM

## 2021-06-24 ENCOUNTER — PATIENT MESSAGE (OUTPATIENT)
Dept: PRIMARY CARE CLINIC | Age: 46
End: 2021-06-24

## 2021-06-25 ENCOUNTER — HOSPITAL ENCOUNTER (OUTPATIENT)
Dept: PHYSICAL THERAPY | Age: 46
Setting detail: THERAPIES SERIES
Discharge: HOME | End: 2021-06-25
Payer: MEDICARE

## 2021-06-25 PROCEDURE — 97110 THERAPEUTIC EXERCISES: CPT

## 2021-06-25 PROCEDURE — 97112 NEUROMUSCULAR REEDUCATION: CPT

## 2021-06-25 RX ORDER — FLUCONAZOLE 150 MG/1
150 TABLET ORAL DAILY
Qty: 3 TABLET | Refills: 0 | Status: SHIPPED | OUTPATIENT
Start: 2021-06-25 | End: 2021-06-28

## 2021-06-25 RX ORDER — CIPROFLOXACIN 500 MG/1
500 TABLET, FILM COATED ORAL 2 TIMES DAILY
Qty: 14 TABLET | Refills: 0 | Status: SHIPPED | OUTPATIENT
Start: 2021-06-25 | End: 2021-07-02

## 2021-06-25 NOTE — PROGRESS NOTES
Physical Therapy  Daily Treatment Note  Date: 2021  Patient Name: Julio César Link  MRN: [id number]     :   1975    Subjective:   General  Referring Practitioner: Augustine Brooks  PT Visit Information  Onset Date: 21  PT Insurance Information: [employer], [facility] Medicaid  Total # of Visits Approved: 5  Total # of Visits to Date: 3  Progress Note Due Date: 21  Progress Note Counter: meidcare auth 4 visits, medicaid auth 8 units each [id number]  Subjective  Subjective: Patient states she has not had any falls since her last visit. She says the last time she was here she had quite a work out. General Comment  Comments: : 2 ther-ex, 2 yonatan             : 2 ther-ex, 2 neuro  Pain Screening  Patient Currently in Pain: Yes  Vital Signs  Patient Currently in Pain: Yes       Treatment Activities:                                    Exercises  Exercise 1: cone weaves   x 3               *use gait belt with all activities  Exercise 2: figure 8's  x  5  Exercise 3: box step  5/5  Exercise 4: ambulation with horizontal and vertical head turns  120' x 1 ea  Exercise 5: 360 degree circles cw/ccw  x 2 ea with sit to stand today alt  Exercise 6: forward bend with cone weave  x 5  Exercise 7: seated horizontal and vertical gaze stabilizaiton  x 10 ea  Exercise 8: seated horizontal and vertical advanced gaze stabilizaiton  x 10 ea  Exercise 9: static standing with crom in front of fish picture horizontal and vertical  x 10 ea  Exercise 10: static stance on foam pads  x 1'  Exercise 11: pertebations in all directions  x 1' min  Exercise 12: standing marching  x 10  Exercise 13: standing fwd and lat step-ups  6\"  x 10 ea- fwd only today  Exercise 14: standing stair tapping 6\"  x 10 RUE on rail- not today  Exercise 15: tandemn stance  x 30\" ea  Exercise 16: sls  x 0  Exercise 20:  If she is having increased dizziness with spinning have PT assess                               Assessment: Vestibular Rehab  Timed Code Treatment Minutes: 62 Minutes     Therapy Time   Individual Concurrent Group Co-treatment   Time In 1005         Time Out 5259         [id number]         Timed Code Treatment Minutes: 62 Minutes  Electronically signed by Mary Kay Mckee PT on 6/25/2021 at 11:20 AM

## 2021-06-25 NOTE — TELEPHONE ENCOUNTER
Cipro 500 mg twice daily x7 days  Dispense #14 with no refills.       Diflucan 150 mg once daily x3 days  Dispense #3 with no refills

## 2021-06-25 NOTE — TELEPHONE ENCOUNTER
From: Hendrick Cardinal ToDenman Frankel, DO  Sent: 6/24/2021 2:47 PM CDT  Subject: Yamile Choi, Dr Sandy Barkley. I'm having some burning when I use the restroom, and I'm pretty certain it's a uti. Could you please call me in an antibiotic and Diflucan? I would be grateful! Thank you, and God bless.

## 2021-06-28 ENCOUNTER — PATIENT MESSAGE (OUTPATIENT)
Dept: PRIMARY CARE CLINIC | Age: 46
End: 2021-06-28

## 2021-06-28 RX ORDER — AMOXICILLIN AND CLAVULANATE POTASSIUM 875; 125 MG/1; MG/1
1 TABLET, FILM COATED ORAL 2 TIMES DAILY
Qty: 20 TABLET | Refills: 0 | Status: SHIPPED | OUTPATIENT
Start: 2021-06-28 | End: 2021-07-08

## 2021-06-28 NOTE — TELEPHONE ENCOUNTER
From: Annia Sullivan  To: Wadell Boxer, DO  Sent: 6/28/2021 9:52 AM CDT  Subject: Visit Follow-Up Question    Ok, so I was taking Jadenu for 2-3 months for my hereditary hemochromatosis with iron overload. I stopped taking that medication last week because my ferritin level is finally below 500. Since stopping, I haven't had a bowel movement, and developed a uti. What do I need to do? I'm currently taking the antibiotic you called in for me after having 8 teeth extracted. My mouth is still in pain and gums are still swollen and red. I'm a hot mess for sure.

## 2021-06-30 ENCOUNTER — TELEPHONE (OUTPATIENT)
Dept: PAIN MANAGEMENT | Age: 46
End: 2021-06-30

## 2021-06-30 NOTE — TELEPHONE ENCOUNTER
Patient called regarding dental work that she had done. She had 8 teeth extracted. She was calling because the dentist prescribed her toradol and she is asking if it is ok to take with her Hydrocodone, as instructed by the dentist.  I returned her call and let her know she can take that along with the hydrocodone.

## 2021-07-01 ENCOUNTER — HOSPITAL ENCOUNTER (OUTPATIENT)
Dept: PHYSICAL THERAPY | Age: 46
Setting detail: THERAPIES SERIES
Discharge: HOME | End: 2021-07-01
Payer: MEDICARE

## 2021-07-01 PROCEDURE — 97110 THERAPEUTIC EXERCISES: CPT

## 2021-07-01 PROCEDURE — 97112 NEUROMUSCULAR REEDUCATION: CPT

## 2021-07-01 ASSESSMENT — PAIN DESCRIPTION - LOCATION: LOCATION: BACK;NECK

## 2021-07-01 ASSESSMENT — PAIN SCALES - GENERAL: PAINLEVEL_OUTOF10: 6

## 2021-07-01 ASSESSMENT — PAIN DESCRIPTION - PAIN TYPE: TYPE: CHRONIC PAIN

## 2021-07-01 ASSESSMENT — PAIN DESCRIPTION - DESCRIPTORS: DESCRIPTORS: CONSTANT

## 2021-07-01 ASSESSMENT — PAIN DESCRIPTION - ORIENTATION: ORIENTATION: LOWER;MID;UPPER

## 2021-07-01 NOTE — PROGRESS NOTES
Physical Therapy  Daily Treatment Note  Date: 2021  Patient Name: Sondra Womack  MRN: [id number]     :   1975    Subjective:   General  Referring Practitioner: Sheri Nick  Onset Date: 21  PT Insurance Information: [employer], [place] Medicaid  Total # of Visits Approved: 5  Total # of Visits to Date: 4  Progress Note Due Date: 21  Subjective: i haven't fallen since last week but my back and neck are hurting  Comments: : 2 ther-ex, 2 nuero             : 2 ther-ex, 2 neuro    : 2 ther ex, 1 neuro  Patient Currently in Pain: Yes  Pain Level: 6  Pain Type: Chronic pain  Pain Location: Back;Neck  Pain Orientation: Lower;Mid;Upper  Pain Descriptors: Constant       Treatment Activities:      Exercises  Exercise 1: cone weaves   x 3               *use gait belt with all activities  Exercise 2: figure 8's  x  5  Exercise 3: box step  5/5  Exercise 4: ambulation with horizontal and vertical head turns  120' x 1 ea  Exercise 5: 360 degree circles cw/ccw  x 2 ea with sit to stand today alt  Exercise 6: forward bend with cone weave  x 5  (spray bottle)  Exercise 7: seated horizontal and vertical gaze stabilizaiton  x 10 ea  Exercise 8: seated horizontal and vertical advanced gaze stabilizaiton  x 10 ea  Exercise 9: static standing with crom in front of fish picture horizontal and vertical  x 10 ea  Exercise 10: static stance on foam pads  x 1'  Exercise 11: pertebations in all directions  x 1' min  Exercise 12: standing marching  x 10  Exercise 13: standing fwd and lat step-ups  6\"  x 10 ea- fwd only today  Exercise 14: standing stair tapping 6\"  x 10  sba  Exercise 15: tandemn stance  x 30\" ea  Exercise 16: sls  x 0  Exercise 20: If she is having increased dizziness with spinning have PT assess        Assessment:   Conditions Requiring Skilled Therapeutic Intervention  Body structures, Functions, Activity limitations: Decreased functional mobility ; Decreased ADL status; Vestibular Carmencita Wiggins PTA    Electronically signed by Carmencita Wiggins PTA on 7/1/2021 at 11:56 AM

## 2021-07-06 DIAGNOSIS — M54.16 CHRONIC LUMBAR RADICULOPATHY: ICD-10-CM

## 2021-07-07 ENCOUNTER — TELEPHONE (OUTPATIENT)
Dept: ONCOLOGY | Facility: CLINIC | Age: 46
End: 2021-07-07

## 2021-07-07 NOTE — TELEPHONE ENCOUNTER
Caller: ISA    Relationship to patient: PATIENT    Best call back number: [phone]     Type of visit: LAB AND RN REVIEW    Requested date: 07/08 AROUND 9AM    Additional notes: SHE THINKS SHE NEEDS TO GET HER BLOOD CHECKED. SHE SAYS SHE'S RENTERIA AND REALLY TIRED.

## 2021-07-08 ENCOUNTER — OFFICE VISIT (OUTPATIENT)
Dept: ONCOLOGY | Facility: CLINIC | Age: 46
End: 2021-07-08

## 2021-07-08 ENCOUNTER — LAB (OUTPATIENT)
Dept: LAB | Facility: HOSPITAL | Age: 46
End: 2021-07-08

## 2021-07-08 ENCOUNTER — HOSPITAL ENCOUNTER (OUTPATIENT)
Dept: PHYSICAL THERAPY | Age: 46
Setting detail: THERAPIES SERIES
Discharge: HOME | End: 2021-07-08
Payer: MEDICARE

## 2021-07-08 ENCOUNTER — CLINICAL SUPPORT (OUTPATIENT)
Dept: ONCOLOGY | Facility: CLINIC | Age: 46
End: 2021-07-08

## 2021-07-08 VITALS
DIASTOLIC BLOOD PRESSURE: 74 MMHG | TEMPERATURE: 99.3 F | HEART RATE: 85 BPM | SYSTOLIC BLOOD PRESSURE: 130 MMHG | RESPIRATION RATE: 16 BRPM | OXYGEN SATURATION: 97 %

## 2021-07-08 VITALS
DIASTOLIC BLOOD PRESSURE: 70 MMHG | RESPIRATION RATE: 12 BRPM | HEART RATE: 82 BPM | SYSTOLIC BLOOD PRESSURE: 110 MMHG | OXYGEN SATURATION: 94 %

## 2021-07-08 DIAGNOSIS — Z45.2 ENCOUNTER FOR CARE RELATED TO PORT-A-CATH: Primary | ICD-10-CM

## 2021-07-08 DIAGNOSIS — E83.110 HEREDITARY HEMOCHROMATOSIS (HCC): ICD-10-CM

## 2021-07-08 DIAGNOSIS — R53.83 OTHER FATIGUE: ICD-10-CM

## 2021-07-08 DIAGNOSIS — Z86.39 HISTORY OF IRON DEFICIENCY: ICD-10-CM

## 2021-07-08 DIAGNOSIS — R53.83 OTHER FATIGUE: Primary | ICD-10-CM

## 2021-07-08 LAB
BASOPHILS # BLD AUTO: 0.09 10*3/MM3 (ref 0–0.2)
BASOPHILS NFR BLD AUTO: 0.6 % (ref 0–1.5)
DEPRECATED RDW RBC AUTO: 54.7 FL (ref 37–54)
EOSINOPHIL # BLD AUTO: 0.12 10*3/MM3 (ref 0–0.4)
EOSINOPHIL NFR BLD AUTO: 0.9 % (ref 0.3–6.2)
ERYTHROCYTE [DISTWIDTH] IN BLOOD BY AUTOMATED COUNT: 16.1 % (ref 12.3–15.4)
FERRITIN SERPL-MCNC: 538.9 NG/ML (ref 13–150)
HCT VFR BLD AUTO: 34.8 % (ref 34–46.6)
HGB BLD-MCNC: 11.9 G/DL (ref 12–15.9)
HOLD SPECIMEN: NORMAL
IMM GRANULOCYTES # BLD AUTO: 0.19 10*3/MM3 (ref 0–0.05)
IMM GRANULOCYTES NFR BLD AUTO: 1.4 % (ref 0–0.5)
LYMPHOCYTES # BLD AUTO: 3.38 10*3/MM3 (ref 0.7–3.1)
LYMPHOCYTES NFR BLD AUTO: 24.1 % (ref 19.6–45.3)
MCH RBC QN AUTO: 31.3 PG (ref 26.6–33)
MCHC RBC AUTO-ENTMCNC: 34.2 G/DL (ref 31.5–35.7)
MCV RBC AUTO: 91.6 FL (ref 79–97)
MONOCYTES # BLD AUTO: 0.84 10*3/MM3 (ref 0.1–0.9)
MONOCYTES NFR BLD AUTO: 6 % (ref 5–12)
NEUTROPHILS NFR BLD AUTO: 67 % (ref 42.7–76)
NEUTROPHILS NFR BLD AUTO: 9.39 10*3/MM3 (ref 1.7–7)
NRBC BLD AUTO-RTO: 0 /100 WBC (ref 0–0.2)
PLATELET # BLD AUTO: 281 10*3/MM3 (ref 140–450)
PMV BLD AUTO: 9.2 FL (ref 6–12)
RBC # BLD AUTO: 3.8 10*6/MM3 (ref 3.77–5.28)
SARS-COV-2 ORF1AB RESP QL NAA+PROBE: NOT DETECTED
WBC # BLD AUTO: 14.01 10*3/MM3 (ref 3.4–10.8)

## 2021-07-08 PROCEDURE — 85025 COMPLETE CBC W/AUTO DIFF WBC: CPT

## 2021-07-08 PROCEDURE — 97110 THERAPEUTIC EXERCISES: CPT

## 2021-07-08 PROCEDURE — 99214 OFFICE O/P EST MOD 30 MIN: CPT | Performed by: INTERNAL MEDICINE

## 2021-07-08 PROCEDURE — 97112 NEUROMUSCULAR REEDUCATION: CPT

## 2021-07-08 PROCEDURE — C9803 HOPD COVID-19 SPEC COLLECT: HCPCS

## 2021-07-08 PROCEDURE — 36415 COLL VENOUS BLD VENIPUNCTURE: CPT

## 2021-07-08 PROCEDURE — U0004 COV-19 TEST NON-CDC HGH THRU: HCPCS

## 2021-07-08 PROCEDURE — 82728 ASSAY OF FERRITIN: CPT

## 2021-07-08 RX ORDER — HEPARIN SODIUM (PORCINE) LOCK FLUSH IV SOLN 100 UNIT/ML 100 UNIT/ML
500 SOLUTION INTRAVENOUS AS NEEDED
Status: DISCONTINUED | OUTPATIENT
Start: 2021-07-08 | End: 2021-07-08 | Stop reason: HOSPADM

## 2021-07-08 RX ORDER — SODIUM CHLORIDE 0.9 % (FLUSH) 0.9 %
10 SYRINGE (ML) INJECTION AS NEEDED
Status: DISCONTINUED | OUTPATIENT
Start: 2021-07-08 | End: 2021-07-08 | Stop reason: HOSPADM

## 2021-07-08 RX ORDER — SODIUM CHLORIDE 0.9 % (FLUSH) 0.9 %
10 SYRINGE (ML) INJECTION AS NEEDED
Status: CANCELLED | OUTPATIENT
Start: 2021-07-08

## 2021-07-08 RX ORDER — HEPARIN SODIUM (PORCINE) LOCK FLUSH IV SOLN 100 UNIT/ML 100 UNIT/ML
500 SOLUTION INTRAVENOUS AS NEEDED
Status: CANCELLED | OUTPATIENT
Start: 2021-07-08

## 2021-07-08 RX ADMIN — Medication 10 ML: at 10:59

## 2021-07-08 RX ADMIN — HEPARIN SODIUM (PORCINE) LOCK FLUSH IV SOLN 100 UNIT/ML 500 UNITS: 100 SOLUTION at 11:00

## 2021-07-08 NOTE — PROGRESS NOTES
Physical Therapy  Daily Treatment Note/ REASSESSMENT  Date: 2021  Patient Name: Nain Romero  MRN: [id number]     :   1975    Subjective:   General  Referring Practitioner: Dima Zaragoza  PT Visit Information  Onset Date: 21  PT Insurance Information: [employer], [id number] Medicaid  Total # of Visits Approved: 5  Total # of Visits to Date: 5  Subjective  Subjective: Patient states it has probably been a couple of weeks since she fell. She says that she thinks she is slightly better with her balance but that she has good and bad days.   General Comment    Pain Screening  Patient Currently in Pain: Yes  Vital Signs  Patient Currently in Pain: Yes       Treatment Activities:                                 Strength RLE  R Hip Flexion: 4/5  R Hip ABduction: 5/5  R Hip ADduction: 5/5  R Knee Flexion: 5/5  R Knee Extension: 5/5  R Ankle Dorsiflexion: 5/5  R Ankle Plantar flexion: 5/5  Strength LLE  L Hip Flexion: 4/5  L Hip ABduction: 5/5  L Hip ADduction: 5/5  L Knee Flexion: 5/5  L Knee Extension: 5/5  L Ankle Dorsiflexion: 5/5  L Ankle Plantar Flexion: 5/5  Exercises  Exercise 1: cone weaves   x 3               *use gait belt with all activities  Exercise 2: figure 8's  x  1 today for testing  Exercise 3: box step  5/5  Exercise 4: ambulation with horizontal and vertical head turns  120' x 1 ea  Exercise 5: 360 degree circles cw/ccw  x 2 ea with sit to stand today alt- not today  Exercise 6: forward bend with cone weave  x 5  (spray bottle)- not today  Exercise 7: seated horizontal and vertical gaze stabilizaiton  x 10 ea- not today  Exercise 8: seated horizontal and vertical advanced gaze stabilizaiton  x 10 ea- not today  Exercise 9: static standing with crom in front of fish picture horizontal and vertical  x 10 ea- not today  Exercise 10: static stance on foam pads  x 1'- on level tile for testing today  Exercise 11: pertebations in all directions  x 1' min- on level tile for testing for ex's for safety)  Long term goals  Time Frame for Long term goals : 6 Weeks  Long term goal 1: Patient to have decreased report of dizziness/imbalance by 50%- PROGRESS (07/08/2021: Patient states she has improvement of dizziness/balance by 50%)  Long term goal 2: Patient to ambulate with more normalized gait with increased step length and height-- PROGRESS (07/08/2021: Patient ambulates with improved step length but does continue to have limited length and height of steps with slow charles)  Long term goal 3: Patient to demo increased function by scoring <= 30% impairment on Dizziness Handicap Inventory- NOT MET (07/08/2021: Patient scored 70% impairment on Dizziness Handicap Inventory)  Long term goal 4: Patient to demo increased balance by scoring >= 19/24 on DGI- PROGRESS (07/08/2021: patient scored 13/24 on DGI)  Patient Goals   Patient goals : decrease dizziness and increase balance  Plan:    Plan  Times per week: 2x/week  Plan weeks: 6 weeks  Current Treatment Recommendations: Strengthening, Balance Training, Functional Mobility Training, Positioning, Modalities, Manual Therapy - Joint Manipulation, Endurance Training, Safety Education & Training, Manual Therapy - Soft Tissue Mobilization, Neuromuscular Re-education, Home Exercise Program, Vestibular Rehab  Timed Code Treatment Minutes: 53 Minutes     Therapy Time   Individual Concurrent Group Co-treatment   Time In 1303         Time Out 1356         Minutes 53         Timed Code Treatment Minutes: 53 Minutes  Electronically signed by Mague Barroso PT on 7/8/2021 at 2:02 PM

## 2021-07-08 NOTE — PROGRESS NOTES
Patient here for lab evaluation.  Stated that she is not feeling good.   C/O being lightheaded, dizzy, shaky, weak, irritable, and having headaches since the weekend.    Reviewed labs and patient complaints with Dr. Goldberg.  Stated that she can see Naomi today at 3:30.  Naomi stated that she will come back to see Dr. Goldberg at 3:30 today.

## 2021-07-08 NOTE — PROGRESS NOTES
"[facility] ONC [facility] HEMATOLOGY AND ONCOLOGY  [address]  [facility] [address] [phone]    Patient Name: Naomi Bhatia  Encounter Date: 2021  YOB: 1975  Patient Number: [id number]      Subjective: 44 year old female who was previously being seen by Dr. Clements for iron deficiency anemia. She has undergone a gastric sleeve procedure in 2017 for morbid obesity and had a total right hip replacement in 3/2019 after a fracture.      She, unfortunateky, stopped following up with bariatric surgery and did not take vitamin replacements. She has had a hysterectomy due to fibroids and endometriosis but found to be iron deficient and was started on iron replacement with IV iron, states it is more than 10 treatments in all.  She states that she has iron deficiency since age 14. She has had multiple blood transfusions in the past (4 or 5).    She presents now for follow up.    Today, she is feeling \"tired\".  She also suffers from chronic pain. In the Degernerative disc disease, fibromyalgia, and previous back surgeries    On follow up on 20: Had right Carpal Tunnel Surgery on 11.3.20 and has  Her arm in a sling today.  Otherwise, she is feeling \"exhausted\" because dad, who was on hospice,  about a month ago.  She has not slept well due to the depression and the pain in the arm was also keeping her up.     On follow up on 21: Upset that her car has broken down with expensive repairs which is causing distress.  Physically, she is tired, no shortness of breath, denies bleeding    On follow up on 3.11.21: Feeling \"OK and hanging in there\".  Her son had Covid 3 weeks ago but patient tested negative although she states that she had all the symptoms and may have had a false negative test.     On follow-up on 2020: Started on Jadenu 5.4.21  (total dose 0; 14 mg/kg) and is still taking that. No issues with the " "medication other than a feeling \"of hot sensation in the mouth, gums and lips\"      Had all teeth pulled a week ago due to severe dental disease, still having some pain after the extraction.    On follow-up on 7/8/21: Patient was in the office receiving flush when she complained to the nurse that she is feeling overall poorly and asked to be put on the schedule for today.  Hemoglobin was noted to be 11.9 from CBC this morning.  She states that for the last week she is more fatigued, with headaches but denies fevers and cough. Mood is bad and she says taht she is being more aggressive with very little to make her \"fly off the handle\".  She is feeling anxious, depressed, very stressed. She states that her ex- was telling her she needs to learn how to calm down more. She is getting dentures tomorrow, money appears to be a source of severe stress.     Past Medical History:   Diagnosis Date   • Anemia    • Anxiety    • Arthritis    • Asthma    • Back pain 03/14/2016    with left sided radiculopathy    • DDD (degenerative disc disease), lumbar     Dr. Stuart Zavaleta for pain manangement   • Depression    • Encounter for care related to Port-a-Cath 01/31/2020    for iron infusions as she was told she has small veins   • Fatigue    • Fibromyalgia    • GERD (gastroesophageal reflux disease)    • H/O cold sores    • Headache    • Hereditary hemochromatosis (CMS/HCC) 1/19/2021   • History of blood transfusion    • Hypertension    • Iron deficiency anemia 9/12/2017   • Iron deficiency anemia secondary to inadequate dietary iron intake 9/12/2017   • Joint pain    • Obesity    • RLS (restless legs syndrome)    • Sleep apnea     positive sleep study; titration study in October, uses a CPAP machine when sleeping       Oncology History:  Oncology/Hematology History    No history exists.       Past Surgical History:  Past Surgical History:   Procedure Laterality Date   • ANKLE SURGERY      Right    • APPENDECTOMY  04/19/2015   • " BACK SURGERY  2000   • CERVICAL SPINE SURGERY  09/01/2015   • CHOLECYSTECTOMY      1995;lap   • COLONOSCOPY  05/02/2017    normal; do not return until age of 49 Dr. Gus Valentine   • GASTRIC SLEEVE LAPAROSCOPIC N/A 12/20/2017    Procedure: GASTRIC SLEEVE LAPAROSCOPIC;  Surgeon: Yifan Cordero MD;  Location: [facility];  Service:    • HIP ARTHROPLASTY      Right  2019   • HIP SURGERY  1987    right    • INSERTION CENTRAL VENOUS ACCESS DEVICE W/ SUBCUTANEOUS PORT  11/07/2017    Dr Anel Gamboa (Smart Port-Power injectable Port) Cat [id number] Lot [id number]    • MOUTH SURGERY  1994   • NECK SURGERY     • TOOTH EXTRACTION     • UPPER GASTROINTESTINAL ENDOSCOPY  05/02/2017    Dr. Gus Valentine, negative for h.pylori, negative for Salomon's   • VAGINAL HYSTERECTOMY SALPINGO OOPHORECTOMY  2008    Partial and then had another surgery to remove the rest      ___________________________________________________________________________________________________________________________________________________  Medications:   Outpatient Encounter Medications as of 7/8/2021   Medication Sig Dispense Refill   • acyclovir (ZOVIRAX) 800 MG tablet Take 800 mg by mouth Daily.     • ALBUTEROL IN Inhale 1 puff As Needed.     • Calcium Citrate-Vitamin D (CALCIUM CITRATE + D3 PO) Take  by mouth Daily.     • carbidopa-levodopa (SINEMET)  MG per tablet Take 1 tablet by mouth Daily.     • CRANBERRY PO Take  by mouth Daily.     • Cyclobenzaprine HCl (FLEXERIL PO) Take 10 mg by mouth Daily As Needed.     • Deferasirox (JADENU) 360 MG tablet Take 5 tablets by mouth Every Morning Before Breakfast. In addition to 90mg tablet for total dose of 1890mg daily. 150 tablet 5   • Deferasirox (Jadenu) 90 MG tablet Take 1 tablet by mouth Daily. In addition to five 360mg tablets for total dose of 1890mg daily 30 tablet 5   • dexamethasone (DECADRON) 4 MG tablet Take 4 mg by mouth 2 (Two) Times a Day With Meals.     • fluconazole (Diflucan) 150 MG tablet  Take 1 now, repeat 1 week 2 tablet 0   • levocetirizine (XYZAL) 5 MG tablet TK 1 T PO QPM  0   • LYRICA 150 MG capsule TK 1 C PO BID  2   • metFORMIN (GLUCOPHAGE) 1000 MG tablet Take 1,000 mg by mouth Daily With Breakfast.     • metoprolol succinate XL (TOPROL XL) 50 MG 24 hr tablet Take 50 mg by mouth 2 (Two) Times a Day.     • Multiple Vitamin (MULTI VITAMIN PO) Take  by mouth Daily.     • NABUMETONE PO Take 500 mg by mouth 2 (two) times a day.     • nortriptyline (PAMELOR) 25 MG capsule 2 tablets at night as needed  3   • omeprazole (priLOSEC) 20 MG capsule TK ONE C PO QD FIRST THING IN THE MORNING ON  AN EMPTY STOMACH  3   • venlafaxine (EFFEXOR) 75 MG tablet three  times daily  11   • venlafaxine XR (EFFEXOR-XR) 150 MG 24 hr capsule Take 150 mg by mouth Daily.       Facility-Administered Encounter Medications as of 7/8/2021   Medication Dose Route Frequency Provider Last Rate Last Admin   • diphenhydrAMINE (BENADRYL) injection 50 mg  50 mg Intravenous PRN Pedro Clements MD       • famotidine (PEPCID) injection 20 mg  20 mg Intravenous PRN Pedro Clements MD       • hydrocortisone sodium succinate (Solu-CORTEF) injection 100 mg  100 mg Intravenous PRN Pedro Clements MD       • [DISCONTINUED] heparin injection 500 Units  500 Units Intravenous PRN Goldberg, Amit, MD   500 Units at 07/08/21 1100   • [DISCONTINUED] sodium chloride 0.9 % flush 10 mL  10 mL Intravenous PRN Goldberg, Amit, MD   10 mL at 07/08/21 1059     ___________________________________________________________________________________________________________________________________________________  Allergies:   Allergies   Allergen Reactions   • Azithromycin GI Intolerance and Nausea And Vomiting     Severe Abdominal Pain   Severe abdominal pain    • Silver Rash     Redness, blisters  blister  Redness, blisters         Social/Family History:   Family History   Problem Relation Age of Onset   • Diabetes Mother   "  • Hypertension Mother    • Coronary artery disease Mother    • Cancer Mother         \"bone\" cancer   • Cancer Father         prostate   • Hypertension Father    • Heart disease Father    • Stroke Father    • Coronary artery disease Father    • Hypertension Sister    • Obesity Sister    • Cancer Brother         throat   • Diabetes Brother    • Diabetes Maternal Grandmother    • Stroke Maternal Grandmother    • No Known Problems Daughter    • No Known Problems Son         /Single/: , lives with her son    Social History     Tobacco Use   • Smoking status: Former Smoker     Packs/day: 1.00     Years: 25.00     Pack years: 25.00     Types: Cigarettes     Quit date:      Years since quittin.5   • Smokeless tobacco: Never Used   Substance Use Topics   • Alcohol use: Yes     Comment: rarely    • Drug use: No     Types: Codeine     Comment: Codeine in Prescribed Medications only         Occupation: On disability due to the back issues.  Previously she was a , denies exposure to chemicals and radiation  ___________________________________________________________________________________________________________________________________________________  I reviewed the ROS as documented here and confirmed the accuracy of it with the patient today. 2021     Review of Systems   Constitutional: Positive for fatigue which is worsening in the last 1 week. Negative for activity change, appetite change, chills, fever, unexpected weight gain and unexpected weight loss.   HENT: Positive for hearing loss. Negative for congestion, + dental problem with dentures , ear pain, mouth sores, nosebleeds, sore throat, swollen glands and trouble swallowing.    Eyes: Negative for blurred vision, double vision, photophobia and pain.   Respiratory: Negative for apnea, cough, chest tightness, shortness of breath and wheezing.    Cardiovascular: Negative for chest pain, palpitations and leg swelling. "   Gastrointestinal: Negative for abdominal distention, abdominal pain, blood in stool, constipation, diarrhea, nausea, vomiting and GERD.   Endocrine: Negative for cold intolerance and heat intolerance.   Genitourinary: Negative for breast discharge, breast lump, breast pain, dysuria, frequency, hematuria and vaginal bleeding.   Musculoskeletal: Positive for arthralgias and back pain is significantly worse over the last week. Negative for gait problem, myalgias and neck stiffness.        Diffusely   Skin: Negative for color change, pallor, rash, skin lesions and bruise.   Allergic/Immunologic: Negative for environmental allergies and immunocompromised state.   Neurological: Negative for dizziness, syncope, weakness, light-headedness, headache, memory problem and confusion.        Frequent falls which have improved as she is holding on to things when she walks, sometimes gets lightheaded before the fall, never losses consciousness,   Hematological: Negative for adenopathy. Does not bruise/bleed easily.   Psychiatric/Behavioral: Positive for depressed mood. Negative for suicidal ideas. The patient is nervous/anxious.         Father passed away in 10/2020 and she is feeling down  and still feeling down (Father's day was difficult without him)    ___________________________________________________________________________________________________________________________________________________  I have reexamined the patient and the results are consistent with the previously documented exam. Amit Goldberg, MD   Physical Examination:   Vitals:    07/08/21 1553   BP: 110/70   Pulse: 82   Resp: 12   SpO2: 94%    There is no height or weight on file to calculate BSA.   Physical Exam   Constitutional: She is oriented to person, place, and time. She appears well-developed and well-nourished. No distress.   Morbidly obese   HENT:   Head: Normocephalic.   Nose: Nose normal.   Mouth/Throat: Oropharynx is clear and moist.   Upper  dentures   Eyes: Pupils are equal, round, and reactive to light. Conjunctivae are normal. No scleral icterus.   No palor   Neck: Normal range of motion. Neck supple. JVD: fatty lipoma  No thyromegaly present.   Mole under the left chin area   Cardiovascular: Normal rate, regular rhythm and normal heart sounds.   No murmur heard.  Pulmonary/Chest: Effort normal and breath sounds normal. No respiratory distress. She has no rales. Right breast exhibits no inverted nipple, no mass, no nipple discharge, no skin change and no tenderness. Left breast exhibits no inverted nipple, no mass, no nipple discharge, no skin change and no tenderness. No breast swelling, tenderness, discharge or bleeding. Breasts are asymmetrical.   Bilateral large breasts with the right bigger than the left.   no masses, no nipple discharge and no axillary LN's  Port palpable and non painful in the left upper chest well,   Abdominal: Soft. Bowel sounds are normal. She exhibits no distension. There is no abdominal tenderness.   Morbidly obese prohibiting proper evaluation for HSM   Musculoskeletal: Normal range of motion.      Comments: Right arm in a sling on 11.6.20 nut not on 1.5.21  Lymphadenopathy:     She has no cervical adenopathy.   Neurological: She is alert and oriented to person, place, and time. No cranial nerve deficit or sensory deficit. She exhibits normal muscle tone.   Skin: Skin is warm. No rash noted. She is not diaphoretic. No pallor.   Psychiatric: very anxious and depressed on exam on 7.8.21.  Denies suicidal thoughts nor homicidal thoughts.  Has not been able to sleep well at night    ___________________________________________________________________________________________________________________________________________________  Labs/X-ray results:     Lab Results - Last 18 Months   Lab Units 07/08/21  0946 06/21/21  1059 06/03/21  1800 05/26/21  1358 05/12/21  0939 04/28/21  1012 04/21/21  1121   HEMOGLOBIN g/dL 11.9* 13.0  10.4* 10.9* 11.6* 11.2* 11.5*   HEMATOCRIT % 34.8 38.9 30.6* 31.5* 34.4 32.0* 34.4   MCV fL 91.6 90.7 93.3 89.5 89.6 88.9 90.8   WBC 10*3/mm3 14.01* 18.89* 8.0 8.81 8.04 10.40 10.78   RDW % 16.1* 16.7* 17.0* 17.0* 16.2* 16.6* 17.2*   MPV fL 9.2 9.7 10.0 9.7 9.4 9.4 9.9   PLATELETS 10*3/mm3 281 305 187 270 295 260 268   IMM GRAN % % 1.4* 1.9*  --  1.0* 0.7* 0.8* 0.7*   NEUTROS ABS 10*3/mm3 9.39* 15.54* 5.0 5.80 5.28 7.07* 7.61*   LYMPHS ABS 10*3/mm3 3.38* 1.95 2.3 2.21 2.10 2.55 2.27   MONOS ABS 10*3/mm3 0.84 0.92* 0.40 0.45 0.43 0.47 0.59   EOS ABS 10*3/mm3 0.12 0.01 0.10 0.17 0.10 0.14 0.14   BASOS ABS 10*3/mm3 0.09 0.11 0.10 0.09 0.07 0.09 0.09   IMMATURE GRANS (ABS) 10*3/mm3 0.19* 0.36* 0.0 0.09* 0.06* 0.08* 0.08*   NRBC /100 WBC 0.0 0.0  --  0.0 0.0 0.0 0.0       Lab Results - Last 18 Months   Lab Units 06/21/21  1059 03/11/21  0958 02/03/21  0937 01/05/21  0847 09/01/20  1306 05/01/20  1013 01/31/20  1115   GLUCOSE mg/dL 120* 115* 123* 155* 112* 100* 118*   SODIUM mmol/L 137 140 139 141 139 138 141   POTASSIUM mmol/L 3.9 3.3* 3.6 3.6 3.8 4.3 3.9   TOTAL CO2 mmol/L  --   --  24  --   --   --   --    CO2 mmol/L 21.0* 23.0  --  28.0 24.0 29.0 28.0   CHLORIDE mmol/L 107 102 102 102 101 97* 102   ANION GAP mmol/L 9.0 15.0 13 11.0 14.0 12.0 11.0   CREATININE mg/dL 1.03* 0.80 0.7 0.62 0.66 0.64 0.74   BUN mg/dL 18 9 10 9 11 12 15   BUN / CREAT RATIO  17.5 11.3  --  14.5 16.7 18.8 20.3   CALCIUM mg/dL 9.7 9.4 9.4 9.3 9.3 10.0 10.2   EGFR IF NONAFRICN AM mL/min/1.73 58* 77 >60 104 97 100 85   ALK PHOS U/L 124* 102 96 97 77 85 96   TOTAL PROTEIN g/dL 7.2 7.6 6.9 7.3 7.1 7.3 8.0   ALT (SGPT) U/L 16 32 31 20 30 27 30   AST (SGOT) U/L 25 36* 30 28 30 31 27   BILIRUBIN mg/dL 0.8 0.6 0.8 0.6 0.6 0.7 0.7   ALBUMIN g/dL 4.30 4.30 4.5 4.10 4.40 4.50 4.60   GLOBULIN gm/dL 2.9 3.3  --  3.2 2.7 2.8 3.4       Lab Results - Last 18 Months   Lab Units 09/01/20  1306   LDH U/L 231*       Lab Results - Last 18 Months   Lab Units  07/08/21  0946 06/21/21  1059 05/26/21  1358 05/12/21  0939 04/28/21  1012 04/21/21  1121 03/11/21  0958 02/03/21  0937 01/05/21  0847 12/04/20  0942 11/20/20  1015 09/01/20  1306   IRON mcg/dL  --  159*  --   --   --   --  61 44 54 57 60 51   TIBC mcg/dL  --  340  --   --   --   --  264* 213* 235* 250* 253* 243*   IRON SATURATION %  --  47  --   --   --   --  23 21 23 23 24 21   FERRITIN ng/mL 538.90* 459.80* 883.70* 1,140.00* 1,245.00* 1,238.00* 1,375.00* 1,274.0* 1,355.00* 1,489.00* 1,663.00* 1,406.00*   T4 TOTAL mcg/dL  --   --   --   --   --   --   --   --   --   --   --  9.34   TSH uIU/mL  --   --   --   --   --   --   --  1.590  --   --   --  1.340   FOLATE ng/mL  --   --   --   --   --   --   --   --   --   --   --  >20.00     ____________________________________________________________________________  Radiology: No results found.           ___________________________________________________________________________________________________________________________________________________  Assessment/Plans:   -   -     Date  5/2017  9/2017  3/2018 5/2020 9/1/20 9/18/20 11.6.20 11.20.20 12.4.20 1.5.21 2.2.21 3.11.21   WBC  11.1  9.97  10.51 10.45  9.46 8.24 8.21 9.10 8.79 9.62   Hb  10.5  10.3  11.6 10.8  12.0 11.9 11.4 11.3 11.7 12.6   MCV  84.4  84.4  88.8 87.4  86.8 87.4 88.7 87.2 84.4 84.6   Plt  275  238  285 242  280 265 261 239 284 313   Iron   53  74 51  50 60 57 54 44  61   Iron sat   20%  28 21%  20% 24% 23% 23% 21%  23%   Transferrin    178 163 (LOW)  167 (L) 170 (L) 250 (L) 158 (L)  177 (L)   TIBC   271  265 243 (LOW)  249 (L) 253 (L) 168 (L) 235 (L) 213 (L)_ 264 (L)   Ferritin    918 1461 1406  1793 !!! 1663 1489 1355 1274 1375   B12     >2000          Folate     >20          Hapto     64          LDH      231(H)          Retic     5.62%     4.53%  4.47%   TFTs      WNL          Hepatitis      neg          Zinc      77          Copper      130          CRP      2.32(H)    2.21 3.19 2.33   HIV       Neg         RF      11.2 (WNL)         SHENA      Neg         Glucose          155 123  115   Creatinine          0.62 0.7  0.8   ALT          20 31  32   AST          28 30  36   Alk phos          97 96  102   ESR           29                                                                  Date 4..8.21 4.21.21 4.28.21 5.12.21 5.26.21 6.21.21  7/8/2021        WBC 9.88 10.78 10.40 8.04 8.81 18.89  14.01        Hb 11.4 11.5 11.2 11.6 10.9 13.0  11.9        MCV 89.8 90.8 88.9 89.6 89.5 90.7  91.6        Plt 257 268 260 295 270 305  281        ANC  7.61 7.07 5.28 5.8 15.54  9.39        ALC  2.27 2.55 2.10 2.21 1.95  3.38        AMC  0.59 0.47 0.43 0.45 0.92  0.84        AEC  0.14 0.14 0.10 0.17 0.01  0.12        ABC  0.09 0.09 0.07 0.09 0.11  0.09        Iron       159 (H)         Iron sat       47%         Transferrin       228         TIBC       340         Ferritin 1249 1238 1245 1140 883 459  538.9        B12               Folate               Hapto               LDH               Retic               TFTs               Hepatitis               Zinc               Copper               CRP       0.65         HIV               RF               SHENA               Glucose       120         Creatinine       1.03         ALT       16         AST       25         Alk phos       124         ESR               EGFR       58                                                            ** Iron deficiency anemia after gastric sleeve procedure  RESOLVED ON 6.21.21  - Patient is status post a hysterectomy and had a colonoscopy and EGD in 2017 which were both reportedly ngative for bleeding  - She has received venofer in the past prior to start of follow up with me (total of 1400 mg IV venofer):  5/14/18 200 mg Venofer   5/18/18 200 mg Venofer   5/21/18 200 mg Venofer   5/25/18 200 mg Venofer   5/30/18 200 mg Venofer   6/1/18 200 mg Venofer   6/4/18 200 mg Venofer   - As per the last 3 ferritins as well as the iron profile, this patient  "does not have iron deficiency and, in fact, may have an iron overload. ALL IRON SUPPLEMENTATIONS should be held at this time  - The patient's indices are more suggestive of anemia of chronic disease  - Would check the following for anemia:  o CBC with differnetial as above  o Copper level   o Zinc level WNL 77  o B12 >2000  o Folate >20  o Haptoglobin WNL 64  o LDH elevated slightly at 231  o Reticulocyte count elevated 5.62% --> down to 4.53% on 1.5.21 --> down to 4.47% on 3.11.21  o TFT’s WNL  o HIV NOT DRAWN ON 9/1/20  o Hepatitis panels negative  - CT scan of the A/P from 2018: MODERATE SPLENOMEGALY (15 cm), only description of liver is \"no focal hepatic lesions\" -- would check US Liver and spleen for re-evaluation as brother had massive splenomegaly as well: 10/12/20: LIVER: There is mild diffuse enlargement of the liver with diffuse increased echogenicity suggesting fatty infiltration. No evidence of focal hepatic mass. BILIARY: The gallbladder has been surgically removed. The common bile duct measures 0.5 cm in diameter. There is no evidence of intrahepatic ductal dilatation.   KIDNEYS: The right and left kidneys are normal in size, shape, orientation, and position measuring 9.9 cm and 10.8 cm, respectively. The corticomedullary differentiation is maintained. There is no evidence of nephrolithiasis, hydronephrosis or perinephric fluid collection. No renal mass is seen. No hydroureter is seen. PANCREAS: No focal lesion or abnormality. SPLEEN: The spleen is enlarged measuring 14.6 cm in [id number] length. OTHER: No ascites is present. The visualized IVC and aorta are normal in appearance. Scanning through the pelvis reveals anechoic urine partially distending the bladder. There are no free fluid collections. IMPRESSION:1. Hepatosplenomegaly. There is diffuse increased echogenicity of the liver parenchyma suggesting steatosis of the liver.2. The gallbladder has been surgically removed. No evidence of " intra or extrahepatic biliary dilatation. otherwise normal abdominal ultrasound.  - splenomegaly likely secondary to hepatic dysfunction and enlargement      **HEMOCHROMATOSIS 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   - The C282Y variant is especially common in caucasians of northern  ancestry and the carrier state occurs in 1 in 5-10 persons of  ancestry  -  Iron overload is uncommon for those that are carriers for hemochromatosis and is usually related more to obesity, fatty liver (she states that she was told she has an enlarged liver and several family members with fatty liver) or alcohol consumption.  This patient's LFT's are normal but her Ferritin is well above the upper limits when iron removal therapy would begin (normally phlebotomies but she has underlying anemia).     - The earliest ferritin level we have available is from 3/26/2018 which was elevated 918 but the increase to over 1400 likely reflects iatrogenic iron supplementation. Iron profile is consistent with iron overload  - As the work up here is not clear as to the nature of her anemia and she was also found to have very elevated ferritin with underlying anemia, she had a BM Bx:  - Bone marrow biopsy (was done 9/18/2017):   - Posterior iliac crest, bone marrow biopsy and aspirate:     Normocellular bone marrow 40%.     Myeloid to erythroid ratio equals 0.8:1.     Iron staining is 1+  - Complete blood count and peripheral blood smear, review:     Microcytic anemia.     No dyspoietic changes and no circulating blasts.  - flow cytometry negative  o CMP for LFT check in 9/1/20 were normal  o Iron profile for transferrin level are indicative of anemia of chronic disease  o Ferritin level noted to be excessive on 9/1/20  o  initially was tried on phlebotomies but as she also has an anemia, it was difficult to balance removal with the anemia and therefore switched to Jadenu    o Phlebotomies to date:  11/20/2020 250 cc   11.5.21 250 cc   11.19.21 250 cc   2.2.21 250 cc   3.11.21 250 cc             - Jadenu (started 5.4.21) which has improved the ferritin quickly and significantly with the ferritin now being <1000.  Her current dose is 1890 mg daily (14 mg/kg daily) this medication can be continued as follows:   -If serum ferritin falls below 1000 at 2 consecutive visits, can consider a dose reduction    -If serum ferritin falls below 500, stop treatment and monitor monthly --found to be less than 500 on 6/21/2021 and therefore held at this time with repeat scheduled to be tested in August 2021.  On 7.8.21, slightly increased but will recheck in August and stay off at this time  - hearing test 5.17.21 and eye exam and reported to have a full eye exam in May 2021 (we do not have the letter) but was reportedly showing that she had a change in her prescription but was not told she had any other issues in the eyes    ** Leukocytosis  - noted on 6.21.21 and is likely secondary to (1) recent tooth extraction and (2) on DEXAMETHASONE from dental which is to complete on 6.22.21.  Again elevated on 7/8/2021 but decreased and was on steroids until 2 days ago  - monitor for now as no fevers nor signs of infection    ** Infection status:   - possible Covid infection in February 2021 (had all the symptoms when son was sick with covid but patient tested negative)  - covid vaccine status not taken and probably not planning to  -Presented on 7/8/2021 with complaints of overall fatigue so checked  Covid testing and was  NEGATIVE    **Metabolic / Renal:   - morbid obesity s/p gastric sleeve, has not been following with bariatric surgery  - s/p full dental extraction beginning of 6/2021   -On 6/21/2021, patient noted to have a decreased EGFR (58) which may be related to the iron chelation.  As the iron chelation has been stopped on 6/21/2021, will recheck a CMP when the patient returns in August    **  Endocrine:   -Noted to have an elevated glucose on 1/5/2021, unclear if the patient was fasting but may require further work-up through PCP    ** GI:   - GERD being followed by PCP  - s/p gastric sleeve surgery in 2017, has lost about 50 lbs total    ** Pulmonary:   - Current smoking status quit smoking in 2014  - no current issues    ** Cardiology:   - HTN, followed by PCP    ** Neurologic:   - FREQUENT FALLS -- with recent foot fracture after falling down the stairs, encouraged to speak with PCP about work up, may be medication related    ** Rheumatology:  - arthritis being followed by PCP but she would like to see a rheumatologist.  She is requesting to be referred and so will refer to Dr. Jesus Caal  - as anemia may be related to rheumatology, would check:   - SHENA negative   - RF Negative   - CRP elevated on 9.18.20 to 2.32, again elevated at 2.21 on 1/5/2021 --> stable at 2.33 on March 11, 2020, still slightly elevated but improved on 6.21.21    ** Psychosocial:   - depression being followed by PCP  - on 7.8.21 appears to be depressed and anxious not sleeping as well at night and encouraged. We spoke for a long time regarding stress relief in the form of (1) coming off social media and (2) trying to go out for long walks and increase physical activity    ** Pain control:   - sees pain management at [facility], on lyrica and flexiril and oxy    ** IV access   - has it flushed at [facility] every 8 weeks    ** Health Maintenance  - Last colonoscopy  2017 and reportedly normal  - EGD in 2017 negative for H pylori and Salomon's  - Last mammogram 10/2019 and reportedly normal    - Follow up August 10, 2021    Amit Goldberg, MD     Total time spent caring for patient, reviewing lab and radiology information, and explaining the plan of care to the patient was  30 minutes

## 2021-07-12 RX ORDER — OXYCODONE HYDROCHLORIDE 5 MG/1
5 TABLET ORAL 3 TIMES DAILY PRN
Qty: 90 TABLET | Refills: 0 | Status: SHIPPED | OUTPATIENT
Start: 2021-07-12 | End: 2021-08-09 | Stop reason: SDUPTHER

## 2021-07-13 ENCOUNTER — HOSPITAL ENCOUNTER (OUTPATIENT)
Dept: PAIN MANAGEMENT | Age: 46
Discharge: HOME | End: 2021-07-13
Payer: MEDICARE

## 2021-07-13 VITALS
OXYGEN SATURATION: 96 % | HEART RATE: 85 BPM | TEMPERATURE: 96.3 F | RESPIRATION RATE: 18 BRPM | DIASTOLIC BLOOD PRESSURE: 83 MMHG | SYSTOLIC BLOOD PRESSURE: 119 MMHG

## 2021-07-13 DIAGNOSIS — R52 PAIN MANAGEMENT: ICD-10-CM

## 2021-07-13 PROCEDURE — 62323 NJX INTERLAMINAR LMBR/SAC: CPT

## 2021-07-13 PROCEDURE — 3209999900 FLUORO FOR SURGICAL PROCEDURES

## 2021-07-13 PROCEDURE — 2500000003 HC RX 250 WO HCPCS

## 2021-07-13 PROCEDURE — 2580000003 HC RX 258

## 2021-07-13 PROCEDURE — 6360000002 HC RX W HCPCS

## 2021-07-13 RX ORDER — SODIUM CHLORIDE 9 MG/ML
5 INJECTION INTRAVENOUS ONCE
Status: DISCONTINUED | OUTPATIENT
Start: 2021-07-13 | End: 2021-07-15 | Stop reason: HOSPADM

## 2021-07-13 RX ORDER — METHYLPREDNISOLONE ACETATE 80 MG/ML
80 INJECTION, SUSPENSION INTRA-ARTICULAR; INTRALESIONAL; INTRAMUSCULAR; SOFT TISSUE ONCE
Status: DISCONTINUED | OUTPATIENT
Start: 2021-07-13 | End: 2021-07-15 | Stop reason: HOSPADM

## 2021-07-13 RX ORDER — LIDOCAINE HYDROCHLORIDE 10 MG/ML
5 INJECTION, SOLUTION EPIDURAL; INFILTRATION; INTRACAUDAL; PERINEURAL ONCE
Status: DISCONTINUED | OUTPATIENT
Start: 2021-07-13 | End: 2021-07-15 | Stop reason: HOSPADM

## 2021-07-13 ASSESSMENT — PAIN DESCRIPTION - ORIENTATION: ORIENTATION: LOWER

## 2021-07-13 ASSESSMENT — PAIN DESCRIPTION - DESCRIPTORS: DESCRIPTORS: CONSTANT

## 2021-07-13 ASSESSMENT — PAIN DESCRIPTION - PAIN TYPE: TYPE: CHRONIC PAIN

## 2021-07-13 ASSESSMENT — PAIN - FUNCTIONAL ASSESSMENT: PAIN_FUNCTIONAL_ASSESSMENT: 0-10

## 2021-07-13 ASSESSMENT — PAIN DESCRIPTION - LOCATION: LOCATION: BACK

## 2021-07-13 ASSESSMENT — PAIN SCALES - GENERAL: PAINLEVEL_OUTOF10: 6

## 2021-07-13 NOTE — INTERVAL H&P NOTE
Update History & Physical    The patient's History and Physical  was reviewed with the patient and I examined the patient. There was NO CHANGE:[id number]}. The surgical site was confirmed by the patient and me. Plan: The risks, benefits, expected outcome, and alternative to the recommended procedure have been discussed with the patient. Patient understands and wants to proceed with the procedure.      Electronically signed by Dlaton Beltran MD on 7/13/2021 at 11:37 AM

## 2021-07-18 DIAGNOSIS — I10 ESSENTIAL HYPERTENSION: Primary | ICD-10-CM

## 2021-07-20 ENCOUNTER — TELEPHONE (OUTPATIENT)
Dept: PAIN MANAGEMENT | Age: 46
End: 2021-07-20

## 2021-07-20 ENCOUNTER — PATIENT MESSAGE (OUTPATIENT)
Dept: PRIMARY CARE CLINIC | Age: 46
End: 2021-07-20

## 2021-07-20 ENCOUNTER — HOSPITAL ENCOUNTER (OUTPATIENT)
Dept: PHYSICAL THERAPY | Age: 46
Setting detail: THERAPIES SERIES
Discharge: HOME | End: 2021-07-20
Payer: MEDICARE

## 2021-07-20 DIAGNOSIS — M54.10 BACK PAIN WITH LEFT-SIDED RADICULOPATHY: ICD-10-CM

## 2021-07-20 DIAGNOSIS — E83.110 HEREDITARY HEMOCHROMATOSIS (HCC): ICD-10-CM

## 2021-07-20 DIAGNOSIS — M79.18 MYOFASCIAL MUSCLE PAIN: ICD-10-CM

## 2021-07-20 DIAGNOSIS — D75.1 POLYCYTHEMIA: Primary | ICD-10-CM

## 2021-07-20 PROCEDURE — 97112 NEUROMUSCULAR REEDUCATION: CPT

## 2021-07-20 PROCEDURE — 97110 THERAPEUTIC EXERCISES: CPT

## 2021-07-20 RX ORDER — AMLODIPINE BESYLATE 5 MG/1
5 TABLET ORAL DAILY
Qty: 90 TABLET | Refills: 3 | Status: SHIPPED | OUTPATIENT
Start: 2021-07-20 | End: 2022-07-05

## 2021-07-20 NOTE — PROGRESS NOTES
Physical Therapy  Daily Treatment Note  Date: 2021  Patient Name: Blake Hoskins  MRN: [id number]     :   1975    Subjective:   General  Referring Practitioner: Billy uMnoz  PT Visit Information  Onset Date: 21  PT Insurance Information: [employer], [id number] [facility] [address] Medicaid  Total # of Visits Approved: 9 ([employer] 8 visits, [id number] [facility] [address] Medicaid 4 visits + 16 units ther-ex and neuro re-ed)  Total # of Visits to Date: 6  Plan of Care/Certification Expiration Date: 21  Progress Note Due Date: 21  Subjective  Subjective: Patient states she has had some falls since she was last here but no injuries. She also states she thinks she may have lupus. Pain Screening  Patient Currently in Pain: Yes  Vital Signs  Patient Currently in Pain: Yes       Treatment Activities:                                    Exercises  Exercise 1: cone weaves   x 3               *use gait belt with all activities  Exercise 2: figure 8's 1 x 5  Exercise 3: box step  5/5  Exercise 4: ambulation with horizontal and vertical head turns  120' x 1 ea  Exercise 5: 360 degree circles cw/ccw  x 2 ea with sit to stand today alt  Exercise 6: forward bend with cone weave  x 5  (spray bottle)- not today  Exercise 7: seated horizontal and vertical gaze stabilizaiton  x 10 ea  Exercise 8: seated horizontal and vertical advanced gaze stabilizaiton  x 10 ea  Exercise 9: static standing with crom in front of fish picture horizontal and vertical  x 10 ea  Exercise 10: static stance on foam pads  x 1'  Exercise 11: pertebations in all directions  x 1' min  Exercise 12: standing marching  x 10  Exercise 13: standing fwd and lat step-ups  6\"  x 10 ea- fwd only today  Exercise 14: standing stair tapping 6\"  x 10  sba  Exercise 15: tandemn stance  x 30\" ea  Exercise 16: sls  x 0  Exercise 20:  If she is having increased dizziness with spinning have PT assess                               Assessment:   Conditions Requiring Skilled Therapeutic Intervention  Body structures, Functions, Activity limitations: Decreased functional mobility ; Decreased ADL status; Vestibular Impairment;Decreased high-level IADLs;Decreased balance  Assessment: Patient did well with tx today with min to mod v.c. for proper tech wtih ex's today. She had lob on several occassions with cga --> min assist required for safety and to prevent fall today. Patient required min to mod rest breaks today and had appropriate fatigue after session. Treatment Diagnosis: impaired balance, possible BPPV right horizontal canal  REQUIRES PT FOLLOW UP: Yes    Goals:  Short term goals  Time Frame for Short term goals: 4 Weeks  Short term goal 1:  Increase balance to have no sway with static stance- MET (07/08/2021: Patient is able to perform static stance with no sway)  Short term goal 2: Patient to have no lob with pertebations in all directions- PROGRESS (07/08/2021: Patient has no lob with min pertebations in all directions and occassional with mod pertebations in backward direction)  Short term goal 3: Patient to have hip flex strength 4+/5, quad and hs 5/5- PARTIALLY MET (07/08/2021: Patient has bilateral hip flex 4/5, quad and hs 5/5)  Short term goal 4: Patient to be independent with HEP- NOT MET (07/08/2021: Patient has not been started on independent HEP at this time due to need for supervison for ex's for safety)  Long term goals  Time Frame for Long term goals : 6 Weeks  Long term goal 1: Patient to have decreased report of dizziness/imbalance by 50%- PROGRESS (07/08/2021: Patient states she has improvement of dizziness/balance by 50%)  Long term goal 2: Patient to ambulate with more normalized gait with increased step length and height-- PROGRESS (07/08/2021: Patient ambulates with improved step length but does continue to have limited length and height of steps with slow charles)  Long term goal 3: Patient to demo increased function by scoring <= 30% impairment on Dizziness Handicap Inventory- NOT MET (07/08/2021: Patient scored 70% impairment on Dizziness Handicap Inventory)  Long term goal 4: Patient to demo increased balance by scoring >= 19/24 on DGI- PROGRESS (07/08/2021: patient scored 13/24 on DGI)  Patient Goals   Patient goals : decrease dizziness and increase balance  Plan:    Plan  Times per week: 2x/week  Plan weeks: 6 weeks  Current Treatment Recommendations: Strengthening, Balance Training, Functional Mobility Training, Positioning, Modalities, Manual Therapy - Joint Manipulation, Endurance Training, Safety Education & Training, Manual Therapy - Soft Tissue Mobilization, Neuromuscular Re-education, Home Exercise Program, Vestibular Rehab  Timed Code Treatment Minutes: 47 Minutes     Therapy Time   Individual Concurrent Group Co-treatment   Time In 1310         Time Out 1357         Minutes 47         Timed Code Treatment Minutes: 47 Minutes  Electronically signed by Nikki Salcedo PT on 7/20/2021 at 1:58 PM

## 2021-07-20 NOTE — TELEPHONE ENCOUNTER
Received fax from pharmacy requesting refill on pts medication(s). Pt was last seen in office on 5/18/2021  and has a follow up scheduled for 7/26/2021. Will send request to  Dr. Ene Levi  for patient.      Requested Prescriptions     Pending Prescriptions Disp Refills    amLODIPine (NORVASC) 5 MG tablet [Pharmacy Med Name: AMLODIPINE BESYLATE 5MG TABLETS] 30 tablet 3     Sig: TAKE 1 TABLET BY MOUTH DAILY

## 2021-07-20 NOTE — TELEPHONE ENCOUNTER
From: Kristine Curtis  [facility],   Sent: 7/20/2021 4:05 PM CDT  Subject: Visit Olivier Marley! I spoke with Dr Narciso Gaines (my hematologist) about wanting to be tested/screens for lupus. If you can set that up for me at [address], I would greatly appreciate it. My daughter has JACQUES and they are doing further testing because they believe she has lupus. I have so many of the symptoms, especially the butterfly rash.

## 2021-07-22 ENCOUNTER — HOSPITAL ENCOUNTER (OUTPATIENT)
Dept: GENERAL RADIOLOGY | Age: 46
Discharge: HOME | End: 2021-07-22
Payer: MEDICARE

## 2021-07-22 ENCOUNTER — HOSPITAL ENCOUNTER (OUTPATIENT)
Dept: PHYSICAL THERAPY | Age: 46
Setting detail: THERAPIES SERIES
Discharge: HOME | End: 2021-07-22
Payer: MEDICARE

## 2021-07-22 ENCOUNTER — OFFICE VISIT (OUTPATIENT)
Dept: URGENT CARE | Age: 46
End: 2021-07-22
Payer: MEDICARE

## 2021-07-22 VITALS
OXYGEN SATURATION: 95 % | RESPIRATION RATE: 18 BRPM | DIASTOLIC BLOOD PRESSURE: 82 MMHG | BODY MASS INDEX: 44.41 KG/M2 | SYSTOLIC BLOOD PRESSURE: 142 MMHG | TEMPERATURE: 97.1 F | WEIGHT: 293 LBS | HEART RATE: 121 BPM | HEIGHT: 68 IN

## 2021-07-22 DIAGNOSIS — K08.89 PAIN, DENTAL: Primary | ICD-10-CM

## 2021-07-22 DIAGNOSIS — K04.7 DENTAL INFECTION: ICD-10-CM

## 2021-07-22 DIAGNOSIS — R22.0 SWELLING ASSOCIATED WITH DENTAL STRUCTURE: ICD-10-CM

## 2021-07-22 DIAGNOSIS — K08.89 PAIN, DENTAL: ICD-10-CM

## 2021-07-22 LAB
BASOPHILS ABSOLUTE: 0.1 K/UL (ref 0–0.2)
BASOPHILS RELATIVE PERCENT: 0.8 % (ref 0–1)
EOSINOPHILS ABSOLUTE: 0.1 K/UL (ref 0–0.6)
EOSINOPHILS RELATIVE PERCENT: 1.1 % (ref 0–5)
HCT VFR BLD CALC: 37.1 % (ref 37–47)
HEMOGLOBIN: 12.7 G/DL (ref 12–16)
IMMATURE GRANULOCYTES #: 0.1 K/UL
LYMPHOCYTES ABSOLUTE: 3 K/UL (ref 1.1–4.5)
LYMPHOCYTES RELATIVE PERCENT: 24.8 % (ref 20–40)
MCH RBC QN AUTO: 31.6 PG (ref 27–31)
MCHC RBC AUTO-ENTMCNC: 34.2 G/DL (ref 33–37)
MCV RBC AUTO: 92.3 FL (ref 81–99)
MONOCYTES ABSOLUTE: 0.7 K/UL (ref 0–0.9)
MONOCYTES RELATIVE PERCENT: 5.4 % (ref 0–10)
NEUTROPHILS ABSOLUTE: 8 K/UL (ref 1.5–7.5)
NEUTROPHILS RELATIVE PERCENT: 67 % (ref 50–65)
PDW BLD-RTO: 17.4 % (ref 11.5–14.5)
PLATELET # BLD: 293 K/UL (ref 130–400)
PMV BLD AUTO: 9.5 FL (ref 9.4–12.3)
RBC # BLD: 4.02 M/UL (ref 4.2–5.4)
WBC # BLD: 12 K/UL (ref 4.8–10.8)

## 2021-07-22 PROCEDURE — G8417 CALC BMI ABV UP PARAM F/U: HCPCS | Performed by: NURSE PRACTITIONER

## 2021-07-22 PROCEDURE — 70100 X-RAY EXAM OF JAW <4VIEWS: CPT

## 2021-07-22 PROCEDURE — G8427 DOCREV CUR MEDS BY ELIG CLIN: HCPCS | Performed by: NURSE PRACTITIONER

## 2021-07-22 PROCEDURE — 36415 COLL VENOUS BLD VENIPUNCTURE: CPT | Performed by: NURSE PRACTITIONER

## 2021-07-22 PROCEDURE — 97112 NEUROMUSCULAR REEDUCATION: CPT

## 2021-07-22 PROCEDURE — 99214 OFFICE O/P EST MOD 30 MIN: CPT | Performed by: NURSE PRACTITIONER

## 2021-07-22 PROCEDURE — 1036F TOBACCO NON-USER: CPT | Performed by: NURSE PRACTITIONER

## 2021-07-22 PROCEDURE — 97110 THERAPEUTIC EXERCISES: CPT

## 2021-07-22 RX ORDER — CLINDAMYCIN HYDROCHLORIDE 300 MG/1
300 CAPSULE ORAL 2 TIMES DAILY
Qty: 20 CAPSULE | Refills: 0 | Status: SHIPPED | OUTPATIENT
Start: 2021-07-22 | End: 2021-08-01

## 2021-07-22 ASSESSMENT — ENCOUNTER SYMPTOMS
FACIAL SWELLING: 0
COLOR CHANGE: 0

## 2021-07-22 NOTE — PROGRESS NOTES
Physical Therapy  Daily Treatment Note  Date: 2021  Patient Name: Nain Romero  MRN: [id number]     :   1975    Subjective:   General  Referring Practitioner: Dima Zaragoza  PT Visit Information  Onset Date: 21  PT Insurance Information: [employer], [id number] [id number] [address] Medicaid  Total # of Visits Approved: 9 ([employer] 8 visits, [id number] [facility] [address] Medicaid 4 visits + 16 units ther-ex and neuro re-ed)  Total # of Visits to Date: 7  Plan of Care/Certification Expiration Date: 21  Progress Note Due Date: 21  Subjective  Subjective: Patient states she has  had no falls since prior to last tx. She says she is doing fair today, but has to go to urgent care after this for her mouth to get checked for infection. Pain Screening  Patient Currently in Pain: Yes  Vital Signs  Patient Currently in Pain: Yes       Treatment Activities:                                    Exercises  Exercise 1: cone weaves   x 3               *use gait belt with all activities  Exercise 2: figure 8's 1 x 5  Exercise 3: box step  5/5  Exercise 4: ambulation with horizontal and vertical head turns  120' x 1 ea  Exercise 5: 360 degree circles cw/ccw  x 2 ea with sit to stand today alt  Exercise 6: forward bend with cone weave  x 5  (spray bottle)- not today  Exercise 7: seated horizontal and vertical gaze stabilizaiton  x 10 ea  Exercise 8: seated horizontal and vertical advanced gaze stabilizaiton  x 10 ea  Exercise 9: static standing with crom in front of fish picture horizontal and vertical  x 10 ea  Exercise 10: static stance on foam pads  x 1'  Exercise 11: pertebations in all directions  x 1' min  Exercise 12: standing marching  x 10  Exercise 13: standing fwd and lat step-ups  6\"  x 10 ea- fwd only today  Exercise 14: standing stair tapping 6\"  x 10  sba  Exercise 15: tandemn stance  x 30\" ea  Exercise 16: sls  x 0  Exercise 20:  If she is having increased dizziness with spinning have PT assess Assessment:   Conditions Requiring Skilled Therapeutic Intervention  Body structures, Functions, Activity limitations: Decreased functional mobility ; Decreased ADL status; Vestibular Impairment;Decreased high-level IADLs;Decreased balance  Assessment: Patient had improved balance noted today during ex's with only cga required throughout session. She needed min breaks throughout session and appeared to have less fatigue today compared to last visit. Treatment Diagnosis: impaired balance, possible BPPV right horizontal canal  REQUIRES PT FOLLOW UP: Yes    Goals:  Short term goals  Time Frame for Short term goals: 4 Weeks  Short term goal 1:  Increase balance to have no sway with static stance- MET (07/08/2021: Patient is able to perform static stance with no sway)  Short term goal 2: Patient to have no lob with pertebations in all directions- PROGRESS (07/08/2021: Patient has no lob with min pertebations in all directions and occassional with mod pertebations in backward direction)  Short term goal 3: Patient to have hip flex strength 4+/5, quad and hs 5/5- PARTIALLY MET (07/08/2021: Patient has bilateral hip flex 4/5, quad and hs 5/5)  Short term goal 4: Patient to be independent with HEP- NOT MET (07/08/2021: Patient has not been started on independent HEP at this time due to need for supervison for ex's for safety)  Long term goals  Time Frame for Long term goals : 6 Weeks  Long term goal 1: Patient to have decreased report of dizziness/imbalance by 50%- PROGRESS (07/08/2021: Patient states she has improvement of dizziness/balance by 50%)  Long term goal 2: Patient to ambulate with more normalized gait with increased step length and height-- PROGRESS (07/08/2021: Patient ambulates with improved step length but does continue to have limited length and height of steps with slow charles)  Long term goal 3: Patient to demo increased function by scoring <= 30% impairment on Dizziness Handicap Inventory- NOT MET (07/08/2021: Patient scored 70% impairment on Dizziness Handicap Inventory)  Long term goal 4: Patient to demo increased balance by scoring >= 19/24 on DGI- PROGRESS (07/08/2021: patient scored 13/24 on DGI)  Patient Goals   Patient goals : decrease dizziness and increase balance  Plan:    Plan  Times per week: 2x/week  Plan weeks: 6 weeks  Current Treatment Recommendations: Strengthening, Balance Training, Functional Mobility Training, Positioning, Modalities, Manual Therapy - Joint Manipulation, Endurance Training, Safety Education & Training, Manual Therapy - Soft Tissue Mobilization, Neuromuscular Re-education, Home Exercise Program, Vestibular Rehab  Timed Code Treatment Minutes: 47 Minutes     Therapy Time   Individual Concurrent Group Co-treatment   Time In [phone]         Time Out 1503         Minutes 54         Timed Code Treatment Minutes: 54 Minutes  Electronically signed by Vernon Neves PT on 7/22/2021 at 3:17 PM

## 2021-07-22 NOTE — PROGRESS NOTES
[address] [facility] URGENT CARE  [address] [facility]  [address] [id number]  Dept: [phone]  Dept Fax: [phone]  Loc: [phone]    Cristina Ramírez is a 54 y.o. female who presents today for her medical conditions/complaintsas noted below. Cristina Ramírez is c/o of Oral Pain (had teeth pulled in June, one section is still very painful, pain radiates into below her chin and head, swollen)        HPI:     Patient here today for dental pain. She had lower tooth extraction in early June from [facility] and developed 5 dry sockets. These were surgically managed by dentist, and patient states most of them have improved but there is still one causing pain. She wears dentures. Denies fevers. States pain radiates into jaw and down neck and is rated as a 9/10. She has been on Ciprofloxacin and Augmentin since procedure. She states she contacted her dentist about this ongoing pain and dentist is unsure of what could be causing it. Patient states she read about osteomyelitis online and discussed this with dentist and was advised that an urgent care could rule out osteo with CT scan and lab work. She is tachycardic today and in pain, is taking Ibuprofen for pain control. Was on short course of Toradol previously.        Past Medical History:   Diagnosis Date    Anemia     has had iron infusion    Anxiety     Arthritis     Back pain with left-sided radiculopathy 3/14/2016    DDD (degenerative disc disease), lumbar     Depression     Esophagitis     Fibromyalgia     Gastritis     Headache(784.0)     History of blood transfusion     Hypertension     Obesity     RLS (restless legs syndrome)     Sleep apnea     uses CPAP machine     Past Surgical History:   Procedure Laterality Date    ANKLE SURGERY Right     APPENDECTOMY  4/19/15    BACK SURGERY  2000    CARPAL TUNNEL RELEASE Left     CERVICAL SPINE SURGERY N/A 9/1/15    CHOLECYSTECTOMY  1995    HIP SURGERY Right [facility]  2018    HYSTERECTOMY      partial , still has ovaries and cervix    INSERTION / REMOVAL / REPLACEMENT VENOUS ACCESS CATHETER Left 2017    PORT INSERTION performed by Rosio José MD at [address] [place] [address] LITHOTRIPSY  [address] [place] [address]      with hardware placed    CT COLONOSCOPY FLX DX W/COLLJ SPEC WHEN PFRMD N/A 2017    Dr Codie Valentin, 7 yr (age 47) recall    CT EGD TRANSORAL BIOPSY SINGLE/MULTIPLE N/A 2017    Dr ABHIJIT Baltazar-Gastritis/gastropathy    SALPINGO-OOPHORECTOMY      STOMACH SURGERY  2017    dr Valencia [facility]        Family History   Problem Relation Age of Onset    Diabetes Mother     High Blood Pressure Mother     Colon Polyps Mother     Heart Disease Mother     Cancer Mother     Depression Mother     Cancer Father     High Blood Pressure Father     Heart Disease Father     Stroke Father     High Blood Pressure Sister     Depression Sister     Anxiety Disorder Sister     Cancer Brother     Esophageal Cancer Brother     Anxiety Disorder Brother     Diabetes Brother     ADHD Brother     Depression Brother     Other Other         has history of suicide in family maternal great uncle       Social History     Tobacco Use    Smoking status: Former Smoker     Packs/day: 1.00     Years: 25.00     Pack years: 25.00     Types: Cigarettes     Quit date: 2013     Years since quittin.8    Smokeless tobacco: Never Used   Substance Use Topics    Alcohol use: Yes     Comment: rarely      Current Outpatient Medications   Medication Sig Dispense Refill    clindamycin (CLEOCIN) 300 MG capsule Take 1 capsule by mouth 2 times daily for 10 days 20 capsule 0    amLODIPine (NORVASC) 5 MG tablet Take 1 tablet by mouth daily 90 tablet 3    oxyCODONE (ROXICODONE) 5 MG immediate release tablet Take 1 tablet by mouth 3 times daily as needed for Pain for up to 30 days. (may fill 7/9/21) 90 tablet 0    acyclovir (ZOVIRAX) 800 MG tablet TAKE 1 TABLET BY MOUTH DAILY 90 tablet 3    metoprolol succinate (TOPROL XL) 50 MG extended release tablet TAKE 1 TABLET BY MOUTH TWICE DAILY 180 tablet 3    venlafaxine (EFFEXOR XR) 75 MG extended release capsule TAKE 1 CAPSULE BY MOUTH DAILY IN ADDITION  MG TO MAKE 225 MG DAILY 30 capsule 11    pregabalin (LYRICA) 100 MG capsule Take 1 capsule by mouth 2 times daily for 180 days. 60 capsule 5    deferasirox (JADENU) 360 MG tablet Take 5 tablets by mouth every morning (before breakfast)      albuterol sulfate HFA (PROVENTIL HFA) 108 (90 Base) MCG/ACT inhaler Inhale 2 puffs into the lungs every 6 hours as needed for Wheezing 3 Inhaler 3    metFORMIN (GLUCOPHAGE) 1000 MG tablet Take 1 tablet by mouth 2 times daily (with meals) 180 tablet 3    levocetirizine (XYZAL) 5 MG tablet Take 1 tablet by mouth nightly 90 tablet 3    nortriptyline (PAMELOR) 25 MG capsule TAKE 1 TO 2 CAPSULES BY MOUTH EVERY NIGHT 180 capsule 3    olopatadine (PATADAY) 0.2 % SOLN ophthalmic solution Place 1 drop into both eyes daily 1 Bottle 2    carbidopa-levodopa (SINEMET)  MG per tablet TAKE 1 TABLET BY MOUTH EVERY NIGHT 90 tablet 3    venlafaxine (EFFEXOR XR) 150 MG extended release capsule Take 1 capsule by mouth daily 90 capsule 3    omeprazole (PRILOSEC) 20 MG delayed release capsule Take 1 capsule by mouth Daily 90 capsule 3    naloxone (NARCAN) 4 MG/0.1ML LIQD nasal spray 1 spray by Nasal route as needed for Opioid Reversal 2 each 0    Calcium-Vitamin D 600-200 MG-UNIT TABS Take 1 tablet by mouth daily      ondansetron (ZOFRAN ODT) 4 MG disintegrating tablet Take 1 tablet by mouth every 8 hours as needed for Nausea or Vomiting 10 tablet 0    Multiple Vitamins-Minerals (MULTIVITAMIN & MINERAL PO) Take  by mouth.  CRANBERRY Take 500 mg by mouth daily.       Cholecalciferol (VITAMIN D3) 5000 UNITS TABS Take 5,000 Units by mouth daily        No current facility-administered medications for this visit. Allergies   Allergen Reactions    Silver Other (See Comments)     Redness, blisters    Tegaderm Ag Mesh 2\"X2\" [Wound Dressings] Other (See Comments)     blister    Zithromax [Azithromycin]      Severe abdominal pain        Health Maintenance   Topic Date Due    COVID-19 Vaccine (1) Never done    Cervical cancer screen  06/04/2016    Flu vaccine (1) 09/01/2021    Annual Wellness Visit (AWV)  04/27/2022    Potassium monitoring  06/03/2022    Creatinine monitoring  06/03/2022    Diabetes screen  02/03/2024    Colon cancer screen colonoscopy  05/02/2024    Lipid screen  02/03/2026    DTaP/Tdap/Td vaccine (2 - Td or Tdap) 03/31/2031    Hepatitis C screen  Completed    HIV screen  Completed    Hepatitis A vaccine  Aged Out    Hepatitis B vaccine  Aged Out    Hib vaccine  Aged Out    Meningococcal (ACWY) vaccine  Aged Out    Pneumococcal 0-64 years Vaccine  Aged Out       Subjective:     Review of Systems   Constitutional: Negative for fever. HENT: Positive for dental problem. Negative for facial swelling. Skin: Negative for color change and rash. Objective:     Physical Exam  Vitals and nursing note reviewed. Constitutional:       General: She is not in acute distress. Appearance: Normal appearance. She is not ill-appearing or toxic-appearing. HENT:      Head: Normocephalic and atraumatic. Right Ear: Tympanic membrane, ear canal and external ear normal.      Left Ear: Tympanic membrane, ear canal and external ear normal.      Mouth/Throat:        Comments: Lower dentures removed for exam.   Gum appears raised and erythematous with white exudate. Tender to touch. Eyes:      Extraocular Movements: Extraocular movements intact. Conjunctiva/sclera: Conjunctivae normal.      Pupils: Pupils are equal, round, and reactive to light. Cardiovascular:      Rate and Rhythm: Tachycardia present. Pulmonary:      Effort: Pulmonary effort is normal.   Musculoskeletal:         General: No swelling or signs of injury. Lymphadenopathy:      Cervical: Cervical adenopathy (left sided) present. Skin:     General: Skin is warm and dry. Findings: No rash. Neurological:      General: No focal deficit present. Mental Status: She is alert and oriented to person, place, and time. Motor: No weakness. Psychiatric:         Mood and Affect: Mood normal.       BP (!) 142/82   Pulse 121   Temp 97.1 °F (36.2 °C)   Resp 18   Ht 5' 8\" (1.727 m)   Wt (!) 307 lb (139.3 kg)   SpO2 95%   BMI 46.68 kg/m²     Assessment:       Diagnosis Orders   1. Pain, dental  XR MANDIBLE (<4 VIEWS)    CBC Auto Differential   2. Swelling associated with dental structure  XR MANDIBLE (<4 VIEWS)    CBC Auto Differential   3. Dental infection  clindamycin (CLEOCIN) 300 MG capsule       Plan:      Orders Placed This Encounter   Procedures    XR MANDIBLE (<4 VIEWS)     Standing Status:   Future     Number of Occurrences:   1     Standing Expiration Date:   7/22/2022     Order Specific Question:   Reason for exam:     Answer:   frontal dental extraction with complications, rule out osteo    CBC Auto Differential     Narrative   EXAMINATION: Mandible 7/22/2021   HISTORY: Frontal dental extraction. FINDINGS: Multiple views of the demonstrates some lucencies associated   with the more anterior mandible felt to be related to the sockets from   recent tooth extraction. I do not see any soft tissue gas or localized   soft tissue swelling. No fractures are identified.       Impression   1.. Lucencies within the more anterior mandible I suspect are related   to the tooth sockets from recently extracted teeth. If the patient's   symptoms are persistent follow-up with CT will be recommended to   better evaluate the mandible and to assess for potential osteomyelitis   and/or bone destruction.    Signed by Dr Julio Cesar Hernandez count 12 on CBC. Call placed to patient by staff to let patient know that her white count was only mildly elevated at 12 and the xray does not show active osteomyelitis. I am sending in Clindamycin to start taking; I also suggest doing probiotics since this will be the 3rd antibiotic she will be on. Keep appt with Dr. Quiroz and discuss need for CT is still having symptoms. No follow-ups on file. Orders Placed This Encounter   Medications    clindamycin (CLEOCIN) 300 MG capsule     Sig: Take 1 capsule by mouth 2 times daily for 10 days     Dispense:  20 capsule     Refill:  0       Patient given educational materials- see patient instructions. Discussed use, benefit, and side effects of prescribedmedications. All patient questions answered. Pt voiced understanding. Reviewedhealth maintenance. Instructed to continue current medications, diet and exercise. Patient agreed with treatment plan. Follow up as directed. Patient Instructions   We will call you with results from lab work and radiology.          Electronically signed by Winifred Moritz, APRN - CNP on 7/22/2021 at 5:15 PM

## 2021-07-23 ENCOUNTER — TELEPHONE (OUTPATIENT)
Dept: PRIMARY CARE CLINIC | Age: 46
End: 2021-07-23

## 2021-07-23 DIAGNOSIS — R68.84 MANDIBULAR PAIN: Primary | ICD-10-CM

## 2021-07-23 NOTE — TELEPHONE ENCOUNTER
Returned call to pt regarding a msg left earlier this morning. Pt has teeth extracted on 6/16/21 and had to go back cause had several dry sockets. They scraped out and put some \"goop\" in there to make her gums think that she had a blood clot there. She is having pain in one area and swelling. Went to urgent care and had x-rays and labs. According to Urgent Care notes:  States pain radiates into jaw and down neck and is rated as a 9/10. She has been on Ciprofloxacin and Augmentin since procedure. She states she contacted her dentist about this ongoing pain and dentist is unsure of what could be causing it. Patient states she read about osteomyelitis online and discussed this with dentist and was advised that an urgent care could rule out osteo with CT scan and lab work. She is tachycardic today and in pain, is taking Ibuprofen for pain control. Was on short course of Toradol previously. Pt has an appointment on Monday with Dr Ene Levi, but is wanting to know if he can go ahead and order the CT scan for her. I explained to her that even if we order it today, it would still have to be pre-certed with her insurance and that there had to be documentation in pts chart from Dr Ene Levi in order to get this covered. Pt reports this is extremely painful and was wanting to see if this could be done today. I will send to Dr Ene Levi for recommendations.

## 2021-07-24 DIAGNOSIS — F41.9 ANXIETY: ICD-10-CM

## 2021-07-24 DIAGNOSIS — F33.41 RECURRENT MAJOR DEPRESSIVE DISORDER, IN PARTIAL REMISSION (HCC): ICD-10-CM

## 2021-07-24 DIAGNOSIS — K21.9 GASTROESOPHAGEAL REFLUX DISEASE: ICD-10-CM

## 2021-07-25 ENCOUNTER — HOSPITAL ENCOUNTER (EMERGENCY)
Age: 46
Discharge: HOME | End: 2021-07-25
Attending: EMERGENCY MEDICINE
Payer: MEDICARE

## 2021-07-25 ENCOUNTER — APPOINTMENT (OUTPATIENT)
Dept: CT IMAGING | Age: 46
End: 2021-07-25
Payer: MEDICARE

## 2021-07-25 VITALS
BODY MASS INDEX: 44.41 KG/M2 | TEMPERATURE: 98.1 F | HEART RATE: 95 BPM | SYSTOLIC BLOOD PRESSURE: 147 MMHG | DIASTOLIC BLOOD PRESSURE: 85 MMHG | RESPIRATION RATE: 18 BRPM | HEIGHT: 68 IN | OXYGEN SATURATION: 96 % | WEIGHT: 293 LBS

## 2021-07-25 DIAGNOSIS — R68.84 PAIN IN MANDIBLE: Primary | ICD-10-CM

## 2021-07-25 DIAGNOSIS — M25.571 ACUTE RIGHT ANKLE PAIN: ICD-10-CM

## 2021-07-25 DIAGNOSIS — K08.409 S/P TOOTH EXTRACTION: ICD-10-CM

## 2021-07-25 DIAGNOSIS — Z98.890 HISTORY OF ANKLE SURGERY: ICD-10-CM

## 2021-07-25 LAB
ALBUMIN SERPL-MCNC: 3.8 G/DL (ref 3.5–5.2)
ALP BLD-CCNC: 117 U/L (ref 35–104)
ALT SERPL-CCNC: 17 U/L (ref 5–33)
ANION GAP SERPL CALCULATED.3IONS-SCNC: 13 MMOL/L (ref 7–19)
AST SERPL-CCNC: 21 U/L (ref 5–32)
BACTERIA: ABNORMAL /HPF
BASOPHILS ABSOLUTE: 0.1 K/UL (ref 0–0.2)
BASOPHILS RELATIVE PERCENT: 0.6 % (ref 0–1)
BILIRUB SERPL-MCNC: 0.8 MG/DL (ref 0.2–1.2)
BILIRUBIN URINE: NEGATIVE
BLOOD, URINE: NEGATIVE
BUN BLDV-MCNC: 14 MG/DL (ref 6–20)
C-REACTIVE PROTEIN: 3.18 MG/DL (ref 0–0.5)
CALCIUM SERPL-MCNC: 9.5 MG/DL (ref 8.6–10)
CHLORIDE BLD-SCNC: 101 MMOL/L (ref 98–111)
CLARITY: ABNORMAL
CO2: 24 MMOL/L (ref 22–29)
COLOR: ABNORMAL
CREAT SERPL-MCNC: 0.7 MG/DL (ref 0.5–0.9)
CRYSTALS, UA: ABNORMAL /HPF
EOSINOPHILS ABSOLUTE: 0.1 K/UL (ref 0–0.6)
EOSINOPHILS RELATIVE PERCENT: 0.8 % (ref 0–5)
EPITHELIAL CELLS, UA: ABNORMAL /HPF
GFR AFRICAN AMERICAN: >59
GFR NON-AFRICAN AMERICAN: >60
GLUCOSE BLD-MCNC: 162 MG/DL (ref 74–109)
GLUCOSE URINE: NEGATIVE MG/DL
HCT VFR BLD CALC: 32.3 % (ref 37–47)
HEMOGLOBIN: 11.1 G/DL (ref 12–16)
IMMATURE GRANULOCYTES #: 0.1 K/UL
KETONES, URINE: ABNORMAL MG/DL
LEUKOCYTE ESTERASE, URINE: ABNORMAL
LYMPHOCYTES ABSOLUTE: 2.5 K/UL (ref 1.1–4.5)
LYMPHOCYTES RELATIVE PERCENT: 25.5 % (ref 20–40)
MCH RBC QN AUTO: 31.7 PG (ref 27–31)
MCHC RBC AUTO-ENTMCNC: 34.4 G/DL (ref 33–37)
MCV RBC AUTO: 92.3 FL (ref 81–99)
MONOCYTES ABSOLUTE: 0.6 K/UL (ref 0–0.9)
MONOCYTES RELATIVE PERCENT: 6 % (ref 0–10)
NEUTROPHILS ABSOLUTE: 6.6 K/UL (ref 1.5–7.5)
NEUTROPHILS RELATIVE PERCENT: 66.4 % (ref 50–65)
NITRITE, URINE: NEGATIVE
PDW BLD-RTO: 17.4 % (ref 11.5–14.5)
PH UA: 5 (ref 5–8)
PLATELET # BLD: 207 K/UL (ref 130–400)
PMV BLD AUTO: 9.4 FL (ref 9.4–12.3)
POTASSIUM REFLEX MAGNESIUM: 3.8 MMOL/L (ref 3.5–5)
PROTEIN UA: ABNORMAL MG/DL
RBC # BLD: 3.5 M/UL (ref 4.2–5.4)
RBC UA: ABNORMAL /HPF (ref 0–2)
SEDIMENTATION RATE, ERYTHROCYTE: 43 MM/HR (ref 0–20)
SODIUM BLD-SCNC: 138 MMOL/L (ref 136–145)
SPECIFIC GRAVITY UA: 1.02 (ref 1–1.03)
TOTAL PROTEIN: 6.7 G/DL (ref 6.6–8.7)
UROBILINOGEN, URINE: 1 E.U./DL
WBC # BLD: 9.9 K/UL (ref 4.8–10.8)
WBC UA: ABNORMAL /HPF (ref 0–5)

## 2021-07-25 PROCEDURE — 70488 CT MAXILLOFACIAL W/O & W/DYE: CPT

## 2021-07-25 PROCEDURE — 85025 COMPLETE CBC W/AUTO DIFF WBC: CPT

## 2021-07-25 PROCEDURE — 86140 C-REACTIVE PROTEIN: CPT

## 2021-07-25 PROCEDURE — 80053 COMPREHEN METABOLIC PANEL: CPT

## 2021-07-25 PROCEDURE — 36415 COLL VENOUS BLD VENIPUNCTURE: CPT

## 2021-07-25 PROCEDURE — 6360000004 HC RX CONTRAST MEDICATION: Performed by: EMERGENCY MEDICINE

## 2021-07-25 PROCEDURE — 81001 URINALYSIS AUTO W/SCOPE: CPT

## 2021-07-25 PROCEDURE — 99283 EMERGENCY DEPT VISIT LOW MDM: CPT

## 2021-07-25 PROCEDURE — 85652 RBC SED RATE AUTOMATED: CPT

## 2021-07-25 RX ADMIN — IOPAMIDOL 95 ML: 755 INJECTION, SOLUTION INTRAVENOUS at 16:13

## 2021-07-25 ASSESSMENT — ENCOUNTER SYMPTOMS
RHINORRHEA: 0
VOMITING: 0
SHORTNESS OF BREATH: 0
VOICE CHANGE: 0
COUGH: 0
ABDOMINAL PAIN: 0
DIARRHEA: 0
EYE PAIN: 0
EYE REDNESS: 0

## 2021-07-25 NOTE — ED PROVIDER NOTES
[facility] EMERGENCY DEPT  EMERGENCY DEPARTMENT ENCOUNTER      Pt Name: Blake Hoskins  MRN: [id number]  [place] 1975  Date of evaluation: 7/25/2021  Provider: Cinthya Underwood MD    CHIEF COMPLAINT       Chief Complaint   Patient presents with    Generalized Body Aches     Has pain in jaw, face, hip and ankle. Thinks she may have Osteomyelitis          HISTORY OF PRESENT ILLNESS   (Location/Symptom, Timing/Onset,Context/Setting, Quality, Duration, Modifying Factors, Severity)  Note limiting factors. Blake Hoskins is a 54 y.o. female who presents to the emergency department with complaint of pain in her jaw. States this pain started after tooth extraction 5 to 6 weeks ago. After that she had a dry socket that was treated by the dentist.  Has continued to have pain in that area associated with night sweats and generalized body aches. Patient believes she may have developed osteomyelitis of the jaw. Has been seen at urgent care and had a mandible x-ray that was unremarkable. She has been on several rounds of antibiotics recently with no improvement in pain. States this morning she also woke up with pain in her right ankle which she says is unusual.  Does have a history of surgery on the right ankle with hardware present and was concerned about infection. Patient has an outpatient follow-up with her primary care provider tomorrow but has not been able to get the CT of her facial bones performed that was ordered as an outpatient because of insurance issues. Patient has followed up with her dentist as well since the tooth extraction who reportedly told her that she would have to come to the ER or her primary care doctor to have testing done to rule out osteomyelitis. HPI    NursingNotes were reviewed. REVIEW OF SYSTEMS    (2-9 systems for level 4, 10 or more for level 5)     Review of Systems   Constitutional: Negative for fatigue and fever. HENT: Positive for dental problem.  Negative for congestion, rhinorrhea and voice change. Eyes: Negative for pain and redness. Respiratory: Negative for cough and shortness of breath. Cardiovascular: Negative for chest pain. Gastrointestinal: Negative for abdominal pain, diarrhea and vomiting. Endocrine: Negative. Genitourinary: Negative. Musculoskeletal: Positive for arthralgias. Negative for gait problem. Skin: Negative for rash and wound. Neurological: Negative for weakness and headaches. Hematological: Negative. Psychiatric/Behavioral: Negative. All other systems reviewed and are negative. A complete review of systems was performed and is negative except as noted above in the HPI.        PAST MEDICAL HISTORY     Past Medical History:   Diagnosis Date    Anemia     has had iron infusion    Anxiety     Arthritis     Back pain with left-sided radiculopathy 3/14/2016    DDD (degenerative disc disease), lumbar     Depression     Esophagitis     Fibromyalgia     Gastritis     Headache(784.0)     History of blood transfusion     Hypertension     Obesity     RLS (restless legs syndrome)     Sleep apnea     uses CPAP machine         SURGICAL HISTORY       Past Surgical History:   Procedure Laterality Date    ANKLE SURGERY Right     APPENDECTOMY  4/19/15    BACK SURGERY  2000    CARPAL TUNNEL RELEASE Left     CERVICAL SPINE SURGERY N/A 9/1/15    CHOLECYSTECTOMY  1995    HIP SURGERY Right 1987    HIP SURGERY  03/28/2018    HYSTERECTOMY      partial 2008, still has ovaries and cervix    INSERTION / REMOVAL / REPLACEMENT VENOUS ACCESS CATHETER Left 11/7/2017    PORT INSERTION performed by Dahiana De Jesus MD at [address] [place] [address] LITHOTRIPSY  [address] [place] [address]      with hardware placed    NE COLONOSCOPY FLX DX W/COLLJ SPEC WHEN PFRMD N/A 5/2/2017    Dr Shanelle Thomas, 7 yr (age 47) recall    NE EGD TRANSORAL BIOPSY SINGLE/MULTIPLE N/A 5/2/2017    Dr Yesica Baltazar-Gastritis/gastropathy    a therapist in the past.         She had ADHD tested by Bella Zhu  This past year        Has been on Lexapro, Wellbutrin, Cymbalta-this was bad. Social Determinants of Health     Financial Resource Strain: Medium Risk    Difficulty of Paying Living Expenses: Somewhat hard   Food Insecurity: Food Insecurity Present    Worried About Running Out of Food in the Last Year: Often true    Clarence of Food in the Last Year: Often true   Transportation Needs:     Lack of Transportation (Medical):  Lack of Transportation (Non-Medical):    Physical Activity:     Days of Exercise per Week:     Minutes of Exercise per Session:    Stress:     Feeling of Stress :    Social Connections:     Frequency of Communication with Friends and Family:     Frequency of Social Gatherings with Friends and Family:     Attends Sabianism Services:     Active Member of Clubs or Organizations:     Attends Club or Organization Meetings:     Marital Status:    Intimate Partner Violence:     Fear of Current or Ex-Partner:     Emotionally Abused:     Physically Abused:     Sexually Abused:        SCREENINGS             PHYSICAL EXAM    (up to 7 for level 4, 8 or more for level 5)     ED Triage Vitals [07/25/21 1407]   BP Temp Temp Source Pulse Resp SpO2 Height Weight   (!) 147/85 98.1 °F (36.7 °C) Temporal 95 18 96 % 5' 8\" (1.727 m) 300 lb (136.1 kg)       Physical Exam  Vitals and nursing note reviewed. Constitutional:       General: She is not in acute distress. Appearance: She is well-developed. She is not toxic-appearing or diaphoretic. HENT:      Head: Normocephalic and atraumatic. Mouth/Throat:      Comments: Edentulous, no obvious dental abscess, submandibular swelling or redness, or other visible or palpable abnormality.   There is tenderness along the anterior mandible on right greater than left with some irregularities on the surface but no large fluid collections were drainable abscess  Eyes: General: No scleral icterus. Right eye: No discharge. Left eye: No discharge. Pupils: Pupils are equal, round, and reactive to light. Cardiovascular:      Rate and Rhythm: Normal rate and regular rhythm. Pulmonary:      Effort: Pulmonary effort is normal. No respiratory distress. Breath sounds: No stridor. Abdominal:      General: There is no distension. Musculoskeletal:         General: No deformity. Normal range of motion. Cervical back: Normal range of motion. Right ankle: Normal.      Left ankle: Normal.   Skin:     General: Skin is warm and dry. Neurological:      Mental Status: She is alert and oriented to person, place, and time. GCS: GCS eye subscore is 4. GCS verbal subscore is 5. GCS motor subscore is 6. Cranial Nerves: No cranial nerve deficit. Motor: No abnormal muscle tone. Psychiatric:         Behavior: Behavior normal.         Thought Content: Thought content normal.         Judgment: Judgment normal.         DIAGNOSTIC RESULTS     EKG: All EKG's are interpreted by the Emergency Department Physician who either signs or Co-signs this chart in the absence of a cardiologist.        RADIOLOGY:   Non-plain film images such as CT, Ultrasound and MRI are read by the radiologist. Angle Dress images are visualized and preliminarily interpreted by the emergency physician with the below findings:        Interpretation per the Radiologist below, if available at the time of this note:    CT FACIAL BONES W WO CONTRAST   Final Result   1.. No radiographic evidence of osteomyelitis. No localized   inflammatory changes or discrete drainable fluid collections observed. 2. Small left mastoid effusion. The mastoid air cells and paranasal   sinuses are otherwise clear.    Signed by Dr Luis Alberto Hoang            ED BEDSIDE ULTRASOUND:   Performed by ED Physician - none    LABS:  Labs Reviewed   CBC WITH AUTO DIFFERENTIAL - Abnormal; Notable for the following components:       Result Value    RBC 3.50 (*)     Hemoglobin 11.1 (*)     Hematocrit 32.3 (*)     MCH 31.7 (*)     RDW 17.4 (*)     Neutrophils % 66.4 (*)     All other components within normal limits   URINE RT REFLEX TO CULTURE - Abnormal; Notable for the following components:    Color, UA DARK YELLOW (*)     Clarity, UA CLOUDY (*)     Ketones, Urine TRACE (*)     Protein, UA TRACE (*)     Leukocyte Esterase, Urine SMALL (*)     All other components within normal limits   COMPREHENSIVE METABOLIC PANEL W/ REFLEX TO MG FOR LOW K - Abnormal; Notable for the following components:    Glucose 162 (*)     Alkaline Phosphatase 117 (*)     All other components within normal limits   SEDIMENTATION RATE - Abnormal; Notable for the following components:    Sed Rate 43 (*)     All other components within normal limits   C-REACTIVE PROTEIN - Abnormal; Notable for the following components:    CRP 3.18 (*)     All other components within normal limits   MICROSCOPIC URINALYSIS - Abnormal; Notable for the following components:    Bacteria, UA 1+ (*)     Crystals, UA 1+ Ca. Oxalate (*)     All other components within normal limits       All other labs were within normal range or not returned as of this dictation. Medications   iopamidol (ISOVUE-370) 76 % injection 95 mL (95 mLs Intravenous Given 7/25/21 1613)       EMERGENCY DEPARTMENT COURSE and DIFFERENTIALDIAGNOSIS/MDM:   Vitals:    Vitals:    07/25/21 1407   BP: (!) 147/85   Pulse: 95   Resp: 18   Temp: 98.1 °F (36.7 °C)   TempSrc: Temporal   SpO2: 96%   Weight: 300 lb (136.1 kg)   Height: 5' 8\" (1.727 m)       MDM    ED Course as of Jul 25 1642   Sun Jul 25, 2021   1523 WBC: 9.9 [CHASITY]   1558 CRP(!): 3.18 [CHASITY]   1635 Sed Rate(!): 43 [CHASITY]   1635 No external evidence of drainable abscess, Ilya's angina, Lemierre's syndrome, or more serious deep space infection. Laboratory evaluation shows normal white blood cell count. Inflammatory markers are mildly elevated.   CT obtained to rule out osteomyelitis or other infection or complication. [CHASITY]      ED Course User Index  [CHASITY] Charlaine Lanes., MD       CONSULTS:  None    PROCEDURES:  Unless otherwise notedbelow, none     Procedures      FINAL IMPRESSION     1. Pain in mandible    2. S/P tooth extraction    3. Acute right ankle pain    4.  History of ankle surgery          DISPOSITION/PLAN   DISPOSITION Decision To Discharge 07/25/2021 04:28:39 PM      PATIENT REFERRED TO:  [facility] EMERGENCY DEPT  [facility]  [phone]    If symptoms worsen    B Toya Bosworth,   [address] [facility]     In 1 day        DISCHARGE MEDICATIONS:  New Prescriptions    No medications on file          (Please note that portions of this note were completed with a voice recognition program.  Efforts were made to edit the dictations butoccasionally words are mis-transcribed.)    Charlaine Lanes, MD (electronically signed)  AttendingEmergency Physician          Charlaine Lanes., MD  07/25/21 9553

## 2021-07-26 ENCOUNTER — APPOINTMENT (OUTPATIENT)
Dept: PHYSICAL THERAPY | Age: 46
End: 2021-07-26
Payer: MEDICARE

## 2021-07-26 ENCOUNTER — OFFICE VISIT (OUTPATIENT)
Dept: PRIMARY CARE CLINIC | Age: 46
End: 2021-07-26
Payer: MEDICARE

## 2021-07-26 VITALS
TEMPERATURE: 98.2 F | HEIGHT: 68 IN | SYSTOLIC BLOOD PRESSURE: 126 MMHG | DIASTOLIC BLOOD PRESSURE: 78 MMHG | OXYGEN SATURATION: 97 % | HEART RATE: 99 BPM | WEIGHT: 293 LBS | BODY MASS INDEX: 44.41 KG/M2

## 2021-07-26 DIAGNOSIS — R21 MALAR RASH: ICD-10-CM

## 2021-07-26 DIAGNOSIS — R21 MALAR RASH: Primary | ICD-10-CM

## 2021-07-26 DIAGNOSIS — J01.21 ACUTE RECURRENT ETHMOIDAL SINUSITIS: ICD-10-CM

## 2021-07-26 DIAGNOSIS — K04.7 TOOTH ABSCESS: ICD-10-CM

## 2021-07-26 PROCEDURE — G8427 DOCREV CUR MEDS BY ELIG CLIN: HCPCS | Performed by: PEDIATRICS

## 2021-07-26 PROCEDURE — 1036F TOBACCO NON-USER: CPT | Performed by: PEDIATRICS

## 2021-07-26 PROCEDURE — G8417 CALC BMI ABV UP PARAM F/U: HCPCS | Performed by: PEDIATRICS

## 2021-07-26 PROCEDURE — 99214 OFFICE O/P EST MOD 30 MIN: CPT | Performed by: PEDIATRICS

## 2021-07-26 RX ORDER — CLINDAMYCIN HYDROCHLORIDE 300 MG/1
300 CAPSULE ORAL 3 TIMES DAILY
Qty: 30 CAPSULE | Refills: 0 | Status: SHIPPED | OUTPATIENT
Start: 2021-07-26 | End: 2021-08-05

## 2021-07-26 RX ORDER — OMEPRAZOLE 20 MG/1
20 CAPSULE, DELAYED RELEASE ORAL DAILY
Qty: 90 CAPSULE | Refills: 3 | Status: SHIPPED | OUTPATIENT
Start: 2021-07-26 | End: 2021-12-02

## 2021-07-26 RX ORDER — VENLAFAXINE HYDROCHLORIDE 150 MG/1
150 CAPSULE, EXTENDED RELEASE ORAL DAILY
Qty: 90 CAPSULE | Refills: 3 | Status: SHIPPED | OUTPATIENT
Start: 2021-07-26 | End: 2022-09-01

## 2021-07-26 ASSESSMENT — ENCOUNTER SYMPTOMS
EYES NEGATIVE: 1
DIARRHEA: 0
SORE THROAT: 0
SHORTNESS OF BREATH: 0
CONSTIPATION: 0
RESPIRATORY NEGATIVE: 1
NAUSEA: 0
CHEST TIGHTNESS: 0
ABDOMINAL PAIN: 0
BACK PAIN: 0
ALLERGIC/IMMUNOLOGIC NEGATIVE: 1
EYE DISCHARGE: 0
VOMITING: 0
WHEEZING: 0
COUGH: 0
SINUS PRESSURE: 0
GASTROINTESTINAL NEGATIVE: 1

## 2021-07-26 NOTE — PROGRESS NOTES
[address] [facility], [address]  Phone [phone]   Fax [phone]      OFFICE VISIT: 2021    Nahid Moore-: 1975      HPI  Reason For Visit:  ST NUNN is a 54 y.o.     3 Month Follow-Up, Lupus (would like labs to check for lupus, her daughter was recently diagnosed. She states she has pain, fatigue and multiple other issues. ), ED Follow-up (went to ER last night for jaw pain. ), and Discuss Medications (increase omeprazole, she is still having issues with heartburn. )    Patient presents with concerns about lupus. Her daughter was recently diagnosed with lupus. She is wanting to be tested for this. She did have some labs performed yesterday. Is likely who ordered these labs. She has other labs that are active in the chart that were ordered previously  She has an elevated C-reactive protein at 3.18  Sedimentation rate is elevated at 43  This Lupus labs were ordered on 2021 are pending    It appears the labs were ordered emergency department  She presented to emergency department at [facility] with jaw pain. She was concerned about osteomyelitis. She had a CT scan of the facial bones with and without contrast.  CT scan was pending previously that we had ordered on 2021    Narrative   EXAMINATION: CT facial bones with and without contrast 2021   Dose: 607 mGycm. All CT scans are performed using dose optimization   techniques as appropriate to the performed exam and include at least   one of the following: Automated exposure control, adjustment of the mA   and/or kV according to size, and the use of the iterative   reconstruction technique. Graylon Usman HISTORY: Recent dental extraction. Patient concern for abscess or   osteomyelitis.    FINDINGS: Multiple contiguous axial images are obtained through the   facial bones per protocol both with and without contrast enhancement   with reformatted images obtained in the sagittal and coronal   projections from the original data set. The orbits are unremarkable. The visualized mastoid air cells and   paranasal sinuses are remarkable for a small left mastoid effusion. There is no fluid in the middle ear cavity or epitympanum. Postoperative changes are noted within the anterior mandible related   to recent extraction of the patient's teeth. There is no evidence of   bone destruction or localized soft tissue swelling to suggest   osteomyelitis. No drainable fluid collection is identified. The orbits are unremarkable.       Impression   1.. No radiographic evidence of osteomyelitis. No localized   inflammatory changes or discrete drainable fluid collections observed. 2. Small left mastoid effusion. The mastoid air cells and paranasal   sinuses are otherwise clear. Signed by Dr Ann Lara     Immunologic studies have not been done but based upon other criteria, she does not meet the diagnostic criteria for lupus. She does have a malar rash. No seizure history  She has photosensitivity with easy burning and itchy with sun burn. No red, swollen joints. (she has a lot of joint pain)    She went to urgent care last week and was put on abx. She is taking Clindamycin bid. We discussed that this should be dosed tid. We will change this today as she is taking this medication. height is 5' 8\" (1.727 m) and weight is 309 lb (140.2 kg) (abnormal). Her temporal temperature is 98.2 °F (36.8 °C). Her blood pressure is 126/78 and her pulse is 99. Her oxygen saturation is 97%. Body mass index is 46.98 kg/m². I have reviewed the following with the Ms. Moore   Lab Review  Admission on 07/25/2021, Discharged on 07/25/2021   Component Date Value    WBC 07/25/2021 9.9     RBC 07/25/2021 3.50*    Hemoglobin 07/25/2021 11.1*    Hematocrit 07/25/2021 32.3*    MCV 07/25/2021 92.3     MCH 07/25/2021 31.7*    MCHC 07/25/2021 34.4     RDW 07/25/2021 17.4*    Platelets 07/25/2021 207     MPV 07/25/2021 9.4     Neutrophils % 07/25/2021 66.4*    Lymphocytes % 07/25/2021 25.5     Monocytes % 07/25/2021 6.0     Eosinophils % 07/25/2021 0.8     Basophils % 07/25/2021 0.6     Neutrophils Absolute 07/25/2021 6.6     Immature Granulocytes # 07/25/2021 0.1     Lymphocytes Absolute 07/25/2021 2.5     Monocytes Absolute 07/25/2021 0.60     Eosinophils Absolute 07/25/2021 0.10     Basophils Absolute 07/25/2021 0.10     Color, UA 07/25/2021 DARK YELLOW*    Clarity, UA 07/25/2021 CLOUDY*    Glucose, Ur 07/25/2021 Negative     Bilirubin Urine 07/25/2021 Negative     Ketones, Urine 07/25/2021 TRACE*    Specific Gravity, UA 07/25/2021 1.025     Blood, Urine 07/25/2021 Negative     pH, UA 07/25/2021 5.0     Protein, UA 07/25/2021 TRACE*    Urobilinogen, Urine 07/25/2021 1.0     Nitrite, Urine 07/25/2021 Negative     Leukocyte Esterase, Urine 07/25/2021 SMALL*    Sodium 07/25/2021 138     Potassium reflex Magnesi* 07/25/2021 3.8     Chloride 07/25/2021 101     CO2 07/25/2021 24     Anion Gap 07/25/2021 13     Glucose 07/25/2021 162*    BUN 07/25/2021 14     CREATININE 07/25/2021 0.7     GFR Non- 07/25/2021 >60     GFR  07/25/2021 >59     Calcium 07/25/2021 9.5     Total Protein 07/25/2021 6.7     Albumin 07/25/2021 3.8     Total Bilirubin 07/25/2021 0.8     Alkaline Phosphatase 07/25/2021 117*    ALT 07/25/2021 17     AST 07/25/2021 21     Sed Rate 07/25/2021 43*    CRP 07/25/2021 3.18*    WBC, UA 07/25/2021 0-1     RBC, UA 07/25/2021 0-1     Epithelial Cells, UA 07/25/2021 5-10     Bacteria, UA 07/25/2021 1+*    Crystals, UA 07/25/2021 1+ Ca.  Oxalate*   Office Visit on 07/22/2021   Component Date Value    WBC 07/22/2021 12.0*    RBC 07/22/2021 4.02*    Hemoglobin 07/22/2021 12.7     Hematocrit 07/22/2021 37.1     MCV 07/22/2021 92.3     MCH 07/22/2021 31.6*    MCHC 07/22/2021 34.2     RDW 07/22/2021 17.4*    Platelets 93/76/0828 293     MPV 07/22/2021 9.5     Neutrophils % 07/22/2021 67.0*    Lymphocytes % 07/22/2021 24.8     Monocytes % 07/22/2021 5.4     Eosinophils % 07/22/2021 1.1     Basophils % 07/22/2021 0.8     Neutrophils Absolute 07/22/2021 8.0*    Immature Granulocytes # 07/22/2021 0.1     Lymphocytes Absolute 07/22/2021 3.0     Monocytes Absolute 07/22/2021 0.70     Eosinophils Absolute 07/22/2021 0.10     Basophils Absolute 07/22/2021 0.10    Admission on 06/03/2021, Discharged on 06/03/2021   Component Date Value    WBC 06/03/2021 8.0     RBC 06/03/2021 3.28*    Hemoglobin 06/03/2021 10.4*    Hematocrit 06/03/2021 30.6*    MCV 06/03/2021 93.3     MCH 06/03/2021 31.7*    MCHC 06/03/2021 34.0     RDW 06/03/2021 17.0*    Platelets 90/38/9839 187     MPV 06/03/2021 10.0     Neutrophils % 06/03/2021 62.2     Lymphocytes % 06/03/2021 29.3     Monocytes % 06/03/2021 5.4     Eosinophils % 06/03/2021 1.6     Basophils % 06/03/2021 1.0     Neutrophils Absolute 06/03/2021 5.0     Immature Granulocytes # 06/03/2021 0.0     Lymphocytes Absolute 06/03/2021 2.3     Monocytes Absolute 06/03/2021 0.40     Eosinophils Absolute 06/03/2021 0.10     Basophils Absolute 06/03/2021 0.10     Sodium 06/03/2021 138     Potassium reflex Magnesi* 06/03/2021 3.7     Chloride 06/03/2021 102     CO2 06/03/2021 24     Anion Gap 06/03/2021 12     Glucose 06/03/2021 118*    BUN 06/03/2021 14     CREATININE 06/03/2021 0.8     GFR Non- 06/03/2021 >60     GFR  06/03/2021 >59     Calcium 06/03/2021 9.0     Total Protein 06/03/2021 6.4*    Albumin 06/03/2021 4.2     Total Bilirubin 06/03/2021 0.8     Alkaline Phosphatase 06/03/2021 112*    ALT 06/03/2021 22     AST 06/03/2021 35*    P-R Interval 06/03/2021 156     QRS Duration 06/03/2021 86     Q-T Interval 06/03/2021 398     QTc Calculation (Bazett) 06/03/2021 420     P Axis 06/03/2021 42     T Axis 06/03/2021 41     Color, UA 06/03/2021 YELLOW     Clarity, UA 06/03/2021 Clear     Glucose, Ur 06/03/2021 Negative     Bilirubin Urine 06/03/2021 Negative     Ketones, Urine 06/03/2021 Negative     Specific Gravity, UA 06/03/2021 1.007     Blood, Urine 06/03/2021 Negative     pH, UA 06/03/2021 5.5     Protein, UA 06/03/2021 Negative     Urobilinogen, Urine 06/03/2021 0.2     Nitrite, Urine 06/03/2021 Negative     Leukocyte Esterase, Urine 06/03/2021 Negative     Troponin 06/03/2021 <0.01    Orders Only on 03/31/2021   Component Date Value    Antibody Screen 09/18/2020 neg    Orders Only on 02/03/2021   Component Date Value    Microalbumin, Random Uri* 02/03/2021 2.70     Creatinine, Ur 02/03/2021 258. 3     Microalbumin Creatinine * 02/03/2021 10.5     Sodium 02/03/2021 139     Potassium 02/03/2021 3.6     Chloride 02/03/2021 102     CO2 02/03/2021 24     Anion Gap 02/03/2021 13     Glucose 02/03/2021 123*    BUN 02/03/2021 10     CREATININE 02/03/2021 0.7     GFR Non- 02/03/2021 >60     GFR  02/03/2021 >59     Calcium 02/03/2021 9.4     Total Protein 02/03/2021 6.9     Albumin 02/03/2021 4.5     Total Bilirubin 02/03/2021 0.8     Alkaline Phosphatase 02/03/2021 96     ALT 02/03/2021 31     AST 02/03/2021 30     T4 Free 02/03/2021 1.49     TSH 02/03/2021 1.590     Cholesterol, Total 02/03/2021 142*    Triglycerides 02/03/2021 97     HDL 02/03/2021 52*    LDL Calculated 02/03/2021 71     Hemoglobin A1C 02/03/2021 4.3     Iron 02/03/2021 44     TIBC 02/03/2021 213*    Iron Saturation 02/03/2021 21     Sed Rate 02/03/2021 29*    CRP 02/03/2021 3.19*    Ferritin 02/03/2021 1,274.0*    WBC 02/03/2021 10.0     RBC 02/03/2021 3.89*    Hemoglobin 02/03/2021 11.4*    Hematocrit 02/03/2021 33.4*    MCV 02/03/2021 85.9     MCH 02/03/2021 29.3     MCHC 02/03/2021 34.1     RDW 02/03/2021 16.6*    Platelets 97/63/4544 280     MPV 02/03/2021 capsule 3    olopatadine (PATADAY) 0.2 % SOLN ophthalmic solution Place 1 drop into both eyes daily 1 Bottle 2    carbidopa-levodopa (SINEMET)  MG per tablet TAKE 1 TABLET BY MOUTH EVERY NIGHT 90 tablet 3    venlafaxine (EFFEXOR XR) 150 MG extended release capsule Take 1 capsule by mouth daily 90 capsule 3    omeprazole (PRILOSEC) 20 MG delayed release capsule Take 1 capsule by mouth Daily 90 capsule 3    naloxone (NARCAN) 4 MG/0.1ML LIQD nasal spray 1 spray by Nasal route as needed for Opioid Reversal 2 each 0    Calcium-Vitamin D 600-200 MG-UNIT TABS Take 1 tablet by mouth daily      ondansetron (ZOFRAN ODT) 4 MG disintegrating tablet Take 1 tablet by mouth every 8 hours as needed for Nausea or Vomiting 10 tablet 0    Multiple Vitamins-Minerals (MULTIVITAMIN & MINERAL PO) Take  by mouth.  CRANBERRY Take 500 mg by mouth daily.  Cholecalciferol (VITAMIN D3) 5000 UNITS TABS Take 5,000 Units by mouth daily        No current facility-administered medications for this visit.        Allergies: Silver, Tegaderm ag mesh 2\"x2\" [wound dressings], and Zithromax [azithromycin]     Past Medical History:   Diagnosis Date    Anemia     has had iron infusion    Anxiety     Arthritis     Back pain with left-sided radiculopathy 3/14/2016    DDD (degenerative disc disease), lumbar     Depression     Esophagitis     Fibromyalgia     Gastritis     Headache(784.0)     History of blood transfusion     Hypertension     Obesity     RLS (restless legs syndrome)     Sleep apnea     uses CPAP machine       Family History   Problem Relation Age of Onset    Diabetes Mother     High Blood Pressure Mother     Colon Polyps Mother     Heart Disease Mother     Cancer Mother     Depression Mother     Cancer Father     High Blood Pressure Father     Heart Disease Father     Stroke Father     High Blood Pressure Sister     Depression Sister     Anxiety Disorder Sister     Cancer Brother    [facility] Esophageal Cancer Brother     Anxiety Disorder Brother     Diabetes Brother     ADHD Brother     Depression Brother     Other Other         has history of suicide in family maternal great uncle       Past Surgical History:   Procedure Laterality Date    ANKLE SURGERY Right     APPENDECTOMY  4/19/15    BACK SURGERY  2000    CARPAL TUNNEL RELEASE Left     CERVICAL SPINE SURGERY N/A 9/1/15    CHOLECYSTECTOMY  1995    HIP SURGERY Right 1987    HIP SURGERY  2018    HYSTERECTOMY      partial , still has ovaries and cervix    INSERTION / REMOVAL / REPLACEMENT VENOUS ACCESS CATHETER Left 2017    PORT INSERTION performed by Tyron Lesch, MD at [address] [place] [address] LITHOTRIPSY  [address] [place] [address]      with hardware placed    ID COLONOSCOPY FLX DX W/COLLJ SPEC WHEN PFRMD N/A 2017    Dr Nikko Brooks, 7 yr (age 47) recall    ID EGD TRANSORAL BIOPSY SINGLE/MULTIPLE N/A 2017    Dr ABHIJIT Baltazar-Gastritis/gastropathy    SALPINGO-OOPHORECTOMY      STOMACH SURGERY  2017    dr Nigel Farias Right 2019       Social History     Tobacco Use    Smoking status: Former Smoker     Packs/day: 1.00     Years: 25.00     Pack years: 25.00     Types: Cigarettes     Quit date: 2013     Years since quittin.8    Smokeless tobacco: Never Used   Substance Use Topics    Alcohol use: Yes     Comment: rarely        Review of Systems   Constitutional: Negative. Negative for appetite change, chills, fatigue and fever. HENT: Negative. Negative for congestion, ear discharge, ear pain, postnasal drip, sinus pressure and sore throat. Eyes: Negative. Negative for discharge and visual disturbance. Respiratory: Negative. Negative for cough, chest tightness, shortness of breath and wheezing. Cardiovascular: Negative. Negative for chest pain, palpitations and leg swelling. Gastrointestinal: Negative.   Negative for abdominal pain, constipation, diarrhea, nausea and vomiting. GERD is well controlled on meds   Endocrine: Negative. Genitourinary: Negative. Negative for difficulty urinating, dysuria and menstrual problem. Musculoskeletal: Positive for arthralgias ( diffuse). Negative for back pain, joint swelling and myalgias. Skin: Positive for rash (malar area). Allergic/Immunologic: Negative. Neurological: Negative. Negative for dizziness, weakness and headaches. Hematological: Negative. Negative for adenopathy. Does not bruise/bleed easily. Psychiatric/Behavioral: Positive for decreased concentration, dysphoric mood ( under a lot of stress recently) and sleep disturbance. Negative for suicidal ideas. The patient is nervous/anxious (much worse since her father's death). Physical Exam  Vitals reviewed. Constitutional:       Appearance: She is well-developed. She is obese. HENT:      Head: Normocephalic. Right Ear: Tympanic membrane, ear canal and external ear normal.      Left Ear: Tympanic membrane, ear canal and external ear normal.      Nose: Nose normal.      Mouth/Throat:      Mouth: Mucous membranes are moist.      Pharynx: Oropharynx is clear. Eyes:      Extraocular Movements: Extraocular movements intact. Conjunctiva/sclera: Conjunctivae normal.      Pupils: Pupils are equal, round, and reactive to light. Cardiovascular:      Rate and Rhythm: Normal rate and regular rhythm. Pulmonary:      Effort: Pulmonary effort is normal.   Abdominal:      General: Bowel sounds are normal.      Palpations: Abdomen is soft. Tenderness: There is no abdominal tenderness. Musculoskeletal:         General: Normal range of motion. Cervical back: Normal range of motion and neck supple. Right hip: Tenderness present. Decreased strength. Skin:     General: Skin is warm and dry. Capillary Refill: Capillary refill takes less than 2 seconds.       Findings: Rash (malar)

## 2021-07-26 NOTE — TELEPHONE ENCOUNTER
Received fax from pharmacy requesting refill on pts medication(s). Pt was last seen in office on 5/18/2021  and has a follow up scheduled for 7/26/2021. Will send request to  Radha Quintanilla  for patient.      Requested Prescriptions     Pending Prescriptions Disp Refills    omeprazole (PRILOSEC) 20 MG delayed release capsule [Pharmacy Med Name: OMEPRAZOLE 20MG CAPSULES] 90 capsule 3     Sig: TAKE 1 CAPSULE BY MOUTH DAILY    venlafaxine (EFFEXOR XR) 150 MG extended release capsule [Pharmacy Med Name: VENLAFAXINE 150MG ER CAPSULES] 90 capsule 3     Sig: TAKE 1 CAPSULE BY MOUTH DAILY

## 2021-07-28 ENCOUNTER — HOSPITAL ENCOUNTER (OUTPATIENT)
Dept: PHYSICAL THERAPY | Age: 46
Setting detail: THERAPIES SERIES
Discharge: HOME | End: 2021-07-28
Payer: MEDICARE

## 2021-07-28 LAB
ANTICARDIOLIPIN IGA ANTIBODY: <10 APL (ref 0–11)
ANTICARDIOLIPIN IGG ANTIBODY: <10 GPL (ref 0–14)
CARDIOLIPIN AB IGM: <10 MPL (ref 0–12)

## 2021-07-28 PROCEDURE — 97110 THERAPEUTIC EXERCISES: CPT

## 2021-07-28 PROCEDURE — 97112 NEUROMUSCULAR REEDUCATION: CPT

## 2021-07-28 ASSESSMENT — PAIN DESCRIPTION - LOCATION: LOCATION: SHOULDER;NECK

## 2021-07-28 ASSESSMENT — PAIN DESCRIPTION - PAIN TYPE: TYPE: CHRONIC PAIN

## 2021-07-28 ASSESSMENT — PAIN DESCRIPTION - ORIENTATION: ORIENTATION: RIGHT;LEFT

## 2021-07-28 ASSESSMENT — PAIN SCALES - GENERAL: PAINLEVEL_OUTOF10: 6

## 2021-07-28 NOTE — PROGRESS NOTES
Physical Therapy  Daily Treatment Note  Date: 2021  Patient Name: Scott Auguste  MRN: [id number]     :   1975    Subjective:   General  Referring Practitioner: Pedro Pablo Fraire  Onset Date: 21  PT Insurance Information: [employer] Medicare, [place] Medicaid  Total # of Visits Approved: 9  Total # of Visits to Date: 8  Plan of Care/Certification Expiration Date: 21  Progress Note Due Date: 21  Progress Note Counter: [employer] Medicare auth 1 eval + 8 total visits thru 21, [place] Medicaid auth 1 eval and 8 units [id number] thru 21; auth 16 x [id number] and 16 x 971127/1 thru 21  Subjective: my shoulders and neck are bothering me, i've had a couple small dizzy spells but i didn't fall  Comments: : 2 ther-ex, 2 nuero             : 2 ther-ex, 2 neuro    : 2 ther ex, 1 neuro   : 2 neuro, 1 ex  Pain Level: 6  Pain Type: Chronic pain  Pain Location: Shoulder;Neck  Pain Orientation: Right;Left       Treatment Activities:     Exercises  Exercise 1: cone weaves   x 3               *use gait belt with all activities  Exercise 2: figure 8's 1 x 5  Exercise 3: box step  5/5  Exercise 4: ambulation with horizontal and vertical head turns  120' x 1 ea  Exercise 5: 360 degree circles cw/ccw  x 2 ea with sit to stand today alt  Exercise 6: forward bend with cone weave  x 5  (spray bottle)- not today  Exercise 7: seated horizontal and vertical gaze stabilizaiton  x 10 ea  Exercise 8: seated horizontal and vertical advanced gaze stabilizaiton  x 10 ea  Exercise 9: static standing with crom in front of fish picture horizontal and vertical  x 10 ea  Exercise 10: static stance on foam pads  x 1'  Exercise 11: pertebations in all directions  x 1' min  Exercise 12: standing marching  x 10  Exercise 13: standing fwd and lat step-ups  6\"  x 10 ea- fwd only today  Exercise 14: standing stair tapping 6\"  x 10  sba  Exercise 15: tandemn stance  x 30\" ea  Exercise 16: sls  x 0  Exercise 18:  If she is having increased dizziness with spinning have PT assess  Exercise 20: . Assessment:   Conditions Requiring Skilled Therapeutic Intervention  Body structures, Functions, Activity limitations: Decreased functional mobility ; Decreased ADL status; Vestibular Impairment;Decreased high-level IADLs;Decreased balance  Assessment: Patient had slight increase in dizziness performing 360 deg turns but subsided quickly, horizontal movements are more bothersome than vertical and had sway fwd & bwd performing horizontal head movements in front of picutre  Treatment Diagnosis: impaired balance, possible BPPV right horizontal canal  REQUIRES PT FOLLOW UP: Yes      Goals:  Short term goals  Time Frame for Short term goals: 4 Weeks  Short term goal 1:  Increase balance to have no sway with static stance- MET (07/08/2021: Patient is able to perform static stance with no sway)  Short term goal 2: Patient to have no lob with pertebations in all directions- PROGRESS (07/08/2021: Patient has no lob with min pertebations in all directions and occassional with mod pertebations in backward direction)  Short term goal 3: Patient to have hip flex strength 4+/5, quad and hs 5/5- PARTIALLY MET (07/08/2021: Patient has bilateral hip flex 4/5, quad and hs 5/5)  Short term goal 4: Patient to be independent with HEP- NOT MET (07/08/2021: Patient has not been started on independent HEP at this time due to need for supervison for ex's for safety)  Long term goals  Time Frame for Long term goals : 6 Weeks  Long term goal 1: Patient to have decreased report of dizziness/imbalance by 50%- PROGRESS (07/08/2021: Patient states she has improvement of dizziness/balance by 50%)  Long term goal 2: Patient to ambulate with more normalized gait with increased step length and height-- PROGRESS (07/08/2021: Patient ambulates with improved step length but does continue to have limited length and height of steps with slow charles)  Long term goal 3: Patient to demo increased function by scoring <= 30% impairment on Dizziness Handicap Inventory- NOT MET (07/08/2021: Patient scored 70% impairment on Dizziness Handicap Inventory)  Long term goal 4: Patient to demo increased balance by scoring >= 19/24 on DGI- PROGRESS (07/08/2021: patient scored 13/24 on DGI)  Patient Goals   Patient goals : decrease dizziness and increase balance    Plan:    Times per week: 2x/week  Plan weeks: 6 weeks  Current Treatment Recommendations: Strengthening, Balance Training, Functional Mobility Training, Positioning, Modalities, Manual Therapy - Joint Manipulation, Endurance Training, Safety Education & Training, Manual Therapy - Soft Tissue Mobilization, Neuromuscular Re-education, Home Exercise Program, Vestibular Rehab        Therapy Time   Individual Concurrent Group Co-treatment   Time In [phone]         Time Out 1344         Minutes [address] [facility]    Electronically signed by Pooja Kern PTA on 7/28/2021 at 1:59 PM

## 2021-07-29 ENCOUNTER — TELEPHONE (OUTPATIENT)
Dept: PRIMARY CARE CLINIC | Age: 46
End: 2021-07-29

## 2021-07-29 LAB — ANA IGG, ELISA: NORMAL

## 2021-07-29 NOTE — TELEPHONE ENCOUNTER
----- Message from ASHLEE Anaya sent at 7/29/2021 10:10 AM CDT -----  JACQUES is negative  Anticardiolipin IgG, IgM, IgA: negative

## 2021-08-02 LAB — ANTI DNA DOUBLE STRANDED: 0.6 IU/ML

## 2021-08-03 ENCOUNTER — TELEPHONE (OUTPATIENT)
Dept: PRIMARY CARE CLINIC | Age: 46
End: 2021-08-03

## 2021-08-05 ENCOUNTER — APPOINTMENT (OUTPATIENT)
Dept: PHYSICAL THERAPY | Age: 46
End: 2021-08-05
Payer: MEDICARE

## 2021-08-09 DIAGNOSIS — M54.16 CHRONIC LUMBAR RADICULOPATHY: ICD-10-CM

## 2021-08-10 ENCOUNTER — TELEPHONE (OUTPATIENT)
Dept: ONCOLOGY | Facility: CLINIC | Age: 46
End: 2021-08-10

## 2021-08-10 ENCOUNTER — HOSPITAL ENCOUNTER (OUTPATIENT)
Dept: PHYSICAL THERAPY | Age: 46
Setting detail: THERAPIES SERIES
Discharge: HOME | End: 2021-08-10
Payer: MEDICARE

## 2021-08-10 ENCOUNTER — LAB (OUTPATIENT)
Dept: LAB | Facility: HOSPITAL | Age: 46
End: 2021-08-10

## 2021-08-10 ENCOUNTER — OFFICE VISIT (OUTPATIENT)
Dept: ONCOLOGY | Facility: CLINIC | Age: 46
End: 2021-08-10

## 2021-08-10 VITALS
WEIGHT: 293 LBS | TEMPERATURE: 97.3 F | DIASTOLIC BLOOD PRESSURE: 74 MMHG | RESPIRATION RATE: 18 BRPM | SYSTOLIC BLOOD PRESSURE: 132 MMHG | BODY MASS INDEX: 44.41 KG/M2 | HEIGHT: 68 IN | OXYGEN SATURATION: 96 % | HEART RATE: 97 BPM

## 2021-08-10 DIAGNOSIS — E83.110 HEREDITARY HEMOCHROMATOSIS (HCC): ICD-10-CM

## 2021-08-10 DIAGNOSIS — Z86.39 HISTORY OF IRON DEFICIENCY: Primary | ICD-10-CM

## 2021-08-10 DIAGNOSIS — E83.110 HEREDITARY HEMOCHROMATOSIS (HCC): Primary | ICD-10-CM

## 2021-08-10 LAB
ALBUMIN SERPL-MCNC: 4.4 G/DL (ref 3.5–5.2)
ALBUMIN/GLOB SERPL: 1.8 G/DL
ALP SERPL-CCNC: 119 U/L (ref 39–117)
ALT SERPL W P-5'-P-CCNC: 24 U/L (ref 1–33)
ANION GAP SERPL CALCULATED.3IONS-SCNC: 13 MMOL/L (ref 5–15)
AST SERPL-CCNC: 31 U/L (ref 1–32)
BILIRUB SERPL-MCNC: 0.6 MG/DL (ref 0–1.2)
BUN SERPL-MCNC: 9 MG/DL (ref 6–20)
BUN/CREAT SERPL: 12 (ref 7–25)
CALCIUM SPEC-SCNC: 9.7 MG/DL (ref 8.6–10.5)
CHLORIDE SERPL-SCNC: 105 MMOL/L (ref 98–107)
CO2 SERPL-SCNC: 24 MMOL/L (ref 22–29)
CREAT SERPL-MCNC: 0.75 MG/DL (ref 0.57–1)
CRP SERPL-MCNC: 1.97 MG/DL (ref 0–0.5)
DEPRECATED RDW RBC AUTO: 58.6 FL (ref 37–54)
ERYTHROCYTE [DISTWIDTH] IN BLOOD BY AUTOMATED COUNT: 17.6 % (ref 12.3–15.4)
FERRITIN SERPL-MCNC: 762.7 NG/ML (ref 13–150)
GFR SERPL CREATININE-BSD FRML MDRD: 83 ML/MIN/1.73
GLOBULIN UR ELPH-MCNC: 2.5 GM/DL
GLUCOSE SERPL-MCNC: 130 MG/DL (ref 65–99)
HCT VFR BLD AUTO: 31.4 % (ref 34–46.6)
HGB BLD-MCNC: 10.8 G/DL (ref 12–15.9)
HOLD SPECIMEN: NORMAL
IRON 24H UR-MRATE: 56 MCG/DL (ref 37–145)
IRON SATN MFR SERPL: 19 % (ref 20–50)
MCH RBC QN AUTO: 31.6 PG (ref 26.6–33)
MCHC RBC AUTO-ENTMCNC: 34.4 G/DL (ref 31.5–35.7)
MCV RBC AUTO: 91.8 FL (ref 79–97)
PLATELET # BLD AUTO: 276 10*3/MM3 (ref 140–450)
PMV BLD AUTO: 9.6 FL (ref 6–12)
POTASSIUM SERPL-SCNC: 3.6 MMOL/L (ref 3.5–5.2)
PROT SERPL-MCNC: 6.9 G/DL (ref 6–8.5)
RBC # BLD AUTO: 3.42 10*6/MM3 (ref 3.77–5.28)
SODIUM SERPL-SCNC: 142 MMOL/L (ref 136–145)
TIBC SERPL-MCNC: 289 MCG/DL (ref 298–536)
TRANSFERRIN SERPL-MCNC: 194 MG/DL (ref 200–360)
WBC # BLD AUTO: 9.21 10*3/MM3 (ref 3.4–10.8)

## 2021-08-10 PROCEDURE — 97112 NEUROMUSCULAR REEDUCATION: CPT

## 2021-08-10 PROCEDURE — 85027 COMPLETE CBC AUTOMATED: CPT

## 2021-08-10 PROCEDURE — 86140 C-REACTIVE PROTEIN: CPT

## 2021-08-10 PROCEDURE — 80053 COMPREHEN METABOLIC PANEL: CPT

## 2021-08-10 PROCEDURE — 83540 ASSAY OF IRON: CPT

## 2021-08-10 PROCEDURE — 99214 OFFICE O/P EST MOD 30 MIN: CPT | Performed by: INTERNAL MEDICINE

## 2021-08-10 PROCEDURE — 82728 ASSAY OF FERRITIN: CPT

## 2021-08-10 PROCEDURE — 36415 COLL VENOUS BLD VENIPUNCTURE: CPT

## 2021-08-10 PROCEDURE — 97110 THERAPEUTIC EXERCISES: CPT

## 2021-08-10 PROCEDURE — 84466 ASSAY OF TRANSFERRIN: CPT

## 2021-08-10 RX ORDER — DEFERASIROX 360 MG/1
360 GRANULE ORAL DAILY
Qty: 60 EACH | Refills: 3 | Status: SHIPPED | OUTPATIENT
Start: 2021-08-10 | End: 2021-08-10 | Stop reason: SDUPTHER

## 2021-08-10 RX ORDER — DEFERASIROX 90 MG/1
90 TABLET, FILM COATED ORAL DAILY
Qty: 60 TABLET | Refills: 3 | Status: SHIPPED | OUTPATIENT
Start: 2021-08-10 | End: 2021-11-16

## 2021-08-10 RX ORDER — DEFERASIROX 360 MG/1
2 TABLET, FILM COATED ORAL
Qty: 60 TABLET | Refills: 5 | Status: SHIPPED | OUTPATIENT
Start: 2021-08-10 | End: 2021-11-16

## 2021-08-10 RX ORDER — OXYCODONE HYDROCHLORIDE 5 MG/1
5 TABLET ORAL 3 TIMES DAILY PRN
Qty: 90 TABLET | Refills: 0 | Status: SHIPPED | OUTPATIENT
Start: 2021-08-11 | End: 2021-09-07 | Stop reason: SDUPTHER

## 2021-08-10 NOTE — PROGRESS NOTES
[facility]  OUTPATIENT PHYSICAL THERAPY  DISCHARGE SUMMARY    Date: 8/10/2021  Patient Name: Herbert Jerry        MRN: [id number]    ACCOUNT #: [de-identified]  : 1975  (54 y.o.)  Gender: female   Referring Practitioner: Mitali Doyle  Diagnosis: W10. 8XXS  Referral Date : 21  Treatment Diagnosis: impaired balance, possible BPPV right horizontal canal    Total # of Visits Approved: 9  Total # of Visits to Date: 9    Subjective  Subjective: Patient states her dizziness and balance is about the same, but she hasn't had any falls. Objective  Treatments received include: ther-ex, neur re-ed  See objective/subjective data in goals    Assessment  Assessment: patient did well with tx today with only occassional dizziness with ex's. She demo'ed increased balance and improved mobility compared to evaluation. She continues to report functional impairment, but reports that overall her dizziness and imbalance has improved. She has also reported decreased falls since starting therapy. Patient met 3/8 goals at this time and progress toward 3/8 goals. Plan  D/C          Goals  Short term goals  Time Frame for Short term goals: 4 Weeks  Short term goal 1:  Increase balance to have no sway with static stance- MET (08/10/2021: Patient is able to perform static stance with no sway)  Short term goal 2: Patient to have no lob with pertebations in all directions- PROGRESS (08/10/2021: Patient has no lob with min pertebations in all directions and occassional (2x) with mod pertebations in backward direction)  Short term goal 3: Patient to have hip flex strength 4+/5, quad and hs 5/5- MET (08/10/2021: Patient has bilateral hip flex 4+/5, quad and hs 5/5)  Short term goal 4: Patient to be independent with HEP- NOT MET (08/10/2021: Patient has not been started on independent HEP at this time due to need for supervison for ex's for safety)    Long term goals  Time Frame for Long term goals : 6

## 2021-08-10 NOTE — TELEPHONE ENCOUNTER
Pharmacy Name:  [facility]    Pharmacy representative name: PRISCILLA    Pharmacy representative phone number: [phone]    What medication are you calling in regards to: Deferasirox (Jadenu Sprinkle) 360 MG pack [[id number]] (Order [id number])        What question does the pharmacy have: DOES THIS NEED TO BE ORDERED AS IS OR IS ORDER TO  MG TABS INSTEAD? IF IS MEANT TO BE PACKET REQUIRES A PA.    Who is the provider that prescribed the medication: GOLDBERG    Additional notes:

## 2021-08-10 NOTE — PROGRESS NOTES
Physical Therapy  Daily Treatment Note/ REASSESSMENT  Date: 8/10/2021  Patient Name: Sabrina Montes  MRN: [id number]     :   1975    Subjective:   General  Referring Practitioner: Dionne Pratt  PT Visit Information  Onset Date: 21  PT Insurance Information: [employer] Medicare, [place] Medicaid  Total # of Visits Approved: 9  Total # of Visits to Date: 9  Plan of Care/Certification Expiration Date: 21  Progress Note Counter: [employer] Medicare auth 1 eval + 8 total visits thru 21, [place] Medicaid auth 1 eval and 8 units [id number] thru 21; auth 16 x [id number] and 16 x [id number]/1 thru 21  Subjective  Subjective: Patient states her dizziness and balance is about the same, but she hasn't had any falls. General Comment  Comments: : 2 ther-ex, 2 nuero             : 2 ther-ex, 2 neuro    : 2 ther ex, 1 neuro   : 2 neuro, 1 ex          Treatment Activities:                                    Exercises  Exercise 1: cone weaves   x 3               *use gait belt with all activities  Exercise 2: figure 8's 1 x 5  Exercise 3: box step  5/5  Exercise 4: ambulation with horizontal and vertical head turns  120' x 1 ea  Exercise 5: 360 degree circles cw/ccw  x 2 ea with sit to stand today alt  Exercise 6: forward bend with cone weave  x 5  (spray bottle)- not today  Exercise 7: seated horizontal and vertical gaze stabilizaiton  x 10 ea  Exercise 8: seated horizontal and vertical advanced gaze stabilizaiton  x 10 ea  Exercise 9: static standing with crom in front of fish picture horizontal and vertical  x 10 ea  Exercise 10: static stance on foam pads  x 1'  Exercise 11: pertebations in all directions  x 1' min  Exercise 12: standing marching  x 10  Exercise 13: standing fwd and lat step-ups  6\"  x 10 ea- fwd only today  Exercise 14: standing stair tapping 6\"  x 10  sba  Exercise 15: tandemn stance  x 30\" ea  Exercise 16: sls  x 0  Exercise 18:  If she is having increased dizziness with spinning have PT assess  Exercise 20: . Assessment:   Conditions Requiring Skilled Therapeutic Intervention  Body structures, Functions, Activity limitations: Decreased functional mobility ; Decreased ADL status; Vestibular Impairment;Decreased high-level IADLs;Decreased balance  Assessment: patient did well with tx today with only occassional dizziness with ex's. She demo'ed increased balance and improved mobility compared to evaluation. She continues to report functional impairment, but reports that overall her dizziness and imbalance has improved. She has also reported decreased falls since starting therapy. Patient met 3/8 goals at this time and progress toward 3/8 goals. Treatment Diagnosis: impaired balance, possible BPPV right horizontal canal  REQUIRES PT FOLLOW UP: Yes    Goals:  Short term goals  Time Frame for Short term goals: 4 Weeks  Short term goal 1:  Increase balance to have no sway with static stance- MET (08/10/2021: Patient is able to perform static stance with no sway)  Short term goal 2: Patient to have no lob with pertebations in all directions- PROGRESS (08/10/2021: Patient has no lob with min pertebations in all directions and occassional (2x) with mod pertebations in backward direction)  Short term goal 3: Patient to have hip flex strength 4+/5, quad and hs 5/5- MET (08/10/2021: Patient has bilateral hip flex 4+/5, quad and hs 5/5)  Short term goal 4: Patient to be independent with HEP- NOT MET (08/10/2021: Patient has not been started on independent HEP at this time due to need for supervison for ex's for safety)  Long term goals  Time Frame for Long term goals : 6 Weeks  Long term goal 1: Patient to have decreased report of dizziness/imbalance by 50%- MET (08/10/2021: Patient states she has improvement of dizziness/balance by 80%)  Long term goal 2: Patient to ambulate with more normalized gait with increased step length and height-- PROGRESS (08/10/2021: Patient ambulates with improved step length but does continue to have limited length and height of steps with slow charles)  Long term goal 3: Patient to demo increased function by scoring <= 30% impairment on Dizziness Handicap Inventory- NOT MET (08/10/2021: Patient scored 76% impairment on Dizziness Handicap Inventory)  Long term goal 4: Patient to demo increased balance by scoring >= 19/24 on DGI- PROGRESS (08/10/2021: patient scored 15/24 on DGI)  Patient Goals   Patient goals : decrease dizziness and increase balance  Plan:    Plan  Plan Comment: D/C secondary to plateau in function and patient requesting back evaluation and therapy  Timed Code Treatment Minutes: 53 Minutes     Therapy Time   Individual Concurrent Group Co-treatment   Time In 1435         Time Out 1356         Minutes 53         Timed Code Treatment Minutes: 53 Minutes  Electronically signed by Vernon Neves PT on 8/10/2021 at 2:03 PM

## 2021-08-11 NOTE — TELEPHONE ENCOUNTER
[facility] TO CLARIFY IF MEDICATION IS A SPRINKLE OR TABLET  [phone]

## 2021-08-16 ENCOUNTER — TELEPHONE (OUTPATIENT)
Dept: ONCOLOGY | Facility: CLINIC | Age: 46
End: 2021-08-16

## 2021-08-16 NOTE — TELEPHONE ENCOUNTER
Pharmacy Name:  [facility]    Pharmacy representative name: PRISCILLA    Pharmacy representative phone number: [phone]    What medication are you calling in regards to: Deferasirox (JADENU) 360 MG tablet [[id number]] (Order [id number])        What question does the pharmacy have: PT STATES SHE WAS TO DECREASE DOSAGE . PHARMACY RECD 1890 MG RX.    Who is the provider that prescribed the medication: GOLDBERG    Additional notes:

## 2021-08-24 ENCOUNTER — HOSPITAL ENCOUNTER (OUTPATIENT)
Dept: PHYSICAL THERAPY | Age: 46
Setting detail: THERAPIES SERIES
Discharge: HOME | End: 2021-08-24
Payer: MEDICARE

## 2021-08-24 PROCEDURE — 97162 PT EVAL MOD COMPLEX 30 MIN: CPT

## 2021-08-24 ASSESSMENT — PAIN DESCRIPTION - LOCATION: LOCATION: BACK

## 2021-08-24 ASSESSMENT — PAIN - FUNCTIONAL ASSESSMENT: PAIN_FUNCTIONAL_ASSESSMENT: PREVENTS OR INTERFERES SOME ACTIVE ACTIVITIES AND ADLS

## 2021-08-24 ASSESSMENT — PAIN SCALES - GENERAL: PAINLEVEL_OUTOF10: 5

## 2021-08-24 ASSESSMENT — PAIN DESCRIPTION - FREQUENCY: FREQUENCY: CONTINUOUS

## 2021-08-24 ASSESSMENT — PAIN DESCRIPTION - DESCRIPTORS: DESCRIPTORS: CONSTANT;DULL

## 2021-08-24 ASSESSMENT — PAIN DESCRIPTION - ORIENTATION: ORIENTATION: LOWER;MID;UPPER

## 2021-08-24 ASSESSMENT — PAIN DESCRIPTION - PAIN TYPE: TYPE: CHRONIC PAIN

## 2021-08-24 NOTE — PROGRESS NOTES
Initial Assessment  Date: 2021  Patient Name: Tiffanie Burton  MRN: [id number]  : 1975     Treatment Diagnosis: back pain    Restrictions       Subjective   General  Chart Reviewed: Yes  Patient assessed for rehabilitation services?: Yes  Additional Pertinent Hx: pain increased with frequent falls last year  Response To Previous Treatment: Not applicable  Family / Caregiver Present: No  Referring Practitioner: ASHLEE Anaya  Referral Date : 21  Diagnosis: back pain M54.5  Follows Commands: Within Functional Limits  PT Visit Information  PT Insurance Information: [employer]  Total # of Visits Approved: 13 (eval + 12)  Total # of Visits to Date: 1  Plan of Care/Certification Expiration Date: 10/19/21  Progress Note Due Date: 21  Subjective  Subjective: pain in entire back 5/10 dull constant pain, no sharp shooting pain, pain radiating down both LEs to her toes  Pain Screening  Patient Currently in Pain: Yes  Pain Assessment  Pain Assessment: 0-10  Pain Level: 5  Pain Type: Chronic pain  Pain Location: Back  Pain Orientation: Lower;Mid;Upper  Pain Radiating Towards: BLE entire LE  Pain Descriptors: Constant;Dull  Pain Frequency: Continuous  Functional Pain Assessment: Prevents or interferes some active activities and ADLs  Vital Signs  Patient Currently in Pain: Yes    Vision/Hearing       Orientation  Orientation  Overall Orientation Status: Within Normal Limits    Social/Functional History       Objective     Observation/Palpation  Posture: Fair  Palpation: TTP bilateral paraspinals, right PSIS, medial border of B scapula  Observation: sitting with right post lean with left knee extendend    AROM RLE (degrees)  RLE AROM: WNL (Hip flexion with knee ext 40 degrees)  AROM LLE (degrees)  LLE AROM : WNL (Hip flexion with knee ext 30 degrees)  AROM RUE (degrees)  RUE AROM : WNL  AROM LUE (degrees)  LUE AROM : WNL  Spine  Lumbar: 20 degrees flexion; 0 degrees extension; 0 sidebend; 25% rotation to left and right    Strength RLE  Comment: c/o lower back pain with all resisted movement  R Hip Flexion: 3/5  R Hip ABduction: 4-/5  R Hip ADduction: 4-/5  R Knee Flexion: 4-/5  R Knee Extension: 4+/5  R Ankle Dorsiflexion: 4+/5  Strength LLE  Comment: c/o lower back pain with all resisted movement  L Hip Flexion: 3/5  L Hip ABduction: 4-/5  L Hip ADduction: 4-/5  L Knee Flexion: 4-/5  L Knee Extension: 3+/5 (limited by c/o chronic knee pain with resisted movement)  L Ankle Dorsiflexion: 4+/5  Strength RUE  Comment: c/o upper back pain with all resisted movement  R Shoulder Flexion: 3+/5  R Shoulder Extension: 4-/5  R Shoulder Internal Rotation: 3/5  R Shoulder External Rotation: 3/5  R Elbow Flexion: 4-/5  R Elbow Extension: 4-/5  Strength LUE  Comment: c/o upper back pain with all resisted movement  L Shoulder Flexion: 3+/5  L Shoulder Extension: 4-/5  L Shoulder Internal Rotation: 3/5  L Shoulder External Rotation: 3/5  L Elbow Flexion: 4-/5  L Elbow Extension: 4-/5  Strength Other  Other: good pelvic tilt     Additional Measures  Flexibility: limitedin hamstrings and trunk  Special Tests: slump test positive left side; Oswestry Disability Index: 60% impaired; 6MWT: only tolerated 1.5 minutes covering 135 feet  Sensation  Overall Sensation Status: Impaired (chronic numbness in toes)     Transfers  Sit to Stand: Independent  Stand to sit:  Independent  Bed to Chair: Independent  Stand Pivot Transfers: Independent  Comment: supine to sit min A       Ambulation  Ambulation?: Yes  Ambulation 1  Surface: level tile  Device: No Device  Assistance: Independent  Gait Deviations: Slow Torie;Decreased step length;Decreased arm swing;Decreased head and trunk rotation  Distance: 135 feet  Comments: stop due to pain     Exercises  Exercise 1: supine lower trunk rotation  Exercise 2: supine quadratus stretch  Exercise 3: hooklying lumbar rotation  Exercise 4: supine piriformis stretch  Exercise 5: B SKTC  Exercise 6: B hamstring stretch  Exercise 7: pelvic tilts  Exercise 8: abdominal series (alone 5s hold, LE, UE, UE/LE)  Exercise 9: bridging  Exercise 10: repeated sit to stand with TA contraction  Exercise 11: HS curls  Exercise 12: Multifidus row/ Paloff press  Exercise 13: Stand hip abd/ext  Exercise 14: Standing marching  Exercise 15: Mini squats                      Assessment   Conditions Requiring Skilled Therapeutic Intervention  Body structures, Functions, Activity limitations: Decreased functional mobility ; Decreased ROM; Decreased strength;Decreased endurance;Decreased posture; Increased pain  Assessment: Pt presents with chronic low back pain radiating into lower extremities as well as thoracic back pain described as muscular pain. Her pain limits her ability to perform household tasks and ADLs. She demonstrates bilateral LE and UE weakness and c/o pain with resisted movements. She also demonstrates decreased spine mobility in lumbar spine most and decreased hamstring length. She would benefit from skilled PT to address impairments. Treatment Diagnosis: back pain  Prognosis: Fair  Decision Making: Medium Complexity  History: see above  Exam: see above  Clinical Presentation: evolving  REQUIRES PT FOLLOW UP: Yes  Discharge Recommendations: Continue to assess pending progress  Activity Tolerance  Activity Tolerance: Patient limited by pain         Plan   Plan  Times per week: 2  Plan weeks: 4-6  Current Treatment Recommendations: Strengthening, ROM, Functional Mobility Training, Endurance Training, Neuromuscular Re-education, Manual Therapy - Soft Tissue Mobilization, Manual Therapy - Joint Manipulation, Pain Management, Patient/Caregiver Education & Training, Home Exercise Program    Goals  Short term goals  Time Frame for Short term goals: 3-4 weeks  Short term goal 1: Independent with HEP. Short term goal 2: Pt will increase lower extremity strength to 4+/5 bilaterally.   Short term goal 3: Pt will

## 2021-08-25 ENCOUNTER — TELEPHONE (OUTPATIENT)
Dept: ONCOLOGY | Facility: CLINIC | Age: 46
End: 2021-08-25

## 2021-08-25 DIAGNOSIS — E83.110 HEREDITARY HEMOCHROMATOSIS (HCC): Primary | ICD-10-CM

## 2021-08-25 NOTE — TELEPHONE ENCOUNTER
Discussed with Wendy SOLIS and order given to have Naomi stop the Jadenu and will recheck CBC and CMP tomorrow.

## 2021-08-25 NOTE — TELEPHONE ENCOUNTER
Caller: Naomi Bhatia    Relationship: Self    Best call back number: [phone]    What medications are you currently taking:   Current Outpatient Medications on File Prior to Visit   Medication Sig Dispense Refill   • acyclovir (ZOVIRAX) 800 MG tablet Take 800 mg by mouth Daily.     • ALBUTEROL IN Inhale 1 puff As Needed.     • Calcium Citrate-Vitamin D (CALCIUM CITRATE + D3 PO) Take  by mouth Daily.     • carbidopa-levodopa (SINEMET)  MG per tablet Take 1 tablet by mouth Daily.     • CRANBERRY PO Take  by mouth Daily.     • Cyclobenzaprine HCl (FLEXERIL PO) Take 10 mg by mouth Daily As Needed.     • Deferasirox (JADENU) 360 MG tablet Take 2 tablets by mouth Every Morning Before Breakfast. In addition to 90mg tablet for total dose of 1890mg daily. 60 tablet 5   • Deferasirox (Jadenu) 90 MG tablet Take 90 mg by mouth Daily. 360 x2 daily along with 90 mg tab x2 daily for a total of 900 mg daily 60 tablet 3   • fluconazole (Diflucan) 150 MG tablet Take 1 now, repeat 1 week 2 tablet 0   • levocetirizine (XYZAL) 5 MG tablet TK 1 T PO QPM  0   • LYRICA 150 MG capsule TK 1 C PO BID  2   • metFORMIN (GLUCOPHAGE) 1000 MG tablet Take 1,000 mg by mouth Daily With Breakfast.     • metoprolol succinate XL (TOPROL XL) 50 MG 24 hr tablet Take 50 mg by mouth 2 (Two) Times a Day.     • Multiple Vitamin (MULTI VITAMIN PO) Take  by mouth Daily.     • NABUMETONE PO Take 500 mg by mouth 2 (two) times a day.     • nortriptyline (PAMELOR) 25 MG capsule 2 tablets at night as needed  3   • omeprazole (priLOSEC) 20 MG capsule TK ONE C PO QD FIRST THING IN THE MORNING ON  AN EMPTY STOMACH  3   • venlafaxine (EFFEXOR) 75 MG tablet three  times daily  11   • venlafaxine XR (EFFEXOR-XR) 150 MG 24 hr capsule Take 150 mg by mouth Daily.       Current Facility-Administered Medications on File Prior to Visit   Medication Dose Route Frequency Provider Last Rate Last Admin   • diphenhydrAMINE (BENADRYL) injection 50 mg  50 mg  Intravenous PRN Pedro Clements MD       • famotidine (PEPCID) injection 20 mg  20 mg Intravenous PRN Pedro Clements MD       • hydrocortisone sodium succinate (Solu-CORTEF) injection 100 mg  100 mg Intravenous PRN Pedro Clements MD          When did you start taking these medications: 8/10/2021    Which medication are you concerned about: GIN    Who prescribed you this medication: DR. GOLDBERG    What are your concerns: PATIENT IS REALLY HAVING BAD DIZZY SPELLS WHEN SHE STANDS UP & SITS DOWN SINCE RE-STARTING THIS MEDICATION & STATES THIS HAS NEVER HAPPENED BEFORE WHEN TAKING THIS MEDICATION.

## 2021-08-26 ENCOUNTER — VIRTUAL VISIT (OUTPATIENT)
Dept: PRIMARY CARE CLINIC | Age: 46
End: 2021-08-26
Payer: MEDICARE

## 2021-08-26 ENCOUNTER — APPOINTMENT (OUTPATIENT)
Dept: CT IMAGING | Age: 46
End: 2021-08-26
Payer: MEDICARE

## 2021-08-26 ENCOUNTER — LAB (OUTPATIENT)
Dept: LAB | Facility: HOSPITAL | Age: 46
End: 2021-08-26

## 2021-08-26 ENCOUNTER — HOSPITAL ENCOUNTER (EMERGENCY)
Age: 46
Discharge: HOME | End: 2021-08-27
Attending: EMERGENCY MEDICINE
Payer: MEDICARE

## 2021-08-26 ENCOUNTER — TELEPHONE (OUTPATIENT)
Dept: ONCOLOGY | Facility: CLINIC | Age: 46
End: 2021-08-26

## 2021-08-26 ENCOUNTER — HOSPITAL ENCOUNTER (OUTPATIENT)
Dept: PHYSICAL THERAPY | Age: 46
Setting detail: THERAPIES SERIES
End: 2021-08-26
Payer: MEDICARE

## 2021-08-26 VITALS
BODY MASS INDEX: 45.99 KG/M2 | DIASTOLIC BLOOD PRESSURE: 97 MMHG | SYSTOLIC BLOOD PRESSURE: 115 MMHG | TEMPERATURE: 98.3 F | OXYGEN SATURATION: 97 % | WEIGHT: 293 LBS | HEIGHT: 67 IN | RESPIRATION RATE: 18 BRPM | HEART RATE: 94 BPM

## 2021-08-26 DIAGNOSIS — F32.A ANXIETY AND DEPRESSION: ICD-10-CM

## 2021-08-26 DIAGNOSIS — E87.6 HYPOKALEMIA: ICD-10-CM

## 2021-08-26 DIAGNOSIS — F41.9 ANXIETY AND DEPRESSION: ICD-10-CM

## 2021-08-26 DIAGNOSIS — H81.10 BENIGN PAROXYSMAL POSITIONAL VERTIGO, UNSPECIFIED LATERALITY: Primary | ICD-10-CM

## 2021-08-26 DIAGNOSIS — E83.110 HEREDITARY HEMOCHROMATOSIS (HCC): ICD-10-CM

## 2021-08-26 DIAGNOSIS — H55.00 NYSTAGMUS: ICD-10-CM

## 2021-08-26 DIAGNOSIS — H93.13 BILATERAL TINNITUS: ICD-10-CM

## 2021-08-26 DIAGNOSIS — R51.9 INTRACTABLE EPISODIC HEADACHE, UNSPECIFIED HEADACHE TYPE: ICD-10-CM

## 2021-08-26 DIAGNOSIS — I10 ESSENTIAL HYPERTENSION: ICD-10-CM

## 2021-08-26 DIAGNOSIS — R42 VERTIGO: Primary | ICD-10-CM

## 2021-08-26 LAB
ALBUMIN SERPL-MCNC: 4.4 G/DL (ref 3.5–5.2)
ALBUMIN SERPL-MCNC: 4.4 G/DL (ref 3.5–5.2)
ALBUMIN/GLOB SERPL: 1.6 G/DL
ALP BLD-CCNC: 106 U/L (ref 35–104)
ALP SERPL-CCNC: 113 U/L (ref 39–117)
ALT SERPL W P-5'-P-CCNC: 31 U/L (ref 1–33)
ALT SERPL-CCNC: 30 U/L (ref 5–33)
ANION GAP SERPL CALCULATED.3IONS-SCNC: 11 MMOL/L (ref 7–19)
ANION GAP SERPL CALCULATED.3IONS-SCNC: 15 MMOL/L (ref 5–15)
AST SERPL-CCNC: 29 U/L (ref 5–32)
AST SERPL-CCNC: 41 U/L (ref 1–32)
BASOPHILS # BLD AUTO: 0.08 10*3/MM3 (ref 0–0.2)
BASOPHILS ABSOLUTE: 0.1 K/UL (ref 0–0.2)
BASOPHILS NFR BLD AUTO: 0.8 % (ref 0–1.5)
BASOPHILS RELATIVE PERCENT: 1 % (ref 0–1)
BILIRUB SERPL-MCNC: 0.7 MG/DL (ref 0.2–1.2)
BILIRUB SERPL-MCNC: 0.7 MG/DL (ref 0–1.2)
BUN BLDV-MCNC: 12 MG/DL (ref 6–20)
BUN SERPL-MCNC: 12 MG/DL (ref 6–20)
BUN/CREAT SERPL: 15.6 (ref 7–25)
CALCIUM SERPL-MCNC: 9.1 MG/DL (ref 8.6–10)
CALCIUM SPEC-SCNC: 9 MG/DL (ref 8.6–10.5)
CHLORIDE BLD-SCNC: 103 MMOL/L (ref 98–111)
CHLORIDE SERPL-SCNC: 105 MMOL/L (ref 98–107)
CO2 SERPL-SCNC: 20 MMOL/L (ref 22–29)
CO2: 25 MMOL/L (ref 22–29)
CREAT SERPL-MCNC: 0.7 MG/DL (ref 0.5–0.9)
CREAT SERPL-MCNC: 0.77 MG/DL (ref 0.57–1)
DEPRECATED RDW RBC AUTO: 53.2 FL (ref 37–54)
EOSINOPHIL # BLD AUTO: 0.14 10*3/MM3 (ref 0–0.4)
EOSINOPHIL NFR BLD AUTO: 1.4 % (ref 0.3–6.2)
EOSINOPHILS ABSOLUTE: 0.1 K/UL (ref 0–0.6)
EOSINOPHILS RELATIVE PERCENT: 1.7 % (ref 0–5)
ERYTHROCYTE [DISTWIDTH] IN BLOOD BY AUTOMATED COUNT: 15.9 % (ref 12.3–15.4)
GFR AFRICAN AMERICAN: >59
GFR NON-AFRICAN AMERICAN: >60
GFR SERPL CREATININE-BSD FRML MDRD: 81 ML/MIN/1.73
GLOBULIN UR ELPH-MCNC: 2.8 GM/DL
GLUCOSE BLD-MCNC: 108 MG/DL (ref 74–109)
GLUCOSE SERPL-MCNC: 135 MG/DL (ref 65–99)
HCT VFR BLD AUTO: 35.5 % (ref 34–46.6)
HCT VFR BLD CALC: 35.9 % (ref 37–47)
HEMOGLOBIN: 11.9 G/DL (ref 12–16)
HGB BLD-MCNC: 12.6 G/DL (ref 12–15.9)
IMM GRANULOCYTES # BLD AUTO: 0.04 10*3/MM3 (ref 0–0.05)
IMM GRANULOCYTES NFR BLD AUTO: 0.4 % (ref 0–0.5)
IMMATURE GRANULOCYTES #: 0.1 K/UL
LYMPHOCYTES # BLD AUTO: 1.89 10*3/MM3 (ref 0.7–3.1)
LYMPHOCYTES ABSOLUTE: 2.8 K/UL (ref 1.1–4.5)
LYMPHOCYTES NFR BLD AUTO: 19.5 % (ref 19.6–45.3)
LYMPHOCYTES RELATIVE PERCENT: 33.4 % (ref 20–40)
MCH RBC QN AUTO: 32.2 PG (ref 27–31)
MCH RBC QN AUTO: 32.7 PG (ref 26.6–33)
MCHC RBC AUTO-ENTMCNC: 33.1 G/DL (ref 33–37)
MCHC RBC AUTO-ENTMCNC: 35.5 G/DL (ref 31.5–35.7)
MCV RBC AUTO: 92.2 FL (ref 79–97)
MCV RBC AUTO: 97 FL (ref 81–99)
MONOCYTES # BLD AUTO: 0.51 10*3/MM3 (ref 0.1–0.9)
MONOCYTES ABSOLUTE: 0.5 K/UL (ref 0–0.9)
MONOCYTES NFR BLD AUTO: 5.3 % (ref 5–12)
MONOCYTES RELATIVE PERCENT: 6.2 % (ref 0–10)
NEUTROPHILS ABSOLUTE: 4.7 K/UL (ref 1.5–7.5)
NEUTROPHILS NFR BLD AUTO: 7.04 10*3/MM3 (ref 1.7–7)
NEUTROPHILS NFR BLD AUTO: 72.6 % (ref 42.7–76)
NEUTROPHILS RELATIVE PERCENT: 56.5 % (ref 50–65)
NRBC BLD AUTO-RTO: 0 /100 WBC (ref 0–0.2)
PDW BLD-RTO: 15.9 % (ref 11.5–14.5)
PLATELET # BLD AUTO: 295 10*3/MM3 (ref 140–450)
PLATELET # BLD: 271 K/UL (ref 130–400)
PMV BLD AUTO: 10 FL (ref 9.4–12.3)
PMV BLD AUTO: 9.8 FL (ref 6–12)
POTASSIUM SERPL-SCNC: 3.4 MMOL/L (ref 3.5–5)
POTASSIUM SERPL-SCNC: 3.5 MMOL/L (ref 3.5–5.2)
PROT SERPL-MCNC: 7.2 G/DL (ref 6–8.5)
RBC # BLD AUTO: 3.85 10*6/MM3 (ref 3.77–5.28)
RBC # BLD: 3.7 M/UL (ref 4.2–5.4)
SODIUM BLD-SCNC: 139 MMOL/L (ref 136–145)
SODIUM SERPL-SCNC: 140 MMOL/L (ref 136–145)
TOTAL PROTEIN: 6.9 G/DL (ref 6.6–8.7)
WBC # BLD AUTO: 9.7 10*3/MM3 (ref 3.4–10.8)
WBC # BLD: 8.4 K/UL (ref 4.8–10.8)

## 2021-08-26 PROCEDURE — 96374 THER/PROPH/DIAG INJ IV PUSH: CPT

## 2021-08-26 PROCEDURE — 70450 CT HEAD/BRAIN W/O DYE: CPT

## 2021-08-26 PROCEDURE — 84484 ASSAY OF TROPONIN QUANT: CPT

## 2021-08-26 PROCEDURE — 99283 EMERGENCY DEPT VISIT LOW MDM: CPT

## 2021-08-26 PROCEDURE — 80053 COMPREHEN METABOLIC PANEL: CPT

## 2021-08-26 PROCEDURE — 99443 PR PHYS/QHP TELEPHONE EVALUATION 21-30 MIN: CPT | Performed by: PEDIATRICS

## 2021-08-26 PROCEDURE — 85025 COMPLETE CBC W/AUTO DIFF WBC: CPT

## 2021-08-26 PROCEDURE — 96375 TX/PRO/DX INJ NEW DRUG ADDON: CPT

## 2021-08-26 PROCEDURE — 36415 COLL VENOUS BLD VENIPUNCTURE: CPT

## 2021-08-26 PROCEDURE — 2580000003 HC RX 258: Performed by: EMERGENCY MEDICINE

## 2021-08-26 PROCEDURE — 6360000002 HC RX W HCPCS: Performed by: EMERGENCY MEDICINE

## 2021-08-26 PROCEDURE — 93005 ELECTROCARDIOGRAM TRACING: CPT | Performed by: EMERGENCY MEDICINE

## 2021-08-26 RX ORDER — PROCHLORPERAZINE EDISYLATE 5 MG/ML
10 INJECTION INTRAMUSCULAR; INTRAVENOUS ONCE
Status: COMPLETED | OUTPATIENT
Start: 2021-08-26 | End: 2021-08-26

## 2021-08-26 RX ORDER — 0.9 % SODIUM CHLORIDE 0.9 %
1000 INTRAVENOUS SOLUTION INTRAVENOUS ONCE
Status: COMPLETED | OUTPATIENT
Start: 2021-08-26 | End: 2021-08-27

## 2021-08-26 RX ORDER — MECLIZINE HYDROCHLORIDE 25 MG/1
25 TABLET ORAL 3 TIMES DAILY PRN
Qty: 30 TABLET | Refills: 1 | Status: SHIPPED | OUTPATIENT
Start: 2021-08-26 | End: 2021-09-05

## 2021-08-26 RX ORDER — DIPHENHYDRAMINE HYDROCHLORIDE 50 MG/ML
50 INJECTION INTRAMUSCULAR; INTRAVENOUS ONCE
Status: COMPLETED | OUTPATIENT
Start: 2021-08-26 | End: 2021-08-26

## 2021-08-26 RX ADMIN — PROCHLORPERAZINE EDISYLATE 10 MG: 5 INJECTION INTRAMUSCULAR; INTRAVENOUS at 23:17

## 2021-08-26 RX ADMIN — DIPHENHYDRAMINE HYDROCHLORIDE 50 MG: 50 INJECTION, SOLUTION INTRAMUSCULAR; INTRAVENOUS at 23:16

## 2021-08-26 RX ADMIN — SODIUM CHLORIDE 1000 ML: 9 INJECTION, SOLUTION INTRAVENOUS at 23:16

## 2021-08-26 ASSESSMENT — ENCOUNTER SYMPTOMS
SINUS PRESSURE: 0
ABDOMINAL PAIN: 0
COUGH: 0
CONSTIPATION: 0
ALLERGIC/IMMUNOLOGIC NEGATIVE: 1
DIARRHEA: 0
PHOTOPHOBIA: 0
VOMITING: 0
WHEEZING: 0
DIARRHEA: 0
SORE THROAT: 0
RHINORRHEA: 0
BACK PAIN: 0
RESPIRATORY NEGATIVE: 1
BACK PAIN: 0
NAUSEA: 1
CHEST TIGHTNESS: 0
SHORTNESS OF BREATH: 0
COUGH: 0
SORE THROAT: 0
SHORTNESS OF BREATH: 0
VOMITING: 0
ABDOMINAL PAIN: 0
EYE DISCHARGE: 0

## 2021-08-26 ASSESSMENT — PAIN DESCRIPTION - DESCRIPTORS: DESCRIPTORS: CONSTANT

## 2021-08-26 ASSESSMENT — PAIN SCALES - GENERAL: PAINLEVEL_OUTOF10: 7

## 2021-08-26 ASSESSMENT — PAIN DESCRIPTION - LOCATION: LOCATION: BACK

## 2021-08-26 NOTE — PROGRESS NOTES
[facility], [address]  Phone [phone]   Fax [phone]      OFFICE VISIT: 2021    Negrita Moore-: 1975      HPI  Reason For Visit:  ST OTT-[place]MATT is a 54 y.o. Dizziness (Constantly dizzy, feels like her eyes are jumping side to side. ), Other (top left side of head is having spasms, it gets really tight and feels like it pulls her head, she has extreme dizziness when this happens. ), and Discuss Labs (discuss labs from  & 8/10)    Patient presents via telephone conference visit. She has multiple complaints today. She notes dizziness. She describes this as a vertigenous process  She has gone through vestibular therapy in the past.  She is trying to focus on an object. She has not had any meclizine in the past.  She notes that rolling over in her bed, the room will start spinning. She does get nauseated, but she has not vomited. She notes that her eyes seem to be fluttering side-to-side (nystagmus)  This is very irritating. She is also complaining of a headache she describes as spasms on the top left side of her head. This is concomitant with her dizziness. She states that this is severe and limiting. She also has tinnitus in her ears bilateral.    She had lab work done at [facility]  There is concern that her medication may be causing her symptoms. Anxiety and depression:  Medication:   Effexor  mg daily   Nortriptyline 25 mg twice daily  Symptoms: Her anxiety presently is situational with her severe symptoms of vertigo and nausea. She is also very concerned about her headaches that seem to be increasing in severity.   We are going to work this up and hopefully this will alleviate some of her anxiety      Hypertension:   BP today was   BP Readings from Last 1 Encounters:   21 (!) 115/97      Recent BP readings:    BP Readings from Last 3 Encounters:   21 (!) 115/97   21 126/78   21 (!) 147/85     Medication   Amlodipine 5 mg daily   Toprol-XL 50 mg twice daily  Medication compliance:  compliant most of the time  Home blood pressure monitoring: Yes - and was 115/87 today by self report. She is somewhat adherent to a low sodium diet. Symptoms: none  Laboratory:  Lab Results   Component Value Date    BUN 14 07/25/2021    CREATININE 0.7 07/25/2021          vitals were not taken for this visit. There is no height or weight on file to calculate BMI. I have reviewed the following with the Ms. Moore   Lab Review  Orders Only on 07/26/2021   Component Date Value    JACQUES Ab, IgG MARIA A 07/26/2021 None Detected     Anticardiolipin IgG 07/26/2021 <10     Cardiolipin Ab IgM 07/26/2021 <10     Anticardiolipin IgA 07/26/2021 <10     Anti ds DNA 07/26/2021 0.6    Admission on 07/25/2021, Discharged on 07/25/2021   Component Date Value    WBC 07/25/2021 9.9     RBC 07/25/2021 3.50*    Hemoglobin 07/25/2021 11.1*    Hematocrit 07/25/2021 32.3*    MCV 07/25/2021 92.3     MCH 07/25/2021 31.7*    MCHC 07/25/2021 34.4     RDW 07/25/2021 17.4*    Platelets 87/96/5665 207     MPV 07/25/2021 9.4     Neutrophils % 07/25/2021 66.4*    Lymphocytes % 07/25/2021 25.5     Monocytes % 07/25/2021 6.0     Eosinophils % 07/25/2021 0.8     Basophils % 07/25/2021 0.6     Neutrophils Absolute 07/25/2021 6.6     Immature Granulocytes # 07/25/2021 0.1     Lymphocytes Absolute 07/25/2021 2.5     Monocytes Absolute 07/25/2021 0.60     Eosinophils Absolute 07/25/2021 0.10     Basophils Absolute 07/25/2021 0.10     Color, UA 07/25/2021 DARK YELLOW*    Clarity, UA 07/25/2021 CLOUDY*    Glucose, Ur 07/25/2021 Negative     Bilirubin Urine 07/25/2021 Negative     Ketones, Urine 07/25/2021 TRACE*    Specific Gravity, UA 07/25/2021 1.025     Blood, Urine 07/25/2021 Negative     pH, UA 07/25/2021 5.0     Protein, UA 07/25/2021 TRACE*    Urobilinogen, Urine 07/25/2021 1.0     Nitrite, Urine 07/25/2021 Negative     Leukocyte Esterase, Urine 07/25/2021 SMALL*    Sodium 07/25/2021 138     Potassium reflex Magnesi* 07/25/2021 3.8     Chloride 07/25/2021 101     CO2 07/25/2021 24     Anion Gap 07/25/2021 13     Glucose 07/25/2021 162*    BUN 07/25/2021 14     CREATININE 07/25/2021 0.7     GFR Non- 07/25/2021 >60     GFR  07/25/2021 >59     Calcium 07/25/2021 9.5     Total Protein 07/25/2021 6.7     Albumin 07/25/2021 3.8     Total Bilirubin 07/25/2021 0.8     Alkaline Phosphatase 07/25/2021 117*    ALT 07/25/2021 17     AST 07/25/2021 21     Sed Rate 07/25/2021 43*    CRP 07/25/2021 3.18*    WBC, UA 07/25/2021 0-1     RBC, UA 07/25/2021 0-1     Epithelial Cells, UA 07/25/2021 5-10     Bacteria, UA 07/25/2021 1+*    Crystals, UA 07/25/2021 1+ Ca.  Oxalate*   Office Visit on 07/22/2021   Component Date Value    WBC 07/22/2021 12.0*    RBC 07/22/2021 4.02*    Hemoglobin 07/22/2021 12.7     Hematocrit 07/22/2021 37.1     MCV 07/22/2021 92.3     MCH 07/22/2021 31.6*    MCHC 07/22/2021 34.2     RDW 07/22/2021 17.4*    Platelets 51/50/7318 293     MPV 07/22/2021 9.5     Neutrophils % 07/22/2021 67.0*    Lymphocytes % 07/22/2021 24.8     Monocytes % 07/22/2021 5.4     Eosinophils % 07/22/2021 1.1     Basophils % 07/22/2021 0.8     Neutrophils Absolute 07/22/2021 8.0*    Immature Granulocytes # 07/22/2021 0.1     Lymphocytes Absolute 07/22/2021 3.0     Monocytes Absolute 07/22/2021 0.70     Eosinophils Absolute 07/22/2021 0.10     Basophils Absolute 07/22/2021 0.10    Admission on 06/03/2021, Discharged on 06/03/2021   Component Date Value    WBC 06/03/2021 8.0     RBC 06/03/2021 3.28*    Hemoglobin 06/03/2021 10.4*    Hematocrit 06/03/2021 30.6*    MCV 06/03/2021 93.3     MCH 06/03/2021 31.7*    MCHC 06/03/2021 34.0     RDW 06/03/2021 17.0*    Platelets 11/64/6882 187     MPV 06/03/2021 10.0     Neutrophils % 06/03/2021 62.2  Lymphocytes % 06/03/2021 29.3     Monocytes % 06/03/2021 5.4     Eosinophils % 06/03/2021 1.6     Basophils % 06/03/2021 1.0     Neutrophils Absolute 06/03/2021 5.0     Immature Granulocytes # 06/03/2021 0.0     Lymphocytes Absolute 06/03/2021 2.3     Monocytes Absolute 06/03/2021 0.40     Eosinophils Absolute 06/03/2021 0.10     Basophils Absolute 06/03/2021 0.10     Sodium 06/03/2021 138     Potassium reflex Magnesi* 06/03/2021 3.7     Chloride 06/03/2021 102     CO2 06/03/2021 24     Anion Gap 06/03/2021 12     Glucose 06/03/2021 118*    BUN 06/03/2021 14     CREATININE 06/03/2021 0.8     GFR Non- 06/03/2021 >60     GFR  06/03/2021 >59     Calcium 06/03/2021 9.0     Total Protein 06/03/2021 6.4*    Albumin 06/03/2021 4.2     Total Bilirubin 06/03/2021 0.8     Alkaline Phosphatase 06/03/2021 112*    ALT 06/03/2021 22     AST 06/03/2021 35*    P-R Interval 06/03/2021 156     QRS Duration 06/03/2021 86     Q-T Interval 06/03/2021 398     QTc Calculation (Bazett) 06/03/2021 420     P Axis 06/03/2021 42     T Axis 06/03/2021 41     Color, UA 06/03/2021 YELLOW     Clarity, UA 06/03/2021 Clear     Glucose, Ur 06/03/2021 Negative     Bilirubin Urine 06/03/2021 Negative     Ketones, Urine 06/03/2021 Negative     Specific Gravity, UA 06/03/2021 1.007     Blood, Urine 06/03/2021 Negative     pH, UA 06/03/2021 5.5     Protein, UA 06/03/2021 Negative     Urobilinogen, Urine 06/03/2021 0.2     Nitrite, Urine 06/03/2021 Negative     Leukocyte Esterase, Urine 06/03/2021 Negative     Troponin 06/03/2021 <0.01    Orders Only on 03/31/2021   Component Date Value    Antibody Screen 09/18/2020 neg      Copies of these are in the chart.     Current Outpatient Medications   Medication Sig Dispense Refill    meclizine (ANTIVERT) 25 MG tablet Take 1 tablet by mouth 3 times daily as needed for Dizziness or Nausea 30 tablet 1    oxyCODONE (ROXICODONE) 5 MG immediate release tablet Take 1 tablet by mouth 3 times daily as needed for Pain for up to 30 days. (may fill 8/11/21) 90 tablet 0    omeprazole (PRILOSEC) 20 MG delayed release capsule Take 1 capsule by mouth Daily 90 capsule 3    venlafaxine (EFFEXOR XR) 150 MG extended release capsule Take 1 capsule by mouth daily 90 capsule 3    amLODIPine (NORVASC) 5 MG tablet Take 1 tablet by mouth daily 90 tablet 3    acyclovir (ZOVIRAX) 800 MG tablet TAKE 1 TABLET BY MOUTH DAILY 90 tablet 3    metoprolol succinate (TOPROL XL) 50 MG extended release tablet TAKE 1 TABLET BY MOUTH TWICE DAILY 180 tablet 3    venlafaxine (EFFEXOR XR) 75 MG extended release capsule TAKE 1 CAPSULE BY MOUTH DAILY IN ADDITION  MG TO MAKE 225 MG DAILY 30 capsule 11    pregabalin (LYRICA) 100 MG capsule Take 1 capsule by mouth 2 times daily for 180 days.  60 capsule 5    deferasirox (JADENU) 360 MG tablet Take 5 tablets by mouth every morning (before breakfast)      albuterol sulfate HFA (PROVENTIL HFA) 108 (90 Base) MCG/ACT inhaler Inhale 2 puffs into the lungs every 6 hours as needed for Wheezing 3 Inhaler 3    metFORMIN (GLUCOPHAGE) 1000 MG tablet Take 1 tablet by mouth 2 times daily (with meals) 180 tablet 3    levocetirizine (XYZAL) 5 MG tablet Take 1 tablet by mouth nightly 90 tablet 3    nortriptyline (PAMELOR) 25 MG capsule TAKE 1 TO 2 CAPSULES BY MOUTH EVERY NIGHT 180 capsule 3    olopatadine (PATADAY) 0.2 % SOLN ophthalmic solution Place 1 drop into both eyes daily 1 Bottle 2    carbidopa-levodopa (SINEMET)  MG per tablet TAKE 1 TABLET BY MOUTH EVERY NIGHT 90 tablet 3    naloxone (NARCAN) 4 MG/0.1ML LIQD nasal spray 1 spray by Nasal route as needed for Opioid Reversal 2 each 0    Calcium-Vitamin D 600-200 MG-UNIT TABS Take 1 tablet by mouth daily      ondansetron (ZOFRAN ODT) 4 MG disintegrating tablet Take 1 tablet by mouth every 8 hours as needed for Nausea or Vomiting 10 tablet 0    Multiple Vitamins-Minerals (MULTIVITAMIN & MINERAL PO) Take  by mouth.  CRANBERRY Take 500 mg by mouth daily.  Cholecalciferol (VITAMIN D3) 5000 UNITS TABS Take 5,000 Units by mouth daily        No current facility-administered medications for this visit.        Allergies: Silver, Tegaderm ag mesh 2\"x2\" [wound dressings], and Zithromax [azithromycin]     Past Medical History:   Diagnosis Date    Anemia     has had iron infusion    Anxiety     Arthritis     Back pain with left-sided radiculopathy 3/14/2016    DDD (degenerative disc disease), lumbar     Depression     Esophagitis     Fibromyalgia     Gastritis     Headache(784.0)     History of blood transfusion     Hypertension     Obesity     RLS (restless legs syndrome)     Sleep apnea     uses CPAP machine       Family History   Problem Relation Age of Onset    Diabetes Mother     High Blood Pressure Mother     Colon Polyps Mother     Heart Disease Mother     Cancer Mother     Depression Mother     Cancer Father     High Blood Pressure Father     Heart Disease Father     Stroke Father     High Blood Pressure Sister     Depression Sister     Anxiety Disorder Sister     Cancer Brother     Esophageal Cancer Brother     Anxiety Disorder Brother     Diabetes Brother     ADHD Brother     Depression Brother     Other Other         has history of suicide in family maternal great uncle       Past Surgical History:   Procedure Laterality Date    ANKLE SURGERY Right     APPENDECTOMY  4/19/15    BACK SURGERY  2000    CARPAL TUNNEL RELEASE Left     CERVICAL SPINE SURGERY N/A 9/1/15    CHOLECYSTECTOMY  1995    HIP SURGERY Right 1987    HIP SURGERY  03/28/2018    HYSTERECTOMY      partial 2008, still has ovaries and cervix    INSERTION / REMOVAL / REPLACEMENT VENOUS ACCESS CATHETER Left 11/7/2017    PORT INSERTION performed by Elo Rivera MD at [address] [place] [address] LITHOTRIPSY  1995/2000 [facility]      with hardware placed  IA COLONOSCOPY FLX DX W/COLLJ SPEC WHEN PFRMD N/A 2017    Dr Gerardo Morgan, 7 yr (age 47) recall    IA EGD TRANSORAL BIOPSY SINGLE/MULTIPLE N/A 2017    Dr ABHIJIT Baltazar-Gastritis/gastropathy    SALPINGO-OOPHORECTOMY      STOMACH SURGERY  2017    dr Tavares Jackman Right 2019       Social History     Tobacco Use    Smoking status: Former Smoker     Packs/day: 1.00     Years: 25.00     Pack years: 25.00     Types: Cigarettes     Quit date: 2013     Years since quittin.9    Smokeless tobacco: Never Used   Substance Use Topics    Alcohol use: Yes     Comment: rarely        Review of Systems   Constitutional: Positive for appetite change (decreased with vertigo and nausea). Negative for chills, fatigue and fever. HENT: Negative. Negative for congestion, ear discharge, ear pain, postnasal drip, sinus pressure and sore throat. Eyes: Positive for visual disturbance (with what sounds like nystagmus). Negative for discharge. Respiratory: Negative. Negative for cough, chest tightness, shortness of breath and wheezing. Cardiovascular: Negative. Negative for chest pain, palpitations and leg swelling. Gastrointestinal: Positive for nausea. Negative for abdominal pain, constipation, diarrhea and vomiting. GERD is well controlled on meds   Endocrine: Negative. Genitourinary: Negative. Negative for difficulty urinating, dysuria and menstrual problem. Musculoskeletal: Positive for arthralgias ( diffuse). Negative for back pain, joint swelling and myalgias. Skin: Positive for rash (malar area). Allergic/Immunologic: Negative. Neurological: Positive for dizziness (vertigo) and light-headedness. Negative for weakness and headaches. Hematological: Negative. Negative for adenopathy. Does not bruise/bleed easily.    Psychiatric/Behavioral: Positive for decreased concentration, dysphoric mood ( under a lot of stress recently) and [address] waiver authority and the [employer] Act. Patient identification was verified, and a caregiver was present when appropriate. The patient was located in a state where the provider was credentialed to provide care. Total time spent for this encounter: 30m+    --MORA Beauchamp DO on 8/26/2021 at 8:11 PM    An electronic signature was used to authenticate this note.

## 2021-08-26 NOTE — TELEPHONE ENCOUNTER
Caller: Sriram Naomi KIRTI    Relationship: Self    Best call back number: [phone]    Caller requesting test results: PATIENT    What test was performed: LABS    When was the test performed: TODAY    Where was the test performed: IN OFFICE     Additional notes: PATIENT ALSO STATES THAT SHE HAS A VERY TENDER SPOT ON THE TOP LEFT SIDE OF HER HEAD THAT JUST STARTED WHEN SHE STARTED HAVING THE DIZZY SPELLS. PATIENTS STATES THAT SHE IS STAGGERING MORE THAN SHE REALIZES.      PLEASE CONTACT PATIENT REGARDING LAB RESULTS AND PLANS FOR TREATMENT.

## 2021-08-26 NOTE — TELEPHONE ENCOUNTER
Notified Naomi of lab results and that Wendy Hernandezviolette IRMA said to hold the Jadenu for a week and if she continues to be dizzy to go to her PCP or ER.  Patient verbalized understanding.

## 2021-08-27 LAB — TROPONIN: <0.01 NG/ML (ref 0–0.03)

## 2021-08-27 PROCEDURE — 96361 HYDRATE IV INFUSION ADD-ON: CPT

## 2021-08-27 PROCEDURE — 6370000000 HC RX 637 (ALT 250 FOR IP): Performed by: EMERGENCY MEDICINE

## 2021-08-27 RX ORDER — MECLIZINE HYDROCHLORIDE 25 MG/1
25 TABLET ORAL 3 TIMES DAILY PRN
Qty: 15 TABLET | Refills: 0 | Status: SHIPPED | OUTPATIENT
Start: 2021-08-27 | End: 2021-09-06

## 2021-08-27 RX ADMIN — POTASSIUM BICARBONATE 40 MEQ: 782 TABLET, EFFERVESCENT ORAL at 00:33

## 2021-08-27 NOTE — ED PROVIDER NOTES
[facility]  eMERGENCY dEPARTMENT eNCOUnter      Pt Name: Pio Lin  MRN: [id number]  [place] 1975  Date of evaluation: 8/26/2021  Provider: Alessandra Haro MD    [address] [place] [address]       Chief Complaint   Patient presents with    Dizziness     symptoms started yesterday          HISTORY OF PRESENT ILLNESS   (Location/Symptom, Timing/Onset,Context/Setting, Quality, Duration, Modifying Factors, Severity)  Note limiting factors. Pio Lin is a 54 y.o. female who presents to the emergency department for vertigo. Patient states that since yesterday anytime that she stands or turns her head quickly or even just rolling over to turn her alarm clock off today puts her into a vertiginous episode. Tells me \"feels like my eyeballs are twitching back and forth when it occurs\". She tells me she felt nauseous yesterday but that is resolved and she is not vomiting. She is able to ambulate. Denies any focal neurologic symptoms including any weakness or numbness speech changes or facial droop. Does admit to some intermittent left temporal parietal region discomfort with associated phonophobia but denies photophobia. Denies any fevers or chills or upper respiratory infections or ear fullness. Denies any chest pain or palpitations. She has not passed out. HPI    NursingNotes were reviewed. REVIEW OF SYSTEMS    (2-9 systems for level 4, 10 or more for level 5)     Review of Systems   Constitutional: Negative for chills and fever. HENT: Negative for rhinorrhea and sore throat. Eyes: Negative for photophobia and visual disturbance. Respiratory: Negative for cough and shortness of breath. Cardiovascular: Negative for chest pain and leg swelling. Gastrointestinal: Negative for abdominal pain, diarrhea and vomiting. Genitourinary: Negative for dysuria, frequency and urgency. Musculoskeletal: Negative for back pain and neck pain. Neurological: Positive for dizziness and headaches. days. (may fill 8/11/21), Disp-90 tablet, R-0Normal      omeprazole (PRILOSEC) 20 MG delayed release capsule Take 1 capsule by mouth Daily, Disp-90 capsule, R-3Normal      !! venlafaxine (EFFEXOR XR) 150 MG extended release capsule Take 1 capsule by mouth daily, Disp-90 capsule, R-3Normal      amLODIPine (NORVASC) 5 MG tablet Take 1 tablet by mouth daily, Disp-90 tablet, R-3Normal      acyclovir (ZOVIRAX) 800 MG tablet TAKE 1 TABLET BY MOUTH DAILY, Disp-90 tablet, R-3Normal      metoprolol succinate (TOPROL XL) 50 MG extended release tablet TAKE 1 TABLET BY MOUTH TWICE DAILY, Disp-180 tablet, R-3Normal      !! venlafaxine (EFFEXOR XR) 75 MG extended release capsule TAKE 1 CAPSULE BY MOUTH DAILY IN ADDITION  MG TO MAKE 225 MG DAILY, Disp-30 capsule, R-11Normal      pregabalin (LYRICA) 100 MG capsule Take 1 capsule by mouth 2 times daily for 180 days. , Disp-60 capsule, R-5Normal      deferasirox (JADENU) 360 MG tablet Take 5 tablets by mouth every morning (before breakfast)Historical Med      albuterol sulfate HFA (PROVENTIL HFA) 108 (90 Base) MCG/ACT inhaler Inhale 2 puffs into the lungs every 6 hours as needed for Wheezing, Disp-3 Inhaler, R-3Normal      metFORMIN (GLUCOPHAGE) 1000 MG tablet Take 1 tablet by mouth 2 times daily (with meals), Disp-180 tablet, R-3Normal      levocetirizine (XYZAL) 5 MG tablet Take 1 tablet by mouth nightly, Disp-90 tablet, R-3Normal      nortriptyline (PAMELOR) 25 MG capsule TAKE 1 TO 2 CAPSULES BY MOUTH EVERY NIGHT, Disp-180 capsule, R-3**Patient requests 90 days supply**Normal      olopatadine (PATADAY) 0.2 % SOLN ophthalmic solution Place 1 drop into both eyes daily, Disp-1 Bottle, R-2Normal      carbidopa-levodopa (SINEMET)  MG per tablet TAKE 1 TABLET BY MOUTH EVERY NIGHT, Disp-90 tablet, R-3Normal      naloxone (NARCAN) 4 MG/0.1ML LIQD nasal spray 1 spray by Nasal route as needed for Opioid Reversal, Disp-2 each,R-0Normal      Calcium-Vitamin D 600-200 MG-UNIT TABS Take 1 tablet by mouth dailyHistorical Med      ondansetron (ZOFRAN ODT) 4 MG disintegrating tablet Take 1 tablet by mouth every 8 hours as needed for Nausea or Vomiting, Disp-10 tablet, R-0      Multiple Vitamins-Minerals (MULTIVITAMIN & MINERAL PO) Take  by mouth. CRANBERRY Take 500 mg by mouth daily. Cholecalciferol (VITAMIN D3) 5000 UNITS TABS Take 5,000 Units by mouth daily        ! ! - Potential duplicate medications found. Please discuss with provider.           ALLERGIES     Silver, Tegaderm ag mesh 2\"x2\" [wound dressings], and Zithromax [azithromycin]    FAMILY HISTORY       Family History   Problem Relation Age of Onset    Diabetes Mother     High Blood Pressure Mother     Colon Polyps Mother     Heart Disease Mother     Cancer Mother     Depression Mother     Cancer Father     High Blood Pressure Father     Heart Disease Father     Stroke Father     High Blood Pressure Sister     Depression Sister     Anxiety Disorder Sister     Cancer Brother     Esophageal Cancer Brother     Anxiety Disorder Brother     Diabetes Brother     ADHD Brother     Depression Brother     Other Other         has history of suicide in family maternal great uncle          SOCIAL HISTORY       Social History     Socioeconomic History    Marital status:      Spouse name: None    Number of children: 2    Years of education: 15    Highest education level: None   Occupational History     Employer: DISABLED    Occupation:    Tobacco Use    Smoking status: Former Smoker     Packs/day: 1.00     Years: 25.00     Pack years: 25.00     Types: Cigarettes     Quit date: 2013     Years since quittin.9    Smokeless tobacco: Never Used   Vaping Use    Vaping Use: Former    Substances: Always   Substance and Sexual Activity    Alcohol use: Yes     Comment: rarely    Drug use: No    Sexual activity: Never     Comment: pt states last 2 months   Other Topics Concern    None   Social History Narrative        Has been seen at [facility] by Linnette Rm NP this past year of medication assessment. She has seen a therapist in the past.         She had ADHD tested by Shira Wright  This past year        Has been on Lexapro, Wellbutrin, Cymbalta-this was bad. Social Determinants of Health     Financial Resource Strain: Medium Risk    Difficulty of Paying Living Expenses: Somewhat hard   Food Insecurity: Food Insecurity Present    Worried About Running Out of Food in the Last Year: Often true    Clarence of Food in the Last Year: Often true   Transportation Needs:     Lack of Transportation (Medical):  Lack of Transportation (Non-Medical):    Physical Activity:     Days of Exercise per Week:     Minutes of Exercise per Session:    Stress:     Feeling of Stress :    Social Connections:     Frequency of Communication with Friends and Family:     Frequency of Social Gatherings with Friends and Family:     Attends Temple Services:     Active Member of Clubs or Organizations:     Attends Club or Organization Meetings:     Marital Status:    Intimate Partner Violence:     Fear of Current or Ex-Partner:     Emotionally Abused:     Physically Abused:     Sexually Abused:        SCREENINGS             PHYSICAL EXAM    (up to 7 for level 4, 8 or more for level 5)     ED Triage Vitals [08/26/21 1929]   BP Temp Temp Source Pulse Resp SpO2 Height Weight   (!) 115/97 98.3 °F (36.8 °C) Oral 94 18 97 % 5' 7\" (1.702 m) 300 lb (136.1 kg)       Physical Exam  Vitals and nursing note reviewed. Constitutional:       General: She is not in acute distress. Appearance: Normal appearance. She is well-developed. She is obese. She is not ill-appearing or diaphoretic. HENT:      Head: Normocephalic and atraumatic.       Right Ear: Tympanic membrane, ear canal and external ear normal.      Left Ear: Tympanic membrane, ear canal and external ear normal.      Nose: Nose normal. Mouth/Throat:      Mouth: Mucous membranes are moist.   Eyes:      Extraocular Movements: Extraocular movements intact. Conjunctiva/sclera: Conjunctivae normal.      Pupils: Pupils are equal, round, and reactive to light. Neck:      Trachea: No tracheal deviation. Cardiovascular:      Rate and Rhythm: Normal rate and regular rhythm. Pulses: Normal pulses. Heart sounds: Normal heart sounds. No murmur heard. Pulmonary:      Effort: No respiratory distress. Breath sounds: Normal breath sounds. No wheezing or rales. Abdominal:      Palpations: Abdomen is soft. There is no mass. Tenderness: There is no abdominal tenderness. Musculoskeletal:         General: Normal range of motion. Cervical back: Normal range of motion. Right lower leg: No edema. Left lower leg: No edema. Skin:     General: Skin is warm and dry. Neurological:      General: No focal deficit present. Mental Status: She is alert and oriented to person, place, and time. GCS: GCS eye subscore is 4. GCS verbal subscore is 5. GCS motor subscore is 6. Cranial Nerves: No cranial nerve deficit, dysarthria or facial asymmetry. Sensory: No sensory deficit. Motor: No weakness or abnormal muscle tone. Coordination: Coordination normal. Finger-Nose-Finger Test normal.      Gait: Gait is intact.          DIAGNOSTIC RESULTS     EKG: All EKG's areinterpreted by the Emergency Department Physician who either signs or Co-signs this chart in the absence of a cardiologist.    78 normal sinus rhythm no obvious ST changes nondiagnostic EKG    RADIOLOGY:  Non-plain film images such as CT, Ultrasound and MRI are read by the radiologist. Plain radiographic images are visualized and preliminarily interpreted bythe emergency physician with the below findings:          [address]    (Results Pending)     PreliminaryFindingsOnly-- See Final Report For Complete Findings  CT HEAD:  No ICH, mass effect or edema. No evidence of acute cortical stroke. Visualized sinuses and mastoid air cells are clear. LABS:  Labs Reviewed   CBC WITH AUTO DIFFERENTIAL - Abnormal; Notable for the following components:       Result Value    RBC 3.70 (*)     Hemoglobin 11.9 (*)     Hematocrit 35.9 (*)     MCH 32.2 (*)     RDW 15.9 (*)     All other components within normal limits   COMPREHENSIVE METABOLIC PANEL - Abnormal; Notable for the following components:    Potassium 3.4 (*)     Alkaline Phosphatase 106 (*)     All other components within normal limits   TROPONIN       All other labs were within normal range or not returned as of this dictation. EMERGENCY DEPARTMENT COURSE and DIFFERENTIAL DIAGNOSIS/MDM:   Vitals:    Vitals:    08/26/21 1929   BP: (!) 115/97   Pulse: 94   Resp: 18   Temp: 98.3 °F (36.8 °C)   TempSrc: Oral   SpO2: 97%   Weight: 300 lb (136.1 kg)   Height: 5' 7\" (1.702 m)       MDM  Number of Diagnoses or Management Options     Amount and/or Complexity of Data Reviewed  Clinical lab tests: ordered and reviewed  Tests in the radiology section of CPT®: ordered and reviewed  Independent visualization of images, tracings, or specimens: yes      VSS, non focal exam, pt ambulatory here, likely BPPV based on hx and exam, ct head neg, labs reassuring, feeling some better after treatment here, can follow up outpt, understands return precautions      CONSULTS:  None    PROCEDURES:  Unless otherwise noted below, none     Procedures    FINAL IMPRESSION      1. Benign paroxysmal positional vertigo, unspecified laterality    2.  Hypokalemia          DISPOSITION/PLAN   DISPOSITION        PATIENT REFERRED TOMaria Antonia Buchanan DO  [address] [facility] [address] [facility]    Schedule an appointment as soon as possible for a visit on 8/30/2021        DISCHARGE MEDICATIONS:  Discharge Medication List as of 8/27/2021 12:30 AM      START taking these medications    Details   !! meclizine (ANTIVERT) 25 MG tablet Take 1 tablet by mouth 3 times daily as needed for Dizziness, Disp-15 tablet, R-0Normal       !! - Potential duplicate medications found. Please discuss with provider.              (Please note that portions of this note were completed with a voice recognition program.  Efforts were made to edit thedictations but occasionally words are mis-transcribed.)    Clarita Hancock MD (electronically signed)  Attending Emergency Physician        Praveen Stewart MD  08/27/21 0893

## 2021-08-29 LAB
EKG P AXIS: 18 DEGREES
EKG P-R INTERVAL: 132 MS
EKG Q-T INTERVAL: 372 MS
EKG QRS DURATION: 86 MS
EKG QTC CALCULATION (BAZETT): 403 MS
EKG T AXIS: 44 DEGREES

## 2021-08-29 PROCEDURE — 93010 ELECTROCARDIOGRAM REPORT: CPT | Performed by: INTERNAL MEDICINE

## 2021-08-30 ENCOUNTER — HOSPITAL ENCOUNTER (OUTPATIENT)
Dept: PHYSICAL THERAPY | Age: 46
Setting detail: THERAPIES SERIES
Discharge: HOME | End: 2021-08-30
Payer: MEDICARE

## 2021-08-30 PROCEDURE — 97110 THERAPEUTIC EXERCISES: CPT

## 2021-08-30 ASSESSMENT — PAIN DESCRIPTION - LOCATION: LOCATION: BACK

## 2021-08-30 ASSESSMENT — PAIN SCALES - GENERAL: PAINLEVEL_OUTOF10: 6

## 2021-08-30 ASSESSMENT — PAIN DESCRIPTION - PAIN TYPE: TYPE: CHRONIC PAIN

## 2021-08-30 ASSESSMENT — PAIN DESCRIPTION - DESCRIPTORS: DESCRIPTORS: TIGHTNESS

## 2021-08-30 NOTE — PROGRESS NOTES
Daily Treatment Note  Date: 2021  Patient Name: Meredith Ledezma  MRN: [id number]     :   1975    Subjective:   General  Chart Reviewed: Yes  Additional Pertinent Hx: pain increased with frequent falls last year  Response To Previous Treatment: Not applicable  Family / Caregiver Present: No  Referring Practitioner: ASHLEE Aden  PT Visit Information  PT Insurance Information: [facility] [employer]  Total # of Visits Approved: 13 (eval + 12)  Total # of Visits to Date: 2  Plan of Care/Certification Expiration Date: 10/19/21  Progress Note Due Date: 21  Subjective  Subjective: \"vertigo since last Wednesday\", difficulty getting around due to vertigo, significant dizziness with change in positions (sup<-> sit<->stand), went to ER Thursday with D/C of dx of BPPV, dizziness has improved since Thursday but continues to have dizziness with position changes  Pain Screening  Patient Currently in Pain: Yes  Pain Assessment  Pain Assessment: 0-10  Pain Level: 6  Pain Type: Chronic pain  Pain Location: Back  Pain Descriptors: Tightness  Vital Signs  Patient Currently in Pain: Yes       Treatment Activities:                                      Exercises  Exercise 1: supine lower trunk rotation  Exercise 2: supine quadratus stretch 10 seconds x3 eacy way  Exercise 3: hooklying lumbar rotation 10 seconds x3 each way  Exercise 4: supine piriformis stretch 10 seconds x3 each  Exercise 5: B SKTC 10 seconds x3 each  Exercise 6: B hamstring stretch 10 seconds x3  Exercise 7: pelvic tilts on a towel x10  Exercise 8: abdominal series (alone 5s hold x10, LEx5, UEx5, UE/LEx5)  Exercise 9: bridging with TA contractions  Exercise 10: repeated sit to stand with TA contraction---not today due to c/o dizziness with position changes  Exercise 11: HS curls--not today  Exercise 12: Multifidus row/ Paloff press--not today  Exercise 13: Stand hip abd/ext x10  Exercise 14: Standing marching x10  Exercise 15:  Mini Minutes     Therapy Time   Individual Concurrent Group Co-treatment   Time In 9537         Time Out 1400         Minutes 55         Timed Code Treatment Minutes: [address], PT  Electronically signed by Chastity Barbosa PT on 8/30/2021 at 3:22 PM

## 2021-08-31 ENCOUNTER — PATIENT MESSAGE (OUTPATIENT)
Dept: PRIMARY CARE CLINIC | Age: 46
End: 2021-08-31

## 2021-08-31 NOTE — TELEPHONE ENCOUNTER
From: Tad Navarro  To: Marianna Marcelo DO  Sent: 8/31/2021 5:09 PM CDT  Subject: Visit Héctor MONCADA, I just wanted to find out if that MRI has been scheduled?

## 2021-09-02 ENCOUNTER — HOSPITAL ENCOUNTER (OUTPATIENT)
Dept: PHYSICAL THERAPY | Age: 46
Setting detail: THERAPIES SERIES
End: 2021-09-02
Payer: MEDICARE

## 2021-09-07 ENCOUNTER — HOSPITAL ENCOUNTER (OUTPATIENT)
Dept: PAIN MANAGEMENT | Age: 46
Discharge: HOME | End: 2021-09-07
Payer: MEDICARE

## 2021-09-07 VITALS
DIASTOLIC BLOOD PRESSURE: 86 MMHG | TEMPERATURE: 97 F | HEIGHT: 68 IN | HEART RATE: 98 BPM | SYSTOLIC BLOOD PRESSURE: 130 MMHG | WEIGHT: 293 LBS | OXYGEN SATURATION: 96 % | BODY MASS INDEX: 44.41 KG/M2

## 2021-09-07 DIAGNOSIS — M54.10 BACK PAIN WITH LEFT-SIDED RADICULOPATHY: ICD-10-CM

## 2021-09-07 DIAGNOSIS — M62.830 SPASM OF MUSCLE OF LOWER BACK: ICD-10-CM

## 2021-09-07 DIAGNOSIS — M62.838 MUSCLE SPASMS OF NECK: ICD-10-CM

## 2021-09-07 DIAGNOSIS — M54.16 CHRONIC LUMBAR RADICULOPATHY: ICD-10-CM

## 2021-09-07 PROCEDURE — 99213 OFFICE O/P EST LOW 20 MIN: CPT | Performed by: NURSE PRACTITIONER

## 2021-09-07 PROCEDURE — 99215 OFFICE O/P EST HI 40 MIN: CPT

## 2021-09-07 RX ORDER — OXYCODONE HYDROCHLORIDE 5 MG/1
5 TABLET ORAL 3 TIMES DAILY PRN
Qty: 90 TABLET | Refills: 0 | Status: SHIPPED | OUTPATIENT
Start: 2021-09-10 | End: 2021-09-27 | Stop reason: SDUPTHER

## 2021-09-07 ASSESSMENT — ENCOUNTER SYMPTOMS
CONSTIPATION: 0
BOWEL INCONTINENCE: 0
BACK PAIN: 1

## 2021-09-07 ASSESSMENT — PAIN SCALES - GENERAL: PAINLEVEL_OUTOF10: 4

## 2021-09-07 ASSESSMENT — PAIN DESCRIPTION - LOCATION: LOCATION: BACK

## 2021-09-07 ASSESSMENT — PAIN DESCRIPTION - PAIN TYPE: TYPE: CHRONIC PAIN

## 2021-09-07 NOTE — PROGRESS NOTES
Clinic Documentation      Education Provided:  [x] Review of Armani Lujan  [x] Agreement Review  [x] PEG Score Calculated [x] PHQ Score Calculated [x] ORT Score Calculated    [] Compliance Issues Discussed [] Cognitive Behavior Needs [x] Exercise [] Review of Test [] Financial Issues  [x] Tobacco/Alcohol Use Reviewed [x] Teaching [] New Patient [] Picture Obtained    Physician Plan:  [] Outgoing Referral  [] Pharmacy Consult  [] Test Ordered [x] Prescription Ordered/Changed   [] Obtained Test Results / Consult Notes        Complete if patient is withholding blood thinner for procedure     Blood Thinner Patient is currently taking:      [] Plavix (Hold for 7 days)  [] Aspirin (Hold for 5 days)     [] Pletal (Hold for 2 days)  [] Pradaxa (Hold for 3 days)    [] Effient (Hold for 7 days)  [] Xarelto (Hold for 2 days)    [] Eliquis (Hold for 2 days)  [] Brilinta (Hold for 7 days)    [] Coumadin (Hold for 5 days) - (INR needs to be drawn the day prior to procedure- INR < 2.0)    [] Aggrenox (Hold for 7 days)        [] Patient will stop medication on their own.    [] Blood Thinner Form Faxed for approval to hold.    Provider form faxed to:     Assessment Completed by:  Electronically signed by Rudy Lin on 9/7/2021 at 10:51 AM
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
## 2021-09-08 ENCOUNTER — HOSPITAL ENCOUNTER (OUTPATIENT)
Dept: PHYSICAL THERAPY | Age: 46
Setting detail: THERAPIES SERIES
Discharge: HOME | End: 2021-09-08
Payer: MEDICARE

## 2021-09-08 PROCEDURE — 97110 THERAPEUTIC EXERCISES: CPT

## 2021-09-08 ASSESSMENT — PAIN SCALES - GENERAL: PAINLEVEL_OUTOF10: 7

## 2021-09-08 ASSESSMENT — PAIN DESCRIPTION - LOCATION: LOCATION: BACK

## 2021-09-08 ASSESSMENT — PAIN DESCRIPTION - PAIN TYPE: TYPE: CHRONIC PAIN

## 2021-09-08 ASSESSMENT — PAIN DESCRIPTION - DESCRIPTORS: DESCRIPTORS: TIGHTNESS

## 2021-09-08 ASSESSMENT — PAIN DESCRIPTION - ORIENTATION: ORIENTATION: LOWER;MID;UPPER

## 2021-09-09 ENCOUNTER — HOSPITAL ENCOUNTER (OUTPATIENT)
Dept: PHYSICAL THERAPY | Age: 46
Setting detail: THERAPIES SERIES
Discharge: HOME | End: 2021-09-09
Payer: MEDICARE

## 2021-09-09 ENCOUNTER — OFFICE VISIT (OUTPATIENT)
Dept: FAMILY MEDICINE CLINIC | Facility: CLINIC | Age: 46
End: 2021-09-09

## 2021-09-09 VITALS
BODY MASS INDEX: 44.41 KG/M2 | TEMPERATURE: 97.2 F | WEIGHT: 293 LBS | DIASTOLIC BLOOD PRESSURE: 70 MMHG | HEART RATE: 91 BPM | RESPIRATION RATE: 20 BRPM | HEIGHT: 68 IN | SYSTOLIC BLOOD PRESSURE: 140 MMHG

## 2021-09-09 DIAGNOSIS — F90.2 ATTENTION DEFICIT HYPERACTIVITY DISORDER (ADHD), COMBINED TYPE: Primary | ICD-10-CM

## 2021-09-09 DIAGNOSIS — F31.9 BIPOLAR 1 DISORDER (HCC): ICD-10-CM

## 2021-09-09 PROBLEM — F39 MOOD DISORDER: Status: RESOLVED | Noted: 2018-05-07 | Resolved: 2021-09-09

## 2021-09-09 PROCEDURE — 97110 THERAPEUTIC EXERCISES: CPT

## 2021-09-09 PROCEDURE — 99214 OFFICE O/P EST MOD 30 MIN: CPT | Performed by: PEDIATRICS

## 2021-09-09 RX ORDER — OXYCODONE HYDROCHLORIDE 5 MG/1
5 TABLET ORAL 3 TIMES DAILY
COMMUNITY
Start: 2021-09-10 | End: 2021-10-10

## 2021-09-09 RX ORDER — AMLODIPINE BESYLATE 5 MG/1
5 TABLET ORAL DAILY
COMMUNITY
Start: 2021-07-20

## 2021-09-09 RX ORDER — LAMOTRIGINE 25 MG/1
TABLET ORAL
Qty: 42 TABLET | Refills: 0 | Status: SHIPPED | OUTPATIENT
Start: 2021-09-09 | End: 2022-03-31

## 2021-09-09 RX ORDER — ALBUTEROL SULFATE 90 UG/1
2 AEROSOL, METERED RESPIRATORY (INHALATION) AS NEEDED
COMMUNITY
Start: 2021-05-14

## 2021-09-09 RX ORDER — MECLIZINE HYDROCHLORIDE 25 MG/1
25 TABLET ORAL AS NEEDED
COMMUNITY
Start: 2021-09-03

## 2021-09-09 RX ORDER — PREGABALIN 100 MG/1
100 CAPSULE ORAL 2 TIMES DAILY
COMMUNITY

## 2021-09-09 ASSESSMENT — PAIN DESCRIPTION - DESCRIPTORS: DESCRIPTORS: DULL

## 2021-09-09 ASSESSMENT — PAIN DESCRIPTION - PAIN TYPE: TYPE: CHRONIC PAIN

## 2021-09-09 ASSESSMENT — PAIN DESCRIPTION - ORIENTATION: ORIENTATION: MID;UPPER

## 2021-09-09 ASSESSMENT — PAIN SCALES - GENERAL: PAINLEVEL_OUTOF10: 4

## 2021-09-09 ASSESSMENT — PAIN DESCRIPTION - LOCATION: LOCATION: BACK

## 2021-09-09 NOTE — ASSESSMENT & PLAN NOTE
Psychological condition is newly identified.  Medication changes per orders.  Psychological condition  will be reassessed in 4 weeks     Her mdq is very positive.  Family history positive..

## 2021-09-09 NOTE — ASSESSMENT & PLAN NOTE
Has symptoms but prob more bpd than adhd.   pdmp is not clean for use of stimulant due to opiate for chronic pain.

## 2021-09-09 NOTE — ASSESSMENT & PLAN NOTE
Psychological condition is newly identified.  Medication changes per orders.  Psychological condition  will be reassessed in 4 weeks.    Very positive mdq and family history   She is under a lot of stress with family matters.  Will stabilize mood w lamictal and see if it help the adhd symptoms as well   He positive pdmp won't allow us to use stimulant.

## 2021-09-09 NOTE — PROGRESS NOTES
Daily Treatment Note  Date: 2021  Patient Name: Mary Villalobos  MRN: [id number]     :   1975    Subjective:   General  Chart Reviewed: Yes  Additional Pertinent Hx: pain increased with frequent falls last year  Response To Previous Treatment: Not applicable  Family / Caregiver Present: No  Referring Practitioner: ASHLEE Barclay  PT Visit Information  PT Insurance Information: [facility] [employer] Medicare  Total # of Visits Approved: 13 (eval + 12)  Total # of Visits to Date: 4  Plan of Care/Certification Expiration Date: 10/19/21  Progress Note Due Date: 21  Progress Note Counter: Teresa Flannery thru 10/19 for 1 eval + 12 visits; [facility] auth waived due to covid  Subjective  Subjective: States that her vertigo is about the same. Back is not too bad. Pt reports she feels therapy is helping. Pain Screening  Patient Currently in Pain: Yes  Pain Assessment  Pain Assessment: 0-10  Pain Level: 4  Pain Type: Chronic pain  Pain Location: Back  Pain Orientation: Mid;Upper  Pain Descriptors: Dull  Vital Signs  Patient Currently in Pain: Yes       Treatment Activities:                                      Exercises  Exercise 1: supine lower trunk rotation x 5 for 5\" ea  Exercise 2: supine quadratus stretch 10 seconds x 3 each way  Exercise 3: hooklying lumbar rotation 10 seconds x3 each way---see ex 1  Exercise 4: supine piriformis stretch 10 seconds x 3 each  Exercise 5: B SKTC 10 seconds x 3 each  Exercise 6: B hamstring stretch 10 seconds x 3  Exercise 7: pelvic tilts on a towel x 10  Exercise 8: abdominal series (alone 5s hold x10, Mika 10, UE x , UE/LE x 10)  Exercise 9: bridging with TA contractions x 5  Exercise 10: repeated sit to stand with TA contraction- x10  Exercise 11: HS curls (green) x 10  Exercise 12: Multifidus row/ Paloff press (green) x 10  Exercise 13: Stand hip abd/ext x10  Exercise 14: Standing marching x10  Exercise 15:  Mini squats x 10  Exercise 16: xxxxxx previous episode xxxxxxxx                                   Assessment:   Conditions Requiring Skilled Therapeutic Intervention  Body structures, Functions, Activity limitations: Decreased functional mobility ; Decreased ROM; Decreased strength;Decreased endurance;Decreased posture; Increased pain  Assessment: Pt tolerated activity without complaints of dizziness. She had no c/o increased pain with activity. She reported feeling like she had done some work. She reports feeling better with PT. Treatment Diagnosis: back pain  Decision Making: Medium Complexity  History: see above  Exam: see above  Clinical Presentation: evolving  REQUIRES PT FOLLOW UP: Yes  Discharge Recommendations: Continue to assess pending progress      Goals:  Short term goals  Time Frame for Short term goals: 3-4 weeks  Short term goal 1: Independent with HEP. Short term goal 2: Pt will increase lower extremity strength to 4+/5 bilaterally. Short term goal 3: Pt will report improved radiating pain to BLE. Short term goal 4: Pt will improve hip flexion to 60 degrees with knee extended. Long term goals  Time Frame for Long term goals : 4-6 weeks  Long term goal 1: Improve Oswestry score to 25% impairement or less. Long term goal 2: Report less than or equal to 4/10 pain with household chores. Long term goal 3: Pt will increase lumbar flexion to 30 degrees  Long term goal 4: Pt will increase lumbar extension to 10 degrees.   Patient Goals   Patient goals : Improve back pain with activity at home    Plan:    Plan  Times per week: 2  Plan weeks: 4-6  Current Treatment Recommendations: Strengthening, ROM, Functional Mobility Training, Endurance Training, Neuromuscular Re-education, Manual Therapy - Soft Tissue Mobilization, Manual Therapy - Joint Manipulation, Pain Management, Patient/Caregiver Education & Training, Home Exercise Program  Timed Code Treatment Minutes: 40 Minutes     Therapy Time   Individual Concurrent Group Co-treatment   Time In 1312         Time Out 1352         Minutes 40         Timed Code Treatment Minutes: [address]. Reema Vargas PT  Electronically signed by Rebekah Argueta, PT on 9/9/2021 at 1:55 PM

## 2021-09-09 NOTE — PROGRESS NOTES
"Chief Complaint  ADHD (initial)    Subjective    History of Present Illness      Patient presents to [facility] for   Pt states that she is here for an initial ADHD evaluation.       ADHD/Mood HPI    Visit for:  initial evaluation  Interim changes to follow up on today: no change in medication  Work/School Performance:  Doesn't work  Cognitive:  unable to focus    Behavior  Hyperactivity: is not hyperactive  Impulsivity: no impulsivity  Tasking: difficulty initiating tasks and difficulty completing tasks    Social  ADHD social/impulsive symptoms:  has difficulty awaiting turn    Behavioral health  Behavior: anxious behavior  Emotional coping: demonstrates feelings of anxiety    Forms reviewed in detail with patient.  Shows adhd symptoms and pos mdq 12   She is on opiates so adhd med is not an option.       Review of Systems    I have reviewed and agree with the HPI information as above.  Matthias Hawkins MD     Objective   Vital Signs:   /70   Pulse 91   Temp 97.2 °F (36.2 °C)   Resp 20   Ht 172.7 cm (68\")   Wt (!) 138 kg (305 lb)   BMI 46.38 kg/m²       Physical Exam  Constitutional:       Appearance: Normal appearance. She is obese.   Cardiovascular:      Rate and Rhythm: Normal rate and regular rhythm.      Heart sounds: Normal heart sounds.   Pulmonary:      Effort: Pulmonary effort is normal.      Breath sounds: Normal breath sounds.   Neurological:      Mental Status: She is alert.   Psychiatric:         Mood and Affect: Mood normal.         Behavior: Behavior normal.          Result Review  Data Reviewed:                   Assessment and Plan      Problem List Items Addressed This Visit        Mental Health    Attention deficit hyperactivity disorder (ADHD), combined type - Primary    Current Assessment & Plan       Has symptoms but prob more bpd than adhd.   pdmp is not clean for use of stimulant due to opiate for chronic pain.         Bipolar 1 disorder (CMS/[facility])    " Current Assessment & Plan     Psychological condition is newly identified.  Medication changes per orders.  Psychological condition  will be reassessed in 4 weeks     Her mdq is very positive.  Family history positive..         Relevant Medications    lamoTRIgine (LaMICtal) 25 MG tablet              Follow Up   Return in about 4 weeks (around 10/7/2021) for Recheck.  Patient was given instructions and counseling regarding her condition or for health maintenance advice. Please see specific information pulled into the AVS if appropriate.

## 2021-09-12 PROBLEM — M62.830 SPASM OF MUSCLE OF LOWER BACK: Status: ACTIVE | Noted: 2021-09-12

## 2021-09-12 PROBLEM — M54.16 LUMBAR RADICULOPATHY: Status: ACTIVE | Noted: 2018-06-05

## 2021-09-12 PROBLEM — M62.838 MUSCLE SPASMS OF NECK: Status: ACTIVE | Noted: 2021-09-12

## 2021-09-12 PROBLEM — M54.50 CHRONIC LEFT-SIDED LOW BACK PAIN WITHOUT SCIATICA: Status: ACTIVE | Noted: 2017-10-19

## 2021-09-12 PROBLEM — G89.29 CHRONIC LEFT-SIDED LOW BACK PAIN WITHOUT SCIATICA: Status: ACTIVE | Noted: 2017-10-19

## 2021-09-13 ENCOUNTER — PATIENT MESSAGE (OUTPATIENT)
Dept: PRIMARY CARE CLINIC | Age: 46
End: 2021-09-13

## 2021-09-13 DIAGNOSIS — N20.0 KIDNEY STONES: Primary | ICD-10-CM

## 2021-09-14 ENCOUNTER — HOSPITAL ENCOUNTER (OUTPATIENT)
Dept: PHYSICAL THERAPY | Age: 46
Setting detail: THERAPIES SERIES
Discharge: HOME | End: 2021-09-14
Payer: MEDICARE

## 2021-09-14 PROCEDURE — 97110 THERAPEUTIC EXERCISES: CPT

## 2021-09-14 ASSESSMENT — PAIN DESCRIPTION - LOCATION: LOCATION: BACK

## 2021-09-14 ASSESSMENT — PAIN DESCRIPTION - FREQUENCY: FREQUENCY: CONTINUOUS

## 2021-09-14 ASSESSMENT — PAIN SCALES - GENERAL: PAINLEVEL_OUTOF10: 8

## 2021-09-14 ASSESSMENT — PAIN DESCRIPTION - ORIENTATION: ORIENTATION: UPPER;MID;LOWER

## 2021-09-14 ASSESSMENT — PAIN DESCRIPTION - DESCRIPTORS: DESCRIPTORS: DULL

## 2021-09-14 NOTE — PROGRESS NOTES
Physical Therapy  Daily Treatment Note  Date: 2021  Patient Name: Sondra Womack  MRN: [id number]     :   1975    Subjective:   General  Chart Reviewed: Yes  Additional Pertinent Hx: pain increased with frequent falls last year  Response To Previous Treatment: Not applicable  Family / Caregiver Present: No  Referring Practitioner: ASHLEE Leyva  PT Visit Information  PT Insurance Information: [facility] [employer] Medicare  Total # of Visits Approved: 13 (eval + 12)  Total # of Visits to Date: 5  Plan of Care/Certification Expiration Date: 10/19/21  Progress Note Due Date: 21  Progress Note Counter: Enmanuel Go thru 10/19 for 1 eval + 12 visits; [facility] auth waived due to covid  Subjective  Subjective: I am having kidney stone issues. Hard to tell difference which is which between back pain and kidney stone pain. Pain Screening  Patient Currently in Pain: Yes  Pain Assessment  Pain Assessment: 0-10  Pain Level: 8  Pain Location: Back  Pain Orientation: Upper;Mid;Lower  Pain Descriptors: Dull  Pain Frequency: Continuous       Treatment Activities:             Exercises  Exercise 1: supine lower trunk rotation x 5 for 5\" ea  Exercise 2: supine quadratus stretch 10 seconds x 3 each way  Exercise 3: hooklying lumbar rotation 10 seconds x3 each way---see ex 1  Exercise 4: supine piriformis stretch 10 seconds x 3 each (figure 4)  Exercise 5: B SKTC 10 seconds x 3 each  Exercise 6: B hamstring stretch 10 seconds x 3  Exercise 7: pelvic tilts on a towel x 10  Exercise 8: abdominal series (alone 5s hold x10, Mika 10, UE x , UE/LE x 10)  Exercise 9: bridging with TA contractions x 5  Exercise 10: repeated sit to stand with TA contraction- x10  Exercise 11: HS curls (green) x 10  Exercise 12: Multifidus row/ Paloff press (green) x 10  Exercise 13: Stand hip abd/ext x10  Exercise 14: Standing marching x10  Exercise 15:  Mini squats x 10  Exercise 16: xxxxxx previous episode xxxxxxxx        Assessment: Conditions Requiring Skilled Therapeutic Intervention  Body structures, Functions, Activity limitations: Decreased functional mobility ; Decreased ROM; Decreased strength;Decreased endurance;Decreased posture; Increased pain  Assessment: Patient did well with session today. She is able to recall most of her ex. Relates ex usually makes her feel better. She rated overall pain at 8/10 pre and post session. She does however, relate that her pain level is highly elevated due to her kidney stones. Treatment Diagnosis: back pain  Decision Making: Medium Complexity  History: see above  Exam: see above  Clinical Presentation: evolving  REQUIRES PT FOLLOW UP: Yes  Discharge Recommendations: Continue to assess pending progress    Goals:  Short term goals  Time Frame for Short term goals: 3-4 weeks  Short term goal 1: Independent with HEP. Short term goal 2: Pt will increase lower extremity strength to 4+/5 bilaterally. Short term goal 3: Pt will report improved radiating pain to BLE. Short term goal 4: Pt will improve hip flexion to 60 degrees with knee extended. Long term goals  Time Frame for Long term goals : 4-6 weeks  Long term goal 1: Improve Oswestry score to 25% impairement or less. Long term goal 2: Report less than or equal to 4/10 pain with household chores. Long term goal 3: Pt will increase lumbar flexion to 30 degrees  Long term goal 4: Pt will increase lumbar extension to 10 degrees.   Patient Goals   Patient goals : Improve back pain with activity at home  Plan:    Plan  Times per week: 2  Plan weeks: 4-6  Current Treatment Recommendations: Strengthening, ROM, Functional Mobility Training, Endurance Training, Neuromuscular Re-education, Manual Therapy - Soft Tissue Mobilization, Manual Therapy - Joint Manipulation, Pain Management, Patient/Caregiver Education & Training, Home Exercise Program  Timed Code Treatment Minutes: 41 Minutes     Therapy Time   Individual Concurrent Group Co-treatment   Time In 1014         Time Out 1055         Minutes 41         Timed Code Treatment Minutes: 41 Minutes  Electronically signed by Nargis Auguste PTA on 9/14/2021 at 12:08 PM

## 2021-09-14 NOTE — TELEPHONE ENCOUNTER
From: Scott Auguste  To: Dahiana Lopez DO  Sent: 9/13/2021 6:09 PM CDT  Subject: Non-Urgent Medical Question    I need a referral to [facility] please. I apparently have another big kidney stone that can't pass. I've been in constant pain for a week now.

## 2021-09-15 DIAGNOSIS — D64.9 ANEMIA, UNSPECIFIED TYPE: ICD-10-CM

## 2021-09-15 DIAGNOSIS — E83.110 HEREDITARY HEMOCHROMATOSIS (HCC): Primary | ICD-10-CM

## 2021-09-16 ENCOUNTER — HOSPITAL ENCOUNTER (OUTPATIENT)
Dept: PHYSICAL THERAPY | Age: 46
Setting detail: THERAPIES SERIES
Discharge: HOME | End: 2021-09-16
Payer: MEDICARE

## 2021-09-16 PROCEDURE — 97110 THERAPEUTIC EXERCISES: CPT

## 2021-09-16 NOTE — PROGRESS NOTES
Physical Therapy  Daily Treatment Note  Date: 2021  Patient Name: Herbert Jerry  MRN: [id number]     :   1975    Subjective:   General  Chart Reviewed: Yes  Additional Pertinent Hx: pain increased with frequent falls last year  Response To Previous Treatment: Not applicable  Family / Caregiver Present: No  Referring Practitioner: ASHLEE Hernandez  PT Visit Information  PT Insurance Information: [facility] [employer] Medicare  Total # of Visits Approved: 13 (eval + 12)  Total # of Visits to Date: 6  Plan of Care/Certification Expiration Date: 10/19/21  Progress Note Due Date: 21  Progress Note Counter: Alexandru Stacy thru 10/19 for 1 eval + 12 visits; [facility] auth waived due to covid  Subjective  Subjective: Patient states she is doing ok today and says she is having a lot of pain from her kidney stone that is affecting her sleep. She says that her back isn't too bad today, but it is about 5/10 pain rating. Pain Screening  Patient Currently in Pain: Yes (5/10 pre tx)  Vital Signs  Patient Currently in Pain: Yes (10 pre tx)       Treatment Activities:                                    Exercises  Exercise 1: supine lower trunk rotation x 5 for 5\" ea  Exercise 2: supine quadratus stretch 10 seconds x 3 each way  Exercise 4: supine piriformis stretch 10 seconds x 3 each (figure 4)  Exercise 5: B SKTC 10 seconds x 3 each  Exercise 6: B hamstring stretch 10 seconds x 3  Exercise 7: pelvic tilts on a towel x 10  Exercise 8: abdominal series (alone 10 sec hold x10, Mika 10, UE x , UE/LE x 10)  Exercise 9: bridging with TA contractions x 5  Exercise 10: repeated sit to stand with TA contraction- x10 *on mat table for elevated surgace  Exercise 11: HS curls (green) x 10  Exercise 12: Multifidus row/ Paloff press (green) x 10  Exercise 13: Stand hip abd/ext x10  Exercise 14: Standing marching x10  Exercise 15:  Mini squats x 10                               Assessment:   Conditions Requiring Skilled

## 2021-09-17 ENCOUNTER — APPOINTMENT (OUTPATIENT)
Dept: CT IMAGING | Age: 46
End: 2021-09-17
Payer: MEDICARE

## 2021-09-17 ENCOUNTER — HOSPITAL ENCOUNTER (EMERGENCY)
Age: 46
Discharge: HOME | End: 2021-09-17
Attending: EMERGENCY MEDICINE
Payer: MEDICARE

## 2021-09-17 VITALS
BODY MASS INDEX: 47.09 KG/M2 | WEIGHT: 293 LBS | HEART RATE: 75 BPM | RESPIRATION RATE: 18 BRPM | TEMPERATURE: 97.2 F | DIASTOLIC BLOOD PRESSURE: 67 MMHG | HEIGHT: 66 IN | OXYGEN SATURATION: 95 % | SYSTOLIC BLOOD PRESSURE: 133 MMHG

## 2021-09-17 DIAGNOSIS — Z98.1 HISTORY OF LUMBAR SPINAL FUSION: ICD-10-CM

## 2021-09-17 DIAGNOSIS — M51.34 DEGENERATIVE DISC DISEASE, THORACIC: ICD-10-CM

## 2021-09-17 DIAGNOSIS — R10.9 LEFT FLANK PAIN: Primary | ICD-10-CM

## 2021-09-17 LAB
ALBUMIN SERPL-MCNC: 4.3 G/DL (ref 3.5–5.2)
ALP BLD-CCNC: 109 U/L (ref 35–104)
ALT SERPL-CCNC: 21 U/L (ref 5–33)
ANION GAP SERPL CALCULATED.3IONS-SCNC: 15 MMOL/L (ref 7–19)
AST SERPL-CCNC: 22 U/L (ref 5–32)
BACTERIA: ABNORMAL /HPF
BASOPHILS ABSOLUTE: 0.1 K/UL (ref 0–0.2)
BASOPHILS RELATIVE PERCENT: 0.9 % (ref 0–1)
BILIRUB SERPL-MCNC: 0.7 MG/DL (ref 0.2–1.2)
BILIRUBIN URINE: NEGATIVE
BLOOD, URINE: NEGATIVE
BUN BLDV-MCNC: 11 MG/DL (ref 6–20)
CALCIUM SERPL-MCNC: 9.4 MG/DL (ref 8.6–10)
CHLORIDE BLD-SCNC: 102 MMOL/L (ref 98–111)
CLARITY: ABNORMAL
CO2: 21 MMOL/L (ref 22–29)
COLOR: YELLOW
CREAT SERPL-MCNC: 0.8 MG/DL (ref 0.5–0.9)
CRYSTALS, UA: ABNORMAL /HPF
EOSINOPHILS ABSOLUTE: 0.2 K/UL (ref 0–0.6)
EOSINOPHILS RELATIVE PERCENT: 1.7 % (ref 0–5)
EPITHELIAL CELLS, UA: ABNORMAL /HPF
GFR AFRICAN AMERICAN: >59
GFR NON-AFRICAN AMERICAN: >60
GLUCOSE BLD-MCNC: 131 MG/DL (ref 74–109)
GLUCOSE URINE: NEGATIVE MG/DL
HCT VFR BLD CALC: 37.9 % (ref 37–47)
HEMOGLOBIN: 12.4 G/DL (ref 12–16)
IMMATURE GRANULOCYTES #: 0.1 K/UL
KETONES, URINE: ABNORMAL MG/DL
LEUKOCYTE ESTERASE, URINE: ABNORMAL
LYMPHOCYTES ABSOLUTE: 3.5 K/UL (ref 1.1–4.5)
LYMPHOCYTES RELATIVE PERCENT: 33.6 % (ref 20–40)
MCH RBC QN AUTO: 30.8 PG (ref 27–31)
MCHC RBC AUTO-ENTMCNC: 32.7 G/DL (ref 33–37)
MCV RBC AUTO: 94.3 FL (ref 81–99)
MONOCYTES ABSOLUTE: 0.6 K/UL (ref 0–0.9)
MONOCYTES RELATIVE PERCENT: 5.8 % (ref 0–10)
NEUTROPHILS ABSOLUTE: 6.1 K/UL (ref 1.5–7.5)
NEUTROPHILS RELATIVE PERCENT: 57.5 % (ref 50–65)
NITRITE, URINE: NEGATIVE
PDW BLD-RTO: 15.2 % (ref 11.5–14.5)
PH UA: 5.5 (ref 5–8)
PLATELET # BLD: 284 K/UL (ref 130–400)
PMV BLD AUTO: 9.8 FL (ref 9.4–12.3)
POTASSIUM REFLEX MAGNESIUM: 3.7 MMOL/L (ref 3.5–5)
PROTEIN UA: ABNORMAL MG/DL
RBC # BLD: 4.02 M/UL (ref 4.2–5.4)
SODIUM BLD-SCNC: 138 MMOL/L (ref 136–145)
SPECIFIC GRAVITY UA: 1.03 (ref 1–1.03)
TOTAL PROTEIN: 7.4 G/DL (ref 6.6–8.7)
UROBILINOGEN, URINE: 1 E.U./DL
WBC # BLD: 10.6 K/UL (ref 4.8–10.8)
WBC UA: ABNORMAL /HPF (ref 0–5)

## 2021-09-17 PROCEDURE — 99283 EMERGENCY DEPT VISIT LOW MDM: CPT

## 2021-09-17 PROCEDURE — 2580000003 HC RX 258: Performed by: EMERGENCY MEDICINE

## 2021-09-17 PROCEDURE — 6360000002 HC RX W HCPCS: Performed by: EMERGENCY MEDICINE

## 2021-09-17 PROCEDURE — 96365 THER/PROPH/DIAG IV INF INIT: CPT

## 2021-09-17 PROCEDURE — 81001 URINALYSIS AUTO W/SCOPE: CPT

## 2021-09-17 PROCEDURE — 74150 CT ABDOMEN W/O CONTRAST: CPT

## 2021-09-17 PROCEDURE — 85025 COMPLETE CBC W/AUTO DIFF WBC: CPT

## 2021-09-17 PROCEDURE — 36415 COLL VENOUS BLD VENIPUNCTURE: CPT

## 2021-09-17 PROCEDURE — 96374 THER/PROPH/DIAG INJ IV PUSH: CPT

## 2021-09-17 PROCEDURE — 80053 COMPREHEN METABOLIC PANEL: CPT

## 2021-09-17 PROCEDURE — 96361 HYDRATE IV INFUSION ADD-ON: CPT

## 2021-09-17 PROCEDURE — 96375 TX/PRO/DX INJ NEW DRUG ADDON: CPT

## 2021-09-17 RX ORDER — KETOROLAC TROMETHAMINE 30 MG/ML
30 INJECTION, SOLUTION INTRAMUSCULAR; INTRAVENOUS ONCE
Status: COMPLETED | OUTPATIENT
Start: 2021-09-17 | End: 2021-09-17

## 2021-09-17 RX ORDER — ONDANSETRON 8 MG/1
8 TABLET, ORALLY DISINTEGRATING ORAL EVERY 8 HOURS PRN
Qty: 15 TABLET | Refills: 0 | Status: SHIPPED | OUTPATIENT
Start: 2021-09-17

## 2021-09-17 RX ORDER — HYDROMORPHONE HYDROCHLORIDE 1 MG/ML
1 INJECTION, SOLUTION INTRAMUSCULAR; INTRAVENOUS; SUBCUTANEOUS ONCE
Status: COMPLETED | OUTPATIENT
Start: 2021-09-17 | End: 2021-09-17

## 2021-09-17 RX ORDER — 0.9 % SODIUM CHLORIDE 0.9 %
1000 INTRAVENOUS SOLUTION INTRAVENOUS ONCE
Status: COMPLETED | OUTPATIENT
Start: 2021-09-17 | End: 2021-09-17

## 2021-09-17 RX ORDER — ONDANSETRON 2 MG/ML
4 INJECTION INTRAMUSCULAR; INTRAVENOUS EVERY 30 MIN PRN
Status: DISCONTINUED | OUTPATIENT
Start: 2021-09-17 | End: 2021-09-18 | Stop reason: HOSPADM

## 2021-09-17 RX ADMIN — KETOROLAC TROMETHAMINE 30 MG: 30 INJECTION, SOLUTION INTRAMUSCULAR; INTRAVENOUS at 21:52

## 2021-09-17 RX ADMIN — HYDROMORPHONE HYDROCHLORIDE 1 MG: 1 INJECTION, SOLUTION INTRAMUSCULAR; INTRAVENOUS; SUBCUTANEOUS at 23:17

## 2021-09-17 RX ADMIN — ONDANSETRON HYDROCHLORIDE 4 MG: 2 SOLUTION INTRAMUSCULAR; INTRAVENOUS at 21:50

## 2021-09-17 RX ADMIN — SODIUM CHLORIDE 1000 ML: 9 INJECTION, SOLUTION INTRAVENOUS at 21:48

## 2021-09-17 ASSESSMENT — ENCOUNTER SYMPTOMS
ABDOMINAL PAIN: 0
VOMITING: 0
VOICE CHANGE: 0
EYE DISCHARGE: 0
APNEA: 0
NAUSEA: 1
SORE THROAT: 0
CONSTIPATION: 0
FACIAL SWELLING: 0
SHORTNESS OF BREATH: 0
BLOOD IN STOOL: 0
CHOKING: 0
SINUS PRESSURE: 0
DIARRHEA: 0

## 2021-09-17 ASSESSMENT — PAIN SCALES - GENERAL
PAINLEVEL_OUTOF10: 7
PAINLEVEL_OUTOF10: 7

## 2021-09-18 NOTE — ED NOTES
Left sided flank pain and is similar to kidney stone.  told her it is not a kidney stone. Results are back from CT.      Cherylene Chancy, RN  09/17/21 [id number]

## 2021-09-18 NOTE — ED PROVIDER NOTES
[facility]  eMERGENCY dEPARTMENT eNCOUnter      Pt Name: Al Beverly  MRN: [id number]  [place] 1975  Date of evaluation: 9/17/2021  Provider: Nohemy Yang MD    [address] [place] [address]       Chief Complaint   Patient presents with    Flank Pain     left side; pain since the 3rd per pt         HISTORY OF PRESENT ILLNESS   (Location/Symptom, Timing/Onset,Context/Setting, Quality, Duration, Modifying Factors, Severity)  Note limiting factors. Al Beverly is a 54 y.o. female who presents to the emergency department pain    69-year-old female presents with complaint of left flank pain that is been intermittent since the third of this month. She been working with her clinician. She states urology will not see her until she has a CAT scan. Her clinician states they will not order CAT scan until they see her. Because her pain is persistent she is present in the emergency room for evaluation. She states years ago that she had a large stone that had to be busted up on the left side she is concerned that what could be going on. She also has several other comorbid conditions hemochromatosis and iron overload. States her spleen is big. Spleen may be the reason for her pain. She denies fever chills denies any infectious symptoms she has no concern for Covid she is not vaccinated but will consider it. The history is provided by the patient. NursingNotes were reviewed. REVIEW OF SYSTEMS    (2-9 systems for level 4, 10 or more for level 5)     Review of Systems   Constitutional: Negative for chills and fever. HENT: Negative for congestion, drooling, facial swelling, nosebleeds, sinus pressure, sore throat and voice change. Eyes: Negative for discharge. Respiratory: Negative for apnea, choking and shortness of breath. Cardiovascular: Negative for chest pain and leg swelling. Gastrointestinal: Positive for nausea.  Negative for abdominal pain, blood in stool, constipation, diarrhea and vomiting. Genitourinary: Positive for flank pain. Negative for enuresis. Musculoskeletal: Negative for joint swelling. Skin: Negative for rash and wound. Neurological: Negative for seizures and syncope. Psychiatric/Behavioral: Negative for behavioral problems, hallucinations and suicidal ideas. All other systems reviewed and are negative. A complete review of systems was performed and is negative except as noted above in the HPI.        PAST MEDICAL HISTORY     Past Medical History:   Diagnosis Date    Anemia     has had iron infusion    Anxiety     Arthritis     Back pain with left-sided radiculopathy 3/14/2016    DDD (degenerative disc disease), lumbar     Depression     Esophagitis     Fibromyalgia     Gastritis     Headache(784.0)     History of blood transfusion     Hypertension     Obesity     RLS (restless legs syndrome)     Sleep apnea     uses CPAP machine         SURGICAL HISTORY       Past Surgical History:   Procedure Laterality Date    ANKLE SURGERY Right     APPENDECTOMY  4/19/15    BACK SURGERY  2000    CARPAL TUNNEL RELEASE Left     CERVICAL SPINE SURGERY N/A 9/1/15    CHOLECYSTECTOMY  1995    HIP SURGERY Right 1987    HIP SURGERY  03/28/2018    HYSTERECTOMY      partial 2008, still has ovaries and cervix    INSERTION / REMOVAL / REPLACEMENT VENOUS ACCESS CATHETER Left 11/7/2017    PORT INSERTION performed by Saintclair Mazzoni, MD at [address] [place] [address] LITHOTRIPSY  [address] [place] [address]      with hardware placed    OR COLONOSCOPY FLX DX W/COLLJ SPEC WHEN PFRMD N/A 5/2/2017    Dr Cora Hernandez, 7 yr (age 47) recall    OR EGD TRANSORAL BIOPSY SINGLE/MULTIPLE N/A 5/2/2017    Dr ABHIJIT Baltazar-Gastritis/gastropathy    SALPINGO-OOPHORECTOMY      STOMACH SURGERY  12/20/2017    dr Kermit Fofana Right 2019         CURRENT MEDICATIONS       Previous Medications    ACYCLOVIR (ZOVIRAX) 800 MG TABLET    TAKE 1 TABLET BY MOUTH DAILY    ALBUTEROL SULFATE HFA (PROVENTIL HFA) 108 (90 BASE) MCG/ACT INHALER    Inhale 2 puffs into the lungs every 6 hours as needed for Wheezing    AMLODIPINE (NORVASC) 5 MG TABLET    Take 1 tablet by mouth daily    CALCIUM-VITAMIN D 600-200 MG-UNIT TABS    Take 1 tablet by mouth daily    CARBIDOPA-LEVODOPA (SINEMET)  MG PER TABLET    TAKE 1 TABLET BY MOUTH EVERY NIGHT    CHOLECALCIFEROL (VITAMIN D3) 5000 UNITS TABS    Take 5,000 Units by mouth daily     CRANBERRY    Take 500 mg by mouth daily. DEFERASIROX (JADENU) 360 MG TABLET    Take 5 tablets by mouth every morning (before breakfast)    LEVOCETIRIZINE (XYZAL) 5 MG TABLET    Take 1 tablet by mouth nightly    METFORMIN (GLUCOPHAGE) 1000 MG TABLET    Take 1 tablet by mouth 2 times daily (with meals)    METOPROLOL SUCCINATE (TOPROL XL) 50 MG EXTENDED RELEASE TABLET    TAKE 1 TABLET BY MOUTH TWICE DAILY    MULTIPLE VITAMINS-MINERALS (MULTIVITAMIN & MINERAL PO)    Take  by mouth. NALOXONE (NARCAN) 4 MG/0.1ML LIQD NASAL SPRAY    1 spray by Nasal route as needed for Opioid Reversal    NORTRIPTYLINE (PAMELOR) 25 MG CAPSULE    TAKE 1 TO 2 CAPSULES BY MOUTH EVERY NIGHT    OLOPATADINE (PATADAY) 0.2 % SOLN OPHTHALMIC SOLUTION    Place 1 drop into both eyes daily    OMEPRAZOLE (PRILOSEC) 20 MG DELAYED RELEASE CAPSULE    Take 1 capsule by mouth Daily    OXYCODONE (ROXICODONE) 5 MG IMMEDIATE RELEASE TABLET    Take 1 tablet by mouth 3 times daily as needed for Pain for up to 30 days. PREGABALIN (LYRICA) 100 MG CAPSULE    Take 1 capsule by mouth 2 times daily for 180 days.     VENLAFAXINE (EFFEXOR XR) 150 MG EXTENDED RELEASE CAPSULE    Take 1 capsule by mouth daily    VENLAFAXINE (EFFEXOR XR) 75 MG EXTENDED RELEASE CAPSULE    TAKE 1 CAPSULE BY MOUTH DAILY IN ADDITION  MG TO MAKE 225 MG DAILY       ALLERGIES     Silver, Tegaderm ag mesh 2\"x2\" [wound dressings], and Zithromax [azithromycin]    FAMILY HISTORY       Family History   Problem Relation Age of Onset    Diabetes Mother     High Blood Pressure Mother     Colon Polyps Mother     Heart Disease Mother     Cancer Mother     Depression Mother     Cancer Father     High Blood Pressure Father     Heart Disease Father     Stroke Father     High Blood Pressure Sister     Depression Sister     Anxiety Disorder Sister     Cancer Brother     Esophageal Cancer Brother     Anxiety Disorder Brother     Diabetes Brother     ADHD Brother     Depression Brother     Other Other         has history of suicide in family maternal great uncle          SOCIAL HISTORY       Social History     Socioeconomic History    Marital status:      Spouse name: None    Number of children: 2    Years of education: 15    Highest education level: None   Occupational History     Employer: DISABLED    Occupation:    Tobacco Use    Smoking status: Former Smoker     Packs/day: 1.00     Years: 25.00     Pack years: 25.00     Types: Cigarettes     Quit date: 2013     Years since quittin.0    Smokeless tobacco: Never Used   Vaping Use    Vaping Use: Former    Substances: Always   Substance and Sexual Activity    Alcohol use: Yes     Comment: rarely    Drug use: No    Sexual activity: Never     Comment: pt states last 2 months   Other Topics Concern    None   Social History Narrative        Has been seen at [facility] by Juliet Mccall NP this past year of medication assessment. She has seen a therapist in the past.         She had ADHD tested by Willie Godwin  This past year        Has been on Lexapro, Wellbutrin, Cymbalta-this was bad.      Social Determinants of Health     Financial Resource Strain: Medium Risk    Difficulty of Paying Living Expenses: Somewhat hard   Food Insecurity: Food Insecurity Present    Worried About Running Out of Food in the Last Year: Often true    Clarence of Food in the Last Year: Often true   Transportation Needs:     Lack of Transportation (Medical):  Lack of Transportation (Non-Medical):    Physical Activity:     Days of Exercise per Week:     Minutes of Exercise per Session:    Stress:     Feeling of Stress :    Social Connections:     Frequency of Communication with Friends and Family:     Frequency of Social Gatherings with Friends and Family:     Attends Congregational Services:     Active Member of Clubs or Organizations:     Attends Club or Organization Meetings:     Marital Status:    Intimate Partner Violence:     Fear of Current or Ex-Partner:     Emotionally Abused:     Physically Abused:     Sexually Abused:        SCREENINGS             PHYSICAL EXAM    (up to 7 for level 4, 8 or more for level 5)     ED Triage Vitals   BP Temp Temp Source Pulse Resp SpO2 Height Weight   09/17/21 1906 09/17/21 1903 09/17/21 1903 09/17/21 1903 09/17/21 1906 09/17/21 1906 09/17/21 1903 09/17/21 1903   133/67 97.2 °F (36.2 °C) Temporal 86 17 94 % 5' 6\" (1.676 m) 300 lb (136.1 kg)       Physical Exam  Vitals and nursing note reviewed. Constitutional:       General: She is not in acute distress. Appearance: She is well-developed. HENT:      Head: Normocephalic and atraumatic. Right Ear: External ear normal.      Left Ear: External ear normal.   Eyes:      General: No scleral icterus. Conjunctiva/sclera: Conjunctivae normal.      Pupils: Pupils are equal, round, and reactive to light. Cardiovascular:      Rate and Rhythm: Normal rate and regular rhythm. Pulses: Normal pulses. Heart sounds: Normal heart sounds. No murmur heard. Pulmonary:      Effort: Pulmonary effort is normal. No respiratory distress. Breath sounds: Normal breath sounds. Abdominal:      General: Bowel sounds are normal.      Palpations: Abdomen is soft. Tenderness: There is abdominal tenderness (Mild left upper quadrant tenderness). Comments: She is obese which makes her exam more difficult. Musculoskeletal:         General: Normal range of motion. Cervical back: Normal range of motion and neck supple. Skin:     General: Skin is warm and dry. Findings: No rash. Neurological:      General: No focal deficit present. Mental Status: She is alert and oriented to person, place, and time. Psychiatric:         Mood and Affect: Mood normal.         Behavior: Behavior normal.         DIAGNOSTIC RESULTS     EKG: All EKG's are interpreted by the Emergency Department Physician who either signs or Co-signs this chart in the absence of a cardiologist.        RADIOLOGY:   Non-plain film images such as CT, Ultrasound and MRI are read by the radiologist. Plainradiographic images are visualized and preliminarily interpreted by the emergency physician with the below findings: The CT was interpreted by stat rad I have also reviewed the films. Hepatomegaly of splenomegaly no hydronephrosis or stone. Gastric sleeve no bowel inflammation prior cholecystectomy appendectomy hysterectomy she has hardware in her lumbar spine. They did mention but I see and showed the patient she has disc disease all the way up in the thoracic spine with evacuation phenomenon of some the disc spaces.     Interpretation per the Radiologist below, if available at the time of this note:    Sonirma 23    (Results Pending)         ED BEDSIDE ULTRASOUND:   Performed by ED Physician - none    LABS:  Labs Reviewed   CBC WITH AUTO DIFFERENTIAL - Abnormal; Notable for the following components:       Result Value    RBC 4.02 (*)     MCHC 32.7 (*)     RDW 15.2 (*)     All other components within normal limits   COMPREHENSIVE METABOLIC PANEL W/ REFLEX TO MG FOR LOW K - Abnormal; Notable for the following components:    CO2 21 (*)     Glucose 131 (*)     Alkaline Phosphatase 109 (*)     All other components within normal limits   URINE RT REFLEX TO CULTURE - Abnormal; Notable for the following components:    Clarity, UA CLOUDY (*)     Ketones, Urine TRACE (*)     Protein, UA TRACE (*)     Leukocyte Esterase, Urine TRACE (*)     All other components within normal limits   MICROSCOPIC URINALYSIS - Abnormal; Notable for the following components:    Bacteria, UA 1+ (*)     Crystals, UA 1+ (*)     All other components within normal limits       All other labs were within normal range or not returned as of this dictation. EMERGENCY DEPARTMENT COURSE and DIFFERENTIALDIAGNOSIS/MDM:   Vitals:    Vitals:    09/17/21 1903 09/17/21 1906   BP:  133/67   Pulse: 86    Resp:  17   Temp: 97.2 °F (36.2 °C)    TempSrc: Temporal    SpO2:  94%   Weight: 300 lb (136.1 kg)    Height: 5' 6\" (1.676 m)        MDM  Number of Diagnoses or Management Options  Degenerative disc disease, thoracic  History of lumbar spinal fusion  Left flank pain  Diagnosis management comments: There is no clear-cut reason for pain I wonder if it is muscle skeletal.  She does get epidurals from pain management I will offer her something else for pain before discharge and she asked to have some Zofran called in. Patient is otherwise stable for discharge and instructed to follow-up with her clinician. CONSULTS:  None    PROCEDURES:  Unless otherwise notedbelow, none     Procedures    FINAL IMPRESSION     1. Left flank pain    2. History of lumbar spinal fusion    3.  Degenerative disc disease, thoracic          DISPOSITION/PLAN   DISPOSITION        PATIENT REFERRED TO:  @FUP@    DISCHARGE MEDICATIONS:  New Prescriptions    ONDANSETRON (ZOFRAN-ODT) 8 MG TBDP DISINTEGRATING TABLET    Take 1 tablet by mouth every 8 hours as needed for Nausea or Vomiting          (Please note that portions of this note were completed with a voice recognition program.  Efforts were made to edit the dictations butoccasionally words are mis-transcribed.)    Vanessa Richardson MD (electronically signed)  AttendingEmergency Physician          Chalo Sanchez MD  09/17/21 3193

## 2021-09-20 ENCOUNTER — OFFICE VISIT (OUTPATIENT)
Dept: ONCOLOGY | Facility: CLINIC | Age: 46
End: 2021-09-20

## 2021-09-20 ENCOUNTER — LAB (OUTPATIENT)
Dept: LAB | Facility: HOSPITAL | Age: 46
End: 2021-09-20

## 2021-09-20 ENCOUNTER — APPOINTMENT (OUTPATIENT)
Dept: PHYSICAL THERAPY | Age: 46
End: 2021-09-20
Payer: MEDICARE

## 2021-09-20 VITALS
HEART RATE: 80 BPM | SYSTOLIC BLOOD PRESSURE: 152 MMHG | HEIGHT: 68 IN | BODY MASS INDEX: 44.41 KG/M2 | TEMPERATURE: 97.7 F | WEIGHT: 293 LBS | DIASTOLIC BLOOD PRESSURE: 88 MMHG | OXYGEN SATURATION: 98 % | RESPIRATION RATE: 16 BRPM

## 2021-09-20 DIAGNOSIS — E83.110 HEREDITARY HEMOCHROMATOSIS (HCC): Primary | ICD-10-CM

## 2021-09-20 DIAGNOSIS — Z45.2 ENCOUNTER FOR CARE RELATED TO PORT-A-CATH: ICD-10-CM

## 2021-09-20 DIAGNOSIS — M51.36 DDD (DEGENERATIVE DISC DISEASE), LUMBAR: ICD-10-CM

## 2021-09-20 DIAGNOSIS — E53.8 FOLIC ACID DEFICIENCY: ICD-10-CM

## 2021-09-20 DIAGNOSIS — D64.9 ANEMIA, UNSPECIFIED TYPE: ICD-10-CM

## 2021-09-20 DIAGNOSIS — I10 ESSENTIAL HYPERTENSION: ICD-10-CM

## 2021-09-20 LAB
ALBUMIN SERPL-MCNC: 4.4 G/DL (ref 3.5–5.2)
ALBUMIN/GLOB SERPL: 1.5 G/DL
ALP SERPL-CCNC: 112 U/L (ref 39–117)
ALT SERPL W P-5'-P-CCNC: 17 U/L (ref 1–33)
ANION GAP SERPL CALCULATED.3IONS-SCNC: 13 MMOL/L (ref 5–15)
AST SERPL-CCNC: 24 U/L (ref 1–32)
BASOPHILS # BLD AUTO: 0.1 10*3/MM3 (ref 0–0.2)
BASOPHILS NFR BLD AUTO: 1.2 % (ref 0–1.5)
BILIRUB SERPL-MCNC: 0.8 MG/DL (ref 0–1.2)
BUN SERPL-MCNC: 8 MG/DL (ref 6–20)
BUN/CREAT SERPL: 11.9 (ref 7–25)
CALCIUM SPEC-SCNC: 9.5 MG/DL (ref 8.6–10.5)
CHLORIDE SERPL-SCNC: 102 MMOL/L (ref 98–107)
CO2 SERPL-SCNC: 26 MMOL/L (ref 22–29)
CREAT SERPL-MCNC: 0.67 MG/DL (ref 0.57–1)
DEPRECATED RDW RBC AUTO: 47.7 FL (ref 37–54)
EOSINOPHIL # BLD AUTO: 0.2 10*3/MM3 (ref 0–0.4)
EOSINOPHIL NFR BLD AUTO: 2.4 % (ref 0.3–6.2)
ERYTHROCYTE [DISTWIDTH] IN BLOOD BY AUTOMATED COUNT: 15 % (ref 12.3–15.4)
FOLATE SERPL-MCNC: 12.9 NG/ML (ref 4.78–24.2)
GFR SERPL CREATININE-BSD FRML MDRD: 95 ML/MIN/1.73
GLOBULIN UR ELPH-MCNC: 2.9 GM/DL
GLUCOSE SERPL-MCNC: 129 MG/DL (ref 65–99)
HCT VFR BLD AUTO: 35.6 % (ref 34–46.6)
HGB BLD-MCNC: 12.7 G/DL (ref 12–15.9)
HOLD SPECIMEN: NORMAL
IMM GRANULOCYTES # BLD AUTO: 0.05 10*3/MM3 (ref 0–0.05)
IMM GRANULOCYTES NFR BLD AUTO: 0.6 % (ref 0–0.5)
LYMPHOCYTES # BLD AUTO: 2.27 10*3/MM3 (ref 0.7–3.1)
LYMPHOCYTES NFR BLD AUTO: 26.9 % (ref 19.6–45.3)
MCH RBC QN AUTO: 31.1 PG (ref 26.6–33)
MCHC RBC AUTO-ENTMCNC: 35.7 G/DL (ref 31.5–35.7)
MCV RBC AUTO: 87.3 FL (ref 79–97)
MONOCYTES # BLD AUTO: 0.44 10*3/MM3 (ref 0.1–0.9)
MONOCYTES NFR BLD AUTO: 5.2 % (ref 5–12)
NEUTROPHILS NFR BLD AUTO: 5.38 10*3/MM3 (ref 1.7–7)
NEUTROPHILS NFR BLD AUTO: 63.7 % (ref 42.7–76)
NRBC BLD AUTO-RTO: 0 /100 WBC (ref 0–0.2)
PLATELET # BLD AUTO: 292 10*3/MM3 (ref 140–450)
PMV BLD AUTO: 9.9 FL (ref 6–12)
POTASSIUM SERPL-SCNC: 4 MMOL/L (ref 3.5–5.2)
PROT SERPL-MCNC: 7.3 G/DL (ref 6–8.5)
RBC # BLD AUTO: 4.08 10*6/MM3 (ref 3.77–5.28)
SODIUM SERPL-SCNC: 141 MMOL/L (ref 136–145)
VIT B12 BLD-MCNC: 779 PG/ML (ref 211–946)
WBC # BLD AUTO: 8.44 10*3/MM3 (ref 3.4–10.8)

## 2021-09-20 PROCEDURE — 82746 ASSAY OF FOLIC ACID SERUM: CPT | Performed by: NURSE PRACTITIONER

## 2021-09-20 PROCEDURE — 36415 COLL VENOUS BLD VENIPUNCTURE: CPT

## 2021-09-20 PROCEDURE — 85025 COMPLETE CBC W/AUTO DIFF WBC: CPT

## 2021-09-20 PROCEDURE — 99214 OFFICE O/P EST MOD 30 MIN: CPT | Performed by: NURSE PRACTITIONER

## 2021-09-20 PROCEDURE — 80053 COMPREHEN METABOLIC PANEL: CPT

## 2021-09-20 PROCEDURE — 82607 VITAMIN B-12: CPT | Performed by: NURSE PRACTITIONER

## 2021-09-20 RX ORDER — SODIUM CHLORIDE 0.9 % (FLUSH) 0.9 %
10 SYRINGE (ML) INJECTION AS NEEDED
Status: CANCELLED | OUTPATIENT
Start: 2021-09-20

## 2021-09-20 RX ORDER — SODIUM CHLORIDE 0.9 % (FLUSH) 0.9 %
10 SYRINGE (ML) INJECTION AS NEEDED
Status: DISCONTINUED | OUTPATIENT
Start: 2021-09-20 | End: 2021-09-26 | Stop reason: HOSPADM

## 2021-09-20 RX ORDER — HEPARIN SODIUM (PORCINE) LOCK FLUSH IV SOLN 100 UNIT/ML 100 UNIT/ML
500 SOLUTION INTRAVENOUS AS NEEDED
Status: CANCELLED | OUTPATIENT
Start: 2021-09-20

## 2021-09-20 RX ORDER — HEPARIN SODIUM (PORCINE) LOCK FLUSH IV SOLN 100 UNIT/ML 100 UNIT/ML
500 SOLUTION INTRAVENOUS AS NEEDED
Status: DISCONTINUED | OUTPATIENT
Start: 2021-09-20 | End: 2021-09-26 | Stop reason: HOSPADM

## 2021-09-20 RX ADMIN — HEPARIN SODIUM (PORCINE) LOCK FLUSH IV SOLN 100 UNIT/ML 500 UNITS: 100 SOLUTION at 10:33

## 2021-09-20 RX ADMIN — Medication 10 ML: at 10:30

## 2021-09-20 NOTE — PROGRESS NOTES
"[facility]  HEMATOLOGY & ONCOLOGY    W ONC [facility] HEMATOLOGY AND ONCOLOGY  [address]  [place] [id number] [phone]    Patient Name: Naomi Bhatia  Encounter Date: 2021  YOB: 1975  Patient Number: [id number]    Chief Complaint   Patient presents with   • Hemochromatosis     Here for f/u     HEMATOLOGIC HISTORY  Subjective: 44 year old female who was previously being seen by Dr. Clements for iron deficiency anemia. She has undergone a gastric sleeve procedure in 2017 for morbid obesity and had a total right hip replacement in 3/2019 after a fracture.       She, unfortunateky, stopped following up with bariatric surgery and did not take vitamin replacements. She has had a hysterectomy due to fibroids and endometriosis but found to be iron deficient and was started on iron replacement with IV iron, states it is more than 10 treatments in all.  She states that she has iron deficiency since age 14. She has had multiple blood transfusions in the past (4 or 5).     She presents now for follow up.     Today, she is feeling \"tired\".  She also suffers from chronic pain. In the Degernerative disc disease, fibromyalgia, and previous back surgeries     On follow up on 20: Had right Carpal Tunnel Surgery on 11.3.20 and has  Her arm in a sling today.  Otherwise, she is feeling \"exhausted\" because dad, who was on hospice,  about a month ago.  She has not slept well due to the depression and the pain in the arm was also keeping her up.      On follow up on 21: Upset that her car has broken down with expensive repairs which is causing distress.  Physically, she is tired, no shortness of breath, denies bleeding     On follow up on 3.11.21: Feeling \"OK and hanging in there\".  Her son had Covid 3 weeks ago but patient tested negative although she states that she had all the symptoms and may have had a " false negative test.        REASON FOR VISIT: Mrs. Bhatia is a 45-year-old female patient here today in follow-up for iron overload.  She has been seen by Dr. Goldberg with initial consultation and recommendations given.  She was found to be positive for this single C282Y hemochromatosis gene variant.  It is noted that iron overload is uncommon for those that are carriers for this but the iron overload could be due to obesity, fatty liver as well as her history of receiving multiple transfusions and iron therapy for iron deficiency anemia.  She has a history of gastric sleeve procedure for which has caused malabsorption issues.  She received multiple transfusions in the past as well as Venofer therapy at least 7 times back in 2018.    Dr. Goldberg saw Mrs. Bhatia initially on 9/18/2020 and work-up showed a hemoglobin of 10.8, hematocrit 31.3, ferritin 1406 and an iron saturation of 21%.  She returned to then in November with a ferritin of 1663 and underwent a phlebotomy of 250 units.  Since then she has been undergoing phlebotomies approximately every month to 2 months. Her ferritin did not decline as would have hoped.  She has been started on Jadenu.  She has been on the Jadenu for nearly a month now at 1890mg daily.  She was having some dizziness and stopped it but found out she has vertigo.  She also states that it gave her some diarrhea.  Her ferritin on 9/10/2021 was at 592.      Naomi presents today overall feeling well.  She states she does have some fatigue and tiredness otherwise no complaints. She has a lot of back pain.  Her hemoglobin is at 12.7 and hematocrit 35.6.  White count 8.44 and platelets 292,000.      PAST MEDICAL HISTORY:  ALLERGIES:  Allergies   Allergen Reactions   • Azithromycin GI Intolerance and Nausea And Vomiting     Severe Abdominal Pain   Severe abdominal pain    • Silver Rash     Redness, blisters  blister  Redness, blisters         CURRENT MEDICATIONS:  Outpatient Encounter  Medications as of 9/20/2021   Medication Sig Dispense Refill   • acyclovir (ZOVIRAX) 800 MG tablet Take 800 mg by mouth Daily.     • albuterol sulfate  (90 Base) MCG/ACT inhaler Inhale 2 puffs As Needed.     • amLODIPine (NORVASC) 5 MG tablet Take 5 mg by mouth Daily.     • Calcium Citrate-Vitamin D (CALCIUM CITRATE + D3 PO) Take 1 tablet by mouth Daily.     • carbidopa-levodopa (SINEMET)  MG per tablet Take 1 tablet by mouth Daily.     • CRANBERRY PO Take 1 capsule by mouth Daily.     • Cyclobenzaprine HCl (FLEXERIL PO) Take 10 mg by mouth Daily As Needed.     • lamoTRIgine (LaMICtal) 25 MG tablet Take 1 tablet by mouth Every Night for 14 days, THEN 2 tablets Every Night for 14 days. 42 tablet 0   • meclizine (ANTIVERT) 25 MG tablet Take 25 mg by mouth As Needed.     • metFORMIN (GLUCOPHAGE) 1000 MG tablet Take 1,000 mg by mouth 2 (Two) Times a Day With Meals.     • metoprolol succinate XL (TOPROL XL) 50 MG 24 hr tablet Take 50 mg by mouth 2 (Two) Times a Day.     • Multiple Vitamin (MULTI VITAMIN PO) Take  by mouth Daily.     • NABUMETONE PO Take 750 mg by mouth 2 (two) times a day.     • nortriptyline (PAMELOR) 25 MG capsule Take 25 mg by mouth Every Night. Pt takes 2 at night  3   • omeprazole (priLOSEC) 20 MG capsule Take 20 mg by mouth Daily.  3   • oxyCODONE (ROXICODONE) 5 MG immediate release tablet Take 5 mg by mouth 3 (Three) Times a Day.     • pregabalin (LYRICA) 100 MG capsule Take 100 mg by mouth 2 (Two) Times a Day.     • venlafaxine (EFFEXOR) 75 MG tablet Take 75 mg by mouth Daily.  11   • venlafaxine XR (EFFEXOR-XR) 150 MG 24 hr capsule Take 150 mg by mouth Daily.     • Deferasirox (JADENU) 360 MG tablet Take 2 tablets by mouth Every Morning Before Breakfast. In addition to 90mg tablet for total dose of 1890mg daily. (Patient taking differently: Take 2 tablets by mouth As Needed. In addition to 90mg tablet for total dose of 1890mg daily.) 60 tablet 5   • Deferasirox (Jadenu) 90 MG tablet  Take 90 mg by mouth Daily. 360 x2 daily along with 90 mg tab x2 daily for a total of 900 mg daily (Patient taking differently: Take 90 mg by mouth As Needed. 360 x2 daily along with 90 mg tab x2 daily for a total of 900 mg daily) 60 tablet 3     Facility-Administered Encounter Medications as of 9/20/2021   Medication Dose Route Frequency Provider Last Rate Last Admin   • diphenhydrAMINE (BENADRYL) injection 50 mg  50 mg Intravenous PRN Pedro Clements MD       • famotidine (PEPCID) injection 20 mg  20 mg Intravenous PRN Pedro Clements MD       • hydrocortisone sodium succinate (Solu-CORTEF) injection 100 mg  100 mg Intravenous PRN Pedro Clements MD           ADULT ILLNESSES:  Patient Active Problem List   Diagnosis Code   • Fatigue R53.83   • History of iron deficiency Z86.39   • Vitamin D deficiency E55.9   • Iron deficiency anemia secondary to inadequate dietary iron intake D50.8   • Malabsorption of iron K90.9   • Obesity, Class III, BMI 40-49.9 (morbid obesity) (CMS/HCC) E66.01   • Status post laparoscopic sleeve gastrectomy Z98.84   • Obstructive sleep apnea syndrome G47.33   • Abnormal hemoglobin (CMS/HCC) D58.2   • Anemia D64.9   • Carpal tunnel syndrome on both sides G56.03   • Cervical vertebral fusion M43.22   • Cobalamin deficiency E53.8   • DDD (degenerative disc disease), lumbar M51.36   • Drug-induced constipation K59.03   • Pain management R52   • Family history of esophageal cancer Z80.0   • Family history of polyps in the colon Z83.71   • Chronic pain disorder G89.4   • Herniation of lumbar intervertebral disc with radiculopathy M51.16   • History of lumbar spinal fusion Z98.1   • Hypertensive disorder I10   • Insomnia G47.00   • Moderate episode of recurrent major depressive disorder (CMS/HCC) F33.1   • RLS (restless legs syndrome) G25.81   • Folic acid deficiency E53.8   • Encounter for care related to Port-a-Cath Z45.2   • Hereditary hemochromatosis (CMS/HCC)  "E83.110   • Iron overload E83.19   • Attention deficit hyperactivity disorder (ADHD), combined type F90.2   • Bipolar 1 disorder (CMS/HCC) F31.9       SURGERIES:  Past Surgical History:   Procedure Laterality Date   • ANKLE SURGERY      Right    • APPENDECTOMY  04/19/2015   • BACK SURGERY  2000   • CERVICAL SPINE SURGERY  09/01/2015   • CHOLECYSTECTOMY      1995;lap   • COLONOSCOPY  05/02/2017    normal; do not return until age of 49 Dr. Gus Valentine   • GASTRIC SLEEVE LAPAROSCOPIC N/A 12/20/2017    Procedure: GASTRIC SLEEVE LAPAROSCOPIC;  Surgeon: Yifan Cordero MD;  Location: [facility] OR;  Service:    • HIP ARTHROPLASTY      Right  2019   • HIP SURGERY  1987    right    • INSERTION CENTRAL VENOUS ACCESS DEVICE W/ SUBCUTANEOUS PORT  11/07/2017    Dr Anel Gamboa (Smart Port-Power injectable Port) Cat [id number] Lot [id number]    • MOUTH SURGERY  1994   • NECK SURGERY     • TOOTH EXTRACTION     • UPPER GASTROINTESTINAL ENDOSCOPY  05/02/2017    Dr. Gus Valentine, negative for h.pylori, negative for Salomon's   • VAGINAL HYSTERECTOMY SALPINGO OOPHORECTOMY  2008    Partial and then had another surgery to remove the rest       HEALTH MAINTENANCE ITEMS:  <no information>  Last Completed Colonoscopy          COLORECTAL CANCER SCREENING  Next due on 5/2/2027 05/02/2017  Outside Procedure: AL COLONOSCOPY FLX DX W/COLLJ SPEC WHEN PFRMD                FAMILY HISTORY:  Family History   Problem Relation Age of Onset   • Diabetes Mother    • Hypertension Mother    • Coronary artery disease Mother    • Cancer Mother         \"bone\" cancer   • Cancer Father         prostate   • Hypertension Father    • Heart disease Father    • Stroke Father    • Coronary artery disease Father    • Hypertension Sister    • Obesity Sister    • Cancer Brother         throat   • Diabetes Brother    • Diabetes Maternal Grandmother    • Stroke Maternal Grandmother    • No Known Problems Daughter    • No Known Problems Son        SOCIAL HISTORY:  Social " "History     Socioeconomic History   • Marital status:      Spouse name: Not on file   • Number of children: Not on file   • Years of education: Not on file   • Highest education level: Not on file   Tobacco Use   • Smoking status: Former Smoker     Packs/day: 1.00     Years: 25.00     Pack years: 25.00     Types: Cigarettes     Quit date:      Years since quittin.7   • Smokeless tobacco: Never Used   Substance and Sexual Activity   • Alcohol use: Yes     Comment: rarely    • Drug use: No     Types: Codeine     Comment: Codeine in Prescribed Medications only    • Sexual activity: Defer     Birth control/protection: Surgical       REVIEW OF SYSTEMS:  Review of Systems   Constitutional: Positive for fatigue. Negative for activity change, appetite change, chills, diaphoresis, fever and unexpected weight loss.   HENT: Negative for ear pain, nosebleeds, sinus pressure, sore throat and voice change.    Eyes: Negative for blurred vision, double vision, pain and visual disturbance.   Respiratory: Negative for cough and shortness of breath.    Cardiovascular: Negative for chest pain, palpitations and leg swelling.   Gastrointestinal: Negative for abdominal pain, anal bleeding, blood in stool, constipation, diarrhea, nausea and vomiting.   Endocrine: Negative for heat intolerance, polydipsia and polyuria.   Genitourinary: Negative for dysuria, frequency, hematuria, urgency and urinary incontinence.   Musculoskeletal: Positive for back pain. Negative for arthralgias and myalgias.   Skin: Negative for rash and skin lesions.   Neurological: Positive for seizures. Negative for dizziness (\"vertigo\"), weakness and headache.   Hematological: Negative for adenopathy. Does not bruise/bleed easily.   Psychiatric/Behavioral: Negative for dysphoric mood, sleep disturbance, suicidal ideas and depressed mood.       /88   Pulse 80   Temp 97.7 °F (36.5 °C) (Temporal)   Resp 16   Ht 172.7 cm (68\")   Wt (!) 139 kg " (305 lb 9.6 oz)   LMP  (LMP Unknown)   SpO2 98%   Breastfeeding No   BMI 46.47 kg/m²  Body surface area is 2.45 meters squared.  Pain Score    09/20/21 0948   PainSc:   7   PainLoc: Back       Physical Exam:  Physical Exam  Vitals reviewed.   Constitutional:       Appearance: Normal appearance. She is well-developed.   HENT:      Head: Atraumatic.   Eyes:      General: No scleral icterus.     Pupils: Pupils are equal, round, and reactive to light.   Neck:      Trachea: Trachea normal.   Cardiovascular:      Rate and Rhythm: Normal rate and regular rhythm.      Heart sounds: No murmur heard.     Pulmonary:      Effort: Pulmonary effort is normal.      Breath sounds: Normal breath sounds. No wheezing, rhonchi or rales.   Abdominal:      Palpations: Abdomen is soft.      Tenderness: There is no abdominal tenderness. There is no guarding or rebound.   Musculoskeletal:      Cervical back: Neck supple.   Skin:     General: Skin is warm and dry.   Neurological:      General: No focal deficit present.      Mental Status: She is alert and oriented to person, place, and time.   Psychiatric:         Mood and Affect: Mood normal.         Behavior: Behavior normal.         Naomi Bhatia reports a pain score of 7.  Given her pain assessment as noted, treatment options were discussed and the following options were decided upon as a follow-up plan to address the patient's pain: continuation of current treatment plan for pain.      Patient's Body mass index is 46.47 kg/m². BMI is above normal parameters. Recommendations include: defer to pcp.      LABS    Lab Results - Last 18 Months   Lab Units 09/20/21  0942 09/17/21  2150 08/26/21  2210 08/26/21  1226 08/10/21  0927 07/25/21  1515 07/22/21  1503 07/22/21  1503 07/08/21  0946 06/21/21  1059 06/21/21  1059 06/03/21  1800 05/26/21  1358 05/12/21  0939 05/12/21  0939   HEMOGLOBIN g/dL 12.7 12.4 11.9* 12.6 10.8* 11.1*   < > 12.7 11.9*   < > 13.0   < > 10.9*   < > 11.6*    HEMATOCRIT % 35.6 37.9 35.9* 35.5 31.4* 32.3*   < > 37.1 34.8   < > 38.9   < > 31.5*   < > 34.4   MCV fL 87.3 94.3 97.0 92.2 91.8 92.3   < > 92.3 91.6   < > 90.7   < > 89.5   < > 89.6   WBC 10*3/mm3 8.44 10.6 8.4 9.70 9.21 9.9   < > 12.0* 14.01*   < > 18.89*   < > 8.81   < > 8.04   RDW % 15.0 15.2* 15.9* 15.9* 17.6* 17.4*   < > 17.4* 16.1*   < > 16.7*   < > 17.0*   < > 16.2*   MPV fL 9.9 9.8 10.0 9.8 9.6 9.4   < > 9.5 9.2   < > 9.7   < > 9.7   < > 9.4   PLATELETS 10*3/mm3 292 284 271 295 276 207   < > 293 281   < > 305   < > 270   < > 295   IMM GRAN % % 0.6*  --   --  0.4  --   --   --   --  1.4*  --  1.9*  --  1.0*  --  0.7*   NEUTROS ABS 10*3/mm3 5.38 6.1 4.7 7.04*  --  6.6  --  8.0* 9.39*   < > 15.54*   < > 5.80   < > 5.28   LYMPHS ABS 10*3/mm3 2.27 3.5 2.8 1.89  --  2.5  --  3.0 3.38*   < > 1.95   < > 2.21   < > 2.10   MONOS ABS 10*3/mm3 0.44 0.60 0.50 0.51  --  0.60  --  0.70 0.84   < > 0.92*   < > 0.45   < > 0.43   EOS ABS 10*3/mm3 0.20 0.20 0.10 0.14  --  0.10  --  0.10 0.12   < > 0.01   < > 0.17   < > 0.10   BASOS ABS 10*3/mm3 0.10 0.10 0.10 0.08  --  0.10  --  0.10 0.09   < > 0.11   < > 0.09   < > 0.07   IMMATURE GRANS (ABS) 10*3/mm3 0.05 0.1 0.1 0.04  --  0.1  --  0.1 0.19*   < > 0.36*   < > 0.09*   < > 0.06*   NRBC /100 WBC 0.0  --   --  0.0  --   --   --   --  0.0  --  0.0  --  0.0  --  0.0    < > = values in this interval not displayed.       Lab Results - Last 18 Months   Lab Units 08/26/21  2210 08/26/21  1226 08/10/21  0927 06/21/21  1059 03/11/21  0958 02/03/21  0937 01/05/21  0847 09/01/20  1306 09/01/20  1306   GLUCOSE mg/dL 108 135* 130* 120* 115* 123* 155*   < > 112*   SODIUM mmol/L 139 140 142 137 140 139 141   < > 139   POTASSIUM mmol/L 3.4* 3.5 3.6 3.9 3.3* 3.6 3.6   < > 3.8   TOTAL CO2 mmol/L 25  --   --   --   --  24  --   --   --    CO2 mmol/L  --  20.0* 24.0 21.0* 23.0  --  28.0   < > 24.0   CHLORIDE mmol/L 103 105 105 107 102 102 102   < > 101   ANION GAP mmol/L 11 15.0 13.0 9.0 15.0  13 11.0   < > 14.0   CREATININE mg/dL 0.7 0.77 0.75 1.03* 0.80 0.7 0.62   < > 0.66   BUN mg/dL 12 12 9 18 9 10 9   < > 11   BUN / CREAT RATIO   --  15.6 12.0 17.5 11.3  --  14.5  --  16.7   CALCIUM mg/dL 9.1 9.0 9.7 9.7 9.4 9.4 9.3   < > 9.3   EGFR IF NONAFRICN AM  >60 81 83 58* 77 >60 104   < > 97   ALK PHOS U/L 106* 113 119* 124* 102 96 97   < > 77   TOTAL PROTEIN g/dL 6.9 7.2 6.9 7.2 7.6 6.9 7.3   < > 7.1   ALT (SGPT) U/L 30 31 24 16 32 31 20   < > 30   AST (SGOT) U/L 29 41* 31 25 36* 30 28   < > 30   BILIRUBIN mg/dL 0.7 0.7 0.6 0.8 0.6 0.8 0.6   < > 0.6   ALBUMIN g/dL 4.4 4.40 4.40 4.30 4.30 4.5 4.10   < > 4.40   GLOBULIN gm/dL  --  2.8 2.5 2.9 3.3  --  3.2  --  2.7    < > = values in this interval not displayed.       Lab Results - Last 18 Months   Lab Units 09/01/20  1306   LDH U/L 231*       Lab Results - Last 18 Months   Lab Units 09/10/21  1315 08/10/21  0927 07/08/21  0946 06/21/21  1059 05/26/21  1358 05/12/21  0939 04/08/21  1006 03/11/21  0958 02/03/21  0937 01/19/21  0934 01/05/21  0847 11/06/20  0956 09/01/20  1306   IRON mcg/dL 70 56  --  159*  --   --   --  61 44  --  54   < > 51   TIBC mcg/mL 238* 289*  --  340  --   --   --  264* 213*  --  235*   < > 243*   IRON SATURATION % 29 19*  --  47  --   --   --  23 21  --  23   < > 21   FERRITIN ng/mL 592* 762.70* 538.90* 459.80* 883.70* 1,140.00*   < > 1,375.00* 1,274.0*   < > 1,355.00*   < > 1,406.00*   T4 TOTAL mcg/dL  --   --   --   --   --   --   --   --   --   --   --   --  9.34   TSH uIU/mL  --   --   --   --   --   --   --   --  1.590  --   --   --  1.340   FOLATE ng/mL 13.8  --   --   --   --   --   --   --   --   --   --   --  >20.00    < > = values in this interval not displayed.     ASSESSMENT  1.  Hemochromatosis, single C282Y--Iron overload is uncommon for those that are carriers for hemochromatosis and is usually related more to obesity and fatty liver or more so in this case the multiple blood transfusions and iron replacement therapy in  the past.  This patient's LFT's are normal but her ferritin is well above the upper limits of when iron removal therapy would begin with normally phlebotomies but she also has underlying anemia.     Her baseline ferritin in September 2020 was at 1406.  She has had multiple phlebotomies since then with little improvement in the ferritin.  Her hemoglobin is ranging 10 to 11.5. MRI of liver on 4/22/2021 showed slight liver iron overload, strong hepatic steatosis and splenic iron overload.  Therefore, Dr Goldberg recommended patient to start iron chelation therapy.      Patient had baseline hearing and eye exams that cleared her for the Jadenu therapy.  She started at 1890mg. She was having dizziness and stopped the Jadenu and found out that she has vertigo.  She has also had some diarrhea with the Jadenu.     Her ferritin is down to 592 and the goal is <500.  I have asked patient to restart Jadenu at 1800mg.      2.  Splenomegaly measuring 14.6cm on US in 10/12/2020.   3.  Osteoarthritis/Fibromyalgia, no inflammatory joint disease per Rheumatology.  Started diclofenac gel and Nabumetone  4.  Anemia secondary to chronic disease, hgb stable today at 12.7  5.  Hypertension with BP at 152/88 today.  Advised patient to follow with pcp    PLAN  1.  Reviewed labs with the patient and her tolerance to Jadeun  2.  Restart Jadenu at 1800mg to see if it will lessen the diarrhea  3.  We will adjust dose every 3 to 6 months based on serum ferritin trends; adjust by 3.5 or 7 mg/kg/day; titrate to individual response and treatment goals. In patients not adequately controlled with 21 mg/kg/day, doses up to 28 mg/kg/day may be considered for serum ferritin levels persistently >2,500 mcg/L and not decreasing over time (doses >28 mg/kg/day are not recommended). If serum ferritin falls to <1,000 mcg/L at 2 consecutive visits, consider dose reduction (especially if dose is >17.5 mg/kg/day). If serum ferritin falls to <500 mcg/L, interrupt  therapy and continue monitoring monthly.  4.  Will need to continue to monitor the ferritin, serum creatinine monthly as well as the GFR . The ALT and AST as well as bilirubin monthly.  5.  Encouraged patient to continue to follow with pcp and other specialists  6.  Patient will return in follow-up to see Dr. Goldberg or myself in 4 weeks.  She will need a preoffice CBC CMP iron profile and ferritin.    I spent 34 minutes caring for Naomi on this date of service. This time includes time spent by me in the following activities: preparing for the visit, reviewing tests, performing a medically appropriate examination and/or evaluation, counseling and educating the patient/family/caregiver, ordering medications, tests, or procedures and documenting information in the medical record.       Wendy Watts, APRN  09/20/2021

## 2021-09-21 ENCOUNTER — TELEPHONE (OUTPATIENT)
Dept: PRIMARY CARE CLINIC | Age: 46
End: 2021-09-21

## 2021-09-21 NOTE — TELEPHONE ENCOUNTER
Pt called and LM that she has an appt with you tomorrow. She had to go to the ER over the weekend thinking it was a kidney stone and was told she didn't have a kidney stone. She is having some issues with her spine and in a lot of pain. She hasnt slept the last two nights. She wants to know if you can send in an elastic band that wraps around the torso to support her back to where she can rest at night.  If so, she wants at home medical

## 2021-09-22 ENCOUNTER — VIRTUAL VISIT (OUTPATIENT)
Dept: PRIMARY CARE CLINIC | Age: 46
End: 2021-09-22
Payer: MEDICARE

## 2021-09-22 ENCOUNTER — HOSPITAL ENCOUNTER (OUTPATIENT)
Dept: PHYSICAL THERAPY | Age: 46
Setting detail: THERAPIES SERIES
Discharge: HOME | End: 2021-09-22
Payer: MEDICARE

## 2021-09-22 DIAGNOSIS — G89.29 CHRONIC LEFT-SIDED LOW BACK PAIN WITHOUT SCIATICA: ICD-10-CM
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
## 2021-09-22 DIAGNOSIS — R10.9 LEFT FLANK PAIN: Primary | ICD-10-CM

## 2021-09-22 DIAGNOSIS — Z09 HOSPITAL DISCHARGE FOLLOW-UP: ICD-10-CM

## 2021-09-22 DIAGNOSIS — M54.50 CHRONIC LEFT-SIDED LOW BACK PAIN WITHOUT SCIATICA: ICD-10-CM

## 2021-09-22 PROCEDURE — 97140 MANUAL THERAPY 1/> REGIONS: CPT

## 2021-09-22 PROCEDURE — 97110 THERAPEUTIC EXERCISES: CPT

## 2021-09-22 PROCEDURE — G8428 CUR MEDS NOT DOCUMENT: HCPCS | Performed by: PEDIATRICS

## 2021-09-22 PROCEDURE — 99214 OFFICE O/P EST MOD 30 MIN: CPT | Performed by: PEDIATRICS

## 2021-09-22 RX ORDER — PREDNISONE 10 MG/1
TABLET ORAL
Qty: 43 TABLET | Refills: 0 | Status: SHIPPED | OUTPATIENT
Start: 2021-09-22 | End: 2022-03-29 | Stop reason: ALTCHOICE

## 2021-09-22 RX ORDER — TRIAMCINOLONE ACETONIDE 1 MG/G
CREAM TOPICAL
Qty: 80 G | Refills: 5 | Status: SHIPPED | OUTPATIENT
Start: 2021-09-22 | End: 2022-06-30

## 2021-09-22 ASSESSMENT — ENCOUNTER SYMPTOMS
CHEST TIGHTNESS: 0
SORE THROAT: 0
SHORTNESS OF BREATH: 0
COUGH: 0
DIARRHEA: 0
ALLERGIC/IMMUNOLOGIC NEGATIVE: 1
BACK PAIN: 0
EYE DISCHARGE: 0
VOMITING: 0
SINUS PRESSURE: 0
CONSTIPATION: 0
ABDOMINAL PAIN: 0
NAUSEA: 1
WHEEZING: 0
RESPIRATORY NEGATIVE: 1

## 2021-09-22 ASSESSMENT — PAIN DESCRIPTION - ORIENTATION: ORIENTATION: MID

## 2021-09-22 ASSESSMENT — PAIN SCALES - GENERAL: PAINLEVEL_OUTOF10: 6

## 2021-09-22 ASSESSMENT — PAIN DESCRIPTION - LOCATION: LOCATION: BACK

## 2021-09-22 ASSESSMENT — PAIN DESCRIPTION - FREQUENCY: FREQUENCY: CONTINUOUS

## 2021-09-22 ASSESSMENT — PAIN DESCRIPTION - PAIN TYPE: TYPE: CHRONIC PAIN

## 2021-09-22 ASSESSMENT — PAIN DESCRIPTION - ONSET: ONSET: ON-GOING

## 2021-09-22 ASSESSMENT — PAIN DESCRIPTION - DESCRIPTORS: DESCRIPTORS: DULL

## 2021-09-22 NOTE — PROGRESS NOTES
Physical Therapy  Daily Treatment Note/Reassessment  Date: 2021  Patient Name: Ivonne Espinosa  MRN: [id number]     :   1975    Subjective:   General  Chart Reviewed: Yes  Additional Pertinent Hx: pain increased with frequent falls last year  Response To Previous Treatment: Not applicable  Family / Caregiver Present: No  Referring Practitioner: ASHLEE Bernal  PT Visit Information  PT Insurance Information: [employer]  Total # of Visits Approved: 13 (eval + 12)  Total # of Visits to Date: 7  Plan of Care/Certification Expiration Date: 10/19/21  Progress Note Due Date: 10/21/21  Progress Note Counter: Maricel Jurado thru 10/19 for 1 eval + 12 visits; [facility] auth waived due to covid  Subjective  Subjective: Patient reports her lower back feels like it can move better. She states she recently when to the ER because she thought she had kidney stones but they did a CT and didn't see stones. She can't sleep right now due to the pain.   Pain Screening  Patient Currently in Pain: Yes (5/10 pre tx)  Pain Assessment  Pain Assessment: 0-10  Pain Level: 6 (9/10 at night)  Pain Type: Chronic pain  Pain Location: Back  Pain Orientation: Mid  Pain Descriptors: Dull  Pain Frequency: Continuous  Pain Onset: On-going  Vital Signs  Patient Currently in Pain: Yes (5/10 pre tx)       Treatment Activities:                              AROM RLE (degrees)  RLE AROM: WNL (Hip flexion with knee ext 40 degrees)  AROM LLE (degrees)  LLE AROM : WNL (Hip flexion with knee ext 30 degrees)  AROM RUE (degrees)  RUE AROM : WNL  AROM LUE (degrees)  LUE AROM : WNL  Spine  Lumbar: 45 degrees flexion; 10 degrees extension  Strength RLE  Comment: c/o lower back pain with all resisted movement  R Hip Flexion: 4+/5  R Hip ABduction: 4/5  R Hip ADduction: 4/5  R Knee Flexion: 4+/5  R Knee Extension: 4+/5  R Ankle Dorsiflexion: 4+/5  Strength LLE  Comment: c/o lower back pain with all resisted movement  L Hip Flexion: Activity limitations: Decreased functional mobility ; Decreased ROM; Decreased strength;Decreased endurance;Decreased posture; Increased pain  Assessment: Patient reassessed today to determine progress with therapy. She has made great progress with low back mobility and decrease in pain and is moving well towrad goals. She has an increase in mid back pain at this time that has been increased for around 2 weeks. Exam shows increased tenderness in L paraspinals around this area as well as QL. New exercises and stretches added to treatment program to aid in these areas with patient able to attempt all new exercises well. Patient is progressing well toward goals and will benefit from continuing plan of care. Treatment Diagnosis: back pain  REQUIRES PT FOLLOW UP: Yes  Discharge Recommendations: Continue to assess pending progress    Goals:  Short term goals  Time Frame for Short term goals: 3-4 weeks  Short term goal 1: Independent with HEP. not yet (9/22 needs HEP)  Short term goal 2: Pt will increase lower extremity strength to 4+/5 bilaterally. progress (9/22 progress made bilaterally, decreased pain with motions as well)  Short term goal 3: Pt will report improved radiating pain to BLE. - met (9/22 patient reports only occasional radiation at this time)  Short term goal 4: Pt will improve hip flexion to 60 degrees with knee extended. - met  Long term goals  Time Frame for Long term goals : 4-6 weeks  Long term goal 1: Improve Oswestry score to 25% impairement or less. not yet (9/22 53% impaired today)  Long term goal 2: Report less than or equal to 4/10 pain with household chores. not yet (9/22    6/10 pain today)  Long term goal 3: Pt will increase lumbar flexion to 30 degrees met (9/22 45 degrees flexion today)  Long term goal 4: Pt will increase lumbar extension to 10 degrees.    met (9/22 10 degrees extension today)  Patient Goals   Patient goals : Improve back pain with activity at home  Plan:    Plan  Times

## 2021-09-22 NOTE — PROGRESS NOTES
[address] [facility], [address]  Phone [phone]   Fax [phone]      OFFICE VISIT: 2021    Ricardo Moore-: 1975      HPI  Reason For Visit:  Livia Tamayo is a 54 y.o. Follow-Up from Hospital    Patient presents via [employer] [facility] video conferencing on follow-up for emergency department visit at [facility] on 2021. She presented to the ED at that time with complaints of left flank pain. CT scan of the kidney was performed without contrast.  No acute abnormality was noted on this study  Laboratory data as noted below  Urine was suspicious for UTI  Apparently a culture was not obtained. She was diagnosed with left flank pain, degenerative disc disease and history of lumbar fusion. She notes that the emergency physician stated that there may be something going on in her spine. Since that visit, she still has pain in the left flank region. She notes that pressure over the area is helpful. She is wanting an elastic low back brace. There was no blood in the urine. She has a red area on her ankle the size of a mosquito bite that is red and sore. She does not believe that this is an infection. She wants something to treat this.     vitals were not taken for this visit. There is no height or weight on file to calculate BMI. I have reviewed the following with the Ms. Moore   Lab Review  Admission on 2021, Discharged on 2021   Component Date Value    WBC 2021 10.6     RBC 2021 4.02*    Hemoglobin 2021 12.4     Hematocrit 2021 37.9     MCV 2021 94.3     MCH 2021 30.8     MCHC 2021 32.7*    RDW 2021 15.2*    Platelets  284     MPV 2021 9.8     Neutrophils % 2021 57.5     Lymphocytes % 2021 33.6     Monocytes % 2021 5.8     Eosinophils % 2021 1.7     Basophils % 2021 0.9     Neutrophils Absolute 09/17/2021 6.1     Immature Granulocytes # 09/17/2021 0.1     Lymphocytes Absolute 09/17/2021 3.5     Monocytes Absolute 09/17/2021 0.60     Eosinophils Absolute 09/17/2021 0.20     Basophils Absolute 09/17/2021 0.10     Sodium 09/17/2021 138     Potassium reflex Magnesi* 09/17/2021 3.7     Chloride 09/17/2021 102     CO2 09/17/2021 21*    Anion Gap 09/17/2021 15     Glucose 09/17/2021 131*    BUN 09/17/2021 11     CREATININE 09/17/2021 0.8     GFR Non- 09/17/2021 >60     GFR  09/17/2021 >59     Calcium 09/17/2021 9.4     Total Protein 09/17/2021 7.4     Albumin 09/17/2021 4.3     Total Bilirubin 09/17/2021 0.7     Alkaline Phosphatase 09/17/2021 109*    ALT 09/17/2021 21     AST 09/17/2021 22     Color, UA 09/17/2021 YELLOW     Clarity, UA 09/17/2021 CLOUDY*    Glucose, Ur 09/17/2021 Negative     Bilirubin Urine 09/17/2021 Negative     Ketones, Urine 09/17/2021 TRACE*    Specific Arion, UA 09/17/2021 1.029     Blood, Urine 09/17/2021 Negative     pH, UA 09/17/2021 5.5     Protein, UA 09/17/2021 TRACE*    Urobilinogen, Urine 09/17/2021 1.0     Nitrite, Urine 09/17/2021 Negative     Leukocyte Esterase, Urine 09/17/2021 TRACE*    WBC, UA 09/17/2021 2-4     Epithelial Cells, UA 09/17/2021 10-20     Bacteria, UA 09/17/2021 1+*    Crystals, UA 09/17/2021 1+*   Admission on 08/26/2021, Discharged on 08/27/2021   Component Date Value    WBC 08/26/2021 8.4     RBC 08/26/2021 3.70*    Hemoglobin 08/26/2021 11.9*    Hematocrit 08/26/2021 35.9*    MCV 08/26/2021 97.0     MCH 08/26/2021 32.2*    MCHC 08/26/2021 33.1     RDW 08/26/2021 15.9*    Platelets 23/45/0904 271     MPV 08/26/2021 10.0     Neutrophils % 08/26/2021 56.5     Lymphocytes % 08/26/2021 33.4     Monocytes % 08/26/2021 6.2     Eosinophils % 08/26/2021 1.7     Basophils % 08/26/2021 1.0     Neutrophils Absolute 08/26/2021 4.7     Immature Granulocytes # 08/26/2021 0.1     Lymphocytes Absolute 08/26/2021 2.8     Monocytes Absolute 08/26/2021 0.50     Eosinophils Absolute 08/26/2021 0.10     Basophils Absolute 08/26/2021 0.10     Sodium 08/26/2021 139     Potassium 08/26/2021 3.4*    Chloride 08/26/2021 103     CO2 08/26/2021 25     Anion Gap 08/26/2021 11     Glucose 08/26/2021 108     BUN 08/26/2021 12     CREATININE 08/26/2021 0.7     GFR Non- 08/26/2021 >60     GFR  08/26/2021 >59     Calcium 08/26/2021 9.1     Total Protein 08/26/2021 6.9     Albumin 08/26/2021 4.4     Total Bilirubin 08/26/2021 0.7     Alkaline Phosphatase 08/26/2021 106*    ALT 08/26/2021 30     AST 08/26/2021 29     P-R Interval 08/26/2021 132     QRS Duration 08/26/2021 86     Q-T Interval 08/26/2021 372     QTc Calculation (Bazett) 08/26/2021 403     P Axis 08/26/2021 18     T Axis 08/26/2021 44     Troponin 08/26/2021 <0.01      Copies of these are in the chart. Current Outpatient Medications   Medication Sig Dispense Refill    predniSONE (DELTASONE) 10 MG tablet Days 1,2,3 = 60 mg (6 pills), Days 4,5 = 50 mg, Days 6,7 = 40 mg, Day 8 = 30 mg, Day 9 = 20 mg, Day 10 = 10 mg, Day 11,12 = 5 mg (1/2 tab) 43 tablet 0    Elastic Bandages & Supports (LUMBAR BACK BRACE/SUPPORT PAD) MISC Wear as needed with activity. 1 each 0    triamcinolone (KENALOG) 0.1 % cream Apply topically 2 times daily. 80 g 5    ondansetron (ZOFRAN-ODT) 8 MG TBDP disintegrating tablet Take 1 tablet by mouth every 8 hours as needed for Nausea or Vomiting 15 tablet 0    oxyCODONE (ROXICODONE) 5 MG immediate release tablet Take 1 tablet by mouth 3 times daily as needed for Pain for up to 30 days.  90 tablet 0    omeprazole (PRILOSEC) 20 MG delayed release capsule Take 1 capsule by mouth Daily 90 capsule 3    venlafaxine (EFFEXOR XR) 150 MG extended release capsule Take 1 capsule by mouth daily 90 capsule 3    amLODIPine (NORVASC) 5 MG tablet Take 1 tablet by mouth daily 90 tablet 3    acyclovir (ZOVIRAX) 800 MG tablet TAKE 1 TABLET BY MOUTH DAILY 90 tablet 3    metoprolol succinate (TOPROL XL) 50 MG extended release tablet TAKE 1 TABLET BY MOUTH TWICE DAILY 180 tablet 3    venlafaxine (EFFEXOR XR) 75 MG extended release capsule TAKE 1 CAPSULE BY MOUTH DAILY IN ADDITION  MG TO MAKE 225 MG DAILY 30 capsule 11    pregabalin (LYRICA) 100 MG capsule Take 1 capsule by mouth 2 times daily for 180 days. 60 capsule 5    deferasirox (JADENU) 360 MG tablet Take 5 tablets by mouth every morning (before breakfast)      albuterol sulfate HFA (PROVENTIL HFA) 108 (90 Base) MCG/ACT inhaler Inhale 2 puffs into the lungs every 6 hours as needed for Wheezing 3 Inhaler 3    metFORMIN (GLUCOPHAGE) 1000 MG tablet Take 1 tablet by mouth 2 times daily (with meals) 180 tablet 3    levocetirizine (XYZAL) 5 MG tablet Take 1 tablet by mouth nightly 90 tablet 3    nortriptyline (PAMELOR) 25 MG capsule TAKE 1 TO 2 CAPSULES BY MOUTH EVERY NIGHT 180 capsule 3    olopatadine (PATADAY) 0.2 % SOLN ophthalmic solution Place 1 drop into both eyes daily 1 Bottle 2    carbidopa-levodopa (SINEMET)  MG per tablet TAKE 1 TABLET BY MOUTH EVERY NIGHT 90 tablet 3    naloxone (NARCAN) 4 MG/0.1ML LIQD nasal spray 1 spray by Nasal route as needed for Opioid Reversal 2 each 0    Calcium-Vitamin D 600-200 MG-UNIT TABS Take 1 tablet by mouth daily      Multiple Vitamins-Minerals (MULTIVITAMIN & MINERAL PO) Take  by mouth.  CRANBERRY Take 500 mg by mouth daily.  Cholecalciferol (VITAMIN D3) 5000 UNITS TABS Take 5,000 Units by mouth daily        No current facility-administered medications for this visit.        Allergies: Silver, Tegaderm ag mesh 2\"x2\" [wound dressings], and Zithromax [azithromycin]     Past Medical History:   Diagnosis Date    Anemia     has had iron infusion    Anxiety     Arthritis     Back pain with left-sided radiculopathy 3/14/2016    DDD (degenerative disc disease), lumbar  Depression     Esophagitis     Fibromyalgia     Gastritis     Headache(784.0)     History of blood transfusion     Hypertension     Obesity     RLS (restless legs syndrome)     Sleep apnea     uses CPAP machine       Family History   Problem Relation Age of Onset    Diabetes Mother     High Blood Pressure Mother     Colon Polyps Mother     Heart Disease Mother     Cancer Mother     Depression Mother     Cancer Father     High Blood Pressure Father     Heart Disease Father     Stroke Father     High Blood Pressure Sister     Depression Sister     Anxiety Disorder Sister     Cancer Brother     Esophageal Cancer Brother     Anxiety Disorder Brother     Diabetes Brother     ADHD Brother     Depression Brother     Other Other         has history of suicide in family maternal great uncle       Past Surgical History:   Procedure Laterality Date    ANKLE SURGERY Right     APPENDECTOMY  4/19/15    BACK SURGERY  2000    CARPAL TUNNEL RELEASE Left     CERVICAL SPINE SURGERY N/A 9/1/15    CHOLECYSTECTOMY  1995    HIP SURGERY Right 1987    HIP SURGERY  03/28/2018    HYSTERECTOMY      partial 2008, still has ovaries and cervix    INSERTION / REMOVAL / REPLACEMENT VENOUS ACCESS CATHETER Left 11/7/2017    PORT INSERTION performed by Josué P[facility]al [facility] at [facility]    LITHOTRIPSY  1995/2000 [facility]      with hardware placed    LA COLONOSCOPY FLX DX W/COLLJ SPEC WHEN PFRMD N/A 5/2/2017    Dr Katie Martinez, 7 yr (age 47) recall    LA EGD TRANSORAL BIOPSY SINGLE/MULTIPLE N/A 5/2/2017    Dr ABHIJIT Baltazar-Gastritis/gastropathy    SALPINGO-OOPHORECTOMY      STOMACH SURGERY  12/20/2017    dr Gunjan Marquis Right 2019       Social History     Tobacco Use    Smoking status: Former Smoker     Packs/day: 1.00     Years: 25.00     Pack years: 25.00     Types: Cigarettes     Quit date: 9/19/2013     Years since quitting: 8.0    Smokeless tobacco: Never Used   Substance Use Topics    Alcohol use: Yes     Comment: rarely        Review of Systems   Constitutional: Positive for appetite change (decreased with vertigo and nausea). Negative for chills, fatigue and fever. HENT: Negative. Negative for congestion, ear discharge, ear pain, postnasal drip, sinus pressure and sore throat. Eyes: Positive for visual disturbance (with what sounds like nystagmus). Negative for discharge. Respiratory: Negative. Negative for cough, chest tightness, shortness of breath and wheezing. Cardiovascular: Negative. Negative for chest pain, palpitations and leg swelling. Gastrointestinal: Positive for nausea. Negative for abdominal pain, constipation, diarrhea and vomiting. GERD is well controlled on meds   Endocrine: Negative. Genitourinary: Negative. Negative for difficulty urinating, dysuria and menstrual problem. Left flank pain (stabbing, constant, relentless)   Musculoskeletal: Positive for arthralgias ( diffuse). Negative for back pain, joint swelling and myalgias. Skin: Positive for rash (on her ankle, red like mosquito bite. ). Allergic/Immunologic: Negative. Neurological: Positive for dizziness (vertigo) and light-headedness. Negative for weakness and headaches. Hematological: Negative. Negative for adenopathy. Does not bruise/bleed easily. Psychiatric/Behavioral: Positive for decreased concentration, dysphoric mood ( under a lot of stress recently) and sleep disturbance. Negative for suicidal ideas. The patient is nervous/anxious (much worse since her father's death). Physical Exam  Physical exam was not performed as this was a video teleconference visit using doxy. Me      ASSESSMENT      ICD-10-CM    1. Left flank pain  R10.9    2. Chronic left-sided low back pain without sciatica  M54.5     G89.29    3. Bug bite, initial encounter  W57. XXXA    4.  Hospital discharge follow-up  Z09 PLAN    1. Left flank pain  Review of all the studies that were performed as well as her description, it does not sound that this is related to a renal lithiasis at all. No kidney stone was noted on x-ray, there was no hematuria. I do agree that this most likely is coming from her lower back. We are going to try a steroid taper to see if this makes a difference. If it does and symptoms return, we may need to do an MRI to further delineate the etiology of her symptoms and identify the pathology    2. Chronic left-sided low back pain without sciatica  As noted above. I believe the pain is coming from her lumbar disc disease and possible lumbar stenosis    3. Bug bite, initial encounter  We will treat this topically with triamcinolone    4. Hospital discharge follow-up  Hospital records were reviewed in detail. Studies were reviewed with independent analysis. Past studies did assist in identifying the most likely diagnosis as noted above      No orders of the defined types were placed in this encounter. Return in about 1 month (around 10/22/2021) for Jessy Mcgrath, was evaluated through a synchronous (real-time) audio-video encounter. The patient (or guardian if applicable) is aware that this is a billable service. Verbal consent to proceed has been obtained within the past 12 months. The visit was conducted pursuant to the emergency declaration under the [address] [place] [address] authority and the [employer] General Act. Patient identification was verified, and a caregiver was present when appropriate. The patient was located in a state where the provider was credentialed to provide care. Total time spent for this encounter: 30m    --MORA Valente DO on 9/22/2021 at 5:34 PM    An electronic signature was used to authenticate this note.

## 2021-09-27 DIAGNOSIS — M54.16 CHRONIC LUMBAR RADICULOPATHY: ICD-10-CM

## 2021-09-27 DIAGNOSIS — M79.18 MYOFASCIAL MUSCLE PAIN: ICD-10-CM

## 2021-09-27 RX ORDER — CYCLOBENZAPRINE HCL 10 MG
10 TABLET ORAL 3 TIMES DAILY PRN
Qty: 270 TABLET | Refills: 0 | Status: SHIPPED | OUTPATIENT
Start: 2021-09-27 | End: 2022-08-17 | Stop reason: SDUPTHER

## 2021-09-27 NOTE — TELEPHONE ENCOUNTER
Returned patients call about her Disability paperwork & that [facility] doesn't do that it would be her PCP.

## 2021-09-28 ENCOUNTER — HOSPITAL ENCOUNTER (OUTPATIENT)
Dept: PHYSICAL THERAPY | Age: 46
Setting detail: THERAPIES SERIES
Discharge: HOME | End: 2021-09-28
Payer: MEDICARE

## 2021-09-28 PROCEDURE — 97110 THERAPEUTIC EXERCISES: CPT

## 2021-09-28 PROCEDURE — 97140 MANUAL THERAPY 1/> REGIONS: CPT

## 2021-09-28 RX ORDER — OXYCODONE HYDROCHLORIDE 5 MG/1
5 TABLET ORAL 3 TIMES DAILY PRN
Qty: 90 TABLET | Refills: 0 | Status: SHIPPED | OUTPATIENT
Start: 2021-10-10 | End: 2021-11-04 | Stop reason: SDUPTHER

## 2021-09-28 ASSESSMENT — PAIN DESCRIPTION - LOCATION: LOCATION: BACK;KNEE

## 2021-09-28 ASSESSMENT — PAIN DESCRIPTION - PAIN TYPE: TYPE: CHRONIC PAIN;ACUTE PAIN

## 2021-09-28 ASSESSMENT — PAIN DESCRIPTION - ORIENTATION: ORIENTATION: LOWER

## 2021-09-28 ASSESSMENT — PAIN SCALES - GENERAL: PAINLEVEL_OUTOF10: 7

## 2021-09-28 NOTE — PROGRESS NOTES
Physical Therapy  Daily Treatment Note  Date: 2021  Patient Name: Sanaz Shaffer  MRN: [id number]     :   1975    Subjective:   General  Referring Practitioner: ASHLEE Higgins  Onset Date: 21  PT Insurance Information: [employer]  Total # of Visits Approved: 13  Total # of Visits to Date: 8  Plan of Care/Certification Expiration Date: 10/19/21  Progress Note Due Date: 10/21/21  Subjective: today my L knee is bothering me more than my back  Patient Currently in Pain: Yes  Pain Level: 7  Pain Type: Chronic pain;Acute pain  Pain Location: Back;Knee  Pain Orientation: Lower       Treatment Activities:     Exercises  Exercise 1: supine lower trunk rotation x 5 for 5\" ea  Exercise 2: supine quadratus stretch 10 seconds x 3 each way  Exercise 3: supine piriformis stretch 10 seconds x 3 each (figure 4)  Exercise 4: B SKTC 10 seconds x 3 each  Exercise 5: B hamstring stretch 10 seconds x 3  Exercise 6: pelvic tilts on a towel  3\" x 10  Exercise 7: abdominal series (alone 10 sec hold x10, Mika 10, UE x 10, UE/LE x 10)  Exercise 8: bridging with TA contractions x 5  Exercise 9: Seated forward bend stretch neutral and to R to tolerance     3 x 10\" each  Exercise 10: repeated sit to stand with TA contraction- x10 *on mat table for elevated surgace  Exercise 11: HS curls (green) x 10  Exercise 12: Multifidus row/ Paloff press (green) x 10  Exercise 13: Stand hip abd/ext x10  Exercise 14: Standing marching x10  Exercise 15: Mini squats x 10  Exercise 16: Rolling ball up wall thoracic stretch  5 x 5\"  Exercise 17: Calf stretch on incline bilaterally  Exercise 18: Heat or biofreeze to mid back with increase pain   - BF today  with manual therapy x 8 minutes to L QL, L paraspinals near t12-L1, L axial distraction        Assessment:   Conditions Requiring Skilled Therapeutic Intervention  Body structures, Functions, Activity limitations: Decreased functional mobility ; Decreased ROM; Decreased strength;Decreased endurance;Decreased posture; Increased pain  Assessment: Patient with good tolerance for exercise routine, she had several tender areas in her L and R lower back and trigger points in bilateral quadratus lumborum when perform STM and biofreeze was applied again to this region afterwards. Patient used a towel for bilateral h/s stretches. WIll create an official HEP and issue in the near future. Treatment Diagnosis: back pain  REQUIRES PT FOLLOW UP: Yes      Goals:  Short term goals  Time Frame for Short term goals: 3-4 weeks  Short term goal 1: Independent with HEP. not yet (9/22 needs HEP)  Short term goal 2: Pt will increase lower extremity strength to 4+/5 bilaterally. progress (9/22 progress made bilaterally, decreased pain with motions as well)  Short term goal 3: Pt will report improved radiating pain to BLE. - met (9/22 patient reports only occasional radiation at this time)  Short term goal 4: Pt will improve hip flexion to 60 degrees with knee extended. - met  Long term goals  Time Frame for Long term goals : 4-6 weeks  Long term goal 1: Improve Oswestry score to 25% impairement or less. not yet (9/22 53% impaired today)  Long term goal 2: Report less than or equal to 4/10 pain with household chores. not yet (9/22    6/10 pain today)  Long term goal 3: Pt will increase lumbar flexion to 30 degrees met (9/22 45 degrees flexion today)  Long term goal 4: Pt will increase lumbar extension to 10 degrees.    met (9/22 10 degrees extension today)  Patient Goals   Patient goals : Improve back pain with activity at home    Plan:    Times per week: 2  Plan weeks: 4-6  Current Treatment Recommendations: Strengthening, ROM, Functional Mobility Training, Endurance Training, Neuromuscular Re-education, Manual Therapy - Soft Tissue Mobilization, Manual Therapy - Joint Manipulation, Pain Management, Patient/Caregiver Education & Training, Home Exercise Program        Therapy Time   Individual Concurrent Group Co-treatment   Time In 1004         Time Out [id number] [place], [place]    Electronically signed by Eladia Lyn PTA on 9/28/2021 at 4:07 PM

## 2021-09-29 ENCOUNTER — PATIENT MESSAGE (OUTPATIENT)
Dept: PRIMARY CARE CLINIC | Age: 46
End: 2021-09-29

## 2021-09-29 DIAGNOSIS — E83.110 HEREDITARY HEMOCHROMATOSIS (HCC): Primary | ICD-10-CM

## 2021-09-30 ENCOUNTER — HOSPITAL ENCOUNTER (OUTPATIENT)
Dept: PHYSICAL THERAPY | Age: 46
Setting detail: THERAPIES SERIES
Discharge: HOME | End: 2021-09-30
Payer: MEDICARE

## 2021-09-30 PROCEDURE — 97110 THERAPEUTIC EXERCISES: CPT

## 2021-09-30 ASSESSMENT — PAIN SCALES - GENERAL: PAINLEVEL_OUTOF10: 5

## 2021-09-30 ASSESSMENT — PAIN DESCRIPTION - DESCRIPTORS: DESCRIPTORS: DULL;ACHING

## 2021-09-30 ASSESSMENT — PAIN DESCRIPTION - ORIENTATION: ORIENTATION: LOWER

## 2021-09-30 ASSESSMENT — PAIN DESCRIPTION - PROGRESSION: CLINICAL_PROGRESSION: GRADUALLY IMPROVING

## 2021-09-30 ASSESSMENT — PAIN DESCRIPTION - PAIN TYPE: TYPE: CHRONIC PAIN

## 2021-09-30 ASSESSMENT — PAIN DESCRIPTION - LOCATION: LOCATION: BACK

## 2021-09-30 NOTE — TELEPHONE ENCOUNTER
Received fax from pharmacy requesting refill on pts medication(s). Pt was last seen in office on 9/22/2021  and has a follow up scheduled for 10/22/2021. Will send request to  Dr. Syd Beyer  for authorization.      Requested Prescriptions     Pending Prescriptions Disp Refills    carbidopa-levodopa (SINEMET)  MG per tablet [Pharmacy Med Name: CARBIDOPA/LEVODOPA 25-100MG TABS] 90 tablet 3     Sig: Take 1 tablet by mouth nightly

## 2021-09-30 NOTE — PROGRESS NOTES
Physical Therapy  Daily Treatment Note  Date: 2021  Patient Name: Rocky Mary  MRN: [id number]     :   1975    Subjective:   General  Additional Pertinent Hx: pain increased with frequent falls last year  Response To Previous Treatment: Patient with no complaints from previous session.   Referring Practitioner: ASHLEE Jauregui  PT Visit Information  Onset Date: 21  PT Insurance Information: [employer]  Total # of Visits Approved: 13  Total # of Visits to Date: 9  Plan of Care/Certification Expiration Date: 10/19/21  Progress Note Due Date: 10/21/21  Progress Note Counter: Diana Pereira thru 10/19 for 1 eval + 12 visits; [facility] auth waived due to covid  Subjective  Subjective: Not to bad today  Pain Screening  Patient Currently in Pain: Yes  Pain Assessment  Pain Assessment: 0-10  Pain Level: 5  Pain Type: Chronic pain  Pain Location: Back  Pain Orientation: Lower  Pain Descriptors: Dull;Aching  Clinical Progression: Gradually improving  Vital Signs  Patient Currently in Pain: Yes       Treatment Activities:    Exercises  Exercise 1: supine lower trunk rotation x 5 for 5\" ea               *   HEP Issued  Exercise 2: supine quadratus stretch 10 seconds x 4 each way  Exercise 3: supine piriformis stretch 10 seconds x 4 each (figure 4)  Exercise 4: B SKTC 10 seconds x 4 each  Exercise 5: B hamstring stretch 10 seconds x 4  Exercise 6: pelvic tilts on a towel  3 second hold x 10  Exercise 7: abdominal series (alone 10 sec hold x10, Mika 10, UE x 10, UE/LE x 10)  Exercise 8: bridging with TA contractions x 10  Exercise 9: Seated forward bend stretch neutral and to R to tolerance   x  3 with 10 second each  Exercise 10: repeated sit to stand with TA contraction- x10 *on mat table for elevated surgace  Exercise 11: HS curls (green) x 10  Exercise 12: Multifidus row/ Paloff press (green) x 10  Exercise 13: Stand hip abd/ext x10  Exercise 14: Standing marching x10  Exercise 15: Mini squats x 10  Exercise 16: Rolling ball up wall thoracic stretch  5 x 5\"  -- not today  Exercise 17: Calf stretch on incline bilaterallyx 4 with 10 second hold  Exercise 18: Heat or biofreeze to mid back with increase pain   - BF today  with manual therapy x 8 minutes to L QL, L paraspinals near t12-L1, L axial distraction        Assessment:   Conditions Requiring Skilled Therapeutic Intervention  Body structures, Functions, Activity limitations: Decreased functional mobility ; Decreased ROM; Decreased strength;Decreased endurance;Decreased posture; Increased pain  Assessment: HEP issued with green t-band. Patient shows good understanding of all exericses and verbalizes good understanding. She reported that STM at end of session helped decrease pain for a longer lasting effect. She reported a slight decrease of pain post session. Treatment Diagnosis: back pain  REQUIRES PT FOLLOW UP: Yes  Discharge Recommendations: Continue to assess pending progress    Goals:  Short term goals  Time Frame for Short term goals: 3-4 weeks  Short term goal 1: Independent with HEP. not yet (9/22 needs HEP  09/30  HEP issued)  Short term goal 2: Pt will increase lower extremity strength to 4+/5 bilaterally. progress (9/22 progress made bilaterally, decreased pain with motions as well)  Short term goal 3: Pt will report improved radiating pain to BLE. - met (9/22 patient reports only occasional radiation at this time)  Short term goal 4: Pt will improve hip flexion to 60 degrees with knee extended. - met  Long term goals  Time Frame for Long term goals : 4-6 weeks  Long term goal 1: Improve Oswestry score to 25% impairement or less. not yet (9/22 53% impaired today)  Long term goal 2: Report less than or equal to 4/10 pain with household chores.    not yet (9/22    6/10 pain today)  Long term goal 3: Pt will increase lumbar flexion to 30 degrees met (9/22 45 degrees flexion today)  Long term goal 4: Pt will increase lumbar extension to 10 degrees.    met (9/22 10 degrees extension today)  Patient Goals   Patient goals : Improve back pain with activity at home  Plan:    Plan  Times per week: 2  Plan weeks: 4-6  Current Treatment Recommendations: Strengthening, ROM, Functional Mobility Training, Endurance Training, Neuromuscular Re-education, Manual Therapy - Soft Tissue Mobilization, Manual Therapy - Joint Manipulation, Pain Management, Patient/Caregiver Education & Training, Home Exercise Program  Timed Code Treatment Minutes: 60 Minutes     Therapy Time   Individual Concurrent Group Co-treatment   Time In 1000         Time Out 1100         Minutes 60         Timed Code Treatment Minutes: 60 Minutes  Electronically signed by Jennifer Nolasco PTA on 9/30/2021 at 11:08 AM

## 2021-10-01 DIAGNOSIS — F31.9 BIPOLAR 1 DISORDER (HCC): ICD-10-CM

## 2021-10-04 ENCOUNTER — HOSPITAL ENCOUNTER (OUTPATIENT)
Dept: PHYSICAL THERAPY | Age: 46
Setting detail: THERAPIES SERIES
Discharge: HOME | End: 2021-10-04
Payer: MEDICARE

## 2021-10-04 PROCEDURE — 97110 THERAPEUTIC EXERCISES: CPT

## 2021-10-04 RX ORDER — LAMOTRIGINE 25 MG/1
TABLET ORAL
Qty: 42 TABLET | Refills: 0 | OUTPATIENT
Start: 2021-10-04

## 2021-10-04 ASSESSMENT — PAIN DESCRIPTION - DESCRIPTORS: DESCRIPTORS: ACHING;DULL

## 2021-10-04 ASSESSMENT — PAIN DESCRIPTION - LOCATION: LOCATION: BACK

## 2021-10-04 ASSESSMENT — PAIN DESCRIPTION - PROGRESSION: CLINICAL_PROGRESSION: GRADUALLY IMPROVING

## 2021-10-04 ASSESSMENT — PAIN DESCRIPTION - FREQUENCY: FREQUENCY: CONTINUOUS

## 2021-10-04 ASSESSMENT — PAIN SCALES - GENERAL: PAINLEVEL_OUTOF10: 6

## 2021-10-04 ASSESSMENT — PAIN DESCRIPTION - PAIN TYPE: TYPE: CHRONIC PAIN

## 2021-10-04 ASSESSMENT — PAIN DESCRIPTION - ORIENTATION: ORIENTATION: LOWER

## 2021-10-04 NOTE — PROGRESS NOTES
Physical Therapy  Daily Treatment Note  Date: 10/4/2021  Patient Name: Maria Dolores Ruiz  MRN: [id number]     :   1975    Subjective:   General  Additional Pertinent Hx: pain increased with frequent falls last year  Response To Previous Treatment: Patient with no complaints from previous session. Referring Practitioner: ASHLEE Lester  PT Visit Information  Onset Date: 21  PT Insurance Information: [employer]  Total # of Visits Approved: 13  Total # of Visits to Date: 10  Plan of Care/Certification Expiration Date: 10/19/21  Progress Note Due Date: 10/21/21  Progress Note Counter: Luc Schneider thru 10/19 for 1 eval + 12 visits; [facility] auth waived due to covid  Subjective  Subjective: I am having a lot of tension today.   Pain Screening  Patient Currently in Pain: Yes  Pain Assessment  Pain Assessment: 0-10  Pain Level: 6 (2-3/10 post session)  Pain Type: Chronic pain  Pain Location: Back  Pain Orientation: Lower  Pain Descriptors: Aching;Dull  Pain Frequency: Continuous  Clinical Progression: Gradually improving       Treatment Activities:          Exercises  Exercise 1: supine lower trunk rotation x 5 for 5\" ea               *   HEP Issued  Exercise 2: supine quadratus stretch 10 seconds x 4 each way  Exercise 3: supine piriformis stretch 10 seconds x 4 each (figure 4)  Exercise 4: B SKTC 10 seconds x 4 each  Exercise 5: B hamstring stretch 10 seconds x 4  Exercise 6: pelvic tilts on a towel  3 second hold x 10  Exercise 7: abdominal series (alone 10 sec hold x10, Mika 10, UE x 10, UE/LE x 10)  Exercise 8: bridging with TA contractions x 10  Exercise 9: Seated forward bend stretch neutral and to R to tolerance   x  3 with 10 second each  Exercise 10: repeated sit to stand with TA contraction- x10 *on mat table for elevated surface  Exercise 11: HS curls (green) x 10  Exercise 12: Multifidus row/ Paloff press (green) x 10  Exercise 13: Stand hip abd/ext x10  Exercise 14: extension to 10 degrees.    met (9/22 10 degrees extension today)  Patient Goals   Patient goals : Improve back pain with activity at home  Plan:    Plan  Times per week: 2  Plan weeks: 4-6  Current Treatment Recommendations: Strengthening, ROM, Functional Mobility Training, Endurance Training, Neuromuscular Re-education, Manual Therapy - Soft Tissue Mobilization, Manual Therapy - Joint Manipulation, Pain Management, Patient/Caregiver Education & Training, Home Exercise Program  Timed Code Treatment Minutes: 60 Minutes     Therapy Time   Individual Concurrent Group Co-treatment   Time In 1000         Time Out 1100         Minutes 60         Timed Code Treatment Minutes: 60 Minutes  Electronically signed by Piero Laboy PTA on 10/4/2021 at 1:15 PM

## 2021-10-04 NOTE — TELEPHONE ENCOUNTER
Pt was seen 9/9/21 and instructed to follow up in 1 month, patient will need to be seen for refills. HUB can read

## 2021-10-06 ENCOUNTER — HOSPITAL ENCOUNTER (OUTPATIENT)
Dept: PHYSICAL THERAPY | Age: 46
Setting detail: THERAPIES SERIES
Discharge: HOME | End: 2021-10-06
Payer: MEDICARE

## 2021-10-06 ENCOUNTER — PATIENT MESSAGE (OUTPATIENT)
Dept: PRIMARY CARE CLINIC | Age: 46
End: 2021-10-06

## 2021-10-06 DIAGNOSIS — B37.9 YEAST INFECTION: Primary | ICD-10-CM

## 2021-10-06 PROCEDURE — 97110 THERAPEUTIC EXERCISES: CPT

## 2021-10-06 PROCEDURE — 97140 MANUAL THERAPY 1/> REGIONS: CPT

## 2021-10-06 ASSESSMENT — PAIN DESCRIPTION - LOCATION: LOCATION: BACK

## 2021-10-06 ASSESSMENT — PAIN SCALES - GENERAL: PAINLEVEL_OUTOF10: 8

## 2021-10-06 ASSESSMENT — PAIN DESCRIPTION - PAIN TYPE: TYPE: CHRONIC PAIN

## 2021-10-06 ASSESSMENT — PAIN DESCRIPTION - ORIENTATION: ORIENTATION: LOWER

## 2021-10-06 ASSESSMENT — PAIN DESCRIPTION - DESCRIPTORS: DESCRIPTORS: ACHING;DULL

## 2021-10-06 ASSESSMENT — PAIN DESCRIPTION - FREQUENCY: FREQUENCY: CONTINUOUS

## 2021-10-06 ASSESSMENT — PAIN DESCRIPTION - PROGRESSION: CLINICAL_PROGRESSION: GRADUALLY IMPROVING

## 2021-10-06 NOTE — TELEPHONE ENCOUNTER
From: Leni Haas  To: Jair Chanel DO  Sent: 10/6/2021 6:22 AM CDT  Subject: Visit Follow-Up Question    I have developed a yeast infection from the steroid pack. Will you escribe some Diflucan for me asap, pretty please. I'm a little miserable. Thank you, and God bless you!

## 2021-10-06 NOTE — PROGRESS NOTES
Physical Therapy  Daily Treatment Note  Date: 10/6/2021  Patient Name: Amaya Hinds  MRN: [id number]     :   1975    Subjective:   General  Additional Pertinent Hx: pain increased with frequent falls last year  Response To Previous Treatment: Patient with no complaints from previous session. Referring Practitioner: ASHLEE Cordova  PT Visit Information  Onset Date: 21  PT Insurance Information: [employer]  Total # of Visits Approved: 13  Total # of Visits to Date: 11  Plan of Care/Certification Expiration Date: 10/19/21  Progress Note Due Date: 10/21/21  Progress Note Counter: Arie Miaryan thru 10/19 for 1 eval + 12 visits; [facility] auth waived due to covid  Subjective  Subjective: Its been a super busy morning.   I am helping take care of my sister who recently had surgery  Pain Screening  Patient Currently in Pain: Yes  Pain Assessment  Pain Assessment: 0-10  Pain Level: 8  Pain Type: Chronic pain  Pain Location: Back  Pain Orientation: Lower  Pain Descriptors: Aching;Dull  Pain Frequency: Continuous  Clinical Progression: Gradually improving       Treatment Activities:          Exercises  Exercise 1: supine lower trunk rotation x 5 for 5\" ea               *   HEP Issued  Exercise 2: supine quadratus stretch 10 seconds x 4 each way  Exercise 3: supine piriformis stretch 10 seconds x 4 each (figure 4) with manual assist  Exercise 4: B SKTC 10 seconds x 4 each with manual assist  Exercise 5: B hamstring stretch 10 seconds x 4--manual  Exercise 6: pelvic tilts on a towel  3 second hold x 10  Exercise 7: abdominal series (alone 10 sec hold x10, Mika 10, UE x 10, UE/LE x 10)  Exercise 8: bridging with TA contractions x 10  Exercise 9: Seated forward bend stretch neutral and to R to tolerance   x  3 with 10 second each  Exercise 10: repeated sit to stand with TA contraction- x10 *on mat table for elevated surface  Exercise 11: HS curls (green) x 10  Exercise 12: Multifidus row/ Paloff press (green) x 10  Exercise 13: Stand hip abd/ext x10  Exercise 14: Standing marching x10  Exercise 15: Mini squats x 10  Exercise 16: Rolling ball up wall thoracic stretch  5 x 5\"  -- not today  Exercise 17: Calf stretch on incline bilaterallyx 4 with 10 second hold--not today  Exercise 18: Heat or biofreeze to mid back with increase pain   - BF today  with manual therapy x 8 minutes to L QL, L paraspinals near t12-L1, L axial distraction              Assessment:   Conditions Requiring Skilled Therapeutic Intervention  Body structures, Functions, Activity limitations: Decreased functional mobility ; Decreased ROM; Decreased strength;Decreased endurance;Decreased posture; Increased pain  Assessment: Patient did well with session. Manual assist with figure 4 stretch, sktc and hamstring stretches. She had moderate tension in her back with STM and ischemic pressure to TP's. Applied biofreeze afterwards. Pain levels at 8/10 pre session and 3-4/10 post session. Treatment Diagnosis: back pain  REQUIRES PT FOLLOW UP: Yes  Discharge Recommendations: Continue to assess pending progress    Goals:  Short term goals  Time Frame for Short term goals: 3-4 weeks  Short term goal 1: Independent with HEP. not yet (9/22 needs HEP  09/30  HEP issued)  Short term goal 2: Pt will increase lower extremity strength to 4+/5 bilaterally. progress (9/22 progress made bilaterally, decreased pain with motions as well)  Short term goal 3: Pt will report improved radiating pain to BLE. - met (9/22 patient reports only occasional radiation at this time)  Short term goal 4: Pt will improve hip flexion to 60 degrees with knee extended. - met  Long term goals  Time Frame for Long term goals : 4-6 weeks  Long term goal 1: Improve Oswestry score to 25% impairement or less. not yet (9/22 53% impaired today)  Long term goal 2: Report less than or equal to 4/10 pain with household chores.    not yet (9/22    6/10 pain today)  Long term goal 3: Pt will increase lumbar flexion to 30 degrees met (9/22 45 degrees flexion today)  Long term goal 4: Pt will increase lumbar extension to 10 degrees.    met (9/22 10 degrees extension today)  Patient Goals   Patient goals : Improve back pain with activity at home  Plan:    Plan  Times per week: 2  Plan weeks: 4-6  Current Treatment Recommendations: Strengthening, ROM, Functional Mobility Training, Endurance Training, Neuromuscular Re-education, Manual Therapy - Soft Tissue Mobilization, Manual Therapy - Joint Manipulation, Pain Management, Patient/Caregiver Education & Training, Home Exercise Program  Timed Code Treatment Minutes: 60 Minutes     Therapy Time   Individual Concurrent Group Co-treatment   Time In 1000         Time Out 1100         Minutes 60         Timed Code Treatment Minutes: 60 Minutes  Electronically signed by Rebekah Bashir PTA on 10/6/2021 at 4:08 PM

## 2021-10-07 RX ORDER — FLUCONAZOLE 150 MG/1
150 TABLET ORAL DAILY
Qty: 3 TABLET | Refills: 0 | Status: SHIPPED | OUTPATIENT
Start: 2021-10-07 | End: 2021-10-10

## 2021-10-07 NOTE — TELEPHONE ENCOUNTER
Requested Prescriptions     Signed Prescriptions Disp Refills    fluconazole (DIFLUCAN) 150 MG tablet 3 tablet 0     Sig: Take 1 tablet by mouth daily for 3 days     Authorizing Provider: Joycelyn Hernandez     Ordering User: Cale Marino

## 2021-10-12 ENCOUNTER — HOSPITAL ENCOUNTER (OUTPATIENT)
Dept: PHYSICAL THERAPY | Age: 46
Setting detail: THERAPIES SERIES
Discharge: HOME | End: 2021-10-12
Payer: MEDICARE

## 2021-10-12 PROCEDURE — 97110 THERAPEUTIC EXERCISES: CPT

## 2021-10-12 ASSESSMENT — PAIN DESCRIPTION - ORIENTATION: ORIENTATION: LOWER

## 2021-10-12 ASSESSMENT — PAIN DESCRIPTION - DESCRIPTORS: DESCRIPTORS: CONSTANT;DULL;ACHING

## 2021-10-12 ASSESSMENT — PAIN SCALES - GENERAL: PAINLEVEL_OUTOF10: 6

## 2021-10-12 ASSESSMENT — PAIN DESCRIPTION - PROGRESSION: CLINICAL_PROGRESSION: GRADUALLY IMPROVING

## 2021-10-12 ASSESSMENT — PAIN DESCRIPTION - LOCATION: LOCATION: BACK

## 2021-10-12 ASSESSMENT — PAIN DESCRIPTION - PAIN TYPE: TYPE: CHRONIC PAIN

## 2021-10-12 NOTE — PROGRESS NOTES
Physical Therapy  Daily Treatment Note  Date: 10/12/2021  Patient Name: Simone Nolasco  MRN: [id number]     :   1975    Subjective:   General  Additional Pertinent Hx: pain increased with frequent falls last year  Response To Previous Treatment: Patient with no complaints from previous session. Referring Practitioner: ASHLEE Joseph  PT Visit Information  Onset Date: 21  PT Insurance Information: [employer]  Total # of Visits Approved: 13  Total # of Visits to Date: 12  Plan of Care/Certification Expiration Date: 10/19/21  Progress Note Due Date: 10/21/21  Progress Note Counter: Margie La thru 10/19 for 1 eval + 12 visits; [facility] auth waived due to covid  Subjective  Subjective: I have been rushing all morning. Had to take my sister to the doctor this morning.   Pain Screening  Patient Currently in Pain: Yes  Pain Assessment  Pain Assessment: 0-10  Pain Level: 6  Pain Type: Chronic pain  Pain Location: Back  Pain Orientation: Lower  Pain Descriptors: Constant;Dull;Aching  Clinical Progression: Gradually improving  Vital Signs  Patient Currently in Pain: Yes       Treatment Activities:    Exercises  Exercise 1: supine lower trunk rotation x 5 for 5\" ea               *   HEP Issued  Exercise 2: supine quadratus stretch 10 seconds x 4 each way  Exercise 3: supine piriformis stretch 10 seconds x 4 each (figure 4) with manual assist  Exercise 4: B SKTC 10 seconds x 4 each with manual assist  Exercise 5: B hamstring stretch 10 seconds x 4--manual  Exercise 6: pelvic tilts on a towel  3 second hold x 10  Exercise 7: abdominal series (alone 10 sec hold x10, Mika 10, UE x 10, UE/LE x 10)  Exercise 8: bridging with TA contractions x 10  Exercise 9: Seated forward bend stretch neutral and to R to tolerance   x  3 with 10 second each  Exercise 10: repeated sit to stand with TA contraction- x10 *on mat table for elevated surface  Exercise 11: HS curls (green) x 10  Exercise 12: Multifidus row/ Paloff press (green) x 10  Exercise 13: Stand hip abd/ext x10  Exercise 14: Standing marching x10  Exercise 15: Mini squats x 10  Exercise 16: Rolling ball up wall thoracic stretch  5 x 5\"  Exercise 17: Calf stretch on incline bilaterallyx 4 with 10 second hold--not today  Exercise 18: Heat or biofreeze to mid back with increase pain   - BF today     Assessment:   Conditions Requiring Skilled Therapeutic Intervention  Body structures, Functions, Activity limitations: Decreased functional mobility ; Decreased ROM; Decreased strength;Decreased endurance;Decreased posture; Increased pain  Assessment: Patient request assist with stretches. She states she gets a better pull with help. Encouarged her to perform HEP daily to keep pain level low. Next visit last insurance approved. She reports pain level post exericses at 4-5/10. Treatment Diagnosis: back pain  REQUIRES PT FOLLOW UP: Yes  Discharge Recommendations: Continue to assess pending progress    Goals:  Short term goals  Time Frame for Short term goals: 3-4 weeks  Short term goal 1: Independent with HEP. not yet (9/22 needs HEP  09/30  HEP issued)  Short term goal 2: Pt will increase lower extremity strength to 4+/5 bilaterally. progress (9/22 progress made bilaterally, decreased pain with motions as well)  Short term goal 3: Pt will report improved radiating pain to BLE. - met (9/22 patient reports only occasional radiation at this time)  Short term goal 4: Pt will improve hip flexion to 60 degrees with knee extended. - met  Long term goals  Time Frame for Long term goals : 4-6 weeks  Long term goal 1: Improve Oswestry score to 25% impairement or less. not yet (9/22 53% impaired today)  Long term goal 2: Report less than or equal to 4/10 pain with household chores.    not yet (9/22    6/10 pain today)  Long term goal 3: Pt will increase lumbar flexion to 30 degrees met (9/22 45 degrees flexion today)  Long term goal 4: Pt will increase lumbar extension to 10 degrees.    met (9/22 10 degrees extension today)  Patient Goals   Patient goals : Improve back pain with activity at home  Plan:    Plan  Times per week: 2  Plan weeks: 4-6  Current Treatment Recommendations: Strengthening, ROM, Functional Mobility Training, Endurance Training, Neuromuscular Re-education, Manual Therapy - Soft Tissue Mobilization, Manual Therapy - Joint Manipulation, Pain Management, Patient/Caregiver Education & Training, Home Exercise Program  Timed Code Treatment Minutes: 39 Minutes     Therapy Time   Individual Concurrent Group Co-treatment   Time In 1502         Time Out 6171         Minutes 39         Timed Code Treatment Minutes: 39 Minutes  Electronically signed by Yesenia Barney PTA on 10/12/2021 at 4:01 PM

## 2021-10-13 ENCOUNTER — TELEPHONE (OUTPATIENT)
Dept: PRIMARY CARE CLINIC | Age: 46
End: 2021-10-13

## 2021-10-13 DIAGNOSIS — M54.42 CHRONIC BILATERAL LOW BACK PAIN WITH BILATERAL SCIATICA: Primary | ICD-10-CM

## 2021-10-13 DIAGNOSIS — G89.29 CHRONIC BILATERAL LOW BACK PAIN WITH BILATERAL SCIATICA: Primary | ICD-10-CM

## 2021-10-13 DIAGNOSIS — M54.41 CHRONIC BILATERAL LOW BACK PAIN WITH BILATERAL SCIATICA: Primary | ICD-10-CM

## 2021-10-13 NOTE — TELEPHONE ENCOUNTER
Pt called to see about her disability paperwork she dropped off. She said that she just dropped off the pages for us to fill out. If you need to see it all, just let her know.

## 2021-10-13 NOTE — TELEPHONE ENCOUNTER
Pt called, she is still having the flank pain and it has been going on for 1.5months. she wants to know about a MRI. She said that the pain is waking her up at night and it is hard to breathe. She puts biofreeze on there every night and it helps for a little bit. This is a new back pain that is unbearable at times.

## 2021-10-13 NOTE — TELEPHONE ENCOUNTER
I sent her a [employer] message on 9/29/21 requesting to entire packet and she was going to bring in. Will send another [employer] message.

## 2021-10-14 NOTE — PROGRESS NOTES
"[facility]  HEMATOLOGY & ONCOLOGY    W ON[facility] HEMATOLOGY AND ONCOLOGY  [address]  [facility] [place] [facility] [place] [facility] [address] [phone]    Patient Name: Naomi Bhatia  Encounter Date: 10/18/2021  YOB: 1975  Patient Number: [id number]    Chief Complaint   Patient presents with   • Hereditary hemochromatosis (CMS/HCC)     follow up     HEMATOLOGIC HISTORY  Subjective: 44 year old female who was previously being seen by Dr. Clements for iron deficiency anemia. She has undergone a gastric sleeve procedure in 2017 for morbid obesity and had a total right hip replacement in 3/2019 after a fracture.       She, unfortunateky, stopped following up with bariatric surgery and did not take vitamin replacements. She has had a hysterectomy due to fibroids and endometriosis but found to be iron deficient and was started on iron replacement with IV iron, states it is more than 10 treatments in all.  She states that she has iron deficiency since age 14. She has had multiple blood transfusions in the past (4 or 5).     She presents now for follow up.     Today, she is feeling \"tired\".  She also suffers from chronic pain. In the Degernerative disc disease, fibromyalgia, and previous back surgeries     On follow up on 20: Had right Carpal Tunnel Surgery on 11.3.20 and has  Her arm in a sling today.  Otherwise, she is feeling \"exhausted\" because dad, who was on hospice,  about a month ago.  She has not slept well due to the depression and the pain in the arm was also keeping her up.      On follow up on 21: Upset that her car has broken down with expensive repairs which is causing distress.  Physically, she is tired, no shortness of breath, denies bleeding     On follow up on 3.11.21: Feeling \"OK and hanging in there\".  Her son had Covid 3 weeks ago but patient tested negative although she states that she had all the symptoms " and may have had a false negative test.        REASON FOR VISIT: Mrs. Bhatia is a 45-year-old female patient here today in follow-up for iron overload.  She has been seen by Dr. Goldberg with initial consultation and recommendations given.  She was found to be positive for this single C282Y hemochromatosis gene variant.  It is noted that iron overload is uncommon for those that are carriers for this but the iron overload could be due to obesity, fatty liver as well as her history of receiving multiple transfusions and iron therapy for iron deficiency anemia.  She has a history of gastric sleeve procedure for which has caused malabsorption issues.  She received multiple transfusions in the past as well as Venofer therapy at least 7 times back in 2018.    Dr. Goldberg saw Mrs. Bhatia initially on 9/18/2020 and work-up showed a hemoglobin of 10.8, hematocrit 31.3, ferritin 1406 and an iron saturation of 21%.  She returned to then in November with a ferritin of 1663 and underwent a phlebotomy of 250 units.  Since then she has been undergoing phlebotomies approximately every month to 2 months. Her ferritin did not decline as would have hoped.  She has been started on Jadenu.  She has been on the Jadenu for nearly a month now at 1890mg daily.  She was having some dizziness and stopped it but found out she has vertigo.  She also states that the jadenu also gave her some diarrhea.  Her ferritin on 9/10/2021 was at 592.  Therefore she was asked to restart the Jadenu at 1800 mg.  Reduce the dose slightly just to see if it would help in reducing the amount of diarrhea and side effects.    Naomi presents today stating that she has been to the ER at [facility] as well as seeing Dr. Sharp for left flank pain.  She thought she had a kidney stone but CT did not show any abnormalities.  Urinalysis showed trace of leukocytes 1+ bacteria but no culture was ran.  Dr. Sharp has an MRI ordered.  She states she does have some  fatigue and tiredness otherwise no complaints.     Her CBC today shows a white count of 7.96, hemoglobin 11.8 and a platelet count of 277,000.  CMP shows a slight decline in her GFR at 60 ml/min.  Alkaline phosphatase has increased to 128 and bilirubin of 1.3.  AST and ALT remain normal.  Her ferritin is currently at 451.50 with a saturation of 42%.      PAST MEDICAL HISTORY:  ALLERGIES:  Allergies   Allergen Reactions   • Azithromycin GI Intolerance and Nausea And Vomiting     Severe Abdominal Pain   Severe abdominal pain    • Silver Rash     Redness, blisters  blister  Redness, blisters         CURRENT MEDICATIONS:  Outpatient Encounter Medications as of 10/18/2021   Medication Sig Dispense Refill   • acyclovir (ZOVIRAX) 800 MG tablet Take 800 mg by mouth Daily.     • albuterol sulfate  (90 Base) MCG/ACT inhaler Inhale 2 puffs As Needed.     • amLODIPine (NORVASC) 5 MG tablet Take 5 mg by mouth Daily.     • Calcium Citrate-Vitamin D (CALCIUM CITRATE + D3 PO) Take 1 tablet by mouth Daily.     • carbidopa-levodopa (SINEMET)  MG per tablet Take 1 tablet by mouth Daily.     • CRANBERRY PO Take 1 capsule by mouth Daily.     • Cyclobenzaprine HCl (FLEXERIL PO) Take 10 mg by mouth Daily As Needed.     • Deferasirox (JADENU) 360 MG tablet Take 2 tablets by mouth Every Morning Before Breakfast. In addition to 90mg tablet for total dose of 1890mg daily. (Patient taking differently: Take 2 tablets by mouth As Needed. In addition to 90mg tablet for total dose of 1890mg daily.) 60 tablet 5   • Deferasirox (Jadenu) 90 MG tablet Take 90 mg by mouth Daily. 360 x2 daily along with 90 mg tab x2 daily for a total of 900 mg daily (Patient taking differently: Take 90 mg by mouth As Needed. 360 x2 daily along with 90 mg tab x2 daily for a total of 900 mg daily) 60 tablet 3   • meclizine (ANTIVERT) 25 MG tablet Take 25 mg by mouth As Needed.     • metFORMIN (GLUCOPHAGE) 1000 MG tablet Take 1,000 mg by mouth 2 (Two) Times a  Day With Meals.     • metoprolol succinate XL (TOPROL XL) 50 MG 24 hr tablet Take 50 mg by mouth 2 (Two) Times a Day.     • Multiple Vitamin (MULTI VITAMIN PO) Take  by mouth Daily.     • NABUMETONE PO Take 750 mg by mouth 2 (two) times a day.     • nortriptyline (PAMELOR) 25 MG capsule Take 25 mg by mouth Every Night. Pt takes 2 at night  3   • omeprazole (priLOSEC) 20 MG capsule Take 20 mg by mouth Daily.  3   • pregabalin (LYRICA) 100 MG capsule Take 100 mg by mouth 2 (Two) Times a Day.     • venlafaxine (EFFEXOR) 75 MG tablet Take 75 mg by mouth Daily.  11   • venlafaxine XR (EFFEXOR-XR) 150 MG 24 hr capsule Take 150 mg by mouth Daily.     • lamoTRIgine (LaMICtal) 25 MG tablet Take 1 tablet by mouth Every Night for 14 days, THEN 2 tablets Every Night for 14 days. 42 tablet 0     Facility-Administered Encounter Medications as of 10/18/2021   Medication Dose Route Frequency Provider Last Rate Last Admin   • diphenhydrAMINE (BENADRYL) injection 50 mg  50 mg Intravenous PRN Pedro Clements MD       • famotidine (PEPCID) injection 20 mg  20 mg Intravenous PRN Pedro Clements MD       • hydrocortisone sodium succinate (Solu-CORTEF) injection 100 mg  100 mg Intravenous PRN Pedro Clements MD           ADULT ILLNESSES:  Patient Active Problem List   Diagnosis Code   • Fatigue R53.83   • History of iron deficiency Z86.39   • Vitamin D deficiency E55.9   • Iron deficiency anemia secondary to inadequate dietary iron intake D50.8   • Malabsorption of iron K90.9   • Obesity, Class III, BMI 40-49.9 (morbid obesity) ([facility]) E66.01   • Status post laparoscopic sleeve gastrectomy Z98.84   • Obstructive sleep apnea syndrome G47.33   • Abnormal hemoglobin ([facility]) D58.2   • Anemia D64.9   • Carpal tunnel syndrome on both sides G56.03   • Cervical vertebral fusion M43.22   • Cobalamin deficiency E53.8   • DDD (degenerative disc disease), lumbar M51.36   • Drug-induced constipation K59.03   • Pain  management R52   • Family history of esophageal cancer Z80.0   • Family history of polyps in the colon Z83.71   • Chronic pain disorder G89.4   • Herniation of lumbar intervertebral disc with radiculopathy M51.16   • History of lumbar spinal fusion Z98.1   • Hypertensive disorder I10   • Insomnia G47.00   • Moderate episode of recurrent major depressive disorder (HCC) F33.1   • RLS (restless legs syndrome) G25.81   • Folic acid deficiency E53.8   • Encounter for care related to Port-a-Cath Z45.2   • Hereditary hemochromatosis (HCC) E83.110   • Iron overload E83.19   • Attention deficit hyperactivity disorder (ADHD), combined type F90.2   • Bipolar 1 disorder (HCC) F31.9       SURGERIES:  Past Surgical History:   Procedure Laterality Date   • ANKLE SURGERY      Right    • APPENDECTOMY  04/19/2015   • BACK SURGERY  2000   • CERVICAL SPINE SURGERY  09/01/2015   • CHOLECYSTECTOMY      1995;lap   • COLONOSCOPY  05/02/2017    normal; do not return until age of 49 Dr. Gus Valentine   • GASTRIC SLEEVE LAPAROSCOPIC N/A 12/20/2017    Procedure: GASTRIC SLEEVE LAPAROSCOPIC;  Surgeon: Yifan Cordero MD;  Location: [facility] OR;  Service:    • HIP ARTHROPLASTY      Right  2019   • HIP SURGERY  1987    right    • INSERTION CENTRAL VENOUS ACCESS DEVICE W/ SUBCUTANEOUS PORT  11/07/2017    Dr Anel Gamboa (Smart Port-Power injectable Port) Cat NO#[id number] Lot [id number]    • MOUTH SURGERY  1994   • NECK SURGERY     • TOOTH EXTRACTION     • UPPER GASTROINTESTINAL ENDOSCOPY  05/02/2017    Dr. Gus Valentine, negative for h.pylori, negative for Salomon's   • VAGINAL HYSTERECTOMY SALPINGO OOPHORECTOMY  2008    Partial and then had another surgery to remove the rest       HEALTH MAINTENANCE ITEMS:  <no information>  Last Completed Colonoscopy          COLORECTAL CANCER SCREENING (COLONOSCOPY - Every 10 Years) Next due on 5/2/2027 05/02/2017  Outside Procedure: MI COLONOSCOPY FLX DX W/COLLJ SPEC WHEN PFRMD                FAMILY  "HISTORY:  Family History   Problem Relation Age of Onset   • Diabetes Mother    • Hypertension Mother    • Coronary artery disease Mother    • Cancer Mother         \"bone\" cancer   • Cancer Father         prostate   • Hypertension Father    • Heart disease Father    • Stroke Father    • Coronary artery disease Father    • Hypertension Sister    • Obesity Sister    • Cancer Brother         throat   • Diabetes Brother    • Diabetes Maternal Grandmother    • Stroke Maternal Grandmother    • No Known Problems Daughter    • No Known Problems Son        SOCIAL HISTORY:  Social History     Socioeconomic History   • Marital status:    Tobacco Use   • Smoking status: Former Smoker     Packs/day: 1.00     Years: 25.00     Pack years: 25.00     Types: Cigarettes     Quit date:      Years since quittin.8   • Smokeless tobacco: Never Used   Substance and Sexual Activity   • Alcohol use: Yes     Comment: rarely    • Drug use: No     Types: Codeine     Comment: Codeine in Prescribed Medications only    • Sexual activity: Defer     Birth control/protection: Surgical       REVIEW OF SYSTEMS:  Review of Systems   Constitutional: Positive for fatigue. Negative for activity change, appetite change, chills, diaphoresis, fever and unexpected weight loss.   HENT: Negative for nosebleeds, sinus pressure, sore throat and voice change.    Eyes: Negative for blurred vision, double vision and visual disturbance.   Respiratory: Negative for cough and shortness of breath.    Cardiovascular: Negative for chest pain, palpitations and leg swelling.   Gastrointestinal: Negative for abdominal pain, anal bleeding, blood in stool, constipation, diarrhea, nausea and vomiting.   Genitourinary: Positive for flank pain (Left sided). Negative for dysuria, frequency, hematuria, urgency and urinary incontinence.   Musculoskeletal: Positive for back pain. Negative for arthralgias and myalgias.   Skin: Negative for rash and skin lesions. " "  Neurological: Negative for dizziness (\"vertigo\"), weakness and headache.   Hematological: Negative for adenopathy. Does not bruise/bleed easily.   Psychiatric/Behavioral: Negative for sleep disturbance and depressed mood.       /88   Pulse 116   Temp 96.3 °F (35.7 °C)   Resp 16   Ht 172.7 cm (68\")   Wt (!) 138 kg (304 lb 4.8 oz)   LMP  (LMP Unknown)   SpO2 97%   BMI 46.27 kg/m²  Body surface area is 2.44 meters squared.  Pain Score    10/18/21 1007   PainSc: 0-No pain       Physical Exam:  Physical Exam  Vitals reviewed.   Constitutional:       Appearance: Normal appearance. She is well-developed.   HENT:      Head: Atraumatic.   Eyes:      General: No scleral icterus.     Pupils: Pupils are equal, round, and reactive to light.   Neck:      Trachea: Trachea normal.   Cardiovascular:      Rate and Rhythm: Normal rate and regular rhythm.   Pulmonary:      Effort: Pulmonary effort is normal.      Breath sounds: Normal breath sounds. No wheezing, rhonchi or rales.   Abdominal:      Palpations: Abdomen is soft.      Tenderness: There is no abdominal tenderness. There is no guarding or rebound.   Musculoskeletal:      Cervical back: Neck supple.   Skin:     General: Skin is warm and dry.   Neurological:      General: No focal deficit present.      Mental Status: She is alert and oriented to person, place, and time.   Psychiatric:         Mood and Affect: Mood normal.         Behavior: Behavior normal.         Naomi Bhatia reports a pain score of 4.  Given her pain assessment as noted, treatment options were discussed and the following options were decided upon as a follow-up plan to address the patient's pain: continuation of current treatment plan for pain.  Dr. Roman working up the left flank pain.      Patient's Body mass index is 46.27 kg/m². BMI is above normal parameters. Recommendations include: defer to pcp.      LABS    Lab Results - Last 18 Months   Lab Units 10/18/21  1002 " 09/20/21  0942 09/17/21  2150 08/26/21  2210 08/26/21  1226 08/10/21  0927 07/25/21  1515 07/22/21  1503 07/08/21  0946 07/08/21  0946 06/21/21  1059 06/21/21  1059 06/03/21  1800 05/26/21  1358   HEMOGLOBIN g/dL 11.8* 12.7 12.4 11.9* 12.6 10.8* 11.1*   < >   < > 11.9*   < > 13.0   < > 10.9*   HEMATOCRIT % 34.8 35.6 37.9 35.9* 35.5 31.4* 32.3*   < >   < > 34.8   < > 38.9   < > 31.5*   MCV fL 89.7 87.3 94.3 97.0 92.2 91.8 92.3   < >   < > 91.6   < > 90.7   < > 89.5   WBC 10*3/mm3 7.96 8.44 10.6 8.4 9.70 9.21 9.9   < >   < > 14.01*   < > 18.89*   < > 8.81   RDW % 17.4* 15.0 15.2* 15.9* 15.9* 17.6* 17.4*   < >   < > 16.1*   < > 16.7*   < > 17.0*   MPV fL 9.8 9.9 9.8 10.0 9.8 9.6 9.4   < >   < > 9.2   < > 9.7   < > 9.7   PLATELETS 10*3/mm3 277 292 284 271 295 276 207   < >   < > 281   < > 305   < > 270   IMM GRAN % % 0.8* 0.6*  --   --  0.4  --   --   --   --  1.4*  --  1.9*  --  1.0*   NEUTROS ABS 10*3/mm3 5.17 5.38 6.1 4.7 7.04*  --  6.6   < >  --  9.39*   < > 15.54*   < > 5.80   LYMPHS ABS 10*3/mm3 2.08 2.27 3.5 2.8 1.89  --  2.5   < >  --  3.38*   < > 1.95   < > 2.21   MONOS ABS 10*3/mm3 0.42 0.44 0.60 0.50 0.51  --  0.60   < >  --  0.84   < > 0.92*   < > 0.45   EOS ABS 10*3/mm3 0.16 0.20 0.20 0.10 0.14  --  0.10   < >  --  0.12   < > 0.01   < > 0.17   BASOS ABS 10*3/mm3 0.07 0.10 0.10 0.10 0.08  --  0.10   < >  --  0.09   < > 0.11   < > 0.09   IMMATURE GRANS (ABS) 10*3/mm3 0.06* 0.05 0.1 0.1 0.04  --  0.1   < >  --  0.19*   < > 0.36*   < > 0.09*   NRBC /100 WBC 0.0 0.0  --   --  0.0  --   --   --   --  0.0  --  0.0  --  0.0    < > = values in this interval not displayed.       Lab Results - Last 18 Months   Lab Units 09/20/21  0942 08/26/21  2210 08/26/21  1226 08/10/21  0927 06/21/21  1059 03/11/21  0958 02/03/21  0937 01/05/21  0847   GLUCOSE mg/dL 129* 108 135* 130* 120* 115*   < > 155*   SODIUM mmol/L 141 139 140 142 137 140   < > 141   POTASSIUM mmol/L 4.0 3.4* 3.5 3.6 3.9 3.3*   < > 3.6   TOTAL CO2 mmol/L  --   25  --   --   --   --    < >  --    CO2 mmol/L 26.0  --  20.0* 24.0 21.0* 23.0  --  28.0   CHLORIDE mmol/L 102 103 105 105 107 102   < > 102   ANION GAP mmol/L 13.0 11 15.0 13.0 9.0 15.0   < > 11.0   CREATININE mg/dL 0.67 0.7 0.77 0.75 1.03* 0.80   < > 0.62   BUN mg/dL 8 12 12 9 18 9   < > 9   BUN / CREAT RATIO  11.9  --  15.6 12.0 17.5 11.3  --  14.5   CALCIUM mg/dL 9.5 9.1 9.0 9.7 9.7 9.4   < > 9.3   EGFR IF NONAFRICN AM mL/min/1.73 95 >60 81 83 58* 77   < > 104   ALK PHOS U/L 112 106* 113 119* 124* 102   < > 97   TOTAL PROTEIN g/dL 7.3 6.9 7.2 6.9 7.2 7.6   < > 7.3   ALT (SGPT) U/L 17 30 31 24 16 32   < > 20   AST (SGOT) U/L 24 29 41* 31 25 36*   < > 28   BILIRUBIN mg/dL 0.8 0.7 0.7 0.6 0.8 0.6   < > 0.6   ALBUMIN g/dL 4.40 4.4 4.40 4.40 4.30 4.30   < > 4.10   GLOBULIN gm/dL 2.9  --  2.8 2.5 2.9 3.3  --  3.2    < > = values in this interval not displayed.       Lab Results - Last 18 Months   Lab Units 09/01/20  1306   LDH U/L 231*       Lab Results - Last 18 Months   Lab Units 09/20/21  0942 09/10/21  1315 08/10/21  0927 07/08/21  0946 06/21/21  1059 05/26/21  1358 05/12/21  0939 04/08/21  1006 03/11/21  0958 02/03/21  0937 01/19/21  0934 01/05/21  0847 11/06/20  0956 09/01/20  1306   IRON mcg/dL  --  70 56  --  159*  --   --   --  61 44  --  54   < > 51   TIBC mcg/mL  --  238* 289*  --  340  --   --   --  264* 213*  --  235*   < > 243*   IRON SATURATION %  --  29 19*  --  47  --   --   --  23 21  --  23   < > 21   FERRITIN ng/mL  --  592* 762.70* 538.90* 459.80* 883.70* 1,140.00*   < > 1,375.00* 1,274.0*   < > 1,355.00*   < > 1,406.00*   T4 TOTAL mcg/dL  --   --   --   --   --   --   --   --   --   --   --   --   --  9.34   TSH uIU/mL  --   --   --   --   --   --   --   --   --  1.590  --   --   --  1.340   FOLATE ng/mL 12.90 13.8  --   --   --   --   --   --   --   --   --   --   --  >20.00    < > = values in this interval not displayed.     ASSESSMENT  1.  Hemochromatosis, single C282Y--Iron overload is  uncommon for those that are carriers for hemochromatosis and is usually related more to obesity and fatty liver or more so in this case the multiple blood transfusions and iron replacement therapy in the past.  This patient's LFT's are normal but her ferritin is well above the upper limits of when iron removal therapy would begin with normally phlebotomies but she also has underlying anemia.     Her baseline ferritin in September 2020 was at 1406.  She has had multiple phlebotomies since then with little improvement in the ferritin.  Her hemoglobin is ranging 10 to 11.5. MRI of liver on 4/22/2021 showed slight liver iron overload, strong hepatic steatosis and splenic iron overload.  Therefore, Dr Goldberg recommended patient to start iron chelation therapy.      Patient had baseline hearing and eye exams that cleared her for the Jadenu therapy.  She started at 1890mg. She was having dizziness and stopped the Jadenu as well as some diarrhea with the Jadenu.     Her ferritin is down to 592 and the goal is <500.  She restarted Jadenu at 1800mg approximately 4 weeks ago and she is tolerating this dose better.  She states the diarrhea is much more manageable and she is having no dizziness.  Her ferritin is now down to 451.50.  Her GFR has also declined to 60ml/min and bilirubin has increased to 1.3.  This may be due to the Jadenu.  All in all with her ferritin now being less than 500 we will interrupt therapy,hold the medication and watch.    2.  Splenomegaly measuring 14.6cm on US in 10/12/2020.   3.  Osteoarthritis/Fibromyalgia, no inflammatory joint disease per Rheumatology.  Started diclofenac gel and Nabumetone  4.  Anemia secondary to chronic disease, hgb stable today at 11.8  5.  Hypertension with BP at 130/88 today.  Advised patient to follow with pcp  6.  New onset left flank pain.  CT was negative for renal stone or abnormalities September 18, 2021.  Urinalysis with trace of leukocytes 1+ bacteria microscope  but no culture was ran.  She is undergoing work-up by her primary care provider Dr. Sharp.  MRI is pending.    PLAN  1.  Reviewed labs with the patient and her tolerance to Jadenu  2.  We will hold Jadenu at present since ferritin is less than 500.  Patient is also having some mild elevation in bilirubin and decrease in GFR.  We will hold the drug and monitor  3.  We will continue to adjust dose every 3 to 6 months based on serum ferritin trends; adjust by 3.5 or 7 mg/kg/day; titrate to individual response and treatment goals. In patients not adequately controlled with 21 mg/kg/day, doses up to 28 mg/kg/day may be considered for serum ferritin levels persistently >2,500 mcg/L and not decreasing over time (doses >28 mg/kg/day are not recommended). If serum ferritin falls to <1,000 mcg/L at 2 consecutive visits, consider dose reduction (especially if dose is >17.5 mg/kg/day). If serum ferritin falls to <500 mcg/L, interrupt therapy and continue monitoring monthly.  4.  Will need to continue to monitor the ferritin, serum creatinine monthly as well as the GFR . The ALT and AST as well as bilirubin monthly.  5.  Encouraged patient to continue to follow with pcp and other specialists  6.  Patient will return in follow-up to see Dr. Goldberg in 4 weeks.  The patient likes to see Dr. Goldberg when she is in town.  She will need a preoffice CBC CMP iron profile and ferritin.    I spent 25 minutes caring for Naomi on this date of service. This time includes time spent by me in the following activities: preparing for the visit, reviewing tests, performing a medically appropriate examination and/or evaluation, counseling and educating the patient/family/caregiver, ordering medications, tests, or procedures and documenting information in the medical record.       Wendy Watts, IRMA  10/18/2021  13:25 CDT

## 2021-10-15 ENCOUNTER — HOSPITAL ENCOUNTER (OUTPATIENT)
Dept: PHYSICAL THERAPY | Age: 46
Setting detail: THERAPIES SERIES
Discharge: HOME | End: 2021-10-15
Payer: MEDICARE

## 2021-10-15 ENCOUNTER — OFFICE VISIT (OUTPATIENT)
Dept: OBGYN CLINIC | Age: 46
End: 2021-10-15
Payer: MEDICARE

## 2021-10-15 VITALS
WEIGHT: 293 LBS | SYSTOLIC BLOOD PRESSURE: 128 MMHG | BODY MASS INDEX: 49.07 KG/M2 | HEART RATE: 70 BPM | DIASTOLIC BLOOD PRESSURE: 82 MMHG

## 2021-10-15 DIAGNOSIS — Z11.3 SCREENING EXAMINATION FOR STD (SEXUALLY TRANSMITTED DISEASE): ICD-10-CM

## 2021-10-15 DIAGNOSIS — Z01.419 WOMEN'S ANNUAL ROUTINE GYNECOLOGICAL EXAMINATION: Primary | ICD-10-CM

## 2021-10-15 DIAGNOSIS — Z76.89 ENCOUNTER TO ESTABLISH CARE: ICD-10-CM

## 2021-10-15 DIAGNOSIS — Z12.31 ENCOUNTER FOR SCREENING MAMMOGRAM FOR MALIGNANT NEOPLASM OF BREAST: ICD-10-CM

## 2021-10-15 DIAGNOSIS — N76.0 ACUTE VAGINITIS: ICD-10-CM

## 2021-10-15 PROCEDURE — 99386 PREV VISIT NEW AGE 40-64: CPT | Performed by: NURSE PRACTITIONER

## 2021-10-15 PROCEDURE — G8484 FLU IMMUNIZE NO ADMIN: HCPCS | Performed by: NURSE PRACTITIONER

## 2021-10-15 PROCEDURE — 97110 THERAPEUTIC EXERCISES: CPT

## 2021-10-15 ASSESSMENT — PAIN DESCRIPTION - LOCATION: LOCATION: BACK;NECK;SHOULDER

## 2021-10-15 ASSESSMENT — PAIN DESCRIPTION - DESCRIPTORS: DESCRIPTORS: DULL

## 2021-10-15 ASSESSMENT — PAIN SCALES - GENERAL: PAINLEVEL_OUTOF10: 4

## 2021-10-15 ASSESSMENT — PAIN DESCRIPTION - FREQUENCY: FREQUENCY: CONTINUOUS

## 2021-10-15 ASSESSMENT — PAIN DESCRIPTION - PROGRESSION: CLINICAL_PROGRESSION: GRADUALLY IMPROVING

## 2021-10-15 ASSESSMENT — PAIN DESCRIPTION - ONSET: ONSET: ON-GOING

## 2021-10-15 ASSESSMENT — PAIN DESCRIPTION - PAIN TYPE: TYPE: CHRONIC PAIN

## 2021-10-15 ASSESSMENT — PAIN DESCRIPTION - ORIENTATION: ORIENTATION: LOWER

## 2021-10-15 NOTE — PATIENT INSTRUCTIONS
Patient Education        A Healthy Lifestyle: Care Instructions  Your Care Instructions     A healthy lifestyle can help you feel good, stay at a healthy weight, and have plenty of energy for both work and play. A healthy lifestyle is something you can share with your whole family. A healthy lifestyle also can lower your risk for serious health problems, such as high blood pressure, heart disease, and diabetes. You can follow a few steps listed below to improve your health and the health of your family. Follow-up care is a key part of your treatment and safety. Be sure to make and go to all appointments, and call your doctor if you are having problems. It's also a good idea to know your test results and keep a list of the medicines you take. How can you care for yourself at home? · Do not eat too much sugar, fat, or fast foods. You can still have dessert and treats now and then. The goal is moderation. · Start small to improve your eating habits. Pay attention to portion sizes, drink less juice and soda pop, and eat more fruits and vegetables. ? Eat a healthy amount of food. A 3-ounce serving of meat, for example, is about the size of a deck of cards. Fill the rest of your plate with vegetables and whole grains. ? Limit the amount of soda and sports drinks you have every day. Drink more water when you are thirsty. ? Eat plenty of fruits and vegetables every day. Have an apple or some carrot sticks as an afternoon snack instead of a candy bar. Try to have fruits and/or vegetables at every meal.  · Make exercise part of your daily routine. You may want to start with simple activities, such as walking, bicycling, or slow swimming. Try to be active 30 to 60 minutes every day. You do not need to do all 30 to 60 minutes all at once. For example, you can exercise 3 times a day for 10 or 20 minutes.  Moderate exercise is safe for most people, but it is always a good idea to talk to your doctor before starting an exercise program.  · Keep moving. [place] the lawn, work in the garden, or [employer]. Take the stairs instead of the elevator at work. · If you smoke, quit. People who smoke have an increased risk for heart attack, stroke, cancer, and other lung illnesses. Quitting is hard, but there are ways to boost your chance of quitting tobacco for good. ? Use nicotine gum, patches, or lozenges. ? Ask your doctor about stop-smoking programs and medicines. ? Keep trying. In addition to reducing your risk of diseases in the future, you will notice some benefits soon after you stop using tobacco. If you have shortness of breath or asthma symptoms, they will likely get better within a few weeks after you quit. · Limit how much alcohol you drink. Moderate amounts of alcohol (up to 2 drinks a day for men, 1 drink a day for women) are okay. But drinking too much can lead to liver problems, high blood pressure, and other health problems. Family health  If you have a family, there are many things you can do together to improve your health. · Eat meals together as a family as often as possible. · Eat healthy foods. This includes fruits, vegetables, lean meats and dairy, and whole grains. · Include your family in your fitness plan. Most people think of activities such as jogging or tennis as the way to fitness, but there are many ways you and your family can be more active. Anything that makes you breathe hard and gets your heart pumping is exercise. Here are some tips:  ? Walk to do errands or to take your child to school or the bus.  ? Go for a family bike ride after dinner instead of watching TV. Where can you learn more? Go to https://Liquor.comcrystalCallFire.ClasesD. org and sign in to your [employer] account. Enter W760 in the [employer] box to learn more about \"A Healthy Lifestyle: Care Instructions. \"     If you do not have an account, please click on the \"Sign Up Now\" link.   Current as of: June 16, 2021               Content Version: 13.0  © 2006-2021 Healthwise, [employer]. Care instructions adapted under license by [facility] ([facility]). If you have questions about a medical condition or this instruction, always ask your healthcare professional. [address] any warranty or liability for your use of this information. Patient Education        Body Mass Index: Care Instructions  Your Care Instructions     Body mass index (BMI) can help you see if your weight is raising your risk for health problems. It uses a formula to compare how much you weigh with how tall you are. · A BMI lower than 18.5 is considered underweight. · A BMI between 18.5 and 24.9 is considered healthy. · A BMI between 25 and 29.9 is considered overweight. A BMI of 30 or higher is considered obese. If your BMI is in the normal range, it means that you have a lower risk for weight-related health problems. If your BMI is in the overweight or obese range, you may be at increased risk for weight-related health problems, such as high blood pressure, heart disease, stroke, arthritis or joint pain, and diabetes. If your BMI is in the underweight range, you may be at increased risk for health problems such as fatigue, lower protection (immunity) against illness, muscle loss, bone loss, hair loss, and hormone problems. BMI is just one measure of your risk for weight-related health problems. You may be at higher risk for health problems if you are not active, you eat an unhealthy diet, or you drink too much alcohol or use tobacco products. Follow-up care is a key part of your treatment and safety. Be sure to make and go to all appointments, and call your doctor if you are having problems. It's also a good idea to know your test results and keep a list of the medicines you take. How can you care for yourself at home? · Practice healthy eating habits.  This includes eating plenty of fruits, vegetables, whole grains, lean protein, and low-fat dairy. · If your doctor recommends it, get more exercise. Walking is a good choice. Bit by bit, increase the amount you walk every day. Try for at least 30 minutes on most days of the week. · Do not smoke. Smoking can increase your risk for health problems. If you need help quitting, talk to your doctor about stop-smoking programs and medicines. These can increase your chances of quitting for good. · Limit alcohol to 2 drinks a day for men and 1 drink a day for women. Too much alcohol can cause health problems. If you have a BMI higher than 25  · Your doctor may do other tests to check your risk for weight-related health problems. This may include measuring the distance around your waist. A waist measurement of more than 40 inches in men or 35 inches in women can increase the risk of weight-related health problems. · Talk with your doctor about steps you can take to stay healthy or improve your health. You may need to make lifestyle changes to lose weight and stay healthy, such as changing your diet and getting regular exercise. If you have a BMI lower than 18.5  · Your doctor may do other tests to check your risk for health problems. · Talk with your doctor about steps you can take to stay healthy or improve your health. You may need to make lifestyle changes to gain or maintain weight and stay healthy, such as getting more healthy foods in your diet and doing exercises to build muscle. Where can you learn more? Go to https://rick.healthPicanovapartners. org and sign in to your [employer] account. Enter S176 in the [facility] box to learn more about \"Body Mass Index: Care Instructions. \"     If you do not have an account, please click on the \"Sign Up Now\" link. Current as of: March 17, 2021               Content Version: 13.0  © 3880-8292 Healthwise, Incorporated. Care instructions adapted under license by [facility] ([facility]).  If you have questions about a medical condition or this instruction, always ask your healthcare professional. [place] [address] any warranty or liability for your use of this information. Patient Education        Breast Self-Exam: Care Instructions  Your Care Instructions     A breast self-exam is when you check your breasts for lumps or changes. This regular exam helps you learn how your breasts normally look and feel. Most breast problems or changes are not because of cancer. Breast self-exam is not a substitute for a mammogram. Having regular breast exams by your doctor and regular mammograms improve your chances of finding any problems with your breasts. Some women set a time each month to do a step-by-step breast self-exam. Other women like a less formal system. They might look at their breasts as they brush their teeth, or feel their breasts once in a while in the shower. If you notice a change in your breast, tell your doctor. Follow-up care is a key part of your treatment and safety. Be sure to make and go to all appointments, and call your doctor if you are having problems. It's also a good idea to know your test results and keep a list of the medicines you take. How do you do a breast self-exam?  · The best time to examine your breasts is usually one week after your menstrual period begins. Your breasts should not be tender then. If you do not have periods, you might do your exam on a day of the month that is easy to remember. · To examine your breasts:  ? Remove all your clothes above the waist and lie down. When you are lying down, your breast tissue spreads evenly over your chest wall, which makes it easier to feel all your breast tissue. ? Use the pads--not the fingertips--of the 3 middle fingers of your left hand to check your right breast. Move your fingers slowly in small coin-sized circles that overlap. ? Use three levels of pressure to feel of all your breast tissue.  Use light pressure to feel the tissue close to

## 2021-10-15 NOTE — PROGRESS NOTES
[facility]  CNM Office Note    Desiree Berumen is a 54 y.o. female who presents today for her medical conditions/ complaints as noted below. Chief Complaint   Patient presents with    New Patient    Gynecologic Exam         HPI  Pt presents today to establish care and for pap smear and breast exam.  She would like STD checked. Thinks she maybe has a yeast infection but did take a diflucan yesterday and has second pill to take. Also some burning and odor as well. Hx of Hyst due to endometriosis, fibroids and ovarian cyst.     Last mammogram:  4/2021  Last pap smear:  6/2013  Contraception:    hyst complete   Last bone density:  never  Last colonoscopy:   4/2017        Patient Active Problem List   Diagnosis    Malaise and fatigue    Insomnia    RLS (restless legs syndrome)    Foot pain    DDD (degenerative disc disease), lumbar    Back pain with left-sided radiculopathy    Chronic bilateral lower abdominal pain    BRBPR (bright red blood per rectum)    Family history of polyps in the colon    Family history of esophageal cancer    Cervical vertebral fusion    Generalized abdominal pain    Drug-induced constipation    Chronic left-sided low back pain without sciatica    Lumbar radiculopathy    Moderate episode of recurrent major depressive disorder (HCC)    Myofascial muscle pain    History of lumbar spinal fusion    High risk medications (not anticoagulants) long-term use    Carpal tunnel syndrome on both sides    Status post hip surgery    Encounter for medication adjustment    History of hip surgery    Pain management contract agreement    Chronic pain of both knees    Fall (on) (from) other stairs and steps, sequela    Hereditary hemochromatosis ([address]    Polycythemia    Essential hypertension    Morbid obesity ([address]    Muscle spasms of neck    Spasm of muscle of lower back       No LMP recorded. Patient has had a hysterectomy.   [id number]    Past Medical History:   Diagnosis Date    Anemia     has had iron infusion    Anxiety     Arthritis     Back pain with left-sided radiculopathy 3/14/2016    DDD (degenerative disc disease), lumbar     Depression     Esophagitis     Fibromyalgia     Gastritis     Headache(784.0)     History of blood transfusion     Hypertension     Obesity     RLS (restless legs syndrome)     Sleep apnea     uses CPAP machine     Past Surgical History:   Procedure Laterality Date    ANKLE SURGERY Right     APPENDECTOMY  4/19/15    BACK SURGERY  2000    CARPAL TUNNEL RELEASE Left     CERVICAL SPINE SURGERY N/A 9/1/15    CHOLECYSTECTOMY  1995    HIP SURGERY Right 1987    HIP SURGERY  03/28/2018    HYSTERECTOMY      partial 2008, still has ovaries and cervix    INSERTION / REMOVAL / REPLACEMENT VENOUS ACCESS CATHETER Left 11/7/2017    PORT INSERTION performed by Aline Puente MD at [address] [place] [address] LITHOTRIPSY  [address] [place] [address]      with hardware placed    [place] COLONOSCOPY FLX DX W/COLLJ SPEC WHEN PFRMD N/A 5/2/2017    Dr Arvin Falk, 7 yr (age 47) recall    [place] EGD TRANSORAL BIOPSY SINGLE/MULTIPLE N/A 5/2/2017    Dr ABHIJIT Baltazar-Gastritis/gastropathy    SALPINGO-OOPHORECTOMY      STOMACH SURGERY  12/20/2017    dr Urmila Kapadia Right 2019     Family History   Problem Relation Age of Onset    Diabetes Mother     High Blood Pressure Mother     Colon Polyps Mother     Heart Disease Mother     Cancer Mother     Depression Mother     Cancer Father     High Blood Pressure Father     Heart Disease Father     Stroke Father     High Blood Pressure Sister     Depression Sister     Anxiety Disorder Sister     Cancer Brother     Esophageal Cancer Brother     Anxiety Disorder Brother     Diabetes Brother     ADHD Brother     Depression Brother     Other Other         has history of suicide in family maternal great uncle     Social History     Tobacco Use    Smoking status: Former Smoker     Packs/day: 1.00     Years: 25.00     Pack years: 25.00     Types: Cigarettes     Quit date: 2013     Years since quittin.1    Smokeless tobacco: Never Used   Substance Use Topics    Alcohol use: Yes     Comment: rarely       Current Outpatient Medications   Medication Sig Dispense Refill    carbidopa-levodopa (SINEMET)  MG per tablet Take 1 tablet by mouth nightly 90 tablet 3    oxyCODONE (ROXICODONE) 5 MG immediate release tablet Take 1 tablet by mouth 3 times daily as needed for Pain for up to 30 days. 90 tablet 0    cyclobenzaprine (FLEXERIL) 10 MG tablet Take 1 tablet by mouth 3 times daily as needed for Muscle spasms 270 tablet 0    predniSONE (DELTASONE) 10 MG tablet Days 1,2,3 = 60 mg (6 pills), Days 4,5 = 50 mg, Days 6,7 = 40 mg, Day 8 = 30 mg, Day 9 = 20 mg, Day 10 = 10 mg, Day 11,12 = 5 mg (1/2 tab) 43 tablet 0    Elastic Bandages & Supports (LUMBAR BACK BRACE/SUPPORT PAD) MISC Wear as needed with activity. 1 each 0    triamcinolone (KENALOG) 0.1 % cream Apply topically 2 times daily.  80 g 5    ondansetron (ZOFRAN-ODT) 8 MG TBDP disintegrating tablet Take 1 tablet by mouth every 8 hours as needed for Nausea or Vomiting 15 tablet 0    omeprazole (PRILOSEC) 20 MG delayed release capsule Take 1 capsule by mouth Daily 90 capsule 3    venlafaxine (EFFEXOR XR) 150 MG extended release capsule Take 1 capsule by mouth daily 90 capsule 3    amLODIPine (NORVASC) 5 MG tablet Take 1 tablet by mouth daily 90 tablet 3    acyclovir (ZOVIRAX) 800 MG tablet TAKE 1 TABLET BY MOUTH DAILY 90 tablet 3    metoprolol succinate (TOPROL XL) 50 MG extended release tablet TAKE 1 TABLET BY MOUTH TWICE DAILY 180 tablet 3    venlafaxine (EFFEXOR XR) 75 MG extended release capsule TAKE 1 CAPSULE BY MOUTH DAILY IN ADDITION  MG TO MAKE 225 MG DAILY 30 capsule 11    pregabalin (LYRICA) 100 MG capsule Take 1 capsule by mouth 2 times daily for 180 days. 60 capsule 5    deferasirox (JADENU) 360 MG tablet Take 5 tablets by mouth every morning (before breakfast)      albuterol sulfate HFA (PROVENTIL HFA) 108 (90 Base) MCG/ACT inhaler Inhale 2 puffs into the lungs every 6 hours as needed for Wheezing 3 Inhaler 3    metFORMIN (GLUCOPHAGE) 1000 MG tablet Take 1 tablet by mouth 2 times daily (with meals) 180 tablet 3    levocetirizine (XYZAL) 5 MG tablet Take 1 tablet by mouth nightly 90 tablet 3    nortriptyline (PAMELOR) 25 MG capsule TAKE 1 TO 2 CAPSULES BY MOUTH EVERY NIGHT 180 capsule 3    olopatadine (PATADAY) 0.2 % SOLN ophthalmic solution Place 1 drop into both eyes daily 1 Bottle 2    naloxone (NARCAN) 4 MG/0.1ML LIQD nasal spray 1 spray by Nasal route as needed for Opioid Reversal 2 each 0    Calcium-Vitamin D 600-200 MG-UNIT TABS Take 1 tablet by mouth daily      Multiple Vitamins-Minerals (MULTIVITAMIN & MINERAL PO) Take  by mouth.  CRANBERRY Take 500 mg by mouth daily.  Cholecalciferol (VITAMIN D3) 5000 UNITS TABS Take 5,000 Units by mouth daily        No current facility-administered medications for this visit. Allergies   Allergen Reactions    Silver Other (See Comments)     Redness, blisters    Tegaderm Ag Mesh 2\"X2\" [Wound Dressings] Other (See Comments)     blister    Zithromax [Azithromycin]      Severe abdominal pain      Vitals:    10/15/21 1419   BP: 128/82   Pulse: 70     Body mass index is 49.07 kg/m². Review of Systems   Constitutional: Negative. HENT: Negative. Eyes: Negative. Respiratory: Negative. Cardiovascular: Negative. Gastrointestinal: Negative. Endocrine: Negative. Genitourinary: Positive for vaginal discharge and vaginal pain. Negative for dyspareunia, dysuria, frequency, pelvic pain, urgency and vaginal bleeding. Musculoskeletal: Negative. Skin: Negative. Allergic/Immunologic: Negative. Neurological: Negative. Hematological: Negative.     Psychiatric/Behavioral: Negative. Physical Exam  Vitals and nursing note reviewed. Constitutional:       Appearance: She is well-developed. HENT:      Head: Normocephalic and atraumatic. Eyes:      Conjunctiva/sclera: Conjunctivae normal.      Pupils: Pupils are equal, round, and reactive to light. Cardiovascular:      Rate and Rhythm: Normal rate and regular rhythm. Heart sounds: Normal heart sounds. Pulmonary:      Effort: Pulmonary effort is normal.   Chest:      Comments: Breasts symmetrical without tenderness, masses, or nipple discharge. Nipples everted bilaterally. Abdominal:      Palpations: Abdomen is soft. Genitourinary:     Vagina: Vaginal discharge, erythema and tenderness present. Comments: Uterus: surgically absent  Cervix: surgically absent  Adnexa: surgically absent  Musculoskeletal:      Cervical back: Normal range of motion and neck supple. Skin:     General: Skin is warm and dry. Neurological:      Mental Status: She is alert and oriented to person, place, and time. Diagnosis Orders   1. Women's annual routine gynecological examination     2. Encounter to establish care     3. Encounter for screening mammogram for malignant neoplasm of breast     4. Screening examination for STD (sexually transmitted disease)     5. Acute vaginitis         MEDICATIONS:  No orders of the defined types were placed in this encounter. ORDERS:  No orders of the defined types were placed in this encounter. PLAN:  1. WWE- pap not indicated, SBE reviewed and CBE performed. Annual labs with PCP. Mammogram earlier this year. 2. Vaginitis- propath collected.

## 2021-10-15 NOTE — PROGRESS NOTES
Physical Therapy  Daily Treatment Note  Date: 10/15/2021  Patient Name: Margarito Aponte  MRN: [id number]     :   1975    Subjective:   General  Additional Pertinent Hx: pain increased with frequent falls last year  Response To Previous Treatment: Patient with no complaints from previous session. Referring Practitioner: ASHLEE Saldaña  PT Visit Information  Onset Date: 21  PT Insurance Information: [employer]  Total # of Visits Approved: 13  Total # of Visits to Date: 13  Plan of Care/Certification Expiration Date: 10/19/21  Progress Note Due Date: 10/21/21  Progress Note Counter: [place] thru 10/19 for 1 eval + 12 visits; [facility] auth waived due to covid  Subjective  Subjective: I should be returning for therapy for pelvic problems. Pain Screening  Patient Currently in Pain: Yes  Pain Assessment  Pain Assessment: 0-10  Pain Level: 4  Pain Type: Chronic pain  Pain Location: Back;Neck; Shoulder  Pain Orientation: Lower  Pain Descriptors: Dull  Pain Frequency: Continuous  Pain Onset: On-going  Clinical Progression: Gradually improving         Treatment Activities:       Strength RLE  Comment: c/o lower back pain with all resisted movement  R Hip Flexion:  (5-/5)  R Hip ABduction: 5/5  R Hip ADduction: 5/5  R Knee Flexion: 5/5  R Knee Extension: 5/5  R Ankle Dorsiflexion: 5/5  R Ankle Plantar flexion: 5/5  Strength LLE  Comment: c/o lower back pain with all resisted movement  L Hip Flexion:  (5-/5)  L Hip ABduction: 5/5  L Hip ADduction: 5/5  L Knee Flexion: 5/5  L Knee Extension: 5/5 (limited by c/o chronic knee pain with resisted movement)  L Ankle Dorsiflexion: 5/5  L Ankle Plantar Flexion: 5/5  Strength Other  Other: good pelvic tilt  Exercises  Exercise 1: supine lower trunk rotation x 5 for 5\" ea               *   HEP Issued  Exercise 2: supine quadratus stretch 10 seconds x 4 each way  Exercise 3: supine piriformis stretch 10 seconds x 4 each (figure 4) with manual assist  Exercise 4: B SKTC 10 seconds x 4 each with manual assist  Exercise 5: B hamstring stretch 10 seconds x 4--manual  Exercise 6: pelvic tilts on a towel  3 second hold x 10  Exercise 7: abdominal series (alone 10 sec hold x10, Mika 10, UE x 10, UE/LE x 10)  Exercise 8: bridging with TA contractions x 10  Exercise 9: Seated forward bend stretch neutral and to R to tolerance   x  3 with 10 second each  Exercise 10: repeated sit to stand with TA contraction- x10 *on mat table for elevated surface  Exercise 11: HS curls (green) x 10  Exercise 12: Multifidus row/ Paloff press (green) x 10  Exercise 13: Stand hip abd/ext x10  Exercise 14: Standing marching x10  Exercise 15: Mini squats x 10  Exercise 16: Rolling ball up wall thoracic stretch  5 x 5\"  Exercise 17: Calf stretch on incline bilaterallyx 4 with 10 second hold--not today  Exercise 18: Heat or biofreeze to mid back with increase pain   - BF today     Assessment:   Conditions Requiring Skilled Therapeutic Intervention  Body structures, Functions, Activity limitations: Decreased functional mobility ; Decreased ROM; Decreased strength;Decreased endurance;Decreased posture; Increased pain  Assessment: Today was the patient's last session. She reported that she was supposed to return for pelvic issues at a later date. She showed improvement in MMT scores today. Needed very little VC on correct technique of ex and was able to perform them with no pain. She reported that her pain had decreased from a 4/10 pre session to a 3/10 post session. She recieved biofreeze and stated that helped with her pain.   Treatment Diagnosis: back pain  REQUIRES PT FOLLOW UP: Yes  Discharge Recommendations: Continue to assess pending progress    Goals:  Short term goals  Time Frame for Short term goals: 3-4 weeks  Short term goal 1: Independent with HEP. not met (9/22 needs HEP  09/30  HEP issued   10/15: does some of the exercises dailybut not the full routine)  Short term goal 2: Pt will increase lower extremity strength to 4+/5 bilaterally. met (9/22 progress made bilaterally, decreased pain with motions as well   10/15: met, see strength section)  Short term goal 3: Pt will report improved radiating pain to BLE. - met (9/22 patient reports only occasional radiation at this time)  Short term goal 4: Pt will improve hip flexion to 60 degrees with knee extended. - met  Long term goals  Time Frame for Long term goals : 4-6 weeks  Long term goal 1: Improve Oswestry score to 25% impairement or less. not met (9/22 53% impaired today    10/15:  53.33% impairment)  Long term goal 2: Report less than or equal to 4/10 pain with household chores. not met (9/22    6/10 pain today    10/15:  6/10 or higher with household chores)  Long term goal 3: Pt will increase lumbar flexion to 30 degrees met (9/22 45 degrees flexion today)  Long term goal 4: Pt will increase lumbar extension to 10 degrees.    met (9/22 10 degrees extension today)  Patient Goals   Patient goals : Improve back pain with activity at home  Plan:    Plan  Times per week: 2  Plan weeks: 4-6  Current Treatment Recommendations: Strengthening, ROM, Functional Mobility Training, Endurance Training, Neuromuscular Re-education, Manual Therapy - Soft Tissue Mobilization, Manual Therapy - Joint Manipulation, Pain Management, Patient/Caregiver Education & Training, Home Exercise Program  Timed Code Treatment Minutes: 55 Minutes     Therapy Time   Individual Concurrent Group Co-treatment   Time In 1005         Time Out 1100         Minutes 55         Timed Code Treatment Minutes: 55 Minutes  Electronically signed by Dipak Frausto PTA on 10/15/2021 at 11:04 AM

## 2021-10-18 ENCOUNTER — TELEPHONE (OUTPATIENT)
Dept: PAIN MANAGEMENT | Age: 46
End: 2021-10-18

## 2021-10-18 ENCOUNTER — LAB (OUTPATIENT)
Dept: LAB | Facility: HOSPITAL | Age: 46
End: 2021-10-18

## 2021-10-18 ENCOUNTER — APPOINTMENT (OUTPATIENT)
Dept: PHYSICAL THERAPY | Age: 46
End: 2021-10-18
Payer: MEDICARE

## 2021-10-18 ENCOUNTER — OFFICE VISIT (OUTPATIENT)
Dept: ONCOLOGY | Facility: CLINIC | Age: 46
End: 2021-10-18

## 2021-10-18 VITALS
OXYGEN SATURATION: 97 % | RESPIRATION RATE: 16 BRPM | BODY MASS INDEX: 44.41 KG/M2 | DIASTOLIC BLOOD PRESSURE: 88 MMHG | TEMPERATURE: 96.3 F | SYSTOLIC BLOOD PRESSURE: 130 MMHG | WEIGHT: 293 LBS | HEART RATE: 116 BPM | HEIGHT: 68 IN

## 2021-10-18 DIAGNOSIS — E83.19 IRON OVERLOAD: Primary | ICD-10-CM

## 2021-10-18 DIAGNOSIS — E83.110 HEREDITARY HEMOCHROMATOSIS (HCC): Primary | ICD-10-CM

## 2021-10-18 DIAGNOSIS — I10 PRIMARY HYPERTENSION: ICD-10-CM

## 2021-10-18 LAB
ALBUMIN SERPL-MCNC: 4.2 G/DL (ref 3.5–5.2)
ALBUMIN/GLOB SERPL: 1.5 G/DL
ALP SERPL-CCNC: 128 U/L (ref 39–117)
ALT SERPL W P-5'-P-CCNC: 19 U/L (ref 1–33)
ANION GAP SERPL CALCULATED.3IONS-SCNC: 10 MMOL/L (ref 5–15)
AST SERPL-CCNC: 21 U/L (ref 1–32)
BASOPHILS # BLD AUTO: 0.07 10*3/MM3 (ref 0–0.2)
BASOPHILS NFR BLD AUTO: 0.9 % (ref 0–1.5)
BILIRUB SERPL-MCNC: 1.3 MG/DL (ref 0–1.2)
BUN SERPL-MCNC: 13 MG/DL (ref 6–20)
BUN/CREAT SERPL: 13 (ref 7–25)
CALCIUM SPEC-SCNC: 9.3 MG/DL (ref 8.6–10.5)
CHLORIDE SERPL-SCNC: 101 MMOL/L (ref 98–107)
CO2 SERPL-SCNC: 26 MMOL/L (ref 22–29)
CREAT SERPL-MCNC: 1 MG/DL (ref 0.57–1)
DEPRECATED RDW RBC AUTO: 55.9 FL (ref 37–54)
EOSINOPHIL # BLD AUTO: 0.16 10*3/MM3 (ref 0–0.4)
EOSINOPHIL NFR BLD AUTO: 2 % (ref 0.3–6.2)
ERYTHROCYTE [DISTWIDTH] IN BLOOD BY AUTOMATED COUNT: 17.4 % (ref 12.3–15.4)
FERRITIN SERPL-MCNC: 451.5 NG/ML (ref 13–150)
GFR SERPL CREATININE-BSD FRML MDRD: 60 ML/MIN/1.73
GLOBULIN UR ELPH-MCNC: 2.8 GM/DL
GLUCOSE SERPL-MCNC: 173 MG/DL (ref 65–99)
HCT VFR BLD AUTO: 34.8 % (ref 34–46.6)
HGB BLD-MCNC: 11.8 G/DL (ref 12–15.9)
HOLD SPECIMEN: NORMAL
IMM GRANULOCYTES # BLD AUTO: 0.06 10*3/MM3 (ref 0–0.05)
IMM GRANULOCYTES NFR BLD AUTO: 0.8 % (ref 0–0.5)
IRON 24H UR-MRATE: 126 MCG/DL (ref 37–145)
IRON SATN MFR SERPL: 42 % (ref 20–50)
LYMPHOCYTES # BLD AUTO: 2.08 10*3/MM3 (ref 0.7–3.1)
LYMPHOCYTES NFR BLD AUTO: 26.1 % (ref 19.6–45.3)
MCH RBC QN AUTO: 30.4 PG (ref 26.6–33)
MCHC RBC AUTO-ENTMCNC: 33.9 G/DL (ref 31.5–35.7)
MCV RBC AUTO: 89.7 FL (ref 79–97)
MONOCYTES # BLD AUTO: 0.42 10*3/MM3 (ref 0.1–0.9)
MONOCYTES NFR BLD AUTO: 5.3 % (ref 5–12)
NEUTROPHILS NFR BLD AUTO: 5.17 10*3/MM3 (ref 1.7–7)
NEUTROPHILS NFR BLD AUTO: 64.9 % (ref 42.7–76)
NRBC BLD AUTO-RTO: 0 /100 WBC (ref 0–0.2)
PLATELET # BLD AUTO: 277 10*3/MM3 (ref 140–450)
PMV BLD AUTO: 9.8 FL (ref 6–12)
POTASSIUM SERPL-SCNC: 3.5 MMOL/L (ref 3.5–5.2)
PROT SERPL-MCNC: 7 G/DL (ref 6–8.5)
RBC # BLD AUTO: 3.88 10*6/MM3 (ref 3.77–5.28)
SODIUM SERPL-SCNC: 137 MMOL/L (ref 136–145)
TIBC SERPL-MCNC: 299 MCG/DL (ref 298–536)
TRANSFERRIN SERPL-MCNC: 201 MG/DL (ref 200–360)
WBC # BLD AUTO: 7.96 10*3/MM3 (ref 3.4–10.8)

## 2021-10-18 PROCEDURE — 85025 COMPLETE CBC W/AUTO DIFF WBC: CPT

## 2021-10-18 PROCEDURE — 99213 OFFICE O/P EST LOW 20 MIN: CPT | Performed by: NURSE PRACTITIONER

## 2021-10-18 PROCEDURE — 83540 ASSAY OF IRON: CPT

## 2021-10-18 PROCEDURE — 84466 ASSAY OF TRANSFERRIN: CPT

## 2021-10-18 PROCEDURE — 80053 COMPREHEN METABOLIC PANEL: CPT

## 2021-10-18 PROCEDURE — 82728 ASSAY OF FERRITIN: CPT

## 2021-10-18 PROCEDURE — 36415 COLL VENOUS BLD VENIPUNCTURE: CPT

## 2021-10-18 NOTE — TELEPHONE ENCOUNTER
Patient called to request an order for pelvic floor therapy. She has finished her PT and would like Loida Garcia to know. I have messaged Loida Garcia about this, and called patient back.

## 2021-10-18 NOTE — PROGRESS NOTES
[facility] Outpatient Physical Therapy  Discharge Summary        Date:10/18/2021    Patient Name:Evelyn Umanzor    LXY:[id number]    :1975    Diagnosis:Diagnosis: back pain M54.5           Total # of Visits to Date: 15     D/C ASSESSMENT: Ms. Juana Cartagena attended 13 PT visits as approved by insurance. She displays improved LE strength and ROM, as well as improved lumbar ROM. Met 3 of 4 STG and 2 of 4 LTG and will be d/c from formal services at this time. GOALS:    Short term goals  Time Frame for Short term goals: 3-4 weeks  Short term goal 1: Independent with HEP. not met ( needs HEP    HEP issued   10/15: does some of the exercises dailybut not the full routine)  Short term goal 2: Pt will increase lower extremity strength to 4+/5 bilaterally. met ( progress made bilaterally, decreased pain with motions as well   10/15: met, see strength section)  Short term goal 3: Pt will report improved radiating pain to BLE. - met ( patient reports only occasional radiation at this time)  Short term goal 4: Pt will improve hip flexion to 60 degrees with knee extended. - met      Long term goals  Time Frame for Long term goals : 4-6 weeks  Long term goal 1: Improve Oswestry score to 25% impairement or less. not met ( 53% impaired today    10/15:  53.33% impairment)  Long term goal 2: Report less than or equal to 4/10 pain with household chores. not met (    6/10 pain today    10/15:  6/10 or higher with household chores)  Long term goal 3: Pt will increase lumbar flexion to 30 degrees met ( 45 degrees flexion today)  Long term goal 4: Pt will increase lumbar extension to 10 degrees.    met ( 10 degrees extension today)      PLAN:  D/c from formal services      Electronically signed by Kelly York PT on 10/18/2021 at 9:41 AM

## 2021-10-19 ENCOUNTER — TELEPHONE (OUTPATIENT)
Dept: PAIN MANAGEMENT | Age: 46
End: 2021-10-19

## 2021-10-19 ENCOUNTER — HOSPITAL ENCOUNTER (OUTPATIENT)
Dept: PAIN MANAGEMENT | Age: 46
Discharge: HOME | End: 2021-10-19
Payer: MEDICARE

## 2021-10-19 VITALS
DIASTOLIC BLOOD PRESSURE: 91 MMHG | HEART RATE: 80 BPM | TEMPERATURE: 96.8 F | OXYGEN SATURATION: 98 % | RESPIRATION RATE: 118 BRPM | SYSTOLIC BLOOD PRESSURE: 138 MMHG

## 2021-10-19 DIAGNOSIS — M79.7 FIBROMYALGIA: Primary | ICD-10-CM

## 2021-10-19 DIAGNOSIS — R52 PAIN MANAGEMENT: ICD-10-CM

## 2021-10-19 PROCEDURE — 62323 NJX INTERLAMINAR LMBR/SAC: CPT

## 2021-10-19 PROCEDURE — 6360000002 HC RX W HCPCS

## 2021-10-19 PROCEDURE — 3209999900 FLUORO FOR SURGICAL PROCEDURES

## 2021-10-19 PROCEDURE — 2500000003 HC RX 250 WO HCPCS

## 2021-10-19 PROCEDURE — 2580000003 HC RX 258

## 2021-10-19 RX ORDER — METHYLPREDNISOLONE ACETATE 80 MG/ML
80 INJECTION, SUSPENSION INTRA-ARTICULAR; INTRALESIONAL; INTRAMUSCULAR; SOFT TISSUE ONCE
Status: DISCONTINUED | OUTPATIENT
Start: 2021-10-19 | End: 2021-10-21 | Stop reason: HOSPADM

## 2021-10-19 RX ORDER — LIDOCAINE HYDROCHLORIDE 10 MG/ML
5 INJECTION, SOLUTION EPIDURAL; INFILTRATION; INTRACAUDAL; PERINEURAL ONCE
Status: DISCONTINUED | OUTPATIENT
Start: 2021-10-19 | End: 2021-10-21 | Stop reason: HOSPADM

## 2021-10-19 RX ORDER — SODIUM CHLORIDE 9 MG/ML
5 INJECTION INTRAVENOUS ONCE
Status: DISCONTINUED | OUTPATIENT
Start: 2021-10-19 | End: 2021-10-21 | Stop reason: HOSPADM

## 2021-10-19 ASSESSMENT — PAIN DESCRIPTION - ORIENTATION: ORIENTATION: LOWER

## 2021-10-19 ASSESSMENT — PAIN DESCRIPTION - FREQUENCY: FREQUENCY: CONTINUOUS

## 2021-10-19 ASSESSMENT — PAIN DESCRIPTION - PAIN TYPE: TYPE: CHRONIC PAIN

## 2021-10-19 ASSESSMENT — PAIN - FUNCTIONAL ASSESSMENT: PAIN_FUNCTIONAL_ASSESSMENT: 0-10

## 2021-10-19 ASSESSMENT — PAIN DESCRIPTION - DESCRIPTORS: DESCRIPTORS: CONSTANT

## 2021-10-19 ASSESSMENT — PAIN SCALES - GENERAL: PAINLEVEL_OUTOF10: 7

## 2021-10-19 ASSESSMENT — PAIN DESCRIPTION - LOCATION: LOCATION: BACK

## 2021-10-19 NOTE — INTERVAL H&P NOTE
Update History & Physical    The patient's History and Physical  was reviewed with the patient and I examined the patient. There was  NO CHANGE:[id number]}. The surgical site was confirmed by the patient and me. Plan: The risks, benefits, expected outcome, and alternative to the recommended procedure have been discussed with the patient. Patient understands and wants to proceed with the procedure.      Electronically signed by Silvia Morales MD on 10/19/2021 at 11:17 AM

## 2021-10-20 ENCOUNTER — TELEPHONE (OUTPATIENT)
Dept: PAIN MANAGEMENT | Age: 46
End: 2021-10-20

## 2021-10-20 ENCOUNTER — APPOINTMENT (OUTPATIENT)
Dept: PHYSICAL THERAPY | Age: 46
End: 2021-10-20
Payer: MEDICARE

## 2021-10-20 DIAGNOSIS — R32 URINARY INCONTINENCE, UNSPECIFIED TYPE: Primary | ICD-10-CM

## 2021-10-25 ENCOUNTER — TELEPHONE (OUTPATIENT)
Dept: PAIN MANAGEMENT | Age: 46
End: 2021-10-25

## 2021-10-25 DIAGNOSIS — M54.10 BACK PAIN WITH LEFT-SIDED RADICULOPATHY: ICD-10-CM

## 2021-10-27 ENCOUNTER — APPOINTMENT (OUTPATIENT)
Dept: PHYSICAL THERAPY | Age: 46
End: 2021-10-27
Payer: MEDICARE

## 2021-10-29 ENCOUNTER — APPOINTMENT (OUTPATIENT)
Dept: PHYSICAL THERAPY | Age: 46
End: 2021-10-29
Payer: MEDICARE

## 2021-11-04 DIAGNOSIS — M54.16 CHRONIC LUMBAR RADICULOPATHY: ICD-10-CM

## 2021-11-04 ASSESSMENT — ENCOUNTER SYMPTOMS
ALLERGIC/IMMUNOLOGIC NEGATIVE: 1
EYES NEGATIVE: 1
GASTROINTESTINAL NEGATIVE: 1
RESPIRATORY NEGATIVE: 1

## 2021-11-09 RX ORDER — OXYCODONE HYDROCHLORIDE 5 MG/1
5 TABLET ORAL 3 TIMES DAILY PRN
Qty: 90 TABLET | Refills: 0 | Status: SHIPPED | OUTPATIENT
Start: 2021-11-09 | End: 2021-12-06 | Stop reason: SDUPTHER

## 2021-11-16 ENCOUNTER — LAB (OUTPATIENT)
Dept: LAB | Facility: HOSPITAL | Age: 46
End: 2021-11-16

## 2021-11-16 ENCOUNTER — OFFICE VISIT (OUTPATIENT)
Dept: ONCOLOGY | Facility: CLINIC | Age: 46
End: 2021-11-16

## 2021-11-16 VITALS
HEIGHT: 68 IN | RESPIRATION RATE: 16 BRPM | TEMPERATURE: 96.8 F | HEART RATE: 112 BPM | OXYGEN SATURATION: 98 % | BODY MASS INDEX: 44.41 KG/M2 | WEIGHT: 293 LBS | SYSTOLIC BLOOD PRESSURE: 136 MMHG | DIASTOLIC BLOOD PRESSURE: 82 MMHG

## 2021-11-16 DIAGNOSIS — E83.19 IRON OVERLOAD: Primary | ICD-10-CM

## 2021-11-16 DIAGNOSIS — R74.8 ELEVATED ALKALINE PHOSPHATASE LEVEL: ICD-10-CM

## 2021-11-16 DIAGNOSIS — D64.9 ANEMIA, UNSPECIFIED TYPE: ICD-10-CM

## 2021-11-16 DIAGNOSIS — E83.110 HEREDITARY HEMOCHROMATOSIS (HCC): ICD-10-CM

## 2021-11-16 LAB
ALBUMIN SERPL-MCNC: 4.3 G/DL (ref 3.5–5.2)
ALBUMIN/GLOB SERPL: 1.5 G/DL
ALP SERPL-CCNC: 131 U/L (ref 39–117)
ALT SERPL W P-5'-P-CCNC: 24 U/L (ref 1–33)
ANION GAP SERPL CALCULATED.3IONS-SCNC: 13 MMOL/L (ref 5–15)
AST SERPL-CCNC: 27 U/L (ref 1–32)
BASOPHILS # BLD AUTO: 0.07 10*3/MM3 (ref 0–0.2)
BASOPHILS NFR BLD AUTO: 0.8 % (ref 0–1.5)
BILIRUB SERPL-MCNC: 0.6 MG/DL (ref 0–1.2)
BUN SERPL-MCNC: 10 MG/DL (ref 6–20)
BUN/CREAT SERPL: 13.5 (ref 7–25)
CALCIUM SPEC-SCNC: 9.2 MG/DL (ref 8.6–10.5)
CHLORIDE SERPL-SCNC: 102 MMOL/L (ref 98–107)
CO2 SERPL-SCNC: 26 MMOL/L (ref 22–29)
CREAT SERPL-MCNC: 0.74 MG/DL (ref 0.57–1)
CRP SERPL-MCNC: 2.3 MG/DL (ref 0–0.5)
DEPRECATED RDW RBC AUTO: 54.4 FL (ref 37–54)
EOSINOPHIL # BLD AUTO: 0.28 10*3/MM3 (ref 0–0.4)
EOSINOPHIL NFR BLD AUTO: 3.1 % (ref 0.3–6.2)
ERYTHROCYTE [DISTWIDTH] IN BLOOD BY AUTOMATED COUNT: 16.1 % (ref 12.3–15.4)
ERYTHROCYTE [SEDIMENTATION RATE] IN BLOOD: 6 MM/HR (ref 0–20)
FERRITIN SERPL-MCNC: 463.1 NG/ML (ref 13–150)
GFR SERPL CREATININE-BSD FRML MDRD: 84 ML/MIN/1.73
GGT SERPL-CCNC: 34 U/L (ref 5–36)
GLOBULIN UR ELPH-MCNC: 2.9 GM/DL
GLUCOSE SERPL-MCNC: 142 MG/DL (ref 65–99)
HCT VFR BLD AUTO: 35.2 % (ref 34–46.6)
HGB BLD-MCNC: 12.1 G/DL (ref 12–15.9)
HOLD SPECIMEN: NORMAL
IMM GRANULOCYTES # BLD AUTO: 0.08 10*3/MM3 (ref 0–0.05)
IMM GRANULOCYTES NFR BLD AUTO: 0.9 % (ref 0–0.5)
IRON 24H UR-MRATE: 85 MCG/DL (ref 37–145)
IRON SATN MFR SERPL: 28 % (ref 20–50)
LYMPHOCYTES # BLD AUTO: 2.29 10*3/MM3 (ref 0.7–3.1)
LYMPHOCYTES NFR BLD AUTO: 25.3 % (ref 19.6–45.3)
MCH RBC QN AUTO: 32 PG (ref 26.6–33)
MCHC RBC AUTO-ENTMCNC: 34.4 G/DL (ref 31.5–35.7)
MCV RBC AUTO: 93.1 FL (ref 79–97)
MONOCYTES # BLD AUTO: 0.46 10*3/MM3 (ref 0.1–0.9)
MONOCYTES NFR BLD AUTO: 5.1 % (ref 5–12)
NEUTROPHILS NFR BLD AUTO: 5.88 10*3/MM3 (ref 1.7–7)
NEUTROPHILS NFR BLD AUTO: 64.8 % (ref 42.7–76)
NRBC BLD AUTO-RTO: 0 /100 WBC (ref 0–0.2)
PLATELET # BLD AUTO: 283 10*3/MM3 (ref 140–450)
PMV BLD AUTO: 10 FL (ref 6–12)
POTASSIUM SERPL-SCNC: 3.7 MMOL/L (ref 3.5–5.2)
PROT SERPL-MCNC: 7.2 G/DL (ref 6–8.5)
RBC # BLD AUTO: 3.78 10*6/MM3 (ref 3.77–5.28)
SODIUM SERPL-SCNC: 141 MMOL/L (ref 136–145)
TIBC SERPL-MCNC: 307 MCG/DL (ref 298–536)
TRANSFERRIN SERPL-MCNC: 206 MG/DL (ref 200–360)
WBC # BLD AUTO: 9.06 10*3/MM3 (ref 3.4–10.8)

## 2021-11-16 PROCEDURE — 82728 ASSAY OF FERRITIN: CPT

## 2021-11-16 PROCEDURE — 84466 ASSAY OF TRANSFERRIN: CPT

## 2021-11-16 PROCEDURE — 80053 COMPREHEN METABOLIC PANEL: CPT

## 2021-11-16 PROCEDURE — 83540 ASSAY OF IRON: CPT

## 2021-11-16 PROCEDURE — 85652 RBC SED RATE AUTOMATED: CPT

## 2021-11-16 PROCEDURE — 86140 C-REACTIVE PROTEIN: CPT

## 2021-11-16 PROCEDURE — 82977 ASSAY OF GGT: CPT

## 2021-11-16 PROCEDURE — 85025 COMPLETE CBC W/AUTO DIFF WBC: CPT

## 2021-11-16 PROCEDURE — 99213 OFFICE O/P EST LOW 20 MIN: CPT | Performed by: INTERNAL MEDICINE

## 2021-11-16 PROCEDURE — 36415 COLL VENOUS BLD VENIPUNCTURE: CPT

## 2021-11-17 ENCOUNTER — TELEPHONE (OUTPATIENT)
Dept: ONCOLOGY | Facility: CLINIC | Age: 46
End: 2021-11-17

## 2021-11-17 NOTE — TELEPHONE ENCOUNTER
Requested from my MA to schedule the patient for every 2 weeks CBC with phlebotomy of 250 cc if the hemoglobin is greater than 11

## 2021-11-17 NOTE — TELEPHONE ENCOUNTER
NOTIFIED PT THAT SHE WOULD BE CALLED IN THE NEXT FEW DAYS TO GET BONE SCAN SET UP DUE TO ELEVATED ALK PHOS. SHE V/U    ----- Message from Amit Goldberg, MD sent at 11/17/2021  8:12 AM [facility] -----  Can you call her and tell her that I’m ordering a bone scan for the elevated alkaline phosphate that appears to be bone source.  Most likely benign but just want to make sure we don’t find anything

## 2021-11-22 ENCOUNTER — HOSPITAL ENCOUNTER (OUTPATIENT)
Dept: MRI IMAGING | Age: 46
Discharge: HOME | End: 2021-11-22
Payer: MEDICARE

## 2021-11-22 DIAGNOSIS — G89.29 CHRONIC BILATERAL LOW BACK PAIN WITH BILATERAL SCIATICA: ICD-10-CM

## 2021-11-22 DIAGNOSIS — M54.42 CHRONIC BILATERAL LOW BACK PAIN WITH BILATERAL SCIATICA: ICD-10-CM

## 2021-11-22 DIAGNOSIS — M54.41 CHRONIC BILATERAL LOW BACK PAIN WITH BILATERAL SCIATICA: ICD-10-CM

## 2021-11-22 PROCEDURE — 6360000004 HC RX CONTRAST MEDICATION: Performed by: PEDIATRICS

## 2021-11-22 PROCEDURE — 72158 MRI LUMBAR SPINE W/O & W/DYE: CPT

## 2021-11-22 PROCEDURE — A9577 INJ MULTIHANCE: HCPCS | Performed by: PEDIATRICS

## 2021-11-22 RX ADMIN — GADOBENATE DIMEGLUMINE 20 ML: 529 INJECTION, SOLUTION INTRAVENOUS at 10:01

## 2021-11-29 ENCOUNTER — PATIENT MESSAGE (OUTPATIENT)
Dept: PRIMARY CARE CLINIC | Age: 46
End: 2021-11-29

## 2021-11-29 ENCOUNTER — HOSPITAL ENCOUNTER (OUTPATIENT)
Dept: NUCLEAR MEDICINE | Facility: HOSPITAL | Age: 46
Discharge: HOME | End: 2021-11-29

## 2021-11-29 DIAGNOSIS — R74.8 ELEVATED ALKALINE PHOSPHATASE LEVEL: ICD-10-CM

## 2021-11-29 PROCEDURE — 78306 BONE IMAGING WHOLE BODY: CPT

## 2021-11-29 PROCEDURE — A9561 TC99M OXIDRONATE: HCPCS | Performed by: INTERNAL MEDICINE

## 2021-11-29 PROCEDURE — 0 TECHNETIUM OXIDRONATE KIT: Performed by: INTERNAL MEDICINE

## 2021-11-29 RX ADMIN — TECHNETIUM TC 99M OXIDRONATE 1 DOSE: 3.15 INJECTION, POWDER, LYOPHILIZED, FOR SOLUTION INTRAVENOUS at 11:10

## 2021-11-29 NOTE — TELEPHONE ENCOUNTER
From: Kaylee December  To: Dr. Ronnie Sylvester: 11/29/2021 11:55 AM CST  Subject: Update Covid Vaccine     I've had both of my Moderna shots, but My Chart says I haven't been vaccinated. I also had my flu shot at [address] a couple of weeks after my second Covid shot.

## 2021-11-30 ENCOUNTER — INFUSION (OUTPATIENT)
Dept: ONCOLOGY | Facility: HOSPITAL | Age: 46
End: 2021-11-30

## 2021-11-30 ENCOUNTER — CLINICAL SUPPORT (OUTPATIENT)
Dept: ONCOLOGY | Facility: CLINIC | Age: 46
End: 2021-11-30

## 2021-11-30 ENCOUNTER — LAB (OUTPATIENT)
Dept: LAB | Facility: HOSPITAL | Age: 46
End: 2021-11-30

## 2021-11-30 VITALS
OXYGEN SATURATION: 99 % | DIASTOLIC BLOOD PRESSURE: 79 MMHG | TEMPERATURE: 98.4 F | WEIGHT: 293 LBS | BODY MASS INDEX: 45.99 KG/M2 | HEIGHT: 67 IN | HEART RATE: 90 BPM | SYSTOLIC BLOOD PRESSURE: 152 MMHG | RESPIRATION RATE: 20 BRPM

## 2021-11-30 VITALS
RESPIRATION RATE: 16 BRPM | SYSTOLIC BLOOD PRESSURE: 160 MMHG | HEART RATE: 92 BPM | DIASTOLIC BLOOD PRESSURE: 90 MMHG | OXYGEN SATURATION: 97 %

## 2021-11-30 DIAGNOSIS — E83.19 IRON OVERLOAD: Primary | ICD-10-CM

## 2021-11-30 DIAGNOSIS — E83.110 HEREDITARY HEMOCHROMATOSIS (HCC): ICD-10-CM

## 2021-11-30 LAB
DEPRECATED RDW RBC AUTO: 53.6 FL (ref 37–54)
ERYTHROCYTE [DISTWIDTH] IN BLOOD BY AUTOMATED COUNT: 16 % (ref 12.3–15.4)
HCT VFR BLD AUTO: 34.9 % (ref 34–46.6)
HGB BLD-MCNC: 11.6 G/DL (ref 12–15.9)
HOLD SPECIMEN: NORMAL
MCH RBC QN AUTO: 30.9 PG (ref 26.6–33)
MCHC RBC AUTO-ENTMCNC: 33.2 G/DL (ref 31.5–35.7)
MCV RBC AUTO: 92.8 FL (ref 79–97)
PLATELET # BLD AUTO: 265 10*3/MM3 (ref 140–450)
PMV BLD AUTO: 9.7 FL (ref 6–12)
RBC # BLD AUTO: 3.76 10*6/MM3 (ref 3.77–5.28)
WBC NRBC COR # BLD: 7.77 10*3/MM3 (ref 3.4–10.8)

## 2021-11-30 PROCEDURE — 85027 COMPLETE CBC AUTOMATED: CPT

## 2021-11-30 PROCEDURE — 36415 COLL VENOUS BLD VENIPUNCTURE: CPT

## 2021-11-30 PROCEDURE — 99195 PHLEBOTOMY: CPT | Performed by: INTERNAL MEDICINE

## 2021-11-30 PROCEDURE — G0463 HOSPITAL OUTPT CLINIC VISIT: HCPCS

## 2021-11-30 NOTE — PROGRESS NOTES
1435 called the office and spoke with MARGUERITE Segovia regarding lab results and the note from dr goldberg stating this pt probably needs a phlebotomy.   1445 return call from Juliette stating to send pt on upstairs to the office for this today. Pt voices understanding  No injection required today.

## 2021-12-01 ENCOUNTER — PATIENT MESSAGE (OUTPATIENT)
Dept: PRIMARY CARE CLINIC | Age: 46
End: 2021-12-01

## 2021-12-01 NOTE — TELEPHONE ENCOUNTER
From: Ricardo Ritter  To: Dr. Cruz Lassiter: 12/1/2021 9:30 AM CST  Subject: Omeprazole    Good morning, I was wanting to ask if we can increase my dosage of Omeprazole? I have been having occasional heartburn and reflux.

## 2021-12-02 RX ORDER — OMEPRAZOLE 40 MG/1
40 CAPSULE, DELAYED RELEASE ORAL
Qty: 30 CAPSULE | Refills: 11 | Status: SHIPPED | OUTPATIENT
Start: 2021-12-02

## 2021-12-02 NOTE — TELEPHONE ENCOUNTER
Omeprazole 40 mg daily first thing in the morning on an empty stomach.   Dispense #30 with 11 refills

## 2021-12-06 DIAGNOSIS — M54.16 CHRONIC LUMBAR RADICULOPATHY: ICD-10-CM

## 2021-12-07 RX ORDER — OXYCODONE HYDROCHLORIDE 5 MG/1
5 TABLET ORAL 3 TIMES DAILY PRN
Qty: 90 TABLET | Refills: 0 | Status: SHIPPED | OUTPATIENT
Start: 2021-12-09 | End: 2021-12-27 | Stop reason: SDUPTHER

## 2021-12-14 ENCOUNTER — LAB (OUTPATIENT)
Dept: LAB | Facility: HOSPITAL | Age: 46
End: 2021-12-14

## 2021-12-14 ENCOUNTER — TELEPHONE (OUTPATIENT)
Dept: ONCOLOGY | Facility: CLINIC | Age: 46
End: 2021-12-14

## 2021-12-14 ENCOUNTER — CLINICAL SUPPORT (OUTPATIENT)
Dept: ONCOLOGY | Facility: CLINIC | Age: 46
End: 2021-12-14

## 2021-12-14 ENCOUNTER — APPOINTMENT (OUTPATIENT)
Dept: ONCOLOGY | Facility: HOSPITAL | Age: 46
End: 2021-12-14

## 2021-12-14 VITALS
OXYGEN SATURATION: 98 % | DIASTOLIC BLOOD PRESSURE: 94 MMHG | RESPIRATION RATE: 16 BRPM | HEART RATE: 109 BPM | SYSTOLIC BLOOD PRESSURE: 170 MMHG

## 2021-12-14 DIAGNOSIS — E83.110 HEREDITARY HEMOCHROMATOSIS (HCC): ICD-10-CM

## 2021-12-14 DIAGNOSIS — E83.19 IRON OVERLOAD: Primary | ICD-10-CM

## 2021-12-14 DIAGNOSIS — M54.16 LUMBAR RADICULOPATHY: ICD-10-CM

## 2021-12-14 LAB
DEPRECATED RDW RBC AUTO: 53.3 FL (ref 37–54)
ERYTHROCYTE [DISTWIDTH] IN BLOOD BY AUTOMATED COUNT: 15.5 % (ref 12.3–15.4)
HCT VFR BLD AUTO: 34.1 % (ref 34–46.6)
HGB BLD-MCNC: 11.3 G/DL (ref 12–15.9)
HOLD SPECIMEN: NORMAL
MCH RBC QN AUTO: 30.9 PG (ref 26.6–33)
MCHC RBC AUTO-ENTMCNC: 33.1 G/DL (ref 31.5–35.7)
MCV RBC AUTO: 93.2 FL (ref 79–97)
PLATELET # BLD AUTO: 261 10*3/MM3 (ref 140–450)
PMV BLD AUTO: 9.9 FL (ref 6–12)
RBC # BLD AUTO: 3.66 10*6/MM3 (ref 3.77–5.28)
WBC NRBC COR # BLD: 7.64 10*3/MM3 (ref 3.4–10.8)

## 2021-12-14 PROCEDURE — 85027 COMPLETE CBC AUTOMATED: CPT

## 2021-12-14 PROCEDURE — 99195 PHLEBOTOMY: CPT | Performed by: INTERNAL MEDICINE

## 2021-12-14 PROCEDURE — 36415 COLL VENOUS BLD VENIPUNCTURE: CPT

## 2021-12-14 NOTE — TELEPHONE ENCOUNTER
Notified pt that if her side pain is severe enough she may need to go to ER otherwise she will need to f/u with her pcp about this issue.

## 2021-12-14 NOTE — PROGRESS NOTES
Patient here for lab evaluation for possible 250 ml phlebotomy for hemachromatosis if Hgb >11.  Patient states that she has been having a lot of pain in her left side, feels numb at times an swollen, when she lays on her right side it feels like it is pulling down, and it is also making her back hurt.  Stated that she is having difficulty sleeping.  Discussed with Naomi that I will let Dr. Goldberg know about her symptoms and will call her tomorrow to let her know what Dr. Goldberg wants to do. Skin warm, dry, and color within normal limits.  250 ml phlebotomy performed per left CVAD.  Patient tolerated without any difficulty.  Denies being lightheaded or dizzy upon standing.  Will return in 2 weeks for repeat labs and possible phlebotomy.

## 2021-12-14 NOTE — PROGRESS NOTES
I have reviewed the notes, assessments, and/or procedures performed by GENTRY BERNARD, I concur with her/his documentation of Naomi Bhatia.

## 2021-12-15 ENCOUNTER — DOCUMENTATION (OUTPATIENT)
Dept: ONCOLOGY | Facility: CLINIC | Age: 46
End: 2021-12-15

## 2021-12-15 DIAGNOSIS — R16.1 SPLENOMEGALY: Primary | ICD-10-CM

## 2021-12-15 RX ORDER — PREGABALIN 100 MG/1
CAPSULE ORAL
Qty: 60 CAPSULE | Refills: 0 | Status: SHIPPED | OUTPATIENT
Start: 2021-12-15 | End: 2022-02-23

## 2021-12-15 NOTE — PROGRESS NOTES
Spoke with patient regarding her Left quadrant pain and discussed that we will check a US spleen vasculature.  If that is negative, she will need to discuss with her PCP a next steps

## 2021-12-16 ENCOUNTER — APPOINTMENT (OUTPATIENT)
Dept: ULTRASOUND IMAGING | Facility: HOSPITAL | Age: 46
End: 2021-12-16

## 2021-12-17 ENCOUNTER — NURSE ONLY (OUTPATIENT)
Dept: PRIMARY CARE CLINIC | Age: 46
End: 2021-12-17
Payer: MEDICARE

## 2021-12-17 ENCOUNTER — HOSPITAL ENCOUNTER (OUTPATIENT)
Dept: ULTRASOUND IMAGING | Facility: HOSPITAL | Age: 46
Discharge: HOME | End: 2021-12-17
Admitting: INTERNAL MEDICINE

## 2021-12-17 ENCOUNTER — TELEPHONE (OUTPATIENT)
Dept: ONCOLOGY | Facility: CLINIC | Age: 46
End: 2021-12-17

## 2021-12-17 DIAGNOSIS — R16.1 SPLENOMEGALY: ICD-10-CM

## 2021-12-17 DIAGNOSIS — Z90.81 S/P SPLENECTOMY: Primary | ICD-10-CM

## 2021-12-17 PROCEDURE — 90472 IMMUNIZATION ADMIN EACH ADD: CPT | Performed by: NURSE PRACTITIONER

## 2021-12-17 PROCEDURE — G0009 ADMIN PNEUMOCOCCAL VACCINE: HCPCS | Performed by: NURSE PRACTITIONER

## 2021-12-17 PROCEDURE — 76705 ECHO EXAM OF ABDOMEN: CPT

## 2021-12-17 PROCEDURE — 90734 MENACWYD/MENACWYCRM VACC IM: CPT | Performed by: NURSE PRACTITIONER

## 2021-12-17 PROCEDURE — 90620 MENB-4C VACCINE IM: CPT | Performed by: NURSE PRACTITIONER

## 2021-12-17 PROCEDURE — 90670 PCV13 VACCINE IM: CPT | Performed by: NURSE PRACTITIONER

## 2021-12-17 PROCEDURE — 93976 VASCULAR STUDY: CPT

## 2021-12-17 PROCEDURE — 90471 IMMUNIZATION ADMIN: CPT | Performed by: NURSE PRACTITIONER

## 2021-12-17 PROCEDURE — 90648 HIB PRP-T VACCINE 4 DOSE IM: CPT | Performed by: NURSE PRACTITIONER

## 2021-12-17 NOTE — PROGRESS NOTES
HIB PRP-T (ActHIB, Hiberix)      Current Status      Updated on: 12/17/2021  4:11 PM    Name Date Status Dose VIS Date Route Site  Lot# Given By Verified By   HIB PRP-T (ActHIB, Hiberix) 12/17/2021 Given 0.5 mL 8/6/2021 IM left delt "[employer] [address] [place], [place] --   Exp. Date [address] # Product Time Location External Comment   12/29/2022 92858-665-18 haemophilus B polysac conjugate vaccine -- -- -- --   Question Answer Comment   VIS/EUA reviewed with patient and questions answered? Yes    VIS/EUA Given Date 12/17/2021    Updated by: JOSI Mccullough                 Previous Statuses      Updated on: 12/17/2021  4:10 PM    Name Date Status Dose VIS Date Route Site  Lot# Given By Verified By   HIB PRP-T (ActHIB, Hiberix) 12/17/2021 Incomplete 0.5 mL 8/6/2021 IM -- -- -- -- --   Exp. Date [address] # Product Time Location External Comment   -- -- -- -- -- -- --   Updated by: JOSI Mccullough        Meningococcal B, OMV (Bexsero)      Current Status      Updated on: 12/17/2021  4:14 PM    Name Date Status Dose VIS Date Route Site  Lot# Given By Verified By   Meningococcal B, OMV (Bexsero) 12/17/2021 Given 0.5 mL 8/6/2021 IM right [facility]ny Willis [place], [place] --   Exp. Date [address] # Product Time Location External Comment   1/1/2022 30805-518-33 Bexsero -- -- -- --   Question Answer Comment   VIS/EUA reviewed with patient and questions answered? Yes    Are you pregnant or planning to be pregnant within next 28 days? NO    Has your child taken cortisone, prednisone or other steroids anti-cancer drugs or x-ray treatments in the past 3 months? NO    Has your child received any vaccination in the past 30 days?ays?  NO    VIS/EUA Given Date 12/17/2021    Updated by: JOSI Mccullough                 Previous Statuses      Updated on: 12/17/2021  4:10 PM    Name Date Status Dose VIS Date Route Site  Lot# Given By Verified By   Meningococcal B, OMV (Bexsero) 12/17/2021 Incomplete 0.5 mL 8/6/2021 IM left delt [employer] -- -- --   Exp. Date [address] # Product Time Location External Comment   -- -- -- -- -- -- --   Updated by: Art Tello MA        Meningococcal MCV4O (Menveo)      Current Status      Updated on: 12/17/2021  4:13 PM    Name Date Status Dose VIS Date Route Site  Lot# Given By Verified By   Meningococcal MCV4O (Menveo) 12/17/2021 Given 0.5 mL 8/6/2021 IM left delt [address] [place] [address] --   Exp. Date [address] # Product Time Location External Comment   8/1/2022 63265-650-36 Menveo -- -- -- --   Question Answer Comment   VIS/EUA reviewed with patient and questions answered? Yes    Are you pregnant or planning to be pregnant within next 28 days? NO    Has your child taken cortisone, prednisone or other steroids anti-cancer drugs or x-ray treatments in the past 3 months? NO    Has your child received any vaccination in the past 30 days?ays?     VIS/EUA Given Date 12/17/2021    Updated by: Art Tello MA                 Previous Statuses      Updated on: 12/17/2021  4:10 PM    Name Date Status Dose VIS Date Route Site  Lot# Given By Verified By   Meningococcal MCV4O (Menveo) 12/17/2021 Incomplete 0.5 mL 8/6/2021 IM left delt [employer] -- -- --   Exp. Date [address] # Product Time Location External Comment   -- -- -- -- -- -- --   Updated by: Art Tello MA        Pneumococcal Conjugate 13-valent ([id number])      Current Status      Updated on: 12/17/2021  4:11 PM    Name Date Status Dose VIS Date Route Site  Lot# Given By Verified By   Pneumococcal Conjugate 13-valent Katherine Min) 12/17/2021 Given 0.5 mL 8/6/2021 IM right [place], [place] --   Exp. Date [address] # Product Time Location External Comment   3/1/2022 9862-2340-71 Prevnar 13 -- -- -- --   Question Answer Comment   VIS/EUA reviewed with patient and questions answered? Yes    Mod/Severe illness and/or fever today?  No    Any Anaphylaxis to vaccines or severe, life-threatening allergic reactions? No    Any other specific contraindications/precautions listed within the VIS? No    VIS/EUA Given Date 12/17/2021    Updated by: Anjum Morales MA                 Previous Statuses      Updated on: 12/17/2021  4:10 PM    Name Date Status Dose VIS Date Route Site  Lot# Given By Verified By   Pneumococcal Conjugate 13-valent Dwight Sunil) 12/17/2021 Incomplete 0.5 mL 8/6/2021 IM left delt [employer] -- -- --   Exp.  Date [address] # Product Time Location External Comment   -- -- -- -- -- -- --   Updated by: Anjum Morales MA

## 2021-12-17 NOTE — LETTER
[facility]  IMMUNIZATION CERTIFICATE  (Required of each child enrolled in a public or private school,  program, day care center, certified family  home, or other licensed facility which cares for children.)     Name:  Robert Adrian  YOB: 1975  Address:  [address] [place] [address]  -------------------------------------------------------------------------------------------------------------------  Immunization History   Administered Date(s) Administered    COVID-19, Moderna, Primary or Immunocompromised, PF, 100mcg/0.5mL 09/20/2021, 10/28/2021    HIB PRP-T (ActHIB, Hiberix) 12/17/2021    Influenza Vaccine, unspecified formulation 10/26/2016    Influenza Virus Vaccine 12/19/2019, 12/31/2020, 11/17/2021    Influenza, MDCK Quadv, IM, PF (Flucelvax 2 yrs and older) 12/19/2019    Influenza, Quadv, IM, PF (6 mo and older Fluzone, Flulaval, Fluarix, and 3 yrs and older Afluria) 10/12/2018    Meningococcal B, OMV (Bexsero) 12/17/2021    Meningococcal MCV4O (Menveo) 12/17/2021    Pneumococcal Conjugate 13-valent ([id number]) 12/17/2021    Pneumococcal Polysaccharide ([id number]) 11/04/2014    Tdap (Boostrix, Adacel) 03/31/2021      -------------------------------------------------------------------------------------------------------------------  *DTaP, DTP, DT, Td   *MMR  for one dose, measles-containing for second. *Hib not required at age 11 years or more. ** Alternative two dose series of approved  adult hepatitis B vaccine for  children 615 years of age. **Varicella  required for children 19 months to 7 years unless a parent, guardian or physician states that the child has had chickenpox disease. This child is current for immunizations until ____/____/____, (two weeks after the next shot is due)  after which this certificate is no longer valid and a new certificate must be obtained.      I CERTIFY THAT THE ABOVE NAMED CHILD HAS RECEIVED IMMUNIZATIONS AS STIPULATED ABOVE. Signature of provider___________________________________________Date_______________  This Certificate should be presented to the school or facility in which the child intends to enroll and should be retained by the school or facility and filed with the childs health record.   [id number] (Rev 8/2002)

## 2021-12-17 NOTE — TELEPHONE ENCOUNTER
Patient had ultrasound spleen that did not show vascular abnormalities no focal splenic findings but it has enlarged by almost 2 cm since scanned last year at this time and therefore worrisome for rapid enlargement and now symptomatic.  I have requested the patient to get her presplenectomy vaccinations and I referred her for a second opinion from [facility] prior to having splenectomy scheduled.  Of note, the patient's brother also had a splenectomy for unclear reasons (they are not speaking but had previously told her that he was not sure why the splenectomy had been done and nothing was found on pathology after the removal)

## 2021-12-27 ENCOUNTER — TELEPHONE (OUTPATIENT)
Dept: ONCOLOGY | Facility: CLINIC | Age: 46
End: 2021-12-27

## 2021-12-27 ENCOUNTER — HOSPITAL ENCOUNTER (OUTPATIENT)
Dept: PAIN MANAGEMENT | Age: 46
Discharge: HOME | End: 2021-12-27
Payer: MEDICARE

## 2021-12-27 VITALS
WEIGHT: 293 LBS | BODY MASS INDEX: 47.09 KG/M2 | SYSTOLIC BLOOD PRESSURE: 132 MMHG | TEMPERATURE: 97 F | HEART RATE: 93 BPM | HEIGHT: 66 IN | DIASTOLIC BLOOD PRESSURE: 88 MMHG | OXYGEN SATURATION: 98 %

## 2021-12-27 DIAGNOSIS — M54.16 CHRONIC LUMBAR RADICULOPATHY: ICD-10-CM

## 2021-12-27 DIAGNOSIS — M54.10 BACK PAIN WITH LEFT-SIDED RADICULOPATHY: ICD-10-CM

## 2021-12-27 PROCEDURE — 99213 OFFICE O/P EST LOW 20 MIN: CPT

## 2021-12-27 PROCEDURE — 99213 OFFICE O/P EST LOW 20 MIN: CPT | Performed by: NURSE PRACTITIONER

## 2021-12-27 ASSESSMENT — ENCOUNTER SYMPTOMS
NAUSEA: 1
ABDOMINAL DISTENTION: 1
ABDOMINAL PAIN: 1
BOWEL INCONTINENCE: 0
BACK PAIN: 1
CONSTIPATION: 0

## 2021-12-27 ASSESSMENT — PAIN SCALES - GENERAL: PAINLEVEL_OUTOF10: 7
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
## 2021-12-27 NOTE — TELEPHONE ENCOUNTER
Provider: DR GOLDBERG  Caller: ISA SIEGEL  Relationship to Patient: SELF  Phone Number: [phone]  Reason for Call: PT WANTS TO CANCEL 12/28 NURSE VISIT

## 2021-12-27 NOTE — PROGRESS NOTES
Clinic Documentation      Education Provided:  [x] Review of Lorrie Gilmore  [] Agreement Review  [x] PEG Score Calculated [] PHQ Score Calculated [] ORT Score Calculated    [] Compliance Issues Discussed [] Cognitive Behavior Needs [x] Exercise [] Review of Test [] Financial Issues  [x] Tobacco/Alcohol Use Reviewed [x] Teaching [] New Patient [] Picture Obtained    Physician Plan:  [] Outgoing Referral  [] Pharmacy Consult  [] Test Ordered [] Prescription Ordered/Changed   [] Obtained Test Results / Consult Notes        Complete if patient is withholding blood thinner for procedure     Blood Thinner Patient is currently taking:      [] Plavix (Hold for 7 days)  [] Aspirin (Hold for 5 days)     [] Pletal (Hold for 2 days)  [] Pradaxa (Hold for 3 days)    [] Effient (Hold for 7 days)  [] Xarelto (Hold for 2 days)    [] Eliquis (Hold for 2 days)  [] Brilinta (Hold for 7 days)    [] Coumadin (Hold for 5 days) - (INR needs to be drawn the day prior to procedure- INR < 2.0)    [] Aggrenox (Hold for 7 days)        [] Patient will stop medication on their own.    [] Blood Thinner Form Faxed for approval to hold.    Provider form faxed to:    Assessment Completed by:  Electronically signed by Verito Hargrove MA on 12/27/2021 at 9:06 AM

## 2021-12-28 ENCOUNTER — APPOINTMENT (OUTPATIENT)
Dept: LAB | Facility: HOSPITAL | Age: 46
End: 2021-12-28

## 2021-12-28 RX ORDER — OXYCODONE HYDROCHLORIDE 5 MG/1
5 TABLET ORAL 3 TIMES DAILY PRN
Qty: 90 TABLET | Refills: 0 | Status: SHIPPED | OUTPATIENT
Start: 2022-01-08 | End: 2022-02-07 | Stop reason: SDUPTHER

## 2022-01-03 ENCOUNTER — TELEPHONE (OUTPATIENT)
Dept: ONCOLOGY | Facility: CLINIC | Age: 47
End: 2022-01-03

## 2022-01-03 DIAGNOSIS — R93.5 ABNORMAL FINDINGS ON DIAGNOSTIC IMAGING OF OTHER ABDOMINAL REGIONS, INCLUDING RETROPERITONEUM: ICD-10-CM

## 2022-01-03 DIAGNOSIS — R16.1 SPLENOMEGALY: Primary | ICD-10-CM

## 2022-01-03 RX ORDER — NORTRIPTYLINE HYDROCHLORIDE 25 MG/1
CAPSULE ORAL
Qty: 180 CAPSULE | Refills: 3 | Status: SHIPPED | OUTPATIENT
Start: 2022-01-03 | End: 2022-05-12 | Stop reason: SDUPTHER

## 2022-01-03 NOTE — TELEPHONE ENCOUNTER
Received a message from the patient that she is insistent on repeating the bone marrow biopsy which was ordered along with a PET scan which was also ordered

## 2022-01-05 DIAGNOSIS — R93.5 ABNORMAL FINDINGS ON DIAGNOSTIC IMAGING OF OTHER ABDOMINAL REGIONS, INCLUDING RETROPERITONEUM: Primary | ICD-10-CM

## 2022-01-05 DIAGNOSIS — Z01.818 PREOP TESTING: ICD-10-CM

## 2022-01-11 ENCOUNTER — LAB (OUTPATIENT)
Dept: LAB | Facility: HOSPITAL | Age: 47
End: 2022-01-11

## 2022-01-11 ENCOUNTER — CLINICAL SUPPORT (OUTPATIENT)
Dept: ONCOLOGY | Facility: CLINIC | Age: 47
End: 2022-01-11

## 2022-01-11 VITALS
RESPIRATION RATE: 18 BRPM | OXYGEN SATURATION: 97 % | DIASTOLIC BLOOD PRESSURE: 84 MMHG | HEART RATE: 84 BPM | SYSTOLIC BLOOD PRESSURE: 148 MMHG

## 2022-01-11 DIAGNOSIS — E83.110 HEREDITARY HEMOCHROMATOSIS: ICD-10-CM

## 2022-01-11 DIAGNOSIS — Z45.2 ENCOUNTER FOR CARE RELATED TO PORT-A-CATH: Primary | ICD-10-CM

## 2022-01-11 DIAGNOSIS — E83.19 IRON OVERLOAD: Primary | ICD-10-CM

## 2022-01-11 DIAGNOSIS — Z01.818 PREOP TESTING: ICD-10-CM

## 2022-01-11 LAB
DEPRECATED RDW RBC AUTO: 48.2 FL (ref 37–54)
ERYTHROCYTE [DISTWIDTH] IN BLOOD BY AUTOMATED COUNT: 14.7 % (ref 12.3–15.4)
HCT VFR BLD AUTO: 33.5 % (ref 34–46.6)
HGB BLD-MCNC: 11.4 G/DL (ref 12–15.9)
HOLD SPECIMEN: NORMAL
MCH RBC QN AUTO: 30.7 PG (ref 26.6–33)
MCHC RBC AUTO-ENTMCNC: 34 G/DL (ref 31.5–35.7)
MCV RBC AUTO: 90.3 FL (ref 79–97)
PLATELET # BLD AUTO: 277 10*3/MM3 (ref 140–450)
PMV BLD AUTO: 9.7 FL (ref 6–12)
RBC # BLD AUTO: 3.71 10*6/MM3 (ref 3.77–5.28)
SARS-COV-2 RNA PNL SPEC NAA+PROBE: NOT DETECTED
WBC NRBC COR # BLD: 8.86 10*3/MM3 (ref 3.4–10.8)

## 2022-01-11 PROCEDURE — 99195 PHLEBOTOMY: CPT | Performed by: INTERNAL MEDICINE

## 2022-01-11 PROCEDURE — C9803 HOPD COVID-19 SPEC COLLECT: HCPCS

## 2022-01-11 PROCEDURE — 36415 COLL VENOUS BLD VENIPUNCTURE: CPT

## 2022-01-11 PROCEDURE — 85027 COMPLETE CBC AUTOMATED: CPT

## 2022-01-11 PROCEDURE — 87635 SARS-COV-2 COVID-19 AMP PRB: CPT

## 2022-01-11 RX ORDER — HEPARIN SODIUM (PORCINE) LOCK FLUSH IV SOLN 100 UNIT/ML 100 UNIT/ML
500 SOLUTION INTRAVENOUS AS NEEDED
Status: CANCELLED | OUTPATIENT
Start: 2022-01-11

## 2022-01-11 RX ORDER — HEPARIN SODIUM (PORCINE) LOCK FLUSH IV SOLN 100 UNIT/ML 100 UNIT/ML
500 SOLUTION INTRAVENOUS AS NEEDED
Status: DISCONTINUED | OUTPATIENT
Start: 2022-01-11 | End: 2022-01-11 | Stop reason: HOSPADM

## 2022-01-11 RX ORDER — SODIUM CHLORIDE 0.9 % (FLUSH) 0.9 %
10 SYRINGE (ML) INJECTION AS NEEDED
Status: CANCELLED | OUTPATIENT
Start: 2022-01-11

## 2022-01-11 RX ORDER — SODIUM CHLORIDE 0.9 % (FLUSH) 0.9 %
10 SYRINGE (ML) INJECTION AS NEEDED
Status: DISCONTINUED | OUTPATIENT
Start: 2022-01-11 | End: 2022-01-11 | Stop reason: HOSPADM

## 2022-01-11 RX ADMIN — HEPARIN SODIUM (PORCINE) LOCK FLUSH IV SOLN 100 UNIT/ML 500 UNITS: 100 SOLUTION at 15:36

## 2022-01-11 RX ADMIN — Medication 10 ML: at 15:35

## 2022-01-11 NOTE — PROGRESS NOTES
Patient here for lab evaluation for possible 250 ml phlebotomy for hemachromatosis if hemoglobin >11.  Naomi states that she is tired today, just got back from a trip to visit her Aunt in [place].  Skin warm, dry, and color within normal limits.  Labs reviewed hemoglobin 11.4.  250 ml phlebotomy performed per left CVAD.  Patient tolerated without any difficulty.  Denies being lightheaded or dizzy upon standing.  Has follow up appointment with Dr. Goldberg on 1/20/2022.

## 2022-01-13 ENCOUNTER — HOSPITAL ENCOUNTER (OUTPATIENT)
Dept: CT IMAGING | Facility: HOSPITAL | Age: 47
End: 2022-01-13

## 2022-01-13 ENCOUNTER — HOSPITAL ENCOUNTER (OUTPATIENT)
Dept: CT IMAGING | Facility: HOSPITAL | Age: 47
Discharge: HOME | End: 2022-01-13
Admitting: INTERNAL MEDICINE

## 2022-01-13 ENCOUNTER — APPOINTMENT (OUTPATIENT)
Dept: CT IMAGING | Facility: HOSPITAL | Age: 47
End: 2022-01-13

## 2022-01-13 VITALS
BODY MASS INDEX: 47.09 KG/M2 | SYSTOLIC BLOOD PRESSURE: 129 MMHG | HEART RATE: 88 BPM | DIASTOLIC BLOOD PRESSURE: 74 MMHG | WEIGHT: 293 LBS | RESPIRATION RATE: 13 BRPM | HEIGHT: 66 IN | TEMPERATURE: 98.2 F | OXYGEN SATURATION: 98 %

## 2022-01-13 DIAGNOSIS — J45.20 MILD INTERMITTENT ASTHMA WITHOUT COMPLICATION: ICD-10-CM

## 2022-01-13 DIAGNOSIS — R93.5 ABNORMAL FINDINGS ON DIAGNOSTIC IMAGING OF OTHER ABDOMINAL REGIONS, INCLUDING RETROPERITONEUM: ICD-10-CM

## 2022-01-13 DIAGNOSIS — R16.1 SPLENOMEGALY: ICD-10-CM

## 2022-01-13 LAB
APTT PPP: 31.8 SECONDS (ref 24.1–35)
BASOPHILS # BLD AUTO: 0.05 10*3/MM3 (ref 0–0.2)
BASOPHILS NFR BLD AUTO: 0.8 % (ref 0–1.5)
DEPRECATED RDW RBC AUTO: 47.1 FL (ref 37–54)
EOSINOPHIL # BLD AUTO: 0.1 10*3/MM3 (ref 0–0.4)
EOSINOPHIL NFR BLD AUTO: 1.5 % (ref 0.3–6.2)
ERYTHROCYTE [DISTWIDTH] IN BLOOD BY AUTOMATED COUNT: 14.7 % (ref 12.3–15.4)
HCT VFR BLD AUTO: 31.7 % (ref 34–46.6)
HGB BLD-MCNC: 10.9 G/DL (ref 12–15.9)
IMM GRANULOCYTES # BLD AUTO: 0.05 10*3/MM3 (ref 0–0.05)
IMM GRANULOCYTES NFR BLD AUTO: 0.8 % (ref 0–0.5)
INR PPP: 1.05 (ref 0.91–1.09)
LYMPHOCYTES # BLD AUTO: 1.41 10*3/MM3 (ref 0.7–3.1)
LYMPHOCYTES NFR BLD AUTO: 21.8 % (ref 19.6–45.3)
MCH RBC QN AUTO: 30.4 PG (ref 26.6–33)
MCHC RBC AUTO-ENTMCNC: 34.4 G/DL (ref 31.5–35.7)
MCV RBC AUTO: 88.5 FL (ref 79–97)
MONOCYTES # BLD AUTO: 0.29 10*3/MM3 (ref 0.1–0.9)
MONOCYTES NFR BLD AUTO: 4.5 % (ref 5–12)
NEUTROPHILS NFR BLD AUTO: 4.58 10*3/MM3 (ref 1.7–7)
NEUTROPHILS NFR BLD AUTO: 70.6 % (ref 42.7–76)
NRBC BLD AUTO-RTO: 0 /100 WBC (ref 0–0.2)
PLATELET # BLD AUTO: 244 10*3/MM3 (ref 140–450)
PMV BLD AUTO: 9.9 FL (ref 6–12)
PROTHROMBIN TIME: 13.3 SECONDS (ref 11.9–14.6)
RBC # BLD AUTO: 3.58 10*6/MM3 (ref 3.77–5.28)
WBC NRBC COR # BLD: 6.48 10*3/MM3 (ref 3.4–10.8)

## 2022-01-13 PROCEDURE — 25010000002 MIDAZOLAM PER 1 MG: Performed by: RADIOLOGY

## 2022-01-13 PROCEDURE — 88313 SPECIAL STAINS GROUP 2: CPT | Performed by: INTERNAL MEDICINE

## 2022-01-13 PROCEDURE — 77012 CT SCAN FOR NEEDLE BIOPSY: CPT

## 2022-01-13 PROCEDURE — 85610 PROTHROMBIN TIME: CPT | Performed by: RADIOLOGY

## 2022-01-13 PROCEDURE — 88311 DECALCIFY TISSUE: CPT | Performed by: INTERNAL MEDICINE

## 2022-01-13 PROCEDURE — 25010000002 FENTANYL CITRATE (PF) 50 MCG/ML SOLUTION: Performed by: RADIOLOGY

## 2022-01-13 PROCEDURE — 85730 THROMBOPLASTIN TIME PARTIAL: CPT | Performed by: RADIOLOGY

## 2022-01-13 PROCEDURE — 88305 TISSUE EXAM BY PATHOLOGIST: CPT | Performed by: INTERNAL MEDICINE

## 2022-01-13 PROCEDURE — 85025 COMPLETE CBC W/AUTO DIFF WBC: CPT | Performed by: RADIOLOGY

## 2022-01-13 RX ORDER — ALBUTEROL SULFATE 90 UG/1
2 AEROSOL, METERED RESPIRATORY (INHALATION) EVERY 6 HOURS PRN
Qty: 3 EACH | Refills: 3 | Status: SHIPPED | OUTPATIENT
Start: 2022-01-13

## 2022-01-13 RX ORDER — MIDAZOLAM HYDROCHLORIDE 1 MG/ML
INJECTION INTRAMUSCULAR; INTRAVENOUS
Status: COMPLETED | OUTPATIENT
Start: 2022-01-13 | End: 2022-01-13

## 2022-01-13 RX ORDER — SODIUM CHLORIDE 0.9 % (FLUSH) 0.9 %
10 SYRINGE (ML) INJECTION AS NEEDED
Status: DISCONTINUED | OUTPATIENT
Start: 2022-01-13 | End: 2022-01-14 | Stop reason: HOSPADM

## 2022-01-13 RX ORDER — SODIUM CHLORIDE 0.9 % (FLUSH) 0.9 %
3 SYRINGE (ML) INJECTION EVERY 12 HOURS SCHEDULED
Status: DISCONTINUED | OUTPATIENT
Start: 2022-01-13 | End: 2022-01-14 | Stop reason: HOSPADM

## 2022-01-13 RX ORDER — FENTANYL CITRATE 50 UG/ML
INJECTION, SOLUTION INTRAMUSCULAR; INTRAVENOUS
Status: COMPLETED | OUTPATIENT
Start: 2022-01-13 | End: 2022-01-13

## 2022-01-13 RX ADMIN — MIDAZOLAM 2 MG: 1 INJECTION INTRAMUSCULAR; INTRAVENOUS at 11:55

## 2022-01-13 RX ADMIN — FENTANYL CITRATE 25 MCG: 50 INJECTION, SOLUTION INTRAMUSCULAR; INTRAVENOUS at 11:53

## 2022-01-13 NOTE — INTERVAL H&P NOTE
H&P reviewed. The patient was examined and there are no changes to the H&P.      This patient has been evaluated in the Radiology Dept. prior to CT guided bone marrow biopsy.  The H&P is in EPIC and there has been no significant change.    The airway has been evaluated and the ASA/Mallampati score has been documented.    Risks, benefits, and alternatives have been discussed.  Consent has been given.    Plan for moderate conscious sedation with continuous pulse oximeter and BP monitoring.

## 2022-01-13 NOTE — TELEPHONE ENCOUNTER
Received fax from pharmacy requesting refill on pts medication(s). Pt was last seen in office on 9/22/2021  and has a follow up scheduled for Visit date not found. Will send request to  [employer]  for authorization.      Requested Prescriptions     Pending Prescriptions Disp Refills    albuterol sulfate HFA (PROVENTIL HFA) 108 (90 Base) MCG/ACT inhaler 3 each 3     Sig: Inhale 2 puffs into the lungs every 6 hours as needed for Wheezing

## 2022-01-20 ENCOUNTER — TELEMEDICINE (OUTPATIENT)
Dept: ONCOLOGY | Facility: CLINIC | Age: 47
End: 2022-01-20

## 2022-01-20 ENCOUNTER — APPOINTMENT (OUTPATIENT)
Dept: LAB | Facility: HOSPITAL | Age: 47
End: 2022-01-20

## 2022-01-20 DIAGNOSIS — R74.8 ELEVATED ALKALINE PHOSPHATASE LEVEL: Primary | ICD-10-CM

## 2022-01-20 DIAGNOSIS — E83.19 IRON OVERLOAD: ICD-10-CM

## 2022-01-20 DIAGNOSIS — E83.110 HEREDITARY HEMOCHROMATOSIS: ICD-10-CM

## 2022-01-20 DIAGNOSIS — R16.1 SPLENOMEGALY: Primary | ICD-10-CM

## 2022-01-20 DIAGNOSIS — R16.1 SPLENOMEGALY: ICD-10-CM

## 2022-01-20 PROCEDURE — 99215 OFFICE O/P EST HI 40 MIN: CPT | Performed by: INTERNAL MEDICINE

## 2022-01-20 NOTE — PROGRESS NOTES
"You have chosen to receive care through a televideo visit. Do you consent to use a telephone visit for your medical care today? Yes     This was an audio and video enabled telemedicine encounter.     Patient presents during the COVID-19 pandemic/federally declared [facility] of public health emergency. This service was conducted via Epic Zoom chart connection via televideo technology. Patient is being seen as part of evaluation for a hematological issue     The patient is located at home and the provider is located in her clinic      [facility] [place] [facility] HEMATOLOGY AND ONCOLOGY  [address]  [place] [id number] [phone]    Patient Name: Naomi Bhatia  Encounter Date: 2022  YOB: 1975  Patient Number: [id number]      Subjective     Subjective: 44 year old female who was previously being seen by Dr. Clements for iron deficiency anemia. She has undergone a gastric sleeve procedure in 2017 for morbid obesity and had a total right hip replacement in 3/2019 after a fracture.      She, unfortunateky, stopped following up with bariatric surgery and did not take vitamin replacements. She has had a hysterectomy due to fibroids and endometriosis but found to be iron deficient and was started on iron replacement with IV iron, states it is more than 10 treatments in all.  She states that she has iron deficiency since age 14. She has had multiple blood transfusions in the past (4 or 5).    She presents now for follow up.    Today, she is feeling \"tired\".  She also suffers from chronic pain. In the Degernerative disc disease, fibromyalgia, and previous back surgeries    On follow up on 20: Had right Carpal Tunnel Surgery on 11.3.20 and has  Her arm in a sling today.  Otherwise, she is feeling \"exhausted\" because dad, who was on hospice,  about a month ago.  She has not slept well due to the depression and the pain in the arm was " "also keeping her up.     On follow up on 1.5.21: Upset that her car has broken down with expensive repairs which is causing distress.  Physically, she is tired, no shortness of breath, denies bleeding    On follow up on 3.11.21: Feeling \"OK and hanging in there\".  Her son had Covid 3 weeks ago but patient tested negative although she states that she had all the symptoms and may have had a false negative test.     On follow-up on June 21, 2020: Started on Jadenu 5.4.21  (total dose 1890; 14 mg/kg) and is still taking that. No issues with the medication other than a feeling \"of hot sensation in the mouth, gums and lips\"      Had all teeth pulled a week ago due to severe dental disease, still having some pain after the extraction.    On follow-up on 7/8/21: Patient was in the office receiving flush when she complained to the nurse that she is feeling overall poorly and asked to be put on the schedule for today.  Hemoglobin was noted to be 11.9 from CBC this morning.  She states that for the last week she is more fatigued, with headaches but denies fevers and cough. Mood is bad and she says taht she is being more aggressive with very little to make her \"fly off the handle\".  She is feeling anxious, depressed, very stressed. She states that her ex- was telling her she needs to learn how to calm down more. She is getting dentures tomorrow, money appears to be a source of severe stress.     On follow-up on August 10, 2021: Last visit in July patient was noted to have a ferritin that decreased below 500 and Jadenu was stopped although repeat testing in July was slightly increased.  It was also noted that her creatinine may have been slightly increased with a decreased GFR which may have been secondary to Jadenu itself.  Hemoglobin during her last visit was 11.9 and steroids had just completed 2 days prior to her last visit likely accounting for the leukocytosis.    Physically not feeling well, lying around a lot, " "states she does not have motivation and she is feeling very depressed, lots of stress going on.  She is worried about family and family issues.     On follow up on 11.16.21: Seen in follow up by IRMA Watts and ferritin dropped from >1000 to 592 on 9/10/21 but as she had some vertigo and diarrhea which she believed was due to the medication, her dose was decreased.  The patient had been to the emergency room at [facility] for complaint of left flank pain and thought that she may have a kidney stone but CT did not show any abnormalities.  Urinalysis that time showed some leukocytosis with bacteria but cultures were never sent.  Dr. Sharp had ordered an MRI (she \"got sick\" and rescheduled it for 11.22.21) She described the pain in the area of the left lower back/flank.  In the last 2 weeks, she states that she had tingling in the \"area of my spleen\" and feels that the area is \"drawing up\".  She had previous US showing splenomegaly.     Her hemoglobin on 10/18/2021 was 11.8 with a normal WBC count as well as platelet count.  Her CMP showed a slight decline in her GFR to 60 mL/min and alkaline phosphatase of increased up to 128 with a bilirubin of 1.3 but AST and ALT remained normal.  Her ferritin on 10/18/2021 was down to 451 with an iron saturation of 42%.  As a reminder, the goal for this patient is to keep her below 500.    Hb today is 12.1.    On follow-up on January 20, 2022: Telehealth visit as patient thinks she may have been exposed to Covid. Patient was noted to have persistently elevated alkaline phosphatase with a normal GGT and therefore bone scan was ordered. NM Bone scan 11.29.21: 1. Increased tracer activity at the L1 level may be degenerative in etiology. Correlation with plain films recommended to exclude a compression deformity. Postoperative changes of the lumbosacral spine. Arthritic uptake within the joints the upper and lower extremities. No additional abnormal bony uptake " "identified.    Pains in the splenic area as \"not as bad\" and she not sure what helped the pain.   US spleen 12.17.21 does not show vascular abnormalities but did show splenomegaly (max dimension 16.7 cm, previously 14.6 cm). Brother had splenectomy for unknown reasons (he is not speaking to the patient but supposedly due to pain like the patient).  As the spleen appears to be enlarging over time, and now symptomatically painful, will refer the patient for another opinion at [facility] regarding possibly requiring to take out the spleen for symptomatic reasons. In the meantime, would get the patient pre-splenectomy vaccinations.     The patient was seen at [facility] for second opinion by Dr. Pina on 12/28/2021 with the following recommendations:  • A flow cytometry was sent from the peripheral blood from [facility] and is negative  • JAK2 and FISH for BCR/ABL were sent at [facility] and is negative  • Recommendation for a PET scan (it is not clear that insurance will pay for this) to be done at [place] and if there is hypermetabolism then there would be a bigger consideration for splenectomy but if there is no hypermetabolism to not pursue splenectomy.  The etiology of the splenectomy was not clear but could include underlying fatty liver and possible liver disease, iron deposition in the spleen as well as splenomegaly as part of obesity and metabolic syndrome.  It was not felt that the size of the spleen had changed significantly over the past few years and it was noted that the spleen was homogenous and smooth which was reassuring without any red flags for an underlying lymphoproliferative disorder.  It was thought that a splenectomy would be low yield and he agreed with the hesitation to proceed with splenectomy.  It was unclear that the pain that the patient was experiencing was due to an large spleen as it has been present for many yearsIt was unclear that the pain that the patient was experiencing was due to an " large spleen as it has been present for many years. Patient had a PET scheduled for 1.13.22 but her BMBx ran over time and it will be rescheduled for 1.25.22 at 9:45 am    Patient has also been contacting me stating that she wants to rule out MDS as her mother had MDS.  We discussed that she had a bone marrow biopsy in 2017 that did not show any evidence of MDS but she is insistent on repeating another which was ordered on 1.3.22 along with a PET scan which was recommended by [place].    Bone marrow biopsy 1.14.22:   •  flow cytometry without evidence of abnormal myeloid maturation nor an increase in blast population, no evidence of LP disorder  • Normal female karyotype    Today, she is at home for fear of being exposed to Covid (negative on 1.14.22 but son tested positive). She has mild symptoms. She is vaccinated.     Past Medical History:   Diagnosis Date   • Anemia    • Anxiety    • Arthritis    • Asthma    • Back pain 03/14/2016    with left sided radiculopathy    • DDD (degenerative disc disease), lumbar     Dr. Stuart Zavaleta for pain manangement   • Depression    • Encounter for care related to Port-a-Cath 01/31/2020    for iron infusions as she was told she has small veins   • Fatigue    • Fibromyalgia    • GERD (gastroesophageal reflux disease)    • H/O cold sores    • Headache    • Hereditary hemochromatosis (HCC) 1/19/2021   • History of blood transfusion    • Hypertension    • Iron deficiency anemia 9/12/2017   • Iron deficiency anemia secondary to inadequate dietary iron intake 9/12/2017   • Joint pain    • Obesity    • RLS (restless legs syndrome)    • Sleep apnea     positive sleep study; titration study in October, uses a CPAP machine when sleeping       Oncology History:  Oncology/Hematology History    No history exists.       Past Surgical History:  Past Surgical History:   Procedure Laterality Date   • ANKLE SURGERY      Right    • APPENDECTOMY  04/19/2015   • BACK SURGERY  2000   • CERVICAL  SPINE SURGERY  09/01/2015   • CHOLECYSTECTOMY      1995;lap   • COLONOSCOPY  05/02/2017    normal; do not return until age of 49 Dr. Gus Valentine   • GASTRIC SLEEVE LAPAROSCOPIC N/A 12/20/2017    Procedure: GASTRIC SLEEVE LAPAROSCOPIC;  Surgeon: Yifan Cordero MD;  Location: [facility];  Service:    • HIP ARTHROPLASTY      Right  2019   • HIP SURGERY  1987    right    • INSERTION CENTRAL VENOUS ACCESS DEVICE W/ SUBCUTANEOUS PORT  11/07/2017    Dr Anel Gamboa (Smart Port-Power injectable Port) Cat [id number] Lot [id number]    • MOUTH SURGERY  1994   • NECK SURGERY     • TOOTH EXTRACTION     • UPPER GASTROINTESTINAL ENDOSCOPY  05/02/2017    Dr. Gus Valentine, negative for h.pylori, negative for Salomon's   • VAGINAL HYSTERECTOMY SALPINGO OOPHORECTOMY  2008    Partial and then had another surgery to remove the rest      ___________________________________________________________________________________________________________________________________________________  Medications:   Outpatient Encounter Medications as of 1/20/2022   Medication Sig Dispense Refill   • acyclovir (ZOVIRAX) 800 MG tablet Take 800 mg by mouth Daily.     • albuterol sulfate  (90 Base) MCG/ACT inhaler Inhale 2 puffs As Needed.     • amLODIPine (NORVASC) 5 MG tablet Take 5 mg by mouth Daily.     • Calcium Citrate-Vitamin D (CALCIUM CITRATE + D3 PO) Take 1 tablet by mouth Daily.     • carbidopa-levodopa (SINEMET)  MG per tablet Take 1 tablet by mouth Daily.     • CRANBERRY PO Take 1 capsule by mouth Daily.     • Cyclobenzaprine HCl (FLEXERIL PO) Take 10 mg by mouth Daily As Needed.     • lamoTRIgine (LaMICtal) 25 MG tablet Take 1 tablet by mouth Every Night for 14 days, THEN 2 tablets Every Night for 14 days. 42 tablet 0   • meclizine (ANTIVERT) 25 MG tablet Take 25 mg by mouth As Needed.     • metFORMIN (GLUCOPHAGE) 1000 MG tablet Take 1,000 mg by mouth 2 (Two) Times a Day With Meals.     • metoprolol succinate XL (TOPROL XL) 50 MG 24  "hr tablet Take 50 mg by mouth 2 (Two) Times a Day.     • Multiple Vitamin (MULTI VITAMIN PO) Take  by mouth Daily.     • NABUMETONE PO Take 750 mg by mouth 2 (two) times a day.     • nortriptyline (PAMELOR) 25 MG capsule Take 25 mg by mouth Every Night. Pt takes 2 at night  3   • omeprazole (priLOSEC) 20 MG capsule Take 20 mg by mouth Daily.  3   • pregabalin (LYRICA) 100 MG capsule Take 100 mg by mouth 2 (Two) Times a Day.     • venlafaxine (EFFEXOR) 75 MG tablet Take 75 mg by mouth Daily.  11   • venlafaxine XR (EFFEXOR-XR) 150 MG 24 hr capsule Take 150 mg by mouth Daily.       Facility-Administered Encounter Medications as of 1/20/2022   Medication Dose Route Frequency Provider Last Rate Last Admin   • diphenhydrAMINE (BENADRYL) injection 50 mg  50 mg Intravenous PRN Pedro Clements MD       • famotidine (PEPCID) injection 20 mg  20 mg Intravenous PRN Pedro Clements MD       • hydrocortisone sodium succinate (Solu-CORTEF) injection 100 mg  100 mg Intravenous PRN Pedro Clements MD         ___________________________________________________________________________________________________________________________________________________  Allergies:   Allergies   Allergen Reactions   • Azithromycin GI Intolerance and Nausea And Vomiting     Severe Abdominal Pain   Severe abdominal pain    • Silver Rash     Redness, blisters  blister  Redness, blisters         Social/Family History:   Family History   Problem Relation Age of Onset   • Diabetes Mother    • Hypertension Mother    • Coronary artery disease Mother    • Cancer Mother         \"bone\" cancer   • Cancer Father         prostate   • Hypertension Father    • Heart disease Father    • Stroke Father    • Coronary artery disease Father    • Hypertension Sister    • Obesity Sister    • Other Sister         MDS in one sister diagnosed in 2021   • Cancer Brother         throat   • Diabetes Brother    • Diabetes Maternal Grandmother  " "  • Stroke Maternal Grandmother    • No Known Problems Daughter    • No Known Problems Son         /Single/:      Social History     Tobacco Use   • Smoking status: Former Smoker     Packs/day: 1.00     Years: 25.00     Pack years: 25.00     Types: Cigarettes     Quit date:      Years since quittin.0   • Smokeless tobacco: Never Used   Substance Use Topics   • Alcohol use: Yes     Comment: rarely    • Drug use: No     Types: Codeine     Comment: Codeine in Prescribed Medications only         Occupation: disability    Objective    I reviewed the ROS as documented here and confirmed the accuracy of it with the patient today. 2022     Review of Systems   Constitutional: Positive for fatigue. Negative for activity change, appetite change, chills, fever, unexpected weight gain and unexpected weight loss.   HENT: Positive for congestion, hearing loss and sore throat. Negative for dental problem, ear pain, mouth sores, nosebleeds, swollen glands and trouble swallowing.         + symptoms of covid   Eyes: Negative for blurred vision, double vision, photophobia and pain.   Respiratory: Negative for apnea, cough, chest tightness, shortness of breath and wheezing.    Cardiovascular: Negative for chest pain, palpitations and leg swelling.   Gastrointestinal: Negative for abdominal distention, abdominal pain, blood in stool, constipation, diarrhea, nausea, vomiting and GERD.        Area of spleen feels liek it's \"riding up\" but not painful   Endocrine: Negative for cold intolerance and heat intolerance.   Genitourinary: Negative for breast discharge, dysuria, frequency and hematuria.        Left flank pain   Musculoskeletal: Positive for arthralgias and back pain. Negative for gait problem, myalgias and neck stiffness.        Diffusely   Skin: Negative for color change, pallor, rash, skin lesions and wound.   Allergic/Immunologic: Negative for environmental allergies and immunocompromised " state.   Neurological: Negative for dizziness, syncope, weakness, light-headedness, headache, memory problem and confusion.        Frequent falls which have improved as she is holding on to things when she walks, sometimes gets lightheaded before the fall, never losses consciousness   Hematological: Negative for adenopathy. Does not bruise/bleed easily.   Psychiatric/Behavioral: Positive for depressed mood. Negative for self-injury and suicidal ideas. The patient is nervous/anxious.         Depression appears to be significantly worse       Physical Examination:   As this is a telehealth visit, no physical done but the following is from the previous exams:    There were no vitals filed for this visit. There is no height or weight on file to calculate BSA.   Physical Exam  Constitutional:       Appearance: Normal appearance. She is morbidly obese.      Comments: BMI >40   HENT:      Head: Normocephalic and atraumatic.      Nose: Nose normal.      Mouth/Throat:      Mouth: Mucous membranes are dry.      Comments: Upper dentures  Eyes:      Pupils: Pupils are equal, round, and reactive to light.      Comments: No palor   Neck:      Comments: Fatty lipoma    Mole under Left chin area  Cardiovascular:      Rate and Rhythm: Normal rate and regular rhythm.      Pulses: Normal pulses.   Pulmonary:      Effort: Pulmonary effort is normal.      Breath sounds: Normal breath sounds.   Chest:   Breasts:      Breasts are asymmetrical.      Right: No supraclavicular adenopathy.      Left: No supraclavicular adenopathy.        Comments: On original consult: Bilateral large breasts with the right bigger than the left.   no masses, no nipple discharge and no axillary LN's    Port palpable and non painful in the left upper chest well  Abdominal:      General: Abdomen is flat. Bowel sounds are normal.      Palpations: Abdomen is soft.      Comments: Centrally obese precluding proper evaluation for hepatosplenomegaly      Musculoskeletal:         General: Normal range of motion.      Cervical back: Neck supple.   Lymphadenopathy:      Head:      Right side of head: No submental or submandibular adenopathy.      Left side of head: No submental or submandibular adenopathy.      Upper Body:      Right upper body: No supraclavicular adenopathy.      Left upper body: No supraclavicular adenopathy.      Comments: Obesity precluding proper evaluation of the LN's   Skin:     General: Skin is warm and dry.   Neurological:      General: No focal deficit present.      Mental Status: She is alert and oriented to person, place, and time.   Psychiatric:         Attention and Perception: Attention and perception normal.         Mood and Affect: Mood is anxious. Mood is not depressed.         Speech: Speech normal.         Behavior: Behavior normal. Behavior is cooperative.         Thought Content: Thought content normal.         Cognition and Memory: Cognition and memory normal.         Judgment: Judgment normal.         Labs/Radiology     Labs/X-ray results:     Lab Results - Last 18 Months   Lab Units 01/13/22  0938 01/11/22  1429 12/14/21  1435 11/30/21  1421 11/16/21  1109 10/18/21  1002 09/20/21  0942 09/20/21  0942 09/17/21  2150 08/26/21  2210 08/26/21  2210 08/26/21  1226 08/26/21  1226 07/25/21  1515 07/22/21  1503 07/08/21  0946   HEMOGLOBIN g/dL 10.9* 11.4* 11.3* 11.6* 12.1 11.8*   < > 12.7 12.4   < > 11.9*   < > 12.6   < >   < > 11.9*   HEMATOCRIT % 31.7* 33.5* 34.1 34.9 35.2 34.8   < > 35.6 37.9   < > 35.9*   < > 35.5   < >   < > 34.8   MCV fL 88.5 90.3 93.2 92.8 93.1 89.7   < > 87.3 94.3   < > 97.0   < > 92.2   < >   < > 91.6   WBC 10*3/mm3 6.48 8.86 7.64 7.77 9.06 7.96   < > 8.44 10.6   < > 8.4   < > 9.70   < >   < > 14.01*   RDW % 14.7 14.7 15.5* 16.0* 16.1* 17.4*   < > 15.0 15.2*   < > 15.9*   < > 15.9*   < >   < > 16.1*   MPV fL 9.9 9.7 9.9 9.7 10.0 9.8   < > 9.9 9.8   < > 10.0   < > 9.8   < >   < > 9.2   PLATELETS 10*3/mm3 244  277 261 265 283 277   < > 292 284   < > 271   < > 295   < >   < > 281   IMM GRAN % % 0.8*  --   --   --  0.9* 0.8*  --  0.6*  --   --   --   --  0.4  --   --  1.4*   NEUTROS ABS 10*3/mm3 4.58  --   --   --  5.88 5.17  --  5.38 6.1  --  4.7  --  7.04*   < >   < > 9.39*   LYMPHS ABS 10*3/mm3 1.41  --   --   --  2.29 2.08  --  2.27 3.5  --  2.8  --  1.89   < >   < > 3.38*   MONOS ABS 10*3/mm3 0.29  --   --   --  0.46 0.42  --  0.44 0.60  --  0.50  --  0.51   < >   < > 0.84   EOS ABS 10*3/mm3 0.10  --   --   --  0.28 0.16  --  0.20 0.20  --  0.10  --  0.14   < >   < > 0.12   BASOS ABS 10*3/mm3 0.05  --   --   --  0.07 0.07  --  0.10 0.10  --  0.10  --  0.08   < >   < > 0.09   IMMATURE GRANS (ABS) 10*3/mm3 0.05  --   --   --  0.08* 0.06*  --  0.05 0.1  --  0.1  --  0.04   < >   < > 0.19*   NRBC /100 WBC 0.0  --   --   --  0.0 0.0  --  0.0  --   --   --   --  0.0  --   --  0.0    < > = values in this interval not displayed.       Lab Results - Last 18 Months   Lab Units 12/28/21  1224 11/16/21  1109 10/18/21  1002 09/20/21  0942 08/26/21  2210 08/26/21  1226 08/10/21  0927 08/10/21  0927 06/21/21  1059 06/21/21  1059   GLUCOSE mg/dL  --  142* 173* 129* 108 135*  --  130*   < > 120*   SODIUM mmol/L  --  141 137 141 139 140  --  142   < > 137   POTASSIUM mmol/L  --  3.7 3.5 4.0 3.4* 3.5  --  3.6   < > 3.9   TOTAL CO2 mmol/L  --   --   --   --  25  --   --   --   --   --    CO2 mmol/L  --  26.0 26.0 26.0  --  20.0*  --  24.0   < > 21.0*   CHLORIDE mmol/L  --  102 101 102 103 105  --  105   < > 107   ANION GAP mmol/L  --  13.0 10.0 13.0 11 15.0  --  13.0   < > 9.0   CREATININE mg/dL  --  0.74 1.00 0.67 0.7 0.77  --  0.75   < > 1.03*   BUN mg/dL  --  10 13 8 12 12  --  9   < > 18   BUN / CREAT RATIO   --  13.5 13.0 11.9  --  15.6  --  12.0  --  17.5   CALCIUM mg/dL  --  9.2 9.3 9.5 9.1 9.0  --  9.7   < > 9.7   EGFR IF NONAFRICN AM mL/min/1.73  --  84 60* 95 >60 81  --  83   < > 58*   ALK PHOS U/L  --  131* 128* 112 106* 113   --  119*   < > 124*   TOTAL PROTEIN gm/dL 6.8 7.2 7.0 7.3 6.9 7.2   < > 6.9   < > 7.2   ALT (SGPT) U/L  --  24 19 17 30 31  --  24   < > 16   AST (SGOT) U/L  --  27 21 24 29 41*  --  31   < > 25   BILIRUBIN mg/dL  --  0.6 1.3* 0.8 0.7 0.7  --  0.6   < > 0.8   ALBUMIN g/dL  --  4.30 4.20 4.40 4.4 4.40  --  4.40   < > 4.30   GLOBULIN gm/dL  --  2.9 2.8 2.9  --  2.8  --  2.5  --  2.9    < > = values in this interval not displayed.       Lab Results - Last 18 Months   Lab Units 12/28/21  1224 09/01/20  1306   FREE LAMBDA LIGHT CHAINS mg/dL 2.27  --    LDH unit/L 253* 231*       Lab Results - Last 18 Months   Lab Units 12/28/21  1224 11/16/21  1109 10/18/21  1002 09/20/21  0942 09/10/21  1315 08/10/21  0927 07/08/21  0946 06/21/21  1059 06/21/21  1059 03/11/21  0958 02/03/21  0937 11/06/20  0956 09/01/20  1306   IRON mcg/dL 71  66 85 126  --  70 56  --   --  159*   < > 44   < > 51   TIBC mcg/mL 208* 307 299  --  238* 289*  --   --  340   < > 213*   < > 243*   IRON SATURATION % 32 28 42  --  29 19*  --   --  47   < > 21   < > 21   FERRITIN ng/mL 343* 463.10* 451.50*  --  592* 762.70* 538.90*   < > 459.80*   < > 1,274.0*   < > 1,406.00*   T4 TOTAL mcg/dL  --   --   --   --   --   --   --   --   --   --   --   --  9.34   TSH uIU/mL  --   --   --   --   --   --   --   --   --   --  1.590  --  1.340   FOLATE ng/mL  --   --   --  12.90 13.8  --   --   --   --   --   --   --  >20.00    < > = values in this interval not displayed.     ____________________________________________________________________________  Radiology: CT Bone marrow biopsy and aspiration    Result Date: 1/13/2022  Narrative: EXAMINATION: CT BONE MARROW ASPIRATION AND BIOPSY-  1/13/2022 11:24 AM CST  HISTORY: rule out infiltrative disease MDS, AML, lymphoma and myeloma; R16.1-Splenomegaly, not elsewhere classified; R93.5-Abnormal findings on diagnostic imaging of other abdominal regions, including retroperitoneum  CT-guided bone marrow biopsy.  An  appropriate time out was performed with all present staff members to ensure correct patient and correct site and procedure. The procedure was discussed with the patient including risks, benefits, and alternatives.  Consent was given.  Airway assessment performed. Allergies noted. Pre procedure note documented in Epic.  Under my direct supervision, intravenous moderate sedation was administered during the course of this procedure. Continuous monitoring of patients vital signs and respiratory status was performed by trained nursing staff. Moderate sedation was achieved with Versed (2 mg) and Fentanyl (25 mcg) The total time of sedation is documented as being from 11:51 AM to 12:15 PM = 24 minutes.  Prone noncontrast CT imaging was used to localize the posterior left ilium. Routine prep, drape, and local anesthesia. Bone marrow biopsy needle access performed using the 15 cm 11-gauge On Control bone marrow biopsy device.  Bone marrow aspirate obtained. Bone marrow core obtained.  No complications. Blood loss = 1-2 cc.  Outpatient discharge planned after 1-2 hours of observation if stable.  Summary: 1. CT-guided bone marrow biopsy with no complications. This report was finalized on 01/13/2022 13:41 by Dr. Sage French MD.               Assessment/Plan      Date  5/2017  9/2017  3/2018 5/2020 9/1/20 9/18/20 11.6.20 11.20.20 12.4.20   WBC  11.1  9.97  10.51 10.45  9.46 8.24 8.21   Hb  10.5  10.3  11.6 10.8  12.0 11.9 11.4   MCV  84.4  84.4  88.8 87.4  86.8 87.4 88.7   Plt  275  238  285 242  280 265 261   Iron   53  74 51  50 60 57   Iron sat   20%  28 21%  20% 24% 23%   Transferrin    178 163 (LOW)  167 (L) 170 (L) 250 (L)   TIBC   271  265 243 (LOW)  249 (L) 253 (L) 168 (L)   Ferritin    918 1461 1406  1793 !!! 1663 1489   B12     >2000       Folate     >20       Hapto     64       LDH      231(H)       Retic     5.62%       TFTs      WNL       Hepatitis      neg       Zinc      77       Copper      130       CRP       2.32(H)      HIV      Neg      RF      11.2 (WNL)      SHENA      Neg      Glucose            Creatinine            ALT            AST            Alk phos            ESR                                                              Date 4..8.21 4.21.21 4.28.21 5.12.21 5.26.21 6.21.21  7/8/2021 8.10.21  10/18/21 11.16.21   WBC 9.88 10.78 10.40 8.04 8.81 18.89  14.01 9.21  7.96 9.06   Hb 11.4 11.5 11.2 11.6 10.9 13.0  11.9 10.8  11.8 12.1   MCV 89.8 90.8 88.9 89.6 89.5 90.7  91.6 91.8  89.7 93.1   Plt 257 268 260 295 270 305  281 276  277 283   ANC  7.61 7.07 5.28 5.8 15.54  9.39   5.17 5.88   ALC  2.27 2.55 2.10 2.21 1.95  3.38   2.08 2.29   AMC  0.59 0.47 0.43 0.45 0.92  0.84   0.42 0.46   AEC  0.14 0.14 0.10 0.17 0.01  0.12  0.16 0.28   ABC  0.09 0.09 0.07 0.09 0.11  0.09  0.07 0.07   Iron       159 (H)  56  126 85   Iron sat       47%  19%  42% 28%   Transferrin       228  194 (L)  201 206   TIBC       340  289 (L)  299 307   Ferritin (goal less than 500 while on Jadenu and hold if less than 500) 1249 1238 1245 1140 883 459  538.9 762.70  451.5 463   B12             Folate             Hapto             LDH             Retic             TFTs             Hepatitis             Zinc             Copper             CRP       0.65       HIV             RF             SHENA             Glucose       120  130  173 142   Creatinine       1.03  0.75  1.0 0.74   ALT       16  24  19 24   AST       25  31  21 27   Alk phos       124  119  128 131   GGT          34 (WNL)   Total bilirubin         1.3    ESR          6   EGFR       58    60    CRP        1.97                                 Date 11.30.21 12.14.21 12.28.21 1.11.22 1.13.22         WBC 7.77 7.64  8.86 6.48         Hb 11.6 11.3  11.4 10.9         MCV 92.8 93.2  90.3 88.5         Plt 265 261  277 244         ANC     4.58         ALC     1.41         AMC     0.29         AEC     0.10         ABC     0.05         Iron   66           Iron sat              Transferrin          "     TIBC              Ferritin (goal less than 500 while on Jadenu and hold if less than 500)   343           B12              Folate              Hapto              LDH              Retic              TFTs              Hepatitis              Zinc              Copper              CRP              HIV              RF              SHENA              Glucose              Creatinine              ALT              AST              Alk phos              GGT              Total bilirubin              ESR              EGFR              CRP                                              ** Iron deficiency anemia after gastric sleeve procedure    ** Iron overload thereafter with iron deposition noted in the liver and spleen   ** elevated ferritin levels  -Patient is status post a hysterectomy and had a colonoscopy and EGD in 2017 which were both reportedly ngative for bleeding  -She has received venofer in the past prior to start of follow up with me (total of 1400 mg IV venofer):  5/14/18 200 mg Venofer   5/18/18 200 mg Venofer   5/21/18 200 mg Venofer   5/25/18 200 mg Venofer   5/30/18 200 mg Venofer   6/1/18 200 mg Venofer   6/4/18 200  Venofer   -As per the last 3 ferritins as well as the iron profile, this patient does not have iron deficiency and, in fact, may have an iron overload. ALL IRON SUPPLEMENTATIONS should be held at this time  -The patient's indices are more suggestive of anemia of chronic disease      - CT scan of the A/P from 2018: MODERATE SPLENOMEGALY (15 cm), only description of liver is \"no focal hepatic lesions\" -- would check US Liver and spleen for re-evaluation as brother had massive splenomegaly as well: 10/12/20: LIVER: There is mild diffuse enlargement of the liver with diffuse increased echogenicity suggesting fatty infiltration. No evidence of focal hepatic mass. BILIARY: The gallbladder has been surgically removed. The common bile duct measures 0.5 cm in diameter. There is no evidence of " intrahepatic ductal dilatation.   KIDNEYS: The right and left kidneys are normal in size, shape, orientation, and position measuring 9.9 cm and 10.8 cm, respectively. The corticomedullary differentiation is maintained. There is no evidence of nephrolithiasis, hydronephrosis or perinephric fluid collection. No renal mass is seen. No hydroureter is seen. PANCREAS: No focal lesion or abnormality. SPLEEN: The spleen is enlarged measuring 14.6 cm in [id number] length. OTHER: No ascites is present. The visualized IVC and aorta are normal in appearance. Scanning through the pelvis reveals anechoic urine partially distending the bladder. There are no free fluid collections. IMPRESSION:1. Hepatosplenomegaly. There is diffuse increased echogenicity of the liver parenchyma suggesting steatosis of the liver.2. The gallbladder has been surgically removed. No evidence of intra or extrahepatic biliary dilatation. otherwise normal abdominal ultrasound.  - splenomegaly likely secondary to hepatic dysfunction and enlargement  - MRI abdomen 4.22.21: 1.  Slight liver iron overload. 2.  Strong hepatic steatosis. 3.  Splenic iron overload.      ** enlarged spleen  ** evaluated at [facility] 12/2021  The patient was seen at [facility] for second opinion by Dr. Pina on 12/28/2021 with the following recommendations:  • A flow cytometry was sent from the peripheral blood from [facility] and is negative  • JAK2 and [facility]L [facility]  • Recommendation for a PET scan to be done at [place] and if there is hypermetabolism then there would be a bigger consideration for splenectomy but if there is no hypermetabolism to not pursue splenectomy.  The etiology of the splenectomy was not clear but could include underlying fatty liver and possible liver disease, iron deposition in the spleen as well as splenomegaly as part of obesity and metabolic syndrome.  It was not felt that the size of the spleen had changed  significantly over the past few years and it was noted that the spleen was homogenous and smooth which was reassuring without any red flags for an underlying lymphoproliferative disorder.  It was thought that a splenectomy would be low yield and he agreed with the hesitation to proceed with splenectomy.  It was unclear that the pain that the patient was experiencing was due to an large spleen as it has been present for many years. Patient had a PET scheduled for 1.13.22 but her BMBx ran over time and it will be rescheduled for 1.25.22 at 9:45 am    -Patient has also been contacting me stating that she wants to rule out MDS as her mother had MDS.  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   -The C282Y variant is especially common in caucasians of northern  ancestry and the carrier state occurs in 1 in 5-10 persons of  ancestry  -Iron overload is uncommon for those that are carriers for hemochromatosis and is usually related more to obesity, fatty liver (she states that she was told she has an enlarged liver and several family members with fatty liver) or alcohol consumption.  This patient's LFT's are normal but her Ferritin is well above the upper limits when iron removal therapy would begin (normally phlebotomies but she has underlying anemia).   This patient was also noted to have an MRI of the abdomen showing slight liver iron overload along with splenic iron overload and therefore  was indicated to begin iron chelation.  -The earliest ferritin level we have available is from 3/26/2018 which was elevated 918 but the increase to over 1400 likely reflects  iron she had received along with the underlying gene mutation. Iron profile is consistent with iron overload  -As the work up here is not clear as to the nature of her anemia and she was also found to have very elevated ferritin with underlying anemia, she had a BM Bx:  Bone marrow biopsy (was done 9/18/2017):   • Posterior iliac crest, bone marrow biopsy and aspirate:  • Normocellular bone marrow 40%.  • Myeloid to erythroid ratio equals 0.8:1.  • Iron staining is 1+  • Complete blood count and peripheral blood smear, review:  • Microcytic anemia.  • No dyspoietic changes and no circulating blasts.  • flow cytometry negative    • CMP for LFT check in 9/1/20 were normal  • Iron profile for transferrin level are indicative of anemia of chronic disease  • Ferritin level noted to be excessive on 9/1/20  • initially was tried on phlebotomies but as she also has an anemia, it was difficult to balance removal with the  anemia and therefore switched to Jadenu   Phlebotomies to date:  11/20/2020 250 cc   11.5.21 250 cc   11.19.21 250 cc   2.2.21 250 cc   3.11.21 250 cc   11.30.21 250 cc   12.14.21 250 cc   1.11.22 250 cc   1.20.22 Ferritin <500 on 12.28.21 --> can STOP phlebotomy and the iron chelation  at this time. If increases >500 again, may be related to inflammation and can consider repeat MRI of the liver and spleen to rule out iron overload still      - Jadenu (started 5.4.21) which has improved the ferritin quickly and significantly with the ferritin now being <1000.  Her current dose is 1890 mg daily (14 mg/kg daily) this medication can be continued as follows:  • If serum ferritin falls below 1000 at 2 consecutive visits, can consider a dose reduction   • If serum ferritin falls below 500, stop treatment and monitor monthly --found to be  less than 500 on 6/21/2021 and therefore held at this time with repeat scheduled to be tested in August 2021.  On 7.8.21, slightly increased but will recheck on August 10, 2021 appointment.  We discussed that she will restart the Jadenu on 8.10.21 and we will make adjustments based on renal function and ferritin level  o Started 1/2 dose of the Jadenue on 8.10.21 to 900 mg (360 x2 and 90 x2)  o Ferritin down to less than 500 on 10/18/2021.  In addition, patient has been complaining of some diarrhea as well as a slight decrease in her GFR and therefore Jadenu was held at that time.  Plan was to adjust the dosing of Jadenu every 3 to 6 months based on ferritin level and to discontinue the medication when ferritin is below 500 (as per recommendations of Janedu therapy).  If the ferritin increases again, would NOT restart Jadenu but instead consider small volume phlebotomy again as the Hb is greatly improved.  Ferritin <500, will hold iron chelation and phlebotomy and if ferritin increases again, may be secondary to inflammation and therefore would check MRI prior to restarting iron chelation or phlebotomy  o As the patient had difficulty with anemia in the past, we do 250 mL of phlebotomy every 2 weeks as long as hemoglobin is above 11 -- STOP AFTER 1.20.22  o hearing test 5.17.21 and eye exam and reported to have a full eye exam in May 2021 (we do not have the letter) but was reportedly showing that she had a change in her prescription but was not told she had any other issues in the eyes      ** Leukocytosis, resolved, likely related to steroids  - noted on 6.21.21 and is likely secondary to (1) recent tooth extraction and (2) on DEXAMETHASONE from dental which is to complete on 6.22.21.  Again elevated on 7/8/2021 but decreased and was on steroids until 7.6.21   - monitor for now as no fevers nor signs of infection  -- normal WBC on 8.10.21    ** Infection status:   - possible Covid infection in February 2021 (had  "all the symptoms when son was sick with covid but patient tested negative)  -Presented on 7/8/2021 with complaints of overall fatigue so checked  Covid testing and was  NEGATIVE  - covid vaccine status MODERNA with second shot in October 2021  - has covid symptoms on 1.20.22 and will get rechecked (previously test was negative)      **Metabolic / Renal:   - morbid obesity s/p gastric sleeve, has not been following with bariatric surgery  - s/p full dental extraction beginning of 6/2021    ** Endocrine:   -Noted to have an elevated glucose on 1/5/2021, unclear if the patient was fasting but may require further work-up through PCP    ** GI:   - GERD being followed by PCP  - s/p gastric sleeve surgery in 2017, has lost about 50 lbs total, patient will call Dr. Cordero as she would like to be re-evaulated for gastric bypass  - \"Feeling of pulling\" in the area of the spleen described on 11.16.21 -- will check US abdomen again to evaluate to see if spleen has increased in size   - alk phos noted to be elevated -- GGT sent and was found to be WNL indicating elevation due to bony disease.   In the case of bony source, could be from benign causes such as Paget's disease, hyper parathyroidism, osteomalacia or recent fractures or even osteopenia.  NM Bone scan 11.29.21: 1. Increased tracer activity at the L1 level may be degenerative in etiology. Correlation with plain films recommended to exclude a compression deformity. Postoperative changes of the lumbosacral spine. Arthritic uptake within the joints the upper and lower extremities. No additional abnormal bony uptake identified. XR Lumbar spine ordered 11.30.21 but not done  - US spleen due to LLQ paisn on 12.17.21: Splenomegaly 13.6x7.3x16.7, no focal mass, no vascular abnormality. This is 2 cm larger than 1 years ago. Brother also had a splenectomy for unclear reasons in the past and told patient no abnormality was found. As the patient has now symptoms which may be " consistent with enlarging spleen, would have the patient get pre-splenectomy vaccinations Awaiting resulst of PET scan prior to deciding if spleen needs to be removed   o 13 Valent pneumococcal conjugate vaccine (PCV13) followed by the 23 valent pneumococcal polysaccharide vaccine (PPSV23) > 8 weeks later  o H influenza type B vaccine (Hib)  o Quadrivalent meningococcal conjugate ACWY vaccine series  o Monovalent meningococcal serogroup B vaccine  --> She received all these vaccinations 12/17/2021  - Causes of splenomegaly:  • Liver disease  • Vascular obstruction - not seen in this patient  • Hematologic malignancies   • Autoimmune cytopenias - SHENA negative but CRP and ESR elevated slightly  • Extramedullary hematopoiesis  • Infection - Infectious organisms associated with splenomegaly include:  •Mayra-Barr virus   •HIV/AIDS - negative  Malaria             •Mycobacteria             •Visceral leishmaniasis             •Schistosomiasis             •Brucellosis             •Leptospirosis             Histoplasmosis   • Infiltrative disorders             •Sarcoidosis - no evidence on CXR            •Amyloidosis - not checked on BM Bx done in 2017            •Gaucher disease             •Systemic lupus erythematosus (SLE) SHENA negative            •Hemophagocytic lymphohistiocytosis (HLH) no signs of this              Lymphoproliferative disorders such as autoimmune lymphoproliferative syndrome (ALPS)             •Tumor metastases  Focal lesions - none seen on US spleen    ** Pulmonary:   - Current smoking status quit smoking in 2014  - no current issues    ** Cardiology:   - HTN, followed by PCP    ** Neurologic:   - FREQUENT FALLS -- with recent foot fracture after falling down the stairs, encouraged to speak with PCP about work up, may be medication related    ** Rheumatology:  - arthritis being followed by PCP but she would like to see a rheumatologist.  She is requesting to be referred and so will refer to   [facility]  - as anemia may be related to rheumatology, would check:  • SHENA negative  • RF Negative  • CRP elevated on 9.18.20 to 2.32, again elevated at 2.21 on 1/5/2021 --> stable at 2.33 on March 11, 2020,  still slightly elevated but improved on 6.21.21  - she has not has the back x-rays that were ordered, still reporting pain on 1.20.22    ** /RENAL  - flank pain in the left side, likely secondary to the spleen has improved somewhat    ** Psychosocial:   - depression being followed by PCP  - on 7.8.21 appears to be depressed and anxious not sleeping as well at night and encouraged. We spoke for a long time regarding stress relief in the form of (1) coming off social media and (2) trying to go out for long walks and increase physical activity   - On follow up on 8.10.21 - very depressed, we discussed the need to have discussion with PCP regarding starting therapy    ** Pain control:   - sees pain management at [facility], on lyrica and flexiril and oxy    ** IV access   - has it flushed at [facility] every 8 weeks    ** Health Maintenance  - Last colonoscopy  2017 and reportedly normal  - EGD in 2017 negative for H pylori and Salomon's  - Last mammogram 2021 and reportedly normal      Follow up      - Follow up April 2022    Amit Goldberg, MD    Time tracker     This visit was via a telehealth video visit and total time spent with patient over video in addition to precharting, reviewing findings and discussing the plan of care with the patient was 60 min

## 2022-01-21 ENCOUNTER — LAB (OUTPATIENT)
Dept: LAB | Facility: HOSPITAL | Age: 47
End: 2022-01-21

## 2022-01-21 ENCOUNTER — TELEPHONE (OUTPATIENT)
Dept: FAMILY MEDICINE CLINIC | Facility: CLINIC | Age: 47
End: 2022-01-21

## 2022-01-21 ENCOUNTER — TELEMEDICINE (OUTPATIENT)
Dept: FAMILY MEDICINE CLINIC | Facility: CLINIC | Age: 47
End: 2022-01-21

## 2022-01-21 VITALS — BODY MASS INDEX: 47.09 KG/M2 | HEIGHT: 66 IN | WEIGHT: 293 LBS

## 2022-01-21 DIAGNOSIS — R11.0 NAUSEA: ICD-10-CM

## 2022-01-21 DIAGNOSIS — Z20.822 SUSPECTED COVID-19 VIRUS INFECTION: ICD-10-CM

## 2022-01-21 DIAGNOSIS — R53.83 OTHER FATIGUE: ICD-10-CM

## 2022-01-21 DIAGNOSIS — U07.1 COVID-19 VIRUS DETECTED: ICD-10-CM

## 2022-01-21 DIAGNOSIS — Z20.822 SUSPECTED COVID-19 VIRUS INFECTION: Primary | ICD-10-CM

## 2022-01-21 LAB — SARS-COV-2 ORF1AB RESP QL NAA+PROBE: DETECTED

## 2022-01-21 PROCEDURE — U0004 COV-19 TEST NON-CDC HGH THRU: HCPCS

## 2022-01-21 PROCEDURE — C9803 HOPD COVID-19 SPEC COLLECT: HCPCS

## 2022-01-21 PROCEDURE — 99213 OFFICE O/P EST LOW 20 MIN: CPT | Performed by: NURSE PRACTITIONER

## 2022-01-21 RX ORDER — FAMOTIDINE 20 MG/1
20 TABLET, FILM COATED ORAL 2 TIMES DAILY
Qty: 60 TABLET | Refills: 0 | Status: SHIPPED | OUTPATIENT
Start: 2022-01-21 | End: 2022-05-18 | Stop reason: ALTCHOICE

## 2022-01-21 RX ORDER — ONDANSETRON 8 MG/1
8 TABLET, ORALLY DISINTEGRATING ORAL EVERY 8 HOURS PRN
Qty: 20 TABLET | Refills: 0 | Status: SHIPPED | OUTPATIENT
Start: 2022-01-21

## 2022-01-21 NOTE — TELEPHONE ENCOUNTER
Caller: Naomi Bhatia    Relationship: Self    Best call back number: [phone]    Caller requesting test results: SELF     What test was performed: COVID     When was the test performed: 1/21/2022    Where was the test performed: IN OFFICE

## 2022-01-21 NOTE — PROGRESS NOTES
"This was an audio and video enabled telemedicine encounter. Patient verbally consented to visit. Patient was located at Home and I was located at [facility]  location.     Chief Complaint  Cough and Fatigue    Subjective    History of Present Illness      Patient presents to [facility] for   Headache started over the weekend Saturday, congestion, sore throat, dry cough, taste changes. Patient is fully vaccinated at time. Patient admits to fatigue. Her son recently tested positive for covid but she tested negative last Monday.        Review of Systems   Constitutional: Positive for fatigue. Negative for fever.   HENT: Positive for congestion and sore throat.    Respiratory: Positive for cough. Negative for shortness of breath.    Neurological: Positive for headache.       I have reviewed and agree with the HPI and ROS information as above.  Jennifer Grey, APRN     Objective   Vital Signs:   Ht 167.6 cm (66\")   Wt (!) 138 kg (304 lb)   BMI 49.07 kg/m²       Physical Exam  Constitutional:       Appearance: She is well-developed. She is morbidly obese.   HENT:      Head: Normocephalic and atraumatic.      Nose: Congestion present.      Mouth/Throat:      Lips: Pink. No lesions.   Eyes:      General: Lids are normal. Vision grossly intact.      Conjunctiva/sclera: Conjunctivae normal.      Right eye: Right conjunctiva is not injected.      Left eye: Left conjunctiva is not injected.   Pulmonary:      Effort: Pulmonary effort is normal.   Musculoskeletal:         General: Normal range of motion.      Cervical back: Full passive range of motion without pain, normal range of motion and neck supple.   Skin:     General: Skin is dry.   Neurological:      Mental Status: She is alert and oriented to person, place, and time.      Motor: Motor function is intact.   Psychiatric:         Mood and Affect: Mood and affect normal.         Judgment: Judgment normal.          Result Review  " Data Reviewed:     COVID PRE-OP / PRE-PROCEDURE SCREENING ORDER (NO ISOLATION) - Swab, Nasal Cavity (01/21/2022 10:16)                Assessment and Plan      Problem List Items Addressed This Visit        Symptoms and Signs    Fatigue      Other Visit Diagnoses     Suspected COVID-19 virus infection    -  Primary    Relevant Orders    COVID PRE-OP / PRE-PROCEDURE SCREENING ORDER (NO ISOLATION) - Swab, Nasal Cavity (Completed)    Nausea        Relevant Medications    famotidine (Pepcid) 20 MG tablet    ondansetron ODT (Zofran ODT) 8 MG disintegrating tablet    COVID-19 virus detected          Plan: On day 7 of symptoms.   1. Covid testing today, will call with results (POSITIVE) [place] testing used.   2. Infusion not available, we did check into this.    3. Start otc vitamins c, d, zinc. Pepcid and zofran.         Follow Up   Return if symptoms worsen or fail to improve.  Patient was given instructions and counseling regarding her condition or for health maintenance advice. Please see specific information pulled into the AVS if appropriate.

## 2022-01-21 NOTE — PROGRESS NOTES
Please let pt know results: covid positive. Quarantine for 10 total days since she still has symptoms. Start otc vit c, d, zinc. No infusions available currently.

## 2022-01-24 ENCOUNTER — TELEPHONE (OUTPATIENT)
Dept: ONCOLOGY | Facility: CLINIC | Age: 47
End: 2022-01-24

## 2022-01-24 LAB
LAB AP CASE REPORT: NORMAL
PATH REPORT.FINAL DX SPEC: NORMAL
PATH REPORT.GROSS SPEC: NORMAL

## 2022-01-24 NOTE — TELEPHONE ENCOUNTER
Called patient to let her know that since she is Covid positive we will need to cancel her PET scan for tomorrow, her lab appointment and nurse visit. Patient was advised that she will need to test again and be symptom free to return to the cancer center in 20 days past the day she was positive and then could reschedule her PET scan at that time. Patient verbalized understanding.    filomena

## 2022-01-25 ENCOUNTER — APPOINTMENT (OUTPATIENT)
Dept: CT IMAGING | Facility: HOSPITAL | Age: 47
End: 2022-01-25

## 2022-01-25 ENCOUNTER — APPOINTMENT (OUTPATIENT)
Dept: LAB | Facility: HOSPITAL | Age: 47
End: 2022-01-25

## 2022-01-26 DIAGNOSIS — R63.5 WEIGHT GAIN: ICD-10-CM

## 2022-01-26 DIAGNOSIS — E66.01 MORBID OBESITY (HCC): ICD-10-CM

## 2022-01-27 NOTE — TELEPHONE ENCOUNTER
Received fax from pharmacy requesting refill on pts medication(s). Pt was last seen in office on 9/22/2021  and has a follow up scheduled for Visit date not found. Will send request to  Dr. Morena Saunders  for authorization.      Requested Prescriptions     Pending Prescriptions Disp Refills    metFORMIN (GLUCOPHAGE) 1000 MG tablet [Pharmacy Med Name: METFORMIN 1000MG TABLETS] 180 tablet 3     Sig: Take 1 tablet by mouth 2 times daily (with meals)

## 2022-02-01 ENCOUNTER — HOSPITAL ENCOUNTER (OUTPATIENT)
Dept: PAIN MANAGEMENT | Age: 47
Discharge: HOME | End: 2022-02-01
Payer: MEDICARE

## 2022-02-01 VITALS
SYSTOLIC BLOOD PRESSURE: 106 MMHG | DIASTOLIC BLOOD PRESSURE: 59 MMHG | RESPIRATION RATE: 18 BRPM | OXYGEN SATURATION: 95 % | HEART RATE: 95 BPM | TEMPERATURE: 96.4 F

## 2022-02-01 DIAGNOSIS — R52 PAIN MANAGEMENT: ICD-10-CM

## 2022-02-01 PROCEDURE — 62323 NJX INTERLAMINAR LMBR/SAC: CPT

## 2022-02-01 PROCEDURE — 2580000003 HC RX 258

## 2022-02-01 PROCEDURE — A4216 STERILE WATER/SALINE, 10 ML: HCPCS

## 2022-02-01 PROCEDURE — 2500000003 HC RX 250 WO HCPCS

## 2022-02-01 PROCEDURE — 3209999900 FLUORO FOR SURGICAL PROCEDURES

## 2022-02-01 PROCEDURE — 6360000002 HC RX W HCPCS

## 2022-02-01 RX ORDER — LIDOCAINE HYDROCHLORIDE 10 MG/ML
5 INJECTION, SOLUTION EPIDURAL; INFILTRATION; INTRACAUDAL; PERINEURAL ONCE
Status: DISCONTINUED | OUTPATIENT
Start: 2022-02-01 | End: 2022-02-03 | Stop reason: HOSPADM

## 2022-02-01 RX ORDER — SODIUM CHLORIDE 9 MG/ML
5 INJECTION INTRAVENOUS ONCE
Status: DISCONTINUED | OUTPATIENT
Start: 2022-02-01 | End: 2022-02-03 | Stop reason: HOSPADM

## 2022-02-01 RX ORDER — METHYLPREDNISOLONE ACETATE 40 MG/ML
80 INJECTION, SUSPENSION INTRA-ARTICULAR; INTRALESIONAL; INTRAMUSCULAR; SOFT TISSUE ONCE
Status: DISCONTINUED | OUTPATIENT
Start: 2022-02-01 | End: 2022-02-03 | Stop reason: HOSPADM

## 2022-02-01 ASSESSMENT — PAIN - FUNCTIONAL ASSESSMENT
PAIN_FUNCTIONAL_ASSESSMENT: 0-10
PAIN_FUNCTIONAL_ASSESSMENT: 0-10

## 2022-02-01 NOTE — INTERVAL H&P NOTE
Update History & Physical    The patient's History and Physical  was reviewed with the patient and I examined the patient. There was  NO CHANGE:[id number]}. The surgical site was confirmed by the patient and me. Plan: The risks, benefits, expected outcome, and alternative to the recommended procedure have been discussed with the patient. Patient understands and wants to proceed with the procedure.      Electronically signed by Pedro Vargas MD on 2/1/2022 at 12:28 PM

## 2022-02-01 NOTE — PROGRESS NOTES
Procedure:  Level of Consciousness: [x]Alert [x]Oriented []Disoriented []Lethargic  Anxiety Level: [x]Calm []Anxious []Depressed []Other  Skin: [x]Warm [x]Dry []Cool []Moist []Intact []Other  Cardiovascular: [x]Palpitations: []Never [x]Occasionally []Frequently  Chest Pain: [x]No []Yes  Respiratory:  [x]Unlabored []Labored []Cough ([] Productive []Unproductive)  HCG Required: [x]No []Yes   Results: []Negative []Positive  Knowledge Level:        [x]Patient/Other verbalized understanding of pre-procedure instructions. [x]Assessment of post-op care needs (transportation, responsible caregiver)        []Able to discuss health care problems and how to deal with it.   Factors that Affect Teaching:        Language Barrier: [x]No []Yes - why:        Hearing Loss:        [x]No []Yes            Corrective Device:  []Yes [x]No        Vision Loss:           []No [x]Yes            Corrective Device:  [x]Yes []No        Memory Loss:       [x]No []Yes            []Short Term []Long Term  Motivational Level:  [x]Asks Questions                  []Extremely Anxious       [x]Seems Interested               []Seems Uninterested                  [x]Denies need for Education  Risk for Injury:  [x]Patient oriented to person, place and time  []History of frequent falls/loss of balance  Nutritional:  []Change in appetite   []Weight Gain   []Weight Loss  Functional:  []Requires assistance with ADL's

## 2022-02-07 ENCOUNTER — TELEPHONE (OUTPATIENT)
Dept: PAIN MANAGEMENT | Age: 47
End: 2022-02-07

## 2022-02-07 DIAGNOSIS — M54.10 BACK PAIN WITH LEFT-SIDED RADICULOPATHY: ICD-10-CM

## 2022-02-07 DIAGNOSIS — M54.16 CHRONIC LUMBAR RADICULOPATHY: ICD-10-CM

## 2022-02-08 RX ORDER — OXYCODONE HYDROCHLORIDE 5 MG/1
5 TABLET ORAL 3 TIMES DAILY PRN
Qty: 90 TABLET | Refills: 0 | Status: SHIPPED | OUTPATIENT
Start: 2022-02-08 | End: 2022-03-07 | Stop reason: SDUPTHER

## 2022-02-17 ENCOUNTER — HOSPITAL ENCOUNTER (OUTPATIENT)
Dept: CT IMAGING | Facility: HOSPITAL | Age: 47
Discharge: HOME | End: 2022-02-17

## 2022-02-17 DIAGNOSIS — R16.1 SPLENOMEGALY: ICD-10-CM

## 2022-02-17 DIAGNOSIS — R93.5 ABNORMAL FINDINGS ON DIAGNOSTIC IMAGING OF OTHER ABDOMINAL REGIONS, INCLUDING RETROPERITONEUM: ICD-10-CM

## 2022-02-17 PROCEDURE — 78815 PET IMAGE W/CT SKULL-THIGH: CPT

## 2022-02-17 PROCEDURE — A9552 F18 FDG: HCPCS | Performed by: INTERNAL MEDICINE

## 2022-02-17 PROCEDURE — 0 FLUDEOXYGLUCOSE F18 SOLUTION: Performed by: INTERNAL MEDICINE

## 2022-02-17 RX ADMIN — FLUDEOXYGLUCOSE F18 1 DOSE: 300 INJECTION INTRAVENOUS at 11:36

## 2022-02-18 ENCOUNTER — TELEPHONE (OUTPATIENT)
Dept: ONCOLOGY | Facility: CLINIC | Age: 47
End: 2022-02-18

## 2022-02-18 ENCOUNTER — TELEPHONE (OUTPATIENT)
Dept: PRIMARY CARE CLINIC | Age: 47
End: 2022-02-18

## 2022-02-18 NOTE — TELEPHONE ENCOUNTER
Dr.Goldbergs office called. They did a PET scan on pt and she needs her spleen removed. They were going to refer to [facility] for this. Ben Patel wants to converse with you to see what you thought about this since you are her PCP.     Zuleyka Mack  [phone] option 1 and ask for Zuleyka Mack

## 2022-02-18 NOTE — TELEPHONE ENCOUNTER
Notified pt of PET Scan results and the need to see a surgeon for splenectomy. She has had all vaccines Dr. RONDON needs except 2nd pneumonia one. She will contact pcp Dr. Alberts at [facility] to see when she needs to get that second one done.      ----- Message from Naomi Bhatia sent at 2/18/2022 11:27 AM CST -----  Regarding: Pet Scan  Dr. Goldberg, does this mean I have cancer?

## 2022-02-22 ENCOUNTER — NURSE ONLY (OUTPATIENT)
Dept: PRIMARY CARE CLINIC | Age: 47
End: 2022-02-22
Payer: MEDICARE

## 2022-02-22 DIAGNOSIS — Z90.81 S/P SPLENECTOMY: Primary | ICD-10-CM

## 2022-02-22 PROCEDURE — 90734 MENACWYD/MENACWYCRM VACC IM: CPT | Performed by: PEDIATRICS

## 2022-02-22 PROCEDURE — 90620 MENB-4C VACCINE IM: CPT | Performed by: PEDIATRICS

## 2022-02-22 PROCEDURE — 90471 IMMUNIZATION ADMIN: CPT | Performed by: PEDIATRICS

## 2022-02-22 PROCEDURE — 90472 IMMUNIZATION ADMIN EACH ADD: CPT | Performed by: PEDIATRICS

## 2022-02-22 NOTE — TELEPHONE ENCOUNTER
Please get medical records from Dr. Brionna Costello. Dr. Brionna Costello can call me if he wants to talk to me. I would be happy to discuss this with them.

## 2022-02-22 NOTE — PROGRESS NOTES
Pt walked in for MCV and Men B immunizations given and tolerated without complaints.     Immunizations Administered     Name Date Dose Route    Meningococcal B, OMV (Bexsero) 2/22/2022 0.5 mL Intramuscular    Site: Deltoid- Left    Lot: [id number]    NDC: 93201-324-67    Meningococcal MCV4O (Menveo) 2/22/2022 0.5 mL Intramuscular    Site: Deltoid- Right    Lot: [id number]    NDC: 49699-880-88

## 2022-02-22 NOTE — TELEPHONE ENCOUNTER
Called pt, was on hold to speak to [facility]. Was sent to . Left message that  will speak to him and asked for records to be sent over.  Also under care everywhere

## 2022-02-23 DIAGNOSIS — M54.16 LUMBAR RADICULOPATHY: ICD-10-CM

## 2022-02-23 NOTE — TELEPHONE ENCOUNTER
Received fax from pharmacy requesting refill on pts medication(s). Pt was last seen in office on 9/22/2021  and has a follow up scheduled for Visit date not found. Will send request to  Dr. Daniel Simmons  for authorization. Requested Prescriptions     Pending Prescriptions Disp Refills    pregabalin (LYRICA) 100 MG capsule [Pharmacy Med Name: PREGABALIN 100MG CAPSULES] 60 capsule 2     Sig: Take 1 capsule by mouth 2 times daily for 30 days.

## 2022-02-24 RX ORDER — PREGABALIN 100 MG/1
100 CAPSULE ORAL 2 TIMES DAILY
Qty: 60 CAPSULE | Refills: 2 | Status: SHIPPED | OUTPATIENT
Start: 2022-02-24 | End: 2022-05-12 | Stop reason: SDUPTHER

## 2022-02-28 ENCOUNTER — TELEPHONE (OUTPATIENT)
Dept: ONCOLOGY | Facility: CLINIC | Age: 47
End: 2022-02-28

## 2022-02-28 DIAGNOSIS — R16.1 SPLENOMEGALY: Primary | ICD-10-CM

## 2022-02-28 NOTE — TELEPHONE ENCOUNTER
Provider: DR GOLDBERG  Caller: ISA  Relationship to Patient: SELF    Reason for Call: ISA IS NEEDING TO TALK TO SOMEONE REGARDING SETTING HER UP WITH A DIFFERENT SURGEON TO REMOVE HER SPLEEN.    SHE STATES THAT SHE OWES THEM $49.95 AND WILL NOT DO THE SURGERY UNTIL HER BILL IS PAID.    PLEASE CALL TO DISCUSS

## 2022-03-07 ENCOUNTER — OFFICE VISIT (OUTPATIENT)
Dept: SURGERY | Age: 47
End: 2022-03-07
Payer: MEDICARE

## 2022-03-07 VITALS
TEMPERATURE: 97.3 F | HEIGHT: 66 IN | DIASTOLIC BLOOD PRESSURE: 74 MMHG | WEIGHT: 293 LBS | SYSTOLIC BLOOD PRESSURE: 130 MMHG | BODY MASS INDEX: 47.09 KG/M2

## 2022-03-07 DIAGNOSIS — R16.1 SPLENOMEGALY: ICD-10-CM

## 2022-03-07 DIAGNOSIS — M54.16 CHRONIC LUMBAR RADICULOPATHY: ICD-10-CM

## 2022-03-07 DIAGNOSIS — E83.110 HEREDITARY HEMOCHROMATOSIS (HCC): ICD-10-CM

## 2022-03-07 DIAGNOSIS — E66.01 MORBID OBESITY (HCC): Primary | ICD-10-CM

## 2022-03-07 PROCEDURE — 1036F TOBACCO NON-USER: CPT | Performed by: SURGERY

## 2022-03-07 PROCEDURE — 99203 OFFICE O/P NEW LOW 30 MIN: CPT | Performed by: SURGERY

## 2022-03-07 PROCEDURE — G8484 FLU IMMUNIZE NO ADMIN: HCPCS | Performed by: SURGERY

## 2022-03-07 PROCEDURE — G8427 DOCREV CUR MEDS BY ELIG CLIN: HCPCS | Performed by: SURGERY

## 2022-03-07 PROCEDURE — G8417 CALC BMI ABV UP PARAM F/U: HCPCS | Performed by: SURGERY

## 2022-03-07 ASSESSMENT — ENCOUNTER SYMPTOMS
EYE PAIN: 0
APNEA: 1
DIARRHEA: 0
CONSTIPATION: 0
VOMITING: 0
SHORTNESS OF BREATH: 1
CHOKING: 0
SORE THROAT: 0
ABDOMINAL PAIN: 0
EYE ITCHING: 1
EYE REDNESS: 0
BACK PAIN: 1
WHEEZING: 1
ABDOMINAL DISTENTION: 0
CHEST TIGHTNESS: 0
FACIAL SWELLING: 0
EYE DISCHARGE: 0

## 2022-03-07 NOTE — PROGRESS NOTES
SUBJECTIVE:  Ms. Claudene Robin is a 51 y.o. female who presents today with left upper quadrant pain. Patient with issues with this pain over the last few years. Initially thought she was having pain from kidney stones. Was evaluated and showed to have splenomegaly. Work-up performed by hematology locally and at [facility]. Diagnosed with hereditary hemochromatosis. Noted to having some uptake on PET scan which was concerning. Patient with history of gastric sleeve yet only has lost 50 pounds. States she is interested in further gastric procedures.       Past Medical History:   Diagnosis Date    Anemia     has had iron infusion    Anxiety     Arthritis     Asthma     Back pain with left-sided radiculopathy 3/14/2016    DDD (degenerative disc disease), lumbar     Depression     Esophagitis     Fibromyalgia     Gastritis     Headache(784.0)     History of blood transfusion     Hypertension     Obesity     RLS (restless legs syndrome)     Sleep apnea     uses CPAP machine     Past Surgical History:   Procedure Laterality Date    ANKLE SURGERY Right     APPENDECTOMY  4/19/15    BACK SURGERY  2000    CARPAL TUNNEL RELEASE Left     CERVICAL SPINE SURGERY N/A 9/1/15    CHOLECYSTECTOMY  1995    HIP SURGERY Right 1987    HIP SURGERY  03/28/2018    HYSTERECTOMY      partial 2008, still has ovaries and cervix    INSERTION / REMOVAL / REPLACEMENT VENOUS ACCESS CATHETER Left 11/7/2017    PORT INSERTION performed by Sana Kraus MD at [address] [place] [address] LITHOTRIPSY  [address] [place] [address]      with hardware placed    OR COLONOSCOPY FLX DX W/COLLJ SPEC WHEN PFRMD N/A 5/2/2017    Dr Karla Mcdonough, 7 yr (age 47) recall    OR EGD TRANSORAL BIOPSY SINGLE/MULTIPLE N/A 5/2/2017    Dr ABHIJIT Baltazar-Gastritis/gastropathy    SALPINGO-OOPHORECTOMY      STOMACH SURGERY  12/20/2017    dr Velasquez November Right 2019     Current Outpatient Medications   Medication Sig Dispense Refill    pregabalin (LYRICA) 100 MG capsule Take 1 capsule by mouth 2 times daily for 30 days. 60 capsule 2    oxyCODONE (ROXICODONE) 5 MG immediate release tablet Take 1 tablet by mouth 3 times daily as needed for Pain for up to 30 days. 90 tablet 0    metFORMIN (GLUCOPHAGE) 1000 MG tablet Take 1 tablet by mouth 2 times daily (with meals) 180 tablet 3    albuterol sulfate HFA (PROVENTIL HFA) 108 (90 Base) MCG/ACT inhaler Inhale 2 puffs into the lungs every 6 hours as needed for Wheezing 3 each 3    nortriptyline (PAMELOR) 25 MG capsule TAKE 1 TO 2 CAPSULES BY MOUTH EVERY NIGHT 180 capsule 3    omeprazole (PRILOSEC) 40 MG delayed release capsule Take 1 capsule by mouth every morning (before breakfast) 30 capsule 11    carbidopa-levodopa (SINEMET)  MG per tablet Take 1 tablet by mouth nightly 90 tablet 3    predniSONE (DELTASONE) 10 MG tablet Days 1,2,3 = 60 mg (6 pills), Days 4,5 = 50 mg, Days 6,7 = 40 mg, Day 8 = 30 mg, Day 9 = 20 mg, Day 10 = 10 mg, Day 11,12 = 5 mg (1/2 tab) 43 tablet 0    Elastic Bandages & Supports (LUMBAR BACK BRACE/SUPPORT PAD) MISC Wear as needed with activity. 1 each 0    triamcinolone (KENALOG) 0.1 % cream Apply topically 2 times daily.  80 g 5    ondansetron (ZOFRAN-ODT) 8 MG TBDP disintegrating tablet Take 1 tablet by mouth every 8 hours as needed for Nausea or Vomiting 15 tablet 0    venlafaxine (EFFEXOR XR) 150 MG extended release capsule Take 1 capsule by mouth daily 90 capsule 3    amLODIPine (NORVASC) 5 MG tablet Take 1 tablet by mouth daily 90 tablet 3    acyclovir (ZOVIRAX) 800 MG tablet TAKE 1 TABLET BY MOUTH DAILY 90 tablet 3    metoprolol succinate (TOPROL XL) 50 MG extended release tablet TAKE 1 TABLET BY MOUTH TWICE DAILY 180 tablet 3    venlafaxine (EFFEXOR XR) 75 MG extended release capsule TAKE 1 CAPSULE BY MOUTH DAILY IN ADDITION  MG TO MAKE 225 MG DAILY 30 capsule 11    deferasirox (JADENU) 360 MG tablet Take 5 tablets by mouth every morning (before breakfast)      levocetirizine (XYZAL) 5 MG tablet Take 1 tablet by mouth nightly 90 tablet 3    olopatadine (PATADAY) 0.2 % SOLN ophthalmic solution Place 1 drop into both eyes daily 1 Bottle 2    naloxone (NARCAN) 4 MG/0.1ML LIQD nasal spray 1 spray by Nasal route as needed for Opioid Reversal 2 each 0    Calcium-Vitamin D 600-200 MG-UNIT TABS Take 1 tablet by mouth daily      Multiple Vitamins-Minerals (MULTIVITAMIN & MINERAL PO) Take  by mouth.  CRANBERRY Take 500 mg by mouth daily.  Cholecalciferol (VITAMIN D3) 5000 UNITS TABS Take 5,000 Units by mouth daily        No current facility-administered medications for this visit. Allergies: Silver, Tegaderm ag mesh 2\"x2\" [wound dressings], and Zithromax [azithromycin]    Family History   Problem Relation Age of Onset    Diabetes Mother     High Blood Pressure Mother     Colon Polyps Mother     Heart Disease Mother     Cancer Mother     Depression Mother     Cancer Father     High Blood Pressure Father     Heart Disease Father     Stroke Father     High Blood Pressure Sister     Depression Sister     Anxiety Disorder Sister     Cancer Brother     Esophageal Cancer Brother     Anxiety Disorder Brother     Diabetes Brother     ADHD Brother     Depression Brother     Other Other         has history of suicide in family maternal great uncle       Social History     Tobacco Use    Smoking status: Former Smoker     Packs/day: 1.00     Years: 25.00     Pack years: 25.00     Types: Cigarettes     Quit date: 2013     Years since quittin.4    Smokeless tobacco: Never Used   Substance Use Topics    Alcohol use: Yes     Comment: rarely       Review of Systems   Constitutional: Positive for fatigue. Negative for activity change, chills and fever. HENT: Negative for facial swelling, hearing loss and sore throat. Eyes: Positive for itching. Negative for pain, discharge and redness. Respiratory: Positive for apnea, shortness of breath and wheezing. Negative for choking and chest tightness. Cardiovascular: Negative for chest pain and palpitations. Gastrointestinal: Negative for abdominal distention, abdominal pain, constipation, diarrhea and vomiting. Endocrine: Positive for polydipsia and polyphagia. Genitourinary: Positive for flank pain. Musculoskeletal: Positive for arthralgias, back pain, joint swelling, neck pain and neck stiffness. Neurological: Positive for weakness, numbness and headaches. Negative for dizziness and speech difficulty. Psychiatric/Behavioral: Positive for agitation. Negative for hallucinations and suicidal ideas. The patient is nervous/anxious. OBJECTIVE:  /74 (Site: Right Upper Arm, Position: Sitting, Cuff Size: Large Adult)   Temp 97.3 °F (36.3 °C) (Temporal)   Ht 5' 6\" (1.676 m)   Wt (!) 309 lb (140.2 kg)   BMI 49.87 kg/m²   Physical Exam  Constitutional:       Appearance: Normal appearance. She is obese. HENT:      Head: Normocephalic and atraumatic. Right Ear: External ear normal.      Left Ear: External ear normal.      Nose: Nose normal.   Eyes:      Pupils: Pupils are equal, round, and reactive to light. Pulmonary:      Effort: Pulmonary effort is normal.      Breath sounds: Wheezing present. Abdominal:      General: Bowel sounds are normal.      Palpations: Abdomen is soft. Tenderness: There is no abdominal tenderness. There is no guarding. Musculoskeletal:         General: Normal range of motion. Cervical back: Normal range of motion and neck supple. Skin:     General: Skin is warm and dry. Neurological:      General: No focal deficit present. Mental Status: She is alert and oriented to person, place, and time. Psychiatric:         Mood and Affect: Mood normal.         Behavior: Behavior normal.         ASSESSMENT:   Diagnosis Orders   1. Morbid obesity ([address]     2.  Hereditary hemochromatosis ([facility] [address]     3. Splenomegaly         PLAN:  No orders of the defined types were placed in this encounter. No orders of the defined types were placed in this encounter. After long discussion with patient, I do feel that she would be better served by referral to tertiary care center for her splenectomy. I do believe she should be evaluated at that time also for conversion of her gastric sleeve to Jody-en-Y bypass. Evaluation by foregut surgery at [facility] could possibly lead to a dual surgery whereas both procedures could be performed at once. Patient does agree with this plan. No follow-ups on file.     Nataliia Lockhart DO 3/7/2022 12:40 PM

## 2022-03-08 ENCOUNTER — TELEPHONE (OUTPATIENT)
Dept: PRIMARY CARE CLINIC | Age: 47
End: 2022-03-08

## 2022-03-08 DIAGNOSIS — Z90.81 S/P SPLENECTOMY: Primary | ICD-10-CM

## 2022-03-08 DIAGNOSIS — E66.01 MORBID OBESITY (HCC): ICD-10-CM

## 2022-03-08 RX ORDER — OXYCODONE HYDROCHLORIDE 5 MG/1
5 TABLET ORAL 3 TIMES DAILY PRN
Qty: 90 TABLET | Refills: 0 | Status: SHIPPED | OUTPATIENT
Start: 2022-03-10 | End: 2022-03-29 | Stop reason: SDUPTHER

## 2022-03-08 NOTE — TELEPHONE ENCOUNTER
----- Message from Shelley Murillo DO sent at 3/7/2022 12:49 PM CST -----      ----- Message -----  From: Twyla Mccormack DO  Sent: 3/7/2022  12:44 PM CST  To: Shelley Murillo DO

## 2022-03-08 NOTE — TELEPHONE ENCOUNTER
Progress Notes  B Beatriz Sanderson DO (Physician) [facility]     Please put in referral to [facility], general surgery and bariatric surgery

## 2022-03-11 ENCOUNTER — TELEPHONE (OUTPATIENT)
Dept: PRIMARY CARE CLINIC | Age: 47
End: 2022-03-11

## 2022-03-11 ENCOUNTER — TELEPHONE (OUTPATIENT)
Dept: ONCOLOGY | Facility: CLINIC | Age: 47
End: 2022-03-11

## 2022-03-11 NOTE — TELEPHONE ENCOUNTER
Pt called, [facility] is faxing over form to be filled out for her appt next week. They state that they need it back by 230 today to be able to schedule her ride next week. I just looked and I dont see it yet. But then she said that they are faxing it right now.

## 2022-03-11 NOTE — TELEPHONE ENCOUNTER
Caller: ISA    Relationship to patient: SELF    Best call back number: [phone]    Patient is needing: TO REQUEST CALL FROM [facility] OR DR. GOLDBERG ABOUT SPLENECTOMY. SHE IS HAVING DIFFICULTY GETTING IT SCHEDULED. PLEASE CALL PT BACK.

## 2022-03-16 NOTE — TELEPHONE ENCOUNTER
Pt wanted to let us know that her [place] doctor will have her see a surgeon there to evaluate for splenectomy. She will see [place] doctor on Tuesday the 22nd of March.

## 2022-03-29 ENCOUNTER — HOSPITAL ENCOUNTER (OUTPATIENT)
Dept: GENERAL RADIOLOGY | Age: 47
Discharge: HOME | End: 2022-03-29
Payer: MEDICARE

## 2022-03-29 ENCOUNTER — HOSPITAL ENCOUNTER (OUTPATIENT)
Dept: PAIN MANAGEMENT | Age: 47
Discharge: HOME | End: 2022-03-29
Payer: MEDICARE

## 2022-03-29 VITALS
SYSTOLIC BLOOD PRESSURE: 137 MMHG | BODY MASS INDEX: 47.09 KG/M2 | RESPIRATION RATE: 20 BRPM | OXYGEN SATURATION: 97 % | WEIGHT: 293 LBS | TEMPERATURE: 97.2 F | HEART RATE: 88 BPM | DIASTOLIC BLOOD PRESSURE: 90 MMHG | HEIGHT: 66 IN

## 2022-03-29 DIAGNOSIS — G89.29 EXACERBATION OF CHRONIC BACK PAIN: Primary | ICD-10-CM
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
## 2022-03-29 DIAGNOSIS — M54.9 EXACERBATION OF CHRONIC BACK PAIN: ICD-10-CM

## 2022-03-29 DIAGNOSIS — G89.29 EXACERBATION OF CHRONIC BACK PAIN: ICD-10-CM

## 2022-03-29 DIAGNOSIS — M54.9 EXACERBATION OF CHRONIC BACK PAIN: Primary | ICD-10-CM

## 2022-03-29 DIAGNOSIS — M54.16 CHRONIC LUMBAR RADICULOPATHY: ICD-10-CM

## 2022-03-29 PROCEDURE — 72050 X-RAY EXAM NECK SPINE 4/5VWS: CPT

## 2022-03-29 PROCEDURE — 99214 OFFICE O/P EST MOD 30 MIN: CPT | Performed by: NURSE PRACTITIONER

## 2022-03-29 PROCEDURE — 72110 X-RAY EXAM L-2 SPINE 4/>VWS: CPT

## 2022-03-29 PROCEDURE — 99214 OFFICE O/P EST MOD 30 MIN: CPT

## 2022-03-29 RX ORDER — CLINDAMYCIN PHOSPHATE 10 MG/G
1 GEL TOPICAL DAILY
COMMUNITY
Start: 2022-03-15 | End: 2022-09-26

## 2022-03-29 RX ORDER — OXYCODONE HYDROCHLORIDE 5 MG/1
5 TABLET ORAL EVERY 6 HOURS PRN
Qty: 90 TABLET | Refills: 0 | Status: SHIPPED | OUTPATIENT
Start: 2022-04-04 | End: 2022-04-28 | Stop reason: SDUPTHER

## 2022-03-29 RX ORDER — MECLIZINE HYDROCHLORIDE 25 MG/1
25 TABLET ORAL PRN
COMMUNITY
Start: 2021-09-03 | End: 2022-09-01 | Stop reason: SDUPTHER

## 2022-03-29 RX ORDER — NABUMETONE 750 MG/1
750 TABLET, FILM COATED ORAL 2 TIMES DAILY
COMMUNITY
Start: 2022-03-09

## 2022-03-29 RX ORDER — FAMOTIDINE 20 MG/1
20 TABLET, FILM COATED ORAL 2 TIMES DAILY
COMMUNITY
Start: 2022-01-25 | End: 2022-06-30

## 2022-03-29 ASSESSMENT — PAIN DESCRIPTION - LOCATION
LOCATION: BACK
LOCATION_2: NECK

## 2022-03-29 ASSESSMENT — ENCOUNTER SYMPTOMS
CONSTIPATION: 0
NAUSEA: 1
BOWEL INCONTINENCE: 0
ABDOMINAL PAIN: 1
BACK PAIN: 1
ABDOMINAL DISTENTION: 1

## 2022-03-29 ASSESSMENT — PAIN DESCRIPTION - PAIN TYPE
TYPE: CHRONIC PAIN
TYPE_2: CHRONIC PAIN

## 2022-03-29 ASSESSMENT — PAIN DESCRIPTION - ORIENTATION
ORIENTATION_2: LOWER
ORIENTATION: LOWER

## 2022-03-29 ASSESSMENT — PAIN DESCRIPTION - INTENSITY: RATING_2: 6

## 2022-03-29 NOTE — PROGRESS NOTES
Clinic Documentation      Education Provided:  [x] Review of Jayda Jeo  [] Agreement Review  [x] PEG Score Calculated [] PHQ Score Calculated [] ORT Score Calculated    [] Compliance Issues Discussed [] Cognitive Behavior Needs [x] Exercise [] Review of Test [] Financial Issues  [x] Tobacco/Alcohol Use Reviewed [x] Teaching [] New Patient [] Picture Obtained    Physician Plan:  [] Outgoing Referral  [] Pharmacy Consult  [x] Test Ordered [x] Prescription Ordered/Changed   [] Obtained Test Results / Consult Notes        Complete if patient is withholding blood thinner for procedure     Blood Thinner Patient is currently taking:      [] Plavix (Hold for 7 days)  [] Aspirin (Hold for 5 days)     [] Pletal (Hold for 2 days)  [] Pradaxa (Hold for 3 days)    [] Effient (Hold for 7 days)  [] Xarelto (Hold for 2 days)    [] Eliquis (Hold for 2 days)  [] Brilinta (Hold for 7 days)    [] Coumadin (Hold for 5 days) - (INR needs to be drawn the day prior to procedure- INR < 2.0)    [] Aggrenox (Hold for 7 days)        [] Patient will stop medication on their own.    [] Blood Thinner Form Faxed for approval to hold.    Provider form faxed to:   Assessment Completed by:  Electronically signed by Jorge Carlin RN on 3/29/2022 at 10:08 AM
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
## 2022-03-29 NOTE — TELEPHONE ENCOUNTER
Requested Prescriptions     Pending Prescriptions Disp Refills    oxyCODONE (ROXICODONE) 5 MG immediate release tablet 90 tablet 0     Sig: Take 1 tablet by mouth every 6 hours as needed for Pain for up to 30 days.

## 2022-03-31 ENCOUNTER — LAB (OUTPATIENT)
Dept: LAB | Facility: HOSPITAL | Age: 47
End: 2022-03-31

## 2022-03-31 ENCOUNTER — TELEMEDICINE (OUTPATIENT)
Dept: ONCOLOGY | Facility: CLINIC | Age: 47
End: 2022-03-31

## 2022-03-31 VITALS
BODY MASS INDEX: 47.09 KG/M2 | DIASTOLIC BLOOD PRESSURE: 82 MMHG | HEART RATE: 95 BPM | HEIGHT: 66 IN | RESPIRATION RATE: 16 BRPM | SYSTOLIC BLOOD PRESSURE: 118 MMHG | WEIGHT: 293 LBS | OXYGEN SATURATION: 96 % | TEMPERATURE: 97 F

## 2022-03-31 DIAGNOSIS — R16.1 SPLENOMEGALY: Primary | ICD-10-CM

## 2022-03-31 DIAGNOSIS — E83.110 HEREDITARY HEMOCHROMATOSIS: ICD-10-CM

## 2022-03-31 DIAGNOSIS — R16.1 SPLENOMEGALY: ICD-10-CM

## 2022-03-31 DIAGNOSIS — R74.8 ELEVATED ALKALINE PHOSPHATASE LEVEL: ICD-10-CM

## 2022-03-31 DIAGNOSIS — E83.19 IRON OVERLOAD: ICD-10-CM

## 2022-03-31 LAB
ALBUMIN SERPL-MCNC: 4.3 G/DL (ref 3.5–5.2)
ALBUMIN/GLOB SERPL: 1.5 G/DL
ALP SERPL-CCNC: 134 U/L (ref 39–117)
ALT SERPL W P-5'-P-CCNC: 31 U/L (ref 1–33)
ANION GAP SERPL CALCULATED.3IONS-SCNC: 12 MMOL/L (ref 5–15)
AST SERPL-CCNC: 56 U/L (ref 1–32)
BASOPHILS # BLD AUTO: 0.06 10*3/MM3 (ref 0–0.2)
BASOPHILS NFR BLD AUTO: 1 % (ref 0–1.5)
BILIRUB SERPL-MCNC: 0.8 MG/DL (ref 0–1.2)
BUN SERPL-MCNC: 10 MG/DL (ref 6–20)
BUN/CREAT SERPL: 13 (ref 7–25)
CALCIUM SPEC-SCNC: 9.1 MG/DL (ref 8.6–10.5)
CHLORIDE SERPL-SCNC: 99 MMOL/L (ref 98–107)
CO2 SERPL-SCNC: 25 MMOL/L (ref 22–29)
CREAT SERPL-MCNC: 0.77 MG/DL (ref 0.57–1)
CRP SERPL-MCNC: 2.54 MG/DL (ref 0–0.5)
DEPRECATED RDW RBC AUTO: 55.7 FL (ref 37–54)
EGFRCR SERPLBLD CKD-EPI 2021: 95.9 ML/MIN/1.73
EOSINOPHIL # BLD AUTO: 0.15 10*3/MM3 (ref 0–0.4)
EOSINOPHIL NFR BLD AUTO: 2.4 % (ref 0.3–6.2)
ERYTHROCYTE [DISTWIDTH] IN BLOOD BY AUTOMATED COUNT: 17 % (ref 12.3–15.4)
ERYTHROCYTE [SEDIMENTATION RATE] IN BLOOD: 34 MM/HR (ref 0–20)
FERRITIN SERPL-MCNC: 582.3 NG/ML (ref 13–150)
GLOBULIN UR ELPH-MCNC: 2.8 GM/DL
GLUCOSE SERPL-MCNC: 303 MG/DL (ref 65–99)
HCT VFR BLD AUTO: 34.1 % (ref 34–46.6)
HGB BLD-MCNC: 11.2 G/DL (ref 12–15.9)
IMM GRANULOCYTES # BLD AUTO: 0.03 10*3/MM3 (ref 0–0.05)
IMM GRANULOCYTES NFR BLD AUTO: 0.5 % (ref 0–0.5)
IRON 24H UR-MRATE: 70 MCG/DL (ref 37–145)
IRON SATN MFR SERPL: 25 % (ref 20–50)
LYMPHOCYTES # BLD AUTO: 1.51 10*3/MM3 (ref 0.7–3.1)
LYMPHOCYTES NFR BLD AUTO: 24.6 % (ref 19.6–45.3)
MCH RBC QN AUTO: 29.8 PG (ref 26.6–33)
MCHC RBC AUTO-ENTMCNC: 32.8 G/DL (ref 31.5–35.7)
MCV RBC AUTO: 90.7 FL (ref 79–97)
MONOCYTES # BLD AUTO: 0.32 10*3/MM3 (ref 0.1–0.9)
MONOCYTES NFR BLD AUTO: 5.2 % (ref 5–12)
NEUTROPHILS NFR BLD AUTO: 4.08 10*3/MM3 (ref 1.7–7)
NEUTROPHILS NFR BLD AUTO: 66.3 % (ref 42.7–76)
NRBC BLD AUTO-RTO: 0 /100 WBC (ref 0–0.2)
PLATELET # BLD AUTO: 286 10*3/MM3 (ref 140–450)
PMV BLD AUTO: 10 FL (ref 6–12)
POTASSIUM SERPL-SCNC: 4 MMOL/L (ref 3.5–5.2)
PROT SERPL-MCNC: 7.1 G/DL (ref 6–8.5)
RBC # BLD AUTO: 3.76 10*6/MM3 (ref 3.77–5.28)
SODIUM SERPL-SCNC: 136 MMOL/L (ref 136–145)
TIBC SERPL-MCNC: 282 MCG/DL (ref 298–536)
TRANSFERRIN SERPL-MCNC: 189 MG/DL (ref 200–360)
WBC NRBC COR # BLD: 6.15 10*3/MM3 (ref 3.4–10.8)

## 2022-03-31 PROCEDURE — 85652 RBC SED RATE AUTOMATED: CPT

## 2022-03-31 PROCEDURE — 84238 ASSAY NONENDOCRINE RECEPTOR: CPT

## 2022-03-31 PROCEDURE — 82728 ASSAY OF FERRITIN: CPT

## 2022-03-31 PROCEDURE — 85025 COMPLETE CBC W/AUTO DIFF WBC: CPT

## 2022-03-31 PROCEDURE — 36415 COLL VENOUS BLD VENIPUNCTURE: CPT

## 2022-03-31 PROCEDURE — 86140 C-REACTIVE PROTEIN: CPT

## 2022-03-31 PROCEDURE — 83540 ASSAY OF IRON: CPT

## 2022-03-31 PROCEDURE — 80053 COMPREHEN METABOLIC PANEL: CPT

## 2022-03-31 PROCEDURE — 99214 OFFICE O/P EST MOD 30 MIN: CPT | Performed by: INTERNAL MEDICINE

## 2022-03-31 PROCEDURE — 86665 EPSTEIN-BARR CAPSID VCA: CPT

## 2022-03-31 PROCEDURE — 99195 PHLEBOTOMY: CPT | Performed by: INTERNAL MEDICINE

## 2022-03-31 PROCEDURE — 84466 ASSAY OF TRANSFERRIN: CPT

## 2022-03-31 RX ORDER — HEPARIN SODIUM (PORCINE) LOCK FLUSH IV SOLN 100 UNIT/ML 100 UNIT/ML
500 SOLUTION INTRAVENOUS AS NEEDED
Status: CANCELLED | OUTPATIENT
Start: 2022-03-31

## 2022-03-31 RX ORDER — SODIUM CHLORIDE 0.9 % (FLUSH) 0.9 %
10 SYRINGE (ML) INJECTION AS NEEDED
Status: CANCELLED | OUTPATIENT
Start: 2022-03-31

## 2022-04-01 LAB
EBV VCA IGG SER IA-ACNC: >600 U/ML (ref 0–17.9)
EBV VCA IGM SER IA-ACNC: <36 U/ML (ref 0–35.9)

## 2022-04-03 LAB — STFR SERPL-SCNC: 50.4 NMOL/L (ref 12.2–27.3)

## 2022-04-04 ENCOUNTER — TELEPHONE (OUTPATIENT)
Dept: PAIN MANAGEMENT | Age: 47
End: 2022-04-04

## 2022-04-04 DIAGNOSIS — J30.89 ENVIRONMENTAL AND SEASONAL ALLERGIES: ICD-10-CM

## 2022-04-04 RX ORDER — LEVOCETIRIZINE DIHYDROCHLORIDE 5 MG/1
5 TABLET, FILM COATED ORAL NIGHTLY
Qty: 90 TABLET | Refills: 3 | Status: SHIPPED | OUTPATIENT
Start: 2022-04-04

## 2022-04-04 NOTE — TELEPHONE ENCOUNTER
Call placed to patient to inform her of results of lumbar spine xray. Patient states understanding and will discuss with ASHLEE Joe at next office visit.

## 2022-04-04 NOTE — TELEPHONE ENCOUNTER
Received fax from pharmacy requesting refill on pts medication(s). Pt was last seen in office on 9/22/2021  and has a follow up scheduled for Visit date not found. Will send request to  Dr. Watson Jenkins  for authorization.      Requested Prescriptions     Pending Prescriptions Disp Refills    levocetirizine (XYZAL) 5 MG tablet [Pharmacy Med Name: LEVOCETIRIZINE 5MG TABLETS] 90 tablet 3     Sig: Take 1 tablet by mouth nightly

## 2022-04-04 NOTE — TELEPHONE ENCOUNTER
----- Message from ASHLEE Contreras CNP sent at 4/3/2022 12:15 PM CDT -----  Cervical spine unremarkable. Lumbar spine advanced degenerative changes at L1/L2 no acute changes. Will discuss options at next appointment.

## 2022-04-07 ENCOUNTER — TELEPHONE (OUTPATIENT)
Dept: PRIMARY CARE CLINIC | Age: 47
End: 2022-04-07

## 2022-04-07 ENCOUNTER — HOSPITAL ENCOUNTER (OUTPATIENT)
Dept: WOMENS IMAGING | Age: 47
Discharge: HOME | End: 2022-04-07
Payer: MEDICARE

## 2022-04-07 DIAGNOSIS — Z12.31 ENCOUNTER FOR SCREENING MAMMOGRAM FOR MALIGNANT NEOPLASM OF BREAST: ICD-10-CM

## 2022-04-07 PROCEDURE — 77063 BREAST TOMOSYNTHESIS BI: CPT

## 2022-04-07 NOTE — TELEPHONE ENCOUNTER
----- Message from ASHLEE Smith sent at 4/7/2022  3:38 PM CDT -----  Mammogram negative  Recheck in 1 year or changes in monthly SB#

## 2022-04-11 ENCOUNTER — TELEPHONE (OUTPATIENT)
Dept: PRIMARY CARE CLINIC | Age: 47
End: 2022-04-11

## 2022-04-12 NOTE — TELEPHONE ENCOUNTER
----- Message from Callie Kaminski sent at 4/11/2022  6:33 PM CDT -----  Subject: Message to Provider    QUESTIONS  Information for Provider? Patient called in trying to schedule an annual   physical w/ Dr. Katelyn Parham. Needs to know when the last one was in order to   schedule but I do not see a previous physical and patient is very sure she   had one i 2021. Please verify last physical and schedule next one.  ---------------------------------------------------------------------------  --------------  CALL BACK INFO  What is the best way for the office to contact you? OK to leave message on   voicemail  Preferred Call Back Phone Number? [phone]  ---------------------------------------------------------------------------  --------------  SCRIPT ANSWERS  Relationship to Patient?  Self

## 2022-04-28 DIAGNOSIS — M54.16 CHRONIC LUMBAR RADICULOPATHY: ICD-10-CM
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
## 2022-04-28 NOTE — TELEPHONE ENCOUNTER
Pt has appt on 6/30/22. Last fill was ok'd to be filled early on 4/4/22 but the pharmacy filled it on 3/29/22. The patient was informed today that she would not be able to fill it until 5/4/22 and she verbalized understanding.

## 2022-05-02 RX ORDER — OXYCODONE HYDROCHLORIDE 5 MG/1
5 TABLET ORAL EVERY 6 HOURS PRN
Qty: 90 TABLET | Refills: 0 | Status: SHIPPED | OUTPATIENT
Start: 2022-05-04 | End: 2022-06-06 | Stop reason: SDUPTHER

## 2022-05-09 SDOH — HEALTH STABILITY: PHYSICAL HEALTH
ON AVERAGE, HOW MANY DAYS PER WEEK DO YOU ENGAGE IN MODERATE TO STRENUOUS EXERCISE (LIKE A BRISK WALK)?: PATIENT DECLINED

## 2022-05-09 SDOH — HEALTH STABILITY: PHYSICAL HEALTH: ON AVERAGE, HOW MANY MINUTES DO YOU ENGAGE IN EXERCISE AT THIS LEVEL?: PATIENT DECLINED

## 2022-05-09 ASSESSMENT — PATIENT HEALTH QUESTIONNAIRE - PHQ9
SUM OF ALL RESPONSES TO PHQ QUESTIONS 1-9: 2
SUM OF ALL RESPONSES TO PHQ QUESTIONS 1-9: 2
1. LITTLE INTEREST OR PLEASURE IN DOING THINGS: 1
SUM OF ALL RESPONSES TO PHQ QUESTIONS 1-9: 2
SUM OF ALL RESPONSES TO PHQ QUESTIONS 1-9: 2
2. FEELING DOWN, DEPRESSED OR HOPELESS: 1
SUM OF ALL RESPONSES TO PHQ9 QUESTIONS 1 & 2: 2

## 2022-05-09 ASSESSMENT — LIFESTYLE VARIABLES
HOW MANY STANDARD DRINKS CONTAINING ALCOHOL DO YOU HAVE ON A TYPICAL DAY: 1
HOW OFTEN DO YOU HAVE A DRINK CONTAINING ALCOHOL: MONTHLY OR LESS
HOW OFTEN DO YOU HAVE A DRINK CONTAINING ALCOHOL: 2
HOW MANY STANDARD DRINKS CONTAINING ALCOHOL DO YOU HAVE ON A TYPICAL DAY: 1 OR 2
HOW OFTEN DO YOU HAVE SIX OR MORE DRINKS ON ONE OCCASION: 1

## 2022-05-12 ENCOUNTER — OFFICE VISIT (OUTPATIENT)
Dept: PRIMARY CARE CLINIC | Age: 47
End: 2022-05-12
Payer: MEDICARE

## 2022-05-12 VITALS
BODY MASS INDEX: 47.09 KG/M2 | OXYGEN SATURATION: 96 % | HEART RATE: 97 BPM | TEMPERATURE: 97.6 F | HEIGHT: 66 IN | WEIGHT: 293 LBS | DIASTOLIC BLOOD PRESSURE: 76 MMHG | SYSTOLIC BLOOD PRESSURE: 118 MMHG

## 2022-05-12 DIAGNOSIS — Z00.00 MEDICARE ANNUAL WELLNESS VISIT, SUBSEQUENT: Primary | ICD-10-CM

## 2022-05-12 DIAGNOSIS — F41.9 ANXIETY AND DEPRESSION: ICD-10-CM

## 2022-05-12 DIAGNOSIS — Z86.32 HISTORY OF GESTATIONAL DIABETES: ICD-10-CM

## 2022-05-12 DIAGNOSIS — I10 PRIMARY HYPERTENSION: ICD-10-CM

## 2022-05-12 DIAGNOSIS — B00.1 HERPES LABIALIS WITHOUT COMPLICATION: ICD-10-CM

## 2022-05-12 DIAGNOSIS — F32.A ANXIETY AND DEPRESSION: ICD-10-CM

## 2022-05-12 DIAGNOSIS — M54.16 LUMBAR RADICULOPATHY: ICD-10-CM

## 2022-05-12 DIAGNOSIS — R53.83 FATIGUE, UNSPECIFIED TYPE: ICD-10-CM

## 2022-05-12 DIAGNOSIS — E83.110 HEREDITARY HEMOCHROMATOSIS (HCC): ICD-10-CM

## 2022-05-12 DIAGNOSIS — K21.9 GASTROESOPHAGEAL REFLUX DISEASE, UNSPECIFIED WHETHER ESOPHAGITIS PRESENT: ICD-10-CM

## 2022-05-12 DIAGNOSIS — K74.60 CIRRHOSIS OF LIVER WITHOUT ASCITES, UNSPECIFIED HEPATIC CIRRHOSIS TYPE (HCC): ICD-10-CM

## 2022-05-12 PROCEDURE — G8427 DOCREV CUR MEDS BY ELIG CLIN: HCPCS | Performed by: PEDIATRICS

## 2022-05-12 PROCEDURE — 1036F TOBACCO NON-USER: CPT | Performed by: PEDIATRICS

## 2022-05-12 PROCEDURE — G8417 CALC BMI ABV UP PARAM F/U: HCPCS | Performed by: PEDIATRICS

## 2022-05-12 PROCEDURE — 99214 OFFICE O/P EST MOD 30 MIN: CPT | Performed by: PEDIATRICS

## 2022-05-12 PROCEDURE — G0439 PPPS, SUBSEQ VISIT: HCPCS | Performed by: PEDIATRICS

## 2022-05-12 RX ORDER — PREGABALIN 100 MG/1
100 CAPSULE ORAL 2 TIMES DAILY
Qty: 60 CAPSULE | Refills: 2 | Status: SHIPPED | OUTPATIENT
Start: 2022-05-12 | End: 2022-06-30 | Stop reason: SDUPTHER

## 2022-05-12 RX ORDER — VENLAFAXINE HYDROCHLORIDE 75 MG/1
75 CAPSULE, EXTENDED RELEASE ORAL DAILY
Qty: 30 CAPSULE | Refills: 11
Start: 2022-05-12 | End: 2022-05-12 | Stop reason: SDUPTHER

## 2022-05-12 RX ORDER — METOPROLOL SUCCINATE 50 MG/1
50 TABLET, EXTENDED RELEASE ORAL 2 TIMES DAILY
Qty: 180 TABLET | Refills: 3 | Status: SHIPPED | OUTPATIENT
Start: 2022-05-12

## 2022-05-12 RX ORDER — VENLAFAXINE HYDROCHLORIDE 75 MG/1
75 CAPSULE, EXTENDED RELEASE ORAL DAILY
Qty: 30 CAPSULE | Refills: 11 | Status: SHIPPED | OUTPATIENT
Start: 2022-05-12

## 2022-05-12 RX ORDER — NORTRIPTYLINE HYDROCHLORIDE 25 MG/1
25 CAPSULE ORAL NIGHTLY
Qty: 180 CAPSULE | Refills: 3 | Status: SHIPPED | OUTPATIENT
Start: 2022-05-12

## 2022-05-12 RX ORDER — ACYCLOVIR 800 MG/1
800 TABLET ORAL DAILY
Qty: 90 TABLET | Refills: 3 | Status: SHIPPED | OUTPATIENT
Start: 2022-05-12

## 2022-05-12 RX ORDER — METOPROLOL SUCCINATE 50 MG/1
50 TABLET, EXTENDED RELEASE ORAL 2 TIMES DAILY
Qty: 180 TABLET | Refills: 3
Start: 2022-05-12 | End: 2022-05-12 | Stop reason: SDUPTHER

## 2022-05-12 ASSESSMENT — ENCOUNTER SYMPTOMS
DIARRHEA: 0
SHORTNESS OF BREATH: 0
COUGH: 0
VOMITING: 0
EYE DISCHARGE: 0
BACK PAIN: 0
NAUSEA: 1
RESPIRATORY NEGATIVE: 1
CHEST TIGHTNESS: 0
ALLERGIC/IMMUNOLOGIC NEGATIVE: 1
CONSTIPATION: 0
WHEEZING: 0
SORE THROAT: 0
SINUS PRESSURE: 0
ABDOMINAL PAIN: 0

## 2022-05-12 ASSESSMENT — PATIENT HEALTH QUESTIONNAIRE - PHQ9
SUM OF ALL RESPONSES TO PHQ QUESTIONS 1-9: 5
4. FEELING TIRED OR HAVING LITTLE ENERGY: 1
9. THOUGHTS THAT YOU WOULD BE BETTER OFF DEAD, OR OF HURTING YOURSELF: 0
1. LITTLE INTEREST OR PLEASURE IN DOING THINGS: 1
6. FEELING BAD ABOUT YOURSELF - OR THAT YOU ARE A FAILURE OR HAVE LET YOURSELF OR YOUR FAMILY DOWN: 0
SUM OF ALL RESPONSES TO PHQ QUESTIONS 1-9: 5
7. TROUBLE CONCENTRATING ON THINGS, SUCH AS READING THE NEWSPAPER OR WATCHING TELEVISION: 0
SUM OF ALL RESPONSES TO PHQ9 QUESTIONS 1 & 2: 2
10. IF YOU CHECKED OFF ANY PROBLEMS, HOW DIFFICULT HAVE THESE PROBLEMS MADE IT FOR YOU TO DO YOUR WORK, TAKE CARE OF THINGS AT HOME, OR GET ALONG WITH OTHER PEOPLE: 0
SUM OF ALL RESPONSES TO PHQ QUESTIONS 1-9: 5
5. POOR APPETITE OR OVEREATING: 1
8. MOVING OR SPEAKING SO SLOWLY THAT OTHER PEOPLE COULD HAVE NOTICED. OR THE OPPOSITE, BEING SO FIGETY OR RESTLESS THAT YOU HAVE BEEN MOVING AROUND A LOT MORE THAN USUAL: 0
SUM OF ALL RESPONSES TO PHQ QUESTIONS 1-9: 5
3. TROUBLE FALLING OR STAYING ASLEEP: 1
2. FEELING DOWN, DEPRESSED OR HOPELESS: 1

## 2022-05-12 ASSESSMENT — COLUMBIA-SUICIDE SEVERITY RATING SCALE - C-SSRS
6. HAVE YOU EVER DONE ANYTHING, STARTED TO DO ANYTHING, OR PREPARED TO DO ANYTHING TO END YOUR LIFE?: NO
1. WITHIN THE PAST MONTH, HAVE YOU WISHED YOU WERE DEAD OR WISHED YOU COULD GO TO SLEEP AND NOT WAKE UP?: NO
2. HAVE YOU ACTUALLY HAD ANY THOUGHTS OF KILLING YOURSELF?: NO

## 2022-05-12 NOTE — PROGRESS NOTES
[address] [facility], [address]  Phone [phone]   Fax [phone]      OFFICE VISIT: 2022    Rogelio Moore-: 1975      HPI  Reason For Visit:  Shawn Ortiz is a 51 y.o. Medicare AWV (routine check up. ) and Medication Refill (lyrica, metoprololm lyrica, acyclovir)      Patient presents for routine annual wellness evaluation. She also presents with multiple health issues. Present concerns:         History of Gestational Diabetes   Diet compliance:  she tries   Nutrition Consultation Needed:  no  Medication:   Metformin 1000 mg twice daily  Medication compliance:  compliant most of the time  Weight trend: decreasing weight is down 8 pounds from last evaluation 2 months ago  Current exercise: Some but limited  Checking: None  Home blood sugar records: None  Low BG:  no  Eye exam current (within one year): yes  Checking Feet regularly:  yes - no sores  ACE/ARB:  no  Aspirin: No:   Tobacco history: She  reports that she quit smoking about 8 years ago. Her smoking use included cigarettes. She has a 25.00 pack-year smoking history. She has never used smokeless tobacco.    Lab Results   Component Value Date    LABA1C 4.3 2021    LABA1C 4.1 2019     Lab Results   Component Value Date    LABMICR 2.70 2021    CREATININE 0.8 2021       Hypertension:   BP today was   BP Readings from Last 1 Encounters:   22 118/76      Recent BP readings:    BP Readings from Last 3 Encounters:   22 118/76   22 (!) 137/90   22 130/74     Medication   Toprol-XL 50 mg twice daily   Amlodipine 5 mg daily  Medication compliance:  compliant most of the time  Home blood pressure monitoring: Yes -controlled. She Is somewhat adherent to a low sodium diet.      Symptoms: none  Laboratory:  Lab Results   Component Value Date    BUN 11 2021    CREATININE 0.8 2021       Hereditary hemochromatosis:  She does have some cirrhosis with concomitant HUNT  Medication:   None  Symptoms:none      Anxiety depression:  Medication:   Effexor  mg with 75 mg for total of 225 mg daily  Symptoms:controlled on present regimen      GERD  Medication:   Omeprazole 40 mg daily  Symptoms:still occasional GERD symptom      Chronic low back pain:  She follows with pain management       height is 5' 6\" (1.676 m) and weight is 298 lb (135.2 kg). Her temporal temperature is 97.6 °F (36.4 °C). Her blood pressure is 118/76 and her pulse is 97. Her oxygen saturation is 96%. Body mass index is 48.1 kg/m². I have reviewed the following with the Ms. Ghassan Del Cid   Lab Review  No visits with results within 6 Month(s) from this visit.    Latest known visit with results is:   Admission on 09/17/2021, Discharged on 09/17/2021   Component Date Value    WBC 09/17/2021 10.6     RBC 09/17/2021 4.02*    Hemoglobin 09/17/2021 12.4     Hematocrit 09/17/2021 37.9     MCV 09/17/2021 94.3     MCH 09/17/2021 30.8     MCHC 09/17/2021 32.7*    RDW 09/17/2021 15.2*    Platelets 47/48/6748 284     MPV 09/17/2021 9.8     Neutrophils % 09/17/2021 57.5     Lymphocytes % 09/17/2021 33.6     Monocytes % 09/17/2021 5.8     Eosinophils % 09/17/2021 1.7     Basophils % 09/17/2021 0.9     Neutrophils Absolute 09/17/2021 6.1     Immature Granulocytes # 09/17/2021 0.1     Lymphocytes Absolute 09/17/2021 3.5     Monocytes Absolute 09/17/2021 0.60     Eosinophils Absolute 09/17/2021 0.20     Basophils Absolute 09/17/2021 0.10     Sodium 09/17/2021 138     Potassium reflex Magnesi* 09/17/2021 3.7     Chloride 09/17/2021 102     CO2 09/17/2021 21*    Anion Gap 09/17/2021 15     Glucose 09/17/2021 131*    BUN 09/17/2021 11     CREATININE 09/17/2021 0.8     GFR Non- 09/17/2021 >60     GFR  09/17/2021 >59     Calcium 09/17/2021 9.4     Total Protein 09/17/2021 7.4     Albumin 09/17/2021 4.3     Total Bilirubin 09/17/2021 0.7     Alkaline Phosphatase 09/17/2021 109*    ALT 09/17/2021 21     AST 09/17/2021 22     Color, UA 09/17/2021 YELLOW     Clarity, UA 09/17/2021 CLOUDY*    Glucose, Ur 09/17/2021 Negative     Bilirubin Urine 09/17/2021 Negative     Ketones, Urine 09/17/2021 TRACE*    Specific [place], UA 09/17/2021 1.029     Blood, Urine 09/17/2021 Negative     pH, UA 09/17/2021 5.5     Protein, UA 09/17/2021 TRACE*    Urobilinogen, Urine 09/17/2021 1.0     Nitrite, Urine 09/17/2021 Negative     Leukocyte Esterase, Urine 09/17/2021 TRACE*    WBC, UA 09/17/2021 2-4     Epithelial Cells, UA 09/17/2021 10-20     Bacteria, UA 09/17/2021 1+*    Crystals, UA 09/17/2021 1+*     Copies of these are in the chart. Current Outpatient Medications   Medication Sig Dispense Refill    nortriptyline (PAMELOR) 25 MG capsule Take 1 capsule by mouth nightly TAKE 1 TO 2 CAPSULES BY MOUTH EVERY NIGHT 180 capsule 3    acyclovir (ZOVIRAX) 800 MG tablet Take 1 tablet by mouth daily 90 tablet 3    pregabalin (LYRICA) 100 MG capsule Take 1 capsule by mouth 2 times daily for 30 days. 60 capsule 2    metoprolol succinate (TOPROL XL) 50 MG extended release tablet Take 1 tablet by mouth in the morning and at bedtime 180 tablet 3    venlafaxine (EFFEXOR XR) 75 MG extended release capsule Take 1 capsule by mouth daily 30 capsule 11    oxyCODONE (ROXICODONE) 5 MG immediate release tablet Take 1 tablet by mouth every 6 hours as needed for Pain for up to 30 days.  May fill 5/4/22. 90 tablet 0    levocetirizine (XYZAL) 5 MG tablet Take 1 tablet by mouth nightly 90 tablet 3    clindamycin (CLINDAGEL) 1 % gel Apply 1 Dose topically daily      famotidine (PEPCID) 20 MG tablet Take 20 mg by mouth 2 times daily      nabumetone (RELAFEN) 750 MG tablet Take 750 mg by mouth 2 times daily      meclizine (ANTIVERT) 25 MG tablet Take 25 mg by mouth as needed      metFORMIN (GLUCOPHAGE) 1000 MG tablet Take 1 tablet by mouth 2 times daily (with meals) 180 tablet 3    albuterol sulfate HFA (PROVENTIL HFA) 108 (90 Base) MCG/ACT inhaler Inhale 2 puffs into the lungs every 6 hours as needed for Wheezing 3 each 3    omeprazole (PRILOSEC) 40 MG delayed release capsule Take 1 capsule by mouth every morning (before breakfast) 30 capsule 11    carbidopa-levodopa (SINEMET)  MG per tablet Take 1 tablet by mouth nightly 90 tablet 3    Elastic Bandages & Supports (LUMBAR BACK BRACE/SUPPORT PAD) MISC Wear as needed with activity. 1 each 0    triamcinolone (KENALOG) 0.1 % cream Apply topically 2 times daily. 80 g 5    ondansetron (ZOFRAN-ODT) 8 MG TBDP disintegrating tablet Take 1 tablet by mouth every 8 hours as needed for Nausea or Vomiting 15 tablet 0    venlafaxine (EFFEXOR XR) 150 MG extended release capsule Take 1 capsule by mouth daily 90 capsule 3    amLODIPine (NORVASC) 5 MG tablet Take 1 tablet by mouth daily 90 tablet 3    olopatadine (PATADAY) 0.2 % SOLN ophthalmic solution Place 1 drop into both eyes daily 1 Bottle 2    naloxone (NARCAN) 4 MG/0.1ML LIQD nasal spray 1 spray by Nasal route as needed for Opioid Reversal 2 each 0    Calcium-Vitamin D 600-200 MG-UNIT TABS Take 1 tablet by mouth daily      Multiple Vitamins-Minerals (MULTIVITAMIN & MINERAL PO) Take  by mouth.  CRANBERRY Take 500 mg by mouth daily.  Cholecalciferol (VITAMIN D3) 5000 UNITS TABS Take 5,000 Units by mouth daily        No current facility-administered medications for this visit.        Allergies: Silver, Tegaderm ag mesh 2\"x2\" [wound dressings], and Zithromax [azithromycin]     Past Medical History:   Diagnosis Date    Anemia     has had iron infusion    Anxiety     Arthritis     Asthma     Back pain with left-sided radiculopathy 3/14/2016    DDD (degenerative disc disease), lumbar     Depression     Esophagitis     Fibromyalgia     Gastritis     Headache(784.0)     Hiatal hernia 03/2022    History of blood transfusion     Hypertension     Obesity     RLS (restless legs syndrome)     Sleep apnea     uses CPAP machine    Spleen cancer ([address] 2022       Family History   Problem Relation Age of Onset    Diabetes Mother     High Blood Pressure Mother     Colon Polyps Mother     Heart Disease Mother     Cancer Mother     Depression Mother     Cancer Father     High Blood Pressure Father     Heart Disease Father     Stroke Father     High Blood Pressure Sister     Depression Sister     Anxiety Disorder Sister     Cancer Brother     Esophageal Cancer Brother     Anxiety Disorder Brother     Diabetes Brother     ADHD Brother     Depression Brother     Other Other         has history of suicide in family maternal great uncle       Past Surgical History:   Procedure Laterality Date    ANKLE SURGERY Right     APPENDECTOMY  4/19/15    BACK SURGERY  2000    CARPAL TUNNEL RELEASE Left     CERVICAL SPINE SURGERY N/A 9/1/15    CHOLECYSTECTOMY  1995    HIP SURGERY Right 1987    HIP SURGERY  2018    HYSTERECTOMY  2008    partial 2008, still has ovaries and cervix    INSERTION / REMOVAL / REPLACEMENT VENOUS ACCESS CATHETER Left 2017    PORT INSERTION performed by Chaya Raymond MD at [address] [facility] OR    LITHOTRIPSY  [facility]      with hardware placed    [place] COLONOSCOPY FLX DX W/COLLJ SPEC WHEN PFRMD N/A 2017    Dr Mojgan Godoy, 7 yr (age 47) recall    [place] EGD TRANSORAL BIOPSY SINGLE/MULTIPLE N/A 2017    Dr ABHIJIT Baltazar-Gastritis/gastropathy    SALPINGO-OOPHORECTOMY Bilateral     age 42    STOMACH SURGERY  2017    dr Samm Barakat Right 2019       Social History     Tobacco Use    Smoking status: Former Smoker     Packs/day: 1.00     Years: 25.00     Pack years: 25.00     Types: Cigarettes     Quit date: 2013     Years since quittin.6    Smokeless tobacco: Never Used   Substance Use Topics    Alcohol use: Yes     Comment: rarely        Review of Systems   Constitutional: Positive for appetite change (decreased with vertigo and nausea). Negative for chills, fatigue and fever. HENT: Negative. Negative for congestion, ear discharge, ear pain, postnasal drip, sinus pressure and sore throat. Eyes: Positive for visual disturbance (with what sounds like nystagmus). Negative for discharge. Respiratory: Negative. Negative for cough, chest tightness, shortness of breath and wheezing. Cardiovascular: Negative. Negative for chest pain, palpitations and leg swelling. Gastrointestinal: Positive for nausea. Negative for abdominal pain, constipation, diarrhea and vomiting. GERD is well controlled on meds. She just had spleen removed. Endocrine: Negative. Genitourinary: Negative. Negative for difficulty urinating, dysuria and menstrual problem. Left flank pain (stabbing, constant, relentless)   Musculoskeletal: Positive for arthralgias ( diffuse). Negative for back pain, joint swelling and myalgias. Skin: Positive for rash (on her ankle, red like mosquito bite. ). Allergic/Immunologic: Negative. Neurological: Positive for dizziness (vertigo) and light-headedness. Negative for weakness and headaches. Hematological: Negative. Negative for adenopathy. Does not bruise/bleed easily. Psychiatric/Behavioral: Positive for decreased concentration, dysphoric mood ( under a lot of stress recently) and sleep disturbance. Negative for suicidal ideas. The patient is nervous/anxious (much worse since her father's death). Physical Exam  Vitals reviewed. Constitutional:       Appearance: She is well-developed. She is obese. HENT:      Head: Normocephalic. Right Ear: Tympanic membrane, ear canal and external ear normal.      Left Ear: Tympanic membrane, ear canal and external ear normal.      Nose: Nose normal.      Mouth/Throat:      Mouth: Mucous membranes are moist.      Pharynx: Oropharynx is clear. Eyes:      Extraocular Movements: Extraocular movements intact. Conjunctiva/sclera: Conjunctivae normal.      Pupils: Pupils are equal, round, and reactive to light. Cardiovascular:      Rate and Rhythm: Normal rate and regular rhythm. Pulmonary:      Effort: Pulmonary effort is normal.   Abdominal:      General: Bowel sounds are normal.      Palpations: Abdomen is soft. Tenderness: There is no abdominal tenderness. Musculoskeletal:         General: Normal range of motion. Cervical back: Normal range of motion and neck supple. Right hip: Tenderness present. Decreased strength. Skin:     General: Skin is warm and dry. Capillary Refill: Capillary refill takes less than 2 seconds. Neurological:      General: No focal deficit present. Mental Status: She is alert and oriented to person, place, and time. Deep Tendon Reflexes: Reflexes are normal and symmetric. Psychiatric:         Mood and Affect: Mood normal.         Behavior: Behavior normal.           ASSESSMENT      ICD-10-CM    1. Medicare annual wellness visit, subsequent  Z00.00 CBC with Auto Differential     Comprehensive Metabolic Panel     Hemoglobin A1C     Lipid Panel     Microalbumin / Creatinine Urine Ratio     T4, Free     TSH   2. Lumbar radiculopathy  M54.16 pregabalin (LYRICA) 100 MG capsule   3. Primary hypertension  I10 metoprolol succinate (TOPROL XL) 50 MG extended release tablet     Lipid Panel     Microalbumin / Creatinine Urine Ratio   4. Anxiety and depression  F41.9 venlafaxine (EFFEXOR XR) 75 MG extended release capsule    F32. A    5. Herpes labialis without complication  Z11.3 acyclovir (ZOVIRAX) 800 MG tablet   6. Gastroesophageal reflux disease, unspecified whether esophagitis present  K21.9    7. History of gestational diabetes  Z86.32 Hemoglobin A1C     Lipid Panel     Microalbumin / Creatinine Urine Ratio   8. Fatigue, unspecified type  R53.83 T4, Free     TSH   9.  Cirrhosis of liver without ascites, unspecified hepatic cirrhosis [facility]  K74.60 Hepatic Function Panel     Protime-INR         PLAN    1. Medicare annual wellness visit, subsequent  Routine age-appropriate anticipatory guidance was provided. Annual wellness visit was performed in a separate note and issues were addressed as identified.   - CBC with Auto Differential; Future  - Comprehensive Metabolic Panel; Future  - Hemoglobin A1C; Future  - Lipid Panel; Future  - Microalbumin / Creatinine Urine Ratio; Future  - T4, Free; Future  - TSH; Future    2. Lumbar radiculopathy  Continue with Lyrica 100 mg twice daily as this is effective  - pregabalin (LYRICA) 100 MG capsule; Take 1 capsule by mouth 2 times daily for 30 days. Dispense: 60 capsule; Refill: 2    3. Primary hypertension  Blood pressure is excellently controlled on present medication regimen for  Continue same  - metoprolol succinate (TOPROL XL) 50 MG extended release tablet; Take 1 tablet by mouth in the morning and at bedtime  Dispense: 180 tablet; Refill: 3  - Lipid Panel; Future  - Microalbumin / Creatinine Urine Ratio; Future    4. Anxiety and depression  Effexor XR 1 tablet daily for anxiety depression  - venlafaxine (EFFEXOR XR) 75 MG extended release capsule; Take 1 capsule by mouth daily  Dispense: 30 capsule; Refill: 11    5. Herpes labialis without complication  Continue with acyclovir 800 mg daily as prophylaxis  - acyclovir (ZOVIRAX) 800 MG tablet; Take 1 tablet by mouth daily  Dispense: 90 tablet; Refill: 3    6. Gastroesophageal reflux disease, unspecified whether esophagitis present  Well-controlled on present medication regimen for  Continue the same    7. History of gestational diabetes  Check hemoglobin A1c with a history of gestational diabetes. Will also check other associated labs  - Hemoglobin A1C; Future  - Lipid Panel; Future  - Microalbumin / Creatinine Urine Ratio; Future    8.  Fatigue, unspecified type  Evaluate thyroid due to fatigue  - T4, Free; Future  - TSH; Future    9. Cirrhosis of liver without ascites, unspecified hepatic cirrhosis type ([address]  We did discuss liver cirrhosis and its impact. We will order labs to calculate meld score. We also discussed the importance of weight loss to prevent Marilyn Bad induced hepatitis and cirrhosis. She is also on the present therapy for her hemochromatosis. [facility] [place]. We will keep abreast of these issues and facilitate anything that she needs on this end.  - Hepatic Function Panel; Future  - Protime-INR; Future      Orders Placed This Encounter   Procedures    CBC with Auto Differential    Comprehensive Metabolic Panel    Hemoglobin A1C    Lipid Panel    Microalbumin / Creatinine Urine Ratio    T4, Free    TSH    Hepatic Function Panel    Protime-INR        Return for Medicare Annual Wellness Visit in 1 year. This was an in-house visit. Medicare Annual Wellness Visit    Deleta Blocker is here for Medicare AWV (routine check up. ) and Medication Refill (lyrica, metoprololm lyrica, acyclovir)    Assessment & Plan   Medicare annual wellness visit, subsequent  -     CBC with Auto Differential; Future  -     Comprehensive Metabolic Panel; Future  -     Hemoglobin A1C; Future  -     Lipid Panel; Future  -     Microalbumin / Creatinine Urine Ratio; Future  -     T4, Free; Future  -     TSH; Future  Lumbar radiculopathy  -     pregabalin (LYRICA) 100 MG capsule; Take 1 capsule by mouth 2 times daily for 30 days. , Disp-60 capsule, R-2Normal  Primary hypertension  -     metoprolol succinate (TOPROL XL) 50 MG extended release tablet; Take 1 tablet by mouth in the morning and at bedtime, Disp-180 tablet, R-3Normal  -     Lipid Panel; Future  -     Microalbumin / Creatinine Urine Ratio; Future  Anxiety and depression  -     venlafaxine (EFFEXOR XR) 75 MG extended release capsule;  Take 1 capsule by mouth daily, Disp-30 capsule, R-11Normal  Herpes labialis without complication  -     acyclovir (ZOVIRAX) 800 MG tablet; Take 1 tablet by mouth daily, Disp-90 tablet, R-3Normal  Gastroesophageal reflux disease, unspecified whether esophagitis present  History of gestational diabetes  -     Hemoglobin A1C; Future  -     Lipid Panel; Future  -     Microalbumin / Creatinine Urine Ratio; Future  Fatigue, unspecified type  -     T4, Free; Future  -     TSH; Future  Cirrhosis of liver without ascites, unspecified hepatic cirrhosis type ([facility]  -     Hepatic Function Panel; Future  -     Protime-INR; Future      Recommendations for Preventive Services Due: see orders and patient instructions/AVS.  Recommended screening schedule for the next 5-10 years is provided to the patient in written form: see Patient Instructions/AVS.     Return for Medicare Annual Wellness Visit in 1 year. Subjective   The following acute and/or chronic problems were also addressed today:    Patient's complete Health Risk Assessment and screening values have been reviewed and are found in Flowsheets. The following problems were reviewed today and where indicated follow up appointments were made and/or referrals ordered.     Positive Risk Factor Screenings with Interventions:    Fall Risk:  Do you feel unsteady or are you worried about falling? : (!) yes  2 or more falls in past year?: (!) yes  Fall with injury in past year?: (!) yes     Fall Risk Interventions:    · The following acute and/or chronic problems were also addressed today:     Depression:  PHQ-2 Score: 2  PHQ-9 Total Score: 5    Severity:1-4 = minimal depression, 5-9 = mild depression, 10-14 = moderate depression, 15-19 = moderately severe depression, 20-27 = severe depression    Depression Interventions:  · Patient declines any further evaluation/treatment for this issue            Opioid Risk: (High risk score ?55) Opioid risk score: 66    Last PDMP Eduardo as Reviewed:  Review User Review Instant Review Result   Laz Mems 12/15/2021  4:58 PM Reviewed PDMP [1]     Last Controlled Substance Monitoring Documentation      Refill from 3/11/2019 in [address] The Prescription Monitoring Report for this patient was reviewed today. filed at 03/12/2019 1306   Periodic Controlled Substance Monitoring No signs of potential drug abuse or diversion identified: otherwise, see note documentation filed at 03/12/2019 1306   Chronic Pain > 80 MEDD Obtained or confirmed a written medication contract was on file. filed at 03/12/2019 1306            General Health and ACP:  General  In general, how would you say your health is?: Fair  In the past 7 days, have you experienced any of the following: New or Increased Pain, New or Increased Fatigue, Loneliness, Social Isolation, Stress or Anger?: (!) Yes  Select all that apply: (!) New or Increased Pain,New or Increased Fatigue,Stress  Do you get the social and emotional support that you need?: Yes  Do you have a Living Will?: Yes    Advance Directives     Power of  Living Will ACP-Advance Directive ACP-Power of     Not on File Not on File Not on File Not on File      General Health Risk Interventions:  · Stress:  Additional information was provided    Health Habits/Nutrition:     Physical Activity: Unknown    Days of Exercise per Week: Patient refused    Minutes of Exercise per Session: Patient refused     Have you lost any weight without trying in the past 3 months?: No  Body mass index: (!) 48.09  Have you seen the dentist within the past year?: N/A - wear dentures    Health Habits/Nutrition Interventions:  · Inadequate physical activity:  educational materials provided to promote increased physical activity  · Nutritional issues:  educational materials for healthy, well-balanced diet provided, educational materials to promote weight loss provided    Hearing/Vision:  Do you or your family notice any trouble with your hearing that hasn't been managed with hearing aids?: No  Do you have difficulty driving, watching TV, or doing any of your daily activities because of your eyesight?: (!) Yes  Have you had an eye exam within the past year?: Yes  No exam data present    Hearing/Vision Interventions:  · Vision concerns:  patient encouraged to make appointment with his/her eye specialist    Safety:  Do you have working smoke detectors?: Yes  Do you have any tripping hazards - loose or unsecured carpets or rugs?: No  Do you have any tripping hazards - clutter in doorways, halls, or stairs?: No  Do you have either shower bars, grab bars, non-slip mats or non-slip surfaces in your shower or bathtub?: (!) No  Do all of your stairways have a railing or banister?: Yes  Do you always fasten your seatbelt when you are in a car?: Yes    Safety Interventions:  · Home safety tips provided    ADLs:  In the past 7 days, did you need help from others to perform any of the following everyday activities: Eating, dressing, grooming, bathing, toileting, or walking/balance?: (!) Yes  Select all that apply: (!) Dressing,Toileting,Walking/Balance  In the past 7 days, did you need help from others to take care of any of the following: Laundry, housekeeping, banking/finances, shopping, telephone use, food preparation, transportation, or taking medications?: (!) Yes  Select all that apply: (!) [address] Preparation,Transportation,Taking Medications    ADL Interventions:  · She is doing okay in this regard          Objective   Vitals:    05/12/22 1321   BP: 118/76   Site: Left Upper Arm   Position: Sitting   Cuff Size: Large Adult   Pulse: 97   Temp: 97.6 °F (36.4 °C)   TempSrc: Temporal   SpO2: 96%   Weight: 298 lb (135.2 kg)   Height: 5' 6\" (1.676 m)      Body mass index is 48.1 kg/m².         Physical exam is documented in a separate note      Allergies   Allergen Reactions    Silver Other (See Comments)     Redness, blisters    Tegaderm Ag Mesh 2\"X2\" [Wound Dressings] Other (See Comments)     Blister if on chest area    Zithromax [Azithromycin]      Severe abdominal pain      Prior to Visit Medications    Medication Sig Taking? Authorizing Provider   nortriptyline (PAMELOR) 25 MG capsule Take 1 capsule by mouth nightly TAKE 1 TO 2 CAPSULES BY MOUTH EVERY NIGHT Yes OMAR Chairez DO   acyclovir (ZOVIRAX) 800 MG tablet Take 1 tablet by mouth daily Yes OMAR Chairez DO   pregabalin (LYRICA) 100 MG capsule Take 1 capsule by mouth 2 times daily for 30 days. Yes OMAR Chairez, DO   metoprolol succinate (TOPROL XL) 50 MG extended release tablet Take 1 tablet by mouth in the morning and at bedtime Yes OMAR Chairez, DO   venlafaxine (EFFEXOR XR) 75 MG extended release capsule Take 1 capsule by mouth daily Yes OMAR Chairez, DO   oxyCODONE (ROXICODONE) 5 MG immediate release tablet Take 1 tablet by mouth every 6 hours as needed for Pain for up to 30 days. May fill 5/4/22.  Yes Alex Thompson, MD   levocetirizine (XYZAL) 5 MG tablet Take 1 tablet by mouth nightly Yes OMAR Chairez DO   clindamycin (CLINDAGEL) 1 % gel Apply 1 Dose topically daily Yes Historical Provider, MD   famotidine (PEPCID) 20 MG tablet Take 20 mg by mouth 2 times daily Yes Historical Provider, MD   nabumetone (RELAFEN) 750 MG tablet Take 750 mg by mouth 2 times daily Yes Historical Provider, MD   meclizine (ANTIVERT) 25 MG tablet Take 25 mg by mouth as needed Yes Historical Provider, MD   metFORMIN (GLUCOPHAGE) 1000 MG tablet Take 1 tablet by mouth 2 times daily (with meals) Yes OMAR Chairez, DO   albuterol sulfate HFA (PROVENTIL HFA) 108 (90 Base) MCG/ACT inhaler Inhale 2 puffs into the lungs every 6 hours as needed for Wheezing Yes ASHLEE Holloway   omeprazole (PRILOSEC) 40 MG delayed release capsule Take 1 capsule by mouth every morning (before breakfast) Yes OMAR Chairez DO   carbidopa-levodopa (SINEMET)  MG per tablet Take 1 tablet by mouth nightly Yes ASHLEE Mcfarland   Elastic Bandages & Supports (LUMBAR BACK BRACE/SUPPORT PAD) MISC Wear as needed with activity. Yes OMAR Tavares DO   triamcinolone (KENALOG) 0.1 % cream Apply topically 2 times daily. Yes OMAR Tavares DO   ondansetron (ZOFRAN-ODT) 8 MG TBDP disintegrating tablet Take 1 tablet by mouth every 8 hours as needed for Nausea or Vomiting Yes Ran Stevens MD   venlafaxine (EFFEXOR XR) 150 MG extended release capsule Take 1 capsule by mouth daily Yes OMAR Tavares DO   amLODIPine (NORVASC) 5 MG tablet Take 1 tablet by mouth daily Yes OMAR Tavares DO   olopatadine (PATADAY) 0.2 % SOLN ophthalmic solution Place 1 drop into both eyes daily Yes OMAR Tavares DO   naloxone (NARCAN) 4 MG/0.1ML LIQD nasal spray 1 spray by Nasal route as needed for Opioid Reversal Yes OMAR Tavares DO   Calcium-Vitamin D 600-200 MG-UNIT TABS Take 1 tablet by mouth daily Yes Historical Provider, MD   Multiple Vitamins-Minerals (MULTIVITAMIN & MINERAL PO) Take  by mouth. Yes Historical Provider, MD   CRANBERRY Take 500 mg by mouth daily. Yes Historical Provider, MD   Cholecalciferol (VITAMIN D3) 5000 UNITS TABS Take 5,000 Units by mouth daily  Yes Historical Provider, MD [place] (Including outside providers/suppliers regularly involved in providing care):   Patient Care Team:  Lisa Ho DO as PCP - General  OMAR Tavares DO as PCP - [facility] Empaneled Provider  [address] and updated this visit:  Tobacco  Allergies  Meds  Med Hx  Surg Hx  Soc Hx  Fam Hx               this was an in-house visit.

## 2022-05-12 NOTE — PATIENT INSTRUCTIONS
Personalized Preventive Plan for [place] - 5/12/2022  Medicare offers a range of preventive health benefits. Some of the tests and screenings are paid in full while other may be subject to a deductible, co-insurance, and/or copay. Some of these benefits include a comprehensive review of your medical history including lifestyle, illnesses that may run in your family, and various assessments and screenings as appropriate. After reviewing your medical record and screening and assessments performed today your provider may have ordered immunizations, labs, imaging, and/or referrals for you. A list of these orders (if applicable) as well as your Preventive Care list are included within your After Visit Summary for your review. Other Preventive Recommendations:    · A preventive eye exam performed by an eye specialist is recommended every 1-2 years to screen for glaucoma; cataracts, macular degeneration, and other eye disorders. · A preventive dental visit is recommended every 6 months. · Try to get at least 150 minutes of exercise per week or 10,000 steps per day on a pedometer . · Order or download the FREE \"Exercise & Physical Activity: Your Everyday Guide\" from [employer]. Call 7-736.116.6804 or search [employer] online. · You need 0130-0310 mg of calcium and 6705-0832 IU of vitamin D per day. It is possible to meet your calcium requirement with diet alone, but a vitamin D supplement is usually necessary to meet this goal.  · When exposed to the sun, use a sunscreen that protects against both UVA and UVB radiation with an SPF of 30 or greater. Reapply every 2 to 3 hours or after sweating, drying off with a towel, or swimming. · Always wear a seat belt when traveling in a car. Always wear a helmet when riding a bicycle or motorcycle.   Patient Education        Well Visit, Ages 25 to 48: Care Instructions  Overview     Well visits can help you stay healthy. Your doctor has checked your overall health and may have suggested ways to take good care of yourself. Your doctor also may have recommended tests. At home, you can help prevent illness withhealthy eating, regular exercise, and other steps. Follow-up care is a key part of your treatment and safety. Be sure to make and go to all appointments, and call your doctor if you are having problems. It's also a good idea to know your test results and keep alist of the medicines you take. How can you care for yourself at home? Get screening tests that you and your doctor decide on. Screening helps find diseases before any symptoms appear. Eat healthy foods. Choose fruits, vegetables, whole grains, protein, and low-fat dairy foods. Limit fat, especially saturated fat. Reduce salt in your diet. Limit alcohol. If you are a man, have no more than 2 drinks a day or 14 drinks a week. If you are a woman, have no more than 1 drink a day or 7 drinks a week. Get at least 30 minutes of physical activity on most days of the week. Walking is a good choice. You also may want to do other activities, such as running, swimming, cycling, or playing tennis or team sports. Discuss any changes in your exercise program with your doctor. Reach and stay at a healthy weight. This will lower your risk for many problems, such as obesity, diabetes, heart disease, and high blood pressure. Do not smoke or allow others to smoke around you. If you need help quitting, talk to your doctor about stop-smoking programs and medicines. These can increase your chances of quitting for good. Care for your mental health. It is easy to get weighed down by worry and stress. Learn strategies to manage stress, like deep breathing and mindfulness, and stay connected with your family and community. If you find you often feel sad or hopeless, talk with your doctor. Treatment can help.   Talk to your doctor about whether you have any risk factors for sexually transmitted infections (STIs). You can help prevent STIs if you wait to have sex with a new partner (or partners) until you've each been tested for STIs. It also helps if you use condoms (male or female condoms) and if you limit your sex partners to one person who only has sex with you. Vaccines are available for some STIs, such as HPV. Use birth control if it's important to you to prevent pregnancy. Talk with your doctor about the choices available and what might be best for you. If you think you may have a problem with alcohol or drug use, talk to your doctor. This includes prescription medicines (such as amphetamines and opioids) and illegal drugs (such as cocaine and methamphetamine). Your doctor can help you figure out what type of treatment is best for you. Protect your skin from too much sun. When you're outdoors from 10 a.m. to 4 p.m., stay in the shade or cover up with clothing and a hat with a wide brim. Wear sunglasses that block UV rays. Even when it's cloudy, put broad-spectrum sunscreen (SPF 30 or higher) on any exposed skin. See a dentist one or two times a year for checkups and to have your teeth cleaned. Wear a seat belt in the car. When should you call for help? Watch closely for changes in your health, and be sure to contact your doctor if you have any problems or symptoms that concern you. Where can you learn more? Go to https://rick.health-partners. org and sign in to your InforcePro account. Enter P072 in the [facility] box to learn more about \"Well Visit, Ages 25 to 48: Care Instructions. \"     If you do not have an account, please click on the \"Sign Up Now\" link. Current as of: October 6, 2021               Content Version: 13.2  © 2006-2022 Healthwise, Incorporated. Care instructions adapted under license by [facility] ([facility]).  If you have questions about a medical condition or this instruction, always ask your healthcare professional. Bud Mayberry disclaims any warranty or liability for your use of this information. Patient Education        Learning About How to Make a Home Safe  Making your home safe: Overview  You can help protect the person in your care by making the home safe. Here aresome general tips for how to lower the chance of getting injured in the home. Pad sharp corners on furniture and counter tops. Keep objects that are used often within easy reach. Install handrails around the toilet and in the shower. Use a tub mat to prevent slipping. Use a shower chair or bath bench when the person bathes. Provide good lighting inside and outside the home. Put night-lights in bedrooms, hallways, and bathrooms. Have light at the top and bottom of stairways. Have a first aid kit. It is also important to be aware of safe temperatures in the home. When helping someone bathe, use the back of your hand to test the water to make sure it's not too hot. Lower the temperature setting in the hot water heater to 120°F or lower to avoid burns. And make sure other liquids (such as coffee, tea, orsoup) are not too hot. How can you protect from fire and carbon monoxide? Home safety alarms are very important. A smoke alarm can detect small amounts of smoke. This can allow time to escape from a fire. And a carbon monoxide alarm can let people know about this deadly gas before it starts to make themsick. Put smoke alarms: On each level of the home, in the hallway outside sleeping areas, and inside each bedroom. In the center of a ceiling, or on a wall 6 to 12 inches from the ceiling. This is where smoke goes first. Avoid places near doors, windows, or air ducts. Put a carbon monoxide alarm in the hallway outside of the bedrooms in each sleeping area of the house. The alarm should be placed high on the wall. Make sure that the alarm can't be covered up by furniture or drapes. Test alarms regularly by pressing the test button.   Replace non-lithium batteries in alarms twice a year. Have a plan for getting out of the home if there is a fire. Practice by having a fire drill. Keep a fire extinguisher in the kitchen. What can you do to prevent falls? You can help prevent falls by keeping rooms uncluttered, with clear walkways around furniture. Keep electrical cords off the floor, and remove throw rugs toprevent tripping. If there are steps in the home, make sure they all have handrails, and always use the handrails. Don't leave items on the steps, and be sure to fix any thatare loose, broken, or uneven. How can you increase safety for people with dementia? If you are caring for someone who has dementia, you may need to make some extra changes to create a safe home. People with dementia have a loss of mental skills, such as memory, problem solving, and learning. So things that might nothave been a danger to them before can cause safety problems now. Here are some things to consider:  Don't move furniture around. The person may become confused. Use locks on doors and cupboards. Lock up knives, scissors, medicines, cleaning supplies, and other dangerous items. Use hidden switches or controls for the stove, thermostat, water heater, and other appliances. If your loved one is still cooking, think about whether that is safe. It may be okay with some help, depending on your loved one's condition. But for people who have memory or thinking problems, it's best to avoid any activities that might not be safe. If the person tends to wander or to try to leave the home, install motion-sensor lights on all doors and windows. Have emergency numbers in a central area near a phone. Include 911 and numbers for the doctor and family members. Get medical alert jewelry for the person so you can be contacted if he or she wanders away. If possible, provide a safe place for wandering, such as an enclosed yard or garden. Where can you learn more?   Go to [email or web address] and sign in to your [email or web address] account. Enter E039 in the [facility] box to learn more about \"Learning About How to Make a Home Safe. \"     If you do not have an account, please click on the \"Sign Up Now\" link. Current as of: October 18, 2021               Content Version: 13.2  © 2006-2022 [employer]. Care instructions adapted under license by [facility] ([facility]). If you have questions about a medical condition or this instruction, always ask your healthcare professional. [place] [address] any warranty or liability for your use of this information. Patient Education        Learning About Stress  What is stress? Stress is what you feel when you have to handle more than you are used to. Stress is a fact of life for most people, and it affects everyone differently. What causes stress for you may not be stressful for someone else. A lot of things can cause stress. You may feel stress when you go on a job interview, take a test, or run a race. This kind of short-term stress is normal and even useful. It can help you if you need to work hard or react quickly. Forexample, stress can help you finish an important job on time. Stress also can last a long time. Long-term stress is caused by stressful situations or events. Examples of long-term stress include long-term health problems, ongoing problems at work, or conflicts in your family. Long-termstress can harm your health. How does stress affect your health? When you are stressed, your body responds as though you are in danger. It makes hormones that speed up your heart, make you breathe faster, and give you a burst of energy. This is called the fight-or-flight stress response. If thestress is over quickly, your body goes back to normal and no harm is done. But if stress happens too often or lasts too long, it can have bad effects.  Long-term stress can make you more likely to get sick, and it can make symptoms of some diseases worse. If you tense up when you are stressed, you may develop neck, shoulder, or low back pain. Stress is linked to high blood pressure andheart disease. Stress also harms your emotional health. It can make you hatch, tense, or depressed. Your relationships may suffer, and you may not do well at work orschool. What can you do to manage stress? How to relax your mind   Write. It may help to write about things that are bothering you. This helps you find out how much stress you feel and what is causing it. When you know this, you can find better ways to cope. Let your feelings out. Talk, laugh, cry, and express anger when you need to. Talking with friends, family, a counselor, or a member of the clergy about your feelings is a healthy way to relieve stress. Do something you enjoy. For example, listen to music or go to a movie. Practice your hobby or do volunteer work. Meditate. This can help you relax, because you are not worrying about what happened before or what may happen in the future. Do guided imagery. Imagine yourself in any setting that helps you feel calm. You can use audiotapes, books, or a teacher to guide you. How to relax your body   Do something active. Exercise or activity can help reduce stress. Walking is a great way to get started. Even everyday activities such as housecleaning or yard work can help. Do breathing exercises. For example:  From a standing position, bend forward from the waist with your knees slightly bent. Let your arms dangle close to the floor. Breathe in slowly and deeply as you return to a standing position. Roll up slowly and lift your head last.  Hold your breath for just a few seconds in the standing position. Breathe out slowly and bend forward from the waist.  Try yoga or alexsandra chi. These techniques combine exercise and meditation. You may need some training at first to learn them.   What can you do to prevent stress? Manage your time. This helps you find time to do the things you want and need to do. Get enough sleep. Your body recovers from the stresses of the day while you are sleeping. Get support. Your family, friends, and community can make a difference in how you experience stress. Where can you learn more? Go to [email or web address] and sign in to your Talkbits account. Enter S541 in the [employer] box to learn more about \"Learning About Stress. \"     If you do not have an account, please click on the \"Sign Up Now\" link. Current as of: June 16, 2021               Content Version: 13.2  © 2006-2022 TheShoppingPro. Care instructions adapted under license by [facility] ([facility]). If you have questions about a medical condition or this instruction, always ask your healthcare professional. [place] [address] any warranty or liability for your use of this information. Patient Education        Learning About Guided Imagery for Stress  What are guided imagery and stress? Stress is what you feel when you have to handle more than you're used to. A lot of things can cause stress. You may feel stress when you go on a job interview, take a test, or run a race. This kind of short-term stress is normal and evenuseful. It can help you if you need to work hard or react quickly. Stress also can last a long time. Long-term stress is caused by stressful situations or events. Examples include long-term health problems, ongoing problems at work, and conflicts in your family. Long-term stress can harm yourhealth. Guided imagery is a technique of directed thoughts and suggestions that guide your mind toward a relaxed, focused state. This technique helps you use your mind to take you to a calm, peaceful place. You can use it to relax, which canlower blood pressure and reduce other problems related to stress. How does guided imagery help to relieve stress?   Because of the way the mind and body are connected, guided imagery can make you feel like you are experiencing something just by imagining it. You can achieve a relaxed state when you imagine all the details of a safe, comfortable place, such as a beach or a garden. This relaxed state may help you feel more in control of your emotions and thought processes. Feeling in control may improveyour attitude, health, and sense of well-being. How do you do guided imagery? You can use a smartphone luis enrique or a video to lead you through the process. You use all of your senses in guided imagery. For example, if you want a tropical setting, you can imagine the warm breeze on your skin, the bright blue of the water, the sound of the surf, the sweet scent of tropical flowers, and the taste of coconut. Imagining those things can make you actually feel like you'rethere. But you don't have to imagine the tropics to feel peace. Guided imagery can take you to your own restful place. To give guided imagery a try, follow thesesteps:  Lean back comfortably in your chair. If you can, close your eyes. Put your arms on the armrests, or fold your hands in your lap. Take a deep breath through your nose. Breathe in slowly, and then let the air out completely through your mouth. Do it again slowly. Keep breathing like this. Gather up any tension in your body, and send it out with every breath. Let a feeling of warmth spread from your lungs to your neck and head, down your arms to your fingertips, through your body and into your legs, all the way down to your toes. Stay this way for a moment. Now imagine a pleasant day. You're at a park or at a place you've visited in the past where you felt at peace. In your mind's eye, look at what lies in front of you. Maybe you see the sun, filtered through trees. Maybe clouds are drifting by. Look to one side, and then the other. Notice the feel of the air around you.  Notice how it feels on your face and on your arms.  Stay here for a while. Let it become real for you. Follow-up care is a key part of your treatment and safety. Be sure to make and go to all appointments, and call your doctor if you are having problems. It's also a good idea to know your test results and keep alist of the medicines you take. Where can you learn more? Go to [email or web address] and sign in to your Ex[employer] Sciences account. Enter N291 in the [employer] box to learn more about \"Learning About Guided Imagery for Stress. \"     If you do not have an account, please click on the \"Sign Up Now\" link. Current as of: June 16, 2021               Content Version: 13.2  © 2006-2022 Healthwise, DocVerse. Care instructions adapted under license by [facility] ([facility]). If you have questions about a medical condition or this instruction, always ask your healthcare professional. [place] [address] any warranty or liability for your use of this information. Patient Education        Learning About Mindfulness for Stress  What are mindfulness and stress? Stress is what you feel when you have to handle more than you are used to. A lot of things can cause stress. You may feel stress when you go on a job interview, take a test, or run a race. This kind of short-term stress is normaland even useful. It can help you if you need to work hard or react quickly. Stress also can last a long time. Long-term stress is caused by stressful situations or events. Examples of long-term stress include long-term health problems, ongoing problems at work, and conflicts in your family. Long-termstress can harm your health. Mindfulness is a focus only on things happening in the present moment. It's a process of purposefully paying attention to and being aware of your surroundings, your emotions, your thoughts, and how your body feels. You are aware of these things, but you aren't judging these experiences as \"good\" or \"bad. \" Mindfulness can help you learn to calm your mind and body to help youcope with illness, pain, and stress. How does mindfulness help to relieve stress? Mindfulness can help quiet your mind and relax your body. Studies show that it can help some people sleep better, feel less anxious, and bring their blood pressure down. And it's been shown to help some people live and cope better with certain health problems like heart disease, depression, chronic pain, andcancer. How do you practice mindfulness? To be mindful is to pay attention, to be present, and to be accepting. When you're mindful, you do just one thing and you pay close attention to that one thing. For example, you may sit quietly and notice your emotions or how your food tastes and smells. When you're present, you focus on the things that are happening right now. You let go of your thoughts about the past and the future. When you dwell on the past or the future, you miss moments that can heal and strengthen you. You may miss moments like hearing a child laugh or seeing a friendly face when you think you're all alone. When you're accepting, you don't  the present moment. Instead you accept your thoughts and feelings as they come. You can practice anytime, anywhere, and in any way you choose. You can practicein many ways. Here are a few ideas:  While doing your chores, like washing the dishes, let your mind focus on what's in your hand. What does the dish feel like? Is the water warm or cold? Go outside and take a few deep breaths. What is the air like? Is it warm or cold? When you can, take some time at the start of your day to sit alone and think. Take a slow walk by yourself. Count your steps while you breathe in and out. Try yoga breathing exercises, stretches, and poses to strengthen and relax your muscles. At work, if you can, try to stop for a few moments each hour. Note how your body feels.  Let yourself regroup and let your mind settle before you return to what you were doing. If you struggle with anxiety or \"worry thoughts,\" imagine your mind as a blue maksim and your worry thoughts as clouds. Now imagine those worry thoughts floating across your mind's maksim. Just let them pass by as you watch. Follow-up care is a key part of your treatment and safety. Be sure to make and go to all appointments, and call your doctor if you are having problems. It's also a good idea to know your test results and keep alist of the medicines you take. Where can you learn more? Go to [email or web address] and sign in to your Synthelis account. Enter Z385 in the [employer] box to learn more about \"Learning About Mindfulness for Stress. \"     If you do not have an account, please click on the \"Sign Up Now\" link. Current as of: June 16, 2021               Content Version: 13.2  © 2006-2022 Pin[employer]. Care instructions adapted under license by [facility] ([facility]). If you have questions about a medical condition or this instruction, always ask your healthcare professional. [place] [address] any warranty or liability for your use of this information. Patient Education        Learning About Progressive Muscle Relaxation for Stress  What are progressive muscle relaxation and stress? Stress is what you feel when you have to handle more than you are used to. A lot of things can cause stress. You may feel stress when you go on a job interview, take a test, or run a race. This kind of short-term stress is normaland even useful. It can help you if you need to work hard or react quickly. Stress also can last a long time. Long-term stress is caused by stressful situations or events. Examples of long-term stress include long-term health problems, ongoing problems at work, and conflicts in your family. Long-termstress can harm your health.   When you have anxiety or stress in your life, one of the ways your body responds is with muscle tension. Progressive muscle relaxation is a method thathelps relieve that tension. How does progressive muscle relaxation reduce stress? The body responds to stress with muscle tension, which can cause pain or discomfort. In turn, tense muscles relay to the body that it's stressed. That keeps the cycle of stress and muscle tension going. Progressive muscle relaxation helps break this cycle by reducing muscle tension and general mentalanxiety. This method often helps people get to sleep. How do you do progressive muscle relaxation? To do progressive muscle relaxation, first you tense a group of muscles as you breathe in. Then you relax them as you breathe out. Notice how your musclesfeel when you relax them. You work on your muscle groups in a certain order. Through practice, you can learn to feel the difference between a tensed muscle and a relaxed muscle. Then you can learn how to turn on this relaxed state atthe first sign of a muscle tensing up due to stress. Practicing this method for a few weeks will help you get better at this skill. In time you'll be able to use this method to relieve stress. When you first start, it may help to use an audio recording until you learn all the musclegroups in order. Check online for these recordings. Choose a place where you won't be interrupted. It should have space for you tolie down on your back and stretch out comfortably, such as on a carpeted floor. Follow-up care is a key part of your treatment and safety. Be sure to make and go to all appointments, and call your doctor if you are having problems. It's also a good idea to know your test results and keep alist of the medicines you take. Where can you learn more? Go to https://rick.Gorb. org and sign in to your [email or web address] account. Enter N125 in the Vigme box to learn more about \"Learning About Progressive Muscle Relaxation for Stress. \"     If you do not have an account, please click on the \"Sign Up Now\" link. Current as of: June 16, 2021               Content Version: 13.2  © 2006-2022 Healthwise, [employer]. Care instructions adapted under license by [facility] ([facility]). If you have questions about a medical condition or this instruction, always ask your healthcare professional. [address] any warranty or liability for your use of this information. Patient Education        Eating Healthy Foods: Care Instructions  Your Care Instructions     Eating healthy foods can help lower your risk for disease. Healthy food gives you energy and keeps your heart strong, your brain active, your musclesworking, and your bones strong. A healthy diet includes a variety of foods from the basic food groups: grains, vegetables, fruits, milk and milk products, and meat and beans. Some people may eat more of their favorite foods from only one food group and, as a result, miss getting the nutrients they need. So, it is important to pay attention not only to what you eat but also to what you are missing from your diet. You caneat a healthy, balanced diet by making a few small changes. Follow-up care is a key part of your treatment and safety. Be sure to make and go to all appointments, and call your doctor if you are having problems. It's also a good idea to know your test results and keep alist of the medicines you take. How can you care for yourself at home? Look at what you eat  Keep a food diary for a week or two and record everything you eat or drink. Track the number of servings you eat from each food group. For a balanced diet every day, eat a variety of:  6 or more ounce-equivalents of grains, such as cereals, breads, crackers, rice, or pasta, every day.  An ounce-equivalent is 1 slice of bread, 1 cup of ready-to-eat cereal, or ½ cup of cooked rice, cooked pasta, or cooked cereal.  2½ cups of vegetables, especially:  Dark-green vegetables such as broccoli and spinach. Orange vegetables such as carrots and sweet potatoes. Dry beans (such as casper and kidney beans) and peas (such as lentils). 2 cups of fresh, frozen, or canned fruit. A small apple or 1 banana or orange equals 1 cup.  3 cups of nonfat or low-fat milk, yogurt, or other milk products. 5½ ounces of meat and beans, such as chicken, fish, lean meat, beans, nuts, and seeds. One egg, 1 tablespoon of peanut butter, ½ ounce nuts or seeds, or ¼ cup of cooked beans equals 1 ounce of meat. Learn how to read food labels for serving sizes and ingredients. Fast-food and convenience-food meals often contain few or no fruits or vegetables. Make sure you eat some fruits and vegetables to make the meal more nutritious. Look at your food diary. For each food group, add up what you have eaten and then divide the total by the number of days. This will give you an idea of how much you are eating from each food group. See if you can find some ways to change your diet to make it more healthy. Start small  Do not try to make dramatic changes to your diet all at once. You might feel that you are missing out on your favorite foods and then be more likely to fail. Start slowly, and gradually change your habits. Try some of the following:  Use whole wheat bread instead of white bread. Use nonfat or low-fat milk instead of whole milk. Eat brown rice instead of white rice, and eat whole wheat pasta instead of white-flour pasta. Try low-fat cheeses and low-fat yogurt. Add more fruits and vegetables to meals and have them for snacks. Add lettuce, tomato, cucumber, and onion to sandwiches. Add fruit to yogurt and cereal.  Enjoy food  You can still eat your favorite foods. You just may need to eat less of them. If your favorite foods are high in fat, salt, and sugar, limit how often you eat them, but do not cut them out entirely. Eat a wide variety of foods.   Make healthy choices when eating out  The type of restaurant you choose can help you make healthy choices. Even fast-food chains are now offering more low-fat or healthier choices on the menu. Choose smaller portions, or take half of your meal home. When eating out, try:  A veggie pizza with a whole wheat crust or grilled chicken (instead of sausage or pepperoni). Pasta with roasted vegetables, grilled chicken, or marinara sauce instead of cream sauce. A vegetable wrap or grilled chicken wrap. Broiled or poached food instead of fried or breaded items. Make healthy choices easy  Buy packaged, prewashed, ready-to-eat fresh vegetables and fruits, such as baby carrots, salad mixes, and chopped or shredded broccoli and cauliflower. Buy packaged, presliced fruits, such as melon or pineapple. Choose 100% fruit or vegetable juice instead of soda. Limit juice intake to 4 to 6 oz (½ to ¾ cup) a day. Blend low-fat yogurt, fruit juice, and canned or frozen fruit to make a smoothie for breakfast or a snack. Where can you learn more? Go to https://Hello MusicpepicWorldOne.AlphaCare Holdings. org and sign in to your 9GAG account. Enter F475 in the CleanAgents.com box to learn more about \"Eating Healthy Foods: Care Instructions. \"     If you do not have an account, please click on the \"Sign Up Now\" link. Current as of: September 8, 2021               Content Version: 13.2  © 2006-2022 Healthwise, Incorporated. Care instructions adapted under license by [facility] ([facility]). If you have questions about a medical condition or this instruction, always ask your healthcare professional. [place] [address] any warranty or liability for your use of this information. Patient Education        7 Tips for Eating Healthy When [facility] All the Time      Make quick meals on busy nights. Try recipes that are simple to make and easy to clean up, like pasta, soups, or casseroles.  These dishes can often be made ahead of time and are easy toreheat when you're short on time.     Buy foods that last.  Choose fresh foods, such as onions, garlic, and potatoes. You can also reach for frozen, canned, and dried foods. Foods like these last and can help youpull a healthy meal together quickly. Millie Fell something new. Try checking out cookbooks from [employer]. Or search for new recipes online. You can also change the ingredients in an old favorite recipe. Or switch theseasonings to create something new. Try theme nights. Try \"Taco Tuesday. \" Do \"Meatless Monday\" for a vegetarian meal each week. And save \"Leftovers Night\" for days when you don't want to cook. Let your favoritedishes guide you. Then make them again every few weeks. Millie Fell with a friend. Maybe you know someone who's feeling the same way about healthy eating. Pick a recipe and schedule a night to make it \"together. \" You can also video callwhile you cook or eat. Share food with other people. If you like cooking and baking, you can share what you've made. Split up what you've made and keep only what you want to eat. You can wrap up the rest toshare with a friend, neighbor, or family member. Aim for balance, variety, and moderation. On most days, eat from each food group--grains, protein foods, vegetables, fruits, and dairy. And choose different foods from each group. For example, if you often eat apples, try reaching for a banana instead. All foods, if you eatthem in moderation, can be part of healthy eating. Current as of: September 8, 2021               Content Version: 13.2  © 2006-2022 Healthwise, [employer]. Care instructions adapted under license by [facility] ([facility]). If you have questions about a medical condition or this instruction, always ask your healthcare professional. [place] [address] any warranty or liability for your use of this information. Patient Education        Learning About Dietary Guidelines  What are the Dietary Guidelines for Americans?      Dietary Guidelines for Americans provide tips for eating well and stayinghealthy. This helps reduce the risk for long-term (chronic) diseases. These guidelines recommend that you:  Eat and drink the right amount for you. The U.S. government's food guide is called MyPlate. It can help you make your own well-balanced eating plan. Try to balance your eating with your activity. This helps you stay at a healthy weight. Drink alcohol in moderation, if at all. Limit foods high in salt, saturated fat, trans fat, and added sugar. These guidelines are from the U.S. Department of Agriculture and the U. S. Department of Health and [employer]. They are updated every 5 years. What is MyPlate? MyPlate is the U.S. government's food guide. It can help you make your own well-balanced eating plan. A balanced eating plan means that you eat enough, but not too much, and that your food gives you the nutrients you need to stayhealthy. MyPlate focuses on eating plenty of whole grains, fruits, and vegetables, andon limiting fat and sugar. It is available online at www. ChooseMyPlate.gov. How can you get started? If you're trying to eat healthier, you can slowly change your eating habits over time. You don't have to make big changes all at once. Start by adding oneor two healthy foods to your meals each day. Grains  Choose whole-grain breads and cereals and whole-wheat pasta and whole-graincrackers. Vegetables  Eat a variety of vegetables every day. They have lots of nutrients and are partof a heart-healthy diet. Fruits  Eat a variety of fruits every day. Fruits contain lots of nutrients. Choosefresh fruit instead of fruit juice. Protein foods  Choose fish and lean poultry more often. Eat red meat and fried meats lessoften. Dried beans, tofu, and nuts are also good sources of protein. Dairy  Choose low-fat or fat-free products from this food group. If you have problemsdigesting milk, try eating cheese or yogurt instead.   Fats and oils  Limit fats and oils if you're trying to cut calories. Choose healthy fats whenyou cook. These include canola oil and olive oil. Where can you learn more? Go to https://Carbon Blackdona.FiberSensing. org and sign in to your 9Lenses account. Enter R883 in the [facility] box to learn more about \"Learning About Dietary Guidelines. \"     If you do not have an account, please click on the \"Sign Up Now\" link. Current as of: September 8, 2021               Content Version: 13.2  © 6972-9745 [employer]. Care instructions adapted under license by [facility] ([facility]). If you have questions about a medical condition or this instruction, always ask your healthcare professional. [address] any warranty or liability for your use of this information. Patient Education        Learning About Healthy Weight  What is a healthy weight? A healthy weight is the weight at which you feel good about yourself and have energy for work and play. It's also one that lowers your risk for healthproblems. What can you do to stay at a healthy weight? It can be hard to stay at a healthy weight, especially when fast food, vending-machine snacks, and processed foods are so easy to find. And with your busy lifestyle, activity may be low on your list of things to do. But stayingat a healthy weight may be easier than you think. Here are some dos and don'ts for staying at a healthy weight. Do eat healthy foods  The kinds of foods you eat have a big impact on both your weight and your health. Reaching and staying at a healthy weight is not about going on a diet. It's about making healthier food choices every day and changing your diet forgood. Healthy eating means eating a variety of foods so that you get all the nutrients you need. Your body needs protein, carbohydrate, and fats for energy. They keep your heart beating, your brain active, and your muscles working.   On most days, try to eat from each food group. This means eating a variety of: Whole grains, such as whole wheat breads and pastas. Fruits and vegetables. Dairy products, such as low-fat milk, yogurt, and cheese. Lean proteins, such as all types of fish, chicken without the skin, and beans. Don't have too much or too little of one thing. All foods, if eaten inmoderation, can be part of healthy eating. Even sweets can be okay. If your favorite foods are high in fat, salt, sugar, or calories, limit howoften you eat them. Eat smaller servings, or look for healthy substitutes. Do watch what you eat  Many people eat more than their bodies need. Part of staying at a healthy weight means learning how much food you really need from day to day and not eating more than that. Even with healthy foods, eating too much can make yougain weight. Having a well-balanced diet means that you eat enough, but not too much, and that your food gives you the nutrients you need to stay healthy. So listen toyour body. Eat when you're hungry. Stop when you feel satisfied. It's a good idea to have healthy snacks ready for when you get hungry. Keep healthy snacks with you at work, in your car, and at home. If you have a healthy snack easily available, you'll be less likely to pick a candy bar orbag of chips from a vending machine instead. Some healthy snacks you might want to keep on hand are fruit, low-fat yogurt, string cheese, low-fat microwave popcorn, raisins and other dried fruit, nuts,whole wheat crackers, pretzels, carrots, celery sticks, and broccoli. Do some physical activity  A big part of reaching and staying at a healthy weight is being active. When you're active, you burn calories. This makes it easier to reach and stay at a healthy weight. When you're active on a regular basis, your body burns more calories, even when you're at rest. Being active helps you lose fat andbuild lean muscle. Try to be active for at least 1 hour every day.  This may sound like a lot, but it's okay to be active in smaller blocks of time that add up to 1 hour a day. Any activity that makes your heart beat faster and keeps it there for a while counts. A brisk walk, run, or swim will get your heart beating faster. So will climbing stairs, shooting baskets, or cycling. Even some household chores likevacuuming and mowing the lawn will get your heart rate up. Pick activities that you enjoy--ones that make your heart beat faster, your muscles stronger, and your muscles and joints more flexible. If you find more than one thing you like doing, do them all. You don't have to do the same thingevery day. Don't diet  Diets don't work. Diets are temporary. Because you give up so much when you diet, you may be hungry and think about food all the time. And after you stop dieting, you also may overeat to make up for what you missed. Most people who diet end up gainingback the pounds they lost--and more. Remember that healthy bodies come in lots of shapes and sizes. Everyone can gethealthier by eating better and being more active. Where can you learn more? Go to https://My-Apps.Buru Buru. org and sign in to your [employer] account. Enter 507 [id number] in the CustomerAdvocacy.com box to learn more about \"Learning About Healthy Weight. \"     If you do not have an account, please click on the \"Sign Up Now\" link. Current as of: December 27, 2021               Content Version: 13.2  © 2006-2022 Healthwise, Incorporated. Care instructions adapted under license by [facility] ([facility]). If you have questions about a medical condition or this instruction, always ask your healthcare professional. [place] [address] any warranty or liability for your use of this information. Patient Education        Learning About Being Physically Active  What is physical activity? Being physically active means doing any kind of activity that gets your bodymoving.   The types of physical activity that can help you get fit and stay healthyinclude:  Aerobic or \"cardio\" activities. These make your heart beat faster and make you breathe harder, such as brisk walking, riding a bike, or running. They strengthen your heart and lungs and build up your endurance. Strength training activities. These make your muscles work against, or \"resist,\" something. Examples include lifting weights or doing push-ups. These activities help tone and strengthen your muscles and bones. Stretches. These let you move your joints and muscles through their full range of motion. Stretching helps you be more flexible. What are the benefits of being active? Being active is one of the best things you can do for your health. It helps youto:  Feel stronger and have more energy to do all the things you like to do. Focus better at school or work. Feel, think, and sleep better. Reach and stay at a healthy weight. Lose fat and build lean muscle. Lower your risk for serious health problems, including diabetes, heart attack, high blood pressure, and some cancers. Keep your heart, lungs, bones, muscles, and joints strong and healthy. How can you make being active part of your life? Start slowly. Make it your long-term goal to get at least 30 minutes of exercise on most days of the week. Walking is a good choice. You also may want to do other activities, such as running, swimming, cycling, or playing tennisor team sports. Pick activities that you like--ones that make your heart beat faster, your muscles stronger, and your muscles and joints more flexible. If you find more than one thing you like doing, do them all. You don't have to do the same thingevery day. Get your heart pumping every day. Any activity that makes your heart beat faster and keeps it at that rate for awhile counts. Here are some great ways to get your heart beating faster:  Go for a brisk walk, run, or bike ride. Go for a hike or swim.   Go in-line skating. Play a game of touch football, basketball, or soccer. Ride a bike. Play tennis or racquetball. Climb stairs. Even some household chores can be aerobic--just do them at a faster pace. Vacuuming, raking or mowing the lawn, sweeping the garage, and washing andwaxing the car all can help get your heart rate up. Strengthen your muscles during the week. You don't have to lift heavy weights or grow big, bulky muscles to get stronger. Doing a few simple activities that make your muscles work against, or\"resist,\" something can help you get stronger. For example, you can:  Do push-ups or sit-ups, which use your own body weight as resistance. Lift weights or dumbbells or use stretch bands at home or in a gym or community center. Stretch your muscles often. Stretching will help you as you become more active. It can help you stay flexible, loosen tight muscles, and avoid injury. It can also help improve yourbalance and posture and can be a great way to relax. Be sure to stretch the muscles you'll be using when you work out. It's best to warm your muscles slightly before you stretch them. Walk or do some other lightaerobic activity for a few minutes, and then start stretching. When you stretch your muscles:  Do it slowly. Stretching is not about going fast or making sudden movements. Don't push or bounce during a stretch. Hold each stretch for at least 15 to 30 seconds, if you can. You should feel a stretch in the muscle, but not pain. Breathe out as you do the stretch. Then breathe in as you hold the stretch. Don't hold your breath. If you're worried about how more activity might affect your health, have a checkup before you start. Follow any special advice your doctor gives you forgetting a smart start. Where can you learn more? Go to https://rick.Stopford Projects. org and sign in to your [employer] account.  Enter U973 in the Tweekaboo box to learn more about \"Learning About Being Physically Active. \"     If you do not have an account, please click on the \"Sign Up Now\" link. Current as of: May 12, 2021               Content Version: 13.2  © 2006-2022 Healthwise, [employer]. Care instructions adapted under license by [facility] ([facility]). If you have questions about a medical condition or this instruction, always ask your healthcare professional. [facility] any warranty or liability for your use of this information. Patient Education        Walking for Exercise: Care Instructions  Your Care Instructions     Walking is one of the easiest ways to get the exercise you need for good health. A brisk, 30-minute walk each day can help you feel better and have more energy. It can help you lower your risk of disease. Walking can help you keepyour bones strong and your heart healthy. Check with your doctor before you start a walking plan if you have heart problems, other health issues, or you have not been active in a long time. Follow your doctor's instructions for safe levels of exercise. Follow-up care is a key part of your treatment and safety. Be sure to make and go to all appointments, and call your doctor if you are having problems. It's also a good idea to know your test results and keep alist of the medicines you take. How can you care for yourself at home? Getting started  Start slowly and set a short-term goal. For example, walk for 5 or 10 minutes every day. Bit by bit, increase the amount you walk every day. Try for at least 30 minutes on most days of the week. You also may want to swim, bike, or do other activities. If finding enough time is a problem, it's fine to be active in shorter periods of time throughout your day. To get the heart-healthy benefits of walking, you need to walk briskly enough to increase your heart rate and breathing, but not so fast that you can't talk comfortably.   Wear comfortable shoes that fit well and provide good support for your feet and ankles. Staying with your plan  After you've made walking a habit, set a longer-term goal. You may want to set a goal of walking briskly for longer or walking farther. Experts say to do 2½ hours (150 minutes) of moderate activity a week. A faster heartbeat is what defines moderate-level activity. To stay motivated, walk with friends, coworkers, or pets. Use a phone luis enrique or pedometer to track your steps each day. Set a goal to increase your steps. When you reach that goal, set a higher goal.  If the weather keeps you from walking outside, go for walks at the mall with a friend. Local schools and churches may have indoor gyms where you can walk. Fitting a walk into your workday  Park several blocks away from work, or get off the bus a few stops early. Use the stairs instead of the elevator, at least for a few floors. Suggest holding meetings with colleagues during a walk inside or outside the building. Use the restroom that is the farthest from your desk or workstation. Use your morning and afternoon breaks to take quick 15 minutes walks. Staying safe  Know your surroundings. Walk in a well-lighted, safe place. If it's dark, walk with a partner. Wear light-colored clothing. If you can, buy a vest or jacket that reflects light. Carry a cell phone for emergencies. Drink plenty of water. Take a water bottle with you when you walk. This is very important if it is hot out. Be careful not to slip on wet or icy ground. You can buy \"grippers\" for your shoes to help keep you from slipping. Pay attention to your walking surface. Use sidewalks and paths. If you have health issues such as asthma, COPD, or heart problems, or if you haven't been active for a long time, check with your doctor before you start a new activity. Where can you learn more? Go to https://rick.healthGrowOp Technology. org and sign in to your [employer] account.  Enter R159 in the [employer] box to learn more about \"Walking for Exercise: Care Instructions. \"     If you do not have an account, please click on the \"Sign Up Now\" link. Current as of: May 12, 2021               Content Version: 13.2  © 2006-2022 Healthwise, Incorporated. Care instructions adapted under license by [facility] ([facility]). If you have questions about a medical condition or this instruction, always ask your healthcare professional. [place] [address] any warranty or liability for your use of this information. Patient Education        Learning About Vision Tests  What are vision tests? The four most common vision tests are visual acuity tests, refraction, visualfield tests, and color vision tests. Visual acuity (sharpness) tests  These tests are used: To see if you need glasses or contact lenses. To monitor an eye problem. To check an eye injury. Visual acuity tests are done as part of routine exams. You may also have thistest when you get your 's license or apply for some types of jobs. Visual field tests  These tests are used: To check for vision loss in any area of your range of vision. To screen for certain eye diseases. To look for nerve damage after a stroke, head injury, or other problem that could reduce blood flow to the brain. Refraction and color tests  A refraction test is done to find the right prescription for glasses and contact lenses. A color vision test is done to check for color blindness. Color vision is often tested as part of a routine exam. You may also have this test when you apply for a job where recognizing different colors is important,such as , electronics, or the [employer]. How are vision tests done? Visual acuity test   You cover one eye at a time. You read aloud from a wall chart across the room. You read aloud from a small card that you hold in your hand. Refraction   You look into a special device.   The device puts lenses of different strengths in front of each eye to see how strong your glasses or contact lenses need to be. Visual field tests   Your doctor may have you look through special machines. Or your doctor may simply have you stare straight ahead while he or she moves a finger into and out of your field of vision. Color vision test   You look at pieces of printed test patterns in various colors. You say what number or symbol you see. Your doctor may have you trace the number or symbol using a pointer. How do these tests feel? There is very little chance of having a problem from this test. If dilating drops are used for a vision test, they may make the eyes sting and cause amedicine taste in the mouth. Follow-up care is a key part of your treatment and safety. Be sure to make and go to all appointments, and call your doctor if you are having problems. It's also a good idea to know your test results and keep alist of the medicines you take. Where can you learn more? Go to https://G[employer] Games.[employer]. org and sign in to your [employer] account. Enter G551 in the [employer] box to learn more about \"Learning About Vision Tests. \"     If you do not have an account, please click on the \"Sign Up Now\" link. Current as of: January 24, 2022               Content Version: 13.2  © 2006-2022 Healthwise, Incorporated. Care instructions adapted under license by [facility] ([facility]). If you have questions about a medical condition or this instruction, always ask your healthcare professional. [place] [address] any warranty or liability for your use of this information. Patient Education        Reduced Vision: Care Instructions  Your Care Instructions     Reduced vision can be caused by many things. These include macular degenerationand glaucoma. When you can't see as well, daily life can be more challenging. But you can dosome things to stay independent and keep doing the activities you enjoy.   Follow-up care is a key part of your treatment and safety. Be sure to make and go to all appointments, and call your doctor if you are having problems. It's also a good idea to know your test results and keep alist of the medicines you take. How can you care for yourself at home? Use lighting  Point lighting at what you want to see. Don't point it at your eyes. Add lamps where you need extra lighting. Use curtains or shades to adjust how much natural light there is. Use good lighting in places where you could easily fall. These include entries and stairways. Use labels  Label things that are hard to recognize or that could be confusing. This might include medicines, spices, and foods. Use black letters on a white background. Or you can color-code the items. Ashwin the positions of the temperature settings you use the most on your stove and oven. Also ashwin the \"on\" and \"off\" positions. Ashwin the water temperatures you use on faucets in the kitchen and bathroom. To prevent overfilling a sink or bathtub, use waterproof markers or tape to ashwin the water level you want. Avoid falls in your home  Replace or remove any worn carpeting. Tape down or remove area rugs. Do not wax your floors. Use nonskid, nonglare  on smooth floors. Remove electrical cords from areas where you need to walk. Or tape them down so you won't trip on them. Make sure furniture doesn't stick out into areas where you walk. Keep chairs pushed in under tables and desks. Keep all drawers closed. Keep doors fully opened or fully closed. Don't leave them longterm open or shut. Use handrails on stairways and ramps. Make sure that they go beyond the top and bottom steps. Then you won't stumble if you miss a step. Use helpful technology  Use a magnifying lens. You can buy ones that you hold. Or you can buy ones that attach to glasses. Some have lights built in. If your budget allows, you may want to think about a video magnifier system.  These systems can make print, pictures, or other items bigger on a screen. If you have a computer:  Try to adjust the display. You can often change how big the text and pictures appear. Then they will be easier to see and read. You may want to try special software. Some software can recognize spoken commands or change dictated speech into text. Other software allows computers to speak text and read documents. Use large-print items. These include books, newspapers, magazines, and medicine labels. You can also listen to recordings of books. Think about using devices made for people with low vision. Examples are clocks and watches that announce the time. There are also clocks, telephones, and calculators with extra-large buttons. Be safe while you stay active  Ask your doctor what physical activities are safe for you. If you bend, lift things, or move fast, it may affect your health or vision. Ask a friend to read you the instructions for a new exercise and to check your technique. Walk with someone who can help look for things that may be a danger. If you swim laps, use a pool that has ropes between the lanes. When should you call for help? Watch closely for changes in your health, and be sure to contact your doctor if:    You have vision changes. Where can you learn more? Go to https://BioAnalytixpeEmefcyeweb.[employer] Software. org and sign in to your [employer] account. Enter S323 in the [employer] box to learn more about \"Reduced Vision: Care Instructions. \"     If you do not have an account, please click on the \"Sign Up Now\" link. Current as of: January 24, 2022               Content Version: 13.2  © 2006-2022 Healthwise, Incorporated. Care instructions adapted under license by [facility] ([facility]). If you have questions about a medical condition or this instruction, always ask your healthcare professional. [place] [address] any warranty or liability for your use of this information.

## 2022-05-14 NOTE — PROGRESS NOTES
[facility]  HEMATOLOGY & ONCOLOGY    [facility] [place] ONC [facility] HEMATOLOGY AND ONCOLOGY  [address]  [place] [address] [phone]    Patient Name: Naomi Bhatia  Encounter Date: 05/18/2022  YOB: 1975  Patient Number: [id number]      REASON FOR VISIT: Mrs. Bhatia is a 45-year-old female patient here today in follow-up for iron overload.  She has been seen by Dr. Goldberg with initial consultation and recommendations given.  She was found to be positive for this single C282Y hemochromatosis gene variant.  It is noted that iron overload is uncommon for those that are carriers for this but the iron overload could be due to obesity, fatty liver as well as her history of receiving multiple transfusions and iron therapy for iron deficiency anemia.  She has a history of gastric sleeve procedure for which has caused malabsorption issues.  She received multiple transfusions in the past as well as Venofer therapy at least 7 times back in 2018.  She is here with her home health aide, Bhavani    HEMATOLOGIC HISTORY  Previously being seen by Dr. Clements for iron deficiency anemia. She has undergone a gastric sleeve procedure in 12/2017 for morbid obesity and had a total right hip replacement in 3/2019 after a fracture.       She, unfortunateky, stopped following up with bariatric surgery and did not take vitamin replacements. She has had a hysterectomy due to fibroids and endometriosis but found to be iron deficient and was started on iron replacement with IV iron, states it is more than 10 treatments in all.  She states that she has iron deficiency since age 14. She has had multiple blood transfusions in the past (4 or 5).      Dr. Goldberg saw Mrs. Bhatia initially on 9/18/2020 and work-up showed a hemoglobin of 10.8, hematocrit 31.3, ferritin 1406 and an iron saturation of 21%.  She returned to then in November with a  "ferritin of 1663 and underwent a phlebotomy of 250 units.  Since then she has been undergoing phlebotomies approximately every month to 2 months. Her ferritin had not declined as would have hoped.  Dr. Goldberg recommended iron chelation therapy with Jadenu.    On follow-up on August 10, 2021: Last visit in July patient was noted to have a ferritin that decreased below 500 and Jadenu was stopped although repeat testing in July was slightly increased.  It was also noted that her creatinine may have been slightly increased with a decreased GFR which may have been secondary to Jadenu itself.  Hemoglobin during her last visit was 11.9 and steroids had just completed 2 days prior to her last visit likely accounting for the leukocytosis.      On follow up on 11.16.21: Seen in follow up by IRMA Watts and ferritin dropped from >1000 to 592 on 9/10/21 but as she had some vertigo and diarrhea which she believed was due to the medication, her dose was decreased.  The patient had been to the emergency room at [facility] for complaint of left flank pain and thought that she may have a kidney stone but CT did not show any abnormalities.  Urinalysis that time showed some leukocytosis with bacteria but cultures were never sent.  Dr. Sharp had ordered an MRI (she \"got sick\" and rescheduled it for 11.22.21) She described the pain in the area of the left lower back/flank.  In the last 2 weeks, she states that she had tingling in the \"area of my spleen\" and feels that the area is \"drawing up\".  She had previous US showing splenomegaly.      Her hemoglobin on 10/18/2021 was 11.8 with a normal WBC count as well as platelet count.  Her CMP showed a slight decline in her GFR to 60 mL/min and alkaline phosphatase of increased up to 128 with a bilirubin of 1.3 but AST and ALT remained normal.  Her ferritin on 10/18/2021 was down to 451 with an iron saturation of 42%.  As a reminder, the goal for this patient is to keep her below " "500.    On follow-up on January 20, 2022: Telehealth visit as patient thinks she may have been exposed to Covid. Patient was noted to have persistently elevated alkaline phosphatase with a normal GGT and therefore bone scan was ordered. NM Bone scan 11.29.21: 1. Increased tracer activity at the L1 level may be degenerative in etiology. Correlation with plain films recommended to exclude a compression deformity. Postoperative changes of the lumbosacral spine. Arthritic uptake within the joints the upper and lower extremities. No additional abnormal bony uptake identified.     Pains in the splenic area as \"not as bad\" and she not sure what helped the pain.   US spleen 12.17.21 does not show vascular abnormalities but did show splenomegaly (max dimension 16.7 cm, previously 14.6 cm). Brother had splenectomy for unknown reasons (he is not speaking to the patient but supposedly due to pain like the patient).  As the spleen appears to be enlarging over time, and now symptomatically painful, will refer the patient for another opinion at [place] regarding possibly requiring to take out the spleen for symptomatic reasons. In the meantime, would get the patient pre-splenectomy vaccinations.      The patient was seen at [facility] for second opinion by Dr. Pina on 12/28/2021 with the following recommendations:  · A flow cytometry was sent from the peripheral blood from [facility] and is negative  · JAK2 and FISH for BCR/ABL were sent at [facility] and is negative  · Recommendation for a PET scan (it is not clear that insurance will pay for this) to be done at [place] and if there is hypermetabolism then there would be a bigger consideration for splenectomy but if there is no hypermetabolism to not pursue splenectomy.  The etiology of the splenectomy was not clear but could include underlying fatty liver and possible liver disease, iron deposition in the spleen as well as splenomegaly as part of obesity and metabolic syndrome.  " "It was not felt that the size of the spleen had changed significantly over the past few years and it was noted that the spleen was homogenous and smooth which was reassuring without any red flags for an underlying lymphoproliferative disorder.  It was thought that a splenectomy would be low yield and he agreed with the hesitation to proceed with splenectomy.  It was unclear that the pain that the patient was experiencing was due to an large spleen as it has been present for many yearsIt was unclear that the pain that the patient was experiencing was due to an large spleen as it has been present for many years. Patient had a PET scheduled for 1.13.22 but her BMBx ran over time and it will be rescheduled for 1.25.22 at 9:45 am     Patient has also been contacting me stating that she wants to rule out MDS as her mother had MDS.  We discussed that she had a bone marrow biopsy in 2017 that did not show any evidence of MDS but she is insistent on repeating another which was ordered on 1.3.22 along with a PET scan which was recommended by [place].     Bone marrow biopsy 1.14.22:   ·  flow cytometry without evidence of abnormal myeloid maturation nor an increase in blast population, no evidence of LP disorder  · Normal female karyotype     Today, she is at home for fear of being exposed to Covid (negative on 1.14.22 but son tested positive). She has mild symptoms. She is vaccinated.      On follow-up on 3/31/2022: Patient is s/p fall forward and she stating the the left side is hurting more, especially in the back.  She is not sure why she fell but states it happens \"now and then\". She is in a wheelchair today due to pain from the fall. She is accompanied by a HHA today.      PET/CT 2.17.22:  1.  Splenomegaly with numerous small 1 to 2 cm hypermetabolic lesions throughout. Differential diagnosis includes primary splenic lymphoma and granulomatous infectious/inflammatory process. 2.  Marked hepatic steatosis. Mild " periportal hypermetabolic lymphadenopathy is presumed related.     These findings were conveyed to the patient on 2.17.22 with recommendation to get splenectomy as long as the pre-splenectomy vaccinations were done. Vaccinations were done and she was given a referral tot he surgeon of her choice. She is scheduled for splenectomy on 5.2.22.      Bone marrow biopsy 1.14.22:   ·  flow cytometry without evidence of abnormal myeloid maturation nor an increase in blast population, no evidence of LP disorder  · Normal female karyotype  · Normocellular marrow for age (50 to 60%) with maturing trilineage hematopoiesis.  The M: E ratio is decreased.  Slight megakaryocytosis and decreased storage iron  · No evidence of an acute leukemia or myeloproliferative disorder or plasma cell dyscrasia  · Marrow profile may indicate a component of iron deficiency anemia, correlation with serum iron studies and clinical data is recommended  · Moreover if the anemic state progresses and/or other cytopenias develop, follow-up marrow evaluation with intelligent myeloid NGS studies is suggested as clinically warranted     -- 05/02/2022-robot-assisted laparoscopic splenectomy, wedge liver biopsy (Dr. Quintana)-[facility].  Findings: Grossly normal anatomy, hepatomegaly with numerous white nodules-wedge biopsy performed of anterior segment 4 lesion.  Splenomegaly with visible nodules throughout.  Diagnosis: 1)  SPLEEN, SPLENECTOMY: SPLENIC PARENCHYMA WITH NON-NECROTIZING GRANULOMAS, SEE COMMENT.  2)  LIVER, WEDGE BIOPSY:   - BRIDGING FIBROSIS WITH EARLY NODULE FORMATION (TRICHROME STAIN-SUBCAPSULAR SPECIMEN), SEE SCORING SHEET.  - SUBCAPSULAR AND PREDOMINANTLY SEPTAL-BASED NON-NECROTIZING GRANULOMAS, SEE COMMENT.  - MILDLY ACTIVE STEATOHEPATITIS WITH MARKED MACROVESICULAR STEATOSIS (80-90%).    Comments  AFB and GMS stains were performed on Parts 1 and 2 and are negative. Clinical and microbiologic correlation is recommended.    MYERS Scoring  System  Steatosis:   % hepatocytes involved: 80-90  Steatosis score:  3    I have reviewed the HPI and verified with the patient the accuracy of it. No changes to interval history since the information was documented. Bob Hutchison MD 05/18/22     PAST MEDICAL HISTORY:  ALLERGIES:  Allergies   Allergen Reactions   • Azithromycin GI Intolerance and Nausea And Vomiting     Severe Abdominal Pain   Severe abdominal pain    • Silver Rash     Redness, blisters  blister  Redness, blisters         CURRENT MEDICATIONS:  Outpatient Encounter Medications as of 5/18/2022   Medication Sig Dispense Refill   • acyclovir (ZOVIRAX) 800 MG tablet Take 800 mg by mouth Daily.     • albuterol sulfate  (90 Base) MCG/ACT inhaler Inhale 2 puffs As Needed.     • amLODIPine (NORVASC) 5 MG tablet Take 5 mg by mouth Daily.     • Calcium Citrate-Vitamin D (CALCIUM CITRATE + D3 PO) Take 1 tablet by mouth Daily.     • carbidopa-levodopa (SINEMET)  MG per tablet Take 1 tablet by mouth Daily.     • CRANBERRY PO Take 1 capsule by mouth Daily.     • Cyclobenzaprine HCl (FLEXERIL PO) Take 10 mg by mouth Daily As Needed.     • meclizine (ANTIVERT) 25 MG tablet Take 25 mg by mouth As Needed.     • metFORMIN (GLUCOPHAGE) 1000 MG tablet Take 1,000 mg by mouth 2 (Two) Times a Day With Meals.     • metoprolol succinate XL (TOPROL-XL) 50 MG 24 hr tablet Take 50 mg by mouth 2 (Two) Times a Day.     • Multiple Vitamin (MULTI VITAMIN PO) Take  by mouth Daily.     • NABUMETONE PO Take 750 mg by mouth 2 (two) times a day.     • nortriptyline (PAMELOR) 25 MG capsule Take 25 mg by mouth Every Night. Pt takes 2 at night  3   • omeprazole (priLOSEC) 20 MG capsule Take 20 mg by mouth Daily.  3   • ondansetron ODT (Zofran ODT) 8 MG disintegrating tablet Place 1 tablet on the tongue Every 8 (Eight) Hours As Needed for Nausea or Vomiting. 20 tablet 0   • pregabalin (LYRICA) 100 MG capsule Take 100 mg by mouth 2 (Two) Times a Day.     • venlafaxine  (EFFEXOR) 75 MG tablet Take 75 mg by mouth Daily.  11   • venlafaxine XR (EFFEXOR-XR) 150 MG 24 hr capsule Take 150 mg by mouth Daily.     • [DISCONTINUED] famotidine (Pepcid) 20 MG tablet Take 1 tablet by mouth 2 (Two) Times a Day. 60 tablet 0     Facility-Administered Encounter Medications as of 5/18/2022   Medication Dose Route Frequency Provider Last Rate Last Admin   • diphenhydrAMINE (BENADRYL) injection 50 mg  50 mg Intravenous PRN Pedro Clements MD       • famotidine (PEPCID) injection 20 mg  20 mg Intravenous PRN Pedro Clements MD       • hydrocortisone sodium succinate (Solu-CORTEF) injection 100 mg  100 mg Intravenous PRN Pedro Clements MD       Adult illnesses:  • Anxiety   • Depression   -Diabetes  -Morbid obesity  -Hemochromatosis  -Splenomegaly with FDG avid splenic lesions  -Fibromyalgia  -Fatty liver disease  • Elevated blood pressure   • High blood pressure   • Joint pain   • Liver disease   • chronic back pain   • Migraine   • Unspecified asthma, uncomplicated   • Unspecified sleep apnea     Past surgeries:  • APPENDECTOMY   • BACK SURGERY Anterior 06/30/2016   x 3   • HYSTERECTOMY 2013   • LAPAROSCOPIC CHOLECYSTECTOMY 12/21/1995   • PORTACATH PLACEMENT Left   11/2017   • ND DIAGNOSTIC UPPER GASTROINTESTINAL ENDOSCOPY N/A 4/26/2022   Procedure: EGD; Surgeon: Mynor Rodriguez MD, PhD; Location: [facility] GI; Service: Gastroenterology   • ND GERD TST W/ MUCOS PH ELECTROD N/A 4/26/2022   Procedure: BRAVO CAPSULE PATENCY STUDY; Surgeon: Mynor Rodriguez MD, PhD; Location: [facility] GI; Service: Gastroenterology   • REPLACEMENT TOTAL HIP LATERAL POSITION Right 03/13/2019   • SLEEVE GASTROPLASTY 12/2017   • SPINAL FUSION   -Robot-assisted laparoscopic splenectomy, wedge liver biopsy, 05/02/2022-Dr. Quintana [facility].  Findings: Grossly normal anatomy, hepatomegaly with numerous white nodules-wedge biopsy performed of anterior segment 4 lesion.  Splenomegaly  with visible nodules throughout.      ADULT ILLNESSES:  Patient Active Problem List   Diagnosis Code   • Fatigue R53.83   • History of iron deficiency Z86.39   • Vitamin D deficiency E55.9   • Iron deficiency anemia secondary to inadequate dietary iron intake D50.8   • Malabsorption of iron K90.9   • Obesity, Class III, BMI 40-49.9 (morbid obesity) ([facility]) E66.01   • Status post laparoscopic sleeve gastrectomy Z98.84   • Obstructive sleep apnea syndrome G47.33   • Abnormal hemoglobin ([facility]) D58.2   • Anemia D64.9   • Carpal tunnel syndrome on both sides G56.03   • Cervical vertebral fusion M43.22   • Cobalamin deficiency E53.8   • DDD (degenerative disc disease), lumbar M51.36   • Drug-induced constipation K59.03   • Pain management R52   • Family history of esophageal cancer Z80.0   • Family history of polyps in the colon Z83.71   • Chronic pain disorder G89.4   • Herniation of lumbar intervertebral disc with radiculopathy M51.16   • History of lumbar spinal fusion Z98.1   • Hypertensive disorder I10   • Insomnia G47.00   • Moderate episode of recurrent major depressive disorder (HCC) F33.1   • RLS (restless legs syndrome) G25.81   • Folic acid deficiency E53.8   • Encounter for care related to Port-a-Cath Z45.2   • Hereditary hemochromatosis (HCC) E83.110   • Iron overload E83.19   • Attention deficit hyperactivity disorder (ADHD), combined type F90.2   • Bipolar 1 disorder (HCC) F31.9       SURGERIES:  Past Surgical History:   Procedure Laterality Date   • ANKLE SURGERY      Right    • APPENDECTOMY  04/19/2015   • BACK SURGERY  2000   • CERVICAL SPINE SURGERY  09/01/2015   • CHOLECYSTECTOMY      1995;lap   • COLONOSCOPY  05/02/2017    normal; do not return until age of 49 Dr. Gus Valentine   • GASTRIC SLEEVE LAPAROSCOPIC N/A 12/20/2017    Procedure: GASTRIC SLEEVE LAPAROSCOPIC;  Surgeon: Yifan Cordero MD;  Location: [facility] OR;  Service:    • HIP ARTHROPLASTY      Right  2019   • HIP SURGERY  1987    right    •  "INSERTION CENTRAL VENOUS ACCESS DEVICE W/ SUBCUTANEOUS PORT  2017    Dr Anel Gamboa (Smart Port-Power injectable Port) Cat NO#[id number] Lot [id number]    • MOUTH SURGERY     • NECK SURGERY     • TOOTH EXTRACTION     • UPPER GASTROINTESTINAL ENDOSCOPY  2017    Dr. Gus Valentine, negative for h.pylori, negative for Salomon's   • VAGINAL HYSTERECTOMY SALPINGO OOPHORECTOMY  2008    Partial and then had another surgery to remove the rest       HEALTH MAINTENANCE ITEMS:  <no information>  Last Completed Colonoscopy          COLORECTAL CANCER SCREENING (COLONOSCOPY - Every 10 Years) Next due on 2017  Outside Procedure: [place] COLONOSCOPY FLX DX W/COLLJ SPEC WHEN PFRMD                Last Completed Mammogram     This patient has no relevant Health Maintenance data.          FAMILY HISTORY:  Family History   Problem Relation Age of Onset   • Diabetes Mother    • Hypertension Mother    • Coronary artery disease Mother    • Cancer Mother         \"bone\" cancer   • Cancer Father         prostate   • Hypertension Father    • Heart disease Father    • Stroke Father    • Coronary artery disease Father    • Hypertension Sister    • Obesity Sister    • Other Sister         MDS in one sister diagnosed in    • Cancer Brother         throat   • Diabetes Brother    • Diabetes Maternal Grandmother    • Stroke Maternal Grandmother    • No Known Problems Daughter    • No Known Problems Son        SOCIAL HISTORY:  Social History     Socioeconomic History   • Marital status:    Tobacco Use   • Smoking status: Former Smoker     Packs/day: 1.00     Years: 25.00     Pack years: 25.00     Types: Cigarettes     Quit date:      Years since quittin.3   • Smokeless tobacco: Never Used   Substance and Sexual Activity   • Alcohol use: Yes     Comment: rarely    • Drug use: No     Types: Codeine     Comment: Codeine in Prescribed Medications only    • Sexual activity: Defer     Birth control/protection: " "Surgical       REVIEW OF SYSTEMS:  Review of Systems   Constitutional: Positive for activity change, fatigue and unexpected weight loss (Says she lost 16 pounds since splenectomy). Negative for appetite change, chills, diaphoresis and fever.        Manages her ADLs including some chores, errands and driving.  \"I have no choice\".  Gets help from her 15 year old son and her home aid worker, Bhavani SYEDT: Negative for ear pain, nosebleeds, sinus pressure, sore throat and voice change.    Eyes: Negative for blurred vision, double vision, pain and visual disturbance.   Respiratory: Positive for shortness of breath (Baseline exertional dyspnea and some SOB with some of her routine activities.  \"Lots better since the spleen was removed.\"). Negative for cough.         Uses home inhalers   Cardiovascular: Negative for chest pain, palpitations and leg swelling.   Gastrointestinal: Negative for abdominal pain, anal bleeding, blood in stool, constipation, diarrhea, nausea and vomiting.   Endocrine: Negative for heat intolerance, polydipsia and polyuria.   Genitourinary: Negative for dysuria, frequency, hematuria, urgency and urinary incontinence.   Musculoskeletal: Positive for arthralgias (back, knees- \"arthritis\"), back pain and myalgias (\"fibromyalgia everywhere\").   Skin: Negative for rash and skin lesions.   Neurological: Positive for numbness (hands and feet). Negative for dizziness, seizures, weakness and headache.   Hematological: Negative for adenopathy. Does not bruise/bleed easily.   Psychiatric/Behavioral: Negative for dysphoric mood, sleep disturbance, suicidal ideas and depressed mood.       /86   Pulse 101   Temp 97 °F (36.1 °C)   Resp 16   Ht 167.6 cm (66\")   Wt 134 kg (295 lb 4.8 oz)   LMP  (LMP Unknown)   SpO2 97%   Breastfeeding No   BMI 47.66 kg/m²  Body surface area is 2.36 meters squared.  Pain Score    05/18/22 1109   PainSc:   4       Physical Exam:  Physical Exam  Vitals reviewed. "   Constitutional:       Appearance: Normal appearance. She is well-developed.      Comments: Pleasant, cooperative, modestly kept, morbidly obese female.  Ambulatory.     HENT:      Head: Atraumatic.   Eyes:      General: No scleral icterus.     Pupils: Pupils are equal, round, and reactive to light.   Neck:      Trachea: Trachea normal.   Cardiovascular:      Rate and Rhythm: Normal rate and regular rhythm.      Heart sounds: No murmur heard.  Pulmonary:      Effort: Pulmonary effort is normal.      Breath sounds: Normal breath sounds. No wheezing, rhonchi or rales.   Abdominal:      Palpations: Abdomen is soft.      Tenderness: There is no abdominal tenderness. There is no guarding or rebound.      Comments: Morbidly obese.  Healed LUQ incisions   Musculoskeletal:      Cervical back: Neck supple.   Skin:     General: Skin is warm and dry.   Neurological:      General: No focal deficit present.      Mental Status: She is alert and oriented to person, place, and time.      Sensory: No sensory deficit.   Psychiatric:         Behavior: Behavior normal.           LABS    Lab Results - Last 18 Months   Lab Units 03/31/22  0824 01/13/22  0938 01/11/22  1429 12/14/21  1435 11/30/21  1421 11/16/21  1109 10/18/21  1002 09/20/21  0942 09/17/21  2150 08/26/21  2210 08/26/21  1226   HEMOGLOBIN g/dL 11.2* 10.9* 11.4* 11.3* 11.6* 12.1 11.8* 12.7 12.4   < > 12.6   HEMATOCRIT % 34.1 31.7* 33.5* 34.1 34.9 35.2 34.8 35.6 37.9   < > 35.5   MCV fL 90.7 88.5 90.3 93.2 92.8 93.1 89.7 87.3 94.3   < > 92.2   WBC 10*3/mm3 6.15 6.48 8.86 7.64 7.77 9.06 7.96 8.44 10.6   < > 9.70   RDW % 17.0* 14.7 14.7 15.5* 16.0* 16.1* 17.4* 15.0 15.2*   < > 15.9*   MPV fL 10.0 9.9 9.7 9.9 9.7 10.0 9.8 9.9 9.8   < > 9.8   PLATELETS 10*3/mm3 286 244 277 261 265 283 277 292 284   < > 295   IMM GRAN % % 0.5 0.8*  --   --   --  0.9* 0.8* 0.6*  --   --  0.4   NEUTROS ABS 10*3/mm3 4.08 4.58  --   --   --  5.88 5.17 5.38 6.1   < > 7.04*   LYMPHS ABS 10*3/mm3 1.51  1.41  --   --   --  2.29 2.08 2.27 3.5   < > 1.89   MONOS ABS 10*3/mm3 0.32 0.29  --   --   --  0.46 0.42 0.44 0.60   < > 0.51   EOS ABS 10*3/mm3 0.15 0.10  --   --   --  0.28 0.16 0.20 0.20   < > 0.14   BASOS ABS 10*3/mm3 0.06 0.05  --   --   --  0.07 0.07 0.10 0.10   < > 0.08   IMMATURE GRANS (ABS) 10*3/mm3 0.03 0.05  --   --   --  0.08* 0.06* 0.05 0.1   < > 0.04   NRBC /100 WBC 0.0 0.0  --   --   --  0.0 0.0 0.0  --   --  0.0    < > = values in this interval not displayed.       Lab Results - Last 18 Months   Lab Units 04/20/22  1133 03/31/22  0824 12/28/21  1224 11/16/21  1109 10/18/21  1002 09/20/21  0942 08/26/21  2210 08/26/21  1226 08/10/21  0927   GLUCOSE mg/dL  --  303*  --  142* 173* 129* 108 135* 130*   SODIUM mmol/L 140 136  --  141 137 141 139 140 142   POTASSIUM mmol/L 4.1 4.0  --  3.7 3.5 4.0 3.4* 3.5 3.6   TOTAL CO2 mmol/L 25  --   --   --   --   --  25  --   --    CO2 mmol/L  --  25.0  --  26.0 26.0 26.0  --  20.0* 24.0   CHLORIDE mmol/L 103 99  --  102 101 102 103 105 105   ANION GAP  12 12.0  --  13.0 10.0 13.0 11 15.0 13.0   CREATININE mg/dL 0.77 0.77  --  0.74 1.00 0.67 0.7 0.77 0.75   BUN mg/dL 13 10  --  10 13 8 12 12 9   BUN / CREAT RATIO   --  13.0  --  13.5 13.0 11.9  --  15.6 12.0   CALCIUM mg/dL 9.5 9.1  --  9.2 9.3 9.5 9.1 9.0 9.7   EGFR IF NONAFRICN AM mL/min/1.73  --   --   --  84 60* 95 >60 81 83   ALK PHOS U/L  --  134*  --  131* 128* 112 106* 113 119*   TOTAL PROTEIN g/dL  --  7.1 6.8 7.2 7.0 7.3 6.9 7.2 6.9   ALT (SGPT) U/L  --  31  --  24 19 17 30 31 24   AST (SGOT) U/L  --  56*  --  27 21 24 29 41* 31   BILIRUBIN mg/dL  --  0.8  --  0.6 1.3* 0.8 0.7 0.7 0.6   ALBUMIN g/dL  --  4.30  --  4.30 4.20 4.40 4.4 4.40 4.40   GLOBULIN gm/dL  --  2.8  --  2.9 2.8 2.9  --  2.8 2.5       Lab Results - Last 18 Months   Lab Units 12/28/21  1224   FREE LAMBDA LIGHT CHAINS mg/dL 2.27   LDH unit/L 253*       Lab Results - Last 18 Months   Lab Units 04/04/22  1241 03/31/22  0824 03/22/22  1514  12/28/21  1224 11/16/21  1109 10/18/21  1002 09/20/21  0942 09/10/21  1315 03/11/21  0958 02/03/21  0937   IRON mcg/dL  --  70 78 71  66 85 126  --  70   < > 44   TIBC mcg/dL  --  282* 220* 208* 307 299  --  238*   < > 213*   IRON SATURATION %  --  25 35 32 28 42  --  29   < > 21   FERRITIN ng/mL  --  582.30* 589* 343* 463.10* 451.50*  --  592*   < > 1,274.0*   TSH mcunit/mL 2.504  --   --   --   --   --   --   --   --  1.590   FOLATE ng/mL  --   --  17.7  --   --   --  12.90 13.8  --   --     < > = values in this interval not displayed.     ASSESSMENT  1.  Hemochromatosis, single C282Y--Iron overload is uncommon for those that are carriers for hemochromatosis and is usually related more to obesity and fatty liver or more so in this case the multiple blood transfusions and iron replacement therapy in the past.    --Baseline ferritin in September 2020 was at 1406.    --She has had multiple phlebotomies since then with little improvement in the ferritin.    --MRI of liver, 04/22/2021 showed slight liver iron overload, strong hepatic steatosis and splenic iron overload.  Therefore, Dr Goldberg recommended patient to start iron chelation therapy.    --On follow-up on August 10, 2021: Ferritin 762.  Jadenu was stopped  -- Ferritin 582, 03/31/2022    2.  Splenomegaly measuring 14.6cm on US in 10/12/2020.   --01/14/2022-bone marrow biopsy - flow cytometry without evidence of abnormal myeloid maturation nor an increase in blast population, no evidence of LP disorder.  Normal female karyotype.  Normocellular marrow for age (50 to 60%) with maturing trilineage hematopoiesis.  The M: E ratio is decreased.  Slight megakaryocytosis and decreased storage iron.  No evidence of an acute leukemia or myeloproliferative disorder or plasma cell dyscrasia.  Marrow profile may indicate a component of iron deficiency anemia, correlation with serum iron studies and clinical data is recommended  --PET/CT 2.17.22- Splenomegaly with numerous  small 1 to 2 cm hypermetabolic lesions throughout. Differential diagnosis includes primary splenic lymphoma and granulomatous infectious/inflammatory process. 2.  Marked hepatic steatosis. Mild periportal hypermetabolic lymphadenopathy is presumed related.  --These findings were conveyed to the patient on 2.17.22 with recommendation to get splenectomy as long as the pre-splenectomy vaccinations were done. Vaccinations were done and she was given a referral tot he surgeon of her choice. She is scheduled for splenectomy on 5.2.22.   ---- 05/02/2022-robot-assisted laparoscopic splenectomy, wedge liver biopsy (Dr. Quintana)-[facility].  Findings: Grossly normal anatomy, hepatomegaly with numerous white nodules-wedge biopsy performed of anterior segment 4 lesion.  Splenomegaly with visible nodules throughout.  Diagnosis: 1)  SPLEEN, SPLENECTOMY: SPLENIC PARENCHYMA WITH NON-NECROTIZING GRANULOMAS, SEE COMMENT.  2)  LIVER, WEDGE BIOPSY:   - BRIDGING FIBROSIS WITH EARLY NODULE FORMATION (TRICHROME STAIN-SUBCAPSULAR SPECIMEN), SEE SCORING SHEET.  - SUBCAPSULAR AND PREDOMINANTLY SEPTAL-BASED NON-NECROTIZING GRANULOMAS, SEE COMMENT.  - MILDLY ACTIVE STEATOHEPATITIS WITH MARKED MACROVESICULAR STEATOSIS (80-90%).       3.  Osteoarthritis/Fibromyalgia, no inflammatory joint disease per Rheumatology.  Started diclofenac gel and Nabumetone  4.  Anemia secondary to chronic disease  --Hgb 11.2; MCV 90.7, 03/31/2022 (prior range: 10.9-12.1)  5.  Hypertension.  Advised patient to follow with pcp    PLAN  1.  Draw labs, CBC with differential, CMP, iron, iron saturation, ferritin.    2.  Review pathology, 05/02/2022 Re: Splenectomy-splenic parenchyma with known necrotizing granulomas.  Liver wedge biopsy with bridging fibrosis with marked macrovesicular steatosis (80-90%).  3.  Continue medications and follow-up with PCP and other providers   4.  Will need transition to hematology care.  Refer to hematology -Dr. Germain or Dr. Jc  5.  Port  flushes q 8 weeks  6.  Return in follow-up in 8 weeks with preoffice CBC and differential, CMP, iron, iron saturation and ferritin.     I spent ~ 71 minutes caring for Naomi on this date of service. This time includes time spent by me in the following activities: preparing for the visit, reviewing tests, performing a medically appropriate examination and/or evaluation, counseling and educating the patient/family/caregiver, ordering medications, tests, or procedures and documenting information in the medical record

## 2022-05-18 ENCOUNTER — TRANSCRIBE ORDERS (OUTPATIENT)
Dept: ADMINISTRATIVE | Facility: HOSPITAL | Age: 47
End: 2022-05-18

## 2022-05-18 ENCOUNTER — OFFICE VISIT (OUTPATIENT)
Dept: ONCOLOGY | Facility: CLINIC | Age: 47
End: 2022-05-18

## 2022-05-18 ENCOUNTER — LAB (OUTPATIENT)
Dept: LAB | Facility: HOSPITAL | Age: 47
End: 2022-05-18

## 2022-05-18 ENCOUNTER — TELEPHONE (OUTPATIENT)
Dept: ONCOLOGY | Facility: CLINIC | Age: 47
End: 2022-05-18

## 2022-05-18 VITALS
RESPIRATION RATE: 16 BRPM | HEART RATE: 101 BPM | HEIGHT: 66 IN | TEMPERATURE: 97 F | SYSTOLIC BLOOD PRESSURE: 130 MMHG | DIASTOLIC BLOOD PRESSURE: 86 MMHG | OXYGEN SATURATION: 97 % | BODY MASS INDEX: 47.09 KG/M2 | WEIGHT: 293 LBS

## 2022-05-18 DIAGNOSIS — I10 PRIMARY HYPERTENSION: ICD-10-CM

## 2022-05-18 DIAGNOSIS — K74.60 CIRRHOSIS OF LIVER WITHOUT ASCITES, UNSPECIFIED HEPATIC CIRRHOSIS TYPE: ICD-10-CM

## 2022-05-18 DIAGNOSIS — Z86.32 HISTORY OF GESTATIONAL DIABETES: ICD-10-CM

## 2022-05-18 DIAGNOSIS — E83.110 HEREDITARY HEMOCHROMATOSIS: Primary | ICD-10-CM

## 2022-05-18 DIAGNOSIS — Z00.00 MEDICARE ANNUAL WELLNESS VISIT, SUBSEQUENT: ICD-10-CM

## 2022-05-18 DIAGNOSIS — R53.83 FATIGUE, UNSPECIFIED TYPE: ICD-10-CM

## 2022-05-18 DIAGNOSIS — E83.110 HEREDITARY HEMOCHROMATOSIS: ICD-10-CM

## 2022-05-18 DIAGNOSIS — K74.60 CIRRHOSIS OF LIVER WITHOUT ASCITES, UNSPECIFIED HEPATIC CIRRHOSIS TYPE: Primary | ICD-10-CM

## 2022-05-18 LAB
ALBUMIN SERPL-MCNC: 3.9 G/DL (ref 3.5–5.2)
ALBUMIN UR-MCNC: 2.7 MG/DL
ALBUMIN/GLOB SERPL: 1.1 G/DL
ALP SERPL-CCNC: 125 U/L (ref 39–117)
ALT SERPL W P-5'-P-CCNC: 25 U/L (ref 1–33)
ANION GAP SERPL CALCULATED.3IONS-SCNC: 12 MMOL/L (ref 5–15)
ANISOCYTOSIS BLD QL: ABNORMAL
AST SERPL-CCNC: 45 U/L (ref 1–32)
BILIRUB CONJ SERPL-MCNC: <0.2 MG/DL (ref 0–0.3)
BILIRUB SERPL-MCNC: <0.2 MG/DL (ref 0–1.2)
BUN SERPL-MCNC: 11 MG/DL (ref 6–20)
BUN/CREAT SERPL: 14.5 (ref 7–25)
CALCIUM SPEC-SCNC: 9.4 MG/DL (ref 8.6–10.5)
CHLORIDE SERPL-SCNC: 100 MMOL/L (ref 98–107)
CHOLEST SERPL-MCNC: 204 MG/DL (ref 0–200)
CO2 SERPL-SCNC: 25 MMOL/L (ref 22–29)
CREAT SERPL-MCNC: 0.76 MG/DL (ref 0.57–1)
CREAT UR-MCNC: 179.3 MG/DL
DEPRECATED RDW RBC AUTO: 47.9 FL (ref 37–54)
EGFRCR SERPLBLD CKD-EPI 2021: 97.4 ML/MIN/1.73
ELLIPTOCYTES BLD QL SMEAR: ABNORMAL
EOSINOPHIL # BLD MANUAL: 1.08 10*3/MM3 (ref 0–0.4)
EOSINOPHIL NFR BLD MANUAL: 9.2 % (ref 0.3–6.2)
ERYTHROCYTE [DISTWIDTH] IN BLOOD BY AUTOMATED COUNT: 14.9 % (ref 12.3–15.4)
GLOBULIN UR ELPH-MCNC: 3.5 GM/DL
GLUCOSE SERPL-MCNC: 136 MG/DL (ref 65–99)
HBA1C MFR BLD: 5.6 % (ref 4.8–5.6)
HCT VFR BLD AUTO: 41.9 % (ref 34–46.6)
HDLC SERPL-MCNC: 39 MG/DL (ref 40–60)
HGB BLD-MCNC: 13.3 G/DL (ref 12–15.9)
INR PPP: 1.07 (ref 0.91–1.09)
LDLC SERPL CALC-MCNC: 131 MG/DL (ref 0–100)
LDLC/HDLC SERPL: 3.26 {RATIO}
LYMPHOCYTES # BLD MANUAL: 4.06 10*3/MM3 (ref 0.7–3.1)
LYMPHOCYTES NFR BLD MANUAL: 9.2 % (ref 5–12)
MCH RBC QN AUTO: 27.9 PG (ref 26.6–33)
MCHC RBC AUTO-ENTMCNC: 31.7 G/DL (ref 31.5–35.7)
MCV RBC AUTO: 88 FL (ref 79–97)
METAMYELOCYTES NFR BLD MANUAL: 1 % (ref 0–0)
MICROALBUMIN/CREAT UR: 15.1 MG/G
MICROCYTES BLD QL: ABNORMAL
MONOCYTES # BLD: 1.08 10*3/MM3 (ref 0.1–0.9)
NEUTROPHILS # BLD AUTO: 5.26 10*3/MM3 (ref 1.7–7)
NEUTROPHILS NFR BLD MANUAL: 41.8 % (ref 42.7–76)
NEUTS BAND NFR BLD MANUAL: 3.1 % (ref 0–5)
PLASMA CELL PREC NFR BLD MANUAL: 1 % (ref 0–0)
PLATELET # BLD AUTO: 1154 10*3/MM3 (ref 140–450)
PMV BLD AUTO: 9.2 FL (ref 6–12)
POIKILOCYTOSIS BLD QL SMEAR: ABNORMAL
POLYCHROMASIA BLD QL SMEAR: ABNORMAL
POTASSIUM SERPL-SCNC: 3.9 MMOL/L (ref 3.5–5.2)
PROT SERPL-MCNC: 7.4 G/DL (ref 6–8.5)
PROTHROMBIN TIME: 13.5 SECONDS (ref 11.9–14.6)
RBC # BLD AUTO: 4.76 10*6/MM3 (ref 3.77–5.28)
SMALL PLATELETS BLD QL SMEAR: ABNORMAL
SODIUM SERPL-SCNC: 137 MMOL/L (ref 136–145)
T4 FREE SERPL-MCNC: 1.55 NG/DL (ref 0.93–1.7)
TRIGL SERPL-MCNC: 190 MG/DL (ref 0–150)
TSH SERPL DL<=0.05 MIU/L-ACNC: 2.02 UIU/ML (ref 0.27–4.2)
VARIANT LYMPHS NFR BLD MANUAL: 29.6 % (ref 19.6–45.3)
VARIANT LYMPHS NFR BLD MANUAL: 5.1 % (ref 0–5)
VLDLC SERPL-MCNC: 34 MG/DL (ref 5–40)
WBC MORPH BLD: NORMAL
WBC NRBC COR # BLD: 11.71 10*3/MM3 (ref 3.4–10.8)

## 2022-05-18 PROCEDURE — 83036 HEMOGLOBIN GLYCOSYLATED A1C: CPT | Performed by: PEDIATRICS

## 2022-05-18 PROCEDURE — 84443 ASSAY THYROID STIM HORMONE: CPT | Performed by: PEDIATRICS

## 2022-05-18 PROCEDURE — 82043 UR ALBUMIN QUANTITATIVE: CPT

## 2022-05-18 PROCEDURE — 80061 LIPID PANEL: CPT

## 2022-05-18 PROCEDURE — 82248 BILIRUBIN DIRECT: CPT

## 2022-05-18 PROCEDURE — 36415 COLL VENOUS BLD VENIPUNCTURE: CPT

## 2022-05-18 PROCEDURE — G2212 PROLONG OUTPT/OFFICE VIS: HCPCS | Performed by: INTERNAL MEDICINE

## 2022-05-18 PROCEDURE — 80053 COMPREHEN METABOLIC PANEL: CPT

## 2022-05-18 PROCEDURE — 85007 BL SMEAR W/DIFF WBC COUNT: CPT

## 2022-05-18 PROCEDURE — 85610 PROTHROMBIN TIME: CPT

## 2022-05-18 PROCEDURE — 99215 OFFICE O/P EST HI 40 MIN: CPT | Performed by: INTERNAL MEDICINE

## 2022-05-18 PROCEDURE — 82570 ASSAY OF URINE CREATININE: CPT

## 2022-05-18 PROCEDURE — 85025 COMPLETE CBC W/AUTO DIFF WBC: CPT

## 2022-05-18 PROCEDURE — 84439 ASSAY OF FREE THYROXINE: CPT

## 2022-05-18 NOTE — TELEPHONE ENCOUNTER
Notified patient Naomi Bhatia that a Critical Lab result has been called regarding her PLT count of 1,154.   Recent Spleenectomy x 2 weeks post-op    Dr Hutchison recommends:  1. Repeat CBC x 1 week to ck PLT count  Wed 5/25/22 @ 1:30  gilbert apt with Dr Germain to review    Previous gilbert apt with Dr Germain was yousuf to sooner date of:    6/3/22 @ 10 am  Patient v/u of all information

## 2022-05-18 NOTE — TELEPHONE ENCOUNTER
CRITICAL LAB VALUE  Received call from Aleja  Hematology with CRITICAL LAB VALUE OF:      PLT: 1,154     PREVIOUS: 286  This information was relayed to Dr Hutchison for review.    Per Dr Hutchison instructions, he wishes to repeat the CBC in 1 week (check PLT count)  Patient did have spleenectomy x 2 weeks ago  Dr Hutchison wants patient to f/u with Dr Marcellus weinstein to be gilbert

## 2022-05-25 ENCOUNTER — APPOINTMENT (OUTPATIENT)
Dept: LAB | Facility: HOSPITAL | Age: 47
End: 2022-05-25

## 2022-05-26 ENCOUNTER — LAB (OUTPATIENT)
Dept: LAB | Facility: HOSPITAL | Age: 47
End: 2022-05-26

## 2022-05-26 DIAGNOSIS — E83.110 HEREDITARY HEMOCHROMATOSIS: ICD-10-CM

## 2022-05-26 LAB
ANISOCYTOSIS BLD QL: ABNORMAL
BASOPHILS # BLD MANUAL: 0.16 10*3/MM3 (ref 0–0.2)
BASOPHILS NFR BLD MANUAL: 1.1 % (ref 0–1.5)
DEPRECATED RDW RBC AUTO: 48.1 FL (ref 37–54)
EOSINOPHIL # BLD MANUAL: 1.06 10*3/MM3 (ref 0–0.4)
EOSINOPHIL NFR BLD MANUAL: 7.5 % (ref 0.3–6.2)
ERYTHROCYTE [DISTWIDTH] IN BLOOD BY AUTOMATED COUNT: 14.6 % (ref 12.3–15.4)
FERRITIN SERPL-MCNC: 260.2 NG/ML (ref 13–150)
HCT VFR BLD AUTO: 42.1 % (ref 34–46.6)
HGB BLD-MCNC: 13 G/DL (ref 12–15.9)
IRON 24H UR-MRATE: 34 MCG/DL (ref 37–145)
IRON SATN MFR SERPL: 10 % (ref 20–50)
LYMPHOCYTES # BLD MANUAL: 4.1 10*3/MM3 (ref 0.7–3.1)
LYMPHOCYTES NFR BLD MANUAL: 12.9 % (ref 5–12)
MCH RBC QN AUTO: 27.8 PG (ref 26.6–33)
MCHC RBC AUTO-ENTMCNC: 30.9 G/DL (ref 31.5–35.7)
MCV RBC AUTO: 90 FL (ref 79–97)
MONOCYTES # BLD: 1.82 10*3/MM3 (ref 0.1–0.9)
NEUTROPHILS # BLD AUTO: 6.98 10*3/MM3 (ref 1.7–7)
NEUTROPHILS NFR BLD MANUAL: 49.5 % (ref 42.7–76)
OVALOCYTES BLD QL SMEAR: ABNORMAL
PLATELET # BLD AUTO: 813 10*3/MM3 (ref 140–450)
PMV BLD AUTO: 9.8 FL (ref 6–12)
POIKILOCYTOSIS BLD QL SMEAR: ABNORMAL
POLYCHROMASIA BLD QL SMEAR: ABNORMAL
RBC # BLD AUTO: 4.68 10*6/MM3 (ref 3.77–5.28)
SMALL PLATELETS BLD QL SMEAR: ABNORMAL
TIBC SERPL-MCNC: 329 MCG/DL (ref 298–536)
TRANSFERRIN SERPL-MCNC: 221 MG/DL (ref 200–360)
VARIANT LYMPHS NFR BLD MANUAL: 2.2 % (ref 0–5)
VARIANT LYMPHS NFR BLD MANUAL: 26.9 % (ref 19.6–45.3)
WBC MORPH BLD: NORMAL
WBC NRBC COR # BLD: 14.1 10*3/MM3 (ref 3.4–10.8)

## 2022-05-26 PROCEDURE — 85007 BL SMEAR W/DIFF WBC COUNT: CPT

## 2022-05-26 PROCEDURE — 83540 ASSAY OF IRON: CPT

## 2022-05-26 PROCEDURE — 36415 COLL VENOUS BLD VENIPUNCTURE: CPT

## 2022-05-26 PROCEDURE — 82728 ASSAY OF FERRITIN: CPT

## 2022-05-26 PROCEDURE — 85025 COMPLETE CBC W/AUTO DIFF WBC: CPT

## 2022-05-26 PROCEDURE — 84466 ASSAY OF TRANSFERRIN: CPT

## 2022-05-27 ENCOUNTER — TELEPHONE (OUTPATIENT)
Dept: ONCOLOGY | Facility: CLINIC | Age: 47
End: 2022-05-27

## 2022-05-27 NOTE — TELEPHONE ENCOUNTER
SHE SEES DR MCALLISTER NEXT Friday, WE WILL CHECK LEVELS THEN AND SCHEDULE HER OUT FROM THAT APPT, PATIENT UNDERSTOOD

## 2022-05-27 NOTE — TELEPHONE ENCOUNTER
Caller: Isa Bhatia    Relationship: Self    Best call back number:[phone]    Who are you requesting to speak with (clinical staff, provider,  specific staff member): CLINICAL      What was the call regarding: ISA HAD HER POST OP THE OTHER DAY WITH DR DURAN AT [place], SHE SUGGESTED THAT SHE START GETTING HER PHLEBOTOMY, EVERY 2 WEEKS, SHE WOULD LIKE TO GET THEM SCHEDULED     Do you require a callback: YES

## 2022-06-03 ENCOUNTER — LAB (OUTPATIENT)
Dept: LAB | Facility: HOSPITAL | Age: 47
End: 2022-06-03

## 2022-06-03 ENCOUNTER — OFFICE VISIT (OUTPATIENT)
Dept: ONCOLOGY | Facility: CLINIC | Age: 47
End: 2022-06-03

## 2022-06-03 VITALS
SYSTOLIC BLOOD PRESSURE: 132 MMHG | BODY MASS INDEX: 47.09 KG/M2 | TEMPERATURE: 97.7 F | HEART RATE: 78 BPM | OXYGEN SATURATION: 92 % | WEIGHT: 293 LBS | RESPIRATION RATE: 16 BRPM | DIASTOLIC BLOOD PRESSURE: 74 MMHG | HEIGHT: 66 IN

## 2022-06-03 DIAGNOSIS — E83.110 HEREDITARY HEMOCHROMATOSIS: ICD-10-CM

## 2022-06-03 DIAGNOSIS — E83.19 IRON OVERLOAD: ICD-10-CM

## 2022-06-03 DIAGNOSIS — K76.0 FATTY LIVER: Primary | ICD-10-CM

## 2022-06-03 LAB
ALBUMIN SERPL-MCNC: 4.3 G/DL (ref 3.5–5.2)
ALBUMIN/GLOB SERPL: 1.1 G/DL
ALP SERPL-CCNC: 156 U/L (ref 39–117)
ALT SERPL W P-5'-P-CCNC: 30 U/L (ref 1–33)
ANION GAP SERPL CALCULATED.3IONS-SCNC: 14 MMOL/L (ref 5–15)
AST SERPL-CCNC: 49 U/L (ref 1–32)
BASOPHILS # BLD AUTO: 0.17 10*3/MM3 (ref 0–0.2)
BASOPHILS NFR BLD AUTO: 1.2 % (ref 0–1.5)
BILIRUB SERPL-MCNC: <0.2 MG/DL (ref 0–1.2)
BUN SERPL-MCNC: 16 MG/DL (ref 6–20)
BUN/CREAT SERPL: 19.8 (ref 7–25)
CALCIUM SPEC-SCNC: 10.2 MG/DL (ref 8.6–10.5)
CHLORIDE SERPL-SCNC: 101 MMOL/L (ref 98–107)
CO2 SERPL-SCNC: 24 MMOL/L (ref 22–29)
CREAT SERPL-MCNC: 0.81 MG/DL (ref 0.57–1)
DEPRECATED RDW RBC AUTO: 46.5 FL (ref 37–54)
EGFRCR SERPLBLD CKD-EPI 2021: 90.2 ML/MIN/1.73
EOSINOPHIL # BLD AUTO: 1.2 10*3/MM3 (ref 0–0.4)
EOSINOPHIL NFR BLD AUTO: 8.4 % (ref 0.3–6.2)
ERYTHROCYTE [DISTWIDTH] IN BLOOD BY AUTOMATED COUNT: 14.7 % (ref 12.3–15.4)
FERRITIN SERPL-MCNC: 285.1 NG/ML (ref 13–150)
GLOBULIN UR ELPH-MCNC: 4 GM/DL
GLUCOSE SERPL-MCNC: 133 MG/DL (ref 65–99)
HCT VFR BLD AUTO: 45.1 % (ref 34–46.6)
HGB BLD-MCNC: 14.2 G/DL (ref 12–15.9)
IMM GRANULOCYTES # BLD AUTO: 0.04 10*3/MM3 (ref 0–0.05)
IMM GRANULOCYTES NFR BLD AUTO: 0.3 % (ref 0–0.5)
IRON 24H UR-MRATE: 36 MCG/DL (ref 37–145)
IRON SATN MFR SERPL: 9 % (ref 20–50)
LYMPHOCYTES # BLD AUTO: 4.86 10*3/MM3 (ref 0.7–3.1)
LYMPHOCYTES NFR BLD AUTO: 34 % (ref 19.6–45.3)
MCH RBC QN AUTO: 27.1 PG (ref 26.6–33)
MCHC RBC AUTO-ENTMCNC: 31.5 G/DL (ref 31.5–35.7)
MCV RBC AUTO: 86.1 FL (ref 79–97)
MONOCYTES # BLD AUTO: 1.24 10*3/MM3 (ref 0.1–0.9)
MONOCYTES NFR BLD AUTO: 8.7 % (ref 5–12)
NEUTROPHILS NFR BLD AUTO: 47.4 % (ref 42.7–76)
NEUTROPHILS NFR BLD AUTO: 6.78 10*3/MM3 (ref 1.7–7)
NRBC BLD AUTO-RTO: 0 /100 WBC (ref 0–0.2)
PLATELET # BLD AUTO: 716 10*3/MM3 (ref 140–450)
PMV BLD AUTO: 10.3 FL (ref 6–12)
POTASSIUM SERPL-SCNC: 4.2 MMOL/L (ref 3.5–5.2)
PROT SERPL-MCNC: 8.3 G/DL (ref 6–8.5)
RBC # BLD AUTO: 5.24 10*6/MM3 (ref 3.77–5.28)
SODIUM SERPL-SCNC: 139 MMOL/L (ref 136–145)
TIBC SERPL-MCNC: 387 MCG/DL (ref 298–536)
TRANSFERRIN SERPL-MCNC: 260 MG/DL (ref 200–360)
WBC NRBC COR # BLD: 14.29 10*3/MM3 (ref 3.4–10.8)

## 2022-06-03 PROCEDURE — 80053 COMPREHEN METABOLIC PANEL: CPT

## 2022-06-03 PROCEDURE — 99214 OFFICE O/P EST MOD 30 MIN: CPT | Performed by: INTERNAL MEDICINE

## 2022-06-03 PROCEDURE — 36415 COLL VENOUS BLD VENIPUNCTURE: CPT

## 2022-06-03 PROCEDURE — 83540 ASSAY OF IRON: CPT

## 2022-06-03 PROCEDURE — 85025 COMPLETE CBC W/AUTO DIFF WBC: CPT

## 2022-06-03 PROCEDURE — 82728 ASSAY OF FERRITIN: CPT

## 2022-06-03 PROCEDURE — 84466 ASSAY OF TRANSFERRIN: CPT

## 2022-06-03 NOTE — PROGRESS NOTES
Clinic Progress Note    Patient:  Naomi Bhatia  YOB: 1975  Date of Service: 6/3/2022  MRN: [id number]   Acct:    Primary Care Physician: Brett Sharp DO    Televisit    Chief Complaint: Hereditary hemochromatosis    Hematology History:  For detailed summary about the patient's previous work-up, differential diagnoses and treatments, please refer to the last note of Dr. Goldberg from 4/1/2022.  Brief summary:    Ms. Bhatia is a 46 y.o. who follows for history of iron level of 19.  She has a history of gastric sleeve procedure in 12/2017, after which she developed iron deficiency anemia like related to decreased dietary absorption. She did receive several multiple blood transfusions in the past as well as parenteral iron Venofer infusions at least 7-10 times back in 2018.     In 9/2020, the patient had seen Dr. Goldberg, where her hemoglobin was 10.8, iron saturation was 21%, ferritin was 1406, and repeat ferritin 8/11/2021 was 1663; she is found to be heterozygous for C282Y hemochromatosis gene variant. She was started on she has phlebotomies every 2 months, and as ferritin had not improved, she initially was started on Jadenu.  As her ferritin improved below 500 in 7/2021, Jadenu was stopped then.  The goal ferritin that was discussed with the patient was to keep ferritin below 500.    US spleen 12/17/2021 showed splenomegaly (max dimension 16.7 cm, previously 14.6 cm); it was symptomatic and painful.  Peripheral blood flow cytometry, JAK2, BCR/ABL FISH were negative.  PET scan 2/13/2022 showed splenomegaly with numerous small 1-2 cm hypermetabolic lesions along with mild periportal hypermetabolic lymphadenopathy.  Bone marrow biopsy 1/14/2022 was unremarkable for the most part; there was no sign of increased iron deposition.  She ultimately underwent robotic assisted laparoscopic splenomegaly and wedge liver biopsy at [facility] on 5/2/2022, with splenectomy pathology showing  Necrotizing  granulomas, and liver biopsy showing bridging fibrosis with early nodule formation as well as granulomas and mildly active steatohepatitis with microvascular steatosis; iron stains showed only mild iron deposition.    Interval History:  Ms. Bhatia is here for his scheduled follow-up.  In the interim from last visit, she has been doing fairly well.  She is recovering appropriately since her splenectomy.  She denies having any fever, chills, chest pain, shortness of breath, abdominal pain, nausea or vomiting, change in bowel or urine habits, weakness or numbness arms or legs.    Objectives:  Vitals:    06/03/22 1013   BP: 132/74   Pulse: 78   Resp: 16   Temp: 97.7 °F (36.5 °C)   SpO2: 92%     GENERAL: Alert and oriented, no apparent distress  HEENT: No scleral icterus  NECK: Supple  SKIN: No jaundice  PSYCHIATRIC: Full affect  NEUROLOGIC: Stable gait    Results:  Lab Results   Component Value Date    WBC 14.10 (H) 05/26/2022    HGB 13.0 05/26/2022    HCT 42.1 05/26/2022    MCV 90.0 05/26/2022     (H) 05/26/2022     Lab Results   Component Value Date    IRON 34 (L) 05/26/2022    TIBC 329 05/26/2022    FERRITIN 260.20 (H) 05/26/2022     Assessment :  1.  Hemochromatosis C282Y heterozygous/carrier.  2.  Prolonged history of iron deficiency before.  3.  Splenomegaly s/p splenectomy on 5/2/2022  4.  Anemia due to chronic disease    Plan:   - I reviewed her most recent labs from 5/26/2022. As her ferritin level dropped to 260 from that day compared to >500 before that, and this is achieved without phlebotomy, and given that her iron level and iron saturation are low with her having anemia, there was no sign of iron deposition in the bone marrow, and there is only mild iron deposition in the liver and significantly deposition in the spleen on pathology; I discussed with her that I wanted to cause more iron deficiency as her labs from last week are consistent with low iron, therefore I recommend to hold off  phlebotomy today and monitor her ferritin and may consider resuming them if her ferritin level trends up.  - Repeat CBC and iron studies today to see if they concur with last week's labs.  - RTC in 2 weeks with preoffice CBC and differential, CMP, iron, iron saturation and ferritin.    Angela Germain MD  6/3/2022

## 2022-06-06 DIAGNOSIS — M54.16 CHRONIC LUMBAR RADICULOPATHY: ICD-10-CM

## 2022-06-06 NOTE — TELEPHONE ENCOUNTER
Patient called on 6/3/22. The office is closed on Fridays. Added 4 days to refill date due to patient receiving script for oxycodone 5mg from [facility] for a surgery. Patient has an appointment on 6/30/22 with Megan Amaro.

## 2022-06-07 RX ORDER — OXYCODONE HYDROCHLORIDE 5 MG/1
5 TABLET ORAL EVERY 6 HOURS PRN
Qty: 90 TABLET | Refills: 0 | Status: SHIPPED | OUTPATIENT
Start: 2022-06-08 | End: 2022-06-30 | Stop reason: SDUPTHER

## 2022-06-16 NOTE — PROGRESS NOTES
Clinic Progress Note    Patient:  Naomi Bhatia  YOB: 1975  Date of Service: 6/17/2022  MRN: [id number]   Acct:    Primary Care Physician: Brett Sharp DO    Televisit    Chief Complaint: Hereditary hemochromatosis    Hematology History:  For detailed summary about the patient's previous work-up, differential diagnoses and treatments, please refer to the last note of Dr. Goldberg from 4/1/2022.  Brief summary:    Ms. Bhatia is a 46 y.o. who follows for history of iron level of 19.  She has a history of gastric sleeve procedure in 12/2017, after which she developed iron deficiency anemia like related to decreased dietary absorption. She did receive several multiple blood transfusions in the past as well as parenteral iron Venofer infusions at least 7-10 times back in 2018.     In 9/2020, the patient had seen Dr. Goldberg, where her hemoglobin was 10.8, iron saturation was 21%, ferritin was 1406, and repeat ferritin 8/11/2021 was 1663; she is found to be heterozygous for C282Y hemochromatosis gene variant. She was started on she has phlebotomies every 2 months, and as ferritin had not improved, she initially was started on Jadenu.  As her ferritin improved below 500 in 7/2021, Jadenu was stopped then. The goal ferritin that was discussed with the patient was to keep ferritin below 500.    US spleen 12/17/2021 showed splenomegaly (max dimension 16.7 cm, previously 14.6 cm); it was symptomatic and painful.  Peripheral blood flow cytometry, JAK2, BCR/ABL FISH were negative.  PET scan 2/13/2022 showed splenomegaly with numerous small 1-2 cm hypermetabolic lesions along with mild periportal hypermetabolic lymphadenopathy.  Bone marrow biopsy 1/14/2022 was unremarkable for the most part; there was no sign of increased iron deposition.  She ultimately underwent robotic assisted laparoscopic splenomegaly and wedge liver biopsy at [facility] on 5/2/2022, with splenectomy pathology showing  Necrotizing  granulomas, and liver biopsy showing bridging fibrosis with early nodule formation as well as granulomas and mildly active steatohepatitis with microvascular steatosis; iron stains showed only mild iron deposition.    Interval History:  Ms. Bhatia is here for his scheduled follow-up. In the interim since I saw her last, she has been doing fairly well. She feels well today and has no complaints. She denies having any fever, chills, chest pain, shortness of breath, abdominal pain, nausea or vomiting, change in bowel or urine habits, weakness or numbness arms or legs.    Objectives:  Vitals:    06/17/22 0849   BP: 110/76   Pulse: 99   Resp: 16   Temp: 96.1 °F (35.6 °C)   SpO2: 94%     GENERAL: Alert and oriented, no apparent distress  HEENT: No scleral icterus  NECK: Supple  SKIN: No jaundice  PSYCHIATRIC: Full affect  NEUROLOGIC: Stable gait    Results:  Lab Results   Component Value Date    WBC 14.29 (H) 06/03/2022    HGB 14.2 06/03/2022    HCT 45.1 06/03/2022    MCV 86.1 06/03/2022     (H) 06/03/2022     Lab Results   Component Value Date    IRON 36 (L) 06/03/2022    TIBC 387 06/03/2022    FERRITIN 285.10 (H) 06/03/2022     Assessment :  1.  Hemochromatosis C282Y heterozygous/carrier.  2.  Prolonged history of iron deficiency before.  3.  Splenomegaly s/p splenectomy on 5/2/2022  4.  Anemia due to chronic disease    Plan:   - We again today had a lengthy discussion about her underlying disease, heterozygous hemochromatosis.  - Since I started seeing her, which was after she underwent splenectomy, her ferritin level and iron studies have not been as high as they were before;   - Reviewed her labs from today: as her ferritin level dropped to 191-260 from that day compared to >500 before that, and this is achieved without phlebotomy, and given that her iron level and iron saturation are low with her having anemia, there was no sign of iron deposition in the bone marrow, and there is only mild iron deposition  in the liver and significantly deposition in the spleen on pathology; I discussed with her that I wanted to cause more iron deficiency as her labs from last week are consistent with low iron, therefore I recommend to hold off phlebotomy today and monitor her ferritin and may consider resuming them if her ferritin level trends up.  - She is establishing care with hepatology at [facility], presenting with them on 7/5/2022.  - RTC in 2-3 weeks with preoffice CBC and differential, CMP, iron, iron saturation and ferritin.    Angela Germain MD  6/17/2022

## 2022-06-17 ENCOUNTER — OFFICE VISIT (OUTPATIENT)
Dept: ONCOLOGY | Facility: CLINIC | Age: 47
End: 2022-06-17

## 2022-06-17 ENCOUNTER — LAB (OUTPATIENT)
Dept: LAB | Facility: HOSPITAL | Age: 47
End: 2022-06-17

## 2022-06-17 VITALS
HEART RATE: 99 BPM | HEIGHT: 66 IN | BODY MASS INDEX: 47.09 KG/M2 | TEMPERATURE: 96.1 F | DIASTOLIC BLOOD PRESSURE: 76 MMHG | SYSTOLIC BLOOD PRESSURE: 110 MMHG | RESPIRATION RATE: 16 BRPM | WEIGHT: 293 LBS | OXYGEN SATURATION: 94 %

## 2022-06-17 DIAGNOSIS — E83.110 HEREDITARY HEMOCHROMATOSIS: ICD-10-CM

## 2022-06-17 DIAGNOSIS — E83.19 IRON OVERLOAD: Primary | ICD-10-CM

## 2022-06-17 DIAGNOSIS — K76.0 FATTY LIVER: ICD-10-CM

## 2022-06-17 LAB
ALBUMIN SERPL-MCNC: 4.1 G/DL (ref 3.5–5.2)
ALBUMIN/GLOB SERPL: 1.2 G/DL
ALP SERPL-CCNC: 133 U/L (ref 39–117)
ALT SERPL W P-5'-P-CCNC: 16 U/L (ref 1–33)
ANION GAP SERPL CALCULATED.3IONS-SCNC: 10 MMOL/L (ref 5–15)
AST SERPL-CCNC: 30 U/L (ref 1–32)
BASOPHILS # BLD AUTO: 0.17 10*3/MM3 (ref 0–0.2)
BASOPHILS NFR BLD AUTO: 1.3 % (ref 0–1.5)
BILIRUB SERPL-MCNC: 0.2 MG/DL (ref 0–1.2)
BUN SERPL-MCNC: 18 MG/DL (ref 6–20)
BUN/CREAT SERPL: 22.2 (ref 7–25)
CALCIUM SPEC-SCNC: 9.8 MG/DL (ref 8.6–10.5)
CHLORIDE SERPL-SCNC: 100 MMOL/L (ref 98–107)
CO2 SERPL-SCNC: 28 MMOL/L (ref 22–29)
CREAT SERPL-MCNC: 0.81 MG/DL (ref 0.57–1)
DEPRECATED RDW RBC AUTO: 44.8 FL (ref 37–54)
EGFRCR SERPLBLD CKD-EPI 2021: 90.2 ML/MIN/1.73
EOSINOPHIL # BLD AUTO: 0.71 10*3/MM3 (ref 0–0.4)
EOSINOPHIL NFR BLD AUTO: 5.4 % (ref 0.3–6.2)
ERYTHROCYTE [DISTWIDTH] IN BLOOD BY AUTOMATED COUNT: 14.6 % (ref 12.3–15.4)
FERRITIN SERPL-MCNC: 191.8 NG/ML (ref 13–150)
GLOBULIN UR ELPH-MCNC: 3.5 GM/DL
GLUCOSE SERPL-MCNC: 156 MG/DL (ref 65–99)
HCT VFR BLD AUTO: 43.4 % (ref 34–46.6)
HGB BLD-MCNC: 13.8 G/DL (ref 12–15.9)
HOLD SPECIMEN: NORMAL
IMM GRANULOCYTES # BLD AUTO: 0.04 10*3/MM3 (ref 0–0.05)
IMM GRANULOCYTES NFR BLD AUTO: 0.3 % (ref 0–0.5)
IRON 24H UR-MRATE: 50 MCG/DL (ref 37–145)
IRON SATN MFR SERPL: 13 % (ref 20–50)
LYMPHOCYTES # BLD AUTO: 4.2 10*3/MM3 (ref 0.7–3.1)
LYMPHOCYTES NFR BLD AUTO: 31.9 % (ref 19.6–45.3)
MCH RBC QN AUTO: 27 PG (ref 26.6–33)
MCHC RBC AUTO-ENTMCNC: 31.8 G/DL (ref 31.5–35.7)
MCV RBC AUTO: 84.8 FL (ref 79–97)
MONOCYTES # BLD AUTO: 0.94 10*3/MM3 (ref 0.1–0.9)
MONOCYTES NFR BLD AUTO: 7.1 % (ref 5–12)
NEUTROPHILS NFR BLD AUTO: 54 % (ref 42.7–76)
NEUTROPHILS NFR BLD AUTO: 7.1 10*3/MM3 (ref 1.7–7)
NRBC BLD AUTO-RTO: 0 /100 WBC (ref 0–0.2)
PLATELET # BLD AUTO: 710 10*3/MM3 (ref 140–450)
PMV BLD AUTO: 9.4 FL (ref 6–12)
POTASSIUM SERPL-SCNC: 3.9 MMOL/L (ref 3.5–5.2)
PROT SERPL-MCNC: 7.6 G/DL (ref 6–8.5)
RBC # BLD AUTO: 5.12 10*6/MM3 (ref 3.77–5.28)
SODIUM SERPL-SCNC: 138 MMOL/L (ref 136–145)
TIBC SERPL-MCNC: 375 MCG/DL (ref 298–536)
TRANSFERRIN SERPL-MCNC: 252 MG/DL (ref 200–360)
WBC NRBC COR # BLD: 13.16 10*3/MM3 (ref 3.4–10.8)

## 2022-06-17 PROCEDURE — 36415 COLL VENOUS BLD VENIPUNCTURE: CPT

## 2022-06-17 PROCEDURE — 82728 ASSAY OF FERRITIN: CPT

## 2022-06-17 PROCEDURE — 85025 COMPLETE CBC W/AUTO DIFF WBC: CPT

## 2022-06-17 PROCEDURE — 84466 ASSAY OF TRANSFERRIN: CPT

## 2022-06-17 PROCEDURE — 80053 COMPREHEN METABOLIC PANEL: CPT

## 2022-06-17 PROCEDURE — 83540 ASSAY OF IRON: CPT

## 2022-06-17 PROCEDURE — 99214 OFFICE O/P EST MOD 30 MIN: CPT | Performed by: INTERNAL MEDICINE

## 2022-06-30 ENCOUNTER — HOSPITAL ENCOUNTER (OUTPATIENT)
Dept: PAIN MANAGEMENT | Age: 47
Discharge: HOME | End: 2022-06-30
Payer: MEDICARE

## 2022-06-30 VITALS
HEIGHT: 66 IN | WEIGHT: 293 LBS | DIASTOLIC BLOOD PRESSURE: 74 MMHG | BODY MASS INDEX: 47.09 KG/M2 | TEMPERATURE: 96.5 F | RESPIRATION RATE: 18 BRPM | HEART RATE: 98 BPM | SYSTOLIC BLOOD PRESSURE: 117 MMHG | OXYGEN SATURATION: 94 %

## 2022-06-30 DIAGNOSIS — M54.16 LUMBAR RADICULOPATHY: ICD-10-CM

## 2022-06-30 DIAGNOSIS — M54.16 CHRONIC LUMBAR RADICULOPATHY: ICD-10-CM

## 2022-06-30 PROCEDURE — 99213 OFFICE O/P EST LOW 20 MIN: CPT

## 2022-06-30 PROCEDURE — 99213 OFFICE O/P EST LOW 20 MIN: CPT | Performed by: NURSE PRACTITIONER

## 2022-06-30 RX ORDER — OXYCODONE HYDROCHLORIDE 5 MG/1
5 TABLET ORAL EVERY 6 HOURS PRN
Qty: 90 TABLET | Refills: 0 | Status: SHIPPED | OUTPATIENT
Start: 2022-06-08 | End: 2022-06-30 | Stop reason: CLARIF

## 2022-06-30 RX ORDER — PREGABALIN 100 MG/1
100 CAPSULE ORAL 2 TIMES DAILY
Qty: 60 CAPSULE | Refills: 2 | Status: SHIPPED | OUTPATIENT
Start: 2022-07-02 | End: 2022-09-30

## 2022-06-30 RX ORDER — OXYCODONE HYDROCHLORIDE 5 MG/1
5 TABLET ORAL EVERY 6 HOURS PRN
Qty: 90 TABLET | Refills: 0 | Status: SHIPPED | OUTPATIENT
Start: 2022-07-08 | End: 2022-08-09 | Stop reason: SDUPTHER

## 2022-06-30 ASSESSMENT — ENCOUNTER SYMPTOMS
NAUSEA: 1
ABDOMINAL DISTENTION: 1
CONSTIPATION: 0
ABDOMINAL PAIN: 1
BACK PAIN: 1
BOWEL INCONTINENCE: 0

## 2022-06-30 ASSESSMENT — PAIN DESCRIPTION - INTENSITY: RATING_2: 8

## 2022-06-30 ASSESSMENT — PAIN DESCRIPTION - LOCATION
LOCATION: BACK
LOCATION_2: NECK

## 2022-06-30 ASSESSMENT — PAIN DESCRIPTION - PAIN TYPE
TYPE: CHRONIC PAIN
TYPE_2: CHRONIC PAIN

## 2022-06-30 ASSESSMENT — PAIN DESCRIPTION - ORIENTATION
ORIENTATION: LOWER
ORIENTATION_2: LOWER

## 2022-06-30 NOTE — TELEPHONE ENCOUNTER
Requested Prescriptions     Signed Prescriptions Disp Refills    pregabalin (LYRICA) 100 MG capsule 60 capsule 2     Sig: Take 1 capsule by mouth 2 times daily for 90 days.      Authorizing Provider: Tommy Menon

## 2022-06-30 NOTE — PROGRESS NOTES
Clinic Documentation      Education Provided:  [x] Review of Matteo Gracia  [] Agreement Review  [x] PEG Score Calculated [] PHQ Score Calculated [] ORT Score Calculated    [] Compliance Issues Discussed [] Cognitive Behavior Needs [x] Exercise [] Review of Test [] Financial Issues  [x] Tobacco/Alcohol Use Reviewed [x] Teaching [] New Patient [] Picture Obtained    Physician Plan:  [] Outgoing Referral  [] Pharmacy Consult  [] Test Ordered [x] Prescription Ordered/Changed   [] Obtained Test Results / Consult Notes        Complete if patient is withholding blood thinner for procedure     Blood Thinner Patient is currently taking:      [] Plavix (Hold for 7 days)  [] Aspirin (Hold for 5 days)     [] Pletal (Hold for 2 days)  [] Pradaxa (Hold for 3 days)    [] Effient (Hold for 7 days)  [] Xarelto (Hold for 2 days)    [] Eliquis (Hold for 2 days)  [] Brilinta (Hold for 7 days)    [] Coumadin (Hold for 5 days) - (INR needs to be drawn the day prior to procedure- INR < 2.0)    [] Aggrenox (Hold for 7 days)        [] Patient will stop medication on their own.    [] Blood Thinner Form Faxed for approval to hold.    Provider form faxed to:    Assessment Completed by:  Electronically signed by Polly Keating RN on 6/30/2022 at 9:15 AM
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
## 2022-07-02 DIAGNOSIS — I10 ESSENTIAL HYPERTENSION: ICD-10-CM

## 2022-07-05 ENCOUNTER — OFFICE VISIT (OUTPATIENT)
Dept: GASTROENTEROLOGY | Facility: CLINIC | Age: 47
End: 2022-07-05

## 2022-07-05 ENCOUNTER — LAB (OUTPATIENT)
Dept: LAB | Facility: HOSPITAL | Age: 47
End: 2022-07-05

## 2022-07-05 ENCOUNTER — TELEPHONE (OUTPATIENT)
Dept: GASTROENTEROLOGY | Facility: CLINIC | Age: 47
End: 2022-07-05

## 2022-07-05 VITALS
DIASTOLIC BLOOD PRESSURE: 72 MMHG | OXYGEN SATURATION: 96 % | HEIGHT: 66 IN | SYSTOLIC BLOOD PRESSURE: 136 MMHG | HEART RATE: 93 BPM | BODY MASS INDEX: 47.09 KG/M2 | WEIGHT: 293 LBS | TEMPERATURE: 98.7 F

## 2022-07-05 DIAGNOSIS — E83.110 HEREDITARY HEMOCHROMATOSIS: ICD-10-CM

## 2022-07-05 DIAGNOSIS — D86.9 SARCOIDOSIS: ICD-10-CM

## 2022-07-05 DIAGNOSIS — E83.19 IRON OVERLOAD: ICD-10-CM

## 2022-07-05 DIAGNOSIS — R74.8 ELEVATED LIVER ENZYMES: ICD-10-CM

## 2022-07-05 DIAGNOSIS — Z83.71 FAMILY HISTORY OF COLONIC POLYPS: Primary | ICD-10-CM

## 2022-07-05 DIAGNOSIS — K75.81 NASH (NONALCOHOLIC STEATOHEPATITIS): ICD-10-CM

## 2022-07-05 LAB
ALBUMIN SERPL-MCNC: 4.1 G/DL (ref 3.5–5.2)
ALBUMIN/GLOB SERPL: 1 G/DL
ALP SERPL-CCNC: 144 U/L (ref 39–117)
ALT SERPL W P-5'-P-CCNC: 21 U/L (ref 1–33)
ANION GAP SERPL CALCULATED.3IONS-SCNC: 12 MMOL/L (ref 5–15)
ANISOCYTOSIS BLD QL: ABNORMAL
AST SERPL-CCNC: 30 U/L (ref 1–32)
BASOPHILS # BLD MANUAL: 0.48 10*3/MM3 (ref 0–0.2)
BASOPHILS NFR BLD MANUAL: 3 % (ref 0–1.5)
BILIRUB SERPL-MCNC: 0.2 MG/DL (ref 0–1.2)
BUN SERPL-MCNC: 18 MG/DL (ref 6–20)
BUN/CREAT SERPL: 25.4 (ref 7–25)
CALCIUM SPEC-SCNC: 9.8 MG/DL (ref 8.6–10.5)
CHLORIDE SERPL-SCNC: 100 MMOL/L (ref 98–107)
CLUMPED PLATELETS: PRESENT
CO2 SERPL-SCNC: 26 MMOL/L (ref 22–29)
CREAT SERPL-MCNC: 0.71 MG/DL (ref 0.57–1)
DEPRECATED RDW RBC AUTO: 45.4 FL (ref 37–54)
EGFRCR SERPLBLD CKD-EPI 2021: 105.7 ML/MIN/1.73
ELLIPTOCYTES BLD QL SMEAR: ABNORMAL
EOSINOPHIL # BLD MANUAL: 0.48 10*3/MM3 (ref 0–0.4)
EOSINOPHIL NFR BLD MANUAL: 3 % (ref 0.3–6.2)
ERYTHROCYTE [DISTWIDTH] IN BLOOD BY AUTOMATED COUNT: 15.3 % (ref 12.3–15.4)
GLOBULIN UR ELPH-MCNC: 4 GM/DL
GLUCOSE SERPL-MCNC: 108 MG/DL (ref 65–99)
HCT VFR BLD AUTO: 44.1 % (ref 34–46.6)
HGB BLD-MCNC: 14.4 G/DL (ref 12–15.9)
LYMPHOCYTES # BLD MANUAL: 5.07 10*3/MM3 (ref 0.7–3.1)
LYMPHOCYTES NFR BLD MANUAL: 9 % (ref 5–12)
MCH RBC QN AUTO: 26.7 PG (ref 26.6–33)
MCHC RBC AUTO-ENTMCNC: 32.7 G/DL (ref 31.5–35.7)
MCV RBC AUTO: 81.7 FL (ref 79–97)
METAMYELOCYTES NFR BLD MANUAL: 3 % (ref 0–0)
MONOCYTES # BLD: 1.43 10*3/MM3 (ref 0.1–0.9)
MYELOCYTES NFR BLD MANUAL: 1 % (ref 0–0)
NEUTROPHILS # BLD AUTO: 7.77 10*3/MM3 (ref 1.7–7)
NEUTROPHILS NFR BLD MANUAL: 49 % (ref 42.7–76)
OVALOCYTES BLD QL SMEAR: ABNORMAL
PLATELET # BLD AUTO: 621 10*3/MM3 (ref 140–450)
PMV BLD AUTO: 10.3 FL (ref 6–12)
POIKILOCYTOSIS BLD QL SMEAR: ABNORMAL
POLYCHROMASIA BLD QL SMEAR: ABNORMAL
POTASSIUM SERPL-SCNC: 4.3 MMOL/L (ref 3.5–5.2)
PROT SERPL-MCNC: 8.1 G/DL (ref 6–8.5)
RBC # BLD AUTO: 5.4 10*6/MM3 (ref 3.77–5.28)
SMALL PLATELETS BLD QL SMEAR: ABNORMAL
SODIUM SERPL-SCNC: 138 MMOL/L (ref 136–145)
VARIANT LYMPHS NFR BLD MANUAL: 29 % (ref 19.6–45.3)
VARIANT LYMPHS NFR BLD MANUAL: 3 % (ref 0–5)
WBC MORPH BLD: NORMAL
WBC NRBC COR # BLD: 15.85 10*3/MM3 (ref 3.4–10.8)

## 2022-07-05 PROCEDURE — 85025 COMPLETE CBC W/AUTO DIFF WBC: CPT

## 2022-07-05 PROCEDURE — 82728 ASSAY OF FERRITIN: CPT

## 2022-07-05 PROCEDURE — 99215 OFFICE O/P EST HI 40 MIN: CPT | Performed by: NURSE PRACTITIONER

## 2022-07-05 PROCEDURE — 36415 COLL VENOUS BLD VENIPUNCTURE: CPT

## 2022-07-05 PROCEDURE — 83540 ASSAY OF IRON: CPT

## 2022-07-05 PROCEDURE — 80053 COMPREHEN METABOLIC PANEL: CPT

## 2022-07-05 PROCEDURE — 86381 MITOCHONDRIAL ANTIBODY EACH: CPT | Performed by: NURSE PRACTITIONER

## 2022-07-05 PROCEDURE — 85007 BL SMEAR W/DIFF WBC COUNT: CPT

## 2022-07-05 PROCEDURE — 84466 ASSAY OF TRANSFERRIN: CPT

## 2022-07-05 PROCEDURE — 86015 ACTIN ANTIBODY EACH: CPT

## 2022-07-05 PROCEDURE — 82390 ASSAY OF CERULOPLASMIN: CPT | Performed by: NURSE PRACTITIONER

## 2022-07-05 PROCEDURE — 82103 ALPHA-1-ANTITRYPSIN TOTAL: CPT | Performed by: NURSE PRACTITIONER

## 2022-07-05 PROCEDURE — 86038 ANTINUCLEAR ANTIBODIES: CPT

## 2022-07-05 RX ORDER — LEVOCETIRIZINE DIHYDROCHLORIDE 5 MG/1
5 TABLET, FILM COATED ORAL EVERY EVENING
COMMUNITY

## 2022-07-05 RX ORDER — AMLODIPINE BESYLATE 5 MG/1
5 TABLET ORAL DAILY
Qty: 90 TABLET | Refills: 3 | Status: SHIPPED | OUTPATIENT
Start: 2022-07-05

## 2022-07-05 NOTE — PROGRESS NOTES
No chief complaint on file.      PCP: Brett Sharp,   REFER: Angela Germain MD    Subjective     HPI    Naomi Bhatia has been referred to our office for fatty liver.  On 5/2/22 she underwent robotic assisted laparoscopic splenomegaly and wedge liver biopsy at [facility] , with splenectomy pathology showing Necrotizing granulomas, and liver biopsy showing bridging fibrosis with early nodule formation as well as granulomas and mildly active steatohepatitis with microvascular steatosis; iron stains showed only mild iron deposition.  MYERS fibrosis score 3 (per path report reviewed on her phone,  reviewed through Care Everywhere).  No abdominal pain.  No bright red blood per rectum, no melena.    Bowels move without difficulty.  Does not follow healthy diet as it is difficult for to cook due to health issues.  She is following dietician at [place] as well as bariatric surgeon.  New diagnosis of Sarcoidosis, pulmonology appointment at [place] in Nov 2022.    Colonoscopy 2017-Dr Gus Soliman evaluation by Dr Rodriguez - 4/26/22-[place]    Lab Results - Last 18 Months   Lab Units 06/17/22  0841 06/03/22  1059 05/26/22  1347 05/18/22  1254 03/31/22  0824 01/13/22  0938   HEMOGLOBIN g/dL 13.8 14.2 13.0 13.3 11.2* 10.9*   HEMATOCRIT % 43.4 45.1 42.1 41.9 34.1 31.7*   MCV fL 84.8 86.1 90.0 88.0 90.7 88.5   WBC 10*3/mm3 13.16* 14.29* 14.10* 11.71* 6.15 6.48   PLATELETS 10*3/mm3 710* 716* 813* 1,154* 286 244       Lab Results - Last 18 Months   Lab Units 06/17/22  0841 06/03/22  1059 05/18/22  1254 04/20/22  1133 03/31/22  0824 11/16/21  1109 10/18/21  1002   GLUCOSE mg/dL 156* 133* 136*  --  303* 142* 173*   SODIUM mmol/L 138 139 137 140 136 141 137   POTASSIUM mmol/L 3.9 4.2 3.9 4.1 4.0 3.7 3.5   CREATININE mg/dL 0.81 0.81 0.76 0.77 0.77 0.74 1.00   BUN mg/dL 18 16 11 13 10 10 13   BUN / CREAT RATIO  22.2 19.8 14.5  --  13.0 13.5 13.0   ALK PHOS U/L 133* 156* 125*  --  134* 131* 128*   ALT (SGPT) U/L 16  30 25  --  31 24 19   AST (SGOT) U/L 30 49* 45*  --  56* 27 21   BILIRUBIN mg/dL 0.2 <0.2 <0.2  --  0.8 0.6 1.3*   ALBUMIN g/dL 4.10 4.30 3.90  --  4.30 4.30 4.20       Lab Results - Last 18 Months   Lab Units 06/17/22  0841 06/03/22  1059 05/26/22  1347 05/18/22  1254 04/04/22  1241 03/31/22  0824 03/22/22  1514 12/28/21  1224 10/18/21  1002 09/20/21  0942 09/10/21  1315 03/11/21  0958 02/03/21  0937   IRON mcg/dL 50 36* 34*  --   --  70 78 71  66   < >  --  70   < > 44   TIBC mcg/dL 375 387 329  --   --  282* 220* 208*   < >  --  238*   < > 213*   IRON SATURATION % 13* 9* 10*  --   --  25 35 32   < >  --  29   < > 21   FERRITIN ng/mL 191.80* 285.10* 260.20*  --   --  582.30* 589* 343*   < >  --  592*   < > 1,274.0*   TSH uIU/mL  --   --   --  2.020 2.504  --   --   --   --   --   --   --  1.590   FOLATE ng/mL  --   --   --   --   --   --  17.7  --   --  12.90 13.8  --   --     < > = values in this interval not displayed.         Past Medical History:   Diagnosis Date   • Anemia    • Anxiety    • Arthritis    • Asthma    • Back pain 03/14/2016    with left sided radiculopathy    • Cholelithiasis 12/1995    Gallbladder was removed on December 21st 1995.   • DDD (degenerative disc disease), lumbar     Dr. Stuart Zavaleta for pain manangement   • Depression    • Encounter for care related to Port-a-Cath 01/31/2020    for iron infusions as she was told she has small veins   • Fatigue    • Fatty liver     It was diagnosed within the last 2 years.   • Fibromyalgia    • GERD (gastroesophageal reflux disease)    • H/O cold sores    • Headache    • Hereditary hemochromatosis (HCC) 01/19/2021   • Hernia 03/2022    Hiatal hernia discovered when I had my upper GI at [place].   • History of blood transfusion    • Hypertension    • Iron deficiency anemia 09/12/2017   • Iron deficiency anemia secondary to inadequate dietary iron intake 09/12/2017   • Irritable bowel syndrome     I don't remember when it was first diagnosed.   •  Joint pain    • Liver disease 05/02/2022    Liver biopsy taken while splenectomy was performed . Stage 3 bridging fibrosis. Early signs of cirrhosis.   • Obesity    • RLS (restless legs syndrome)    • Sleep apnea     positive sleep study; titration study in October, uses a CPAP machine when sleeping       Past Surgical History:   Procedure Laterality Date   • ABDOMINAL SURGERY     • ANKLE SURGERY      Right    • APPENDECTOMY  04/19/2015   • BACK SURGERY  2000   • BARIATRIC SURGERY  12/2017   • CERVICAL SPINE SURGERY  09/01/2015   • CHOLECYSTECTOMY      1995;lap   • COLONOSCOPY  05/02/2017    normal; do not return until age of 49 Dr. Gus Valentine   • GASTRIC SLEEVE LAPAROSCOPIC N/A 12/20/2017    Procedure: GASTRIC SLEEVE LAPAROSCOPIC;  Surgeon: Yifan Cordero MD;  Location: [facility] OR;  Service:    • HIP ARTHROPLASTY      Right  2019   • HIP SURGERY  1987    right    • INSERTION CENTRAL VENOUS ACCESS DEVICE W/ SUBCUTANEOUS PORT  11/07/2017    Dr Anel Gamboa (Smart Port-Power injectable Port) Cat NO#[id number] Lot [id number]    • LIVER BIOPSY  05/02/2022   • MOUTH SURGERY  1994   • NECK SURGERY     • TOOTH EXTRACTION     • UPPER GASTROINTESTINAL ENDOSCOPY  05/02/2017    Dr. Gus Valentine, negative for h.pylori, negative for Salomon's   • VAGINAL HYSTERECTOMY SALPINGO OOPHORECTOMY  2008    Partial and then had another surgery to remove the rest       Outpatient Medications Marked as Taking for the 7/5/22 encounter (Office Visit) with Lj Hernandez APRN   Medication Sig Dispense Refill   • acyclovir (ZOVIRAX) 800 MG tablet Take 800 mg by mouth Daily.     • albuterol sulfate  (90 Base) MCG/ACT inhaler Inhale 2 puffs As Needed.     • amLODIPine (NORVASC) 5 MG tablet Take 5 mg by mouth Daily.     • Calcium Citrate-Vitamin D (CALCIUM CITRATE + D3 PO) Take 1 tablet by mouth Daily.     • carbidopa-levodopa (SINEMET)  MG per tablet Take 1 tablet by mouth Daily.     • CRANBERRY PO Take 1 capsule by mouth Daily.      • Cyclobenzaprine HCl (FLEXERIL PO) Take 10 mg by mouth Daily As Needed.     • levocetirizine (XYZAL) 5 MG tablet Take 5 mg by mouth Every Evening.     • meclizine (ANTIVERT) 25 MG tablet Take 25 mg by mouth As Needed.     • metFORMIN (GLUCOPHAGE) 1000 MG tablet Take 1,000 mg by mouth 2 (Two) Times a Day With Meals.     • metoprolol succinate XL (TOPROL-XL) 50 MG 24 hr tablet Take 50 mg by mouth 2 (Two) Times a Day.     • Multiple Vitamin (MULTI VITAMIN PO) Take  by mouth Daily.     • NABUMETONE PO Take 750 mg by mouth 2 (two) times a day.     • nortriptyline (PAMELOR) 25 MG capsule Take 25 mg by mouth Every Night. Pt takes 2 at night  3   • omeprazole (priLOSEC) 20 MG capsule Take 20 mg by mouth Daily.  3   • ondansetron ODT (Zofran ODT) 8 MG disintegrating tablet Place 1 tablet on the tongue Every 8 (Eight) Hours As Needed for Nausea or Vomiting. 20 tablet 0   • pregabalin (LYRICA) 100 MG capsule Take 100 mg by mouth 2 (Two) Times a Day.     • Specialty Vitamins Products (MENOPAUSE RELIEF PO) Take  by mouth Daily. Amberen otc     • venlafaxine (EFFEXOR) 75 MG tablet Take 75 mg by mouth Daily.  11   • venlafaxine XR (EFFEXOR-XR) 150 MG 24 hr capsule Take 150 mg by mouth Daily.         Allergies   Allergen Reactions   • Azithromycin GI Intolerance and Nausea And Vomiting     Severe Abdominal Pain   Severe abdominal pain    • Silver Rash     Redness, blisters  blister  Redness, blisters         Social History     Socioeconomic History   • Marital status:    Tobacco Use   • Smoking status: Former Smoker     Packs/day: 1.00     Years: 25.00     Pack years: 25.00     Types: Cigarettes, Cigarettes     Quit date: 2014     Years since quittin.5   • Smokeless tobacco: Never Used   Substance and Sexual Activity   • Alcohol use: Yes     Comment: rarely (no alcohol approx 6 months)   • Drug use: No     Types: Codeine     Comment: Codeine in Prescribed Medications only    • Sexual activity: Not Currently      "Partners: Male     Birth control/protection: Surgical     Comment: Complete Hysterectomy       Review of Systems   Constitutional: Negative for unexpected weight change.   Respiratory: Negative for shortness of breath.    Cardiovascular: Negative for chest pain.   Gastrointestinal: Negative for abdominal pain and anal bleeding.       Objective     Vitals:    07/05/22 1400   BP: 136/72   Pulse: 93   Temp: 98.7 °F (37.1 °C)   SpO2: 96%   Weight: (!) 137 kg (303 lb)   Height: 167.6 cm (66\")     Body mass index is 48.91 kg/m².    Physical Exam  Constitutional:       Appearance: Normal appearance. She is well-developed.   Eyes:      General: No scleral icterus.  Cardiovascular:      Rate and Rhythm: Regular rhythm.      Heart sounds: Normal heart sounds. No murmur heard.  Pulmonary:      Effort: Pulmonary effort is normal. No accessory muscle usage.      Breath sounds: Normal breath sounds.   Abdominal:      General: Bowel sounds are normal. There is no distension.      Palpations: Abdomen is soft. There is no mass.      Tenderness: There is no abdominal tenderness. There is no guarding or rebound.   Skin:     General: Skin is warm and dry.      Coloration: Skin is not jaundiced.   Neurological:      Mental Status: She is alert.   Psychiatric:         Behavior: Behavior is cooperative.         Imaging Results (Most Recent)     None          Body mass index is 48.91 kg/m².    Assessment & Plan     Diagnoses and all orders for this visit:    1. Family history of colonic polyps (Primary)  -     Case Request; Standing  -     Case Request    2. MYERS (nonalcoholic steatohepatitis)    3. Elevated liver enzymes  -     Alpha - 1 - Antitrypsin  -     Anti-Smooth Muscle Antibody Titer; Future  -     Ceruloplasmin  -     Mitochondrial Antibodies, M2  -     Nuclear Antigen Antibody, IFA; Future    4. Sarcoidosis  -     Ambulatory Referral to Pulmonology    5. Hereditary hemochromatosis (HCC)         COLONOSCOPY WITH ANESTHESIA " (N/A)      Very lengthy discussion regarding pathology report regarding MYERS, F-3 score  Explained F4 is a cirrhosis/end stage liver disease.  Due to nodularity on pathology suspicious she is closer to early F4.  Stressed importance of following guidelines set by Dietician for weight loss.  Explained only treatment for fatty liver is weight loss/diet/exercise.  I encouraged her to keep appointment with bariatric surgeon next month at [place].    I discussed how fatty liver can lead to cirrhosis, disability and pre-mature death.  How it also can be a sign of increased risk for cardiovascular disease.  I discussed the importance of getting rid of fat in the liver by controlling lipids and glucose, avoiding etoh helps, and gradual weight loss to ideal body weight is very important.     She is followed by hematology at [facility] - they have recommended she hold phlebotomy at this time    She was concerned her pulmonary appointment is not until Nov 2022.  Referral entered for [facility] pulmonology for review and recommendations (Naomi Bhatia stated her pcp did not make referral to [place].  I explained If referral was made by pcp then I encouraged to follow Brett Sharp,  recommendations).  Advised her to keep [place] appointment until she is seen by [facility] pulmonology.     Advised pt to stop use of NSAIDs, Fish Oil, and MV 5 days prior to procedure, per Dr Suarez protocol.  Tylenol based products are ok to take.  Pt verbalized understanding.      All risks, benefits, alternatives, and indications of colonoscopy procedure have been discussed with the patient. Risks to include perforation of the colon requiring possible surgery or colostomy, risk of bleeding from biopsies or removal of colon tissue, possibility of missing a colon polyp or cancer, or adverse drug reaction.  Benefits to include the diagnosis and management of disease of the colon and rectum.  Alternatives to include barium enema, radiographic evaluation, lab testing or no intervention. Pt verbalizes understanding and agrees to proceed with procedure.    I have spent over 58 minutes today reviewing patients chart, records from [place] as well as discussion in exam room with patient.  Thirty minutes was spent in exam room with Naomi Bhatia with over half of that time being focused on education.     Lj Hernandez, APRN  07/05/22          There are no Patient Instructions on file for this visit.

## 2022-07-05 NOTE — H&P (VIEW-ONLY)
No chief complaint on file.      PCP: Brett Sharp,   REFER: Angela Germain MD    Subjective     HPI    Naomi Bhatia has been referred to our office for fatty liver.  On 5/2/22 she underwent robotic assisted laparoscopic splenomegaly and wedge liver biopsy at [facility] , with splenectomy pathology showing Necrotizing granulomas, and liver biopsy showing bridging fibrosis with early nodule formation as well as granulomas and mildly active steatohepatitis with microvascular steatosis; iron stains showed only mild iron deposition.  MYERS fibrosis score 3 (per path report reviewed on her phone,  reviewed through Care Everywhere).  No abdominal pain.  No bright red blood per rectum, no melena.    Bowels move without difficulty.  Does not follow healthy diet as it is difficult for to cook due to health issues.  She is following dietician at [place] as well as bariatric surgeon.  New diagnosis of Sarcoidosis, pulmonology appointment at [place] in Nov 2022.    Colonoscopy 2017-Dr Gus Soliman evaluation by Dr Rodriguez - 4/26/22-[place]    Lab Results - Last 18 Months   Lab Units 06/17/22  0841 06/03/22  1059 05/26/22  1347 05/18/22  1254 03/31/22  0824 01/13/22  0938   HEMOGLOBIN g/dL 13.8 14.2 13.0 13.3 11.2* 10.9*   HEMATOCRIT % 43.4 45.1 42.1 41.9 34.1 31.7*   MCV fL 84.8 86.1 90.0 88.0 90.7 88.5   WBC 10*3/mm3 13.16* 14.29* 14.10* 11.71* 6.15 6.48   PLATELETS 10*3/mm3 710* 716* 813* 1,154* 286 244       Lab Results - Last 18 Months   Lab Units 06/17/22  0841 06/03/22  1059 05/18/22  1254 04/20/22  1133 03/31/22  0824 11/16/21  1109 10/18/21  1002   GLUCOSE mg/dL 156* 133* 136*  --  303* 142* 173*   SODIUM mmol/L 138 139 137 140 136 141 137   POTASSIUM mmol/L 3.9 4.2 3.9 4.1 4.0 3.7 3.5   CREATININE mg/dL 0.81 0.81 0.76 0.77 0.77 0.74 1.00   BUN mg/dL 18 16 11 13 10 10 13   BUN / CREAT RATIO  22.2 19.8 14.5  --  13.0 13.5 13.0   ALK PHOS U/L 133* 156* 125*  --  134* 131* 128*   ALT (SGPT) U/L 16  30 25  --  31 24 19   AST (SGOT) U/L 30 49* 45*  --  56* 27 21   BILIRUBIN mg/dL 0.2 <0.2 <0.2  --  0.8 0.6 1.3*   ALBUMIN g/dL 4.10 4.30 3.90  --  4.30 4.30 4.20       Lab Results - Last 18 Months   Lab Units 06/17/22  0841 06/03/22  1059 05/26/22  1347 05/18/22  1254 04/04/22  1241 03/31/22  0824 03/22/22  1514 12/28/21  1224 10/18/21  1002 09/20/21  0942 09/10/21  1315 03/11/21  0958 02/03/21  0937   IRON mcg/dL 50 36* 34*  --   --  70 78 71  66   < >  --  70   < > 44   TIBC mcg/dL 375 387 329  --   --  282* 220* 208*   < >  --  238*   < > 213*   IRON SATURATION % 13* 9* 10*  --   --  25 35 32   < >  --  29   < > 21   FERRITIN ng/mL 191.80* 285.10* 260.20*  --   --  582.30* 589* 343*   < >  --  592*   < > 1,274.0*   TSH uIU/mL  --   --   --  2.020 2.504  --   --   --   --   --   --   --  1.590   FOLATE ng/mL  --   --   --   --   --   --  17.7  --   --  12.90 13.8  --   --     < > = values in this interval not displayed.         Past Medical History:   Diagnosis Date   • Anemia    • Anxiety    • Arthritis    • Asthma    • Back pain 03/14/2016    with left sided radiculopathy    • Cholelithiasis 12/1995    Gallbladder was removed on December 21st 1995.   • DDD (degenerative disc disease), lumbar     Dr. Stuart Zavaleta for pain manangement   • Depression    • Encounter for care related to Port-a-Cath 01/31/2020    for iron infusions as she was told she has small veins   • Fatigue    • Fatty liver     It was diagnosed within the last 2 years.   • Fibromyalgia    • GERD (gastroesophageal reflux disease)    • H/O cold sores    • Headache    • Hereditary hemochromatosis (HCC) 01/19/2021   • Hernia 03/2022    Hiatal hernia discovered when I had my upper GI at [place].   • History of blood transfusion    • Hypertension    • Iron deficiency anemia 09/12/2017   • Iron deficiency anemia secondary to inadequate dietary iron intake 09/12/2017   • Irritable bowel syndrome     I don't remember when it was first diagnosed.   •  Joint pain    • Liver disease 05/02/2022    Liver biopsy taken while splenectomy was performed . Stage 3 bridging fibrosis. Early signs of cirrhosis.   • Obesity    • RLS (restless legs syndrome)    • Sleep apnea     positive sleep study; titration study in October, uses a CPAP machine when sleeping       Past Surgical History:   Procedure Laterality Date   • ABDOMINAL SURGERY     • ANKLE SURGERY      Right    • APPENDECTOMY  04/19/2015   • BACK SURGERY  2000   • BARIATRIC SURGERY  12/2017   • CERVICAL SPINE SURGERY  09/01/2015   • CHOLECYSTECTOMY      1995;lap   • COLONOSCOPY  05/02/2017    normal; do not return until age of 49 Dr. Gus Valentine   • GASTRIC SLEEVE LAPAROSCOPIC N/A 12/20/2017    Procedure: GASTRIC SLEEVE LAPAROSCOPIC;  Surgeon: Yifan Cordero MD;  Location: [facility] OR;  Service:    • HIP ARTHROPLASTY      Right  2019   • HIP SURGERY  1987    right    • INSERTION CENTRAL VENOUS ACCESS DEVICE W/ SUBCUTANEOUS PORT  11/07/2017    Dr Anel Gamboa (Smart Port-Power injectable Port) Cat NO#[id number] Lot [id number]    • LIVER BIOPSY  05/02/2022   • MOUTH SURGERY  1994   • NECK SURGERY     • TOOTH EXTRACTION     • UPPER GASTROINTESTINAL ENDOSCOPY  05/02/2017    Dr. Gus Valentine, negative for h.pylori, negative for Salomon's   • VAGINAL HYSTERECTOMY SALPINGO OOPHORECTOMY  2008    Partial and then had another surgery to remove the rest       Outpatient Medications Marked as Taking for the 7/5/22 encounter (Office Visit) with Lj Hernandez APRN   Medication Sig Dispense Refill   • acyclovir (ZOVIRAX) 800 MG tablet Take 800 mg by mouth Daily.     • albuterol sulfate  (90 Base) MCG/ACT inhaler Inhale 2 puffs As Needed.     • amLODIPine (NORVASC) 5 MG tablet Take 5 mg by mouth Daily.     • Calcium Citrate-Vitamin D (CALCIUM CITRATE + D3 PO) Take 1 tablet by mouth Daily.     • carbidopa-levodopa (SINEMET)  MG per tablet Take 1 tablet by mouth Daily.     • CRANBERRY PO Take 1 capsule by mouth Daily.      • Cyclobenzaprine HCl (FLEXERIL PO) Take 10 mg by mouth Daily As Needed.     • levocetirizine (XYZAL) 5 MG tablet Take 5 mg by mouth Every Evening.     • meclizine (ANTIVERT) 25 MG tablet Take 25 mg by mouth As Needed.     • metFORMIN (GLUCOPHAGE) 1000 MG tablet Take 1,000 mg by mouth 2 (Two) Times a Day With Meals.     • metoprolol succinate XL (TOPROL-XL) 50 MG 24 hr tablet Take 50 mg by mouth 2 (Two) Times a Day.     • Multiple Vitamin (MULTI VITAMIN PO) Take  by mouth Daily.     • NABUMETONE PO Take 750 mg by mouth 2 (two) times a day.     • nortriptyline (PAMELOR) 25 MG capsule Take 25 mg by mouth Every Night. Pt takes 2 at night  3   • omeprazole (priLOSEC) 20 MG capsule Take 20 mg by mouth Daily.  3   • ondansetron ODT (Zofran ODT) 8 MG disintegrating tablet Place 1 tablet on the tongue Every 8 (Eight) Hours As Needed for Nausea or Vomiting. 20 tablet 0   • pregabalin (LYRICA) 100 MG capsule Take 100 mg by mouth 2 (Two) Times a Day.     • Specialty Vitamins Products (MENOPAUSE RELIEF PO) Take  by mouth Daily. Amberen otc     • venlafaxine (EFFEXOR) 75 MG tablet Take 75 mg by mouth Daily.  11   • venlafaxine XR (EFFEXOR-XR) 150 MG 24 hr capsule Take 150 mg by mouth Daily.         Allergies   Allergen Reactions   • Azithromycin GI Intolerance and Nausea And Vomiting     Severe Abdominal Pain   Severe abdominal pain    • Silver Rash     Redness, blisters  blister  Redness, blisters         Social History     Socioeconomic History   • Marital status:    Tobacco Use   • Smoking status: Former Smoker     Packs/day: 1.00     Years: 25.00     Pack years: 25.00     Types: Cigarettes, Cigarettes     Quit date: 2014     Years since quittin.5   • Smokeless tobacco: Never Used   Substance and Sexual Activity   • Alcohol use: Yes     Comment: rarely (no alcohol approx 6 months)   • Drug use: No     Types: Codeine     Comment: Codeine in Prescribed Medications only    • Sexual activity: Not Currently      "Partners: Male     Birth control/protection: Surgical     Comment: Complete Hysterectomy       Review of Systems   Constitutional: Negative for unexpected weight change.   Respiratory: Negative for shortness of breath.    Cardiovascular: Negative for chest pain.   Gastrointestinal: Negative for abdominal pain and anal bleeding.       Objective     Vitals:    07/05/22 1400   BP: 136/72   Pulse: 93   Temp: 98.7 °F (37.1 °C)   SpO2: 96%   Weight: (!) 137 kg (303 lb)   Height: 167.6 cm (66\")     Body mass index is 48.91 kg/m².    Physical Exam  Constitutional:       Appearance: Normal appearance. She is well-developed.   Eyes:      General: No scleral icterus.  Cardiovascular:      Rate and Rhythm: Regular rhythm.      Heart sounds: Normal heart sounds. No murmur heard.  Pulmonary:      Effort: Pulmonary effort is normal. No accessory muscle usage.      Breath sounds: Normal breath sounds.   Abdominal:      General: Bowel sounds are normal. There is no distension.      Palpations: Abdomen is soft. There is no mass.      Tenderness: There is no abdominal tenderness. There is no guarding or rebound.   Skin:     General: Skin is warm and dry.      Coloration: Skin is not jaundiced.   Neurological:      Mental Status: She is alert.   Psychiatric:         Behavior: Behavior is cooperative.         Imaging Results (Most Recent)     None          Body mass index is 48.91 kg/m².    Assessment & Plan     Diagnoses and all orders for this visit:    1. Family history of colonic polyps (Primary)  -     Case Request; Standing  -     Case Request    2. MYERS (nonalcoholic steatohepatitis)    3. Elevated liver enzymes  -     Alpha - 1 - Antitrypsin  -     Anti-Smooth Muscle Antibody Titer; Future  -     Ceruloplasmin  -     Mitochondrial Antibodies, M2  -     Nuclear Antigen Antibody, IFA; Future    4. Sarcoidosis  -     Ambulatory Referral to Pulmonology    5. Hereditary hemochromatosis (HCC)         COLONOSCOPY WITH ANESTHESIA " (N/A)      Very lengthy discussion regarding pathology report regarding MYERS, F-3 score  Explained F4 is a cirrhosis/end stage liver disease.  Due to nodularity on pathology suspicious she is closer to early F4.  Stressed importance of following guidelines set by Dietician for weight loss.  Explained only treatment for fatty liver is weight loss/diet/exercise.  I encouraged her to keep appointment with bariatric surgeon next month at [place].    I discussed how fatty liver can lead to cirrhosis, disability and pre-mature death.  How it also can be a sign of increased risk for cardiovascular disease.  I discussed the importance of getting rid of fat in the liver by controlling lipids and glucose, avoiding etoh helps, and gradual weight loss to ideal body weight is very important.     She is followed by hematology at [facility] - they have recommended she hold phlebotomy at this time    She was concerned her pulmonary appointment is not until Nov 2022.  Referral entered for [facility] pulmonology for review and recommendations (Naomi Bhatia stated her pcp did not make referral to [place].  I explained If referral was made by pcp then I encouraged to follow Brett Sharp,  recommendations).  Advised her to keep [facility] appointment until she is seen by [facility] pulmonology.     Advised pt to stop use of NSAIDs, Fish Oil, and MV 5 days prior to procedure, per Dr Suarez protocol.  Tylenol based products are ok to take.  Pt verbalized understanding.      All risks, benefits, alternatives, and indications of colonoscopy procedure have been discussed with the patient. Risks to include perforation of the colon requiring possible surgery or colostomy, risk of bleeding from biopsies or removal of colon tissue, possibility of missing a colon polyp or cancer, or adverse drug reaction.  Benefits to include the diagnosis and management of disease of the colon and rectum.  Alternatives to include barium enema, radiographic evaluation, lab testing or no intervention. Pt verbalizes understanding and agrees to proceed with procedure.    I have spent over 58 minutes today reviewing patients chart, records from [place] as well as discussion in exam room with patient.  Thirty minutes was spent in exam room with Naomi Bhatia with over half of that time being focused on education.     Lj Hernandez, APRN  07/05/22          There are no Patient Instructions on file for this visit.

## 2022-07-05 NOTE — TELEPHONE ENCOUNTER
Received fax from pharmacy requesting refill on pts medication(s). Pt was last seen in office on 5/12/2022  and has a follow up scheduled for Visit date not found. Will send request to  Dr. Devin Hughes  for authorization.      Requested Prescriptions     Pending Prescriptions Disp Refills    amLODIPine (NORVASC) 5 MG tablet [Pharmacy Med Name: AMLODIPINE BESYLATE 5MG TABLETS] 90 tablet 3     Sig: Take 1 tablet by mouth daily

## 2022-07-06 LAB
ALPHA1 GLOB MFR UR ELPH: 164 MG/DL (ref 90–200)
ANA SER QL IF: NEGATIVE
CERULOPLASMIN SERPL-MCNC: 37 MG/DL (ref 19–39)
FERRITIN SERPL-MCNC: 146 NG/ML (ref 13–150)
IRON 24H UR-MRATE: 40 MCG/DL (ref 37–145)
IRON SATN MFR SERPL: 11 % (ref 20–50)
MITOCHONDRIA M2 IGG SER-ACNC: <20 UNITS (ref 0–20)
SMA IGG SER-ACNC: 7 UNITS (ref 0–19)
TIBC SERPL-MCNC: 362 MCG/DL (ref 298–536)
TRANSFERRIN SERPL-MCNC: 243 MG/DL (ref 200–360)

## 2022-07-07 NOTE — PROGRESS NOTES
Clinic Progress Note    Patient:  Naomi SIEGEL  YOB: 1975  Date of Service: 7/8/2022  MRN: [id number]   Acct:    Primary Care Physician: Brett Sharp DO    Televisit    Chief Complaint: Hereditary hemochromatosis    Hematology History:  For detailed summary about the patient's previous work-up, differential diagnoses and treatments, please refer to the last note of Dr. Goldberg from 4/1/2022.  Brief summary:    Ms. SIEGEL is a 46 y.o. who follows for history of iron level of 19.  She has a history of gastric sleeve procedure in 12/2017, after which she developed iron deficiency anemia like related to decreased dietary absorption. She did receive several multiple blood transfusions in the past as well as parenteral iron Venofer infusions at least 7-10 times back in 2018.     In 9/2020, the patient had seen Dr. Goldberg, where her hemoglobin was 10.8, iron saturation was 21%, ferritin was 1406, and repeat ferritin 8/11/2021 was 1663; she is found to be heterozygous for C282Y hemochromatosis gene variant. She was started on she has phlebotomies every 2 months, and as ferritin had not improved, she initially was started on Jadenu.  As her ferritin improved below 500 in 7/2021, Jadenu was stopped then. The goal ferritin that was discussed with the patient was to keep ferritin below 500.    US spleen 12/17/2021 showed splenomegaly (max dimension 16.7 cm, previously 14.6 cm); it was symptomatic and painful.  Peripheral blood flow cytometry, JAK2, BCR/ABL FISH were negative.  PET scan 2/13/2022 showed splenomegaly with numerous small 1-2 cm hypermetabolic lesions along with mild periportal hypermetabolic lymphadenopathy.  Bone marrow biopsy 1/14/2022 was unremarkable for the most part; there was no sign of increased iron deposition.  She ultimately underwent robotic assisted laparoscopic splenomegaly and wedge liver biopsy at [facility] on 5/2/2022, with splenectomy pathology showing  Necrotizing  granulomas, and liver biopsy showing bridging fibrosis with early nodule formation as well as granulomas and mildly active steatohepatitis with microvascular steatosis; iron stains showed only mild iron deposition.    Interval History:  Ms. SIEGEL is here for his scheduled follow-up.  In the interim from last visit, the patient has been doing fairly well.  She reports that she has been experiencing more fatigue than before, otherwise she denies having any fever, chills, chest pain, shortness of breath, abdominal pain, nausea or vomiting, change in bowel or urine habits.    Objectives:  Vitals:    07/08/22 0852   BP: 118/84   Pulse: 99   Resp: 16   Temp: 97.3 °F (36.3 °C)   SpO2: 97%     GENERAL: Alert and oriented, no apparent distress  HEENT: No scleral icterus  NECK: Supple  SKIN: No jaundice  PSYCHIATRIC: Full affect  NEUROLOGIC: Stable gait    Results:  Lab Results   Component Value Date    WBC 15.85 (H) 07/05/2022    HGB 14.4 07/05/2022    HCT 44.1 07/05/2022    MCV 81.7 07/05/2022     (H) 07/05/2022     Lab Results   Component Value Date    IRON 40 07/05/2022    TIBC 362 07/05/2022    FERRITIN 146.00 07/05/2022     Assessment :  1.  Hemochromatosis C282Y heterozygous/carrier.  2.  Prolonged history of iron deficiency before.  3.  Splenomegaly s/p splenectomy on 5/2/2022  4.  Anemia due to chronic disease  5.  Leukocytosis, elevated myelocytes and metamyelocytes  6. Thrombocytosis post splenectomy    Plan:   - We again today had a lengthy discussion about her underlying disease, heterozygous hemochromatosis.  - Since I started seeing her, which was after she underwent splenectomy, her ferritin level and iron studies have not been as high as they were before; and her ferritin level has been trending down steadily.  - Reviewed her labs from 7/5/2022: as her ferritin level dropped to 146-260 since her splenectomy compared to >500 before that, and this is achieved without phlebotomy, and given that her  iron level and iron saturation are low with her having anemia, there was no sign of iron deposition in the bone marrow; though there was  significantly deposition in the spleen on pathology and mild iron deposition in the liver; she had held off phlebotomy since her ferritin has been trending down.  - She is establishing care with hepatology at [place], presenting with them on 7/5/2022.  - As for her leukocytosis having myelocytes and meta-myelocyte, I believe this is related to her splenectomy, as she started to develop mild leukocytosis with increasing numbers of eosinophils, basophils, myelocytes and metamyelocytes after her splenectomy. To assure we are missing an underlying bone marrow disease, I will plan to obtain PCR for BCR/ABL.  - RTC in 3-4 weeks with preoffice CBC and differential, CMP, iron profile, ferritin, vitamin B12, folate, BCR/ABL PCR.    Angela Germain MD  7/8/2022

## 2022-07-08 ENCOUNTER — APPOINTMENT (OUTPATIENT)
Dept: LAB | Facility: HOSPITAL | Age: 47
End: 2022-07-08

## 2022-07-08 ENCOUNTER — OFFICE VISIT (OUTPATIENT)
Dept: ONCOLOGY | Facility: CLINIC | Age: 47
End: 2022-07-08

## 2022-07-08 VITALS
HEART RATE: 99 BPM | HEIGHT: 66 IN | SYSTOLIC BLOOD PRESSURE: 118 MMHG | TEMPERATURE: 97.3 F | RESPIRATION RATE: 16 BRPM | DIASTOLIC BLOOD PRESSURE: 84 MMHG | BODY MASS INDEX: 47.09 KG/M2 | WEIGHT: 293 LBS | OXYGEN SATURATION: 97 %

## 2022-07-08 DIAGNOSIS — D50.8 IRON DEFICIENCY ANEMIA SECONDARY TO INADEQUATE DIETARY IRON INTAKE: ICD-10-CM

## 2022-07-08 DIAGNOSIS — K74.60 CIRRHOSIS OF LIVER WITHOUT ASCITES, UNSPECIFIED HEPATIC CIRRHOSIS TYPE: ICD-10-CM

## 2022-07-08 DIAGNOSIS — R16.1 SPLENOMEGALY: ICD-10-CM

## 2022-07-08 DIAGNOSIS — E83.110 HEREDITARY HEMOCHROMATOSIS: Primary | ICD-10-CM

## 2022-07-08 DIAGNOSIS — R53.83 FATIGUE, UNSPECIFIED TYPE: ICD-10-CM

## 2022-07-08 DIAGNOSIS — R93.5 ABNORMAL FINDINGS ON DIAGNOSTIC IMAGING OF OTHER ABDOMINAL REGIONS, INCLUDING RETROPERITONEUM: ICD-10-CM

## 2022-07-08 PROCEDURE — 99214 OFFICE O/P EST MOD 30 MIN: CPT | Performed by: INTERNAL MEDICINE

## 2022-07-12 ENCOUNTER — HOSPITAL ENCOUNTER (OUTPATIENT)
Dept: PAIN MANAGEMENT | Age: 47
Discharge: HOME | End: 2022-07-12
Payer: MEDICARE

## 2022-07-12 VITALS
TEMPERATURE: 97.7 F | SYSTOLIC BLOOD PRESSURE: 128 MMHG | DIASTOLIC BLOOD PRESSURE: 60 MMHG | OXYGEN SATURATION: 96 % | HEART RATE: 76 BPM | RESPIRATION RATE: 18 BRPM

## 2022-07-12 DIAGNOSIS — R52 PAIN MANAGEMENT: ICD-10-CM

## 2022-07-12 DIAGNOSIS — M54.10 BACK PAIN WITH LEFT-SIDED RADICULOPATHY: ICD-10-CM

## 2022-07-12 PROCEDURE — 2500000003 HC RX 250 WO HCPCS

## 2022-07-12 PROCEDURE — 6360000002 HC RX W HCPCS

## 2022-07-12 PROCEDURE — 3209999900 FLUORO FOR SURGICAL PROCEDURES

## 2022-07-12 PROCEDURE — A4216 STERILE WATER/SALINE, 10 ML: HCPCS

## 2022-07-12 PROCEDURE — 2580000003 HC RX 258

## 2022-07-12 PROCEDURE — 62323 NJX INTERLAMINAR LMBR/SAC: CPT

## 2022-07-12 RX ORDER — SODIUM CHLORIDE 9 MG/ML
5 INJECTION INTRAVENOUS ONCE
Status: DISCONTINUED | OUTPATIENT
Start: 2022-07-12 | End: 2022-07-14 | Stop reason: HOSPADM

## 2022-07-12 RX ORDER — LIDOCAINE HYDROCHLORIDE 10 MG/ML
5 INJECTION, SOLUTION EPIDURAL; INFILTRATION; INTRACAUDAL; PERINEURAL ONCE
Status: DISCONTINUED | OUTPATIENT
Start: 2022-07-12 | End: 2022-07-14 | Stop reason: HOSPADM

## 2022-07-12 RX ORDER — METHYLPREDNISOLONE ACETATE 40 MG/ML
80 INJECTION, SUSPENSION INTRA-ARTICULAR; INTRALESIONAL; INTRAMUSCULAR; SOFT TISSUE ONCE
Status: DISCONTINUED | OUTPATIENT
Start: 2022-07-12 | End: 2022-07-14 | Stop reason: HOSPADM

## 2022-07-12 ASSESSMENT — PAIN - FUNCTIONAL ASSESSMENT: PAIN_FUNCTIONAL_ASSESSMENT: 0-10

## 2022-07-18 ENCOUNTER — PATIENT MESSAGE (OUTPATIENT)
Dept: PRIMARY CARE CLINIC | Age: 47
End: 2022-07-18

## 2022-07-18 ENCOUNTER — TELEPHONE (OUTPATIENT)
Dept: PAIN MANAGEMENT | Age: 47
End: 2022-07-18

## 2022-07-18 DIAGNOSIS — M54.10 BACK PAIN WITH LEFT-SIDED RADICULOPATHY: Primary | ICD-10-CM

## 2022-07-19 ENCOUNTER — TELEPHONE (OUTPATIENT)
Dept: PAIN MANAGEMENT | Age: 47
End: 2022-07-19

## 2022-07-19 RX ORDER — POLYETHYLENE GLYCOL 3350 17 G/17G
17 POWDER, FOR SOLUTION ORAL DAILY
Qty: 1 EACH | Refills: 3 | Status: SHIPPED | OUTPATIENT
Start: 2022-07-19 | End: 2022-08-18

## 2022-07-19 NOTE — TELEPHONE ENCOUNTER
Patient called stating that when she had her LESI she was instructed to call and schedule a GEORGETTE. She is wanting to do that. I spoke with her and let her know that at the last office visit, Hoa Dave documented that she wanted patient to follow up with her specialists and then will work up neck pain. Patient is scheduled to see Hoa Dave in September for next office visit. I let her know that at that time, she can discuss the cervical area and schedule injections. Patient verbalized understanding.

## 2022-07-21 ENCOUNTER — ANESTHESIA EVENT (OUTPATIENT)
Dept: GASTROENTEROLOGY | Facility: HOSPITAL | Age: 47
End: 2022-07-21

## 2022-07-21 ENCOUNTER — ANESTHESIA (OUTPATIENT)
Dept: GASTROENTEROLOGY | Facility: HOSPITAL | Age: 47
End: 2022-07-21

## 2022-07-21 ENCOUNTER — TELEPHONE (OUTPATIENT)
Dept: GASTROENTEROLOGY | Facility: CLINIC | Age: 47
End: 2022-07-21

## 2022-07-21 ENCOUNTER — HOSPITAL ENCOUNTER (OUTPATIENT)
Facility: HOSPITAL | Age: 47
Setting detail: HOSPITAL OUTPATIENT SURGERY
Discharge: HOME | End: 2022-07-21
Attending: INTERNAL MEDICINE | Admitting: INTERNAL MEDICINE

## 2022-07-21 VITALS
RESPIRATION RATE: 16 BRPM | SYSTOLIC BLOOD PRESSURE: 117 MMHG | HEART RATE: 85 BPM | BODY MASS INDEX: 46.93 KG/M2 | DIASTOLIC BLOOD PRESSURE: 76 MMHG | TEMPERATURE: 98 F | OXYGEN SATURATION: 99 % | HEIGHT: 66 IN | WEIGHT: 292 LBS

## 2022-07-21 DIAGNOSIS — Z83.71 FAMILY HISTORY OF COLONIC POLYPS: ICD-10-CM

## 2022-07-21 PROCEDURE — 25010000002 PROPOFOL 10 MG/ML EMULSION: Performed by: NURSE ANESTHETIST, CERTIFIED REGISTERED

## 2022-07-21 PROCEDURE — G0105 COLORECTAL SCRN; HI RISK IND: HCPCS | Performed by: INTERNAL MEDICINE

## 2022-07-21 RX ORDER — SODIUM CHLORIDE 9 MG/ML
500 INJECTION, SOLUTION INTRAVENOUS CONTINUOUS PRN
Status: DISCONTINUED | OUTPATIENT
Start: 2022-07-21 | End: 2022-07-21 | Stop reason: HOSPADM

## 2022-07-21 RX ORDER — PROPOFOL 10 MG/ML
VIAL (ML) INTRAVENOUS AS NEEDED
Status: DISCONTINUED | OUTPATIENT
Start: 2022-07-21 | End: 2022-07-21 | Stop reason: SURG

## 2022-07-21 RX ORDER — SODIUM CHLORIDE 0.9 % (FLUSH) 0.9 %
10 SYRINGE (ML) INJECTION AS NEEDED
Status: DISCONTINUED | OUTPATIENT
Start: 2022-07-21 | End: 2022-07-21 | Stop reason: HOSPADM

## 2022-07-21 RX ORDER — LIDOCAINE HYDROCHLORIDE 20 MG/ML
INJECTION, SOLUTION EPIDURAL; INFILTRATION; INTRACAUDAL; PERINEURAL AS NEEDED
Status: DISCONTINUED | OUTPATIENT
Start: 2022-07-21 | End: 2022-07-21 | Stop reason: SURG

## 2022-07-21 RX ADMIN — PROPOFOL 50 MG: 10 INJECTION, EMULSION INTRAVENOUS at 09:57

## 2022-07-21 RX ADMIN — PROPOFOL 80 MG: 10 INJECTION, EMULSION INTRAVENOUS at 09:51

## 2022-07-21 RX ADMIN — PROPOFOL 60 MG: 10 INJECTION, EMULSION INTRAVENOUS at 09:54

## 2022-07-21 RX ADMIN — PROPOFOL 50 MG: 10 INJECTION, EMULSION INTRAVENOUS at 09:59

## 2022-07-21 RX ADMIN — PROPOFOL 60 MG: 10 INJECTION, EMULSION INTRAVENOUS at 09:53

## 2022-07-21 RX ADMIN — LIDOCAINE HYDROCHLORIDE 60 MG: 20 INJECTION, SOLUTION EPIDURAL; INFILTRATION; INTRACAUDAL; PERINEURAL at 09:51

## 2022-07-21 RX ADMIN — SODIUM CHLORIDE 500 ML: 9 INJECTION, SOLUTION INTRAVENOUS at 09:15

## 2022-07-21 NOTE — ANESTHESIA PREPROCEDURE EVALUATION
Anesthesia Evaluation     Patient summary reviewed   no history of anesthetic complications:  NPO Solid Status: > 8 hours             Airway   Mallampati: II  Dental    (+) lower dentures and upper dentures    Pulmonary    (+) sleep apnea on CPAP,   Cardiovascular   Exercise tolerance: good (4-7 METS)    (+) hypertension,       Neuro/Psych- negative ROS  GI/Hepatic/Renal/Endo    (+) morbid obesity, GERD,  liver disease fatty liver disease,     Musculoskeletal     Abdominal    Substance History      OB/GYN          Other                        Anesthesia Plan    ASA 3     MAC       Anesthetic plan, risks, benefits, and alternatives have been provided, discussed and informed consent has been obtained with: patient.        CODE STATUS:

## 2022-07-21 NOTE — ANESTHESIA POSTPROCEDURE EVALUATION
"Patient: Naomi SIEGEL    Procedure Summary     Date: 07/21/22 Room / Location: [facility] ENDOSCOPY 5 / [facility] ENDOSCOPY    Anesthesia Start: 0946 Anesthesia Stop: 1004    Procedure: COLONOSCOPY WITH ANESTHESIA (N/A ) Diagnosis:       Family history of colonic polyps      (Family history of colonic polyps [Z83.71])    Surgeons: Dain Suarez DO Provider: Perez Garcia CRNA    Anesthesia Type: MAC ASA Status: 3          Anesthesia Type: MAC    Vitals  No vitals data found for the desired time range.          Post Anesthesia Care and Evaluation    Patient location during evaluation: PHASE II  Patient participation: complete - patient participated  Level of consciousness: awake  Pain score: 0  Pain management: adequate    Airway patency: patent  Anesthetic complications: No anesthetic complications  PONV Status: none  Cardiovascular status: acceptable  Respiratory status: acceptable  Hydration status: acceptable    Comments: Blood pressure 135/78, pulse 104, temperature 98 °F (36.7 °C), temperature source Tympanic, resp. rate 18, height 167.6 cm (66\"), weight 132 kg (292 lb), SpO2 94 %, not currently breastfeeding.        "

## 2022-08-06 DIAGNOSIS — B37.9 YEAST INFECTION: ICD-10-CM

## 2022-08-08 ENCOUNTER — PATIENT MESSAGE (OUTPATIENT)
Dept: PRIMARY CARE CLINIC | Age: 47
End: 2022-08-08

## 2022-08-08 DIAGNOSIS — B37.9 YEAST INFECTION: ICD-10-CM

## 2022-08-08 DIAGNOSIS — N30.00 ACUTE CYSTITIS WITHOUT HEMATURIA: Primary | ICD-10-CM

## 2022-08-08 DIAGNOSIS — M54.16 CHRONIC LUMBAR RADICULOPATHY: ICD-10-CM

## 2022-08-08 RX ORDER — FLUCONAZOLE 150 MG/1
150 TABLET ORAL DAILY
Qty: 3 TABLET | Refills: 0 | OUTPATIENT
Start: 2022-08-08 | End: 2022-08-11

## 2022-08-08 RX ORDER — FLUCONAZOLE 150 MG/1
150 TABLET ORAL DAILY
Qty: 3 TABLET | Refills: 0 | Status: SHIPPED | OUTPATIENT
Start: 2022-08-08 | End: 2022-08-11

## 2022-08-08 RX ORDER — CEFDINIR 300 MG/1
300 CAPSULE ORAL 2 TIMES DAILY
Qty: 20 CAPSULE | Refills: 0 | Status: SHIPPED | OUTPATIENT
Start: 2022-08-08 | End: 2022-08-18

## 2022-08-08 NOTE — TELEPHONE ENCOUNTER
Sent rx to pharmacy for pt    Requested Prescriptions     Signed Prescriptions Disp Refills    fluconazole (DIFLUCAN) 150 MG tablet 3 tablet 0     Sig: Take 1 tablet by mouth in the morning for 3 days. Authorizing Provider: Luis Carlos Lyman     Ordering User: Lissette Walker    cefdinir (OMNICEF) 300 MG capsule 20 capsule 0     Sig: Take 1 capsule by mouth in the morning and 1 capsule before bedtime. Do all this for 10 days.      Authorizing Provider: Luis Carlos Lyman     Ordering User: Lissette Walker

## 2022-08-08 NOTE — TELEPHONE ENCOUNTER
We can treat her urinary tract infection with cefdinir 300 mg twice daily x7 to 10 days. Can also send in Diflucan 150 mg tab 1 p.o. daily x3 days  Dispense #3 with no refills.

## 2022-08-09 RX ORDER — OXYCODONE HYDROCHLORIDE 5 MG/1
5 TABLET ORAL EVERY 6 HOURS PRN
Qty: 90 TABLET | Refills: 0 | Status: SHIPPED | OUTPATIENT
Start: 2022-08-09 | End: 2022-09-05 | Stop reason: SDUPTHER

## 2022-08-15 ENCOUNTER — APPOINTMENT (OUTPATIENT)
Dept: LAB | Facility: HOSPITAL | Age: 47
End: 2022-08-15

## 2022-08-17 ENCOUNTER — PATIENT MESSAGE (OUTPATIENT)
Dept: PRIMARY CARE CLINIC | Age: 47
End: 2022-08-17

## 2022-08-17 DIAGNOSIS — M79.18 MYOFASCIAL MUSCLE PAIN: ICD-10-CM

## 2022-08-17 DIAGNOSIS — F11.90 CHRONIC, CONTINUOUS USE OF OPIOIDS: ICD-10-CM

## 2022-08-17 DIAGNOSIS — F41.9 ANXIETY: ICD-10-CM

## 2022-08-17 RX ORDER — NALOXONE HYDROCHLORIDE 4 MG/.1ML
1 SPRAY NASAL PRN
Qty: 2 EACH | Refills: 0 | Status: SHIPPED | OUTPATIENT
Start: 2022-08-17

## 2022-08-17 RX ORDER — CYCLOBENZAPRINE HCL 10 MG
10 TABLET ORAL 3 TIMES DAILY PRN
Qty: 270 TABLET | Refills: 0 | Status: SHIPPED | OUTPATIENT
Start: 2022-08-17 | End: 2022-11-15

## 2022-08-17 NOTE — TELEPHONE ENCOUNTER
Received fax from pharmacy requesting refill on pts medication(s). Pt was last seen in office on 2022  and has a follow up scheduled for Visit date not found. Will send request to  Dr. Niall Jose  for patient.      Requested Prescriptions     Pending Prescriptions Disp Refills    naloxone (NARCAN) 4 MG/0.1ML LIQD nasal spray 2 each 0     Si spray by Nasal route as needed for Opioid Reversal

## 2022-08-17 NOTE — TELEPHONE ENCOUNTER
From: Ricardo Ritter  To: Dr. Cruz Lassiter: 8/17/2022 1:29 PM CDT  Subject: Flexeril    I am completely out if my Flexeril. Would you please send in refills for me? Thank you and God bless!

## 2022-08-23 ENCOUNTER — TELEPHONE (OUTPATIENT)
Dept: ONCOLOGY | Facility: CLINIC | Age: 47
End: 2022-08-23

## 2022-08-26 ENCOUNTER — TELEPHONE (OUTPATIENT)
Dept: ONCOLOGY | Facility: CLINIC | Age: 47
End: 2022-08-26

## 2022-08-26 NOTE — TELEPHONE ENCOUNTER
Caller: Naomi Bhatia    Relationship: Self        What was the call regarding: NEEDING TO RESCHEDULE TODAY'S APPOINTMENT SINCE DID NOT GET LAB WORK COMPLETED    AND WANTED TO KNOW WHEN LAST PORT FLUSH WAS.       WARM TRANSFERRED TO LAITH  TO FURTHER ASSIST.

## 2022-08-29 ENCOUNTER — TELEPHONE (OUTPATIENT)
Dept: ONCOLOGY | Facility: CLINIC | Age: 47
End: 2022-08-29

## 2022-08-29 ENCOUNTER — OFFICE VISIT (OUTPATIENT)
Dept: PULMONOLOGY | Facility: CLINIC | Age: 47
End: 2022-08-29

## 2022-08-29 VITALS
OXYGEN SATURATION: 94 % | WEIGHT: 293 LBS | HEART RATE: 80 BPM | BODY MASS INDEX: 47.09 KG/M2 | HEIGHT: 66 IN | DIASTOLIC BLOOD PRESSURE: 74 MMHG | SYSTOLIC BLOOD PRESSURE: 130 MMHG

## 2022-08-29 DIAGNOSIS — R06.02 SOB (SHORTNESS OF BREATH): Primary | ICD-10-CM

## 2022-08-29 DIAGNOSIS — J30.89 NON-SEASONAL ALLERGIC RHINITIS, UNSPECIFIED TRIGGER: ICD-10-CM

## 2022-08-29 DIAGNOSIS — L92.9 NON-CASEATING GRANULOMA: ICD-10-CM

## 2022-08-29 DIAGNOSIS — Z87.891 FORMER SMOKER: ICD-10-CM

## 2022-08-29 DIAGNOSIS — G47.33 OBSTRUCTIVE SLEEP APNEA SYNDROME: ICD-10-CM

## 2022-08-29 DIAGNOSIS — J45.20 MILD INTERMITTENT ASTHMA WITHOUT COMPLICATION: ICD-10-CM

## 2022-08-29 DIAGNOSIS — R53.83 OTHER FATIGUE: ICD-10-CM

## 2022-08-29 DIAGNOSIS — E66.01 MORBID OBESITY WITH BMI OF 45.0-49.9, ADULT: ICD-10-CM

## 2022-08-29 DIAGNOSIS — G25.81 RLS (RESTLESS LEGS SYNDROME): ICD-10-CM

## 2022-08-29 PROCEDURE — 99204 OFFICE O/P NEW MOD 45 MIN: CPT | Performed by: INTERNAL MEDICINE

## 2022-08-29 RX ORDER — BUPROPION HYDROCHLORIDE 150 MG/1
150 TABLET ORAL DAILY
COMMUNITY
Start: 2022-08-19 | End: 2022-11-18

## 2022-08-29 RX ORDER — NALOXONE HYDROCHLORIDE 4 MG/.1ML
SPRAY NASAL
COMMUNITY
Start: 2022-08-17

## 2022-08-29 RX ORDER — FLUTICASONE PROPIONATE 50 MCG
2 SPRAY, SUSPENSION (ML) NASAL DAILY
Qty: 16 G | Refills: 5 | Status: SHIPPED | OUTPATIENT
Start: 2022-08-29

## 2022-08-29 RX ORDER — OXYCODONE HYDROCHLORIDE 5 MG/1
5 TABLET ORAL EVERY 6 HOURS PRN
COMMUNITY
Start: 2022-08-09

## 2022-08-29 RX ORDER — ALBUTEROL SULFATE 90 UG/1
2 AEROSOL, METERED RESPIRATORY (INHALATION) EVERY 4 HOURS PRN
Qty: 6.7 G | Refills: 5 | Status: SHIPPED | OUTPATIENT
Start: 2022-08-29 | End: 2023-01-25

## 2022-08-29 RX ORDER — NABUMETONE 750 MG/1
750 TABLET, FILM COATED ORAL 2 TIMES DAILY
COMMUNITY
Start: 2022-08-18

## 2022-08-29 RX ORDER — AZELASTINE HYDROCHLORIDE 137 UG/1
2 SPRAY, METERED NASAL 2 TIMES DAILY
Qty: 30 ML | Refills: 5 | Status: SHIPPED | OUTPATIENT
Start: 2022-08-29

## 2022-08-29 NOTE — TELEPHONE ENCOUNTER
Caller: Naomi Bhatia    Relationship to patient: Self    Best call back number: [phone]    Chief complaint: EARLIER TIME    Type of visit: LAB AND FLUSH    Requested date: SAME DATE, NEEDS EARLY MORNING AROUND 9    If rescheduling, when is the original appointment: 8-31    Additional notes:PLEASE ADVISE

## 2022-08-29 NOTE — PROGRESS NOTES
RESPIRATORY DISEASE CLINIC NEW CONSULT NOTE     Patient: Naomi Bhatia      :  1975   Age: 46 y.o.    Date of Service: 2022      Subjective:   Requesting Physician: Brett Sharp DO     Reason for Consultation:    Diagnosis Plan   1. SOB (shortness of breath)     2. Obstructive sleep apnea syndrome     3. RLS (restless legs syndrome)     4. Morbid obesity with BMI of 45.0-49.9, adult (HCC)     5. Other fatigue     6. Former smoker     7. Mild intermittent asthma without complication     8. Non-seasonal allergic rhinitis, unspecified trigger          Chief Complaint:     Chief Complaint   Patient presents with   • Sarcoidosis     Pet  going to see daphne jara        History of present illness: Naomi Bhatia is a 46 y.o. female who presents to the office today to be seen for    Diagnosis Plan   1. SOB (shortness of breath)     2. Obstructive sleep apnea syndrome     3. RLS (restless legs syndrome)     4. Morbid obesity with BMI of 45.0-49.9, adult (HCC)     5. Other fatigue     6. Former smoker     7. Mild intermittent asthma without complication     8. Non-seasonal allergic rhinitis, unspecified trigger        Other problems per record.  Patient is a very pleasant middle aged  female who was seen in the pulmonary clinic consult today.    Patient had a very complex medical history.  She has been diagnosed with hereditary hemochromatosis with iron overload and was seen and treated by hematology and oncology in [facility] and later had splenectomy done in the [facility].  She also has chronic iron deficiency anemia.  She had a liver resection done as well and the biopsy of the liver shows she has chronic cirrhotic and fibrotic changes in the liver.  They also did mention the patient and nonconcerning granuloma suggestive of sarcoidosis and patient was advised to get a appointment with pulmonology for further assessment and treatment.  The patient reported she  gets short of breath on exertion.  She gained weight over the years but did not have any recent weight gain.  She has diagnosis of obstructive sleep apnea and she is on CPAP at night but does not use any oxygen.  She has chronic pain and she is on Roxicodone and also has restless leg syndrome for which she takes Sinemet.    She is a former smoker and quit smoking in 2014.  She does not use an long-term inhaler but has albuterol rescue inhaler which she uses on and off.  She also has chronic pain and uses cyclobenzaprine.  She is on Xyzal for chronic nasal allergy.  She does not use any nasal spray but has ongoing nasal drainage.  She is already vaccinated for COVID and did not have any COVID infection.  She is about to get a booster dose for COVID-vaccine.  I have no prior history of any infiltrative lung disease.  The patient has no recent imaging studies done.  She has no history of childhood asthma but is unable to do much activity due to ongoing shortness of breath.  She also has chronic pain and has hypertension.  She sees Dr. Zavaleta for pain management    Past History  Past Medical History:   Diagnosis Date   • Anemia    • Anxiety    • Arthritis    • Asthma    • Asthma, extrinsic    • Back pain 03/14/2016    with left sided radiculopathy    • Cholelithiasis 12/1995    Gallbladder was removed on December 21st 1995.   • DDD (degenerative disc disease), lumbar     Dr. Stuart Zavaleta for pain manangement   • Depression    • Encounter for care related to Port-a-Cath 01/31/2020    for iron infusions as she was told she has small veins   • Fatigue    • Fatty liver     It was diagnosed within the last 2 years.   • Fibromyalgia    • GERD (gastroesophageal reflux disease)    • H/O cold sores    • Headache    • Hereditary hemochromatosis (HCC) 01/19/2021   • Hernia 03/2022    Hiatal hernia discovered when I had my upper GI at [place].   • History of blood transfusion    • Hypertension    • Iron deficiency anemia 09/12/2017    • Iron deficiency anemia secondary to inadequate dietary iron intake 09/12/2017   • Irritable bowel syndrome     I don't remember when it was first diagnosed.   • Joint pain    • Liver disease 05/02/2022    Liver biopsy taken while splenectomy was performed . Stage 3 bridging fibrosis. Early signs of cirrhosis.   • Obesity    • Pneumonia    • RLS (restless legs syndrome)    • Sleep apnea     positive sleep study; titration study in October, uses a CPAP machine when sleeping     Past Surgical History:   Procedure Laterality Date   • ABDOMINAL SURGERY     • ANKLE SURGERY      Right    • APPENDECTOMY  04/19/2015   • BACK SURGERY  2000   • BARIATRIC SURGERY  12/2017   • CERVICAL SPINE SURGERY  09/01/2015   • CHOLECYSTECTOMY      1995;lap   • COLONOSCOPY  05/02/2017    normal; do not return until age of 49 Dr. Gus Valentine   • COLONOSCOPY N/A 7/21/2022    Procedure: COLONOSCOPY WITH ANESTHESIA;  Surgeon: Dain Suarez DO;  Location: [facility] ENDOSCOPY;  Service: Gastroenterology;  Laterality: N/A;  pre op: screening  post op:normal  PCP: Brett Sharp DO   • GASTRIC SLEEVE LAPAROSCOPIC N/A 12/20/2017    Procedure: GASTRIC SLEEVE LAPAROSCOPIC;  Surgeon: Yifan Cordero MD;  Location: [facility] OR;  Service:    • HIP ARTHROPLASTY      Right  2019   • HIP SURGERY  1987    right    • INSERTION CENTRAL VENOUS ACCESS DEVICE W/ SUBCUTANEOUS PORT  11/07/2017    Dr Anel Gamboa (Smart Port-Power injectable Port) Cat [id number] Lot [id number]    • LIVER BIOPSY  05/02/2022   • MOUTH SURGERY  1994   • NECK SURGERY     • SPLENECTOMY  05/2022   • TOOTH EXTRACTION     • UPPER GASTROINTESTINAL ENDOSCOPY  05/02/2017    Dr. Gus Valentine, negative for h.pylori, negative for Salomon's   • VAGINAL HYSTERECTOMY SALPINGO OOPHORECTOMY  2008    Partial and then had another surgery to remove the rest     Allergies   Allergen Reactions   • Azithromycin GI Intolerance and Nausea And Vomiting     Severe Abdominal Pain   Severe abdominal pain     • Silver Rash     Redness, blisters  blister  Redness, blisters       Current Outpatient Medications   Medication Sig Dispense Refill   • acyclovir (ZOVIRAX) 800 MG tablet Take 800 mg by mouth Daily.     • albuterol sulfate  (90 Base) MCG/ACT inhaler Inhale 2 puffs As Needed.     • amLODIPine (NORVASC) 5 MG tablet Take 5 mg by mouth Daily.     • buPROPion XL (WELLBUTRIN XL) 150 MG 24 hr tablet Take 150 mg by mouth Daily.     • Calcium Citrate-Vitamin D (CALCIUM CITRATE + D3 PO) Take 1 tablet by mouth Daily.     • carbidopa-levodopa (SINEMET)  MG per tablet Take 1 tablet by mouth Daily.     • CRANBERRY PO Take 1 capsule by mouth Daily.     • Cyclobenzaprine HCl (FLEXERIL PO) Take 10 mg by mouth Daily As Needed.     • levocetirizine (XYZAL) 5 MG tablet Take 5 mg by mouth Every Evening.     • meclizine (ANTIVERT) 25 MG tablet Take 25 mg by mouth As Needed.     • metFORMIN (GLUCOPHAGE) 1000 MG tablet Take 1,000 mg by mouth 2 (Two) Times a Day With Meals.     • metoprolol succinate XL (TOPROL-XL) 50 MG 24 hr tablet Take 50 mg by mouth 2 (Two) Times a Day.     • Multiple Vitamin (MULTI VITAMIN PO) Take  by mouth Daily.     • nabumetone (RELAFEN) 750 MG tablet Take 750 mg by mouth 2 (Two) Times a Day.     • naloxone (NARCAN) 4 MG/0.1ML nasal spray SPRAY 1 SPRAY VIA NASAL ROUTE AS NEEDED FOR OPIOID REVERSAL     • nortriptyline (PAMELOR) 25 MG capsule Take 25 mg by mouth Every Night. Pt takes 2 at night  3   • omeprazole (priLOSEC) 20 MG capsule Take 20 mg by mouth Daily.  3   • ondansetron ODT (Zofran ODT) 8 MG disintegrating tablet Place 1 tablet on the tongue Every 8 (Eight) Hours As Needed for Nausea or Vomiting. 20 tablet 0   • oxyCODONE (ROXICODONE) 5 MG immediate release tablet Take 5 mg by mouth Every 6 (Six) Hours As Needed. for pain     • pregabalin (LYRICA) 100 MG capsule Take 100 mg by mouth 2 (Two) Times a Day.     • Specialty Vitamins Products (MENOPAUSE RELIEF PO) Take  by mouth Daily. Amberen  "otc     • venlafaxine (EFFEXOR) 75 MG tablet Take 75 mg by mouth Daily.  11   • venlafaxine XR (EFFEXOR-XR) 150 MG 24 hr capsule Take 150 mg by mouth Daily.       No current facility-administered medications for this visit.     Facility-Administered Medications Ordered in Other Visits   Medication Dose Route Frequency Provider Last Rate Last Admin   • diphenhydrAMINE (BENADRYL) injection 50 mg  50 mg Intravenous PRN Pedro Clements MD       • famotidine (PEPCID) injection 20 mg  20 mg Intravenous PRN Pedro Clements MD       • hydrocortisone sodium succinate (Solu-CORTEF) injection 100 mg  100 mg Intravenous PRN Pedro Clements MD         Social History     Socioeconomic History   • Marital status:    Tobacco Use   • Smoking status: Former Smoker     Packs/day: 1.00     Years: 24.00     Pack years: 24.00     Types: Cigarettes     Start date:      Quit date: 2014     Years since quittin.6   • Smokeless tobacco: Never Used   Vaping Use   • Vaping Use: Never used   Substance and Sexual Activity   • Alcohol use: Yes     Comment: rarely (no alcohol approx 6 months)   • Drug use: No   • Sexual activity: Not Currently     Partners: Male     Birth control/protection: Surgical, Abstinence     Comment: Complete Hysterectomy     Family History   Problem Relation Age of Onset   • Diabetes Mother    • Hypertension Mother    • Coronary artery disease Mother    • Cancer Mother         \"bone\" cancer   • Colon polyps Mother    • Hemochromatosis Mother    • Irritable bowel syndrome Mother    • Heart failure Mother    • Cancer Father         prostate   • Hypertension Father    • Heart disease Father    • Stroke Father    • Coronary artery disease Father    • Alcohol abuse Father    • Heart failure Father    • Hypertension Sister    • Obesity Sister    • Other Sister         MDS in one sister diagnosed in    • Colon polyps Sister    • Hemochromatosis Sister    • Liver disease " "Sister    • Cancer Brother         throat   • Diabetes Brother    • Esophageal cancer Brother    • Diabetes Maternal Grandmother    • Stroke Maternal Grandmother    • No Known Problems Daughter    • No Known Problems Son    • Colon cancer Neg Hx      Immunization History   Administered Date(s) Administered   • COVID-19 (MODERNA) 1st, 2nd, 3rd Dose Only 09/20/2021, 10/28/2021   • FluLaval/Fluzone >6mos 10/12/2018, 12/31/2020   • Hib (PRP-T) 12/17/2021   • Influenza, Unspecified 10/26/2016, 12/19/2019, 12/31/2020, 11/17/2021   • Meningococcal B,(Bexsero) 12/17/2021, 02/22/2022   • Meningococcal Conjugate 12/17/2021, 02/22/2022   • Pneumococcal Conjugate 13-Valent (PCV13) 12/17/2021   • Pneumococcal Polysaccharide (PPSV23) 11/04/2014   • Tdap 03/31/2021   • flucelvax quad pfs =>4 YRS 12/19/2019       Review of Systems  A complete review of systems is performed and all other systems were reviewed and negative except as noted above in the HPI.  Review of Systems   Constitutional: Positive for fatigue and unexpected weight gain.   HENT: Positive for congestion, postnasal drip and sinus pressure.    Eyes: Negative.    Respiratory: Positive for cough, chest tightness and shortness of breath.    Cardiovascular: Negative.    Gastrointestinal: Negative.    Endocrine: Negative.    Genitourinary: Negative.    Musculoskeletal: Positive for arthralgias and back pain.   Skin: Negative.    Allergic/Immunologic: Positive for environmental allergies.   Neurological: Negative.    Hematological: Negative.    Psychiatric/Behavioral: Negative.          Objective:     Vital Signs:  /74   Pulse 80   Ht 167.6 cm (66\")   Wt (!) 138 kg (303 lb 3.2 oz)   LMP  (LMP Unknown)   SpO2 94%   Breastfeeding No   BMI 48.94 kg/m²     Pulmonary Functions Testing Results:    No results found for this or any previous visit.        Physical Exam  General:  Patient is a 46 y.o. pleasant middle aged obese  female. Looks states age. " Appears to be in no acute distress.  Eyes:  EOMI.  PERRLA.  Vision intact.  No scleral icterus.    Ear, Nose, Mouth and Throat:  External inspection of the ears and nose appear to be normal.  No obvious scars or lesions.  Hearing is grossly intact.  No leukoplakia, pharyngitis, stomatitis or thrush. Swollen nasal mucosa with post nasal drip.   Neck:  Range of motion of neck normal.  No thyromegaly or masses. Malanpati Class 3, no jugular venous distention, no thyroid swelling  Respiratory:  Clear to auscultation bilaterally.  No use of accessory muscles. Decreased breath sounds.      Cardiovascular:  Regularly regular rhythm without S3, S4 or murmur.  No hepatojugular reflux nor carotid bruits.  Pulses intact in four extremities.  No obvious signs of edema.    Gastrointestinal:  Nontender, nondistended, soft.  Bowel sounds positive in all four quadrants.  No organomegaly or masses nor bruit.    Lymphatic:  No significant adenopathy appreciated in the neck, axilla or elsewhere.    Musculoskeletal:  Gait was grossly intact.  Range of motion, strength and sensitivity of all four extremities appear to be intact.  Distal tendon reflexes intact.   Skin:  No obvious rashes, lesions, ulcers or large amount of bruising.    Neurological:  Refer to Eye, Ear, Nose and Throat and musculoskeletal exams for additional details.  Distal tendon reflexes intact.  No clonus or Babinski.  Sensation to touch is grossly intact.    Psychiatric:  Patient is alert and oriented to person, place and time.  Recent and remote memory appear to be intact.  Patient does not show outward signs of depression.      Diagnostic Findings:   Pertinent findings noted and abnormal findings also noted. Reviewed .     Chest Imaging    Study Result  Narrative & Impression   NM PET/CT SKULL BASE TO MID THIGH-     Indication: Splenomegaly. 46-year-old with chronic iron deficiency  anemia     Comparison: MR 4/22/2022     12.96 mCi F-18 FDG was administered IV per  protocol via the RIGHT wrist.  Blood glucose at the time of injection was 135 mg/dl.     PET/CT images were obtained from the base of the skull to the proximal  femurs approximately 60 minutes after radiotracer administration.  Noncontrast CT images of the neck, chest, abdomen, and pelvis were  obtained for attenuation correction and anatomic localization. DLP: 1265  mGy*cm     Findings:  Background right hepatic lobe metabolic activity measures max SUV 3.9.     Spleen is enlarged measuring 15.8 cm craniocaudal dimension. Numerous  hypermetabolic lesions are present throughout the spleen. For example,  1.7 cm lesion anteriorly on fused image 106 with max SUV of 7.2.     Hypermetabolic periportal lymphadenopathy is noted. For reference, a  lymph node on image 105 measures 11 mm short axis dimension and has a  max SUV of 5.9.     No abnormal hypermetabolic activity in the head or neck. No abnormal  hypermetabolic pulmonary nodule or thoracic lymph nodes. No abnormal  hypermetabolic activity in the pelvis. Hypermetabolic activity in the  lower lumbar spine is likely related to postoperative change. No other  hypermetabolic bone lesion.     Non-PET avid findings:     Included portion of the lower intracranial cavity is unremarkable. Prior  glands appear normal. Parapharyngeal fat planes are maintained. No  cervical lymphadenopathy.      Central airways are clear. No consolidation or pleural effusion. RIGHT  hemidiaphragm elevation with mild compressive atelectasis is noted. No  suspicious pulmonary nodule. LEFT chest wall port with catheter tip in  the SVC. No pericardial effusion. No enlarged thoracic lymph nodes.     Severe hepatic steatosis. Prior cholecystectomy. No biliary ductal  dilatation. Pancreas and adrenal glands are unremarkable. No urinary  tract calculi or hydronephrosis.     No abnormal bowel distention or active bowel inflammation. Prior sleeve  gastrectomy. No ascites or free pelvic fluid. No pelvic  mass or pelvic  collection. Nonaneurysmal abdominal aorta.      No acute abdominal wall soft tissue abnormality. RIGHT hip arthroplasty  hardware. Lumbar spine instrumented fusion. No aggressive bone lesion.     IMPRESSION:     1.  Splenomegaly with numerous small 1 to 2 cm hypermetabolic lesions  throughout. Differential diagnosis includes primary splenic lymphoma and  granulomatous infectious/inflammatory process.  2.  Marked hepatic steatosis. Mild periportal hypermetabolic  lymphadenopathy is presumed related.  This report was finalized on 02/17/2022 15:06 by Dr. Denny Ellington MD.       Assessment      1. SOB (shortness of breath)    2. Obstructive sleep apnea syndrome    3. RLS (restless legs syndrome)    4. Morbid obesity with BMI of 45.0-49.9, adult (HCC)    5. Other fatigue    6. Former smoker    7. Mild intermittent asthma without complication    8. Non-seasonal allergic rhinitis, unspecified trigger        Plan/Recommendations     1.  Patient had a PET scan done February.  It showed mostly splenic lesions and liver lesions and some hypermetabolic lymphadenopathy but overall no other significant lung abnormality was noted.  2.  I showed her the PET scan results and explained the abnormality to her.  From the diagnosis of nonconcerning granuloma from the liver biopsy sample there is a possibility patient may have sarcoidosis which is not very active.  She has normal calcium level and did not have any angiotensin converting enzyme level checked I ordered both this test with her routine blood test next Wednesday.  3.  For further evaluation she will need a CT scan of the chest without contrast to look at the lung parenchyma and the lymph nodes and depending on the results we will do further testing.  Apparently she is stable from the pulmonary standpoint and is on room air and I do not believe she needs any active treatment for sarcoidosis or any biopsy yet but the patient may be changed depending on the CT  results.  I also have ordered a pulmonary function test results.  If sarcoidosis present patient may have associated obstructive airway dysfunction or less likely restrictive airway dysfunction.  We will wait for the PFT and the CT results.  4.  She is a former smoker and she may have underlying chronic obstructive pulmonary disease or asthma.  She also has chronic allergy problems with postnasal drip and I advised the patient to start on Breo Ellipta and continue albuterol as needed as she is using now.  Prescription was sent to the pharmacy.   5.  For heart chronic nasal allergy and  postnasal drainage with chronic headache and she is only using Xyzal.  I started the patient on fluticasone nasal spray and Astelin nasal spray in addition to Xyzal.  Prescription was sent to the pharmacy.  She is already using the CPAP for obstructive sleep apnea which will be continued and she also takes Sinemet for the restless leg syndrome.  6.  She will continue other treatment as before and follow-up with other physicians and return to pulmonary clinic for follow-up visit in 3 months time or earlier if needed.    Follow Up    3 months    Time Spent   45 minutes      I appreciate the opportunity of participating in this patients care. I would like to thank the PCP for the referral. Please feel free to contact me with any other questions.       Jacqui Burk MD   Pulmonologist/Intensivist     13:57 CDT

## 2022-08-29 NOTE — TELEPHONE ENCOUNTER
Caller: Naomi Bhatia    Relationship: Self    Best call back number: [phone]      What was the call regarding: PATIENT CALLED WANTED TO PUT HER PORT FLUSH BACK ON THE SCHEDULE FOR 8-31-22, SHE ACCIDENTALLY CANCELLED

## 2022-08-31 ENCOUNTER — LAB (OUTPATIENT)
Dept: LAB | Facility: HOSPITAL | Age: 47
End: 2022-08-31

## 2022-08-31 ENCOUNTER — INFUSION (OUTPATIENT)
Dept: ONCOLOGY | Facility: CLINIC | Age: 47
End: 2022-08-31

## 2022-08-31 DIAGNOSIS — K74.60 CIRRHOSIS OF LIVER WITHOUT ASCITES, UNSPECIFIED HEPATIC CIRRHOSIS TYPE: ICD-10-CM

## 2022-08-31 DIAGNOSIS — R53.83 FATIGUE, UNSPECIFIED TYPE: ICD-10-CM

## 2022-08-31 DIAGNOSIS — Z45.2 ENCOUNTER FOR CARE RELATED TO PORT-A-CATH: Primary | ICD-10-CM

## 2022-08-31 DIAGNOSIS — R93.5 ABNORMAL FINDINGS ON DIAGNOSTIC IMAGING OF OTHER ABDOMINAL REGIONS, INCLUDING RETROPERITONEUM: ICD-10-CM

## 2022-08-31 DIAGNOSIS — D50.8 IRON DEFICIENCY ANEMIA SECONDARY TO INADEQUATE DIETARY IRON INTAKE: ICD-10-CM

## 2022-08-31 DIAGNOSIS — E83.110 HEREDITARY HEMOCHROMATOSIS: ICD-10-CM

## 2022-08-31 LAB
ALBUMIN SERPL-MCNC: 4.2 G/DL (ref 3.5–5.2)
ALBUMIN/GLOB SERPL: 1.2 G/DL
ALP SERPL-CCNC: 120 U/L (ref 39–117)
ALT SERPL W P-5'-P-CCNC: 17 U/L (ref 1–33)
ANION GAP SERPL CALCULATED.3IONS-SCNC: 12 MMOL/L (ref 5–15)
ANISOCYTOSIS BLD QL: ABNORMAL
AST SERPL-CCNC: 23 U/L (ref 1–32)
BILIRUB SERPL-MCNC: 0.3 MG/DL (ref 0–1.2)
BUN SERPL-MCNC: 12 MG/DL (ref 6–20)
BUN/CREAT SERPL: 16.2 (ref 7–25)
BURR CELLS BLD QL SMEAR: ABNORMAL
CALCIUM SPEC-SCNC: 9.4 MG/DL (ref 8.6–10.5)
CHLORIDE SERPL-SCNC: 99 MMOL/L (ref 98–107)
CO2 SERPL-SCNC: 27 MMOL/L (ref 22–29)
CREAT SERPL-MCNC: 0.74 MG/DL (ref 0.57–1)
DEPRECATED RDW RBC AUTO: 59 FL (ref 37–54)
EGFRCR SERPLBLD CKD-EPI 2021: 100.6 ML/MIN/1.73
EOSINOPHIL # BLD MANUAL: 1.18 10*3/MM3 (ref 0–0.4)
EOSINOPHIL NFR BLD MANUAL: 7.1 % (ref 0.3–6.2)
ERYTHROCYTE [DISTWIDTH] IN BLOOD BY AUTOMATED COUNT: 20.1 % (ref 12.3–15.4)
FERRITIN SERPL-MCNC: 155 NG/ML (ref 13–150)
FOLATE SERPL-MCNC: 11.4 NG/ML (ref 4.78–24.2)
GLOBULIN UR ELPH-MCNC: 3.4 GM/DL
GLUCOSE SERPL-MCNC: 131 MG/DL (ref 65–99)
HCT VFR BLD AUTO: 38.2 % (ref 34–46.6)
HGB BLD-MCNC: 12.5 G/DL (ref 12–15.9)
IRON 24H UR-MRATE: 43 MCG/DL (ref 37–145)
IRON SATN MFR SERPL: 13 % (ref 20–50)
LYMPHOCYTES # BLD MANUAL: 5.39 10*3/MM3 (ref 0.7–3.1)
LYMPHOCYTES NFR BLD MANUAL: 7.1 % (ref 5–12)
MCH RBC QN AUTO: 27.1 PG (ref 26.6–33)
MCHC RBC AUTO-ENTMCNC: 32.7 G/DL (ref 31.5–35.7)
MCV RBC AUTO: 82.9 FL (ref 79–97)
MONOCYTES # BLD: 1.18 10*3/MM3 (ref 0.1–0.9)
NEUTROPHILS # BLD AUTO: 8.93 10*3/MM3 (ref 1.7–7)
NEUTROPHILS NFR BLD MANUAL: 50.5 % (ref 42.7–76)
NEUTS BAND NFR BLD MANUAL: 3 % (ref 0–5)
OVALOCYTES BLD QL SMEAR: ABNORMAL
PLAT MORPH BLD: NORMAL
PLATELET # BLD AUTO: 698 10*3/MM3 (ref 140–450)
PMV BLD AUTO: 9.1 FL (ref 6–12)
POIKILOCYTOSIS BLD QL SMEAR: ABNORMAL
POLYCHROMASIA BLD QL SMEAR: ABNORMAL
POTASSIUM SERPL-SCNC: 4 MMOL/L (ref 3.5–5.2)
PROT SERPL-MCNC: 7.6 G/DL (ref 6–8.5)
RBC # BLD AUTO: 4.61 10*6/MM3 (ref 3.77–5.28)
SCHISTOCYTES BLD QL SMEAR: ABNORMAL
SODIUM SERPL-SCNC: 138 MMOL/L (ref 136–145)
TIBC SERPL-MCNC: 338 MCG/DL (ref 298–536)
TRANSFERRIN SERPL-MCNC: 227 MG/DL (ref 200–360)
VARIANT LYMPHS NFR BLD MANUAL: 32.3 % (ref 19.6–45.3)
VIT B12 BLD-MCNC: 701 PG/ML (ref 211–946)
WBC MORPH BLD: NORMAL
WBC NRBC COR # BLD: 16.68 10*3/MM3 (ref 3.4–10.8)

## 2022-08-31 PROCEDURE — 82728 ASSAY OF FERRITIN: CPT

## 2022-08-31 PROCEDURE — 84466 ASSAY OF TRANSFERRIN: CPT

## 2022-08-31 PROCEDURE — 83540 ASSAY OF IRON: CPT

## 2022-08-31 PROCEDURE — 85007 BL SMEAR W/DIFF WBC COUNT: CPT

## 2022-08-31 PROCEDURE — 85025 COMPLETE CBC W/AUTO DIFF WBC: CPT

## 2022-08-31 PROCEDURE — 82330 ASSAY OF CALCIUM: CPT | Performed by: INTERNAL MEDICINE

## 2022-08-31 PROCEDURE — 82164 ANGIOTENSIN I ENZYME TEST: CPT | Performed by: INTERNAL MEDICINE

## 2022-08-31 PROCEDURE — 82746 ASSAY OF FOLIC ACID SERUM: CPT

## 2022-08-31 PROCEDURE — 80053 COMPREHEN METABOLIC PANEL: CPT

## 2022-08-31 PROCEDURE — 82607 VITAMIN B-12: CPT

## 2022-08-31 RX ORDER — HEPARIN SODIUM (PORCINE) LOCK FLUSH IV SOLN 100 UNIT/ML 100 UNIT/ML
500 SOLUTION INTRAVENOUS AS NEEDED
Status: DISCONTINUED | OUTPATIENT
Start: 2022-08-31 | End: 2022-08-31 | Stop reason: HOSPADM

## 2022-08-31 RX ORDER — SODIUM CHLORIDE 0.9 % (FLUSH) 0.9 %
10 SYRINGE (ML) INJECTION AS NEEDED
Status: DISCONTINUED | OUTPATIENT
Start: 2022-08-31 | End: 2022-08-31 | Stop reason: HOSPADM

## 2022-08-31 RX ORDER — SODIUM CHLORIDE 0.9 % (FLUSH) 0.9 %
10 SYRINGE (ML) INJECTION AS NEEDED
Status: CANCELLED | OUTPATIENT
Start: 2022-08-31

## 2022-08-31 RX ORDER — HEPARIN SODIUM (PORCINE) LOCK FLUSH IV SOLN 100 UNIT/ML 100 UNIT/ML
500 SOLUTION INTRAVENOUS AS NEEDED
Status: CANCELLED | OUTPATIENT
Start: 2022-08-31

## 2022-08-31 RX ORDER — CALCIUM CITRATE/VITAMIN D3 200MG-6.25
1 TABLET ORAL DAILY
Qty: 100 EACH | Refills: 3 | OUTPATIENT
Start: 2022-08-31

## 2022-08-31 RX ORDER — BLOOD-GLUCOSE METER
EACH MISCELLANEOUS
Qty: 1 EACH | Refills: 0 | OUTPATIENT
Start: 2022-08-31

## 2022-08-31 RX ADMIN — Medication 10 ML: at 09:34

## 2022-08-31 RX ADMIN — HEPARIN SODIUM (PORCINE) LOCK FLUSH IV SOLN 100 UNIT/ML 500 UNITS: 100 SOLUTION at 09:34

## 2022-09-01 ENCOUNTER — PATIENT MESSAGE (OUTPATIENT)
Dept: PRIMARY CARE CLINIC | Age: 47
End: 2022-09-01

## 2022-09-01 DIAGNOSIS — F33.41 RECURRENT MAJOR DEPRESSIVE DISORDER, IN PARTIAL REMISSION (HCC): ICD-10-CM

## 2022-09-01 DIAGNOSIS — M54.16 CHRONIC LUMBAR RADICULOPATHY: ICD-10-CM

## 2022-09-01 DIAGNOSIS — G47.30 SLEEP APNEA, UNSPECIFIED TYPE: Primary | ICD-10-CM

## 2022-09-01 DIAGNOSIS — F41.9 ANXIETY: ICD-10-CM

## 2022-09-01 RX ORDER — MECLIZINE HYDROCHLORIDE 25 MG/1
25 TABLET ORAL PRN
Qty: 30 TABLET | Refills: 3 | Status: SHIPPED | OUTPATIENT
Start: 2022-09-01

## 2022-09-01 RX ORDER — VENLAFAXINE HYDROCHLORIDE 150 MG/1
150 CAPSULE, EXTENDED RELEASE ORAL DAILY
Qty: 90 CAPSULE | Refills: 3 | Status: SHIPPED | OUTPATIENT
Start: 2022-09-01

## 2022-09-01 NOTE — PROGRESS NOTES
Clinic Progress Note    Patient:  Naomi Bhatia  YOB: 1975  Date of Service: 9/2/2022  MRN: [id number]   Acct:    Primary Care Physician: Brett Sharp DO    Televisit    Chief Complaint: Hereditary hemochromatosis    Hematology History:  For detailed summary about the patient's previous work-up, differential diagnoses and treatments, please refer to the last note of Dr. Goldberg from 4/1/2022.  Brief summary:    Ms. Bhatia is a 46 y.o. who follows for history of iron level of 19.  She has a history of gastric sleeve procedure in 12/2017, after which she developed iron deficiency anemia like related to decreased dietary absorption. She did receive several multiple blood transfusions in the past as well as parenteral iron Venofer infusions at least 7-10 times back in 2018.     In 9/2020, the patient had seen Dr. Goldberg, where her hemoglobin was 10.8, iron saturation was 21%, ferritin was 1406, and repeat ferritin 8/11/2021 was 1663; she is found to be heterozygous for C282Y hemochromatosis gene variant. She was started on she has phlebotomies every 2 months, and as ferritin had not improved, she initially was started on Jadenu.  As her ferritin improved below 500 in 7/2021, Jadenu was stopped then. The goal ferritin that was discussed with the patient was to keep ferritin below 500.    US spleen 12/17/2021 showed splenomegaly (max dimension 16.7 cm, previously 14.6 cm); it was symptomatic and painful.  Peripheral blood flow cytometry, JAK2, BCR/ABL FISH were negative.  PET scan 2/13/2022 showed splenomegaly with numerous small 1-2 cm hypermetabolic lesions along with mild periportal hypermetabolic lymphadenopathy.  Bone marrow biopsy 1/14/2022 was unremarkable for the most part; there was no sign of increased iron deposition.  She ultimately underwent robotic assisted laparoscopic splenomegaly and wedge liver biopsy at [facility] on 5/2/2022, with splenectomy pathology showing  Necrotizing  granulomas, and liver biopsy showing bridging fibrosis with early nodule formation as well as granulomas and mildly active steatohepatitis with microvascular steatosis; iron stains showed only mild iron deposition.    Interval History:  Ms. Bhatia is here for his scheduled follow-up.  In the interim from last visit, the patient has been doing fairly well.  She feels well today and has no complaints.  In the interim, she did meet with weight loss clinic at [place], and also had met with pulmonary.  She denies having any fever, chills, chest pain, shortness of breath, abdominal pain, nausea or vomiting, change in bowel or urine habits, weakness or numbness arms or legs.    Objectives:  GENERAL: Alert and oriented, no apparent distress  HEENT: No scleral icterus  NECK: Supple  SKIN: No jaundice  PSYCHIATRIC: Full affect  NEUROLOGIC: Stable gait    Results:  Lab Results   Component Value Date    WBC 16.68 (H) 08/31/2022    HGB 12.5 08/31/2022    HCT 38.2 08/31/2022    MCV 82.9 08/31/2022     (H) 08/31/2022     Lab Results   Component Value Date    IRON 43 08/31/2022    TIBC 338 08/31/2022    FERRITIN 155.00 (H) 08/31/2022     Assessment :  1.  Hemochromatosis C282Y heterozygous/carrier.  2.  Prolonged history of iron deficiency before.  3.  Splenomegaly s/p splenectomy on 5/2/2022  4.  Anemia due to chronic disease  5.  Leukocytosis, elevated myelocytes and metamyelocytes  6. Thrombocytosis post splenectomy    Plan:   - We again today had a lengthy discussion about her underlying disease, heterozygous hemochromatosis.  - Since I started seeing her, which was after she underwent splenectomy, her ferritin level and iron studies have not been as high as they were before; and her ferritin level has been trending down steadily.  - Reviewed her labs from 7/5/2022 and 8/31/2022: as her ferritin level dropped to 146-260 since her splenectomy compared to >500 before that, and this is achieved without phlebotomy, and  given that her iron level and iron saturation are low with her having anemia, there was no sign of iron deposition in the bone marrow; though there was  significantly deposition in the spleen on pathology and mild iron deposition in the liver; she had held off phlebotomy since her ferritin has been trending down.  - She is establishing care with hepatology at [place], last visit on 7/5/2022.  - As for her leukocytosis having myelocytes and meta-myelocyte, I believe this is related to her splenectomy, as she started to develop mild leukocytosis with increasing numbers of eosinophils, basophils, myelocytes and metamyelocytes after her splenectomy. To assure we are missing an underlying bone marrow disease, PCR for BCR/ABL was drawn; result is pending.  - RTC in 6 weeks with preoffice CBC and differential, CMP, iron profile, ferritin, vitamin B12, folate.    Angela Germain MD  9/2/2022/

## 2022-09-01 NOTE — TELEPHONE ENCOUNTER
Received fax from pharmacy requesting refill on pts medication(s). Pt was last seen in office on 5/12/2022  and has a follow up scheduled for Visit date not found. Will send request to  Dr. Andres Spencer  for authorization.      Requested Prescriptions     Pending Prescriptions Disp Refills    venlafaxine (EFFEXOR XR) 150 MG extended release capsule [Pharmacy Med Name: VENLAFAXINE 150MG ER CAPSULES] 90 capsule 3     Sig: TAKE 1 CAPSULE BY MOUTH DAILY

## 2022-09-01 NOTE — TELEPHONE ENCOUNTER
Received fax from pharmacy requesting refill on pts medication(s). Pt was last seen in office on 5/12/2022  and has a follow up scheduled for Visit date not found. Will send request to  Dr. Jesus Navarrete  for authorization.      Requested Prescriptions     Pending Prescriptions Disp Refills    meclizine (ANTIVERT) 25 MG tablet 30 tablet 3     Sig: Take 1 tablet by mouth as needed for Dizziness DISPLAY PLAN FREE TEXT DISPLAY PLAN FREE TEXT DISPLAY PLAN FREE TEXT

## 2022-09-02 ENCOUNTER — TELEMEDICINE (OUTPATIENT)
Dept: ONCOLOGY | Facility: CLINIC | Age: 47
End: 2022-09-02

## 2022-09-02 DIAGNOSIS — K74.60 CIRRHOSIS OF LIVER WITHOUT ASCITES, UNSPECIFIED HEPATIC CIRRHOSIS TYPE: ICD-10-CM

## 2022-09-02 DIAGNOSIS — E83.110 HEREDITARY HEMOCHROMATOSIS: Primary | ICD-10-CM

## 2022-09-02 DIAGNOSIS — D50.8 IRON DEFICIENCY ANEMIA SECONDARY TO INADEQUATE DIETARY IRON INTAKE: ICD-10-CM

## 2022-09-02 PROCEDURE — 99213 OFFICE O/P EST LOW 20 MIN: CPT | Performed by: INTERNAL MEDICINE

## 2022-09-05 RX ORDER — OXYCODONE HYDROCHLORIDE 5 MG/1
5 TABLET ORAL EVERY 6 HOURS PRN
Qty: 90 TABLET | Refills: 0 | Status: SHIPPED | OUTPATIENT
Start: 2022-10-08 | End: 2022-09-13

## 2022-09-06 DIAGNOSIS — M54.16 CHRONIC LUMBAR RADICULOPATHY: ICD-10-CM

## 2022-09-08 ENCOUNTER — PATIENT ROUNDING (BHMG ONLY) (OUTPATIENT)
Dept: PULMONOLOGY | Facility: CLINIC | Age: 47
End: 2022-09-08

## 2022-09-08 LAB — REF LAB TEST METHOD: NORMAL

## 2022-09-08 NOTE — PROGRESS NOTES
September 8, 2022    Hello, may I speak with Naomi Bhatia?    My name is Ramona Moore      I am  with [facility]  [address]  [facility] [address] [phone].    Before we get started may I verify your date of birth? 1975    I am calling to officially welcome you to our practice and ask about your recent visit. Is this a good time to talk? yes    Tell me about your visit with us. What things went well?  Visit went smoothly.  Staff were courteous and respectful.       We're always looking for ways to make our patients' experiences even better. Do you have recommendations on ways we may improve?  no    Overall were you satisfied with your first visit to our practice? yes       I appreciate you taking the time to speak with me today. Is there anything else I can do for you? no      Thank you, and have a great day.

## 2022-09-09 ENCOUNTER — HOSPITAL ENCOUNTER (OUTPATIENT)
Dept: CT IMAGING | Facility: HOSPITAL | Age: 47
Discharge: HOME | End: 2022-09-09
Admitting: INTERNAL MEDICINE

## 2022-09-09 DIAGNOSIS — L92.9 NON-CASEATING GRANULOMA: ICD-10-CM

## 2022-09-09 DIAGNOSIS — R06.02 SOB (SHORTNESS OF BREATH): ICD-10-CM

## 2022-09-09 PROCEDURE — 71250 CT THORAX DX C-: CPT

## 2022-09-12 DIAGNOSIS — M54.16 CHRONIC LUMBAR RADICULOPATHY: ICD-10-CM

## 2022-09-13 RX ORDER — OXYCODONE HYDROCHLORIDE 5 MG/1
5 TABLET ORAL EVERY 6 HOURS PRN
Qty: 90 TABLET | Refills: 0 | Status: SHIPPED | OUTPATIENT
Start: 2022-09-13 | End: 2022-09-27 | Stop reason: SDUPTHER

## 2022-09-14 RX ORDER — OXYCODONE HYDROCHLORIDE 5 MG/1
5 TABLET ORAL EVERY 6 HOURS PRN
Qty: 4 TABLET | Refills: 0 | Status: SHIPPED | OUTPATIENT
Start: 2022-10-08 | End: 2022-09-26

## 2022-09-26 ENCOUNTER — HOSPITAL ENCOUNTER (OUTPATIENT)
Dept: PAIN MANAGEMENT | Age: 47
Discharge: HOME | End: 2022-09-26
Payer: MEDICARE

## 2022-09-26 VITALS
TEMPERATURE: 97 F | DIASTOLIC BLOOD PRESSURE: 81 MMHG | HEIGHT: 66 IN | BODY MASS INDEX: 47.09 KG/M2 | SYSTOLIC BLOOD PRESSURE: 124 MMHG | OXYGEN SATURATION: 93 % | RESPIRATION RATE: 18 BRPM | HEART RATE: 86 BPM | WEIGHT: 293 LBS

## 2022-09-26 DIAGNOSIS — M54.16 LUMBAR RADICULOPATHY: ICD-10-CM

## 2022-09-26 DIAGNOSIS — M51.36 DDD (DEGENERATIVE DISC DISEASE), LUMBAR: ICD-10-CM

## 2022-09-26 DIAGNOSIS — M54.16 CHRONIC LUMBAR RADICULOPATHY: ICD-10-CM

## 2022-09-26 DIAGNOSIS — M25.511 ACUTE PAIN OF RIGHT SHOULDER: ICD-10-CM

## 2022-09-26 DIAGNOSIS — M54.50 CHRONIC LEFT-SIDED LOW BACK PAIN WITHOUT SCIATICA: ICD-10-CM

## 2022-09-26 DIAGNOSIS — Z51.81 ENCOUNTER FOR MONITORING OPIOID MAINTENANCE THERAPY: ICD-10-CM

## 2022-09-26 DIAGNOSIS — R10.32 CHRONIC BILATERAL LOWER ABDOMINAL PAIN: Primary | ICD-10-CM

## 2022-09-26 DIAGNOSIS — Z79.891 ENCOUNTER FOR MONITORING OPIOID MAINTENANCE THERAPY: ICD-10-CM

## 2022-09-26 DIAGNOSIS — Z98.1 HISTORY OF LUMBAR SPINAL FUSION: ICD-10-CM

## 2022-09-26 DIAGNOSIS — R10.31 CHRONIC BILATERAL LOWER ABDOMINAL PAIN: Primary | ICD-10-CM

## 2022-09-26 DIAGNOSIS — G89.29 CHRONIC BILATERAL LOWER ABDOMINAL PAIN: Primary | ICD-10-CM

## 2022-09-26 DIAGNOSIS — G89.29 CHRONIC LEFT-SIDED LOW BACK PAIN WITHOUT SCIATICA: ICD-10-CM

## 2022-09-26 PROCEDURE — 99215 OFFICE O/P EST HI 40 MIN: CPT

## 2022-09-26 PROCEDURE — 99214 OFFICE O/P EST MOD 30 MIN: CPT | Performed by: NURSE PRACTITIONER

## 2022-09-26 RX ORDER — BUPROPION HYDROCHLORIDE 150 MG/1
1 TABLET ORAL DAILY
COMMUNITY
Start: 2022-08-19

## 2022-09-26 RX ORDER — FLUTICASONE FUROATE AND VILANTEROL TRIFENATATE 100; 25 UG/1; UG/1
1 POWDER RESPIRATORY (INHALATION) DAILY
COMMUNITY
Start: 2022-08-31

## 2022-09-26 RX ORDER — FLUTICASONE PROPIONATE 50 MCG
2 SPRAY, SUSPENSION (ML) NASAL DAILY
COMMUNITY
Start: 2022-08-29

## 2022-09-26 ASSESSMENT — ENCOUNTER SYMPTOMS
BACK PAIN: 1
NAUSEA: 0
ABDOMINAL PAIN: 0
CONSTIPATION: 0
ABDOMINAL DISTENTION: 0
BOWEL INCONTINENCE: 0

## 2022-09-26 ASSESSMENT — PAIN DESCRIPTION - PAIN TYPE
TYPE: CHRONIC PAIN
TYPE_2: CHRONIC PAIN

## 2022-09-26 ASSESSMENT — PAIN DESCRIPTION - ORIENTATION
ORIENTATION_2: LOWER
ORIENTATION: LOWER

## 2022-09-26 ASSESSMENT — PAIN SCALES - GENERAL: PAINLEVEL_OUTOF10: 7

## 2022-09-26 ASSESSMENT — PAIN DESCRIPTION - INTENSITY: RATING_2: 5

## 2022-09-26 ASSESSMENT — PAIN DESCRIPTION - LOCATION
LOCATION: BACK
LOCATION_2: NECK
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
## 2022-09-26 NOTE — PROGRESS NOTES
Clinic Documentation      Education Provided:  [x] Review of Marvel Pfeiffer  [x] Agreement Review  [x] PEG Score Calculated [x] PHQ Score Calculated [x] ORT Score Calculated    [] Compliance Issues Discussed [] Cognitive Behavior Needs [x] Exercise [] Review of Test [] Financial Issues  [x] Tobacco/Alcohol Use Reviewed [x] Teaching [] New Patient [] Picture Obtained    Physician Plan:  [] Outgoing Referral  [] Pharmacy Consult  [x] Test Ordered [x] Prescription Ordered/Changed   [] Obtained Test Results / Consult Notes        Complete if patient is withholding blood thinner for procedure     Blood Thinner Patient is currently taking:      [] Plavix (Hold for 7 days)  [] Aspirin (Hold for 5 days)     [] Pletal (Hold for 2 days)  [] Pradaxa (Hold for 3 days)    [] Effient (Hold for 7 days)  [] Xarelto (Hold for 2 days)    [] Eliquis (Hold for 2 days)  [] Brilinta (Hold for 7 days)    [] Coumadin (Hold for 5 days) - (INR needs to be drawn the day prior to procedure- INR < 2.0)    [] Aggrenox (Hold for 7 days)        [] Patient will stop medication on their own.    [] Blood Thinner Form Faxed for approval to hold.    Provider form faxed to:     Assessment Completed by:  Electronically signed by Jose Mckeon RN on 9/26/2022 at 12:12 PM

## 2022-09-26 NOTE — TELEPHONE ENCOUNTER
Requested Prescriptions     Pending Prescriptions Disp Refills    oxyCODONE (ROXICODONE) 5 MG immediate release tablet 90 tablet 0     Sig: Take 1 tablet by mouth every 6 hours as needed for Pain for up to 30 days.

## 2022-09-27 ENCOUNTER — TELEPHONE (OUTPATIENT)
Dept: ONCOLOGY | Facility: CLINIC | Age: 47
End: 2022-09-27

## 2022-09-27 RX ORDER — OXYCODONE HYDROCHLORIDE 5 MG/1
5 TABLET ORAL EVERY 6 HOURS PRN
Qty: 90 TABLET | Refills: 0 | Status: SHIPPED | OUTPATIENT
Start: 2022-10-13 | End: 2022-11-12

## 2022-09-27 NOTE — TELEPHONE ENCOUNTER
Caller: Naomi Bhatia    Relationship to patient: Self    Best call back number: [phone]    Chief complaint: R/S    Type of visit: LAB    Requested date: 10-11 9:30     If rescheduling, when is the original appointment: 10-11    Additional notes:PLEASE ADVISE

## 2022-09-30 NOTE — TELEPHONE ENCOUNTER
Received fax from pharmacy requesting refill on pts medication(s). Pt was last seen in office on 5/12/2022  and has a follow up scheduled for Visit date not found. Will send request to  Allen Bob  for authorization.      Requested Prescriptions     Pending Prescriptions Disp Refills    carbidopa-levodopa (SINEMET)  MG per tablet [Pharmacy Med Name: CARBIDOPA/LEVODOPA 25-100MG TABS] 90 tablet 3     Sig: TAKE 1 TABLET BY MOUTH EVERY NIGHT

## 2022-10-07 ENCOUNTER — HOSPITAL ENCOUNTER (OUTPATIENT)
Dept: PHYSICAL THERAPY | Age: 47
Setting detail: THERAPIES SERIES
Discharge: HOME | End: 2022-10-07
Payer: MEDICARE

## 2022-10-07 PROCEDURE — 97162 PT EVAL MOD COMPLEX 30 MIN: CPT

## 2022-10-07 ASSESSMENT — PAIN DESCRIPTION - FREQUENCY: FREQUENCY: CONTINUOUS

## 2022-10-07 ASSESSMENT — PAIN DESCRIPTION - DESCRIPTORS: DESCRIPTORS: SORE;BURNING;ACHING

## 2022-10-07 ASSESSMENT — PAIN SCALES - GENERAL: PAINLEVEL_OUTOF10: 7

## 2022-10-07 ASSESSMENT — PAIN - FUNCTIONAL ASSESSMENT: PAIN_FUNCTIONAL_ASSESSMENT: PREVENTS OR INTERFERES WITH ALL ACTIVE AND SOME PASSIVE ACTIVITIES

## 2022-10-07 ASSESSMENT — PAIN DESCRIPTION - ORIENTATION: ORIENTATION: RIGHT

## 2022-10-07 ASSESSMENT — PAIN DESCRIPTION - LOCATION: LOCATION: SHOULDER

## 2022-10-07 NOTE — PROGRESS NOTES
Physical Therapy  Initial Assessment  Date: 10/7/2022  Patient Name: Claudette Gaona  MRN: [id number]  : 1975    Referring Physician: ASHLEE Mckenzie * TIMUR Lou   PCP: Allegra Weber DO     Medical Diagnosis: Pain in right shoulder [M25.511] acute pain of right shoulder (M25.511)  Treatment Diagnosis: back pain      Insurance: Payor: Fani Davis / Plan: Lars Alpers / Product Type: *No Product type* /   Insurance ID: [id number] - (Medicare Managed)    Subjective:   General  Chart Reviewed: Yes  Patient Assessed for Rehabilitation Services: Yes  Additional Pertinent Hx: 51year old female previously seen at our clinic in  for back pain currently referred to PT with diagnosis of acute pain in right shoulder. Diagnosis: acute pain of right shoulder (M25.511)  Referring Provider (secondary): TIMUR Lou  Follows Commands: Within Functional Limits  PT Visit Information  PT Insurance Information: [facility] after eval, [place] Medicaid ([facility]  Total # of Visits Approved:  (12 visits anticipated for this diagnosis)  Total # of Visits to Date: 1  Progress Note Due Date: 22  Subjective  Subjective: Patient presents with right shoulder pain present for a couple of weeks, no specific mechanism of injury recalled but she did do chair aerobics using her arms. Her pain is reported as constant, variable in level of intensity, use of right arm increases her pain. She is having problems with forward reaching, overhead reaching, lifting and carrying are self-limited, \"pouring\" motions are painful. She is unable to sleep on her right shoulder, has to prop right arm with pillow. Abduction and ER motions are not well tolerated as is elevation of right arm. Major pain is in her right lateral upper arm with extension to her upper scapula.   Dominant Hand: : Right  Pain Screening  Patient Currently in Pain: Yes  Pain Assessment: 0-10  Pain Level: 7 (increases with movement of her arm)  Pain Location: Shoulder  Pain Orientation: Right  Pain Radiating Towards: lateral and posterior right shoulder  Pain Descriptors: Sore, Burning, Aching  Pain Frequency: Continuous  Functional Pain Assessment: Prevents or interferes with all active and some passive activities  Aggravating factors: Reaching, Lifting, Carrying, Laying on involved side, Supine lying     Vision/Hearing:  Vision  Vision: Within Functional Limits  Hearing  Hearing: Within functional limits    Orientation:  Orientation  Overall Orientation Status: Within Normal Limits  Patient affect[de-identified] Normal  Follows Commands: Within Functional Limits    Social History:  Social History  Lives With: Son  Type of Home: Mobile home    Functional Status:  Functional Status  Prior level of function: Independent  Occupation: On disability  Type of Occupation: formerly   ADL Assistance: Independent (independent but with pain)  Homemaking Assistance: Independent  Ambulation Assistance: Independent  Active : Yes  Mode of Transportation: Van    Objective:     Current Level of Function  Grooming Required increased time; Modified Independent   Bathing Required increased time; Modified Independent   Upper Body Dressing Required increased time; Modified Independent   Lower Body Dressing Required increased time; Modified Independent   Don/doff Socks/Shoes Required increased time; Modified Independent   Toileting --   Self Care Required increased time; Modified Independent   Bed Mobility --   Transfers (sit to stand) Independent/No Functional Limitation   Transfers (stand to sit) Independent/No Functional Limitation   Walking Independent/No Functional Limitation   Stairs --   ADLs Required increased time; Modified Independent   IADLs --   Driving Required Assist/Some Functional Limitations     General Observations --   Description Patient sits with protective motion of right arm, carries purse but with difficulty. Sits with rounded shoulders, forward protracted head, increased thoracic kyphosis. She has pain when trying to actively elevate or abduct her right arm, increased tenderness to palpation of right supraspinatus muscle belly, increased tenderness palpation of supraspinatus tendon. Joint Mobility  ROM RUE: Patient only able to tolerate active flex and abduction of right arm to 80~90 degrees, right ER to ~45 deg. (Not able to tolerate enough manual resistance for proper MMT). At present, right elbow and wrist strength appear WNL but provoke proximal shoulder pain. ROM LUE: WNL left arm strength and ROM. Additional Measures  Special Tests: Increased pain with right shoulder \"pour\" test, increased pain with golfer stretch across chest.  Other: Patient scored 75% impairment on the general use portion of the QuickDASH. Sensation  Overall Sensation Status: WNL     Transfers  Sit to Stand: Independent  Stand to Sit: Independent    Assessment:    Conditions Requiring Skilled Therapeutic Intervention  Body Structures, Functions, Activity Limitations Requiring Skilled Therapeutic Intervention: Decreased ADL status; Decreased ROM; Decreased strength;Decreased high-level IADLs; Increased pain;Decreased posture  Assessment: The following problems were identified at today's PT initial evaluation: 1) acute onset of right shoulder pain, possibly related to arm exercise, 2) decreased right shoulder strength (limited tolerance for resisted motion all planes), 3) decreased right shoulder ROM (passive and active), 4) decreased ability to perform ADL's using right arm, 5) decreased ability to sleep secondary to shoulder pain, 6) need for instruction in HEP.   Therapy Prognosis: Fair  Referring Provider (secondary): TMIUR James  Activity Tolerance  Activity Tolerance: Patient limited by pain  Activity Tolerance Comments: Patient was poorly tolerant of handling and use of right arm during eval, limited tolerance for active movement or resistance to right arm. Activity Tolerance: Patient limited by pain  Activity Tolerance Comments: Patient was poorly tolerant of handling and use of right arm during eval, limited tolerance for active movement or resistance to right arm. Plan:    Physcial Therapy Plan: 2 x per week  Plan weeks: 4-6 weeks pending insurance approval    Goals:  Short Term Goals  Time Frame for Short Term Goals: 3-4 weeks  Short Term Goal 1: Patient to be independent with HEP. Short Term Goal 2: Patient independent with daily use of home pulleys. Short Term Goal 3: Patient to report less disturbance of sleep due to right arm pain. Long Term Goals  Time Frame for Long Term Goals : 4-6 weeks  Long Term Goal 1: Patient to have >= 165 degrees passive flexion of right shoulder, 135 degrees passive right shoulder abduction, >= 70 degrees shoulder ER. Long Term Goal 2: Patient able to actively perform forward flexion of right arm through 3/4 full range, full range right shoulder ER. Long Term Goal 3: Patient to score <= 35% impairment on the general use portion of the QuickDASH. Long Term Goal 4: Patient to report less difficulty with dressing and grooming due to right arm pain. Patient Goals   Patient Goals : Return to normal use of right shoulder, have less pain, be able to sleep better.        Therapy Time:   Individual Concurrent Group Co-treatment   Time In 0850         Time Out 0940         Minutes 50         Timed Code Treatment Minutes: [address], PT

## 2022-10-11 ENCOUNTER — LAB (OUTPATIENT)
Dept: LAB | Facility: HOSPITAL | Age: 47
End: 2022-10-11

## 2022-10-11 DIAGNOSIS — K74.60 CIRRHOSIS OF LIVER WITHOUT ASCITES, UNSPECIFIED HEPATIC CIRRHOSIS TYPE: ICD-10-CM

## 2022-10-11 DIAGNOSIS — E83.110 HEREDITARY HEMOCHROMATOSIS: ICD-10-CM

## 2022-10-11 DIAGNOSIS — D50.8 IRON DEFICIENCY ANEMIA SECONDARY TO INADEQUATE DIETARY IRON INTAKE: ICD-10-CM

## 2022-10-11 LAB
ANISOCYTOSIS BLD QL: ABNORMAL
BASOPHILS # BLD MANUAL: 0.17 10*3/MM3 (ref 0–0.2)
BASOPHILS NFR BLD MANUAL: 1 % (ref 0–1.5)
DEPRECATED RDW RBC AUTO: 46.4 FL (ref 37–54)
EOSINOPHIL # BLD MANUAL: 0.17 10*3/MM3 (ref 0–0.4)
EOSINOPHIL NFR BLD MANUAL: 1 % (ref 0.3–6.2)
ERYTHROCYTE [DISTWIDTH] IN BLOOD BY AUTOMATED COUNT: 14.7 % (ref 12.3–15.4)
FERRITIN SERPL-MCNC: 131.7 NG/ML (ref 13–150)
FOLATE SERPL-MCNC: 11.1 NG/ML (ref 4.78–24.2)
HCT VFR BLD AUTO: 42.4 % (ref 34–46.6)
HGB BLD-MCNC: 13.8 G/DL (ref 12–15.9)
IRON 24H UR-MRATE: 54 MCG/DL (ref 37–145)
IRON SATN MFR SERPL: 15 % (ref 20–50)
LYMPHOCYTES # BLD MANUAL: 6.94 10*3/MM3 (ref 0.7–3.1)
LYMPHOCYTES NFR BLD MANUAL: 6 % (ref 5–12)
MCH RBC QN AUTO: 28.1 PG (ref 26.6–33)
MCHC RBC AUTO-ENTMCNC: 32.5 G/DL (ref 31.5–35.7)
MCV RBC AUTO: 86.4 FL (ref 79–97)
MICROCYTES BLD QL: ABNORMAL
MONOCYTES # BLD: 1.04 10*3/MM3 (ref 0.1–0.9)
NEUTROPHILS # BLD AUTO: 9.02 10*3/MM3 (ref 1.7–7)
NEUTROPHILS NFR BLD MANUAL: 51 % (ref 42.7–76)
NEUTS BAND NFR BLD MANUAL: 1 % (ref 0–5)
OVALOCYTES BLD QL SMEAR: ABNORMAL
PLATELET # BLD AUTO: 771 10*3/MM3 (ref 140–450)
PMV BLD AUTO: 10.2 FL (ref 6–12)
POIKILOCYTOSIS BLD QL SMEAR: ABNORMAL
POLYCHROMASIA BLD QL SMEAR: ABNORMAL
RBC # BLD AUTO: 4.91 10*6/MM3 (ref 3.77–5.28)
SCHISTOCYTES BLD QL SMEAR: ABNORMAL
SMALL PLATELETS BLD QL SMEAR: ABNORMAL
TIBC SERPL-MCNC: 361 MCG/DL (ref 298–536)
TRANSFERRIN SERPL-MCNC: 242 MG/DL (ref 200–360)
VARIANT LYMPHS NFR BLD MANUAL: 11 % (ref 0–5)
VARIANT LYMPHS NFR BLD MANUAL: 29 % (ref 19.6–45.3)
VIT B12 BLD-MCNC: 699 PG/ML (ref 211–946)
WBC MORPH BLD: NORMAL
WBC NRBC COR # BLD: 17.35 10*3/MM3 (ref 3.4–10.8)

## 2022-10-11 PROCEDURE — 85025 COMPLETE CBC W/AUTO DIFF WBC: CPT

## 2022-10-11 PROCEDURE — 36415 COLL VENOUS BLD VENIPUNCTURE: CPT

## 2022-10-11 PROCEDURE — 83540 ASSAY OF IRON: CPT

## 2022-10-11 PROCEDURE — 82746 ASSAY OF FOLIC ACID SERUM: CPT

## 2022-10-11 PROCEDURE — 82607 VITAMIN B-12: CPT

## 2022-10-11 PROCEDURE — 84466 ASSAY OF TRANSFERRIN: CPT

## 2022-10-11 PROCEDURE — 82728 ASSAY OF FERRITIN: CPT

## 2022-10-11 PROCEDURE — 85007 BL SMEAR W/DIFF WBC COUNT: CPT

## 2022-10-12 ENCOUNTER — HOSPITAL ENCOUNTER (OUTPATIENT)
Dept: GENERAL RADIOLOGY | Age: 47
Discharge: HOME | End: 2022-10-12
Payer: MEDICARE

## 2022-10-12 DIAGNOSIS — M25.511 ACUTE PAIN OF RIGHT SHOULDER: ICD-10-CM

## 2022-10-12 PROCEDURE — 73030 X-RAY EXAM OF SHOULDER: CPT | Performed by: RADIOLOGY

## 2022-10-12 PROCEDURE — 73030 X-RAY EXAM OF SHOULDER: CPT

## 2022-10-14 ENCOUNTER — TELEMEDICINE (OUTPATIENT)
Dept: ONCOLOGY | Facility: CLINIC | Age: 47
End: 2022-10-14

## 2022-10-14 DIAGNOSIS — R79.89 ELEVATED FERRITIN: Primary | ICD-10-CM

## 2022-10-14 PROCEDURE — 99213 OFFICE O/P EST LOW 20 MIN: CPT | Performed by: INTERNAL MEDICINE

## 2022-10-16 PROBLEM — Z51.81 ENCOUNTER FOR MONITORING OPIOID MAINTENANCE THERAPY: Status: ACTIVE | Noted: 2022-10-16

## 2022-10-16 PROBLEM — Z79.891 ENCOUNTER FOR MONITORING OPIOID MAINTENANCE THERAPY: Status: ACTIVE | Noted: 2022-10-16

## 2022-10-17 NOTE — PROGRESS NOTES
Clinic Progress Note    Patient:  Naomi Bhatia  YOB: 1975  Date of Service: 12/9/2022  MRN: [id number]   Acct:    Primary Care Physician: Brett Sharp DO    Televisit    Chief Complaint: Hereditary hemochromatosis    Hematology History:  For detailed summary about the patient's previous work-up, differential diagnoses and treatments, please refer to the last note of Dr. Goldberg from 4/1/2022.    Brief summary:  Ms. Bhatia is a 46 y.o. who follows for history of iron level of 19.  She has a history of gastric sleeve procedure in 12/2017, after which she developed iron deficiency anemia like related to decreased dietary absorption. She did receive several multiple blood transfusions in the past as well as parenteral iron Venofer infusions at least 7-10 times back in 2018.     In 9/2020, the patient had seen Dr. Goldberg, where her hemoglobin was 10.8, iron saturation was 21%, ferritin was 1406, and repeat ferritin 8/11/2021 was 1663; she is found to be heterozygous for C282Y hemochromatosis gene variant. She was started on she has phlebotomies every 2 months, and as ferritin had not improved, she initially was started on Jadenu.  As her ferritin improved below 500 in 7/2021, Jadenu was stopped then. The goal ferritin that was discussed with the patient was to keep ferritin below 500.    US spleen 12/17/2021 showed splenomegaly (max dimension 16.7 cm, previously 14.6 cm); it was symptomatic and painful.  Peripheral blood flow cytometry, JAK2, BCR/ABL FISH were negative.  PET scan 2/13/2022 showed splenomegaly with numerous small 1-2 cm hypermetabolic lesions along with mild periportal hypermetabolic lymphadenopathy.  Bone marrow biopsy 1/14/2022 was unremarkable for the most part; there was no sign of increased iron deposition.  She ultimately underwent robotic assisted laparoscopic splenomegaly and wedge liver biopsy at [facility] on 5/2/2022, with splenectomy pathology showing  Necrotizing  granulomas, and liver biopsy showing bridging fibrosis with early nodule formation as well as granulomas and mildly active steatohepatitis with microvascular steatosis; iron stains showed only mild iron deposition.    Interval History:  Ms. Bhatia is here for his scheduled follow-up.  In the interim from last visit, she has been doing fairly well.  She feels well today and has no complaints.  She denies having any fever, chills, chest pain, shortness of breath, abdominal pain, nausea or vomiting, change in bowel or urine habits, weakness or numbness arms or legs.    Objectives:  GENERAL: Alert and oriented, no apparent distress  HEENT: No scleral icterus  NECK: Supple  SKIN: No jaundice, normal skin color  PSYCHIATRIC: Full affect  NEUROLOGIC: Stable gait    Results:  Lab Results   Component Value Date    WBC 16.97 (H) 11/28/2022    HGB 14.2 11/28/2022    HCT 44.5 11/28/2022    MCV 85.4 11/28/2022     (H) 11/28/2022     Lab Results   Component Value Date    IRON 51 11/28/2022    TIBC 362 11/28/2022    FERRITIN 135.50 11/28/2022     Assessment :  1.  Hemochromatosis C282Y heterozygous/carrier.  2.  Prolonged history of iron deficiency before.  3.  Splenomegaly s/p splenectomy on 5/2/2022  4.  Anemia due to chronic disease  5.  Leukocytosis, elevated myelocytes and metamyelocytes  6. Thrombocytosis post splenectomy    Plan:   # Elevated ferritin:  - She has heterozygous hemochromatosis.  - Since she underwent splenectomy on  5/2/2022, her ferritin level and iron studies have not been as high as they were before; and her ferritin level has been trending down steadily.  - Reviewed her labs since I started following the patient, on 7/5/2022, 8/31/2022, 9/23/2022, 10/11/2022: her ferritin level dropped to 110-260 since her splenectomy compared to >500 before that, and this is achieved without phlebotomy, and given that her iron level and iron saturation are low with her having anemia, there was no sign of iron  deposition in the bone marrow; though there was  significantly deposition in the spleen on pathology and mild iron deposition in the liver; she had held off phlebotomy since her ferritin has been trending down.  - She follows with hepatology at [place].    # Leukocytosis:  - As for her leukocytosis having myelocytes and meta-myelocyte, I believe this is related to her splenectomy, as she started to develop mild leukocytosis with increasing numbers of eosinophils, basophils, myelocytes and metamyelocytes after her splenectomy.   - To assure we are missing an underlying bone marrow disease, PCR for BCR/ABL was obtained and is negative.   - Obtain labs in 6 weeks, and RTC for OV and labs in 12 weeks.     I reviewed, confirmed and updated HPI and assessment and plan as necessary.     Angela Germain MD  12/9/2022   Detail Level: Detailed X Size Of Lesion In Cm (Optional): 0

## 2022-10-18 ENCOUNTER — HOSPITAL ENCOUNTER (OUTPATIENT)
Dept: PAIN MANAGEMENT | Age: 47
Discharge: HOME | End: 2022-10-18
Payer: MEDICARE

## 2022-10-18 ENCOUNTER — HOSPITAL ENCOUNTER (OUTPATIENT)
Dept: PHYSICAL THERAPY | Age: 47
Setting detail: THERAPIES SERIES
Discharge: HOME | End: 2022-10-18
Payer: MEDICARE

## 2022-10-18 VITALS
HEART RATE: 83 BPM | TEMPERATURE: 96.3 F | OXYGEN SATURATION: 98 % | SYSTOLIC BLOOD PRESSURE: 153 MMHG | RESPIRATION RATE: 18 BRPM | DIASTOLIC BLOOD PRESSURE: 85 MMHG

## 2022-10-18 DIAGNOSIS — M54.10 BACK PAIN WITH LEFT-SIDED RADICULOPATHY: Primary | ICD-10-CM

## 2022-10-18 DIAGNOSIS — R52 PAIN MANAGEMENT: ICD-10-CM

## 2022-10-18 PROCEDURE — 97035 APP MDLTY 1+ULTRASOUND EA 15: CPT

## 2022-10-18 PROCEDURE — 3209999900 FLUORO FOR SURGICAL PROCEDURES

## 2022-10-18 PROCEDURE — 2580000003 HC RX 258

## 2022-10-18 PROCEDURE — 2500000003 HC RX 250 WO HCPCS

## 2022-10-18 PROCEDURE — 97110 THERAPEUTIC EXERCISES: CPT

## 2022-10-18 PROCEDURE — 62323 NJX INTERLAMINAR LMBR/SAC: CPT

## 2022-10-18 PROCEDURE — 6360000002 HC RX W HCPCS

## 2022-10-18 PROCEDURE — A4216 STERILE WATER/SALINE, 10 ML: HCPCS

## 2022-10-18 RX ORDER — METHYLPREDNISOLONE ACETATE 40 MG/ML
80 INJECTION, SUSPENSION INTRA-ARTICULAR; INTRALESIONAL; INTRAMUSCULAR; SOFT TISSUE ONCE
Status: DISCONTINUED | OUTPATIENT
Start: 2022-10-18 | End: 2022-10-20 | Stop reason: HOSPADM

## 2022-10-18 RX ORDER — SODIUM CHLORIDE 9 MG/ML
5 INJECTION INTRAVENOUS ONCE
Status: DISCONTINUED | OUTPATIENT
Start: 2022-10-18 | End: 2022-10-20 | Stop reason: HOSPADM

## 2022-10-18 RX ORDER — LIDOCAINE HYDROCHLORIDE 10 MG/ML
5 INJECTION, SOLUTION EPIDURAL; INFILTRATION; INTRACAUDAL; PERINEURAL ONCE
Status: DISCONTINUED | OUTPATIENT
Start: 2022-10-18 | End: 2022-10-20 | Stop reason: HOSPADM

## 2022-10-18 ASSESSMENT — PAIN DESCRIPTION - FREQUENCY: FREQUENCY: CONTINUOUS

## 2022-10-18 ASSESSMENT — PAIN SCALES - GENERAL: PAINLEVEL_OUTOF10: 6

## 2022-10-18 ASSESSMENT — PAIN - FUNCTIONAL ASSESSMENT: PAIN_FUNCTIONAL_ASSESSMENT: 0-10

## 2022-10-18 ASSESSMENT — PAIN DESCRIPTION - LOCATION: LOCATION: SHOULDER

## 2022-10-18 ASSESSMENT — PAIN DESCRIPTION - DESCRIPTORS: DESCRIPTORS: SORE;BURNING;ACHING

## 2022-10-18 ASSESSMENT — PAIN DESCRIPTION - PAIN TYPE: TYPE: ACUTE PAIN

## 2022-10-18 ASSESSMENT — PAIN DESCRIPTION - ORIENTATION: ORIENTATION: RIGHT

## 2022-10-18 NOTE — PROGRESS NOTES
Physical Therapy  Daily Treatment Note  Date: 10/18/2022  Patient Name: Ash Weaver  MRN: [id number]     :   1975    Referring Physician: ASHLEE Joya APRN-CNP   PCP: Braden Guerrero DO    Medical Diagnosis: Pain in right shoulder [M25.511] acute pain of right shoulder (M25.511)  No data recorded    Insurance: Payor: [facility] / Plan: [employer] / Product Type: *No Product type* /   Insurance ID: [id number] - (Medicare Managed)    Subjective:   General  Chart Reviewed: Yes  Patient Assessed for Rehabilitation Services: Yes  Additional Pertinent Hx: 51year old female previously seen at our clinic in  for back pain currently referred to PT with diagnosis of acute pain in right shoulder. Diagnosis: acute pain of right shoulder (M25.511)  Referring Provider (secondary): TIMUR Dumont  PT Visit Information  PT Insurance Information: [facility] Medicare--auth after [place], [place] Medicaid (Lonnie Garcia after 20 visit)  Total # of Visits Approved: 12 (12 visits anticipated for this diagnosis)  Total # of Visits to Date: 2  Plan of Care/Certification Expiration Date: 22  Progress Note Due Date: 22  Subjective  Subjective: I had a very busy weekend. I am worn out. Dominant Hand: : Right  Pain Screening  Patient Currently in Pain: Yes  Pain Assessment: 0-10  Pain Level: 6  Pain Type: Acute pain  Pain Location: Shoulder  Pain Orientation: Right  Pain Descriptors: Sore, Burning, Aching  Pain Frequency: Continuous       Treatment Activities:  Exercises:      Treatment Reasoning    Exercise 1: ++right shoulder pain, suspected RTC injury, reacted poorly to arm mvt.  at eval, advance slowly to tolerance  Exercise 2: overhead pulleys--3 mins  Exercise 3: finger walk x 5  Exercise 4: pendulum--4 directional x 10 each-poor quality and guarded  Exercise 5: shoulder shrugs x 10  Exercise 6: backward shoulder roll x 10  Exercise 7: sitting scapular retraction x 10  Exercise 8: isometric right shoulder with ball X 10 each  Exercise 9: 3 way right elbow flexion (no weight to light weight) x 10 each 0#  Exercise 10: red hand gripper and red ball squeeze x 1min each  Exercise 11: supine passive shoulder flexion, abduction and ER x 10 each  Exercise 12: elbow digs into mat- x 10  Exercise 13: isometric \"holding\" of right shoulder at 90 deg (will need assist/spot)-NOT TODAY  Exercise 14: sitting towel slides on table with skate-NOT TODAY  Exercise 15: ++progress to AA/AROM as tolerance appears to permit  Exercise 18: u/s to right shoulder (lateral shoulder, common deltoid insertion) 2.0w/cm2 x 8 mins                           Assessment:   Conditions Requiring Skilled Therapeutic Intervention  Body Structures, Functions, Activity Limitations Requiring Skilled Therapeutic Intervention: Decreased ADL status; Decreased ROM; Decreased strength;Decreased high-level IADLs; Increased pain;Decreased posture  Assessment: Initiated ex/ROM/US per POC. Patient did fair with session. She had facial grimmace throughout. Also noted guarded posturing. Patient more tolerant of passive shoulder ROM vs the other exercises. She reported pain at 6/10 at beginning  and end of session. However, pain level up to 8/10 during exercises. Goals:  Short Term Goals  Time Frame for Short Term Goals: 3-4 weeks  Short Term Goal 1: Patient to be independent with HEP. Short Term Goal 2: Patient independent with daily use of home pulleys. Short Term Goal 3: Patient to report less disturbance of sleep due to right arm pain. Long Term Goals  Time Frame for Long Term Goals : 4-6 weeks  Long Term Goal 1: Patient to have >= 165 degrees passive flexion of right shoulder, 135 degrees passive right shoulder abduction, >= 70 degrees shoulder ER. Long Term Goal 2: Patient able to actively perform forward flexion of right arm through 3/4 full range, full range right shoulder ER.   Long Term Goal 3: Patient to score <= 35% impairment on the general use portion of the QuickDASH. Long Term Goal 4: Patient to report less difficulty with dressing and grooming due to right arm pain. Patient Goals   Patient Goals : Return to normal use of right shoulder, have less pain, be able to sleep better.     Plan:    Physcial Therapy Plan  Plan weeks: 4-6 weeks pending insurance approval  Timed Code Treatment Minutes: 65 Minutes     Therapy Time:   Individual Concurrent Group Co-treatment   Time In 0900         Time Out 1005         Minutes 65         Timed [facility]     Electronically signed by Pj Reyna PTA on 10/18/2022 at 11:31 AM

## 2022-10-18 NOTE — INTERVAL H&P NOTE
Update History & Physical    The patient's History and Physical  was reviewed with the patient and I examined the patient. There was no change. The surgical site was confirmed by the patient and me. Plan: The risks, benefits, expected outcome, and alternative to the recommended procedure have been discussed with the patient. Patient understands and wants to proceed with the procedure.      Electronically signed by Crissy Morgan MD on 10/18/2022 at 10:51 AM

## 2022-10-21 ENCOUNTER — HOSPITAL ENCOUNTER (OUTPATIENT)
Dept: PHYSICAL THERAPY | Age: 47
Setting detail: THERAPIES SERIES
Discharge: HOME | End: 2022-10-21
Payer: MEDICARE

## 2022-10-21 PROCEDURE — 97110 THERAPEUTIC EXERCISES: CPT

## 2022-10-21 PROCEDURE — 97035 APP MDLTY 1+ULTRASOUND EA 15: CPT

## 2022-10-21 ASSESSMENT — PAIN DESCRIPTION - ORIENTATION: ORIENTATION: RIGHT

## 2022-10-21 ASSESSMENT — PAIN DESCRIPTION - LOCATION: LOCATION: SHOULDER

## 2022-10-21 ASSESSMENT — PAIN DESCRIPTION - FREQUENCY: FREQUENCY: CONTINUOUS

## 2022-10-21 ASSESSMENT — PAIN DESCRIPTION - PAIN TYPE: TYPE: ACUTE PAIN

## 2022-10-21 ASSESSMENT — PAIN DESCRIPTION - DESCRIPTORS: DESCRIPTORS: ACHING;THROBBING

## 2022-10-21 ASSESSMENT — PAIN SCALES - GENERAL: PAINLEVEL_OUTOF10: 8

## 2022-10-21 NOTE — PROGRESS NOTES
Physical Therapy  Daily Treatment Note  Date: 10/21/2022  Patient Name: Aida Arias  MRN: [id number]     :   1975      Subjective:   General  Additional Pertinent Hx: 51year old female previously seen at our clinic in  for back pain currently referred to PT with diagnosis of acute pain in right shoulder. PT Visit Information  PT Insurance Information: [facility] Le[facility] after eval, [place] Medicaid ([employer] after 20 visit)  Total # of Visits Approved: 12 (12 visits anticipated for this diagnosis)  Total # of Visits to Date: 3  Plan of Care/Certification Expiration Date: 22  Progress Note Due Date: 22  Subjective: Hurting this morning. Pain Screening  Patient Currently in Pain: Yes  Pain Assessment: 0-10  Pain Level: 8  Pain Type: Acute pain  Pain Location: Shoulder  Pain Orientation: Right  Pain Descriptors: Aching; Throbbing  Pain Frequency: Continuous         Treatment Activities:    Exercises  Exercise 1: ++right shoulder pain, suspected RTC injury, reacted poorly to arm mvt.  at eval, advance slowly to tolerance  Exercise 2: overhead pulleys--3 mins  Exercise 3: finger walk x 5  Exercise 4: pendulum--4 directional x 10 each-poor quality and guarded  Exercise 5: shoulder shrugs x 10  Exercise 6: backward shoulder roll x 10  Exercise 7: sitting scapular retraction x 10  Exercise 8: isometric right shoulder with ball X 10 each  Exercise 9: 3 way right elbow flexion (no weight to light weight) x 10 each 1  pound weight  Exercise 10: red hand gripper and red ball squeeze x 1min each  Exercise 11: supine passive shoulder flexion, abduction and ER x 10 each  Exercise 12: elbow digs into mat- x 10  Exercise 13: isometric \"holding\" of right shoulder at 90 deg (will need assist/spot) x 4 for 10 seconds with assist into position  Exercise 14: sitting towel slides on table with skate- 1 minute each direction  Exercise 15: ++progress to AA/AROM as tolerance appears to permit  Exercise 18: u/s to right shoulder (lateral shoulder, common deltoid insertion) 2.0w/cm2 x 8 mins    Assessment:   Conditions Requiring Skilled Therapeutic Intervention  Body Structures, Functions, Activity Limitations Requiring Skilled Therapeutic Intervention: Decreased ADL status; Decreased ROM; Decreased strength;Decreased high-level IADLs; Increased pain;Decreased posture  Assessment: HEP issued. Patient was performing exericses well with minimal cues. She had no questions about HEP and was instructed to perform once daily and twice if possible. She reported decrease throbbing post session and pain was less intense. Therapy Prognosis: Fair      Goals:Short Term Goals  Time Frame for Short Term Goals: 3-4 weeks  Short Term Goal 1: Patient to be independent with HEP. Short Term Goal 2: Patient independent with daily use of home pulleys. Short Term Goal 3: Patient to report less disturbance of sleep due to right arm pain. Long Term Goals  Time Frame for Long Term Goals : 4-6 weeks  Long Term Goal 1: Patient to have >= 165 degrees passive flexion of right shoulder, 135 degrees passive right shoulder abduction, >= 70 degrees shoulder ER. Long Term Goal 2: Patient able to actively perform forward flexion of right arm through 3/4 full range, full range right shoulder ER. Long Term Goal 3: Patient to score <= 35% impairment on the general use portion of the QuickDASH. Long Term Goal 4: Patient to report less difficulty with dressing and grooming due to right arm pain. Patient Goals   Patient Goals : Return to normal use of right shoulder, have less pain, be able to sleep better.     Plan:    Physcial Therapy Plan  Plan weeks: 4-6 weeks pending insurance approval    Therapy Time   Individual Concurrent Group Co-treatment   Time In 0802         Time Out 0900         Minutes 62          Electronically signed by Rashawn Boogie PTA on 10/21/2022 at 9:43 AM

## 2022-10-24 ENCOUNTER — TELEPHONE (OUTPATIENT)
Dept: PAIN MANAGEMENT | Age: 47
End: 2022-10-24

## 2022-10-24 DIAGNOSIS — M54.10 BACK PAIN WITH LEFT-SIDED RADICULOPATHY: Primary | ICD-10-CM

## 2022-10-25 ENCOUNTER — HOSPITAL ENCOUNTER (OUTPATIENT)
Dept: PHYSICAL THERAPY | Age: 47
Setting detail: THERAPIES SERIES
Discharge: HOME | End: 2022-10-25
Payer: MEDICARE

## 2022-10-25 PROCEDURE — 97110 THERAPEUTIC EXERCISES: CPT

## 2022-10-25 PROCEDURE — 97035 APP MDLTY 1+ULTRASOUND EA 15: CPT

## 2022-10-25 ASSESSMENT — PAIN DESCRIPTION - DESCRIPTORS: DESCRIPTORS: BURNING

## 2022-10-25 ASSESSMENT — PAIN DESCRIPTION - PAIN TYPE: TYPE: ACUTE PAIN

## 2022-10-25 ASSESSMENT — PAIN DESCRIPTION - FREQUENCY: FREQUENCY: CONTINUOUS

## 2022-10-25 ASSESSMENT — PAIN DESCRIPTION - ORIENTATION: ORIENTATION: RIGHT

## 2022-10-25 ASSESSMENT — PAIN DESCRIPTION - LOCATION: LOCATION: SHOULDER

## 2022-10-25 ASSESSMENT — PAIN SCALES - GENERAL: PAINLEVEL_OUTOF10: 7

## 2022-10-25 NOTE — PROGRESS NOTES
Physical Therapy  Daily Treatment Note  Date: 10/25/2022  Patient Name: Alisson Hendrickson  MRN: [id number]     :   1975    Referring Physician: ASHLEE Mcfadden * TIMUR You   PCP: Marjorie Murrell DO    Medical Diagnosis: Pain in right shoulder [M25.511] acute pain of right shoulder (M25.511)  No data recorded    Insurance: Payor: [employer] / Plan: Kiddie Kist / Product Type: *No Product type* /   Insurance ID: [id number] - (Medicare Managed)    Subjective:   General  Additional Pertinent Hx: 51year old female previously seen at our clinic in  for back pain currently referred to PT with diagnosis of acute pain in right shoulder. Diagnosis: acute pain of right shoulder (M25.511)  Referring Provider (secondary): TIMUR You  PT Visit Information  PT Insurance Information: [employer] Medicare--auth after [facility], [place] Medicaid (Danisha Benitez after 20 visit)  Total # of Visits Approved: 12 (12 visits anticipated for this diagnosis)  Total # of Visits to Date: 4  Plan of Care/Certification Expiration Date: 22  Progress Note Due Date: 22  Subjective  Subjective: My shoulder is burning this morning. I didnt do anything different. Dominant Hand: : Right  Pain Screening  Patient Currently in Pain: Yes  Pain Assessment: 0-10  Pain Level: 7  Pain Type: Acute pain  Pain Location: Shoulder  Pain Orientation: Right  Pain Descriptors: Burning  Pain Frequency: Continuous       Treatment Activities:  Exercises:      Treatment Reasoning    Exercise 1: ++right shoulder pain, suspected RTC injury, reacted poorly to arm mvt.  at eval, advance slowly to tolerance  Exercise 2: overhead pulleys--3 mins  Exercise 3: finger walk x 5  Exercise 4: pendulum--4 directional x 10 each-poor quality and guarded  Exercise 5: shoulder shrugs x 10  Exercise 6: backward shoulder roll x 10  Exercise 7: sitting scapular retraction x 10  Exercise 8: isometric right shoulder with ball X 10 each-seated  Exercise 9: 3 way right elbow flexion (no weight to light weight) x 10 each 1  pound weight  Exercise 10: red hand gripper and red ball squeeze x 1min each  Exercise 11: supine passive shoulder flexion, abduction and ER x 10 each  Exercise 12: elbow digs into mat- x 10  Exercise 13: isometric \"holding\" of right shoulder at 90 deg (will need assist/spot) x 4 for 10 seconds with assist into position  Exercise 14: sitting towel slides on table with skate- 1 minute each direction  Exercise 15: ++progress to AA/AROM as tolerance appears to permit  Exercise 18: u/s to right shoulder (lateral shoulder, common deltoid insertion) 2.0w/cm2 x 8 mins                           Assessment:   Conditions Requiring Skilled Therapeutic Intervention  Body Structures, Functions, Activity Limitations Requiring Skilled Therapeutic Intervention: Decreased ADL status; Decreased ROM; Decreased strength;Decreased high-level IADLs; Increased pain;Decreased posture  Assessment: Patient reporting continued right shoulder issues. She relates therapy is somewhat helpful with slight relieft only temporarily. She is able to complete all ex asked of her. Some modification needed at times as she felt more off balance today and needed to sit for those she could. Pain rated at 7/10 pre session and 5-6/10 post.  Therapy Prognosis: Fair        Goals:  Short Term Goals  Time Frame for Short Term Goals: 3-4 weeks  Short Term Goal 1: Patient to be independent with HEP. Short Term Goal 2: Patient independent with daily use of home pulleys. Short Term Goal 3: Patient to report less disturbance of sleep due to right arm pain. Long Term Goals  Time Frame for Long Term Goals : 4-6 weeks  Long Term Goal 1: Patient to have >= 165 degrees passive flexion of right shoulder, 135 degrees passive right shoulder abduction, >= 70 degrees shoulder ER.   Long Term Goal 2: Patient able to actively perform forward flexion of right arm through 3/4 full range, full range right shoulder ER. Long Term Goal 3: Patient to score <= 35% impairment on the general use portion of the QuickDASH. Long Term Goal 4: Patient to report less difficulty with dressing and grooming due to right arm pain. Patient Goals   Patient Goals : Return to normal use of right shoulder, have less pain, be able to sleep better.     Plan:    Physcial Therapy Plan  Plan weeks: 4-6 weeks pending insurance approval  Timed Code Treatment Minutes: 47 Minutes     Therapy Time:   Individual Concurrent Group Co-treatment   Time In 0900         Time Out 0954         Minutes 54         Timed [facility] [address] [facility]   Electronically signed by Preet Thomson PTA on 10/25/2022 at 11:32 AM

## 2022-10-28 ENCOUNTER — APPOINTMENT (OUTPATIENT)
Dept: PHYSICAL THERAPY | Age: 47
End: 2022-10-28
Payer: MEDICARE

## 2022-10-31 DIAGNOSIS — E66.01 MORBID OBESITY (HCC): ICD-10-CM

## 2022-10-31 DIAGNOSIS — R63.5 WEIGHT GAIN: ICD-10-CM

## 2022-10-31 NOTE — TELEPHONE ENCOUNTER
Received fax from pharmacy requesting refill on pts medication(s). Pt was last seen in office on 5/12/2022  and has a follow up scheduled for Visit date not found. Will send request to  Dr. Patricia Peña  for authorization.      Requested Prescriptions     Pending Prescriptions Disp Refills    metFORMIN (GLUCOPHAGE) 1000 MG tablet [Pharmacy Med Name: METFORMIN 1000MG TABLETS] 180 tablet 3     Sig: TAKE 1 TABLET BY MOUTH TWICE DAILY WITH MEALS

## 2022-11-01 ENCOUNTER — HOSPITAL ENCOUNTER (OUTPATIENT)
Dept: PHYSICAL THERAPY | Age: 47
Setting detail: THERAPIES SERIES
Discharge: HOME | End: 2022-11-01
Payer: MEDICARE

## 2022-11-01 PROCEDURE — 97110 THERAPEUTIC EXERCISES: CPT

## 2022-11-01 PROCEDURE — 97035 APP MDLTY 1+ULTRASOUND EA 15: CPT

## 2022-11-01 ASSESSMENT — PAIN DESCRIPTION - ORIENTATION: ORIENTATION: RIGHT

## 2022-11-01 ASSESSMENT — PAIN DESCRIPTION - LOCATION: LOCATION: SHOULDER

## 2022-11-01 ASSESSMENT — PAIN DESCRIPTION - PAIN TYPE: TYPE: ACUTE PAIN

## 2022-11-01 ASSESSMENT — PAIN DESCRIPTION - DESCRIPTORS: DESCRIPTORS: BURNING;ACHING;DISCOMFORT;THROBBING

## 2022-11-01 ASSESSMENT — PAIN SCALES - GENERAL: PAINLEVEL_OUTOF10: 8

## 2022-11-01 NOTE — PROGRESS NOTES
Physical Therapy  Daily Treatment Note  Date: 2022  Patient Name: Tramaine Castillo  MRN: [id number]     :   1975    Subjective:      PT Visit Information  PT Insurance Information: Martinez Perrin after [place], [place] Medicaid (Rae Ledbetter after 20 visit)  Total # of Visits Approved: 12 (12 visits anticipated for this diagnosis)  Total # of Visits to Date: 5  Plan of Care/Certification Expiration Date: 22  Progress Note Due Date: 22  Subjective: My shoulder is hurting pretty good this morning. Pain Screening  Patient Currently in Pain: Yes  Pain Assessment: 0-10  Pain Level: 8  Pain Type: Acute pain  Pain Location: Shoulder  Pain Orientation: Right  Pain Descriptors: Burning;Aching;Discomfort; Throbbing       Treatment Activities:   Exercises  Exercise 1: ++right shoulder pain, suspected RTC injury, reacted poorly to arm mvt.  at eval, advance slowly to tolerance  Exercise 2: overhead pulleys--3 mins  Exercise 3: finger walk x 5  Exercise 4: pendulum--4 directional x 10 each-poor quality and guarded  Exercise 5: shoulder shrugs x 10  Exercise 6: backward shoulder roll x 10  Exercise 7: sitting scapular retraction x 10  Exercise 8: isometric right shoulder with ball X 10 each-seated  Exercise 9: 3 way right elbow flexion (no weight to light weight) x 15 each 1  pound weight  Exercise 10: red hand gripper and red ball squeeze x 1min each  Exercise 11: supine passive shoulder flexion, abduction and ER x 10 each  Exercise 12: elbow digs into mat- x 15  Exercise 13: isometric \"holding\" of right shoulder at 90 deg (will need assist/spot) x 5 for 10 seconds with assist into position  Exercise 14: sitting towel slides on table with skate- 1 minute each direction  Exercise 15: ++progress to AA/AROM as tolerance appears to permit  Exercise 18: u/s to right shoulder (lateral shoulder, common deltoid insertion) 2.0w/cm2 x 8 mins     Assessment:   Conditions Requiring Skilled Therapeutic Intervention  Body Structures, Functions, Activity Limitations Requiring Skilled Therapeutic Intervention: Decreased ADL status; Decreased ROM; Decreased strength;Decreased high-level IADLs; Increased pain;Decreased posture  Assessment: Patient presents with continued R shoulder pain this morning and states that it has been bothering her all weekend. She complete all exercises this date with some at increased reps. Some noted increased discomfort on and off with tx especially with R ER isometric exercise. Continued decreased motion noted with pulleys and finger walk. Will continue with current POC and progress as she tolerates. Goals:Short Term Goals  Time Frame for Short Term Goals: 3-4 weeks  Short Term Goal 1: Patient to be independent with HEP. Short Term Goal 2: Patient independent with daily use of home pulleys. Short Term Goal 3: Patient to report less disturbance of sleep due to right arm pain. Long Term Goals  Time Frame for Long Term Goals : 4-6 weeks  Long Term Goal 1: Patient to have >= 165 degrees passive flexion of right shoulder, 135 degrees passive right shoulder abduction, >= 70 degrees shoulder ER. Long Term Goal 2: Patient able to actively perform forward flexion of right arm through 3/4 full range, full range right shoulder ER. Long Term Goal 3: Patient to score <= 35% impairment on the general use portion of the QuickDASH. Long Term Goal 4: Patient to report less difficulty with dressing and grooming due to right arm pain. Patient Goals   Patient Goals : Return to normal use of right shoulder, have less pain, be able to sleep better.     Plan:    Physcial Therapy Plan  Plan weeks: 4-6 weeks pending insurance approval  Timed Code Treatment Minutes: 62 Minutes     Therapy Time   Individual Concurrent Group Co-treatment   Time In 7429         Time Out 3657         Minutes 58         Timed Code Treatment Minutes: 62 Minutes     Electronically signed by Jhonatan Quinonez PTA on 11/1/2022 at 9:54 AM

## 2022-11-04 ENCOUNTER — HOSPITAL ENCOUNTER (OUTPATIENT)
Dept: PHYSICAL THERAPY | Age: 47
Setting detail: THERAPIES SERIES
End: 2022-11-04
Payer: MEDICARE

## 2022-11-04 ENCOUNTER — HOSPITAL ENCOUNTER (OUTPATIENT)
Dept: PHYSICAL THERAPY | Age: 47
Setting detail: THERAPIES SERIES
Discharge: HOME | End: 2022-11-04
Payer: MEDICARE

## 2022-11-04 PROCEDURE — 97110 THERAPEUTIC EXERCISES: CPT

## 2022-11-04 ASSESSMENT — PAIN DESCRIPTION - ORIENTATION: ORIENTATION: RIGHT

## 2022-11-04 ASSESSMENT — PAIN SCALES - GENERAL: PAINLEVEL_OUTOF10: 8

## 2022-11-04 ASSESSMENT — PAIN DESCRIPTION - FREQUENCY: FREQUENCY: CONTINUOUS

## 2022-11-04 ASSESSMENT — PAIN - FUNCTIONAL ASSESSMENT: PAIN_FUNCTIONAL_ASSESSMENT: PREVENTS OR INTERFERES SOME ACTIVE ACTIVITIES AND ADLS

## 2022-11-04 ASSESSMENT — PAIN DESCRIPTION - ONSET: ONSET: AWAKENED FROM SLEEP

## 2022-11-04 ASSESSMENT — PAIN DESCRIPTION - LOCATION: LOCATION: SHOULDER

## 2022-11-04 ASSESSMENT — PAIN DESCRIPTION - DESCRIPTORS: DESCRIPTORS: ACHING

## 2022-11-04 NOTE — PROGRESS NOTES
Physical Therapy  Daily Treatment Note  Date: 2022  Patient Name: Sharyle Killer  MRN: [id number]     :   1975    Referring Physician: ASHLEE Nieves * TIMUR Lara   PCP: Laurie Chatterjee DO    Medical Diagnosis: Pain in right shoulder [M25.511] acute pain of right shoulder (M25.511)  No data recorded    Insurance: Payor: Cris Bud / Plan: [employer] / Product Type: *No Product type* /   Insurance ID: [id number] - (Medicare Managed)    Subjective:   General  Patient Assessed for Rehabilitation Services: Yes  Additional Pertinent Hx: 51year old female previously seen at our clinic in  for back pain currently referred to PT with diagnosis of acute pain in right shoulder. Diagnosis: acute pain of right shoulder (M25.511)  Referring Provider (secondary): TIMUR Lara  PT Visit Information  PT Insurance Information: [employer] Medicare--auth after [place], [place] Medicaid ([address] [facility] after 20 visit)  Total # of Visits Approved: 12 (12 visits anticipated for this diagnosis)  Total # of Visits to Date: 6  Plan of Care/Certification Expiration Date: 22  Progress Note Due Date: 22  Subjective  Subjective: She rates shoulder pain at 8/10 this morning. Dominant Hand: : Right  Pain Screening  Patient Currently in Pain: Yes  Pain Assessment: 0-10  Pain Level: 8  Best Pain Level: 3  Worst Pain Level: 9  Pain Location: Shoulder  Pain Orientation: Right  Pain Descriptors: Aching  Pain Frequency: Continuous  Pain Onset: Awakened from sleep  Functional Pain Assessment: Prevents or interferes some active activities and ADLs  Aggravating factors: Lifting, Laying on involved side       Treatment Activities:  Exercises:      Treatment Reasoning    Exercise 1: ++right shoulder pain, suspected RTC injury, reacted poorly to arm mvt.  at eval, advance slowly to tolerance  Exercise 2: overhead pulleys--3 mins  Exercise 3: finger walk x 5  not today  Exercise 4: pendulum--4 directional x 10 each-poor quality and guarded  not today  Exercise 5: shoulder shrugs x 10  not today  Exercise 6: backward shoulder roll x 10  not today  Exercise 7: sitting scapular retraction x 10  not today  Exercise 8: isometric right shoulder with ball X 10 each-seated  not today  Exercise 9: 3 way right elbow flexion (no weight to light weight) x 15 each 1  pound weight  not today  Exercise 10: red hand gripper and red ball squeeze x 1min each  not today  Exercise 11: supine passive shoulder flexion, abduction and ER x 10 each  not today  Exercise 12: elbow digs into mat- x 15  not today  Exercise 13: isometric \"holding\" of right shoulder at 90 deg (will need assist/spot) x 5 for 10 seconds with assist into position  Exercise 14: sitting towel slides on table with skate- 1 minute each direction  not today  Exercise 15: ++progress to AA/AROM as tolerance appears to permit  Exercise 18: u/s to right shoulder (lateral shoulder, common deltoid insertion) 2.0w/cm2 x 8 mins  not today    Limitations addressed: Mobility, Strength, Flexibility, Pain modulation  Therapist provided: Verbal cuing  Progressed: Repetitions                      Assessment:   Conditions Requiring Skilled Therapeutic Intervention  Body Structures, Functions, Activity Limitations Requiring Skilled Therapeutic Intervention: Decreased ADL status; Decreased ROM; Decreased strength;Decreased high-level IADLs; Increased pain;Decreased posture  Assessment: Ms. Fletcher Gottron continues to relate right shoulder pain and dysfunction. Her QuickDASH self-assessment impairment at initial eval was 75%. Her impairment rating today was  77%. She demonstrates increased active ROM of right shoulder. She is able to raise RUE overhead. She had increased pain with resisted isometric right shoulder external rotation consistent with ER mm pathology. I recomeeend continuation of current PT POC.   Therapy Prognosis: Fair  Decision Making: Medium Complexity  Requires PT Follow-Up: Yes      G-Code:       OutComes Score:                 QuickDASH Total Score: 45 (11/04/22 0911)       QuickDASH Disability/Symptom Score : 77.27 % (11/04/22 0911)                           Goals:  Short Term Goals  Time Frame for Short Term Goals: 3-4 weeks  Short Term Goal 1: Patient to be independent with HEP. STG 1 Current Status[de-identified] in progress  STG Goal 1 Status[de-identified] In progress  Short Term Goal 2: Patient independent with daily use of home pulleys. STG 2 Current Status[de-identified] MET  STG Goal 2 Status[de-identified] Met  Short Term Goal 3: Patient to report less disturbance of sleep due to right arm pain. STG 3 Current Status[de-identified] Still with troubel sleeping, in progress  STG Goal 3 Status[de-identified] In progress  Long Term Goals  Time Frame for Long Term Goals : 4-6 weeks  Long Term Goal 1: Patient to have >= 165 degrees passive flexion of right shoulder, 135 degrees passive right shoulder abduction, >= 70 degrees shoulder ER. LTG 1 Current Status[de-identified] ROM goals mett, see measurements. LTG Goal 1 Status[de-identified] Met  Long Term Goal 2: Patient able to actively perform forward flexion of right arm through 3/4 full range, full range right shoulder ER. LTG 2 Current Status[de-identified] Active standing Right shoulder flex: 59 degrees,  in progress  Long Term Goal 3: Patient to score <= 35% impairment on the general use portion of the QuickDASH. LTG 3 Current Status[de-identified] Sujatha Balm: 77% impairment  LTG Goal 3 Status[de-identified] Not Met  Long Term Goal 4: Patient to report less difficulty with dressing and grooming due to right arm pain. LTG 4 Current Status[de-identified] Not  met  LTG Goal 4 Status[de-identified] Not Met  Patient Goals   Patient Goals : Return to normal use of right shoulder, have less pain, be able to sleep better.     Plan:    Physcial Therapy Plan  Plan weeks: 4-6 weeks pending insurance approval  Current Treatment Recommendations: Strengthening, ROM, Balance training, Functional mobility training, Transfer training, ADL/Self-care training, IADL training, Endurance training, Manual Therapy - Joint Manipulation, Manual Therapy - Soft Tissue Mobilization, Neuromuscular re-education, Stair training, Pain management, Home exercise program, Therapeutic activities, Positioning, Modalities  Timed Code Treatment Minutes: 45 Minutes     Therapy Time:   Individual Concurrent Group Co-treatment   Time In 0900         Time Out 0945         Minutes 45         Timed Code Treatment Minutes: [address], PT

## 2022-11-08 ENCOUNTER — TELEPHONE (OUTPATIENT)
Dept: PAIN MANAGEMENT | Age: 47
End: 2022-11-08

## 2022-11-08 NOTE — TELEPHONE ENCOUNTER
Call to patient with results of right shoulder xray. Per Moe Negron, right shoulder is unremarkable at this time. Patient verbalized understanding. Next office visit on 12/6/22.

## 2022-11-09 ENCOUNTER — APPOINTMENT (OUTPATIENT)
Dept: PHYSICAL THERAPY | Age: 47
End: 2022-11-09
Payer: MEDICARE

## 2022-11-09 ENCOUNTER — HOSPITAL ENCOUNTER (OUTPATIENT)
Dept: PHYSICAL THERAPY | Age: 47
Setting detail: THERAPIES SERIES
Discharge: HOME | End: 2022-11-09
Payer: MEDICARE

## 2022-11-09 DIAGNOSIS — M54.16 CHRONIC LUMBAR RADICULOPATHY: ICD-10-CM

## 2022-11-09 PROCEDURE — 97110 THERAPEUTIC EXERCISES: CPT

## 2022-11-09 ASSESSMENT — PAIN DESCRIPTION - PAIN TYPE: TYPE: CHRONIC PAIN

## 2022-11-09 ASSESSMENT — PAIN DESCRIPTION - LOCATION: LOCATION: ARM;SHOULDER

## 2022-11-09 ASSESSMENT — PAIN DESCRIPTION - ONSET: ONSET: AWAKENED FROM SLEEP

## 2022-11-09 ASSESSMENT — PAIN DESCRIPTION - ORIENTATION: ORIENTATION: RIGHT

## 2022-11-09 ASSESSMENT — PAIN DESCRIPTION - DESCRIPTORS: DESCRIPTORS: ACHING;SHARP;TENDER

## 2022-11-09 ASSESSMENT — PAIN DESCRIPTION - FREQUENCY: FREQUENCY: CONTINUOUS

## 2022-11-09 ASSESSMENT — PAIN SCALES - GENERAL: PAINLEVEL_OUTOF10: 5

## 2022-11-09 NOTE — PROGRESS NOTES
Physical Therapy  Daily Treatment Note/Reassessment  Date: 2022  Patient Name: Aida Arias  MRN: [id number]     :   1975    Referring Physician: ASHLEE Barajas APRN-CNP   PCP: Catherine Ding DO    Medical Diagnosis: Pain in right shoulder [M25.511] acute pain of right shoulder (M25.511)  No data recorded    Insurance: Payor: Dino Chang / Plan: Mariela Hernandes / Product Type: *No Product type* /   Insurance ID: [id number] - (Medicare Managed)    Subjective:   General  Patient Assessed for Rehabilitation Services: Yes  Additional Pertinent Hx: 51year old female previously seen at our clinic in  for back pain currently referred to PT with diagnosis of acute pain in right shoulder. Diagnosis: acute pain of right shoulder (M25.511)  Referring Provider (secondary): TIMUR Dao  PT Visit Information  PT Insurance Information: [employer] Medicare--auth after eval, [place] Medicaid (Wendy Varela after 20 visit)  Total # of Visits Approved: 12 (12 visits anticipated for this diagnosis)  Total # of Visits to Date: 7  Plan of Care/Certification Expiration Date: 22  Progress Note Due Date: 22  Subjective  Subjective: She rates right shoulder pain at 5/10 this morning but \"I took pain medicine\". She relates increased right shoulder pain since 2022 when she was transferring from sitting to standing when she felt clicking in right shoulder. She now has right pain in the area of anterior elbow. She relates constant pain. Prior diagnostic testing[de-identified] X-ray  Dominant Hand: : Right  Pain Screening  Patient Currently in Pain: Yes  Pain Assessment: Emery-Baker FACES  Pain Level: 5  Best Pain Level: 3  Worst Pain Level: 9  Pain Type: Chronic pain  Pain Location: Arm, Shoulder  Pain Orientation: Right  Pain Descriptors: Aching, Sharp, Tender  Pain Frequency: Continuous  Pain Onset: Awakened from sleep  Aggravating factors:  Movement, Sleeping       Treatment Activities:  Exercises:      Treatment Reasoning    Exercise 1: ++right shoulder pain, suspected RTC injury, reacted poorly to arm mvt. at eval, advance slowly to tolerance  Exercise 2: overhead pulleys--3 mins  Exercise 3: finger walk x 5 , 3 min  Exercise 4: pendulum--4 directional x 10 each-poor quality and guarded  Exercise 5: shoulder shrugs x 10  not today  Exercise 6: backward shoulder roll x 10  Exercise 7: sitting scapular retraction x 10  not today  Exercise 8: isometric right shoulder with ball X 10 each-seated  Exercise 9: 3 way right elbow flexion (no weight to light weight) x 15 each 1  pound weight  Exercise 10: red hand gripper and red ball squeeze x 1min each  Exercise 11: supine passive shoulder flexion, abduction and ER x 10 each  not today  Exercise 12: elbow digs into mat- x 15  not today  Exercise 13: isometric \"holding\" of right shoulder at 90 deg (will need assist/spot) x 5 for 10 seconds with assist into position  not today  Exercise 14: sitting towel slides on table with skate- 1 minute each direction  not today  Exercise 15: ++progress to AA/AROM as tolerance appears to permit  Exercise 18: u/s to right shoulder (lateral shoulder, common deltoid insertion) 2.0w/cm2 x 8 mins  not today    Limitations addressed: Mobility, Strength, Flexibility, Pain modulation  Therapist provided: Verbal cuing  Progressed: Repetitions                      Assessment:   Conditions Requiring Skilled Therapeutic Intervention  Body Structures, Functions, Activity Limitations Requiring Skilled Therapeutic Intervention: Decreased ADL status; Decreased ROM; Decreased strength;Decreased high-level IADLs; Increased pain;Decreased posture  Assessment: Ms. Kade Montano continues to relate right shoulder pain and dysfunction. Her QuickDASH self-assessment impairment at initial eval was 75%. Her impairment rating today was  77%. She demonstrates increased active ROM of right shoulder. She is able to raise RUE overhead. She had increased pain with resisted isometric right shoulder external rotation consistent with ER mm pathology. I recomeeend continuation of current PT POC. Therapy Prognosis: Fair  Decision Making: Medium Complexity  Requires PT Follow-Up: Yes      G-Code:       OutComes Score:                 QuickDASH Total Score: 45 (11/09/22 0953)       QuickDASH Disability/Symptom Score : 77.27 % (11/09/22 0953)                           Goals:  Short Term Goals  Time Frame for Short Term Goals: 3-4 weeks  Short Term Goal 1: Patient to be independent with HEP. STG 1 Current Status[de-identified] in progress  STG Goal 1 Status[de-identified] In progress  Short Term Goal 2: Patient independent with daily use of home pulleys. STG 2 Current Status[de-identified] MET  STG Goal 2 Status[de-identified] Met  Short Term Goal 3: Patient to report less disturbance of sleep due to right arm pain. STG 3 Current Status[de-identified] Still with troubel sleeping, in progress  STG Goal 3 Status[de-identified] In progress  Long Term Goals  Time Frame for Long Term Goals : 4-6 weeks  Long Term Goal 1: Patient to have >= 165 degrees passive flexion of right shoulder, 135 degrees passive right shoulder abduction, >= 70 degrees shoulder ER. LTG 1 Current Status[de-identified] ROM goals mett, see measurements. LTG Goal 1 Status[de-identified] Met  Long Term Goal 2: Patient able to actively perform forward flexion of right arm through 3/4 full range, full range right shoulder ER. LTG 2 Current Status[de-identified] Active standing Right shoulder flex: 59 degrees,  in progress  Long Term Goal 3: Patient to score <= 35% impairment on the general use portion of the QuickDASH. LTG 3 Current Status[de-identified] Clemencia Calero: 77% impairment  LTG Goal 3 Status[de-identified] Not Met  Long Term Goal 4: Patient to report less difficulty with dressing and grooming due to right arm pain. LTG 4 Current Status[de-identified] Not  met  LTG Goal 4 Status[de-identified] Not Met  Patient Goals   Patient Goals : Return to normal use of right shoulder, have less pain, be able to sleep better.     Plan:    Physcial Therapy Plan  Plan weeks: 4-6 weeks pending insurance approval  Current Treatment Recommendations: Strengthening, ROM, Balance training, Functional mobility training, Transfer training, ADL/Self-care training, IADL training, Endurance training, Manual Therapy - Joint Manipulation, Manual Therapy - Soft Tissue Mobilization, Neuromuscular re-education, Stair training, Pain management, Home exercise program, Therapeutic activities, Positioning, Modalities  Timed Code Treatment Minutes: 55 Minutes     Therapy Time:   Individual Concurrent Group Co-treatment   Time In 0900         Time Out [phone]         Minutes 55         Timed Code Treatment Minutes: [address], PT

## 2022-11-12 DIAGNOSIS — M79.18 MYOFASCIAL MUSCLE PAIN: ICD-10-CM

## 2022-11-14 RX ORDER — CYCLOBENZAPRINE HCL 10 MG
10 TABLET ORAL 3 TIMES DAILY PRN
Qty: 270 TABLET | Refills: 0 | Status: SHIPPED | OUTPATIENT
Start: 2022-11-14

## 2022-11-14 NOTE — TELEPHONE ENCOUNTER
Received fax from pharmacy requesting refill on pts medication(s). Pt was last seen in office on 5/12/2022  and has a follow up scheduled for Visit date not found. Will send request to  Dr. Odalys Petit  for authorization.      Requested Prescriptions     Pending Prescriptions Disp Refills    cyclobenzaprine (FLEXERIL) 10 MG tablet [Pharmacy Med Name: CYCLOBENZAPRINE 10MG TABLETS] 270 tablet 0     Sig: TAKE 1 TABLET BY MOUTH THREE TIMES DAILY AS NEEDED FOR MUSCLE SPASMS

## 2022-11-15 ENCOUNTER — HOSPITAL ENCOUNTER (OUTPATIENT)
Dept: PHYSICAL THERAPY | Age: 47
Setting detail: THERAPIES SERIES
Discharge: HOME | End: 2022-11-15
Payer: MEDICARE

## 2022-11-15 PROCEDURE — 97035 APP MDLTY 1+ULTRASOUND EA 15: CPT

## 2022-11-15 PROCEDURE — 97110 THERAPEUTIC EXERCISES: CPT

## 2022-11-15 RX ORDER — OXYCODONE HYDROCHLORIDE 5 MG/1
5 TABLET ORAL EVERY 6 HOURS PRN
Qty: 90 TABLET | Refills: 0 | Status: SHIPPED | OUTPATIENT
Start: 2022-11-15 | End: 2022-12-15

## 2022-11-15 ASSESSMENT — PAIN DESCRIPTION - DESCRIPTORS: DESCRIPTORS: ACHING;SORE

## 2022-11-15 ASSESSMENT — PAIN DESCRIPTION - PAIN TYPE: TYPE: CHRONIC PAIN

## 2022-11-15 ASSESSMENT — PAIN SCALES - GENERAL: PAINLEVEL_OUTOF10: 8

## 2022-11-15 ASSESSMENT — PAIN DESCRIPTION - ORIENTATION: ORIENTATION: RIGHT

## 2022-11-15 ASSESSMENT — PAIN DESCRIPTION - LOCATION: LOCATION: ARM;SHOULDER

## 2022-11-15 NOTE — PROGRESS NOTES
Physical Therapy  Daily Treatment Note  Date: 11/15/2022  Patient Name: Elliot Chang  MRN: [id number]     :   1975    Subjective:      PT Visit Information  PT Insurance Information: Alex Pharr after eval, [id number] Medicaid ([facility] after 20 visit)  Total # of Visits Approved: 12 (12 visits anticipated for this diagnosis)  Total # of Visits to Date: 8  Plan of Care/Certification Expiration Date: 22  Progress Note Due Date: 22  Subjective: My pain is pretty bad this morning and the weather is part of it im sure. Pain Screening  Patient Currently in Pain: Yes  Pain Assessment: 0-10  Pain Level: 8  Pain Type: Chronic pain  Pain Location: Arm; Shoulder  Pain Orientation: Right  Pain Descriptors: Aching; Sore         Treatment Activities:   Exercises  Exercise 1: ++right shoulder pain, suspected RTC injury, reacted poorly to arm mvt.  at eval, advance slowly to tolerance  Exercise 2: overhead pulleys--3 mins  Exercise 3: finger walk x 5 , 3 min  Exercise 4: pendulum--4 directional x 10 each-poor quality and guarded  Exercise 5: shoulder shrugs x 10  Exercise 6: backward shoulder roll x 10  Exercise 7: sitting scapular retraction x 10  Exercise 8: isometric right shoulder with ball X 10 each-seated  Exercise 9: 3 way right elbow flexion (no weight to light weight) x 15 each 1  pound weight  Exercise 10: red hand gripper and red ball squeeze x 1min 30 sec each  Exercise 11: supine passive shoulder flexion, abduction and ER x 10 each  Exercise 12: elbow digs into mat- x 15  Exercise 13: isometric \"holding\" of right shoulder at 90 deg (will need assist/spot) x 5 for 10 seconds with assist into position  Exercise 14: sitting towel slides on table with skate- 1 minute 30 sec each direction  Exercise 15: ++progress to AA/AROM as tolerance appears to permit  Exercise 18: u/s to right shoulder (lateral shoulder, common deltoid insertion) 2.0w/cm2 x 8 mins     Assessment:   Conditions Requiring Skilled Therapeutic Intervention  Body Structures, Functions, Activity Limitations Requiring Skilled Therapeutic Intervention: Decreased ADL status; Decreased ROM; Decreased strength;Decreased high-level IADLs; Increased pain;Decreased posture  Assessment: Patient tolerates full exercises this date having noted increased discomfort on and off but able to complete listed reps. ER continues to be the most painful position with PROM but she is able to tolerate full range. Some cues needed for technique for max exercise benefit. Will progress as she tolerates. Goals:Short Term Goals  Time Frame for Short Term Goals: 3-4 weeks  Short Term Goal 1: Patient to be independent with HEP. STG 1 Current Status[de-identified] in progress  STG Goal 1 Status[de-identified] In progress  Short Term Goal 2: Patient independent with daily use of home pulleys. STG 2 Current Status[de-identified] MET  STG Goal 2 Status[de-identified] Met  Short Term Goal 3: Patient to report less disturbance of sleep due to right arm pain. STG 3 Current Status[de-identified] Still with troubel sleeping, in progress  STG Goal 3 Status[de-identified] In progress  Long Term Goals  Time Frame for Long Term Goals : 4-6 weeks  Long Term Goal 1: Patient to have >= 165 degrees passive flexion of right shoulder, 135 degrees passive right shoulder abduction, >= 70 degrees shoulder ER. LTG 1 Current Status[de-identified] ROM goals mett, see measurements. LTG Goal 1 Status[de-identified] Met  Long Term Goal 2: Patient able to actively perform forward flexion of right arm through 3/4 full range, full range right shoulder ER. LTG 2 Current Status[de-identified] Active standing Right shoulder flex: 59 degrees,  in progress  Long Term Goal 3: Patient to score <= 35% impairment on the general use portion of the QuickDASH. LTG 3 Current Status[de-identified] Roland Later: 77% impairment  LTG Goal 3 Status[de-identified] Not Met  Long Term Goal 4: Patient to report less difficulty with dressing and grooming due to right arm pain.   LTG 4 Current Status[de-identified] Not  met  LTG Goal 4 Status[de-identified] Not Met  Patient Goals Patient Goals : Return to normal use of right shoulder, have less pain, be able to sleep better.     Plan:    Physcial Therapy Plan  Plan weeks: 4-6 weeks pending insurance approval  Current Treatment Recommendations: Strengthening, ROM, Balance training, Functional mobility training, Transfer training, ADL/Self-care training, IADL training, Endurance training, Manual Therapy - Joint Manipulation, Manual Therapy - Soft Tissue Mobilization, Neuromuscular re-education, Stair training, Pain management, Home exercise program, Therapeutic activities, Positioning, Modalities  Timed Code Treatment Minutes: 62 Minutes     Therapy Time   Individual Concurrent Group Co-treatment   Time In [phone]         Time Out 1000         Minutes 57         Timed Code Treatment Minutes: 57 Minutes     Electronically signed by Harper Pino PTA on 11/15/2022 at 1:25 PM

## 2022-11-17 ENCOUNTER — HOSPITAL ENCOUNTER (OUTPATIENT)
Dept: PHYSICAL THERAPY | Age: 47
Setting detail: THERAPIES SERIES
Discharge: HOME | End: 2022-11-17
Payer: MEDICARE

## 2022-11-17 PROCEDURE — 97035 APP MDLTY 1+ULTRASOUND EA 15: CPT

## 2022-11-17 PROCEDURE — 97110 THERAPEUTIC EXERCISES: CPT

## 2022-11-17 ASSESSMENT — PAIN DESCRIPTION - LOCATION: LOCATION: ARM;SHOULDER

## 2022-11-17 ASSESSMENT — PAIN DESCRIPTION - PAIN TYPE: TYPE: CHRONIC PAIN

## 2022-11-17 ASSESSMENT — PAIN DESCRIPTION - FREQUENCY: FREQUENCY: CONTINUOUS

## 2022-11-17 ASSESSMENT — PAIN SCALES - GENERAL: PAINLEVEL_OUTOF10: 3

## 2022-11-17 ASSESSMENT — PAIN DESCRIPTION - DESCRIPTORS: DESCRIPTORS: ACHING;SORE

## 2022-11-17 ASSESSMENT — PAIN DESCRIPTION - ORIENTATION: ORIENTATION: RIGHT

## 2022-11-17 NOTE — PROGRESS NOTES
10: red hand gripper and red ball squeeze x 1min 30 sec each  Exercise 11: supine passive shoulder flexion, abduction and ER x 10 each  Exercise 12: elbow digs into mat- x 15  Exercise 13: isometric \"holding\" of right shoulder at 90 deg (will need assist/spot) x 5 for 10 seconds with assist into position  Exercise 14: sitting towel slides on table with skate- 1 minute 30 sec each direction  Exercise 15: ++progress to AA/AROM as tolerance appears to permit  Exercise 18: u/s to right shoulder (lateral shoulder, common deltoid insertion) 2.0w/cm2 x 8 mins                                 ASSESSMENT     Assessment: Assessment: Patient did well with session. She does have a hard time doing pendulums correctly. Supine static right shoulder holding is reportedly the most painful. Otherwise, no complaints. Reported pain at 3/10 pre session and 1-2/10 post  Body Structures, Functions, Activity Limitations Requiring Skilled Therapeutic Intervention: Decreased ADL status, Decreased ROM, Decreased strength, Decreased high-level IADLs, Increased pain, Decreased posture    Post-Treatment Pain Level: Activity Tolerance: Patient limited by pain    Therapy Prognosis: Fair       GOALS   Patient Goals : Return to normal use of right shoulder, have less pain, be able to sleep better. Short Term Goals Completed by 3-4 weeks Current Status Goal Status   Patient to be independent with HEP. in progress In progress   Patient independent with daily use of home pulleys. MET Met   Patient to report less disturbance of sleep due to right arm pain. Still with troubel sleeping, in progress In progress                                                               Long Term Goals Completed by 4-6 weeks Current Status Goal Status   Patient to have >= 165 degrees passive flexion of right shoulder, 135 degrees passive right shoulder abduction, >= 70 degrees shoulder ER. ROM goals mett, see measurements.  Met   Patient able to actively perform forward flexion of right arm through 3/4 full range, full range right shoulder ER. Active standing Right shoulder flex: 59 degrees,  in progress     Patient to score <= 35% impairment on the general use portion of the QuickDASH. QuickDAST: 77% impairment Not Met   Patient to report less difficulty with dressing and grooming due to right arm pain.  Not  met Not Met                                                        TREATMENT PLAN   Plan Frequency: 2 x per week  Plan weeks: 4-6 weeks pending insurance approval  Current Treatment Recommendations: Strengthening, ROM, Balance training, Functional mobility training, Transfer training, ADL/Self-care training, IADL training, Endurance training, Manual Therapy - Joint Manipulation, Manual Therapy - Soft Tissue Mobilization, Neuromuscular re-education, Stair training, Pain management, Home exercise program, Therapeutic activities, Positioning, Modalities           Therapy Time  Individual Time In: 1499       Individual Time Out: 1000  Minutes: 55  Timed Code Treatment Minutes: 55 Minutes     Electronically signed by Domenico Cali PTA  on 11/17/2022 at 1:19 PM   POC NOTE  Electronically signed by Domenico Cali PTA on 11/17/2022 at 1:20 PM

## 2022-11-22 ENCOUNTER — HOSPITAL ENCOUNTER (OUTPATIENT)
Dept: PHYSICAL THERAPY | Age: 47
Setting detail: THERAPIES SERIES
Discharge: HOME | End: 2022-11-22
Payer: MEDICARE

## 2022-11-22 PROCEDURE — 97110 THERAPEUTIC EXERCISES: CPT

## 2022-11-22 ASSESSMENT — PAIN DESCRIPTION - LOCATION: LOCATION: SHOULDER

## 2022-11-22 ASSESSMENT — PAIN DESCRIPTION - ORIENTATION: ORIENTATION: RIGHT

## 2022-11-22 ASSESSMENT — PAIN SCALES - GENERAL: PAINLEVEL_OUTOF10: 4

## 2022-11-22 NOTE — PROGRESS NOTES
Physical Therapy  Daily Treatment Note  Date: 2022  Patient Name: Ash Weaver  MRN: [id number]     :   1975    Referring Physician: ASHLEE Joya APRN-CNP   PCP: Braden Guerrero DO    Medical Diagnosis: Pain in right shoulder [M25.511] acute pain of right shoulder (M25.511)  No data recorded    Ins[facility] / Plan: KSE / Product Type: *No Product type* /   Insurance ID: [id number] - (Medicare Managed)    Subjective:   General  Additional Pertinent Hx: 51year old female previously seen at our clinic in  for back pain currently referred to PT with diagnosis of acute pain in right shoulder. Diagnosis: acute pain of right shoulder (M25.511)  Referring Provider (secondary): TIMUR Dumont  PT Visit Information  PT Insurance Information: [employer]--auth after eval, [id number] Medicaid ([facility] after 20 visit)  Total # of Visits Approved: 12 (12 visits anticipated for this diagnosis)  Total # of Visits to Date: 10  Plan of Care/Certification Expiration Date: 22  Progress Note Due Date: 22  Referring Provider (secondary): TIMUR Dumont  Subjective  Subjective: She rates pain at 4/10. She had increased yesterday, she cannot relate pain to specific activity/event. Prior diagnostic testing[de-identified] X-ray  Dominant Hand: : Right  Pain Screening  Pain Level: 4  Best Pain Level: 1  Pain Location: Shoulder  Pain Orientation: Right       Treatment Activities:  Exercises:      Treatment Reasoning    Exercise 1: ++right shoulder pain, suspected RTC injury, reacted poorly to arm mvt.  at eval, advance slowly to tolerance  Exercise 2: overhead pulleys--5 mins  Exercise 3: finger walk x 5 , 3 min  Exercise 4: pendulum--4 directional x 10 each-poor quality and guarded  Exercise 5: shoulder shrugs x 10  not today  Exercise 6: backward shoulder roll x 10  not today  Exercise 7: sitting scapular retraction x 10  not today  Exercise 8: isometric right shoulder with ball X 10 each-seated not today  Exercise 9: 3 way right elbow flexion (no weight to light weight) x 15 each 2  pound weight  Exercise 10: red hand gripper and red ball squeeze x 1min 30 sec each  Exercise 11: supine passive shoulder flexion, abduction and ER x 10 each  Exercise 12: elbow digs into mat- x 15  Exercise 13: isometric \"holding\" of right shoulder at 90 deg (will need assist/spot) x 5 for 10 seconds with assist into position  Exercise 14: sitting towel slides on table with skate- 1 minute 30 sec each direction  not today  Exercise 15: ++progress to AA/AROM as tolerance appears to permit  Exercise 18: u/s to right shoulder (lateral shoulder, common deltoid insertion) 2.0w/cm2 x 8 mins    Limitations addressed: Mobility, Strength, Flexibility, Pain modulation  Therapist provided: Verbal cuing  Progressed: Repetitions                      Assessment:   Conditions Requiring Skilled Therapeutic Intervention  Body Structures, Functions, Activity Limitations Requiring Skilled Therapeutic Intervention: Decreased ADL status; Decreased ROM; Decreased strength;Decreased high-level IADLs; Increased pain;Decreased posture  Assessment: She has had decreased pain today. She tolerates increased activity well. Therapy Prognosis: Fair  Decision Making: Medium Complexity  Requires PT Follow-Up: Yes      G-Code:       OutComes Score:                                                     Goals:  Short Term Goals  Time Frame for Short Term Goals: 3-4 weeks  Short Term Goal 1: Patient to be independent with HEP. STG 1 Current Status[de-identified] in progress  STG Goal 1 Status[de-identified] In progress  Short Term Goal 2: Patient independent with daily use of home pulleys. STG 2 Current Status[de-identified] MET  STG Goal 2 Status[de-identified] Met  Short Term Goal 3: Patient to report less disturbance of sleep due to right arm pain.   STG 3 Current Status[de-identified] Still with troubel sleeping, in progress  STG Goal 3 Status[de-identified] In progress  Long Term Goals  Time Frame for Long Term Goals : 4-6 weeks  Long Term Goal 1: Patient to have >= 165 degrees passive flexion of right shoulder, 135 degrees passive right shoulder abduction, >= 70 degrees shoulder ER. LTG 1 Current Status[de-identified] ROM goals mett, see measurements. LTG Goal 1 Status[de-identified] Met  Long Term Goal 2: Patient able to actively perform forward flexion of right arm through 3/4 full range, full range right shoulder ER. LTG 2 Current Status[de-identified] Active standing Right shoulder flex: 59 degrees,  in progress  Long Term Goal 3: Patient to score <= 35% impairment on the general use portion of the QuickDASH. LTG 3 Current Status[de-identified] Nathalia Wilson: 77% impairment  LTG Goal 3 Status[de-identified] Not Met  Long Term Goal 4: Patient to report less difficulty with dressing and grooming due to right arm pain. LTG 4 Current Status[de-identified] Not  met  LTG Goal 4 Status[de-identified] Not Met  Patient Goals   Patient Goals : Return to normal use of right shoulder, have less pain, be able to sleep better.     Plan:    Physcial Therapy Plan  Plan weeks: 4-6 weeks pending insurance approval  Current Treatment Recommendations: Strengthening, ROM, Balance training, Functional mobility training, Transfer training, ADL/Self-care training, IADL training, Endurance training, Manual Therapy - Joint Manipulation, Manual Therapy - Soft Tissue Mobilization, Neuromuscular re-education, Stair training, Pain management, Home exercise program, Therapeutic activities, Positioning, Modalities  Timed Code Treatment Minutes: 60 Minutes     Therapy Time:   Individual Concurrent Group Co-treatment   Time In 0900         Time Out 1000         Minutes 60         Timed Code Treatment Minutes: Pina Lindsey 69, PT

## 2022-11-28 ENCOUNTER — LAB (OUTPATIENT)
Dept: LAB | Facility: HOSPITAL | Age: 47
End: 2022-11-28

## 2022-11-28 DIAGNOSIS — D75.839 THROMBOCYTOSIS: ICD-10-CM

## 2022-11-28 DIAGNOSIS — D72.829 LEUKOCYTOSIS, UNSPECIFIED TYPE: ICD-10-CM

## 2022-11-28 DIAGNOSIS — R79.89 ELEVATED FERRITIN: ICD-10-CM

## 2022-11-28 LAB
ALBUMIN SERPL-MCNC: 4 G/DL (ref 3.5–5.2)
ALBUMIN/GLOB SERPL: 1.1 G/DL
ALP SERPL-CCNC: 131 U/L (ref 39–117)
ALT SERPL W P-5'-P-CCNC: 18 U/L (ref 1–33)
ANION GAP SERPL CALCULATED.3IONS-SCNC: 8 MMOL/L (ref 5–15)
AST SERPL-CCNC: 20 U/L (ref 1–32)
BASOPHILS # BLD AUTO: 0.2 10*3/MM3 (ref 0–0.2)
BASOPHILS NFR BLD AUTO: 1.2 % (ref 0–1.5)
BILIRUB SERPL-MCNC: 0.2 MG/DL (ref 0–1.2)
BUN SERPL-MCNC: 8 MG/DL (ref 6–20)
BUN/CREAT SERPL: 11.3 (ref 7–25)
CALCIUM SPEC-SCNC: 9.8 MG/DL (ref 8.6–10.5)
CHLORIDE SERPL-SCNC: 100 MMOL/L (ref 98–107)
CO2 SERPL-SCNC: 29 MMOL/L (ref 22–29)
CREAT SERPL-MCNC: 0.71 MG/DL (ref 0.57–1)
DEPRECATED RDW RBC AUTO: 43.8 FL (ref 37–54)
EGFRCR SERPLBLD CKD-EPI 2021: 105.7 ML/MIN/1.73
EOSINOPHIL # BLD AUTO: 0.8 10*3/MM3 (ref 0–0.4)
EOSINOPHIL NFR BLD AUTO: 4.7 % (ref 0.3–6.2)
ERYTHROCYTE [DISTWIDTH] IN BLOOD BY AUTOMATED COUNT: 14.4 % (ref 12.3–15.4)
FERRITIN SERPL-MCNC: 135.5 NG/ML (ref 13–150)
GLOBULIN UR ELPH-MCNC: 3.6 GM/DL
GLUCOSE SERPL-MCNC: 169 MG/DL (ref 65–99)
HCT VFR BLD AUTO: 44.5 % (ref 34–46.6)
HGB BLD-MCNC: 14.2 G/DL (ref 12–15.9)
IMM GRANULOCYTES # BLD AUTO: 0.1 10*3/MM3 (ref 0–0.05)
IMM GRANULOCYTES NFR BLD AUTO: 0.6 % (ref 0–0.5)
IRON 24H UR-MRATE: 51 MCG/DL (ref 37–145)
IRON SATN MFR SERPL: 14 % (ref 20–50)
LYMPHOCYTES # BLD AUTO: 5.57 10*3/MM3 (ref 0.7–3.1)
LYMPHOCYTES NFR BLD AUTO: 32.8 % (ref 19.6–45.3)
MCH RBC QN AUTO: 27.3 PG (ref 26.6–33)
MCHC RBC AUTO-ENTMCNC: 31.9 G/DL (ref 31.5–35.7)
MCV RBC AUTO: 85.4 FL (ref 79–97)
MONOCYTES # BLD AUTO: 1.3 10*3/MM3 (ref 0.1–0.9)
MONOCYTES NFR BLD AUTO: 7.7 % (ref 5–12)
NEUTROPHILS NFR BLD AUTO: 53 % (ref 42.7–76)
NEUTROPHILS NFR BLD AUTO: 9 10*3/MM3 (ref 1.7–7)
NRBC BLD AUTO-RTO: 0 /100 WBC (ref 0–0.2)
PLATELET # BLD AUTO: 743 10*3/MM3 (ref 140–450)
PMV BLD AUTO: 10.1 FL (ref 6–12)
POTASSIUM SERPL-SCNC: 4.1 MMOL/L (ref 3.5–5.2)
PROT SERPL-MCNC: 7.6 G/DL (ref 6–8.5)
RBC # BLD AUTO: 5.21 10*6/MM3 (ref 3.77–5.28)
SODIUM SERPL-SCNC: 137 MMOL/L (ref 136–145)
TIBC SERPL-MCNC: 362 MCG/DL (ref 298–536)
TRANSFERRIN SERPL-MCNC: 243 MG/DL (ref 200–360)
WBC NRBC COR # BLD: 16.97 10*3/MM3 (ref 3.4–10.8)

## 2022-11-28 PROCEDURE — 36415 COLL VENOUS BLD VENIPUNCTURE: CPT

## 2022-11-28 PROCEDURE — 83540 ASSAY OF IRON: CPT

## 2022-11-28 PROCEDURE — 85025 COMPLETE CBC W/AUTO DIFF WBC: CPT

## 2022-11-28 PROCEDURE — 80053 COMPREHEN METABOLIC PANEL: CPT

## 2022-11-28 PROCEDURE — 82728 ASSAY OF FERRITIN: CPT

## 2022-11-28 PROCEDURE — 84466 ASSAY OF TRANSFERRIN: CPT

## 2022-11-29 ENCOUNTER — HOSPITAL ENCOUNTER (OUTPATIENT)
Dept: PHYSICAL THERAPY | Age: 47
Setting detail: THERAPIES SERIES
Discharge: HOME | End: 2022-11-29
Payer: MEDICARE

## 2022-11-29 PROCEDURE — 97110 THERAPEUTIC EXERCISES: CPT

## 2022-11-29 PROCEDURE — 97035 APP MDLTY 1+ULTRASOUND EA 15: CPT

## 2022-11-29 NOTE — PROGRESS NOTES
Physical Therapy: Daily Note   Patient: Ramsse Miller (1 y.o. female)   Examination Date:   Plan of Care/Certification Expiration Date: 22    No data recorded   :  1975 # of Visits since [facility]:   11   MRN: [id number]  CSN: [id number] Start of Care Date:   10/7/2022   Insurance: Payor: Jennifer Jerzy / Plan: Michelle Elkins / Product Type: *No Product type* /   Insurance ID: [id number] - (Medicare Managed) Secondary Insurance (if applicable): [address]   Referring Physician: ASHLEE Avendano - ASHLEE Samuel-FAZAL   PCP: Elridge Romberg,  Visits to Date/Visits Approved: 10 / 12 (12 visits anticipated for this diagnosis)    No Show/Cancelled Appts:   /       Medical Diagnosis: Pain in right shoulder [M25.511] acute pain of right shoulder (M25.511)  No data recorded      SUBJECTIVE EXAMINATION   Pain Level:  7/10    Patient Comments: Subjective: Patient states she has about 7/10 pain today. She says that she fell yesterday on the concrete and jolted everything and is have a little more pain from that. OBJECTIVE EXAMINATION   Restrictions:  No data recorded No data recorded No data recorded        TREATMENT     Exercises:      Treatment Reasoning    Exercise 1: ++right shoulder pain, suspected RTC injury, reacted poorly to arm mvt.  at eval, advance slowly to tolerance  Exercise 2: overhead pulleys--5 mins  Exercise 3: finger walk x 5  Exercise 4: pendulum--4 directional x 10 each  Exercise 5: shoulder shrugs x 10  Exercise 6: backward shoulder roll x 10  Exercise 7: sitting scapular retraction x 10  not today  Exercise 8: isometric right shoulder with ball X 10 each-seated not today  Exercise 9: 3 way right elbow flexion (no weight to light weight) x 15 each 2  pound weight  Exercise 10: red hand gripper and red ball squeeze x 1min 30 sec each  Exercise 11: supine passive shoulder flexion, abduction and ER x 10 each  Exercise 12: elbow digs into mat- x 15  Exercise 13: isometric \"holding\" of right shoulder at 90 deg (will need assist/spot) x 5 for 10 seconds with assist into position *seated today  Exercise 14: sitting towel slides on table with skate- 1 minute 30 sec each direction  not today  Exercise 15: ++progress to AA/AROM as tolerance appears to permit  Exercise 18: u/s to right shoulder (lateral shoulder, common deltoid insertion) 2.0w/cm2 x 8 mins    Limitations addressed: Mobility, Strength, Flexibility, Pain modulation  Therapist provided: Verbal cuing  Progressed: Repetitions                        ASSESSMENT     Assessment: Assessment: Patient had increased report of discomfort with several ex's today, likely due to soreness from fall yesterday. She was able to tolerate all attempted ex's today with min v.c. throughout. She reported decreased pain of 5/10 post tx. Body Structures, Functions, Activity Limitations Requiring Skilled Therapeutic Intervention: Decreased ADL status, Decreased ROM, Decreased strength, Decreased high-level IADLs, Increased pain, Decreased posture    Post-Treatment Pain Level:  5/10     Activity Tolerance: Patient limited by pain    Therapy Prognosis: Fair       GOALS   Patient Goals : Return to normal use of right shoulder, have less pain, be able to sleep better. Short Term Goals Completed by 3-4 weeks Current Status Goal Status   Patient to be independent with HEP. in progress In progress   Patient independent with daily use of home pulleys. MET Met   Patient to report less disturbance of sleep due to right arm pain. Still with troubel sleeping, in progress In progress                                                               Long Term Goals Completed by 4-6 weeks Current Status Goal Status   Patient to have >= 165 degrees passive flexion of right shoulder, 135 degrees passive right shoulder abduction, >= 70 degrees shoulder ER. ROM goals mett, see measurements.  Met   Patient able to actively perform forward flexion of right arm through 3/4 full range, full range right shoulder ER. Active standing Right shoulder flex: 59 degrees,  in progress     Patient to score <= 35% impairment on the general use portion of the QuickDASH. QuickDAST: 77% impairment Not Met   Patient to report less difficulty with dressing and grooming due to right arm pain.  Not  met Not Met                                                        TREATMENT PLAN   Plan Frequency: 2 x per week  Plan weeks: 4-6 weeks pending insurance approval  Current Treatment Recommendations: Strengthening, ROM, Balance training, Functional mobility training, Transfer training, ADL/Self-care training, IADL training, Endurance training, Manual Therapy - Joint Manipulation, Manual Therapy - Soft Tissue Mobilization, Neuromuscular re-education, Stair training, Pain management, Home exercise program, Therapeutic activities, Positioning, Modalities           Therapy Time  Individual Time In: 1009       Individual Time Out: 1056  Minutes: 47  Timed Code Treatment Minutes: 47 Minutes     Electronically signed by Enrico Harry PT  on 11/29/2022 at 11:25 AM   POC NOTE

## 2022-11-30 ENCOUNTER — INFUSION (OUTPATIENT)
Dept: ONCOLOGY | Facility: CLINIC | Age: 47
End: 2022-11-30

## 2022-11-30 DIAGNOSIS — Z45.2 ENCOUNTER FOR CARE RELATED TO PORT-A-CATH: Primary | ICD-10-CM

## 2022-11-30 RX ORDER — HEPARIN SODIUM (PORCINE) LOCK FLUSH IV SOLN 100 UNIT/ML 100 UNIT/ML
500 SOLUTION INTRAVENOUS AS NEEDED
Status: DISCONTINUED | OUTPATIENT
Start: 2022-11-30 | End: 2022-11-30 | Stop reason: HOSPADM

## 2022-11-30 RX ORDER — SODIUM CHLORIDE 0.9 % (FLUSH) 0.9 %
10 SYRINGE (ML) INJECTION AS NEEDED
Status: CANCELLED | OUTPATIENT
Start: 2022-11-30

## 2022-11-30 RX ORDER — OMEPRAZOLE 40 MG/1
40 CAPSULE, DELAYED RELEASE ORAL DAILY
Qty: 30 CAPSULE | Refills: 11 | Status: SHIPPED | OUTPATIENT
Start: 2022-11-30

## 2022-11-30 RX ORDER — SODIUM CHLORIDE 0.9 % (FLUSH) 0.9 %
10 SYRINGE (ML) INJECTION AS NEEDED
Status: DISCONTINUED | OUTPATIENT
Start: 2022-11-30 | End: 2022-11-30 | Stop reason: HOSPADM

## 2022-11-30 RX ORDER — HEPARIN SODIUM (PORCINE) LOCK FLUSH IV SOLN 100 UNIT/ML 100 UNIT/ML
500 SOLUTION INTRAVENOUS AS NEEDED
Status: CANCELLED | OUTPATIENT
Start: 2022-11-30

## 2022-11-30 RX ADMIN — HEPARIN SODIUM (PORCINE) LOCK FLUSH IV SOLN 100 UNIT/ML 500 UNITS: 100 SOLUTION at 08:57

## 2022-11-30 RX ADMIN — Medication 10 ML: at 08:56

## 2022-11-30 NOTE — TELEPHONE ENCOUNTER
Received fax from pharmacy requesting refill on pts medication(s). Pt was last seen in office on 5/12/2022  and has a follow up scheduled for Visit date not found. Will send request to  Dr. Odalys Petit  for authorization.      Requested Prescriptions     Pending Prescriptions Disp Refills    omeprazole (PRILOSEC) 40 MG delayed release capsule [Pharmacy Med Name: OMEPRAZOLE 40MG CAPSULES] 30 capsule 11     Sig: Take 1 capsule by mouth daily

## 2022-12-02 ENCOUNTER — HOSPITAL ENCOUNTER (OUTPATIENT)
Dept: PHYSICAL THERAPY | Age: 47
Setting detail: THERAPIES SERIES
Discharge: HOME | End: 2022-12-02
Payer: MEDICARE

## 2022-12-02 PROCEDURE — 97035 APP MDLTY 1+ULTRASOUND EA 15: CPT

## 2022-12-02 PROCEDURE — 97110 THERAPEUTIC EXERCISES: CPT

## 2022-12-02 ASSESSMENT — PAIN DESCRIPTION - LOCATION: LOCATION: SHOULDER

## 2022-12-02 ASSESSMENT — PAIN SCALES - GENERAL: PAINLEVEL_OUTOF10: 7

## 2022-12-02 ASSESSMENT — PAIN DESCRIPTION - PAIN TYPE: TYPE: CHRONIC PAIN

## 2022-12-02 ASSESSMENT — PAIN DESCRIPTION - ORIENTATION: ORIENTATION: RIGHT

## 2022-12-02 NOTE — PROGRESS NOTES
Physical Therapy  Daily Treatment Note  Date: 2022  Patient Name: Deisi Davidson  MRN: [id number]     :   1975    Referring Physician: ASHLEE Marino * TIMUR Samayoa   PCP: Marques Dupree DO    Medical Diagnosis: Pain in right shoulder [M25.511] acute pain of right shoulder (M25.511)  No data recorded    Insurance: Payor: Dulce Vasquez / Plan: Dylan Rosas / Product Type: *No Product type* /   Insurance ID: [id number] - (Medicare Managed)    Subjective:   General  Diagnosis: acute pain of right shoulder (M25.511)  Referring Provider (secondary): TIMUR Samayoa  PT Insurance Information: Candida Perrykatherine after eval, [place] Medicaid (Yisel Price after 20 visit)  Total # of Visits Approved: 13  Total # of Visits to Date: 15  Plan of Care/Certification Expiration Date: 22  Progress Note Due Date: 22  Referring Provider (secondary): TIMUR Samayoa  Subjective: Patient states she has about 7/10 pain today. Had to go to [facility] yesterday with my sister for a Dr's appointment  Patient Currently in Pain: Yes  Pain Level: 7  Pain Type: Chronic pain  Pain Location: Shoulder  Pain Orientation: Right       Treatment Activities:  Exercises:      Treatment Reasoning    Exercise 1: ++right shoulder pain, suspected RTC injury, reacted poorly to arm mvt.  at eval, advance slowly to tolerance  Exercise 2: overhead pulleys--5 mins  Exercise 3: finger walk x 5  Exercise 4: pendulum--4 directional x 10 each  Exercise 5: shoulder shrugs x 10  Exercise 6: backward shoulder roll x 10  Exercise 7: sitting scapular retraction x 10  Exercise 8: isometric right shoulder with ball X 10 each-seated not today  Exercise 9: 3 way right elbow flexion (no weight to light weight) x 15 each 2  pound weight  Exercise 10: red hand gripper and red ball squeeze x 1min 30 sec each  Exercise 11: supine passive shoulder flexion, abduction and ER x 10 each  Exercise 12: elbow digs into mat- x 15  Exercise 13: isometric \"holding\" of right shoulder at 90 deg (will need assist/spot) x 5 for 10 seconds with assist into position *seated today  Exercise 14: sitting towel slides on table with skate- 1 minute 30 sec each direction  not today  Exercise 15: ++progress to AA/AROM as tolerance appears to permit  Exercise 18: u/s to right shoulder (lateral shoulder, common deltoid insertion) 2.0w/cm2 x 8 mins                           Assessment:   Conditions Requiring Skilled Therapeutic Intervention  Body Structures, Functions, Activity Limitations Requiring Skilled Therapeutic Intervention: Decreased ADL status; Decreased ROM; Decreased strength;Decreased high-level IADLs; Increased pain;Decreased posture  Assessment: Patient was 12 min late for her appointment so not all exercises were performed due to this, she is still rather guarded in her RUE and PROM is decreased also with abduction being 90-95 degrees before she begins to guard more, she has one remaining visit per her insurance. Requires PT Follow-Up: Yes        Goals:  Short Term Goals  Time Frame for Short Term Goals: 3-4 weeks  Short Term Goal 1: Patient to be independent with HEP. STG 1 Current Status[de-identified] in progress  STG Goal 1 Status[de-identified] In progress  Short Term Goal 2: Patient independent with daily use of home pulleys. STG 2 Current Status[de-identified] MET  STG Goal 2 Status[de-identified] Met  Short Term Goal 3: Patient to report less disturbance of sleep due to right arm pain. STG 3 Current Status[de-identified] Still with troubel sleeping, in progress  STG Goal 3 Status[de-identified] In progress  Long Term Goals  Time Frame for Long Term Goals : 4-6 weeks  Long Term Goal 1: Patient to have >= 165 degrees passive flexion of right shoulder, 135 degrees passive right shoulder abduction, >= 70 degrees shoulder ER. LTG 1 Current Status[de-identified] ROM goals mett, see measurements.   LTG Goal 1 Status[de-identified] Met  Long Term Goal 2: Patient able to actively perform forward flexion of right arm through 3/4 full range, full range right shoulder ER. LTG 2 Current Status[de-identified] Active standing Right shoulder flex: 59 degrees,  in progress  Long Term Goal 3: Patient to score <= 35% impairment on the general use portion of the QuickDASH. LTG 3 Current Status[de-identified] Norma Hope: 77% impairment  LTG Goal 3 Status[de-identified] Not Met  Long Term Goal 4: Patient to report less difficulty with dressing and grooming due to right arm pain. LTG 4 Current Status[de-identified] Not  met  LTG Goal 4 Status[de-identified] Not Met  Patient Goals   Patient Goals : Return to normal use of right shoulder, have less pain, be able to sleep better.     Plan:    Physcial Therapy Plan  Plan weeks: 4-6 weeks pending insurance approval      Therapy Time:   Individual Concurrent Group Co-treatment   Time In 7856         Time Out 2495         Minutes [address], PTA   Electronically signed by Edith Canada PTA on 12/2/2022 at 12:01 PM

## 2022-12-06 ENCOUNTER — HOSPITAL ENCOUNTER (OUTPATIENT)
Dept: PAIN MANAGEMENT | Age: 47
Discharge: HOME | End: 2022-12-06
Payer: MEDICARE

## 2022-12-06 VITALS
SYSTOLIC BLOOD PRESSURE: 128 MMHG | WEIGHT: 293 LBS | DIASTOLIC BLOOD PRESSURE: 79 MMHG | BODY MASS INDEX: 47.09 KG/M2 | TEMPERATURE: 98 F | OXYGEN SATURATION: 94 % | HEIGHT: 66 IN | HEART RATE: 91 BPM | RESPIRATION RATE: 18 BRPM

## 2022-12-06 DIAGNOSIS — M25.511 ACUTE PAIN OF RIGHT SHOULDER: Primary | ICD-10-CM

## 2022-12-06 DIAGNOSIS — M54.16 LUMBAR RADICULOPATHY: ICD-10-CM

## 2022-12-06 DIAGNOSIS — M54.16 CHRONIC LUMBAR RADICULOPATHY: ICD-10-CM

## 2022-12-06 PROCEDURE — 99214 OFFICE O/P EST MOD 30 MIN: CPT

## 2022-12-06 RX ORDER — DICLOFENAC EPOLAMINE 0.01 G/1
1 SYSTEM TOPICAL 2 TIMES DAILY
Qty: 60 PATCH | Refills: 5 | Status: SHIPPED | OUTPATIENT
Start: 2022-12-06 | End: 2023-06-04

## 2022-12-06 RX ORDER — DULAGLUTIDE 0.75 MG/.5ML
0.75 INJECTION, SOLUTION SUBCUTANEOUS WEEKLY
COMMUNITY
Start: 2022-11-28

## 2022-12-06 RX ORDER — PREGABALIN 100 MG/1
100 CAPSULE ORAL 2 TIMES DAILY
Qty: 60 CAPSULE | Refills: 2 | Status: SHIPPED | OUTPATIENT
Start: 2022-12-06 | End: 2023-03-06

## 2022-12-06 ASSESSMENT — PAIN DESCRIPTION - LOCATION
LOCATION: BACK
LOCATION_2: NECK

## 2022-12-06 ASSESSMENT — ENCOUNTER SYMPTOMS
ABDOMINAL DISTENTION: 0
NAUSEA: 0
CONSTIPATION: 0
ABDOMINAL PAIN: 0
BOWEL INCONTINENCE: 0
BACK PAIN: 1

## 2022-12-06 ASSESSMENT — PAIN DESCRIPTION - PAIN TYPE: TYPE: CHRONIC PAIN

## 2022-12-06 ASSESSMENT — PAIN DESCRIPTION - INTENSITY: RATING_2: 7

## 2022-12-06 ASSESSMENT — PAIN DESCRIPTION - ORIENTATION
ORIENTATION: LOWER
ORIENTATION_2: LOWER

## 2022-12-06 ASSESSMENT — PAIN SCALES - GENERAL: PAINLEVEL_OUTOF10: 3

## 2022-12-06 NOTE — TELEPHONE ENCOUNTER
1. Chronic lumbar radiculopathy  - diclofenac (FLECTOR) 1.3 % PT[facility] patch; Place 1 patch onto the skin 2 times daily  Dispense: 60 patch; Refill: 5    2. Lumbar radiculopathy  - pregabalin (LYRICA) 100 MG capsule; Take 1 capsule by mouth 2 times daily for 90 days. Dispense: 60 capsule; Refill: 2  - diclofenac (FLECTOR) 1.3 % PT[facility] patch; Place 1 patch onto the skin 2 times daily  Dispense: 60 patch; Refill: 5    3. Acute pain of right shoulder  - diclofenac (FLECTOR) 1.3 % PT[facility] patch; Place 1 patch onto the skin 2 times daily  Dispense: 60 patch; Refill: 5     Requested Prescriptions     Pending Prescriptions Disp Refills    oxyCODONE (ROXICODONE) 5 MG immediate release tablet 90 tablet 0     Sig: Take 1 tablet by mouth every 6 hours as needed for Pain for up to 30 days. oxyCODONE (ROXICODONE) 5 MG immediate release tablet 90 tablet 0     Sig: Take 1 tablet by mouth every 6 hours as needed for Pain for up to 30 days. Signed Prescriptions Disp Refills    pregabalin (LYRICA) 100 MG capsule 60 capsule 2     Sig: Take 1 capsule by mouth 2 times daily for 90 days.      Authorizing Provider: Jitendra MONCADA    diclofenac (FLECTOR) 1.3 % PTCH patch 60 patch 5     Sig: Place 1 patch onto the skin 2 times daily     Authorizing Provider: Stacey Saravia
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
## 2022-12-06 NOTE — PROGRESS NOTES
Clinic Documentation      Education Provided:  [x] Review of Lazaro Read  [] Agreement Review  [x] PEG Score Calculated [] PHQ Score Calculated [] ORT Score Calculated    [] Compliance Issues Discussed [] Cognitive Behavior Needs [x] Exercise [] Review of Test [] Financial Issues  [x] Tobacco/Alcohol Use Reviewed [x] Teaching [] New Patient [] Picture Obtained    Physician Plan:  [] Outgoing Referral  [] Pharmacy Consult  [x] Test Ordered [x] Prescription Ordered/Changed   [] Obtained Test Results / Consult Notes        Complete if patient is withholding blood thinner for procedure     Blood Thinner Patient is currently taking:      [] Plavix (Hold for 7 days)  [] Aspirin (Hold for 5 days)     [] Pletal (Hold for 2 days)  [] Pradaxa (Hold for 3 days)    [] Effient (Hold for 7 days)  [] Xarelto (Hold for 2 days)    [] Eliquis (Hold for 2 days)  [] Brilinta (Hold for 7 days)    [] Coumadin (Hold for 5 days) - (INR needs to be drawn the day prior to procedure- INR < 2.0)    [] Aggrenox (Hold for 7 days)        [] Patient will stop medication on their own.    [] Blood Thinner Form Faxed for approval to hold.    Provider form faxed to:     Assessment Completed by:  Electronically signed by Octavio Kent RN on 12/6/2022 at 12:09 PM

## 2022-12-12 ENCOUNTER — HOSPITAL ENCOUNTER (OUTPATIENT)
Dept: PHYSICAL THERAPY | Age: 47
Setting detail: THERAPIES SERIES
Discharge: HOME | End: 2022-12-12
Payer: MEDICARE

## 2022-12-12 PROCEDURE — 97530 THERAPEUTIC ACTIVITIES: CPT

## 2022-12-12 ASSESSMENT — PAIN DESCRIPTION - DESCRIPTORS: DESCRIPTORS: ACHING

## 2022-12-12 ASSESSMENT — PAIN DESCRIPTION - FREQUENCY: FREQUENCY: CONTINUOUS

## 2022-12-12 ASSESSMENT — PAIN DESCRIPTION - PAIN TYPE: TYPE: CHRONIC PAIN

## 2022-12-12 ASSESSMENT — PAIN DESCRIPTION - LOCATION: LOCATION: SHOULDER

## 2022-12-12 ASSESSMENT — PAIN SCALES - GENERAL: PAINLEVEL_OUTOF10: 5

## 2022-12-12 ASSESSMENT — PAIN DESCRIPTION - ORIENTATION: ORIENTATION: RIGHT

## 2022-12-12 NOTE — PROGRESS NOTES
Physical Therapy: Daily Note/Discharge Note   Patient: Claudette Gaona (1 y.o. female)   Examination Date:   Plan of Care/Certification Expiration Date: 22    No data recorded   :  1975 # of Visits since [facility]:   13   MRN: [id number]  CSN: [id number] Start of Care Date:   10/7/2022   Insurance: Payor: [employer] MEDICARE / Plan: Lars Alpers / Product Type: *No Product type* /   Insurance ID: [id number] - (Medicare Managed) Secondary Insurance (if applicable): [address]   Referring Physician: ASHLEE Mckenzie CNP, APRN-CNP   PCP: Allegra Weber,  Visits to Date/Visits Approved: 15 / 13    No Show/Cancelled Appts:   /       Medical Diagnosis: Pain in right shoulder [M25.511] acute pain of right shoulder (M25.511)  No data recorded      SUBJECTIVE EXAMINATION   Pain Level: Pain Screening  Patient Currently in Pain: Yes  Pain Assessment: 0-10  Pain Level: 5  Worst Pain Level: 7  Pain Type: Chronic pain  Pain Location: Shoulder  Pain Orientation: Right  Pain Descriptors: Aching  Pain Frequency: Continuous    Patient Comments: Subjective: She rates right shoulder pain at 5/10 at rest, 7/10 with movement. She feels her condition is staying the same. She has trouble sleeping due to pain.     HEP Compliance: Good        OBJECTIVE EXAMINATION   Restrictions:  No data recorded No data recorded No data recorded      Right AROM  Right PROM         AROM RUE (degrees)  R Shoulder Flexion (0-180): 65  R Shoulder ABduction (0-180): 80    PROM RUE (degrees)  R Shoulder Flex  (0-180): 149  R Shoulder ABduction (0-180): 90  R Shoulder Int Rotation  (0-70): 70  R Shoulder Ext Rotation  (0-90): 30       Left AROM  Right AROM        AROM RUE (degrees)  R Shoulder Flexion (0-180): 65  R Shoulder ABduction (0-180): 80       Left PROM  Right PROM        PROM RUE (degrees)  R Shoulder Flex  (0-180): 149  R Shoulder ABduction (0-180): 90  R Shoulder Int Rotation  (0-70): 70  R Shoulder Ext Rotation  (0-90): 30       Special Tests:        QuichDASH:  77.27% impairment    TREATMENT     Exercises:      Treatment Reasoning    Exercise 1: ++right shoulder pain, suspected RTC injury, reacted poorly to arm mvt. at eval, advance slowly to tolerance  Exercise 2: overhead pulleys--5 mins  Exercise 3: finger walk x 5  not today  Exercise 4: pendulum--4 directional x 10 each  not today  Exercise 5: shoulder shrugs x 10  not today  Exercise 6: backward shoulder roll x 10  not today  Exercise 7: sitting scapular retraction x 10  not today  Exercise 8: isometric right shoulder with ball X 10 each-seated not today  Exercise 9: 3 way right elbow flexion (no weight to light weight) x 15 each 2  pound weight  not today  Exercise 10: red hand gripper and red ball squeeze x 1min 30 sec each  not today  Exercise 11: supine passive shoulder flexion, abduction and ER x 10 each  not today  Exercise 12: elbow digs into mat- x 15  not today  Exercise 13: isometric \"holding\" of right shoulder at 90 deg (will need assist/spot) x 5 for 10 seconds with assist into position *seated today  not today  Exercise 14: sitting towel slides on table with skate- 1 minute 30 sec each direction  not today  Exercise 15: ++progress to AA/AROM as tolerance appears to permit  not today  Exercise 18: u/s to right shoulder (lateral shoulder, common deltoid insertion) 2.0w/cm2 x 8 mins  not today    Limitations addressed: Mobility, Strength, Flexibility, Pain modulation  Therapist provided: Verbal cuing  Progressed: Repetitions                     Pt Education:         ASSESSMENT     Assessment: Assessment: Ms. Diana Min relates continued right shoulder pain. She does not feel that her condition is improving. Right shoulder ROM has not improved. Her QuickDASH score has remained the same demonstrating no self-related functional improvement. She is to have right shoulder MRI in the near future. She relates compliance with HEP.   I recommend continuatioon of HEP with DC from PT caseload at this time. Body Structures, Functions, Activity Limitations Requiring Skilled Therapeutic Intervention: Decreased ADL status, Decreased ROM, Decreased strength, Decreased high-level IADLs, Increased pain, Decreased posture    Post-Treatment Pain Level: Activity Tolerance: Patient limited by pain    Therapy Prognosis: Fair       GOALS   Patient Goals : Return to normal use of right shoulder, have less pain, be able to sleep better. Short Term Goals Completed by 3-4 weeks Current Status Goal Status   Patient to be independent with HEP. in progress In progress   Patient independent with daily use of home pulleys. MET Met   Patient to report less disturbance of sleep due to right arm pain. Still with troubel sleeping, in progress In progress                                                               Long Term Goals Completed by 4-6 weeks Current Status Goal Status   Patient to have >= 149 degrees passive flexion of right shoulder, 90 degrees passive right shoulder abduction, >= 30degrees shoulder ER. ROM goals mett, see measurements. Met   Patient able to actively perform forward flexion of right arm through 3/4 full range, full range right shoulder ER. Active standing Right shoulder flex: 65 degrees,  in progress     Patient to score <= 35% impairment on the general use portion of the QuickDASH. QuickDAST: 77% impairment Not Met   Patient to report less difficulty with dressing and grooming due to right arm pain.  Not  met Not Met                                                        TREATMENT PLAN   Other Activities  Comment: QuickDASH: 77.3%  Plan Frequency: DC PT  Plan weeks: Contineu HEP  Current Treatment Recommendations: Home exercise program   Requires PT Follow-Up: No       Therapy Time  Individual Time In: 6841       Individual Time Out: 1000  Minutes: 55  Timed Code Treatment Minutes: 55 Minutes     Electronically signed by Ben Carl PT on 12/12/2022 at 12:33 PM   POC NOTE

## 2022-12-13 RX ORDER — OXYCODONE HYDROCHLORIDE 5 MG/1
5 TABLET ORAL EVERY 6 HOURS PRN
Qty: 90 TABLET | Refills: 0 | Status: SHIPPED | OUTPATIENT
Start: 2022-12-13 | End: 2023-01-12

## 2022-12-13 RX ORDER — OXYCODONE HYDROCHLORIDE 5 MG/1
5 TABLET ORAL EVERY 6 HOURS PRN
Qty: 90 TABLET | Refills: 0 | Status: SHIPPED | OUTPATIENT
Start: 2023-01-09 | End: 2023-02-08

## 2022-12-19 ENCOUNTER — HOSPITAL ENCOUNTER (OUTPATIENT)
Dept: MRI IMAGING | Age: 47
Discharge: HOME | End: 2022-12-19

## 2022-12-19 DIAGNOSIS — M25.511 ACUTE PAIN OF RIGHT SHOULDER: ICD-10-CM

## 2022-12-27 ENCOUNTER — HOSPITAL ENCOUNTER (OUTPATIENT)
Dept: MRI IMAGING | Age: 47
Discharge: HOME | End: 2022-12-27
Payer: MEDICARE

## 2022-12-27 PROCEDURE — 73221 MRI JOINT UPR EXTREM W/O DYE: CPT

## 2023-01-11 ENCOUNTER — TELEPHONE (OUTPATIENT)
Dept: PAIN MANAGEMENT | Age: 48
End: 2023-01-11

## 2023-01-11 NOTE — TELEPHONE ENCOUNTER
Call placed to patient to inform her of MRI right shoulder results and recommendation to follow up with orthopedic surgeon. Patient states she wants to go to [place] but is unsure of which surgeon she wants to be referred to and she will call office back when she has made a decision.

## 2023-01-11 NOTE — TELEPHONE ENCOUNTER
----- Message from ASHLEE Turner - CNP sent at 1/6/2023  3:34 PM CST -----  Peter Mckeon - please call patient. Patient needs orthopedic referral of her choice. She has partial tears of several tendons, posterior tear in inferior labrum. Since there doesn't appear to be full thickness tears this may or may not be surgical. However, I am not a surgeon, so I would rather let them make the call on steps going forward.  Thanks

## 2023-01-24 ENCOUNTER — HOSPITAL ENCOUNTER (OUTPATIENT)
Dept: PAIN MANAGEMENT | Age: 48
Discharge: HOME | End: 2023-01-24
Payer: MEDICARE

## 2023-01-24 ENCOUNTER — TELEPHONE (OUTPATIENT)
Dept: FAMILY MEDICINE CLINIC | Age: 48
End: 2023-01-24

## 2023-01-24 VITALS
SYSTOLIC BLOOD PRESSURE: 133 MMHG | DIASTOLIC BLOOD PRESSURE: 74 MMHG | TEMPERATURE: 96.4 F | HEART RATE: 79 BPM | RESPIRATION RATE: 18 BRPM | OXYGEN SATURATION: 99 %

## 2023-01-24 DIAGNOSIS — R52 PAIN MANAGEMENT: ICD-10-CM

## 2023-01-24 PROCEDURE — 6360000002 HC RX W HCPCS

## 2023-01-24 PROCEDURE — 2580000003 HC RX 258

## 2023-01-24 PROCEDURE — A4216 STERILE WATER/SALINE, 10 ML: HCPCS

## 2023-01-24 PROCEDURE — 62323 NJX INTERLAMINAR LMBR/SAC: CPT

## 2023-01-24 PROCEDURE — 2500000003 HC RX 250 WO HCPCS

## 2023-01-24 RX ORDER — SODIUM CHLORIDE 9 MG/ML
5 INJECTION INTRAVENOUS ONCE
Status: DISCONTINUED | OUTPATIENT
Start: 2023-01-24 | End: 2023-01-26 | Stop reason: HOSPADM

## 2023-01-24 RX ORDER — METHYLPREDNISOLONE ACETATE 80 MG/ML
80 INJECTION, SUSPENSION INTRA-ARTICULAR; INTRALESIONAL; INTRAMUSCULAR; SOFT TISSUE ONCE
Status: DISCONTINUED | OUTPATIENT
Start: 2023-01-24 | End: 2023-01-26 | Stop reason: HOSPADM

## 2023-01-24 RX ORDER — LIDOCAINE HYDROCHLORIDE 10 MG/ML
5 INJECTION, SOLUTION EPIDURAL; INFILTRATION; INTRACAUDAL; PERINEURAL ONCE
Status: DISCONTINUED | OUTPATIENT
Start: 2023-01-24 | End: 2023-01-26 | Stop reason: HOSPADM

## 2023-01-24 ASSESSMENT — PAIN DESCRIPTION - DIRECTION: RADIATING_TOWARDS: RADIATES INTO LEGS

## 2023-01-24 ASSESSMENT — PAIN DESCRIPTION - DESCRIPTORS: DESCRIPTORS: ACHING;BURNING;SHOOTING

## 2023-01-24 ASSESSMENT — PAIN DESCRIPTION - PAIN TYPE: TYPE: CHRONIC PAIN

## 2023-01-24 ASSESSMENT — PAIN - FUNCTIONAL ASSESSMENT: PAIN_FUNCTIONAL_ASSESSMENT: 0-10

## 2023-01-24 ASSESSMENT — PAIN DESCRIPTION - LOCATION: LOCATION: BACK

## 2023-01-24 ASSESSMENT — PAIN DESCRIPTION - ORIENTATION: ORIENTATION: LOWER

## 2023-01-24 ASSESSMENT — PAIN SCALES - GENERAL: PAINLEVEL_OUTOF10: 5

## 2023-01-24 NOTE — PROGRESS NOTES
Procedure:  Level of Consciousness: [x]Alert [x]Oriented []Disoriented []Lethargic  Anxiety Level: [x]Calm []Anxious []Depressed []Other  Skin: [x]Warm [x]Dry []Cool []Moist []Intact []Other  Cardiovascular: [x]Palpitations: [x]Never []Occasionally []Frequently  Chest Pain: [x]No []Yes  Respiratory:  [x]Unlabored []Labored []Cough ([] Productive []Unproductive)  HCG Required: [x]No []Yes   Results: []Negative []Positive  HYSTERECTOMY  Knowledge Level:        [x]Patient/Other verbalized understanding of pre-procedure instructions. [x]Assessment of post-op care needs (transportation, responsible caregiver)        [x]Able to discuss health care problems and how to deal with it.   Factors that Affect Teaching:        Language Barrier: [x]No []Yes - why:        Hearing Loss:        [x]No []Yes            Corrective Device:  []Yes [x]No        Vision Loss:           []No [x]Yes            Corrective Device:  [x]Yes []No        Memory Loss:       [x]No []Yes            []Short Term []Long Term  Motivational Level:  [x]Asks Questions                  []Extremely Anxious       [x]Seems Interested               []Seems Uninterested                  [x]Denies need for Education  Risk for Injury:  [x]Patient oriented to person, place and time  []History of frequent falls/loss of balance  Nutritional:  []Change in appetite   []Weight Gain   []Weight Loss  Functional:  []Requires assistance with ADL's

## 2023-01-24 NOTE — TELEPHONE ENCOUNTER
Juan Pt case management called stating he has been unable to reach pt which is unusual for her, he asked if she had been admitted in the hospital. I was unable to see that she had been admitted at [facility].  I let him know that she could very well be at a different hospital . He stated he is obligated to do a welfare check since pt has been unable to get caregivers

## 2023-01-24 NOTE — INTERVAL H&P NOTE
Update History & Physical    The patient's History and Physical  was reviewed with the patient and I examined the patient. There was no change. The surgical site was confirmed by the patient and me. Plan: The risks, benefits, expected outcome, and alternative to the recommended procedure have been discussed with the patient. Patient understands and wants to proceed with the procedure.      Electronically signed by Carla Aguilar MD on 1/24/2023 at 9:42 AM

## 2023-01-25 DIAGNOSIS — R06.02 SOB (SHORTNESS OF BREATH): Primary | ICD-10-CM

## 2023-01-25 RX ORDER — ALBUTEROL SULFATE 90 UG/1
AEROSOL, METERED RESPIRATORY (INHALATION)
Qty: 6.7 G | Refills: 0 | Status: SHIPPED | OUTPATIENT
Start: 2023-01-25

## 2023-01-25 NOTE — TELEPHONE ENCOUNTER
Pharmacy sent a request for refills on Albuterol HFA. Refills passed protocol and sent to the pharmacy.   Rx Refill Note  Requested Prescriptions     Pending Prescriptions Disp Refills   • albuterol sulfate  (90 Base) MCG/ACT inhaler [Pharmacy Med Name: ALBUTEROL HFA INH (200 PUFFS) 6.7GM] 6.7 g 5     Sig: INHALE 2 PUFFS INTO THE LUNGS EVERY 4 HOURS AS NEEDED FOR WHEEZING      Last office visit with prescribing clinician: 8/29/2022   Last telemedicine visit with prescribing clinician: Visit date not found   Next office visit with prescribing clinician: Visit date not found                         Would you like a call back once the refill request has been completed: [] Yes [] No    If the office needs to give you a call back, can they leave a voicemail: [] Yes [] No    Luc Dickens, ALFREDA  01/25/23, 08:12 CST

## 2023-01-30 ENCOUNTER — TELEPHONE (OUTPATIENT)
Dept: PAIN MANAGEMENT | Age: 48
End: 2023-01-30

## 2023-01-30 DIAGNOSIS — M54.10 BACK PAIN WITH LEFT-SIDED RADICULOPATHY: Primary | ICD-10-CM

## 2023-02-02 NOTE — PROGRESS NOTES
Clinic Progress Note    Patient:  Naomi Bhatia  YOB: 1975  Date of Service: 2/2/2023  MRN: [id number]   Acct:    Primary Care Physician: Brett Sharp DO    Televisit    Chief Complaint: Hereditary hemochromatosis    Hematology History:  For detailed summary about the patient's previous work-up, differential diagnoses and treatments, please refer to the last note of Dr. Goldberg from 4/1/2022.    Brief summary:  Ms. Bhatia is a 47 y.o. who follows for history of iron level of 19.  She has a history of gastric sleeve procedure in 12/2017, after which she developed iron deficiency anemia like related to decreased dietary absorption. She did receive several multiple blood transfusions in the past as well as parenteral iron Venofer infusions at least 7-10 times back in 2018.     In 9/2020, the patient had seen Dr. Goldberg, where her hemoglobin was 10.8, iron saturation was 21%, ferritin was 1406, and repeat ferritin 8/11/2021 was 1663; she is found to be heterozygous for C282Y hemochromatosis gene variant. She was started on she has phlebotomies every 2 months, and as ferritin had not improved, she initially was started on Jadenu.  As her ferritin improved below 500 in 7/2021, Jadenu was stopped then. The goal ferritin that was discussed with the patient was to keep ferritin below 500.    US spleen 12/17/2021 showed splenomegaly (max dimension 16.7 cm, previously 14.6 cm); it was symptomatic and painful.  Peripheral blood flow cytometry, JAK2, BCR/ABL FISH were negative.  PET scan 2/13/2022 showed splenomegaly with numerous small 1-2 cm hypermetabolic lesions along with mild periportal hypermetabolic lymphadenopathy.  Bone marrow biopsy 1/14/2022 was unremarkable for the most part; there was no sign of increased iron deposition. She ultimately underwent robotic assisted laparoscopic splenomegaly and wedge liver biopsy at [facility] on 5/2/2022, with splenectomy pathology showing  Necrotizing  granulomas, and liver biopsy showing bridging fibrosis with early nodule formation as well as granulomas and mildly active steatohepatitis with microvascular steatosis; iron stains showed only mild iron deposition.    Interval History:  Ms. Bhatia is here for his scheduled follow-up.  In the interim from last visit, she has been doing fairly well.  She feels well today and has no complaints.  She denies having any fever, chills, chest pain, shortness of breath, abdominal pain, nausea vomiting, change in bowel or urine habits, weakness or numbness in arms or legs.    Objectives:  GENERAL: Alert and oriented, no apparent distress  HEENT: No scleral icterus  NECK: Supple  SKIN: No jaundice, normal skin color  PSYCHIATRIC: Full affect  NEUROLOGIC: Stable gait    Results:  Lab Results   Component Value Date    WBC 16.97 (H) 11/28/2022    HGB 14.2 11/28/2022    HCT 44.5 11/28/2022    MCV 85.4 11/28/2022     (H) 11/28/2022     Lab Results   Component Value Date    IRON 51 11/28/2022    TIBC 362 11/28/2022    FERRITIN 135.50 11/28/2022     Assessment :  1.  Hemochromatosis C282Y heterozygous/carrier.  2.  Prolonged history of iron deficiency before.  3.  Splenomegaly s/p splenectomy on 5/2/2022  4.  Anemia due to chronic disease  5.  Leukocytosis, elevated myelocytes and metamyelocytes  6. Thrombocytosis post splenectomy    Plan:   # Elevated ferritin:  - We reviewed her labs from today as well as from the past several months; her ferritin is trending up slightly, today at 171, compared to 135 6 weeks ago, and 131 12 weeks ago.  - She has heterozygous hemochromatosis.  - Since she underwent splenectomy on  5/2/2022, her ferritin level and iron studies have not been as high as they were before; and her ferritin level has been trending down steadily.  - Reviewed her labs since I started following the patient, on 7/5/2022, 8/31/2022, 9/23/2022, 10/11/2022: her ferritin level dropped to 110-260 since her splenectomy  compared to >500 before that, and this is achieved without phlebotomy, and given that her iron level and iron saturation are low with her having anemia, there was no sign of iron deposition in the bone marrow; though there was  significantly deposition in the spleen on pathology and mild iron deposition in the liver; she had held off phlebotomy since her ferritin has been trending down.  - She used to follow with hepatology at [facility], but as [facility] would not be accepting her insurance, she may elect to transition to follow at [place].    # Leukocytosis:  - As for her leukocytosis having myelocytes and meta-myelocyte, I believe this is related to her splenectomy, as she started to develop mild leukocytosis with increasing numbers of eosinophils, basophils, myelocytes and metamyelocytes after her splenectomy.   - To assure we are missing an underlying bone marrow disease, PCR for BCR/ABL was obtained and is negative.   - Obtain labs in 8 weeks, and RTC for OV and labs in 16 weeks.     I reviewed, confirmed and updated HPI and assessment and plan as necessary.     Angela Germain MD  2/2/2023

## 2023-02-03 ENCOUNTER — LAB (OUTPATIENT)
Dept: LAB | Facility: HOSPITAL | Age: 48
End: 2023-02-03
Payer: MEDICARE

## 2023-02-03 ENCOUNTER — OFFICE VISIT (OUTPATIENT)
Dept: ONCOLOGY | Facility: CLINIC | Age: 48
End: 2023-02-03
Payer: MEDICARE

## 2023-02-03 VITALS
SYSTOLIC BLOOD PRESSURE: 132 MMHG | BODY MASS INDEX: 47.09 KG/M2 | WEIGHT: 293 LBS | RESPIRATION RATE: 18 BRPM | HEART RATE: 89 BPM | DIASTOLIC BLOOD PRESSURE: 78 MMHG | TEMPERATURE: 97.9 F | OXYGEN SATURATION: 99 % | HEIGHT: 66 IN

## 2023-02-03 DIAGNOSIS — K74.60 CIRRHOSIS OF LIVER WITHOUT ASCITES, UNSPECIFIED HEPATIC CIRRHOSIS TYPE: ICD-10-CM

## 2023-02-03 DIAGNOSIS — D75.839 THROMBOCYTOSIS: ICD-10-CM

## 2023-02-03 DIAGNOSIS — D75.839 THROMBOCYTOSIS: Primary | ICD-10-CM

## 2023-02-03 DIAGNOSIS — D72.829 LEUKOCYTOSIS, UNSPECIFIED TYPE: ICD-10-CM

## 2023-02-03 DIAGNOSIS — R79.89 ELEVATED FERRITIN: Primary | ICD-10-CM

## 2023-02-03 DIAGNOSIS — R79.89 ELEVATED FERRITIN: ICD-10-CM

## 2023-02-03 DIAGNOSIS — Z45.2 ENCOUNTER FOR CARE RELATED TO PORT-A-CATH: ICD-10-CM

## 2023-02-03 LAB
ALBUMIN SERPL-MCNC: 4 G/DL (ref 3.5–5.2)
ALBUMIN/GLOB SERPL: 1.1 G/DL
ALP SERPL-CCNC: 128 U/L (ref 39–117)
ALT SERPL W P-5'-P-CCNC: 27 U/L (ref 1–33)
ANION GAP SERPL CALCULATED.3IONS-SCNC: 11 MMOL/L (ref 5–15)
AST SERPL-CCNC: 19 U/L (ref 1–32)
BASOPHILS # BLD AUTO: 0.18 10*3/MM3 (ref 0–0.2)
BASOPHILS NFR BLD AUTO: 0.7 % (ref 0–1.5)
BILIRUB SERPL-MCNC: 0.2 MG/DL (ref 0–1.2)
BUN SERPL-MCNC: 16 MG/DL (ref 6–20)
BUN/CREAT SERPL: 21.6 (ref 7–25)
CALCIUM SPEC-SCNC: 9.5 MG/DL (ref 8.6–10.5)
CHLORIDE SERPL-SCNC: 104 MMOL/L (ref 98–107)
CO2 SERPL-SCNC: 27 MMOL/L (ref 22–29)
CREAT SERPL-MCNC: 0.74 MG/DL (ref 0.57–1)
DEPRECATED RDW RBC AUTO: 49.3 FL (ref 37–54)
EGFRCR SERPLBLD CKD-EPI 2021: 99.9 ML/MIN/1.73
EOSINOPHIL # BLD AUTO: 0.28 10*3/MM3 (ref 0–0.4)
EOSINOPHIL NFR BLD AUTO: 1.1 % (ref 0.3–6.2)
ERYTHROCYTE [DISTWIDTH] IN BLOOD BY AUTOMATED COUNT: 15.8 % (ref 12.3–15.4)
FERRITIN SERPL-MCNC: 171.9 NG/ML (ref 13–150)
GLOBULIN UR ELPH-MCNC: 3.7 GM/DL
GLUCOSE SERPL-MCNC: 135 MG/DL (ref 65–99)
HCT VFR BLD AUTO: 43.3 % (ref 34–46.6)
HGB BLD-MCNC: 13.7 G/DL (ref 12–15.9)
HOLD SPECIMEN: NORMAL
IMM GRANULOCYTES # BLD AUTO: 0.18 10*3/MM3 (ref 0–0.05)
IMM GRANULOCYTES NFR BLD AUTO: 0.7 % (ref 0–0.5)
IRON 24H UR-MRATE: 60 MCG/DL (ref 37–145)
IRON SATN MFR SERPL: 17 % (ref 20–50)
LYMPHOCYTES # BLD AUTO: 6.77 10*3/MM3 (ref 0.7–3.1)
LYMPHOCYTES NFR BLD AUTO: 27.2 % (ref 19.6–45.3)
MCH RBC QN AUTO: 27.4 PG (ref 26.6–33)
MCHC RBC AUTO-ENTMCNC: 31.6 G/DL (ref 31.5–35.7)
MCV RBC AUTO: 86.6 FL (ref 79–97)
MONOCYTES # BLD AUTO: 1.78 10*3/MM3 (ref 0.1–0.9)
MONOCYTES NFR BLD AUTO: 7.1 % (ref 5–12)
NEUTROPHILS NFR BLD AUTO: 15.73 10*3/MM3 (ref 1.7–7)
NEUTROPHILS NFR BLD AUTO: 63.2 % (ref 42.7–76)
NRBC BLD AUTO-RTO: 0 /100 WBC (ref 0–0.2)
PLATELET # BLD AUTO: 753 10*3/MM3 (ref 140–450)
PMV BLD AUTO: 9.4 FL (ref 6–12)
POTASSIUM SERPL-SCNC: 3.7 MMOL/L (ref 3.5–5.2)
PROT SERPL-MCNC: 7.7 G/DL (ref 6–8.5)
RBC # BLD AUTO: 5 10*6/MM3 (ref 3.77–5.28)
SODIUM SERPL-SCNC: 142 MMOL/L (ref 136–145)
TIBC SERPL-MCNC: 361 MCG/DL (ref 298–536)
TRANSFERRIN SERPL-MCNC: 242 MG/DL (ref 200–360)
WBC NRBC COR # BLD: 24.92 10*3/MM3 (ref 3.4–10.8)

## 2023-02-03 PROCEDURE — 84466 ASSAY OF TRANSFERRIN: CPT

## 2023-02-03 PROCEDURE — 99213 OFFICE O/P EST LOW 20 MIN: CPT | Performed by: INTERNAL MEDICINE

## 2023-02-03 PROCEDURE — 36415 COLL VENOUS BLD VENIPUNCTURE: CPT

## 2023-02-03 PROCEDURE — 83540 ASSAY OF IRON: CPT

## 2023-02-03 PROCEDURE — 85025 COMPLETE CBC W/AUTO DIFF WBC: CPT

## 2023-02-03 PROCEDURE — 82728 ASSAY OF FERRITIN: CPT

## 2023-02-03 PROCEDURE — 80053 COMPREHEN METABOLIC PANEL: CPT

## 2023-02-03 RX ORDER — SODIUM CHLORIDE 0.9 % (FLUSH) 0.9 %
10 SYRINGE (ML) INJECTION AS NEEDED
Status: DISCONTINUED | OUTPATIENT
Start: 2023-02-03 | End: 2023-02-03 | Stop reason: HOSPADM

## 2023-02-03 RX ORDER — HEPARIN SODIUM (PORCINE) LOCK FLUSH IV SOLN 100 UNIT/ML 100 UNIT/ML
500 SOLUTION INTRAVENOUS AS NEEDED
Status: DISCONTINUED | OUTPATIENT
Start: 2023-02-03 | End: 2023-02-03 | Stop reason: HOSPADM

## 2023-02-03 RX ORDER — SODIUM CHLORIDE 0.9 % (FLUSH) 0.9 %
10 SYRINGE (ML) INJECTION AS NEEDED
OUTPATIENT
Start: 2023-02-03

## 2023-02-03 RX ORDER — HEPARIN SODIUM (PORCINE) LOCK FLUSH IV SOLN 100 UNIT/ML 100 UNIT/ML
500 SOLUTION INTRAVENOUS AS NEEDED
OUTPATIENT
Start: 2023-02-03

## 2023-02-03 RX ORDER — SEMAGLUTIDE 1.34 MG/ML
1 INJECTION, SOLUTION SUBCUTANEOUS
COMMUNITY
Start: 2023-02-02 | End: 2023-05-04

## 2023-02-03 RX ADMIN — Medication 10 ML: at 10:25

## 2023-02-03 RX ADMIN — HEPARIN SODIUM (PORCINE) LOCK FLUSH IV SOLN 100 UNIT/ML 500 UNITS: 100 SOLUTION at 10:25

## 2023-02-15 DIAGNOSIS — M54.16 CHRONIC LUMBAR RADICULOPATHY: ICD-10-CM

## 2023-02-21 RX ORDER — OXYCODONE HYDROCHLORIDE 5 MG/1
5 TABLET ORAL EVERY 6 HOURS PRN
Qty: 90 TABLET | Refills: 0 | Status: SHIPPED | OUTPATIENT
Start: 2023-02-21 | End: 2023-03-23

## 2023-02-23 ENCOUNTER — TELEPHONE (OUTPATIENT)
Dept: FAMILY MEDICINE CLINIC | Age: 48
End: 2023-02-23

## 2023-02-23 NOTE — TELEPHONE ENCOUNTER
----- Message from Jase Chavez sent at 2/23/2023 12:57 PM CST -----  Subject: Referral Request    Reason for referral request? Patient requesting an order for orthopedic   shoes, wants to order them from [facility] specifically. Provider patient wants to be referred to(if known):     Provider Phone Number(if known):     Additional Information for Provider?   ---------------------------------------------------------------------------  --------------  [id number]    [phone]; OK to leave message on voicemail  ---------------------------------------------------------------------------  --------------

## 2023-02-24 NOTE — TELEPHONE ENCOUNTER
I am not exactly sure how to do that or even if we can do that. If she can give us more information, that would be wonderful.

## 2023-02-24 NOTE — TELEPHONE ENCOUNTER
I called pt and she did not really know anything about this. I have called Hersmaryl Ohs and it did not give an option to speak to anyone, I am unsure how to do this as well.

## 2023-03-13 ENCOUNTER — OFFICE VISIT (OUTPATIENT)
Dept: OBGYN CLINIC | Age: 48
End: 2023-03-13

## 2023-03-13 VITALS
WEIGHT: 293 LBS | DIASTOLIC BLOOD PRESSURE: 77 MMHG | BODY MASS INDEX: 47.09 KG/M2 | HEART RATE: 84 BPM | HEIGHT: 66 IN | SYSTOLIC BLOOD PRESSURE: 110 MMHG

## 2023-03-13 DIAGNOSIS — R11.0 NAUSEA: ICD-10-CM

## 2023-03-13 DIAGNOSIS — Z01.419 WOMEN'S ANNUAL ROUTINE GYNECOLOGICAL EXAMINATION: Primary | ICD-10-CM

## 2023-03-13 DIAGNOSIS — E83.110 HEREDITARY HEMOCHROMATOSIS (HCC): ICD-10-CM

## 2023-03-13 DIAGNOSIS — E66.01 MORBID OBESITY (HCC): ICD-10-CM

## 2023-03-13 DIAGNOSIS — Z87.310 H/O HEALED FRAGILITY FRACTURE: ICD-10-CM

## 2023-03-13 DIAGNOSIS — Z13.9 ENCOUNTER FOR SCREENING: ICD-10-CM

## 2023-03-13 DIAGNOSIS — Z12.31 ENCOUNTER FOR SCREENING MAMMOGRAM FOR MALIGNANT NEOPLASM OF BREAST: ICD-10-CM

## 2023-03-13 DIAGNOSIS — N95.9 UNSPECIFIED MENOPAUSAL AND PERIMENOPAUSAL DISORDER: ICD-10-CM

## 2023-03-13 RX ORDER — ONDANSETRON 4 MG/1
4 TABLET, FILM COATED ORAL DAILY PRN
Qty: 30 TABLET | Refills: 1 | Status: SHIPPED | OUTPATIENT
Start: 2023-03-13

## 2023-03-13 ASSESSMENT — ENCOUNTER SYMPTOMS
ALLERGIC/IMMUNOLOGIC NEGATIVE: 1
RESPIRATORY NEGATIVE: 1
EYES NEGATIVE: 1
BACK PAIN: 1

## 2023-03-13 NOTE — PROGRESS NOTES
and myalgias. Skin: Negative. Allergic/Immunologic: Negative. Neurological: Negative. Hematological: Negative. Psychiatric/Behavioral: Negative. Physical Exam  Vitals and nursing note reviewed. Constitutional:       Appearance: She is well-developed. HENT:      Head: Normocephalic and atraumatic. Eyes:      Conjunctiva/sclera: Conjunctivae normal.      Pupils: Pupils are equal, round, and reactive to light. Cardiovascular:      Rate and Rhythm: Normal rate and regular rhythm. Heart sounds: Normal heart sounds. Pulmonary:      Effort: Pulmonary effort is normal.   Chest:      Chest wall: No mass, deformity, swelling, tenderness or edema. Breasts:     Right: Normal. No swelling, inverted nipple, mass, nipple discharge, skin change or tenderness. Left: Normal. No swelling, inverted nipple, mass, nipple discharge, skin change or tenderness. Comments: Breasts symmetrical without tenderness, masses, or nipple discharge. Nipples everted bilaterally. Abdominal:      General: Bowel sounds are normal.      Palpations: Abdomen is soft. Tenderness: There is no abdominal tenderness. Hernia: No hernia is present. There is no hernia in the left inguinal area, right femoral area, left femoral area or right inguinal area. Comments: No suprapubic pain/pressure of bladder   Genitourinary:     General: Normal vulva. Exam position: Lithotomy position. Pubic Area: No rash. Labia:         Right: No rash, tenderness, lesion or injury. Left: No rash, tenderness, lesion or injury. Urethra: No prolapse, urethral pain, urethral swelling or urethral lesion. Vagina: Normal. No signs of injury. No vaginal discharge, erythema, tenderness, bleeding, lesions or prolapsed vaginal walls. Rectum: No mass, tenderness, anal fissure, external hemorrhoid or internal hemorrhoid. Normal anal tone.       Comments: Urethral meatus of normal size without

## 2023-03-14 ENCOUNTER — TELEPHONE (OUTPATIENT)
Dept: PAIN MANAGEMENT | Age: 48
End: 2023-03-14

## 2023-03-14 NOTE — TELEPHONE ENCOUNTER
Patient left message on nurse line. She is letting provider know that she will be having rotator cuff repair next Thursday by Dr. Karla Haas of [address] [place] [address]. She stated that she will probably be receiving a script from him post op for pain medication, but she is not completely sure, as before she was not given anything since she is a patient of pain management. She will let us know regarding the scripts.

## 2023-03-16 DIAGNOSIS — M54.16 CHRONIC LUMBAR RADICULOPATHY: ICD-10-CM

## 2023-03-17 DIAGNOSIS — J30.89 ENVIRONMENTAL AND SEASONAL ALLERGIES: ICD-10-CM

## 2023-03-17 RX ORDER — LEVOCETIRIZINE DIHYDROCHLORIDE 5 MG/1
5 TABLET, FILM COATED ORAL NIGHTLY
Qty: 90 TABLET | Refills: 3 | Status: SHIPPED | OUTPATIENT
Start: 2023-03-17

## 2023-03-17 NOTE — TELEPHONE ENCOUNTER
Received fax from pharmacy requesting refill on pts medication(s). Pt was last seen in office on Visit date not found  and has a follow up scheduled for 4/12/2023. Will send request to  Dr. Jam Bloom  for authorization.      Requested Prescriptions     Pending Prescriptions Disp Refills    levocetirizine (XYZAL) 5 MG tablet [Pharmacy Med Name: LEVOCETIRIZINE 5MG TABLETS] 90 tablet 3     Sig: TAKE 1 TABLET BY MOUTH EVERY NIGHT

## 2023-03-21 ENCOUNTER — TELEPHONE (OUTPATIENT)
Dept: PAIN MANAGEMENT | Age: 48
End: 2023-03-21

## 2023-03-21 DIAGNOSIS — M54.16 LUMBAR RADICULOPATHY: ICD-10-CM

## 2023-03-21 RX ORDER — OXYCODONE HYDROCHLORIDE 5 MG/1
5 TABLET ORAL EVERY 6 HOURS PRN
Qty: 90 TABLET | Refills: 0 | Status: SHIPPED | OUTPATIENT
Start: 2023-03-23 | End: 2023-04-22

## 2023-03-21 ASSESSMENT — ENCOUNTER SYMPTOMS: NAUSEA: 1

## 2023-03-21 NOTE — TELEPHONE ENCOUNTER
Received call from patient that she had her surgery today and was sent home with script for hydrocodone q 4 hrs, #20 tablets. I clarified with her that she can take the surgeons medication, but needs to hold the medication from pain management provider until she finishes medications from the surgeon. Patient verbalized understanding.

## 2023-03-22 ENCOUNTER — TELEPHONE (OUTPATIENT)
Dept: PAIN MANAGEMENT | Age: 48
End: 2023-03-22

## 2023-03-22 RX ORDER — PREGABALIN 100 MG/1
100 CAPSULE ORAL 2 TIMES DAILY
Qty: 60 CAPSULE | Refills: 2 | OUTPATIENT
Start: 2023-03-22 | End: 2023-06-20

## 2023-03-23 ENCOUNTER — TELEPHONE (OUTPATIENT)
Dept: FAMILY MEDICINE CLINIC | Age: 48
End: 2023-03-23

## 2023-03-23 NOTE — TELEPHONE ENCOUNTER
Patient called stating that she is out of her lyrica. She is needing this sent in. I told her that she would probably need a follow up appt since her last visit was may of  2022. She cannot drive at this time due to having rotator cuff surgery a few days ago. Patient has appt scheduled with doc in April. Please advise.  [phone]

## 2023-03-23 NOTE — TELEPHONE ENCOUNTER
Please set up virtual appointment so we can fill this for her.   [facility] law requires every 6 months evaluation

## 2023-03-24 ENCOUNTER — TELEMEDICINE (OUTPATIENT)
Dept: FAMILY MEDICINE CLINIC | Age: 48
End: 2023-03-24

## 2023-03-24 DIAGNOSIS — M54.16 LUMBAR RADICULOPATHY: ICD-10-CM

## 2023-03-24 DIAGNOSIS — M79.7 FIBROMYALGIA: Primary | ICD-10-CM

## 2023-03-24 DIAGNOSIS — S46.019A TRAUMATIC ROTATOR CUFF TEAR, UNSPECIFIED LATERALITY, UNSPECIFIED TEAR EXTENT, INITIAL ENCOUNTER: ICD-10-CM

## 2023-03-24 RX ORDER — PREGABALIN 100 MG/1
100 CAPSULE ORAL 2 TIMES DAILY
Qty: 60 CAPSULE | Refills: 2 | Status: SHIPPED | OUTPATIENT
Start: 2023-03-24 | End: 2023-06-22

## 2023-03-24 ASSESSMENT — ENCOUNTER SYMPTOMS
EYES NEGATIVE: 1
COUGH: 0
SHORTNESS OF BREATH: 0

## 2023-03-24 NOTE — PROGRESS NOTES
Attempted to send link and two calls and voicemail  For patient apt
Physical Exam  [INSTRUCTIONS:  \"[x]\" Indicates a positive item  \"[]\" Indicates a negative item  -- DELETE ALL ITEMS NOT EXAMINED]    Constitutional: [x] Appears well-developed and well-nourished [x] No apparent distress      [] Abnormal -     Mental status: [x] Alert and awake  [x] Oriented to person/place/time [x] Able to follow commands    [] Abnormal -     Eyes:   EOM    [x]  Normal    [] Abnormal -   Sclera  [x]  Normal    [] Abnormal -          Discharge [x]  None visible   [] Abnormal -     HENT: [x] Normocephalic, atraumatic  [] Abnormal -   [x] Mouth/Throat: Mucous membranes are moist    External Ears [x] Normal  [] Abnormal -    Neck: [x] No visualized mass [] Abnormal -     Pulmonary/Chest: [x] Respiratory effort normal   [x] No visualized signs of difficulty breathing or respiratory distress        [] Abnormal -      Musculoskeletal:   [x] Normal gait with no signs of ataxia         [x] Normal range of motion of neck        [] Abnormal -     Neurological:        [x] No Facial Asymmetry (Cranial nerve 7 motor function) (limited exam due to video visit)          [x] No gaze palsy        [] Abnormal -          Skin:        [x] No significant exanthematous lesions or discoloration noted on facial skin         [] Abnormal -            Psychiatric:       [x] Normal Affect [] Abnormal -        [x] No Hallucinations    Other pertinent observable physical exam findings: -             Geraline Leyden, was evaluated through a synchronous (real-time) audio-video encounter. The patient (or guardian if applicable) is aware that this is a billable service, which includes applicable co-pays. This Virtual Visit was conducted with patient's (and/or legal guardian's) consent. The visit was conducted pursuant to the emergency declaration under the [address] [facility], [address] [place] [address] authority and the [employer] Â® and [employer] General Act.   Patient identification was

## 2023-03-24 NOTE — Clinical Note
I just did virtual with her.  I think she was confused whom gives her the lyrica I put a note to pharmacy to fill yours unless she was running out etc she was just fearful that would have withdrawal  Henrry Sethi

## 2023-03-30 DIAGNOSIS — E83.110 HEREDITARY HEMOCHROMATOSIS: ICD-10-CM

## 2023-04-10 NOTE — TELEPHONE ENCOUNTER
Okay to order as requested Doxepin Pregnancy And Lactation Text: This medication is Pregnancy Category C and it isn't known if it is safe during pregnancy. It is also excreted in breast milk and breast feeding isn't recommended.

## 2023-04-19 ENCOUNTER — HOSPITAL ENCOUNTER (OUTPATIENT)
Dept: PHYSICAL THERAPY | Age: 48
Setting detail: THERAPIES SERIES
Discharge: HOME | End: 2023-04-19
Payer: MEDICARE

## 2023-04-19 PROCEDURE — 97110 THERAPEUTIC EXERCISES: CPT

## 2023-04-19 ASSESSMENT — PAIN SCALES - GENERAL: PAINLEVEL_OUTOF10: 7

## 2023-04-19 ASSESSMENT — PAIN DESCRIPTION - PAIN TYPE: TYPE: CHRONIC PAIN;SURGICAL PAIN

## 2023-04-19 ASSESSMENT — PAIN DESCRIPTION - ORIENTATION: ORIENTATION: RIGHT

## 2023-04-19 ASSESSMENT — PAIN DESCRIPTION - LOCATION: LOCATION: ARM;SHOULDER

## 2023-04-19 ASSESSMENT — PAIN DESCRIPTION - DESCRIPTORS: DESCRIPTORS: ACHING;SHARP;SHOOTING

## 2023-04-19 NOTE — PROGRESS NOTES
Goals Completed by 4-6 weeks Current Status Goal Status   Report independence with upper body dressing and ADLs   New   Demo AROM R shoulder flexion/scaption at least 130 degrees in standing   New   Improve QuickDASH score to 50% impairment or better   New                                                                TREATMENT PLAN   Plan Frequency: 2x  Plan weeks: 6 weeks  Current Treatment Recommendations: Strengthening, ROM, Pain management, Modalities, Positioning, Equipment evaluation, education, & procurement, Patient/Caregiver education & training, Safety education & training, Home exercise program, Manual   Requires PT Follow-Up: Yes     Therapy Time  Individual Time In: 9097       Individual Time Out: 1008  Minutes: 62        Electronically signed by Haley Alicea PT  on 4/19/2023 at 2:30 PM

## 2023-04-21 ENCOUNTER — HOSPITAL ENCOUNTER (OUTPATIENT)
Dept: PHYSICAL THERAPY | Age: 48
Setting detail: THERAPIES SERIES
Discharge: HOME | End: 2023-04-21
Payer: MEDICARE

## 2023-04-21 PROCEDURE — 97110 THERAPEUTIC EXERCISES: CPT

## 2023-04-21 ASSESSMENT — PAIN SCALES - GENERAL: PAINLEVEL_OUTOF10: 7

## 2023-04-21 ASSESSMENT — PAIN DESCRIPTION - PAIN TYPE: TYPE: SURGICAL PAIN

## 2023-04-21 ASSESSMENT — PAIN DESCRIPTION - ORIENTATION: ORIENTATION: RIGHT

## 2023-04-21 ASSESSMENT — PAIN DESCRIPTION - DESCRIPTORS: DESCRIPTORS: ACHING;SHARP

## 2023-04-21 ASSESSMENT — PAIN DESCRIPTION - LOCATION: LOCATION: ARM;SHOULDER

## 2023-04-21 NOTE — PROGRESS NOTES
9: Pendulums  Exercise 10: Passive table slide flex/abd x 10 ea  Exercise 11: PROM R shoulder flexion, scaption, ER/IR (in scapular plane) x 10 ea  Exercise 12: CRITERIA TO PROGRESS TO PHASE II: PROM 120 degrees flexion, 90 degrees scaption, ER/IR 60 degrees  Exercise 13: At 4/25/23, AAROM in supine (no ER)  Exercise 14: At 4/25/23: Gentle GH mobs  Exercise 15: At 4/25: Submaximal isometric R shoulder  Exercise 16: At 5/2: AAROM with cane in flexion/scaption/ER  Exercise 17: At 5/2: Pulleys  Exercise 18: At 5/16: AROM scaption, abduction  Exercise 19: At 5/16: T-band ER/IR, sidelying ER  Exercise 20: End with ice and IFC PRN for elevated pain                        ASSESSMENT     Assessment: Assessment: Pt less guarded with movements today, and able to move through exercises with less facial grimace noted. She does require cuing, particularly with PROM in flexion, to maintain relaxed state for eccentric movement, as she tends to tense and guard, which leads to increase of pain. Added table slide activities w/o any complaint. Body Structures, Functions, Activity Limitations Requiring Skilled Therapeutic Intervention: Decreased functional mobility , Decreased ADL status, Decreased ROM, Decreased strength, Decreased high-level IADLs, Increased pain, Decreased posture    Post-Treatment Pain Level:       Activity Tolerance: Patient limited by pain    Therapy Prognosis: Fair; Good       GOALS   Patient Goals : improve use of RUE  Short Term Goals Completed by 3-4 weeks Current Status Goal Status   Independent with HEP   New   Improve R elbow/forearm/wrist strength to 5/5   New   Improve R  strength to at least 50#   New   Improve AAROM of R shoulder in supine to at least 125 degrees flexion/scaption, 60 degrees ER/IR   New                                                       Long Term Goals Completed by 4-6 weeks Current Status Goal Status   Report independence with upper body dressing and ADLs   New   Demo AROM R

## 2023-04-24 DIAGNOSIS — M54.16 CHRONIC LUMBAR RADICULOPATHY: ICD-10-CM

## 2023-04-25 ENCOUNTER — HOSPITAL ENCOUNTER (OUTPATIENT)
Dept: PHYSICAL THERAPY | Age: 48
Setting detail: THERAPIES SERIES
Discharge: HOME | End: 2023-04-25
Payer: MEDICARE

## 2023-04-25 PROCEDURE — 97110 THERAPEUTIC EXERCISES: CPT

## 2023-04-25 RX ORDER — OXYCODONE HYDROCHLORIDE 5 MG/1
5 TABLET ORAL EVERY 6 HOURS PRN
Qty: 90 TABLET | Refills: 0 | Status: SHIPPED | OUTPATIENT
Start: 2023-04-25 | End: 2023-05-25

## 2023-04-25 ASSESSMENT — PAIN DESCRIPTION - LOCATION: LOCATION: SHOULDER;ARM

## 2023-04-25 ASSESSMENT — PAIN SCALES - GENERAL: PAINLEVEL_OUTOF10: 6

## 2023-04-25 ASSESSMENT — PAIN DESCRIPTION - PAIN TYPE: TYPE: SURGICAL PAIN;CHRONIC PAIN

## 2023-04-25 ASSESSMENT — PAIN DESCRIPTION - ORIENTATION: ORIENTATION: RIGHT

## 2023-04-25 NOTE — PROGRESS NOTES
Daily Treatment Note  Date: 2023  Patient Name: Sharyle Killer  MRN: [id number]     :   1975    Referring Physician: DO Cm Adorno PA   PCP: Princess De La Cruz DO    Medical Diagnosis: Other specified joint disorders, right shoulder [M25.811]  Unspecified rotator cuff tear or rupture of right shoulder, not specified as traumatic [M75.101] R rotator cuff repair  Treatment Diagnosis: R rotator cuff repair      Insurance: Payor: Cris Hickman / Plan: AGNITiO / Product Type: *No Product type* /   Insurance ID: [id number] - (Medicare Managed)    Subjective:   General  Diagnosis: R rotator cuff repair  Referring Provider (secondary): ZULLY Grijalva  Onset Date: 23  PT Insurance Information: [employer] Medicare (Primary- precert req'd) and [place] Medicaid (Secondary- auth required after 30 visits)  Total # of Visits Approved: 12  Total # of Visits to Date: 4  Plan of Care/Certification Expiration Date: 23  Progress Note Due Date: 23  Referring Provider (secondary): ZULLY Grijalva  Subjective: i washed my hair for the first time by myself since surgery and i think i really pushed it. Patient Currently in Pain: Yes  Pain Level: 6  Pain Type: Surgical pain, Chronic pain  Pain Location: Shoulder, Arm  Pain Orientation: Right       Treatment Activities:  Exercises:      Treatment Reasoning    Exercise 1: R rotator cuff repair 3/21/23. PHASE I: 3/21-23. PHASE II: -.   PHASE III: -23  Exercise 2: 3 way elbow flexion (no weight until ) x 10 ea  Exercise 3: Triceps extension (no weight until ) x 10 (standing)  Exercise 4: Wrist flex/ext x 20 ea 1#  Exercise 5: Pronation/supination x 10  Exercise 6: Gripper/ball squeeze (yellow) x 1' ea  Exercise 7: Shoulder shrugs/rolls x 10  Exercise 8: Scapular retraction x 10  Exercise 9: Pendulums  Exercise 10: Passive table slide flex/abd x 10 ea  Exercise 11: PROM R

## 2023-04-27 ENCOUNTER — HOSPITAL ENCOUNTER (OUTPATIENT)
Dept: PHYSICAL THERAPY | Age: 48
Setting detail: THERAPIES SERIES
Discharge: HOME | End: 2023-04-27
Payer: MEDICARE

## 2023-04-27 PROCEDURE — 97110 THERAPEUTIC EXERCISES: CPT

## 2023-04-27 ASSESSMENT — PAIN DESCRIPTION - PAIN TYPE: TYPE: SURGICAL PAIN;CHRONIC PAIN

## 2023-04-27 ASSESSMENT — PAIN DESCRIPTION - ORIENTATION: ORIENTATION: RIGHT

## 2023-04-27 ASSESSMENT — PAIN SCALES - GENERAL: PAINLEVEL_OUTOF10: 5

## 2023-04-27 ASSESSMENT — PAIN DESCRIPTION - LOCATION: LOCATION: SHOULDER;ARM

## 2023-04-27 NOTE — PROGRESS NOTES
Physical Therapy: Daily Note   Patient: Swati Bean (4 y.o. female)   Examination Date:   Plan of Care/Certification Expiration Date: 23    No data recorded   :  1975 # of Visits since [facility]:   5   MRN: [id number]  CSN: [id number] Start of Care Date:   2023   Insurance: Payor: [employer] MEDICARE / Plan: Suhail Gil / Product Type: *No Product type* /   Insurance ID: [id number] - (Medicare Managed) Secondary Insurance (if applicable): MEDICAID [place]   Referring Physician: DO Estevan Romero PA   PCP: Irma Valdez DO Visits to Date/Visits Approved:     No Show/Cancelled Appts:   /       Medical Diagnosis: Other specified joint disorders, right shoulder [M25.811]  Unspecified rotator cuff tear or rupture of right shoulder, not specified as traumatic [M75.101]    Treatment Diagnosis: R rotator cuff repair        SUBJECTIVE EXAMINATION   Pain Level: Pain Screening  Patient Currently in Pain: Yes  Pain Assessment: 0-10  Pain Level: 5  Pain Type: Surgical pain, Chronic pain  Pain Location: Shoulder, Arm  Pain Orientation: Right    Patient Comments:        Comments: Nothing new     OBJECTIVE EXAMINATION       TREATMENT     Exercises:      Treatment Reasoning    Exercise 1: R rotator cuff repair 3/21/23. PHASE I: 3/21-23. PHASE II: -.   PHASE III: -23  Exercise 2: 3 way elbow flexion (no weight until ) x 10 ea  Exercise 3: Triceps extension (no weight until ) x 10 (standing)  Exercise 4: Wrist flex/ext x 20 ea 1#  Exercise 5: Pronation/supination x 10 1#  Exercise 6: Gripper/ball squeeze (yellow) x 1' ea  Exercise 7: Shoulder shrugs/rolls x 10  Exercise 8: Scapular retraction x 10  Exercise 9: Pendulums  Exercise 10: Passive table slide flex/abd x 10 ea  Exercise 11: PROM R shoulder flexion, scaption, ER/IR (in scapular plane) x 10 ea  Exercise 12: CRITERIA TO PROGRESS TO PHASE II: PROM 120 degrees flexion, 90

## 2023-05-02 ENCOUNTER — HOSPITAL ENCOUNTER (OUTPATIENT)
Dept: PHYSICAL THERAPY | Age: 48
Setting detail: THERAPIES SERIES
Discharge: HOME | End: 2023-05-02
Payer: MEDICARE

## 2023-05-02 PROCEDURE — 97110 THERAPEUTIC EXERCISES: CPT

## 2023-05-02 NOTE — PROGRESS NOTES
Daily Treatment Note  Date: 2023  Patient Name: Francine Potter  MRN: [id number]     :   1975    Referring Physician: DO Silvina Enamorado PA   PCP: Keith Heard DO    Medical Diagnosis: Other specified joint disorders, right shoulder [M25.811]  Unspecified rotator cuff tear or rupture of right shoulder, not specified as traumatic [M75.101] R rotator cuff repair  Treatment Diagnosis: R rotator cuff repair      Insurance: Payor: Susan Correia / Plan: [employer] / Product Type: *No Product type* /   Insurance ID: [id number] - (Medicare Managed)    Subjective:   General  Diagnosis: R rotator cuff repair  Referring Provider (secondary): ZULLY Ojeda  Onset Date: 23  PT Insurance Information: [employer] Medicare (Primary- precert req'd) and [place] Medicaid (Secondary- auth required after 30 visits)  Total # of Visits Approved: 12  Total # of Visits to Date: 7  Plan of Care/Certification Expiration Date: 23  Progress Note Due Date: 23  Referring Provider (secondary): ZULLY Ojeda  Subjective: not really in much pain just sore and stiff, i have an appointment with my Dr in [facility] tomorrow       Treatment Activities:  Exercises:      Treatment Reasoning    Exercise 1: R rotator cuff repair 3/21/23. PHASE I: 3/21-23. PHASE II: -.   PHASE III: -23  Exercise 2: 3 way elbow flexion (no weight until ) x 10 ea  Exercise 3: Triceps extension (no weight until ) x 10 (standing)  Exercise 4: Wrist flex/ext x 20 ea 1#  Exercise 5: Pronation/supination x 10 1#  Exercise 6: Gripper/ball squeeze (yellow) x 1' ea  Exercise 7: Shoulder shrugs/rolls x 10  Exercise 8: Scapular retraction x 10  Exercise 9: Pendulums  cw/ccw  x 10  Exercise 10: Passive table slide flex/abd x 10 ea  Exercise 11: PROM R shoulder flexion, scaption, ER/IR (in scapular plane) x 10 ea  Exercise 12: CRITERIA TO PROGRESS TO PHASE II: PROM 120

## 2023-05-04 ENCOUNTER — TELEPHONE (OUTPATIENT)
Dept: ONCOLOGY | Facility: CLINIC | Age: 48
End: 2023-05-04

## 2023-05-04 ENCOUNTER — APPOINTMENT (OUTPATIENT)
Dept: PHYSICAL THERAPY | Age: 48
End: 2023-05-04
Payer: MEDICARE

## 2023-05-04 NOTE — TELEPHONE ENCOUNTER
Caller: Isa Bahtia    Relationship: Self    Best call back number: [phone]    What is the best time to reach you: ANY    Who are you requesting to speak with (clinical staff, provider,  specific staff member): CLINICAL    What was the call regarding: ISA IS CALLING WANTING TO COME IN TOMORROW FOR A PORT FLUSH AND LABS, SINCE SHE MISSED HER LAST APPT    Do you require a callback: YES

## 2023-05-05 ENCOUNTER — INFUSION (OUTPATIENT)
Dept: ONCOLOGY | Facility: CLINIC | Age: 48
End: 2023-05-05
Payer: MEDICARE

## 2023-05-05 ENCOUNTER — HOSPITAL ENCOUNTER (OUTPATIENT)
Dept: PHYSICAL THERAPY | Age: 48
Setting detail: THERAPIES SERIES
Discharge: HOME | End: 2023-05-05
Payer: MEDICARE

## 2023-05-05 ENCOUNTER — LAB (OUTPATIENT)
Dept: LAB | Facility: HOSPITAL | Age: 48
End: 2023-05-05
Payer: MEDICARE

## 2023-05-05 ENCOUNTER — OFFICE VISIT (OUTPATIENT)
Dept: GASTROENTEROLOGY | Age: 48
End: 2023-05-05
Payer: MEDICARE

## 2023-05-05 VITALS
SYSTOLIC BLOOD PRESSURE: 110 MMHG | HEIGHT: 65 IN | BODY MASS INDEX: 48.82 KG/M2 | WEIGHT: 293 LBS | OXYGEN SATURATION: 96 % | DIASTOLIC BLOOD PRESSURE: 80 MMHG | HEART RATE: 97 BPM

## 2023-05-05 DIAGNOSIS — E83.110 HEREDITARY HEMOCHROMATOSIS: ICD-10-CM

## 2023-05-05 DIAGNOSIS — E83.110 HEREDITARY HEMOCHROMATOSIS (HCC): ICD-10-CM

## 2023-05-05 DIAGNOSIS — Z45.2 ENCOUNTER FOR CARE RELATED TO PORT-A-CATH: Primary | ICD-10-CM

## 2023-05-05 DIAGNOSIS — K21.9 CHRONIC GERD: ICD-10-CM

## 2023-05-05 DIAGNOSIS — K74.00 HEPATIC FIBROSIS: Primary | ICD-10-CM

## 2023-05-05 DIAGNOSIS — R13.10 DYSPHAGIA, UNSPECIFIED TYPE: ICD-10-CM

## 2023-05-05 LAB
ALBUMIN SERPL-MCNC: 3.9 G/DL (ref 3.5–5.2)
ALBUMIN/GLOB SERPL: 1.1 G/DL
ALP SERPL-CCNC: 126 U/L (ref 39–117)
ALT SERPL W P-5'-P-CCNC: 17 U/L (ref 1–33)
ANION GAP SERPL CALCULATED.3IONS-SCNC: 17 MMOL/L (ref 5–15)
ANISOCYTOSIS BLD QL: ABNORMAL
AST SERPL-CCNC: 21 U/L (ref 1–32)
BILIRUB SERPL-MCNC: 0.2 MG/DL (ref 0–1.2)
BUN SERPL-MCNC: 11 MG/DL (ref 6–20)
BUN/CREAT SERPL: 15.5 (ref 7–25)
BURR CELLS BLD QL SMEAR: ABNORMAL
CALCIUM SPEC-SCNC: 8.9 MG/DL (ref 8.6–10.5)
CHLORIDE SERPL-SCNC: 99 MMOL/L (ref 98–107)
CO2 SERPL-SCNC: 22 MMOL/L (ref 22–29)
CREAT SERPL-MCNC: 0.71 MG/DL (ref 0.57–1)
DEPRECATED RDW RBC AUTO: 47.7 FL (ref 37–54)
EGFRCR SERPLBLD CKD-EPI 2021: 105 ML/MIN/1.73
EOSINOPHIL # BLD MANUAL: 0.2 10*3/MM3 (ref 0–0.4)
EOSINOPHIL NFR BLD MANUAL: 1 % (ref 0.3–6.2)
ERYTHROCYTE [DISTWIDTH] IN BLOOD BY AUTOMATED COUNT: 15 % (ref 12.3–15.4)
FERRITIN SERPL-MCNC: 166.1 NG/ML (ref 13–150)
GLOBULIN UR ELPH-MCNC: 3.6 GM/DL
GLUCOSE SERPL-MCNC: 190 MG/DL (ref 65–99)
HCT VFR BLD AUTO: 42.1 % (ref 34–46.6)
HGB BLD-MCNC: 13 G/DL (ref 12–15.9)
IRON 24H UR-MRATE: 37 MCG/DL (ref 37–145)
IRON SATN MFR SERPL: 11 % (ref 20–50)
LYMPHOCYTES # BLD MANUAL: 6.08 10*3/MM3 (ref 0.7–3.1)
LYMPHOCYTES NFR BLD MANUAL: 4 % (ref 5–12)
MCH RBC QN AUTO: 26.7 PG (ref 26.6–33)
MCHC RBC AUTO-ENTMCNC: 30.9 G/DL (ref 31.5–35.7)
MCV RBC AUTO: 86.4 FL (ref 79–97)
MONOCYTES # BLD: 0.79 10*3/MM3 (ref 0.1–0.9)
NEUTROPHILS # BLD AUTO: 12.75 10*3/MM3 (ref 1.7–7)
NEUTROPHILS NFR BLD MANUAL: 59.4 % (ref 42.7–76)
NEUTS BAND NFR BLD MANUAL: 5 % (ref 0–5)
OVALOCYTES BLD QL SMEAR: ABNORMAL
PLATELET # BLD AUTO: 710 10*3/MM3 (ref 140–450)
PMV BLD AUTO: 9.2 FL (ref 6–12)
POIKILOCYTOSIS BLD QL SMEAR: ABNORMAL
POLYCHROMASIA BLD QL SMEAR: ABNORMAL
POTASSIUM SERPL-SCNC: 3.7 MMOL/L (ref 3.5–5.2)
PROT SERPL-MCNC: 7.5 G/DL (ref 6–8.5)
RBC # BLD AUTO: 4.87 10*6/MM3 (ref 3.77–5.28)
SMALL PLATELETS BLD QL SMEAR: ABNORMAL
SODIUM SERPL-SCNC: 138 MMOL/L (ref 136–145)
TIBC SERPL-MCNC: 334 MCG/DL (ref 298–536)
TRANSFERRIN SERPL-MCNC: 224 MG/DL (ref 200–360)
VARIANT LYMPHS NFR BLD MANUAL: 27.7 % (ref 19.6–45.3)
VARIANT LYMPHS NFR BLD MANUAL: 3 % (ref 0–5)
WBC MORPH BLD: NORMAL
WBC NRBC COR # BLD: 19.81 10*3/MM3 (ref 3.4–10.8)

## 2023-05-05 PROCEDURE — G8427 DOCREV CUR MEDS BY ELIG CLIN: HCPCS | Performed by: NURSE PRACTITIONER

## 2023-05-05 PROCEDURE — 85007 BL SMEAR W/DIFF WBC COUNT: CPT

## 2023-05-05 PROCEDURE — 1036F TOBACCO NON-USER: CPT | Performed by: NURSE PRACTITIONER

## 2023-05-05 PROCEDURE — G8417 CALC BMI ABV UP PARAM F/U: HCPCS | Performed by: NURSE PRACTITIONER

## 2023-05-05 PROCEDURE — 82728 ASSAY OF FERRITIN: CPT

## 2023-05-05 PROCEDURE — 3074F SYST BP LT 130 MM HG: CPT | Performed by: NURSE PRACTITIONER

## 2023-05-05 PROCEDURE — 84466 ASSAY OF TRANSFERRIN: CPT

## 2023-05-05 PROCEDURE — 36415 COLL VENOUS BLD VENIPUNCTURE: CPT

## 2023-05-05 PROCEDURE — 85025 COMPLETE CBC W/AUTO DIFF WBC: CPT

## 2023-05-05 PROCEDURE — 3079F DIAST BP 80-89 MM HG: CPT | Performed by: NURSE PRACTITIONER

## 2023-05-05 PROCEDURE — 97110 THERAPEUTIC EXERCISES: CPT

## 2023-05-05 PROCEDURE — 80053 COMPREHEN METABOLIC PANEL: CPT

## 2023-05-05 PROCEDURE — 83540 ASSAY OF IRON: CPT

## 2023-05-05 PROCEDURE — 99204 OFFICE O/P NEW MOD 45 MIN: CPT | Performed by: NURSE PRACTITIONER

## 2023-05-05 RX ORDER — HEPARIN SODIUM (PORCINE) LOCK FLUSH IV SOLN 100 UNIT/ML 100 UNIT/ML
500 SOLUTION INTRAVENOUS AS NEEDED
Status: DISCONTINUED | OUTPATIENT
Start: 2023-05-05 | End: 2023-05-05 | Stop reason: HOSPADM

## 2023-05-05 RX ORDER — SODIUM CHLORIDE 0.9 % (FLUSH) 0.9 %
10 SYRINGE (ML) INJECTION AS NEEDED
OUTPATIENT
Start: 2023-05-05

## 2023-05-05 RX ORDER — HEPARIN SODIUM (PORCINE) LOCK FLUSH IV SOLN 100 UNIT/ML 100 UNIT/ML
500 SOLUTION INTRAVENOUS AS NEEDED
OUTPATIENT
Start: 2023-05-05

## 2023-05-05 RX ORDER — SODIUM CHLORIDE 0.9 % (FLUSH) 0.9 %
10 SYRINGE (ML) INJECTION AS NEEDED
Status: DISCONTINUED | OUTPATIENT
Start: 2023-05-05 | End: 2023-05-05 | Stop reason: HOSPADM

## 2023-05-05 RX ADMIN — Medication 10 ML: at 08:37

## 2023-05-05 RX ADMIN — HEPARIN SODIUM (PORCINE) LOCK FLUSH IV SOLN 100 UNIT/ML 500 UNITS: 100 SOLUTION at 08:37

## 2023-05-05 ASSESSMENT — PAIN SCALES - GENERAL: PAINLEVEL_OUTOF10: 7

## 2023-05-05 ASSESSMENT — PAIN DESCRIPTION - ORIENTATION: ORIENTATION: LEFT

## 2023-05-05 ASSESSMENT — PAIN DESCRIPTION - LOCATION: LOCATION: SHOULDER

## 2023-05-05 ASSESSMENT — PAIN DESCRIPTION - PAIN TYPE: TYPE: SURGICAL PAIN

## 2023-05-05 NOTE — PROGRESS NOTES
Physical Therapy: Daily Note   Patient: Miranda Nixon (37 y.o. female)   Examination Date: 03/10/2822  Plan of Care/Certification Expiration Date: 23    No data recorded   :  1975 # of Visits since [facility]:   7   MRN: [id number]  CSN: [id number] Start of Care Date:   2023   Insurance: Payor: [employer] MEDICARE / Plan: Johnny Sandoval / Product Type: *No Product type* /   Insurance ID: [id number] - (Medicare Managed) Secondary Insurance (if applicable): MEDICAID [place]   Referring Physician: DO Teodora Dent PA   PCP: Bradford Jones DO Visits to Date/Visits Approved:     No Show/Cancelled Appts:   /       Medical Diagnosis: Other specified joint disorders, right shoulder [M25.811]  Unspecified rotator cuff tear or rupture of right shoulder, not specified as traumatic [M75.101] R rotator cuff repair  Treatment Diagnosis: R rotator cuff repair        SUBJECTIVE EXAMINATION   Pain Level: Pain Screening  Patient Currently in Pain: Yes  Pain Level: 7  Pain Type: Surgical pain  Pain Location: Shoulder  Pain Orientation: Left    Patient Comments: Subjective: She rates right shoulder pain a 7/10. She saw ortho this week and he extended PT.  ROM noted in chart. She performs ther ex as per flow sheet. Pain at end of PT session, 510. HEP Compliance: Good  Previous treatments prior to current episode?: Surgery, Outpatient PT  Comments: Nothing new     OBJECTIVE EXAMINATION   Restrictions:  Restrictions/Precautions: Surgical Protocols   Required Braces or Orthoses?: Yes   No data recorded      Right AROM  Right PROM              PROM RUE (degrees)  R Elbow Flex/Ext (0-145): AA supine right shoulder flex: 126       TREATMENT         Pt Education:         ASSESSMENT     Assessment: Assessment: She rates pain at 7/10. She was seen at [facility] and recommended to continue PT. She performs ther ex as per flow sheet. AA supine right shoulder flex: 127.   She rates pain

## 2023-05-05 NOTE — PROGRESS NOTES
Subjective:     Patient ID: Edd Daniels is a 49 y.o. female  PCP: Bre Buchanan DO  Referring Provider: Chata Merino DO    HPI  Patient presents to the office today with the following complaints: New Patient      Pt seen today to establish care. Hx hereditary hemochromatosis (following [facility]) and liver disease. 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 Biopsy noted F3 bridging fibrosis. Pt reports known hx fatty liver. Denies any ETOH use. Hx chronic GERD. Currently treated with Omeprazole 40 mg daily. She reports getting choked easily. Last EGD 2022 - hiatal hernia per pt   Last Colonoscopy 7/2022 - normal per pt  Family hx colon polyps - Mother    LIVER, WEDGE BIOPSY 5/2022:   - BRIDGING FIBROSIS WITH EARLY NODULE FORMATION (TRICHROME STAIN-SUBCAPSULAR SPECIMEN), SEE SCORING SHEET.   - SUBCAPSULAR AND PREDOMINANTLY SEPTAL-BASED NON-NECROTIZING GRANULOMAS, SEE COMMENT.   - MILDLY ACTIVE STEATOHEPATITIS WITH MARKED MACROVESICULAR STEATOSIS (80-90%). Assessment:     1. Hepatic fibrosis    2. Hereditary hemochromatosis ([address]    3. Chronic GERD    4. Dysphagia, unspecified type            Plan:   - US liver with elastography  - Check CMP, CBC, Pt/INR  - Follow up in 6-8 weeks or sooner if needed  - Call with any questions or concerns   - Schedule EGD  Nothing to eat or drink after midnight. No driving for 24 hours after procedure. Bring a  to procedure. No aspirin, NSAIDs, fish oil 5 days before procedure. I have discussed the benefits, alternatives, and risks (including bleeding, perforation and death)  for pursuing Endoscopy (EGD/Colonscopy/EUS/ERCP) with the patient and they are willing to continue. We also discussed the need for anesthesia, IV access, proper dietary changes, medication changes if necessary, and need for bowel prep (if ordered) prior to their Endoscopic procedure.   They are aware they must have someone accompany them to their

## 2023-05-09 ENCOUNTER — HOSPITAL ENCOUNTER (OUTPATIENT)
Dept: PHYSICAL THERAPY | Age: 48
Setting detail: THERAPIES SERIES
Discharge: HOME | End: 2023-05-09
Payer: MEDICARE

## 2023-05-09 PROCEDURE — 97110 THERAPEUTIC EXERCISES: CPT

## 2023-05-09 ASSESSMENT — PAIN DESCRIPTION - ORIENTATION: ORIENTATION: RIGHT

## 2023-05-09 ASSESSMENT — PAIN SCALES - GENERAL: PAINLEVEL_OUTOF10: 7

## 2023-05-09 ASSESSMENT — PAIN DESCRIPTION - PAIN TYPE: TYPE: CHRONIC PAIN;SURGICAL PAIN

## 2023-05-09 ASSESSMENT — PAIN DESCRIPTION - LOCATION: LOCATION: SHOULDER

## 2023-05-09 NOTE — PROGRESS NOTES
Daily Treatment Note  Date: 2023  Patient Name: Garrick Lan  MRN: [id number]     :   1975    Referring Physician: DO Fortino Tyler PA   PCP: [id number],[address], DO    Medical Diagnosis: Other specified joint disorders, right shoulder [M25.811]  Unspecified rotator cuff tear or rupture of right shoulder, not specified as traumatic [M75.101] R rotator cuff repair  Treatment Diagnosis: R rotator cuff repair      Insurance: Payor: Renetta Lewis / Plan: Bhargavi Correa / Product Type: *No Product type* /   Insurance ID: [id number] - (Medicare Managed)    Subjective:   General  Diagnosis: R rotator cuff repair  Referring Provider (secondary): ZULLY Mcknight  Onset Date: 23  PT Insurance Information: [employer] Medicare (Primary- precert req'd) and [place] Medicaid (Secondary- auth required after 30 visits)  Total # of Visits Approved: 12  Total # of Visits to Date: 9  Plan of Care/Certification Expiration Date: 23  Progress Note Due Date: 23  Referring Provider (secondary): Baby Prime, PA  Subjective: i had to fill out 8 pages of paper work at the dentist this morning so my arm is hurting  Patient Currently in Pain: Yes  Pain Level: 7  Pain Type: Chronic pain, Surgical pain  Pain Location: Shoulder  Pain Orientation: Right       Treatment Activities:  Exercises:      Treatment Reasoning    Exercise 1: R rotator cuff repair 3/21/23. PHASE I: 3/21-23. PHASE II: -.   PHASE III: -23  Exercise 2: 3 way elbow flexion (no weight until ) x 10 ea  Exercise 3: Triceps extension (no weight until ) x 10 (standing)  Exercise 4: Wrist flex/ext x 20 ea 1#  Exercise 5: Pronation/supination x 10 1#  Exercise 6: Gripper/ball squeeze (yellow) x 1' ea  Exercise 7: Shoulder shrugs/rolls x 10  Exercise 8: Scapular retraction x 10  Exercise 9: Pendulums  cw/ccw  x 10  Exercise 10: Passive table slide flex/abd x 10 ea  Exercise 11:

## 2023-05-11 ENCOUNTER — HOSPITAL ENCOUNTER (OUTPATIENT)
Dept: PHYSICAL THERAPY | Age: 48
Setting detail: THERAPIES SERIES
Discharge: HOME | End: 2023-05-11
Payer: MEDICARE

## 2023-05-11 DIAGNOSIS — R79.89 ELEVATED FERRITIN: Primary | ICD-10-CM

## 2023-05-11 DIAGNOSIS — D75.839 THROMBOCYTOSIS: ICD-10-CM

## 2023-05-11 PROCEDURE — 97110 THERAPEUTIC EXERCISES: CPT

## 2023-05-11 ASSESSMENT — PAIN DESCRIPTION - DESCRIPTORS: DESCRIPTORS: ACHING;SORE

## 2023-05-11 ASSESSMENT — PAIN DESCRIPTION - LOCATION: LOCATION: SHOULDER

## 2023-05-11 ASSESSMENT — PAIN DESCRIPTION - PAIN TYPE: TYPE: CHRONIC PAIN

## 2023-05-11 ASSESSMENT — PAIN DESCRIPTION - ORIENTATION: ORIENTATION: RIGHT

## 2023-05-11 ASSESSMENT — PAIN SCALES - GENERAL: PAINLEVEL_OUTOF10: 7

## 2023-05-11 NOTE — PROGRESS NOTES
Physical Therapy  Daily Treatment Note  Date: 2023  Patient Name: Blaire Light  MRN: [id number]     :   1975    Subjective:      PT Visit Information  Onset Date: 23  PT Insurance Information: [employer] Medicare (Primary- precert req'd) and [place] Medicaid (Secondary- auth required after 30 visits)  Total # of Visits Approved: 12  Total # of Visits to Date: 10  Plan of Care/Certification Expiration Date: 23  Progress Note Due Date: 23  Referring Provider (secondary): Mike Ramos, [address]  Subjective: My pain is up there this morning which it normally is in the mornings. Pain Screening  Patient Currently in Pain: Yes  Pain Assessment: 0-10  Pain Level: 7  Pain Type: Chronic pain  Pain Location: Shoulder  Pain Orientation: Right  Pain Descriptors: Aching; Sore       Treatment Activities:   Exercises  Exercise 1: R rotator cuff repair 3/21/23. PHASE I: 3/21-23. PHASE II: -. PHASE III: -23  Exercise 2: 3 way elbow flexion (no weight until ) x 10 ea  Exercise 3: Triceps extension (no weight until ) x 15 (standing)  Exercise 4: Wrist flex/ext x 20 ea 1#  Exercise 5: Pronation/supination x 15 1#  Exercise 6: Gripper/ball squeeze (red) x 1' ea  Exercise 7: Shoulder shrugs/rolls x 15  Exercise 8: Scapular retraction x 15  Exercise 9: Pendulums  cw/ccw  x 10  Exercise 10: Passive table slide flex/abd x 15 ea  Exercise 11: PROM R shoulder flexion, scaption, ER/IR (in scapular plane) x 10 ea  Exercise 12: CRITERIA TO PROGRESS TO PHASE II: PROM 120 degrees flexion, 90 degrees scaption, ER/IR 60 degrees  Exercise 13: At 23, AAROM in supine (no ER)  flex & scaption x 10  Exercise 14: 2023: Gentle GH mobs--Caudal glide, 3-5 min  Exercise 15: 2023: Submaximal isometric R shoulder  x 10  no ER  Exercise 16: At : AAROM with cane in flexion/scaption/ER  x 5 ea  Exercise 17: At : Pulleys  x 5'  Exercise 18:  At : AROM scaption, abduction-NOT

## 2023-05-12 DIAGNOSIS — I10 PRIMARY HYPERTENSION: ICD-10-CM

## 2023-05-14 DIAGNOSIS — F32.A ANXIETY AND DEPRESSION: ICD-10-CM

## 2023-05-14 DIAGNOSIS — B00.1 HERPES LABIALIS WITHOUT COMPLICATION: ICD-10-CM

## 2023-05-14 DIAGNOSIS — F41.9 ANXIETY AND DEPRESSION: ICD-10-CM

## 2023-05-15 ENCOUNTER — HOSPITAL ENCOUNTER (OUTPATIENT)
Dept: ULTRASOUND IMAGING | Age: 48
Discharge: HOME | End: 2023-05-15
Payer: MEDICARE

## 2023-05-15 DIAGNOSIS — R73.03 PREDIABETES: ICD-10-CM

## 2023-05-15 DIAGNOSIS — M79.7 FIBROMYALGIA: ICD-10-CM

## 2023-05-15 DIAGNOSIS — I10 PRIMARY HYPERTENSION: ICD-10-CM

## 2023-05-15 DIAGNOSIS — K74.00 HEPATIC FIBROSIS: ICD-10-CM

## 2023-05-15 DIAGNOSIS — K74.60 HEPATIC CIRRHOSIS, UNSPECIFIED HEPATIC CIRRHOSIS TYPE, UNSPECIFIED WHETHER ASCITES PRESENT (HCC): ICD-10-CM

## 2023-05-15 DIAGNOSIS — E83.110 HEMOCHROMATOSIS, HEREDITARY (HCC): ICD-10-CM

## 2023-05-15 LAB
ALBUMIN SERPL-MCNC: 4.1 G/DL (ref 3.5–5.2)
ALP SERPL-CCNC: 128 U/L (ref 35–104)
ALT SERPL-CCNC: 18 U/L (ref 5–33)
ANION GAP SERPL CALCULATED.3IONS-SCNC: 14 MMOL/L (ref 7–19)
ANISOCYTOSIS BLD QL SMEAR: ABNORMAL
AST SERPL-CCNC: 22 U/L (ref 5–32)
BASOPHILS # BLD: 0 K/UL (ref 0–0.2)
BASOPHILS NFR BLD: 0 % (ref 0–1)
BILIRUB SERPL-MCNC: 0.3 MG/DL (ref 0.2–1.2)
BUN SERPL-MCNC: 10 MG/DL (ref 6–20)
CALCIUM SERPL-MCNC: 9.6 MG/DL (ref 8.6–10)
CHLORIDE SERPL-SCNC: 101 MMOL/L (ref 98–111)
CHOLEST SERPL-MCNC: 198 MG/DL (ref 160–199)
CO2 SERPL-SCNC: 24 MMOL/L (ref 22–29)
CREAT SERPL-MCNC: 0.8 MG/DL (ref 0.5–0.9)
DACRYOCYTES BLD QL SMEAR: ABNORMAL
EOSINOPHIL # BLD: 0.42 K/UL (ref 0–0.6)
EOSINOPHIL NFR BLD: 2 % (ref 0–5)
ERYTHROCYTE [DISTWIDTH] IN BLOOD BY AUTOMATED COUNT: 14.8 % (ref 11.5–14.5)
GLUCOSE SERPL-MCNC: 108 MG/DL (ref 74–109)
HBA1C MFR BLD: 5.4 % (ref 4–6)
HCT VFR BLD AUTO: 44 % (ref 37–47)
HDLC SERPL-MCNC: 46 MG/DL (ref 65–121)
HGB BLD-MCNC: 13.7 G/DL (ref 12–16)
IMM GRANULOCYTES # BLD: 0.1 K/UL
INR PPP: 0.98 (ref 0.88–1.18)
LDLC SERPL CALC-MCNC: 125 MG/DL
LYMPHOCYTES # BLD: 6.3 K/UL (ref 1.1–4.5)
LYMPHOCYTES NFR BLD: 30 % (ref 20–40)
MCH RBC QN AUTO: 27.6 PG (ref 27–31)
MCHC RBC AUTO-ENTMCNC: 31.1 G/DL (ref 33–37)
MCV RBC AUTO: 88.7 FL (ref 81–99)
MONOCYTES # BLD: 1.1 K/UL (ref 0–0.9)
MONOCYTES NFR BLD: 5 % (ref 0–10)
NEUTROPHILS # BLD: 13.3 K/UL (ref 1.5–7.5)
NEUTS BAND NFR BLD MANUAL: 1 % (ref 0–5)
NEUTS SEG NFR BLD: 62 % (ref 50–65)
OVALOCYTES BLD QL SMEAR: ABNORMAL
PLATELET # BLD AUTO: 742 K/UL (ref 130–400)
PMV BLD AUTO: 9.9 FL (ref 9.4–12.3)
POIKILOCYTOSIS BLD QL SMEAR: ABNORMAL
POTASSIUM SERPL-SCNC: 4 MMOL/L (ref 3.5–5)
PROT SERPL-MCNC: 7.8 G/DL (ref 6.6–8.7)
PROTHROMBIN TIME: 12.6 SEC (ref 12–14.6)
RBC # BLD AUTO: 4.96 M/UL (ref 4.2–5.4)
SODIUM SERPL-SCNC: 139 MMOL/L (ref 136–145)
T4 FREE SERPL-MCNC: 1.17 NG/DL (ref 0.93–1.7)
TRIGL SERPL-MCNC: 134 MG/DL (ref 0–149)
TSH SERPL DL<=0.005 MIU/L-ACNC: 2.31 UIU/ML (ref 0.27–4.2)
WBC # BLD AUTO: 21.1 K/UL (ref 4.8–10.8)

## 2023-05-15 PROCEDURE — 76981 USE PARENCHYMA: CPT

## 2023-05-15 PROCEDURE — 76705 ECHO EXAM OF ABDOMEN: CPT

## 2023-05-15 PROCEDURE — 76705 ECHO EXAM OF ABDOMEN: CPT | Performed by: RADIOLOGY

## 2023-05-15 RX ORDER — ACYCLOVIR 800 MG/1
800 TABLET ORAL DAILY
Qty: 90 TABLET | Refills: 3 | Status: SHIPPED | OUTPATIENT
Start: 2023-05-15

## 2023-05-15 RX ORDER — VENLAFAXINE HYDROCHLORIDE 75 MG/1
75 CAPSULE, EXTENDED RELEASE ORAL DAILY
Qty: 30 CAPSULE | Refills: 11 | Status: SHIPPED | OUTPATIENT
Start: 2023-05-15

## 2023-05-15 RX ORDER — METOPROLOL SUCCINATE 50 MG/1
50 TABLET, EXTENDED RELEASE ORAL 2 TIMES DAILY
Qty: 180 TABLET | Refills: 3 | Status: SHIPPED | OUTPATIENT
Start: 2023-05-15

## 2023-05-15 NOTE — TELEPHONE ENCOUNTER
Received fax from pharmacy requesting refill on pts medication(s). Pt was last seen in office on 4/12/2023  and has a follow up scheduled for 5/14/2023. Will send request to  Dr. Viraj Short  for authorization.      Requested Prescriptions     Pending Prescriptions Disp Refills    metoprolol succinate (TOPROL XL) 50 MG extended release tablet [Pharmacy Med Name: METOPROLOL ER SUCCINATE 50MG TABS] 180 tablet 3     Sig: TAKE 1 TABLET BY MOUTH IN THE MORNING AND AT BEDTIME

## 2023-05-15 NOTE — TELEPHONE ENCOUNTER
Sent rx to pharmacy for pt    Requested Prescriptions     Signed Prescriptions Disp Refills    metoprolol succinate (TOPROL XL) 50 MG extended release tablet 180 tablet 3     Sig: TAKE 1 TABLET BY MOUTH IN THE MORNING AND AT BEDTIME     Authorizing Provider: Agnieszka Adair     Ordering User: Karson Rob

## 2023-05-15 NOTE — TELEPHONE ENCOUNTER
Received fax from pharmacy requesting refill on pts medication(s). Pt was last seen in office on 4/12/2023  and has a follow up scheduled for 7/17/2023. Will send request to  Dr. Rocío Escalante  for authorization.      Requested Prescriptions     Pending Prescriptions Disp Refills    venlafaxine (EFFEXOR XR) 75 MG extended release capsule [Pharmacy Med Name: VENLAFAXINE ER 75MG CAPSULES] 30 capsule 11     Sig: TAKE 1 CAPSULE BY MOUTH DAILY    acyclovir (ZOVIRAX) 800 MG tablet [Pharmacy Med Name: ACYCLOVIR 800MG TABLETS] 90 tablet 3     Sig: TAKE 1 TABLET BY MOUTH DAILY

## 2023-05-16 ENCOUNTER — HOSPITAL ENCOUNTER (OUTPATIENT)
Dept: PHYSICAL THERAPY | Age: 48
Setting detail: THERAPIES SERIES
Discharge: HOME | End: 2023-05-16
Payer: MEDICARE

## 2023-05-16 PROCEDURE — 97110 THERAPEUTIC EXERCISES: CPT

## 2023-05-17 DIAGNOSIS — E78.5 HYPERLIPIDEMIA, UNSPECIFIED HYPERLIPIDEMIA TYPE: Primary | ICD-10-CM

## 2023-05-17 RX ORDER — ROSUVASTATIN CALCIUM 5 MG/1
5 TABLET, COATED ORAL NIGHTLY
Qty: 30 TABLET | Refills: 5 | Status: SHIPPED | OUTPATIENT
Start: 2023-05-17

## 2023-05-17 ASSESSMENT — PAIN DESCRIPTION - PAIN TYPE: TYPE: CHRONIC PAIN

## 2023-05-17 ASSESSMENT — PAIN SCALES - GENERAL: PAINLEVEL_OUTOF10: 7

## 2023-05-17 ASSESSMENT — PAIN DESCRIPTION - LOCATION: LOCATION: SHOULDER

## 2023-05-17 ASSESSMENT — PAIN DESCRIPTION - ORIENTATION: ORIENTATION: RIGHT

## 2023-05-17 ASSESSMENT — PAIN DESCRIPTION - DESCRIPTORS: DESCRIPTORS: TIGHTNESS;SORE;ACHING

## 2023-05-17 NOTE — PROGRESS NOTES
Physical Therapy: Daily Note/Reassessment   Patient: Ronni Martinez (15 y.o. female)   Examination Date:   Plan of Care/Certification Expiration Date: 23    No data recorded   :  1975 # of Visits since [facility]:   10   MRN: [id number]  CSN: [id number] Start of Care Date:   2023   Insurance: Payor: [employer] MEDICARE / Plan: Jessica Rojas / Product Type: *No Product type* /   Insurance ID: [id number] - (Medicare Managed) Secondary Insurance (if applicable): [address]   Referring Physician: DO Nicol Turner PA   PCP: Natali Manriquez DO Visits to Date/Visits Approved:     No Show/Cancelled Appts:   /       Medical Diagnosis: Other specified joint disorders, right shoulder [M25.811]  Unspecified rotator cuff tear or rupture of right shoulder, not specified as traumatic [M75.101]    Treatment Diagnosis: R rotator cuff repair        SUBJECTIVE EXAMINATION   Pain Level: Pain Screening  Patient Currently in Pain: Yes  Pain Assessment: 0-10  Pain Level: 7  Pain Type: Chronic pain  Pain Location: Shoulder  Pain Orientation: Right  Pain Descriptors: Tightness, Sore, Aching    Patient Comments: Subjective: States that she fell over the weekend and was able to move to not hit her RUE, but did have to use it to help push to get up. Feeling stiff and reports that it typically feels better after PT sessions.     OBJECTIVE EXAMINATION   Restrictions:  Restrictions/Precautions: Surgical Protocols   Required Braces or Orthoses?: Yes   No data recorded      Right AROM      AROM RUE (degrees)  R Shoulder Flexion (0-180): 60 degrees (supine)  R Shoulder ABduction (0-180): 68 degrees (supine)  R Shoulder Int Rotation  (0-70): 70  R Shoulder Ext Rotation (0-90): 50       Right PROM      PROM RUE (degrees)  R Shoulder Flex  (0-180): 165  R Shoulder ABduction (0-180): 160  R Shoulder Int Rotation  (0-70): 75  R Shoulder Ext Rotation  (0-90): 65     Right Strength

## 2023-05-17 NOTE — PROGRESS NOTES
Clinic Progress Note    Patient:  Naomi Bhatia  YOB: 1975  Date of Service: 5/17/2023  MRN: [id number]   Acct:    Primary Care Physician: Brett Sharp DO    Televisit    Chief Complaint: Hereditary hemochromatosis    Hematology History:  For detailed summary about the patient's previous work-up, differential diagnoses and treatments, please refer to the last note of Dr. Goldberg from 4/1/2022.    Brief summary:  Ms. Bhatia is a 47 y.o. who follows for history of iron level of 19.  She has a history of gastric sleeve procedure in 12/2017, after which she developed iron deficiency anemia like related to decreased dietary absorption. She did receive several multiple blood transfusions in the past as well as parenteral iron Venofer infusions at least 7-10 times back in 2018.     In 9/2020, the patient had seen Dr. Goldberg, where her hemoglobin was 10.8, iron saturation was 21%, ferritin was 1406, and repeat ferritin 8/11/2021 was 1663; she is found to be heterozygous for C282Y hemochromatosis gene variant. She was started on she has phlebotomies every 2 months, and as ferritin had not improved, she initially was started on Jadenu.  As her ferritin improved below 500 in 7/2021, Jadenu was stopped then. The goal ferritin that was discussed with the patient was to keep ferritin below 500.    US spleen 12/17/2021 showed splenomegaly (max dimension 16.7 cm, previously 14.6 cm); it was symptomatic and painful.  Peripheral blood flow cytometry, JAK2, BCR/ABL FISH were negative.  PET scan 2/13/2022 showed splenomegaly with numerous small 1-2 cm hypermetabolic lesions along with mild periportal hypermetabolic lymphadenopathy.  Bone marrow biopsy 1/14/2022 was unremarkable for the most part; there was no sign of increased iron deposition. She ultimately underwent robotic assisted laparoscopic splenomegaly and wedge liver biopsy at [facility] on 5/2/2022, with splenectomy pathology showing  Necrotizing  granulomas, and liver biopsy showing bridging fibrosis with early nodule formation as well as granulomas and mildly active steatohepatitis with microvascular steatosis; iron stains showed only mild iron deposition.    Interval History:  Ms. Bhatia is here for his scheduled follow-up.  She underwent rotator cuff surgery which she is undergoing physical therapy for now.  She follows with hepatology at [facility] for which she underwent FibroScan recently and she is awaiting the results; she reportedly had a ultrasound that showed a left liver lesion for which we will plan to do an MRI.    Objectives:  GENERAL: Alert and oriented, no apparent distress  HEENT: No scleral icterus  NECK: Supple  SKIN: No jaundice, normal skin color  PSYCHIATRIC: Full affect  NEUROLOGIC: Stable gait    Results:  Lab Results   Component Value Date    WBC 19.81 (H) 05/05/2023    HGB 13.0 05/05/2023    HCT 42.1 05/05/2023    MCV 86.4 05/05/2023     (H) 05/05/2023     Lab Results   Component Value Date    IRON 37 05/05/2023    TIBC 334 05/05/2023    FERRITIN 166.10 (H) 05/05/2023     Assessment :  1.  Hemochromatosis C282Y heterozygous/carrier.  2.  Prolonged history of iron deficiency before.  3.  Splenomegaly s/p splenectomy on 5/2/2022  4.  Anemia due to chronic disease  5.  Leukocytosis, elevated myelocytes and metamyelocytes  6. Thrombocytosis post splenectomy  7.  Liver lesion on US    Plan:   # Elevated ferritin:  - We reviewed her labs from today as well as from the past several months; her ferritin is trending up slightly, today at 166 overall stable.  - She has heterozygous hemochromatosis.  - Since she underwent splenectomy on  5/2/2022, her ferritin level and iron studies have not been as high as they were before; and her ferritin level has been trending down steadily.  - Reviewed her labs since I started following the patient, on 7/5/2022, 8/31/2022, 9/23/2022, 10/11/2022: her ferritin level dropped to 110-260 since  her splenectomy compared to >500 before that, and this is achieved without phlebotomy, and given that her iron level and iron saturation are low with her having anemia, there was no sign of iron deposition in the bone marrow; though there was  significantly deposition in the spleen on pathology and mild iron deposition in the liver; she had held off phlebotomy since her ferritin has been trending down.  - She used to follow with hepatology at [facility], but as [facility] would not be accepting her insurance, she currently follows with hepatology at [facility].  - Obtain labs in 3 months, and RTC for OV and labs in 6 months.    # Leukocytosis:  - As for her leukocytosis having myelocytes and meta-myelocyte, I believe this is related to her splenectomy, as she started to develop mild leukocytosis with increasing numbers of eosinophils, basophils, myelocytes and metamyelocytes after her splenectomy.   - To assure we are missing an underlying bone marrow disease, PCR for BCR/ABL was obtained and is negative.     # Liver lesion in US done at [facility]:  - This is reported by the patient; obtain MRI liver.    I reviewed, confirmed and updated HPI and assessment and plan as necessary.     Angela Germain MD  5/17/2023

## 2023-05-17 NOTE — TELEPHONE ENCOUNTER
----- Message from [address] [facility],[address], DO sent at 5/16/2023  8:35 PM CDT -----  White blood cell count is markedly elevated at 21,000 with normal being 4.8 10.8 thousand. Platelets are also markedly elevated at 742,000. The differential is relatively normal.  Are you having any symptoms of infection anywhere? Recommend repeat CBC in 1 week    Cholesterol has significantly increased from last evaluation. Recommend rosuvastatin 5 mg nightly, #30 with 5 refills. Recheck lipids and ALT in 4 to 6 weeks. Your metabolic profile is normal.  This includes kidney and liver functions as well as electrolytes. Thyroid is normal.  Hemoglobin A1c is 5.4 which indicates excellent blood sugar control over the past 3 months.   INR is normal.  I am not sure why this lab was checked

## 2023-05-18 ENCOUNTER — HOSPITAL ENCOUNTER (OUTPATIENT)
Dept: PHYSICAL THERAPY | Age: 48
Setting detail: THERAPIES SERIES
Discharge: HOME | End: 2023-05-18
Payer: MEDICARE

## 2023-05-18 DIAGNOSIS — K76.9 LIVER LESION: Primary | ICD-10-CM

## 2023-05-18 PROCEDURE — 97110 THERAPEUTIC EXERCISES: CPT

## 2023-05-18 ASSESSMENT — PAIN SCALES - GENERAL: PAINLEVEL_OUTOF10: 7

## 2023-05-18 ASSESSMENT — PAIN DESCRIPTION - LOCATION: LOCATION: SHOULDER

## 2023-05-18 ASSESSMENT — PAIN DESCRIPTION - DESCRIPTORS: DESCRIPTORS: TIGHTNESS;ACHING;SORE

## 2023-05-18 ASSESSMENT — PAIN DESCRIPTION - ORIENTATION: ORIENTATION: RIGHT

## 2023-05-18 ASSESSMENT — PAIN DESCRIPTION - PAIN TYPE: TYPE: CHRONIC PAIN

## 2023-05-18 NOTE — PROGRESS NOTES
Physical Therapy  Daily Treatment Note  Date: 2023  Patient Name: Aida Arias  MRN: [id number]     :   1975      Subjective:   General  Additional Pertinent Hx: 48 y/o F presents s/p R rotator cuff repair on 3/21/23. PMH includes multiple hip surgeries, spleen cancer, back sx, neck sx, DDD  PT Visit Information  Onset Date: 23  PT Insurance Information: [employer] Medicare (Primary- precert req'd) and [place] Medicaid (Secondary- auth required after 30 visits)  Total # of Visits Approved: 13  Total # of Visits to Date: 12  Plan of Care/Certification Expiration Date: 23  Progress Note Due Date: 06/15/23  Referring Provider (secondary): ZULLY Umana  Subjective: I hurtin pretty bad today    Pain Screening  Patient Currently in Pain: Yes  Pain Assessment: 0-10  Pain Level: 7  Pain Type: Chronic pain  Pain Location: Shoulder  Pain Orientation: Right  Pain Descriptors: Tightness; Aching; Sore         Treatment Activities:    Exercises  Exercise 1: R rotator cuff repair 3/21/23. PHASE I: 3/21-23. PHASE II: -. PHASE III: -23  Exercise 2: 3 way elbow flexion 2 pound weight x 10 ea  Exercise 3: Triceps extension 2 pound weight  x 15 (standing)  Exercise 4: Wrist flex/ext x 20 ea 2 pound weight  Exercise 5: Pronation/supination x 15 2 pound weight  Exercise 6: Gripper/ball squeeze (red) x 1' ea- not today  Exercise 7: Shoulder shrugs/rolls x 15  2 pound weight  Exercise 8: Scapular retraction with red t-band  x 10  Exercise 9: Pendulums  cw/ccw  x 10- not today  Exercise 10: Passive table slide flex/abd x 15 ea- not today  Exercise 11: PROM R shoulder flexion, scaption, ER/IR (in scapular plane) x 10 ea  Exercise 12: CRITERIA TO PROGRESS TO PHASE II: PROM 120 degrees flexion, 90 degrees scaption, ER/IR 60 degrees  Exercise 13:  At 23, AAROM in supine all directions x 10  Exercise 14: 2023: Gentle GH mobs--Caudal glide, 3-5 min  Exercise 15: 2023: Submaximal

## 2023-05-19 ENCOUNTER — LAB (OUTPATIENT)
Dept: LAB | Facility: HOSPITAL | Age: 48
End: 2023-05-19
Payer: MEDICARE

## 2023-05-19 ENCOUNTER — OFFICE VISIT (OUTPATIENT)
Dept: ONCOLOGY | Facility: CLINIC | Age: 48
End: 2023-05-19
Payer: MEDICARE

## 2023-05-19 VITALS
BODY MASS INDEX: 47.09 KG/M2 | HEIGHT: 66 IN | WEIGHT: 293 LBS | OXYGEN SATURATION: 94 % | TEMPERATURE: 97 F | HEART RATE: 108 BPM | DIASTOLIC BLOOD PRESSURE: 84 MMHG | RESPIRATION RATE: 18 BRPM | SYSTOLIC BLOOD PRESSURE: 124 MMHG

## 2023-05-19 DIAGNOSIS — R79.89 ELEVATED FERRITIN: ICD-10-CM

## 2023-05-19 DIAGNOSIS — R16.0 LIVER MASS, LEFT LOBE: Primary | ICD-10-CM

## 2023-05-19 DIAGNOSIS — D75.839 THROMBOCYTOSIS: ICD-10-CM

## 2023-05-19 LAB
ALBUMIN SERPL-MCNC: 4.1 G/DL (ref 3.5–5.2)
ALBUMIN/GLOB SERPL: 1.2 G/DL
ALP SERPL-CCNC: 124 U/L (ref 39–117)
ALT SERPL W P-5'-P-CCNC: 15 U/L (ref 1–33)
ANION GAP SERPL CALCULATED.3IONS-SCNC: 17 MMOL/L (ref 5–15)
AST SERPL-CCNC: 20 U/L (ref 1–32)
BASOPHILS # BLD AUTO: 0.15 10*3/MM3 (ref 0–0.2)
BASOPHILS NFR BLD AUTO: 0.9 % (ref 0–1.5)
BILIRUB SERPL-MCNC: 0.2 MG/DL (ref 0–1.2)
BUN SERPL-MCNC: 8 MG/DL (ref 6–20)
BUN/CREAT SERPL: 11.3 (ref 7–25)
CALCIUM SPEC-SCNC: 9.5 MG/DL (ref 8.6–10.5)
CHLORIDE SERPL-SCNC: 98 MMOL/L (ref 98–107)
CO2 SERPL-SCNC: 23 MMOL/L (ref 22–29)
CREAT SERPL-MCNC: 0.71 MG/DL (ref 0.57–1)
DEPRECATED RDW RBC AUTO: 47.5 FL (ref 37–54)
EGFRCR SERPLBLD CKD-EPI 2021: 105 ML/MIN/1.73
EOSINOPHIL # BLD AUTO: 0.44 10*3/MM3 (ref 0–0.4)
EOSINOPHIL NFR BLD AUTO: 2.6 % (ref 0.3–6.2)
ERYTHROCYTE [DISTWIDTH] IN BLOOD BY AUTOMATED COUNT: 14.7 % (ref 12.3–15.4)
FERRITIN SERPL-MCNC: 155.7 NG/ML (ref 13–150)
GLOBULIN UR ELPH-MCNC: 3.4 GM/DL
GLUCOSE SERPL-MCNC: 147 MG/DL (ref 65–99)
HCT VFR BLD AUTO: 42.5 % (ref 34–46.6)
HGB BLD-MCNC: 13.2 G/DL (ref 12–15.9)
IMM GRANULOCYTES # BLD AUTO: 0.1 10*3/MM3 (ref 0–0.05)
IMM GRANULOCYTES NFR BLD AUTO: 0.6 % (ref 0–0.5)
IRON 24H UR-MRATE: 51 MCG/DL (ref 37–145)
IRON SATN MFR SERPL: 15 % (ref 20–50)
LYMPHOCYTES # BLD AUTO: 5.76 10*3/MM3 (ref 0.7–3.1)
LYMPHOCYTES NFR BLD AUTO: 33.7 % (ref 19.6–45.3)
MCH RBC QN AUTO: 27.2 PG (ref 26.6–33)
MCHC RBC AUTO-ENTMCNC: 31.1 G/DL (ref 31.5–35.7)
MCV RBC AUTO: 87.6 FL (ref 79–97)
MONOCYTES # BLD AUTO: 0.98 10*3/MM3 (ref 0.1–0.9)
MONOCYTES NFR BLD AUTO: 5.7 % (ref 5–12)
NEUTROPHILS NFR BLD AUTO: 56.5 % (ref 42.7–76)
NEUTROPHILS NFR BLD AUTO: 9.67 10*3/MM3 (ref 1.7–7)
NRBC BLD AUTO-RTO: 0 /100 WBC (ref 0–0.2)
PLATELET # BLD AUTO: 794 10*3/MM3 (ref 140–450)
PMV BLD AUTO: 9.8 FL (ref 6–12)
POTASSIUM SERPL-SCNC: 4.6 MMOL/L (ref 3.5–5.2)
PROT SERPL-MCNC: 7.5 G/DL (ref 6–8.5)
RBC # BLD AUTO: 4.85 10*6/MM3 (ref 3.77–5.28)
SODIUM SERPL-SCNC: 138 MMOL/L (ref 136–145)
TIBC SERPL-MCNC: 337 MCG/DL (ref 298–536)
TRANSFERRIN SERPL-MCNC: 226 MG/DL (ref 200–360)
WBC NRBC COR # BLD: 17.1 10*3/MM3 (ref 3.4–10.8)

## 2023-05-19 PROCEDURE — 84466 ASSAY OF TRANSFERRIN: CPT

## 2023-05-19 PROCEDURE — 82728 ASSAY OF FERRITIN: CPT

## 2023-05-19 PROCEDURE — 80053 COMPREHEN METABOLIC PANEL: CPT

## 2023-05-19 PROCEDURE — 36415 COLL VENOUS BLD VENIPUNCTURE: CPT

## 2023-05-19 PROCEDURE — 3074F SYST BP LT 130 MM HG: CPT | Performed by: INTERNAL MEDICINE

## 2023-05-19 PROCEDURE — 99214 OFFICE O/P EST MOD 30 MIN: CPT | Performed by: INTERNAL MEDICINE

## 2023-05-19 PROCEDURE — 83540 ASSAY OF IRON: CPT

## 2023-05-19 PROCEDURE — 85025 COMPLETE CBC W/AUTO DIFF WBC: CPT

## 2023-05-19 PROCEDURE — 3079F DIAST BP 80-89 MM HG: CPT | Performed by: INTERNAL MEDICINE

## 2023-05-19 PROCEDURE — 1125F AMNT PAIN NOTED PAIN PRSNT: CPT | Performed by: INTERNAL MEDICINE

## 2023-05-19 RX ORDER — BUPROPION HYDROCHLORIDE 150 MG/1
150 TABLET, EXTENDED RELEASE ORAL 2 TIMES DAILY
COMMUNITY

## 2023-05-22 ENCOUNTER — TELEPHONE (OUTPATIENT)
Dept: PAIN MANAGEMENT | Age: 48
End: 2023-05-22

## 2023-05-22 DIAGNOSIS — M54.16 CHRONIC LUMBAR RADICULOPATHY: ICD-10-CM

## 2023-05-22 DIAGNOSIS — M54.10 BACK PAIN WITH LEFT-SIDED RADICULOPATHY: Primary | Chronic | ICD-10-CM

## 2023-05-23 ENCOUNTER — HOSPITAL ENCOUNTER (OUTPATIENT)
Dept: PHYSICAL THERAPY | Age: 48
Setting detail: THERAPIES SERIES
End: 2023-05-23
Payer: MEDICARE

## 2023-05-23 RX ORDER — OXYCODONE HYDROCHLORIDE 5 MG/1
5 TABLET ORAL EVERY 6 HOURS PRN
Qty: 90 TABLET | Refills: 0 | Status: SHIPPED | OUTPATIENT
Start: 2023-05-25 | End: 2023-06-24

## 2023-05-25 ENCOUNTER — HOSPITAL ENCOUNTER (OUTPATIENT)
Dept: PHYSICAL THERAPY | Age: 48
Setting detail: THERAPIES SERIES
Discharge: HOME | End: 2023-05-25
Payer: MEDICARE

## 2023-05-25 PROCEDURE — 97110 THERAPEUTIC EXERCISES: CPT

## 2023-05-25 ASSESSMENT — PAIN DESCRIPTION - DESCRIPTORS: DESCRIPTORS: ACHING;SORE;TIGHTNESS

## 2023-05-25 ASSESSMENT — PAIN DESCRIPTION - PAIN TYPE: TYPE: CHRONIC PAIN

## 2023-05-25 ASSESSMENT — PAIN DESCRIPTION - LOCATION: LOCATION: SHOULDER

## 2023-05-25 ASSESSMENT — PAIN DESCRIPTION - ORIENTATION: ORIENTATION: RIGHT

## 2023-05-25 ASSESSMENT — PAIN SCALES - GENERAL: PAINLEVEL_OUTOF10: 7

## 2023-05-25 NOTE — PROGRESS NOTES
Physical Therapy  Daily Treatment Note  Date: 2023  Patient Name: Tri Cabello  MRN: [id number]     :   1975    Subjective:      PT Visit Information  Onset Date: 23  PT Insurance Information: [employer] Medicare (Primary- precert req'd) and [place] Medicaid (Secondary- auth required after 30 visits)  Total # of Visits Approved: 13  Total # of Visits to Date: 15  Plan of Care/Certification Expiration Date: 23  Progress Note Due Date: 06/15/23  Referring Provider (secondary): ZULLY Enriquez  Subjective: I am hurting this moring but other than that doing okay. Patient reports that she fell in her room and luckily landed on her bed but did land on her R shoulder. Pain Screening  Patient Currently in Pain: Yes  Pain Assessment: 0-10  Pain Level: 7  Pain Type: Chronic pain  Pain Location: Shoulder  Pain Orientation: Right  Pain Descriptors: Aching;Sore;Tightness       Treatment Activities:   Exercises  Exercise 1: R rotator cuff repair 3/21/23. PHASE I: 3/21-23. PHASE II: -. PHASE III: -23  Exercise 2: 3 way elbow flexion 2 pound weight x 10 ea  Exercise 3: Triceps extension 2 pound weight  x 20 (standing)  Exercise 4: Wrist flex/ext x 20 ea 2 pound weight  Exercise 5: Pronation/supination x 15 2 pound weight  Exercise 6: Gripper/ball squeeze (red) x 1' ea  Exercise 7: Shoulder shrugs/rolls x 15  2 pound weight  Exercise 8: Scapular retraction with red t-band  x 15  Exercise 9: Pendulums  cw/ccw  x 10  Exercise 10: Passive table slide flex/abd x 15 ea- not today  Exercise 11: PROM R shoulder flexion, scaption, ER/IR (in scapular plane) x 10 ea  Exercise 12: CRITERIA TO PROGRESS TO PHASE II: PROM 120 degrees flexion, 90 degrees scaption, ER/IR 60 degrees  Exercise 13:  At 23, AAROM in supine all directions x 10  Exercise 14: 2023: Gentle GH mobs--Caudal glide, 3-5 min  Exercise 15: 2023: Submaximal isometric R shoulder  x 10  Exercise 16: At : [place]

## 2023-05-30 ENCOUNTER — HOSPITAL ENCOUNTER (OUTPATIENT)
Dept: PHYSICAL THERAPY | Age: 48
Setting detail: THERAPIES SERIES
Discharge: HOME | End: 2023-05-30
Payer: MEDICARE

## 2023-05-30 PROCEDURE — 97110 THERAPEUTIC EXERCISES: CPT

## 2023-05-30 ASSESSMENT — PAIN SCALES - GENERAL: PAINLEVEL_OUTOF10: 5

## 2023-05-30 ASSESSMENT — PAIN DESCRIPTION - LOCATION: LOCATION: SHOULDER

## 2023-05-30 ASSESSMENT — PAIN DESCRIPTION - PAIN TYPE: TYPE: CHRONIC PAIN

## 2023-05-30 ASSESSMENT — PAIN DESCRIPTION - ORIENTATION: ORIENTATION: RIGHT

## 2023-05-30 NOTE — PROGRESS NOTES
Daily Treatment Note  Date: 2023  Patient Name: Shubham Vance  MRN: [id number]     :   1975    Referring Physician: DO Luca Frankel PA   PCP: Kate Purdy DO    Medical Diagnosis: Other specified joint disorders, right shoulder [M25.811]  Unspecified rotator cuff tear or rupture of right shoulder, not specified as traumatic [M75.101] R rotator cuff repair  Treatment Diagnosis: R rotator cuff repair      Insurance: Payor: Sylwia Huffman / Plan: Melody Angelucci / Product Type: *No Product type* /   Insurance ID: [id number] - (Medicare Managed)    Subjective:   General  Diagnosis: R rotator cuff repair  Referring Provider (secondary): ZULLY Torres  Onset Date: 23  PT Insurance Information: [employer] Medicare (Primary- precert req'd) and [place] Medicaid (Secondary- auth required after 30 visits)  Total # of Visits Approved: 13  Total # of Visits to Date: 15  Plan of Care/Certification Expiration Date: 23  Progress Note Due Date: 06/15/23  Referring Provider (secondary): ZULLY Torres  Subjective: pain is about a 5, it's slowly getting better. I can wash my hair now without needing help but i've been having a dull constant pain in my R elbow  Patient Currently in Pain: Yes  Pain Level: 5  Pain Type: Chronic pain  Pain Location: Shoulder  Pain Orientation: Right       Treatment Activities:  Exercises:      Treatment Reasoning    Exercise 1: R rotator cuff repair 3/21/23. PHASE I: 3/21-23. PHASE II: -.   PHASE III: -23  Exercise 2: 3 way elbow flexion 2 pound weight x 10 ea  Exercise 3: Triceps extension 2 pound weight  x 20 (standing)  Exercise 4: Wrist flex/ext x 20 ea 2 pound weight  Exercise 5: Pronation/supination x 15 2 pound weight  Exercise 6: Gripper/ball squeeze (green) x 1' ea  Exercise 7: Shoulder shrugs/rolls x 15  2 pound weight  Exercise 8: Scapular retraction with red t-band  x 15  Exercise 9:

## 2023-06-01 ENCOUNTER — HOSPITAL ENCOUNTER (OUTPATIENT)
Dept: PHYSICAL THERAPY | Age: 48
Setting detail: THERAPIES SERIES
Discharge: HOME | End: 2023-06-01
Payer: MEDICARE

## 2023-06-01 PROCEDURE — 97110 THERAPEUTIC EXERCISES: CPT

## 2023-06-01 ASSESSMENT — PAIN DESCRIPTION - ORIENTATION: ORIENTATION: RIGHT

## 2023-06-01 ASSESSMENT — PAIN DESCRIPTION - PAIN TYPE: TYPE: CHRONIC PAIN

## 2023-06-01 ASSESSMENT — PAIN DESCRIPTION - LOCATION: LOCATION: SHOULDER;ELBOW

## 2023-06-01 ASSESSMENT — PAIN SCALES - GENERAL: PAINLEVEL_OUTOF10: 8

## 2023-06-01 NOTE — PROGRESS NOTES
table slide flex/abd x 15 ea- not today  Exercise 11: PROM R shoulder flexion, scaption, ER/IR (in scapular plane) x 10 ea  Exercise 12: CRITERIA TO PROGRESS TO PHASE II: PROM 120 degrees flexion, 90 degrees scaption, ER/IR 60 degrees  Exercise 13: At 4/25/23, AAROM in supine all directions x 10  Exercise 14: 5/5/2023: Gentle GH mobs--Caudal glide, 3-5 min  Exercise 15: 5/5/2023: Submaximal isometric R shoulder  x 10  Exercise 16: At 5/2: AAROM with cane in flexion/scaption/ER  x 15 ea  Exercise 17: At 5/2: Pulleys  x 5 minutes  Exercise 18: At 5/16: AROM scaption, abduction  x 10  Exercise 19: At 5/16: T-band ER/IR red  x 10,     sidelying ER x 10  Exercise 20: End with ice and IFC PRN for elevated pain  x 10'        6/1: HEP ISSUED                           Assessment:   Conditions Requiring Skilled Therapeutic Intervention  Body Structures, Functions, Activity Limitations Requiring Skilled Therapeutic Intervention: Decreased functional mobility ; Decreased ADL status; Decreased ROM; Decreased strength;Decreased high-level IADLs; Increased pain;Decreased posture  Assessment: HEP issued with pictures and written instructions, patient was experiencing more pain today and had decreased AROM with supine exercises and increased pain in anterior and lateral shoulder with these but was able to complete full routine and CP applied at end of session  Treatment Diagnosis: R rotator cuff repair  Requires PT Follow-Up: Yes        Goals:  Short Term Goals  Time Frame for Short Term Goals: 3-4 weeks  Short Term Goal 1: Independent with HEP  STG 1 Current Status[de-identified] 5/16: Not yet issued. Pulleys issued today.   Short Term Goal 2: Improve R elbow/forearm/wrist strength to 5/5  STG 2 Current Status[de-identified] 5/16: See strength section     5/30: see strength section  STG Goal 2 Status[de-identified] Partially met  Short Term Goal 3: Improve R  strength to at least 50#  STG 3 Current Status[de-identified] 5/16: 20#     5/30: 35#  STG Goal 3 Status[de-identified] In progress  Short

## 2023-06-06 ENCOUNTER — HOSPITAL ENCOUNTER (OUTPATIENT)
Dept: PHYSICAL THERAPY | Age: 48
Setting detail: THERAPIES SERIES
Discharge: HOME | End: 2023-06-06
Payer: MEDICARE

## 2023-06-06 DIAGNOSIS — R06.02 SOB (SHORTNESS OF BREATH): ICD-10-CM

## 2023-06-06 DIAGNOSIS — B00.1 HERPES LABIALIS WITHOUT COMPLICATION: ICD-10-CM

## 2023-06-06 DIAGNOSIS — J45.20 MILD INTERMITTENT ASTHMA WITHOUT COMPLICATION: ICD-10-CM

## 2023-06-06 PROCEDURE — 97110 THERAPEUTIC EXERCISES: CPT

## 2023-06-06 RX ORDER — ACYCLOVIR 800 MG/1
800 TABLET ORAL DAILY
Qty: 90 TABLET | Refills: 3 | Status: SHIPPED | OUTPATIENT
Start: 2023-06-06

## 2023-06-06 RX ORDER — ALBUTEROL SULFATE 90 UG/1
2 AEROSOL, METERED RESPIRATORY (INHALATION) EVERY 6 HOURS PRN
Qty: 3 EACH | Refills: 3 | Status: SHIPPED | OUTPATIENT
Start: 2023-06-06

## 2023-06-06 RX ORDER — ALBUTEROL SULFATE 90 UG/1
AEROSOL, METERED RESPIRATORY (INHALATION)
Qty: 6.7 G | Refills: 0 | OUTPATIENT
Start: 2023-06-06

## 2023-06-06 ASSESSMENT — PAIN DESCRIPTION - LOCATION: LOCATION: SHOULDER;ELBOW

## 2023-06-06 ASSESSMENT — PAIN DESCRIPTION - PAIN TYPE: TYPE: CHRONIC PAIN;SURGICAL PAIN

## 2023-06-06 ASSESSMENT — PAIN DESCRIPTION - DESCRIPTORS: DESCRIPTORS: ACHING;SORE;TIGHTNESS

## 2023-06-06 ASSESSMENT — PAIN SCALES - GENERAL: PAINLEVEL_OUTOF10: 7

## 2023-06-06 NOTE — TELEPHONE ENCOUNTER
Pharmacy sent a request for refills on Albuterol HFA. Refill refused patient going to [facility].  Rx Refill Note  Requested Prescriptions     Pending Prescriptions Disp Refills    albuterol sulfate  (90 Base) MCG/ACT inhaler [Pharmacy Med Name: ALBUTEROL HFA INH (200 PUFFS) 6.7GM] 6.7 g 0     Sig: INHALE 2 PUFFS INTO THE LUNGS EVERY 4 HOURS AS NEEDED FOR WHEEZING      Last office visit with prescribing clinician: 8/29/2022   Last telemedicine visit with prescribing clinician: Visit date not found   Next office visit with prescribing clinician: Visit date not found                         Would you like a call back once the refill request has been completed: [] Yes [] No    If the office needs to give you a call back, can they leave a voicemail: [] Yes [] No    Luc Dickens CMA  06/06/23, 09:05 CDT

## 2023-06-06 NOTE — TELEPHONE ENCOUNTER
Received fax from pharmacy requesting refill on pts medication(s). Pt was last seen in office on 4/12/2023  and has a follow up scheduled for 6/6/2023. Will send request to  Dr. Armen Browne  for authorization.      Requested Prescriptions     Pending Prescriptions Disp Refills    acyclovir (ZOVIRAX) 800 MG tablet [Pharmacy Med Name: ACYCLOVIR 800MG TABLETS] 90 tablet 3     Sig: TAKE 1 TABLET BY MOUTH DAILY

## 2023-06-06 NOTE — TELEPHONE ENCOUNTER
Received fax from pharmacy requesting refill on pts medication(s). Pt was last seen in office on 4/12/2023  and has a follow up scheduled for 7/17/2023. Will send request to  Dr. Narciso Granado  for authorization.      Requested Prescriptions     Pending Prescriptions Disp Refills    albuterol sulfate HFA (PROVENTIL HFA) 108 (90 Base) MCG/ACT inhaler 3 each 3     Sig: Inhale 2 puffs into the lungs every 6 hours as needed for Wheezing

## 2023-06-07 NOTE — PROGRESS NOTES
today.     Improve R elbow/forearm/wrist strength to 5/5 5/16: See strength section     5/30: see strength section Partially met   Improve R  strength to at least 50# 5/16: 20#     5/30: 35# In progress   Improve AAROM of R shoulder in supine to at least 125 degrees flexion/scaption, 60 degrees ER/IR 5/16: See ROM section     5/30: see ROM section Met   Improve R shoulder strength to 5-/5. 5/30: see strength section                                                 Long Term Goals Completed by 4-6 weeks Current Status Goal Status   Report independence with upper body dressing and ADLs 5/16: \"Mostly by myself, but occasionally I need just a little help and it takes me awhile. \"    5/30: same as 5/16 In progress   Demo AROM R shoulder flexion/scaption at least 130 degrees in standing 5/16: 65 degrees flexion/scaption in standing    5/30:  60 deg flexion/65 deg scaption In progress   Improve QuickDASH score to 50% impairment or better 5/16: 75% impairment    5/30:  47.72% impairment Met                                                                TREATMENT PLAN   Plan Frequency: 2x  Plan weeks: 10-12 weeks  Current Treatment Recommendations: Strengthening, ROM, Pain management, Modalities, Positioning, Equipment evaluation, education, & procurement, Patient/Caregiver education & training, Safety education & training, Home exercise program, Manual   Requires PT Follow-Up: Yes     Therapy Time  Individual Time In:   2452     Individual Time Out:  [address]  Minutes:  63        Electronically signed by Chau Carrasquillo PT  on 6/7/2023 at 10:40 AM

## 2023-06-08 ENCOUNTER — TELEPHONE (OUTPATIENT)
Dept: GASTROENTEROLOGY | Age: 48
End: 2023-06-08

## 2023-06-08 ENCOUNTER — HOSPITAL ENCOUNTER (OUTPATIENT)
Dept: WOMENS IMAGING | Age: 48
Discharge: HOME | End: 2023-06-08
Payer: MEDICARE

## 2023-06-08 ENCOUNTER — HOSPITAL ENCOUNTER (OUTPATIENT)
Dept: MRI IMAGING | Facility: HOSPITAL | Age: 48
Discharge: HOME | End: 2023-06-08
Payer: MEDICARE

## 2023-06-08 ENCOUNTER — HOSPITAL ENCOUNTER (OUTPATIENT)
Dept: PHYSICAL THERAPY | Age: 48
Setting detail: THERAPIES SERIES
Discharge: HOME | End: 2023-06-08
Payer: MEDICARE

## 2023-06-08 DIAGNOSIS — Z12.31 ENCOUNTER FOR SCREENING MAMMOGRAM FOR MALIGNANT NEOPLASM OF BREAST: ICD-10-CM

## 2023-06-08 DIAGNOSIS — N95.9 UNSPECIFIED MENOPAUSAL AND PERIMENOPAUSAL DISORDER: ICD-10-CM

## 2023-06-08 DIAGNOSIS — Z87.310 H/O HEALED FRAGILITY FRACTURE: ICD-10-CM

## 2023-06-08 DIAGNOSIS — R16.0 LIVER MASS, LEFT LOBE: ICD-10-CM

## 2023-06-08 PROCEDURE — 97110 THERAPEUTIC EXERCISES: CPT

## 2023-06-08 PROCEDURE — 77063 BREAST TOMOSYNTHESIS BI: CPT

## 2023-06-08 PROCEDURE — 77080 DXA BONE DENSITY AXIAL: CPT

## 2023-06-08 ASSESSMENT — PAIN SCALES - GENERAL: PAINLEVEL_OUTOF10: 5

## 2023-06-08 ASSESSMENT — PAIN DESCRIPTION - ORIENTATION: ORIENTATION: RIGHT

## 2023-06-08 ASSESSMENT — PAIN DESCRIPTION - LOCATION: LOCATION: SHOULDER

## 2023-06-08 NOTE — TELEPHONE ENCOUNTER
Called patient to remind them of their procedure with Dr. Rylie Sahu  at [facility]  on 6/14/23 to arrive at [address]

## 2023-06-08 NOTE — PROGRESS NOTES
Physical Therapy: Daily Note   Patient: Ash Weaver (67 y.o. female)   Examination Date:   Plan of Care/Certification Expiration Date: 23    No data recorded   :  1975 # of Visits since [facility]:   16   MRN: [id number]  CSN: [id number] Start of Care Date:   2023   Insurance: Payor: [employer] MEDICARE / Plan: [place] Lat / Product Type: *No Product type* /   Insurance ID: [id number] - (Medicare Managed) Secondary Insurance (if applicable): MEDICAID [place]   Referring Physician: DO Candida Rubalcava PA   PCP: Braden Guerrero DO Visits to Date/Visits Approved:     No Show/Cancelled Appts:   /       Medical Diagnosis: Other specified joint disorders, right shoulder [M25.811]  Unspecified rotator cuff tear or rupture of right shoulder, not specified as traumatic [M75.101] R rotator cuff repair  Treatment Diagnosis: R rotator cuff repair        SUBJECTIVE EXAMINATION   Pain Level: Pain Screening  Pain Level: 5  Pain Location: Shoulder  Pain Orientation: Right    Patient Comments: Subjective: She went to ortho yesterday, she is to continue PT X 6 weeks. She rates pain at 5/10 today. HEP Compliance: Good        OBJECTIVE EXAMINATION   Restrictions:  Restrictions/Precautions: Surgical Protocols   Required Braces or Orthoses?: Yes   No data recorded                TREATMENT     Exercises:      Treatment Reasoning    Exercise 1: R rotator cuff repair 3/21/23. PHASE I: 3/21-23. PHASE II: -.   PHASE III: -23  Exercise 2: 3 way elbow flexion 2 pound weight x 10 ea  Exercise 3: Triceps extension green  x 20 (standing)-  Exercise 4: Wrist flex/ext x 20 ea 2 pound weight  Exercise 5: Pronation/supination x 15 2 pound weight-  Exercise 6: Gripper/ball squeeze (green) x 1' ea-  Exercise 7: Shoulder shrugs/rolls x 20  2 pound weight  Exercise 8: Scapular retraction with green t-band  x 15  Exercise 9: Pendulums  cw/ccw  x 10-  Exercise 10:

## 2023-06-14 ENCOUNTER — HOSPITAL ENCOUNTER (OUTPATIENT)
Age: 48
Setting detail: OUTPATIENT SURGERY
Discharge: HOME | End: 2023-06-14
Attending: INTERNAL MEDICINE | Admitting: INTERNAL MEDICINE
Payer: MEDICARE

## 2023-06-14 VITALS
DIASTOLIC BLOOD PRESSURE: 65 MMHG | HEART RATE: 79 BPM | RESPIRATION RATE: 16 BRPM | BODY MASS INDEX: 48.82 KG/M2 | HEIGHT: 65 IN | TEMPERATURE: 97.9 F | OXYGEN SATURATION: 91 % | SYSTOLIC BLOOD PRESSURE: 106 MMHG | WEIGHT: 293 LBS

## 2023-06-14 DIAGNOSIS — K21.9 CHRONIC GERD: ICD-10-CM

## 2023-06-14 DIAGNOSIS — R13.10 DYSPHAGIA, UNSPECIFIED TYPE: ICD-10-CM

## 2023-06-14 DIAGNOSIS — I85.00 ESOPHAGEAL VARICES WITHOUT BLEEDING, UNSPECIFIED ESOPHAGEAL VARICES TYPE (HCC): ICD-10-CM

## 2023-06-14 LAB
GLUCOSE BLD-MCNC: 156 MG/DL (ref 70–99)
PERFORMED ON: ABNORMAL

## 2023-06-14 PROCEDURE — 3700000001 HC ADD 15 MINUTES (ANESTHESIA): Performed by: INTERNAL MEDICINE

## 2023-06-14 PROCEDURE — 3609012400 HC EGD TRANSORAL BIOPSY SINGLE/MULTIPLE: Performed by: INTERNAL MEDICINE

## 2023-06-14 PROCEDURE — 82962 GLUCOSE BLOOD TEST: CPT

## 2023-06-14 PROCEDURE — 3700000000 HC ANESTHESIA ATTENDED CARE: Performed by: INTERNAL MEDICINE

## 2023-06-14 PROCEDURE — 7100000011 HC PHASE II RECOVERY - ADDTL 15 MIN: Performed by: INTERNAL MEDICINE

## 2023-06-14 PROCEDURE — 2709999900 HC NON-CHARGEABLE SUPPLY: Performed by: INTERNAL MEDICINE

## 2023-06-14 PROCEDURE — 43239 EGD BIOPSY SINGLE/MULTIPLE: CPT | Performed by: INTERNAL MEDICINE

## 2023-06-14 PROCEDURE — 2580000003 HC RX 258: Performed by: INTERNAL MEDICINE

## 2023-06-14 PROCEDURE — 88342 IMHCHEM/IMCYTCHM 1ST ANTB: CPT

## 2023-06-14 PROCEDURE — 88305 TISSUE EXAM BY PATHOLOGIST: CPT

## 2023-06-14 PROCEDURE — 6360000002 HC RX W HCPCS: Performed by: INTERNAL MEDICINE

## 2023-06-14 PROCEDURE — 7100000010 HC PHASE II RECOVERY - FIRST 15 MIN: Performed by: INTERNAL MEDICINE

## 2023-06-14 RX ORDER — HEPARIN SODIUM 100 [USP'U]/ML
300 INJECTION, SOLUTION INTRAVENOUS PRN
Status: DISCONTINUED | OUTPATIENT
Start: 2023-06-14 | End: 2023-06-14 | Stop reason: HOSPADM

## 2023-06-14 RX ORDER — SODIUM CHLORIDE, SODIUM LACTATE, POTASSIUM CHLORIDE, CALCIUM CHLORIDE 600; 310; 30; 20 MG/100ML; MG/100ML; MG/100ML; MG/100ML
INJECTION, SOLUTION INTRAVENOUS CONTINUOUS
Status: DISCONTINUED | OUTPATIENT
Start: 2023-06-14 | End: 2023-06-14 | Stop reason: HOSPADM

## 2023-06-14 RX ADMIN — HEPARIN 300 UNITS: 100 SYRINGE at 09:24

## 2023-06-14 RX ADMIN — SODIUM CHLORIDE, POTASSIUM CHLORIDE, SODIUM LACTATE AND CALCIUM CHLORIDE: 600; 310; 30; 20 INJECTION, SOLUTION INTRAVENOUS at 08:21

## 2023-06-14 NOTE — H&P
CAPSULE BY MOUTH DAILY 9/1/22   OMAR Pardo DO   naloxone [facility]) 4 MG/0.1ML LIQD nasal spray 1 spray by Nasal route as needed for Opioid Reversal 8/17/22   OMAR Pardo DO   amLODIPine (NORVASC) 5 MG tablet Take 1 tablet by mouth daily 7/5/22   ASHLEE Prado   nabumetone (RELAFEN) 750 MG tablet Take 1 tablet by mouth 2 times daily 3/9/22   Historical Provider, MD   Elastic Bandages & Supports (LUMBAR BACK BRACE/SUPPORT PAD) MISC Wear as needed with activity. 9/22/21   OMAR Pardo DO   ondansetron (ZOFRAN-ODT) 8 MG TBDP disintegrating tablet Take 1 tablet by mouth every 8 hours as needed for Nausea or Vomiting 9/17/21   Shila Bhatti MD   Calcium-Vitamin D 600-200 MG-UNIT TABS Take 1 tablet by mouth daily    Historical Provider, MD   Multiple Vitamins-Minerals (MULTIVITAMIN & MINERAL PO) Take  by mouth. Historical Provider, MD   CRANBERRY Take 500 mg by mouth daily.     Historical Provider, MD   Cholecalciferol (VITAMIN D3) 5000 UNITS TABS Take 1 tablet by mouth daily    Historical Provider, MD       Past Medical History:  Past Medical History:   Diagnosis Date    Anemia     has had iron infusion    Anxiety     Arthritis     Asthma     Back pain with left-sided radiculopathy 03/14/2016    Cirrhosis ([address]     DDD (degenerative disc disease), lumbar     Depression     Esophagitis     Fibromyalgia     Gastritis     Headache(784.0)     Hemochromatosis     Hiatal hernia 03/2022    History of blood transfusion     Hypertension     Obesity     RLS (restless legs syndrome)     Sarcoidosis     Sleep apnea     uses CPAP machine    Spleen cancer ([address] 03/2022       Past Surgical History:  Past Surgical History:   Procedure Laterality Date    ANKLE SURGERY Right     APPENDECTOMY  04/19/2015    BACK SURGERY  2000    CARPAL TUNNEL RELEASE Left     CERVICAL SPINE SURGERY N/A 09/01/2015    CHOLECYSTECTOMY  1995    COLONOSCOPY  07/21/2022    Dr Brandt Bending, normal per patient    HIP

## 2023-06-14 NOTE — DISCHARGE INSTRUCTIONS
RECOMMENDATIONS:    1. Await path results  2. Continue current meds  3. Lifestyle changes for reflux disease (weight loss, decrease sugary foods, increase exercise)  4.   Avoid NSAIDS (Relafen)

## 2023-06-14 NOTE — OP NOTE
Endoscopic Procedure Note    Patient: Lisa Huntin1975  Med Rec#: [id number] Acc#: [id number]     Primary Care Provider MORA Adams DO  Referring Provider:     Endoscopist: Gerardo Trejo MD    Date of Procedure:  2023    Procedure:   1. EGD with biopsy    Indications:   1. Reflux   2. Dyspepsia     Anesthesia:  Sedation was administered by anesthesia who monitored the patient during the procedure. Estimated Blood Loss: minimal    Procedure:   After reviewing the patient's chart and obtaining informed consent, the patient was placed in the left lateral decubitus position. A forward-viewing Olympus endoscope was lubricated and inserted through the mouth into the oropharynx. Under direct visualization, the upper esophagus was intubated. The scope was advanced to the level of the third portion of duodenum. Scope was slowly withdrawn with careful inspection of the mucosal surfaces. The scope was retroflexed for inspection of the gastric fundus and incisura. Findings and maneuvers are listed in impression below. The patient tolerated the procedure well. The scope was removed. There were no immediate complications. Findings:   Esophagus: normal mucosa. No varices noted. There is a small hiatal hernia present. Stomach:  abnormal: mild mucosal changes suggestive of gastritis noted -  Gastric biopsies were taken from the antrum and body to rule out Helicobacter pylori infection. Duodenum: normal      IMPRESSION:  1. Small Hiatal hernia  2. Gastritis - biopsied      RECOMMENDATIONS:    1. Await path results  2. Continue current meds  3. Lifestyle changes for reflux disease (weight loss, decrease sugary foods, increase exercise)  4. Avoid NSAIDS (Relafen)    The results were discussed with the patient and family. A copy of the images obtained were given to the patient.      Jenny Ferrell MD  2023  8:45 AM

## 2023-06-20 ENCOUNTER — PATIENT MESSAGE (OUTPATIENT)
Dept: FAMILY MEDICINE CLINIC | Age: 48
End: 2023-06-20

## 2023-06-20 ENCOUNTER — HOSPITAL ENCOUNTER (OUTPATIENT)
Dept: GENERAL RADIOLOGY | Age: 48
Discharge: HOME | End: 2023-06-20
Payer: MEDICARE

## 2023-06-20 ENCOUNTER — HOSPITAL ENCOUNTER (OUTPATIENT)
Dept: PHYSICAL THERAPY | Age: 48
Setting detail: THERAPIES SERIES
Discharge: HOME | End: 2023-06-20
Payer: MEDICARE

## 2023-06-20 DIAGNOSIS — M79.661 PAIN AND SWELLING OF LOWER LEG, RIGHT: Primary | ICD-10-CM
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
## 2023-06-20 DIAGNOSIS — M79.89 PAIN AND SWELLING OF LOWER LEG, RIGHT: ICD-10-CM

## 2023-06-20 DIAGNOSIS — M79.661 PAIN AND SWELLING OF LOWER LEG, RIGHT: ICD-10-CM

## 2023-06-20 DIAGNOSIS — M79.89 PAIN AND SWELLING OF LOWER LEG, RIGHT: Primary | ICD-10-CM

## 2023-06-20 PROCEDURE — 73590 X-RAY EXAM OF LOWER LEG: CPT

## 2023-06-20 PROCEDURE — 97110 THERAPEUTIC EXERCISES: CPT

## 2023-06-20 ASSESSMENT — PAIN DESCRIPTION - DESCRIPTORS: DESCRIPTORS: ACHING;SORE

## 2023-06-20 ASSESSMENT — PAIN DESCRIPTION - LOCATION: LOCATION: SHOULDER

## 2023-06-20 ASSESSMENT — PAIN DESCRIPTION - ORIENTATION: ORIENTATION: RIGHT

## 2023-06-20 ASSESSMENT — PAIN DESCRIPTION - PAIN TYPE: TYPE: CHRONIC PAIN

## 2023-06-20 ASSESSMENT — PAIN SCALES - GENERAL: PAINLEVEL_OUTOF10: 8

## 2023-06-20 NOTE — PROGRESS NOTES
Physical Therapy: Daily Note   Patient: Kaylee Partida (70 y.o. female)   Examination Date:   Plan of Care/Certification Expiration Date: 23    No data recorded   :  1975 # of Visits since [facility]:   19   MRN: [id number]  CSN: [id number] Start of Care Date:   2023   Insurance: Payor: HUMANA MEDICARE / Plan: Janine Raya / Product Type: *No Product type* /   Insurance ID: [id number] - (Medicare Managed) Secondary Insurance (if applicable): MEDICAID [place]   Referring Physician: DO Sonja Steen PA   PCP: Yue Stock DO Visits to Date/Visits Approved:     No Show/Cancelled Appts:   /       Medical Diagnosis: Other specified joint disorders, right shoulder [M25.811]  Unspecified rotator cuff tear or rupture of right shoulder, not specified as traumatic [M75.101]    Treatment Diagnosis: R rotator cuff repair        SUBJECTIVE EXAMINATION   Pain Level: Pain Screening  Patient Currently in Pain: Yes  Pain Assessment: 0-10  Pain Level: 8  Pain Type: Chronic pain  Pain Location: Shoulder  Pain Orientation: Right  Pain Descriptors: Aching, Sore    Patient Comments: Subjective: I fell Thursday night. I hope I havent hurt my shoulder. I am going to try this therapy today.           OBJECTIVE EXAMINATION     TREATMENT     Exercises:      Treatment Reasoning    Exercise 1: Pulleys 5'  Exercise 2: Wall walk x 5  Exercise 3: Submaximal isometric R shoulder  x 15  Exercise 4: T-band ER/IR/flex/ext green x 10 each  Exercise 5: Shoulder shrugs/rolls x 20  3 pound weight  Exercise 6: Scapular retraction with green t-band  x 15  Exercise 7: Standing Is, Ts  Exercise 8: Fwd bent rows 2# x 15  Exercise 9: Supine cane flexion/abduction/ER 1.5# x 15  Exercise 10: Supine cane benchpress/shoulder protraction 1.5# x 15  Exercise 11: Supine RUE circles cw/ccw x 10, alphabet a -z x 1  Exercise 12: Supine RUE rhythmic stabilization at 90 degrees shoulder flex x 30

## 2023-06-22 ENCOUNTER — HOSPITAL ENCOUNTER (OUTPATIENT)
Dept: PHYSICAL THERAPY | Age: 48
Setting detail: THERAPIES SERIES
Discharge: HOME | End: 2023-06-22
Payer: MEDICARE

## 2023-06-22 ENCOUNTER — TELEPHONE (OUTPATIENT)
Dept: FAMILY MEDICINE CLINIC | Age: 48
End: 2023-06-22

## 2023-06-22 PROCEDURE — 97110 THERAPEUTIC EXERCISES: CPT

## 2023-06-22 ASSESSMENT — PAIN DESCRIPTION - ORIENTATION: ORIENTATION: RIGHT

## 2023-06-22 ASSESSMENT — PAIN DESCRIPTION - PAIN TYPE: TYPE: CHRONIC PAIN;ACUTE PAIN

## 2023-06-22 ASSESSMENT — PAIN SCALES - GENERAL: PAINLEVEL_OUTOF10: 8

## 2023-06-22 ASSESSMENT — PAIN DESCRIPTION - LOCATION: LOCATION: SHOULDER

## 2023-06-22 NOTE — TELEPHONE ENCOUNTER
----- Message from [address] [facility],[address], DO sent at 6/21/2023  7:21 PM CDT -----  Evidence of an old fracture in the distal fibula (now healed) is noted. Also evidence of a healed proximal fibular fracture. Plantar spur is noted. Subcutaneous edema is present.   No new fractures

## 2023-06-22 NOTE — PROGRESS NOTES
Physical Therapy  Daily Treatment Note  Date: 2023  Patient Name: Jennifer Billy  MRN: [id number]     :   1975      Subjective:   General  Additional Pertinent Hx: 50 y/o F presents s/p R rotator cuff repair on 3/21/23. PMH includes multiple hip surgeries, spleen cancer, back sx, neck sx, DDD  PT Visit Information  Onset Date: 23  PT Insurance Information: [employer] Medicare (Primary- precert req'd) and [place] Medicaid (Secondary- auth required after 30 visits)  Total # of Visits Approved: 25  Total # of Visits to Date: 20  Plan of Care/Certification Expiration Date: 23  Progress Note Due Date: 07/15/23  Referring Provider (secondary): ZULLY Sarmiento  Subjective: I fell again last night. I was walking through my daughter's yard and found a dip. I guess I am gonna see if my doctor will order me a cane. Pain Screening  Patient Currently in Pain: Yes  Pain Level: 8  Pain Type: Chronic pain;Acute pain  Pain Location: Shoulder  Pain Orientation: Right  Pain Descriptors: Shooting;Aching;Tightness         Treatment Activities:    Exercises  Exercise 1: Pulleys 5'  Exercise 2: Wall walk x 5  Exercise 3: Submaximal isometric R shoulder  x 15  Exercise 4: T-band ER/IR/flex/ext green x 10 each  Exercise 5: Shoulder shrugs/rolls x 20  3 pound weight  Exercise 6: Scapular retraction with green t-band  x 15  Exercise 8: Fwd bent rows 2# x 15  Exercise 9: Supine cane flexion/abduction/ER 2 pound weight x 15  Exercise 10: Supine cane benchpress/shoulder protraction 2 pound weight  x 15  Exercise 11: Supine RUE circles cw/ccw x 10, alphabet a -z x 1  Exercise 12: Supine RUE rhythmic stabilization at 90 degrees shoulder flex x 30 sec,  at 45 degrees ER x 0  Exercise 13: PROM R shoulder flexion, scaption, ER/IR (in scapular plane) x 10 ea  Exercise 14: AAROM in supine all directions x 10  Exercise 15: Gentle GH mobs--Caudal glide, 3-5 min   2023  Pre mob 140 flex; Post mob: 165 flex.   Exercise 16:

## 2023-06-23 DIAGNOSIS — M54.16 LUMBAR RADICULOPATHY: ICD-10-CM

## 2023-06-23 DIAGNOSIS — M79.7 FIBROMYALGIA: ICD-10-CM

## 2023-06-23 RX ORDER — PREGABALIN 100 MG/1
100 CAPSULE ORAL 2 TIMES DAILY
Qty: 60 CAPSULE | Refills: 2 | Status: SHIPPED | OUTPATIENT
Start: 2023-06-23 | End: 2023-09-21

## 2023-06-23 NOTE — TELEPHONE ENCOUNTER
Received fax from pharmacy requesting refill on pts medication(s). Pt was last seen in office on 4/12/2023  and has a follow up scheduled for 7/17/2023. Will send request to  Dr. Remigio Blanton  for authorization. Requested Prescriptions     Pending Prescriptions Disp Refills    pregabalin (LYRICA) 100 MG capsule 60 capsule 2     Sig: Take 1 capsule by mouth 2 times daily for 90 days.

## 2023-06-25 ENCOUNTER — PATIENT MESSAGE (OUTPATIENT)
Dept: FAMILY MEDICINE CLINIC | Age: 48
End: 2023-06-25

## 2023-06-25 DIAGNOSIS — G43.811 OTHER MIGRAINE WITH STATUS MIGRAINOSUS, INTRACTABLE: Primary | ICD-10-CM

## 2023-06-26 ENCOUNTER — OFFICE VISIT (OUTPATIENT)
Dept: FAMILY MEDICINE CLINIC | Age: 48
End: 2023-06-26
Payer: MEDICARE

## 2023-06-26 VITALS
BODY MASS INDEX: 48.82 KG/M2 | OXYGEN SATURATION: 98 % | HEIGHT: 65 IN | HEART RATE: 92 BPM | SYSTOLIC BLOOD PRESSURE: 124 MMHG | DIASTOLIC BLOOD PRESSURE: 86 MMHG | WEIGHT: 293 LBS | TEMPERATURE: 97.3 F

## 2023-06-26 DIAGNOSIS — R26.89 BALANCE PROBLEM: ICD-10-CM

## 2023-06-26 DIAGNOSIS — D86.0 SARCOIDOSIS OF LUNG (HCC): ICD-10-CM

## 2023-06-26 DIAGNOSIS — J98.4 RESTRICTIVE LUNG DISEASE: ICD-10-CM

## 2023-06-26 DIAGNOSIS — T14.8XXA HEMATOMA: Primary | ICD-10-CM

## 2023-06-26 PROCEDURE — G8427 DOCREV CUR MEDS BY ELIG CLIN: HCPCS | Performed by: PEDIATRICS

## 2023-06-26 PROCEDURE — 1036F TOBACCO NON-USER: CPT | Performed by: PEDIATRICS

## 2023-06-26 PROCEDURE — 3074F SYST BP LT 130 MM HG: CPT | Performed by: PEDIATRICS

## 2023-06-26 PROCEDURE — 3079F DIAST BP 80-89 MM HG: CPT | Performed by: PEDIATRICS

## 2023-06-26 PROCEDURE — G8417 CALC BMI ABV UP PARAM F/U: HCPCS | Performed by: PEDIATRICS

## 2023-06-26 PROCEDURE — 99214 OFFICE O/P EST MOD 30 MIN: CPT | Performed by: PEDIATRICS

## 2023-06-26 RX ORDER — TOPIRAMATE 50 MG/1
50 TABLET, FILM COATED ORAL 2 TIMES DAILY
Qty: 60 TABLET | Refills: 3 | Status: SHIPPED | OUTPATIENT
Start: 2023-06-26

## 2023-06-26 RX ORDER — FLUTICASONE PROPIONATE AND SALMETEROL XINAFOATE 115; 21 UG/1; UG/1
2 AEROSOL, METERED RESPIRATORY (INHALATION) DAILY
COMMUNITY
Start: 2023-06-14

## 2023-06-26 ASSESSMENT — ENCOUNTER SYMPTOMS
BACK PAIN: 0
ABDOMINAL PAIN: 0
EYE DISCHARGE: 0
SORE THROAT: 0
COUGH: 0
SHORTNESS OF BREATH: 0
RESPIRATORY NEGATIVE: 1
ALLERGIC/IMMUNOLOGIC NEGATIVE: 1
VOMITING: 0
CONSTIPATION: 0
WHEEZING: 0
SINUS PRESSURE: 0
NAUSEA: 1
DIARRHEA: 0
CHEST TIGHTNESS: 0

## 2023-06-27 ENCOUNTER — HOSPITAL ENCOUNTER (OUTPATIENT)
Dept: PHYSICAL THERAPY | Age: 48
Setting detail: THERAPIES SERIES
Discharge: HOME | End: 2023-06-27
Payer: MEDICARE

## 2023-06-27 PROCEDURE — 97110 THERAPEUTIC EXERCISES: CPT

## 2023-06-27 ASSESSMENT — PAIN SCALES - GENERAL: PAINLEVEL_OUTOF10: 8

## 2023-06-27 ASSESSMENT — PAIN DESCRIPTION - DESCRIPTORS: DESCRIPTORS: ACHING;SORE;TIGHTNESS

## 2023-06-27 ASSESSMENT — PAIN DESCRIPTION - LOCATION: LOCATION: SHOULDER

## 2023-06-27 ASSESSMENT — PAIN DESCRIPTION - PAIN TYPE: TYPE: CHRONIC PAIN

## 2023-06-27 ASSESSMENT — PAIN DESCRIPTION - ORIENTATION: ORIENTATION: RIGHT

## 2023-06-29 ENCOUNTER — APPOINTMENT (OUTPATIENT)
Dept: PHYSICAL THERAPY | Age: 48
End: 2023-06-29
Payer: MEDICARE

## 2023-06-29 ENCOUNTER — HOSPITAL ENCOUNTER (OUTPATIENT)
Dept: PHYSICAL THERAPY | Age: 48
Setting detail: THERAPIES SERIES
Discharge: HOME | End: 2023-06-29
Payer: MEDICARE

## 2023-06-29 PROCEDURE — 97110 THERAPEUTIC EXERCISES: CPT

## 2023-07-03 ENCOUNTER — HOSPITAL ENCOUNTER (OUTPATIENT)
Dept: PHYSICAL THERAPY | Age: 48
Setting detail: THERAPIES SERIES
Discharge: HOME | End: 2023-07-03
Payer: MEDICARE

## 2023-07-03 PROCEDURE — 97110 THERAPEUTIC EXERCISES: CPT

## 2023-07-03 ASSESSMENT — PAIN DESCRIPTION - LOCATION: LOCATION: SHOULDER

## 2023-07-03 ASSESSMENT — PAIN DESCRIPTION - ORIENTATION: ORIENTATION: RIGHT

## 2023-07-03 ASSESSMENT — PAIN DESCRIPTION - DESCRIPTORS: DESCRIPTORS: ACHING;TIGHTNESS

## 2023-07-03 ASSESSMENT — PAIN DESCRIPTION - PAIN TYPE: TYPE: CHRONIC PAIN;SURGICAL PAIN

## 2023-07-03 ASSESSMENT — PAIN SCALES - GENERAL: PAINLEVEL_OUTOF10: 6

## 2023-07-03 NOTE — PROGRESS NOTES
Physical Therapy: Daily Note   Patient: Vito Davis (8 y.o. female)   Examination Date:   Plan of Care/Certification Expiration Date: 23    No data recorded   :  1975 # of Visits since [facility]:   23   MRN: [id number]  CSN: [id number] Start of Care Date:   2023   Insurance: Payor: HUMANA MEDICARE / Plan: Amara Pino / Product Type: *No Product type* /   Insurance ID: [id number] - (Medicare Managed) Secondary Insurance (if applicable): MEDICAID [place]   Referring Physician: DO Froilan Levin PA   PCP: Thanh Faith DO Visits to Date/Visits Approved:     No Show/Cancelled Appts:   /       Medical Diagnosis: Other specified joint disorders, right shoulder [M25.811]  Unspecified rotator cuff tear or rupture of right shoulder, not specified as traumatic [M75.101]    Treatment Diagnosis: R rotator cuff repair        SUBJECTIVE EXAMINATION   Pain Level: Pain Screening  Patient Currently in Pain: Yes  Pain Assessment: 0-10  Pain Level: 6  Pain Type: Chronic pain, Surgical pain  Pain Location: Shoulder  Pain Orientation: Right  Pain Descriptors: Aching, Tightness    Patient Comments: Subjective: No specific complaints today. Continues with poor sleep.     Restrictions:  Restrictions/Precautions: Surgical Protocols   Required Braces or Orthoses?: Yes   No data recorded      TREATMENT     Exercises:      Treatment Reasoning    Exercise 1: Pulleys 5'  Exercise 2: Wall walk x 10  Exercise 3: Submaximal isometric R shoulder  x 15  ball all directions- not today  Exercise 4: T-band ER/IR/flex/ext green x 15 each  Exercise 5: Shoulder shrugs/rolls x 20  3 pound weight  Exercise 6: Scapular retraction with blue t-band  x 15  Exercise 7: Standing Is, Ts  Exercise 8: Fwd bent rows 3# x 15  Exercise 9: Supine cane flexion/abduction/ER 2 pound weight x 15  Exercise 10: Supine cane benchpress/shoulder protraction 2 pound weight  x 15  Exercise 11: Supine RUE

## 2023-07-06 ENCOUNTER — HOSPITAL ENCOUNTER (OUTPATIENT)
Dept: PHYSICAL THERAPY | Age: 48
Setting detail: THERAPIES SERIES
Discharge: HOME | End: 2023-07-06
Payer: MEDICARE

## 2023-07-06 PROCEDURE — 97110 THERAPEUTIC EXERCISES: CPT

## 2023-07-06 ASSESSMENT — PAIN DESCRIPTION - LOCATION: LOCATION: SHOULDER

## 2023-07-06 ASSESSMENT — PAIN DESCRIPTION - PAIN TYPE: TYPE: CHRONIC PAIN;SURGICAL PAIN

## 2023-07-06 ASSESSMENT — PAIN DESCRIPTION - ORIENTATION: ORIENTATION: RIGHT

## 2023-07-06 ASSESSMENT — PAIN SCALES - GENERAL: PAINLEVEL_OUTOF10: 5

## 2023-07-06 NOTE — PROGRESS NOTES
Daily Treatment Note  Date: 2023  Patient Name: Ilene Ledbetter  MRN: [id number]     :   1975    Referring Physician: DO Ana Durbin PA   PCP: Wendy Garcia DO    Medical Diagnosis: Other specified joint disorders, right shoulder [M25.811]  Unspecified rotator cuff tear or rupture of right shoulder, not specified as traumatic [M75.101] R rotator cuff repair  Treatment Diagnosis: R rotator cuff repair      Insurance: Payor: Lux Barroso / Plan: Bill Me Later / Product Type: *No Product type* /   Insurance ID: [id number] - (Medicare Managed)    Subjective:   General  Diagnosis: R rotator cuff repair  Referring Provider (secondary): UZLLY Haney  PT Insurance Information: [employer] Medicare (Primary- precert req'd) and [place] Medicaid (Secondary- auth required after 30 visits)  Total # of Visits Approved: 25  Total # of Visits to Date: 24  Plan of Care/Certification Expiration Date: 23  Progress Note Due Date: 07/15/23  Referring Provider (secondary): ZULLY Haney  Subjective: right now it's not too bad, about a 5  Patient Currently in Pain: Yes  Pain Level: 5  Pain Type: Chronic pain, Surgical pain  Pain Location: Shoulder  Pain Orientation: Right       Treatment Activities:  Exercises:      Treatment Reasoning    Exercise 1: Pulleys 5'  Exercise 2: Wall walk x 10  Exercise 3: Submaximal isometric R shoulder  x 15  ball all directions- not today  Exercise 4: T-band ER/IR/flex/ext green x 15 each  Exercise 5: Shoulder shrugs/rolls x 20  3 pound weight  Exercise 6: Fwd bent rows 3# x 15  Exercise 7: Standing Is, Ts  Exercise 8: Scapular retraction with blue t-band  x 15  Exercise 9: Supine cane flexion/abduction/ER 2 pound weight x 15  Exercise 10: Supine cane benchpress/shoulder protraction 2 pound weight  x 15  Exercise 11: Supine RUE circles cw/ccw x 15, alphabet a -z x 1 with 1#  Exercise 12: Supine RUE rhythmic stabilization at 90

## 2023-07-10 DIAGNOSIS — I10 ESSENTIAL HYPERTENSION: ICD-10-CM

## 2023-07-10 RX ORDER — AMLODIPINE BESYLATE 5 MG/1
5 TABLET ORAL DAILY
Qty: 90 TABLET | Refills: 3 | Status: SHIPPED | OUTPATIENT
Start: 2023-07-10

## 2023-07-10 NOTE — TELEPHONE ENCOUNTER
Received fax from pharmacy requesting refill on pts medication(s). Pt was last seen in office on 6/26/2023  and has a follow up scheduled for 7/17/2023. Will send request to  Dr. Atlas Dakins  for authorization.      Requested Prescriptions     Pending Prescriptions Disp Refills    amLODIPine (NORVASC) 5 MG tablet 90 tablet 3     Sig: Take 1 tablet by mouth daily

## 2023-07-11 ENCOUNTER — HOSPITAL ENCOUNTER (OUTPATIENT)
Dept: PHYSICAL THERAPY | Age: 48
Setting detail: THERAPIES SERIES
Discharge: HOME | End: 2023-07-11
Payer: MEDICARE

## 2023-07-11 PROCEDURE — 97110 THERAPEUTIC EXERCISES: CPT

## 2023-07-11 ASSESSMENT — PAIN DESCRIPTION - PAIN TYPE: TYPE: CHRONIC PAIN;SURGICAL PAIN

## 2023-07-11 ASSESSMENT — PAIN DESCRIPTION - DESCRIPTORS: DESCRIPTORS: ACHING;TIGHTNESS;SORE

## 2023-07-11 ASSESSMENT — PAIN DESCRIPTION - LOCATION: LOCATION: SHOULDER

## 2023-07-11 ASSESSMENT — PAIN SCALES - GENERAL: PAINLEVEL_OUTOF10: 5

## 2023-07-11 NOTE — PROGRESS NOTES
Physical Therapy: Daily Note/Reassessment   Patient: Candace Cohen (55 y.o. female)   Examination Date: 3671  Plan of Care/Certification Expiration Date: 23    No data recorded   :  1975 # of Visits since [facility]:   25   MRN: [id number]  CSN: [id number] Start of Care Date:   2023   Insurance: Payor: HUMANA MEDICARE / Plan: Vance Beverage / Product Type: *No Product type* /   Insurance ID: [id number] - (Medicare Managed) Secondary Insurance (if applicable): MEDICAID [place]   Referring Physician: DO Jovana Wright PA   PCP: Mata Shaver DO Visits to Date/Visits Approved:     No Show/Cancelled Appts:   /       Medical Diagnosis: Other specified joint disorders, right shoulder [M25.811]  Unspecified rotator cuff tear or rupture of right shoulder, not specified as traumatic [M75.101]    Treatment Diagnosis: R rotator cuff repair        SUBJECTIVE EXAMINATION   Pain Level: Pain Screening  Patient Currently in Pain: Yes  Pain Assessment: 0-10  Pain Level: 5  Pain Type: Chronic pain, Surgical pain  Pain Location: Shoulder  Pain Descriptors: Aching, Tightness, Sore    Patient Comments: Subjective: \"I had some burning down my arm while I was washing my hair yesterday. \"        OBJECTIVE EXAMINATION   Restrictions:  Restrictions/Precautions: Surgical Protocols   Required Braces or Orthoses?: Yes   No data recorded      Left Strength  Right Strength              Strength RUE  R Shoulder Flexion: 4-/5  R Shoulder Extension: 5/5 (5-/5)  R Shoulder ABduction: 3+/5, 4-/5  R Shoulder Internal Rotation: 5/5 (5-/5)  R Shoulder External Rotation: 4+/5  R Elbow Flexion: 5/5 (5-/5)  R Elbow Extension: 4+/5, 5/5 (4+ to 5-/5)  R Forearm Pron: 5/5  R Forearm Sup: 5/5  R Wrist Flexion: 5/5  R Wrist Extension: 5/5     TREATMENT     Exercises:      Treatment Reasoning    Exercise 1: Pulleys 5'  Exercise 2: Wall walk x 10- not today  Exercise 3: Submaximal isometric R

## 2023-07-13 ENCOUNTER — HOSPITAL ENCOUNTER (OUTPATIENT)
Dept: PHYSICAL THERAPY | Age: 48
Setting detail: THERAPIES SERIES
Discharge: HOME | End: 2023-07-13
Payer: MEDICARE

## 2023-07-13 ENCOUNTER — OFFICE VISIT (OUTPATIENT)
Dept: GASTROENTEROLOGY | Age: 48
End: 2023-07-13
Payer: MEDICARE

## 2023-07-13 VITALS
WEIGHT: 293 LBS | OXYGEN SATURATION: 96 % | BODY MASS INDEX: 48.82 KG/M2 | DIASTOLIC BLOOD PRESSURE: 70 MMHG | SYSTOLIC BLOOD PRESSURE: 100 MMHG | HEIGHT: 65 IN | HEART RATE: 82 BPM

## 2023-07-13 DIAGNOSIS — K74.00 HEPATIC FIBROSIS: Primary | ICD-10-CM

## 2023-07-13 DIAGNOSIS — K76.9 HEPATIC LESION: ICD-10-CM

## 2023-07-13 DIAGNOSIS — K76.0 NAFLD (NONALCOHOLIC FATTY LIVER DISEASE): ICD-10-CM

## 2023-07-13 PROCEDURE — 3074F SYST BP LT 130 MM HG: CPT | Performed by: NURSE PRACTITIONER

## 2023-07-13 PROCEDURE — G8427 DOCREV CUR MEDS BY ELIG CLIN: HCPCS | Performed by: NURSE PRACTITIONER

## 2023-07-13 PROCEDURE — 1036F TOBACCO NON-USER: CPT | Performed by: NURSE PRACTITIONER

## 2023-07-13 PROCEDURE — 99213 OFFICE O/P EST LOW 20 MIN: CPT | Performed by: NURSE PRACTITIONER

## 2023-07-13 PROCEDURE — 97110 THERAPEUTIC EXERCISES: CPT

## 2023-07-13 PROCEDURE — G8417 CALC BMI ABV UP PARAM F/U: HCPCS | Performed by: NURSE PRACTITIONER

## 2023-07-13 PROCEDURE — 3078F DIAST BP <80 MM HG: CPT | Performed by: NURSE PRACTITIONER

## 2023-07-13 ASSESSMENT — ENCOUNTER SYMPTOMS
COUGH: 0
SHORTNESS OF BREATH: 0
ANAL BLEEDING: 0
BLOOD IN STOOL: 0
NAUSEA: 0
CONSTIPATION: 0
DIARRHEA: 0
ABDOMINAL DISTENTION: 0
RECTAL PAIN: 0
ABDOMINAL PAIN: 0
VOMITING: 0
CHOKING: 0
TROUBLE SWALLOWING: 0

## 2023-07-13 ASSESSMENT — PAIN DESCRIPTION - LOCATION: LOCATION: SHOULDER

## 2023-07-13 ASSESSMENT — PAIN DESCRIPTION - PAIN TYPE: TYPE: CHRONIC PAIN

## 2023-07-13 ASSESSMENT — PAIN SCALES - GENERAL: PAINLEVEL_OUTOF10: 5

## 2023-07-13 NOTE — PROGRESS NOTES
Subjective:     Patient ID: Mariana Carroll is a 49 y.o. female  PCP: Johnnie Sanchez DO  Referring Provider: No ref. provider found    HPI  Patient presents to the office today with the following complaints: Follow-up      Pt seen today for follow up after EGD on 6/14/2023 for c/o reflux. Currently controlled with Omeprazole 40 mg daily. Hx hepatic fibrosis, NAFLD, HH. She is UTD on liver US (5/18/2023). US liver noted 1.6 cm hepatic lesion. MRI was completed, lesion did not appear suspicious. All scope and pathology reports were reviewed and discussed with patient. All questions answered, pt verbalized understanding. EGD Findings 6/14/2023:   Esophagus: normal mucosa. No varices noted. There is a small hiatal hernia present. Stomach:  abnormal: mild mucosal changes suggestive of gastritis noted - Gastric biopsies were taken from the antrum and body to rule out Helicobacter pylori infection. Duodenum: normal  IMPRESSION:  1. Small Hiatal hernia  2. Gastritis - biopsied   RECOMMENDATIONS:    1. Await path results  2. Continue current meds  3. Lifestyle changes for reflux disease (weight loss, decrease sugary foods, increase exercise)  4. Avoid NSAIDS (Relafen)    FINAL DIAGNOSIS:   Stomach, random gastric biopsies:   1. Benign gastric mucosa with minimal chronic inflammation. 2.  No Helicobacter pylori organisms identified by immunohistochemical   stain. Lab Results   Component Value Date     05/15/2023    K 4.0 05/15/2023     05/15/2023    CO2 24 05/15/2023    BUN 10 05/15/2023    CREATININE 0.8 05/15/2023    GLUCOSE 108 05/15/2023    CALCIUM 9.6 05/15/2023    PROT 7.8 05/15/2023    LABALBU 4.1 05/15/2023    BILITOT 0.3 05/15/2023    ALKPHOS 128 (H) 05/15/2023    AST 22 05/15/2023    ALT 18 05/15/2023    LABGLOM >60 05/15/2023    GFRAA >59 09/17/2021       Assessment:     1. Hepatic fibrosis    2. Hepatic lesion    3.  NAFLD (nonalcoholic fatty liver

## 2023-07-13 NOTE — PROGRESS NOTES
Physical Therapy: Daily Note   Patient: Bao Khan (65 y.o. female)   Examination Date:   Plan of Care/Certification Expiration Date: 23    No data recorded   :  1975 # of Visits since [facility]:   26   MRN: [id number]  CSN: [id number] Start of Care Date:   2023   Insurance: Payor: HU[employer]NA MEDICARE / Plan: Fred Damon / Product Type: *No Product type* /   Insurance ID: [id number] - (Medicare Managed) Secondary Insurance (if applicable): MEDICAID [place]   Referring Physician: DO Hallie Raya PA   PCP: Rebekah Mcdermott DO Visits to Date/Visits Approved:     No Show/Cancelled Appts:   /       Medical Diagnosis: Other specified joint disorders, right shoulder [M25.811]  Unspecified rotator cuff tear or rupture of right shoulder, not specified as traumatic [M75.101] R rotator cuff repair  Treatment Diagnosis: R rotator cuff repair        SUBJECTIVE EXAMINATION   Pain Level: Pain Screening  Patient Currently in Pain: Yes  Pain Assessment: 0-10  Pain Level: 5  Pain Type: Chronic pain  Pain Location: Shoulder    Patient Comments: Subjective: \"I had some burning down my arm while I was washing my hair yesterday. \"    HEP Compliance: Good        OBJECTIVE EXAMINATION   Restrictions:  Restrictions/Precautions: Surgical Protocols   Required Braces or Orthoses?: Yes   No data recorded                TREATMENT     Exercises:      Treatment Reasoning    Exercise 1: Pulleys 5'  Exercise 2: Wall walk x 10-  Exercise 3: Submaximal isometric R shoulder  x 15  ball all directions-  Exercise 4: T-band ER/IR/flex/ext green x 15 each  Exercise 5: Shoulder shrugs/rolls x 20  3 pound weight  Exercise 6: Fwd bent rows 3# x 15  Exercise 7: Standing Is, Ts  Exercise 8: Scapular retraction with blue t-band  x 15  Exercise 9: Supine cane flexion/abduction/ER 2 pound weight x 15     2023 Right shoulder flex after ex: 146  Exercise 10: Supine cane benchpress/shoulder

## 2023-07-17 ENCOUNTER — OFFICE VISIT (OUTPATIENT)
Dept: FAMILY MEDICINE CLINIC | Age: 48
End: 2023-07-17
Payer: MEDICARE

## 2023-07-17 VITALS
DIASTOLIC BLOOD PRESSURE: 82 MMHG | BODY MASS INDEX: 48.82 KG/M2 | OXYGEN SATURATION: 97 % | HEIGHT: 65 IN | HEART RATE: 87 BPM | WEIGHT: 293 LBS | SYSTOLIC BLOOD PRESSURE: 130 MMHG | TEMPERATURE: 97.1 F

## 2023-07-17 DIAGNOSIS — F41.9 ANXIETY AND DEPRESSION: ICD-10-CM

## 2023-07-17 DIAGNOSIS — Z91.09 ENVIRONMENTAL ALLERGIES: ICD-10-CM

## 2023-07-17 DIAGNOSIS — S93.402D SPRAIN OF LEFT ANKLE, UNSPECIFIED LIGAMENT, SUBSEQUENT ENCOUNTER: ICD-10-CM

## 2023-07-17 DIAGNOSIS — F32.A ANXIETY AND DEPRESSION: ICD-10-CM

## 2023-07-17 DIAGNOSIS — I10 PRIMARY HYPERTENSION: ICD-10-CM

## 2023-07-17 DIAGNOSIS — M54.50 CHRONIC LOW BACK PAIN, UNSPECIFIED BACK PAIN LATERALITY, UNSPECIFIED WHETHER SCIATICA PRESENT: ICD-10-CM

## 2023-07-17 DIAGNOSIS — E83.110 HEREDITARY HEMOCHROMATOSIS (HCC): ICD-10-CM

## 2023-07-17 DIAGNOSIS — G43.909 MIGRAINE WITHOUT STATUS MIGRAINOSUS, NOT INTRACTABLE, UNSPECIFIED MIGRAINE TYPE: ICD-10-CM

## 2023-07-17 DIAGNOSIS — E78.2 MIXED HYPERLIPIDEMIA: ICD-10-CM

## 2023-07-17 DIAGNOSIS — K21.9 GASTROESOPHAGEAL REFLUX DISEASE, UNSPECIFIED WHETHER ESOPHAGITIS PRESENT: ICD-10-CM

## 2023-07-17 DIAGNOSIS — R73.03 PREDIABETES: Primary | ICD-10-CM

## 2023-07-17 DIAGNOSIS — M79.7 FIBROMYALGIA: ICD-10-CM

## 2023-07-17 DIAGNOSIS — G89.29 CHRONIC LOW BACK PAIN, UNSPECIFIED BACK PAIN LATERALITY, UNSPECIFIED WHETHER SCIATICA PRESENT: ICD-10-CM

## 2023-07-17 DIAGNOSIS — J45.40 MODERATE PERSISTENT ASTHMA WITHOUT COMPLICATION: ICD-10-CM

## 2023-07-17 PROCEDURE — 3075F SYST BP GE 130 - 139MM HG: CPT | Performed by: PEDIATRICS

## 2023-07-17 PROCEDURE — 1036F TOBACCO NON-USER: CPT | Performed by: PEDIATRICS

## 2023-07-17 PROCEDURE — L1902 AFO ANKLE GAUNTLET PRE OTS: HCPCS | Performed by: PEDIATRICS

## 2023-07-17 PROCEDURE — G8427 DOCREV CUR MEDS BY ELIG CLIN: HCPCS | Performed by: PEDIATRICS

## 2023-07-17 PROCEDURE — 3079F DIAST BP 80-89 MM HG: CPT | Performed by: PEDIATRICS

## 2023-07-17 PROCEDURE — G8417 CALC BMI ABV UP PARAM F/U: HCPCS | Performed by: PEDIATRICS

## 2023-07-17 PROCEDURE — 99214 OFFICE O/P EST MOD 30 MIN: CPT | Performed by: PEDIATRICS

## 2023-07-17 RX ORDER — TIRZEPATIDE 2.5 MG/.5ML
2.5 INJECTION, SOLUTION SUBCUTANEOUS WEEKLY
Qty: 4 ADJUSTABLE DOSE PRE-FILLED PEN SYRINGE | Refills: 5 | Status: SHIPPED | OUTPATIENT
Start: 2023-07-17

## 2023-07-17 RX ORDER — FLUTICASONE PROPIONATE AND SALMETEROL XINAFOATE 230; 21 UG/1; UG/1
2 AEROSOL, METERED RESPIRATORY (INHALATION) 2 TIMES DAILY
Qty: 1 EACH | Refills: 5 | Status: SHIPPED | OUTPATIENT
Start: 2023-07-17

## 2023-07-17 ASSESSMENT — ENCOUNTER SYMPTOMS
ALLERGIC/IMMUNOLOGIC NEGATIVE: 1
EYE DISCHARGE: 0
SHORTNESS OF BREATH: 0
SINUS PRESSURE: 0
ABDOMINAL PAIN: 0
WHEEZING: 0
RESPIRATORY NEGATIVE: 1
VOMITING: 0
DIARRHEA: 0
SORE THROAT: 0
CHEST TIGHTNESS: 0
NAUSEA: 1
COUGH: 0
BACK PAIN: 1
CONSTIPATION: 0

## 2023-07-17 NOTE — PROGRESS NOTES
[address] [facility]  [address] [place], [address] [facility]  Phone [phone]   Fax [phone]      OFFICE VISIT: 2023    Jennifer Moore-: 1975      HPI  Reason For Visit:  Andres Richards is a 49 y.o.     3 Month Follow-Up (Patient states that she has new pain in back (cervical and thoracic spine) began two days ago when she turns head she can feel between shoulder blades. Left ankle is giving her pain (sprained in ). Patient would like to discuss Mounjaro due to not being able to take Ozempic due to not being able to tolerate. )    Patient presents with multiple health issues. She is being followed by multiple sub-specialists at multiple tertiary care medical centers, but we have no records or documentation in our medical record. Present concerns:  She is having multiple musculoskeletal pain complaints. Her upper and middle back have been bothering her for the past several days. She also has neck pain that is new. She complains of left ankle pain as well. She has a history of a past ankle injury. No unusual activity. She is following with pain management on a routine basis  She is also requesting to try [facility] for weight loss. I did explain that Mounjaro unfortunately does not have a weight loss indication and we are unlikely to get it through insurance without having diagnosis of diabetes. She declined steroid taper. Pre-Diabetes   Diet compliance:  noncompliant: Much of the time  Nutrition Consultation Needed:  yes -but declined  Medication:   Semaglutide 2 mg subcu weekly (she has stopped taking this medication)    This medication caused her to have nausea.    Metformin 1000 mg twice daily  Medication compliance:  compliant most of the time  Weight trend: increasing, weight is up 6 pounds 12.8 ounces from 1 month ago  Current exercise: no  Checkin times daily  Low BG:  no  Eye exam current (within one year): yes  Checking Feet regularly:  yes - no sores  ACE/ARB:

## 2023-07-18 ENCOUNTER — HOSPITAL ENCOUNTER (OUTPATIENT)
Dept: PHYSICAL THERAPY | Age: 48
Setting detail: THERAPIES SERIES
Discharge: HOME | End: 2023-07-18
Payer: MEDICARE

## 2023-07-18 PROCEDURE — 97110 THERAPEUTIC EXERCISES: CPT

## 2023-07-18 ASSESSMENT — PAIN DESCRIPTION - PAIN TYPE: TYPE: CHRONIC PAIN

## 2023-07-18 ASSESSMENT — PAIN DESCRIPTION - ORIENTATION: ORIENTATION: RIGHT

## 2023-07-18 ASSESSMENT — PAIN SCALES - GENERAL: PAINLEVEL_OUTOF10: 5

## 2023-07-18 ASSESSMENT — PAIN DESCRIPTION - DESCRIPTORS: DESCRIPTORS: ACHING;TIGHTNESS

## 2023-07-18 ASSESSMENT — PAIN DESCRIPTION - LOCATION: LOCATION: SHOULDER

## 2023-07-20 ENCOUNTER — HOSPITAL ENCOUNTER (OUTPATIENT)
Dept: PHYSICAL THERAPY | Age: 48
Setting detail: THERAPIES SERIES
Discharge: HOME | End: 2023-07-20
Payer: MEDICARE

## 2023-07-20 PROCEDURE — 97110 THERAPEUTIC EXERCISES: CPT

## 2023-07-20 ASSESSMENT — PAIN DESCRIPTION - LOCATION: LOCATION: SHOULDER

## 2023-07-20 ASSESSMENT — PAIN DESCRIPTION - PAIN TYPE: TYPE: CHRONIC PAIN

## 2023-07-20 ASSESSMENT — PAIN SCALES - GENERAL: PAINLEVEL_OUTOF10: 6

## 2023-07-20 ASSESSMENT — PAIN DESCRIPTION - DESCRIPTORS: DESCRIPTORS: ACHING;DISCOMFORT

## 2023-07-20 ASSESSMENT — PAIN DESCRIPTION - ORIENTATION: ORIENTATION: RIGHT

## 2023-07-20 NOTE — PROGRESS NOTES
Physical Therapy  Daily Treatment Note  Date: 2023  Patient Name: Fuentes Nazario  MRN: [id number]     :   1975    Subjective:      PT Visit Information  Onset Date: 23  PT Insurance Information: [employer] Medicare (Primary- precert req'd) and [place] Medicaid (Secondary- auth required after 30 visits)  Total # of Visits Approved: 37  Total # of Visits to Date: 29  Plan of Care/Certification Expiration Date: 23  Progress Note Due Date: 08/10/23  Referring Provider (secondary): Angeles Acevedo  Subjective: Patient reports continued discomfort in her R shoulder this morning.     Pain Screening  Patient Currently in Pain: Yes  Pain Assessment: 0-10  Pain Level: 6  Pain Type: Chronic pain  Pain Location: Shoulder  Pain Orientation: Right  Pain Descriptors: Aching;Discomfort       Treatment Activities:   Exercises  Exercise 1: Pulleys 5'  Exercise 2: Wall walk x 10-  Exercise 3: Submaximal isometric R shoulder  x 15  ball all directions- d/c and continue with t-band  Exercise 4: T-band ER/IR/flex/ext green x 20 each  Exercise 5: Shoulder shrugs/rolls x 20  3 pound weight  Exercise 6: Fwd bent rows 3# x 20  Exercise 7: Standing Is, Ts red x 20 ea  Exercise 8: Scapular retraction with blue t-band x 20  Exercise 9: Supine cane flexion/abduction/ER 2 pound weight x 20  Right shoulder flex after ex: 146  Exercise 10: Supine cane benchpress/shoulder protraction 2 pound weight  x 20  Exercise 11: Supine RUE circles cw/ccw x 15, alphabet a -z x 1 with 2#  Exercise 12: Supine RUE rhythmic stabilization at 90 degrees shoulder flex x 30 sec,  at 45 degrees ER x 30 sec- not today  Exercise 13: PROM R shoulder flexion, scaption, ER/IR (in scapular plane) x 10 ea- not today  Exercise 14: AAROM in supine all directions x 10  Exercise 15: Gentle GH mobs--Caudal glide, 3-5 min  Exercise 16: Supine AROM shoulder flexion x 20 1#  Exercise 17: Sidelying ER/IR, abd x 20 ea 1#  Exercise 18: Standing shoulder

## 2023-07-24 DIAGNOSIS — M54.16 CHRONIC LUMBAR RADICULOPATHY: ICD-10-CM

## 2023-07-24 NOTE — TELEPHONE ENCOUNTER
Patient called on 7/21/23. The office is closed on Fridays. Office cancelled appt on 3/14/23. Rescheduled for 7/25/23 with Veronica Goncalveske. Last office visit 12/6/22.

## 2023-07-25 ENCOUNTER — HOSPITAL ENCOUNTER (OUTPATIENT)
Dept: PAIN MANAGEMENT | Age: 48
Discharge: HOME | End: 2023-07-25
Payer: MEDICARE

## 2023-07-25 ENCOUNTER — HOSPITAL ENCOUNTER (OUTPATIENT)
Dept: PHYSICAL THERAPY | Age: 48
Setting detail: THERAPIES SERIES
Discharge: HOME | End: 2023-07-25
Payer: MEDICARE

## 2023-07-25 VITALS
WEIGHT: 293 LBS | RESPIRATION RATE: 16 BRPM | TEMPERATURE: 97.5 F | HEART RATE: 86 BPM | BODY MASS INDEX: 47.09 KG/M2 | DIASTOLIC BLOOD PRESSURE: 92 MMHG | SYSTOLIC BLOOD PRESSURE: 148 MMHG | OXYGEN SATURATION: 94 % | HEIGHT: 66 IN

## 2023-07-25 DIAGNOSIS — Z98.890 HX OF SHOULDER SURGERY: ICD-10-CM

## 2023-07-25 DIAGNOSIS — M54.10 BACK PAIN WITH LEFT-SIDED RADICULOPATHY: Primary | Chronic | ICD-10-CM

## 2023-07-25 PROCEDURE — 99214 OFFICE O/P EST MOD 30 MIN: CPT

## 2023-07-25 PROCEDURE — 99214 OFFICE O/P EST MOD 30 MIN: CPT | Performed by: NURSE PRACTITIONER

## 2023-07-25 PROCEDURE — 97110 THERAPEUTIC EXERCISES: CPT

## 2023-07-25 RX ORDER — OXYCODONE AND ACETAMINOPHEN 7.5; 325 MG/1; MG/1
1 TABLET ORAL EVERY 6 HOURS PRN
Qty: 90 TABLET | Refills: 0 | Status: SHIPPED | OUTPATIENT
Start: 2023-07-25 | End: 2023-08-24

## 2023-07-25 RX ORDER — OXYCODONE HYDROCHLORIDE 5 MG/1
5 TABLET ORAL EVERY 6 HOURS PRN
Qty: 90 TABLET | Refills: 0 | Status: SHIPPED | OUTPATIENT
Start: 2023-07-25 | End: 2023-07-25 | Stop reason: DRUGHIGH

## 2023-07-25 ASSESSMENT — ENCOUNTER SYMPTOMS
BOWEL INCONTINENCE: 0
BACK PAIN: 1
ABDOMINAL DISTENTION: 0
CONSTIPATION: 0
ABDOMINAL PAIN: 0
NAUSEA: 0

## 2023-07-25 ASSESSMENT — PAIN DESCRIPTION - ORIENTATION
ORIENTATION_2: LOWER
ORIENTATION: LOWER

## 2023-07-25 ASSESSMENT — PAIN DESCRIPTION - PAIN TYPE
TYPE: CHRONIC PAIN
TYPE_2: CHRONIC PAIN

## 2023-07-25 ASSESSMENT — PAIN DESCRIPTION - INTENSITY: RATING_2: 7

## 2023-07-25 ASSESSMENT — PAIN DESCRIPTION - LOCATION
LOCATION: BACK
LOCATION_2: NECK

## 2023-07-25 ASSESSMENT — PAIN SCALES - GENERAL: PAINLEVEL_OUTOF10: 7

## 2023-07-25 NOTE — PROGRESS NOTES
Physical Therapy: Daily Note   Patient: Cal Weaver (71 y.o. female)   Examination Date:   Plan of Care/Certification Expiration Date: 23    No data recorded   :  1975 # of Visits since [facility]:   29   MRN: [id number]  CSN: [id number] Start of Care Date:   2023   Insurance: Payor: HUMANA MEDICARE / Plan: Amado Pel / Product Type: *No Product type* /   Insurance ID: [id number] - (Medicare Managed) Secondary Insurance (if applicable): MEDICAID [place]   Referring Physician: DO David Perea PA   PCP: Margret Turner DO Visits to Date/Visits Approved:     No Show/Cancelled Appts:   /       Medical Diagnosis: Other specified joint disorders, right shoulder [M25.811]  Unspecified rotator cuff tear or rupture of right shoulder, not specified as traumatic [M75.101]    Treatment Diagnosis: R rotator cuff repair        SUBJECTIVE EXAMINATION       Patient Comments: Subjective: patient states she tried to shut her door by leaning on her right shoulder and it caused pain. Patient states her pain is 6/10 at the moment.           OBJECTIVE EXAMINATION   Restrictions:  Restrictions/Precautions: Surgical Protocols   Required Braces or Orthoses?: Yes   No data recorded        TREATMENT     Exercises:  Therapeutic exercise (CPT 27337)   Treatment Reasoning    Exercise 1: Pulleys 5'  Exercise 2: Wall walk x 10-  Exercise 4: T-band ER/IR/flex/ext green x 20 each  Exercise 5: Shoulder shrugs/rolls x 20  3 pound weight  Exercise 6: Fwd bent rows 3# x 20  Exercise 7: Standing Is, Ts red x 20 ea  Exercise 8: Scapular retraction with blue t-band x 20  Exercise 9: Supine cane flexion/abduction/ER 2 pound weight x 20  Right shoulder flex after ex: 146  Exercise 10: Supine cane benchpress/shoulder protraction 2 pound weight  x 20  Exercise 11: Supine RUE circles cw/ccw x 15, alphabet a -z x 1 with 2#  Exercise 12: Supine RUE rhythmic stabilization at 90 degrees

## 2023-07-25 NOTE — TELEPHONE ENCOUNTER
1. Back pain with left-sided radiculopathy    2. Hx of shoulder surgery      Requested Prescriptions     Pending Prescriptions Disp Refills    oxyCODONE-acetaminophen (PERCOCET) 7.5-325 MG per tablet 90 tablet 0     Sig: Take 1 tablet by mouth every 6 hours as needed for Pain for up to 30 days. Max Daily Amount: 4 tablets       Continue medication with refill sent at appointment yes; refill sent to medical director at appointment yes, see refill encounter dated 7/25/2023    Pain medication increased due to shoulder pain from recent rotator cuff repair. Patient is still in PT at this time.      Electronically signed by ASHLEE Hughes CNP on 7/25/2023 at 10:58 AM

## 2023-07-25 NOTE — PROGRESS NOTES
Clinic Documentation      Education Provided:  [x] Review of Marlen Hew  [] Agreement Review  [x] PEG Score Calculated [] PHQ Score Calculated [] ORT Score Calculated    [] Compliance Issues Discussed [] Cognitive Behavior Needs [x] Exercise [] Review of Test [] Financial Issues  [x] Tobacco/Alcohol Use Reviewed [x] Teaching [] New Patient [] Picture Obtained    Physician Plan:  [] Outgoing Referral  [] Pharmacy Consult  [x] Test Ordered [x] Prescription Ordered/Changed   [] Obtained Test Results / Consult Notes        Complete if patient is withholding blood thinner for procedure     Blood Thinner Patient is currently taking:      [] Plavix (Hold for 7 days)  [] Aspirin (Hold for 5 days)     [] Pletal (Hold for 2 days)  [] Pradaxa (Hold for 3 days)    [] Effient (Hold for 7 days)  [] Xarelto (Hold for 2 days)    [] Eliquis (Hold for 2 days)  [] Brilinta (Hold for 7 days)    [] Coumadin (Hold for 5 days) - (INR needs to be drawn the day prior to procedure- INR < 2.0)    [] Aggrenox (Hold for 7 days)        [] Patient will stop medication on their own.    [] Blood Thinner Form Faxed for approval to hold.    Provider form faxed to:     Assessment Completed by:  Electronically signed by Twin Noel RN on 7/25/2023 at 11:14 AM
and/or providers discretion  Patient risk low  UDS or Saliva collected & tested on 7/25/2023     Chronic bilateral lower abdominal pain  Chronic left-sided low back pain without sciatica  Lumbar radiculopathy    Order placed for NexWave 3 in 1 device. Patient suffers from    DDD (degenerative disc disease), lumbar, Back pain with left-sided radiculopathy, Cervical vertebral fusion, Chronic low back pain, Lumbar radiculopathy, hx of shoulder surgery, Myofascial muscle pain, Muscle spasms of neck, Spasm of muscle of lower back, Fibromyalgia    Z[employer]nex NexWave is a prescription only 3-in-1 device with IFC, TENS, and NMES. The device is used to help manage their pain symptoms, re-educate and strengthen muscles, and or hopefully reduce or eliminate their need for pain medications. IFC can help extend pain relief and hopefully reduce need for pain medication. TENS is used to control break through pain. NMES is used to help with muscle spasms and strengthen weak muscles. Electrotherapy is effective and can increase activity levels by over 60% and has shown, in some patients, a reduction of pain medication usage by 50% (1). Parisa DA, Chi C, Glen A, Edouard Medellin (1996) Transcutaneous Electric Nerve Stimulation Treatment Outcome in Long-Term Users. Clinical Journal of Pain, 12(3), 1996, p.201       [x] Follow up    [] 4 weeks   [x] 6-8 weeks   [] 10-12 weeks   [] 3 months  [x] Post procedure to evaluate effectiveness of treatment  [x] To evaluate medications changes made at office visit. [] To review diagnostics ordered at last visit. [] To evaluate response to therapy    [x] For management of controlled substance  [x] Random UDS as indicated by ORT score or if indicated by abberent behaviors    Discussion: Discussed exam findings and plan of care with patient. Patient agreed with POC and questions were asked and answered.      Activity: discussed exercise as beneficial to pain reduction,

## 2023-07-27 ENCOUNTER — HOSPITAL ENCOUNTER (OUTPATIENT)
Dept: PHYSICAL THERAPY | Age: 48
Setting detail: THERAPIES SERIES
Discharge: HOME | End: 2023-07-27
Payer: MEDICARE

## 2023-07-27 PROCEDURE — 97110 THERAPEUTIC EXERCISES: CPT

## 2023-07-27 ASSESSMENT — PAIN DESCRIPTION - PAIN TYPE: TYPE: CHRONIC PAIN

## 2023-07-27 ASSESSMENT — PAIN DESCRIPTION - DESCRIPTORS: DESCRIPTORS: ACHING;DISCOMFORT

## 2023-07-27 ASSESSMENT — PAIN DESCRIPTION - ORIENTATION: ORIENTATION: RIGHT

## 2023-07-27 ASSESSMENT — PAIN SCALES - GENERAL: PAINLEVEL_OUTOF10: 8

## 2023-07-27 ASSESSMENT — PAIN DESCRIPTION - LOCATION: LOCATION: SHOULDER

## 2023-07-27 NOTE — PROGRESS NOTES
45 degrees ER x 30 sec  Exercise 13: PROM R shoulder flexion, scaption, ER/IR (in scapular plane) x 10 ea  Exercise 14: AAROM in supine all directions x 10  Exercise 15: Gentle GH mobs--Caudal glide, 3-5 min  Exercise 16: Supine AROM shoulder flexion x 20 1#  Exercise 17: Sidelying ER/IR, abd x 20 ea 1#  Exercise 18: Standing shoulder flexion/scaption- with cane x 10  Exercise 19: Pegboard -not today;  UBE, manual, Level 1.5, 5 min, (2.5 min fwd/2.5 min back)- no bike due to time  Exercise 20: End with ice and IFC PRN for elevated pain  x 10' (ice only)        6/1: HEP ISSUED    Limitations addressed: Mobility, Strength  Therapist provided: Verbal cuing, Tactile cuing  Progressed: Repetitions, Resistance                            ASSESSMENT     Assessment: Assessment: Patient did well with session despite complaints of increased pain. She reports her dog jumped on her arm and pushed it backwards. She was able to complete all ex. Ended session with cold pack. She rated pain at 8/10 pre session and 5/10 post.  Body Structures, Functions, Activity Limitations Requiring Skilled Therapeutic Intervention: Decreased functional mobility , Decreased ADL status, Decreased ROM, Decreased strength, Decreased high-level IADLs, Increased pain, Decreased posture    Post-Treatment Pain Level: Activity Tolerance: Patient limited by pain    Therapy Prognosis: Fair; Good       GOALS   Patient Goals : improve use of RUE  Short Term Goals Completed by 3-4 weeks Current Status Goal Status   Independent with HEP 5/16: Not yet issued. Pulleys issued today. 6/15/23 Patient states she has her HEP and pulleys and has been using them. Met   Improve R elbow/forearm/wrist strength to 5/5 5/16: See strength section     5/30: see strength section. 6/15/23 See strength section. Met   Improve R  strength to at least 50# 5/16: 20#     5/30: 35# 6/15/23 Patient has 46#  right hand.   7/11: 48# R hand In progress   Improve AAROM of R

## 2023-08-01 ENCOUNTER — HOSPITAL ENCOUNTER (OUTPATIENT)
Dept: PHYSICAL THERAPY | Age: 48
Setting detail: THERAPIES SERIES
Discharge: HOME | End: 2023-08-01
Payer: MEDICARE

## 2023-08-01 PROCEDURE — 97110 THERAPEUTIC EXERCISES: CPT

## 2023-08-01 ASSESSMENT — PAIN DESCRIPTION - ORIENTATION: ORIENTATION: RIGHT

## 2023-08-01 ASSESSMENT — PAIN DESCRIPTION - LOCATION: LOCATION: SHOULDER

## 2023-08-01 ASSESSMENT — PAIN SCALES - GENERAL: PAINLEVEL_OUTOF10: 5

## 2023-08-01 ASSESSMENT — PAIN DESCRIPTION - PAIN TYPE: TYPE: CHRONIC PAIN

## 2023-08-01 NOTE — PROGRESS NOTES
Daily Treatment Note  Date: 2023  Patient Name: Candace Cohen  MRN: [id number]     :   1975    Referring Physician: DO Jovana Wright PA   PCP: Mata Shaver DO    Medical Diagnosis: Other specified joint disorders, right shoulder [M25.811]  Unspecified rotator cuff tear or rupture of right shoulder, not specified as traumatic [M75.101] R rotator cuff repair  Treatment Diagnosis: R rotator cuff repair      Insurance: Payor: [place] / Plan: [employer] / Product Type: *No Product type* /   Insurance ID: [id number] - (Medicare Managed)    Subjective:   General  Diagnosis: R rotator cuff repair  Referring Provider (secondary): ZULLY Ritter  Onset Date: 23  PT Insurance Information: [employer] (Primary- precert req'd) and [place] Medicaid (Secondary- auth required after 30 visits)  Total # of Visits Approved: 37  Total # of Visits to Date: 32  Plan of Care/Certification Expiration Date: 23  Progress Note Due Date: 23  Referring Provider (secondary): ZULLY Ritter  Subjective: it's slowly getting there  Patient Currently in Pain: Yes  Pain Level: 5  Pain Type: Chronic pain  Pain Location: Shoulder  Pain Orientation: Right       Treatment Activities:  Exercises:      Treatment Reasoning    Exercise 1: Pulleys 5'  Exercise 2: Wall walk x 10  Exercise 3: Submaximal isometric R shoulder  x 15  ball all directions- d/c and continue with t-band  Exercise 4: Scapular retraction with blue t-band x 20  Exercise 5: Standing Is, Ts red x 20 ea  Exercise 6: T-band ER/IR/flex/ext green x 20 each  Exercise 7: Shoulder shrugs/rolls x 20  3 pound weight  Exercise 8: Fwd bent rows 3# x 20  Exercise 9: Supine cane flexion/abduction/ER 2 pound weight x 20  Right shoulder flex after ex: 154  Exercise 10: Supine cane benchpress/shoulder protraction 2 pound weight  x 20  Exercise 11: Supine RUE circles cw/ccw x 15, alphabet a -z x 1 with

## 2023-08-03 ENCOUNTER — HOSPITAL ENCOUNTER (OUTPATIENT)
Dept: PHYSICAL THERAPY | Age: 48
Setting detail: THERAPIES SERIES
Discharge: HOME | End: 2023-08-03
Payer: MEDICARE

## 2023-08-03 PROCEDURE — 97110 THERAPEUTIC EXERCISES: CPT

## 2023-08-03 ASSESSMENT — PAIN DESCRIPTION - ORIENTATION: ORIENTATION: RIGHT

## 2023-08-03 ASSESSMENT — PAIN DESCRIPTION - DESCRIPTORS: DESCRIPTORS: ACHING;SORE;DISCOMFORT

## 2023-08-03 ASSESSMENT — PAIN DESCRIPTION - LOCATION: LOCATION: SHOULDER

## 2023-08-03 ASSESSMENT — PAIN DESCRIPTION - PAIN TYPE: TYPE: CHRONIC PAIN

## 2023-08-03 ASSESSMENT — PAIN SCALES - GENERAL: PAINLEVEL_OUTOF10: 7

## 2023-08-03 NOTE — PROGRESS NOTES
Physical Therapy  Daily Treatment Note  Date: 8/3/2023  Patient Name: Valentin Goldberg  MRN: [id number]     :   1975    Subjective:      PT Visit Information  Onset Date: 23  PT Insurance Information: [employer] (Primary- precert req'd) and [place] Medicaid (Secondary- auth required after 30 visits)  Total # of Visits Approved: 37  Total # of Visits to Date: 28  Plan of Care/Certification Expiration Date: 23  Progress Note Due Date: 23  Referring Provider (secondary): ZULLY Hernandez  Subjective: I had a bad night the other night.  My pain is about a 7/10    Pain Screening  Patient Currently in Pain: Yes  Pain Assessment: 0-10  Pain Level: 7  Pain Type: Chronic pain  Pain Location: Shoulder  Pain Orientation: Right  Pain Descriptors: Aching;Sore;Discomfort      Treatment Activities:   Exercises  Exercise 1: Pulleys 5'  Exercise 2: Wall walk x 10  Exercise 3: Submaximal isometric R shoulder  x 15  ball all directions- d/c and continue with t-band  Exercise 4: Scapular retraction with blue t-band x 20  Exercise 5: Standing Is, Ts green x 20 ea  Exercise 6: T-band ER/IR/flex/ext green x 20 each  Exercise 7: Shoulder shrugs/rolls x 20  3 pound weight  Exercise 8: Fwd bent rows 3# x 20  Exercise 9: Supine cane flexion/abduction/ER 2 pound weight x 20  Right shoulder flex after ex: 154  Exercise 10: Supine cane benchpress/shoulder protraction 2 pound weight  x 20  Exercise 11: Supine RUE circles cw/ccw x 15, alphabet a -z x 1 with 2#  Exercise 12: Supine RUE rhythmic stabilization at 90 degrees shoulder flex x 30 sec,  at 45 degrees ER x 30 sec  Exercise 13: PROM R shoulder flexion, scaption, ER/IR (in scapular plane) x 10 ea  Exercise 14: AAROM in supine all directions x 10  Exercise 15: Gentle GH mobs--Caudal glide, 3-5 min  Exercise 16: Supine AROM shoulder flexion x 20 1# not today  Exercise 17: Sidelying ER/IR, abd x 20 ea 1# not today  Exercise 18: Standing shoulder flexion/scaption-

## 2023-08-04 DIAGNOSIS — M79.18 MYOFASCIAL MUSCLE PAIN: ICD-10-CM

## 2023-08-07 DIAGNOSIS — M79.18 MYOFASCIAL MUSCLE PAIN: ICD-10-CM

## 2023-08-07 RX ORDER — CYCLOBENZAPRINE HCL 10 MG
10 TABLET ORAL 3 TIMES DAILY PRN
Qty: 270 TABLET | Refills: 0 | Status: SHIPPED | OUTPATIENT
Start: 2023-08-07

## 2023-08-07 RX ORDER — CYCLOBENZAPRINE HCL 10 MG
10 TABLET ORAL 3 TIMES DAILY PRN
Qty: 270 TABLET | Refills: 0 | OUTPATIENT
Start: 2023-08-07

## 2023-08-07 NOTE — TELEPHONE ENCOUNTER
Received fax from pharmacy requesting refill on pts medication(s). Pt was last seen in office on 7/17/2023  and has a follow up scheduled for 10/17/2023. Will send request to  Dr. Omer Blunt  for authorization.      Requested Prescriptions     Pending Prescriptions Disp Refills    cyclobenzaprine (FLEXERIL) 10 MG tablet 270 tablet 0     Sig: Take 1 tablet by mouth 3 times daily as needed for Muscle spasms

## 2023-08-07 NOTE — TELEPHONE ENCOUNTER
Received fax from pharmacy requesting refill on pts medication(s). Pt was last seen in office on 7/17/2023  and has a follow up scheduled for 10/17/2023. Will send request to  Dr. Rosalina Cowan  for authorization.      Requested Prescriptions     Pending Prescriptions Disp Refills    cyclobenzaprine (FLEXERIL) 10 MG tablet [Pharmacy Med Name: CYCLOBENZAPRINE 10MG TABLETS] 270 tablet 0     Sig: TAKE 1 TABLET BY MOUTH THREE TIMES DAILY AS NEEDED FOR MUSCLE SPASMS

## 2023-08-08 ENCOUNTER — HOSPITAL ENCOUNTER (OUTPATIENT)
Dept: PAIN MANAGEMENT | Age: 48
Discharge: HOME | End: 2023-08-08
Payer: MEDICARE

## 2023-08-08 ENCOUNTER — HOSPITAL ENCOUNTER (OUTPATIENT)
Dept: PHYSICAL THERAPY | Age: 48
Setting detail: THERAPIES SERIES
Discharge: HOME | End: 2023-08-08
Payer: MEDICARE

## 2023-08-08 VITALS
TEMPERATURE: 96.8 F | SYSTOLIC BLOOD PRESSURE: 120 MMHG | OXYGEN SATURATION: 98 % | RESPIRATION RATE: 18 BRPM | DIASTOLIC BLOOD PRESSURE: 72 MMHG | HEART RATE: 80 BPM

## 2023-08-08 DIAGNOSIS — R52 PAIN MANAGEMENT: ICD-10-CM

## 2023-08-08 DIAGNOSIS — F41.9 ANXIETY: ICD-10-CM

## 2023-08-08 DIAGNOSIS — F33.41 RECURRENT MAJOR DEPRESSIVE DISORDER, IN PARTIAL REMISSION (HCC): ICD-10-CM

## 2023-08-08 PROCEDURE — 97110 THERAPEUTIC EXERCISES: CPT

## 2023-08-08 PROCEDURE — 2500000003 HC RX 250 WO HCPCS

## 2023-08-08 PROCEDURE — 2580000003 HC RX 258

## 2023-08-08 PROCEDURE — 6360000002 HC RX W HCPCS

## 2023-08-08 PROCEDURE — 62323 NJX INTERLAMINAR LMBR/SAC: CPT

## 2023-08-08 PROCEDURE — A4216 STERILE WATER/SALINE, 10 ML: HCPCS

## 2023-08-08 RX ORDER — LIDOCAINE HYDROCHLORIDE 10 MG/ML
5 INJECTION, SOLUTION EPIDURAL; INFILTRATION; INTRACAUDAL; PERINEURAL ONCE
Status: DISCONTINUED | OUTPATIENT
Start: 2023-08-08 | End: 2023-08-10 | Stop reason: HOSPADM

## 2023-08-08 RX ORDER — SODIUM CHLORIDE 9 MG/ML
5 INJECTION INTRAVENOUS ONCE
Status: DISCONTINUED | OUTPATIENT
Start: 2023-08-08 | End: 2023-08-10 | Stop reason: HOSPADM

## 2023-08-08 RX ORDER — METHYLPREDNISOLONE ACETATE 80 MG/ML
80 INJECTION, SUSPENSION INTRA-ARTICULAR; INTRALESIONAL; INTRAMUSCULAR; SOFT TISSUE ONCE
Status: DISCONTINUED | OUTPATIENT
Start: 2023-08-08 | End: 2023-08-10 | Stop reason: HOSPADM

## 2023-08-08 RX ORDER — VENLAFAXINE HYDROCHLORIDE 150 MG/1
150 CAPSULE, EXTENDED RELEASE ORAL DAILY
Qty: 90 CAPSULE | Refills: 3 | Status: SHIPPED | OUTPATIENT
Start: 2023-08-08

## 2023-08-08 ASSESSMENT — PAIN DESCRIPTION - LOCATION
LOCATION: BACK
LOCATION: SHOULDER

## 2023-08-08 ASSESSMENT — PAIN SCALES - GENERAL
PAINLEVEL_OUTOF10: 7
PAINLEVEL_OUTOF10: 6

## 2023-08-08 ASSESSMENT — PAIN DESCRIPTION - PAIN TYPE
TYPE: CHRONIC PAIN
TYPE: CHRONIC PAIN

## 2023-08-08 ASSESSMENT — PAIN - FUNCTIONAL ASSESSMENT
PAIN_FUNCTIONAL_ASSESSMENT: 0-10
PAIN_FUNCTIONAL_ASSESSMENT: PREVENTS OR INTERFERES SOME ACTIVE ACTIVITIES AND ADLS

## 2023-08-08 ASSESSMENT — PAIN DESCRIPTION - ORIENTATION: ORIENTATION: LOWER

## 2023-08-08 ASSESSMENT — PAIN DESCRIPTION - DESCRIPTORS
DESCRIPTORS: ACHING
DESCRIPTORS: ACHING;SORE
DESCRIPTORS: ACHING;DISCOMFORT

## 2023-08-08 NOTE — PROGRESS NOTES
Physical Therapy: Daily Note/Reassessment   Patient: Bao Khan (2 y.o. female)   Examination Date:   Plan of Care/Certification Expiration Date: 23    No data recorded   :  1975 # of Visits since [facility]:   35   MRN: [id number]  CSN: [id number] Start of Care Date:   2023   Insurance: Payor: [employer]IC[employer] / Plan: [place] / Product Type: *No Product type* /   Insurance ID: [id number] - (Medicare Managed) Secondary Insurance (if applicable): MEDICAID [place]   Referring Physician: DO Hallie Raya PA   PCP: Rebekah Mcdermott DO Visits to Date/Visits Approved:     No Show/Cancelled Appts:   /       Medical Diagnosis: Other specified joint disorders, right shoulder [M25.811]  Unspecified rotator cuff tear or rupture of right shoulder, not specified as traumatic [M75.101]    Treatment Diagnosis: R rotator cuff repair        SUBJECTIVE EXAMINATION   Pain Level: Pain Screening  Patient Currently in Pain: Yes  Pain Assessment: 0-10  Pain Level: 7  Pain Type: Chronic pain  Pain Location: Shoulder  Pain Descriptors: Aching, Sore    Patient Comments: Subjective: \"Not feeling great today. It's hurting. I think it's because of the weather. \"    OBJECTIVE EXAMINATION   Restrictions:  Restrictions/Precautions: Surgical Protocols   Required Braces or Orthoses?: Yes   No data recorded      Left AROM  Right AROM        AROM RUE (degrees)  R Shoulder Flexion (0-180): 170 (supine) and 90 (standing)  R Shoulder ABduction (0-180): 159 (supine)  R Shoulder Int Rotation  (0-70): 74 (supine)  R Shoulder Ext Rotation (0-90): 77 (supine)       Left Strength  Right Strength              Strength RUE  R Shoulder Flexion: 4/5, 4+/5  R Shoulder Extension: 5/5  R Shoulder ABduction: 4/5  R Shoulder Internal Rotation: 5/5  R Shoulder External Rotation: 4+/5  R Elbow Flexion: 5/5  R Elbow Extension: 5/5     TREATMENT     Exercises:      Treatment Reasoning

## 2023-08-08 NOTE — TELEPHONE ENCOUNTER
Received fax from pharmacy requesting refill on pts medication(s). Pt was last seen in office on 7/17/2023  and has a follow up scheduled for 10/17/2023. Will send request to  Dr. Clara Egan  for authorization.      Requested Prescriptions     Pending Prescriptions Disp Refills    venlafaxine (EFFEXOR XR) 150 MG extended release capsule [Pharmacy Med Name: VENLAFAXINE ER 150MG CAPSULES] 90 capsule 3     Sig: Take 1 capsule by mouth daily

## 2023-08-08 NOTE — INTERVAL H&P NOTE
Update History & Physical    The patient's History and Physical  was reviewed with the patient and I examined the patient. There was no change. The surgical site was confirmed by the patient and me. Plan: The risks, benefits, expected outcome, and alternative to the recommended procedure have been discussed with the patient. Patient understands and wants to proceed with the procedure.      Electronically signed by Deonte Gibbs MD on 8/8/2023 at 11:57 AM

## 2023-08-10 ENCOUNTER — HOSPITAL ENCOUNTER (OUTPATIENT)
Dept: PHYSICAL THERAPY | Age: 48
Setting detail: THERAPIES SERIES
Discharge: HOME | End: 2023-08-10
Payer: MEDICARE

## 2023-08-10 PROCEDURE — 97110 THERAPEUTIC EXERCISES: CPT

## 2023-08-10 ASSESSMENT — PAIN DESCRIPTION - PAIN TYPE: TYPE: CHRONIC PAIN

## 2023-08-10 ASSESSMENT — PAIN DESCRIPTION - ORIENTATION: ORIENTATION: RIGHT

## 2023-08-10 ASSESSMENT — PAIN SCALES - GENERAL: PAINLEVEL_OUTOF10: 8

## 2023-08-10 ASSESSMENT — PAIN DESCRIPTION - DESCRIPTORS: DESCRIPTORS: ACHING;SORE

## 2023-08-10 ASSESSMENT — PAIN DESCRIPTION - LOCATION: LOCATION: SHOULDER

## 2023-08-10 NOTE — PROGRESS NOTES
least 125 degrees flexion/scaption, 60 degrees ER/IR 7/11: 165 degrees flexion, 145 degrees scaption, 65 degrees ER, 75 degrees IR  8/8: See ROM section Met   Improve R shoulder strength to 5-/5. 8/8: See strength section                                                 Long Term Goals Completed by 4-6 weeks Current Status Goal Status   Report independence with upper body dressing and ADLs 5/16: \"Mostly by myself, but occasionally I need just a little help and it takes me awhile. \"    5/30 and : same as 5/16.  6/15/23 some improvement, still has to modify how she moves right arm, decreased ability to put on bra. 7/11: Independent Met   Demo AROM R shoulder flexion/scaption at least 130 degrees in standing. 8/8: 100 degrees flexion/scaption In progress   Improve QuickDASH score to 50% impairment or better. 5/16: 75% impairment    5/30:  47.72% impairment.  6/15/23 72.7% impairment on the general use portion of QuickDASH (impairment on the 5/30/23 QuickDASH was calculated incorrectly)  7/11: 79.5%   8/8: 68% impairment In progress                                                                TREATMENT PLAN   Plan Frequency: 2x  Plan weeks: 15-18 weeks  Current Treatment Recommendations: Strengthening, ROM, Pain management, Modalities, Positioning, Equipment evaluation, education, & procurement, Patient/Caregiver education & training, Safety education & training, Home exercise program, Manual           Therapy Time  Individual Time In: 0900       Individual Time Out: 1010  Minutes: 70  Timed Code Treatment Minutes: 60 Minutes (10 mins ice)     Electronically signed by Rio Rowe PTA  on 8/10/2023 at 10:46 AM   POC NOTE  Electronically signed by Rio Rowe PTA on 8/10/2023 at 10:47 AM

## 2023-08-14 ENCOUNTER — TELEPHONE (OUTPATIENT)
Dept: PAIN MANAGEMENT | Age: 48
End: 2023-08-14

## 2023-08-14 DIAGNOSIS — M54.10 BACK PAIN WITH LEFT-SIDED RADICULOPATHY: Primary | Chronic | ICD-10-CM

## 2023-08-15 ENCOUNTER — HOSPITAL ENCOUNTER (OUTPATIENT)
Dept: PHYSICAL THERAPY | Age: 48
Setting detail: THERAPIES SERIES
Discharge: HOME | End: 2023-08-15
Payer: MEDICARE

## 2023-08-15 PROCEDURE — 97110 THERAPEUTIC EXERCISES: CPT

## 2023-08-15 ASSESSMENT — PAIN SCALES - GENERAL: PAINLEVEL_OUTOF10: 5

## 2023-08-15 ASSESSMENT — PAIN DESCRIPTION - DESCRIPTORS: DESCRIPTORS: ACHING;SORE

## 2023-08-15 ASSESSMENT — PAIN DESCRIPTION - PAIN TYPE: TYPE: CHRONIC PAIN

## 2023-08-15 ASSESSMENT — PAIN DESCRIPTION - LOCATION: LOCATION: SHOULDER

## 2023-08-15 ASSESSMENT — PAIN DESCRIPTION - ORIENTATION: ORIENTATION: RIGHT

## 2023-08-15 NOTE — PROGRESS NOTES
Physical Therapy: Daily Note   Patient: Pham Johns (15 y.o. female)   Examination Date:   Plan of Care/Certification Expiration Date: 23    No data recorded   :  1975 # of Visits since [facility]:   28   MRN: [id number]  CSN: [id number] Start of Care Date:   2023   Insurance: Payor: [employer] MEDICARE / Plan: [place] / Product Type: *No Product type* /   Insurance ID: [id number] - (Medicare Managed) Secondary Insurance (if applicable): [place]   Referring Physician: DO Sergio Glover PA   PCP: Morris Rebolledo DO Visits to Date/Visits Approved:     No Show/Cancelled Appts:   /       Medical Diagnosis: Other specified joint disorders, right shoulder [M25.811]  Unspecified rotator cuff tear or rupture of right shoulder, not specified as traumatic [M75.101]    Treatment Diagnosis: R rotator cuff repair        SUBJECTIVE EXAMINATION   Pain Level: Pain Screening  Patient Currently in Pain: Yes  Pain Assessment: 0-10  Pain Level: 5  Pain Type: Chronic pain  Pain Location: Shoulder  Pain Orientation: Right  Pain Descriptors: Aching, Sore    Patient Comments: Subjective: Actually feel better today. I was able to reach my left ear with my right shoulder.     HEP Compliance: Good        OBJECTIVE EXAMINATION       TREATMENT     Exercises:      Treatment Reasoning    Exercise 1: Pulleys 4'  Exercise 2: Wall walk x 10  Exercise 3: Submaximal isometric R shoulder  x 15  ball all directions- d/c and continue with t-band  Exercise 4: Scapular retraction with blue t-band x 20  Exercise 5: Standing Is, Ts green x 20 ea  Exercise 6: T-band ER/IR/flex/ext green x 20 each  Exercise 7: Shoulder shrugs/rolls x 20  3 pound weight  Exercise 8: Fwd bent rows 3# x 20  Exercise 9: Supine cane flexion/abduction/ER 2 pound weight x 20  Right shoulder flex after ex: 154  Exercise 10: Supine cane benchpress/shoulder protraction 2 pound weight  x 20  Exercise 11:

## 2023-08-16 ENCOUNTER — HOSPITAL ENCOUNTER (OUTPATIENT)
Dept: PAIN MANAGEMENT | Age: 48
Discharge: HOME | End: 2023-08-16
Payer: MEDICARE

## 2023-08-16 VITALS
TEMPERATURE: 96.9 F | DIASTOLIC BLOOD PRESSURE: 82 MMHG | RESPIRATION RATE: 18 BRPM | SYSTOLIC BLOOD PRESSURE: 133 MMHG | HEART RATE: 80 BPM | OXYGEN SATURATION: 100 %

## 2023-08-16 DIAGNOSIS — M79.18 MYOFASCIAL MUSCLE PAIN: Primary | ICD-10-CM

## 2023-08-16 PROCEDURE — 6360000002 HC RX W HCPCS

## 2023-08-16 PROCEDURE — 2500000003 HC RX 250 WO HCPCS

## 2023-08-16 PROCEDURE — 20553 NJX 1/MLT TRIGGER POINTS 3/>: CPT

## 2023-08-16 RX ORDER — BUPIVACAINE HYDROCHLORIDE 5 MG/ML
10 INJECTION, SOLUTION EPIDURAL; INTRACAUDAL ONCE
Status: DISCONTINUED | OUTPATIENT
Start: 2023-08-16 | End: 2023-08-18 | Stop reason: HOSPADM

## 2023-08-16 RX ORDER — LIDOCAINE HYDROCHLORIDE 10 MG/ML
10 INJECTION, SOLUTION EPIDURAL; INFILTRATION; INTRACAUDAL; PERINEURAL ONCE
Status: DISCONTINUED | OUTPATIENT
Start: 2023-08-16 | End: 2023-08-18 | Stop reason: HOSPADM

## 2023-08-16 NOTE — TELEPHONE ENCOUNTER
Received fax from pharmacy requesting refill on pts medication(s). Pt was last seen in office on 9/22/2021  and has a follow up scheduled for Visit date not found. Will send request to  Dr. Teresa Bruner  for authorization.      Requested Prescriptions     Pending Prescriptions Disp Refills    pregabalin (LYRICA) 100 MG capsule [Pharmacy Med Name: PREGABALIN 100MG CAPSULES] 60 capsule      Sig: TAKE 1 CAPSULE BY MOUTH TWICE DAILY stated

## 2023-08-16 NOTE — DISCHARGE INSTRUCTIONS
[facility]  Post Procedure Discharge Instructions        YOU HAVE HAD THE FOLLOWING PROCEDURE:                                  [] Occipital Nerve Blocks  [] CTS wrist injection(s)  [] Knee Injection(s)         [] Shoulder Injection(s)   [] Elbow Injection(s)     [] Botox Injection  [] Cervical Trigger Point Injections    [x] Thoracic Trigger Point Injections    [] Lumbar Trigger Point Injections  [] Piriformis Trigger Point Injections  [] SI Joint Injection(s)     [] Trochanteric Bursa Injection(s)       [] Ankle Injection(s)   [] Plantar Fasciitis   []  ______________  Injection(s) [] Botox []  Migraines [] Spasticity    YOU HAVE RECEIVED THE FOLLOWING MEDICATIONS IN YOUR INJECTION(s)  [x] Lidocaine [x] Bupivacaine   [] DepoMedrol (steroid) [] Decadron (steroid)  []  Kenalog (steroid)   [] Toradol  [] Supartz [] [place]    [] Botox        PATIENT INFORMATION:   You may experience the following symptoms after your procedure. These symptoms are normal and should not cause concern: You may have an increase in your pain. This may last 24 - 48 hours after your procedure. You may have no change in the pain that you had prior to your injection(s). You may have weakness or numbness in your affected extremity. If this occurs, this may last until numbing the medication wears off. REPORT THE FOLLOWING SYMPTOMS TO YOUR DOCTOR:  Redness, swelling or drainage at the injection site(s)  Unusual pain that interferes with your normal activities of daily living. OTHER INSTRUCTIONS:    [x] I will apply ice to the injection site(s) for at least 24 hours after the procedure. I will rotate the ice on for 20 minutes and off for 20 minutes for at least 24 hours. [x] I will not apply heat for at least 48 hours and I will not take a hot bath or shower for at least 24 hours.      [x] I understand that if Lidocaine or Bupivacaine was used in my injection(s) that the injection site(s)

## 2023-08-17 ENCOUNTER — HOSPITAL ENCOUNTER (OUTPATIENT)
Dept: PHYSICAL THERAPY | Age: 48
Setting detail: THERAPIES SERIES
Discharge: HOME | End: 2023-08-17
Payer: MEDICARE

## 2023-08-17 PROCEDURE — 97110 THERAPEUTIC EXERCISES: CPT

## 2023-08-17 ASSESSMENT — PAIN DESCRIPTION - PAIN TYPE: TYPE: CHRONIC PAIN

## 2023-08-17 ASSESSMENT — PAIN DESCRIPTION - ORIENTATION: ORIENTATION: RIGHT

## 2023-08-17 ASSESSMENT — PAIN DESCRIPTION - LOCATION: LOCATION: SHOULDER

## 2023-08-17 ASSESSMENT — PAIN SCALES - GENERAL: PAINLEVEL_OUTOF10: 5

## 2023-08-17 NOTE — PROGRESS NOTES
3: Improve R  strength to at least 50#  STG 3 Current Status[de-identified] 5/16: 20#     5/30: 35# 6/15/23 Patient has 46#  right hand. 7/11: 48# R hand  STG Goal 3 Status[de-identified] Deferred  Short Term Goal 4: Improve AAROM of R shoulder in supine to at least 125 degrees flexion/scaption, 60 degrees ER/IR  STG 4 Current Status[de-identified] 7/11: 165 degrees flexion, 145 degrees scaption, 65 degrees ER, 75 degrees IR  8/8: See ROM section  STG Goal 4 Status[de-identified] Met  Short Term Goal 5: Improve R shoulder strength to 5-/5. STG 5 Current Status[de-identified] 8/8: See strength section  Long Term Goals  Time Frame for Long Term Goals : 4-6 weeks  Long Term Goal 1: Report independence with upper body dressing and ADLs  LTG 1 Current Status[de-identified] 5/16: \"Mostly by myself, but occasionally I need just a little help and it takes me awhile. \"    5/30 and : same as 5/16.  6/15/23 some improvement, still has to modify how she moves right arm, decreased ability to put on bra. 7/11: Independent  LTG Goal 1 Status[de-identified] Met  Long Term Goal 2: Demo AROM R shoulder flexion/scaption at least 130 degrees in standing. LTG 2 Current Status[de-identified] 8/8: 100 degrees flexion/scaption  LTG Goal 2 Status[de-identified] In progress  Long Term Goal 3: Improve QuickDASH score to 50% impairment or better. LTG 3 Current Status[de-identified] 5/16: 75% impairment    5/30:  47.72% impairment.  6/15/23 72.7% impairment on the general use portion of QuickDASH (impairment on the 5/30/23 QuickDASH was calculated incorrectly)  7/11: 79.5%   8/8: 68% impairment  LTG Goal 3 Status[de-identified] In progress  Patient Goals   Patient Goals : improve use of RUE    Plan:    Physcial Therapy Plan  Plan weeks: 15-18 weeks  Current Treatment Recommendations: Strengthening, ROM, Pain management, Modalities, Positioning, Equipment evaluation, education, & procurement, Patient/Caregiver education & training, Safety education & training, Home exercise program, Manual     Therapy Time:   Individual Concurrent Group Co-treatment   Time In [phone]

## 2023-08-22 ENCOUNTER — HOSPITAL ENCOUNTER (OUTPATIENT)
Dept: PHYSICAL THERAPY | Age: 48
Setting detail: THERAPIES SERIES
Discharge: HOME | End: 2023-08-22
Payer: MEDICARE

## 2023-08-22 ENCOUNTER — TELEPHONE (OUTPATIENT)
Dept: PAIN MANAGEMENT | Age: 48
End: 2023-08-22

## 2023-08-22 DIAGNOSIS — Z98.890 HX OF SHOULDER SURGERY: ICD-10-CM

## 2023-08-22 DIAGNOSIS — M54.10 BACK PAIN WITH LEFT-SIDED RADICULOPATHY: Primary | Chronic | ICD-10-CM

## 2023-08-22 DIAGNOSIS — M54.10 BACK PAIN WITH LEFT-SIDED RADICULOPATHY: Chronic | ICD-10-CM

## 2023-08-22 PROCEDURE — 97110 THERAPEUTIC EXERCISES: CPT

## 2023-08-22 ASSESSMENT — PAIN SCALES - GENERAL: PAINLEVEL_OUTOF10: 4

## 2023-08-22 ASSESSMENT — PAIN DESCRIPTION - LOCATION: LOCATION: SHOULDER

## 2023-08-22 ASSESSMENT — PAIN DESCRIPTION - DESCRIPTORS: DESCRIPTORS: ACHING;SORE

## 2023-08-22 ASSESSMENT — PAIN DESCRIPTION - PAIN TYPE: TYPE: CHRONIC PAIN;SURGICAL PAIN

## 2023-08-22 ASSESSMENT — PAIN DESCRIPTION - ORIENTATION: ORIENTATION: RIGHT

## 2023-08-23 DIAGNOSIS — Z98.890 HX OF SHOULDER SURGERY: ICD-10-CM

## 2023-08-23 DIAGNOSIS — M54.10 BACK PAIN WITH LEFT-SIDED RADICULOPATHY: Chronic | ICD-10-CM

## 2023-08-23 RX ORDER — OXYCODONE AND ACETAMINOPHEN 7.5; 325 MG/1; MG/1
1 TABLET ORAL EVERY 6 HOURS PRN
Qty: 90 TABLET | Refills: 0 | Status: SHIPPED | OUTPATIENT
Start: 2023-08-24 | End: 2023-09-23

## 2023-08-23 RX ORDER — OXYCODONE AND ACETAMINOPHEN 7.5; 325 MG/1; MG/1
1 TABLET ORAL EVERY 6 HOURS PRN
Qty: 12 TABLET | Refills: 0 | Status: SHIPPED | OUTPATIENT
Start: 2023-08-24 | End: 2023-08-27

## 2023-08-24 ENCOUNTER — HOSPITAL ENCOUNTER (OUTPATIENT)
Dept: PHYSICAL THERAPY | Age: 48
Setting detail: THERAPIES SERIES
Discharge: HOME | End: 2023-08-24
Payer: MEDICARE

## 2023-08-24 PROCEDURE — 97110 THERAPEUTIC EXERCISES: CPT

## 2023-08-24 ASSESSMENT — PAIN DESCRIPTION - DESCRIPTORS: DESCRIPTORS: ACHING;SORE

## 2023-08-24 ASSESSMENT — PAIN DESCRIPTION - ORIENTATION: ORIENTATION: RIGHT

## 2023-08-24 ASSESSMENT — PAIN SCALES - GENERAL: PAINLEVEL_OUTOF10: 6

## 2023-08-24 ASSESSMENT — PAIN DESCRIPTION - LOCATION: LOCATION: SHOULDER

## 2023-08-24 ASSESSMENT — PAIN DESCRIPTION - PAIN TYPE: TYPE: CHRONIC PAIN;SURGICAL PAIN

## 2023-08-24 NOTE — PROGRESS NOTES
ea 2#  Exercise 18: Standing shoulder flexion/scaption- with cane   2 x 10 not today  Exercise 19: Pegboard -not today;  UBE, manual, Level 1.5, 5 min, (2.5 min fwd/2.5 min back) not today  Exercise 20: End with ice and IFC PRN for elevated pain  x 10' (ice only)        6/1: HEP ISSUED     Assessment:   Conditions Requiring Skilled Therapeutic Intervention  Body Structures, Functions, Activity Limitations Requiring Skilled Therapeutic Intervention: Decreased functional mobility ; Decreased ADL status; Decreased ROM; Decreased strength;Decreased high-level IADLs; Increased pain;Decreased posture  Assessment: Patient able to tolerate increased reps of tband exercises this date with noted fatigue but reports no increase in pain. Patient shows good ROM with PROM but continues to be limited in standing exercises due to weakness. Ice applied post exercsies with patient reporting relief post session. Will continue to progress as able. Treatment Diagnosis: R rotator cuff repair  Therapy Prognosis: Fair;Good      Goals:Short Term Goals  Time Frame for Short Term Goals: 3-4 weeks  Short Term Goal 1: Independent with HEP  STG 1 Current Status[de-identified] 6/15/23 Patient states she has her HEP and pulleys and has been using them. STG Goal 1 Status[de-identified] Met  Short Term Goal 2: Improve R elbow/forearm/wrist strength to 5/5  STG 2 Current Status[de-identified] 6/15/23 See strength section. STG Goal 2 Status[de-identified] Met  Short Term Goal 3: Improve R  strength to at least 50#  STG 3 Current Status[de-identified] 5/30: 35# ---  6/15: 46# --- 7/11: 48# --- 8/22: 50#  STG Goal 3 Status[de-identified] Met  Short Term Goal 4: Improve AAROM of R shoulder in supine to at least 125 degrees flexion/scaption, 60 degrees ER/IR  STG 4 Current Status[de-identified] 7/11: 165 degrees flexion, 145 degrees scaption, 65 degrees ER, 75 degrees IR  8/8: See ROM section  STG Goal 4 Status[de-identified] Met  Short Term Goal 5: Improve R shoulder strength to 5-/5.   STG 5 Current Status[de-identified] 8/8: See strength section  8/22: See strength

## 2023-08-29 ENCOUNTER — HOSPITAL ENCOUNTER (OUTPATIENT)
Dept: PHYSICAL THERAPY | Age: 48
Setting detail: THERAPIES SERIES
Discharge: HOME | End: 2023-08-29
Payer: MEDICARE

## 2023-08-29 PROCEDURE — 97110 THERAPEUTIC EXERCISES: CPT

## 2023-08-29 ASSESSMENT — PAIN DESCRIPTION - ORIENTATION: ORIENTATION: RIGHT

## 2023-08-29 ASSESSMENT — PAIN DESCRIPTION - PAIN TYPE: TYPE: CHRONIC PAIN;SURGICAL PAIN

## 2023-08-29 ASSESSMENT — PAIN DESCRIPTION - LOCATION: LOCATION: SHOULDER

## 2023-08-29 ASSESSMENT — PAIN DESCRIPTION - DESCRIPTORS: DESCRIPTORS: ACHING

## 2023-08-29 ASSESSMENT — PAIN SCALES - GENERAL: PAINLEVEL_OUTOF10: 6

## 2023-08-29 NOTE — PROGRESS NOTES
Physical Therapy: Daily Note   Patient: Dequan Coffey (17 y.o. female)   Examination Date:   Plan of Care/Certification Expiration Date: 10/31/23    No data recorded   :  1975 # of Visits since [facility]:   44   MRN: [id number]  CSN: [id number] Start of Care Date:   2023   Insurance: Payor: [employer] MEDICARE / Plan: Janie Lair / Product Type: *No Product type* /   Insurance ID: [id number] - (Medicare Managed) Secondary Insurance (if applicable): Sally   Referring Physician: DO Samina Peres PA   PCP: Tisha Blanco DO Visits to Date/Visits Approved:     No Show/Cancelled Appts:   /       Medical Diagnosis: Other specified joint disorders, right shoulder [M25.811]  Unspecified rotator cuff tear or rupture of right shoulder, not specified as traumatic [M75.101]    Treatment Diagnosis: R rotator cuff repair        SUBJECTIVE EXAMINATION   Pain Level: Pain Screening  Patient Currently in Pain: Yes  Pain Assessment: 0-10  Pain Level: 6  Pain Type: Chronic pain, Surgical pain  Pain Location: Shoulder  Pain Orientation: Right  Pain Descriptors: Aching    Patient Comments: Subjective: Still having a little soreness in R elbow after falling in kitchen, but otherwise no complaints today.     OBJECTIVE EXAMINATION   Restrictions:  Restrictions/Precautions: Surgical Protocols   Required Braces or Orthoses?: Yes   No data recorded      TREATMENT     Exercises:      Treatment Reasoning    Exercise 1: Pulleys 3'  Exercise 2: Wall walk x 10 1.5#  Exercise 3: Submaximal isometric R shoulder  x 15  ball all directions- d/c and continue with t-band  Exercise 4: Scapular retraction with blue t-band x 25- not today  Exercise 5: Standing Is, Ts green x 25 ea- not today  Exercise 6: T-band ER/IR/flex/ext green x 25 each- not today  Exercise 7: Shoulder shrugs/rolls x 25 4 pound weight  Exercise 8: Fwd bent rows 4# x 25  Exercise 9: Supine cane

## 2023-08-31 ENCOUNTER — HOSPITAL ENCOUNTER (OUTPATIENT)
Dept: PHYSICAL THERAPY | Age: 48
Setting detail: THERAPIES SERIES
Discharge: HOME | End: 2023-08-31
Payer: MEDICARE

## 2023-08-31 PROCEDURE — 97110 THERAPEUTIC EXERCISES: CPT

## 2023-08-31 ASSESSMENT — PAIN SCALES - GENERAL: PAINLEVEL_OUTOF10: 5

## 2023-08-31 ASSESSMENT — PAIN DESCRIPTION - LOCATION: LOCATION: SHOULDER

## 2023-08-31 ASSESSMENT — PAIN DESCRIPTION - ORIENTATION: ORIENTATION: RIGHT

## 2023-08-31 ASSESSMENT — PAIN DESCRIPTION - PAIN TYPE: TYPE: CHRONIC PAIN

## 2023-08-31 NOTE — PROGRESS NOTES
Daily Treatment Note  Date: 2023  Patient Name: Russ Tolliver  MRN: [id number]     :   1975    Referring Physician: DO Fernando Webber PA   PCP: Chantelle Babb DO    Medical Diagnosis: Other specified joint disorders, right shoulder [M25.811]  Unspecified rotator cuff tear or rupture of right shoulder, not specified as traumatic [M75.101] R rotator cuff repair  Treatment Diagnosis: R rotator cuff repair      Insurance: Payor: [employer] / Plan: Jovan Rico / Product Type: *No Product type* /   Insurance ID: [id number] - (Medicare Managed)    Subjective:   General  Diagnosis: R rotator cuff repair  Referring Provider (secondary): ZULLY Castillo  Onset Date: 23  PT Insurance Information: [employer] (Primary- precert req'd) and [place] Medicaid (Secondary- auth required after 30 visits)  Total # of Visits Approved: 49  Total # of Visits to Date: 36  Plan of Care/Certification Expiration Date: 10/31/23  Progress Note Due Date: 23  Referring Provider (secondary): ZULLY Castillo  Subjective: i guess i'm doing alright, right now pain about a 5  Patient Currently in Pain: Yes  Pain Level: 5  Pain Type: Chronic pain  Pain Location: Shoulder  Pain Orientation: Right       Treatment Activities:  Exercises:      Treatment Reasoning    Exercise 1: Pulleys 3'  Exercise 2: Wall walk x 10 1.5#  Exercise 3: Submaximal isometric R shoulder  x 15  ball all directions- d/c and continue with t-band  Exercise 4: Scapular retraction with blue t-band x 25  Exercise 5: Standing Is, Ts green x 25 ea  Exercise 6: T-band ER/IR/flex/ext green x 25 each  Exercise 7: Shoulder shrugs/rolls x 25 4 pound weight  Exercise 8: Fwd bent rows 4# x 25  Exercise 9: Supine cane flexion/abduction/ER 2 pound weight x 20  Exercise 10: Supine cane benchpress/shoulder protraction 2 pound weight  x 20  Exercise 11: Supine RUE circles cw/ccw x 15 with 2#, alphabet a -z x 1

## 2023-09-05 ENCOUNTER — HOSPITAL ENCOUNTER (OUTPATIENT)
Dept: PHYSICAL THERAPY | Age: 48
Setting detail: THERAPIES SERIES
Discharge: HOME | End: 2023-09-05
Payer: MEDICARE

## 2023-09-05 PROCEDURE — 97110 THERAPEUTIC EXERCISES: CPT

## 2023-09-05 ASSESSMENT — PAIN DESCRIPTION - ORIENTATION: ORIENTATION: RIGHT

## 2023-09-05 ASSESSMENT — PAIN SCALES - GENERAL: PAINLEVEL_OUTOF10: 7

## 2023-09-05 ASSESSMENT — PAIN DESCRIPTION - LOCATION: LOCATION: SHOULDER

## 2023-09-05 ASSESSMENT — PAIN DESCRIPTION - DESCRIPTORS: DESCRIPTORS: ACHING

## 2023-09-05 ASSESSMENT — PAIN DESCRIPTION - PAIN TYPE: TYPE: CHRONIC PAIN

## 2023-09-05 NOTE — PROGRESS NOTES
Physical Therapy  Daily Treatment Note  Date: 2023  Patient Name: Fuentes Nazario  MRN: [id number]     :   1975    Subjective:      PT Visit Information  Onset Date: 23  PT Insurance Information: [employer] Medicare (Primary- precert req'd) and [place] Medicaid (Secondary- auth required after 30 visits)  Total # of Visits Approved: 49  Total # of Visits to Date: 39  Plan of Care/Certification Expiration Date: 10/31/23  Progress Note Due Date: 23  Referring Provider (secondary): Angeles Acevedo  Subjective: Patient reports that she continues to have pain in her R shoulder at about a 7/10 today.     Pain Screening  Patient Currently in Pain: Yes  Pain Assessment: 0-10  Pain Level: 7  Pain Type: Chronic pain  Pain Location: Shoulder  Pain Orientation: Right  Pain Descriptors: Aching       Treatment Activities:   Exercises  Exercise 1: Pulleys 3'  Exercise 2: Wall walk x 10 1.5#  Exercise 3: Submaximal isometric R shoulder  x 15  ball all directions- d/c and continue with t-band  Exercise 4: Scapular retraction with blue t-band x 25  Exercise 5: Standing Is, Ts green x 25 ea  Exercise 6: T-band ER/IR/flex/ext green x 25 each  Exercise 7: Shoulder shrugs/rolls x 25 4 pound weight  Exercise 8: Fwd bent rows 4# x 25  Exercise 9: Supine cane flexion/abduction/ER 2 pound weight x 20  Exercise 10: Supine cane benchpress/shoulder protraction 2 pound weight  x 20  Exercise 11: Supine RUE circles cw/ccw x 20 with 2#, alphabet a -z x 1 with 2#  Exercise 12: Supine RUE rhythmic stabilization at 90 degrees shoulder flex x 30 sec,  at 45 degrees ER x 30 sec- not today  Exercise 13: PROM R shoulder flexion, scaption, ER/IR (in scapular plane) x 10 ea- not today  Exercise 14: AAROM in supine all directions x 10  Exercise 15: Gentle GH mobs--Caudal glide, 3-5 min  Exercise 16: Supine AROM shoulder flexion x 20 2#  Exercise 17: Sidelying ER/IR, abd x 20 ea 2#  Exercise 18: Standing shoulder flexion/scaption- with

## 2023-09-07 ENCOUNTER — HOSPITAL ENCOUNTER (OUTPATIENT)
Dept: PHYSICAL THERAPY | Age: 48
Setting detail: THERAPIES SERIES
Discharge: HOME | End: 2023-09-07
Payer: MEDICARE

## 2023-09-07 DIAGNOSIS — Z98.890 HX OF SHOULDER SURGERY: ICD-10-CM

## 2023-09-07 DIAGNOSIS — M54.10 BACK PAIN WITH LEFT-SIDED RADICULOPATHY: Chronic | ICD-10-CM

## 2023-09-07 DIAGNOSIS — G43.811 OTHER MIGRAINE WITH STATUS MIGRAINOSUS, INTRACTABLE: ICD-10-CM

## 2023-09-07 PROCEDURE — 97110 THERAPEUTIC EXERCISES: CPT

## 2023-09-07 RX ORDER — TOPIRAMATE 50 MG/1
50 TABLET, FILM COATED ORAL 2 TIMES DAILY
Qty: 180 TABLET | Refills: 3 | Status: SHIPPED | OUTPATIENT
Start: 2023-09-07

## 2023-09-07 NOTE — TELEPHONE ENCOUNTER
Sent rx to pharmacy for pt    Requested Prescriptions     Signed Prescriptions Disp Refills    topiramate (TOPAMAX) 50 MG tablet 180 tablet 3     Sig: Take 1 tablet by mouth 2 times daily     Authorizing Provider: Jayesh Dietrich     Ordering User: Minor Eaton

## 2023-09-07 NOTE — TELEPHONE ENCOUNTER
Received fax from pharmacy requesting refill on pts medication(s). Pt was last seen in office on 7/17/2023  and has a follow up scheduled for 10/17/2023. Will send request to  Dr. Candido Sinclair  for patient.      Requested Prescriptions     Pending Prescriptions Disp Refills    topiramate (TOPAMAX) 50 MG tablet [Pharmacy Med Name: TOPIRAMATE 50MG TABLETS] 180 tablet 3     Sig: Take 1 tablet by mouth 2 times daily

## 2023-09-07 NOTE — PROGRESS NOTES
165 degrees flexion, 145 degrees scaption, 65 degrees ER, 75 degrees IR  8/8: See ROM section  STG Goal 4 Status[de-identified] Met  Short Term Goal 5: Improve R shoulder strength to 5-/5. STG 5 Current Status[de-identified] 8/8: See strength section  8/22: See strength section  Long Term Goals  Time Frame for Long Term Goals : 4-6 weeks  Long Term Goal 1: Report independence with upper body dressing and ADLs  LTG 1 Current Status[de-identified] 5/16: \"Mostly by myself, but occasionally I need just a little help and it takes me awhile. \"    5/30 and : same as 5/16.  6/15/23 some improvement, still has to modify how she moves right arm, decreased ability to put on bra. 7/11: Independent  LTG Goal 1 Status[de-identified] Met  Long Term Goal 2: Demo AROM R shoulder flexion/scaption at least 150 degrees in standing. LTG 2 Current Status[de-identified] 8/8: 100 degrees flexion/scaption--- 8/22: 110 degrees  LTG Goal 2 Status[de-identified] In progress  Long Term Goal 3: Improve QuickDASH score to 50% impairment or better.   LTG 3 Current Status[de-identified] 8/22: 72.7% impairment  LTG Goal 3 Status[de-identified] In progress  Patient Goals   Patient Goals : improve use of RUE    Plan:    Physcial Therapy Plan  Plan weeks: 20-22 weeks  Current Treatment Recommendations: Strengthening, ROM, Pain management, Modalities, Positioning, Equipment evaluation, education, & procurement, Patient/Caregiver education & training, Safety education & training, Home exercise program, Manual     Therapy Time:   Individual Concurrent Group Co-treatment   Time In [phone]         Time Out 1011         Minutes 64         Timed Code Treatment Minutes: [address], PTA   Electronically signed by Lianna Morris PTA on 9/7/2023 at 10:18 AM

## 2023-09-07 NOTE — TELEPHONE ENCOUNTER
Pt was issued #75 tablets from 08/23/23 fill due to pharmacy supply. This request will be the balance and next fill.

## 2023-09-07 NOTE — TELEPHONE ENCOUNTER
Received fax from pharmacy requesting refill on pts medication(s). Pt was last seen in office on 7/17/2023  and has a follow up scheduled for 9/7/2023. Will send request to  Dr. Atlas Dakins  for authorization.      Requested Prescriptions     Pending Prescriptions Disp Refills    carbidopa-levodopa (SINEMET)  MG per tablet [Pharmacy Med Name: CARBIDOPA/LEVODOPA 25-100MG TABS] 90 tablet 3     Sig: TAKE 1 TABLET BY MOUTH AT BEDTIME

## 2023-09-08 ENCOUNTER — INFUSION (OUTPATIENT)
Dept: ONCOLOGY | Facility: CLINIC | Age: 48
End: 2023-09-08
Payer: MEDICARE

## 2023-09-08 DIAGNOSIS — Z45.2 ENCOUNTER FOR CARE RELATED TO PORT-A-CATH: Primary | ICD-10-CM

## 2023-09-08 RX ORDER — SODIUM CHLORIDE 0.9 % (FLUSH) 0.9 %
10 SYRINGE (ML) INJECTION AS NEEDED
OUTPATIENT
Start: 2023-09-08

## 2023-09-08 RX ORDER — HEPARIN SODIUM (PORCINE) LOCK FLUSH IV SOLN 100 UNIT/ML 100 UNIT/ML
500 SOLUTION INTRAVENOUS AS NEEDED
OUTPATIENT
Start: 2023-09-08

## 2023-09-08 RX ORDER — SODIUM CHLORIDE 0.9 % (FLUSH) 0.9 %
10 SYRINGE (ML) INJECTION AS NEEDED
Status: DISCONTINUED | OUTPATIENT
Start: 2023-09-08 | End: 2023-09-08 | Stop reason: HOSPADM

## 2023-09-08 RX ORDER — HEPARIN SODIUM (PORCINE) LOCK FLUSH IV SOLN 100 UNIT/ML 100 UNIT/ML
500 SOLUTION INTRAVENOUS AS NEEDED
Status: DISCONTINUED | OUTPATIENT
Start: 2023-09-08 | End: 2023-09-08 | Stop reason: HOSPADM

## 2023-09-08 RX ADMIN — Medication 10 ML: at 10:17

## 2023-09-08 RX ADMIN — HEPARIN SODIUM (PORCINE) LOCK FLUSH IV SOLN 100 UNIT/ML 500 UNITS: 100 SOLUTION at 10:18

## 2023-09-12 RX ORDER — OXYCODONE AND ACETAMINOPHEN 7.5; 325 MG/1; MG/1
1 TABLET ORAL EVERY 6 HOURS PRN
Qty: 165 TABLET | Refills: 0 | Status: SHIPPED | OUTPATIENT
Start: 2023-09-15 | End: 2023-10-26

## 2023-09-13 ENCOUNTER — HOSPITAL ENCOUNTER (OUTPATIENT)
Dept: PHYSICAL THERAPY | Age: 48
Setting detail: THERAPIES SERIES
Discharge: HOME | End: 2023-09-13
Payer: MEDICARE

## 2023-09-13 PROCEDURE — 97110 THERAPEUTIC EXERCISES: CPT

## 2023-09-13 ASSESSMENT — PAIN SCALES - GENERAL: PAINLEVEL_OUTOF10: 5

## 2023-09-13 ASSESSMENT — PAIN DESCRIPTION - LOCATION: LOCATION: SHOULDER

## 2023-09-13 ASSESSMENT — PAIN DESCRIPTION - PAIN TYPE: TYPE: CHRONIC PAIN

## 2023-09-13 ASSESSMENT — PAIN DESCRIPTION - ORIENTATION: ORIENTATION: RIGHT

## 2023-09-13 NOTE — PROGRESS NOTES
Daily Treatment Note  Date: 2023  Patient Name: Fuentes Nazario  MRN: [id number]     :   1975    Referring Physician: Jeananne Nyhan, DO Jackee Dys, PA   PCP: Jeananne Nyhan, DO    Medical Diagnosis: Other specified joint disorders, right shoulder [M25.811]  Unspecified rotator cuff tear or rupture of right shoulder, not specified as traumatic [M75.101] R rotator cuff repair  Treatment Diagnosis: R rotator cuff repair      Insurance: Payor: [address] [place] [address] / Plan: Spimekalyani Ginny / Product Type: *No Product type* /   Insurance ID: [id number] - (Medicare Managed)    Subjective:   General  Diagnosis: R rotator cuff repair  Referring Provider (secondary): ZULLY Acevedo  Onset Date: 23  PT Insurance Information: [employer] (Primary- precert req'd) and [place] Medicaid (Secondary- auth required after 30 visits)  Total # of Visits Approved: 49  Total # of Visits to Date: 37  Plan of Care/Certification Expiration Date: 10/31/23  Progress Note Due Date: 23  Referring Provider (secondary): ZULLY Acevedo  Subjective: the pain isn't horrible this morning  Patient Currently in Pain: Yes  Pain Level: 5  Pain Type: Chronic pain  Pain Location: Shoulder  Pain Orientation: Right       Treatment Activities:  Exercises:      Treatment Reasoning    Exercise 1: Pulleys 3'  Exercise 2: Wall walk x 10 1.5#  Exercise 3: Submaximal isometric R shoulder  x 15  ball all directions- d/c and continue with t-band  Exercise 4: Scapular retraction with blue t-band x 25  Exercise 5: Standing Is, Ts green x 25 ea  Exercise 6: T-band ER/IR/flex/ext green x 25 each  Exercise 7: Shoulder shrugs/rolls x 25 4 pound weight  Exercise 8: Fwd bent rows 4# x 25  Exercise 9: Supine cane flexion/abduction/ER 2.5 pound weight x 20  Exercise 10: Supine cane benchpress/shoulder protraction 2.5 pound weight  x 20  Exercise 11: Supine RUE circles cw/ccw x 20 with 2#, alphabet a -z x 1 with

## 2023-09-15 ENCOUNTER — HOSPITAL ENCOUNTER (OUTPATIENT)
Dept: PHYSICAL THERAPY | Age: 48
Setting detail: THERAPIES SERIES
Discharge: HOME | End: 2023-09-15
Payer: MEDICARE

## 2023-09-15 PROCEDURE — 97110 THERAPEUTIC EXERCISES: CPT

## 2023-09-15 ASSESSMENT — PAIN DESCRIPTION - LOCATION: LOCATION: SHOULDER

## 2023-09-15 ASSESSMENT — PAIN SCALES - GENERAL: PAINLEVEL_OUTOF10: 4

## 2023-09-15 ASSESSMENT — PAIN DESCRIPTION - PAIN TYPE: TYPE: CHRONIC PAIN

## 2023-09-15 ASSESSMENT — PAIN DESCRIPTION - ORIENTATION: ORIENTATION: RIGHT

## 2023-09-15 NOTE — PROGRESS NOTES
shoulder in supine to at least 125 degrees flexion/scaption, 60 degrees ER/IR  STG 4 Current Status[de-identified] 7/11: 165 degrees flexion, 145 degrees scaption, 65 degrees ER, 75 degrees IR  8/8: See ROM section  STG Goal 4 Status[de-identified] Met  Short Term Goal 5: Improve R shoulder strength to 5-/5. STG 5 Current Status[de-identified] 8/8: See strength section  8/22: See strength section  Long Term Goals  Time Frame for Long Term Goals : 4-6 weeks  Long Term Goal 1: Report independence with upper body dressing and ADLs  LTG 1 Current Status[de-identified] 5/16: \"Mostly by myself, but occasionally I need just a little help and it takes me awhile. \"    5/30 and : same as 5/16.  6/15/23 some improvement, still has to modify how she moves right arm, decreased ability to put on bra. 7/11: Independent  LTG Goal 1 Status[de-identified] Met  Long Term Goal 2: Demo AROM R shoulder flexion/scaption at least 150 degrees in standing. LTG 2 Current Status[de-identified] 8/8: 100 degrees flexion/scaption--- 8/22: 110 degrees  LTG Goal 2 Status[de-identified] In progress  Long Term Goal 3: Improve QuickDASH score to 50% impairment or better.   LTG 3 Current Status[de-identified] 8/22: 72.7% impairment  LTG Goal 3 Status[de-identified] In progress  Patient Goals   Patient Goals : improve use of RUE    Plan:    Physcial Therapy Plan  Plan weeks: 20-22 weeks  Current Treatment Recommendations: Strengthening, ROM, Pain management, Modalities, Positioning, Equipment evaluation, education, & procurement, Patient/Caregiver education & training, Safety education & training, Home exercise program, Manual     Therapy Time:   Individual Concurrent Group Co-treatment   Time In 0912         Time Out 1020         Minutes 68         Timed Code Treatment Minutes: [address] [facility], [facility]   Electronically signed by Ronald Rivera PTA on 9/15/2023 at 10:38 AM

## 2023-09-18 ENCOUNTER — TELEPHONE (OUTPATIENT)
Dept: PAIN MANAGEMENT | Age: 48
End: 2023-09-18

## 2023-09-18 NOTE — TELEPHONE ENCOUNTER
Returned patient's call from Friday 9/15/23. Her script for oxycodone is at her pharmacy for four times a day #165.

## 2023-09-18 NOTE — TELEPHONE ENCOUNTER
Informed patient that I called the pharmacy and told them to fill #120 pills out of the script at the pharmacy. This is a 30 day supply and should go through on her insurance. If they still have a problem to let me know.

## 2023-09-19 ENCOUNTER — HOSPITAL ENCOUNTER (OUTPATIENT)
Dept: PHYSICAL THERAPY | Age: 48
Setting detail: THERAPIES SERIES
Discharge: HOME | End: 2023-09-19
Payer: MEDICARE

## 2023-09-19 PROCEDURE — 97110 THERAPEUTIC EXERCISES: CPT

## 2023-09-19 ASSESSMENT — PAIN DESCRIPTION - PAIN TYPE: TYPE: CHRONIC PAIN

## 2023-09-19 ASSESSMENT — PAIN DESCRIPTION - ORIENTATION: ORIENTATION: RIGHT

## 2023-09-19 ASSESSMENT — PAIN DESCRIPTION - DESCRIPTORS: DESCRIPTORS: ACHING;TIGHTNESS;SORE

## 2023-09-19 ASSESSMENT — PAIN SCALES - GENERAL: PAINLEVEL_OUTOF10: 5

## 2023-09-19 ASSESSMENT — PAIN DESCRIPTION - LOCATION: LOCATION: SHOULDER

## 2023-09-21 ENCOUNTER — APPOINTMENT (OUTPATIENT)
Dept: PHYSICAL THERAPY | Age: 48
End: 2023-09-21
Payer: MEDICARE

## 2023-09-24 DIAGNOSIS — M79.7 FIBROMYALGIA: ICD-10-CM

## 2023-09-24 DIAGNOSIS — M54.16 LUMBAR RADICULOPATHY: ICD-10-CM

## 2023-09-25 RX ORDER — PREGABALIN 100 MG/1
100 CAPSULE ORAL 2 TIMES DAILY
Qty: 60 CAPSULE | Refills: 2 | Status: SHIPPED | OUTPATIENT
Start: 2023-09-25 | End: 2023-12-24

## 2023-09-25 NOTE — TELEPHONE ENCOUNTER
Received fax from pharmacy requesting refill on pts medication(s). Pt was last seen in office on 7/17/2023  and has a follow up scheduled for 10/17/2023. Will send request to  Dr. Jayesh Brumfield  for authorization. Requested Prescriptions     Pending Prescriptions Disp Refills    pregabalin (LYRICA) 100 MG capsule [Pharmacy Med Name: PREGABALIN 100MG CAPSULES] 60 capsule 2     Sig: Take 1 capsule by mouth 2 times daily for 90 days.  Max Daily Amount: 200 mg

## 2023-09-26 ENCOUNTER — HOSPITAL ENCOUNTER (OUTPATIENT)
Dept: PHYSICAL THERAPY | Age: 48
Setting detail: THERAPIES SERIES
Discharge: HOME | End: 2023-09-26
Payer: MEDICARE

## 2023-09-26 PROCEDURE — 97110 THERAPEUTIC EXERCISES: CPT

## 2023-09-26 NOTE — PROGRESS NOTES
Physical Therapy: Daily Note   Patient: Mare Hodge (14 y.o. female)   Examination Date:   Plan of Care/Certification Expiration Date: 10/31/23    No data recorded   :  1975 # of Visits since [facility]:   55   MRN: [id number]  CSN: [id number] Start of Care Date:   2023   Insurance: Payor: [facility] MEDICARE / Plan: Connie Goodell / Product Type: *No Product type* /   Insurance ID: [id number] - (Medicare Managed) Secondary Insurance (if applicable): Sally   Referring Physician: DO Jaleel Díaz PA   PCP: Ochoa Emerson DO Visits to Date/Visits Approved:     No Show/Cancelled Appts:   /       Medical Diagnosis: Other specified joint disorders, right shoulder [M25.811]  Unspecified rotator cuff tear or rupture of right shoulder, not specified as traumatic [M75.101]    Treatment Diagnosis: R rotator cuff repair        SUBJECTIVE EXAMINATION   Pain Level:      Patient Comments: Subjective: Ortho is pleased with progress but wants her to continue with therapy for strengthening. She states that if she doesn't have continued improvement in strength he stated he may order an MRI.     OBJECTIVE EXAMINATION   Restrictions:  Restrictions/Precautions: Surgical Protocols   Required Braces or Orthoses?: Yes   No data recorded      TREATMENT     Exercises:      Treatment Reasoning    Exercise 1: Pulleys 4'  Exercise 2: Wall walk x 10 1.5#  Exercise 3: Submaximal isometric R shoulder  x 15  ball all directions- d/c and continue with t-band  Exercise 4: Scapular retraction with blue t-band x 25- not today  Exercise 5: Standing Is, Ts green x 25 ea  Exercise 6: T-band ER/IR/flex/ext green x 25 each  Exercise 7: Shoulder shrugs/rolls x 25 5 pound weight  Exercise 8: Fwd bent rows 4# x 25  Exercise 9: Supine cane flexion/abduction/ER 3 pound weight x 20  Exercise 10: Supine cane benchpress/shoulder protraction 3 pound weight  x 20  Exercise 11: Supine RUE

## 2023-09-29 ENCOUNTER — HOSPITAL ENCOUNTER (OUTPATIENT)
Dept: PHYSICAL THERAPY | Age: 48
Setting detail: THERAPIES SERIES
Discharge: HOME | End: 2023-09-29
Payer: MEDICARE

## 2023-09-29 PROCEDURE — 97110 THERAPEUTIC EXERCISES: CPT

## 2023-09-29 PROCEDURE — 97530 THERAPEUTIC ACTIVITIES: CPT

## 2023-09-29 ASSESSMENT — PAIN DESCRIPTION - LOCATION: LOCATION: SHOULDER

## 2023-09-29 ASSESSMENT — PAIN DESCRIPTION - DESCRIPTORS: DESCRIPTORS: ACHING;SORE;DULL

## 2023-09-29 ASSESSMENT — PAIN SCALES - GENERAL: PAINLEVEL_OUTOF10: 4

## 2023-09-29 ASSESSMENT — PAIN DESCRIPTION - PAIN TYPE: TYPE: CHRONIC PAIN

## 2023-09-29 ASSESSMENT — PAIN DESCRIPTION - ORIENTATION: ORIENTATION: RIGHT

## 2023-09-29 NOTE — PROGRESS NOTES
Physical Therapy  Daily Treatment Note  Date: 2023  Patient Name: Siena Wong  MRN: [id number]     :   1975      Subjective:   General  Additional Pertinent Hx: 50 y/o F presents s/p R rotator cuff repair on 3/21/23. PMH includes multiple hip surgeries, spleen cancer, back sx, neck sx, DDD  PT Visit Information  Onset Date: 23  PT Insurance Information: [employer] (Primary- precert req'd) and [place] Medicaid (Secondary- auth required after 30 visits)  Total # of Visits Approved: 49  Total # of Visits to Date: 52  Plan of Care/Certification Expiration Date: 10/31/23  Progress Note Due Date: 10/19/23  Referring Provider (secondary): ZULLY Grubbs  Subjective: I know my doctor just sent a new order for more visits.     Pain Screening  Patient Currently in Pain: Yes  Pain Assessment: 0-10  Pain Level: 4  Pain Type: Chronic pain  Pain Location: Shoulder  Pain Orientation: Right  Pain Descriptors: Aching;Sore;Dull (only with movement)         Treatment Activities:    Exercises  Exercise 1: Pulleys 5 minutes  Exercise 2: Wall walk x 10 2 pounds  Exercise 3: progressed to ex 6  Exercise 4: Scapular retraction with blue t-band x 25  Exercise 5: Standing Is, Ts green x 25 ea  Exercise 6: T-band ER/IR/flex/ext green x 25 each  Exercise 7: Shoulder shrugs/rolls x 25 4 pound weight  Exercise 8: Fwd bent rows 4 pounds x 25  Exercise 9: Supine cane flexion/abduction/ER 4 pound weight x 20  Exercise 10: Supine cane benchpress/shoulder protraction 4 pound weight  x 20  Exercise 11: Supine RUE circles cw/ccw x 20 with 3#, alphabet a -z x 1 with 3#  not today  Exercise 12: Supine RUE rhythmic stabilization at 90 degrees shoulder flex x 30 sec,  at 45 degrees ER x 30 sec- not today  Exercise 13: PROM R shoulder flexion, scaption, ER/IR (in scapular plane) x 10 ea- not today  Exercise 14: AAROM in supine all directions x 10- not today  Exercise 15: Gentle GH mobs--Caudal glide, 3-5 min--not

## 2023-10-03 ENCOUNTER — HOSPITAL ENCOUNTER (OUTPATIENT)
Dept: PHYSICAL THERAPY | Age: 48
Setting detail: THERAPIES SERIES
Discharge: HOME | End: 2023-10-03
Payer: MEDICARE

## 2023-10-03 PROCEDURE — 97110 THERAPEUTIC EXERCISES: CPT

## 2023-10-03 ASSESSMENT — PAIN DESCRIPTION - PAIN TYPE: TYPE: CHRONIC PAIN

## 2023-10-03 ASSESSMENT — PAIN SCALES - GENERAL: PAINLEVEL_OUTOF10: 6

## 2023-10-03 ASSESSMENT — PAIN DESCRIPTION - DESCRIPTORS: DESCRIPTORS: ACHING;SORE

## 2023-10-03 ASSESSMENT — PAIN DESCRIPTION - LOCATION: LOCATION: SHOULDER

## 2023-10-03 ASSESSMENT — PAIN DESCRIPTION - ORIENTATION: ORIENTATION: RIGHT

## 2023-10-03 NOTE — PROGRESS NOTES
Status[de-identified] 8/8: See strength section  8/22: See strength section  9/19: See strength section  Long Term Goals  Time Frame for Long Term Goals : 4-6 weeks  Long Term Goal 1: Report independence with upper body dressing and ADLs  LTG 1 Current Status[de-identified] 5/16: \"Mostly by myself, but occasionally I need just a little help and it takes me awhile. \"    5/30 and : same as 5/16.  6/15/23 some improvement, still has to modify how she moves right arm, decreased ability to put on bra. 7/11: Independent  LTG Goal 1 Status[de-identified] Met  Long Term Goal 2: Demo AROM R shoulder flexion/scaption at least 150 degrees in standing. LTG 2 Current Status[de-identified] 8/8: 100 degrees flexion/scaption--- 8/22: 110 degrees  9/19: With elbow extended, 110 degrees. With elbow flexed and reaching straight up, 120 degrees  LTG Goal 2 Status[de-identified] In progress  Long Term Goal 3: Improve QuickDASH score to 50% impairment or better.   LTG 3 Current Status[de-identified] 8/22: 72.7% impairment  9/19: 70.5%  LTG Goal 3 Status[de-identified] In progress  Patient Goals   Patient Goals : improve use of RUE    Plan:    Physcial Therapy Plan  Plan weeks: 20-22 weeks  Current Treatment Recommendations: Strengthening, ROM, Pain management, Modalities, Positioning, Equipment evaluation, education, & procurement, Patient/Caregiver education & training, Safety education & training, Home exercise program, Manual  Timed Code Treatment Minutes: 55 Minutes     Therapy Time   Individual Concurrent Group Co-treatment   Time In [phone]         Time Out 1015         Minutes 65         Timed Code Treatment Minutes: 55 Minutes     Electronically signed by Mari Granado PTA on 10/3/2023 at 3:16 PM

## 2023-10-06 ENCOUNTER — HOSPITAL ENCOUNTER (OUTPATIENT)
Dept: PHYSICAL THERAPY | Age: 48
Setting detail: THERAPIES SERIES
Discharge: HOME | End: 2023-10-06
Payer: MEDICARE

## 2023-10-06 PROCEDURE — 97110 THERAPEUTIC EXERCISES: CPT

## 2023-10-06 ASSESSMENT — PAIN DESCRIPTION - DESCRIPTORS: DESCRIPTORS: ACHING;SORE

## 2023-10-06 ASSESSMENT — PAIN DESCRIPTION - LOCATION: LOCATION: SHOULDER

## 2023-10-06 ASSESSMENT — PAIN DESCRIPTION - PAIN TYPE: TYPE: CHRONIC PAIN

## 2023-10-06 ASSESSMENT — PAIN DESCRIPTION - ORIENTATION: ORIENTATION: RIGHT

## 2023-10-06 ASSESSMENT — PAIN SCALES - GENERAL: PAINLEVEL_OUTOF10: 6

## 2023-10-06 NOTE — PROGRESS NOTES
Physical Therapy: Daily Note/Reassessment   Patient: Nilesh Vega (76 y.o. female)   Examination Date:   Plan of Care/Certification Expiration Date: 10/31/23    No data recorded   :  1975 # of Visits since [facility]:   52   MRN: [id number]  CSN: [id number] Start of Care Date:   2023   Insurance: Payor: [employer] MEDICARE / Plan: Kierra Morris / Product Type: *No Product type* /   Insurance ID: [id number] - (Medicare Managed) Secondary Insurance (if applicable): Sally   Referring Physician: DO Isaac Poe PA   PCP: Unique Moulton DO Visits to Date/Visits Approved:     No Show/Cancelled Appts:   /       Medical Diagnosis: Other specified joint disorders, right shoulder [M25.811]  Unspecified rotator cuff tear or rupture of right shoulder, not specified as traumatic [M75.101] R rotator cuff repair  Treatment Diagnosis: R rotator cuff repair        SUBJECTIVE EXAMINATION   Pain Level: Pain Screening  Patient Currently in Pain: Yes  Pain Assessment: 0-10  Pain Level: 6  Pain Type: Chronic pain  Pain Location: Shoulder  Pain Orientation: Right  Pain Descriptors: Aching, Sore    Patient Comments: Subjective: Patient reports she continues to improve with help from PT. She is starting to have improvement with use of right hand for grooming. She can hold purse with right hand now, can use right arm to assist with driving, can use right arm to carry items. She is able to raise her right shoulder to about 90 degrees actively, she is able to fold laundry but doesn't cook or wash dishes due to back limiting ability to stand. Overall, pain in right shoulder is gradually decreasing, pain more like \"aggravating\" rather than searing/stabbing pain. Today is her last insurance- approved visit but she expresses a desire to to continue with PT as she is still having limitations in strength and functional use of right arm.     HEP Compliance:           OBJECTIVE

## 2023-10-09 DIAGNOSIS — Z98.890 HX OF SHOULDER SURGERY: ICD-10-CM

## 2023-10-09 DIAGNOSIS — M54.10 BACK PAIN WITH LEFT-SIDED RADICULOPATHY: Chronic | ICD-10-CM

## 2023-10-10 ENCOUNTER — HOSPITAL ENCOUNTER (OUTPATIENT)
Dept: PHYSICAL THERAPY | Age: 48
Setting detail: THERAPIES SERIES
Discharge: HOME | End: 2023-10-10
Payer: MEDICARE

## 2023-10-10 PROCEDURE — 97110 THERAPEUTIC EXERCISES: CPT

## 2023-10-10 RX ORDER — OXYCODONE AND ACETAMINOPHEN 7.5; 325 MG/1; MG/1
1 TABLET ORAL EVERY 6 HOURS PRN
Qty: 165 TABLET | Refills: 0 | Status: SHIPPED | OUTPATIENT
Start: 2023-10-18 | End: 2023-11-28

## 2023-10-10 ASSESSMENT — PAIN DESCRIPTION - DESCRIPTORS: DESCRIPTORS: ACHING;SORE

## 2023-10-10 ASSESSMENT — PAIN DESCRIPTION - PAIN TYPE: TYPE: CHRONIC PAIN

## 2023-10-10 ASSESSMENT — PAIN SCALES - GENERAL: PAINLEVEL_OUTOF10: 6

## 2023-10-10 ASSESSMENT — PAIN DESCRIPTION - LOCATION: LOCATION: SHOULDER

## 2023-10-10 ASSESSMENT — PAIN DESCRIPTION - ORIENTATION: ORIENTATION: RIGHT

## 2023-10-10 NOTE — PROGRESS NOTES
mins ice pac)     Electronically signed by Beka Kasper PTA  on 10/10/2023 at 11:50 AM   POC NOTE  Electronically signed by Beka Kasper PTA on 10/10/2023 at 11:51 AM

## 2023-10-12 ENCOUNTER — HOSPITAL ENCOUNTER (OUTPATIENT)
Dept: PHYSICAL THERAPY | Age: 48
Setting detail: THERAPIES SERIES
Discharge: HOME | End: 2023-10-12
Payer: MEDICARE

## 2023-10-12 PROCEDURE — 97110 THERAPEUTIC EXERCISES: CPT

## 2023-10-12 ASSESSMENT — PAIN DESCRIPTION - LOCATION: LOCATION: SHOULDER

## 2023-10-12 ASSESSMENT — PAIN DESCRIPTION - PAIN TYPE: TYPE: CHRONIC PAIN

## 2023-10-12 ASSESSMENT — PAIN DESCRIPTION - DESCRIPTORS: DESCRIPTORS: DULL;ACHING

## 2023-10-12 ASSESSMENT — PAIN DESCRIPTION - ORIENTATION: ORIENTATION: RIGHT

## 2023-10-12 ASSESSMENT — PAIN SCALES - GENERAL: PAINLEVEL_OUTOF10: 6

## 2023-10-12 NOTE — PROGRESS NOTES
Improve R shoulder strength to 5-/5. STG 5 Current Status[de-identified] 8/8: See strength section  8/22: See strength section  9/19: See strength section. Long Term Goals  Time Frame for Long Term Goals : 4-6 weeks  Long Term Goal 1: Report independence with upper body dressing and ADLs  LTG 1 Current Status[de-identified] 5/16: \"Mostly by myself, but occasionally I need just a little help and it takes me awhile. \"    5/30 and : same as 5/16.  6/15/23 some improvement, still has to modify how she moves right arm, decreased ability to put on bra. 7/11: Independent  LTG Goal 1 Status[de-identified] Met  Long Term Goal 2: Demo AROM R shoulder flexion/scaption at least 150 degrees in standing. LTG 2 Current Status[de-identified] 8/8: 100 degrees flexion/scaption--- 8/22: 110 degrees  9/19: With elbow extended, 110 degrees. With elbow flexed and reaching straight up, 120 degrees. 10/6/23 Elbow extended shoulder flexion 115 degrees, elbow flexed 116 degrees. LTG Goal 2 Status[de-identified] In progress  Long Term Goal 3: Improve QuickDASH score to 50% impairment or better. LTG 3 Current Status[de-identified] 8/22: 72.7% impairment  9/19: 70.5%. Patient scored  56.8% impairment.   LTG Goal 3 Status[de-identified] In progress, Partially met  Patient Goals   Patient Goals : improve use of RUE    Plan:    Physcial Therapy Plan  Plan weeks: 20-22 weeks  Current Treatment Recommendations: Strengthening, ROM, Pain management, Modalities, Positioning, Equipment evaluation, education, & procurement, Patient/Caregiver education & training, Safety education & training, Home exercise program, Manual  Timed Code Treatment Minutes: 63 Minutes     Therapy Time   Individual Concurrent Group Co-treatment   Time In [phone]         Time Out 1007         Minutes 63         Timed Code Treatment Minutes: 63 Minutes   Electronically signed by Macey Pyle PTA on 10/12/2023 at 11:18 AM

## 2023-10-16 ENCOUNTER — HOSPITAL ENCOUNTER (OUTPATIENT)
Dept: PAIN MANAGEMENT | Age: 48
Discharge: HOME | End: 2023-10-16
Payer: MEDICARE

## 2023-10-16 VITALS
HEART RATE: 87 BPM | HEIGHT: 66 IN | RESPIRATION RATE: 16 BRPM | BODY MASS INDEX: 47.09 KG/M2 | TEMPERATURE: 97.5 F | SYSTOLIC BLOOD PRESSURE: 140 MMHG | WEIGHT: 293 LBS | DIASTOLIC BLOOD PRESSURE: 92 MMHG | OXYGEN SATURATION: 95 %

## 2023-10-16 DIAGNOSIS — Z02.89 PAIN MEDICATION AGREEMENT SIGNED: ICD-10-CM

## 2023-10-16 PROBLEM — R10.31 CHRONIC BILATERAL LOWER ABDOMINAL PAIN: Status: RESOLVED | Noted: 2017-04-04 | Resolved: 2023-10-16

## 2023-10-16 PROBLEM — Z98.1 HX OF FUSION OF CERVICAL SPINE: Status: ACTIVE | Noted: 2017-04-04

## 2023-10-16 PROBLEM — Z91.09 ENVIRONMENTAL ALLERGIES: Status: RESOLVED | Noted: 2023-07-17 | Resolved: 2023-10-16

## 2023-10-16 PROBLEM — E83.110 HEREDITARY HEMOCHROMATOSIS (HCC): Status: RESOLVED | Noted: 2021-04-26 | Resolved: 2023-10-16

## 2023-10-16 PROBLEM — Z83.719 FAMILY HISTORY OF POLYPS IN THE COLON: Status: RESOLVED | Noted: 2017-04-04 | Resolved: 2023-10-16

## 2023-10-16 PROBLEM — E66.01 MORBID OBESITY (HCC): Status: RESOLVED | Noted: 2021-04-26 | Resolved: 2023-10-16

## 2023-10-16 PROBLEM — G56.03 CARPAL TUNNEL SYNDROME ON BOTH SIDES: Status: RESOLVED | Noted: 2019-02-06 | Resolved: 2023-10-16

## 2023-10-16 PROBLEM — R10.32 CHRONIC BILATERAL LOWER ABDOMINAL PAIN: Status: RESOLVED | Noted: 2017-04-04 | Resolved: 2023-10-16

## 2023-10-16 PROBLEM — D75.1 POLYCYTHEMIA: Status: RESOLVED | Noted: 2021-04-26 | Resolved: 2023-10-16

## 2023-10-16 PROBLEM — M25.511 ACUTE PAIN OF RIGHT SHOULDER: Status: RESOLVED | Noted: 2022-09-26 | Resolved: 2023-10-16

## 2023-10-16 PROBLEM — Z80.0 FAMILY HISTORY OF ESOPHAGEAL CANCER: Status: RESOLVED | Noted: 2017-04-04 | Resolved: 2023-10-16

## 2023-10-16 PROBLEM — Z79.899 HIGH RISK MEDICATIONS (NOT ANTICOAGULANTS) LONG-TERM USE: Status: RESOLVED | Noted: 2018-08-06 | Resolved: 2023-10-16

## 2023-10-16 PROBLEM — F33.1 MODERATE EPISODE OF RECURRENT MAJOR DEPRESSIVE DISORDER (HCC): Status: RESOLVED | Noted: 2018-06-19 | Resolved: 2023-10-16

## 2023-10-16 PROBLEM — G89.29 CHRONIC BILATERAL LOWER ABDOMINAL PAIN: Status: RESOLVED | Noted: 2017-04-04 | Resolved: 2023-10-16

## 2023-10-16 PROBLEM — F32.A ANXIETY AND DEPRESSION: Status: RESOLVED | Noted: 2023-07-17 | Resolved: 2023-10-16

## 2023-10-16 PROBLEM — R73.03 PREDIABETES: Status: RESOLVED | Noted: 2023-07-17 | Resolved: 2023-10-16

## 2023-10-16 PROBLEM — K21.9 GASTROESOPHAGEAL REFLUX DISEASE: Status: RESOLVED | Noted: 2023-07-17 | Resolved: 2023-10-16

## 2023-10-16 PROBLEM — E78.2 MIXED HYPERLIPIDEMIA: Status: RESOLVED | Noted: 2023-07-17 | Resolved: 2023-10-16

## 2023-10-16 PROBLEM — Z98.890 STATUS POST HIP SURGERY: Status: RESOLVED | Noted: 2019-04-18 | Resolved: 2023-10-16

## 2023-10-16 PROBLEM — K62.5 BRBPR (BRIGHT RED BLOOD PER RECTUM): Status: RESOLVED | Noted: 2017-04-04 | Resolved: 2023-10-16

## 2023-10-16 PROBLEM — I10 PRIMARY HYPERTENSION: Status: RESOLVED | Noted: 2021-04-26 | Resolved: 2023-10-16

## 2023-10-16 PROBLEM — F41.9 ANXIETY AND DEPRESSION: Status: RESOLVED | Noted: 2023-07-17 | Resolved: 2023-10-16

## 2023-10-16 PROBLEM — G89.29 EXACERBATION OF CHRONIC BACK PAIN: Status: RESOLVED | Noted: 2022-03-29 | Resolved: 2023-10-16

## 2023-10-16 PROBLEM — J45.40 MODERATE PERSISTENT ASTHMA WITHOUT COMPLICATION: Status: RESOLVED | Noted: 2023-07-17 | Resolved: 2023-10-16

## 2023-10-16 PROBLEM — M54.9 EXACERBATION OF CHRONIC BACK PAIN: Status: RESOLVED | Noted: 2022-03-29 | Resolved: 2023-10-16

## 2023-10-16 PROBLEM — R16.1 SPLENOMEGALY: Status: RESOLVED | Noted: 2022-03-07 | Resolved: 2023-10-16

## 2023-10-16 PROBLEM — Z51.89 ENCOUNTER FOR MEDICATION ADJUSTMENT: Status: RESOLVED | Noted: 2019-04-21 | Resolved: 2023-10-16

## 2023-10-16 PROBLEM — Z98.890 HISTORY OF HIP SURGERY: Status: RESOLVED | Noted: 2019-06-21 | Resolved: 2023-10-16

## 2023-10-16 PROCEDURE — 99214 OFFICE O/P EST MOD 30 MIN: CPT | Performed by: NURSE PRACTITIONER

## 2023-10-16 PROCEDURE — 99215 OFFICE O/P EST HI 40 MIN: CPT

## 2023-10-16 RX ORDER — BUPROPION HYDROCHLORIDE 300 MG/1
TABLET ORAL
COMMUNITY
Start: 2023-08-21

## 2023-10-16 ASSESSMENT — ENCOUNTER SYMPTOMS
BACK PAIN: 1
BOWEL INCONTINENCE: 0
ABDOMINAL DISTENTION: 0
CONSTIPATION: 0
NAUSEA: 0
ABDOMINAL PAIN: 0

## 2023-10-16 ASSESSMENT — PAIN DESCRIPTION - PAIN TYPE: TYPE: CHRONIC PAIN

## 2023-10-16 ASSESSMENT — PAIN DESCRIPTION - LOCATION
LOCATION_2: NECK
LOCATION: BACK

## 2023-10-16 ASSESSMENT — PAIN DESCRIPTION - INTENSITY: RATING_2: 7

## 2023-10-16 ASSESSMENT — PAIN SCALES - GENERAL: PAINLEVEL_OUTOF10: 6

## 2023-10-16 ASSESSMENT — PAIN DESCRIPTION - ORIENTATION: ORIENTATION: LOWER

## 2023-10-16 NOTE — PROGRESS NOTES
Clinic Documentation      Education Provided:  [x] Review of Jeanie Cover  [x] Agreement Review  [x] PEG Score Calculated [x] PHQ Score Calculated [x] ORT Score Calculated    [] Compliance Issues Discussed [] Cognitive Behavior Needs [x] Exercise [] Review of Test [] Financial Issues  [x] Tobacco/Alcohol Use Reviewed [x] Teaching [] New Patient [] Picture Obtained    Physician Plan:  [] Outgoing Referral  [] Pharmacy Consult  [] Test Ordered [x] Prescription Ordered/Changed   [] Obtained Test Results / Consult Notes        Complete if patient is withholding blood thinner for procedure     Blood Thinner Patient is currently taking:      [] Plavix (Hold for 7 days)  [] Aspirin (Hold for 5 days)     [] Pletal (Hold for 2 days)  [] Pradaxa (Hold for 3 days)    [] Effient (Hold for 7 days)  [] Xarelto (Hold for 2 days)    [] Eliquis (Hold for 2 days)  [] Brilinta (Hold for 7 days)    [] Coumadin (Hold for 5 days) - (INR needs to be drawn the day prior to procedure- INR < 2.0)    [] Aggrenox (Hold for 7 days)        [] Patient will stop medication on their own.    [] Blood Thinner Form Faxed for approval to hold.    Provider form faxed to:     Assessment Completed by:  Electronically signed by Noa Rand RN on 10/16/2023 at 10:40 AM

## 2023-10-16 NOTE — PROGRESS NOTES
[facility] Physical & Pain Medicine    Office Visit    Patient Name: Beatriz Parker    MR #: [id number]    Account [de-identified]    : 1975    Age: 49 y.o. Sex: female    Date: 10/16/2023    PCP: Penelope Jurado DO    Chief Complaint:   Chief Complaint   Patient presents with    Back Pain     6/10    Neck Pain     7/10       History of Present Illness: The patient is a 49 y.o. female who presents for procedure follow up       Patient had LESI of L2/L3 on 2023. Patient had at least 80-85% relief of pain from procedure(s) for at least 60 days. yes  Patient rates his/her pain a 6 today. After injection, patient was able to increase activity and had less pain from aggravating factors such as standing; twisting/turning; bending; transportation, shopping, preparing meals, laundry, housework, and dressing  Patient was able to reduce pain medication yes  Patient was able to continue HEP - home exercise program which consists of exercises at least 3 times a week or as tolerated in addition patient is encouraged to stretch twice daily (upon awaking & prior to bed) yes  Does patient receive any other injections on the same day as Epidural or Facet injection(s)? no  Patient received enough pain relief from injections that the patient would like to repeat the injection(s). yes     Patient had bilateral thoracic trigger point injections on 2023. She states that these helped a little bit but did not last very long. Patient did get nexwave device. This has been very effective in helping to keep her pain tolerable. Neck Pain   This is a recurrent problem. The current episode started more than 1 year ago. The problem occurs constantly. The problem has been waxing and waning. The pain is present in the midline. The quality of the pain is described as cramping and aching. The pain is at a severity of 7/10. The symptoms are aggravated by bending and twisting.  Associated symptoms

## 2023-10-17 ENCOUNTER — TELEMEDICINE (OUTPATIENT)
Dept: FAMILY MEDICINE CLINIC | Age: 48
End: 2023-10-17
Payer: MEDICARE

## 2023-10-17 ENCOUNTER — HOSPITAL ENCOUNTER (OUTPATIENT)
Dept: PHYSICAL THERAPY | Age: 48
Setting detail: THERAPIES SERIES
Discharge: HOME | End: 2023-10-17
Payer: MEDICARE

## 2023-10-17 DIAGNOSIS — K21.9 GASTROESOPHAGEAL REFLUX DISEASE, UNSPECIFIED WHETHER ESOPHAGITIS PRESENT: ICD-10-CM

## 2023-10-17 DIAGNOSIS — M79.7 FIBROMYALGIA: ICD-10-CM

## 2023-10-17 DIAGNOSIS — E78.2 MIXED HYPERLIPIDEMIA: ICD-10-CM

## 2023-10-17 DIAGNOSIS — Z91.09 ENVIRONMENTAL ALLERGIES: ICD-10-CM

## 2023-10-17 DIAGNOSIS — M54.50 CHRONIC MIDLINE LOW BACK PAIN WITHOUT SCIATICA: ICD-10-CM

## 2023-10-17 DIAGNOSIS — E83.110 HEREDITARY HEMOCHROMATOSIS (HCC): ICD-10-CM

## 2023-10-17 DIAGNOSIS — E11.42 TYPE 2 DIABETES MELLITUS WITH DIABETIC POLYNEUROPATHY, WITHOUT LONG-TERM CURRENT USE OF INSULIN (HCC): Primary | ICD-10-CM

## 2023-10-17 DIAGNOSIS — G43.009 MIGRAINE WITHOUT AURA AND WITHOUT STATUS MIGRAINOSUS, NOT INTRACTABLE: ICD-10-CM

## 2023-10-17 DIAGNOSIS — F32.A ANXIETY AND DEPRESSION: ICD-10-CM

## 2023-10-17 DIAGNOSIS — F41.9 ANXIETY AND DEPRESSION: ICD-10-CM

## 2023-10-17 DIAGNOSIS — G89.29 CHRONIC MIDLINE LOW BACK PAIN WITHOUT SCIATICA: ICD-10-CM

## 2023-10-17 DIAGNOSIS — I10 PRIMARY HYPERTENSION: ICD-10-CM

## 2023-10-17 DIAGNOSIS — J45.40 MODERATE PERSISTENT ASTHMA WITHOUT COMPLICATION: ICD-10-CM

## 2023-10-17 PROCEDURE — 2022F DILAT RTA XM EVC RTNOPTHY: CPT | Performed by: PEDIATRICS

## 2023-10-17 PROCEDURE — G8427 DOCREV CUR MEDS BY ELIG CLIN: HCPCS | Performed by: PEDIATRICS

## 2023-10-17 PROCEDURE — 99214 OFFICE O/P EST MOD 30 MIN: CPT | Performed by: PEDIATRICS

## 2023-10-17 PROCEDURE — 97110 THERAPEUTIC EXERCISES: CPT

## 2023-10-17 PROCEDURE — 3044F HG A1C LEVEL LT 7.0%: CPT | Performed by: PEDIATRICS

## 2023-10-17 ASSESSMENT — PAIN DESCRIPTION - ORIENTATION: ORIENTATION: RIGHT

## 2023-10-17 ASSESSMENT — ENCOUNTER SYMPTOMS
SINUS PRESSURE: 0
VOMITING: 0
CHEST TIGHTNESS: 0
EYE DISCHARGE: 0
SORE THROAT: 0
COUGH: 0
ABDOMINAL PAIN: 0
SHORTNESS OF BREATH: 0
ALLERGIC/IMMUNOLOGIC NEGATIVE: 1
DIARRHEA: 0
WHEEZING: 0
RESPIRATORY NEGATIVE: 1
BACK PAIN: 1
CONSTIPATION: 0
NAUSEA: 1

## 2023-10-17 ASSESSMENT — PAIN DESCRIPTION - DESCRIPTORS: DESCRIPTORS: DULL;ACHING

## 2023-10-17 ASSESSMENT — PAIN DESCRIPTION - LOCATION: LOCATION: SHOULDER

## 2023-10-17 ASSESSMENT — PAIN DESCRIPTION - PAIN TYPE: TYPE: CHRONIC PAIN

## 2023-10-17 ASSESSMENT — PAIN SCALES - GENERAL: PAINLEVEL_OUTOF10: 7

## 2023-10-17 NOTE — PROGRESS NOTES
hypertension  I10 CBC with Auto Differential     Comprehensive Metabolic Panel      3. Mixed hyperlipidemia  E78.2 Comprehensive Metabolic Panel     Lipid Panel      4. Hereditary hemochromatosis ([address])  E83.110 Ferritin     Iron and TIBC      5. Fibromyalgia  M79.7       6. Moderate persistent asthma without complication  J87.84       7. Migraine without aura and without status migrainosus, not intractable  G43.009       8. Anxiety and depression  F41.9 T4, Free    F32. A TSH      9. Gastroesophageal reflux disease, unspecified whether esophagitis present  K21.9       10. Chronic midline low back pain without sciatica  M54.50     G89.29       11. Environmental allergies  Z91.09 CBC with Auto Differential            PLAN    1. Type 2 diabetes mellitus with diabetic polyneuropathy, without long-term current use of insulin ([address])  She is only on metformin at this time. Blood sugars are reportedly controlled. We will check hemoglobin A1c and evaluate average.  - Comprehensive Metabolic Panel; Future  - Microalbumin / Creatinine Urine Ratio; Future  - Hemoglobin A1C; Future    2. Primary hypertension  She is not routinely monitoring her blood pressure but she notes that when she does go to appointments for physical therapy, her blood pressure is typically well controlled  - CBC with Auto Differential; Future  - Comprehensive Metabolic Panel; Future    3. Mixed hyperlipidemia  Recheck lipids on therapy  - Comprehensive Metabolic Panel; Future  - Lipid Panel; Future    4. Hereditary hemochromatosis ([address])  We do need to follow this over time. We will check iron levels and ferritin  - Ferritin; Future  - Iron and TIBC; Future    5. Fibromyalgia  She does believe that physical therapy is helping in this regard. Continue present medication regimen    6. Moderate persistent asthma without complication  Breathing is stable on present medication regimen. Continue the same    7.  Migraine without aura and without status

## 2023-10-17 NOTE — PROGRESS NOTES
Physical Therapy: Daily Note   Patient: Sloan Taylor (89 y.o. female)   Examination Date:   Plan of Care/Certification Expiration Date: 23    No data recorded   :  1975 # of Visits since [facility]:   46   MRN: [id number]  CSN: [id number] Start of Care Date:   2023   Insurance: Payor: [facility] MEDICARE / Plan: [facility] / Product Type: *No Product type* /   Insurance ID: [id number] - (Medicare Managed) Secondary Insurance (if applicable): Sally   Referring Physician: DO Keeley Koo PA   PCP: Sydney Salcido DO Visits to Date/Visits Approved:     No Show/Cancelled Appts:   /       Medical Diagnosis: Other specified joint disorders, right shoulder [M25.811]  Unspecified rotator cuff tear or rupture of right shoulder, not specified as traumatic [M75.101]    Treatment Diagnosis: R rotator cuff repair        SUBJECTIVE EXAMINATION   Pain Level: Pain Screening  Patient Currently in Pain: Yes  Pain Assessment: 0-10  Pain Level: 7  Pain Type: Chronic pain  Pain Location: Shoulder  Pain Orientation: Right  Pain Descriptors: Dull, Aching    Patient Comments: Subjective: i am doing okay today.               TREATMENT     Exercises:  Therapeutic exercise (CPT 77529)   Treatment Reasoning    Exercise 1: Pulleys 5 minutes  Exercise 2: Wall walk x 10 2 pounds  Exercise 3: progressed to ex 6  Exercise 4: Scapular retraction with blue t-band x 25  Exercise 5: Standing Is, Ts blue x 25 ea  Exercise 6: T-band ER/IR/flex/ext blue x 25 each  Exercise 7: Shoulder shrugs/rolls x 25 4 pound weight  Exercise 8: Fwd bent rows 4 pounds x 25  Exercise 9: Supine cane flexion/abduction/ER 4 pound weight x 20  Exercise 10: Supine cane benchpress/shoulder protraction 4 pound weight  x 20  Exercise 11: Supine RUE circles cw/ccw x 10 with 4 pound weights, alphabet a -z x 1 with 4 pound weights  Exercise 12: Supine RUE rhythmic stabilization at 90 degrees shoulder

## 2023-10-19 DIAGNOSIS — Z98.890 HX OF SHOULDER SURGERY: ICD-10-CM

## 2023-10-19 DIAGNOSIS — M54.10 BACK PAIN WITH LEFT-SIDED RADICULOPATHY: Chronic | ICD-10-CM

## 2023-10-19 RX ORDER — OXYCODONE AND ACETAMINOPHEN 7.5; 325 MG/1; MG/1
1 TABLET ORAL EVERY 6 HOURS PRN
Qty: 12 TABLET | Refills: 0 | Status: SHIPPED | OUTPATIENT
Start: 2023-10-19 | End: 2023-10-22

## 2023-10-19 NOTE — TELEPHONE ENCOUNTER
Per Prabhjot Garcia pt can have 4 tablets a day for 2 months (October and November fills) after novembers fill pt is to go back to 90 tablets for a 30 day supply.

## 2023-10-20 DIAGNOSIS — B00.1 HERPES LABIALIS WITHOUT COMPLICATION: ICD-10-CM

## 2023-10-20 DIAGNOSIS — M54.10 BACK PAIN WITH LEFT-SIDED RADICULOPATHY: Chronic | ICD-10-CM

## 2023-10-20 DIAGNOSIS — Z98.890 HX OF SHOULDER SURGERY: ICD-10-CM

## 2023-10-24 ENCOUNTER — HOSPITAL ENCOUNTER (OUTPATIENT)
Dept: PHYSICAL THERAPY | Age: 48
Setting detail: THERAPIES SERIES
Discharge: HOME | End: 2023-10-24
Payer: MEDICARE

## 2023-10-24 PROCEDURE — 97110 THERAPEUTIC EXERCISES: CPT

## 2023-10-24 RX ORDER — OXYCODONE AND ACETAMINOPHEN 7.5; 325 MG/1; MG/1
1 TABLET ORAL EVERY 6 HOURS PRN
Qty: 120 TABLET | Refills: 0 | Status: SHIPPED | OUTPATIENT
Start: 2023-10-24 | End: 2023-11-23

## 2023-10-24 NOTE — PROGRESS NOTES
ER, 75 degrees IR  8/8: See ROM section 10/6/23 Met   Improve R shoulder strength to 5-/5. 8/8: See strength section  8/22: See strength section  9/19: See strength section. Long Term Goals Completed by 4-6 weeks Current Status Goal Status   Report independence with upper body dressing and ADLs 5/16: \"Mostly by myself, but occasionally I need just a little help and it takes me awhile. \"    5/30 and : same as 5/16.  6/15/23 some improvement, still has to modify how she moves right arm, decreased ability to put on bra. 7/11: Independent Met   Demo AROM R shoulder flexion/scaption at least 150 degrees in standing. 8/8: 100 degrees flexion/scaption--- 8/22: 110 degrees  9/19: With elbow extended, 110 degrees. With elbow flexed and reaching straight up, 120 degrees. 10/6/23 Elbow extended shoulder flexion 115 degrees, elbow flexed 116 degrees. In progress   Improve QuickDASH score to 50% impairment or better. 8/22: 72.7% impairment  9/19: 70.5%. Patient scored  56.8% impairment.  In progress, Partially met                                                                TREATMENT PLAN   Plan Frequency: 2x  Plan weeks: 20-22 weeks  Current Treatment Recommendations: Strengthening, ROM, Pain management, Modalities, Positioning, Equipment evaluation, education, & procurement, Patient/Caregiver education & training, Safety education & training, Home exercise program, Manual           Therapy Time  Individual Time In: 5892       Individual Time Out: 0266  Minutes: 68  Timed Code Treatment Minutes: 58 Minutes     Electronically signed by Kenroy Olmedo PT  on 10/24/2023 at 10:33 AM   POC NOTE

## 2023-10-26 ENCOUNTER — HOSPITAL ENCOUNTER (OUTPATIENT)
Dept: PHYSICAL THERAPY | Age: 48
Setting detail: THERAPIES SERIES
Discharge: HOME | End: 2023-10-26
Payer: MEDICARE

## 2023-10-26 PROCEDURE — 97110 THERAPEUTIC EXERCISES: CPT

## 2023-10-26 ASSESSMENT — PAIN DESCRIPTION - PAIN TYPE: TYPE: CHRONIC PAIN

## 2023-10-26 ASSESSMENT — PAIN DESCRIPTION - ORIENTATION: ORIENTATION: RIGHT

## 2023-10-26 ASSESSMENT — PAIN DESCRIPTION - LOCATION: LOCATION: SHOULDER

## 2023-10-26 ASSESSMENT — PAIN SCALES - GENERAL: PAINLEVEL_OUTOF10: 5

## 2023-10-26 NOTE — PROGRESS NOTES
strength section. STG Goal 2 Status[de-identified] Met  Short Term Goal 3: Improve R  strength to at least 50#  STG 3 Current Status[de-identified] 5/30: 35# ---  6/15: 46# --- 7/11: 48# --- 8/22: 50#  STG Goal 3 Status[de-identified] Met  Short Term Goal 4: Improve AAROM of R shoulder in supine to at least 125 degrees flexion/scaption, 60 degrees ER/IR  STG 4 Current Status[de-identified] 7/11: 165 degrees flexion, 145 degrees scaption, 65 degrees ER, 75 degrees IR  8/8: See ROM section 10/6/23  STG Goal 4 Status[de-identified] Met  Short Term Goal 5: Improve R shoulder strength to 5-/5. STG 5 Current Status[de-identified] 8/8: See strength section  8/22: See strength section  9/19: See strength section. Long Term Goals  Time Frame for Long Term Goals : 4-6 weeks  Long Term Goal 1: Report independence with upper body dressing and ADLs  LTG 1 Current Status[de-identified] 5/16: \"Mostly by myself, but occasionally I need just a little help and it takes me awhile. \"    5/30 and : same as 5/16.  6/15/23 some improvement, still has to modify how she moves right arm, decreased ability to put on bra. 7/11: Independent  LTG Goal 1 Status[de-identified] Met  Long Term Goal 2: Demo AROM R shoulder flexion/scaption at least 150 degrees in standing. LTG 2 Current Status[de-identified] 8/8: 100 degrees flexion/scaption--- 8/22: 110 degrees  9/19: With elbow extended, 110 degrees. With elbow flexed and reaching straight up, 120 degrees. 10/6/23 Elbow extended shoulder flexion 115 degrees, elbow flexed 116 degrees. LTG Goal 2 Status[de-identified] In progress  Long Term Goal 3: Improve QuickDASH score to 50% impairment or better. LTG 3 Current Status[de-identified] 8/22: 72.7% impairment  9/19: 70.5%. Patient scored  56.8% impairment.   LTG Goal 3 Status[de-identified] In progress, Partially met  Patient Goals   Patient Goals : improve use of RUE    Plan:    Physcial Therapy Plan  Plan weeks: 20-22 weeks  Current Treatment Recommendations: Strengthening, ROM, Pain management, Modalities, Positioning, Equipment evaluation, education, & procurement, Patient/Caregiver

## 2023-10-31 ENCOUNTER — HOSPITAL ENCOUNTER (OUTPATIENT)
Dept: PHYSICAL THERAPY | Age: 48
Setting detail: THERAPIES SERIES
Discharge: HOME | End: 2023-10-31
Payer: MEDICARE

## 2023-10-31 PROCEDURE — 97110 THERAPEUTIC EXERCISES: CPT

## 2023-10-31 ASSESSMENT — PAIN DESCRIPTION - ORIENTATION: ORIENTATION: RIGHT

## 2023-10-31 ASSESSMENT — PAIN DESCRIPTION - PAIN TYPE: TYPE: CHRONIC PAIN

## 2023-10-31 ASSESSMENT — PAIN SCALES - GENERAL: PAINLEVEL_OUTOF10: 6

## 2023-10-31 ASSESSMENT — PAIN DESCRIPTION - LOCATION: LOCATION: SHOULDER

## 2023-10-31 NOTE — PROGRESS NOTES
Daily Treatment Note  Date: 10/31/2023  Patient Name: Savanna Kong  MRN: [id number]     :   1975    Referring Physician: DO Reggie Saavedra PA   PCP: Leonor Posey DO    Medical Diagnosis: Other specified joint disorders, right shoulder [M25.811]  Unspecified rotator cuff tear or rupture of right shoulder, not specified as traumatic [M75.101] R rotator cuff repair  Treatment Diagnosis: R rotator cuff repair      Insurance: Payor: Ra Ching / Plan: [facility] [employer] / Product Type: *No Product type* /   Insurance ID: [id number] - (Medicare Managed)    Subjective:   General  Diagnosis: R rotator cuff repair  Referring Provider (secondary): ZULLY Llanos  Onset Date: 23  PT Insurance Information: [employer] (Primary- precert req'd) and [place] Medicaid (Secondary- auth required after 30 visits)  Total # of Visits Approved: 61  Total # of Visits to Date: 54  Plan of Care/Certification Expiration Date: 23  Progress Note Due Date: 23  Referring Provider (secondary): ZULLY Llanos  Subjective: right now it's just really stiff and sore  Patient Currently in Pain: Yes  Pain Level: 6  Pain Type: Chronic pain  Pain Location: Shoulder  Pain Orientation: Right       Treatment Activities:  Exercises:      Treatment Reasoning    Exercise 1: Pulleys 5 minutes  Exercise 2: Wall walk x 10 2 pounds  Exercise 3: isometrics--not today progressed to ex 6  Exercise 4: Scapular retraction with blue t-band x 25  Exercise 5: Standing Is, Ts blue x 25 ea  Exercise 6: T-band ER/IR/flex/ext blue x 25 each  Exercise 7: Shoulder shrugs/rolls x 25 4 pound weight  Exercise 8: Fwd bent rows 4 pounds x 25  Exercise 9: Gentle GH mobs--Caudal glide, 6 x 15 sec, manual ue distraction with oscillations x 2 min  Exercise 10: Supine cane benchpress/shoulder protraction 4 pound weight  x 20  Exercise 11: Supine cane flexion/abduction/ER 4 pound weight x 20  Exercise

## 2023-11-02 ENCOUNTER — APPOINTMENT (OUTPATIENT)
Dept: PHYSICAL THERAPY | Age: 48
End: 2023-11-02
Payer: MEDICARE

## 2023-11-06 ENCOUNTER — TELEPHONE (OUTPATIENT)
Dept: ONCOLOGY | Facility: CLINIC | Age: 48
End: 2023-11-06

## 2023-11-06 DIAGNOSIS — E78.5 HYPERLIPIDEMIA, UNSPECIFIED HYPERLIPIDEMIA TYPE: ICD-10-CM

## 2023-11-06 RX ORDER — OMEPRAZOLE 40 MG/1
40 CAPSULE, DELAYED RELEASE ORAL DAILY
Qty: 30 CAPSULE | Refills: 11 | OUTPATIENT
Start: 2023-11-06

## 2023-11-06 RX ORDER — OMEPRAZOLE 40 MG/1
40 CAPSULE, DELAYED RELEASE ORAL DAILY
Qty: 30 CAPSULE | Refills: 11 | Status: SHIPPED | OUTPATIENT
Start: 2023-11-06

## 2023-11-06 RX ORDER — ROSUVASTATIN CALCIUM 5 MG/1
5 TABLET, COATED ORAL NIGHTLY
Qty: 30 TABLET | Refills: 5 | OUTPATIENT
Start: 2023-11-06

## 2023-11-06 RX ORDER — ROSUVASTATIN CALCIUM 5 MG/1
5 TABLET, COATED ORAL NIGHTLY
Qty: 90 TABLET | Refills: 3 | Status: SHIPPED | OUTPATIENT
Start: 2023-11-06

## 2023-11-06 NOTE — TELEPHONE ENCOUNTER
"  Caller: Isa Bhatia    Relationship: Self    Best call back number: [phone]    What is the best time to reach you: ANY.LEAVE VM     Who are you requesting to speak with (clinical staff, provider,  specific staff member): SCHEDULING        What was the call regarding: PATIENT CALLED TO R/S APPT ON 11/17/23. ISA REQUESTS FOR THE APPT TO BE \"A LITTLE LATER\" BECAUSE SHE HAS TO BE AT THE [facility] IN [place] AT 9 AM ON THAT DAY. SHE IS REQUESTING TO COME IN AT 11 OR LATER.     Is it okay if the provider responds through MyChart: NO        "

## 2023-11-06 NOTE — TELEPHONE ENCOUNTER
Received fax from pharmacy requesting refill on pts medication(s). Pt was last seen in office on 10/17/2023  and has a follow up scheduled for Visit date not found. Will send request to  Dr. Lizbeth Curiel  for authorization.      Requested Prescriptions     Pending Prescriptions Disp Refills    omeprazole (PRILOSEC) 40 MG delayed release capsule [Pharmacy Med Name: OMEPRAZOLE 40MG CAPSULES] 30 capsule 11     Sig: TAKE 1 CAPSULE BY MOUTH DAILY    rosuvastatin (CRESTOR) 5 MG tablet [Pharmacy Med Name: ROSUVASTATIN 5MG TABLETS] 90 tablet 3     Sig: TAKE 1 TABLET BY MOUTH AT BEDTIME

## 2023-11-07 ENCOUNTER — HOSPITAL ENCOUNTER (OUTPATIENT)
Dept: PHYSICAL THERAPY | Age: 48
Setting detail: THERAPIES SERIES
Discharge: HOME | End: 2023-11-07
Payer: MEDICARE

## 2023-11-07 PROCEDURE — 97110 THERAPEUTIC EXERCISES: CPT

## 2023-11-07 ASSESSMENT — PAIN DESCRIPTION - PAIN TYPE: TYPE: CHRONIC PAIN

## 2023-11-07 ASSESSMENT — PAIN DESCRIPTION - ORIENTATION: ORIENTATION: RIGHT

## 2023-11-07 ASSESSMENT — PAIN DESCRIPTION - LOCATION: LOCATION: SHOULDER

## 2023-11-07 ASSESSMENT — PAIN DESCRIPTION - DESCRIPTORS: DESCRIPTORS: ACHING

## 2023-11-07 ASSESSMENT — PAIN SCALES - GENERAL: PAINLEVEL_OUTOF10: 5

## 2023-11-07 NOTE — PROGRESS NOTES
impairment.   11/7: 54.5% impairment In progress                                                                TREATMENT PLAN   Plan Frequency: 2x  Plan weeks: 20-22 weeks  Current Treatment Recommendations: Strengthening, ROM, Pain management, Modalities, Positioning, Equipment evaluation, education, & procurement, Patient/Caregiver education & training, Safety education & training, Home exercise program, Manual   Requires PT Follow-Up: Yes     Therapy Time  Individual Time In: 2847       Individual Time Out: 1010  Minutes: 45  Timed Code Treatment Minutes: 45 Minutes     Electronically signed by Mango Reardon PT  on 11/7/2023 at 4:27 PM

## 2023-11-09 ENCOUNTER — HOSPITAL ENCOUNTER (OUTPATIENT)
Dept: PHYSICAL THERAPY | Age: 48
Setting detail: THERAPIES SERIES
Discharge: HOME | End: 2023-11-09
Payer: MEDICARE

## 2023-11-09 PROCEDURE — 97110 THERAPEUTIC EXERCISES: CPT

## 2023-11-09 ASSESSMENT — PAIN DESCRIPTION - ORIENTATION: ORIENTATION: RIGHT

## 2023-11-09 ASSESSMENT — PAIN SCALES - GENERAL: PAINLEVEL_OUTOF10: 6

## 2023-11-09 ASSESSMENT — PAIN DESCRIPTION - DESCRIPTORS: DESCRIPTORS: SORE

## 2023-11-09 ASSESSMENT — PAIN DESCRIPTION - LOCATION: LOCATION: SHOULDER

## 2023-11-09 NOTE — PROGRESS NOTES
Pain Level: Activity Tolerance: Patient limited by pain    Therapy Prognosis: Fair; Good       GOALS   Patient Goals : improve use of RUE  Short Term Goals Completed by 3-4 weeks Current Status Goal Status   Independent with HEP 6/15/23 Patient states she has her HEP and pulleys and has been using them. 10/6/23 patient reports she continues to perform HEP. Met   Improve R elbow/forearm/wrist strength to 5/5 6/15/23 See strength section. Met   Improve R  strength to at least 50# 5/30: 35# ---  6/15: 46# --- 7/11: 48# --- 8/22: 50# Met   Improve AAROM of R shoulder in supine to at least 125 degrees flexion/scaption, 60 degrees ER/IR 7/11: 165 degrees flexion, 145 degrees scaption, 65 degrees ER, 75 degrees IR  8/8: See ROM section 10/6/23 Met   Improve R shoulder strength to 5-/5. 8/8: See strength section  8/22: See strength section  9/19: See strength section. 11/7: See strength section                                                 Long Term Goals Completed by 4-6 weeks Current Status Goal Status   Report independence with upper body dressing and ADLs 5/16: \"Mostly by myself, but occasionally I need just a little help and it takes me awhile. \"    5/30 and : same as 5/16.  6/15/23 some improvement, still has to modify how she moves right arm, decreased ability to put on bra. 7/11: Independent Met   Demo AROM R shoulder flexion/scaption at least 150 degrees in standing. 8/8: 100 degrees flexion/scaption--- 8/22: 110 degrees  9/19: With elbow extended, 110 degrees. With elbow flexed and reaching straight up, 120 degrees. 10/6/23 Elbow extended shoulder flexion 115 degrees, elbow flexed 116 degrees. 11/7: 130 degrees In progress   Improve QuickDASH score to 50% impairment or better. 8/22: 72.7% impairment  9/19: 70.5%. Patient scored  56.8% impairment.   11/7: 54.5% impairment In progress                                                                TREATMENT PLAN   Plan Frequency: 2x  Plan weeks: 20-22

## 2023-11-09 NOTE — PROGRESS NOTES
Clinic Progress Note    Patient:  Naomi Bhatia  YOB: 1975  Date of Service: 11/10/2023  MRN: [id number]   Acct:    Primary Care Physician: Brett Sharp DO    Televisit    Chief Complaint: Hereditary hemochromatosis    Hematology History:  For detailed summary about the patient's previous work-up, differential diagnoses and treatments, please refer to the last note of Dr. Goldberg from 4/1/2022.    Brief summary:  Ms. Bhatia is a 47 y.o. who follows for history of iron level of 19.  She has a history of gastric sleeve procedure in 12/2017, after which she developed iron deficiency anemia like related to decreased dietary absorption. She did receive several multiple blood transfusions in the past as well as parenteral iron Venofer infusions at least 7-10 times back in 2018.     In 9/2020, the patient had seen Dr. Goldberg, where her hemoglobin was 10.8, iron saturation was 21%, ferritin was 1406, and repeat ferritin 8/11/2021 was 1663; she is found to be heterozygous for C282Y hemochromatosis gene variant. She was started on she has phlebotomies every 2 months, and as ferritin had not improved, she initially was started on Jadenu.  As her ferritin improved below 500 in 7/2021, Jadenu was stopped then. The goal ferritin that was discussed with the patient was to keep ferritin below 500.    US spleen 12/17/2021 showed splenomegaly (max dimension 16.7 cm, previously 14.6 cm); it was symptomatic and painful.  Peripheral blood flow cytometry, JAK2, BCR/ABL FISH were negative.  PET scan 2/13/2022 showed splenomegaly with numerous small 1-2 cm hypermetabolic lesions along with mild periportal hypermetabolic lymphadenopathy.  Bone marrow biopsy 1/14/2022 was unremarkable for the most part; there was no sign of increased iron deposition. She ultimately underwent robotic assisted laparoscopic splenomegaly and wedge liver biopsy at [facility] on 5/2/2022, with splenectomy pathology showing  Necrotizing  "granulomas, and liver biopsy showing bridging fibrosis with early nodule formation as well as granulomas and mildly active steatohepatitis with microvascular steatosis; iron stains showed only mild iron deposition.    Interval History:  Ms. Bhatia is here for his scheduled follow-up.  In the interim from last visit, she has been doing very well.  She feels well today and has no complaints.  She denies having any fever, chills, chest pain, shortness of breath, abdominal pain, nausea vomiting.  She reports experiencing occasional fatigue.    Objectives:  /78   Pulse 92   Temp 97.2 °F (36.2 °C)   Resp 18   Ht 167.6 cm (65.98\")   Wt (!) 142 kg (312 lb 14.4 oz)   LMP  (LMP Unknown)   SpO2 97%   BMI 50.53 kg/m²   GENERAL: Alert and oriented, no apparent distress  HEENT: No scleral icterus  NECK: Supple  SKIN: No jaundice, normal skin color  PSYCHIATRIC: Full affect  NEUROLOGIC: Stable gait    Results:  Lab Results   Component Value Date    WBC 18.74 (H) 11/10/2023    HGB 13.1 11/10/2023    HCT 40.6 11/10/2023    MCV 86.9 11/10/2023     (H) 11/10/2023     Lab Results   Component Value Date    IRON 59 11/10/2023    TIBC 335 11/10/2023    FERRITIN 95.34 11/10/2023     Assessment :  1.  Hemochromatosis C282Y heterozygous/carrier.  2.  Prolonged history of iron deficiency before.  3.  Splenomegaly s/p splenectomy on 5/2/2022  4.  Anemia due to chronic disease  5.  Leukocytosis, elevated myelocytes and metamyelocytes  6. Thrombocytosis post splenectomy  7.  Liver lesion on US    Plan:   # Elevated ferritin:  - We reviewed her labs from today as well as from the past several months; ferritin has been trending down constantly since her splenectomy surgery, and today it is within normal range at 93.3.  - She has heterozygous hemochromatosis; she used to receive phlebotomies for elevated ferritin, but since her splenectomy surgery her ferritin had trended down and she did not require any phlebotomy since " then.  - Since she underwent splenectomy on  5/2/2022, her ferritin level and iron studies have not been as high as they were before; and her ferritin level has been trending down steadily.  - Reviewed her labs since I started following the patient, on 7/5/2022, 8/31/2022, 9/23/2022, 10/11/2022: her ferritin level dropped to 110-260 since her splenectomy compared to >500 before that, and this is achieved without phlebotomy, and given that her iron level and iron saturation are low with her having anemia, there was no sign of iron deposition in the bone marrow; though there was  significantly deposition in the spleen on pathology and mild iron deposition in the liver; she had held off phlebotomy since her ferritin has been trending down.  - She used to follow with hepatology at [facility], but as [facility] would not be accepting her insurance, she currently follows with hepatology at [facility].  - Obtain labs in 3 months, and RTC for OV and labs in 6 months.    # Leukocytosis and thrombocytosis:  - As for her leukocytosis having myelocytes and meta-myelocyte, I believe this is related to her splenectomy, as she started to develop mild leukocytosis with increasing numbers of eosinophils, basophils, myelocytes and metamyelocytes after her splenectomy.   - To assure we are missing an underlying bone marrow disease, PCR for BCR/ABL was obtained and was negative.   - Thrombocytosis is likely related to splenectomy.    # Liver lesion in US done at [facility]:  - This is reported by the patient; MRI liver showed 15 mm cyst in the LEFT anterior liver, correlating with the lesion of concern on outside ultrasound. No suspicious solid liver lesion.    I reviewed, confirmed and updated HPI and assessment and plan as necessary.     Angela Germain MD  11/10/2023

## 2023-11-10 ENCOUNTER — OFFICE VISIT (OUTPATIENT)
Dept: ONCOLOGY | Facility: CLINIC | Age: 48
End: 2023-11-10
Payer: MEDICARE

## 2023-11-10 ENCOUNTER — PATIENT MESSAGE (OUTPATIENT)
Dept: FAMILY MEDICINE CLINIC | Age: 48
End: 2023-11-10

## 2023-11-10 ENCOUNTER — LAB (OUTPATIENT)
Dept: LAB | Facility: HOSPITAL | Age: 48
End: 2023-11-10
Payer: MEDICARE

## 2023-11-10 VITALS
TEMPERATURE: 97.2 F | RESPIRATION RATE: 18 BRPM | HEIGHT: 66 IN | OXYGEN SATURATION: 97 % | WEIGHT: 293 LBS | HEART RATE: 92 BPM | BODY MASS INDEX: 47.09 KG/M2 | DIASTOLIC BLOOD PRESSURE: 78 MMHG | SYSTOLIC BLOOD PRESSURE: 134 MMHG

## 2023-11-10 DIAGNOSIS — Z45.2 ENCOUNTER FOR CARE RELATED TO PORT-A-CATH: ICD-10-CM

## 2023-11-10 DIAGNOSIS — D75.839 THROMBOCYTOSIS: ICD-10-CM

## 2023-11-10 DIAGNOSIS — R79.89 ELEVATED FERRITIN: Primary | ICD-10-CM

## 2023-11-10 DIAGNOSIS — R79.89 ELEVATED FERRITIN: ICD-10-CM

## 2023-11-10 DIAGNOSIS — D75.839 THROMBOCYTOSIS: Primary | ICD-10-CM

## 2023-11-10 LAB
ALBUMIN SERPL-MCNC: 3.8 G/DL (ref 3.5–5.2)
ALBUMIN/GLOB SERPL: 1.2 G/DL
ALP SERPL-CCNC: 119 U/L (ref 39–117)
ALT SERPL W P-5'-P-CCNC: 15 U/L (ref 1–33)
ANION GAP SERPL CALCULATED.3IONS-SCNC: 13 MMOL/L (ref 5–15)
ANISOCYTOSIS BLD QL: ABNORMAL
AST SERPL-CCNC: 18 U/L (ref 1–32)
BILIRUB SERPL-MCNC: 0.2 MG/DL (ref 0–1.2)
BUN SERPL-MCNC: 8 MG/DL (ref 6–20)
BUN/CREAT SERPL: 13.3 (ref 7–25)
BURR CELLS BLD QL SMEAR: ABNORMAL
CALCIUM SPEC-SCNC: 9.1 MG/DL (ref 8.6–10.5)
CHLORIDE SERPL-SCNC: 102 MMOL/L (ref 98–107)
CO2 SERPL-SCNC: 24 MMOL/L (ref 22–29)
CREAT SERPL-MCNC: 0.6 MG/DL (ref 0.57–1)
DEPRECATED RDW RBC AUTO: 47 FL (ref 37–54)
EGFRCR SERPLBLD CKD-EPI 2021: 110.9 ML/MIN/1.73
EOSINOPHIL # BLD MANUAL: 0.19 10*3/MM3 (ref 0–0.4)
EOSINOPHIL NFR BLD MANUAL: 1 % (ref 0.3–6.2)
ERYTHROCYTE [DISTWIDTH] IN BLOOD BY AUTOMATED COUNT: 14.8 % (ref 12.3–15.4)
FERRITIN SERPL-MCNC: 95.34 NG/ML (ref 13–150)
GLOBULIN UR ELPH-MCNC: 3.2 GM/DL
GLUCOSE SERPL-MCNC: 146 MG/DL (ref 65–99)
HCT VFR BLD AUTO: 40.6 % (ref 34–46.6)
HGB BLD-MCNC: 13.1 G/DL (ref 12–15.9)
HOLD SPECIMEN: NORMAL
IRON 24H UR-MRATE: 59 MCG/DL (ref 37–145)
IRON SATN MFR SERPL: 18 % (ref 20–50)
LYMPHOCYTES # BLD MANUAL: 5.79 10*3/MM3 (ref 0.7–3.1)
LYMPHOCYTES NFR BLD MANUAL: 4.1 % (ref 5–12)
MCH RBC QN AUTO: 28.1 PG (ref 26.6–33)
MCHC RBC AUTO-ENTMCNC: 32.3 G/DL (ref 31.5–35.7)
MCV RBC AUTO: 86.9 FL (ref 79–97)
MICROCYTES BLD QL: ABNORMAL
MONOCYTES # BLD: 0.77 10*3/MM3 (ref 0.1–0.9)
NEUTROPHILS # BLD AUTO: 11.98 10*3/MM3 (ref 1.7–7)
NEUTROPHILS NFR BLD MANUAL: 60.8 % (ref 42.7–76)
NEUTS BAND NFR BLD MANUAL: 3.1 % (ref 0–5)
OVALOCYTES BLD QL SMEAR: ABNORMAL
PLATELET # BLD AUTO: 721 10*3/MM3 (ref 140–450)
PMV BLD AUTO: 10.3 FL (ref 6–12)
POIKILOCYTOSIS BLD QL SMEAR: ABNORMAL
POTASSIUM SERPL-SCNC: 4.2 MMOL/L (ref 3.5–5.2)
PROT SERPL-MCNC: 7 G/DL (ref 6–8.5)
RBC # BLD AUTO: 4.67 10*6/MM3 (ref 3.77–5.28)
SMALL PLATELETS BLD QL SMEAR: ABNORMAL
SODIUM SERPL-SCNC: 139 MMOL/L (ref 136–145)
TIBC SERPL-MCNC: 335 MCG/DL (ref 298–536)
TRANSFERRIN SERPL-MCNC: 225 MG/DL (ref 200–360)
VARIANT LYMPHS NFR BLD MANUAL: 14.4 % (ref 0–5)
VARIANT LYMPHS NFR BLD MANUAL: 16.5 % (ref 19.6–45.3)
WBC MORPH BLD: NORMAL
WBC NRBC COR # BLD: 18.74 10*3/MM3 (ref 3.4–10.8)

## 2023-11-10 PROCEDURE — 3078F DIAST BP <80 MM HG: CPT | Performed by: INTERNAL MEDICINE

## 2023-11-10 PROCEDURE — 3075F SYST BP GE 130 - 139MM HG: CPT | Performed by: INTERNAL MEDICINE

## 2023-11-10 PROCEDURE — 85007 BL SMEAR W/DIFF WBC COUNT: CPT

## 2023-11-10 PROCEDURE — 85025 COMPLETE CBC W/AUTO DIFF WBC: CPT

## 2023-11-10 PROCEDURE — 36415 COLL VENOUS BLD VENIPUNCTURE: CPT

## 2023-11-10 PROCEDURE — 82728 ASSAY OF FERRITIN: CPT

## 2023-11-10 PROCEDURE — 84466 ASSAY OF TRANSFERRIN: CPT

## 2023-11-10 PROCEDURE — 80053 COMPREHEN METABOLIC PANEL: CPT

## 2023-11-10 PROCEDURE — 83540 ASSAY OF IRON: CPT

## 2023-11-10 PROCEDURE — 1126F AMNT PAIN NOTED NONE PRSNT: CPT | Performed by: INTERNAL MEDICINE

## 2023-11-10 PROCEDURE — 99213 OFFICE O/P EST LOW 20 MIN: CPT | Performed by: INTERNAL MEDICINE

## 2023-11-10 RX ORDER — SODIUM CHLORIDE 0.9 % (FLUSH) 0.9 %
10 SYRINGE (ML) INJECTION AS NEEDED
OUTPATIENT
Start: 2023-11-10

## 2023-11-10 RX ORDER — SODIUM CHLORIDE 0.9 % (FLUSH) 0.9 %
10 SYRINGE (ML) INJECTION AS NEEDED
Status: DISCONTINUED | OUTPATIENT
Start: 2023-11-10 | End: 2023-11-10 | Stop reason: HOSPADM

## 2023-11-10 RX ORDER — TOPIRAMATE 50 MG/1
50 TABLET, FILM COATED ORAL 2 TIMES DAILY
COMMUNITY

## 2023-11-10 RX ORDER — ROSUVASTATIN CALCIUM 5 MG/1
5 TABLET, COATED ORAL DAILY
COMMUNITY
Start: 2023-05-17

## 2023-11-10 RX ORDER — HEPARIN SODIUM (PORCINE) LOCK FLUSH IV SOLN 100 UNIT/ML 100 UNIT/ML
500 SOLUTION INTRAVENOUS AS NEEDED
Status: DISCONTINUED | OUTPATIENT
Start: 2023-11-10 | End: 2023-11-10 | Stop reason: HOSPADM

## 2023-11-10 RX ORDER — OXYCODONE AND ACETAMINOPHEN 7.5; 325 MG/1; MG/1
1 TABLET ORAL EVERY 6 HOURS PRN
COMMUNITY

## 2023-11-10 RX ORDER — TIRZEPATIDE 2.5 MG/.5ML
INJECTION, SOLUTION SUBCUTANEOUS
COMMUNITY

## 2023-11-10 RX ORDER — HEPARIN SODIUM (PORCINE) LOCK FLUSH IV SOLN 100 UNIT/ML 100 UNIT/ML
500 SOLUTION INTRAVENOUS AS NEEDED
OUTPATIENT
Start: 2023-11-10

## 2023-11-10 RX ADMIN — Medication 10 ML: at 10:20

## 2023-11-10 RX ADMIN — HEPARIN SODIUM (PORCINE) LOCK FLUSH IV SOLN 100 UNIT/ML 500 UNITS: 100 SOLUTION at 10:20

## 2023-11-11 NOTE — TELEPHONE ENCOUNTER
Augmentin 875 mg  Tab 1 p.o. twice daily x10 days  Dispense #20 with no refills. Fluconazole 150 mg  Tab 1 p.o. daily x3 days.   Dispense #3 with no refills

## 2023-11-13 DIAGNOSIS — Z98.890 HX OF SHOULDER SURGERY: ICD-10-CM

## 2023-11-13 DIAGNOSIS — M54.10 BACK PAIN WITH LEFT-SIDED RADICULOPATHY: Chronic | ICD-10-CM

## 2023-11-13 RX ORDER — AMOXICILLIN AND CLAVULANATE POTASSIUM 875; 125 MG/1; MG/1
1 TABLET, FILM COATED ORAL 2 TIMES DAILY
Qty: 20 TABLET | Refills: 0 | Status: SHIPPED | OUTPATIENT
Start: 2023-11-13 | End: 2023-11-23

## 2023-11-13 RX ORDER — FLUCONAZOLE 150 MG/1
150 TABLET ORAL
Qty: 2 TABLET | Refills: 0 | Status: SHIPPED | OUTPATIENT
Start: 2023-11-13 | End: 2023-11-19

## 2023-11-13 RX ORDER — NABUMETONE 750 MG/1
750 TABLET, FILM COATED ORAL 2 TIMES DAILY
Qty: 60 TABLET | Refills: 11 | Status: SHIPPED | OUTPATIENT
Start: 2023-11-13

## 2023-11-13 NOTE — TELEPHONE ENCOUNTER
Patient called on 11/10/23. The office is closed on Fridays. Sending 2 months for the holidays. Decreased December script back to #90 per note.

## 2023-11-13 NOTE — TELEPHONE ENCOUNTER
[address] [facility] Staff (supporting Morris Rebolledo DO) 4 hours ago (12:34 PM)       Thank you so much! Would you please let him know that I need another script for Nabumetone? Received call/My Chart Message from patient requesting refill on medication(s). Pt was last seen in office on 10/17/2023  and has a follow up scheduled for Visit date not found. Will send request to provider for authorization.      Requested Prescriptions     Pending Prescriptions Disp Refills    nabumetone (RELAFEN) 750 MG tablet 60 tablet 11     Sig: Take 1 tablet by mouth 2 times daily

## 2023-11-14 ENCOUNTER — HOSPITAL ENCOUNTER (OUTPATIENT)
Dept: PHYSICAL THERAPY | Age: 48
Setting detail: THERAPIES SERIES
Discharge: HOME | End: 2023-11-14
Payer: MEDICARE

## 2023-11-14 PROCEDURE — 97110 THERAPEUTIC EXERCISES: CPT

## 2023-11-14 RX ORDER — OXYCODONE AND ACETAMINOPHEN 7.5; 325 MG/1; MG/1
1 TABLET ORAL EVERY 6 HOURS PRN
Qty: 120 TABLET | Refills: 0 | Status: SHIPPED | OUTPATIENT
Start: 2023-11-23 | End: 2023-12-23

## 2023-11-14 RX ORDER — OXYCODONE AND ACETAMINOPHEN 7.5; 325 MG/1; MG/1
1 TABLET ORAL EVERY 8 HOURS PRN
Qty: 90 TABLET | Refills: 0 | Status: SHIPPED | OUTPATIENT
Start: 2023-12-23 | End: 2024-01-22

## 2023-11-14 ASSESSMENT — PAIN DESCRIPTION - ORIENTATION: ORIENTATION: RIGHT

## 2023-11-14 ASSESSMENT — PAIN DESCRIPTION - LOCATION: LOCATION: SHOULDER;BACK

## 2023-11-14 ASSESSMENT — PAIN SCALES - GENERAL: PAINLEVEL_OUTOF10: 6

## 2023-11-14 ASSESSMENT — PAIN DESCRIPTION - PAIN TYPE: TYPE: CHRONIC PAIN

## 2023-11-14 ASSESSMENT — PAIN DESCRIPTION - DESCRIPTORS: DESCRIPTORS: SORE;ACHING;TIGHTNESS

## 2023-11-14 NOTE — PROGRESS NOTES
Physical Therapy: Daily Note   Patient: Savanna Kong (33 y.o. female)   Examination Date: 15/55/9006  Plan of Care/Certification Expiration Date: 23    No data recorded   :  1975 # of Visits since [facility]:   62   MRN: [id number]  CSN: [id number] Start of Care Date:   2023   Insurance: Payor: HUMANA MEDICARE / Plan: [employer] / Product Type: *No Product type* /   Insurance ID: [id number] - (Medicare Managed) Secondary Insurance (if applicable): Sally   Referring Physician: DO Reggie Saavedra PA   PCP: Leonor Posey DO Visits to Date/Visits Approved:     No Show/Cancelled Appts:   /       Medical Diagnosis: Other specified joint disorders, right shoulder [M25.811]  Unspecified rotator cuff tear or rupture of right shoulder, not specified as traumatic [M75.101]    Treatment Diagnosis: R rotator cuff repair        SUBJECTIVE EXAMINATION   Pain Level: Pain Screening  Patient Currently in Pain: Yes  Pain Assessment: 0-10  Pain Level: 6  Pain Type: Chronic pain  Pain Location: Shoulder, Back  Pain Orientation: Right  Pain Descriptors: Sore, Aching, Tightness    Patient Comments: Subjective: Reports that she had a fall on Sat night while carrying stuff out of her class reunion. Reports multiple scrapes and bruises as well sore lower back and knee.      OBJECTIVE EXAMINATION   Restrictions:  Restrictions/Precautions: Surgical Protocols   Required Braces or Orthoses?: Yes   No data recorded      TREATMENT     Exercises:      Treatment Reasoning    Exercise 1: Pulleys 4 minutes---- Start with LBE x 2.5' fwd/back at lvl 1.5  Exercise 2: Wall walk x 10, 2 pounds- not today  Exercise 3: isometrics--not today progressed to ex 6- d/c  Exercise 4: Scapular retraction with blue t-band x 25  Exercise 5: Standing Is, Ts, Ys blue x 25 ea  Exercise 6: T-band ER/IR/flex/ext blue x 25 each  Exercise 7: Shoulder shrugs/rolls x 25 4 pound weight  Exercise 8: Fwd

## 2023-11-16 ENCOUNTER — HOSPITAL ENCOUNTER (OUTPATIENT)
Dept: PHYSICAL THERAPY | Age: 48
Setting detail: THERAPIES SERIES
Discharge: HOME | End: 2023-11-16
Payer: MEDICARE

## 2023-11-16 PROCEDURE — 97110 THERAPEUTIC EXERCISES: CPT

## 2023-11-16 ASSESSMENT — PAIN SCALES - GENERAL: PAINLEVEL_OUTOF10: 7

## 2023-11-16 ASSESSMENT — PAIN DESCRIPTION - ORIENTATION: ORIENTATION: RIGHT

## 2023-11-16 ASSESSMENT — PAIN DESCRIPTION - LOCATION: LOCATION: SHOULDER

## 2023-11-16 ASSESSMENT — PAIN DESCRIPTION - PAIN TYPE: TYPE: CHRONIC PAIN

## 2023-11-16 ASSESSMENT — PAIN DESCRIPTION - DESCRIPTORS: DESCRIPTORS: SORE;ACHING;TIGHTNESS

## 2023-11-16 NOTE — PROGRESS NOTES
6/15/23 See strength section. Met   Improve R  strength to at least 50# 5/30: 35# ---  6/15: 46# --- 7/11: 48# --- 8/22: 50# Met   Improve AAROM of R shoulder in supine to at least 125 degrees flexion/scaption, 60 degrees ER/IR 7/11: 165 degrees flexion, 145 degrees scaption, 65 degrees ER, 75 degrees IR  8/8: See ROM section 10/6/23 Met   Improve R shoulder strength to 5-/5. 8/8: See strength section  8/22: See strength section  9/19: See strength section. 11/7: See strength section                                                 Long Term Goals Completed by 4-6 weeks Current Status Goal Status   Report independence with upper body dressing and ADLs 5/16: \"Mostly by myself, but occasionally I need just a little help and it takes me awhile. \"    5/30 and : same as 5/16.  6/15/23 some improvement, still has to modify how she moves right arm, decreased ability to put on bra. 7/11: Independent Met   Demo AROM R shoulder flexion/scaption at least 150 degrees in standing. 8/8: 100 degrees flexion/scaption--- 8/22: 110 degrees  9/19: With elbow extended, 110 degrees. With elbow flexed and reaching straight up, 120 degrees. 10/6/23 Elbow extended shoulder flexion 115 degrees, elbow flexed 116 degrees. 11/7: 130 degrees In progress   Improve QuickDASH score to 50% impairment or better. 8/22: 72.7% impairment  9/19: 70.5%. Patient scored  56.8% impairment.   11/7: 54.5% impairment In progress                                                                TREATMENT PLAN   Plan Frequency: 2x  Plan weeks: 20-22 weeks  Current Treatment Recommendations: Strengthening, ROM, Pain management, Modalities, Positioning, Equipment evaluation, education, & procurement, Patient/Caregiver education & training, Safety education & training, Home exercise program, Manual           Therapy Time  Individual Time In: 0921       Individual Time Out: 1020  Minutes: 59  Timed Code Treatment Minutes: 49 Minutes (plus 10 mins ice)

## 2023-11-21 ENCOUNTER — HOSPITAL ENCOUNTER (OUTPATIENT)
Dept: PHYSICAL THERAPY | Age: 48
Setting detail: THERAPIES SERIES
Discharge: HOME | End: 2023-11-21
Payer: MEDICARE

## 2023-11-21 PROCEDURE — 97110 THERAPEUTIC EXERCISES: CPT

## 2023-11-21 ASSESSMENT — PAIN DESCRIPTION - LOCATION: LOCATION: SHOULDER

## 2023-11-21 ASSESSMENT — PAIN DESCRIPTION - ORIENTATION: ORIENTATION: RIGHT

## 2023-11-21 ASSESSMENT — PAIN DESCRIPTION - PAIN TYPE: TYPE: CHRONIC PAIN

## 2023-11-21 NOTE — PROGRESS NOTES
50#  STG 3 Current Status[de-identified] 5/30: 35# ---  6/15: 46# --- 7/11: 48# --- 8/22: 50#  STG Goal 3 Status[de-identified] Met  Short Term Goal 4: Improve AAROM of R shoulder in supine to at least 125 degrees flexion/scaption, 60 degrees ER/IR  STG 4 Current Status[de-identified] 7/11: 165 degrees flexion, 145 degrees scaption, 65 degrees ER, 75 degrees IR  8/8: See ROM section 10/6/23  STG Goal 4 Status[de-identified] Met  Short Term Goal 5: Improve R shoulder strength to 5-/5. STG 5 Current Status[de-identified] 8/8: See strength section  8/22: See strength section  9/19: See strength section. 11/7: See strength section  Long Term Goals  Time Frame for Long Term Goals : 4-6 weeks  Long Term Goal 1: Report independence with upper body dressing and ADLs  LTG 1 Current Status[de-identified] 5/16: \"Mostly by myself, but occasionally I need just a little help and it takes me awhile. \"    5/30 and : same as 5/16.  6/15/23 some improvement, still has to modify how she moves right arm, decreased ability to put on bra. 7/11: Independent  LTG Goal 1 Status[de-identified] Met  Long Term Goal 2: Demo AROM R shoulder flexion/scaption at least 150 degrees in standing. LTG 2 Current Status[de-identified] 8/8: 100 degrees flexion/scaption--- 8/22: 110 degrees  9/19: With elbow extended, 110 degrees. With elbow flexed and reaching straight up, 120 degrees. 10/6/23 Elbow extended shoulder flexion 115 degrees, elbow flexed 116 degrees. 11/7: 130 degrees  LTG Goal 2 Status[de-identified] In progress  Long Term Goal 3: Improve QuickDASH score to 50% impairment or better. LTG 3 Current Status[de-identified] 8/22: 72.7% impairment  9/19: 70.5%. Patient scored  56.8% impairment.   11/7: 54.5% impairment  LTG Goal 3 Status[de-identified] In progress  Patient Goals   Patient Goals : improve use of RUE    Plan:    Physical Therapy Plan  Plan weeks: 20-22 weeks  Current Treatment Recommendations: Strengthening, ROM, Pain management, Modalities, Positioning, Equipment evaluation, education, & procurement, Patient/Caregiver education & training, Safety education & training,

## 2023-11-28 ENCOUNTER — HOSPITAL ENCOUNTER (OUTPATIENT)
Dept: PHYSICAL THERAPY | Age: 48
Setting detail: THERAPIES SERIES
Discharge: HOME | End: 2023-11-28
Payer: MEDICARE

## 2023-11-28 PROCEDURE — 97110 THERAPEUTIC EXERCISES: CPT

## 2023-11-28 ASSESSMENT — PAIN DESCRIPTION - DESCRIPTORS: DESCRIPTORS: SORE;TIGHTNESS

## 2023-11-28 ASSESSMENT — PAIN DESCRIPTION - LOCATION: LOCATION: SHOULDER

## 2023-11-28 ASSESSMENT — PAIN DESCRIPTION - PAIN TYPE: TYPE: CHRONIC PAIN

## 2023-11-28 ASSESSMENT — PAIN DESCRIPTION - ORIENTATION: ORIENTATION: RIGHT

## 2023-11-28 ASSESSMENT — PAIN SCALES - GENERAL: PAINLEVEL_OUTOF10: 7

## 2023-11-28 NOTE — PROGRESS NOTES
high-level IADLs, Increased pain, Decreased posture    Post-Treatment Pain Level: Activity Tolerance: Patient limited by pain    Therapy Prognosis: Fair; Good       GOALS   Patient Goals : improve use of RUE  Short Term Goals Completed by 3-4 weeks Current Status Goal Status   Independent with HEP 6/15/23 Patient states she has her HEP and pulleys and has been using them. 10/6/23 patient reports she continues to perform HEP. Met   Improve R elbow/forearm/wrist strength to 5/5 6/15/23 See strength section. Met   Improve R  strength to at least 50# 5/30: 35# ---  6/15: 46# --- 7/11: 48# --- 8/22: 50# Met   Improve AAROM of R shoulder in supine to at least 125 degrees flexion/scaption, 60 degrees ER/IR 7/11: 165 degrees flexion, 145 degrees scaption, 65 degrees ER, 75 degrees IR  8/8: See ROM section 10/6/23 Met   Improve R shoulder strength to 5-/5. 11/28: See strength section                                                 Long Term Goals Completed by 4-6 weeks Current Status Goal Status   Report independence with upper body dressing and ADLs 5/16: \"Mostly by myself, but occasionally I need just a little help and it takes me awhile. \"    5/30 and : same as 5/16.  6/15/23 some improvement, still has to modify how she moves right arm, decreased ability to put on bra. 7/11: Independent Met   Demo AROM R shoulder flexion/scaption at least 150 degrees in standing. 8/8: 100 degrees flexion/scaption--- 8/22: 110 degrees  9/19: With elbow extended, 110 degrees. With elbow flexed and reaching straight up, 120 degrees. 10/6/23 Elbow extended shoulder flexion 115 degrees, elbow flexed 116 degrees. 11/7: 130 degrees   11/28: 135 degrees In progress   Improve QuickDASH score to 50% impairment or better. 8/22: 72.7% impairment  9/19: 70.5%. Patient scored  56.8% impairment.   11/7: 54.5% impairment   11/28: 90% (reports decline after fall) In progress

## 2023-11-30 ENCOUNTER — HOSPITAL ENCOUNTER (OUTPATIENT)
Dept: PHYSICAL THERAPY | Age: 48
Setting detail: THERAPIES SERIES
Discharge: HOME | End: 2023-11-30
Payer: MEDICARE

## 2023-11-30 PROCEDURE — 97110 THERAPEUTIC EXERCISES: CPT

## 2023-11-30 ASSESSMENT — PAIN DESCRIPTION - LOCATION: LOCATION: SHOULDER

## 2023-11-30 ASSESSMENT — PAIN DESCRIPTION - ORIENTATION: ORIENTATION: RIGHT

## 2023-11-30 ASSESSMENT — PAIN SCALES - GENERAL: PAINLEVEL_OUTOF10: 8

## 2023-11-30 ASSESSMENT — PAIN DESCRIPTION - PAIN TYPE: TYPE: CHRONIC PAIN

## 2023-11-30 NOTE — PROGRESS NOTES
50#  STG Goal 3 Status[de-identified] Met  Short Term Goal 4: Improve AAROM of R shoulder in supine to at least 125 degrees flexion/scaption, 60 degrees ER/IR  STG 4 Current Status[de-identified] 7/11: 165 degrees flexion, 145 degrees scaption, 65 degrees ER, 75 degrees IR  8/8: See ROM section 10/6/23  STG Goal 4 Status[de-identified] Met  Short Term Goal 5: Improve R shoulder strength to 5-/5. STG 5 Current Status[de-identified] 11/28: See strength section  Long Term Goals  Time Frame for Long Term Goals : 4-6 weeks  Long Term Goal 1: Report independence with upper body dressing and ADLs  LTG 1 Current Status[de-identified] 5/16: \"Mostly by myself, but occasionally I need just a little help and it takes me awhile. \"    5/30 and : same as 5/16.  6/15/23 some improvement, still has to modify how she moves right arm, decreased ability to put on bra. 7/11: Independent  LTG Goal 1 Status[de-identified] Met  Long Term Goal 2: Demo AROM R shoulder flexion/scaption at least 150 degrees in standing. LTG 2 Current Status[de-identified] 8/8: 100 degrees flexion/scaption--- 8/22: 110 degrees  9/19: With elbow extended, 110 degrees. With elbow flexed and reaching straight up, 120 degrees. 10/6/23 Elbow extended shoulder flexion 115 degrees, elbow flexed 116 degrees. 11/7: 130 degrees   11/28: 135 degrees  LTG Goal 2 Status[de-identified] In progress  Long Term Goal 3: Improve QuickDASH score to 50% impairment or better. LTG 3 Current Status[de-identified] 8/22: 72.7% impairment  9/19: 70.5%. Patient scored  56.8% impairment.   11/7: 54.5% impairment   11/28: 90% (reports decline after fall)  LTG Goal 3 Status[de-identified] In progress  Patient Goals   Patient Goals : improve use of RUE    Plan:    Physical Therapy Plan  Plan weeks: 20-22 weeks  Current Treatment Recommendations: Strengthening, ROM, Pain management, Modalities, Positioning, Equipment evaluation, education, & procurement, Patient/Caregiver education & training, Safety education & training, Home exercise program, Manual     Therapy Time:   Individual Concurrent Group Co-treatment   Time

## 2023-12-01 DIAGNOSIS — R63.5 WEIGHT GAIN: ICD-10-CM

## 2023-12-01 DIAGNOSIS — E66.01 MORBID OBESITY (HCC): ICD-10-CM

## 2023-12-01 NOTE — TELEPHONE ENCOUNTER
Received fax from pharmacy requesting refill on pts medication(s). Pt was last seen in office on 10/17/2023  and has a follow up scheduled for Visit date not found. Will send request to  Dr. Rajan Crum  for authorization.      Requested Prescriptions     Pending Prescriptions Disp Refills    metFORMIN (GLUCOPHAGE) 1000 MG tablet [Pharmacy Med Name: METFORMIN 1000MG TABLETS] 180 tablet 3     Sig: Take 1 tablet by mouth 2 times daily (with meals)

## 2023-12-06 ENCOUNTER — HOSPITAL ENCOUNTER (OUTPATIENT)
Dept: PHYSICAL THERAPY | Age: 48
Setting detail: THERAPIES SERIES
Discharge: HOME | End: 2023-12-06
Payer: MEDICARE

## 2023-12-06 PROCEDURE — 97110 THERAPEUTIC EXERCISES: CPT

## 2023-12-06 NOTE — PROGRESS NOTES
Physical Therapy: Daily Note   Patient: Jolanta Rodriguez (49 y.o. female)   Examination Date:   Plan of Care/Certification Expiration Date: 23    No data recorded   :  1975 # of Visits since [facility]:   63   MRN: [id number]  CSN: [id number] Start of Care Date:   2023   Insurance: Payor: HUMANA MEDICARE / Plan: Philly Engle / Product Type: *No Product type* /   Insurance ID: [id number] - (Medicare Managed) Secondary Insurance (if applicable): Sally   Referring Physician: DO Xavi Reid PA   PCP: Brisa Escalante DO Visits to Date/Visits Approved:     No Show/Cancelled Appts:   /       Medical Diagnosis: Other specified joint disorders, right shoulder [M25.811]  Unspecified rotator cuff tear or rupture of right shoulder, not specified as traumatic [M75.101] R rotator cuff repair  Treatment Diagnosis: R rotator cuff repair        SUBJECTIVE EXAMINATION       Patient Comments: Subjective: Patient states she was up late last night [place]. She says that she has intense pain behind her left knee and her left calf, performed homans and it was negative.  Advised her that if pain and swelling continues to notify MD.          OBJECTIVE EXAMINATION   Restrictions:  Restrictions/Precautions: Surgical Protocols   Required Braces or Orthoses?: Yes   No data recorded        TREATMENT     Exercises:      Treatment Reasoning    Exercise 1: Pulleys 4 minutes---- Start with LBE x 2.5' fwd/back at lvl 2.0  Exercise 2: Wall walk x 5, 2 pounds  Exercise 3: Pegboard at 23\" x 2 rows  with 2 pound wt  Exercise 4: Scapular retraction with blue t-band x 25---- Sitting T x 15 (green)---- Sitting W (scapular retraction + bilat ER) x 15 (green)- all seated today due to knee pain  Exercise 5: Standing Is, Ts, Ys blue x 25 ea- not today  Exercise 6: T-band ER/IR/flex/ext blue x 25 each- seated today due to knee pain  Exercise 7:

## 2023-12-08 ENCOUNTER — HOSPITAL ENCOUNTER (OUTPATIENT)
Dept: PHYSICAL THERAPY | Age: 48
Setting detail: THERAPIES SERIES
Discharge: HOME | End: 2023-12-08
Payer: MEDICARE

## 2023-12-08 PROCEDURE — 97110 THERAPEUTIC EXERCISES: CPT

## 2023-12-08 ASSESSMENT — PAIN DESCRIPTION - DESCRIPTORS: DESCRIPTORS: TIGHTNESS;SORE

## 2023-12-08 ASSESSMENT — PAIN SCALES - GENERAL: PAINLEVEL_OUTOF10: 7

## 2023-12-08 ASSESSMENT — PAIN DESCRIPTION - LOCATION: LOCATION: SHOULDER

## 2023-12-08 ASSESSMENT — PAIN DESCRIPTION - PAIN TYPE: TYPE: CHRONIC PAIN

## 2023-12-08 ASSESSMENT — PAIN DESCRIPTION - ORIENTATION: ORIENTATION: RIGHT

## 2023-12-08 NOTE — PROGRESS NOTES
Physical Therapy: Daily Note   Patient: Vito Davis (4 y.o. female)   Examination Date:   Plan of Care/Certification Expiration Date: 23    No data recorded   :  1975 # of Visits since [facility]:   64   MRN: [id number]  CSN: [id number] Start of Care Date:   2023   Insurance: Payor: [employer] MEDICARE / Plan: Amara Pino / Product Type: *No Product type* /   Insurance ID: [id number] - (Medicare Managed) Secondary Insurance (if applicable): Sally   Referring Physician: DO Froilan Levin PA   PCP: Thanh Faith DO Visits to Date/Visits Approved:     No Show/Cancelled Appts:   /       Medical Diagnosis: Other specified joint disorders, right shoulder [M25.811]  Unspecified rotator cuff tear or rupture of right shoulder, not specified as traumatic [M75.101]    Treatment Diagnosis: R rotator cuff repair        SUBJECTIVE EXAMINATION   Pain Level: Pain Screening  Patient Currently in Pain: Yes  Pain Assessment: 0-10  Pain Level: 7  Pain Type: Chronic pain  Pain Location: Shoulder  Pain Orientation: Right  Pain Descriptors: Tightness, Sore    Patient Comments: Subjective: States that she had a fall Wed. Denies slipping, states she just lost her balance. No significant pain after this.     OBJECTIVE EXAMINATION   Restrictions:  Restrictions/Precautions: Surgical Protocols   Required Braces or Orthoses?: Yes   No data recorded      TREATMENT     Exercises:      Treatment Reasoning    Exercise 1: Pulleys 4 minutes---- Start with LBE x 2.5' fwd/back at lvl 2.0  Exercise 2: Wall walk x 5, 2 pounds  Exercise 3: Pegboard at 23\" x 2 rows  with 2 pound wt  Exercise 4: Scapular retraction with blue t-band x 25---- Sitting T x 15 (green)---- Sitting W (scapular retraction + bilat ER) x 15 (green)- all seated today due to knee pain  Exercise 5: Standing Is, Ts, Ys blue x 25 ea  Exercise 6: T-band ER/IR/flex/ext blue x 25 each- seated today due to

## 2023-12-12 ENCOUNTER — APPOINTMENT (OUTPATIENT)
Dept: PHYSICAL THERAPY | Age: 48
End: 2023-12-12
Payer: MEDICARE

## 2023-12-14 ENCOUNTER — HOSPITAL ENCOUNTER (OUTPATIENT)
Dept: PHYSICAL THERAPY | Age: 48
Setting detail: THERAPIES SERIES
Discharge: HOME | End: 2023-12-14
Payer: MEDICARE

## 2023-12-14 PROCEDURE — 97110 THERAPEUTIC EXERCISES: CPT

## 2023-12-14 ASSESSMENT — PAIN DESCRIPTION - DESCRIPTORS: DESCRIPTORS: SORE

## 2023-12-14 ASSESSMENT — PAIN SCALES - GENERAL: PAINLEVEL_OUTOF10: 7

## 2023-12-14 ASSESSMENT — PAIN DESCRIPTION - LOCATION: LOCATION: SHOULDER

## 2023-12-14 ASSESSMENT — PAIN DESCRIPTION - PAIN TYPE: TYPE: CHRONIC PAIN

## 2023-12-14 NOTE — PROGRESS NOTES
Physical Therapy: Daily Note   Patient: Mariana Carroll (55 y.o. female)   Examination Date:   Plan of Care/Certification Expiration Date: 23    No data recorded   :  1975 # of Visits since [facility]:   72   MRN: [id number]  CSN: [id number] Start of Care Date:   2023   Insurance: Payor: [employer] MEDICARE / Plan: Greg Martines / Product Type: *No Product type* /   Insurance ID: [id number] - (Medicare Managed) Secondary Insurance (if applicable): Sally   Referring Physician: DO Jarrod Belcher PA   PCP: Johnnie Sanchez DO Visits to Date/Visits Approved:     No Show/Cancelled Appts:   /       Medical Diagnosis: Other specified joint disorders, right shoulder [M25.811]  Unspecified rotator cuff tear or rupture of right shoulder, not specified as traumatic [M75.101]    Treatment Diagnosis: R rotator cuff repair        SUBJECTIVE EXAMINATION   Pain Level: Pain Screening  Patient Currently in Pain: Yes  Pain Assessment: 0-10  Pain Level: 7  Pain Type: Chronic pain  Pain Location: Shoulder  Pain Descriptors: Sore    Patient Comments: Subjective: Patient relates she has had another fall. She says she tripped and landed on her left knee really hard.       TREATMENT     Exercises:      Treatment Reasoning    Exercise 1: Pulleys 4 minutes---- Start with LBE x 2.5' fwd/back at lvl 2.0  Exercise 2: Wall walk x 5, 2 pounds  Exercise 3: Pegboard at 23\" x 2 rows  with 2 pound wt  Exercise 4: Scapular retraction with blue t-band x 25---- Sitting T x 15 (green)---- Sitting W (scapular retraction + bilat ER) x 15 (green)- all seated today due to knee pain  Exercise 5: Standing Is, Ts, Ys blue x 25 ea- seated  Exercise 6: T-band ER/IR/flex/ext blue x 25 each- seated today due to knee pain  Exercise 7: Shoulder shrugs/rolls x 25 4 pound weight- seated today due to knee pain  Exercise 8: Fwd bent rows 4 pounds x 25- seated today due to knee pain  Exercise 9:

## 2023-12-21 ENCOUNTER — APPOINTMENT (OUTPATIENT)
Dept: PHYSICAL THERAPY | Age: 48
End: 2023-12-21
Payer: MEDICARE

## 2023-12-26 ENCOUNTER — HOSPITAL ENCOUNTER (OUTPATIENT)
Dept: PHYSICAL THERAPY | Age: 48
Setting detail: THERAPIES SERIES
Discharge: HOME | End: 2023-12-26
Payer: MEDICARE

## 2023-12-26 PROCEDURE — 97110 THERAPEUTIC EXERCISES: CPT

## 2023-12-26 NOTE — PROGRESS NOTES
Physical Therapy: Daily Note   Patient: Savanna Kong (36 y.o. female)   Examination Date:   Plan of Care/Certification Expiration Date: 23    No data recorded   :  1975 # of Visits since [facility]:   68   MRN: [id number]  CSN: [id number] Start of Care Date:   2023   Insurance: Payor: [employer]NA MEDICARE / Plan: [place] / Product Type: *No Product type* /   Insurance ID: [id number] - (Medicare Managed) Secondary Insurance (if applicable): Sally   Referring Physician: DO Reggie Saavedra PA   PCP: Leonor Posey DO Visits to Date/Visits Approved:     No Show/Cancelled Appts:   /       Medical Diagnosis: Other specified joint disorders, right shoulder [M25.811]  Unspecified rotator cuff tear or rupture of right shoulder, not specified as traumatic [M75.101] R rotator cuff repair  Treatment Diagnosis: R rotator cuff repair        SUBJECTIVE EXAMINATION   Pain Level: Pain Screening  Patient Currently in Pain: Yes  Pain Assessment: 0-10  Pain Level: 5  Worst Pain Level: 7  Pain Location: Shoulder    Patient Comments: Subjective: She rate pain at 5-7, sharp at times.     HEP Compliance: Good  Previous treatments prior to current episode?: Medications     OBJECTIVE EXAMINATION   Restrictions:  Restrictions/Precautions: Surgical Protocols   Required Braces or Orthoses?: Yes   No data recorded               TREATMENT     Exercises:      Treatment Reasoning    Exercise 1: Pulleys 4 minutes---- Start with LBE x 2.5' fwd/back at lvl 2.0  Exercise 2: Wall walk x 5, 2 pounds--  Exercise 3: Pegboard at 23\" x 2 rows  with 2 pound wt  Exercise 4: Scapular retraction with blue t-band x 25----  Sitting W (scapular retraction + bilat ER) x 15 (green)  Exercise 5: Standing Is, Ts, Ys blue x 25 ea- seated not today  Exercise 6: T-band ER/IR/flex/ext blue x 25 each- seated today not today  Exercise 7: Shoulder shrugs/rolls x 25 4 pound weight-

## 2023-12-27 ENCOUNTER — HOSPITAL ENCOUNTER (OUTPATIENT)
Dept: PHYSICAL THERAPY | Age: 48
Setting detail: THERAPIES SERIES
Discharge: HOME | End: 2023-12-27
Payer: MEDICARE

## 2023-12-27 PROCEDURE — 97110 THERAPEUTIC EXERCISES: CPT

## 2023-12-27 NOTE — PROGRESS NOTES
- Per Ophthalmology, we recommend sleeping with head elevated.  - Follow up in retina clinic at next available appointment  - Can use Tylenol for pain  
54.5% impairment   11/28: 90% (reports decline after fall) In progress                                                                TREATMENT PLAN   Plan Frequency: DC PT  Current Treatment Recommendations: Home exercise program   Requires PT Follow-Up: No       Therapy Time  Individual Time In: 8413       Individual Time Out: 5779  Minutes: 60  Timed Code Treatment Minutes: 60 Minutes     Electronically signed by Rodo Ibrahim PT  on 12/27/2023 at 4:23 PM   POC NOTE

## 2023-12-28 ENCOUNTER — INFUSION (OUTPATIENT)
Dept: ONCOLOGY | Facility: CLINIC | Age: 48
End: 2023-12-28
Payer: MEDICARE

## 2023-12-28 DIAGNOSIS — Z45.2 ENCOUNTER FOR CARE RELATED TO PORT-A-CATH: Primary | ICD-10-CM

## 2023-12-28 RX ORDER — SODIUM CHLORIDE 0.9 % (FLUSH) 0.9 %
10 SYRINGE (ML) INJECTION AS NEEDED
OUTPATIENT
Start: 2023-12-28

## 2023-12-28 RX ORDER — HEPARIN SODIUM (PORCINE) LOCK FLUSH IV SOLN 100 UNIT/ML 100 UNIT/ML
500 SOLUTION INTRAVENOUS AS NEEDED
Status: DISCONTINUED | OUTPATIENT
Start: 2023-12-28 | End: 2023-12-28 | Stop reason: HOSPADM

## 2023-12-28 RX ORDER — SODIUM CHLORIDE 0.9 % (FLUSH) 0.9 %
10 SYRINGE (ML) INJECTION AS NEEDED
Status: DISCONTINUED | OUTPATIENT
Start: 2023-12-28 | End: 2023-12-28 | Stop reason: HOSPADM

## 2023-12-28 RX ORDER — HEPARIN SODIUM (PORCINE) LOCK FLUSH IV SOLN 100 UNIT/ML 100 UNIT/ML
500 SOLUTION INTRAVENOUS AS NEEDED
OUTPATIENT
Start: 2023-12-28

## 2023-12-28 RX ADMIN — Medication 10 ML: at 09:15

## 2023-12-28 RX ADMIN — HEPARIN SODIUM (PORCINE) LOCK FLUSH IV SOLN 100 UNIT/ML 500 UNITS: 100 SOLUTION at 09:15

## 2023-12-29 DIAGNOSIS — T82.598A OTHER MECHANICAL COMPLICATION OF OTHER CARDIAC AND VASCULAR DEVICES AND IMPLANTS, INITIAL ENCOUNTER: Primary | ICD-10-CM

## 2023-12-30 DIAGNOSIS — M79.18 MYOFASCIAL MUSCLE PAIN: ICD-10-CM

## 2023-12-31 DIAGNOSIS — E78.5 HYPERLIPIDEMIA, UNSPECIFIED HYPERLIPIDEMIA TYPE: ICD-10-CM

## 2023-12-31 DIAGNOSIS — M79.7 FIBROMYALGIA: ICD-10-CM

## 2023-12-31 DIAGNOSIS — M54.16 LUMBAR RADICULOPATHY: ICD-10-CM

## 2024-01-02 ENCOUNTER — APPOINTMENT (OUTPATIENT)
Dept: PHYSICAL THERAPY | Age: 49
End: 2024-01-02
Payer: MEDICARE

## 2024-01-02 RX ORDER — ROSUVASTATIN CALCIUM 5 MG/1
5 TABLET, COATED ORAL NIGHTLY
Qty: 90 TABLET | Refills: 3 | Status: SHIPPED | OUTPATIENT
Start: 2024-01-02

## 2024-01-02 RX ORDER — PREGABALIN 100 MG/1
100 CAPSULE ORAL 2 TIMES DAILY
Qty: 60 CAPSULE | Refills: 2 | Status: SHIPPED | OUTPATIENT
Start: 2024-01-02 | End: 2024-04-01

## 2024-01-02 RX ORDER — CYCLOBENZAPRINE HCL 10 MG
10 TABLET ORAL 3 TIMES DAILY PRN
Qty: 270 TABLET | Refills: 0 | Status: SHIPPED | OUTPATIENT
Start: 2024-01-02

## 2024-01-02 NOTE — TELEPHONE ENCOUNTER
Received fax from pharmacy requesting refill on pts medication(s). Pt was last seen in office on 10/17/2023  and has a follow up scheduled for Visit date not found. Will send request to  Dr. Dhillon  for authorization.     Requested Prescriptions     Pending Prescriptions Disp Refills    pregabalin (LYRICA) 100 MG capsule 60 capsule 2     Sig: Take 1 capsule by mouth 2 times daily for 90 days. Max Daily Amount: 200 mg    rosuvastatin (CRESTOR) 5 MG tablet 90 tablet 3     Sig: Take 1 tablet by mouth at bedtime

## 2024-01-02 NOTE — TELEPHONE ENCOUNTER
Received fax from pharmacy requesting refill on pts medication(s). Pt was last seen in office on 10/17/2023  and has a follow up scheduled for 12/31/2023. Will send request to  Dr. Dhillon  for authorization.     Requested Prescriptions     Pending Prescriptions Disp Refills    cyclobenzaprine (FLEXERIL) 10 MG tablet [Pharmacy Med Name: CYCLOBENZAPRINE 10MG TABLETS] 270 tablet 0     Sig: TAKE 1 TABLET BY MOUTH THREE TIMES DAILY AS NEEDED FOR MUSCLE SPASMS

## 2024-01-04 ENCOUNTER — APPOINTMENT (OUTPATIENT)
Dept: PHYSICAL THERAPY | Age: 49
End: 2024-01-04
Payer: MEDICARE

## 2024-01-09 ENCOUNTER — APPOINTMENT (OUTPATIENT)
Dept: PHYSICAL THERAPY | Age: 49
End: 2024-01-09
Payer: MEDICARE

## 2024-01-09 ENCOUNTER — HOSPITAL ENCOUNTER (OUTPATIENT)
Dept: PHYSICAL THERAPY | Age: 49
Setting detail: THERAPIES SERIES
End: 2024-01-09
Payer: MEDICARE

## 2024-01-09 DIAGNOSIS — J45.40 MODERATE PERSISTENT ASTHMA WITHOUT COMPLICATION: ICD-10-CM

## 2024-01-10 RX ORDER — FLUTICASONE PROPIONATE AND SALMETEROL XINAFOATE 230; 21 UG/1; UG/1
2 AEROSOL, METERED RESPIRATORY (INHALATION) 2 TIMES DAILY
Qty: 12 G | Refills: 5 | Status: SHIPPED | OUTPATIENT
Start: 2024-01-10

## 2024-01-10 NOTE — TELEPHONE ENCOUNTER
Received fax from pharmacy requesting refill on pts medication(s). Pt was last seen in office on 10/17/2023  and has a follow up scheduled for Visit date not found. Will send request to  Dr. Dhillon  for authorization.     Requested Prescriptions     Pending Prescriptions Disp Refills    ADVAIR -21 MCG/ACT inhaler [Pharmacy Med Name: ADVAIR /21MCG ORAL INH 120S] 12 g 5     Sig: INHALE 2 PUFFS INTO THE LUNGS TWICE DAILY

## 2024-01-11 ENCOUNTER — APPOINTMENT (OUTPATIENT)
Dept: PHYSICAL THERAPY | Age: 49
End: 2024-01-11
Payer: MEDICARE

## 2024-01-16 ENCOUNTER — HOSPITAL ENCOUNTER (OUTPATIENT)
Dept: PHYSICAL THERAPY | Age: 49
Setting detail: THERAPIES SERIES
Discharge: HOME | End: 2024-01-16
Payer: MEDICARE

## 2024-01-16 ENCOUNTER — APPOINTMENT (OUTPATIENT)
Dept: PHYSICAL THERAPY | Age: 49
End: 2024-01-16
Payer: MEDICARE

## 2024-01-16 PROCEDURE — 97162 PT EVAL MOD COMPLEX 30 MIN: CPT

## 2024-01-16 ASSESSMENT — PAIN DESCRIPTION - LOCATION: LOCATION: BACK;SHOULDER

## 2024-01-16 ASSESSMENT — PAIN DESCRIPTION - DESCRIPTORS: DESCRIPTORS: ACHING;SORE

## 2024-01-16 ASSESSMENT — PAIN DESCRIPTION - PAIN TYPE: TYPE: CHRONIC PAIN

## 2024-01-16 NOTE — PROGRESS NOTES
Physical Therapy: Initial Evaluation    Patient: Evelyn Moore (48 y.o. female)   Examination Date: 2024  Plan of Care Certification Period:2024 to        :  1975 ;    MRN: [id number]  CSN: [id number]   Insurance: Payor: HUMANA MEDICARE / Plan: HUMANA CHOICE-PPO MEDICARE / Product Type: *No Product type* /   Insurance ID: [id number] - (Medicare Managed) Secondary Insurance (if applicable): MEDICAID [place]   Referring Physician: OMAR Dhillon DO Bradley Albertson, DO   PCP: OMAR Dhillon DO Visits to Date/Visits Approved: 1 /      No Show/Cancelled Appts:   /       Medical Diagnosis: Other symptoms and signs involving the musculoskeletal system [R29.898]  Unspecified fall, sequela [W19.XXXS] Falls, LE weakness  Treatment Diagnosis: LE weakness, falls     PERTINENT MEDICAL HISTORY   Patient Assessed for Rehabilitation Services: Yes       Medical History: Chart Reviewed: Yes      Allergies: Silver and Zithromax [azithromycin]       SUBJECTIVE EXAMINATION     History obtained from:: Chart Review, Patient,           Subjective History:   Pt presents with complaint of leg weakness and report of frequent falls.  Feels that she falls at least once every couple of weeks.  Did have some episodes of dizziness last week while she was \"very sick\" but otherwise no issues with dizziness or lightheadedness.  Reports severe neuropathy in both feet.  In regards to LE strength, feels that both are equally weak.  Has had multiple hip surgeries on R hip.    Additional Pertinent Hx (if applicable): 48 y/o F presents with complaint of falls and leg weakness. PMH includes multiple hip surgeries, spleen cancer, back sx, neck sx, DDD, rotator cuff repair             Pain Screening    Pain Screening  Patient Currently in Pain: Yes  Pain Assessment: 0-10  Pain Type: Chronic pain  Pain Location: Back, Shoulder  Pain Descriptors: Aching, Sore    Functional Status       Social History:    Social History  Lives

## 2024-01-17 ENCOUNTER — PATIENT MESSAGE (OUTPATIENT)
Dept: FAMILY MEDICINE CLINIC | Age: 49
End: 2024-01-17

## 2024-01-17 DIAGNOSIS — R05.1 ACUTE COUGH: Primary | ICD-10-CM

## 2024-01-18 ENCOUNTER — APPOINTMENT (OUTPATIENT)
Dept: PHYSICAL THERAPY | Age: 49
End: 2024-01-18
Payer: MEDICARE

## 2024-01-18 ENCOUNTER — HOSPITAL ENCOUNTER (OUTPATIENT)
Dept: PHYSICAL THERAPY | Age: 49
Setting detail: THERAPIES SERIES
Discharge: HOME | End: 2024-01-18
Payer: MEDICARE

## 2024-01-18 PROCEDURE — 97110 THERAPEUTIC EXERCISES: CPT

## 2024-01-18 NOTE — PROGRESS NOTES
2x  Plan weeks: 4-6 weeks  Current Treatment Recommendations: Strengthening, Balance training, Functional mobility training, Neuromuscular re-education, Endurance training, Home exercise program, Safety education & training, Patient/Caregiver education & training, Equipment evaluation, education, & procurement, Therapeutic activities   Requires PT Follow-Up: Yes       Therapy Time  Individual Time In: 0906       Individual Time Out: 1006  Minutes: 60  Timed Code Treatment Minutes: 60 Minutes     Electronically signed by Eduardo Hernandez PT  on 1/18/2024 at 12:49 PM   POC NOTE

## 2024-01-19 ENCOUNTER — HOSPITAL ENCOUNTER (OUTPATIENT)
Dept: GENERAL RADIOLOGY | Age: 49
Discharge: HOME | End: 2024-01-19
Payer: MEDICARE

## 2024-01-19 DIAGNOSIS — R05.1 ACUTE COUGH: ICD-10-CM

## 2024-01-19 PROCEDURE — 71046 X-RAY EXAM CHEST 2 VIEWS: CPT

## 2024-01-22 ENCOUNTER — TELEPHONE (OUTPATIENT)
Dept: FAMILY MEDICINE CLINIC | Age: 49
End: 2024-01-22

## 2024-01-22 NOTE — TELEPHONE ENCOUNTER
Tried calling pt with results. No answer and vm was full. Will send pt a normal my chart message.

## 2024-01-22 NOTE — TELEPHONE ENCOUNTER
----- Message from OMAR Dhillon DO sent at 1/22/2024 12:35 PM CST -----  Chest x-ray is unremarkable with normal position and no infiltrates

## 2024-01-23 ENCOUNTER — HOSPITAL ENCOUNTER (OUTPATIENT)
Dept: PAIN MANAGEMENT | Age: 49
Discharge: HOME | End: 2024-01-23
Payer: MEDICARE

## 2024-01-23 ENCOUNTER — HOSPITAL ENCOUNTER (OUTPATIENT)
Dept: PHYSICAL THERAPY | Age: 49
Setting detail: THERAPIES SERIES
Discharge: HOME | End: 2024-01-23
Payer: MEDICARE

## 2024-01-23 ENCOUNTER — TELEPHONE (OUTPATIENT)
Dept: FAMILY MEDICINE CLINIC | Age: 49
End: 2024-01-23

## 2024-01-23 ENCOUNTER — APPOINTMENT (OUTPATIENT)
Dept: PHYSICAL THERAPY | Age: 49
End: 2024-01-23
Payer: MEDICARE

## 2024-01-23 VITALS
TEMPERATURE: 96.5 F | BODY MASS INDEX: 48.82 KG/M2 | RESPIRATION RATE: 18 BRPM | SYSTOLIC BLOOD PRESSURE: 131 MMHG | OXYGEN SATURATION: 95 % | WEIGHT: 293 LBS | DIASTOLIC BLOOD PRESSURE: 95 MMHG | HEIGHT: 65 IN | HEART RATE: 97 BPM

## 2024-01-23 DIAGNOSIS — R05.1 ACUTE COUGH: Primary | ICD-10-CM

## 2024-01-23 PROCEDURE — 97110 THERAPEUTIC EXERCISES: CPT

## 2024-01-23 PROCEDURE — 99213 OFFICE O/P EST LOW 20 MIN: CPT

## 2024-01-23 RX ORDER — DESVENLAFAXINE 25 MG/1
TABLET, EXTENDED RELEASE ORAL
COMMUNITY
Start: 2023-12-19

## 2024-01-23 ASSESSMENT — PAIN DESCRIPTION - INTENSITY: RATING_2: 7

## 2024-01-23 ASSESSMENT — PAIN SCALES - GENERAL
PAINLEVEL_OUTOF10: 8
PAINLEVEL_OUTOF10: 7

## 2024-01-23 ASSESSMENT — ENCOUNTER SYMPTOMS
ABDOMINAL DISTENTION: 0
BOWEL INCONTINENCE: 0
ABDOMINAL PAIN: 0
BACK PAIN: 1
CONSTIPATION: 0
NAUSEA: 0

## 2024-01-23 ASSESSMENT — PAIN DESCRIPTION - LOCATION
LOCATION: BACK;SHOULDER
LOCATION: BACK
LOCATION_2: NECK

## 2024-01-23 ASSESSMENT — PAIN DESCRIPTION - PAIN TYPE
TYPE: CHRONIC PAIN
TYPE: CHRONIC PAIN

## 2024-01-23 ASSESSMENT — PAIN DESCRIPTION - ORIENTATION
ORIENTATION_2: LOWER
ORIENTATION: LOWER
ORIENTATION: LOWER

## 2024-01-23 ASSESSMENT — PAIN DESCRIPTION - DESCRIPTORS: DESCRIPTORS: ACHING;SORE

## 2024-01-23 NOTE — TELEPHONE ENCOUNTER
Letter received from [facility] patient only received a temporary supply of the new advair inhaler.     Insurance prefers   Symbicort  Genaro Harden  Would you like to change it to one of these?

## 2024-01-23 NOTE — PROGRESS NOTES
Clinic Documentation      Education Provided:  [x] Review of Alberto  [] Agreement Review  [x] PEG Score Calculated [] PHQ Score Calculated [] ORT Score Calculated    [] Compliance Issues Discussed [] Cognitive Behavior Needs [x] Exercise [] Review of Test [] Financial Issues  [x] Tobacco/Alcohol Use Reviewed [x] Teaching [] New Patient [] Picture Obtained    Physician Plan:  [] Outgoing Referral  [] Pharmacy Consult  [] Test Ordered [x] Prescription Ordered/Changed   [] Obtained Test Results / Consult Notes        Complete if patient is withholding blood thinner for procedure     Blood Thinner Patient is currently taking:      [] Plavix (Hold for 7 days)  [] Aspirin (Hold for 5 days)     [] Pletal (Hold for 2 days)  [] Pradaxa (Hold for 3 days)    [] Effient (Hold for 7 days)  [] Xarelto (Hold for 2 days)    [] Eliquis (Hold for 2 days)  [] Brilinta (Hold for 7 days)    [] Coumadin (Hold for 5 days) - (INR needs to be drawn the day prior to procedure- INR < 2.0)    [] Aggrenox (Hold for 7 days)        [] Patient will stop medication on their own.    [] Blood Thinner Form Faxed for approval to hold.   Provider form faxed to:     Assessment Completed by:  Electronically signed by Nandini Bender RN on 1/23/2024 at 10:29 AM  
                                                                                                         Active Problems:    Back pain with left-sided radiculopathy    Hx of fusion of cervical spine    Chronic bilateral low back pain without sciatica    Lumbar radiculopathy    Myofascial muscle pain    History of lumbar spinal fusion    Chronic pain of both knees    Muscle spasms of neck    Spasm of muscle of lower back    Fibromyalgia    Hx of shoulder surgery  Resolved Problems:    * No resolved hospital problems. *      PLAN:  DDD (degenerative disc disease), lumbar  Back pain with left-sided radiculopathy  Cervical vertebral fusion  Chronic low back pain  Refill sent prior to appointment with fill date 10/18/2023. NO refill sent at appointment.     oxycodone-acetaminophen (PERCOCET) 7.5-325 MG per tablet Sig: Take 1 tablet by mouth every 6 hours as needed for Pain for up to 30 days. Max Daily Amount: 4 tablets    Lyrica and Flexeril is being prescribed by PCP.     Lumbar radiculopathy  Repeat -  LESI of L2/L3 or fluoroscopy with medical director as patient has had good results from past injections.      Muscle Spasms  Re-Schedule bilateral thoracic trigger point injections    Encounter for Opioid Maintenance Monitoring  Patient is tested according to risk of opioid misuse, office policy, state regulations and/or providers discretion  Patient risk low  UDS or Saliva collected & tested on 7/25/2023     Pain Medication Agreement Signed  Agreement reviewed and signed 10/16/2023  Patient or Patient's Advocate verbalized understanding of agreement     Chronic bilateral lower abdominal pain  Chronic left-sided low back pain without sciatica  Lumbar radiculopathy    Continue use of NexWave 3 in 1 device. Patient suffers from    DDD (degenerative disc disease), lumbar, Back pain with left-sided radiculopathy, Cervical vertebral fusion, Chronic low back pain, Lumbar radiculopathy, hx of shoulder surgery, Myofascial muscle 
Cervical vertebral fusion, Chronic low back pain, Lumbar radiculopathy, hx of shoulder surgery, Myofascial muscle pain, Muscle spasms of neck, Spasm of muscle of lower back, Fibromyalgia    Zynex NexWave is a prescription only 3-in-1 device with IFC, TENS, and NMES. The device is used to help manage their pain symptoms, re-educate and strengthen muscles, and or hopefully reduce or eliminate their need for pain medications.     [x] Follow up    [] 4 weeks   [] 6-8 weeks   [x] 10-12 weeks   [] 3 months  [x] Post procedure to evaluate effectiveness of treatment  [x] To evaluate medications changes made at office visit.   [] To review diagnostics ordered at last visit.   [] To evaluate response to therapy    [x] For management of controlled substance  [x] Random UDS as indicated by ORT score or if indicated by abberent behaviors    Discussion: Discussed exam findings and plan of care with patient. Patient agreed with POC and questions were asked and answered.     Activity: discussed exercise as beneficial to pain reduction, encouraged stretching exercise with a focus on torso strengthening.    Goals:  Pain Management Goals of Therapy:   [] Resolution in pain  [x] Decrease in pain level  [x] Improvement in ADL's  [x] Increase in activities with less pain  [] Decrease in medication      Controlled substance monitoring:    [x] Discussed medication side effects, risk of tolerance and/or dependence, and/or alternative treatment  [] Discussed the detrimental effects of long term narcotic use in younger patients especially women of childbearing years.  [x] No signs and symptoms of potential drug abuse or diversion were identified  [] Signs of potential drug abuse or diversion were identified   [] ORT Score   [] UDS non-compliant   [] See Note  [] Random urine drug screen sent today  [x] Random urine drug screen not completed today   [] Deferred New Patient  [x] Compliant   [] Not Compliant see note  [x] Medication agreement

## 2024-01-23 NOTE — PROGRESS NOTES
Physical Therapy: Daily Note   Patient: Evelyn Moore (48 y.o. female)   Examination Date: 2024  Plan of Care/Certification Expiration Date: 24    No data recorded   :  1975 # of Visits since SOC:   71   MRN: [id number]  CSN: [id number] Start of Care Date:   2023   Insurance: Payor: HUMANA MEDICARE / Plan: HUMANA CHOICE-PPO MEDICARE / Product Type: *No Product type* /   Insurance ID: [id number] - (Medicare Managed) Secondary Insurance (if applicable): MEDICAID [place]   Referring Physician: OMAR Dhillon DO Bradley Albertson, DO   PCP: OMAR Dhillon DO Visits to Date/Visits Approved: 3 / 11    No Show/Cancelled Appts:   /       Medical Diagnosis: Other symptoms and signs involving the musculoskeletal system [R29.898]  Unspecified fall, sequela [W19.XXXS] Falls, LE weakness  Treatment Diagnosis: LE weakness, falls        SUBJECTIVE EXAMINATION   Pain Level: Pain Screening  Patient Currently in Pain: Yes  Pain Assessment: 0-10  Pain Level: 7  Pain Type: Chronic pain  Pain Location: Back, Shoulder  Pain Orientation: Lower  Pain Descriptors: Aching, Sore    Patient Comments: Subjective: I am out of energy.  I am having alot of pain.        TREATMENT     Exercises:      Treatment Reasoning    Exercise 1: May need to modify based on tolerance/back pain  Exercise 2: LBE, 5 min, level 1  Exercise 3: Box steps cw/ccw, 5 each  Exercise 4: Fwd/back on line, 2 each  Exercise 5: Sidestepping on line, 2 each  Exercise 6: Cone weaves, X 2  Exercise 7: Repeated sit to stand, table height 26\", 10 reps  Exercise 8: Standing march, 10 reps  Exercise 9: Mini squats, 10 reps  Exercise 10: Standing hip abd/ext, 10 reps  Exercise 11: Heel/toe raises, 10 reps  Exercise 12: Stance on foam EO/EC/Narrow DIDI, 60 sec  Exercise 13: Toe taps on 4\" step, 10 each  Exercise 14: Stance with balance perturbations not today  Exercise 15: LAQ/HS Curls, 15 reps  Exercise 16: BAPS board bilat LE not today  Exercise 17:

## 2024-01-24 ENCOUNTER — PATIENT MESSAGE (OUTPATIENT)
Dept: FAMILY MEDICINE CLINIC | Age: 49
End: 2024-01-24

## 2024-01-24 DIAGNOSIS — J45.40 MODERATE PERSISTENT ASTHMA WITHOUT COMPLICATION: Primary | ICD-10-CM

## 2024-01-24 RX ORDER — BUDESONIDE AND FORMOTEROL FUMARATE DIHYDRATE 160; 4.5 UG/1; UG/1
2 AEROSOL RESPIRATORY (INHALATION) 2 TIMES DAILY
Qty: 30.6 G | Refills: 3 | Status: SHIPPED | OUTPATIENT
Start: 2024-01-24

## 2024-01-24 NOTE — TELEPHONE ENCOUNTER
It looks like she was actually on the Advair HFA and not Advair discus.  So on that regard we will do Symbicort 160/4.5 mcg per actuation, 2 puffs twice daily  Rinse mouth after use  Dispense number 3 units with 3 refills.    Wixela however is the generic for Advair discus.  That may be why they did not just substituted because she was using the HFA formulation.  We will use a mist inhaler MDI as before.

## 2024-01-25 ENCOUNTER — HOSPITAL ENCOUNTER (OUTPATIENT)
Dept: PHYSICAL THERAPY | Age: 49
Setting detail: THERAPIES SERIES
Discharge: HOME | End: 2024-01-25
Payer: MEDICARE

## 2024-01-25 ENCOUNTER — APPOINTMENT (OUTPATIENT)
Dept: PHYSICAL THERAPY | Age: 49
End: 2024-01-25
Payer: MEDICARE

## 2024-01-25 DIAGNOSIS — Z98.890 HX OF SHOULDER SURGERY: ICD-10-CM

## 2024-01-25 DIAGNOSIS — M54.10 BACK PAIN WITH LEFT-SIDED RADICULOPATHY: Chronic | ICD-10-CM

## 2024-01-25 PROCEDURE — 97110 THERAPEUTIC EXERCISES: CPT

## 2024-01-25 PROCEDURE — 97112 NEUROMUSCULAR REEDUCATION: CPT

## 2024-01-25 RX ORDER — BUDESONIDE AND FORMOTEROL FUMARATE DIHYDRATE 160; 4.5 UG/1; UG/1
2 AEROSOL RESPIRATORY (INHALATION) 2 TIMES DAILY
Qty: 10.2 G | Refills: 5 | Status: SHIPPED | OUTPATIENT
Start: 2024-01-25

## 2024-01-25 ASSESSMENT — PAIN DESCRIPTION - ORIENTATION: ORIENTATION: LOWER

## 2024-01-25 ASSESSMENT — PAIN DESCRIPTION - PAIN TYPE: TYPE: CHRONIC PAIN

## 2024-01-25 ASSESSMENT — PAIN DESCRIPTION - DESCRIPTORS: DESCRIPTORS: ACHING;SORE

## 2024-01-25 ASSESSMENT — PAIN DESCRIPTION - LOCATION: LOCATION: BACK

## 2024-01-25 ASSESSMENT — PAIN SCALES - GENERAL: PAINLEVEL_OUTOF10: 5

## 2024-01-25 NOTE — PROGRESS NOTES
Physical Therapy: Daily Note   Patient: Evelyn Moore (48 y.o. female)   Examination Date: 2024  Plan of Care/Certification Expiration Date: 24    No data recorded   :  1975 # of Visits since SOC:   3   MRN: [id number]  CSN: [id number] Start of Care Date:   2024   Insurance: Payor: HUMANA MEDICARE / Plan: HUMANA CHOICE-PPO MEDICARE / Product Type: *No Product type* /   Insurance ID: [id number] - (Medicare Managed) Secondary Insurance (if applicable): MEDICAID [place]   Referring Physician: OMAR Dhillon DO Bradley Albertson, DO   PCP: OMAR Dhillon DO Visits to Date/Visits Approved:     No Show/Cancelled Appts:   /       Medical Diagnosis: Other symptoms and signs involving the musculoskeletal system [R29.898]  Unspecified fall, sequela [W19.XXXS] Falls, LE weakness  Treatment Diagnosis: LE weakness, falls        SUBJECTIVE EXAMINATION   Pain Level: Pain Screening  Patient Currently in Pain: Yes  Pain Assessment: 0-10  Pain Level: 5  Pain Type: Chronic pain  Pain Location: Back  Pain Orientation: Lower  Pain Descriptors: Aching, Sore    Patient Comments: Subjective: I didnt wear the best shoes for today.  I dont feel as bad today.        TREATMENT     Exercises:      Treatment Reasoning    Exercise 1: May need to modify based on tolerance/back pain  Exercise 2: LBE, 5 min, level 1  Exercise 3: Box steps cw/ccw, 5 each  Exercise 4: Fwd/back on line, 2 each  Exercise 5: Sidestepping on line, 2 each  Exercise 6: Cone weaves, X 2  Exercise 7: Repeated sit to stand, table height 26\", 10 reps  Exercise 8: Standing march, 10 reps  Exercise 9: Mini squats, 10 reps  Exercise 10: Standing hip abd/ext, 10 reps  Exercise 11: Heel/toe raises, 10 reps  Exercise 12: Stance on foam EO/EC/Narrow DIDI, 60 sec  Exercise 13: Toe taps on 4\" step, 10 each  Exercise 14: Stance with balance perturbations x 1 mins  Exercise 15: LAQ/HS Curls with red bqnd, 15 reps  Exercise 16: BAPS board bilat LE

## 2024-01-25 NOTE — TELEPHONE ENCOUNTER
From: LASHA DE LA CRUZ  To: Evelyn Moore  Sent: 1/24/2024 10:56 AM CST  Subject: Elvia Rivas,     Insurance would not cover the Advair inhaler Dr. Jacquie kumar sent, he wants us to send in an inhaler called Symbicort in. Where would you want this sent?     Symbicort would be 2 puffs twice a day, rinse your mouth after every use.

## 2024-01-26 RX ORDER — OXYCODONE AND ACETAMINOPHEN 7.5; 325 MG/1; MG/1
1 TABLET ORAL EVERY 8 HOURS PRN
Qty: 90 TABLET | Refills: 0 | Status: SHIPPED | OUTPATIENT
Start: 2024-01-26 | End: 2024-02-25

## 2024-01-30 ENCOUNTER — HOSPITAL ENCOUNTER (OUTPATIENT)
Dept: PHYSICAL THERAPY | Age: 49
Setting detail: THERAPIES SERIES
Discharge: HOME | End: 2024-01-30
Payer: MEDICARE

## 2024-01-30 ENCOUNTER — APPOINTMENT (OUTPATIENT)
Dept: PHYSICAL THERAPY | Age: 49
End: 2024-01-30
Payer: MEDICARE

## 2024-01-30 PROCEDURE — 97110 THERAPEUTIC EXERCISES: CPT

## 2024-01-30 ASSESSMENT — PAIN DESCRIPTION - DESCRIPTORS: DESCRIPTORS: ACHING;TIGHTNESS

## 2024-01-30 ASSESSMENT — PAIN DESCRIPTION - PAIN TYPE: TYPE: CHRONIC PAIN

## 2024-01-30 ASSESSMENT — PAIN SCALES - GENERAL: PAINLEVEL_OUTOF10: 5

## 2024-01-30 ASSESSMENT — PAIN DESCRIPTION - ORIENTATION: ORIENTATION: LOWER

## 2024-01-30 ASSESSMENT — PAIN DESCRIPTION - LOCATION: LOCATION: BACK

## 2024-01-30 NOTE — PROGRESS NOTES
decline after fall)                                                                  TREATMENT PLAN   Plan Frequency: 2x  Plan weeks: 4-6 weeks  Current Treatment Recommendations: Strengthening, Balance training, Functional mobility training, Neuromuscular re-education, Endurance training, Home exercise program, Safety education & training, Patient/Caregiver education & training, Equipment evaluation, education, & procurement, Therapeutic activities   Requires PT Follow-Up: Yes     Therapy Time  Individual Time In: 0907       Individual Time Out: 1010  Minutes: 63  Timed Code Treatment Minutes: 63 Minutes     Electronically signed by Dipti Arcos, PT  on 1/30/2024 at 12:48 PM

## 2024-02-01 ENCOUNTER — HOSPITAL ENCOUNTER (OUTPATIENT)
Dept: PHYSICAL THERAPY | Age: 49
Setting detail: THERAPIES SERIES
Discharge: HOME | End: 2024-02-01
Payer: MEDICARE

## 2024-02-01 ENCOUNTER — APPOINTMENT (OUTPATIENT)
Dept: PHYSICAL THERAPY | Age: 49
End: 2024-02-01
Payer: MEDICARE

## 2024-02-01 PROCEDURE — 97110 THERAPEUTIC EXERCISES: CPT

## 2024-02-01 PROCEDURE — 97112 NEUROMUSCULAR REEDUCATION: CPT

## 2024-02-01 ASSESSMENT — PAIN DESCRIPTION - ORIENTATION: ORIENTATION: RIGHT;LEFT

## 2024-02-01 ASSESSMENT — PAIN SCALES - GENERAL: PAINLEVEL_OUTOF10: 6

## 2024-02-01 ASSESSMENT — PAIN DESCRIPTION - PAIN TYPE: TYPE: CHRONIC PAIN

## 2024-02-01 ASSESSMENT — PAIN DESCRIPTION - LOCATION: LOCATION: LEG

## 2024-02-01 NOTE — PROGRESS NOTES
Daily Treatment Note  Date: 2024  Patient Name: Evelyn Moore  MRN: [id number]     :   1975    Referring Physician: OMAR Dhillon DO Bradley Albertson, DO   PCP: OMAR Dhillon DO    Medical Diagnosis: Other symptoms and signs involving the musculoskeletal system [R29.898]  Unspecified fall, sequela [W19.XXXS] Falls, LE weakness  Treatment Diagnosis: LE weakness, falls      Insurance: Payor: HUMANA MEDICARE / Plan: HUMANA CHOICE-PPO MEDICARE / Product Type: *No Product type* /   Insurance ID: [id number] - (Medicare Managed)    Subjective:   General  Diagnosis: Falls, LE weakness  Referring Provider (secondary): Leobardo Dhillon DO  Onset Date: 23  PT Insurance Information: [employer] Medicare (Primary) and [place] Medicaid (Secondary)  Total # of Visits Approved: 11  Total # of Visits to Date: 6  Plan of Care/Certification Expiration Date: 24  Progress Note Due Date: 02/15/24  Referring Provider (secondary): Leobardo Dhillon DO  Subjective: both legs are hurting  Patient Currently in Pain: Yes  Pain Level: 6  Pain Type: Chronic pain  Pain Location: Leg  Pain Orientation: Right, Left       Treatment Activities:  Exercises:      Treatment Reasoning    Exercise 1: May need to modify based on tolerance/back pain  Exercise 2: LBE, 5 min, level 1  Exercise 3: Box steps cw/ccw, 5 each  Exercise 4: Fwd/back on line, 2 each  Exercise 5: Sidestepping on line, 2 each  Exercise 6: Cone weaves, X 2  Exercise 7: Repeated sit to stand, table height 23\", 10 reps  Exercise 8: Standing march, 10 reps  Exercise 9: Mini squats, 10 reps  Exercise 10: Standing hip abd/ext, 10 reps  Exercise 11: Heel/toe raises, 10 reps  Exercise 12: Stance on foam EO/EC/Narrow DIDI, 60 sec  Exercise 13: Toe taps on 4\" step, 10 each  Exercise 14: Stance with balance perturbations x 1 mins  Exercise 15: LAQ/HS Curls with red band, 15 reps  Exercise 16: BAPS board bilat LE not today                           Assessment:

## 2024-02-02 ENCOUNTER — HOSPITAL ENCOUNTER (OUTPATIENT)
Dept: INTERVENTIONAL RADIOLOGY/VASCULAR | Facility: HOSPITAL | Age: 49
Discharge: HOME | End: 2024-02-02
Payer: MEDICARE

## 2024-02-02 VITALS — SYSTOLIC BLOOD PRESSURE: 132 MMHG | DIASTOLIC BLOOD PRESSURE: 76 MMHG | HEART RATE: 71 BPM | OXYGEN SATURATION: 100 %

## 2024-02-02 DIAGNOSIS — T82.598A OTHER MECHANICAL COMPLICATION OF OTHER CARDIAC AND VASCULAR DEVICES AND IMPLANTS, INITIAL ENCOUNTER: ICD-10-CM

## 2024-02-02 PROCEDURE — 36598 INJ W/FLUOR EVAL CV DEVICE: CPT

## 2024-02-02 PROCEDURE — 25010000002 HEPARIN LOCK FLUSH PER 10 UNITS: Performed by: RADIOLOGY

## 2024-02-02 PROCEDURE — 25510000001 IOPAMIDOL 61 % SOLUTION: Performed by: RADIOLOGY

## 2024-02-02 RX ORDER — HEPARIN SODIUM (PORCINE) LOCK FLUSH IV SOLN 100 UNIT/ML 100 UNIT/ML
500 SOLUTION INTRAVENOUS ONCE
Status: COMPLETED | OUTPATIENT
Start: 2024-02-02 | End: 2024-02-02

## 2024-02-02 RX ADMIN — SODIUM CHLORIDE, PRESERVATIVE FREE 500 UNITS: 5 INJECTION INTRAVENOUS at 10:44

## 2024-02-02 RX ADMIN — IOPAMIDOL 10 ML: 612 INJECTION, SOLUTION INTRAVENOUS at 10:37

## 2024-02-06 ENCOUNTER — APPOINTMENT (OUTPATIENT)
Dept: PHYSICAL THERAPY | Age: 49
End: 2024-02-06
Payer: MEDICARE

## 2024-02-08 ENCOUNTER — HOSPITAL ENCOUNTER (OUTPATIENT)
Dept: PHYSICAL THERAPY | Age: 49
Setting detail: THERAPIES SERIES
End: 2024-02-08
Payer: MEDICARE

## 2024-02-12 DIAGNOSIS — M54.10 BACK PAIN WITH LEFT-SIDED RADICULOPATHY: Chronic | ICD-10-CM

## 2024-02-12 DIAGNOSIS — Z98.890 HX OF SHOULDER SURGERY: ICD-10-CM

## 2024-02-12 RX ORDER — OXYCODONE AND ACETAMINOPHEN 7.5; 325 MG/1; MG/1
1 TABLET ORAL EVERY 8 HOURS PRN
Qty: 90 TABLET | Refills: 0 | Status: SHIPPED | OUTPATIENT
Start: 2024-02-25 | End: 2024-03-26

## 2024-02-13 ENCOUNTER — HOSPITAL ENCOUNTER (OUTPATIENT)
Dept: PAIN MANAGEMENT | Age: 49
Discharge: HOME | End: 2024-02-13
Payer: MEDICARE

## 2024-02-13 ENCOUNTER — HOSPITAL ENCOUNTER (OUTPATIENT)
Dept: PHYSICAL THERAPY | Age: 49
Setting detail: THERAPIES SERIES
Discharge: HOME | End: 2024-02-13
Payer: MEDICARE

## 2024-02-13 VITALS
HEART RATE: 79 BPM | TEMPERATURE: 97.1 F | SYSTOLIC BLOOD PRESSURE: 124 MMHG | RESPIRATION RATE: 18 BRPM | DIASTOLIC BLOOD PRESSURE: 84 MMHG | OXYGEN SATURATION: 95 %

## 2024-02-13 DIAGNOSIS — R52 PAIN MANAGEMENT: ICD-10-CM

## 2024-02-13 PROCEDURE — 2500000003 HC RX 250 WO HCPCS

## 2024-02-13 PROCEDURE — 6360000002 HC RX W HCPCS

## 2024-02-13 PROCEDURE — A4216 STERILE WATER/SALINE, 10 ML: HCPCS

## 2024-02-13 PROCEDURE — 62323 NJX INTERLAMINAR LMBR/SAC: CPT

## 2024-02-13 PROCEDURE — 2580000003 HC RX 258

## 2024-02-13 PROCEDURE — 97110 THERAPEUTIC EXERCISES: CPT

## 2024-02-13 RX ORDER — METHYLPREDNISOLONE ACETATE 80 MG/ML
80 INJECTION, SUSPENSION INTRA-ARTICULAR; INTRALESIONAL; INTRAMUSCULAR; SOFT TISSUE ONCE
Status: DISCONTINUED | OUTPATIENT
Start: 2024-02-13 | End: 2024-02-15 | Stop reason: HOSPADM

## 2024-02-13 RX ORDER — LIDOCAINE HYDROCHLORIDE 10 MG/ML
5 INJECTION, SOLUTION EPIDURAL; INFILTRATION; INTRACAUDAL; PERINEURAL ONCE
Status: DISCONTINUED | OUTPATIENT
Start: 2024-02-13 | End: 2024-02-15 | Stop reason: HOSPADM

## 2024-02-13 RX ORDER — SODIUM CHLORIDE 9 MG/ML
5 INJECTION INTRAVENOUS ONCE
Status: DISCONTINUED | OUTPATIENT
Start: 2024-02-13 | End: 2024-02-15 | Stop reason: HOSPADM

## 2024-02-13 NOTE — PROGRESS NOTES
Procedure:  Level of Consciousness: [x]Alert [x]Oriented []Disoriented []Lethargic  Anxiety Level: [x]Calm []Anxious []Depressed []Other  Skin: []Warm [x]Dry []Cool []Moist []Intact []Other  Cardiovascular: [x]Palpitations: [x]Never []Occasionally []Frequently  Chest Pain: [x]No []Yes  Respiratory:  [x]Unlabored []Labored []Cough ([] Productive []Unproductive)  HCG Required: [x]No []Yes   Results: []Negative []Positive  Knowledge Level:        [x]Patient/Other verbalized understanding of pre-procedure instructions.        [x]Assessment of post-op care needs (transportation, responsible caregiver)        [x]Able to discuss health care problems and how to deal with it.  Factors that Affect Teaching:        Language Barrier: [x]No []Yes - why:        Hearing Loss:        [x]No []Yes            Corrective Device:  []Yes []No        Vision Loss:           []No [x]Yes            Corrective Device:  [x]Yes []No        Memory Loss:       [x]No []Yes            []Short Term []Long Term  Motivational Level:  [x]Asks Questions                  []Extremely Anxious       [x]Seems Interested               []Seems Uninterested                  []Denies need for Education  Risk for Injury:  [x]Patient oriented to person, place and time  []History of frequent falls/loss of balance  Nutritional:  []Change in appetite   []Weight Gain   []Weight Loss  Functional:  []Requires assistance with ADL's

## 2024-02-13 NOTE — INTERVAL H&P NOTE
Update History & Physical    The patient's History and Physical  was reviewed with the patient and I examined the patient. There was no change. The surgical site was confirmed by the patient and me.     Plan: The risks, benefits, expected outcome, and alternative to the recommended procedure have been discussed with the patient. Patient understands and wants to proceed with the procedure.     Electronically signed by Alex Carmona MD on 2/13/2024 at 11:26 AM       today

## 2024-02-13 NOTE — PROGRESS NOTES
Physical Therapy: Daily Note   Patient: Evelyn Moore (48 y.o. female)   Examination Date: 2024  Plan of Care/Certification Expiration Date: 24    No data recorded   :  1975 # of Visits since SOC:   73   MRN: [id number]  CSN: [id number] Start of Care Date:   2024   Insurance: Payor: HUMANA MEDICARE / Plan: HUMANA CHOICE-PPO MEDICARE / Product Type: *No Product type* /   Insurance ID: [id number] - (Medicare Managed) Secondary Insurance (if applicable): MEDICAID [place]   Referring Physician: OMAR Dhillon DO Bradley Albertson, DO   PCP: OMAR Dhillon DO Visits to Date/Visits Approved:     No Show/Cancelled Appts:   /       Medical Diagnosis: Other symptoms and signs involving the musculoskeletal system [R29.898]  Unspecified fall, sequela [W19.XXXS]    Treatment Diagnosis: LE weakness, falls        SUBJECTIVE EXAMINATION   Pain Level: Pain Screening  Patient Currently in Pain: Yes  Pain Assessment: 0-10  Pain Level: 5  Pain Type: Chronic pain  Pain Location: Knee, Hip, Back  Pain Orientation: Left, Right  Pain Descriptors: Aching    Patient Comments: Subjective: C/o LE pain and back pain.  She has appt with pain management for epidural injection after this visit.     OBJECTIVE EXAMINATION   Restrictions:  Restrictions/Precautions: Surgical Protocols   Required Braces or Orthoses?: Yes   No data recorded      Left Strength  Right Strength         Strength LLE  L Hip Flexion: 5/5 (5-/5)  L Hip Extension: 5/5 (5-/5)  L Hip ABduction: 5/5  L Hip ADduction: 5/5  L Knee Flexion: 5/5 (5-/5)  L Knee Extension: 5/5 (5-/5)  L Ankle Dorsiflexion: 5/5 (5-/5)  L Ankle Plantar Flexion: 5/5  L Ankle Inversion: 5/5 (5-/5)  L Ankle Eversion: 5/5 (5-/5)    Strength RLE  R Hip Flexion: 5/5 (5-/5)  R Hip Extension: 5/5  R Hip ABduction: 5/5  R Hip ADduction: 5/5  R Knee Flexion: 5/5 (5-/5)  R Knee Extension: 5/5 (5-/5)  R Ankle Dorsiflexion: 5/5  R Ankle Plantar flexion: 5/5  R Ankle Inversion:

## 2024-02-15 ENCOUNTER — HOSPITAL ENCOUNTER (OUTPATIENT)
Dept: PHYSICAL THERAPY | Age: 49
Setting detail: THERAPIES SERIES
Discharge: HOME | End: 2024-02-15
Payer: MEDICARE

## 2024-02-15 PROCEDURE — 97110 THERAPEUTIC EXERCISES: CPT

## 2024-02-15 NOTE — PROGRESS NOTES
Physical Therapy: Daily Note   Patient: Evelyn Moore (48 y.o. female)   Examination Date: 02/15/2024  Plan of Care/Certification Expiration Date: 24    No data recorded   :  1975 # of Visits since SOC:   74   MRN: [id number]  CSN: [id number] Start of Care Date:   2024   Insurance: Payor: HUMANA MEDICARE / Plan: HUMANA CHOICE-PPO MEDICARE / Product Type: *No Product type* /   Insurance ID: [id number] - (Medicare Managed) Secondary Insurance (if applicable): MEDICAID [place]   Referring Physician: OMAR Dhillon DO Bradley Albertson, DO   PCP: OMAR Dhillon DO Visits to Date/Visits Approved:     No Show/Cancelled Appts:   /       Medical Diagnosis: Other symptoms and signs involving the musculoskeletal system [R29.898]  Unspecified fall, sequela [W19.XXXS]    Treatment Diagnosis: LE weakness, falls        SUBJECTIVE EXAMINATION   Pain Level: Pain Screening  Patient Currently in Pain: Denies    Patient Comments: Subjective: My shoulder is bothering me today.  My balance seems about the same.        TREATMENT     Exercises:      Treatment Reasoning    Exercise 1: May need to modify based on tolerance/back pain  Exercise 2: LBE, 5 min, level 1  Exercise 3: Box steps cw/ccw, 5 each  Exercise 4: Fwd/back on line, 2 each  Exercise 5: Sidestepping on line, 2 each  Exercise 6: Cone weaves, X 2  Exercise 7: Repeated sit to stand, table height 23\", 10 reps  Exercise 8: Standing march, 10 reps  Exercise 9: Mini squats, 10 reps  Exercise 10: Standing hip abd/ext, 10 reps  Exercise 11: Heel/toe raises, 10 reps  Exercise 12: Stance on foam EO/EC/Narrow DIDI, 60 sec  Exercise 13: Toe taps on 4\" step, 10 each  Exercise 14: Stance with balance perturbations x 1 mins  Exercise 15: LAQ/HS Curls with green band, 15 reps  Exercise 16: BAPS board bilat LE not today                             ASSESSMENT     Assessment: Assessment: Patient did fairly well in session.  She denies any pain this morning but

## 2024-02-19 ENCOUNTER — TELEPHONE (OUTPATIENT)
Dept: PAIN MANAGEMENT | Age: 49
End: 2024-02-19

## 2024-02-20 ENCOUNTER — HOSPITAL ENCOUNTER (OUTPATIENT)
Dept: PHYSICAL THERAPY | Age: 49
Setting detail: THERAPIES SERIES
Discharge: HOME | End: 2024-02-20
Payer: MEDICARE

## 2024-02-20 PROCEDURE — 97110 THERAPEUTIC EXERCISES: CPT

## 2024-02-20 ASSESSMENT — PAIN DESCRIPTION - LOCATION: LOCATION: KNEE

## 2024-02-20 ASSESSMENT — PAIN DESCRIPTION - ORIENTATION: ORIENTATION: RIGHT

## 2024-02-20 ASSESSMENT — PAIN DESCRIPTION - PAIN TYPE: TYPE: CHRONIC PAIN

## 2024-02-20 ASSESSMENT — PAIN SCALES - GENERAL: PAINLEVEL_OUTOF10: 5

## 2024-02-20 NOTE — PROGRESS NOTES
Physical Therapy: Daily Note   Patient: Evelyn Moore (48 y.o. female)   Examination Date: 2024  Plan of Care/Certification Expiration Date: 24    No data recorded   :  1975 # of Visits since SOC:   75   MRN: [id number]  CSN: [id number] Start of Care Date:   2024   Insurance: Payor: HUMANA MEDICARE / Plan: HUMANA CHOICE-PPO MEDICARE / Product Type: *No Product type* /   Insurance ID: [id number] - (Medicare Managed) Secondary Insurance (if applicable): MEDICAID [place]   Referring Physician: OMAR Dhillon DO Bradley Albertson, DO   PCP: OMAR Dhillon DO Visits to Date/Visits Approved:     No Show/Cancelled Appts:   /       Medical Diagnosis: Other symptoms and signs involving the musculoskeletal system [R29.898]  Unspecified fall, sequela [W19.XXXS]    Treatment Diagnosis: LE weakness, falls        SUBJECTIVE EXAMINATION   Pain Level: Pain Screening  Patient Currently in Pain: Yes  Pain Assessment: 0-10  Pain Level: 5  Pain Type: Chronic pain  Pain Location: Knee  Pain Orientation: Right    Patient Comments: Subjective: I fell last Thursday after I left here.  I had to go to hospital.  It was awful.  I hit my head on the ground.              TREATMENT     Exercises:      Treatment Reasoning    Exercise 1: May need to modify based on tolerance/back pain  Exercise 2: LBE, 5 min, level 1  Exercise 3: Box steps cw/ccw, 5 each  Exercise 4: Fwd/back on line, 2 each  Exercise 5: Sidestepping on line, 2 each  Exercise 6: Cone weaves, X 2  Exercise 7: Repeated sit to stand, table height 23\", 10 reps  Exercise 8: Standing march, 10 reps  Exercise 9: Mini squats, 10 reps  Exercise 10: Standing hip abd/ext, 10 reps  Exercise 11: Heel/toe raises, 10 reps  Exercise 12: Stance on foam EO/EC/Narrow DIDI, 60 sec  Exercise 13: Toe taps on 4\" step, 10 each  Exercise 14: Stance with balance perturbations x 1 mins  Exercise 15: LAQ 2# x 15 reps/HS Curls with green band, 15 reps  Exercise 16: BAPS

## 2024-02-23 ENCOUNTER — HOSPITAL ENCOUNTER (OUTPATIENT)
Dept: PHYSICAL THERAPY | Age: 49
Setting detail: THERAPIES SERIES
Discharge: HOME | End: 2024-02-23
Payer: MEDICARE

## 2024-02-23 PROCEDURE — 97110 THERAPEUTIC EXERCISES: CPT

## 2024-02-23 ASSESSMENT — PAIN DESCRIPTION - ORIENTATION: ORIENTATION: RIGHT

## 2024-02-23 ASSESSMENT — PAIN SCALES - GENERAL: PAINLEVEL_OUTOF10: 4

## 2024-02-23 ASSESSMENT — PAIN DESCRIPTION - PAIN TYPE: TYPE: CHRONIC PAIN

## 2024-02-23 ASSESSMENT — PAIN DESCRIPTION - LOCATION: LOCATION: SHOULDER;GENERALIZED

## 2024-02-23 ASSESSMENT — PAIN DESCRIPTION - DESCRIPTORS: DESCRIPTORS: ACHING;SORE

## 2024-02-23 NOTE — PROGRESS NOTES
Physical Therapy  Daily Treatment Note  Date: 2024  Patient Name: Evelyn Moore  MRN: [id number]     :   1975    Subjective:      PT Visit Information  Onset Date: 23  PT Insurance Information: [employer] Medicare (Primary) and [place] Medicaid (Secondary)  Total # of Visits Approved: 11  Total # of Visits to Date: 10  Plan of Care/Certification Expiration Date: 24  Progress Note Due Date: 24  Referring Provider (secondary): Leobardo Dhillon DO  Subjective: Patient reports that she is still hurting in her R shoulder and sore still from where she fell the other day.    Pain Screening  Patient Currently in Pain: Yes  Pain Assessment: 0-10  Pain Level: 4  Pain Type: Chronic pain  Pain Location: Shoulder;Generalized  Pain Orientation: Right  Pain Descriptors: Aching;Sore       Treatment Activities:     Exercises  Exercise 1: May need to modify based on tolerance/back pain  Exercise 2: LBE, 5 min, level 1  Exercise 3: Box steps cw/ccw, 5 each  Exercise 4: Fwd/back on line, 2 each  Exercise 5: Sidestepping on line, 2 each  Exercise 6: Cone weaves, X 2  Exercise 7: Repeated sit to stand, table height 23\", 10 reps  Exercise 8: Standing march, 10 reps  Exercise 9: Mini squats, 10 reps  Exercise 10: Standing hip abd/ext, 10 reps  Exercise 11: Heel/toe raises, 10 reps  Exercise 12: Stance on foam EO/EC/Narrow DIDI, 60 sec  Exercise 13: Toe taps on 4\" step, 10 each  Exercise 14: Stance with balance perturbations x 1 mins  Exercise 15: LAQ 2# x 15 reps/HS Curls with green band, 15 reps  Exercise 16: BAPS board bilat LE standing in // x 10 each direction     Assessment:   Conditions Requiring Skilled Therapeutic Intervention  Body Structures, Functions, Activity Limitations Requiring Skilled Therapeutic Intervention: Decreased functional mobility ;Decreased balance;Decreased sensation;Decreased endurance;Decreased strength;Decreased high-level IADLs  Assessment: Patient completes exercises per flow sheet

## 2024-02-27 ENCOUNTER — HOSPITAL ENCOUNTER (OUTPATIENT)
Dept: PHYSICAL THERAPY | Age: 49
Setting detail: THERAPIES SERIES
Discharge: HOME | End: 2024-02-27
Payer: MEDICARE

## 2024-02-27 DIAGNOSIS — J30.89 ENVIRONMENTAL AND SEASONAL ALLERGIES: ICD-10-CM

## 2024-02-27 PROCEDURE — 97112 NEUROMUSCULAR REEDUCATION: CPT

## 2024-02-27 PROCEDURE — 97110 THERAPEUTIC EXERCISES: CPT

## 2024-02-27 RX ORDER — LEVOCETIRIZINE DIHYDROCHLORIDE 5 MG/1
5 TABLET, FILM COATED ORAL NIGHTLY
Qty: 90 TABLET | Refills: 3 | Status: SHIPPED | OUTPATIENT
Start: 2024-02-27

## 2024-02-27 ASSESSMENT — PAIN SCALES - GENERAL: PAINLEVEL_OUTOF10: 7

## 2024-02-27 NOTE — PROGRESS NOTES
sensation;Decreased endurance;Decreased strength;Decreased high-level IADLs  Assessment: Today was her last approved visit through her insurance so goals were assessed and findings/measurements are recorded in goal section of chart so not all exercises were performed due to this. She has made progress on her CRISOSTOMO balance score up 5 points from initial evaluation from 37/56 to 42/56 and sit to stand goal was met today.  Treatment Diagnosis: LE weakness, falls      Goals:  Short Term Goals  Time Frame for Short Term Goals: 3-4 weeks  Short Term Goal 1: Independent with HEP  STG 1 Current Status:: 2/13: HEP issued today   2/27: performing HEP every other day  STG Goal 1 Status:: In progress  Short Term Goal 2: Improve bilat LE strength to grossly 5/5  STG 2 Current Status:: 2/13: See strength section  2/27: hip flex & ext R & L 5-/5, hip abd/add R & L 5/5, knee flex R & L 5-/5, knee ext R 5-/5, L 5/5,  R ankle all 5/5, L ankle PF 5/5, DF 5-/5, INV/EVR 4+/5 to 5-/5  STG Goal 2 Status:: Partially met  Short Term Goal 3: Perform 5x sit to  18 sec or better  STG 3 Current Status:: 2/13: 20.99 sec   2/27:  16.59  STG Goal 3 Status:: Met  Short Term Goal 4: .  STG 4 Current Status:: .  Short Term Goal 5: .  STG 5 Current Status:: .  Long Term Goals  Time Frame for Long Term Goals : 4-6 weeks  Long Term Goal 1: No falls throughout course of therapy  LTG 1 Current Status:: 2/13: \"I'm really wobbly.\"  Indicates she has fallen backwards/forwards, though not very clear on the number of times of falls.  2/27: fell twice on 2/26  LTG Goal 1 Status:: Not Met  Long Term Goal 2: Demo improved righting responses in anterior/posterior directions with balance challenges  LTG 2 Current Status:: 2/13: Continues with poor righting responses in all directions   2/27: same as 2/13  LTG Goal 2 Status:: In progress  Long Term Goal 3: Improve Crisostomo balance score to at least 45/56  LTG 3 Current Status:: 2/13: 41/56 (was 37/56 at

## 2024-02-27 NOTE — TELEPHONE ENCOUNTER
Received fax from pharmacy requesting refill on pts medication(s). Pt was last seen in office on 10/17/2023  and has a follow up scheduled for Visit date not found. Will send request to  Dr. Dhillon  for authorization.     Requested Prescriptions     Pending Prescriptions Disp Refills    levocetirizine (XYZAL) 5 MG tablet [Pharmacy Med Name: LEVOCETIRIZINE 5MG TABLETS] 90 tablet 3     Sig: Take 1 tablet by mouth nightly       
The patient is a 38y Female complaining of

## 2024-02-29 ENCOUNTER — HOSPITAL ENCOUNTER (OUTPATIENT)
Dept: PHYSICAL THERAPY | Age: 49
Setting detail: THERAPIES SERIES
Discharge: HOME | End: 2024-02-29
Payer: MEDICARE

## 2024-02-29 PROCEDURE — 97112 NEUROMUSCULAR REEDUCATION: CPT

## 2024-02-29 PROCEDURE — 97110 THERAPEUTIC EXERCISES: CPT

## 2024-02-29 NOTE — PROGRESS NOTES
balance challenges  LTG 2 Current Status:: 2/13: Continues with poor righting responses in all directions   2/27: same as 2/13  LTG Goal 2 Status:: In progress  Long Term Goal 3: Improve Mcmillan balance score to at least 45/56  LTG 3 Current Status:: 2/13: 41/56 (was 37/56 at evaluation)   2/27:  42/56  LTG Goal 3 Status:: In progress  Patient Goals   Patient Goals : decrease falls    Plan:    Physical Therapy Plan  Plan weeks: 4-6 weeks  Current Treatment Recommendations: Strengthening, Balance training, Functional mobility training, Neuromuscular re-education, Endurance training, Home exercise program, Safety education & training, Patient/Caregiver education & training, Equipment evaluation, education, & procurement, Therapeutic activities      Therapy Time:   Individual Concurrent Group Co-treatment   Time In 0859         Time Out 0953         Minutes 54            Clifford Calhoun PTA   Electronically signed by Clifford Calhoun PTA on 2/29/2024 at 9:54 AM

## 2024-03-01 DIAGNOSIS — J45.20 MILD INTERMITTENT ASTHMA WITHOUT COMPLICATION: ICD-10-CM

## 2024-03-01 NOTE — TELEPHONE ENCOUNTER
Received fax from pharmacy requesting refill on pts medication(s). Pt was last seen in office on 10/17/2023  and has a follow up scheduled for Visit date not found. Will send request to  Dr. Dhillon  for authorization.    Requested Prescriptions     Pending Prescriptions Disp Refills    albuterol sulfate HFA (PROVENTIL;VENTOLIN;PROAIR) 108 (90 Base) MCG/ACT inhaler [Pharmacy Med Name: ALBUTEROL HFA INH (200 PUFFS) 8.5GM] 8.5 g      Sig: INHALE 2 PUFFS INTO THE LUNGS EVERY 6 HOURS AS NEEDED FOR WHEEZING

## 2024-03-04 RX ORDER — ALBUTEROL SULFATE 90 UG/1
2 AEROSOL, METERED RESPIRATORY (INHALATION) EVERY 6 HOURS PRN
Qty: 8.5 G | Refills: 5 | Status: SHIPPED | OUTPATIENT
Start: 2024-03-04

## 2024-03-05 ENCOUNTER — HOSPITAL ENCOUNTER (OUTPATIENT)
Dept: PHYSICAL THERAPY | Age: 49
Setting detail: THERAPIES SERIES
Discharge: HOME | End: 2024-03-05
Payer: MEDICARE

## 2024-03-05 PROCEDURE — 97110 THERAPEUTIC EXERCISES: CPT

## 2024-03-05 ASSESSMENT — PAIN DESCRIPTION - PAIN TYPE: TYPE: CHRONIC PAIN

## 2024-03-05 ASSESSMENT — PAIN SCALES - GENERAL: PAINLEVEL_OUTOF10: 7

## 2024-03-05 ASSESSMENT — PAIN DESCRIPTION - ORIENTATION: ORIENTATION: RIGHT

## 2024-03-05 ASSESSMENT — PAIN DESCRIPTION - LOCATION: LOCATION: HIP;BACK;SHOULDER

## 2024-03-05 NOTE — PROGRESS NOTES
Daily Treatment Note  Date: 3/5/2024  Patient Name: Evelyn Moore  MRN: [id number]     :   1975    Referring Physician: OMAR Dhillon DO Bradley Albertson, DO   PCP: OMAR Dhillon DO    Medical Diagnosis: Other symptoms and signs involving the musculoskeletal system [R29.898]  Unspecified fall, sequela [W19.XXXS] Falls, LE weakness  Treatment Diagnosis: LE weakness, falls      Insurance: Payor: HU[employer]NA MEDICARE / Plan: HUMANA CHOICE-PPO MEDICARE / Product Type: *No Product type* /   Insurance ID: [id number] - (Medicare Managed)    Subjective:   General  Diagnosis: Falls, LE weakness  Referring Provider (secondary): Leobardo Dhillon DO  Onset Date: 23  PT Insurance Information: [employer] Medicare (Primary) and [place] Medicaid (Secondary)  Total # of Visits Approved: 21  Total # of Visits to Date: 13  Plan of Care/Certification Expiration Date: 24  Progress Note Due Date: 24  Referring Provider (secondary): Leobardo Dhillon DO  Subjective: My R hip and shoulder are still sore from when i fell about 2 weeks ago  Patient Currently in Pain: Yes  Pain Level: 7  Pain Type: Chronic pain  Pain Location: Hip, Back, Shoulder  Pain Orientation: Right       Treatment Activities:  Exercises:      Treatment Reasoning    Exercise 1: May need to modify based on tolerance/back pain  Exercise 2: LBE, 5 min, level 1.5  Exercise 3: Box steps cw/ccw, 5 each  Exercise 4: Fwd/back on line, 2 each  Exercise 5: Sidestepping on line, 2 each  Exercise 6: Cone weaves, X 3  Exercise 7: Repeated sit to stand, table height 22\", 10 reps  (snall mat table in gym)  Exercise 8: Standing march, 10 reps  Exercise 9: Mini squats, 10 reps  Exercise 10: Standing hip abd/ext, 10 reps  Exercise 11: Heel/toe raises, 10 reps  Exercise 12: Stance on foam EO/EC/Narrow DIDI, 60 sec  Exercise 13: Stance with balance perturbations x 1 mins  Exercise 14: Toe taps on 6\" step, 10 each  Exercise 15: LAQ 3# x 15 reps/HS Curls with

## 2024-03-07 ENCOUNTER — HOSPITAL ENCOUNTER (OUTPATIENT)
Dept: PHYSICAL THERAPY | Age: 49
Setting detail: THERAPIES SERIES
Discharge: HOME | End: 2024-03-07
Payer: MEDICARE

## 2024-03-07 PROCEDURE — 97110 THERAPEUTIC EXERCISES: CPT

## 2024-03-07 ASSESSMENT — PAIN SCALES - GENERAL: PAINLEVEL_OUTOF10: 5

## 2024-03-07 ASSESSMENT — PAIN DESCRIPTION - ORIENTATION: ORIENTATION: RIGHT

## 2024-03-07 ASSESSMENT — PAIN DESCRIPTION - LOCATION: LOCATION: HIP;SHOULDER

## 2024-03-07 ASSESSMENT — PAIN DESCRIPTION - PAIN TYPE: TYPE: CHRONIC PAIN

## 2024-03-07 NOTE — PROGRESS NOTES
Daily Treatment Note  Date: 3/7/2024  Patient Name: Evelyn Moore  MRN: [id number]     :   1975    Referring Physician: OMAR Dhillon DO Bradley Albertson, DO   PCP: OMAR Dhillon DO    Medical Diagnosis: Other symptoms and signs involving the musculoskeletal system [R29.898]  Unspecified fall, sequela [W19.XXXS] Falls, LE weakness  Treatment Diagnosis: LE weakness, falls      Insurance: Payor: HU[employer]NA MEDICARE / Plan: HUMANA CHOICE-PPO MEDICARE / Product Type: *No Product type* /   Insurance ID: [id number] - (Medicare Managed)    Subjective:   General  Diagnosis: Falls, LE weakness  Referring Provider (secondary): Leobardo Dhillon DO  Onset Date: 23  PT Insurance Information: [employer] Medicare (Primary) and [place] Medicaid (Secondary)  Total # of Visits Approved: 21  Total # of Visits to Date: 14  Plan of Care/Certification Expiration Date: 24  Progress Note Due Date: 24  Referring Provider (secondary): Leobardo Dhillon DO  Subjective: right now it's not too bad, about a 5  Patient Currently in Pain: Yes  Pain Level: 5  Pain Type: Chronic pain  Pain Location: Hip, Shoulder  Pain Orientation: Right       Treatment Activities:  Exercises:      Treatment Reasoning    Exercise 1: May need to modify based on tolerance/back pain  Exercise 2: LBE, 5 min, level 1.5  Exercise 3: Box steps cw/ccw, 5 each  Exercise 4: Fwd/back on line, 2 each  Exercise 5: Sidestepping on line, 2 each  Exercise 6: Cone weaves, X 3  Exercise 7: Repeated sit to stand, table height 22\", 10 reps  (small mat table in gym)  Exercise 8: Standing march, 10 reps  Exercise 9: Mini squats, 10 reps  Exercise 10: Standing hip abd/ext, 10 reps  Exercise 11: Heel/toe raises, 10 reps  Exercise 12: Stance on foam EO/EC/Narrow DIDI, 60 sec  Exercise 13: Stance with balance perturbations x 1 mins  Exercise 14: Toe taps on 6\" step, 10 each  Exercise 15: LAQ 3# x 15 reps/HS Curls with green band, 15 reps  Exercise 16: BAPS

## 2024-03-12 ENCOUNTER — APPOINTMENT (OUTPATIENT)
Dept: PHYSICAL THERAPY | Age: 49
End: 2024-03-12
Payer: MEDICARE

## 2024-03-14 ENCOUNTER — HOSPITAL ENCOUNTER (OUTPATIENT)
Dept: PHYSICAL THERAPY | Age: 49
Setting detail: THERAPIES SERIES
End: 2024-03-14
Payer: MEDICARE

## 2024-03-18 DIAGNOSIS — Z98.890 HX OF SHOULDER SURGERY: ICD-10-CM

## 2024-03-18 DIAGNOSIS — M54.10 BACK PAIN WITH LEFT-SIDED RADICULOPATHY: Chronic | ICD-10-CM

## 2024-03-18 RX ORDER — OXYCODONE AND ACETAMINOPHEN 7.5; 325 MG/1; MG/1
1 TABLET ORAL EVERY 8 HOURS PRN
Qty: 90 TABLET | Refills: 0 | Status: SHIPPED | OUTPATIENT
Start: 2024-03-26 | End: 2024-04-25

## 2024-03-19 ENCOUNTER — APPOINTMENT (OUTPATIENT)
Dept: PHYSICAL THERAPY | Age: 49
End: 2024-03-19
Payer: MEDICARE

## 2024-03-21 ENCOUNTER — HOSPITAL ENCOUNTER (OUTPATIENT)
Dept: PHYSICAL THERAPY | Age: 49
Setting detail: THERAPIES SERIES
Discharge: HOME | End: 2024-03-21
Payer: MEDICARE

## 2024-03-21 PROCEDURE — 97110 THERAPEUTIC EXERCISES: CPT

## 2024-03-21 NOTE — PROGRESS NOTES
Physical Therapy: Daily Note/Reassessment   Patient: Evelyn Moore (48 y.o. female)   Examination Date: 2024  Plan of Care/Certification Expiration Date: 24    No data recorded   :  1975 # of Visits since SOC:   14   MRN: [id number]  CSN: [id number] Start of Care Date:   2024   Insurance: Payor: HU[employer]NA MEDICARE / Plan: HUMANA CHOICE-PPO MEDICARE / Product Type: *No Product type* /   Insurance ID: [id number] - (Medicare Managed) Secondary Insurance (if applicable): MEDICAID [place]   Referring Physician: OMAR Dhillon DO Bradley Albertson, DO   PCP: OMAR Dhillon DO Visits to Date/Visits Approved: 15 / 21    No Show/Cancelled Appts:   /       Medical Diagnosis: Other symptoms and signs involving the musculoskeletal system [R29.898]  Unspecified fall, sequela [W19.XXXS] Falls, LE weakness  Treatment Diagnosis: LE weakness, falls        SUBJECTIVE EXAMINATION   Pain Level:      Patient Comments: Subjective: Patient states she is tired and sore today, went to [employer]. She states that participation with PT has been helpful, she missed recent sessions due to sickness. She reports she fell this morning, got home about 2:00 am this morning. She also states she has some other recent falls, wonders if she has some inner ear problems, gets lightheaded when she looks upward.    HEP Compliance: Fair        OBJECTIVE EXAMINATION   Restrictions:  Restrictions/Precautions: Surgical Protocols   Required Braces or Orthoses?: Yes   No data recorded            TREATMENT     Exercises:      Treatment Reasoning    Exercise 1: May need to modify based on tolerance/back pain  Exercise 2: LBE, 5 min, level 1.5  Exercise 3: Box steps cw/ccw, 5 each  Exercise 4: Fwd/back on line, 2 each  Exercise 5: Sidestepping on line, 2 each  Exercise 6: Cone weaves, X 3  Exercise 7: Repeated sit to stand, table height 22\", 10 reps  (small mat table in gym)  Exercise 8: Standing march, 10 reps  Exercise 9:  Spoke with patient.  She will have the lab work done at [facility] about a week before her physical with [facility].

## 2024-03-26 ENCOUNTER — HOSPITAL ENCOUNTER (OUTPATIENT)
Dept: PHYSICAL THERAPY | Age: 49
Setting detail: THERAPIES SERIES
Discharge: HOME | End: 2024-03-26
Payer: MEDICARE

## 2024-03-26 PROCEDURE — 97110 THERAPEUTIC EXERCISES: CPT

## 2024-03-26 PROCEDURE — 97112 NEUROMUSCULAR REEDUCATION: CPT

## 2024-03-26 ASSESSMENT — PAIN DESCRIPTION - LOCATION: LOCATION: HIP;SHOULDER

## 2024-03-26 ASSESSMENT — PAIN DESCRIPTION - DESCRIPTORS: DESCRIPTORS: ACHING;SORE

## 2024-03-26 ASSESSMENT — PAIN DESCRIPTION - PAIN TYPE: TYPE: CHRONIC PAIN

## 2024-03-26 ASSESSMENT — PAIN SCALES - GENERAL: PAINLEVEL_OUTOF10: 5

## 2024-03-26 ASSESSMENT — PAIN DESCRIPTION - ORIENTATION: ORIENTATION: RIGHT

## 2024-03-26 NOTE — PROGRESS NOTES
Physical Therapy: Daily Note   Patient: Evelyn Moore (48 y.o. female)   Examination Date: 2024  Plan of Care/Certification Expiration Date: 24    No data recorded   :  1975 # of Visits since SOC:   15   MRN: [id number]  CSN: [id number] Start of Care Date:   2024   Insurance: Payor: HUMANA MEDICARE / Plan: HUMANA CHOICE-PPO MEDICARE / Product Type: *No Product type* /   Insurance ID: [id number] - (Medicare Managed) Secondary Insurance (if applicable): MEDICAID [place]   Referring Physician: OMAR Dhillon DO Bradley Albertson, DO   PCP: OMAR Dhillon DO Visits to Date/Visits Approved:     No Show/Cancelled Appts:   /       Medical Diagnosis: Other symptoms and signs involving the musculoskeletal system [R29.898]  Unspecified fall, sequela [W19.XXXS] Falls, LE weakness  Treatment Diagnosis: LE weakness, falls        SUBJECTIVE EXAMINATION   Pain Level: Pain Screening  Patient Currently in Pain: Yes  Pain Assessment: 0-10  Pain Level: 5  Pain Type: Chronic pain  Pain Location: Hip, Shoulder  Pain Orientation: Right  Pain Descriptors: Aching, Sore    Patient Comments: Subjective: I fell again last Saturday.  I am sore.  I hope I havent messed up my hip or my shoulder.        TREATMENT     Exercises:      Treatment Reasoning    Exercise 1: May need to modify based on tolerance/back pain  Exercise 2: LBE, 5 min, level 1.5  Exercise 3: Box steps cw/ccw, 5 each  Exercise 4: Fwd/back on line, 2 each  Exercise 5: Sidestepping on line, 2 each  Exercise 6: Cone weaves, X 3  Exercise 7: Repeated sit to stand, table height 22\", 10 reps  (small mat table in gym)  Exercise 8: Standing march, 10 reps  Exercise 9: Mini squats, 10 reps  Exercise 10: Standing hip abd/ext, 10 reps  Exercise 11: Heel/toe raises, 10 reps  Exercise 12: Stance on foam EO/EC/Narrow DIDI, 60 sec  Exercise 13: Stance with balance perturbations x 1 mins  Exercise 14: Toe taps on 6\" step, 10 each  Exercise 15: LAQ 3# x

## 2024-03-28 ENCOUNTER — HOSPITAL ENCOUNTER (OUTPATIENT)
Dept: PHYSICAL THERAPY | Age: 49
Setting detail: THERAPIES SERIES
Discharge: HOME | End: 2024-03-28
Payer: MEDICARE

## 2024-03-28 PROCEDURE — 97110 THERAPEUTIC EXERCISES: CPT

## 2024-03-28 ASSESSMENT — PAIN DESCRIPTION - LOCATION: LOCATION: SHOULDER

## 2024-03-28 ASSESSMENT — PAIN DESCRIPTION - PAIN TYPE: TYPE: CHRONIC PAIN

## 2024-03-28 ASSESSMENT — PAIN DESCRIPTION - DESCRIPTORS: DESCRIPTORS: ACHING;SORE

## 2024-03-28 ASSESSMENT — PAIN DESCRIPTION - ORIENTATION: ORIENTATION: RIGHT;LEFT

## 2024-03-28 ASSESSMENT — PAIN SCALES - GENERAL: PAINLEVEL_OUTOF10: 6

## 2024-03-28 NOTE — PROGRESS NOTES
Physical Therapy  Daily Treatment Note  Date: 3/28/2024  Patient Name: Evelyn Moore  MRN: [id number]     :   1975    Subjective:      PT Visit Information  Onset Date: 23  PT Insurance Information: [employer] Medicare (Primary) and [place] Medicaid (Secondary)  Total # of Visits Approved: 21  Total # of Visits to Date: 17  Plan of Care/Certification Expiration Date: 24  Progress Note Due Date: 24  Referring Provider (secondary): Leobardo Dhillon DO  Subjective: Patient reports that her shoulders are bothering more today than anything.    Pain Screening  Patient Currently in Pain: Yes  Pain Assessment: 0-10  Pain Level: 6  Pain Type: Chronic pain  Pain Location: Shoulder  Pain Orientation: Right;Left  Pain Descriptors: Aching;Sore       Treatment Activities:   Exercises  Exercise 1: May need to modify based on tolerance/back pain  Exercise 2: LBE, 5 min, level 2.0  Exercise 3: Box steps cw/ccw, 5 each  Exercise 4: Fwd/back on line, 2 each  Exercise 5: Sidestepping on line, 2 each  Exercise 6: Cone weaves, X 3  Exercise 7: Repeated sit to stand, table height 22\", 10 reps  (small mat table in gym)  Exercise 8: Standing march, 10 reps  Exercise 9: Mini squats, 10 reps  Exercise 10: Standing hip abd/ext, 10 reps  Exercise 11: Heel/toe raises, 10 reps  Exercise 12: Stance on foam EO/EC/Narrow DIDI, 60 sec  Exercise 13: Stance with balance perturbations x 1 mins  Exercise 14: Toe taps on 6\" step, 10 each  Exercise 15: LAQ 3# x 15 reps/HS Curls with green band, 15 reps  Exercise 16: BAPS board bilat LE standing in // x 15 each direction     Assessment:   Conditions Requiring Skilled Therapeutic Intervention  Body Structures, Functions, Activity Limitations Requiring Skilled Therapeutic Intervention: Decreased functional mobility ;Decreased balance;Decreased sensation;Decreased endurance;Decreased strength;Decreased high-level IADLs  Assessment: Patient presents today with MARY shoulder pain but denies

## 2024-03-29 DIAGNOSIS — M79.18 MYOFASCIAL MUSCLE PAIN: ICD-10-CM

## 2024-03-30 DIAGNOSIS — F32.A ANXIETY AND DEPRESSION: ICD-10-CM

## 2024-03-30 DIAGNOSIS — F41.9 ANXIETY AND DEPRESSION: ICD-10-CM

## 2024-04-01 RX ORDER — VENLAFAXINE HYDROCHLORIDE 75 MG/1
75 CAPSULE, EXTENDED RELEASE ORAL DAILY
Qty: 30 CAPSULE | Refills: 11 | Status: SHIPPED | OUTPATIENT
Start: 2024-04-01

## 2024-04-01 RX ORDER — CYCLOBENZAPRINE HCL 10 MG
10 TABLET ORAL 3 TIMES DAILY PRN
Qty: 270 TABLET | Refills: 0 | Status: SHIPPED | OUTPATIENT
Start: 2024-04-01

## 2024-04-01 NOTE — TELEPHONE ENCOUNTER
Received fax from pharmacy requesting refill on pts medication(s). Pt was last seen in office on 10/17/2023  and has a follow up scheduled for Visit date not found. Will send request to  Dr. Dhillon  for authorization.     Requested Prescriptions     Pending Prescriptions Disp Refills    venlafaxine (EFFEXOR XR) 75 MG extended release capsule [Pharmacy Med Name: VENLAFAXINE ER 75MG CAPSULES] 30 capsule 11     Sig: TAKE 1 CAPSULE BY MOUTH DAILY

## 2024-04-01 NOTE — TELEPHONE ENCOUNTER
Received fax from pharmacy requesting refill on pts medication(s). Pt was last seen in office on 10/17/2023  and has a follow up scheduled for 3/30/2024. Will send request to  Dr. Dhillon  for authorization.     Requested Prescriptions     Pending Prescriptions Disp Refills    cyclobenzaprine (FLEXERIL) 10 MG tablet [Pharmacy Med Name: CYCLOBENZAPRINE 10MG TABLETS] 270 tablet 0     Sig: TAKE 1 TABLET BY MOUTH THREE TIMES DAILY AS NEEDED FOR MUSCLE SPASMS

## 2024-04-02 ENCOUNTER — HOSPITAL ENCOUNTER (OUTPATIENT)
Dept: PHYSICAL THERAPY | Age: 49
Setting detail: THERAPIES SERIES
Discharge: HOME | End: 2024-04-02
Payer: MEDICARE

## 2024-04-02 PROCEDURE — 97110 THERAPEUTIC EXERCISES: CPT

## 2024-04-02 PROCEDURE — 97112 NEUROMUSCULAR REEDUCATION: CPT

## 2024-04-02 ASSESSMENT — PAIN DESCRIPTION - PAIN TYPE: TYPE: CHRONIC PAIN

## 2024-04-02 ASSESSMENT — PAIN SCALES - GENERAL: PAINLEVEL_OUTOF10: 5

## 2024-04-02 ASSESSMENT — PAIN DESCRIPTION - ORIENTATION: ORIENTATION: LOWER

## 2024-04-02 ASSESSMENT — PAIN DESCRIPTION - LOCATION: LOCATION: BACK

## 2024-04-04 ENCOUNTER — HOSPITAL ENCOUNTER (OUTPATIENT)
Dept: PHYSICAL THERAPY | Age: 49
Setting detail: THERAPIES SERIES
Discharge: HOME | End: 2024-04-04
Payer: MEDICARE

## 2024-04-04 PROCEDURE — 97110 THERAPEUTIC EXERCISES: CPT

## 2024-04-04 ASSESSMENT — PAIN DESCRIPTION - LOCATION: LOCATION: BACK;NECK

## 2024-04-04 NOTE — PROGRESS NOTES
band, 15 reps  Exercise 16: BAPS board bilat LE standing in // x 15 each direction    Limitations addressed: Mobility, Strength, Balance, Proprioception, Activity tolerance  Therapist provided: Verbal cuing, Tactile cuing  Progressed: Complexity of movement                     Pt Education:         ASSESSMENT     Assessment: Assessment: She performs ther ex as per flow sheet.  She has good tolerance of ther ex.  Body Structures, Functions, Activity Limitations Requiring Skilled Therapeutic Intervention: Decreased functional mobility , Decreased balance, Decreased sensation, Decreased endurance, Decreased strength, Decreased high-level IADLs    Post-Treatment Pain Level:      Activity Tolerance: Patient tolerated evaluation without incident    Therapy Prognosis: Good       GOALS   Patient Goals : decrease falls  Short Term Goals Completed by 3-4 weeks Current Status Goal Status   Independent with HEP 2/13: HEP issued today   2/27 : performing HEP every other day. 3/21/24 Patient had been working on her exercises regularly until her recent sickness (stomach bug). In progress   Improve bilat LE strength to grossly 5/5 2/13: See strength section  2/27 and 3/21/24: hip flex & ext R & L 5-/5, hip abd/add R & L 5/5, knee flex R & L 5-/5, knee ext R 5-/5, L 5/5,  R ankle all 5/5, L ankle PF 5/5, DF 5-/5, INV/EVR 4+/5 to 5-/5 Partially met   Perform 5x sit to  18 sec or better 2/13: 20.99 sec   2/27:  16.59,   3/21/24 19.37 sec Met, Partially met   . . Met   . .                                                 Long Term Goals Completed by 4-6 weeks Current Status Goal Status   No falls throughout course of therapy 2/13: \"I'm really wobbly.\"  Indicates she has fallen backwards/forwards, though not very clear on the number of times of falls.  2/27: fell twice on 2/26. 3/21/24 Patient states she had a fall this morning and had also had a previous fall. Not Met   Demo improved righting responses in anterior/posterior

## 2024-04-09 ENCOUNTER — HOSPITAL ENCOUNTER (OUTPATIENT)
Dept: PHYSICAL THERAPY | Age: 49
Setting detail: THERAPIES SERIES
Discharge: HOME | End: 2024-04-09
Payer: MEDICARE

## 2024-04-09 PROCEDURE — 97110 THERAPEUTIC EXERCISES: CPT

## 2024-04-09 ASSESSMENT — PAIN DESCRIPTION - LOCATION: LOCATION: BACK;ARM;KNEE

## 2024-04-09 ASSESSMENT — PAIN DESCRIPTION - DESCRIPTORS: DESCRIPTORS: ACHING;SORE

## 2024-04-09 ASSESSMENT — PAIN SCALES - GENERAL: PAINLEVEL_OUTOF10: 7

## 2024-04-09 ASSESSMENT — PAIN DESCRIPTION - PAIN TYPE: TYPE: CHRONIC PAIN;ACUTE PAIN

## 2024-04-09 ASSESSMENT — PAIN DESCRIPTION - ORIENTATION: ORIENTATION: RIGHT;LEFT

## 2024-04-09 NOTE — PROGRESS NOTES
balance score to at least 45/56.  LTG 3 Current Status:: 2/13: 41/56 (was 37/56 at evaluation)   2/27 and 3/21/24:  42/56  LTG Goal 3 Status:: In progress, Partially met  Patient Goals   Patient Goals : decrease falls    Plan:    Physical Therapy Plan  Plan weeks: 4-6 weeks  Current Treatment Recommendations: Strengthening, Balance training, Functional mobility training, Neuromuscular re-education, Endurance training, Home exercise program, Safety education & training, Patient/Caregiver education & training, Equipment evaluation, education, & procurement, Therapeutic activities  Timed Code Treatment Minutes: 64 Minutes     Therapy Time   Individual Concurrent Group Co-treatment   Time In 0904         Time Out 1008         Minutes 64         Timed Code Treatment Minutes: 64 Minutes     Electronically signed by Alison Bowen PTA on 4/9/2024 at 3:54 PM

## 2024-04-11 ENCOUNTER — HOSPITAL ENCOUNTER (OUTPATIENT)
Dept: GENERAL RADIOLOGY | Age: 49
Discharge: HOME | End: 2024-04-11
Payer: MEDICARE

## 2024-04-11 ENCOUNTER — OFFICE VISIT (OUTPATIENT)
Dept: FAMILY MEDICINE CLINIC | Age: 49
End: 2024-04-11
Payer: MEDICARE

## 2024-04-11 ENCOUNTER — HOSPITAL ENCOUNTER (OUTPATIENT)
Dept: PHYSICAL THERAPY | Age: 49
Setting detail: THERAPIES SERIES
Discharge: HOME | End: 2024-04-11
Payer: MEDICARE

## 2024-04-11 VITALS
SYSTOLIC BLOOD PRESSURE: 106 MMHG | OXYGEN SATURATION: 98 % | TEMPERATURE: 96.9 F | DIASTOLIC BLOOD PRESSURE: 70 MMHG | WEIGHT: 293 LBS | HEIGHT: 65 IN | BODY MASS INDEX: 48.82 KG/M2 | HEART RATE: 86 BPM

## 2024-04-11 DIAGNOSIS — M25.511 ACUTE PAIN OF BOTH SHOULDERS: ICD-10-CM
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
## 2024-04-11 DIAGNOSIS — M25.562 CHRONIC PAIN OF BOTH KNEES: ICD-10-CM

## 2024-04-11 DIAGNOSIS — G89.29 CHRONIC PAIN OF BOTH KNEES: ICD-10-CM

## 2024-04-11 DIAGNOSIS — M54.16 LUMBAR RADICULOPATHY: ICD-10-CM

## 2024-04-11 DIAGNOSIS — Z98.1 HISTORY OF LUMBAR SPINAL FUSION: ICD-10-CM

## 2024-04-11 DIAGNOSIS — M25.512 ACUTE PAIN OF BOTH SHOULDERS: ICD-10-CM
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
## 2024-04-11 DIAGNOSIS — M25.561 CHRONIC PAIN OF BOTH KNEES: ICD-10-CM

## 2024-04-11 DIAGNOSIS — Z98.890 S/P RIGHT ROTATOR CUFF REPAIR: ICD-10-CM

## 2024-04-11 PROCEDURE — 97110 THERAPEUTIC EXERCISES: CPT

## 2024-04-11 PROCEDURE — 73562 X-RAY EXAM OF KNEE 3: CPT

## 2024-04-11 PROCEDURE — 99214 OFFICE O/P EST MOD 30 MIN: CPT | Performed by: NURSE PRACTITIONER

## 2024-04-11 PROCEDURE — 73030 X-RAY EXAM OF SHOULDER: CPT

## 2024-04-11 PROCEDURE — 72100 X-RAY EXAM L-S SPINE 2/3 VWS: CPT

## 2024-04-11 PROCEDURE — G8417 CALC BMI ABV UP PARAM F/U: HCPCS | Performed by: NURSE PRACTITIONER

## 2024-04-11 PROCEDURE — G8427 DOCREV CUR MEDS BY ELIG CLIN: HCPCS | Performed by: NURSE PRACTITIONER

## 2024-04-11 PROCEDURE — 1036F TOBACCO NON-USER: CPT | Performed by: NURSE PRACTITIONER

## 2024-04-11 ASSESSMENT — PAIN DESCRIPTION - LOCATION: LOCATION: SHOULDER;LEG

## 2024-04-11 ASSESSMENT — ENCOUNTER SYMPTOMS
NAUSEA: 0
SHORTNESS OF BREATH: 1
BACK PAIN: 1
VOMITING: 0
SINUS PRESSURE: 0
DIARRHEA: 0
SINUS PAIN: 0
RHINORRHEA: 0
COUGH: 1

## 2024-04-11 ASSESSMENT — PAIN SCALES - GENERAL: PAINLEVEL_OUTOF10: 8

## 2024-04-11 ASSESSMENT — PAIN DESCRIPTION - DESCRIPTORS: DESCRIPTORS_2: ACHING

## 2024-04-11 NOTE — PROGRESS NOTES
Evelyn Moore (:  1975) is a 48 y.o. female,Established patient, here for evaluation of the following chief complaint(s):  Fall (Fell Tuesday), Knee Pain, and Back Pain      ASSESSMENT/PLAN:    ICD-10-CM    1. Fall, initial encounter  W19.XXXA XR LUMBAR SPINE (2-3 VIEWS)     XR SHOULDER RIGHT (MIN 2 VIEWS)     XR SHOULDER LEFT (MIN 2 VIEWS)     XR KNEE RIGHT (3 VIEWS)     XR KNEE LEFT (3 VIEWS)      2. Lumbar radiculopathy  M54.16 XR LUMBAR SPINE (2-3 VIEWS)      3. Acute pain of both shoulders  M25.511 XR SHOULDER RIGHT (MIN 2 VIEWS)    M25.512 XR SHOULDER LEFT (MIN 2 VIEWS)      4. History of lumbar spinal fusion  Z98.1 XR LUMBAR SPINE (2-3 VIEWS)      5. S/P right rotator cuff repair  Z98.890 XR SHOULDER RIGHT (MIN 2 VIEWS)      6. Chronic pain of both knees  M25.561 XR KNEE RIGHT (3 VIEWS)    M25.562 XR KNEE LEFT (3 VIEWS)    G89.29         SPENT 35 minutes in direct conversation with patient    Return if symptoms worsen or fail to improve.    SUBJECTIVE/OBJECTIVE:  HPI    FALL  Patient states she had 2 falls on Tuesday at 730 am and 1130 pm  She states the first fall she tripped over a shoe and the second fall was due to a wet floor.   She landed on knees and elbows and she hit her back. She thuan her upper body and lower body during falls.   Patient has a history of back surgery in 2016 and right hip surgery in 2019.   Patient has a history of a right rotator cuff tear surgery in .  Patient is having bilateral knee, bilateral shins and ankle pain. She is also having back, pelvis, neck and bilateral arm pain.   She is taking oxycodone for pain through pain management.   She is on sinemet for restless legs.   She states the worst pain is in her upper arms and knees.   Patient states she is not sleeping due to pain  The topamax is controlling migraines but she does have a headache.She did not hit her head during fall.    LUMBAR X-RAY, 3-:  IMPRESSION:  1.  No acute fracture or

## 2024-04-11 NOTE — PROGRESS NOTES
Physical Therapy: Daily Note   Patient: Evelyn Moore (48 y.o. female)   Examination Date: 2024  Plan of Care/Certification Expiration Date: 24    No data recorded   :  1975 # of Visits since SOC:   20   MRN: [id number]  CSN: [id number] Start of Care Date:   2024   Insurance: Payor: HUMANA MEDICARE / Plan: HUMANA CHOICE-PPO MEDICARE / Product Type: *No Product type* /   Insurance ID: [id number] - (Medicare Managed) Secondary Insurance (if applicable): MEDICAID [place]   Referring Physician: OMAR Dhillon DO Bradley Albertson, DO   PCP: OMAR Dhillon DO Visits to Date/Visits Approved:     No Show/Cancelled Appts:   /       Medical Diagnosis: Other symptoms and signs involving the musculoskeletal system [R29.898]  Unspecified fall, sequela [W19.XXXS] Falls, LE weakness  Treatment Diagnosis: LE weakness, falls        SUBJECTIVE EXAMINATION   Pain Level: Pain Screening  Patient Currently in Pain: Yes  Pain Level: 8  Pain Location: Shoulder, Leg  Pain 2  Pain Descriptors 2: Aching    Patient Comments: Subjective: Patient rates pain at 81/0.  She fell recently.  She is to see PCP this afternoon.    HEP Compliance: Good  Previous treatments prior to current episode?: Medications  Comments: Nothing new     OBJECTIVE EXAMINATION   Restrictions:  Restrictions/Precautions: Surgical Protocols   Required Braces or Orthoses?: Yes   No data recorded                TREATMENT     Exercises:      Treatment Reasoning    Exercise 1: May need to modify based on tolerance/back pain  Exercise 2: LBE, 5 min, level 2.0  Exercise 3: Box steps cw/ccw, 5 each  Exercise 4: Fwd/back on line, 3 each  Exercise 5: Sidestepping on line, 2 each  Exercise 6: Cone weaves, X 3  Exercise 7: Repeated sit to stand, table height 22\", 10 reps  (small mat table in gym)  Exercise 8: Standing march, 15 reps  Exercise 9: Mini squats, 15 reps  Exercise 10: Standing hip abd/ext, 15 reps  Exercise 11: Heel/toe raises, 15

## 2024-04-15 DIAGNOSIS — M79.7 FIBROMYALGIA: ICD-10-CM

## 2024-04-15 DIAGNOSIS — M54.16 LUMBAR RADICULOPATHY: ICD-10-CM

## 2024-04-15 DIAGNOSIS — M54.10 BACK PAIN WITH LEFT-SIDED RADICULOPATHY: Chronic | ICD-10-CM

## 2024-04-15 DIAGNOSIS — Z98.890 HX OF SHOULDER SURGERY: ICD-10-CM

## 2024-04-15 RX ORDER — OXYCODONE AND ACETAMINOPHEN 7.5; 325 MG/1; MG/1
1 TABLET ORAL EVERY 8 HOURS PRN
Qty: 90 TABLET | Refills: 0 | Status: SHIPPED | OUTPATIENT
Start: 2024-04-25 | End: 2024-05-25

## 2024-04-15 NOTE — TELEPHONE ENCOUNTER
Patient called on 4/12/24.  The office is closed on Fridays.  Patient's next appointment scheduled for 4/22/24 with Janice.

## 2024-04-16 ENCOUNTER — HOSPITAL ENCOUNTER (OUTPATIENT)
Dept: PHYSICAL THERAPY | Age: 49
Setting detail: THERAPIES SERIES
Discharge: HOME | End: 2024-04-16
Payer: MEDICARE

## 2024-04-16 ENCOUNTER — TELEPHONE (OUTPATIENT)
Dept: FAMILY MEDICINE CLINIC | Age: 49
End: 2024-04-16

## 2024-04-16 PROCEDURE — 97110 THERAPEUTIC EXERCISES: CPT

## 2024-04-16 PROCEDURE — 97112 NEUROMUSCULAR REEDUCATION: CPT

## 2024-04-16 RX ORDER — PREGABALIN 100 MG/1
CAPSULE ORAL
Qty: 60 CAPSULE | Refills: 5 | Status: SHIPPED | OUTPATIENT
Start: 2024-04-16 | End: 2024-10-13

## 2024-04-16 ASSESSMENT — PAIN DESCRIPTION - DESCRIPTORS: DESCRIPTORS: SORE

## 2024-04-16 ASSESSMENT — PAIN DESCRIPTION - PAIN TYPE: TYPE: CHRONIC PAIN

## 2024-04-16 ASSESSMENT — PAIN SCALES - GENERAL: PAINLEVEL_OUTOF10: 4

## 2024-04-16 ASSESSMENT — PAIN DESCRIPTION - ORIENTATION: ORIENTATION: RIGHT;LEFT

## 2024-04-16 ASSESSMENT — PAIN DESCRIPTION - LOCATION: LOCATION: SHOULDER

## 2024-04-16 NOTE — TELEPHONE ENCOUNTER
----- Message from ASHLEE Casas sent at 4/15/2024  8:11 PM CDT -----  Right knee x-ray: no acute fracture or dislocation.

## 2024-04-16 NOTE — TELEPHONE ENCOUNTER
Patient is requesting a refill on Lyrica. Patient was last seen on 04/11/2024 , with a follow up on 05/15/2024. The prescription was last filled on 03/12/2024

## 2024-04-16 NOTE — TELEPHONE ENCOUNTER
----- Message from ASHLEE Casas sent at 4/15/2024  8:10 PM CDT -----  Lumbar x-ray: no acute bony or hardware abnormality. Multilevel chronic degenerative disc disease.

## 2024-04-16 NOTE — TELEPHONE ENCOUNTER
----- Message from ASHLEE Casas sent at 4/15/2024  8:09 PM CDT -----  Left knee x-ray shows no fracture or dislocation. Degenerative changes but no acute bony abnormality.

## 2024-04-16 NOTE — TELEPHONE ENCOUNTER
Called patient, spoke with: Patient regarding the results of the patients most recent xrays.  I advised Patient of Claudia Rockwell recommendations.   Patient did voice understanding    Pt will follow up with her provider at [facility]

## 2024-04-16 NOTE — PROGRESS NOTES
Physical Therapy: Daily Note   Patient: Evelyn Moore (48 y.o. female)   Examination Date: 2024  Plan of Care/Certification Expiration Date: 24    No data recorded   :  1975 # of Visits since SOC:   21   MRN: [id number]  CSN: [id number] Start of Care Date:   2024   Insurance: Payor: HUMANA MEDICARE / Plan: HUMANA CHOICE-PPO MEDICARE / Product Type: *No Product type* /   Insurance ID: [id number] - (Medicare Managed) Secondary Insurance (if applicable): MEDICAID [place]   Referring Physician: OMAR Dhillon DO Bradley Albertson, DO   PCP: OMAR Dhillon DO Visits to Date/Visits Approved:     No Show/Cancelled Appts:   /       Medical Diagnosis: Other symptoms and signs involving the musculoskeletal system [R29.898]  Unspecified fall, sequela [W19.XXXS] Falls, LE weakness  Treatment Diagnosis: LE weakness, falls        SUBJECTIVE EXAMINATION   Pain Level: Pain Screening  Patient Currently in Pain: Yes  Pain Assessment: 0-10  Pain Level: 4  Pain Type: Chronic pain  Pain Location: Shoulder  Pain Orientation: Right, Left  Pain Descriptors: Sore    Patient Comments: Subjective: My xrays came back clear with no fractures.  So I am okay to continue.  They say I have a lot of arthritis.        TREATMENT     Exercises:      Treatment Reasoning    Exercise 1: May need to modify based on tolerance/back pain  Exercise 2: LBE, 5 min, level 2.0  Exercise 3: Box steps cw/ccw, 5 each  Exercise 4: Fwd/back on line, 3 each  Exercise 5: Sidestepping on line, 2 each  Exercise 6: Cone weaves, X 3  Exercise 7: Repeated sit to stand, table height 22\", 10 reps  (small mat table in gym)  Exercise 8: Standing march, 15 reps  Exercise 9: Mini squats, 15 reps  Exercise 10: Standing hip abd/ext, 15 reps  Exercise 11: Heel/toe raises, 15 reps  Exercise 12: Stance on foam EO/EC/Narrow DIDI, 60 sec not today  Exercise 13: Stance with balance perturbations x 1 mins not today  Exercise 14: Toe taps on 6\" step,

## 2024-04-16 NOTE — TELEPHONE ENCOUNTER
----- Message from ASHLEE Casas sent at 4/15/2024  8:07 PM CDT -----  Left shoulder x-ray shows no fracture or dislocation. There are some chronic degenerative changes.  No acute injury

## 2024-04-16 NOTE — TELEPHONE ENCOUNTER
----- Message from ASHLEE Casas sent at 4/15/2024  8:08 PM CDT -----  Right shoulder x-ray: No fracture or dislocation. Moderate to advanced degenerative changes in the right AC joint.  This is chronic but recommend referral to orthopaedics of choice

## 2024-04-18 ENCOUNTER — HOSPITAL ENCOUNTER (OUTPATIENT)
Dept: PHYSICAL THERAPY | Age: 49
Setting detail: THERAPIES SERIES
Discharge: HOME | End: 2024-04-18
Payer: MEDICARE

## 2024-04-18 PROCEDURE — 97110 THERAPEUTIC EXERCISES: CPT

## 2024-04-18 ASSESSMENT — PAIN DESCRIPTION - ORIENTATION: ORIENTATION: RIGHT;LEFT

## 2024-04-18 ASSESSMENT — PAIN DESCRIPTION - DESCRIPTORS: DESCRIPTORS: SORE

## 2024-04-18 ASSESSMENT — PAIN DESCRIPTION - LOCATION: LOCATION: SHOULDER

## 2024-04-18 ASSESSMENT — PAIN DESCRIPTION - PAIN TYPE: TYPE: CHRONIC PAIN

## 2024-04-18 ASSESSMENT — PAIN SCALES - GENERAL: PAINLEVEL_OUTOF10: 6

## 2024-04-18 NOTE — PROGRESS NOTES
2/13: 41/56 (was 37/56 at evaluation)   2/27 and 3/21/24:  42/56  LTG Goal 3 Status:: In progress, Partially met  Patient Goals   Patient Goals : decrease falls    Plan:    Physical Therapy Plan  Plan weeks: 4-6 weeks  Current Treatment Recommendations: Strengthening, Balance training, Functional mobility training, Neuromuscular re-education, Endurance training, Home exercise program, Safety education & training, Patient/Caregiver education & training, Equipment evaluation, education, & procurement, Therapeutic activities  Timed Code Treatment Minutes: 49 Minutes     Therapy Time   Individual Concurrent Group Co-treatment   Time In 0915         Time Out 1004         Minutes 49         Timed Code Treatment Minutes: 49 Minutes     Electronically signed by Alison Bowen PTA on 4/18/2024 at 12:55 PM

## 2024-04-22 ENCOUNTER — HOSPITAL ENCOUNTER (OUTPATIENT)
Dept: PAIN MANAGEMENT | Age: 49
Discharge: HOME | End: 2024-04-22
Payer: MEDICARE

## 2024-04-22 VITALS
TEMPERATURE: 97.3 F | HEART RATE: 85 BPM | SYSTOLIC BLOOD PRESSURE: 120 MMHG | RESPIRATION RATE: 18 BRPM | WEIGHT: 293 LBS | OXYGEN SATURATION: 98 % | DIASTOLIC BLOOD PRESSURE: 74 MMHG | BODY MASS INDEX: 50.52 KG/M2

## 2024-04-22 DIAGNOSIS — M54.10 BACK PAIN WITH LEFT-SIDED RADICULOPATHY: Chronic | ICD-10-CM

## 2024-04-22 DIAGNOSIS — Z98.1 HISTORY OF LUMBAR SPINAL FUSION: Primary | ICD-10-CM

## 2024-04-22 DIAGNOSIS — M54.50 CHRONIC BILATERAL LOW BACK PAIN WITHOUT SCIATICA: ICD-10-CM

## 2024-04-22 DIAGNOSIS — M51.36 DDD (DEGENERATIVE DISC DISEASE), LUMBAR: ICD-10-CM

## 2024-04-22 DIAGNOSIS — M53.3 SACROILIAC JOINT DYSFUNCTION OF RIGHT SIDE: ICD-10-CM

## 2024-04-22 DIAGNOSIS — M54.16 LUMBAR RADICULOPATHY: ICD-10-CM

## 2024-04-22 DIAGNOSIS — G89.29 CHRONIC BILATERAL LOW BACK PAIN WITHOUT SCIATICA: ICD-10-CM

## 2024-04-22 PROCEDURE — 99214 OFFICE O/P EST MOD 30 MIN: CPT

## 2024-04-22 PROCEDURE — 99214 OFFICE O/P EST MOD 30 MIN: CPT | Performed by: NURSE PRACTITIONER

## 2024-04-22 ASSESSMENT — ENCOUNTER SYMPTOMS
ABDOMINAL PAIN: 0
BOWEL INCONTINENCE: 0
BACK PAIN: 1
NAUSEA: 0
ABDOMINAL DISTENTION: 0
CONSTIPATION: 0

## 2024-04-22 ASSESSMENT — PAIN DESCRIPTION - ONSET: ONSET: ON-GOING

## 2024-04-22 ASSESSMENT — PAIN DESCRIPTION - INTENSITY: RATING_2: 7

## 2024-04-22 ASSESSMENT — PAIN - FUNCTIONAL ASSESSMENT: PAIN_FUNCTIONAL_ASSESSMENT: PREVENTS OR INTERFERES SOME ACTIVE ACTIVITIES AND ADLS

## 2024-04-22 ASSESSMENT — PAIN SCALES - GENERAL: PAINLEVEL_OUTOF10: 7

## 2024-04-22 ASSESSMENT — PAIN DESCRIPTION - LOCATION
LOCATION: BACK;KNEE;SHOULDER;NECK
LOCATION_2: NECK

## 2024-04-22 ASSESSMENT — PAIN DESCRIPTION - PAIN TYPE: TYPE: CHRONIC PAIN

## 2024-04-22 ASSESSMENT — PAIN DESCRIPTION - FREQUENCY: FREQUENCY: INTERMITTENT

## 2024-04-22 NOTE — PROGRESS NOTES
Clinic Documentation      Education Provided:  [x] Review of Alberto  [] Agreement Review  [x] PEG Score Calculated [] PHQ Score Calculated [] ORT Score Calculated    [] Compliance Issues Discussed [] Cognitive Behavior Needs [x] Exercise [] Review of Test [] Financial Issues  [x] Tobacco/Alcohol Use Reviewed [x] Teaching [] New Patient [] Picture Obtained    Physician Plan:  [] Outgoing Referral  [] Pharmacy Consult  [x] Test Ordered [x] Prescription Ordered/Changed   [] Obtained Test Results / Consult Notes        Complete if patient is withholding blood thinner for procedure     Blood Thinner Patient is currently taking:      [] Plavix (Hold for 7 days)  [] Aspirin (Hold for 5 days)     [] Pletal (Hold for 2 days)  [] Pradaxa (Hold for 3 days)    [] Effient (Hold for 7 days)  [] Xarelto (Hold for 2 days)    [] Eliquis (Hold for 2 days)  [] Brilinta (Hold for 7 days)    [] Coumadin (Hold for 5 days) - (INR needs to be drawn the day prior to procedure- INR < 2.0)    [] Aggrenox (Hold for 7 days)        [] Patient will stop medication on their own.    [] Blood Thinner Form Faxed for approval to hold.   Provider form faxed to:     Assessment Completed by:  Electronically signed by Jeanne Ortega RN on 4/22/2024 at 10:18 AM  
Year: No         Family History  family history includes ADHD in her brother; Anemia in her mother and sister; Anxiety Disorder in her brother and sister; Arthritis in her brother, brother, brother, father, mother, sister, and sister; Breast Cancer in her paternal cousin; Cancer in her brother, brother, mother, and sister; Colon Polyps in her mother; Depression in her brother, mother, and sister; Diabetes in her brother and mother; Esophageal Cancer in her brother; Heart Disease in her father and mother; High Blood Pressure in her father, mother, and sister; Learning Disabilities in her sister; Mental Illness in her brother; Obesity in her brother, mother, paternal cousin, sister, and sister; Other in an other family member; Prostate Cancer in her father; Psoriasis in her sister; Stroke in her father.    Review of Systems:  Review of Systems   Constitutional:  Negative for activity change.   Gastrointestinal:  Negative for abdominal distention, abdominal pain, bowel incontinence, constipation and nausea.   Genitourinary:  Negative for bladder incontinence.   Musculoskeletal:  Positive for arthralgias, back pain, gait problem, myalgias, neck pain and neck stiffness.   Neurological:  Positive for headaches. Negative for weakness and numbness.   Psychiatric/Behavioral:  Negative for agitation, self-injury and suicidal ideas.         14 point ROS negative besides that noted in HPI    Physical exam:     Patient Vitals for the past 24 hrs:   BP Temp Temp src Pulse Resp SpO2 Weight   04/22/24 0924 120/74 97.3 °F (36.3 °C) Temporal 85 18 98 % (!) 137.7 kg (303 lb 9.6 oz)       Body mass index is 50.52 kg/m².    Physical Exam  Vitals and nursing note reviewed. Exam conducted with a chaperone present.   Constitutional:       General: She is not in acute distress.     Appearance: She is well-developed.   HENT:      Head: Normocephalic.      Right Ear: External ear normal.      Left Ear: External ear normal.      Nose: Nose

## 2024-04-23 ENCOUNTER — HOSPITAL ENCOUNTER (OUTPATIENT)
Dept: PHYSICAL THERAPY | Age: 49
Setting detail: THERAPIES SERIES
Discharge: HOME | End: 2024-04-23
Payer: MEDICARE

## 2024-04-23 PROCEDURE — 97110 THERAPEUTIC EXERCISES: CPT

## 2024-04-23 NOTE — PROGRESS NOTES
Physical Therapy: Daily Note/ REASSESSMENT   Patient: Evelyn Moore (48 y.o. female)   Examination Date: 2024  Plan of Care/Certification Expiration Date: 24    No data recorded   :  1975 # of Visits since SOC:   23   MRN: [id number]  CSN: [id number] Start of Care Date:   2024   Insurance: Payor: HUMANA MEDICARE / Plan: HUMANA CHOICE-PPO MEDICARE / Product Type: *No Product type* /   Insurance ID: [id number] - (Medicare Managed) Secondary Insurance (if applicable): MEDICAID [place]   Referring Physician: OMAR Dhillon DO Bradley Albertson, DO   PCP: OMAR Dhillon DO Visits to Date/Visits Approved:     No Show/Cancelled Appts:   /       Medical Diagnosis: Other symptoms and signs involving the musculoskeletal system [R29.898]  Unspecified fall, sequela [W19.XXXS]    Treatment Diagnosis: LE weakness, falls        SUBJECTIVE EXAMINATION       Patient Comments: Subjective: Patient states she has her usual pain, says she didn't have any falls over the weekend but did have an almost fall.          OBJECTIVE EXAMINATION   Restrictions:  No data recorded No data recorded No data recorded        TREATMENT     Exercises:  Therapeutic exercise (CPT 04828)   Treatment Reasoning    Exercise 1: May need to modify based on tolerance/back pain  Exercise 2: LBE, 5 min, level 2.0  Exercise 3: Box steps cw/ccw, 5 each  Exercise 4: Fwd/back on line, 3 each  Exercise 5: Sidestepping on line, 2 each  Exercise 6: Cone weaves, X 3  Exercise 7: Repeated sit to stand, table height 22\", 10 reps  (small mat table in gym)- 5x from chair today for testing  Exercise 8: Standing march, 20 reps  Exercise 9: Mini squats, 20 reps  Exercise 10: Standing hip abd/ext, 20 reps  Exercise 11: Heel/toe raises, 20 reps  Exercise 12: Stance on foam EO/EC/Narrow DIDI, 60 sec- not today, testing for aguilar performed  Exercise 13: Stance with balance perturbations x 1 mins  Exercise 14: Toe taps on 6\" step, 20 each, 4\"

## 2024-04-25 ENCOUNTER — HOSPITAL ENCOUNTER (OUTPATIENT)
Dept: PHYSICAL THERAPY | Age: 49
Setting detail: THERAPIES SERIES
Discharge: HOME | End: 2024-04-25
Payer: MEDICARE

## 2024-04-25 PROCEDURE — 97110 THERAPEUTIC EXERCISES: CPT

## 2024-04-25 ASSESSMENT — PAIN SCALES - GENERAL: PAINLEVEL_OUTOF10: 6

## 2024-04-25 ASSESSMENT — PAIN DESCRIPTION - PAIN TYPE: TYPE: CHRONIC PAIN

## 2024-04-25 ASSESSMENT — PAIN DESCRIPTION - ORIENTATION: ORIENTATION: LOWER

## 2024-04-25 ASSESSMENT — PAIN DESCRIPTION - LOCATION: LOCATION: BACK

## 2024-04-25 NOTE — PROGRESS NOTES
Daily Treatment Note  Date: 2024  Patient Name: Evelyn Moore  MRN: [id number]     :   1975    Referring Physician: OMAR Dhillon DO Bradley Albertson, DO   PCP: OMAR Dhillon DO    Medical Diagnosis: Other symptoms and signs involving the musculoskeletal system [R29.898]  Unspecified fall, sequela [W19.XXXS] Falls, LE weakness  Treatment Diagnosis: LE weakness, falls      Insurance: Payor: HUMANA MEDICARE / Plan: HUMANA CHOICE-PPO MEDICARE / Product Type: *No Product type* /   Insurance ID: [id number] - (Medicare Managed)    Subjective:   General  Diagnosis: Falls, LE weakness  Referring Provider (secondary): Leobardo Dhillon DO  Onset Date: 23  PT Insurance Information: [employer] Medicare (Primary) and [place] Medicaid (Secondary)  Total # of Visits Approved: 31  Total # of Visits to Date:   Plan of Care/Certification Expiration Date: 24  Progress Note Due Date: 24  Referring Provider (secondary): Leobardo Dhillon DO  Subjective: the pain isn't terrible, about the usual  Patient Currently in Pain: Yes  Pain Level: 6  Pain Type: Chronic pain  Pain Location: Back  Pain Orientation: Lower       Treatment Activities:  Exercises:      Treatment Reasoning    Exercise 1: May need to modify based on tolerance/back pain  Exercise 2: LBE, 5 min, level 2.0  Exercise 3: Box steps cw/ccw, 5 each  Exercise 4: Fwd/back on line, 3 each  Exercise 5: Sidestepping on line, 2 each  Exercise 6: Cone weaves, X 3  Exercise 7: Repeated sit to stand, table height 22\", 10 reps  (small mat table in gym)  Exercise 8: Standing march, 20 reps  Exercise 9: Mini squats, 20 reps  Exercise 10: Standing hip abd/ext, 20 reps  Exercise 11: Heel/toe raises, 20 reps  Exercise 12: Stance on foam EO/EC/Narrow DIDI, 60 sec  Exercise 13: Stance with balance perturbations x 1 mins  Exercise 14: Toe taps on 6\" step, 20 each  Exercise 15: LAQ 4# x 20 reps/HS Curls with green band, 20 reps  Exercise 16: BAPS board

## 2024-04-28 DIAGNOSIS — I10 PRIMARY HYPERTENSION: ICD-10-CM

## 2024-04-29 RX ORDER — METOPROLOL SUCCINATE 50 MG/1
50 TABLET, EXTENDED RELEASE ORAL 2 TIMES DAILY
Qty: 180 TABLET | Refills: 3 | Status: SHIPPED | OUTPATIENT
Start: 2024-04-29

## 2024-04-29 NOTE — TELEPHONE ENCOUNTER
Received fax from pharmacy requesting refill on pts medication(s). Pt was last seen in office on 4/11/2024  and has a follow up scheduled for 5/15/2024. Will send request to  Dr. Dhillon  for authorization.     Requested Prescriptions     Pending Prescriptions Disp Refills    metoprolol succinate (TOPROL XL) 50 MG extended release tablet [Pharmacy Med Name: METOPROLOL ER SUCCINATE 50MG TABS] 180 tablet 3     Sig: TAKE 1 TABLET BY MOUTH IN THE MORNING AND AT BEDTIME

## 2024-04-30 ENCOUNTER — HOSPITAL ENCOUNTER (OUTPATIENT)
Dept: PHYSICAL THERAPY | Age: 49
Setting detail: THERAPIES SERIES
Discharge: HOME | End: 2024-04-30
Payer: MEDICARE

## 2024-04-30 PROCEDURE — 97110 THERAPEUTIC EXERCISES: CPT

## 2024-04-30 ASSESSMENT — PAIN SCALES - GENERAL: PAINLEVEL_OUTOF10: 9

## 2024-04-30 ASSESSMENT — PAIN DESCRIPTION - ORIENTATION: ORIENTATION: LOWER;RIGHT

## 2024-04-30 ASSESSMENT — PAIN DESCRIPTION - LOCATION: LOCATION: BACK;HIP

## 2024-04-30 ASSESSMENT — PAIN DESCRIPTION - PAIN TYPE: TYPE: CHRONIC PAIN;ACUTE PAIN

## 2024-04-30 NOTE — PROGRESS NOTES
Daily Treatment Note  Date: 2024  Patient Name: Evelyn Moore  MRN: [id number]     :   1975    Referring Physician: OMAR Dhillon DO Bradley Albertson, DO   PCP: OMAR Dhillon DO    Medical Diagnosis: Other symptoms and signs involving the musculoskeletal system [R29.898]  Unspecified fall, sequela [W19.XXXS] Falls, LE weakness  Treatment Diagnosis: LE weakness, falls      Insurance: Payor: HUMANA MEDICARE / Plan: HUMANA CHOICE-PPO MEDICARE / Product Type: *No Product type* /   Insurance ID: [id number] - (Medicare Managed)    Subjective:   General  Diagnosis: Falls, LE weakness  Referring Provider (secondary): Leobardo Dhillon DO  Onset Date: 23  PT Insurance Information: [employer] Medicare (Primary) and [place] Medicaid (Secondary)  Total # of Visits Approved: 31  Total # of Visits to Date:   Plan of Care/Certification Expiration Date: 24  Progress Note Due Date: 24  Referring Provider (secondary): Leobardo Dhillon DO  Subjective: my hip has been hurting bad since Saturday, been using my rollator since then due to pain  Patient Currently in Pain: Yes  Pain Level: 9  Pain Type: Chronic pain, Acute pain  Pain Location: Back, Hip  Pain Orientation: Lower, Right       Treatment Activities:  Exercises:      Treatment Reasoning    Exercise 1: May need to modify based on tolerance/back pain  Exercise 2: LBE, 5 min, level 2.0  Exercise 3: Box steps cw/ccw, 5 each--not today due to using rollator  Exercise 4: Fwd/back on line, 3 each  Exercise 5: Sidestepping on line, 2 each  : in // bars  Exercise 6: Cone weaves, X 3--not today patient using rollator  Exercise 7: Repeated sit to stand, table height 22\", 10 reps  (small mat table in gym)  Exercise 8: Standing march, 20 reps  Exercise 9: Mini squats, 20 reps  Exercise 10: Standing hip abd/ext, 20 reps  : RLE only  Exercise 11: Heel/toe raises, 20 reps  Exercise 12: Stance on foam EO/EC/Narrow DIDI, 60 sec--not

## 2024-05-02 ENCOUNTER — HOSPITAL ENCOUNTER (OUTPATIENT)
Dept: PHYSICAL THERAPY | Age: 49
Setting detail: THERAPIES SERIES
Discharge: HOME | End: 2024-05-02
Payer: MEDICARE

## 2024-05-02 PROCEDURE — 97110 THERAPEUTIC EXERCISES: CPT

## 2024-05-02 ASSESSMENT — PAIN DESCRIPTION - ORIENTATION: ORIENTATION: RIGHT;LOWER

## 2024-05-02 ASSESSMENT — PAIN DESCRIPTION - LOCATION: LOCATION: BACK;SHOULDER;KNEE

## 2024-05-02 ASSESSMENT — PAIN DESCRIPTION - PAIN TYPE: TYPE: CHRONIC PAIN;ACUTE PAIN

## 2024-05-02 ASSESSMENT — PAIN SCALES - GENERAL: PAINLEVEL_OUTOF10: 8

## 2024-05-02 NOTE — PROGRESS NOTES
Daily Treatment Note  Date: 2024  Patient Name: Evelyn Moore  MRN: [id number]     :   1975    Referring Physician: OMAR Dhillon DO Bradley Albertson, DO   PCP: OMAR Dhillon DO    Medical Diagnosis: Other symptoms and signs involving the musculoskeletal system [R29.898]  Unspecified fall, sequela [W19.XXXS] Falls, LE weakness  Treatment Diagnosis: LE weakness, falls      Insurance: Payor: HUMANA MEDICARE / Plan: HUMANA CHOICE-PPO MEDICARE / Product Type: *No Product type* /   Insurance ID: [id number] - (Medicare Managed)    Subjective:   General  Diagnosis: Falls, LE weakness  Referring Provider (secondary): Leobardo Dhillon DO  Onset Date: 23  PT Insurance Information: [employer] Medicare (Primary) and [place] Medicaid (Secondary)  Total # of Visits Approved: 31  Total # of Visits to Date:   Plan of Care/Certification Expiration Date: 24  Progress Note Due Date: 24  Referring Provider (secondary): Leobardo Dhillon DO  Subjective: i fell in my bedroom last night, she has a 4-5 mirna scratch/abrasion on her R medial knee/distal quads from fall and my R shoulder is hurting from fall  Patient Currently in Pain: Yes  Pain Level: 8  Pain Type: Chronic pain, Acute pain  Pain Location: Back, Shoulder, Knee  Pain Orientation: Right, Lower       Treatment Activities:  Exercises:      Treatment Reasoning    Exercise 1: May need to modify based on tolerance/back pain  Exercise 2: LBE, 5 min, level 2.0  Exercise 3: Box steps cw/ccw, 5 each--not today due to using rollator  Exercise 4: Fwd/back on line, 3 each  Exercise 5: Sidestepping on line, 2 each  5/2: in // bars  Exercise 6: Cone weaves, X 3--not today patient using rollator  Exercise 7: Repeated sit to stand, table height 22\", 10 reps  (small mat table in gym)  Exercise 8: Standing march, 20 reps  Exercise 9: Mini squats, 20 reps  Exercise 10: Standing hip abd/ext, 20 reps  5/2: RLE only  Exercise 11: Heel/toe raises, 20

## 2024-05-07 ENCOUNTER — HOSPITAL ENCOUNTER (OUTPATIENT)
Dept: PHYSICAL THERAPY | Age: 49
Setting detail: THERAPIES SERIES
Discharge: HOME | End: 2024-05-07
Payer: MEDICARE

## 2024-05-07 PROCEDURE — 97110 THERAPEUTIC EXERCISES: CPT

## 2024-05-07 ASSESSMENT — PAIN DESCRIPTION - DESCRIPTORS: DESCRIPTORS: SORE

## 2024-05-07 ASSESSMENT — PAIN DESCRIPTION - PAIN TYPE: TYPE: CHRONIC PAIN

## 2024-05-07 ASSESSMENT — PAIN DESCRIPTION - LOCATION: LOCATION: SHOULDER;HIP

## 2024-05-07 ASSESSMENT — PAIN DESCRIPTION - ORIENTATION: ORIENTATION: RIGHT

## 2024-05-07 ASSESSMENT — PAIN SCALES - GENERAL: PAINLEVEL_OUTOF10: 6

## 2024-05-07 NOTE — PROGRESS NOTES
Physical Therapy: Daily Note   Patient: Evelyn Moore (48 y.o. female)   Examination Date: 2024  Plan of Care/Certification Expiration Date: 24    No data recorded   :  1975 # of Visits since SOC:   27   MRN: [id number]  CSN: [id number] Start of Care Date:   2024   Insurance: Payor: HUMANA MEDICARE / Plan: HUMANA CHOICE-PPO MEDICARE / Product Type: *No Product type* /   Insurance ID: [id number] - (Medicare Managed) Secondary Insurance (if applicable): MEDICAID [place]   Referring Physician: OMAR Dhillon DO Bradley Albertson, DO   PCP: OMAR Dhillon DO Visits to Date/Visits Approved:     No Show/Cancelled Appts:   /       Medical Diagnosis: Other symptoms and signs involving the musculoskeletal system [R29.898]  Unspecified fall, sequela [W19.XXXS] Falls, LE weakness  Treatment Diagnosis: LE weakness, falls        SUBJECTIVE EXAMINATION   Pain Level: Pain Screening  Patient Currently in Pain: Yes  Pain Assessment: 0-10  Pain Level: 6  Pain Type: Chronic pain  Pain Location: Shoulder, Hip  Pain Orientation: Right  Pain Descriptors: Sore    Patient Comments: Subjective: I have had near falls since last visit.  I have been using the walker primarily but I couldnt get the one out of my car this morning.  Its was too much stuff blocking it.        TREATMENT     Exercises:      Treatment Reasoning    Exercise 1: May need to modify based on tolerance/back pain  Exercise 2: LBE, 5 min, level 2.0  Exercise 3: Box steps cw/ccw, 5 each--not today due to using rollator  Exercise 4: Fwd/back on line, 3 each in : in // bars  Exercise 5: Sidestepping on line, 3 each  : in // bars  Exercise 6: Cone weaves, X 3--not today patient using rollator  Exercise 7: Repeated sit to stand, table height 22\", 10 reps  (small mat table in gym)  Exercise 8: Standing march, 20 reps  Exercise 9: Mini squats, 20 reps  Exercise 10: Standing hip abd/ext, 20 reps  5/2: RLE only  Exercise 11: Heel/toe

## 2024-05-08 ENCOUNTER — HOSPITAL ENCOUNTER (OUTPATIENT)
Dept: PAIN MANAGEMENT | Age: 49
Discharge: HOME | End: 2024-05-08
Payer: MEDICARE

## 2024-05-08 VITALS
DIASTOLIC BLOOD PRESSURE: 90 MMHG | OXYGEN SATURATION: 99 % | HEART RATE: 82 BPM | RESPIRATION RATE: 18 BRPM | TEMPERATURE: 96.5 F | SYSTOLIC BLOOD PRESSURE: 133 MMHG

## 2024-05-08 PROCEDURE — 76942 ECHO GUIDE FOR BIOPSY: CPT | Performed by: NURSE PRACTITIONER

## 2024-05-08 PROCEDURE — 20611 DRAIN/INJ JOINT/BURSA W/US: CPT

## 2024-05-08 PROCEDURE — 2500000003 HC RX 250 WO HCPCS

## 2024-05-08 PROCEDURE — 20552 NJX 1/MLT TRIGGER POINT 1/2: CPT | Performed by: NURSE PRACTITIONER

## 2024-05-08 PROCEDURE — 6360000002 HC RX W HCPCS

## 2024-05-08 RX ORDER — KETOROLAC TROMETHAMINE 30 MG/ML
30 INJECTION, SOLUTION INTRAMUSCULAR; INTRAVENOUS ONCE
Status: DISCONTINUED | OUTPATIENT
Start: 2024-05-08 | End: 2024-05-10 | Stop reason: HOSPADM

## 2024-05-08 RX ORDER — BUPIVACAINE HYDROCHLORIDE 5 MG/ML
1 INJECTION, SOLUTION EPIDURAL; INTRACAUDAL ONCE
Status: DISCONTINUED | OUTPATIENT
Start: 2024-05-08 | End: 2024-05-10 | Stop reason: HOSPADM

## 2024-05-08 RX ORDER — LIDOCAINE HYDROCHLORIDE 10 MG/ML
1 INJECTION, SOLUTION EPIDURAL; INFILTRATION; INTRACAUDAL; PERINEURAL ONCE
Status: DISCONTINUED | OUTPATIENT
Start: 2024-05-08 | End: 2024-05-10 | Stop reason: HOSPADM

## 2024-05-08 RX ORDER — ETHYL CHLORIDE 100 %
AEROSOL, SPRAY (ML) TOPICAL PRN
Status: DISCONTINUED | OUTPATIENT
Start: 2024-05-08 | End: 2024-05-10 | Stop reason: HOSPADM

## 2024-05-08 RX ORDER — TRIAMCINOLONE ACETONIDE 40 MG/ML
40 INJECTION, SUSPENSION INTRA-ARTICULAR; INTRAMUSCULAR ONCE
Status: DISCONTINUED | OUTPATIENT
Start: 2024-05-08 | End: 2024-05-10 | Stop reason: HOSPADM

## 2024-05-08 NOTE — DISCHARGE INSTRUCTIONS
[facility]  Post Procedure Discharge Instructions        YOU HAVE HAD THE FOLLOWING PROCEDURE:                                  [] Occipital Nerve Blocks  [] CTS wrist injection(s)  [] Knee Injection(s)         [] Shoulder Injection(s)   [] Elbow Injection(s)     [] Botox Injection  [] Cervical Trigger Point Injections    [] Thoracic Trigger Point Injections    [] Lumbar Trigger Point Injections  [] Piriformis Trigger Point Injections  [x] SI Joint Injection(s)     [] Trochanteric Bursa Injection(s)       [] Ankle Injection(s)   [] Plantar Fasciitis   []  ______________  Injection(s) [] Botox []  Migraines [] Spasticity    YOU HAVE RECEIVED THE FOLLOWING MEDICATIONS IN YOUR INJECTION(s)  [x] Lidocaine [x] Bupivacaine   [] DepoMedrol (steroid) [] Decadron (steroid)  [x]  Kenalog (steroid)   [x] Toradol  [] Supartz [] Zilretta    [] Botox        PATIENT INFORMATION:   You may experience the following symptoms after your procedure. These symptoms are normal and should not cause concern:    You may have an increase in your pain. This may last 24 - 48 hours after your procedure.  You may have no change in the pain that you had prior to your injection(s).  You may have weakness or numbness in your affected extremity. If this occurs, this may last until numbing the medication wears off.     REPORT THE FOLLOWING SYMPTOMS TO YOUR DOCTOR:  Redness, swelling or drainage at the injection site(s)  Unusual pain that interferes with your normal activities of daily living.    OTHER INSTRUCTIONS:    [x] I will apply ice to the injection site(s) for at least 24 hours after the procedure. I will rotate the ice on for 20 minutes and off for 20 minutes for at least 24 hours.    [x] I will not apply heat for at least 48 hours and I will not take a hot bath or shower for at least 24 hours.     [x] I understand that if Lidocaine or Bupivacaine was used in my injection(s) that the injection site(s)

## 2024-05-09 ENCOUNTER — HOSPITAL ENCOUNTER (OUTPATIENT)
Dept: PHYSICAL THERAPY | Age: 49
Setting detail: THERAPIES SERIES
Discharge: HOME | End: 2024-05-09
Payer: MEDICARE

## 2024-05-09 PROCEDURE — 97110 THERAPEUTIC EXERCISES: CPT

## 2024-05-09 ASSESSMENT — PAIN DESCRIPTION - ORIENTATION: ORIENTATION: RIGHT;LEFT

## 2024-05-09 ASSESSMENT — PAIN DESCRIPTION - PAIN TYPE: TYPE: CHRONIC PAIN

## 2024-05-09 ASSESSMENT — PAIN DESCRIPTION - LOCATION: LOCATION: LEG

## 2024-05-09 ASSESSMENT — PAIN SCALES - GENERAL: PAINLEVEL_OUTOF10: 6

## 2024-05-09 NOTE — PROCEDURES
there be any complications or questions to arise between today and their next appointment.    Plan:    Patient to keep previously scheduled follow up appointment.    ASHLEE Olivia - CNP, 5/9/2024 at 7:49 AM

## 2024-05-09 NOTE — PROGRESS NOTES
though not very clear on the number of times of falls.  2/27: fell twice on 2/26. 3/21/24 Patient states she had a fall this morning and had also had a previous fall.  04/23/2024: Patient statse she has continued to have falls atleast 2 times/week, but says she is catching herself from falling often  LTG Goal 1 Status:: Not Met  Long Term Goal 2: Demo improved righting responses in anterior/posterior directions with balance challenges  LTG 2 Current Status:: 2/13: Continues with poor righting responses in all directions   2/27 and 3/21/24: same as 2/13 04/23/2024: Patient was able to resist with pertebations slightly today but had more difficulty in posterior direction  LTG Goal 2 Status:: In progress  Long Term Goal 3: Improve Crisostomo balance score to at least 45/56.  LTG 3 Current Status:: 2/13: 41/56 (was 37/56 at evaluation)   2/27 and 3/21/24:  42/56 04/23/2024: CRISOSTOMO 43/56  LTG Goal 3 Status:: In progress  Patient Goals   Patient Goals : decrease falls    Plan:    Physical Therapy Plan  Plan weeks: 4-6 weeks  Current Treatment Recommendations: Strengthening, Balance training, Functional mobility training, Neuromuscular re-education, Endurance training, Home exercise program, Safety education & training, Patient/Caregiver education & training, Equipment evaluation, education, & procurement, Therapeutic activities      Therapy Time:   Individual Concurrent Group Co-treatment   Time In 0903         Time Out 0946         Minutes 43            Clifford Calhoun PTA   Electronically signed by Clifford Calhoun PTA on 5/9/2024 at 9:56 AM

## 2024-05-10 ENCOUNTER — OFFICE VISIT (OUTPATIENT)
Dept: ONCOLOGY | Facility: CLINIC | Age: 49
End: 2024-05-10
Payer: MEDICARE

## 2024-05-10 ENCOUNTER — LAB (OUTPATIENT)
Dept: LAB | Facility: HOSPITAL | Age: 49
End: 2024-05-10
Payer: MEDICARE

## 2024-05-10 VITALS
SYSTOLIC BLOOD PRESSURE: 122 MMHG | TEMPERATURE: 98.1 F | WEIGHT: 293 LBS | RESPIRATION RATE: 16 BRPM | HEART RATE: 80 BPM | HEIGHT: 66 IN | BODY MASS INDEX: 47.09 KG/M2 | DIASTOLIC BLOOD PRESSURE: 82 MMHG | OXYGEN SATURATION: 99 %

## 2024-05-10 DIAGNOSIS — K76.9 LIVER LESION: ICD-10-CM

## 2024-05-10 DIAGNOSIS — D75.839 THROMBOCYTOSIS: ICD-10-CM

## 2024-05-10 DIAGNOSIS — D75.839 THROMBOCYTOSIS: Primary | ICD-10-CM

## 2024-05-10 DIAGNOSIS — Z86.39 HISTORY OF IRON DEFICIENCY: ICD-10-CM

## 2024-05-10 DIAGNOSIS — D72.829 LEUKOCYTOSIS, UNSPECIFIED TYPE: ICD-10-CM

## 2024-05-10 DIAGNOSIS — Z90.81 H/O SPLENECTOMY: ICD-10-CM

## 2024-05-10 DIAGNOSIS — R79.89 ELEVATED FERRITIN: ICD-10-CM

## 2024-05-10 DIAGNOSIS — R79.89 ELEVATED FERRITIN: Primary | ICD-10-CM

## 2024-05-10 LAB
ALBUMIN SERPL-MCNC: 3.9 G/DL (ref 3.5–5.2)
ALBUMIN/GLOB SERPL: 1.1 G/DL
ALP SERPL-CCNC: 129 U/L (ref 39–117)
ALT SERPL W P-5'-P-CCNC: 11 U/L (ref 1–33)
ANION GAP SERPL CALCULATED.3IONS-SCNC: 11 MMOL/L (ref 5–15)
AST SERPL-CCNC: 14 U/L (ref 1–32)
BASOPHILS # BLD AUTO: 0.11 10*3/MM3 (ref 0–0.2)
BASOPHILS NFR BLD AUTO: 0.5 % (ref 0–1.5)
BILIRUB SERPL-MCNC: 0.2 MG/DL (ref 0–1.2)
BUN SERPL-MCNC: 13 MG/DL (ref 6–20)
BUN/CREAT SERPL: 16.7 (ref 7–25)
CALCIUM SPEC-SCNC: 8.9 MG/DL (ref 8.6–10.5)
CHLORIDE SERPL-SCNC: 106 MMOL/L (ref 98–107)
CO2 SERPL-SCNC: 26 MMOL/L (ref 22–29)
CREAT SERPL-MCNC: 0.78 MG/DL (ref 0.57–1)
DEPRECATED RDW RBC AUTO: 48.9 FL (ref 37–54)
EGFRCR SERPLBLD CKD-EPI 2021: 93.2 ML/MIN/1.73
EOSINOPHIL # BLD AUTO: 0.01 10*3/MM3 (ref 0–0.4)
EOSINOPHIL NFR BLD AUTO: 0 % (ref 0.3–6.2)
ERYTHROCYTE [DISTWIDTH] IN BLOOD BY AUTOMATED COUNT: 15.6 % (ref 12.3–15.4)
FERRITIN SERPL-MCNC: 106 NG/ML (ref 13–150)
GLOBULIN UR ELPH-MCNC: 3.5 GM/DL
GLUCOSE SERPL-MCNC: 138 MG/DL (ref 65–99)
HCT VFR BLD AUTO: 39.2 % (ref 34–46.6)
HGB BLD-MCNC: 12.9 G/DL (ref 12–15.9)
HOLD SPECIMEN: NORMAL
IMM GRANULOCYTES # BLD AUTO: 0.11 10*3/MM3 (ref 0–0.05)
IMM GRANULOCYTES NFR BLD AUTO: 0.5 % (ref 0–0.5)
IRON 24H UR-MRATE: 55 MCG/DL (ref 37–145)
IRON SATN MFR SERPL: 17 % (ref 20–50)
LYMPHOCYTES # BLD AUTO: 5.94 10*3/MM3 (ref 0.7–3.1)
LYMPHOCYTES NFR BLD AUTO: 29.6 % (ref 19.6–45.3)
MCH RBC QN AUTO: 28.5 PG (ref 26.6–33)
MCHC RBC AUTO-ENTMCNC: 32.9 G/DL (ref 31.5–35.7)
MCV RBC AUTO: 86.7 FL (ref 79–97)
MONOCYTES # BLD AUTO: 1.18 10*3/MM3 (ref 0.1–0.9)
MONOCYTES NFR BLD AUTO: 5.9 % (ref 5–12)
NEUTROPHILS NFR BLD AUTO: 12.7 10*3/MM3 (ref 1.7–7)
NEUTROPHILS NFR BLD AUTO: 63.5 % (ref 42.7–76)
NRBC BLD AUTO-RTO: 0 /100 WBC (ref 0–0.2)
PLATELET # BLD AUTO: 588 10*3/MM3 (ref 140–450)
PMV BLD AUTO: 9.3 FL (ref 6–12)
POTASSIUM SERPL-SCNC: 3.2 MMOL/L (ref 3.5–5.2)
PROT SERPL-MCNC: 7.4 G/DL (ref 6–8.5)
RBC # BLD AUTO: 4.52 10*6/MM3 (ref 3.77–5.28)
SODIUM SERPL-SCNC: 143 MMOL/L (ref 136–145)
TIBC SERPL-MCNC: 332 MCG/DL (ref 298–536)
TRANSFERRIN SERPL-MCNC: 223 MG/DL (ref 200–360)
WBC NRBC COR # BLD AUTO: 20.05 10*3/MM3 (ref 3.4–10.8)

## 2024-05-10 PROCEDURE — 82728 ASSAY OF FERRITIN: CPT

## 2024-05-10 PROCEDURE — 85025 COMPLETE CBC W/AUTO DIFF WBC: CPT

## 2024-05-10 PROCEDURE — 84466 ASSAY OF TRANSFERRIN: CPT

## 2024-05-10 PROCEDURE — 80053 COMPREHEN METABOLIC PANEL: CPT

## 2024-05-10 PROCEDURE — 83540 ASSAY OF IRON: CPT

## 2024-05-10 PROCEDURE — 36415 COLL VENOUS BLD VENIPUNCTURE: CPT

## 2024-05-10 RX ORDER — DESVENLAFAXINE SUCCINATE 50 MG/1
1 TABLET, EXTENDED RELEASE ORAL DAILY
COMMUNITY
Start: 2024-04-17

## 2024-05-10 NOTE — PROGRESS NOTES
Clinic Progress Note    Patient:  Naomi Bhatia  YOB: 1975  Date of Service: 5/19/2024  MRN: [id number]   Acct:    Primary Care Physician: Brett Sharp DO    Chief Complaint: Hereditary hemochromatosis    Hematology History:  For detailed summary about the patient's previous work-up, differential diagnoses and treatments, please refer to the last note of Dr. Goldberg from 4/1/2022.    Brief summary:  Ms. Bhatia is a 48 y.o. who follows for history of iron level of 19.  She has a history of gastric sleeve procedure in 12/2017, after which she developed iron deficiency anemia like related to decreased dietary absorption. She did receive several multiple blood transfusions in the past as well as parenteral iron Venofer infusions at least 7-10 times back in 2018.     In 9/2020, the patient had seen Dr. Goldberg, where her hemoglobin was 10.8, iron saturation was 21%, ferritin was 1406, and repeat ferritin 8/11/2021 was 1663; she is found to be heterozygous for C282Y hemochromatosis gene variant. She was started on she has phlebotomies every 2 months, and as ferritin had not improved, she initially was started on Jadenu.  As her ferritin improved below 500 in 7/2021, Jadenu was stopped then. The goal ferritin that was discussed with the patient was to keep ferritin below 500.    US spleen 12/17/2021 showed splenomegaly (max dimension 16.7 cm, previously 14.6 cm); it was symptomatic and painful.  Peripheral blood flow cytometry, JAK2, BCR/ABL FISH were negative.  PET scan 2/13/2022 showed splenomegaly with numerous small 1-2 cm hypermetabolic lesions along with mild periportal hypermetabolic lymphadenopathy.  Bone marrow biopsy 1/14/2022 was unremarkable for the most part; there was no sign of increased iron deposition. She ultimately underwent robotic assisted laparoscopic splenomegaly and wedge liver biopsy at [facility] on 5/2/2022, with splenectomy pathology showing  Necrotizing granulomas,  "and liver biopsy showing bridging fibrosis with early nodule formation as well as granulomas and mildly active steatohepatitis with microvascular steatosis; iron stains showed only mild iron deposition.    Interval History:    Ms. Bhatia presents to clinic today for continued follow up.   She states she has been doing well.  She has no acute complaints today other than some fatigue.     She had labs drawn and results were reviewed with her in office.         Objectives:  /82   Pulse 80   Temp 98.1 °F (36.7 °C)   Resp 16   Ht 167.6 cm (66\")   Wt (!) 139 kg (307 lb 3.2 oz)   LMP  (LMP Unknown)   SpO2 99%   BMI 49.58 kg/m²      Review of Systems   Constitutional:  Positive for fatigue. Negative for fever.   HENT:  Negative for trouble swallowing.    Respiratory:  Negative for cough and shortness of breath.    Cardiovascular:  Negative for chest pain, palpitations and leg swelling.   Gastrointestinal:  Negative for nausea and vomiting.   Genitourinary:  Negative for hematuria.   Musculoskeletal:  Negative for arthralgias and myalgias.   Skin:  Negative for rash, skin lesions and wound.   Neurological:  Negative for dizziness, syncope, memory problem and confusion.   Psychiatric/Behavioral:  Negative for suicidal ideas and depressed mood. The patient is not nervous/anxious.      I have reviewed the ROS and verified with the patient the accuracy of it. No changes since the information was documented.  Rhiannon DAVID Sanders, APRN 05/10/2024       Results:  Lab Results   Component Value Date    WBC 20.05 (H) 05/10/2024    HGB 12.9 05/10/2024    HCT 39.2 05/10/2024    MCV 86.7 05/10/2024     (H) 05/10/2024     Lab Results   Component Value Date    IRON 55 05/10/2024    TIBC 332 05/10/2024    FERRITIN 106.00 05/10/2024     Physical Exam  Constitutional:       Appearance: Normal appearance.   HENT:      Head: Normocephalic and atraumatic.   Cardiovascular:      Rate and Rhythm: Normal rate and regular rhythm. "   Pulmonary:      Effort: Pulmonary effort is normal.      Breath sounds: Normal breath sounds.   Abdominal:      General: Bowel sounds are normal.      Palpations: Abdomen is soft.   Musculoskeletal:      Right lower leg: No edema.      Left lower leg: No edema.   Skin:     General: Skin is warm and dry.   Neurological:      Mental Status: She is alert and oriented to person, place, and time.   Psychiatric:         Attention and Perception: Attention normal.         Mood and Affect: Mood normal.         Judgment: Judgment normal.           Assessment :  1.  Hemochromatosis C282Y heterozygous/carrier.  2.  History of iron deficiency  3.  Splenectomy on 5/2/2022  4.  Leukocytosis  5. Thrombocytosis    7.  Liver lesion       Hemochromatosis Carrier  History of iron deficiency   -Heterozygous C282Y  Carrier   -Previously received phlebotomies for elevated ferritin, but since her splenectomy surgery her ferritin had trended down  -Lab today:  Iron 55 Ferritin 106, Sat 17, TIBC 332  -Stable for observation     3.  Leukocytosis  4.  Thrombocytosis  5.  Splenectomy   -JAK2, BCR/ABL Negative  -Splenectomy 5/2/2022  -Labs today:  WBC 20.05, Plt 588, ANC 12.7, ALC 5.94, AMC 1.18  -Chronic, Platelet Elevation most likely secondary to splenectomy   -Bone Marrow 1/14/22   -Flow Cytometry:  no evidence for abnormal myeloid maturation or an increased blast population   -There is no evidence for a lymphoproliferative disorder   -Normal female karyotype   -Normocellular marrow for age (50-60%) with maturing trilineage hematopoiesis.  The M:E Ratio is    Decreased. Slight megakaryocytosis.  Decreased storage iron.     -No evidence of an acute leukemia, a lymphoproliferative disorder or a a plasma cell dyscrasia.   - Will continue to follow   -Will order Flow Cytometry    6.  Liver Lesion   -Liver: Severe hepatic steatosis. No morphologic changes of cirrhosis. 15  mm cyst in the LEFT liver segment 3 without solid components  or  enhancement, correlating with lesion of concern on outside ultrasound.  No suspicious solid liver lesion.  -she follows with hepatology at [facility].       PLAN  Stable for observation  Will continue to follow leukocytosis  Continue current medications, treatment plans and follow up with PCP and any other providers  Return to office 3 months   Pre-office labs for CBC, CMP, Iron Profile, Ferritin   Care discussed with patient.  Understanding expressed.  Patient agreeable with plan.  -Port flush in 3 months      Rhiannon Sanders, APRN  5/19/2024

## 2024-05-12 SDOH — ECONOMIC STABILITY: INCOME INSECURITY: HOW HARD IS IT FOR YOU TO PAY FOR THE VERY BASICS LIKE FOOD, HOUSING, MEDICAL CARE, AND HEATING?: VERY HARD

## 2024-05-12 SDOH — ECONOMIC STABILITY: FOOD INSECURITY: WITHIN THE PAST 12 MONTHS, YOU WORRIED THAT YOUR FOOD WOULD RUN OUT BEFORE YOU GOT MONEY TO BUY MORE.: OFTEN TRUE

## 2024-05-12 SDOH — ECONOMIC STABILITY: HOUSING INSECURITY
IN THE LAST 12 MONTHS, WAS THERE A TIME WHEN YOU DID NOT HAVE A STEADY PLACE TO SLEEP OR SLEPT IN A SHELTER (INCLUDING NOW)?: NO

## 2024-05-12 SDOH — ECONOMIC STABILITY: TRANSPORTATION INSECURITY
IN THE PAST 12 MONTHS, HAS LACK OF TRANSPORTATION KEPT YOU FROM MEETINGS, WORK, OR FROM GETTING THINGS NEEDED FOR DAILY LIVING?: NO

## 2024-05-12 SDOH — ECONOMIC STABILITY: FOOD INSECURITY: WITHIN THE PAST 12 MONTHS, THE FOOD YOU BOUGHT JUST DIDN'T LAST AND YOU DIDN'T HAVE MONEY TO GET MORE.: OFTEN TRUE

## 2024-05-12 SDOH — HEALTH STABILITY: PHYSICAL HEALTH: ON AVERAGE, HOW MANY DAYS PER WEEK DO YOU ENGAGE IN MODERATE TO STRENUOUS EXERCISE (LIKE A BRISK WALK)?: 3 DAYS

## 2024-05-12 ASSESSMENT — LIFESTYLE VARIABLES
HOW OFTEN DO YOU HAVE A DRINK CONTAINING ALCOHOL: 1
HOW OFTEN DO YOU HAVE SIX OR MORE DRINKS ON ONE OCCASION: 1
HOW MANY STANDARD DRINKS CONTAINING ALCOHOL DO YOU HAVE ON A TYPICAL DAY: PATIENT DOES NOT DRINK
HOW OFTEN DO YOU HAVE A DRINK CONTAINING ALCOHOL: NEVER
HOW MANY STANDARD DRINKS CONTAINING ALCOHOL DO YOU HAVE ON A TYPICAL DAY: 0

## 2024-05-12 ASSESSMENT — PATIENT HEALTH QUESTIONNAIRE - PHQ9
2. FEELING DOWN, DEPRESSED OR HOPELESS: SEVERAL DAYS
SUM OF ALL RESPONSES TO PHQ QUESTIONS 1-9: 2
SUM OF ALL RESPONSES TO PHQ QUESTIONS 1-9: 2
1. LITTLE INTEREST OR PLEASURE IN DOING THINGS: SEVERAL DAYS
SUM OF ALL RESPONSES TO PHQ9 QUESTIONS 1 & 2: 2
SUM OF ALL RESPONSES TO PHQ QUESTIONS 1-9: 2
SUM OF ALL RESPONSES TO PHQ QUESTIONS 1-9: 2

## 2024-05-14 ENCOUNTER — HOSPITAL ENCOUNTER (OUTPATIENT)
Dept: PHYSICAL THERAPY | Age: 49
Setting detail: THERAPIES SERIES
Discharge: HOME | End: 2024-05-14
Payer: MEDICARE

## 2024-05-14 ENCOUNTER — HOSPITAL ENCOUNTER (OUTPATIENT)
Dept: MRI IMAGING | Age: 49
Discharge: HOME | End: 2024-05-14
Payer: MEDICARE

## 2024-05-14 DIAGNOSIS — M54.50 CHRONIC BILATERAL LOW BACK PAIN WITHOUT SCIATICA: ICD-10-CM

## 2024-05-14 DIAGNOSIS — M51.36 DDD (DEGENERATIVE DISC DISEASE), LUMBAR: ICD-10-CM

## 2024-05-14 DIAGNOSIS — Z98.1 HISTORY OF LUMBAR SPINAL FUSION: ICD-10-CM

## 2024-05-14 DIAGNOSIS — M54.16 LUMBAR RADICULOPATHY: ICD-10-CM

## 2024-05-14 DIAGNOSIS — G89.29 CHRONIC BILATERAL LOW BACK PAIN WITHOUT SCIATICA: ICD-10-CM

## 2024-05-14 DIAGNOSIS — M54.10 BACK PAIN WITH LEFT-SIDED RADICULOPATHY: Chronic | ICD-10-CM

## 2024-05-14 PROCEDURE — 97110 THERAPEUTIC EXERCISES: CPT

## 2024-05-14 PROCEDURE — A9577 INJ MULTIHANCE: HCPCS | Performed by: NURSE PRACTITIONER

## 2024-05-14 PROCEDURE — 72158 MRI LUMBAR SPINE W/O & W/DYE: CPT

## 2024-05-14 PROCEDURE — 6360000004 HC RX CONTRAST MEDICATION: Performed by: NURSE PRACTITIONER

## 2024-05-14 RX ADMIN — GADOBENATE DIMEGLUMINE 20 ML: 529 INJECTION, SOLUTION INTRAVENOUS at 08:40

## 2024-05-14 ASSESSMENT — PAIN DESCRIPTION - PAIN TYPE: TYPE: CHRONIC PAIN

## 2024-05-14 ASSESSMENT — PAIN DESCRIPTION - LOCATION: LOCATION: LEG;SHOULDER

## 2024-05-14 ASSESSMENT — PAIN SCALES - GENERAL: PAINLEVEL_OUTOF10: 6

## 2024-05-14 ASSESSMENT — PAIN DESCRIPTION - ORIENTATION: ORIENTATION: RIGHT;LEFT

## 2024-05-14 ASSESSMENT — PAIN DESCRIPTION - DESCRIPTORS: DESCRIPTORS: ACHING;SORE

## 2024-05-14 NOTE — PROGRESS NOTES
Physical Therapy  Daily Treatment Note  Date: 2024  Patient Name: Evelyn Moore  MRN: [id number]     :   1975    Subjective:      PT Visit Information  PT Insurance Information: [employer] Medicare (Primary) and [place] Medicaid (Secondary)  Total # of Visits Approved: 31  Total # of Visits to Date: 30  Plan of Care/Certification Expiration Date: 24  Progress Note Due Date: 24  Referring Provider (secondary): Leobardo Dhillon DO  Subjective: Patient reports continued difficulty walking this date as well as continued generalized pain.    Pain Screening  Patient Currently in Pain: Yes  Pain Assessment: 0-10  Pain Level: 6  Pain Type: Chronic pain  Pain Location: Leg;Shoulder  Pain Orientation: Right;Left  Pain Descriptors: Aching;Sore     Treatment Activities:   Exercises  Exercise 1: May need to modify based on tolerance/back pain  Exercise 2: LBE, 5 min, level 2.0  Exercise 3: Box steps cw/ccw, 5 each  Exercise 4: Fwd/back on line, 3 each  Exercise 5: Sidestepping on line, 3 each  Exercise 6: Cone weaves, X 3  Exercise 7: Repeated sit to stand, table height 22\", 10 reps  (small mat table in gym)  Exercise 8: Standing march, 20 reps  Exercise 9: Mini squats, 20 reps  Exercise 10: Standing hip abd/ext, 20 reps  5/9: RLE only  Exercise 11: Heel/toe raises, 20 reps  Exercise 12: Stance on foam EO/EC/Narrow DIDI, 60 sec--no foam today  Exercise 13: Stance with balance perturbations x 1 mins--not today  Exercise 14: Toe taps on 6\" step, 20 each--not today  Exercise 15: LAQ 4# x 20 reps/HS Curls with green band, 20 reps  Exercise 16: BAPS board bilat LE standing in // x 20 each direction--not today  Exercise 17: : HEP RE-ISSUED      Assessment:   Conditions Requiring Skilled Therapeutic Intervention  Body Structures, Functions, Activity Limitations Requiring Skilled Therapeutic Intervention: Decreased functional mobility ;Decreased balance;Decreased sensation;Decreased endurance;Decreased

## 2024-05-15 ENCOUNTER — TELEPHONE (OUTPATIENT)
Dept: PAIN MANAGEMENT | Age: 49
End: 2024-05-15

## 2024-05-15 ENCOUNTER — OFFICE VISIT (OUTPATIENT)
Dept: FAMILY MEDICINE CLINIC | Age: 49
End: 2024-05-15
Payer: MEDICARE

## 2024-05-15 VITALS
OXYGEN SATURATION: 96 % | TEMPERATURE: 97.1 F | SYSTOLIC BLOOD PRESSURE: 130 MMHG | WEIGHT: 293 LBS | DIASTOLIC BLOOD PRESSURE: 68 MMHG | HEIGHT: 65 IN | HEART RATE: 82 BPM | BODY MASS INDEX: 48.82 KG/M2

## 2024-05-15 DIAGNOSIS — I10 PRIMARY HYPERTENSION: ICD-10-CM

## 2024-05-15 DIAGNOSIS — J45.40 MODERATE PERSISTENT ASTHMA WITHOUT COMPLICATION: ICD-10-CM

## 2024-05-15 DIAGNOSIS — F41.9 ANXIETY AND DEPRESSION: ICD-10-CM

## 2024-05-15 DIAGNOSIS — E11.9 TYPE 2 DIABETES MELLITUS WITHOUT COMPLICATION, WITHOUT LONG-TERM CURRENT USE OF INSULIN (HCC): Primary | ICD-10-CM

## 2024-05-15 DIAGNOSIS — K21.9 GASTROESOPHAGEAL REFLUX DISEASE, UNSPECIFIED WHETHER ESOPHAGITIS PRESENT: ICD-10-CM

## 2024-05-15 DIAGNOSIS — E78.2 MIXED HYPERLIPIDEMIA: ICD-10-CM

## 2024-05-15 DIAGNOSIS — Z91.09 ENVIRONMENTAL ALLERGIES: ICD-10-CM

## 2024-05-15 DIAGNOSIS — M25.551 RIGHT HIP PAIN: ICD-10-CM

## 2024-05-15 DIAGNOSIS — G25.81 RESTLESS LEG SYNDROME: ICD-10-CM

## 2024-05-15 DIAGNOSIS — M15.9 PRIMARY OSTEOARTHRITIS INVOLVING MULTIPLE JOINTS: ICD-10-CM

## 2024-05-15 DIAGNOSIS — F32.A ANXIETY AND DEPRESSION: ICD-10-CM

## 2024-05-15 DIAGNOSIS — Z13.31 POSITIVE DEPRESSION SCREENING: ICD-10-CM

## 2024-05-15 DIAGNOSIS — Z00.00 MEDICARE ANNUAL WELLNESS VISIT, SUBSEQUENT: ICD-10-CM

## 2024-05-15 DIAGNOSIS — G89.4 CHRONIC PAIN SYNDROME: ICD-10-CM

## 2024-05-15 PROCEDURE — G0439 PPPS, SUBSEQ VISIT: HCPCS | Performed by: PEDIATRICS

## 2024-05-15 PROCEDURE — 3046F HEMOGLOBIN A1C LEVEL >9.0%: CPT | Performed by: PEDIATRICS

## 2024-05-15 PROCEDURE — 3078F DIAST BP <80 MM HG: CPT | Performed by: PEDIATRICS

## 2024-05-15 PROCEDURE — 3075F SYST BP GE 130 - 139MM HG: CPT | Performed by: PEDIATRICS

## 2024-05-15 SDOH — ECONOMIC STABILITY: FOOD INSECURITY: WITHIN THE PAST 12 MONTHS, THE FOOD YOU BOUGHT JUST DIDN'T LAST AND YOU DIDN'T HAVE MONEY TO GET MORE.: OFTEN TRUE

## 2024-05-15 SDOH — ECONOMIC STABILITY: FOOD INSECURITY: WITHIN THE PAST 12 MONTHS, YOU WORRIED THAT YOUR FOOD WOULD RUN OUT BEFORE YOU GOT MONEY TO BUY MORE.: OFTEN TRUE

## 2024-05-15 SDOH — ECONOMIC STABILITY: INCOME INSECURITY: HOW HARD IS IT FOR YOU TO PAY FOR THE VERY BASICS LIKE FOOD, HOUSING, MEDICAL CARE, AND HEATING?: VERY HARD

## 2024-05-15 ASSESSMENT — ENCOUNTER SYMPTOMS
BACK PAIN: 1
SHORTNESS OF BREATH: 1
DIARRHEA: 0
COUGH: 1
NAUSEA: 0
VOMITING: 0
SINUS PAIN: 0

## 2024-05-15 ASSESSMENT — COLUMBIA-SUICIDE SEVERITY RATING SCALE - C-SSRS
1. WITHIN THE PAST MONTH, HAVE YOU WISHED YOU WERE DEAD OR WISHED YOU COULD GO TO SLEEP AND NOT WAKE UP?: NO
6. HAVE YOU EVER DONE ANYTHING, STARTED TO DO ANYTHING, OR PREPARED TO DO ANYTHING TO END YOUR LIFE?: NO
2. HAVE YOU ACTUALLY HAD ANY THOUGHTS OF KILLING YOURSELF?: NO

## 2024-05-15 ASSESSMENT — PATIENT HEALTH QUESTIONNAIRE - PHQ9
1. LITTLE INTEREST OR PLEASURE IN DOING THINGS: SEVERAL DAYS
6. FEELING BAD ABOUT YOURSELF - OR THAT YOU ARE A FAILURE OR HAVE LET YOURSELF OR YOUR FAMILY DOWN: NOT AT ALL
SUM OF ALL RESPONSES TO PHQ9 QUESTIONS 1 & 2: 2
7. TROUBLE CONCENTRATING ON THINGS, SUCH AS READING THE NEWSPAPER OR WATCHING TELEVISION: MORE THAN HALF THE DAYS
4. FEELING TIRED OR HAVING LITTLE ENERGY: NEARLY EVERY DAY
SUM OF ALL RESPONSES TO PHQ QUESTIONS 1-9: 12
SUM OF ALL RESPONSES TO PHQ QUESTIONS 1-9: 12
3. TROUBLE FALLING OR STAYING ASLEEP: NEARLY EVERY DAY
10. IF YOU CHECKED OFF ANY PROBLEMS, HOW DIFFICULT HAVE THESE PROBLEMS MADE IT FOR YOU TO DO YOUR WORK, TAKE CARE OF THINGS AT HOME, OR GET ALONG WITH OTHER PEOPLE: VERY DIFFICULT
5. POOR APPETITE OR OVEREATING: NOT AT ALL
8. MOVING OR SPEAKING SO SLOWLY THAT OTHER PEOPLE COULD HAVE NOTICED. OR THE OPPOSITE, BEING SO FIGETY OR RESTLESS THAT YOU HAVE BEEN MOVING AROUND A LOT MORE THAN USUAL: MORE THAN HALF THE DAYS
9. THOUGHTS THAT YOU WOULD BE BETTER OFF DEAD, OR OF HURTING YOURSELF: NOT AT ALL
2. FEELING DOWN, DEPRESSED OR HOPELESS: SEVERAL DAYS
SUM OF ALL RESPONSES TO PHQ QUESTIONS 1-9: 12
SUM OF ALL RESPONSES TO PHQ QUESTIONS 1-9: 12

## 2024-05-15 NOTE — TELEPHONE ENCOUNTER
Injection didn't help as much as she hoped, but this was her first time getting this injection. Still having spasm in her right inter thigh. Told her it can take up to 2-3 injections before the inflammation is calmed down some.

## 2024-05-15 NOTE — PROGRESS NOTES
MOUTH DAILY Yes OMAR Dhillon DO   fluticasone (FLONASE) 50 MCG/ACT nasal spray 2 sprays by Each Nostril route daily Yes ProviderBam MD   naloxone (NARCAN) 4 MG/0.1ML LIQD nasal spray 1 spray by Nasal route as needed for Opioid Reversal Yes OMAR Dhillon DO   Elastic Bandages & Supports (LUMBAR BACK BRACE/SUPPORT PAD) MISC Wear as needed with activity. Yes OMAR Dhillon DO   ondansetron (ZOFRAN-ODT) 8 MG TBDP disintegrating tablet Take 1 tablet by mouth every 8 hours as needed for Nausea or Vomiting Yes Ramos Marte MD   Calcium-Vitamin D 600-200 MG-UNIT TABS Take 1 tablet by mouth daily Yes ProviderBam MD   Multiple Vitamins-Minerals (MULTIVITAMIN & MINERAL PO) Take  by mouth. Yes Bam Sibley MD   CRANBERRY Take 500 mg by mouth daily. Yes ProviderBam MD       CareTeam (Including outside providers/suppliers regularly involved in providing care):   Patient Care Team:  OMAR Dhillon DO as PCP - General  OMAR Dhillon DO as PCP - Empaneled Provider  Evelyn Cervantes APRN - CNP     Reviewed and updated this visit:  Allergies  Meds  Med Hx  Surg Hx  Soc Hx  Fam Hx         This was an in-house visit

## 2024-05-16 ENCOUNTER — HOSPITAL ENCOUNTER (OUTPATIENT)
Dept: PHYSICAL THERAPY | Age: 49
Setting detail: THERAPIES SERIES
Discharge: HOME | End: 2024-05-16
Payer: MEDICARE

## 2024-05-16 PROCEDURE — 97110 THERAPEUTIC EXERCISES: CPT

## 2024-05-16 ASSESSMENT — PAIN DESCRIPTION - LOCATION: LOCATION: HIP;BACK

## 2024-05-16 ASSESSMENT — PAIN DESCRIPTION - PAIN TYPE: TYPE: CHRONIC PAIN

## 2024-05-16 ASSESSMENT — PAIN SCALES - GENERAL: PAINLEVEL_OUTOF10: 5

## 2024-05-16 ASSESSMENT — PAIN DESCRIPTION - DESCRIPTORS: DESCRIPTORS: ACHING;SORE

## 2024-05-16 ASSESSMENT — PAIN DESCRIPTION - ORIENTATION: ORIENTATION: RIGHT;LEFT

## 2024-05-16 NOTE — PROGRESS NOTES
3/21/24 Patient states she had a fall this morning and had also had a previous fall.  04/23/2024: Patient statse she has continued to have falls atleast 2 times/week, but says she is catching herself from falling often. 5/16/24 Patient states she continues to have falls but not as frequent since last reassessment. Not Met   Demo improved righting responses in anterior/posterior directions with balance challenges 2/13: Continues with poor righting responses in all directions   2/27 and 3/21/24: same as 2/13 04/23/2024 and 5/16/24: Patient was able to resist with pertebations slightly today but had more difficulty in posterior direction. In progress   Improve Crisostomo balance score to at least 45/56. 2/13: 41/56 (was 37/56 at evaluation)   2/27 and 3/21/24:  42/56 04/23/2024: CRISOSTOMO 43/56. 5/16/24 Patient scored 44/56 on the Crisostomo Balance testing. In progress, Partially met                                                                TREATMENT PLAN   Plan Frequency: 2x  Plan weeks: 4-6 weeks  Current Treatment Recommendations: Strengthening, Balance training, Functional mobility training, Neuromuscular re-education, Endurance training, Home exercise program, Safety education & training, Patient/Caregiver education & training, Equipment evaluation, education, & procurement, Therapeutic activities   Requires PT Follow-Up: Yes       Therapy Time  Individual Time In: 0857       Individual Time Out: 0940  Minutes: 43  Timed Code Treatment Minutes: 43 Minutes     Electronically signed by Riley Yao, PT  on 5/16/2024 at 12:27 PM   POC NOTE

## 2024-05-17 DIAGNOSIS — Z91.81 AT HIGH RISK FOR FALLS: Primary | ICD-10-CM

## 2024-05-17 DIAGNOSIS — M54.10 BACK PAIN WITH LEFT-SIDED RADICULOPATHY: Chronic | ICD-10-CM

## 2024-05-17 DIAGNOSIS — M53.3 SACROILIAC JOINT DYSFUNCTION OF RIGHT SIDE: ICD-10-CM

## 2024-05-17 DIAGNOSIS — M79.7 FIBROMYALGIA: ICD-10-CM

## 2024-05-17 DIAGNOSIS — M54.16 LUMBAR RADICULOPATHY: ICD-10-CM

## 2024-05-21 ENCOUNTER — TELEPHONE (OUTPATIENT)
Dept: PAIN MANAGEMENT | Age: 49
End: 2024-05-21

## 2024-05-21 ENCOUNTER — HOSPITAL ENCOUNTER (OUTPATIENT)
Dept: PHYSICAL THERAPY | Age: 49
Setting detail: THERAPIES SERIES
Discharge: HOME | End: 2024-05-21
Payer: MEDICARE

## 2024-05-21 PROCEDURE — 97110 THERAPEUTIC EXERCISES: CPT

## 2024-05-21 ASSESSMENT — PAIN DESCRIPTION - ORIENTATION: ORIENTATION: RIGHT

## 2024-05-21 ASSESSMENT — PAIN DESCRIPTION - LOCATION: LOCATION: HIP;BACK

## 2024-05-21 ASSESSMENT — PAIN DESCRIPTION - DESCRIPTORS: DESCRIPTORS: ACHING;SORE

## 2024-05-21 ASSESSMENT — PAIN DESCRIPTION - PAIN TYPE: TYPE: CHRONIC PAIN

## 2024-05-21 ASSESSMENT — PAIN SCALES - GENERAL: PAINLEVEL_OUTOF10: 5

## 2024-05-21 NOTE — PROGRESS NOTES
Physical Therapy: Daily Note   Patient: Evelyn Moore (48 y.o. female)   Examination Date: 2024  Plan of Care/Certification Expiration Date: 24    No data recorded   :  1975 # of Visits since SOC:   31   MRN: [id number]  CSN: [id number] Start of Care Date:   2024   Insurance: Payor: HU[employer] MEDICARE / Plan: HU[employer]NA CHOICE-PPO MEDICARE / Product Type: *No Product type* /   Insurance ID: [id number] - (Medicare Managed) Secondary Insurance (if applicable): MEDICAID [place]   Referring Physician: OMAR Dhillon DO Bradley Albertson, DO   PCP: OMAR Dhillon DO Visits to Date/Visits Approved:  (Will see if her therapy can be extended more visits after reassessment 24)    No Show/Cancelled Appts:   /       Medical Diagnosis: Other symptoms and signs involving the musculoskeletal system [R29.898]  Unspecified fall, sequela [W19.XXXS] Falls, LE weakness  Treatment Diagnosis: LE weakness, falls        SUBJECTIVE EXAMINATION   Pain Level: Pain Screening  Patient Currently in Pain: Yes  Pain Assessment: 0-10  Pain Level: 5  Pain Type: Chronic pain  Pain Location: Hip, Back  Pain Orientation: Right  Pain Descriptors: Aching, Sore    Patient Comments: Subjective: I didnt need to use my AD to get into the building today.  I stiill feel off but want to try it doing my PT today.        TREATMENT     Exercises:  Therapeutic exercise (CPT 51717)   Treatment Reasoning    Exercise 1: May need to modify based on tolerance/back pain  Exercise 2: LBE, 5 min, level 2.0  Exercise 3: Box steps cw/ccw, 5 each  Exercise 4: Fwd/back on line, 3 each  Exercise 5: Sidestepping on line, 3 each  Exercise 6: Cone weaves, X 3  Exercise 7: Repeated sit to stand, table height 22\", 10 reps  (small mat table in gym)  Exercise 8: Standing march, 20 reps  Exercise 9: Mini squats, 20 reps  Exercise 10: Standing hip abd/ext, 20 reps  5: RLE only  Exercise 11: Heel/toe raises, 20 reps  Exercise 12: Stance on foam

## 2024-05-21 NOTE — TELEPHONE ENCOUNTER
Patient has worsening degenerative changes noted in multiple levels. Would recommend neurosurgery consultation.

## 2024-05-22 DIAGNOSIS — M54.10 BACK PAIN WITH LEFT-SIDED RADICULOPATHY: Chronic | ICD-10-CM

## 2024-05-22 DIAGNOSIS — Z98.890 HX OF SHOULDER SURGERY: ICD-10-CM

## 2024-05-23 ENCOUNTER — HOSPITAL ENCOUNTER (OUTPATIENT)
Dept: PHYSICAL THERAPY | Age: 49
Setting detail: THERAPIES SERIES
Discharge: HOME | End: 2024-05-23
Payer: MEDICARE

## 2024-05-23 ENCOUNTER — HOSPITAL ENCOUNTER (OUTPATIENT)
Dept: GENERAL RADIOLOGY | Age: 49
Discharge: HOME | End: 2024-05-23
Payer: MEDICARE

## 2024-05-23 ENCOUNTER — TELEPHONE (OUTPATIENT)
Dept: FAMILY MEDICINE CLINIC | Age: 49
End: 2024-05-23

## 2024-05-23 DIAGNOSIS — Z00.00 MEDICARE ANNUAL WELLNESS VISIT, SUBSEQUENT: ICD-10-CM

## 2024-05-23 DIAGNOSIS — M15.9 PRIMARY OSTEOARTHRITIS INVOLVING MULTIPLE JOINTS: ICD-10-CM

## 2024-05-23 DIAGNOSIS — F32.A ANXIETY AND DEPRESSION: ICD-10-CM

## 2024-05-23 DIAGNOSIS — I10 PRIMARY HYPERTENSION: ICD-10-CM

## 2024-05-23 DIAGNOSIS — K21.9 GASTROESOPHAGEAL REFLUX DISEASE, UNSPECIFIED WHETHER ESOPHAGITIS PRESENT: ICD-10-CM

## 2024-05-23 DIAGNOSIS — E78.2 MIXED HYPERLIPIDEMIA: ICD-10-CM

## 2024-05-23 DIAGNOSIS — F41.9 ANXIETY AND DEPRESSION: ICD-10-CM

## 2024-05-23 DIAGNOSIS — E11.9 TYPE 2 DIABETES MELLITUS WITHOUT COMPLICATION, WITHOUT LONG-TERM CURRENT USE OF INSULIN (HCC): ICD-10-CM

## 2024-05-23 DIAGNOSIS — M25.551 RIGHT HIP PAIN: ICD-10-CM

## 2024-05-23 LAB
ALBUMIN SERPL-MCNC: 3.8 G/DL (ref 3.5–5.2)
ALP SERPL-CCNC: 138 U/L (ref 35–104)
ALT SERPL-CCNC: 12 U/L (ref 5–33)
ANION GAP SERPL CALCULATED.3IONS-SCNC: 17 MMOL/L (ref 7–19)
AST SERPL-CCNC: 18 U/L (ref 5–32)
BASOPHILS # BLD: 0 K/UL (ref 0–0.2)
BASOPHILS NFR BLD: 0 % (ref 0–1)
BILIRUB SERPL-MCNC: 0.3 MG/DL (ref 0.2–1.2)
BUN SERPL-MCNC: 13 MG/DL (ref 6–20)
CALCIUM SERPL-MCNC: 9.5 MG/DL (ref 8.6–10)
CHLORIDE SERPL-SCNC: 98 MMOL/L (ref 98–111)
CHOLEST SERPL-MCNC: 136 MG/DL (ref 160–199)
CO2 SERPL-SCNC: 23 MMOL/L (ref 22–29)
CREAT SERPL-MCNC: 0.9 MG/DL (ref 0.5–0.9)
CREAT UR-MCNC: 292.6 MG/DL (ref 28–217)
DACRYOCYTES BLD QL SMEAR: ABNORMAL
EOSINOPHIL # BLD: 0.18 K/UL (ref 0–0.6)
EOSINOPHIL NFR BLD: 1 % (ref 0–5)
ERYTHROCYTE [DISTWIDTH] IN BLOOD BY AUTOMATED COUNT: 14.9 % (ref 11.5–14.5)
GLUCOSE SERPL-MCNC: 49 MG/DL (ref 74–109)
HBA1C MFR BLD: 5.7 % (ref 4–6)
HCT VFR BLD AUTO: 41.7 % (ref 37–47)
HDLC SERPL-MCNC: 47 MG/DL (ref 65–121)
HGB BLD-MCNC: 13.1 G/DL (ref 12–16)
IMM GRANULOCYTES # BLD: 0.1 K/UL
LDLC SERPL CALC-MCNC: 70 MG/DL
LYMPHOCYTES # BLD: 5.8 K/UL (ref 1.1–4.5)
LYMPHOCYTES NFR BLD: 33 % (ref 20–40)
MCH RBC QN AUTO: 28.8 PG (ref 27–31)
MCHC RBC AUTO-ENTMCNC: 31.4 G/DL (ref 33–37)
MCV RBC AUTO: 91.6 FL (ref 81–99)
MICROALBUMIN UR-MCNC: 2.1 MG/DL (ref 0–19)
MICROALBUMIN/CREAT UR-RTO: 7.2 MG/G
MONOCYTES # BLD: 0.2 K/UL (ref 0–0.9)
MONOCYTES NFR BLD: 1 % (ref 0–10)
NEUTROPHILS # BLD: 11.5 K/UL (ref 1.5–7.5)
NEUTS BAND NFR BLD MANUAL: 1 % (ref 0–5)
NEUTS SEG NFR BLD: 64 % (ref 50–65)
OVALOCYTES BLD QL SMEAR: ABNORMAL
PLATELET # BLD AUTO: 649 K/UL (ref 130–400)
PLATELET SLIDE REVIEW: ABNORMAL
PMV BLD AUTO: 10.2 FL (ref 9.4–12.3)
POIKILOCYTOSIS BLD QL SMEAR: ABNORMAL
POTASSIUM SERPL-SCNC: 4 MMOL/L (ref 3.5–5)
PROT SERPL-MCNC: 7.8 G/DL (ref 6.6–8.7)
RBC # BLD AUTO: 4.55 M/UL (ref 4.2–5.4)
SCHISTOCYTES BLD QL SMEAR: ABNORMAL
SODIUM SERPL-SCNC: 138 MMOL/L (ref 136–145)
T4 FREE SERPL-MCNC: 1.6 NG/DL (ref 0.93–1.7)
TRIGL SERPL-MCNC: 97 MG/DL (ref 0–149)
TSH SERPL DL<=0.005 MIU/L-ACNC: 1.72 UIU/ML (ref 0.27–4.2)
WBC # BLD AUTO: 17.7 K/UL (ref 4.8–10.8)

## 2024-05-23 PROCEDURE — 97110 THERAPEUTIC EXERCISES: CPT

## 2024-05-23 PROCEDURE — 73502 X-RAY EXAM HIP UNI 2-3 VIEWS: CPT

## 2024-05-23 ASSESSMENT — PAIN DESCRIPTION - PAIN TYPE: TYPE: CHRONIC PAIN

## 2024-05-23 ASSESSMENT — PAIN DESCRIPTION - ORIENTATION: ORIENTATION: RIGHT

## 2024-05-23 ASSESSMENT — PAIN SCALES - GENERAL: PAINLEVEL_OUTOF10: 5

## 2024-05-23 ASSESSMENT — PAIN DESCRIPTION - LOCATION: LOCATION: HIP;BACK

## 2024-05-23 NOTE — TELEPHONE ENCOUNTER
Attempted to call patient regarding her MRI results.  She did not answer I have left a voicemail at this time, indicating that provider is recommending a referral to neurosurgery due to changes.  I left number to nurse line and asked patient to return call at her convenience.  Have also left reminder call at this time for procedure appointment with Dr Carmona on 5/28.

## 2024-05-23 NOTE — TELEPHONE ENCOUNTER
[facility] called with critical lab. Glucose was 49. I called patient to check on status. She said she had ate and feels better. She does not have any way to check her BG, made  aware.

## 2024-05-23 NOTE — PROGRESS NOTES
Physical Therapy: Daily Note   Patient: Evelyn Moore (48 y.o. female)   Examination Date: 2024  Plan of Care/Certification Expiration Date: 24    No data recorded   :  1975 # of Visits since SOC:   32   MRN: [id number]  CSN: [id number] Start of Care Date:   2024   Insurance: Payor: HU[employer]NA MEDICARE / Plan: HU[employer]NA CHOICE-PPO MEDICARE / Product Type: *No Product type* /   Insurance ID: [id number] - (Medicare Managed) Secondary Insurance (if applicable): MEDICAID [place]   Referring Physician: OMAR Dhillon DO Bradley Albertson, DO   PCP: OMAR Dhillon DO Visits to Date/Visits Approved:  (Will see if her therapy can be extended more visits after reassessment 24)    No Show/Cancelled Appts:   /       Medical Diagnosis: Other symptoms and signs involving the musculoskeletal system [R29.898]  Unspecified fall, sequela [W19.XXXS] Falls, LE weakness  Treatment Diagnosis: LE weakness, falls        SUBJECTIVE EXAMINATION   Pain Level: Pain Screening  Patient Currently in Pain: Yes  Pain Level: 5  Pain Type: Chronic pain  Pain Location: Hip, Back  Pain Orientation: Right    Patient Comments: Subjective: Doing okay.  I have to get blood drawn today.        TREATMENT     Exercises:  Therapeutic exercise (CPT 33327)   Treatment Reasoning    Exercise 1: May need to modify based on tolerance/back pain  Exercise 2: LBE, 5 min, level 2.0  Exercise 3: Box steps cw/ccw, 5 each  Exercise 4: Fwd/back on line, 3 each  Exercise 5: Sidestepping on line, 3 each  Exercise 6: Cone weaves, X 5  Exercise 7: Repeated sit to stand, table height 22\", 10 reps  (small mat table in gym)  Exercise 8: Standing march, 20 reps  Exercise 9: Mini squats, 20 reps  Exercise 10: Standing hip abd/ext, 20 reps  both legs 5/23/2024 x 10 reps on left x 20 reps on right  Exercise 11: Heel/toe raises, 20 reps  Exercise 12: Stance on foam EO/EC/Narrow DIDI, 60 sec--no foam today  Exercise 13: Stance with balance

## 2024-05-24 ENCOUNTER — TELEPHONE (OUTPATIENT)
Dept: FAMILY MEDICINE CLINIC | Age: 49
End: 2024-05-24

## 2024-05-24 RX ORDER — OXYCODONE AND ACETAMINOPHEN 7.5; 325 MG/1; MG/1
1 TABLET ORAL EVERY 8 HOURS PRN
Qty: 90 TABLET | Refills: 0 | Status: SHIPPED | OUTPATIENT
Start: 2024-05-27 | End: 2024-06-26

## 2024-05-24 NOTE — TELEPHONE ENCOUNTER
Called patient, spoke with: Patient regarding the results of the patients most recent labs.  I advised Patient of Dr. Dhillon recommendations.   Patient did voice understanding      Pt states she will schedule an appt with her hematology and oncology office

## 2024-05-24 NOTE — TELEPHONE ENCOUNTER
----- Message from OMAR Dhillon DO sent at 5/23/2024  4:37 PM CDT -----  Blood sugar was low.  Watch for hypoglycemia.  Alkaline phosphatase is elevated at 138.  Please make sure you are taking calcium and vitamin D supplementation.  Lipids are excellently controlled.  Thyroid is normal.  Hemoglobin A1c is 5.7 which indicates excellent blood sugar over the past 3 months.

## 2024-05-24 NOTE — TELEPHONE ENCOUNTER
----- Message from OMAR Dhillon DO sent at 5/23/2024  6:54 PM CDT -----  There is no significant protein excretion in the urine.

## 2024-05-24 NOTE — TELEPHONE ENCOUNTER
----- Message from OMAR Dhillon DO sent at 5/23/2024  6:01 PM CDT -----  CBC continues to show a mildly elevated white blood cell count.  This is less than it was previously but still elevated.  Platelets are also elevated as they have been in the past.  There are some unusual cells on your CBC.  This may be a laboratory issue, but I would like to get the opinion of hematology/oncology.  Please put in referral to hematology oncology for abnormal cells on CBC.

## 2024-05-28 ENCOUNTER — HOSPITAL ENCOUNTER (OUTPATIENT)
Dept: PAIN MANAGEMENT | Age: 49
Discharge: HOME | End: 2024-05-28
Payer: MEDICARE

## 2024-05-28 ENCOUNTER — HOSPITAL ENCOUNTER (OUTPATIENT)
Dept: PHYSICAL THERAPY | Age: 49
Setting detail: THERAPIES SERIES
Discharge: HOME | End: 2024-05-28
Payer: MEDICARE

## 2024-05-28 VITALS
DIASTOLIC BLOOD PRESSURE: 71 MMHG | TEMPERATURE: 98.1 F | OXYGEN SATURATION: 95 % | RESPIRATION RATE: 18 BRPM | SYSTOLIC BLOOD PRESSURE: 103 MMHG | HEART RATE: 82 BPM

## 2024-05-28 DIAGNOSIS — R52 PAIN MANAGEMENT: ICD-10-CM

## 2024-05-28 PROCEDURE — 97110 THERAPEUTIC EXERCISES: CPT

## 2024-05-28 PROCEDURE — 6360000002 HC RX W HCPCS

## 2024-05-28 PROCEDURE — 2500000003 HC RX 250 WO HCPCS

## 2024-05-28 PROCEDURE — 2580000003 HC RX 258

## 2024-05-28 PROCEDURE — A4216 STERILE WATER/SALINE, 10 ML: HCPCS

## 2024-05-28 PROCEDURE — 62323 NJX INTERLAMINAR LMBR/SAC: CPT

## 2024-05-28 RX ORDER — METHYLPREDNISOLONE ACETATE 80 MG/ML
80 INJECTION, SUSPENSION INTRA-ARTICULAR; INTRALESIONAL; INTRAMUSCULAR; SOFT TISSUE ONCE
Status: DISCONTINUED | OUTPATIENT
Start: 2024-05-28 | End: 2024-05-30 | Stop reason: HOSPADM

## 2024-05-28 RX ORDER — SODIUM CHLORIDE 9 MG/ML
5 INJECTION, SOLUTION INTRAMUSCULAR; INTRAVENOUS; SUBCUTANEOUS ONCE
Status: DISCONTINUED | OUTPATIENT
Start: 2024-05-28 | End: 2024-05-30 | Stop reason: HOSPADM

## 2024-05-28 RX ORDER — LIDOCAINE HYDROCHLORIDE 10 MG/ML
5 INJECTION, SOLUTION EPIDURAL; INFILTRATION; INTRACAUDAL; PERINEURAL ONCE
Status: DISCONTINUED | OUTPATIENT
Start: 2024-05-28 | End: 2024-05-30 | Stop reason: HOSPADM

## 2024-05-28 ASSESSMENT — PAIN - FUNCTIONAL ASSESSMENT
PAIN_FUNCTIONAL_ASSESSMENT: 0-10
PAIN_FUNCTIONAL_ASSESSMENT_SITE2: PREVENTS OR INTERFERES SOME ACTIVE ACTIVITIES AND ADLS
PAIN_FUNCTIONAL_ASSESSMENT: NONE - DENIES PAIN

## 2024-05-28 NOTE — PROGRESS NOTES
Procedure:  Level of Consciousness: [x]Alert [x]Oriented []Disoriented []Lethargic  Anxiety Level: [x]Calm []Anxious []Depressed []Other  Skin: [x]Warm [x]Dry []Cool []Moist []Intact []Other  Cardiovascular: [x]Palpitations: [x]Never []Occasionally []Frequently  Chest Pain: [x]No []Yes  Respiratory:  [x]Unlabored []Labored []Cough ([] Productive []Unproductive)  HCG Required: [x]No []Yes   Results: []Negative []Positive  Knowledge Level:        [x]Patient/Other verbalized understanding of pre-procedure instructions.        [x]Assessment of post-op care needs (transportation, responsible caregiver)        [x]Able to discuss health care problems and how to deal with it.  Factors that Affect Teaching:        Language Barrier: [x]No []Yes - why:        Hearing Loss:        [x]No []Yes            Corrective Device:  []Yes []No        Vision Loss:           []No [x]Yes            Corrective Device:  [x]Yes []No        Memory Loss:       [x]No []Yes            []Short Term []Long Term  Motivational Level:  [x]Asks Questions                  []Extremely Anxious       [x]Seems Interested               []Seems Uninterested                  [x]Denies need for Education  Risk for Injury:  [x]Patient oriented to person, place and time  [x]History of frequent falls/loss of balance  Nutritional:  []Change in appetite   []Weight Gain   []Weight Loss  Functional:  []Requires assistance with ADL's

## 2024-05-28 NOTE — INTERVAL H&P NOTE
Update History & Physical    The patient's History and Physical  was reviewed with the patient and I examined the patient. There was no change. The surgical site was confirmed by the patient and me.     Plan: The risks, benefits, expected outcome, and alternative to the recommended procedure have been discussed with the patient. Patient understands and wants to proceed with the procedure.     Electronically signed by Alex Carmona MD on 5/28/2024 at 11:08 AM

## 2024-05-28 NOTE — PROGRESS NOTES
Physical Therapy  Daily Treatment Note  Date: 2024  Patient Name: Evelyn Moore  MRN: [id number]     :   1975    Subjective:      PT Visit Information  PT Insurance Information: [employer] Medicare (Primary) and [place] Medicaid (Secondary)  Total # of Visits Approved: 41 (Will see if her therapy can be extended more visits after reassessment 24)  Total # of Visits to Date: 34  Plan of Care/Certification Expiration Date: 24  Progress Note Due Date: 06/15/24  Referring Provider (secondary): Leobardo Dhillon DO  Subjective: Patient denies pain this morning and reports no issues since previous session.    Pain Screening  Patient Currently in Pain: Denies       Treatment Activities:   Exercises  Exercise 1: May need to modify based on tolerance/back pain  Exercise 2: LBE, 5 min, level 2.0  Exercise 3: Box steps cw/ccw, 5 each  Exercise 4: Fwd/back on line, 3 each  Exercise 5: Sidestepping on line, 3 each  Exercise 6: Cone weaves, X 5  Exercise 7: Repeated sit to stand, table height 22\", 10 reps  (small mat table in gym)  Exercise 8: Standing march, 20 reps  Exercise 9: Mini squats, 20 reps  Exercise 10: Standing hip abd/ext, 20 reps  both legs  Exercise 11: Heel/toe raises, 20 reps  Exercise 12: Stance on foam EO/EC/Narrow DIDI, 60 sec  Exercise 13: Stance with balance perturbations x 1 mins  Exercise 14: Toe taps on 6\" step, 20 each  Exercise 15: LAQ 4# x 20 reps/HS Curls with green band, 20 reps  Exercise 16: BAPS board bilat LE standing in // x 20 each direction; Patient declines stating she doesnt think she is ready to add this back yet.  Exercise 17: : HEP RE-ISSUED     Assessment:   Conditions Requiring Skilled Therapeutic Intervention  Body Structures, Functions, Activity Limitations Requiring Skilled Therapeutic Intervention: Decreased functional mobility ;Decreased balance;Decreased sensation;Decreased endurance;Decreased strength;Decreased high-level IADLs  Assessment: Patient denies

## 2024-05-28 NOTE — DISCHARGE INSTRUCTIONS
[facility] Physical And Pain Medicine  Post Procedure Discharge Instructions        YOU HAVE HAD THE FOLLOWING PROCEDURE:                                  [] Occipital Nerve Blocks  [] CTS wrist injection(s)  [] Knee Injection(s)         [] Shoulder Injection(s)   [] Elbow Injection(s)     [] Botox Injection  [] Cervical Trigger Point Injections    [] Thoracic Trigger Point Injections    [] Lumbar Trigger Point Injections  [] Piriformis Trigger Point Injections  [] SI Joint Injection(s)     [] Trochanteric Bursa Injection(s)       [] Ankle Injection(s)   [] Plantar Fasciitis   [x]  ___LESI___________  Injection(s) [] Botox []  Migraines [] Spasticity    YOU HAVE RECEIVED THE FOLLOWING MEDICATIONS IN YOUR INJECTION(s)  [x] Lidocaine [x] Bupivacaine   [x] DepoMedrol (steroid) [] Decadron (steroid)  []  Kenalog (steroid)   [] Toradol  [] Supartz [] Zilretta    [] Botox        PATIENT INFORMATION:   You may experience the following symptoms after your procedure. These symptoms are normal and should not cause concern:    You may have an increase in your pain. This may last 24 - 48 hours after your procedure.  You may have no change in the pain that you had prior to your injection(s).  You may have weakness or numbness in your affected extremity. If this occurs, this may last until numbing the medication wears off.     REPORT THE FOLLOWING SYMPTOMS TO YOUR DOCTOR:  Redness, swelling or drainage at the injection site(s)  Unusual pain that interferes with your normal activities of daily living.    OTHER INSTRUCTIONS:    [x] I will apply ice to the injection site(s) for at least 24 hours after the procedure. I will rotate the ice on for 20 minutes and off for 20 minutes for at least 24 hours.    [x] I will not apply heat for at least 48 hours and I will not take a hot bath or shower for at least 24 hours.     [x] I understand that if Lidocaine or Bupivacaine was used in my injection(s) that the injection

## 2024-05-29 RX ORDER — NORTRIPTYLINE HYDROCHLORIDE 25 MG/1
CAPSULE ORAL
Qty: 180 CAPSULE | Refills: 3 | Status: SHIPPED | OUTPATIENT
Start: 2024-05-29

## 2024-05-29 NOTE — TELEPHONE ENCOUNTER
Received fax from pharmacy requesting refill on pts medication(s). Pt was last seen in office on 5/15/2024  and has a follow up scheduled for Visit date not found. Will send request to  Dr. Dhillon  for authorization.     Requested Prescriptions     Pending Prescriptions Disp Refills    nortriptyline (PAMELOR) 25 MG capsule [Pharmacy Med Name: NORTRIPTYLINE 25MG CAPSULES] 180 capsule 3     Sig: TAKE 2 CAPSULES BY MOUTH EVERY NIGHT

## 2024-05-30 ENCOUNTER — HOSPITAL ENCOUNTER (OUTPATIENT)
Dept: PHYSICAL THERAPY | Age: 49
Setting detail: THERAPIES SERIES
Discharge: HOME | End: 2024-05-30
Payer: MEDICARE

## 2024-05-30 PROCEDURE — 97112 NEUROMUSCULAR REEDUCATION: CPT

## 2024-05-30 PROCEDURE — 97110 THERAPEUTIC EXERCISES: CPT

## 2024-05-30 ASSESSMENT — PAIN DESCRIPTION - LOCATION: LOCATION: BACK;SHOULDER

## 2024-05-30 ASSESSMENT — PAIN DESCRIPTION - PAIN TYPE: TYPE: CHRONIC PAIN

## 2024-05-30 ASSESSMENT — PAIN DESCRIPTION - ORIENTATION: ORIENTATION: RIGHT;LEFT;MID

## 2024-05-30 ASSESSMENT — PAIN SCALES - GENERAL: PAINLEVEL_OUTOF10: 5

## 2024-05-30 ASSESSMENT — PAIN DESCRIPTION - DESCRIPTORS: DESCRIPTORS: ACHING;SORE

## 2024-05-30 NOTE — PROGRESS NOTES
Daily Treatment Note  Date: 2024  Patient Name: Evelyn Moore  MRN: [id number]     :   1975    Referring Physician: OMAR Dhillon DO Bradley Albertson, DO   PCP: OMAR Dhillon DO    Medical Diagnosis: Other symptoms and signs involving the musculoskeletal system [R29.898]  Unspecified fall, sequela [W19.XXXS]    Treatment Diagnosis: LE weakness, falls      Insurance: Payor: [employer] / Plan: [employer] CHOICE-PPO MEDICARE / Product Type: *No Product type* /   Insurance ID: [id number] - (Medicare Managed)    Subjective:   General  Referring Provider (secondary): Leobardo Dhillon DO  PT Visit Information  PT Insurance Information: [employer] Medicare (Primary) and [place] Medicaid (Secondary)  Total # of Visits Approved: 41 (Will see if her therapy can be extended more visits after reassessment 24)  Total # of Visits to Date: 35  Plan of Care/Certification Expiration Date: 24  Progress Note Due Date: 06/15/24  Referring Provider (secondary): Leobardo Dhillon DO  Subjective  Subjective: Pt reports no change since her previous tx, does c/o some pain in her mid-back and shoulders today.  Pain Screening  Patient Currently in Pain: Yes  Pain Assessment: 0-10  Pain Level: 5  Pain Type: Chronic pain  Pain Location: Back, Shoulder  Pain Orientation: Right, Left, Mid  Pain Descriptors: Aching, Sore    Treatment Activities:  Exercises:      Treatment Reasoning    Exercise 1: May need to modify based on tolerance/back pain  Exercise 2: LBE, 5 min, level 2.0  Exercise 3: Box steps cw/ccw, 5 each  Exercise 4: Fwd/back on line, 3 each  Exercise 5: Sidestepping on line, 3 each  Exercise 6: Cone weaves, X 5  Exercise 7: Repeated sit to stand, table height 22\", 10 reps  (small mat table in gym)  Exercise 8: Standing march, 20 reps  Exercise 9: Mini squats, 20 reps  Exercise 10: Standing hip abd/ext, 20 reps  both legs  Exercise 11: Heel/toe raises, 20 reps  Exercise 12: Stance on foam

## 2024-05-31 ENCOUNTER — TELEPHONE (OUTPATIENT)
Dept: FAMILY MEDICINE CLINIC | Age: 49
End: 2024-05-31

## 2024-05-31 NOTE — TELEPHONE ENCOUNTER
----- Message from OMAR Dhillon DO sent at 5/30/2024  7:23 PM CDT -----  Right hip x-ray shows prosthetic device in good position without other abnormality.

## 2024-06-03 ENCOUNTER — HOSPITAL ENCOUNTER (OUTPATIENT)
Dept: PHYSICAL THERAPY | Age: 49
Setting detail: THERAPIES SERIES
Discharge: HOME | End: 2024-06-03
Payer: MEDICARE

## 2024-06-03 PROCEDURE — 97110 THERAPEUTIC EXERCISES: CPT

## 2024-06-03 ASSESSMENT — PAIN DESCRIPTION - ORIENTATION: ORIENTATION: RIGHT

## 2024-06-03 ASSESSMENT — PAIN DESCRIPTION - DESCRIPTORS: DESCRIPTORS: ACHING;SORE

## 2024-06-03 ASSESSMENT — PAIN DESCRIPTION - PAIN TYPE: TYPE: CHRONIC PAIN

## 2024-06-03 ASSESSMENT — PAIN DESCRIPTION - LOCATION: LOCATION: NECK;SHOULDER

## 2024-06-03 ASSESSMENT — PAIN SCALES - GENERAL: PAINLEVEL_OUTOF10: 5

## 2024-06-03 NOTE — PROGRESS NOTES
Physical Therapy  Daily Treatment Note  Date: 6/3/2024  Patient Name: Evelyn Moore  MRN: [id number]     :   1975    Subjective:      PT Visit Information  PT Insurance Information: [employer] Medicare (Primary) and [place] Medicaid (Secondary)  Total # of Visits Approved: 41  Total # of Visits to Date: 36  Plan of Care/Certification Expiration Date: 24  Progress Note Due Date: 06/15/24  Referring Provider (secondary): Leobardo Dhillon DO  Subjective: Patient reports that she is hurting more in her neck and R shoulder today.    Pain Screening  Patient Currently in Pain: Yes  Pain Assessment: 0-10  Pain Level: 5  Pain Type: Chronic pain  Pain Location: Neck;Shoulder  Pain Orientation: Right  Pain Descriptors: Aching;Sore       Treatment Activities:     Exercises  Exercise 1: May need to modify based on tolerance/back pain  Exercise 2: LBE, 5 min, level 2.0  Exercise 3: Box steps cw/ccw, 5 each  Exercise 4: Fwd/back on line, 3 each  Exercise 5: Sidestepping on line, 3 each  Exercise 6: Cone weaves, X 5  Exercise 7: Repeated sit to stand, table height 22\", 10 reps  (small mat table in gym)  Exercise 8: Standing march, 20 reps  Exercise 9: Mini squats, 20 reps  Exercise 10: Standing hip abd/ext, 20 reps  both legs  Exercise 11: Heel/toe raises, 20 reps  Exercise 12: Stance on foam EO/EC/Narrow DIDI, 60 sec  Exercise 13: Stance with balance perturbations x 1 mins  Exercise 14: Toe taps on 6\" step, 20 each  Exercise 15: LAQ 4# x 20 reps/HS Curls with green band, 20 reps  Exercise 16: BAPS board bilat LE standing in // x 20 each direction  Exercise 17: : HEP RE-ISSUED     Assessment:   Conditions Requiring Skilled Therapeutic Intervention  Body Structures, Functions, Activity Limitations Requiring Skilled Therapeutic Intervention: Decreased functional mobility ;Decreased balance;Decreased sensation;Decreased endurance;Decreased strength;Decreased high-level IADLs  Assessment: Patient is able to complete full

## 2024-06-07 ENCOUNTER — HOSPITAL ENCOUNTER (OUTPATIENT)
Dept: PHYSICAL THERAPY | Age: 49
Setting detail: THERAPIES SERIES
Discharge: HOME | End: 2024-06-07
Payer: MEDICARE

## 2024-06-07 PROCEDURE — 97110 THERAPEUTIC EXERCISES: CPT

## 2024-06-07 ASSESSMENT — PAIN DESCRIPTION - LOCATION: LOCATION: SHOULDER;NECK;BACK

## 2024-06-07 ASSESSMENT — PAIN DESCRIPTION - DESCRIPTORS: DESCRIPTORS: ACHING;SORE

## 2024-06-07 ASSESSMENT — PAIN DESCRIPTION - PAIN TYPE: TYPE: CHRONIC PAIN

## 2024-06-07 ASSESSMENT — PAIN SCALES - GENERAL: PAINLEVEL_OUTOF10: 5

## 2024-06-07 NOTE — PROGRESS NOTES
Physical Therapy: Daily Note   Patient: Evelyn Moore (48 y.o. female)   Examination Date: 2024  Plan of Care/Certification Expiration Date: 24    No data recorded   :  1975 # of Visits since SOC:   35   MRN: [id number]  CSN: [id number] Start of Care Date:   2024   Insurance: Payor: [employer] MEDICARE / Plan: [employer] CHOICE-PPO MEDICARE / Product Type: *No Product type* /   Insurance ID: [id number] - (Medicare Managed) Secondary Insurance (if applicable): MEDICAID [place]   Referring Physician: OMAR Dhillon DO Bradley Albertson, DO   PCP: OMAR Dhillon DO Visits to Date/Visits Approved:     No Show/Cancelled Appts:   /       Medical Diagnosis: Other symptoms and signs involving the musculoskeletal system [R29.898]  Unspecified fall, sequela [W19.XXXS]    Treatment Diagnosis: LE weakness, falls        SUBJECTIVE EXAMINATION   Pain Level: Pain Screening  Patient Currently in Pain: Yes  Pain Assessment: 0-10  Pain Level: 5  Pain Type: Chronic pain  Pain Location: Shoulder, Neck, Back  Pain Descriptors: Aching, Sore    Patient Comments: Subjective: Had appt in [place] with neurologist and more testing has been ordered, but not until 2024.      OBJECTIVE EXAMINATION   Restrictions:  No data recorded No data recorded No data recorded      TREATMENT     Exercises:      Treatment Reasoning    Exercise 1: May need to modify based on tolerance/back pain  Exercise 2: LBE, 5 min, level 2.0  Exercise 3: Box steps cw/ccw, 5 each  Exercise 4: Fwd/back on line, 3 each  Exercise 5: Sidestepping on line, 3 each  Exercise 6: Cone weaves, X 5  Exercise 7: Repeated sit to stand, table height 22\", 10 reps  (small mat table in gym)  Exercise 8: Standing march, 20 reps  Exercise 9: Mini squats, 20 reps  Exercise 10: Standing hip abd/ext, 20 reps  both legs  Exercise 11: Heel/toe raises, 20 reps  Exercise 12: Stance on foam EO/EC/Narrow DIDI, 60 sec  Exercise 13: Stance with balance

## 2024-06-11 ENCOUNTER — HOSPITAL ENCOUNTER (OUTPATIENT)
Dept: PHYSICAL THERAPY | Age: 49
Setting detail: THERAPIES SERIES
Discharge: HOME | End: 2024-06-11
Payer: MEDICARE

## 2024-06-11 PROCEDURE — 97110 THERAPEUTIC EXERCISES: CPT

## 2024-06-11 ASSESSMENT — PAIN SCALES - GENERAL: PAINLEVEL_OUTOF10: 5

## 2024-06-11 ASSESSMENT — PAIN DESCRIPTION - DESCRIPTORS: DESCRIPTORS: ACHING;SORE

## 2024-06-11 ASSESSMENT — PAIN DESCRIPTION - PAIN TYPE: TYPE: CHRONIC PAIN

## 2024-06-11 ASSESSMENT — PAIN DESCRIPTION - LOCATION: LOCATION: BACK;NECK;SHOULDER

## 2024-06-11 ASSESSMENT — PAIN DESCRIPTION - ORIENTATION: ORIENTATION: RIGHT

## 2024-06-11 NOTE — PROGRESS NOTES
Physical Therapy  Daily Treatment Note  Date: 2024  Patient Name: Evelyn Moore  MRN: [id number]     :   1975    Subjective:   General  Additional Pertinent Hx: 49 y/o F presents with complaint of falls and leg weakness. PMH includes multiple hip surgeries, spleen cancer, back sx, neck sx, DDD, rotator cuff repair  PT Visit Information  Onset Date: 23  PT Insurance Information: [facility] Medicare (Primary) and [place] Medicaid (Secondary)  Total # of Visits Approved: 41  Total # of Visits to Date: 38  Plan of Care/Certification Expiration Date: 24  Progress Note Due Date: 06/15/24  Referring Provider (secondary): Leobardo Dhillon DO  Subjective: moving slow this morning    Pain Screening  Patient Currently in Pain: Yes  Pain Assessment: 0-10  Pain Level: 5  Pain Type: Chronic pain  Pain Location: Back;Neck;Shoulder  Pain Orientation: Right  Pain Descriptors: Aching;Sore     Treatment Activities:    Exercises  Exercise 1: May need to modify based on tolerance/back pain  Exercise 2: LBE, 5 min, level 3.0  Exercise 3: Box steps cw/ccw, 10 each  Exercise 4: Fwd/back on line, 3 each  Exercise 5: Sidestepping on line, 3 each  Exercise 6: Cone weaves, X 7  Figure 8's  x 5  Exercise 7: Repeated sit to stand, from chair 10 reps  Exercise 8: Standing march, 20 reps  Exercise 9: Mini squats, 20 reps  Exercise 10: Standing hip abd/ext, 20 reps  both legs  Exercise 11: Heel rasies then toe raises, 20 reps each  Exercise 12: Stance on foam EO/EC/Narrow DIDI, 60 sec  Exercise 13: Stance with balance perturbations x 1 mins  Exercise 14: Toe taps on 8\" step, 20 each  Exercise 15: LAQ 4# x 20 reps/HS Curls with blue band, 20 reps  Exercise 16: BAPS board bilat LE standing in // x 20 each direction- not today  Exercise 17: : HEP RE-ISSUED    Assessment:   Conditions Requiring Skilled Therapeutic Intervention  Body Structures, Functions, Activity Limitations Requiring Skilled Therapeutic Intervention: Decreased

## 2024-06-12 ENCOUNTER — TELEPHONE (OUTPATIENT)
Dept: ONCOLOGY | Facility: CLINIC | Age: 49
End: 2024-06-12

## 2024-06-12 NOTE — TELEPHONE ENCOUNTER
Caller: Naomi Bhatia    Relationship to patient: Self    Best call back number: [phone]    Chief complaint: HAS PHYSICAL THERAPY AT [facility] AT 9AM SAME DAY , NEEDING LATER TIME OR DIFFERENT DAY     Type of visit: PORT FLU     Requested date: 06/20 NEEDING IN THE AFTERNOON     OR CAN DO 06/21    If rescheduling, when is the original appointment: 06/20

## 2024-06-13 ENCOUNTER — PATIENT MESSAGE (OUTPATIENT)
Dept: FAMILY MEDICINE CLINIC | Age: 49
End: 2024-06-13

## 2024-06-13 ENCOUNTER — HOSPITAL ENCOUNTER (OUTPATIENT)
Dept: PHYSICAL THERAPY | Age: 49
Setting detail: THERAPIES SERIES
Discharge: HOME | End: 2024-06-13
Payer: MEDICARE

## 2024-06-13 DIAGNOSIS — M54.16 LUMBAR RADICULOPATHY: Primary | ICD-10-CM

## 2024-06-13 PROCEDURE — 97530 THERAPEUTIC ACTIVITIES: CPT

## 2024-06-13 ASSESSMENT — 10 METER WALK TEST (10METWT)
AVERAGE VELOCITY: 6.7
TRIAL 1: TIME TO WALK 10 METERS: 6.69
AVERAGE VELOCITY - METERS PER SECOND: 0.9

## 2024-06-13 NOTE — PROGRESS NOTES
fallen backwards/forwards, though not very clear on the number of times of falls.  2/27: fell twice on 2/26. 3/21/24 Patient states she had a fall this morning and had also had a previous fall.  04/23/2024: Patient statse she has continued to have falls atleast 2 times/week, but says she is catching herself from falling often. 5/16/24 Patient states she continues to have falls but not as frequent since last reassessment. In progress   Demo improved righting responses in anterior/posterior directions with balance challenges 6/13/2024:  She relates improvement is stride stance.   in progress                       2/13: Continues with poor righting responses in all directions   2/27 and 3/21/24: same as 2/13 04/23/2024 and 5/16/24: Patient was able to resist with pertebations slightly today but had more difficulty in posterior direction. In progress   Improve Crisostomo balance score to at least 45/56. 6/13/2024:  BBS: 45/56   in progress                     2/13: 41/56 (was 37/56 at evaluation)   2/27 and 3/21/24:  42/56 04/23/2024: CRISOSTOMO 43/56. 5/16/24 Patient scored 44/56 on the Crisostomo Balance testing. In progress, Partially met                                                                TREATMENT PLAN   Plan Frequency: 2x  Plan weeks: 4-6 weeks  Current Treatment Recommendations: Strengthening, Balance training, Functional mobility training, Neuromuscular re-education, Endurance training, Home exercise program, Safety education & training, Patient/Caregiver education & training, Equipment evaluation, education, & procurement, Therapeutic activities   Requires PT Follow-Up: Yes       Therapy Time  Individual Time In: 0915       Individual Time Out: 1015  Minutes: 60  Timed Code Treatment Minutes: 60 Minutes     Electronically signed by Eduardo Hernandez PT  on 6/13/2024 at 10:17 AM   POC NOTE

## 2024-06-18 ENCOUNTER — HOSPITAL ENCOUNTER (OUTPATIENT)
Dept: PHYSICAL THERAPY | Age: 49
Setting detail: THERAPIES SERIES
Discharge: HOME | End: 2024-06-18
Payer: MEDICARE

## 2024-06-18 PROCEDURE — 97110 THERAPEUTIC EXERCISES: CPT

## 2024-06-18 ASSESSMENT — PAIN SCALES - GENERAL: PAINLEVEL_OUTOF10: 7

## 2024-06-18 ASSESSMENT — PAIN DESCRIPTION - ORIENTATION: ORIENTATION: RIGHT

## 2024-06-18 ASSESSMENT — PAIN DESCRIPTION - DESCRIPTORS: DESCRIPTORS: ACHING

## 2024-06-18 ASSESSMENT — PAIN DESCRIPTION - LOCATION: LOCATION: HIP

## 2024-06-18 ASSESSMENT — PAIN DESCRIPTION - PAIN TYPE: TYPE: CHRONIC PAIN

## 2024-06-18 NOTE — PROGRESS NOTES
Debby Garcia PTA  on 6/18/2024 at 10:27 AM   POC NOTE  Electronically signed by Debby Garcia PTA on 6/18/2024 at 10:28 AM

## 2024-06-20 ENCOUNTER — PATIENT MESSAGE (OUTPATIENT)
Dept: FAMILY MEDICINE CLINIC | Age: 49
End: 2024-06-20

## 2024-06-20 ENCOUNTER — INFUSION (OUTPATIENT)
Dept: ONCOLOGY | Facility: CLINIC | Age: 49
End: 2024-06-20
Payer: MEDICARE

## 2024-06-20 ENCOUNTER — HOSPITAL ENCOUNTER (OUTPATIENT)
Dept: PHYSICAL THERAPY | Age: 49
Setting detail: THERAPIES SERIES
Discharge: HOME | End: 2024-06-20
Payer: MEDICARE

## 2024-06-20 DIAGNOSIS — Z45.2 ENCOUNTER FOR CARE RELATED TO PORT-A-CATH: Primary | ICD-10-CM

## 2024-06-20 DIAGNOSIS — R29.6 FREQUENT FALLS: ICD-10-CM

## 2024-06-20 DIAGNOSIS — R13.10 DYSPHAGIA, UNSPECIFIED TYPE: Primary | ICD-10-CM

## 2024-06-20 PROCEDURE — 97110 THERAPEUTIC EXERCISES: CPT

## 2024-06-20 RX ORDER — HEPARIN SODIUM (PORCINE) LOCK FLUSH IV SOLN 100 UNIT/ML 100 UNIT/ML
500 SOLUTION INTRAVENOUS AS NEEDED
OUTPATIENT
Start: 2024-06-20

## 2024-06-20 RX ORDER — SODIUM CHLORIDE 0.9 % (FLUSH) 0.9 %
10 SYRINGE (ML) INJECTION AS NEEDED
OUTPATIENT
Start: 2024-06-20

## 2024-06-20 RX ORDER — HEPARIN SODIUM (PORCINE) LOCK FLUSH IV SOLN 100 UNIT/ML 100 UNIT/ML
500 SOLUTION INTRAVENOUS AS NEEDED
Status: DISCONTINUED | OUTPATIENT
Start: 2024-06-20 | End: 2024-06-20 | Stop reason: HOSPADM

## 2024-06-20 RX ORDER — SODIUM CHLORIDE 0.9 % (FLUSH) 0.9 %
10 SYRINGE (ML) INJECTION AS NEEDED
Status: DISCONTINUED | OUTPATIENT
Start: 2024-06-20 | End: 2024-06-20 | Stop reason: HOSPADM

## 2024-06-20 RX ADMIN — Medication 10 ML: at 13:37

## 2024-06-20 RX ADMIN — HEPARIN SODIUM (PORCINE) LOCK FLUSH IV SOLN 100 UNIT/ML 500 UNITS: 100 SOLUTION at 13:37

## 2024-06-20 ASSESSMENT — PAIN DESCRIPTION - LOCATION: LOCATION: HIP

## 2024-06-20 ASSESSMENT — PAIN DESCRIPTION - ORIENTATION: ORIENTATION: RIGHT

## 2024-06-20 ASSESSMENT — PAIN SCALES - GENERAL: PAINLEVEL_OUTOF10: 5

## 2024-06-20 ASSESSMENT — PAIN DESCRIPTION - PAIN TYPE: TYPE: CHRONIC PAIN

## 2024-06-20 ASSESSMENT — PAIN DESCRIPTION - DESCRIPTORS: DESCRIPTORS: ACHING

## 2024-06-20 NOTE — PROGRESS NOTES
Physical Therapy: Daily Note   Patient: Evelyn Moore (48 y.o. female)   Examination Date: 2024  Plan of Care/Certification Expiration Date: 24    No data recorded   :  1975 # of Visits since SOC:   39   MRN: [id number]  CSN: [id number] Start of Care Date:   2024   Insurance: Payor: [employer] MEDICARE / Plan: [employer] CHOICE-PPO MEDICARE / Product Type: *No Product type* /   Insurance ID: [id number] - (Medicare Managed) Secondary Insurance (if applicable): MEDICAID [place]   Referring Physician: OMAR Dhillon DO Bradley Albertson, DO   PCP: OMAR Dhillon DO Visits to Date/Visits Approved:     No Show/Cancelled Appts:   /       Medical Diagnosis: Other symptoms and signs involving the musculoskeletal system [R29.898]  Unspecified fall, sequela [W19.XXXS] Falls, LE weakness  Treatment Diagnosis: LE weakness, falls        SUBJECTIVE EXAMINATION   Pain Level: Pain Screening  Patient Currently in Pain: Yes  Pain Assessment: 0-10  Pain Level: 5  Pain Type: Chronic pain  Pain Location: Hip  Pain Orientation: Right  Pain Descriptors: Aching    Patient Comments: Subjective: I havent had any falls since I was here last.  But my balance if off more today.        TREATMENT     Exercises:  Therapeutic exercise (CPT 82074)   Treatment Reasoning    Exercise 1: May need to modify based on tolerance/back pain  Exercise 2: LBE, 5 min, level 3.0  Exercise 3: Box steps cw/ccw, 10 each  Exercise 4: Fwd/back on line, 3 each  Exercise 5: Sidestepping on line, 3 each  Exercise 6: Cone weaves, X 4  Figure 8's  x 7  Exercise 7: Repeated sit to stand, from chair 10 reps- from lowest mat in back corner   Exercise 8: Standing march, 20 reps  Exercise 9: Mini squats, 20 reps  Exercise 10: Standing hip abd/ext, 20 reps  both legs  Exercise 11: Heel rasies then toe raises, 20 reps each  Exercise 12: Stance on foam EO/EC/Narrow DIDI, 60 sec  not today  Exercise 13: Stance with balance perturbations x 1 mins

## 2024-06-20 NOTE — TELEPHONE ENCOUNTER
From: Evelyn Moore  To: Dr. OMAR Dhillon  Sent: 6/20/2024 7:41 AM CDT  Subject: Speech Therapy     Good morning! I'm needing to get a referral or script for speech therapy due to my falling so much. I would like to do my speech therapy at the same location as my physical therapy, which is [facility] please. Thank you for all that you do!

## 2024-06-22 NOTE — PROGRESS NOTES
I have reviewed the notes, assessments, and/or procedures performed by Heavenly Kohler RN, I concur with her/his documentation of Naomi Bhatia.

## 2024-06-24 ENCOUNTER — PATIENT MESSAGE (OUTPATIENT)
Dept: FAMILY MEDICINE CLINIC | Age: 49
End: 2024-06-24

## 2024-06-24 DIAGNOSIS — Z98.890 HX OF SHOULDER SURGERY: ICD-10-CM

## 2024-06-24 DIAGNOSIS — M54.10 BACK PAIN WITH LEFT-SIDED RADICULOPATHY: Chronic | ICD-10-CM

## 2024-06-24 DIAGNOSIS — Z13.31 POSITIVE DEPRESSION SCREENING: Primary | ICD-10-CM

## 2024-06-24 RX ORDER — DESVENLAFAXINE SUCCINATE 50 MG/1
50 TABLET, EXTENDED RELEASE ORAL DAILY
Qty: 90 TABLET | Refills: 3 | Status: SHIPPED | OUTPATIENT
Start: 2024-06-24

## 2024-06-24 NOTE — TELEPHONE ENCOUNTER
Received fax from pharmacy requesting refill on pts medication(s). Pt was last seen in office on 5/15/2024  and has a follow up scheduled for Visit date not found. Will send request to  Dr. Dhillon  for patient.     Requested Prescriptions     Pending Prescriptions Disp Refills    desvenlafaxine succinate (PRISTIQ) 50 MG TB24 extended release tablet 90 tablet 3     Sig: Take 1 tablet by mouth daily

## 2024-06-24 NOTE — TELEPHONE ENCOUNTER
From: Evelyn Moore  To: Dr. OMAR Dhillon  Sent: 6/24/2024 8:57 AM CDT  Subject: Out of medicine     Good morning Dr. MONCADA! I am completely out of my Desvenlafaxine ER succinate 50mg T. Normally, [facility] was writing my scripts for that, but I haven't been there in a little bit and I've ran out. Would you take over that for me? It took the place of the Venlafaxine 150mg & Venlafaxine 75mg, as I was taking both daily.

## 2024-06-25 ENCOUNTER — HOSPITAL ENCOUNTER (OUTPATIENT)
Dept: PHYSICAL THERAPY | Age: 49
Setting detail: THERAPIES SERIES
Discharge: HOME | End: 2024-06-25
Payer: MEDICARE

## 2024-06-25 PROCEDURE — 97110 THERAPEUTIC EXERCISES: CPT

## 2024-06-25 RX ORDER — OXYCODONE AND ACETAMINOPHEN 7.5; 325 MG/1; MG/1
1 TABLET ORAL EVERY 8 HOURS PRN
Qty: 90 TABLET | Refills: 0 | Status: SHIPPED | OUTPATIENT
Start: 2024-06-28 | End: 2024-07-28

## 2024-06-25 NOTE — PROGRESS NOTES
Physical Therapy: Daily Note   Patient: Evelyn Moore (48 y.o. female)   Examination Date: 2024  Plan of Care/Certification Expiration Date: 24    No data recorded   :  1975 # of Visits since SOC:   40   MRN: [id number]  CSN: [id number] Start of Care Date:   2024   Insurance: Payor: [employer] MEDICARE / Plan: [employer] CHOICE-PPO MEDICARE / Product Type: *No Product type* /   Insurance ID: [id number] - (Medicare Managed) Secondary Insurance (if applicable): MEDICAID [place]   Referring Physician: OMAR Dhillon DO Bradley Albertson, DO   PCP: OMAR Dhillon DO Visits to Date/Visits Approved:     No Show/Cancelled Appts:   /       Medical Diagnosis: Other symptoms and signs involving the musculoskeletal system [R29.898]  Unspecified fall, sequela [W19.XXXS] Falls, LE weakness  Treatment Diagnosis: LE weakness, falls        SUBJECTIVE EXAMINATION   Pain Level:      Patient Comments: Subjective: She denies recent falls.  She feels her condition is improving.    HEP Compliance: Good        OBJECTIVE EXAMINATION   Restrictions:  No data recorded No data recorded No data recorded              TREATMENT     Exercises:      Treatment Reasoning    Exercise 1: May need to modify based on tolerance/back pain  Exercise 2: LBE, 7 min, level 3.0; Dist:  Exercise 3: Box steps cw/ccw, 10 each  Exercise 4: Fwd/back on line, 3 each  Exercise 5: Sidestepping on line, 3 each  Exercise 6: Cone weaves, X 4  Figure 8's  x 7  Exercise 7: Repeated sit to stand, from chair 10 reps- from lowest mat in back corner   Exercise 8: Standing march, 20 reps  Exercise 9: Mini squats, 20 reps  Exercise 10: Standing hip abd/ext, 20 reps  both legs  Exercise 11: Heel rasies then toe raises, 20 reps each  Exercise 12: Stance on foam EO/EC/Narrow DIDI, 60 sec  not today  Exercise 13: Stance with balance perturbations x 1 mins  not today  Exercise 14: Toe taps on 6\" step, 20 each-NOT TODAY  Exercise 15: LAQ 4# x 20

## 2024-06-27 ENCOUNTER — APPOINTMENT (OUTPATIENT)
Dept: PHYSICAL THERAPY | Age: 49
End: 2024-06-27
Payer: MEDICARE

## 2024-07-02 ENCOUNTER — HOSPITAL ENCOUNTER (OUTPATIENT)
Dept: PHYSICAL THERAPY | Age: 49
Setting detail: THERAPIES SERIES
Discharge: HOME | End: 2024-07-02
Payer: MEDICARE

## 2024-07-02 PROCEDURE — 97110 THERAPEUTIC EXERCISES: CPT

## 2024-07-02 ASSESSMENT — PAIN DESCRIPTION - LOCATION: LOCATION: BACK

## 2024-07-02 ASSESSMENT — PAIN SCALES - GENERAL: PAINLEVEL_OUTOF10: 7

## 2024-07-02 NOTE — PROGRESS NOTES
reps  Exercise 10: Standing hip abd/ext, 20 reps both legs  Exercise 11: Heel raises then toe raises, 20 reps each  Exercise 12: Stance on foam EO/EC/Narrow DIDI, 60 sec  not today  Exercise 13: Stance with balance perturbations x 1 mins  not today  Exercise 14: Toe taps on 6\" step, 20 each  Exercise 15: LAQ 4# x 20 reps/HS Curls with blue band, 20 reps  Exercise 16: BAPS board bilat LE standing in // x 20 each direction- not today  Exercise 17: 4/30: HEP RE-ISSUED                           Assessment:   Conditions Requiring Skilled Therapeutic Intervention  Body Structures, Functions, Activity Limitations Requiring Skilled Therapeutic Intervention: Decreased functional mobility ;Decreased balance;Decreased sensation;Decreased endurance;Decreased strength;Decreased high-level IADLs  Assessment: Pt did well with all exercises. Requires rest in between. Pt reports feeling better after, believes the LAQ and HS curls help the most. Post session pain 5/10  Treatment Diagnosis: LE weakness, falls  Barriers impacting rehab : Medical co-morbidities;Chronicity of problem      Goals:  Short Term Goals  Time Frame for Short Term Goals: 3-4 weeks  Short Term Goal 1: Independent with HEP  STG 1 Current Status:: 6/12/2024:  as noted.  2/13: HEP issued today   2/27 : performing HEP every other day. 3/21/24 Patient had been working on her exercises regularly until her recent sickness (stomach bug). 04/23/2024: Patient states she does some ex's at home throughout. 5/16/24 Patient has HEP and reports she has been trying to perform them daily. 6/20/24-patient is performing ex at home sometimes but not every one every day  STG Goal 1 Status:: In progress  Short Term Goal 2: Improve bilat LE strength to grossly 5/5  STG 2 Current Status:: 06/20/2024, 6/13/2024, 04/23/2024 and 5/16/24 : hip flex & ext R & L 5/5, hip abd/add R & L 5/5, knee flex R & L 5/5, knee ext R 5/5, L 5/5,  R ankle all 5/5, L ankle PF 5/5, DF 5/5, INV/EVR 5-/5.  STG

## 2024-07-05 ENCOUNTER — TELEPHONE (OUTPATIENT)
Dept: SURGERY | Facility: CLINIC | Age: 49
End: 2024-07-05
Payer: MEDICARE

## 2024-07-05 NOTE — TELEPHONE ENCOUNTER
Attempted to contact patient to see if she would be interested in rescheduling her annual follow-up appointment. No answer. Voicemail left requesting patient to return our call.   Letter was sent to check in on patient due to having bariatric surgery and to encourage patient to call office to schedule a follow up appointment.    CBC  7/5

## 2024-07-09 ENCOUNTER — HOSPITAL ENCOUNTER (OUTPATIENT)
Dept: PHYSICAL THERAPY | Age: 49
Setting detail: THERAPIES SERIES
Discharge: HOME | End: 2024-07-09
Payer: MEDICARE

## 2024-07-09 PROCEDURE — 97110 THERAPEUTIC EXERCISES: CPT

## 2024-07-09 ASSESSMENT — PAIN SCALES - GENERAL: PAINLEVEL_OUTOF10: 7

## 2024-07-09 ASSESSMENT — PAIN DESCRIPTION - LOCATION: LOCATION: BACK

## 2024-07-09 ASSESSMENT — PAIN DESCRIPTION - ORIENTATION: ORIENTATION: RIGHT

## 2024-07-09 ASSESSMENT — PAIN DESCRIPTION - DESCRIPTORS: DESCRIPTORS: ACHING

## 2024-07-09 ASSESSMENT — PAIN DESCRIPTION - PAIN TYPE: TYPE: CHRONIC PAIN

## 2024-07-09 NOTE — PROGRESS NOTES
today  Exercise 14: Toe taps on 6\" step, 20 each  Exercise 15: LAQ 4# x 20 reps/HS Curls with blue band, 20 reps  Exercise 16: BAPS board bilat LE standing in // x 20 each direction- not today  Exercise 17: 4/30: HEP RE-ISSUED                                 ASSESSMENT     Assessment: Assessment: Patient did well in session.  She had a few LOB episodes but able to independently recover.  She did require her usual rest breaks.  She reported pain at 7/10 pre session and 5/10 post.  Body Structures, Functions, Activity Limitations Requiring Skilled Therapeutic Intervention: Decreased functional mobility , Decreased balance, Decreased sensation, Decreased endurance, Decreased strength, Decreased high-level IADLs    Post-Treatment Pain Level:      Activity Tolerance: Patient tolerated evaluation without incident    Therapy Prognosis: Good       GOALS   Patient Goals : decrease falls  Short Term Goals Completed by 3-4 weeks Current Status Goal Status   Independent with HEP 6/12/2024:  as noted.  2/13: HEP issued today   2/27 : performing HEP every other day. 3/21/24 Patient had been working on her exercises regularly until her recent sickness (stomach bug). 04/23/2024: Patient states she does some ex's at home throughout. 5/16/24 Patient has HEP and reports she has been trying to perform them daily. 6/20/24-patient is performing ex at home sometimes but not every one every day In progress   Improve bilat LE strength to grossly 5/5 06/20/2024, 6/13/2024, 04/23/2024 and 5/16/24 : hip flex & ext R & L 5/5, hip abd/add R & L 5/5, knee flex R & L 5/5, knee ext R 5/5, L 5/5,  R ankle all 5/5, L ankle PF 5/5, DF 5/5, INV/EVR 5-/5. Met   Perform 5x sit to  18 sec or better 6/20/204- tested from standard chair-still unable, 6/13/2024: Tested from standard chair: Unable;  2/13: 20.99 sec   2/27:  16.59,   3/21/24 19.37 sec 04/23/2024; 18.2 seconds. 5/16/24 14.74 secs Not Met   . .     . .

## 2024-07-11 ENCOUNTER — HOSPITAL ENCOUNTER (OUTPATIENT)
Dept: PHYSICAL THERAPY | Age: 49
Setting detail: THERAPIES SERIES
Discharge: HOME | End: 2024-07-11
Payer: MEDICARE

## 2024-07-11 PROCEDURE — 97110 THERAPEUTIC EXERCISES: CPT

## 2024-07-11 ASSESSMENT — PAIN DESCRIPTION - PAIN TYPE: TYPE: CHRONIC PAIN

## 2024-07-11 ASSESSMENT — PAIN SCALES - GENERAL: PAINLEVEL_OUTOF10: 4

## 2024-07-11 ASSESSMENT — PAIN DESCRIPTION - LOCATION: LOCATION: BACK

## 2024-07-11 ASSESSMENT — PAIN DESCRIPTION - DESCRIPTORS: DESCRIPTORS: ACHING;SORE

## 2024-07-11 ASSESSMENT — PAIN DESCRIPTION - ORIENTATION: ORIENTATION: RIGHT

## 2024-07-11 NOTE — PROGRESS NOTES
Physical Therapy: Daily Note/Reassessment   Patient: Evelyn Moore (48 y.o. female)   Examination Date: 2024  Plan of Care/Certification Expiration Date: 24    No data recorded   :  1975 # of Visits since SOC:   43   MRN: [id number]  CSN: [id number] Start of Care Date:   2024   Insurance: Payor: [employer] MEDICARE / Plan: [employer] CHOICE-PPO MEDICARE / Product Type: *No Product type* /   Insurance ID: [id number] - (Medicare Managed) Secondary Insurance (if applicable): MEDICAID [place]   Referring Physician: OMAR Dhillon DO Bradley Albertson, DO   PCP: OMAR Dhillon DO Visits to Date/Visits Approved:     No Show/Cancelled Appts:   /       Medical Diagnosis: Other symptoms and signs involving the musculoskeletal system [R29.898]  Unspecified fall, sequela [W19.XXXS] Falls, LE weakness  Treatment Diagnosis: LE weakness, falls        SUBJECTIVE EXAMINATION   Pain Level: Pain Screening  Patient Currently in Pain: Yes  Pain Assessment: 0-10  Pain Level: 4  Pain Type: Chronic pain  Pain Location: Back  Pain Orientation: Right  Pain Descriptors: Aching, Sore    Patient Comments: Subjective: Patient states her condition has been improving over the past month with her participation with PT, states she hasn't lately had a fall but reports she is still not confident when she is walking. She is tentatively scheduled to see her back doctor as her back pain has resurfaced after her past falls, standing and walking is not well tolerated. She is working on balance exercises at home, but appears to have lost her copy of the exercise lineup.    HEP Compliance: Fair        OBJECTIVE EXAMINATION   Restrictions:  No data recorded No data recorded No data recorded            TREATMENT     Exercises:      Treatment Reasoning    Exercise 1: May need to modify based on tolerance/back pain  Exercise 2: LBE, 7 min, level 3.0; Dist:1.00 mile  Exercise 3: Box steps cw/ccw, 5 each way  Exercise 4: Fwd/back

## 2024-07-16 ENCOUNTER — HOSPITAL ENCOUNTER (OUTPATIENT)
Dept: PHYSICAL THERAPY | Age: 49
Setting detail: THERAPIES SERIES
Discharge: HOME | End: 2024-07-16
Payer: MEDICARE

## 2024-07-16 PROCEDURE — 97112 NEUROMUSCULAR REEDUCATION: CPT

## 2024-07-16 PROCEDURE — 97110 THERAPEUTIC EXERCISES: CPT

## 2024-07-16 ASSESSMENT — PAIN SCALES - GENERAL: PAINLEVEL_OUTOF10: 7

## 2024-07-16 ASSESSMENT — PAIN DESCRIPTION - PAIN TYPE: TYPE: CHRONIC PAIN

## 2024-07-16 ASSESSMENT — PAIN DESCRIPTION - LOCATION: LOCATION: BACK;SHOULDER;LEG

## 2024-07-16 ASSESSMENT — PAIN DESCRIPTION - ORIENTATION: ORIENTATION: RIGHT;LEFT;LOWER

## 2024-07-16 NOTE — PROGRESS NOTES
sec  Exercise 13: Stance with balance perturbations x 1 mins  not today  Exercise 14: Toe taps on 6\" step, 20 each  Exercise 15: LAQ 4# x 20 reps/HS Curls with blue band, 20 reps  Exercise 16: BAPS board bilat LE standing in // x 10 each direction  Exercise 17: 4/30: HEP RE-ISSUED  ---7/11/24 HEP again reissued                           Assessment:   Conditions Requiring Skilled Therapeutic Intervention  Body Structures, Functions, Activity Limitations Requiring Skilled Therapeutic Intervention: Decreased functional mobility ;Decreased balance;Decreased sensation;Decreased endurance;Decreased strength;Decreased high-level IADLs  Assessment: All activities were performed today except for perturbations, she had less sway/wobble performing static standing on airex and better balance with neuro re-ed exercises as well. She has one remaining visit and anticipate d/c with continuation of her HEP  Treatment Diagnosis: LE weakness, falls        Goals:  Short Term Goals  Time Frame for Short Term Goals: 3-4 weeks  Short Term Goal 1: Independent with HEP  STG 1 Current Status:: 6/12/2024:  as noted.  2/13: HEP issued today   2/27 : performing HEP every other day. 3/21/24 Patient had been working on her exercises regularly until her recent sickness (stomach bug). 04/23/2024: Patient states she does some ex's at home throughout. 5/16/24 Patient has HEP and reports she has been trying to perform them daily. 6/20/24 and 7/11/24 -patient is performing ex at home sometimes but not every one every day. New copy of exercise program reissued today.  STG Goal 1 Status:: In progress  Short Term Goal 2: Improve bilat LE strength to grossly 5/5  STG 2 Current Status:: 06/20/2024, 6/13/2024, 04/23/2024 and 5/16/24 : hip flex & ext R & L 5/5, hip abd/add R & L 5/5, knee flex R & L 5/5, knee ext R 5/5, L 5/5,  R ankle all 5/5, L ankle PF 5/5, DF 5/5, INV/EVR 5-/5.  STG Goal 2 Status:: Met  Short Term Goal 3: Perform 5x sit to  18 sec

## 2024-07-18 ENCOUNTER — HOSPITAL ENCOUNTER (OUTPATIENT)
Dept: PHYSICAL THERAPY | Age: 49
Setting detail: THERAPIES SERIES
End: 2024-07-18
Payer: MEDICARE

## 2024-07-18 ENCOUNTER — HOSPITAL ENCOUNTER (OUTPATIENT)
Dept: PHYSICAL THERAPY | Age: 49
Setting detail: THERAPIES SERIES
Discharge: HOME | End: 2024-07-18
Payer: MEDICARE

## 2024-07-18 DIAGNOSIS — M54.10 BACK PAIN WITH LEFT-SIDED RADICULOPATHY: Chronic | ICD-10-CM

## 2024-07-18 DIAGNOSIS — Z98.890 HX OF SHOULDER SURGERY: ICD-10-CM

## 2024-07-18 PROCEDURE — 97110 THERAPEUTIC EXERCISES: CPT

## 2024-07-18 ASSESSMENT — PAIN DESCRIPTION - ORIENTATION: ORIENTATION: RIGHT;LEFT;LOWER

## 2024-07-18 ASSESSMENT — PAIN DESCRIPTION - LOCATION: LOCATION: HIP;BACK

## 2024-07-18 ASSESSMENT — PAIN SCALES - GENERAL: PAINLEVEL_OUTOF10: 6

## 2024-07-18 ASSESSMENT — PAIN DESCRIPTION - PAIN TYPE: TYPE: CHRONIC PAIN

## 2024-07-19 ENCOUNTER — HOSPITAL ENCOUNTER (OUTPATIENT)
Dept: SPEECH THERAPY | Age: 49
Setting detail: THERAPIES SERIES
Discharge: HOME | End: 2024-07-19
Payer: MEDICARE

## 2024-07-19 PROCEDURE — 92523 SPEECH SOUND LANG COMPREHEN: CPT

## 2024-07-19 PROCEDURE — 92610 EVALUATE SWALLOWING FUNCTION: CPT

## 2024-07-19 PROCEDURE — 92526 ORAL FUNCTION THERAPY: CPT

## 2024-07-19 RX ORDER — OXYCODONE AND ACETAMINOPHEN 7.5; 325 MG/1; MG/1
1 TABLET ORAL EVERY 8 HOURS PRN
Qty: 90 TABLET | Refills: 0 | Status: SHIPPED | OUTPATIENT
Start: 2024-07-28 | End: 2024-08-27

## 2024-07-19 NOTE — PROGRESS NOTES
Physical Therapy  [facility]  OUTPATIENT PHYSICAL THERAPY  DISCHARGE SUMMARY    Date: 2024  Patient Name: Evelyn Moore        MRN: [id number]    ACCOUNT #: [id number]  : 1975  (48 y.o.)  Gender: female      Diagnosis: Falls, LE weakness     Treatment Diagnosis: LE weakness, falls    Total # of Visits Approved: 48  Total # of Visits to Date: 47    Objective  Treatments received include:ther ex, neuro re-ed  See objective/subjective data in goals    Discharge Assessment  Pt attended 47 total PT visits to address complaint of falls and LE weakness.  She displays improved strength and improved balance, as indicated by improvement on Mcmillan Balance scale.  She now scores 47/56, above the \"risk to fall\" threshold.  She has been issued formal HEP twice, and all included exercises are ones that she can perform independently.  It is worth noting that she frequently wears flip flops to PT sessions and has been educated multiple times that this, in addition to significant impairment in LE/foot sensation, is not a safe option for someone who has history of multiple falls.  It is also worth noting that more than one of her reported falls have been described as tripping over things in her house, and she has been educated on need to remove any rugs, as well as clear walking pathways of clutter.  She has greatly exceeded the anticipated plan of care (12 visits) and has been attending PT since 24 for this same issue.  See below for specific progress towards goals.      As she has formal HEP that she is able to perform independently, and she appears to have reached her maximum functional potential in relation to LE strength and balance, pt will be discharged from formal PT services.       GOALS   Patient Goals : decrease falls  Short Term Goals Completed by 3-4 weeks Current Status Goal Status   Independent with HEP 2024:  as noted.  : HEP issued today    : performing HEP every other day. 
each  Exercise 15: BAPS board bilat LE standing in // x 10 each direction  Exercise 16: LAQ 4# x 20 reps/HS Curls with blue band, 20 reps  Exercise 17: 4/30: HEP RE-ISSUED  ---7/11/24 HEP again reissued                           Assessment:   Conditions Requiring Skilled Therapeutic Intervention  Body Structures, Functions, Activity Limitations Requiring Skilled Therapeutic Intervention: Decreased functional mobility ;Decreased balance;Decreased sensation;Decreased endurance;Decreased strength;Decreased high-level IADLs  Assessment: Today is patients last visit, she had a reassessment 1 week ago today so goals which were not met were assessed today and findings recorded in goal section of chart. Encouraged her to perform HEP on a daily basis to maintain the strength and stamina she has gained in therapy in order to decrease her risk of falling  Treatment Diagnosis: LE weakness, falls        Goals:  Short Term Goals  Time Frame for Short Term Goals: 3-4 weeks  Short Term Goal 1: Independent with HEP  STG 1 Current Status:: 6/12/2024:  as noted.  2/13: HEP issued today   2/27 : performing HEP every other day. 3/21/24 Patient had been working on her exercises regularly until her recent sickness (stomach bug). 04/23/2024: Patient states she does some ex's at home throughout. 5/16/24 Patient has HEP and reports she has been trying to perform them daily. 6/20/24 and 7/11/24 -patient is performing ex at home sometimes but not every one every day. New copy of exercise program reissued today.  STG Goal 1 Status:: In progress  Short Term Goal 2: Improve bilat LE strength to grossly 5/5  STG 2 Current Status:: 06/20/2024, 6/13/2024, 04/23/2024 and 5/16/24 : hip flex & ext R & L 5/5, hip abd/add R & L 5/5, knee flex R & L 5/5, knee ext R 5/5, L 5/5,  R ankle all 5/5, L ankle PF 5/5, DF 5/5, INV/EVR 5-/5.  STG Goal 2 Status:: Met  Short Term Goal 3: Perform 5x sit to  18 sec or better  STG 3 Current Status:: 6/20/204-

## 2024-07-19 NOTE — PROGRESS NOTES
[facility]  SPEECH THERAPY  Cognitive and Swallowing Evaluation    Name: Evelyn Moore  YOB: 1975  Gender: female  Referring Physician: Leobardo Dhillon    Subjective:  Patient reports she is doing well; she just finished and was discharged from outpatient physical therapy. She is unsure why she was referred to speech therapy, although she states that one of her doctors (She was not sure which one) \"thought it might help with all of the falling.\"    Past Medical History:   Diagnosis Date    Acute pain of right shoulder 09/26/2022    Anemia     has had iron infusion    Anxiety     Anxiety and depression 07/17/2023    Arthritis     Asthma     Back pain with left-sided radiculopathy 03/14/2016    BRBPR (bright red blood per rectum) 04/04/2017    Cancer (HCC) 03/2022    Carpal tunnel syndrome on both sides 02/06/2019    Chronic back pain     Chronic bilateral lower abdominal pain 04/04/2017    Cirrhosis (HCC)     DDD (degenerative disc disease), lumbar     Depression     Drug-induced constipation     Environmental allergies 07/17/2023    Esophagitis     Exacerbation of chronic back pain 03/29/2022    Family history of esophageal cancer 04/04/2017    Family history of polyps in the colon 04/04/2017    Fibromyalgia     Foot pain 04/21/2015    Gastritis     Gastroesophageal reflux disease 07/17/2023    Generalized abdominal pain     Headache(784.0)     Hemochromatosis     Hereditary hemochromatosis (HCC) 04/26/2021    Hiatal hernia 03/2022    High risk medications (not anticoagulants) long-term use 08/06/2018    History of blood transfusion     History of hip surgery 06/21/2019    Hypertension     Insomnia 09/05/2013    Liver disease     Malaise and fatigue 09/05/2013    Mixed hyperlipidemia 07/17/2023    Moderate episode of recurrent major depressive disorder (HCC) 06/19/2018    Moderate persistent asthma without complication 07/17/2023    Morbid obesity (HCC) 04/26/2021    Obesity

## 2024-07-23 ENCOUNTER — APPOINTMENT (OUTPATIENT)
Dept: PHYSICAL THERAPY | Age: 49
End: 2024-07-23
Payer: MEDICARE

## 2024-07-24 ENCOUNTER — HOSPITAL ENCOUNTER (OUTPATIENT)
Dept: SPEECH THERAPY | Age: 49
Setting detail: THERAPIES SERIES
Discharge: HOME | End: 2024-07-24
Payer: MEDICARE

## 2024-07-24 ENCOUNTER — TELEPHONE (OUTPATIENT)
Dept: FAMILY MEDICINE CLINIC | Age: 49
End: 2024-07-24

## 2024-07-24 DIAGNOSIS — R13.10 DYSPHAGIA, UNSPECIFIED TYPE: Primary | ICD-10-CM

## 2024-07-24 PROCEDURE — 92526 ORAL FUNCTION THERAPY: CPT

## 2024-07-24 NOTE — TELEPHONE ENCOUNTER
Liliya from speech therapy called requesting for provider to place an order for Modified Barium Swallow Study.

## 2024-07-24 NOTE — PROGRESS NOTES
[facility]   SPEECH THERAPY  Daily Treatment Note    Date: 2024  Patient Name: Evelyn Moore        MRN: [id number]    Account #: [id number]  : 1975  (48 y.o.)  Gender: female   Referring Provider: Jacquie  Treating Therapist: ORESTES Taylor     Swallowing Status/Diet:   Patient is eating a regular diet and drinking thin liquids     Subjective:   Subjective: Patient attended session independently; reports she went to [place] yesterday for a doctor appointment.      Visit Information:     Visit Information  Onset Date: 24  SLP Insurance Information: [employer] Medicare and [place] Medicaid - Precertification with evaluation    Objective:         Diet Solids Recommendation:  Soft and bite sized to regular, as tolerated (pending results of MBSS)     Diet Liquid Recommendation: Thin (pending results of MBSS)     Compensatory Swallowing Strategies: Eat slowly, small bites and sips, alternate bites and drinks, choose foods carefully     Dysphagia:   Patient reports she kept track of her food and liquid intake; she did not bring her food journal. She states that she mainly coughed when eating chips.  She denies choking or losing her breath and states she did not need to throw up.  She reports this happened \"about every other day.\"  She reports BBQ chips caused this. She does state that foods sometimes feel \"trapped\" in her upper chest area. She does have GERD and a hiatal hernia.  She reports that things like cornbread, biscuits, and chips cause her to have issues.  She states that she has not had aspiration pneumonia to her knowledge. Multiple swallows of Pepsi today did not result in any overt s/s of aspiration.    Discussed with patient that there is no way to no for sure if she is aspirating without a Modified Barium Swallow Study. Explained the procedures of the test and what it will tell clinician in terms of need for ongoing swallow therapy. She verbalized understanding and agreement

## 2024-07-25 ENCOUNTER — APPOINTMENT (OUTPATIENT)
Dept: PHYSICAL THERAPY | Age: 49
End: 2024-07-25
Payer: MEDICARE

## 2024-07-26 DIAGNOSIS — B00.1 HERPES LABIALIS WITHOUT COMPLICATION: ICD-10-CM

## 2024-07-26 DIAGNOSIS — R73.03 PREDIABETES: ICD-10-CM

## 2024-07-28 DIAGNOSIS — B00.1 HERPES LABIALIS WITHOUT COMPLICATION: ICD-10-CM

## 2024-07-29 DIAGNOSIS — R73.03 PREDIABETES: ICD-10-CM

## 2024-07-29 DIAGNOSIS — B00.1 HERPES LABIALIS WITHOUT COMPLICATION: ICD-10-CM

## 2024-07-29 RX ORDER — TIRZEPATIDE 2.5 MG/.5ML
INJECTION, SOLUTION SUBCUTANEOUS
Qty: 4 ML | Refills: 3 | Status: SHIPPED | OUTPATIENT
Start: 2024-07-29

## 2024-07-29 RX ORDER — TIRZEPATIDE 2.5 MG/.5ML
2.5 INJECTION, SOLUTION SUBCUTANEOUS WEEKLY
Qty: 4 ML | Refills: 3 | Status: SHIPPED | OUTPATIENT
Start: 2024-07-29

## 2024-07-29 RX ORDER — OMEPRAZOLE 40 MG/1
40 CAPSULE, DELAYED RELEASE ORAL DAILY
Qty: 30 CAPSULE | Refills: 11 | Status: SHIPPED | OUTPATIENT
Start: 2024-07-29

## 2024-07-29 RX ORDER — ACYCLOVIR 800 MG/1
800 TABLET ORAL DAILY
Qty: 90 TABLET | Refills: 3 | OUTPATIENT
Start: 2024-07-29

## 2024-07-29 RX ORDER — ACYCLOVIR 800 MG/1
800 TABLET ORAL DAILY
Qty: 90 TABLET | Refills: 3 | Status: SHIPPED | OUTPATIENT
Start: 2024-07-29

## 2024-07-29 RX ORDER — TIRZEPATIDE 2.5 MG/.5ML
2.5 INJECTION, SOLUTION SUBCUTANEOUS WEEKLY
OUTPATIENT
Start: 2024-07-29

## 2024-07-29 NOTE — TELEPHONE ENCOUNTER
Received fax from pharmacy requesting refill on pts medication(s). Pt was last seen in office on 5/15/2024  and has a follow up scheduled for not found. Will send request to  Dr. Dhillon  for authorization.     Requested Prescriptions     Pending Prescriptions Disp Refills    MOUNJARO 2.5 MG/0.5ML SOPN SC injection [Pharmacy Med Name: MOUNJARO 2.5MG/0.5ML INJ ( 4 PENS)] 4 mL 3     Sig: ADMINISTER 2.5MG UNDER THE SKIN 1 TIME A WEEK    acyclovir (ZOVIRAX) 800 MG tablet [Pharmacy Med Name: ACYCLOVIR 800MG TABLETS] 90 tablet 3     Sig: TAKE 1 TABLET BY MOUTH DAILY

## 2024-07-29 NOTE — TELEPHONE ENCOUNTER
Refill request for this medication and pharmacy has already been sent to provide for review. Duplicate request.

## 2024-07-29 NOTE — TELEPHONE ENCOUNTER
Received fax from pharmacy requesting refill on pts medication(s). Pt was last seen in office on 5/15/2024  and has a follow up scheduled for 7/29/2024. Will send request to  Dr. Dhillon  for authorization.     Requested Prescriptions     Pending Prescriptions Disp Refills    acyclovir (ZOVIRAX) 800 MG tablet 90 tablet 3     Sig: Take 1 tablet by mouth daily

## 2024-07-29 NOTE — TELEPHONE ENCOUNTER
Received fax from pharmacy requesting refill on pts medication(s). Pt was last seen in office on 5/15/2024  and has a follow up scheduled for Visit date not found. Will send request to  Dr. Dhillon  for authorization.     Requested Prescriptions     Pending Prescriptions Disp Refills    omeprazole (PRILOSEC) 40 MG delayed release capsule [Pharmacy Med Name: OMEPRAZOLE 40MG CAPSULES] 30 capsule 11     Sig: TAKE 1 CAPSULE BY MOUTH DAILY

## 2024-07-29 NOTE — TELEPHONE ENCOUNTER
Received fax from pharmacy requesting refill on pts medication(s). Pt was last seen in office on 5/15/2024  and has a follow up scheduled for 7/29/2024. Will send request to  Dr. Dhillon  for authorization.     Requested Prescriptions     Pending Prescriptions Disp Refills    Tirzepatide (MOUNJARO) 2.5 MG/0.5ML SOPN SC injection 4 mL 3     Sig: Inject 0.5 mLs into the skin once a week      
abscess

## 2024-07-29 NOTE — TELEPHONE ENCOUNTER
Pharmacy has already requested medication and it has been sent to the provider for review. Duplicate request.

## 2024-07-30 ENCOUNTER — APPOINTMENT (OUTPATIENT)
Dept: PHYSICAL THERAPY | Age: 49
End: 2024-07-30
Payer: MEDICARE

## 2024-07-31 ENCOUNTER — PATIENT MESSAGE (OUTPATIENT)
Dept: FAMILY MEDICINE CLINIC | Age: 49
End: 2024-07-31

## 2024-07-31 ENCOUNTER — HOSPITAL ENCOUNTER (OUTPATIENT)
Dept: GENERAL RADIOLOGY | Age: 49
Discharge: HOME | End: 2024-07-31
Payer: MEDICARE
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
## 2024-07-31 DIAGNOSIS — M25.562 LEFT KNEE PAIN, UNSPECIFIED CHRONICITY: ICD-10-CM

## 2024-07-31 DIAGNOSIS — M89.8X5 PAIN OF LEFT FEMUR: ICD-10-CM
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
## 2024-07-31 DIAGNOSIS — M25.552 LEFT HIP PAIN: ICD-10-CM

## 2024-07-31 PROCEDURE — 73502 X-RAY EXAM HIP UNI 2-3 VIEWS: CPT

## 2024-07-31 PROCEDURE — 73552 X-RAY EXAM OF FEMUR 2/>: CPT

## 2024-07-31 PROCEDURE — 73562 X-RAY EXAM OF KNEE 3: CPT

## 2024-07-31 NOTE — TELEPHONE ENCOUNTER
From: Evelyn Moore  To: Dr. OMAR Dhillon  Sent: 7/31/2024 8:57 AM CDT  Subject: I fell again     Hey Dr Dhillon, I am reaching out to you because I have fallen again. My leg began hurting last Friday, hurting so bad, it's almost impossible to get sleep. Sunday morning at 4:30 I was walking through my kitchen, when I fell. I was walking, when to take a step with my left leg, my entire leg went numb causing me to fall really hard. Since then, the pain has been more intense. It runs from my upper pelvic area to just below my knee. Honestly, I'm concerned that I may have broken something. I would greatly appreciate it if you would order X-rays. I know and you now I could very well be painfully be walking on a broken bone. I walked on a broken hip for months. Please let me know as soon as possible. Thank you so much!

## 2024-08-01 ENCOUNTER — TELEPHONE (OUTPATIENT)
Dept: FAMILY MEDICINE CLINIC | Age: 49
End: 2024-08-01

## 2024-08-01 ENCOUNTER — APPOINTMENT (OUTPATIENT)
Dept: PHYSICAL THERAPY | Age: 49
End: 2024-08-01
Payer: MEDICARE

## 2024-08-01 RX ORDER — COLCHICINE 0.6 MG/1
0.6 TABLET ORAL DAILY
Qty: 30 TABLET | Refills: 3 | Status: SHIPPED | OUTPATIENT
Start: 2024-08-01

## 2024-08-01 NOTE — TELEPHONE ENCOUNTER
MRIs are difficult to get approved with insurance.  I would instead have them order the MRI to be done locally.

## 2024-08-01 NOTE — TELEPHONE ENCOUNTER
----- Message from OMAR Dhillon DO sent at 7/31/2024  5:51 PM CDT -----  Left hip joint shows no significant abnormality of concern.  Nothing that would require surgery.  Right hip shows appropriately placed hardware.  Lumbar spine shows appropriately placed hardware in

## 2024-08-01 NOTE — TELEPHONE ENCOUNTER
Called patient, verified name and date of birth, and gave results in detail as per provider notes below. Patient verbalized understanding .     Patient agrees to starting medication. Medication pended. Patient is inquiring about whether MRI Cervical Spine order can be placed. She states Busy recommends her getting it done there but she doesn't want to drive all the way to [place].

## 2024-08-01 NOTE — TELEPHONE ENCOUNTER
Call returned to pt to let her Dr Dhillon recommendations. Pt understood.    Sent medication to pharmacy for pt    Requested Prescriptions     Signed Prescriptions Disp Refills    colchicine (COLCRYS) 0.6 MG tablet 30 tablet 3     Sig: Take 1 tablet by mouth daily     Authorizing Provider: OMAR DHILLON     Ordering User: ZULEMA MIMS

## 2024-08-01 NOTE — TELEPHONE ENCOUNTER
----- Message from OMAR Dhillon DO sent at 7/31/2024  5:34 PM CDT -----  X-ray of the left hip and knee show mild arthritic changes.  No other significant abnormalities are noted

## 2024-08-01 NOTE — TELEPHONE ENCOUNTER
----- Message from OMAR Dhillon DO sent at 7/31/2024  5:56 PM CDT -----  The knee does show some CPPD crystals which is also known as chondrocalcinosis.  This can be treated with a medication called colchicine 0.6 mg 1-2 times daily.  Recommend starting at 0.6 mg once daily.  If this is ineffective at 1 time daily I would increase to twice daily  Another option is to get a steroid shot in your knee.  We can do this here in this office or we can send you to orthopedics.  This is most likely what is causing the majority of your pain.

## 2024-08-02 ENCOUNTER — HOSPITAL ENCOUNTER (OUTPATIENT)
Dept: SPEECH THERAPY | Age: 49
Setting detail: THERAPIES SERIES
Discharge: HOME | End: 2024-08-02
Payer: MEDICARE

## 2024-08-02 ENCOUNTER — HOSPITAL ENCOUNTER (OUTPATIENT)
Dept: GENERAL RADIOLOGY | Age: 49
Discharge: HOME | End: 2024-08-02
Payer: MEDICARE

## 2024-08-02 DIAGNOSIS — R13.10 DYSPHAGIA, UNSPECIFIED TYPE: ICD-10-CM

## 2024-08-02 PROCEDURE — 92611 MOTION FLUOROSCOPY/SWALLOW: CPT

## 2024-08-02 PROCEDURE — 74230 X-RAY XM SWLNG FUNCJ C+: CPT

## 2024-08-02 NOTE — PROCEDURES
INSTRUMENTAL SWALLOW REPORT  MODIFIED BARIUM SWALLOW    NAME: Evelyn Moore     : 1975  MRN: [id number]     Date of Eval: 2024     Radiologist: Dr. Myers    Referring Diagnosis:  Unspecified Dysphagia R13.10    Past Medical History:    has a past medical history of Acute pain of right shoulder, Anemia, Anxiety, Anxiety and depression, Arthritis, Asthma, Back pain with left-sided radiculopathy, BRBPR (bright red blood per rectum), Cancer (HCC), Carpal tunnel syndrome on both sides, Chronic back pain, Chronic bilateral lower abdominal pain, Cirrhosis (HCC), DDD (degenerative disc disease), lumbar, Depression, Drug-induced constipation, Environmental allergies, Esophagitis, Exacerbation of chronic back pain, Family history of esophageal cancer, Family history of polyps in the colon, Fibromyalgia, Foot pain, Gastritis, Gastroesophageal reflux disease, Generalized abdominal pain, Headache(784.0), Hemochromatosis, Hereditary hemochromatosis (HCC), Hiatal hernia, High risk medications (not anticoagulants) long-term use, History of blood transfusion, History of hip surgery, Hypertension, Insomnia, Liver disease, Malaise and fatigue, Mixed hyperlipidemia, Moderate episode of recurrent major depressive disorder (HCC), Moderate persistent asthma without complication, Morbid obesity (HCC), Obesity, Polycythemia, Prediabetes, Primary hypertension, Restless legs syndrome, RLS (restless legs syndrome), Sarcoidosis, Sleep apnea, Spleen cancer (HCC), Splenomegaly, and Unsteady gait.    Past Surgical History:    has a past surgical history that includes hip surgery (Right, ); Cholecystectomy (); Lithotripsy (/); back surgery (); Mouth surgery (); Appendectomy (2015); Cervical spine surgery (N/A, 2015); Tooth Extraction; Ankle surgery (Right); Neck surgery; pr colonoscopy flx dx w/collj spec when pfrmd (N/A, 2017); pr egd transoral biopsy single/multiple (N/A, 2017);

## 2024-08-05 ENCOUNTER — TELEPHONE (OUTPATIENT)
Dept: FAMILY MEDICINE CLINIC | Age: 49
End: 2024-08-05

## 2024-08-05 ENCOUNTER — HOSPITAL ENCOUNTER (OUTPATIENT)
Dept: PAIN MANAGEMENT | Age: 49
Discharge: HOME | End: 2024-08-05
Payer: MEDICARE

## 2024-08-05 VITALS
HEIGHT: 66 IN | RESPIRATION RATE: 18 BRPM | WEIGHT: 293 LBS | HEART RATE: 88 BPM | OXYGEN SATURATION: 99 % | BODY MASS INDEX: 47.09 KG/M2 | TEMPERATURE: 97.6 F

## 2024-08-05 DIAGNOSIS — M54.16 LUMBAR RADICULOPATHY: ICD-10-CM

## 2024-08-05 DIAGNOSIS — G89.29 CHRONIC BILATERAL LOW BACK PAIN WITHOUT SCIATICA: Primary | ICD-10-CM

## 2024-08-05 DIAGNOSIS — M54.50 CHRONIC BILATERAL LOW BACK PAIN WITHOUT SCIATICA: Primary | ICD-10-CM

## 2024-08-05 DIAGNOSIS — Z51.81 ENCOUNTER FOR MONITORING OPIOID MAINTENANCE THERAPY: ICD-10-CM

## 2024-08-05 DIAGNOSIS — Z98.890 HX OF SHOULDER SURGERY: ICD-10-CM

## 2024-08-05 DIAGNOSIS — M25.561 CHRONIC PAIN OF BOTH KNEES: ICD-10-CM

## 2024-08-05 DIAGNOSIS — G89.29 CHRONIC PAIN OF BOTH KNEES: ICD-10-CM

## 2024-08-05 DIAGNOSIS — M25.562 CHRONIC PAIN OF BOTH KNEES: ICD-10-CM

## 2024-08-05 DIAGNOSIS — Z79.891 ENCOUNTER FOR MONITORING OPIOID MAINTENANCE THERAPY: ICD-10-CM

## 2024-08-05 DIAGNOSIS — M54.10 BACK PAIN WITH LEFT-SIDED RADICULOPATHY: Chronic | ICD-10-CM

## 2024-08-05 DIAGNOSIS — M53.3 SACROILIAC JOINT DYSFUNCTION OF RIGHT SIDE: ICD-10-CM

## 2024-08-05 PROCEDURE — 99214 OFFICE O/P EST MOD 30 MIN: CPT

## 2024-08-05 RX ORDER — OXYCODONE AND ACETAMINOPHEN 7.5; 325 MG/1; MG/1
1 TABLET ORAL EVERY 8 HOURS PRN
Qty: 90 TABLET | Refills: 0 | Status: SHIPPED | OUTPATIENT
Start: 2024-08-28 | End: 2024-09-27

## 2024-08-05 ASSESSMENT — PAIN DESCRIPTION - LOCATION
LOCATION: BACK
LOCATION_2: NECK

## 2024-08-05 ASSESSMENT — ENCOUNTER SYMPTOMS
NAUSEA: 0
ABDOMINAL DISTENTION: 0
BOWEL INCONTINENCE: 0
ABDOMINAL PAIN: 0
BACK PAIN: 1
CONSTIPATION: 0

## 2024-08-05 ASSESSMENT — PAIN DESCRIPTION - ORIENTATION
ORIENTATION: LOWER
ORIENTATION_2: LOWER

## 2024-08-05 ASSESSMENT — PAIN SCALES - GENERAL: PAINLEVEL_OUTOF10: 7

## 2024-08-05 ASSESSMENT — PAIN DESCRIPTION - PAIN TYPE: TYPE: CHRONIC PAIN

## 2024-08-05 ASSESSMENT — PAIN DESCRIPTION - INTENSITY: RATING_2: 5

## 2024-08-05 NOTE — TELEPHONE ENCOUNTER
1. Back pain with left-sided radiculopathy    2. Hx of shoulder surgery      Requested Prescriptions     Pending Prescriptions Disp Refills    oxyCODONE-acetaminophen (PERCOCET) 7.5-325 MG per tablet 90 tablet 0     Sig: Take 1 tablet by mouth every 8 hours as needed for Pain for up to 30 days. (May fill 08/28/24)       Continue medication with refill sent at appointment yes; refill sent to medical director at appointment no, see refill encounter dated 8/5/2024    Electronically signed by ASHLEE Valente CNP on 8/5/2024 at 9:54 AM

## 2024-08-05 NOTE — TELEPHONE ENCOUNTER
Called patient, spoke with: Patient regarding the results of the patients most recent labs.  I advised Patient of Dr. Dhillon recommendations.   Patient did voice understanding

## 2024-08-05 NOTE — TELEPHONE ENCOUNTER
----- Message from OMAR Dhillon DO sent at 8/5/2024  8:11 AM CDT -----  No evidence of aspiration on swallowing study.  The radiologist did note some \"flash penetration\" with thin barium.  They recommend to see the pathologist assessment and recommendation.  They recommend remaining as upright as possible for all oral intake and remain upright for 30 to 45 minutes after meals.  Take small bites and sips and eat slowly.  No further intervention is required.

## 2024-08-05 NOTE — PROGRESS NOTES
Clinic Documentation      Education Provided:  [x] Review of Alberto  [] Agreement Review  [x] PEG Score Calculated [] PHQ Score Calculated [] ORT Score Calculated    [] Compliance Issues Discussed [] Cognitive Behavior Needs [x] Exercise [] Review of Test [] Financial Issues  [x] Tobacco/Alcohol Use Reviewed [x] Teaching [] New Patient [] Picture Obtained    Physician Plan:  [] Outgoing Referral  [] Pharmacy Consult  [x] Test Ordered [x] Prescription Ordered/Changed   [] Obtained Test Results / Consult Notes        Complete if patient is withholding blood thinner for procedure     Blood Thinner Patient is currently taking:      [] Plavix (Hold for 7 days)  [] Aspirin (Hold for 5 days)     [] Pletal (Hold for 2 days)  [] Pradaxa (Hold for 3 days)    [] Effient (Hold for 7 days)  [] Xarelto (Hold for 2 days)    [] Eliquis (Hold for 2 days)  [] Brilinta (Hold for 7 days)    [] Coumadin (Hold for 5 days) - (INR needs to be drawn the day prior to procedure- INR < 2.0)    [] Aggrenox (Hold for 7 days)        [] Patient will stop medication on their own.    [] Blood Thinner Form Faxed for approval to hold.   Provider form faxed to:     Assessment Completed by:  Electronically signed by Tuyet Olmos MA on 8/5/2024 at 10:08 AM

## 2024-08-05 NOTE — PROGRESS NOTES
[facility]    Office Visit    Patient Name: Evelyn Moore    MR #: [id number]    Account #:[id number]    : 1975    Age: 48 y.o.    Sex: female    Date: 2024    PCP: OMAR Dhillon DO    Chief Complaint:   Chief Complaint   Patient presents with    Back Pain     7/10; Last  pt said she fell and now she cannot lift her leg up at all. She is showing concern.     Neck Pain     5/10       History of Present Illness:   The patient is a 48 y.o. female who presents for procedure follow up       Patient had a Lumbar Epidural L2-3 under Fluoroscopy on 2024. Patient had at least 80-85% relief of pain from procedure(s) for at least 8 weeks. After injection, patient was able to increase activity. Patient had less pain from aggravating factors such as physical activity, position, twisting/turning, lifting, and walking. Patient was able to decrease pain medication usage. Patient received enough pain relief from injections that the patient would like to repeat the injection(s).     Patient had a Right Sacroiliac Joint Injection under Ultrasound on 2024. Patient had at least 80-85% relief of pain from procedure(s) for at least 8 weeks. After injection, patient was able to increase activity. Patient had less pain from aggravating factors. Patient was able to decrease pain medication usage. Patient received enough pain relief from injections that the patient would like to repeat the injection(s).     Patient has continued HEP - home exercise program which consists of exercises at least 3 times a week or as tolerated in addition patient is encouraged to stretch twice daily (upon awaking & prior to bed).      Patient continue Percocet as needed for pain.     Neck Pain   This is a recurrent problem. The current episode started more than 1 year ago. The problem occurs constantly. The problem has been waxing and waning. The pain is present in the midline. The

## 2024-08-09 ENCOUNTER — TRANSCRIBE ORDERS (OUTPATIENT)
Dept: LAB | Facility: HOSPITAL | Age: 49
End: 2024-08-09
Payer: MEDICARE

## 2024-08-09 ENCOUNTER — LAB (OUTPATIENT)
Dept: LAB | Facility: HOSPITAL | Age: 49
End: 2024-08-09
Payer: MEDICARE

## 2024-08-09 ENCOUNTER — INFUSION (OUTPATIENT)
Dept: ONCOLOGY | Facility: CLINIC | Age: 49
End: 2024-08-09
Payer: MEDICARE

## 2024-08-09 DIAGNOSIS — M79.7 SCAPULOHUMERAL FIBROSITIS: ICD-10-CM

## 2024-08-09 DIAGNOSIS — R79.89 ELEVATED FERRITIN: ICD-10-CM

## 2024-08-09 DIAGNOSIS — G89.29 CHRONIC IDIOPATHIC ANAL PAIN: ICD-10-CM

## 2024-08-09 DIAGNOSIS — G44.229 CHRONIC TENSION-TYPE HEADACHE, NOT INTRACTABLE: ICD-10-CM

## 2024-08-09 DIAGNOSIS — E11.9 TYPE 2 DIABETES MELLITUS WITHOUT COMPLICATION, WITHOUT LONG-TERM CURRENT USE OF INSULIN: ICD-10-CM

## 2024-08-09 DIAGNOSIS — R26.89 SCISSOR GAIT: ICD-10-CM

## 2024-08-09 DIAGNOSIS — E78.2 MIXED HYPERLIPIDEMIA: ICD-10-CM

## 2024-08-09 DIAGNOSIS — G44.229 CHRONIC TENSION-TYPE HEADACHE, NOT INTRACTABLE: Primary | ICD-10-CM

## 2024-08-09 DIAGNOSIS — Z45.2 ENCOUNTER FOR CARE RELATED TO PORT-A-CATH: Primary | ICD-10-CM

## 2024-08-09 DIAGNOSIS — R74.8 ACID PHOSPHATASE ELEVATED: ICD-10-CM

## 2024-08-09 DIAGNOSIS — K62.89 CHRONIC IDIOPATHIC ANAL PAIN: ICD-10-CM

## 2024-08-09 DIAGNOSIS — I10 PRIMARY HYPERTENSION: ICD-10-CM

## 2024-08-09 DIAGNOSIS — D75.839 THROMBOCYTOSIS: ICD-10-CM

## 2024-08-09 DIAGNOSIS — E11.9 TYPE 2 DIABETES MELLITUS WITHOUT COMPLICATION, WITHOUT LONG-TERM CURRENT USE OF INSULIN: Primary | ICD-10-CM

## 2024-08-09 DIAGNOSIS — M54.9 DORSALGIA: ICD-10-CM

## 2024-08-09 DIAGNOSIS — E11.9 TYPE 2 DIABETES MELLITUS WITHOUT COMPLICATION, WITHOUT LONG-TERM CURRENT USE OF INSULIN (HCC): Primary | ICD-10-CM

## 2024-08-09 DIAGNOSIS — Z00.00 MEDICARE ANNUAL WELLNESS VISIT, SUBSEQUENT: Primary | ICD-10-CM

## 2024-08-09 LAB
ALBUMIN SERPL-MCNC: 3.8 G/DL (ref 3.5–5.2)
ALBUMIN UR-MCNC: <1.2 MG/DL
ALBUMIN/GLOB SERPL: 1.2 G/DL
ALBUMIN: 3.8 G/DL
ALP BLD-CCNC: 110 U/L
ALP SERPL-CCNC: 110 U/L (ref 39–117)
ALT SERPL W P-5'-P-CCNC: 23 U/L (ref 1–33)
ALT SERPL-CCNC: 23 U/L
ANION GAP SERPL CALCULATED.3IONS-SCNC: 13 MMOL/L
ANION GAP SERPL CALCULATED.3IONS-SCNC: 13 MMOL/L (ref 5–15)
AST SERPL-CCNC: 28 U/L
AST SERPL-CCNC: 28 U/L (ref 1–32)
BASOPHILS # BLD AUTO: 0.15 10*3/MM3 (ref 0–0.2)
BASOPHILS ABSOLUTE: 0.15 /ΜL
BASOPHILS NFR BLD AUTO: 1 % (ref 0–1.5)
BASOPHILS RELATIVE PERCENT: 1 %
BILIRUB SERPL-MCNC: 0.3 MG/DL (ref 0.1–1.4)
BILIRUB SERPL-MCNC: 0.3 MG/DL (ref 0–1.2)
BUN BLDV-MCNC: 7 MG/DL
BUN SERPL-MCNC: 7 MG/DL (ref 6–20)
BUN/CREAT SERPL: 10.1 (ref 7–25)
CALCIUM SERPL-MCNC: 8.9 MG/DL
CALCIUM SPEC-SCNC: 8.9 MG/DL (ref 8.6–10.5)
CHLORIDE BLD-SCNC: 103 MMOL/L
CHLORIDE SERPL-SCNC: 103 MMOL/L (ref 98–107)
CHOLEST SERPL-MCNC: 192 MG/DL (ref 0–200)
CHOLESTEROL, TOTAL: 192 MG/DL
CHOLESTEROL/HDL RATIO: 2.6
CK SERPL-CCNC: 70 U/L (ref 20–180)
CO2 SERPL-SCNC: 26 MMOL/L (ref 22–29)
CO2: 26 MMOL/L
CREAT SERPL-MCNC: 0.69 MG/DL
CREAT SERPL-MCNC: 0.69 MG/DL (ref 0.57–1)
CREAT UR-MCNC: 100.1 MG/DL
DEPRECATED RDW RBC AUTO: 47.7 FL (ref 37–54)
EGFRCR SERPLBLD CKD-EPI 2021: 106.5 ML/MIN/1.73
EOSINOPHIL # BLD AUTO: 0.35 10*3/MM3 (ref 0–0.4)
EOSINOPHIL NFR BLD AUTO: 2.3 % (ref 0.3–6.2)
EOSINOPHILS ABSOLUTE: 0.35 /ΜL
EOSINOPHILS RELATIVE PERCENT: 2.3 %
ERYTHROCYTE [DISTWIDTH] IN BLOOD BY AUTOMATED COUNT: 14.9 % (ref 12.3–15.4)
ESTIMATED AVERAGE GLUCOSE: ABNORMAL
FERRITIN SERPL-MCNC: 81.09 NG/ML (ref 13–150)
GFR, ESTIMATED: 106.5
GLOBULIN UR ELPH-MCNC: 3.3 GM/DL
GLUCOSE BLD-MCNC: 131 MG/DL
GLUCOSE SERPL-MCNC: 131 MG/DL (ref 65–99)
HBA1C MFR BLD: 5.7 %
HBA1C MFR BLD: 5.7 % (ref 4.8–5.6)
HCT VFR BLD AUTO: 40.4 % (ref 34–46.6)
HCT VFR BLD CALC: 40.4 % (ref 36–46)
HDLC SERPL-MCNC: 45 MG/DL (ref 35–70)
HDLC SERPL-MCNC: 45 MG/DL (ref 40–60)
HEMOGLOBIN: 13.3 G/DL (ref 12–16)
HGB BLD-MCNC: 13.3 G/DL (ref 12–15.9)
IMM GRANULOCYTES # BLD AUTO: 0.05 10*3/MM3 (ref 0–0.05)
IMM GRANULOCYTES NFR BLD AUTO: 0.3 % (ref 0–0.5)
LDL CHOLESTEROL: 120
LDLC SERPL CALC-MCNC: 120 MG/DL (ref 0–100)
LDLC/HDLC SERPL: 2.6 {RATIO}
LYMPHOCYTES # BLD AUTO: 5.27 10*3/MM3 (ref 0.7–3.1)
LYMPHOCYTES ABSOLUTE: 5.27 /ΜL
LYMPHOCYTES NFR BLD AUTO: 34.4 % (ref 19.6–45.3)
LYMPHOCYTES RELATIVE PERCENT: 34.4 %
MCH RBC QN AUTO: 29.2 PG
MCH RBC QN AUTO: 29.2 PG (ref 26.6–33)
MCHC RBC AUTO-ENTMCNC: 32.9 G/DL
MCHC RBC AUTO-ENTMCNC: 32.9 G/DL (ref 31.5–35.7)
MCV RBC AUTO: 88.6 FL
MCV RBC AUTO: 88.6 FL (ref 79–97)
MICROALBUMIN/CREAT UR: NORMAL MG/G{CREAT}
MONOCYTES # BLD AUTO: 0.96 10*3/MM3 (ref 0.1–0.9)
MONOCYTES ABSOLUTE: 0.96 /ΜL
MONOCYTES NFR BLD AUTO: 6.3 % (ref 5–12)
MONOCYTES RELATIVE PERCENT: 6.3 %
NEUTROPHILS ABSOLUTE: 8.53 /ΜL
NEUTROPHILS NFR BLD AUTO: 55.7 % (ref 42.7–76)
NEUTROPHILS NFR BLD AUTO: 8.53 10*3/MM3 (ref 1.7–7)
NEUTROPHILS RELATIVE PERCENT: 55.7 %
NONHDLC SERPL-MCNC: ABNORMAL MG/DL
NRBC BLD AUTO-RTO: 0 /100 WBC (ref 0–0.2)
PDW BLD-RTO: 14.9 %
PLATELET # BLD AUTO: 655 10*3/MM3 (ref 140–450)
PLATELET # BLD: 655 K/ΜL
PMV BLD AUTO: 10.1 FL
PMV BLD AUTO: 10.1 FL (ref 6–12)
POTASSIUM SERPL-SCNC: 3.8 MMOL/L
POTASSIUM SERPL-SCNC: 3.8 MMOL/L (ref 3.5–5.2)
PROT SERPL-MCNC: 7.1 G/DL (ref 6–8.5)
RBC # BLD AUTO: 4.56 10*6/MM3 (ref 3.77–5.28)
RBC # BLD: 4.56 10^6/ΜL
SODIUM BLD-SCNC: 142 MMOL/L
SODIUM SERPL-SCNC: 142 MMOL/L (ref 136–145)
T4 FREE SERPL-MCNC: 1.35 NG/DL (ref 0.93–1.7)
T4 FREE: 1.35
TOTAL PROTEIN: 7.1 G/DL (ref 6.4–8.2)
TRIGL SERPL-MCNC: 149 MG/DL
TRIGL SERPL-MCNC: 149 MG/DL (ref 0–150)
TSH SERPL DL<=0.05 MIU/L-ACNC: 2.89 UIU/ML
TSH SERPL DL<=0.05 MIU/L-ACNC: 2.89 UIU/ML (ref 0.27–4.2)
VLDLC SERPL CALC-MCNC: 27 MG/DL
VLDLC SERPL-MCNC: 27 MG/DL (ref 5–40)
WBC # BLD: 15.31 10^3/ML
WBC NRBC COR # BLD AUTO: 15.31 10*3/MM3 (ref 3.4–10.8)

## 2024-08-09 PROCEDURE — 36415 COLL VENOUS BLD VENIPUNCTURE: CPT

## 2024-08-09 PROCEDURE — 80053 COMPREHEN METABOLIC PANEL: CPT

## 2024-08-09 PROCEDURE — 84439 ASSAY OF FREE THYROXINE: CPT

## 2024-08-09 PROCEDURE — 82043 UR ALBUMIN QUANTITATIVE: CPT

## 2024-08-09 PROCEDURE — 85025 COMPLETE CBC W/AUTO DIFF WBC: CPT

## 2024-08-09 PROCEDURE — 82550 ASSAY OF CK (CPK): CPT

## 2024-08-09 PROCEDURE — 80061 LIPID PANEL: CPT

## 2024-08-09 PROCEDURE — 84443 ASSAY THYROID STIM HORMONE: CPT

## 2024-08-09 PROCEDURE — 82728 ASSAY OF FERRITIN: CPT

## 2024-08-09 PROCEDURE — 82570 ASSAY OF URINE CREATININE: CPT

## 2024-08-09 PROCEDURE — 83036 HEMOGLOBIN GLYCOSYLATED A1C: CPT

## 2024-08-09 RX ORDER — SODIUM CHLORIDE 0.9 % (FLUSH) 0.9 %
10 SYRINGE (ML) INJECTION AS NEEDED
OUTPATIENT
Start: 2024-08-09

## 2024-08-09 RX ORDER — HEPARIN SODIUM (PORCINE) LOCK FLUSH IV SOLN 100 UNIT/ML 100 UNIT/ML
500 SOLUTION INTRAVENOUS AS NEEDED
Status: DISCONTINUED | OUTPATIENT
Start: 2024-08-09 | End: 2024-08-09 | Stop reason: HOSPADM

## 2024-08-09 RX ORDER — SODIUM CHLORIDE 0.9 % (FLUSH) 0.9 %
10 SYRINGE (ML) INJECTION AS NEEDED
Status: DISCONTINUED | OUTPATIENT
Start: 2024-08-09 | End: 2024-08-09 | Stop reason: HOSPADM

## 2024-08-09 RX ORDER — HEPARIN SODIUM (PORCINE) LOCK FLUSH IV SOLN 100 UNIT/ML 100 UNIT/ML
500 SOLUTION INTRAVENOUS AS NEEDED
OUTPATIENT
Start: 2024-08-09

## 2024-08-09 RX ADMIN — HEPARIN SODIUM (PORCINE) LOCK FLUSH IV SOLN 100 UNIT/ML 500 UNITS: 100 SOLUTION at 08:56

## 2024-08-09 RX ADMIN — Medication 10 ML: at 08:56

## 2024-08-11 NOTE — PROGRESS NOTES
I have reviewed the notes, assessments, and/or procedures performed by Heavenly Kohler RN , I concur with her/his documentation of Naomi Bhatia.

## 2024-08-12 DIAGNOSIS — E78.2 MIXED HYPERLIPIDEMIA: Primary | ICD-10-CM

## 2024-08-12 RX ORDER — ROSUVASTATIN CALCIUM 10 MG/1
10 TABLET, COATED ORAL NIGHTLY
Qty: 30 TABLET | Refills: 3 | Status: SHIPPED | OUTPATIENT
Start: 2024-08-12

## 2024-08-12 NOTE — TELEPHONE ENCOUNTER
----- Message from Dr. OMAR Dhillon DO sent at 8/9/2024  5:32 PM CDT -----  Cbc is stable.  Your metabolic profile is normal.  This includes kidney and liver functions as well as electrolytes.  A1C =5.7  Lipids are elevated  Recommend increasing crestor to 10mg nightly, #[id number]  Recheck lipids in 4-6 weeks  Thyroid functions are normal.  No need for any changes.

## 2024-08-12 NOTE — TELEPHONE ENCOUNTER
Called patient, spoke with: Patient regarding the results of the patients most recent labs.  I advised Patient of Dr. Dhillon recommendations.   Patient did voice understanding       Pt states her statin was Dc'd by Dr Haque at [facility] due to them thinking the statin could be contributing to symptoms     Please advise what you would like to do

## 2024-08-13 ENCOUNTER — OFFICE VISIT (OUTPATIENT)
Dept: ONCOLOGY | Facility: CLINIC | Age: 49
End: 2024-08-13
Payer: MEDICARE

## 2024-08-13 VITALS
WEIGHT: 293 LBS | HEIGHT: 66 IN | DIASTOLIC BLOOD PRESSURE: 88 MMHG | SYSTOLIC BLOOD PRESSURE: 124 MMHG | RESPIRATION RATE: 16 BRPM | BODY MASS INDEX: 47.09 KG/M2 | OXYGEN SATURATION: 98 % | TEMPERATURE: 97.2 F | HEART RATE: 96 BPM

## 2024-08-13 DIAGNOSIS — D72.829 LEUKOCYTOSIS, UNSPECIFIED TYPE: ICD-10-CM

## 2024-08-13 DIAGNOSIS — R79.89 ELEVATED FERRITIN: Primary | ICD-10-CM

## 2024-08-13 PROCEDURE — 1125F AMNT PAIN NOTED PAIN PRSNT: CPT | Performed by: NURSE PRACTITIONER

## 2024-08-13 PROCEDURE — 3079F DIAST BP 80-89 MM HG: CPT | Performed by: NURSE PRACTITIONER

## 2024-08-13 PROCEDURE — 99214 OFFICE O/P EST MOD 30 MIN: CPT | Performed by: NURSE PRACTITIONER

## 2024-08-13 PROCEDURE — 3074F SYST BP LT 130 MM HG: CPT | Performed by: NURSE PRACTITIONER

## 2024-08-13 RX ORDER — COLCHICINE 0.6 MG/1
1 TABLET ORAL DAILY
COMMUNITY
Start: 2024-08-02

## 2024-08-13 RX ORDER — CARIPRAZINE 3 MG/1
3 CAPSULE, GELATIN COATED ORAL DAILY
COMMUNITY

## 2024-08-13 NOTE — PROGRESS NOTES
Clinic Progress Note    Patient:  Naomi Bhatia  YOB: 1975  Date of Service: 8/13/2024  MRN: [id number]   Acct:    Primary Care Physician: Brett Sharp DO    Chief Complaint: Hereditary hemochromatosis    Hematology History:  For detailed summary about the patient's previous work-up, differential diagnoses and treatments, please refer to the last note of Dr. Goldberg from 4/1/2022.    Brief summary:  Ms. Bhatia is a 48 y.o. who follows for history of iron level of 19.  She has a history of gastric sleeve procedure in 12/2017, after which she developed iron deficiency anemia like related to decreased dietary absorption. She did receive several multiple blood transfusions in the past as well as parenteral iron Venofer infusions at least 7-10 times back in 2018.     In 9/2020, the patient had seen Dr. Goldberg, where her hemoglobin was 10.8, iron saturation was 21%, ferritin was 1406, and repeat ferritin 8/11/2021 was 1663; she is found to be heterozygous for C282Y hemochromatosis gene variant. She was started on she has phlebotomies every 2 months, and as ferritin had not improved, she initially was started on Jadenu.  As her ferritin improved below 500 in 7/2021, Jadenu was stopped then. The goal ferritin that was discussed with the patient was to keep ferritin below 500.    US spleen 12/17/2021 showed splenomegaly (max dimension 16.7 cm, previously 14.6 cm); it was symptomatic and painful.  Peripheral blood flow cytometry, JAK2, BCR/ABL FISH were negative.  PET scan 2/13/2022 showed splenomegaly with numerous small 1-2 cm hypermetabolic lesions along with mild periportal hypermetabolic lymphadenopathy.  Bone marrow biopsy 1/14/2022 was unremarkable for the most part; there was no sign of increased iron deposition. She ultimately underwent robotic assisted laparoscopic splenomegaly and wedge liver biopsy at [facility] on 5/2/2022, with splenectomy pathology showing  Necrotizing granulomas,  "and liver biopsy showing bridging fibrosis with early nodule formation as well as granulomas and mildly active steatohepatitis with microvascular steatosis; iron stains showed only mild iron deposition.    Interval History:    Ms. Bhatia presents to clinic today for continued follow up.   She states she has been doing well.  She has no acute complaints today other than some fatigue.     She had labs drawn and results were reviewed with her in office.         Objectives:  /88   Pulse 96   Temp 97.2 °F (36.2 °C)   Resp 16   Ht 167.6 cm (66\")   Wt 136 kg (299 lb 1.6 oz)   LMP  (LMP Unknown)   SpO2 98%   BMI 48.28 kg/m²      Review of Systems   Constitutional:  Positive for fatigue. Negative for fever.   HENT:  Negative for trouble swallowing.    Respiratory:  Negative for cough and shortness of breath.    Cardiovascular:  Negative for chest pain, palpitations and leg swelling.   Gastrointestinal:  Negative for nausea and vomiting.   Genitourinary:  Negative for hematuria.   Musculoskeletal:  Negative for arthralgias and myalgias.   Skin:  Negative for rash, skin lesions and wound.   Neurological:  Negative for dizziness, syncope, memory problem and confusion.   Psychiatric/Behavioral:  Negative for suicidal ideas and depressed mood. The patient is not nervous/anxious.      I have reviewed the ROS and verified with the patient the accuracy of it. No changes since the information was documented.  Rhiannon HERNANDEZ Tommy, APRN 08/13/2024       Results:  Lab Results   Component Value Date    WBC 15.31 (H) 08/09/2024    HGB 13.3 08/09/2024    HCT 40.4 08/09/2024    MCV 88.6 08/09/2024     (H) 08/09/2024     Lab Results   Component Value Date    IRON 55 05/10/2024    TIBC 332 05/10/2024    FERRITIN 81.09 08/09/2024     Physical Exam  Constitutional:       Appearance: Normal appearance.   HENT:      Head: Normocephalic and atraumatic.   Cardiovascular:      Rate and Rhythm: Normal rate and regular rhythm. "   Pulmonary:      Effort: Pulmonary effort is normal.      Breath sounds: Normal breath sounds.   Abdominal:      General: Bowel sounds are normal.      Palpations: Abdomen is soft.   Musculoskeletal:      Right lower leg: No edema.      Left lower leg: No edema.   Skin:     General: Skin is warm and dry.   Neurological:      Mental Status: She is alert and oriented to person, place, and time.   Psychiatric:         Attention and Perception: Attention normal.         Mood and Affect: Mood normal.         Judgment: Judgment normal.           Assessment :  1.  Hemochromatosis C282Y heterozygous/carrier.  2.  History of iron deficiency  3.  Splenectomy on 5/2/2022  4.  Leukocytosis  5. Thrombocytosis    7.  Liver lesion       Hemochromatosis Carrier  History of iron deficiency   -Heterozygous C282Y  Carrier   -Previously received phlebotomies for elevated ferritin, but since her splenectomy surgery her ferritin had trended down  -Lab 8/9/24:  81  -Stable for observation     3.  Leukocytosis  4.  Thrombocytosis  5.  Splenectomy   -JAK2, BCR/ABL Negative  -Splenectomy 5/2/2022  -Labs 8/9/24:  WBC 15.31, Platelets 655, ANC 8.53, ALC 5.27, AMC 0.96  -Chronic, Platelet Elevation most likely secondary to splenectomy   -Bone Marrow 1/14/22   -Flow Cytometry:  no evidence for abnormal myeloid maturation or an increased blast population   -There is no evidence for a lymphoproliferative disorder   -Normal female karyotype  Normocellular marrow for age (50-60%) with maturing trilineage hematopoiesis.  The M:E Ratio is Decreased. Slight megakaryocytosis.  Decreased storage iron.    -No evidence of an acute leukemia, a lymphoproliferative disorder or a a plasma cell dyscrasia.   -Pt remains asymptomatic.  Continue observation   -Advised low dose ASA for thrombus prophylaxis.     6.  Liver Lesion   MRI Abdomen 6/29/23:  -Liver: Severe hepatic steatosis. No morphologic changes of cirrhosis. 15 mm cyst in the LEFT liver segment 3  without solid components or enhancement, correlating with lesion of concern on outside ultrasound.  No suspicious solid liver lesion.  -Previously she was followed by Hepatology at [facility].         PLAN   Stable for observation  Continue current medications, treatment plans and follow up with PCP and any other providers  Return to office 6 months  Labs one week before office visit for CBC CMP, Iron Profile, Ferritin   Care discussed with patient.  Understanding expressed.  Patient agreeable with plan.              Rhiannon Sanders, APRN  8/13/2024

## 2024-08-14 ENCOUNTER — TELEPHONE (OUTPATIENT)
Dept: FAMILY MEDICINE CLINIC | Age: 49
End: 2024-08-14

## 2024-08-14 DIAGNOSIS — I10 PRIMARY HYPERTENSION: Primary | ICD-10-CM

## 2024-08-14 LAB
CREATININE URINE: 100.1 MG/DL
MICROALBUMIN/CREAT 24H UR: <1.2 MG/DL
MICROALBUMIN/CREAT UR-RTO: NORMAL

## 2024-08-14 NOTE — TELEPHONE ENCOUNTER
----- Message from Dr. OMAR Dhillon DO sent at 8/14/2024  2:47 PM CDT -----  No significant protein excretion in the urine.

## 2024-08-14 NOTE — TELEPHONE ENCOUNTER
Hyperlipidemia does not resolve.  The medication will prevent the body from making extra cholesterol, but without medication, it we will go right back to where it was before.  I think there was a misunderstanding of what Dr. Ko was trying to say.

## 2024-08-14 NOTE — TELEPHONE ENCOUNTER
Spoke to patient with results. Patient understood. She had a followup question for Dr Dhillon. She stated Dr Dhillon wants her to continue her cholesterol medication but Dr Ko told her she did not have to take that medication any longer, that the problem was resolved. She is needing advice from her PCP on what she needs to be taking.

## 2024-08-15 NOTE — TELEPHONE ENCOUNTER
Spoke to patient. She understood but was still confused because of what Dr Ko told her. We decided it was best if she did a VV to discuss this further.

## 2024-08-21 ENCOUNTER — TELEMEDICINE (OUTPATIENT)
Dept: FAMILY MEDICINE CLINIC | Age: 49
End: 2024-08-21
Payer: MEDICARE

## 2024-08-21 DIAGNOSIS — E78.2 MIXED HYPERLIPIDEMIA: Primary | ICD-10-CM

## 2024-08-21 DIAGNOSIS — G72.9 MYOPATHY: ICD-10-CM

## 2024-08-21 DIAGNOSIS — E11.42 TYPE 2 DIABETES MELLITUS WITH DIABETIC POLYNEUROPATHY, WITHOUT LONG-TERM CURRENT USE OF INSULIN (HCC): ICD-10-CM

## 2024-08-21 PROCEDURE — 3044F HG A1C LEVEL LT 7.0%: CPT | Performed by: PEDIATRICS

## 2024-08-21 PROCEDURE — 2022F DILAT RTA XM EVC RTNOPTHY: CPT | Performed by: PEDIATRICS

## 2024-08-21 PROCEDURE — 99214 OFFICE O/P EST MOD 30 MIN: CPT | Performed by: PEDIATRICS

## 2024-08-21 PROCEDURE — G8428 CUR MEDS NOT DOCUMENT: HCPCS | Performed by: PEDIATRICS

## 2024-08-21 ASSESSMENT — ENCOUNTER SYMPTOMS
COUGH: 1
SINUS PRESSURE: 0
RHINORRHEA: 0
SINUS PAIN: 0
VOMITING: 0
BACK PAIN: 1
SHORTNESS OF BREATH: 1
DIARRHEA: 0
NAUSEA: 0

## 2024-08-21 NOTE — PROGRESS NOTES
appropriately licensed, registered, or certified to deliver care in the state where the patient is located as indicated above. If you are not or unsure, please re-schedule the visit: Yes, I confirm.      Total time spent for this encounter: Not billed by time    --MORA Dhillon,  on 8/21/2024 at 4:48 PM    An electronic signature was used to authenticate this note.

## 2024-08-24 DIAGNOSIS — I10 ESSENTIAL HYPERTENSION: ICD-10-CM

## 2024-08-26 ENCOUNTER — PATIENT MESSAGE (OUTPATIENT)
Dept: FAMILY MEDICINE CLINIC | Age: 49
End: 2024-08-26

## 2024-08-26 RX ORDER — BUPROPION HYDROCHLORIDE 300 MG/1
300 TABLET ORAL 2 TIMES DAILY
Qty: 180 TABLET | Refills: 3 | Status: SHIPPED | OUTPATIENT
Start: 2024-08-26

## 2024-08-26 RX ORDER — AMLODIPINE BESYLATE 5 MG/1
5 TABLET ORAL DAILY
Qty: 90 TABLET | Refills: 3 | Status: SHIPPED | OUTPATIENT
Start: 2024-08-26

## 2024-08-26 NOTE — TELEPHONE ENCOUNTER
Received fax from pharmacy requesting refill on pts medication(s). Pt was last seen in office on 8/21/2024  and has a follow up scheduled for Visit date not found. Will send request to  Dr. Dhillon  for authorization.     Requested Prescriptions     Pending Prescriptions Disp Refills    amLODIPine (NORVASC) 5 MG tablet [Pharmacy Med Name: AMLODIPINE BESYLATE 5MG TABLETS] 90 tablet 3     Sig: TAKE 1 TABLET BY MOUTH DAILY

## 2024-08-26 NOTE — TELEPHONE ENCOUNTER
Received fax from pharmacy requesting refill on pts medication(s). Pt was last seen in office on 8/21/2024  and has a follow up scheduled for Visit date not found. Will send request to  Dr. Dhillon  for patient.     Requested Prescriptions     Pending Prescriptions Disp Refills    buPROPion (WELLBUTRIN XL) 300 MG extended release tablet 180 tablet 3     Sig: Take 1 tablet by mouth in the morning and at bedtime

## 2024-08-27 ENCOUNTER — HOSPITAL ENCOUNTER (OUTPATIENT)
Dept: PAIN MANAGEMENT | Age: 49
Discharge: HOME | End: 2024-08-27
Payer: MEDICARE

## 2024-08-27 VITALS
OXYGEN SATURATION: 95 % | SYSTOLIC BLOOD PRESSURE: 120 MMHG | DIASTOLIC BLOOD PRESSURE: 44 MMHG | TEMPERATURE: 96.1 F | RESPIRATION RATE: 18 BRPM | HEART RATE: 85 BPM

## 2024-08-27 DIAGNOSIS — R52 PAIN MANAGEMENT: ICD-10-CM

## 2024-08-27 DIAGNOSIS — G43.811 OTHER MIGRAINE WITH STATUS MIGRAINOSUS, INTRACTABLE: ICD-10-CM

## 2024-08-27 PROCEDURE — G0260 INJ FOR SACROILIAC JT ANESTH: HCPCS

## 2024-08-27 PROCEDURE — 6360000002 HC RX W HCPCS

## 2024-08-27 PROCEDURE — 2500000003 HC RX 250 WO HCPCS

## 2024-08-27 PROCEDURE — 2580000003 HC RX 258

## 2024-08-27 RX ORDER — TOPIRAMATE 50 MG/1
50 TABLET, FILM COATED ORAL 2 TIMES DAILY
Qty: 180 TABLET | Refills: 3 | Status: SHIPPED | OUTPATIENT
Start: 2024-08-27

## 2024-08-27 RX ORDER — METHYLPREDNISOLONE ACETATE 80 MG/ML
80 INJECTION, SUSPENSION INTRA-ARTICULAR; INTRALESIONAL; INTRAMUSCULAR; SOFT TISSUE ONCE
Status: DISCONTINUED | OUTPATIENT
Start: 2024-08-27 | End: 2024-08-29 | Stop reason: HOSPADM

## 2024-08-27 RX ORDER — BUPIVACAINE HYDROCHLORIDE 5 MG/ML
2 INJECTION, SOLUTION EPIDURAL; INTRACAUDAL ONCE
Status: DISCONTINUED | OUTPATIENT
Start: 2024-08-27 | End: 2024-08-29 | Stop reason: HOSPADM

## 2024-08-27 RX ORDER — CARBIDOPA AND LEVODOPA 25; 100 MG/1; MG/1
1 TABLET ORAL NIGHTLY
Qty: 90 TABLET | Refills: 3 | Status: SHIPPED | OUTPATIENT
Start: 2024-08-27

## 2024-08-27 RX ORDER — LIDOCAINE HYDROCHLORIDE 10 MG/ML
7 INJECTION, SOLUTION EPIDURAL; INFILTRATION; INTRACAUDAL; PERINEURAL ONCE
Status: DISCONTINUED | OUTPATIENT
Start: 2024-08-27 | End: 2024-08-29 | Stop reason: HOSPADM

## 2024-08-27 RX ORDER — SODIUM CHLORIDE 9 MG/ML
3 INJECTION, SOLUTION INTRAMUSCULAR; INTRAVENOUS; SUBCUTANEOUS ONCE
Status: DISCONTINUED | OUTPATIENT
Start: 2024-08-27 | End: 2024-08-29 | Stop reason: HOSPADM

## 2024-08-27 ASSESSMENT — PAIN DESCRIPTION - LOCATION: LOCATION: BACK;SACRUM

## 2024-08-27 ASSESSMENT — PAIN DESCRIPTION - PAIN TYPE: TYPE: CHRONIC PAIN

## 2024-08-27 ASSESSMENT — PAIN DESCRIPTION - DESCRIPTORS: DESCRIPTORS: ACHING

## 2024-08-27 ASSESSMENT — PAIN SCALES - GENERAL: PAINLEVEL_OUTOF10: 7

## 2024-08-27 ASSESSMENT — PAIN DESCRIPTION - ORIENTATION: ORIENTATION: LEFT

## 2024-08-27 ASSESSMENT — PAIN DESCRIPTION - FREQUENCY: FREQUENCY: INTERMITTENT

## 2024-08-27 ASSESSMENT — PAIN - FUNCTIONAL ASSESSMENT: PAIN_FUNCTIONAL_ASSESSMENT: PREVENTS OR INTERFERES SOME ACTIVE ACTIVITIES AND ADLS

## 2024-08-27 NOTE — INTERVAL H&P NOTE
Update History & Physical    The patient's History and Physical was reviewed with the patient and I examined the patient. There was no change. The surgical site was confirmed by the patient and me.     Plan: The risks, benefits, expected outcome, and alternative to the recommended procedure have been discussed with the patient. Patient understands and wants to proceed with the procedure.     Electronically signed by Alex Carmona MD on 8/27/2024 at 2:19 PM

## 2024-08-27 NOTE — TELEPHONE ENCOUNTER
Received fax from pharmacy requesting refill on pts medication(s). Pt was last seen in office on 8/21/2024  and has a follow up scheduled for Visit date not found. Will send request to  Dr. Dhillon  for authorization.     Requested Prescriptions     Pending Prescriptions Disp Refills    carbidopa-levodopa (SINEMET)  MG per tablet [Pharmacy Med Name: CARBIDOPA/LEVODOPA 25-100MG TABS] 90 tablet 3     Sig: TAKE 1 TABLET BY MOUTH AT BEDTIME    topiramate (TOPAMAX) 50 MG tablet [Pharmacy Med Name: TOPIRAMATE 50MG TABLETS] 180 tablet 3     Sig: TAKE 1 TABLET BY MOUTH TWICE DAILY

## 2024-09-17 ENCOUNTER — HOSPITAL ENCOUNTER (OUTPATIENT)
Dept: PAIN MANAGEMENT | Age: 49
Discharge: HOME | End: 2024-09-17
Payer: MEDICARE

## 2024-09-17 VITALS
RESPIRATION RATE: 18 BRPM | SYSTOLIC BLOOD PRESSURE: 130 MMHG | OXYGEN SATURATION: 97 % | DIASTOLIC BLOOD PRESSURE: 63 MMHG | HEART RATE: 91 BPM

## 2024-09-17 DIAGNOSIS — R52 PAIN MANAGEMENT: ICD-10-CM

## 2024-09-17 PROCEDURE — 62323 NJX INTERLAMINAR LMBR/SAC: CPT

## 2024-09-17 PROCEDURE — 6360000002 HC RX W HCPCS

## 2024-09-17 PROCEDURE — 2500000003 HC RX 250 WO HCPCS

## 2024-09-17 PROCEDURE — 2580000003 HC RX 258

## 2024-09-17 RX ORDER — METHYLPREDNISOLONE ACETATE 80 MG/ML
80 INJECTION, SUSPENSION INTRA-ARTICULAR; INTRALESIONAL; INTRAMUSCULAR; SOFT TISSUE ONCE
Status: DISCONTINUED | OUTPATIENT
Start: 2024-09-17 | End: 2024-09-19 | Stop reason: HOSPADM

## 2024-09-17 RX ORDER — LIDOCAINE HYDROCHLORIDE 10 MG/ML
5 INJECTION, SOLUTION EPIDURAL; INFILTRATION; INTRACAUDAL; PERINEURAL ONCE
Status: DISCONTINUED | OUTPATIENT
Start: 2024-09-17 | End: 2024-09-19 | Stop reason: HOSPADM

## 2024-09-17 RX ORDER — SODIUM CHLORIDE 9 MG/ML
5 INJECTION, SOLUTION INTRAMUSCULAR; INTRAVENOUS; SUBCUTANEOUS ONCE
Status: DISCONTINUED | OUTPATIENT
Start: 2024-09-17 | End: 2024-09-19 | Stop reason: HOSPADM

## 2024-09-17 ASSESSMENT — PAIN SCALES - GENERAL: PAINLEVEL_OUTOF10: 6

## 2024-09-17 ASSESSMENT — PAIN - FUNCTIONAL ASSESSMENT
PAIN_FUNCTIONAL_ASSESSMENT: PREVENTS OR INTERFERES SOME ACTIVE ACTIVITIES AND ADLS
PAIN_FUNCTIONAL_ASSESSMENT: PREVENTS OR INTERFERES SOME ACTIVE ACTIVITIES AND ADLS
PAIN_FUNCTIONAL_ASSESSMENT: NONE - DENIES PAIN

## 2024-09-17 ASSESSMENT — PAIN DESCRIPTION - PAIN TYPE: TYPE: CHRONIC PAIN

## 2024-09-17 ASSESSMENT — PAIN DESCRIPTION - ORIENTATION: ORIENTATION: LOWER;RIGHT

## 2024-09-17 ASSESSMENT — PAIN DESCRIPTION - FREQUENCY: FREQUENCY: INTERMITTENT

## 2024-09-17 ASSESSMENT — PAIN DESCRIPTION - LOCATION: LOCATION: BACK;SACRUM

## 2024-09-30 DIAGNOSIS — Z98.890 HX OF SHOULDER SURGERY: ICD-10-CM

## 2024-09-30 DIAGNOSIS — M54.10 BACK PAIN WITH LEFT-SIDED RADICULOPATHY: Chronic | ICD-10-CM

## 2024-10-01 RX ORDER — OXYCODONE AND ACETAMINOPHEN 7.5; 325 MG/1; MG/1
1 TABLET ORAL EVERY 8 HOURS PRN
Qty: 90 TABLET | Refills: 0 | Status: SHIPPED | OUTPATIENT
Start: 2024-10-03 | End: 2024-11-02

## 2024-10-03 NOTE — INTERVAL H&P NOTE
Update History & Physical    The patient's History and Physical  was reviewed with the patient and I examined the patient. There was NO CHANGE:[id number]}. The surgical site was confirmed by the patient and me. Plan: The risks, benefits, expected outcome, and alternative to the recommended procedure have been discussed with the patient. Patient understands and wants to proceed with the procedure.      Electronically signed by Daryle Held, MD on 7/12/2022 at 1:11 PM
Detail Level: Zone

## 2024-10-04 ENCOUNTER — OFFICE VISIT (OUTPATIENT)
Dept: FAMILY MEDICINE CLINIC | Age: 49
End: 2024-10-04

## 2024-10-04 ENCOUNTER — HOSPITAL ENCOUNTER (OUTPATIENT)
Dept: GENERAL RADIOLOGY | Age: 49
Discharge: HOME | End: 2024-10-04
Payer: MEDICARE

## 2024-10-04 VITALS
HEART RATE: 85 BPM | BODY MASS INDEX: 48.74 KG/M2 | TEMPERATURE: 98.5 F | WEIGHT: 293 LBS | DIASTOLIC BLOOD PRESSURE: 78 MMHG | SYSTOLIC BLOOD PRESSURE: 126 MMHG | OXYGEN SATURATION: 96 %

## 2024-10-04 DIAGNOSIS — M25.512 ACUTE PAIN OF LEFT SHOULDER: Primary | ICD-10-CM

## 2024-10-04 DIAGNOSIS — M25.512 ACUTE PAIN OF LEFT SHOULDER: ICD-10-CM
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
## 2024-10-04 DIAGNOSIS — H65.93 FLUID LEVEL BEHIND TYMPANIC MEMBRANE OF BOTH EARS: ICD-10-CM

## 2024-10-04 DIAGNOSIS — H60.501 ACUTE OTITIS EXTERNA OF RIGHT EAR, UNSPECIFIED TYPE: ICD-10-CM

## 2024-10-04 DIAGNOSIS — H66.001 ACUTE SUPPURATIVE OTITIS MEDIA OF RIGHT EAR WITHOUT SPONTANEOUS RUPTURE OF TYMPANIC MEMBRANE, RECURRENCE NOT SPECIFIED: ICD-10-CM

## 2024-10-04 PROCEDURE — 73030 X-RAY EXAM OF SHOULDER: CPT

## 2024-10-04 RX ORDER — CEFDINIR 300 MG/1
300 CAPSULE ORAL 2 TIMES DAILY
Qty: 20 CAPSULE | Refills: 0 | Status: SHIPPED | OUTPATIENT
Start: 2024-10-04 | End: 2024-10-14

## 2024-10-04 RX ORDER — METHYLPREDNISOLONE 4 MG
TABLET, DOSE PACK ORAL
Qty: 1 KIT | Refills: 0 | Status: SHIPPED | OUTPATIENT
Start: 2024-10-04

## 2024-10-04 RX ORDER — NEOMYCIN SULFATE, POLYMYXIN B SULFATE, HYDROCORTISONE 3.5; 10000; 1 MG/ML; [USP'U]/ML; MG/ML
4 SOLUTION/ DROPS AURICULAR (OTIC) 3 TIMES DAILY
Qty: 1 EACH | Refills: 0 | Status: SHIPPED | OUTPATIENT
Start: 2024-10-04 | End: 2024-10-14

## 2024-10-04 ASSESSMENT — ENCOUNTER SYMPTOMS
COUGH: 1
ABDOMINAL PAIN: 0
EYE REDNESS: 0
EYE DISCHARGE: 0
SINUS PRESSURE: 1
RHINORRHEA: 1
SINUS PAIN: 1
CONSTIPATION: 0
BLOOD IN STOOL: 0
DIARRHEA: 0
WHEEZING: 0
TROUBLE SWALLOWING: 0
SORE THROAT: 1

## 2024-10-04 NOTE — PROGRESS NOTES
Evelyn Moore (:  1975) is a 48 y.o. female,Established patient, here for evaluation of the following chief complaint(s):  Congestion (Congestion started about a week ago.), Otalgia (Pts states ear pain and fullness started yesterday. ), and Fall (Pts states that she fell yesterday. Pts states she tripped on her porch, fell and hit left side of body. States her arm and shoulder are sore today. )      ASSESSMENT/PLAN:    ICD-10-CM    1. Acute pain of left shoulder  M25.512 XR SHOULDER LEFT (MIN 2 VIEWS)     methylPREDNISolone (MEDROL, KAROL,) 4 MG tablet     [employer] Arm Sling, Left; L      2. Fall, initial encounter  W19.XXXA XR SHOULDER LEFT (MIN 2 VIEWS)     [employer] Arm Sling, Left; L      3. Acute suppurative otitis media of right ear without spontaneous rupture of tympanic membrane, recurrence not specified  H66.001 cefdinir (OMNICEF) 300 MG capsule      4. Acute otitis externa of right ear, unspecified type  H60.501 neomycin-polymyxin-hydrocortisone (CORTISPORIN) 3.5-26966-6 otic solution      5. Fluid level behind tympanic membrane of both ears  H65.93 methylPREDNISolone (MEDROL, KAROL,) 4 MG tablet          Return if symptoms worsen or fail to improve.    SUBJECTIVE/OBJECTIVE:  HPI  Congestion (Congestion started about a week ago.), Otalgia (Pts states ear pain and fullness started yesterday. ), and Fall (Pts states that she fell yesterday. Pts states she tripped on her porch, fell and hit left side of body. States her arm and shoulder are sore today. )  Review of Systems   Constitutional:  Negative for appetite change, fever and unexpected weight change.   HENT:  Positive for congestion, ear pain, rhinorrhea, sinus pressure, sinus pain and sore throat. Negative for trouble swallowing.    Eyes:  Negative for discharge and redness.   Respiratory:  Positive for cough. Negative for wheezing.    Cardiovascular:  Negative for chest pain.

## 2024-10-10 DIAGNOSIS — B37.9 YEAST INFECTION: Primary | ICD-10-CM

## 2024-10-10 RX ORDER — FLUCONAZOLE 150 MG/1
150 TABLET ORAL DAILY
Qty: 3 TABLET | Refills: 0 | Status: SHIPPED | OUTPATIENT
Start: 2024-10-10 | End: 2024-10-13

## 2024-10-10 NOTE — PATIENT INSTRUCTIONS
Naomi Bhatia has struggled some this month with healthy changes. Patient has gained 3 pounds due to inactivity from right hip pain.  Today we discussed healthy changes in lifestyle, diet, and exercise.   Intensive behavioral therapy for obesity was done today.   Goals for this month are: 3 meals per day with protein at each; eat protein first, use smaller plate; exercise as advised.  Keep psych appt for re-evaluation on 11/10/17.   Keep scheduled appt with hematologist and future iron infusions.  Follow up in one month for a weight recheck.      none

## 2024-10-10 NOTE — TELEPHONE ENCOUNTER
Spoke to patient with xray results. She is also requesting a prescription for Diflucan. Pt was last seen in office on 10/4/2024. She was given an antibiotic and she stated she gets a yeast infection every time she is on one and it's already starting.     Will send request to provider for authorization.     Requested Prescriptions     Pending Prescriptions Disp Refills    fluconazole (DIFLUCAN) 150 MG tablet 3 tablet 0     Sig: Take 1 tablet by mouth daily for 3 days

## 2024-10-11 DIAGNOSIS — J45.20 MILD INTERMITTENT ASTHMA WITHOUT COMPLICATION: ICD-10-CM

## 2024-10-11 RX ORDER — ALBUTEROL SULFATE 90 UG/1
2 INHALANT RESPIRATORY (INHALATION) EVERY 6 HOURS PRN
Qty: 8.5 G | Refills: 5 | Status: SHIPPED | OUTPATIENT
Start: 2024-10-11

## 2024-10-11 NOTE — TELEPHONE ENCOUNTER
Received fax from pharmacy requesting refill on pts medication(s). Pt was last seen in office on 10/4/2024  and has a follow up scheduled for Visit date not found. Will send request to  Dr. Dhillon  for authorization.     Requested Prescriptions     Pending Prescriptions Disp Refills    albuterol sulfate HFA (PROVENTIL;VENTOLIN;PROAIR) 108 (90 Base) MCG/ACT inhaler [Pharmacy Med Name: ALBUTEROL HFA INH (200 PUFFS) 8.5GM] 8.5 g 5     Sig: INHALE 2 PUFFS INTO THE LUNGS EVERY 6 HOURS AS NEEDED FOR WHEEZING

## 2024-10-15 ENCOUNTER — TELEPHONE (OUTPATIENT)
Dept: FAMILY MEDICINE CLINIC | Age: 49
End: 2024-10-15

## 2024-10-15 ENCOUNTER — INFUSION (OUTPATIENT)
Dept: ONCOLOGY | Facility: CLINIC | Age: 49
End: 2024-10-15
Payer: MEDICARE
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
## 2024-10-15 DIAGNOSIS — M25.562 ACUTE PAIN OF LEFT KNEE: ICD-10-CM

## 2024-10-15 DIAGNOSIS — Z45.2 ENCOUNTER FOR CARE RELATED TO PORT-A-CATH: Primary | ICD-10-CM

## 2024-10-15 RX ORDER — HEPARIN SODIUM (PORCINE) LOCK FLUSH IV SOLN 100 UNIT/ML 100 UNIT/ML
500 SOLUTION INTRAVENOUS AS NEEDED
Status: DISCONTINUED | OUTPATIENT
Start: 2024-10-15 | End: 2024-10-15 | Stop reason: HOSPADM

## 2024-10-15 RX ORDER — SODIUM CHLORIDE 0.9 % (FLUSH) 0.9 %
10 SYRINGE (ML) INJECTION AS NEEDED
Status: DISCONTINUED | OUTPATIENT
Start: 2024-10-15 | End: 2024-10-15 | Stop reason: HOSPADM

## 2024-10-15 RX ORDER — SODIUM CHLORIDE 0.9 % (FLUSH) 0.9 %
10 SYRINGE (ML) INJECTION AS NEEDED
Status: CANCELLED | OUTPATIENT
Start: 2024-10-15

## 2024-10-15 RX ORDER — HEPARIN SODIUM (PORCINE) LOCK FLUSH IV SOLN 100 UNIT/ML 100 UNIT/ML
500 SOLUTION INTRAVENOUS AS NEEDED
Status: CANCELLED | OUTPATIENT
Start: 2024-10-15

## 2024-10-15 RX ORDER — HEPARIN SODIUM (PORCINE) LOCK FLUSH IV SOLN 100 UNIT/ML 100 UNIT/ML
500 SOLUTION INTRAVENOUS AS NEEDED
OUTPATIENT
Start: 2024-10-15

## 2024-10-15 RX ORDER — SODIUM CHLORIDE 0.9 % (FLUSH) 0.9 %
10 SYRINGE (ML) INJECTION AS NEEDED
OUTPATIENT
Start: 2024-10-15

## 2024-10-15 RX ADMIN — HEPARIN SODIUM (PORCINE) LOCK FLUSH IV SOLN 100 UNIT/ML 500 UNITS: 100 SOLUTION at 09:39

## 2024-10-15 RX ADMIN — Medication 10 ML: at 09:39

## 2024-10-15 NOTE — TELEPHONE ENCOUNTER
Patient was seen on 10/4/24 after a fall. She had left shoulder xrays done but states that her left knee is hurting pretty bad and has since she fell.  Patient Requesting left knee xray.     Will send to provider to approve.

## 2024-10-15 NOTE — TELEPHONE ENCOUNTER
Spoke to patient. She is aware we have placed an order for left knee xrays. I did tell her our xray machine is down for maintenance right now and we can send it somewhere else. She said she will have this done at another [facility] office.

## 2024-10-16 ENCOUNTER — HOSPITAL ENCOUNTER (OUTPATIENT)
Dept: GENERAL RADIOLOGY | Age: 49
Discharge: HOME | End: 2024-10-16
Payer: MEDICARE
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
## 2024-10-16 DIAGNOSIS — M25.562 ACUTE PAIN OF LEFT KNEE: ICD-10-CM

## 2024-10-16 PROCEDURE — 73560 X-RAY EXAM OF KNEE 1 OR 2: CPT

## 2024-10-18 ENCOUNTER — TELEPHONE (OUTPATIENT)
Dept: FAMILY MEDICINE CLINIC | Age: 49
End: 2024-10-18

## 2024-10-18 DIAGNOSIS — M17.12 ARTHRITIS OF LEFT KNEE: ICD-10-CM

## 2024-10-18 DIAGNOSIS — G89.29 CHRONIC PAIN OF LEFT KNEE: ICD-10-CM

## 2024-10-18 DIAGNOSIS — M25.562 ACUTE PAIN OF LEFT KNEE: Primary | ICD-10-CM

## 2024-10-18 DIAGNOSIS — M25.562 CHRONIC PAIN OF LEFT KNEE: ICD-10-CM

## 2024-10-18 NOTE — TELEPHONE ENCOUNTER
----- Message from ASHLEE Diaz sent at 10/18/2024 11:38 AM CDT -----  Please inform patient results show  Xray shows mild arthritis   Since knee pain has continued, recommend referral to ortho of choice

## 2024-10-18 NOTE — TELEPHONE ENCOUNTER
Spoke to patient with results. She would like referral sent to   [facility] in [place]

## 2024-10-23 ENCOUNTER — APPOINTMENT (OUTPATIENT)
Dept: CT IMAGING | Age: 49
End: 2024-10-23
Payer: MEDICARE

## 2024-10-23 ENCOUNTER — HOSPITAL ENCOUNTER (EMERGENCY)
Age: 49
Discharge: HOME | End: 2024-10-23
Payer: MEDICARE

## 2024-10-23 VITALS
WEIGHT: 293 LBS | RESPIRATION RATE: 17 BRPM | HEART RATE: 102 BPM | BODY MASS INDEX: 47.09 KG/M2 | TEMPERATURE: 98 F | OXYGEN SATURATION: 98 % | HEIGHT: 66 IN | DIASTOLIC BLOOD PRESSURE: 98 MMHG | SYSTOLIC BLOOD PRESSURE: 142 MMHG

## 2024-10-23 DIAGNOSIS — M25.562 ACUTE PAIN OF LEFT KNEE: ICD-10-CM

## 2024-10-23 DIAGNOSIS — Z98.890 HX OF SHOULDER SURGERY: ICD-10-CM

## 2024-10-23 DIAGNOSIS — M76.892 HIP TENDONITIS, LEFT: Primary | ICD-10-CM

## 2024-10-23 DIAGNOSIS — M54.10 BACK PAIN WITH LEFT-SIDED RADICULOPATHY: Chronic | ICD-10-CM

## 2024-10-23 PROCEDURE — 96372 THER/PROPH/DIAG INJ SC/IM: CPT

## 2024-10-23 PROCEDURE — 99284 EMERGENCY DEPT VISIT MOD MDM: CPT

## 2024-10-23 PROCEDURE — 73700 CT LOWER EXTREMITY W/O DYE: CPT

## 2024-10-23 PROCEDURE — 6360000002 HC RX W HCPCS: Performed by: PHYSICIAN ASSISTANT

## 2024-10-23 RX ORDER — DEXAMETHASONE SODIUM PHOSPHATE 10 MG/ML
4 INJECTION, SOLUTION INTRAMUSCULAR; INTRAVENOUS ONCE
Status: COMPLETED | OUTPATIENT
Start: 2024-10-23 | End: 2024-10-23

## 2024-10-23 RX ORDER — PREDNISONE 10 MG/1
10 TABLET ORAL DAILY
Qty: 10 TABLET | Refills: 0 | Status: SHIPPED | OUTPATIENT
Start: 2024-10-23 | End: 2024-11-02

## 2024-10-23 RX ORDER — HYDROMORPHONE HYDROCHLORIDE 1 MG/ML
1 INJECTION, SOLUTION INTRAMUSCULAR; INTRAVENOUS; SUBCUTANEOUS ONCE
Status: COMPLETED | OUTPATIENT
Start: 2024-10-23 | End: 2024-10-23

## 2024-10-23 RX ORDER — ORPHENADRINE CITRATE 30 MG/ML
60 INJECTION INTRAMUSCULAR; INTRAVENOUS ONCE
Status: COMPLETED | OUTPATIENT
Start: 2024-10-23 | End: 2024-10-23

## 2024-10-23 RX ORDER — IODINE/SODIUM IODIDE 2 %
TINCTURE TOPICAL PRN
Status: DISCONTINUED | OUTPATIENT
Start: 2024-10-23 | End: 2024-10-23 | Stop reason: HOSPADM

## 2024-10-23 RX ADMIN — ORPHENADRINE CITRATE 60 MG: 60 INJECTION INTRAMUSCULAR; INTRAVENOUS at 10:01

## 2024-10-23 RX ADMIN — HYDROMORPHONE HYDROCHLORIDE 1 MG: 1 INJECTION, SOLUTION INTRAMUSCULAR; INTRAVENOUS; SUBCUTANEOUS at 10:01

## 2024-10-23 RX ADMIN — DEXAMETHASONE SODIUM PHOSPHATE 4 MG: 10 INJECTION, SOLUTION INTRAMUSCULAR; INTRAVENOUS at 10:02

## 2024-10-23 ASSESSMENT — ENCOUNTER SYMPTOMS
EYE DISCHARGE: 0
NAUSEA: 0
SORE THROAT: 0
EYE PAIN: 0
BACK PAIN: 0
COLOR CHANGE: 0
RHINORRHEA: 0
SHORTNESS OF BREATH: 0
APNEA: 0
ABDOMINAL PAIN: 0
COUGH: 0
PHOTOPHOBIA: 0
ABDOMINAL DISTENTION: 0

## 2024-10-23 ASSESSMENT — PAIN DESCRIPTION - ORIENTATION
ORIENTATION: LEFT
ORIENTATION: LEFT

## 2024-10-23 ASSESSMENT — PAIN DESCRIPTION - LOCATION
LOCATION: LEG
LOCATION: LEG

## 2024-10-23 ASSESSMENT — PAIN - FUNCTIONAL ASSESSMENT: PAIN_FUNCTIONAL_ASSESSMENT: 0-10

## 2024-10-23 ASSESSMENT — PAIN SCALES - GENERAL
PAINLEVEL_OUTOF10: 9
PAINLEVEL_OUTOF10: 9

## 2024-10-23 ASSESSMENT — PAIN DESCRIPTION - DESCRIPTORS: DESCRIPTORS: SHOOTING;SHARP
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
## 2024-10-23 NOTE — DISCHARGE INSTRUCTIONS
Ct suggests calcinosis of meniscus possilbe medial body tear vs pseudogout findings  Moderate osteoarthritis of left knee joint  The hip socket has mild arthritic findings   There is tendonitis noted near trochanteric bursa   to call Dr Dhillon to coordinate MRI locally  If concern for tear meniscus will need TOA to likely do knee scope  Steroids and observation at home 72 hrs for the hip  If knee improving MRI isnt required but likely next step  Immobilizer only when upright and active  Plan for patient to call pain mgmt to adjust pain meds accordingly.

## 2024-10-24 ENCOUNTER — TELEPHONE (OUTPATIENT)
Dept: PAIN MANAGEMENT | Age: 49
End: 2024-10-24

## 2024-10-24 NOTE — TELEPHONE ENCOUNTER
Provider is not going to increase pain medication. Patient received Dilaudid & Toradol injection in ER yesterday. I called patient to let her know what provider decision.

## 2024-10-28 RX ORDER — OXYCODONE AND ACETAMINOPHEN 7.5; 325 MG/1; MG/1
1 TABLET ORAL EVERY 8 HOURS PRN
Qty: 90 TABLET | Refills: 0 | Status: SHIPPED | OUTPATIENT
Start: 2024-11-04 | End: 2024-12-04

## 2024-10-31 DIAGNOSIS — M79.7 FIBROMYALGIA: ICD-10-CM

## 2024-10-31 DIAGNOSIS — M54.16 LUMBAR RADICULOPATHY: ICD-10-CM

## 2024-10-31 RX ORDER — PREGABALIN 100 MG/1
100 CAPSULE ORAL 2 TIMES DAILY
Qty: 60 CAPSULE | Refills: 5 | Status: SHIPPED | OUTPATIENT
Start: 2024-10-31 | End: 2025-04-29

## 2024-10-31 NOTE — TELEPHONE ENCOUNTER
Patient is requesting a refill of pregabalin. Patient was last seen on 8/21/24 and has appointment on not found. Medication was last sent in on 4/16/24 for #60 and 5 rf.

## 2024-10-31 NOTE — TELEPHONE ENCOUNTER
Received fax from pharmacy requesting refill on pts medication(s). Pt was last seen in office on 10/4/2024  and has a follow up scheduled for Visit date not found. Will send request to  Dr. Dhillon  for authorization.     Requested Prescriptions     Pending Prescriptions Disp Refills    nabumetone (RELAFEN) 750 MG tablet [Pharmacy Med Name: NABUMETONE 750MG TABLETS] 60 tablet 11     Sig: TAKE 1 TABLET BY MOUTH TWICE DAILY

## 2024-11-13 DIAGNOSIS — R63.5 WEIGHT GAIN: ICD-10-CM

## 2024-11-13 DIAGNOSIS — E66.01 MORBID OBESITY: ICD-10-CM

## 2024-11-13 NOTE — TELEPHONE ENCOUNTER
Sent rx to pharmacy for pt    Requested Prescriptions     Signed Prescriptions Disp Refills    metFORMIN (GLUCOPHAGE) 1000 MG tablet 180 tablet 3     Sig: TAKE 1 TABLET BY MOUTH TWICE DAILY WITH MEALS     Authorizing Provider: OMAR MEDINA     Ordering User: ZULEMA MIMS

## 2024-11-13 NOTE — TELEPHONE ENCOUNTER
Received fax from pharmacy requesting refill on pts medication(s). Pt was last seen in office on 10/4/2024  and has a follow up scheduled for Visit date not found. Will send request to  Dr. Dhillon  for authorization.     Requested Prescriptions     Pending Prescriptions Disp Refills    metFORMIN (GLUCOPHAGE) 1000 MG tablet [Pharmacy Med Name: METFORMIN 1000MG TABLETS] 180 tablet 3     Sig: TAKE 1 TABLET BY MOUTH TWICE DAILY WITH MEALS

## 2024-11-18 DIAGNOSIS — M79.18 MYOFASCIAL MUSCLE PAIN: ICD-10-CM

## 2024-11-18 RX ORDER — CYCLOBENZAPRINE HCL 10 MG
10 TABLET ORAL 3 TIMES DAILY PRN
Qty: 270 TABLET | Refills: 1 | Status: SHIPPED | OUTPATIENT
Start: 2024-11-18

## 2024-11-18 NOTE — TELEPHONE ENCOUNTER
Received fax from pharmacy requesting refill on pts medication(s). Pt was last seen in office on 10/4/2024  and has a follow up scheduled for Visit date not found. Will send request to Dr. Dhillon for authorization.     Requested Prescriptions     Pending Prescriptions Disp Refills    cyclobenzaprine (FLEXERIL) 10 MG tablet [Pharmacy Med Name: CYCLOBENZAPRINE 10MG TABLETS] 270 tablet 1     Sig: TAKE 1 TABLET BY MOUTH THREE TIMES DAILY AS NEEDED FOR MUSCLE SPASMS

## 2024-11-19 ENCOUNTER — HOSPITAL ENCOUNTER (OUTPATIENT)
Dept: PAIN MANAGEMENT | Age: 49
Discharge: HOME | End: 2024-11-19
Payer: MEDICARE

## 2024-11-19 VITALS
DIASTOLIC BLOOD PRESSURE: 85 MMHG | WEIGHT: 293 LBS | OXYGEN SATURATION: 97 % | BODY MASS INDEX: 47.09 KG/M2 | HEART RATE: 84 BPM | RESPIRATION RATE: 16 BRPM | HEIGHT: 66 IN | TEMPERATURE: 97.5 F | SYSTOLIC BLOOD PRESSURE: 121 MMHG

## 2024-11-19 DIAGNOSIS — G89.29 CHRONIC NECK PAIN: ICD-10-CM

## 2024-11-19 DIAGNOSIS — Z98.890 HX OF SHOULDER SURGERY: ICD-10-CM

## 2024-11-19 DIAGNOSIS — G89.29 CHRONIC BILATERAL LOW BACK PAIN WITHOUT SCIATICA: Primary | ICD-10-CM

## 2024-11-19 DIAGNOSIS — M54.16 LUMBAR RADICULOPATHY: ICD-10-CM

## 2024-11-19 DIAGNOSIS — M70.62 TROCHANTERIC BURSITIS OF BOTH HIPS: ICD-10-CM

## 2024-11-19 DIAGNOSIS — M54.2 CHRONIC NECK PAIN: ICD-10-CM

## 2024-11-19 DIAGNOSIS — M70.61 TROCHANTERIC BURSITIS OF BOTH HIPS: ICD-10-CM

## 2024-11-19 DIAGNOSIS — M54.10 BACK PAIN WITH LEFT-SIDED RADICULOPATHY: Chronic | ICD-10-CM

## 2024-11-19 DIAGNOSIS — J30.89 ENVIRONMENTAL AND SEASONAL ALLERGIES: ICD-10-CM

## 2024-11-19 DIAGNOSIS — M53.3 SACROILIAC JOINT DYSFUNCTION OF RIGHT SIDE: ICD-10-CM

## 2024-11-19 DIAGNOSIS — Z02.89 PAIN MANAGEMENT CONTRACT AGREEMENT: ICD-10-CM

## 2024-11-19 DIAGNOSIS — M54.50 CHRONIC BILATERAL LOW BACK PAIN WITHOUT SCIATICA: Primary | ICD-10-CM

## 2024-11-19 PROCEDURE — 99215 OFFICE O/P EST HI 40 MIN: CPT

## 2024-11-19 PROCEDURE — 99214 OFFICE O/P EST MOD 30 MIN: CPT

## 2024-11-19 RX ORDER — OXYCODONE AND ACETAMINOPHEN 7.5; 325 MG/1; MG/1
1 TABLET ORAL EVERY 8 HOURS PRN
Qty: 90 TABLET | Refills: 0 | Status: SHIPPED | OUTPATIENT
Start: 2024-12-04 | End: 2025-01-03

## 2024-11-19 RX ORDER — LEVOCETIRIZINE DIHYDROCHLORIDE 5 MG/1
5 TABLET, FILM COATED ORAL NIGHTLY
Qty: 90 TABLET | Refills: 3 | Status: SHIPPED | OUTPATIENT
Start: 2024-11-19

## 2024-11-19 RX ORDER — MELOXICAM 15 MG/1
15 TABLET ORAL DAILY
COMMUNITY
Start: 2024-11-02

## 2024-11-19 ASSESSMENT — PAIN DESCRIPTION - LOCATION
LOCATION_2: NECK
LOCATION: BACK

## 2024-11-19 ASSESSMENT — ENCOUNTER SYMPTOMS
ABDOMINAL PAIN: 0
CONSTIPATION: 0
BOWEL INCONTINENCE: 0
ABDOMINAL DISTENTION: 0
BACK PAIN: 1
NAUSEA: 0

## 2024-11-19 ASSESSMENT — PAIN SCALES - GENERAL: PAINLEVEL_OUTOF10: 4

## 2024-11-19 ASSESSMENT — PAIN DESCRIPTION - INTENSITY: RATING_2: 4

## 2024-11-19 ASSESSMENT — PAIN DESCRIPTION - ORIENTATION
ORIENTATION_2: LOWER
ORIENTATION: LOWER

## 2024-11-19 ASSESSMENT — PAIN DESCRIPTION - PAIN TYPE: TYPE: CHRONIC PAIN

## 2024-11-19 NOTE — TELEPHONE ENCOUNTER
Received fax from pharmacy requesting refill on pts medication(s). Pt was last seen in office on 10/4/2024  and has a follow up scheduled for Visit date not found. Will send request to Dr. Dhillon for authorization.     Requested Prescriptions     Pending Prescriptions Disp Refills    levocetirizine (XYZAL) 5 MG tablet [Pharmacy Med Name: LEVOCETIRIZINE 5MG TABLETS] 90 tablet 3     Sig: TAKE 1 TABLET BY MOUTH EVERY NIGHT

## 2024-11-19 NOTE — TELEPHONE ENCOUNTER
1. Back pain with left-sided radiculopathy    2. Hx of shoulder surgery      Requested Prescriptions     Pending Prescriptions Disp Refills    oxyCODONE-acetaminophen (PERCOCET) 7.5-325 MG per tablet 90 tablet 0     Sig: Take 1 tablet by mouth every 8 hours as needed for Pain for up to 30 days. (May fill 12/04/24)       Continue medication with refill sent at appointment yes; refill sent to medical director at appointment yes, see refill encounter dated 11/19/2024    Electronically signed by ASHLEE Valente CNP on 11/19/2024 at 11:47 AM
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
## 2024-11-19 NOTE — PROGRESS NOTES
Clinic Documentation      Education Provided:  [x] Review of Alberto  [x] Agreement Review  [x] PEG Score Calculated [x] PHQ Score Calculated [x] ORT Score Calculated    [] Compliance Issues Discussed [] Cognitive Behavior Needs [x] Exercise [] Review of Test [] Financial Issues  [x] Tobacco/Alcohol Use Reviewed [x] Teaching [] New Patient [] Picture Obtained    Physician Plan:  [] Outgoing Referral  [] Pharmacy Consult  [] Test Ordered [x] Prescription Ordered/Changed   [] Obtained Test Results / Consult Notes        Complete if patient is withholding blood thinner for procedure     Blood Thinner Patient is currently taking:      [] Plavix (Hold for 7 days)  [] Aspirin (Hold for 5 days)     [] Pletal (Hold for 2 days)  [] Pradaxa (Hold for 3 days)    [] Effient (Hold for 7 days)  [] Xarelto (Hold for 2 days)    [] Eliquis (Hold for 2 days)  [] Brilinta (Hold for 7 days)    [] Coumadin (Hold for 5 days) - (INR needs to be drawn the day prior to procedure- INR < 2.0)    [] Aggrenox (Hold for 7 days)        [] Patient will stop medication on their own.    [] Blood Thinner Form Faxed for approval to hold.   Provider form faxed to:     Assessment Completed by:  Electronically signed by Tuyet Olmos MA on 11/19/2024 at 12:05 PM

## 2024-11-29 DIAGNOSIS — F32.A ANXIETY AND DEPRESSION: ICD-10-CM

## 2024-11-29 DIAGNOSIS — F41.9 ANXIETY AND DEPRESSION: ICD-10-CM

## 2024-11-29 DIAGNOSIS — Z13.31 POSITIVE DEPRESSION SCREENING: ICD-10-CM

## 2024-12-02 RX ORDER — COLCHICINE 0.6 MG/1
0.6 TABLET ORAL DAILY
Qty: 30 TABLET | Refills: 3 | Status: SHIPPED | OUTPATIENT
Start: 2024-12-02

## 2024-12-02 RX ORDER — CARIPRAZINE 3 MG/1
3 CAPSULE, GELATIN COATED ORAL DAILY
Qty: 30 CAPSULE | Refills: 11 | Status: SHIPPED | OUTPATIENT
Start: 2024-12-02

## 2024-12-02 NOTE — TELEPHONE ENCOUNTER
Received fax from pharmacy requesting refill on pts medication(s). Pt was last seen in office on 10/4/2024  and has a follow up scheduled for Visit date not found. Will send request to  Dr. Dhillon  for patient.     Requested Prescriptions     Pending Prescriptions Disp Refills    colchicine (COLCRYS) 0.6 MG tablet [Pharmacy Med Name: COLCHICINE 0.6MG TABLETS] 30 tablet 3     Sig: TAKE 1 TABLET BY MOUTH DAILY

## 2024-12-02 NOTE — TELEPHONE ENCOUNTER
Received fax from pharmacy requesting refill on pts medication(s). Pt was last seen in office on 10/4/2024  and has a follow up scheduled for not found. Will send request to Dr. Dhillon for authorization.     Requested Prescriptions     Pending Prescriptions Disp Refills    VRAYLAR 3 MG CAPS capsule [Pharmacy Med Name: VRAYLAR 3MG CAPSULES] 30 capsule 11     Sig: TAKE 1 CAPSULE BY MOUTH DAILY

## 2024-12-10 ENCOUNTER — TELEPHONE (OUTPATIENT)
Dept: ONCOLOGY | Facility: CLINIC | Age: 49
End: 2024-12-10

## 2024-12-10 NOTE — TELEPHONE ENCOUNTER
Caller: Naomi Bhatia    Relationship to patient: Self    Best call back number:   Telephone Information:   Mobile [phone]     Type of visit: [facility] FLU    Requested date: 12/13/2024 CALL TO R/S    If rescheduling, when is the original appointment: 12/10/2024

## 2024-12-11 ENCOUNTER — TELEPHONE (OUTPATIENT)
Dept: ONCOLOGY | Facility: CLINIC | Age: 49
End: 2024-12-11

## 2024-12-11 NOTE — TELEPHONE ENCOUNTER
Caller: Naomi Bhatia    Relationship to patient: Self    Best call back number: [phone]    Chief complaint: RESCHEDULE TIME      Type of visit: FLUSH    Requested date: 12-16 AT 1:00 OR 2:00     If rescheduling, when is the original appointment: 12-16     Additional notes:PLEASE ADVISE

## 2024-12-13 ENCOUNTER — PATIENT MESSAGE (OUTPATIENT)
Dept: FAMILY MEDICINE CLINIC | Age: 49
End: 2024-12-13

## 2024-12-13 DIAGNOSIS — Z98.890 HISTORY OF COLONOSCOPY: Primary | ICD-10-CM

## 2024-12-16 ENCOUNTER — HOSPITAL ENCOUNTER (OUTPATIENT)
Dept: WOMENS IMAGING | Age: 49
Discharge: HOME | End: 2024-12-16
Payer: MEDICARE

## 2024-12-16 ENCOUNTER — TELEPHONE (OUTPATIENT)
Dept: FAMILY MEDICINE CLINIC | Age: 49
End: 2024-12-16

## 2024-12-16 DIAGNOSIS — Z12.31 ENCOUNTER FOR SCREENING MAMMOGRAM FOR MALIGNANT NEOPLASM OF BREAST: ICD-10-CM

## 2024-12-16 PROCEDURE — 77063 BREAST TOMOSYNTHESIS BI: CPT

## 2024-12-16 NOTE — TELEPHONE ENCOUNTER
----- Message from Dr. OMAR Dhillon DO sent at 12/16/2024  4:46 PM CST -----  Mammogram is normal.  Recommend repeat in 1 year.  Continue self breast assessments

## 2024-12-27 ENCOUNTER — INFUSION (OUTPATIENT)
Dept: ONCOLOGY | Facility: CLINIC | Age: 49
End: 2024-12-27
Payer: MEDICARE

## 2024-12-27 DIAGNOSIS — Z45.2 ENCOUNTER FOR CARE RELATED TO PORT-A-CATH: Primary | ICD-10-CM

## 2024-12-27 RX ORDER — SODIUM CHLORIDE 0.9 % (FLUSH) 0.9 %
10 SYRINGE (ML) INJECTION AS NEEDED
Status: DISCONTINUED | OUTPATIENT
Start: 2024-12-27 | End: 2024-12-27 | Stop reason: HOSPADM

## 2024-12-27 RX ORDER — HEPARIN SODIUM (PORCINE) LOCK FLUSH IV SOLN 100 UNIT/ML 100 UNIT/ML
500 SOLUTION INTRAVENOUS AS NEEDED
Status: DISCONTINUED | OUTPATIENT
Start: 2024-12-27 | End: 2024-12-27 | Stop reason: HOSPADM

## 2024-12-27 RX ORDER — SODIUM CHLORIDE 0.9 % (FLUSH) 0.9 %
10 SYRINGE (ML) INJECTION AS NEEDED
OUTPATIENT
Start: 2024-12-27

## 2024-12-27 RX ORDER — HEPARIN SODIUM (PORCINE) LOCK FLUSH IV SOLN 100 UNIT/ML 100 UNIT/ML
500 SOLUTION INTRAVENOUS AS NEEDED
OUTPATIENT
Start: 2024-12-27

## 2024-12-27 RX ADMIN — HEPARIN SODIUM (PORCINE) LOCK FLUSH IV SOLN 100 UNIT/ML 500 UNITS: 100 SOLUTION at 09:00

## 2024-12-27 RX ADMIN — Medication 10 ML: at 08:55

## 2024-12-31 ENCOUNTER — HOSPITAL ENCOUNTER (OUTPATIENT)
Dept: PAIN MANAGEMENT | Age: 49
Discharge: HOME | End: 2024-12-31
Payer: MEDICARE

## 2024-12-31 VITALS
OXYGEN SATURATION: 95 % | DIASTOLIC BLOOD PRESSURE: 85 MMHG | TEMPERATURE: 96.3 F | SYSTOLIC BLOOD PRESSURE: 138 MMHG | RESPIRATION RATE: 18 BRPM | HEART RATE: 78 BPM

## 2024-12-31 DIAGNOSIS — R52 PAIN MANAGEMENT: ICD-10-CM

## 2024-12-31 PROCEDURE — 6360000002 HC RX W HCPCS

## 2024-12-31 PROCEDURE — 2580000003 HC RX 258

## 2024-12-31 PROCEDURE — G0260 INJ FOR SACROILIAC JT ANESTH: HCPCS

## 2024-12-31 RX ORDER — BUPIVACAINE HYDROCHLORIDE 5 MG/ML
2 INJECTION, SOLUTION EPIDURAL; INTRACAUDAL ONCE
Status: DISCONTINUED | OUTPATIENT
Start: 2024-12-31 | End: 2025-01-02 | Stop reason: HOSPADM

## 2024-12-31 RX ORDER — LIDOCAINE HYDROCHLORIDE 10 MG/ML
7 INJECTION, SOLUTION EPIDURAL; INFILTRATION; INTRACAUDAL; PERINEURAL ONCE
Status: DISCONTINUED | OUTPATIENT
Start: 2024-12-31 | End: 2025-01-02 | Stop reason: HOSPADM

## 2024-12-31 RX ORDER — TRIAMCINOLONE ACETONIDE 40 MG/ML
80 INJECTION, SUSPENSION INTRA-ARTICULAR; INTRAMUSCULAR ONCE
Status: DISCONTINUED | OUTPATIENT
Start: 2024-12-31 | End: 2025-01-02 | Stop reason: HOSPADM

## 2024-12-31 RX ORDER — SODIUM CHLORIDE 9 MG/ML
3 INJECTION, SOLUTION INTRAMUSCULAR; INTRAVENOUS; SUBCUTANEOUS ONCE
Status: DISCONTINUED | OUTPATIENT
Start: 2024-12-31 | End: 2025-01-02 | Stop reason: HOSPADM

## 2024-12-31 NOTE — INTERVAL H&P NOTE
Update History & Physical    The patient's History and Physical  was reviewed with the patient and I examined the patient. There was no change. The surgical site was confirmed by the patient and me.     Plan: The risks, benefits, expected outcome, and alternative to the recommended procedure have been discussed with the patient. Patient understands and wants to proceed with the procedure.     Electronically signed by Alex Carmona MD on 12/31/2024 at 10:42 AM

## 2024-12-31 NOTE — PROGRESS NOTES
Procedure:  Level of Consciousness: [x]Alert [x]Oriented []Disoriented []Lethargic  Anxiety Level: [x]Calm []Anxious []Depressed []Other  Skin: [x]Warm [x]Dry []Cool []Moist []Intact []Other  Cardiovascular: []Palpitations: [x]Never []Occasionally []Frequently  Chest Pain: [x]No []Yes  Respiratory:  [x]Unlabored []Labored []Cough ([] Productive []Unproductive)  HCG Required: [x]No []Yes   Results: []Negative []Positive  Knowledge Level:        [x]Patient/Other verbalized understanding of pre-procedure instructions.        [x]Assessment of post-op care needs (transportation, responsible caregiver)        [x]Able to discuss health care problems and how to deal with it.  Factors that Affect Teaching:        Language Barrier: [x]No []Yes - why:        Hearing Loss:        [x]No []Yes            Corrective Device:  []Yes []No        Vision Loss:           []No [x]Yes            Corrective Device:  [x]Yes []No        Memory Loss:       [x]No []Yes            []Short Term []Long Term  Motivational Level:  [x]Asks Questions                  []Extremely Anxious       [x]Seems Interested               []Seems Uninterested                  []Denies need for Education  Risk for Injury:  [x]Patient oriented to person, place and time  []History of frequent falls/loss of balance  Nutritional:  []Change in appetite   []Weight Gain   []Weight Loss  Functional:  []Requires assistance with ADL's

## 2025-01-02 DIAGNOSIS — M54.10 BACK PAIN WITH LEFT-SIDED RADICULOPATHY: Chronic | ICD-10-CM

## 2025-01-02 DIAGNOSIS — Z98.890 HX OF SHOULDER SURGERY: ICD-10-CM

## 2025-01-03 RX ORDER — OXYCODONE AND ACETAMINOPHEN 7.5; 325 MG/1; MG/1
1 TABLET ORAL EVERY 8 HOURS PRN
Qty: 90 TABLET | Refills: 0 | Status: SHIPPED | OUTPATIENT
Start: 2025-01-04 | End: 2025-02-03

## 2025-01-07 ENCOUNTER — TELEPHONE (OUTPATIENT)
Dept: PAIN MANAGEMENT | Age: 50
End: 2025-01-07

## 2025-01-07 NOTE — TELEPHONE ENCOUNTER
How much -percent wise- did the shot help?    50  %  What is your current pain level now?    5/10                     Has your activity level increased since the shot?  Pt states pain level has increased \"somewhat\" since her injection on 12/31.

## 2025-01-14 ENCOUNTER — HOSPITAL ENCOUNTER (OUTPATIENT)
Dept: PAIN MANAGEMENT | Age: 50
Discharge: HOME | End: 2025-01-14
Payer: MEDICARE

## 2025-01-14 DIAGNOSIS — R52 PAIN MANAGEMENT: ICD-10-CM

## 2025-01-14 PROCEDURE — 6360000002 HC RX W HCPCS

## 2025-01-14 PROCEDURE — 2580000003 HC RX 258

## 2025-01-21 ENCOUNTER — HOSPITAL ENCOUNTER (OUTPATIENT)
Dept: PAIN MANAGEMENT | Age: 50
Discharge: HOME | End: 2025-01-21
Payer: MEDICARE

## 2025-01-21 VITALS
HEART RATE: 75 BPM | SYSTOLIC BLOOD PRESSURE: 132 MMHG | TEMPERATURE: 96.1 F | DIASTOLIC BLOOD PRESSURE: 83 MMHG | OXYGEN SATURATION: 98 % | RESPIRATION RATE: 16 BRPM

## 2025-01-21 DIAGNOSIS — R52 PAIN MANAGEMENT: ICD-10-CM

## 2025-01-21 PROCEDURE — 6360000002 HC RX W HCPCS

## 2025-01-21 PROCEDURE — 2580000003 HC RX 258

## 2025-01-21 PROCEDURE — 62323 NJX INTERLAMINAR LMBR/SAC: CPT

## 2025-01-21 RX ORDER — SODIUM CHLORIDE 9 MG/ML
5 INJECTION, SOLUTION INTRAMUSCULAR; INTRAVENOUS; SUBCUTANEOUS ONCE
Status: DISCONTINUED | OUTPATIENT
Start: 2025-01-21 | End: 2025-01-23 | Stop reason: HOSPADM

## 2025-01-21 RX ORDER — TRIAMCINOLONE ACETONIDE 40 MG/ML
80 INJECTION, SUSPENSION INTRA-ARTICULAR; INTRAMUSCULAR ONCE
Status: DISCONTINUED | OUTPATIENT
Start: 2025-01-21 | End: 2025-01-23 | Stop reason: HOSPADM

## 2025-01-21 RX ORDER — LIDOCAINE HYDROCHLORIDE 10 MG/ML
5 INJECTION, SOLUTION EPIDURAL; INFILTRATION; INTRACAUDAL; PERINEURAL ONCE
Status: DISCONTINUED | OUTPATIENT
Start: 2025-01-21 | End: 2025-01-23 | Stop reason: HOSPADM

## 2025-01-21 NOTE — INTERVAL H&P NOTE
Update History & Physical    The patient's History and Physical  was reviewed with the patient and I examined the patient. There was no change. The surgical site was confirmed by the patient and me.     Plan: The risks, benefits, expected outcome, and alternative to the recommended procedure have been discussed with the patient. Patient understands and wants to proceed with the procedure.     Electronically signed by Alex Carmona MD on 1/21/2025 at 9:13 AM

## 2025-01-30 ENCOUNTER — TELEPHONE (OUTPATIENT)
Dept: ONCOLOGY | Facility: CLINIC | Age: 50
End: 2025-01-30

## 2025-01-30 NOTE — TELEPHONE ENCOUNTER
Caller: Naomi Bhatia    Relationship to patient: Self    Best call back number: [phone]    Type of visit: LAB AND PORT FLUSH    Requested date: 02/07 MORNING     If rescheduling, when is the original appointment: 02/04     Additional notes: PLEASE CALL TO R/S.

## 2025-02-02 DIAGNOSIS — J45.40 MODERATE PERSISTENT ASTHMA WITHOUT COMPLICATION: ICD-10-CM

## 2025-02-03 RX ORDER — BUDESONIDE AND FORMOTEROL FUMARATE DIHYDRATE 160; 4.5 UG/1; UG/1
AEROSOL RESPIRATORY (INHALATION)
Qty: 30.6 G | Refills: 5 | Status: SHIPPED | OUTPATIENT
Start: 2025-02-03

## 2025-02-03 NOTE — TELEPHONE ENCOUNTER
Pharmacy requests a refill on Evelyn Moore's medication.    Last Office Visit: 10/4/2024  Next Office Visit: Visit date not found     Requested Prescriptions     Pending Prescriptions Disp Refills    SYMBICORT 160-4.5 MCG/ACT AERO [Pharmacy Med Name: SYMBICORT 160/4.5MCG (120 ORAL INH)] 30.6 g      Sig: INHALE 2 PUFFS INTO THE LUNGS TWICE DAILY. RINSE MOUTH AFTER USE EVERY USE       Lali Frederick LPN

## 2025-02-06 ENCOUNTER — HOSPITAL ENCOUNTER (OUTPATIENT)
Dept: PAIN MANAGEMENT | Age: 50
Discharge: HOME | End: 2025-02-06
Payer: MEDICARE

## 2025-02-06 VITALS
OXYGEN SATURATION: 97 % | TEMPERATURE: 98.4 F | DIASTOLIC BLOOD PRESSURE: 78 MMHG | HEART RATE: 85 BPM | SYSTOLIC BLOOD PRESSURE: 114 MMHG | RESPIRATION RATE: 18 BRPM

## 2025-02-06 DIAGNOSIS — M17.10 ARTHRITIS OF KNEE: Primary | ICD-10-CM

## 2025-02-06 DIAGNOSIS — M25.561 CHRONIC PAIN OF BOTH KNEES: ICD-10-CM

## 2025-02-06 DIAGNOSIS — G89.29 CHRONIC PAIN OF BOTH KNEES: ICD-10-CM

## 2025-02-06 DIAGNOSIS — M25.562 CHRONIC PAIN OF BOTH KNEES: ICD-10-CM

## 2025-02-06 PROCEDURE — 6360000002 HC RX W HCPCS

## 2025-02-06 PROCEDURE — 20611 DRAIN/INJ JOINT/BURSA W/US: CPT

## 2025-02-06 RX ORDER — LIDOCAINE HYDROCHLORIDE 10 MG/ML
4 INJECTION, SOLUTION EPIDURAL; INFILTRATION; INTRACAUDAL; PERINEURAL ONCE
Status: DISCONTINUED | OUTPATIENT
Start: 2025-02-06 | End: 2025-02-08 | Stop reason: HOSPADM

## 2025-02-06 RX ORDER — ETHYL CHLORIDE 100 %
AEROSOL, SPRAY (ML) TOPICAL PRN
Status: DISCONTINUED | OUTPATIENT
Start: 2025-02-06 | End: 2025-02-08 | Stop reason: HOSPADM

## 2025-02-06 NOTE — DISCHARGE INSTRUCTIONS
[facility] Physical And Pain Medicine  Post Procedure Discharge Instructions        YOU HAVE HAD THE FOLLOWING PROCEDURE:                                  [] Occipital Nerve Blocks  [] CTS wrist injection(s)  [x] Knee Injection(s)         [] Shoulder Injection(s)   [] Elbow Injection(s)     [] Botox Injection  [] Cervical Trigger Point Injections    [] Thoracic Trigger Point Injections    [] Lumbar Trigger Point Injections  [] Piriformis Trigger Point Injections  [] SI Joint Injection(s)     [] Trochanteric Bursa Injection(s)       [] Ankle Injection(s)   [] Plantar Fasciitis   []  ______________  Injection(s) [] Botox []  Migraines [] Spasticity    YOU HAVE RECEIVED THE FOLLOWING MEDICATIONS IN YOUR INJECTION(s)  [] Lidocaine [] Bupivacaine   [] DepoMedrol (steroid) [] Decadron (steroid)  []  Kenalog (steroid)   [] Toradol  [] Supartz [x] Zilretta    [] Botox        PATIENT INFORMATION:   You may experience the following symptoms after your procedure. These symptoms are normal and should not cause concern:    You may have an increase in your pain. This may last 24 - 48 hours after your procedure.  You may have no change in the pain that you had prior to your injection(s).  You may have weakness or numbness in your affected extremity. If this occurs, this may last until numbing the medication wears off.     REPORT THE FOLLOWING SYMPTOMS TO YOUR DOCTOR:  Redness, swelling or drainage at the injection site(s)  Unusual pain that interferes with your normal activities of daily living.    OTHER INSTRUCTIONS:    [x] I will apply ice to the injection site(s) for at least 24 hours after the procedure. I will rotate the ice on for 20 minutes and off for 20 minutes for at least 24 hours.    [x] I will not apply heat for at least 48 hours and I will not take a hot bath or shower for at least 24 hours.     [x] I understand that if Lidocaine or Bupivacaine was used in my injection(s) that the injection site(s)

## 2025-02-06 NOTE — PROCEDURES
PROCEDURE NOTE  Date: 2025   Name: Evelyn Moore  YOB: 1975    Procedures    [facility] Physical & Pain Medicine    Patient Name: Evelyn Moore    : 1975    Age: 49 y.o.    Sex: female    Date: 2025    Preop Diagnosis: Arthritis of bilateral Knee(s)    Postop Diagnosis: Arthritis of  bilateral  Knee(s)      Procedure: Injection of  bilateral  Knee(s) with US confirmation    Performing Procedure: Lali Hernandez APRN - CNP    Patient Vitals for the past 24 hrs:   BP Temp Temp src Pulse Resp SpO2   25 0858 114/78 98.4 °F (36.9 °C) Temporal 85 18 97 %       Description of Procedure:    After a brief physical assessment and failure to improve with conservative measures the patient presented for an injection of the bilateral Knee(s). The indications, limitations and possible complications were discussed with the patient and the patient elected to proceed with the procedure.    bilateral Knee(s)    After voluntary, informed and signed consent obtained the patient was placed in a seated position. Appropriate time out was obtained per policy. The knee was palpated at the anterior aspect of the knee to locate the patellar tendon. The projected injection site is approximately 1 cm medially or laterally of the midpoint of the tendon. Usually, there is a depression. The area was marked. The ultrasound transducer was used to confirm the appropriate location. The skin prepped in an aseptic fashion with CHG swab and then sprayed with Gebauer's Solution. Under aseptic technique 22 gauge 1 1/2 inch needle was then introduced into the Knee(s). After a negative aspiration, a solution of 1 ml of 0.5% Marcaine Plain, 1 ml of 1% Lidocaine Plain and     [] 1 ml of Toradol (30 mg/ml)     [] 1 ml of Kenalog (40mg/ml)    []  0.5 ml of DepoMedrol (80mg/ml)    [] 1 ml of DepoMedrol (80mg/ml)    OR    [x] After negative aspiration, 1 ml of 1% Lidocaine Plain was injected into

## 2025-02-06 NOTE — DISCHARGE INSTR - COC
recorded.    Safety Concerns:     { BRYCE Safety Concerns:154492010}    Impairments/Disabilities:      {[facility] Impairments/Disabilities:024911994}    Nutrition Therapy:  Current Nutrition Therapy:   { [facility] Diet List:845584664}    Routes of Feeding: {[facility] DME Other Feedings:464741164}  Liquids: {Slp liquid thickness:32301}  Daily Fluid Restriction: {[facility] DME Yes amt example:862885508}  Last Modified Barium Swallow with Video (Video Swallowing Test): {Done Not Done Date:}    Treatments at the Time of Hospital Discharge:   Respiratory Treatments: ***  Oxygen Therapy:  {Therapy; copd oxygen:52196}  Ventilator:    {[facility] Vent List:428707575}    Rehab Therapies: {THERAPEUTIC INTERVENTION:4231898528}  Weight Bearing Status/Restrictions: {[facility] Weight Bearin}  Other Medical Equipment (for information only, NOT a DME order):  {EQUIPMENT:074959453}  Other Treatments: ***    Patient's personal belongings (please select all that are sent with patient):  {[facility] DME Belongings:806596845}    RN SIGNATURE:  {Esignature:817565375}    CASE MANAGEMENT/SOCIAL WORK SECTION    Inpatient Status Date: ***    Readmission Risk Assessment Score:  [facility] RISK OF UNPLANNED READMISSION 2.0             0 Total Score        Discharging to Facility/ Agency   Name:   Address:  Phone:  Fax:    Dialysis Facility (if applicable)   Name:  Address:  Dialysis Schedule:  Phone:  Fax:    / signature: {Esignature:999350117}    PHYSICIAN SECTION    Prognosis: {Prognosis:1285270168}    Condition at Discharge: { Patient Condition:652364340}    Rehab Potential (if transferring to Rehab): {Prognosis:3409271065}    Recommended Labs or Other Treatments After Discharge: ***    Physician Certification: I certify the above information and transfer of Evelyn Moore  is necessary for the continuing treatment of the diagnosis listed and that she requires {Admit to Appropriate Level of Care:38959} for

## 2025-02-07 ENCOUNTER — INFUSION (OUTPATIENT)
Dept: ONCOLOGY | Facility: CLINIC | Age: 50
End: 2025-02-07
Payer: MEDICARE

## 2025-02-07 ENCOUNTER — LAB (OUTPATIENT)
Dept: LAB | Facility: HOSPITAL | Age: 50
End: 2025-02-07
Payer: MEDICARE

## 2025-02-07 DIAGNOSIS — Z45.2 ENCOUNTER FOR CARE RELATED TO PORT-A-CATH: Primary | ICD-10-CM

## 2025-02-07 DIAGNOSIS — D72.829 LEUKOCYTOSIS, UNSPECIFIED TYPE: ICD-10-CM

## 2025-02-07 DIAGNOSIS — R79.89 ELEVATED FERRITIN: ICD-10-CM

## 2025-02-07 LAB
ALBUMIN SERPL-MCNC: 4.2 G/DL (ref 3.5–5.2)
ALBUMIN/GLOB SERPL: 1.4 G/DL
ALP SERPL-CCNC: 79 U/L (ref 39–117)
ALT SERPL W P-5'-P-CCNC: 34 U/L (ref 1–33)
ANION GAP SERPL CALCULATED.3IONS-SCNC: 13 MMOL/L (ref 5–15)
ANISOCYTOSIS BLD QL: ABNORMAL
AST SERPL-CCNC: 24 U/L (ref 1–32)
BASOPHILS # BLD MANUAL: 0.16 10*3/MM3 (ref 0–0.2)
BASOPHILS NFR BLD MANUAL: 1 % (ref 0–1.5)
BILIRUB SERPL-MCNC: 0.3 MG/DL (ref 0–1.2)
BUN SERPL-MCNC: 15 MG/DL (ref 6–20)
BUN/CREAT SERPL: 19.5 (ref 7–25)
CALCIUM SPEC-SCNC: 9.5 MG/DL (ref 8.6–10.5)
CHLORIDE SERPL-SCNC: 102 MMOL/L (ref 98–107)
CO2 SERPL-SCNC: 26 MMOL/L (ref 22–29)
CREAT SERPL-MCNC: 0.77 MG/DL (ref 0.57–1)
DEPRECATED RDW RBC AUTO: 46.1 FL (ref 37–54)
EGFRCR SERPLBLD CKD-EPI 2021: 94.1 ML/MIN/1.73
ELLIPTOCYTES BLD QL SMEAR: ABNORMAL
EOSINOPHIL # BLD MANUAL: 0 10*3/MM3 (ref 0–0.4)
EOSINOPHIL NFR BLD MANUAL: 0 % (ref 0.3–6.2)
ERYTHROCYTE [DISTWIDTH] IN BLOOD BY AUTOMATED COUNT: 14.6 % (ref 12.3–15.4)
FERRITIN SERPL-MCNC: 89.58 NG/ML (ref 13–150)
GLOBULIN UR ELPH-MCNC: 3 GM/DL
GLUCOSE SERPL-MCNC: 93 MG/DL (ref 65–99)
HCT VFR BLD AUTO: 41.9 % (ref 34–46.6)
HGB BLD-MCNC: 13.9 G/DL (ref 12–15.9)
IRON 24H UR-MRATE: 81 MCG/DL (ref 37–145)
IRON SATN MFR SERPL: 21 % (ref 20–50)
LYMPHOCYTES # BLD MANUAL: 5.27 10*3/MM3 (ref 0.7–3.1)
LYMPHOCYTES NFR BLD MANUAL: 7.1 % (ref 5–12)
MCH RBC QN AUTO: 29 PG (ref 26.6–33)
MCHC RBC AUTO-ENTMCNC: 33.2 G/DL (ref 31.5–35.7)
MCV RBC AUTO: 87.3 FL (ref 79–97)
MONOCYTES # BLD: 1.12 10*3/MM3 (ref 0.1–0.9)
NEUTROPHILS # BLD AUTO: 9.25 10*3/MM3 (ref 1.7–7)
NEUTROPHILS NFR BLD MANUAL: 57.6 % (ref 42.7–76)
NEUTS BAND NFR BLD MANUAL: 1 % (ref 0–5)
NEUTS VAC BLD QL SMEAR: ABNORMAL
PLATELET # BLD AUTO: 588 10*3/MM3 (ref 140–450)
PMV BLD AUTO: 8.9 FL (ref 6–12)
POIKILOCYTOSIS BLD QL SMEAR: ABNORMAL
POLYCHROMASIA BLD QL SMEAR: ABNORMAL
POTASSIUM SERPL-SCNC: 4.1 MMOL/L (ref 3.5–5.2)
PROT SERPL-MCNC: 7.2 G/DL (ref 6–8.5)
RBC # BLD AUTO: 4.8 10*6/MM3 (ref 3.77–5.28)
SCHISTOCYTES BLD QL SMEAR: ABNORMAL
SMALL PLATELETS BLD QL SMEAR: ABNORMAL
SODIUM SERPL-SCNC: 141 MMOL/L (ref 136–145)
SPHEROCYTES BLD QL SMEAR: ABNORMAL
TIBC SERPL-MCNC: 389 MCG/DL (ref 298–536)
TRANSFERRIN SERPL-MCNC: 261 MG/DL (ref 200–360)
VARIANT LYMPHS NFR BLD MANUAL: 25.3 % (ref 19.6–45.3)
VARIANT LYMPHS NFR BLD MANUAL: 8.1 % (ref 0–5)
WBC NRBC COR # BLD AUTO: 15.79 10*3/MM3 (ref 3.4–10.8)

## 2025-02-07 PROCEDURE — 85025 COMPLETE CBC W/AUTO DIFF WBC: CPT

## 2025-02-07 PROCEDURE — 84466 ASSAY OF TRANSFERRIN: CPT

## 2025-02-07 PROCEDURE — 80053 COMPREHEN METABOLIC PANEL: CPT

## 2025-02-07 PROCEDURE — 83540 ASSAY OF IRON: CPT

## 2025-02-07 PROCEDURE — 82728 ASSAY OF FERRITIN: CPT

## 2025-02-07 PROCEDURE — 36415 COLL VENOUS BLD VENIPUNCTURE: CPT

## 2025-02-07 PROCEDURE — 85007 BL SMEAR W/DIFF WBC COUNT: CPT

## 2025-02-07 RX ORDER — SODIUM CHLORIDE 0.9 % (FLUSH) 0.9 %
10 SYRINGE (ML) INJECTION AS NEEDED
OUTPATIENT
Start: 2025-02-07

## 2025-02-07 RX ORDER — HEPARIN SODIUM (PORCINE) LOCK FLUSH IV SOLN 100 UNIT/ML 100 UNIT/ML
500 SOLUTION INTRAVENOUS AS NEEDED
OUTPATIENT
Start: 2025-02-07

## 2025-02-07 RX ORDER — SODIUM CHLORIDE 0.9 % (FLUSH) 0.9 %
10 SYRINGE (ML) INJECTION AS NEEDED
Status: DISCONTINUED | OUTPATIENT
Start: 2025-02-07 | End: 2025-02-07 | Stop reason: HOSPADM

## 2025-02-07 RX ORDER — HEPARIN SODIUM (PORCINE) LOCK FLUSH IV SOLN 100 UNIT/ML 100 UNIT/ML
500 SOLUTION INTRAVENOUS AS NEEDED
Status: DISCONTINUED | OUTPATIENT
Start: 2025-02-07 | End: 2025-02-07 | Stop reason: HOSPADM

## 2025-02-07 RX ADMIN — HEPARIN SODIUM (PORCINE) LOCK FLUSH IV SOLN 100 UNIT/ML 500 UNITS: 100 SOLUTION at 11:36

## 2025-02-07 RX ADMIN — Medication 10 ML: at 11:28

## 2025-02-11 DIAGNOSIS — Z98.890 HX OF SHOULDER SURGERY: ICD-10-CM

## 2025-02-11 DIAGNOSIS — M54.10 BACK PAIN WITH LEFT-SIDED RADICULOPATHY: Chronic | ICD-10-CM

## 2025-02-11 NOTE — TELEPHONE ENCOUNTER
Last seen in office visit: 11/19/24  Next scheduled office visit: 4/21/25 with Lali  Last UDS results: compliant  Any discrepancies on Alberto (such as refills from another provider): none

## 2025-02-12 ENCOUNTER — TELEPHONE (OUTPATIENT)
Dept: PAIN MANAGEMENT | Age: 50
End: 2025-02-12

## 2025-02-12 NOTE — TELEPHONE ENCOUNTER
Placed follow up call regarding bilateral knee injections administered on 2/6/25.  No answer.  Left voicemail message.

## 2025-02-18 RX ORDER — OXYCODONE AND ACETAMINOPHEN 7.5; 325 MG/1; MG/1
1 TABLET ORAL EVERY 8 HOURS PRN
Qty: 90 TABLET | Refills: 0 | Status: SHIPPED | OUTPATIENT
Start: 2025-02-18 | End: 2025-03-20

## 2025-02-20 ENCOUNTER — TELEPHONE (OUTPATIENT)
Dept: ONCOLOGY | Facility: CLINIC | Age: 50
End: 2025-02-20
Payer: MEDICARE

## 2025-02-20 NOTE — TELEPHONE ENCOUNTER
----- Message from Rhiannon Sanders sent at 2/19/2025 11:36 AM CST -----  Labs are stable.  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

## 2025-03-03 DIAGNOSIS — I10 PRIMARY HYPERTENSION: ICD-10-CM

## 2025-03-03 DIAGNOSIS — J45.40 MODERATE PERSISTENT ASTHMA WITHOUT COMPLICATION: ICD-10-CM

## 2025-03-03 RX ORDER — BUDESONIDE AND FORMOTEROL FUMARATE DIHYDRATE 160; 4.5 UG/1; UG/1
2 AEROSOL RESPIRATORY (INHALATION) 2 TIMES DAILY
Qty: 10.2 G | Refills: 11 | Status: SHIPPED | OUTPATIENT
Start: 2025-03-03

## 2025-03-03 RX ORDER — METOPROLOL SUCCINATE 50 MG/1
50 TABLET, EXTENDED RELEASE ORAL 2 TIMES DAILY
Qty: 180 TABLET | Refills: 3 | Status: SHIPPED | OUTPATIENT
Start: 2025-03-03

## 2025-03-03 NOTE — TELEPHONE ENCOUNTER
Received fax from pharmacy requesting refill on pts medication(s). Pt was last seen in office on 10/4/2024  and has a follow up scheduled for Visit date not found. Will send request to  Dr. Dhillon  for patient.     Requested Prescriptions     Pending Prescriptions Disp Refills    budesonide-formoterol (SYMBICORT) 160-4.5 MCG/ACT AERO [Pharmacy Med Name: BUDESONIDE/FORM 160/4.5MCG(120 INH)] 10.2 g 11     Sig: Inhale 2 puffs into the lungs 2 times daily

## 2025-03-03 NOTE — TELEPHONE ENCOUNTER
Received fax from pharmacy requesting refill on pts medication(s). Pt was last seen in office on 10/4/2024  and has a follow up scheduled for Visit date not found. Will send request to  Dr. Dhillon  for patient.     Requested Prescriptions     Pending Prescriptions Disp Refills    metoprolol succinate (TOPROL XL) 50 MG extended release tablet [Pharmacy Med Name: METOPROLOL ER SUCCINATE 50MG TABS] 180 tablet 3     Sig: Take 1 tablet by mouth in the morning and at bedtime

## 2025-03-04 DIAGNOSIS — Z13.31 POSITIVE DEPRESSION SCREENING: ICD-10-CM

## 2025-03-04 RX ORDER — DESVENLAFAXINE 50 MG/1
50 TABLET, FILM COATED, EXTENDED RELEASE ORAL DAILY
Qty: 90 TABLET | Refills: 3 | Status: SHIPPED | OUTPATIENT
Start: 2025-03-04

## 2025-03-04 NOTE — TELEPHONE ENCOUNTER
Received fax from pharmacy requesting refill on pts medication. Will send to provider for authorization  Last OV: 10/4/2024   Next appt: none found    Requested Prescriptions     Pending Prescriptions Disp Refills    desvenlafaxine succinate (PRISTIQ) 50 MG TB24 extended release tablet [Pharmacy Med Name: DESVENLAFAXINE ER SUCCINATE 50MG T] 90 tablet 3     Sig: TAKE 1 TABLET BY MOUTH DAILY

## 2025-03-13 DIAGNOSIS — M54.10 BACK PAIN WITH LEFT-SIDED RADICULOPATHY: Chronic | ICD-10-CM

## 2025-03-13 DIAGNOSIS — Z98.890 HX OF SHOULDER SURGERY: ICD-10-CM

## 2025-03-17 RX ORDER — OXYCODONE AND ACETAMINOPHEN 7.5; 325 MG/1; MG/1
1 TABLET ORAL EVERY 8 HOURS PRN
Qty: 90 TABLET | Refills: 0 | Status: SHIPPED | OUTPATIENT
Start: 2025-03-20 | End: 2025-04-19

## 2025-03-31 DIAGNOSIS — E11.42 TYPE 2 DIABETES MELLITUS WITH DIABETIC POLYNEUROPATHY, WITHOUT LONG-TERM CURRENT USE OF INSULIN (HCC): ICD-10-CM

## 2025-03-31 DIAGNOSIS — E78.2 MIXED HYPERLIPIDEMIA: ICD-10-CM

## 2025-03-31 RX ORDER — COLCHICINE 0.6 MG/1
0.6 TABLET ORAL DAILY
Qty: 30 TABLET | Refills: 3 | Status: SHIPPED | OUTPATIENT
Start: 2025-03-31

## 2025-03-31 RX ORDER — EVOLOCUMAB 140 MG/ML
INJECTION, SOLUTION SUBCUTANEOUS
Qty: 2 ML | Refills: 11 | Status: SHIPPED | OUTPATIENT
Start: 2025-03-31

## 2025-03-31 NOTE — TELEPHONE ENCOUNTER
Received fax from pharmacy requesting refill on pts medication(s). Pt was last seen in office on 10/4/2024  and has a follow up scheduled for Visit date not found. Will send request to  Dr. Dhillon  for authorization.     Requested Prescriptions     Pending Prescriptions Disp Refills    colchicine (COLCRYS) 0.6 MG tablet [Pharmacy Med Name: COLCHICINE 0.6MG TABLETS] 30 tablet 3     Sig: TAKE 1 TABLET BY MOUTH DAILY

## 2025-03-31 NOTE — TELEPHONE ENCOUNTER
Received fax from pharmacy requesting refill on pts medication(s). Pt was last seen in office on Visit date not found  and has a follow up scheduled for 4/1/2025. Will send request to  Dr. Dhillon  for authorization.     Requested Prescriptions     Pending Prescriptions Disp Refills    REPATHA SOSY syringe [Pharmacy Med Name: REPATHA 140MG/ML 1 PF SYR INJECTION] 2 mL      Sig: INJECT 1 ML INTO THE SKIN EVERY 14 DAYS

## 2025-04-01 ENCOUNTER — TELEMEDICINE (OUTPATIENT)
Age: 50
End: 2025-04-01
Payer: MEDICARE

## 2025-04-01 DIAGNOSIS — M79.7 FIBROMYALGIA: ICD-10-CM

## 2025-04-01 DIAGNOSIS — E78.2 MIXED HYPERLIPIDEMIA: ICD-10-CM

## 2025-04-01 DIAGNOSIS — M54.16 LUMBAR RADICULOPATHY: ICD-10-CM

## 2025-04-01 DIAGNOSIS — R11.0 NAUSEA: ICD-10-CM

## 2025-04-01 DIAGNOSIS — I10 PRIMARY HYPERTENSION: Primary | ICD-10-CM

## 2025-04-01 DIAGNOSIS — R73.03 PREDIABETES: ICD-10-CM

## 2025-04-01 PROCEDURE — G8427 DOCREV CUR MEDS BY ELIG CLIN: HCPCS | Performed by: PEDIATRICS

## 2025-04-01 PROCEDURE — 99214 OFFICE O/P EST MOD 30 MIN: CPT | Performed by: PEDIATRICS

## 2025-04-01 RX ORDER — PREGABALIN 100 MG/1
100 CAPSULE ORAL 2 TIMES DAILY
Qty: 60 CAPSULE | Refills: 5 | Status: SHIPPED | OUTPATIENT
Start: 2025-04-01 | End: 2025-09-28

## 2025-04-01 RX ORDER — ONDANSETRON 8 MG/1
8 TABLET, ORALLY DISINTEGRATING ORAL EVERY 8 HOURS PRN
Qty: 15 TABLET | Refills: 5 | Status: SHIPPED | OUTPATIENT
Start: 2025-04-01

## 2025-04-01 SDOH — ECONOMIC STABILITY: FOOD INSECURITY: WITHIN THE PAST 12 MONTHS, THE FOOD YOU BOUGHT JUST DIDN'T LAST AND YOU DIDN'T HAVE MONEY TO GET MORE.: NEVER TRUE

## 2025-04-01 SDOH — ECONOMIC STABILITY: FOOD INSECURITY: WITHIN THE PAST 12 MONTHS, YOU WORRIED THAT YOUR FOOD WOULD RUN OUT BEFORE YOU GOT MONEY TO BUY MORE.: NEVER TRUE

## 2025-04-01 ASSESSMENT — ENCOUNTER SYMPTOMS
RHINORRHEA: 0
SINUS PRESSURE: 0
VOMITING: 0
DIARRHEA: 0
SHORTNESS OF BREATH: 1
BACK PAIN: 1
SINUS PAIN: 0
NAUSEA: 0
COUGH: 1

## 2025-04-01 ASSESSMENT — PATIENT HEALTH QUESTIONNAIRE - PHQ9
10. IF YOU CHECKED OFF ANY PROBLEMS, HOW DIFFICULT HAVE THESE PROBLEMS MADE IT FOR YOU TO DO YOUR WORK, TAKE CARE OF THINGS AT HOME, OR GET ALONG WITH OTHER PEOPLE: SOMEWHAT DIFFICULT
2. FEELING DOWN, DEPRESSED OR HOPELESS: SEVERAL DAYS
7. TROUBLE CONCENTRATING ON THINGS, SUCH AS READING THE NEWSPAPER OR WATCHING TELEVISION: SEVERAL DAYS
SUM OF ALL RESPONSES TO PHQ QUESTIONS 1-9: 8
5. POOR APPETITE OR OVEREATING: SEVERAL DAYS
SUM OF ALL RESPONSES TO PHQ QUESTIONS 1-9: 8
6. FEELING BAD ABOUT YOURSELF - OR THAT YOU ARE A FAILURE OR HAVE LET YOURSELF OR YOUR FAMILY DOWN: SEVERAL DAYS
3. TROUBLE FALLING OR STAYING ASLEEP: SEVERAL DAYS
8. MOVING OR SPEAKING SO SLOWLY THAT OTHER PEOPLE COULD HAVE NOTICED. OR THE OPPOSITE, BEING SO FIGETY OR RESTLESS THAT YOU HAVE BEEN MOVING AROUND A LOT MORE THAN USUAL: SEVERAL DAYS
SUM OF ALL RESPONSES TO PHQ QUESTIONS 1-9: 8
1. LITTLE INTEREST OR PLEASURE IN DOING THINGS: SEVERAL DAYS
SUM OF ALL RESPONSES TO PHQ QUESTIONS 1-9: 8
9. THOUGHTS THAT YOU WOULD BE BETTER OFF DEAD, OR OF HURTING YOURSELF: NOT AT ALL
4. FEELING TIRED OR HAVING LITTLE ENERGY: SEVERAL DAYS

## 2025-04-01 NOTE — PROGRESS NOTES
[facility] [place] [address]  Phone [phone]   Fax [phone]      OFFICE VISIT: 2025    Evelyn Moore-: 1975      HPI  Reason For Visit:  Evelyn is a 49 y.o.     1 Month Follow-Up (Discuss weight loss alternative. Medicare will not cover Mounjaro due to patient not being diabetic. )    Patient presents via [employer] video conferencing on follow-up for multiple health issues.  Present concerns:      Hypertension:   BP today was   BP Readings from Last 1 Encounters:   25 114/78      Recent BP readings:    BP Readings from Last 3 Encounters:   25 114/78   25 132/83   24 138/85     Medication   Metoprolol succinate 50 mg daily   Amlodipine 5 mg daily  Medication compliance:  compliant most of the time  Home blood pressure monitoring: Yes - occasionally.    She  is somewhat  adherent to a low sodium diet.     Symptoms: none  Laboratory:  Lab Results   Component Value Date    BUN 7 2024    CREATININE 0.69 2024         Hyperlipidemia:   Medication:   Repatha 140 mg subcu every 14 days  Low Fat, Low Choleterol Diet: Somewhat  Myalgias or GI upset: no  The patient exercises rarely.  Laboratory:    Lab Results   Component Value Date    CHOL 192 2024    TRIG 149 2024    HDL 45 2024     (H) 2024    LDLDIRECT 149 2014      Lab Results   Component Value Date    ALT 23 2024    AST 28 2024       Prediabetes:  Medication:   Mounjaro 2.5 mg subcu weekly (this is no longer covered by insurance)   Metformin 1000 mg twice daily  Symptoms:none      Osteoarthritis and fibromyalgia:  Medication:    Meloxicam 15 mg daily  Flexeril 10 mg 3 times daily as needed  Desvenlafaxine 50 mg daily  Lyrica 100 mg twice daily  Symptoms: fairly controlled on this regimen.       Weight management  She is not on any special diet  She tries to drink lots of water.  She is not interested in bariatric

## 2025-04-08 ENCOUNTER — HOSPITAL ENCOUNTER (OUTPATIENT)
Dept: GENERAL RADIOLOGY | Age: 50
Discharge: HOME | End: 2025-04-08
Payer: MEDICARE

## 2025-04-08 DIAGNOSIS — I10 PRIMARY HYPERTENSION: ICD-10-CM

## 2025-04-08 DIAGNOSIS — E78.2 MIXED HYPERLIPIDEMIA: ICD-10-CM

## 2025-04-08 PROCEDURE — 71271 CT THORAX LUNG CANCER SCR C-: CPT

## 2025-04-15 DIAGNOSIS — M54.10 BACK PAIN WITH LEFT-SIDED RADICULOPATHY: Chronic | ICD-10-CM

## 2025-04-15 DIAGNOSIS — Z98.890 HX OF SHOULDER SURGERY: ICD-10-CM

## 2025-04-15 RX ORDER — OXYCODONE AND ACETAMINOPHEN 7.5; 325 MG/1; MG/1
1 TABLET ORAL EVERY 8 HOURS PRN
Qty: 90 TABLET | Refills: 0 | Status: SHIPPED | OUTPATIENT
Start: 2025-04-20 | End: 2025-05-20

## 2025-04-21 ENCOUNTER — OFFICE VISIT (OUTPATIENT)
Dept: PAIN MANAGEMENT | Age: 50
End: 2025-04-21
Payer: MEDICARE

## 2025-04-21 VITALS
DIASTOLIC BLOOD PRESSURE: 89 MMHG | HEIGHT: 66 IN | SYSTOLIC BLOOD PRESSURE: 138 MMHG | WEIGHT: 293 LBS | TEMPERATURE: 97.2 F | RESPIRATION RATE: 18 BRPM | OXYGEN SATURATION: 97 % | BODY MASS INDEX: 47.09 KG/M2 | HEART RATE: 79 BPM

## 2025-04-21 DIAGNOSIS — Z98.890 HX OF SHOULDER SURGERY: ICD-10-CM

## 2025-04-21 DIAGNOSIS — M25.562 CHRONIC PAIN OF BOTH KNEES: ICD-10-CM

## 2025-04-21 DIAGNOSIS — G89.29 CHRONIC NECK PAIN: Primary | ICD-10-CM

## 2025-04-21 DIAGNOSIS — M53.3 SACROILIAC JOINT DYSFUNCTION OF RIGHT SIDE: ICD-10-CM

## 2025-04-21 DIAGNOSIS — M54.16 LUMBAR RADICULOPATHY: ICD-10-CM

## 2025-04-21 DIAGNOSIS — G89.29 CHRONIC PAIN OF BOTH KNEES: ICD-10-CM

## 2025-04-21 DIAGNOSIS — M25.561 CHRONIC PAIN OF BOTH KNEES: ICD-10-CM

## 2025-04-21 DIAGNOSIS — Z79.891 ENCOUNTER FOR MONITORING OPIOID MAINTENANCE THERAPY: ICD-10-CM

## 2025-04-21 DIAGNOSIS — Z51.81 ENCOUNTER FOR MONITORING OPIOID MAINTENANCE THERAPY: ICD-10-CM

## 2025-04-21 DIAGNOSIS — M54.50 CHRONIC BILATERAL LOW BACK PAIN WITHOUT SCIATICA: ICD-10-CM

## 2025-04-21 DIAGNOSIS — M54.10 BACK PAIN WITH LEFT-SIDED RADICULOPATHY: Chronic | ICD-10-CM

## 2025-04-21 DIAGNOSIS — M54.2 CHRONIC NECK PAIN: Primary | ICD-10-CM

## 2025-04-21 DIAGNOSIS — G89.29 CHRONIC BILATERAL LOW BACK PAIN WITHOUT SCIATICA: ICD-10-CM

## 2025-04-21 PROCEDURE — 1036F TOBACCO NON-USER: CPT

## 2025-04-21 PROCEDURE — 3017F COLORECTAL CA SCREEN DOC REV: CPT

## 2025-04-21 PROCEDURE — 99214 OFFICE O/P EST MOD 30 MIN: CPT

## 2025-04-21 PROCEDURE — G8427 DOCREV CUR MEDS BY ELIG CLIN: HCPCS

## 2025-04-21 PROCEDURE — G8417 CALC BMI ABV UP PARAM F/U: HCPCS

## 2025-04-21 ASSESSMENT — PATIENT HEALTH QUESTIONNAIRE - PHQ9
SUM OF ALL RESPONSES TO PHQ QUESTIONS 1-9: 5
6. FEELING BAD ABOUT YOURSELF - OR THAT YOU ARE A FAILURE OR HAVE LET YOURSELF OR YOUR FAMILY DOWN: SEVERAL DAYS
10. IF YOU CHECKED OFF ANY PROBLEMS, HOW DIFFICULT HAVE THESE PROBLEMS MADE IT FOR YOU TO DO YOUR WORK, TAKE CARE OF THINGS AT HOME, OR GET ALONG WITH OTHER PEOPLE: SOMEWHAT DIFFICULT
8. MOVING OR SPEAKING SO SLOWLY THAT OTHER PEOPLE COULD HAVE NOTICED. OR THE OPPOSITE, BEING SO FIGETY OR RESTLESS THAT YOU HAVE BEEN MOVING AROUND A LOT MORE THAN USUAL: NOT AT ALL
SUM OF ALL RESPONSES TO PHQ QUESTIONS 1-9: 5
3. TROUBLE FALLING OR STAYING ASLEEP: NOT AT ALL
SUM OF ALL RESPONSES TO PHQ QUESTIONS 1-9: 5
2. FEELING DOWN, DEPRESSED OR HOPELESS: NOT AT ALL
4. FEELING TIRED OR HAVING LITTLE ENERGY: SEVERAL DAYS
9. THOUGHTS THAT YOU WOULD BE BETTER OFF DEAD, OR OF HURTING YOURSELF: NOT AT ALL
SUM OF ALL RESPONSES TO PHQ QUESTIONS 1-9: 5
1. LITTLE INTEREST OR PLEASURE IN DOING THINGS: NOT AT ALL
7. TROUBLE CONCENTRATING ON THINGS, SUCH AS READING THE NEWSPAPER OR WATCHING TELEVISION: MORE THAN HALF THE DAYS
5. POOR APPETITE OR OVEREATING: SEVERAL DAYS

## 2025-04-21 ASSESSMENT — ENCOUNTER SYMPTOMS
BACK PAIN: 1
GASTROINTESTINAL NEGATIVE: 1
RESPIRATORY NEGATIVE: 1
EYES NEGATIVE: 1

## 2025-04-21 NOTE — TELEPHONE ENCOUNTER
1. Back pain with left-sided radiculopathy    2. Hx of shoulder surgery      Requested Prescriptions     Pending Prescriptions Disp Refills    oxyCODONE-acetaminophen (PERCOCET) 7.5-325 MG per tablet 90 tablet 0     Sig: Take 1 tablet by mouth every 8 hours as needed for Pain for up to 30 days. May fill 05/20/25       Continue medication with refill sent at appointment yes; refill sent to medical director at appointment yes, see refill encounter dated 4/21/2025    Electronically signed by ASHLEE Valente CNP on 4/21/2025 at 11:20 AM

## 2025-04-21 NOTE — PROGRESS NOTES
Hospitalist consisting of twice daily stretches performed upon awakening and before bedtime every day. Secondly, three times weekly exercises targeted at areas of greatest pain to be performed per handout instructions.     Our mission at the [facility] is to improve the lives of our patients by improving function and relieving pain. We have a multidisciplinary outlook with a focus on non-opioid medications, interventions, physical therapy and improved mindset. We strive to improve function and relieve pain with a focus on health and individualizing care.    Per CDC recommendations, patients prescribed opiates with a history of overdose, substance use disorder, >50 OME's daily, or concurrent benzodiazepine use should be prescribed naloxone. We instructed the patient to keep one nasal spray on his/her person, and instruct his/her family and friends to use 2 sprays under their nose in the event of accidental overdose, then to call 911.    Potential opioid side effects were discussed in detail including constipation, urinary retention, pruritus, respiratory depression, sedation, dependence, addiction, tolerance, opioid induced hyperalgesia, osteopenia, hypogonadism and immune suppression.  Advised to take the opioid medications as prescribed.    [] Benzodiazapines and Narcotics:  Patient educated on the possible effects of combining Benzodiazapines and Opioids. Explained \"Black Box Warnings\" such as; possible suppressed breathing, hypoxia, anoxia, depressed cognition, heart arrhythmia, coma and possible death. Patient verbalized understanding concerning possible effects.    [] Tobacco Cessation:  Patient educated on the harms of tobacco/nicotine use and was provided with resources on available programs to assist in smoking cessation. Patient does show understanding.  Patient has/does not have the desire to quit smoking in the near future.    [employer] [place] is a FREE online service available to [facility]

## 2025-04-22 RX ORDER — OXYCODONE AND ACETAMINOPHEN 7.5; 325 MG/1; MG/1
1 TABLET ORAL EVERY 8 HOURS PRN
Qty: 90 TABLET | Refills: 0 | Status: SHIPPED | OUTPATIENT
Start: 2025-05-20 | End: 2025-06-19

## 2025-04-24 ENCOUNTER — CLINICAL SUPPORT (OUTPATIENT)
Age: 50
End: 2025-04-24

## 2025-04-24 DIAGNOSIS — J02.9 SORE THROAT: Primary | ICD-10-CM

## 2025-04-24 LAB — S PYO AG THROAT QL: NORMAL

## 2025-04-25 ENCOUNTER — RESULTS FOLLOW-UP (OUTPATIENT)
Age: 50
End: 2025-04-25

## 2025-05-06 ENCOUNTER — RESULTS FOLLOW-UP (OUTPATIENT)
Age: 50
End: 2025-05-06

## 2025-05-06 ENCOUNTER — HOSPITAL ENCOUNTER (OUTPATIENT)
Dept: PAIN MANAGEMENT | Age: 50
Discharge: HOME | End: 2025-05-06
Payer: MEDICARE

## 2025-05-06 VITALS
RESPIRATION RATE: 16 BRPM | SYSTOLIC BLOOD PRESSURE: 122 MMHG | OXYGEN SATURATION: 97 % | DIASTOLIC BLOOD PRESSURE: 76 MMHG | TEMPERATURE: 97.4 F | HEART RATE: 71 BPM

## 2025-05-06 DIAGNOSIS — R52 PAIN MANAGEMENT: ICD-10-CM

## 2025-05-06 PROCEDURE — 62323 NJX INTERLAMINAR LMBR/SAC: CPT

## 2025-05-06 PROCEDURE — 2580000003 HC RX 258

## 2025-05-06 PROCEDURE — 6360000002 HC RX W HCPCS

## 2025-05-06 RX ORDER — TRIAMCINOLONE ACETONIDE 40 MG/ML
80 INJECTION, SUSPENSION INTRA-ARTICULAR; INTRAMUSCULAR ONCE
Status: DISCONTINUED | OUTPATIENT
Start: 2025-05-06 | End: 2025-05-08 | Stop reason: HOSPADM

## 2025-05-06 RX ORDER — SODIUM CHLORIDE 9 MG/ML
5 INJECTION, SOLUTION INTRAMUSCULAR; INTRAVENOUS; SUBCUTANEOUS ONCE
Status: DISCONTINUED | OUTPATIENT
Start: 2025-05-06 | End: 2025-05-08 | Stop reason: HOSPADM

## 2025-05-06 RX ORDER — LIDOCAINE HYDROCHLORIDE 10 MG/ML
5 INJECTION, SOLUTION EPIDURAL; INFILTRATION; INTRACAUDAL; PERINEURAL ONCE
Status: DISCONTINUED | OUTPATIENT
Start: 2025-05-06 | End: 2025-05-08 | Stop reason: HOSPADM

## 2025-05-06 ASSESSMENT — PAIN - FUNCTIONAL ASSESSMENT: PAIN_FUNCTIONAL_ASSESSMENT: 0-10

## 2025-05-06 NOTE — INTERVAL H&P NOTE
Update History & Physical    The patient's History and Physical  was reviewed with the patient and I examined the patient. There was no change. The surgical site was confirmed by the patient and me.     Plan: The risks, benefits, expected outcome, and alternative to the recommended procedure have been discussed with the patient. Patient understands and wants to proceed with the procedure.     Electronically signed by Alex Carmona MD on 5/6/2025 at 1:10 PM

## 2025-05-06 NOTE — H&P
See H&P in chart   Telephone Encounter by Tenisha Olivarez at 07/25/18 11:19 AM     Author:  Tenisha Olivarez Service:  (none) Author Type:  Patient      Filed:  07/25/18 11:23 AM Encounter Date:  7/25/2018 Status:  Signed     :  Tenisha Olivarez (Patient )            Left message on answering machine to call back[JC1.1T] to schedule NPT in Derm per order from Dr Kulkarni[JC1.1M]        Revision History        User Key Date/Time User Provider Type Action    > JC1.1 07/25/18 11:23 AM Tenisha Olivarez Patient  Sign    M - Manual, T - Template

## 2025-05-13 ENCOUNTER — TELEPHONE (OUTPATIENT)
Dept: PAIN MANAGEMENT | Age: 50
End: 2025-05-13

## 2025-05-13 NOTE — TELEPHONE ENCOUNTER
How much did the shot help?        85    %     What is your current pain level now?     5/10                      Has your activity level increased since the shot?   Yes.  Electronically signed by Shawn Greer RN on 5/13/2025 at 3:07 PM

## 2025-05-14 RX ORDER — NORTRIPTYLINE HYDROCHLORIDE 25 MG/1
CAPSULE ORAL
Qty: 180 CAPSULE | Refills: 3 | Status: SHIPPED | OUTPATIENT
Start: 2025-05-14

## 2025-05-14 NOTE — TELEPHONE ENCOUNTER
Received fax from pharmacy requesting refill on pts medication(s). Pt was last seen in office on 4/24/2025  and has a follow up scheduled for Visit date not found. Will send request to  Dr. Dhillon  for authorization.     Requested Prescriptions     Pending Prescriptions Disp Refills    nortriptyline (PAMELOR) 25 MG capsule [Pharmacy Med Name: NORTRIPTYLINE 25MG CAPSULES] 180 capsule 3     Sig: TAKE 2 CAPSULES BY MOUTH EVERY NIGHT

## 2025-05-20 ENCOUNTER — HOSPITAL ENCOUNTER (OUTPATIENT)
Dept: PAIN MANAGEMENT | Age: 50
Discharge: HOME | End: 2025-05-20
Payer: MEDICARE

## 2025-05-20 VITALS
RESPIRATION RATE: 18 BRPM | HEART RATE: 72 BPM | SYSTOLIC BLOOD PRESSURE: 125 MMHG | DIASTOLIC BLOOD PRESSURE: 86 MMHG | OXYGEN SATURATION: 98 % | TEMPERATURE: 96.6 F

## 2025-05-20 DIAGNOSIS — R52 PAIN MANAGEMENT: ICD-10-CM

## 2025-05-20 PROCEDURE — 6360000002 HC RX W HCPCS

## 2025-05-20 PROCEDURE — 2580000003 HC RX 258

## 2025-05-20 PROCEDURE — G0260 INJ FOR SACROILIAC JT ANESTH: HCPCS

## 2025-05-20 RX ORDER — BUPIVACAINE HYDROCHLORIDE 5 MG/ML
2 INJECTION, SOLUTION EPIDURAL; INTRACAUDAL; PERINEURAL ONCE
Status: DISCONTINUED | OUTPATIENT
Start: 2025-05-20 | End: 2025-05-20

## 2025-05-20 RX ORDER — TRIAMCINOLONE ACETONIDE 40 MG/ML
80 INJECTION, SUSPENSION INTRA-ARTICULAR; INTRAMUSCULAR ONCE
Status: DISCONTINUED | OUTPATIENT
Start: 2025-05-20 | End: 2025-05-22 | Stop reason: HOSPADM

## 2025-05-20 RX ORDER — SODIUM CHLORIDE 9 MG/ML
3 INJECTION, SOLUTION INTRAMUSCULAR; INTRAVENOUS; SUBCUTANEOUS ONCE
Status: DISCONTINUED | OUTPATIENT
Start: 2025-05-20 | End: 2025-05-20

## 2025-05-20 RX ORDER — LIDOCAINE HYDROCHLORIDE 10 MG/ML
7 INJECTION, SOLUTION EPIDURAL; INFILTRATION; INTRACAUDAL; PERINEURAL ONCE
Status: DISCONTINUED | OUTPATIENT
Start: 2025-05-20 | End: 2025-05-20

## 2025-05-20 RX ORDER — SODIUM CHLORIDE 9 MG/ML
3 INJECTION, SOLUTION INTRAMUSCULAR; INTRAVENOUS; SUBCUTANEOUS ONCE
Status: DISCONTINUED | OUTPATIENT
Start: 2025-05-20 | End: 2025-05-22 | Stop reason: HOSPADM

## 2025-05-20 RX ORDER — BUPIVACAINE HYDROCHLORIDE 5 MG/ML
2 INJECTION, SOLUTION EPIDURAL; INTRACAUDAL; PERINEURAL ONCE
Status: DISCONTINUED | OUTPATIENT
Start: 2025-05-20 | End: 2025-05-22 | Stop reason: HOSPADM

## 2025-05-20 RX ORDER — LIDOCAINE HYDROCHLORIDE 10 MG/ML
7 INJECTION, SOLUTION EPIDURAL; INFILTRATION; INTRACAUDAL; PERINEURAL ONCE
Status: DISCONTINUED | OUTPATIENT
Start: 2025-05-20 | End: 2025-05-22 | Stop reason: HOSPADM

## 2025-05-20 RX ORDER — TRIAMCINOLONE ACETONIDE 40 MG/ML
80 INJECTION, SUSPENSION INTRA-ARTICULAR; INTRAMUSCULAR ONCE
Status: DISCONTINUED | OUTPATIENT
Start: 2025-05-20 | End: 2025-05-20

## 2025-05-20 ASSESSMENT — PAIN - FUNCTIONAL ASSESSMENT: PAIN_FUNCTIONAL_ASSESSMENT: NONE - DENIES PAIN

## 2025-05-20 NOTE — PROGRESS NOTES
Procedure:  Level of Consciousness: [x]Alert [x]Oriented []Disoriented []Lethargic  Anxiety Level: [x]Calm []Anxious []Depressed []Other  Skin: [x]Warm [x]Dry []Cool []Moist []Intact []Other  Cardiovascular: [x]Palpitations: [x]Never []Occasionally []Frequently  Chest Pain: [x]No []Yes  Respiratory:  [x]Unlabored []Labored []Cough ([] Productive []Unproductive)  HCG Required: [x]No []Yes Pt. Reports having a Hysterectomy  Results: []Negative []Positive  Knowledge Level:        [x]Patient/Other verbalized understanding of pre-procedure instructions.        [x]Assessment of post-op care needs (transportation, responsible caregiver)        [x]Able to discuss health care problems and how to deal with it.  Factors that Affect Teaching:        Language Barrier: [x]No []Yes - why:        Hearing Loss:        [x]No []Yes            Corrective Device:  []Yes [x]No        Vision Loss:           []No [x]Yes            Corrective Device:  [x]Yes []No        Memory Loss:       [x]No []Yes            []Short Term []Long Term  Motivational Level:  [x]Asks Questions                  []Extremely Anxious       [x]Seems Interested               []Seems Uninterested                  []Denies need for Education  Risk for Injury:  [x]Patient oriented to person, place and time  []History of frequent falls/loss of balance  Nutritional:  []Change in appetite   []Weight Gain   []Weight Loss  Functional:  []Requires assistance with ADL's

## 2025-05-20 NOTE — INTERVAL H&P NOTE
Update History & Physical    The patient's History and Physical  was reviewed with the patient and I examined the patient. There was no change. The surgical site was confirmed by the patient and me.     Plan: The risks, benefits, expected outcome, and alternative to the recommended procedure have been discussed with the patient. Patient understands and wants to proceed with the procedure.     Electronically signed by Alex Carmona MD on 5/20/2025 at 11:37 AM

## 2025-05-27 ENCOUNTER — TELEPHONE (OUTPATIENT)
Dept: PAIN MANAGEMENT | Age: 50
End: 2025-05-27

## 2025-05-27 NOTE — TELEPHONE ENCOUNTER
How much did the shot help?    80        %   What is your current pain level now?    4/10                     Has your activity level increased since the shot?   Yes.

## 2025-06-03 DIAGNOSIS — I10 ESSENTIAL HYPERTENSION: ICD-10-CM

## 2025-06-03 RX ORDER — AMLODIPINE BESYLATE 5 MG/1
5 TABLET ORAL DAILY
Qty: 90 TABLET | Refills: 3 | Status: SHIPPED | OUTPATIENT
Start: 2025-06-03

## 2025-06-03 RX ORDER — BUPROPION HYDROCHLORIDE 300 MG/1
300 TABLET ORAL 2 TIMES DAILY
Qty: 180 TABLET | Refills: 3 | Status: SHIPPED | OUTPATIENT
Start: 2025-06-03

## 2025-06-03 NOTE — TELEPHONE ENCOUNTER
Received fax from pharmacy requesting refill on pts medication(s). Pt was last seen in office on 4/24/2025  and has a follow up scheduled for Visit date not found. Will send request to  Dr. Dhillon  for patient.     Requested Prescriptions     Pending Prescriptions Disp Refills    buPROPion (WELLBUTRIN XL) 300 MG extended release tablet [Pharmacy Med Name: BUPROPION XL 300MG TABLETS] 180 tablet 3     Sig: Take 1 tablet by mouth in the morning and at bedtime    amLODIPine (NORVASC) 5 MG tablet [Pharmacy Med Name: AMLODIPINE BESYLATE 5MG TABLETS] 90 tablet 3     Sig: Take 1 tablet by mouth daily

## 2025-06-05 ENCOUNTER — HOSPITAL ENCOUNTER (OUTPATIENT)
Dept: PAIN MANAGEMENT | Age: 50
Discharge: HOME | End: 2025-06-05
Payer: MEDICARE

## 2025-06-05 VITALS
HEART RATE: 88 BPM | SYSTOLIC BLOOD PRESSURE: 143 MMHG | RESPIRATION RATE: 18 BRPM | DIASTOLIC BLOOD PRESSURE: 90 MMHG | OXYGEN SATURATION: 100 % | TEMPERATURE: 96.7 F

## 2025-06-05 DIAGNOSIS — M25.561 CHRONIC PAIN OF BOTH KNEES: ICD-10-CM

## 2025-06-05 DIAGNOSIS — M25.562 CHRONIC PAIN OF BOTH KNEES: ICD-10-CM

## 2025-06-05 DIAGNOSIS — G89.29 CHRONIC PAIN OF BOTH KNEES: ICD-10-CM

## 2025-06-05 DIAGNOSIS — M17.10 ARTHRITIS OF KNEE: Primary | ICD-10-CM

## 2025-06-05 PROCEDURE — 20611 DRAIN/INJ JOINT/BURSA W/US: CPT

## 2025-06-05 PROCEDURE — 6360000002 HC RX W HCPCS

## 2025-06-05 RX ORDER — ETHYL CHLORIDE 100 %
AEROSOL, SPRAY (ML) TOPICAL PRN
Status: DISCONTINUED | OUTPATIENT
Start: 2025-06-05 | End: 2025-06-07 | Stop reason: HOSPADM

## 2025-06-05 RX ORDER — LIDOCAINE HYDROCHLORIDE 10 MG/ML
4 INJECTION, SOLUTION EPIDURAL; INFILTRATION; INTRACAUDAL; PERINEURAL ONCE
Status: DISCONTINUED | OUTPATIENT
Start: 2025-06-05 | End: 2025-06-07 | Stop reason: HOSPADM

## 2025-06-05 NOTE — DISCHARGE INSTRUCTIONS
[facility]  Post Procedure Discharge Instructions        YOU HAVE HAD THE FOLLOWING PROCEDURE:                                  [] Occipital Nerve Blocks  [] CTS wrist injection(s)  [x] Knee Injection(s)         [] Shoulder Injection(s)   [] Elbow Injection(s)     [] Botox Injection  [] Cervical Trigger Point Injections    [] Thoracic Trigger Point Injections    [] Lumbar Trigger Point Injections  [] Piriformis Trigger Point Injections  [] SI Joint Injection(s)     [] Trochanteric Bursa Injection(s)       [] Ankle Injection(s)   [] Plantar Fasciitis   []  ______________  Injection(s) [] Botox []  Migraines [] Spasticity    YOU HAVE RECEIVED THE FOLLOWING MEDICATIONS IN YOUR INJECTION(s)  [x] Lidocaine [] Bupivacaine   [] DepoMedrol (steroid) [] Decadron (steroid)  []  Kenalog (steroid)   [] Toradol  [] Supartz [x] Zilretta    [] Botox        PATIENT INFORMATION:   You may experience the following symptoms after your procedure. These symptoms are normal and should not cause concern:    You may have an increase in your pain. This may last 24 - 48 hours after your procedure.  You may have no change in the pain that you had prior to your injection(s).  You may have weakness or numbness in your affected extremity. If this occurs, this may last until numbing the medication wears off.     REPORT THE FOLLOWING SYMPTOMS TO YOUR DOCTOR:  Redness, swelling or drainage at the injection site(s)  Unusual pain that interferes with your normal activities of daily living.    OTHER INSTRUCTIONS:    [x] I will apply ice to the injection site(s) for at least 24 hours after the procedure. I will rotate the ice on for 20 minutes and off for 20 minutes for at least 24 hours.    [x] I will not apply heat for at least 48 hours and I will not take a hot bath or shower for at least 24 hours.     [x] I understand that if Lidocaine or Bupivacaine was used in my injection(s) that the injection site(s)

## 2025-06-05 NOTE — PROCEDURES
PROCEDURE NOTE  Date: 2025   Name: Evelyn Moore  YOB: 1975    Procedures    [facility] Physical & Pain Medicine    Patient Name: Evelyn oMore    : 1975    Age: 49 y.o.    Sex: female    Date: 2025    Preop Diagnosis: Osteoarthritis of bilateral Knee(s)    Postop Diagnosis: Osteoarthritis of  bilateral  Knee(s)      Procedure: Injection of  bilateral  Knee(s) with US confirmation    Performing Procedure: Lali Hernandez APRN - CNP    Patient Vitals for the past 24 hrs:   BP Temp Temp src Pulse Resp SpO2   25 1051 (!) 143/90 (!) 96.7 °F (35.9 °C) Temporal 88 18 100 %       Description of Procedure:    After a brief physical assessment and failure to improve with conservative measures the patient presented for an injection of the bilateral Knee(s). The indications, limitations and possible complications were discussed with the patient and the patient elected to proceed with the procedure.    bilateral Knee(s)    After voluntary, informed and signed consent obtained the patient was placed in a seated position. Appropriate time out was obtained per policy. The knee was palpated at the anterior aspect of the knee to locate the patellar tendon. The projected injection site is approximately 1 cm medially or laterally of the midpoint of the tendon. Usually, there is a depression. The area was marked. The ultrasound transducer was used to confirm the appropriate location. The skin prepped in an aseptic fashion with CHG swab and then sprayed with Gebauer's Solution. Under aseptic technique 22 gauge 1 1/2 inch needle was then introduced into the Knee(s). After a negative aspiration, a solution of 1 ml of 0.5% Marcaine Plain, 1 ml of 1% Lidocaine Plain and     [] 1 ml of Toradol (30 mg/ml)     [] 1 ml of Kenalog (40mg/ml)    []  0.5 ml of DepoMedrol (80mg/ml)    [] 1 ml of DepoMedrol (80mg/ml)    OR    [x] After negative aspiration, 1 ml of 1% Lidocaine Plain was

## 2025-06-11 ENCOUNTER — TELEPHONE (OUTPATIENT)
Dept: PAIN MANAGEMENT | Age: 50
End: 2025-06-11

## 2025-06-12 DIAGNOSIS — Z98.890 HX OF SHOULDER SURGERY: ICD-10-CM

## 2025-06-12 DIAGNOSIS — M54.10 BACK PAIN WITH LEFT-SIDED RADICULOPATHY: Chronic | ICD-10-CM

## 2025-06-12 NOTE — TELEPHONE ENCOUNTER
Last OV - 04/21/25  Next OV - 07/14/25  Last UDS - 04/21/25    2 refills to cover providers vacation

## 2025-06-16 ENCOUNTER — OFFICE VISIT (OUTPATIENT)
Age: 50
End: 2025-06-16
Payer: MEDICARE

## 2025-06-16 ENCOUNTER — HOSPITAL ENCOUNTER (OUTPATIENT)
Dept: GENERAL RADIOLOGY | Age: 50
Discharge: HOME | End: 2025-06-16
Payer: MEDICARE

## 2025-06-16 ENCOUNTER — RESULTS FOLLOW-UP (OUTPATIENT)
Age: 50
End: 2025-06-16

## 2025-06-16 VITALS
SYSTOLIC BLOOD PRESSURE: 136 MMHG | HEIGHT: 66 IN | BODY MASS INDEX: 47.09 KG/M2 | OXYGEN SATURATION: 97 % | DIASTOLIC BLOOD PRESSURE: 82 MMHG | WEIGHT: 293 LBS | HEART RATE: 76 BPM | TEMPERATURE: 96.9 F

## 2025-06-16 DIAGNOSIS — R22.42 LOCALIZED SWELLING OF LEFT LOWER LEG: Primary | ICD-10-CM

## 2025-06-16 DIAGNOSIS — R22.42 LOCALIZED SWELLING OF LEFT LOWER LEG: ICD-10-CM

## 2025-06-16 LAB — ECHO BSA: 2.58 M2

## 2025-06-16 PROCEDURE — G8417 CALC BMI ABV UP PARAM F/U: HCPCS | Performed by: NURSE PRACTITIONER

## 2025-06-16 PROCEDURE — G8427 DOCREV CUR MEDS BY ELIG CLIN: HCPCS | Performed by: NURSE PRACTITIONER

## 2025-06-16 PROCEDURE — 99214 OFFICE O/P EST MOD 30 MIN: CPT | Performed by: NURSE PRACTITIONER

## 2025-06-16 PROCEDURE — 93971 EXTREMITY STUDY: CPT

## 2025-06-16 ASSESSMENT — ENCOUNTER SYMPTOMS
BACK PAIN: 0
COUGH: 0
WHEEZING: 0
SHORTNESS OF BREATH: 0

## 2025-06-16 NOTE — PROGRESS NOTES
Total right hip    LITHOTRIPSY  1995/2000    LIVER BIOPSY  05/02/2022    Bridging fibrosis w/early nodule formation (trichrome stain-subcapsular specimen), mildly active steatohepatitis w/marked macrovesicular steatosis (80-90%), score 3    MOUTH SURGERY  1994    NECK SURGERY      with hardware placed    DE COLONOSCOPY FLX DX W/COLLJ SPEC WHEN PFRMD N/A 05/02/2017    Dr ABHIJIT Baltazar-normal, 7 yr (age 49) recall    DE EGD TRANSORAL BIOPSY SINGLE/MULTIPLE N/A 05/02/2017    Dr ABHIJIT Baltazar-Gastritis/gastropathy    SALPINGO-OOPHORECTOMY Bilateral 2013    SHOULDER SURGERY  03/21/2023    Dr Hightower    SPINAL FUSION      SPINE SURGERY      SPLENECTOMY  05/02/2022    splenic parenchyma w/non-nectrotizing granulomas    STOMACH SURGERY  12/20/2017    Dr Fred Falk    gastric sleeve    TOOTH EXTRACTION      TOTAL HIP ARTHROPLASTY Right 2019    UPPER GASTROINTESTINAL ENDOSCOPY N/A 06/14/2023    Dr ABHIJIT Baltazar-Small HH, gastritis, no h pylori       Family History   Problem Relation Age of Onset    Diabetes Mother     High Blood Pressure Mother     Colon Polyps Mother     Heart Disease Mother     Cancer Mother         MDS?    Depression Mother     Anemia Mother     Arthritis Mother     Obesity Mother     High Blood Pressure Father     Heart Disease Father     Stroke Father     Prostate Cancer Father     Arthritis Father     High Blood Pressure Sister     Depression Sister     Anxiety Disorder Sister     Anemia Sister     Arthritis Sister     Cancer Sister     Obesity Sister     Psoriasis Sister     Cancer Brother     Esophageal Cancer Brother     Anxiety Disorder Brother     Diabetes Brother     ADHD Brother     Depression Brother     Arthritis Brother     Mental Illness Brother     Other Other         has history of suicide in family maternal great uncle    Arthritis Sister     Learning Disabilities Sister     Obesity Sister     Arthritis Brother     Arthritis Brother     Cancer Brother     Obesity Brother     Breast Cancer Paternal

## 2025-06-17 RX ORDER — OXYCODONE AND ACETAMINOPHEN 7.5; 325 MG/1; MG/1
1 TABLET ORAL EVERY 8 HOURS PRN
Qty: 90 TABLET | Refills: 0 | Status: SHIPPED | OUTPATIENT
Start: 2025-06-20 | End: 2025-07-20

## 2025-06-17 RX ORDER — OXYCODONE AND ACETAMINOPHEN 7.5; 325 MG/1; MG/1
1 TABLET ORAL EVERY 8 HOURS PRN
Qty: 90 TABLET | Refills: 0 | Status: SHIPPED | OUTPATIENT
Start: 2025-07-20 | End: 2025-08-19

## 2025-06-25 ENCOUNTER — APPOINTMENT (OUTPATIENT)
Dept: GENERAL RADIOLOGY | Age: 50
End: 2025-06-25
Payer: MEDICARE

## 2025-06-25 ENCOUNTER — APPOINTMENT (OUTPATIENT)
Dept: VASCULAR LAB | Age: 50
End: 2025-06-25
Payer: MEDICARE

## 2025-06-25 ENCOUNTER — HOSPITAL ENCOUNTER (EMERGENCY)
Age: 50
Discharge: HOME | End: 2025-06-25
Payer: MEDICARE

## 2025-06-25 VITALS
HEART RATE: 77 BPM | DIASTOLIC BLOOD PRESSURE: 85 MMHG | WEIGHT: 293 LBS | BODY MASS INDEX: 47.09 KG/M2 | TEMPERATURE: 98.5 F | SYSTOLIC BLOOD PRESSURE: 148 MMHG | HEIGHT: 66 IN

## 2025-06-25 DIAGNOSIS — M79.89 LEG SWELLING: ICD-10-CM

## 2025-06-25 DIAGNOSIS — M16.12 ARTHRITIS OF LEFT HIP: ICD-10-CM

## 2025-06-25 DIAGNOSIS — M71.22 BAKER CYST, LEFT: Primary | ICD-10-CM

## 2025-06-25 LAB
ALBUMIN SERPL-MCNC: 3.6 G/DL (ref 3.5–5.2)
ALP SERPL-CCNC: 100 U/L (ref 35–104)
ALT SERPL-CCNC: 19 U/L (ref 10–35)
ANION GAP SERPL CALCULATED.3IONS-SCNC: 9 MMOL/L (ref 8–16)
ANISOCYTOSIS BLD QL SMEAR: ABNORMAL
AST SERPL-CCNC: 16 U/L (ref 10–35)
BASOPHILS # BLD: 0.3 K/UL (ref 0–0.2)
BASOPHILS NFR BLD: 2 % (ref 0–1)
BILIRUB SERPL-MCNC: <0.2 MG/DL (ref 0.2–1.2)
BNP BLD-MCNC: 234 PG/ML (ref 0–124)
BUN SERPL-MCNC: 10 MG/DL (ref 6–20)
BURR CELLS BLD QL SMEAR: ABNORMAL
CALCIUM SERPL-MCNC: 8.8 MG/DL (ref 8.6–10)
CHLORIDE SERPL-SCNC: 108 MMOL/L (ref 98–107)
CO2 SERPL-SCNC: 24 MMOL/L (ref 22–29)
CREAT SERPL-MCNC: 0.8 MG/DL (ref 0.5–0.9)
EOSINOPHIL # BLD: 0.17 K/UL (ref 0–0.6)
EOSINOPHIL NFR BLD: 1 % (ref 0–5)
ERYTHROCYTE [DISTWIDTH] IN BLOOD BY AUTOMATED COUNT: 14 % (ref 11.5–14.5)
GLUCOSE SERPL-MCNC: 115 MG/DL (ref 70–99)
HCT VFR BLD AUTO: 38.1 % (ref 37–47)
HGB BLD-MCNC: 12.6 G/DL (ref 12–16)
IMM GRANULOCYTES # BLD: 0.1 K/UL
LYMPHOCYTES # BLD: 6.8 K/UL (ref 1.1–4.5)
LYMPHOCYTES NFR BLD: 41 % (ref 20–40)
MCH RBC QN AUTO: 29.8 PG (ref 27–31)
MCHC RBC AUTO-ENTMCNC: 33.1 G/DL (ref 33–37)
MCV RBC AUTO: 90.1 FL (ref 81–99)
MONOCYTES # BLD: 0.8 K/UL (ref 0–0.9)
MONOCYTES NFR BLD: 5 % (ref 0–10)
NEUTROPHILS # BLD: 8.5 K/UL (ref 1.5–7.5)
NEUTS BAND NFR BLD MANUAL: 1 % (ref 0–5)
NEUTS SEG NFR BLD: 50 % (ref 50–65)
OVALOCYTES BLD QL SMEAR: ABNORMAL
PLATELET # BLD AUTO: 421 K/UL (ref 130–400)
PLATELET SLIDE REVIEW: ABNORMAL
PMV BLD AUTO: 10.1 FL (ref 9.4–12.3)
POTASSIUM SERPL-SCNC: 3.8 MMOL/L (ref 3.5–5.1)
PROT SERPL-MCNC: 6.3 G/DL (ref 6.4–8.3)
RBC # BLD AUTO: 4.23 M/UL (ref 4.2–5.4)
SODIUM SERPL-SCNC: 141 MMOL/L (ref 136–145)
TROPONIN, HIGH SENSITIVITY: 8 NG/L (ref 0–14)
WBC # BLD AUTO: 16.6 K/UL (ref 4.8–10.8)

## 2025-06-25 PROCEDURE — 71045 X-RAY EXAM CHEST 1 VIEW: CPT

## 2025-06-25 PROCEDURE — 85025 COMPLETE CBC W/AUTO DIFF WBC: CPT

## 2025-06-25 PROCEDURE — 93005 ELECTROCARDIOGRAM TRACING: CPT

## 2025-06-25 PROCEDURE — 6360000002 HC RX W HCPCS

## 2025-06-25 PROCEDURE — 96374 THER/PROPH/DIAG INJ IV PUSH: CPT

## 2025-06-25 PROCEDURE — 36415 COLL VENOUS BLD VENIPUNCTURE: CPT

## 2025-06-25 PROCEDURE — 93971 EXTREMITY STUDY: CPT

## 2025-06-25 PROCEDURE — 73502 X-RAY EXAM HIP UNI 2-3 VIEWS: CPT

## 2025-06-25 PROCEDURE — 99285 EMERGENCY DEPT VISIT HI MDM: CPT

## 2025-06-25 PROCEDURE — 83880 ASSAY OF NATRIURETIC PEPTIDE: CPT

## 2025-06-25 PROCEDURE — 80053 COMPREHEN METABOLIC PANEL: CPT

## 2025-06-25 PROCEDURE — 84484 ASSAY OF TROPONIN QUANT: CPT

## 2025-06-25 RX ORDER — HEPARIN 100 UNIT/ML
300 SYRINGE INTRAVENOUS ONCE
Status: COMPLETED | OUTPATIENT
Start: 2025-06-25 | End: 2025-06-25

## 2025-06-25 RX ORDER — HEPARIN 100 UNIT/ML
SYRINGE INTRAVENOUS
Status: COMPLETED
Start: 2025-06-25 | End: 2025-06-25

## 2025-06-25 RX ORDER — KETOROLAC TROMETHAMINE 30 MG/ML
30 INJECTION, SOLUTION INTRAMUSCULAR; INTRAVENOUS ONCE
Status: COMPLETED | OUTPATIENT
Start: 2025-06-25 | End: 2025-06-25

## 2025-06-25 RX ADMIN — HEPARIN 300 UNITS: 100 SYRINGE at 12:14

## 2025-06-25 RX ADMIN — KETOROLAC TROMETHAMINE 30 MG: 30 INJECTION, SOLUTION INTRAMUSCULAR at 11:06

## 2025-06-25 ASSESSMENT — ENCOUNTER SYMPTOMS
GASTROINTESTINAL NEGATIVE: 1
EYES NEGATIVE: 1
RESPIRATORY NEGATIVE: 1
STRIDOR: 0
COUGH: 0

## 2025-06-25 NOTE — PROGRESS NOTES
Vascular Lab Prelim  Duplex venous scan of LLE shows no evidence for dvt, svt, reflux noted at this time. There is what appears to be a left Baker's cyst, which measures approximately 4.4 x 0.9cm. Final report pending.

## 2025-06-25 NOTE — DISCHARGE INSTRUCTIONS
Please rest, and elevate the bilateral lower extremities, you may use a compression brace on the knee for further management of your Baker's cyst, and you should follow-up with Ortho for further recommendations.  You may use compression stockings to manage your lower extremity edema, and this important to elevate your feet whenever possible.  Please return to the ED for any new or worsening symptoms or concerns

## 2025-06-25 NOTE — ED PROVIDER NOTES
[facility] EMERGENCY DEPARTMENT  EMERGENCY DEPARTMENT ENCOUNTER      Pt Name: Evelyn Moore  MRN: [id number]  Birthdate 1975  Date of evaluation: 6/25/2025  Provider: Misty Zamarripa PA-C    CHIEF COMPLAINT       Chief Complaint   Patient presents with    Leg Swelling     Pt states tat her legs are swelling.  She states that her left leg hurts from her hip to knee.           HISTORY OF PRESENT ILLNESS   (Location/Symptom, Timing/Onset,Context/Setting, Quality, Duration, Modifying Factors, Severity)  Note limiting factors.   HPI    Evelyn Moore is a 49 y.o. female with a past medical history significant for chronic pain of both knees, back pain, fibromyalgia who presents to the emergency department with a chief complaint of bilateral leg swelling, worse on the left side, left leg pain.  Patient endorses a long history of bilateral knee pain, but states that for the past several days her legs have been swelling and they do not usually do this.  Her left leg has been more swollen than the right, and today she started having pain in her left leg from her hip all the way down to her foot.  She denies any recent travel or immobility, denies any history of blood clots, is not on any blood thinners.  Does have bilateral knee pain, and states that her knee pain on the left side has been worse than normal, so she is been wearing a compression brace on that side without significant relief of her symptoms.  Denies chest pain or shortness of breath.  Denies fevers, chills, or injuries.    Nursing Notes were reviewed.    Limitations to history: None  Outside historians none    REVIEW OF SYSTEMS    (2-9 systems for level 4, 10 or more for level 5)     Review of Systems   Constitutional: Negative.    HENT: Negative.     Eyes: Negative.    Respiratory: Negative.  Negative for cough and stridor.    Cardiovascular: Negative.    Gastrointestinal: Negative.    Genitourinary: Negative.    Musculoskeletal:  Positive

## 2025-06-26 LAB
ECHO BSA: 2.52 M2
EKG P AXIS: 53 DEGREES
EKG P-R INTERVAL: 184 MS
EKG Q-T INTERVAL: 374 MS
EKG QRS DURATION: 88 MS
EKG QTC CALCULATION (BAZETT): 396 MS
EKG T AXIS: 50 DEGREES

## 2025-06-26 PROCEDURE — 93010 ELECTROCARDIOGRAM REPORT: CPT | Performed by: INTERNAL MEDICINE

## 2025-06-26 PROCEDURE — 93971 EXTREMITY STUDY: CPT | Performed by: SURGERY

## 2025-07-02 ENCOUNTER — PATIENT MESSAGE (OUTPATIENT)
Age: 50
End: 2025-07-02

## 2025-07-12 ENCOUNTER — HOSPITAL ENCOUNTER (EMERGENCY)
Age: 50
Discharge: HOME | End: 2025-07-12
Payer: MEDICARE

## 2025-07-12 ENCOUNTER — APPOINTMENT (OUTPATIENT)
Dept: GENERAL RADIOLOGY | Age: 50
End: 2025-07-12
Payer: MEDICARE

## 2025-07-12 VITALS
BODY MASS INDEX: 47.09 KG/M2 | TEMPERATURE: 97.2 F | WEIGHT: 293 LBS | DIASTOLIC BLOOD PRESSURE: 90 MMHG | SYSTOLIC BLOOD PRESSURE: 128 MMHG | RESPIRATION RATE: 20 BRPM | HEART RATE: 94 BPM | OXYGEN SATURATION: 94 % | HEIGHT: 66 IN

## 2025-07-12 DIAGNOSIS — M17.12 ARTHRITIS OF LEFT KNEE: Primary | ICD-10-CM

## 2025-07-12 PROCEDURE — 73560 X-RAY EXAM OF KNEE 1 OR 2: CPT

## 2025-07-12 PROCEDURE — 96372 THER/PROPH/DIAG INJ SC/IM: CPT

## 2025-07-12 PROCEDURE — 6360000002 HC RX W HCPCS

## 2025-07-12 PROCEDURE — 6370000000 HC RX 637 (ALT 250 FOR IP)

## 2025-07-12 PROCEDURE — 99284 EMERGENCY DEPT VISIT MOD MDM: CPT

## 2025-07-12 RX ORDER — PREDNISONE 50 MG/1
50 TABLET ORAL DAILY
Qty: 4 TABLET | Refills: 0 | Status: SHIPPED | OUTPATIENT
Start: 2025-07-13 | End: 2025-07-17

## 2025-07-12 RX ORDER — KETOROLAC TROMETHAMINE 30 MG/ML
30 INJECTION, SOLUTION INTRAMUSCULAR; INTRAVENOUS ONCE
Status: COMPLETED | OUTPATIENT
Start: 2025-07-12 | End: 2025-07-12

## 2025-07-12 RX ORDER — GINSENG 100 MG
CAPSULE ORAL ONCE
Status: COMPLETED | OUTPATIENT
Start: 2025-07-12 | End: 2025-07-12

## 2025-07-12 RX ADMIN — KETOROLAC TROMETHAMINE 30 MG: 30 INJECTION, SOLUTION INTRAMUSCULAR at 19:18

## 2025-07-12 RX ADMIN — PREDNISONE 50 MG: 10 TABLET ORAL at 19:17

## 2025-07-12 RX ADMIN — BACITRACIN: 500 OINTMENT TOPICAL at 19:46

## 2025-07-12 ASSESSMENT — PAIN DESCRIPTION - LOCATION: LOCATION: KNEE

## 2025-07-12 ASSESSMENT — PAIN SCALES - GENERAL
PAINLEVEL_OUTOF10: 7
PAINLEVEL_OUTOF10: 6

## 2025-07-12 ASSESSMENT — LIFESTYLE VARIABLES
HOW OFTEN DO YOU HAVE A DRINK CONTAINING ALCOHOL: NEVER
HOW MANY STANDARD DRINKS CONTAINING ALCOHOL DO YOU HAVE ON A TYPICAL DAY: PATIENT DOES NOT DRINK

## 2025-07-13 NOTE — ED PROVIDER NOTES
for soft tissue injury of the knee such as a meniscus injury, and she is comfortable following up with them for further imaging as needed  X-ray shows tricompartmental arthritis.  No acute fractures or dislocations noted.    Patient is advised to rest, ice, elevate the affected extremity, will start her on a prednisone burst for additional symptomatic control.  She is encouraged to take Tylenol and ibuprofen, and to use topical diclofenac as needed for knee pain.  We discussed the importance of keeping her follow-up appointment with Ortho on Monday, and she is encouraged to return if symptoms change or worsen.  Patient is in agreement with this plan.  See below for follow-up instructions and discharge medications.  Patient instructed to return to the ED for any new or worsening symptoms or concerns.  Discussed signs and symptoms most concerning that necessitate immediate return to the ED for reevaluation.  Patient verbalizes understanding of return precautions and follow-up recommendations.  Is given opportunity to ask any questions they may have.  Patient denies further question/comments/concerns, is understanding and agreeable to stated plan, and is discharged home in stable condition.     CONSULTS:  None    PROCEDURES:  Unless otherwise notedbelow, none     Procedures      FINAL IMPRESSION     1. Arthritis of left knee          DISPOSITION/PLAN   DISPOSITION Decision To Discharge 07/12/2025 07:52:11 PM   DISPOSITION CONDITION Stable           No notes of EC Admission Criteria type on file.    PATIENT REFERRED TO:  [facility] Emergency Department  [address] [id number] [phone]  Go to   If symptoms worsen      DISCHARGE MEDICATIONS:  Discharge Medication List as of 7/12/2025  7:52 PM        START taking these medications    Details   diclofenac sodium (VOLTAREN) 1 % GEL Apply 4 g topically 4 times daily for 7 days, Topical, 4 TIMES DAILY Starting Sat 7/12/2025, Until Sat 7/19/2025, For

## 2025-07-13 NOTE — DISCHARGE INSTRUCTIONS
Please follow-up with orthopedics on Monday as previously scheduled.  You may take Tylenol and ibuprofen for your pain, you should rest, ice, elevate the affected extremity, and you should take your steroid as prescribed for additional pain relief.  You may use topical Voltaren for further symptomatic management, and you should follow-up with orthopedics for further recommendations as previously scheduled on Monday.  Please return to the ED for any new or worsening symptoms or concerns

## 2025-07-14 ENCOUNTER — OFFICE VISIT (OUTPATIENT)
Age: 50
End: 2025-07-14
Payer: MEDICARE

## 2025-07-14 ENCOUNTER — OFFICE VISIT (OUTPATIENT)
Dept: PAIN MANAGEMENT | Age: 50
End: 2025-07-14
Payer: MEDICARE

## 2025-07-14 VITALS
BODY MASS INDEX: 47.09 KG/M2 | OXYGEN SATURATION: 95 % | RESPIRATION RATE: 16 BRPM | SYSTOLIC BLOOD PRESSURE: 121 MMHG | HEIGHT: 66 IN | WEIGHT: 293 LBS | HEART RATE: 94 BPM | TEMPERATURE: 96.6 F | DIASTOLIC BLOOD PRESSURE: 84 MMHG

## 2025-07-14 VITALS — HEIGHT: 66 IN | BODY MASS INDEX: 47.09 KG/M2 | WEIGHT: 293 LBS

## 2025-07-14 DIAGNOSIS — G89.29 CHRONIC BILATERAL LOW BACK PAIN WITHOUT SCIATICA: Primary | ICD-10-CM

## 2025-07-14 DIAGNOSIS — M48.062 SPINAL STENOSIS OF LUMBAR REGION WITH NEUROGENIC CLAUDICATION: ICD-10-CM

## 2025-07-14 DIAGNOSIS — M25.561 CHRONIC PAIN OF BOTH KNEES: ICD-10-CM

## 2025-07-14 DIAGNOSIS — M17.12 PRIMARY OSTEOARTHRITIS OF LEFT KNEE: ICD-10-CM

## 2025-07-14 DIAGNOSIS — M25.552 LEFT HIP PAIN: Primary | ICD-10-CM

## 2025-07-14 DIAGNOSIS — M54.50 CHRONIC BILATERAL LOW BACK PAIN WITHOUT SCIATICA: Primary | ICD-10-CM

## 2025-07-14 DIAGNOSIS — M16.12 PRIMARY OSTEOARTHRITIS OF LEFT HIP: ICD-10-CM

## 2025-07-14 DIAGNOSIS — G89.29 CHRONIC PAIN OF BOTH KNEES: ICD-10-CM

## 2025-07-14 DIAGNOSIS — G89.29 CHRONIC NECK PAIN: ICD-10-CM

## 2025-07-14 DIAGNOSIS — M54.2 CHRONIC NECK PAIN: ICD-10-CM

## 2025-07-14 DIAGNOSIS — M54.16 LUMBAR RADICULOPATHY: ICD-10-CM

## 2025-07-14 DIAGNOSIS — F11.90 CHRONIC, CONTINUOUS USE OF OPIOIDS: ICD-10-CM

## 2025-07-14 DIAGNOSIS — M54.10 BACK PAIN WITH LEFT-SIDED RADICULOPATHY: Chronic | ICD-10-CM

## 2025-07-14 DIAGNOSIS — M25.562 CHRONIC PAIN OF BOTH KNEES: ICD-10-CM

## 2025-07-14 DIAGNOSIS — M17.10 ARTHRITIS OF KNEE: ICD-10-CM

## 2025-07-14 PROCEDURE — 99203 OFFICE O/P NEW LOW 30 MIN: CPT | Performed by: ORTHOPAEDIC SURGERY

## 2025-07-14 PROCEDURE — 99214 OFFICE O/P EST MOD 30 MIN: CPT

## 2025-07-14 RX ORDER — OXYCODONE AND ACETAMINOPHEN 7.5; 325 MG/1; MG/1
1 TABLET ORAL EVERY 8 HOURS PRN
Qty: 90 TABLET | Refills: 0 | Status: CANCELLED | OUTPATIENT
Start: 2025-07-20 | End: 2025-08-19

## 2025-07-14 ASSESSMENT — ENCOUNTER SYMPTOMS
GASTROINTESTINAL NEGATIVE: 1
BACK PAIN: 1
EYES NEGATIVE: 1
RESPIRATORY NEGATIVE: 1

## 2025-07-14 NOTE — PROGRESS NOTES
[facility] Physical & Pain Medicine  [address]  [place] [address]  Phone: [phone]  Fax: [phone]    Follow Up Office Visit    Patient Name: Evelyn Moore    MR #: [id number]    Account #:    : 1975    Age: 49 y.o.    Sex: female    Date: 2025     PCP: OMAR Dhillon DO    Chief Complaint:   Chief Complaint   Patient presents with    Follow-up    Back Pain     6/10    Neck Pain     5/10    Knee Pain     Bilateral, 9/10       History of Present Illness:   History of Present Illness  The patient presents for a routine visit to manage chronic low back pain, chronic neck pain, and chronic bilateral knee pain. She reports her low back pain as 6 out of 10, neck pain as 5 out of 10, and knee pain as 9 out of 10.     She was seen by Dr. Corral with [facility] for back pain with left-sided radiculopathy and primary osteoarthritis of the left hip. Recommendations for an L3-4 epidural injection were recommended, and an MRI of the lumbar spine was ordered today by Dr. Corral.    The patient reports that her previous epidural injection at the L2-3 level in 2025 provided some relief. However, she has been experiencing severe knee pain for the past 2 weeks, which led to an emergency room visit due to difficulty walking. The pain radiates into her groin area and is described as a sharp, tearing sensation. An ultrasound revealed a Baker's cyst, but it was deemed too small to cause significant discomfort. The attending physician suggested that arthritis might be the primary cause of her symptoms and recommended a knee replacement. She also reports sensitivity to touch in her back. Patient continues to have low back pain with radicular pain down left lower extremity to her left knee with associated numbness, tingling, and weakness.    She recalls experiencing severe hip pain during a recent x-ray procedure. Currently, she is taking Percocet three times

## 2025-07-14 NOTE — PROGRESS NOTES
[place] [facility]  [facility] ORTHOPED[facility]  [address]  [facility] [id number] [phone]         Evelyn Moore (: 1975) is a 49 y.o. female, patient, here for evaluation of the following chief complaint(s): Hip Pain (Left )  .         Patient's PCP: OMAR Dhillon DO     Patient's Last Appointment in this Department was on Visit date not found      Subjective:     Chief Complaint   Patient presents with    Hip Pain     Left         History of Present Illness  The patient presents for evaluation of left knee pain, left hip pain, and back pain.    She has been experiencing left hip and knee discomfort for the past 2 weeks, following a diagnosis of Baker's cyst. The knee pain is localized to the front and medial side, and is sensitive to touch. She reports no injury to the knee and notes that the condition has remained unchanged. The pain intensifies during walking and standing, necessitating the use of a cane in her right hand. She has been using a cane for mobility and occasionally finds it difficult to bear weight on the knee. She rates the knee pain as more severe than the hip pain. She was diagnosed with a Baker's cyst following an ultrasound at [facility]. She underwent x-rays of her knee last Saturday due to increased sensitivity to touch. She has been receiving injections from [facility] and had an injection in her knee approximately a month ago.    She also reports groin pain, which she describes as a sensation of muscle tearing extending from her leg to her hip. This pain began concurrently with the onset of her knee pain. She has a history of right hip replacement in 2019 by Dr. Hightower, which provided relief. She is uncertain if the current left hip pain is similar to the pre-surgery right hip pain. She also had a right slipped capital femoral epiphysis at age 11, which was treated with three screws and in 2019 with

## 2025-07-17 ENCOUNTER — TELEPHONE (OUTPATIENT)
Dept: NEUROSURGERY | Age: 50
End: 2025-07-17

## 2025-07-21 ENCOUNTER — HOSPITAL ENCOUNTER (OUTPATIENT)
Dept: MRI IMAGING | Age: 50
Discharge: HOME | End: 2025-07-21
Payer: MEDICARE

## 2025-07-21 DIAGNOSIS — M54.10 BACK PAIN WITH LEFT-SIDED RADICULOPATHY: Chronic | ICD-10-CM

## 2025-07-21 PROCEDURE — 72148 MRI LUMBAR SPINE W/O DYE: CPT

## 2025-07-24 ENCOUNTER — PATIENT MESSAGE (OUTPATIENT)
Age: 50
End: 2025-07-24

## 2025-07-24 DIAGNOSIS — I10 PRIMARY HYPERTENSION: ICD-10-CM

## 2025-07-24 DIAGNOSIS — R73.03 PREDIABETES: ICD-10-CM

## 2025-07-24 DIAGNOSIS — R73.03 PREDIABETES: Primary | ICD-10-CM

## 2025-07-24 DIAGNOSIS — E78.2 MIXED HYPERLIPIDEMIA: ICD-10-CM

## 2025-07-24 LAB
ALBUMIN SERPL-MCNC: 3.5 G/DL (ref 3.5–5.2)
ALP SERPL-CCNC: 108 U/L (ref 35–104)
ALT SERPL-CCNC: 21 U/L (ref 10–35)
ANION GAP SERPL CALCULATED.3IONS-SCNC: 12 MMOL/L (ref 8–16)
AST SERPL-CCNC: 24 U/L (ref 10–35)
BASOPHILS # BLD: 0 K/UL (ref 0–0.2)
BASOPHILS NFR BLD: 0 % (ref 0–1)
BILIRUB SERPL-MCNC: <0.2 MG/DL (ref 0.2–1.2)
BUN SERPL-MCNC: 8 MG/DL (ref 6–20)
CALCIUM SERPL-MCNC: 8.8 MG/DL (ref 8.6–10)
CHLORIDE SERPL-SCNC: 107 MMOL/L (ref 98–107)
CHOLEST SERPL-MCNC: 214 MG/DL (ref 0–199)
CO2 SERPL-SCNC: 23 MMOL/L (ref 22–29)
CREAT SERPL-MCNC: 0.7 MG/DL (ref 0.5–0.9)
EOSINOPHIL # BLD: 0.45 K/UL (ref 0–0.6)
EOSINOPHIL NFR BLD: 3 % (ref 0–5)
ERYTHROCYTE [DISTWIDTH] IN BLOOD BY AUTOMATED COUNT: 14 % (ref 11.5–14.5)
GLUCOSE SERPL-MCNC: 132 MG/DL (ref 70–99)
HBA1C MFR BLD: 5.8 % (ref 4–5.6)
HCT VFR BLD AUTO: 41.5 % (ref 37–47)
HDLC SERPL-MCNC: 50 MG/DL (ref 40–60)
HGB BLD-MCNC: 14 G/DL (ref 12–16)
IMM GRANULOCYTES # BLD: 0.1 K/UL
LDLC SERPL CALC-MCNC: 134 MG/DL
LYMPHOCYTES # BLD: 6.4 K/UL (ref 1.1–4.5)
LYMPHOCYTES NFR BLD: 41 % (ref 20–40)
MCH RBC QN AUTO: 29.7 PG (ref 27–31)
MCHC RBC AUTO-ENTMCNC: 33.7 G/DL (ref 33–37)
MCV RBC AUTO: 88.1 FL (ref 81–99)
MONOCYTES # BLD: 1 K/UL (ref 0–0.9)
MONOCYTES NFR BLD: 7 % (ref 0–10)
MYELOCYTES NFR BLD MANUAL: 2 %
NEUTROPHILS # BLD: 7 K/UL (ref 1.5–7.5)
NEUTS SEG NFR BLD: 45 % (ref 50–65)
OVALOCYTES BLD QL SMEAR: ABNORMAL
PLATELET # BLD AUTO: 633 K/UL (ref 130–400)
PLATELET SLIDE REVIEW: ABNORMAL
PMV BLD AUTO: 9.2 FL (ref 9.4–12.3)
POTASSIUM SERPL-SCNC: 3.8 MMOL/L (ref 3.5–5.1)
PROT SERPL-MCNC: 6.5 G/DL (ref 6.4–8.3)
RBC # BLD AUTO: 4.71 M/UL (ref 4.2–5.4)
SODIUM SERPL-SCNC: 142 MMOL/L (ref 136–145)
T4 FREE SERPL-MCNC: 1.43 NG/DL (ref 0.93–1.7)
TRIGL SERPL-MCNC: 152 MG/DL (ref 0–149)
TSH SERPL DL<=0.005 MIU/L-ACNC: 2.73 UIU/ML (ref 0.27–4.2)
VARIANT LYMPHS NFR BLD: 2 % (ref 0–8)
WBC # BLD AUTO: 14.9 K/UL (ref 4.8–10.8)

## 2025-07-24 NOTE — TELEPHONE ENCOUNTER
We can try to send in her Mounjaro at starting dose.  I do not think we will get it covered but we can at least try.

## 2025-07-26 ENCOUNTER — HOSPITAL ENCOUNTER (OUTPATIENT)
Age: 50
Setting detail: OBSERVATION
Discharge: HOME | End: 2025-07-27
Attending: EMERGENCY MEDICINE | Admitting: STUDENT IN AN ORGANIZED HEALTH CARE EDUCATION/TRAINING PROGRAM
Payer: MEDICARE

## 2025-07-26 ENCOUNTER — APPOINTMENT (OUTPATIENT)
Dept: GENERAL RADIOLOGY | Age: 50
End: 2025-07-26
Payer: MEDICARE

## 2025-07-26 ENCOUNTER — APPOINTMENT (OUTPATIENT)
Dept: CT IMAGING | Age: 50
End: 2025-07-26
Payer: MEDICARE

## 2025-07-26 DIAGNOSIS — R41.82 ALTERED MENTAL STATUS, UNSPECIFIED ALTERED MENTAL STATUS TYPE: Primary | ICD-10-CM

## 2025-07-26 DIAGNOSIS — R44.3 HALLUCINATIONS: ICD-10-CM

## 2025-07-26 DIAGNOSIS — Z91.89 AT RISK FOR POLYPHARMACY: ICD-10-CM

## 2025-07-26 DIAGNOSIS — M79.18 MYOFASCIAL MUSCLE PAIN: ICD-10-CM

## 2025-07-26 PROBLEM — B00.1 HERPES LABIALIS WITHOUT COMPLICATION: Status: ACTIVE | Noted: 2025-07-26

## 2025-07-26 LAB
ALBUMIN SERPL-MCNC: 3.8 G/DL (ref 3.5–5.2)
ALP SERPL-CCNC: 107 U/L (ref 35–104)
ALT SERPL-CCNC: 22 U/L (ref 10–35)
AMMONIA PLAS-SCNC: 20 UMOL/L (ref 11–51)
AMPHET UR QL SCN: NEGATIVE
ANION GAP SERPL CALCULATED.3IONS-SCNC: 10 MMOL/L (ref 8–16)
ANISOCYTOSIS BLD QL SMEAR: ABNORMAL
APTT PPP: 23.5 SEC (ref 26–36.2)
AST SERPL-CCNC: 21 U/L (ref 10–35)
BACTERIA #/AREA URNS HPF: ABNORMAL /HPF
BARBITURATES UR QL SCN: NEGATIVE
BASE EXCESS VENOUS: -1 MMOL/L
BASOPHILS # BLD: 0.2 K/UL (ref 0–0.2)
BASOPHILS NFR BLD: 1 % (ref 0–1)
BENZODIAZ UR QL SCN: NEGATIVE
BILIRUB SERPL-MCNC: <0.2 MG/DL (ref 0.2–1.2)
BILIRUB UR QL STRIP: NEGATIVE
BUN SERPL-MCNC: 10 MG/DL (ref 6–20)
BUPRENORPHINE URINE: NEGATIVE
CALCIUM SERPL-MCNC: 9.1 MG/DL (ref 8.6–10)
CANNABINOIDS UR QL SCN: NEGATIVE
CARBOXYHEMOGLOBIN: 2.6 %
CHLORIDE SERPL-SCNC: 106 MMOL/L (ref 98–107)
CLARITY UR: CLEAR
CO2 SERPL-SCNC: 24 MMOL/L (ref 22–29)
COCAINE UR QL SCN: NEGATIVE
COLOR UR: YELLOW
CREAT SERPL-MCNC: 0.8 MG/DL (ref 0.5–0.9)
DRUG SCREEN COMMENT UR-IMP: ABNORMAL
EOSINOPHIL # BLD: 0.49 K/UL (ref 0–0.6)
EOSINOPHIL NFR BLD: 3 % (ref 0–5)
ERYTHROCYTE [DISTWIDTH] IN BLOOD BY AUTOMATED COUNT: 14.3 % (ref 11.5–14.5)
ETHANOLAMINE SERPL-MCNC: <11 MG/DL (ref 0–11)
FENTANYL SCREEN, URINE: NEGATIVE
GLUCOSE BLD-MCNC: 116 MG/DL
GLUCOSE BLD-MCNC: 116 MG/DL (ref 70–99)
GLUCOSE SERPL-MCNC: 113 MG/DL (ref 70–99)
GLUCOSE UR STRIP.AUTO-MCNC: NEGATIVE MG/DL
HCO3 VENOUS: 24 MMOL/L (ref 23–29)
HCT VFR BLD AUTO: 41.2 % (ref 37–47)
HGB BLD-MCNC: 13.5 G/DL (ref 12–16)
HGB UR STRIP.AUTO-MCNC: NEGATIVE MG/L
IMM GRANULOCYTES # BLD: 0.1 K/UL
INR PPP: 0.91 (ref 0.88–1.18)
KETONES UR STRIP.AUTO-MCNC: NEGATIVE MG/DL
LEUKOCYTE ESTERASE UR QL STRIP.AUTO: ABNORMAL
LYMPHOCYTES # BLD: 5.9 K/UL (ref 1.1–4.5)
LYMPHOCYTES NFR BLD: 36 % (ref 20–40)
MCH RBC QN AUTO: 29.3 PG (ref 27–31)
MCHC RBC AUTO-ENTMCNC: 32.8 G/DL (ref 33–37)
MCV RBC AUTO: 89.6 FL (ref 81–99)
METHADONE UR QL SCN: NEGATIVE
METHAMPHETAMINE, URINE: NEGATIVE
METHEMOGLOBIN VENOUS: 1.2 %
MONOCYTES # BLD: 1 K/UL (ref 0–0.9)
MONOCYTES NFR BLD: 6 % (ref 0–10)
NEUTROPHILS # BLD: 8.8 K/UL (ref 1.5–7.5)
NEUTS BAND NFR BLD MANUAL: 3 % (ref 0–5)
NEUTS SEG NFR BLD: 51 % (ref 50–65)
NITRITE UR QL STRIP.AUTO: NEGATIVE
O2 CONTENT, VEN: 15 ML/DL
O2 SAT, VEN: 75 %
O2 THERAPY: NORMAL
OPIATES UR QL SCN: NEGATIVE
OVALOCYTES BLD QL SMEAR: ABNORMAL
OXYCODONE UR QL SCN: POSITIVE
PCO2 VENOUS: 42 MMHG (ref 40–50)
PCP UR QL SCN: NEGATIVE
PERFORMED ON: ABNORMAL
PH UR STRIP.AUTO: 6 [PH] (ref 5–8)
PH VENOUS: 7.37 (ref 7.35–7.45)
PLATELET # BLD AUTO: 592 K/UL (ref 130–400)
PLATELET SLIDE REVIEW: ABNORMAL
PMV BLD AUTO: 9.8 FL (ref 9.4–12.3)
PO2 VENOUS: 42 MMHG
POTASSIUM SERPL-SCNC: 3.6 MMOL/L (ref 3.5–5.1)
PROT SERPL-MCNC: 6.4 G/DL (ref 6.4–8.3)
PROT UR STRIP.AUTO-MCNC: NEGATIVE MG/DL
PROTHROMBIN TIME: 12.3 SEC (ref 12–14.6)
RBC # BLD AUTO: 4.6 M/UL (ref 4.2–5.4)
RBC #/AREA URNS HPF: ABNORMAL /HPF (ref 0–2)
REASON FOR REJECTION: NORMAL
REJECTED TEST: NORMAL
SODIUM SERPL-SCNC: 140 MMOL/L (ref 136–145)
SP GR UR STRIP.AUTO: 1.01 (ref 1–1.03)
SQUAMOUS #/AREA URNS HPF: ABNORMAL /HPF
TRICYCLIC ANTIDEPRESSANTS, UR: POSITIVE
TSH SERPL DL<=0.005 MIU/L-ACNC: 3.3 UIU/ML (ref 0.27–4.2)
UROBILINOGEN UR STRIP.AUTO-MCNC: 0.2 E.U./DL
WBC # BLD AUTO: 16.3 K/UL (ref 4.8–10.8)
WBC #/AREA URNS HPF: ABNORMAL /HPF (ref 0–5)

## 2025-07-26 PROCEDURE — 82962 GLUCOSE BLOOD TEST: CPT

## 2025-07-26 PROCEDURE — 85730 THROMBOPLASTIN TIME PARTIAL: CPT

## 2025-07-26 PROCEDURE — 82800 BLOOD PH: CPT

## 2025-07-26 PROCEDURE — 96372 THER/PROPH/DIAG INJ SC/IM: CPT

## 2025-07-26 PROCEDURE — 81001 URINALYSIS AUTO W/SCOPE: CPT

## 2025-07-26 PROCEDURE — 84443 ASSAY THYROID STIM HORMONE: CPT

## 2025-07-26 PROCEDURE — 85610 PROTHROMBIN TIME: CPT

## 2025-07-26 PROCEDURE — G0378 HOSPITAL OBSERVATION PER HR: HCPCS

## 2025-07-26 PROCEDURE — 80053 COMPREHEN METABOLIC PANEL: CPT

## 2025-07-26 PROCEDURE — 6360000002 HC RX W HCPCS: Performed by: EMERGENCY MEDICINE

## 2025-07-26 PROCEDURE — 6360000002 HC RX W HCPCS

## 2025-07-26 PROCEDURE — 82077 ASSAY SPEC XCP UR&BREATH IA: CPT

## 2025-07-26 PROCEDURE — 82803 BLOOD GASES ANY COMBINATION: CPT

## 2025-07-26 PROCEDURE — 82140 ASSAY OF AMMONIA: CPT

## 2025-07-26 PROCEDURE — 71045 X-RAY EXAM CHEST 1 VIEW: CPT

## 2025-07-26 PROCEDURE — 6370000000 HC RX 637 (ALT 250 FOR IP)

## 2025-07-26 PROCEDURE — 80307 DRUG TEST PRSMV CHEM ANLYZR: CPT

## 2025-07-26 PROCEDURE — 96374 THER/PROPH/DIAG INJ IV PUSH: CPT

## 2025-07-26 PROCEDURE — 2500000003 HC RX 250 WO HCPCS

## 2025-07-26 PROCEDURE — 93005 ELECTROCARDIOGRAM TRACING: CPT | Performed by: EMERGENCY MEDICINE

## 2025-07-26 PROCEDURE — G0480 DRUG TEST DEF 1-7 CLASSES: HCPCS

## 2025-07-26 PROCEDURE — 72125 CT NECK SPINE W/O DYE: CPT

## 2025-07-26 PROCEDURE — 36415 COLL VENOUS BLD VENIPUNCTURE: CPT

## 2025-07-26 PROCEDURE — 6370000000 HC RX 637 (ALT 250 FOR IP): Performed by: STUDENT IN AN ORGANIZED HEALTH CARE EDUCATION/TRAINING PROGRAM

## 2025-07-26 PROCEDURE — 70450 CT HEAD/BRAIN W/O DYE: CPT

## 2025-07-26 PROCEDURE — 99285 EMERGENCY DEPT VISIT HI MDM: CPT

## 2025-07-26 PROCEDURE — 85025 COMPLETE CBC W/AUTO DIFF WBC: CPT

## 2025-07-26 RX ORDER — AMLODIPINE BESYLATE 5 MG/1
5 TABLET ORAL DAILY
Status: DISCONTINUED | OUTPATIENT
Start: 2025-07-26 | End: 2025-07-27 | Stop reason: HOSPADM

## 2025-07-26 RX ORDER — ONDANSETRON 2 MG/ML
4 INJECTION INTRAMUSCULAR; INTRAVENOUS EVERY 6 HOURS PRN
Status: DISCONTINUED | OUTPATIENT
Start: 2025-07-26 | End: 2025-07-27 | Stop reason: HOSPADM

## 2025-07-26 RX ORDER — SODIUM CHLORIDE 9 MG/ML
INJECTION, SOLUTION INTRAVENOUS PRN
Status: DISCONTINUED | OUTPATIENT
Start: 2025-07-26 | End: 2025-07-27 | Stop reason: HOSPADM

## 2025-07-26 RX ORDER — POTASSIUM CHLORIDE 7.45 MG/ML
10 INJECTION INTRAVENOUS PRN
Status: DISCONTINUED | OUTPATIENT
Start: 2025-07-26 | End: 2025-07-27 | Stop reason: HOSPADM

## 2025-07-26 RX ORDER — NALOXONE HYDROCHLORIDE 0.4 MG/ML
0.4 INJECTION, SOLUTION INTRAMUSCULAR; INTRAVENOUS; SUBCUTANEOUS ONCE
Status: COMPLETED | OUTPATIENT
Start: 2025-07-26 | End: 2025-07-26

## 2025-07-26 RX ORDER — ENOXAPARIN SODIUM 100 MG/ML
30 INJECTION SUBCUTANEOUS 2 TIMES DAILY
Status: DISCONTINUED | OUTPATIENT
Start: 2025-07-26 | End: 2025-07-27 | Stop reason: HOSPADM

## 2025-07-26 RX ORDER — SODIUM CHLORIDE 0.9 % (FLUSH) 0.9 %
5-40 SYRINGE (ML) INJECTION EVERY 12 HOURS SCHEDULED
Status: DISCONTINUED | OUTPATIENT
Start: 2025-07-26 | End: 2025-07-27 | Stop reason: HOSPADM

## 2025-07-26 RX ORDER — ACETAMINOPHEN 325 MG/1
650 TABLET ORAL EVERY 6 HOURS PRN
Status: DISCONTINUED | OUTPATIENT
Start: 2025-07-26 | End: 2025-07-27 | Stop reason: HOSPADM

## 2025-07-26 RX ORDER — METOPROLOL SUCCINATE 50 MG/1
50 TABLET, EXTENDED RELEASE ORAL 2 TIMES DAILY
Status: DISCONTINUED | OUTPATIENT
Start: 2025-07-26 | End: 2025-07-27 | Stop reason: HOSPADM

## 2025-07-26 RX ORDER — ACYCLOVIR 800 MG/1
800 TABLET ORAL DAILY
Status: DISCONTINUED | OUTPATIENT
Start: 2025-07-26 | End: 2025-07-27 | Stop reason: HOSPADM

## 2025-07-26 RX ORDER — PANTOPRAZOLE SODIUM 40 MG/1
40 TABLET, DELAYED RELEASE ORAL
Status: DISCONTINUED | OUTPATIENT
Start: 2025-07-27 | End: 2025-07-27 | Stop reason: HOSPADM

## 2025-07-26 RX ORDER — CETIRIZINE HYDROCHLORIDE 10 MG/1
5 TABLET ORAL NIGHTLY
Status: DISCONTINUED | OUTPATIENT
Start: 2025-07-26 | End: 2025-07-27 | Stop reason: HOSPADM

## 2025-07-26 RX ORDER — TOPIRAMATE 50 MG/1
50 TABLET, FILM COATED ORAL 2 TIMES DAILY
Status: DISCONTINUED | OUTPATIENT
Start: 2025-07-26 | End: 2025-07-27 | Stop reason: HOSPADM

## 2025-07-26 RX ORDER — ONDANSETRON 4 MG/1
4 TABLET, ORALLY DISINTEGRATING ORAL EVERY 8 HOURS PRN
Status: DISCONTINUED | OUTPATIENT
Start: 2025-07-26 | End: 2025-07-27 | Stop reason: HOSPADM

## 2025-07-26 RX ORDER — POTASSIUM CHLORIDE 1500 MG/1
40 TABLET, EXTENDED RELEASE ORAL PRN
Status: DISCONTINUED | OUTPATIENT
Start: 2025-07-26 | End: 2025-07-27 | Stop reason: HOSPADM

## 2025-07-26 RX ORDER — ACETAMINOPHEN 650 MG/1
650 SUPPOSITORY RECTAL EVERY 6 HOURS PRN
Status: DISCONTINUED | OUTPATIENT
Start: 2025-07-26 | End: 2025-07-27 | Stop reason: HOSPADM

## 2025-07-26 RX ORDER — PREGABALIN 50 MG/1
100 CAPSULE ORAL 2 TIMES DAILY
Status: DISCONTINUED | OUTPATIENT
Start: 2025-07-26 | End: 2025-07-27 | Stop reason: HOSPADM

## 2025-07-26 RX ORDER — VENLAFAXINE HYDROCHLORIDE 37.5 MG/1
75 CAPSULE, EXTENDED RELEASE ORAL
Status: DISCONTINUED | OUTPATIENT
Start: 2025-07-27 | End: 2025-07-27 | Stop reason: HOSPADM

## 2025-07-26 RX ORDER — BUPROPION HYDROCHLORIDE 150 MG/1
300 TABLET ORAL 2 TIMES DAILY
Status: DISCONTINUED | OUTPATIENT
Start: 2025-07-26 | End: 2025-07-27 | Stop reason: HOSPADM

## 2025-07-26 RX ORDER — MAGNESIUM SULFATE IN WATER 40 MG/ML
2000 INJECTION, SOLUTION INTRAVENOUS PRN
Status: DISCONTINUED | OUTPATIENT
Start: 2025-07-26 | End: 2025-07-27 | Stop reason: HOSPADM

## 2025-07-26 RX ORDER — POLYETHYLENE GLYCOL 3350 17 G/17G
17 POWDER, FOR SOLUTION ORAL DAILY PRN
Status: DISCONTINUED | OUTPATIENT
Start: 2025-07-26 | End: 2025-07-27 | Stop reason: HOSPADM

## 2025-07-26 RX ORDER — SODIUM CHLORIDE 0.9 % (FLUSH) 0.9 %
5-40 SYRINGE (ML) INJECTION PRN
Status: DISCONTINUED | OUTPATIENT
Start: 2025-07-26 | End: 2025-07-27 | Stop reason: HOSPADM

## 2025-07-26 RX ORDER — MELOXICAM 7.5 MG/1
15 TABLET ORAL DAILY
Status: DISCONTINUED | OUTPATIENT
Start: 2025-07-26 | End: 2025-07-27 | Stop reason: HOSPADM

## 2025-07-26 RX ADMIN — ENOXAPARIN SODIUM 30 MG: 100 INJECTION SUBCUTANEOUS at 20:29

## 2025-07-26 RX ADMIN — BUPROPION HYDROCHLORIDE 300 MG: 150 TABLET, EXTENDED RELEASE ORAL at 21:17

## 2025-07-26 RX ADMIN — ACYCLOVIR 800 MG: 800 TABLET ORAL at 21:42

## 2025-07-26 RX ADMIN — SODIUM CHLORIDE, PRESERVATIVE FREE 10 ML: 5 INJECTION INTRAVENOUS at 21:36

## 2025-07-26 RX ADMIN — MELOXICAM 15 MG: 7.5 TABLET ORAL at 21:42

## 2025-07-26 RX ADMIN — ACETAMINOPHEN 650 MG: 325 TABLET ORAL at 20:28

## 2025-07-26 RX ADMIN — CETIRIZINE HYDROCHLORIDE 5 MG: 10 TABLET, FILM COATED ORAL at 21:41

## 2025-07-26 RX ADMIN — TOPIRAMATE 50 MG: 50 TABLET, FILM COATED ORAL at 20:28

## 2025-07-26 RX ADMIN — PREGABALIN 100 MG: 50 CAPSULE ORAL at 20:28

## 2025-07-26 RX ADMIN — NALXONE HYDROCHLORIDE 0.4 MG: 0.4 INJECTION INTRAMUSCULAR; INTRAVENOUS; SUBCUTANEOUS at 13:30

## 2025-07-26 RX ADMIN — METOPROLOL SUCCINATE 50 MG: 50 TABLET, EXTENDED RELEASE ORAL at 20:28

## 2025-07-26 RX ADMIN — AMLODIPINE BESYLATE 5 MG: 5 TABLET ORAL at 21:41

## 2025-07-26 SDOH — ECONOMIC STABILITY: INCOME INSECURITY: IN THE PAST 12 MONTHS, HAS THE ELECTRIC, GAS, OIL, OR WATER COMPANY THREATENED TO SHUT OFF SERVICE IN YOUR HOME?: NO

## 2025-07-26 SDOH — ECONOMIC STABILITY: FOOD INSECURITY: WITHIN THE PAST 12 MONTHS, YOU WORRIED THAT YOUR FOOD WOULD RUN OUT BEFORE YOU GOT MONEY TO BUY MORE.: NEVER TRUE

## 2025-07-26 SDOH — ECONOMIC STABILITY: INCOME INSECURITY: HOW HARD IS IT FOR YOU TO PAY FOR THE VERY BASICS LIKE FOOD, HOUSING, MEDICAL CARE, AND HEATING?: NOT HARD AT ALL

## 2025-07-26 ASSESSMENT — PAIN - FUNCTIONAL ASSESSMENT
PAIN_FUNCTIONAL_ASSESSMENT: PREVENTS OR INTERFERES SOME ACTIVE ACTIVITIES AND ADLS
PAIN_FUNCTIONAL_ASSESSMENT: PREVENTS OR INTERFERES SOME ACTIVE ACTIVITIES AND ADLS

## 2025-07-26 ASSESSMENT — PATIENT HEALTH QUESTIONNAIRE - PHQ9
SUM OF ALL RESPONSES TO PHQ QUESTIONS 1-9: 0
1. LITTLE INTEREST OR PLEASURE IN DOING THINGS: NOT AT ALL
SUM OF ALL RESPONSES TO PHQ QUESTIONS 1-9: 0
2. FEELING DOWN, DEPRESSED OR HOPELESS: NOT AT ALL

## 2025-07-26 ASSESSMENT — PAIN DESCRIPTION - LOCATION
LOCATION: BACK
LOCATION: BACK

## 2025-07-26 ASSESSMENT — PAIN DESCRIPTION - DESCRIPTORS
DESCRIPTORS: DISCOMFORT;ACHING
DESCRIPTORS: ACHING;DISCOMFORT

## 2025-07-26 ASSESSMENT — PAIN SCALES - GENERAL
PAINLEVEL_OUTOF10: 1
PAINLEVEL_OUTOF10: 6
PAINLEVEL_OUTOF10: 1

## 2025-07-26 ASSESSMENT — PAIN DESCRIPTION - ORIENTATION
ORIENTATION: MID
ORIENTATION: MID

## 2025-07-26 NOTE — PROGRESS NOTES
4 Eyes Skin Assessment     NAME:  Evelyn Moore  YOB: 1975  MEDICAL RECORD NUMBER:  [id number]    The patient is being assessed for  Admission    I agree that at least one RN has performed a thorough Head to Toe Skin Assessment on the patient. ALL assessment sites listed below have been assessed.      Areas assessed by both nurses:    Head, Face, Ears, Shoulders, Back, Chest, Arms, Elbows, Hands, Sacrum. Buttock, Coccyx, Ischium, Legs. Feet and Heels, and Under Medical Devices         Does the Patient have a Wound? No noted wound(s)       Beka Prevention initiated by RN: Yes  Wound Care Orders initiated by RN: No    For hospital-acquired stage 1 & 2 and ALL Stage 3,4, Unstageable, DTI, NWPT, and Complex wounds: place order “IP Wound Care/Ostomy Nurse Eval and Treat” by RN under : No    New Ostomies, if present place, Ostomy referral order under : No     Nurse 1 eSignature: Electronically signed by Pretty Thompson RN on 7/26/25 at 5:54 PM CDT    **SHARE this note so that the co-signing nurse can place an eSignature**    Nurse 2 eSignature: Electronically signed by Tristin Poon RN on 7/26/25 at 5:56 PM CDT

## 2025-07-26 NOTE — H&P
[facility]      Hospitalist - History & Physical      PCP: OMAR Dhillon DO    Date of Admission: 7/26/2025    Date of Service: 7/26/2025    Chief Complaint:  Altered mental status    History Of Present Illness:   The patient is a 49 y.o. female with anxiety and depression, cirrhosis, fibromyalgia, GERD, HTN, RLS comes to ED for further evaluation of altered mental status. Patient states that she has been having confusion, auditory and visual hallucinations, and fatigue since last night. Patient is on multiple medications that can cause confusion and have sedating effects. She is also on Endocet as well for pain and was given narcan in the ED with some improvement in mental status. Patient is alert and oriented at this time. Patient denies any urinary fevers. Denies fever, chills, nausea, vomiting, abdominal pain, shortness of breath, and chest pain.     In ED: CXR with no acute radiographic abnormality; CT head with no acute intracranial abnormality; CT cervical spine with loss of normal cervical lordosis suggesting paraspinal muscle spasm with no acute findings; UDS positive for opiates and TCAs; UA unremarkable for UTI; WBC 16.3, H&H 13.5/41.2, CMP unremarkable.  Patient will be placed in observation to hospitalist.    Past Medical History:        Diagnosis Date    Acute pain of right shoulder 09/26/2022    Anemia     has had iron infusion    Anxiety     Anxiety and depression 07/17/2023    Arthritis     Asthma     Back pain with left-sided radiculopathy 03/14/2016    BRBPR (bright red blood per rectum) 04/04/2017    Cancer (HCC) 03/2022    Carpal tunnel syndrome on both sides 02/06/2019    Cervical disc disorder     Chronic back pain     Chronic bilateral lower abdominal pain 04/04/2017    Cirrhosis (HCC)     DDD (degenerative disc disease), lumbar     Depression     Drug-induced constipation     Environmental allergies 07/17/2023    Esophagitis     Exacerbation of chronic back pain

## 2025-07-26 NOTE — ED PROVIDER NOTES
[facility] EMERGENCY DEPARTMENT  eMERGENCY dEPARTMENT eNCOUnter      Pt Name: Evelyn Moore  MRN: [id number]  Birthdate 1975  Date of evaluation: 7/26/2025  Provider: LOIDA OJEDA MD    CHIEF COMPLAINT       Chief Complaint   Patient presents with    Altered Mental Status     LKW per daughter 1600 yesterday. Family states she called pt this AM around 0630 and pt was stating she was at the doctors office and talking to people that weren't there. Pt A&Ox3 in triage but drowsy          HISTORY OF PRESENT ILLNESS   (Location/Symptom, Timing/Onset,Context/Setting, Quality, Duration, Modifying Factors, Severity)  Note limiting factors.   Evelyn Moore is a 49 y.o. female who presents to the emergency department for altered mental status.  Daughter spoke with her yesterday afternoon around 1600 and was normal state of health at that time.  She called this morning around 0630 discussed with her leaving a little early before going to get her daughter at [employer] and noticed that she seems somewhat confused and also in text messages.  Patient mention being at a doctor's office.  Patient does states she got tripped last night and fell on the night.  Denies loss of consciousness.  She is sleepy here but alert and oriented.  States when talking to people she can see the face of who she is talking to and has been talking to multiple family members per daughter at the bedside.  Reports poor water intake denies any GI losses.  Denies being in any pain except for usual chronic pain.  Has not taken any additional medication.  No recent medication changes.  States last couple days urine has noticeably seemed a little thicker but denies any obvious dysuria or frequency.  Has been ambulatory this morning per daughter and initially was driving them that noticed her mental status and ultimately drove her here to the ER for further evaluation.    HPI    NursingNotes were reviewed.    REVIEW OF SYSTEMS    (2-9 systems for

## 2025-07-26 NOTE — ED NOTES
ED TO INPATIENT SBAR HANDOFF    Patient Name: Evelyn Moore   : 1975  49 y.o.   Family/Caregiver Present: Yes  Code Status Order: No Order    C-SSRS: Risk of Suicide: No Risk  Sitter No  Restraints:         Situation  Chief Complaint:   Chief Complaint   Patient presents with    Altered Mental Status     LKW per daughter 1600 yesterday. Family states she called pt this AM around 0630 and pt was stating she was at the doctors office and talking to people that weren't there. Pt A&Ox3 in triage but drowsy      Patient Diagnosis: Altered mental status [R41.82]     Brief Description of Patient's Condition: 49 y.o. female who presents to the emergency department for altered mental status.  Daughter spoke with her yesterday afternoon around 1600 and was normal state of health at that time.  She called this morning around 0630 discussed with her leaving a little early before going to get her daughter at [employer] and noticed that she seems somewhat confused and also in text messages.  Patient mention being at a doctor's office.  Patient does states she got tripped last night and fell on the night.  Denies loss of consciousness.  She is sleepy here but alert and oriented.  States when talking to people she can see the face of who she is talking to and has been talking to multiple family members per daughter at the bedside.  Reports poor water intake denies any GI losses.  Denies being in any pain except for usual chronic pain.  Has not taken any additional medication.  No recent medication changes.  States last couple days urine has noticeably seemed a little thicker but denies any obvious dysuria or frequency.  Has been ambulatory this morning per daughter and initially was driving them that noticed her mental status and ultimately drove her here to the ER for further evaluation.     Mental Status: oriented, alert, coherent, logical, thought processes intact, and able to concentrate and follow

## 2025-07-27 VITALS
HEART RATE: 83 BPM | RESPIRATION RATE: 18 BRPM | SYSTOLIC BLOOD PRESSURE: 121 MMHG | HEIGHT: 66 IN | TEMPERATURE: 97.2 F | BODY MASS INDEX: 47.09 KG/M2 | DIASTOLIC BLOOD PRESSURE: 81 MMHG | OXYGEN SATURATION: 98 % | WEIGHT: 293 LBS

## 2025-07-27 LAB
ANION GAP SERPL CALCULATED.3IONS-SCNC: 12 MMOL/L (ref 8–16)
BASOPHILS # BLD: 0 K/UL (ref 0–0.2)
BASOPHILS NFR BLD: 0 % (ref 0–1)
BUN SERPL-MCNC: 8 MG/DL (ref 6–20)
CALCIUM SERPL-MCNC: 8.8 MG/DL (ref 8.6–10)
CHLORIDE SERPL-SCNC: 109 MMOL/L (ref 98–107)
CO2 SERPL-SCNC: 22 MMOL/L (ref 22–29)
CREAT SERPL-MCNC: 0.7 MG/DL (ref 0.5–0.9)
EKG P AXIS: 52 DEGREES
EKG P-R INTERVAL: 184 MS
EKG Q-T INTERVAL: 392 MS
EKG QRS DURATION: 92 MS
EKG QTC CALCULATION (BAZETT): 423 MS
EKG T AXIS: 46 DEGREES
EOSINOPHIL # BLD: 0.15 K/UL (ref 0–0.6)
EOSINOPHIL NFR BLD: 1 % (ref 0–5)
ERYTHROCYTE [DISTWIDTH] IN BLOOD BY AUTOMATED COUNT: 14.2 % (ref 11.5–14.5)
GLUCOSE SERPL-MCNC: 115 MG/DL (ref 70–99)
HCT VFR BLD AUTO: 41.3 % (ref 37–47)
HGB BLD-MCNC: 13.5 G/DL (ref 12–16)
IMM GRANULOCYTES # BLD: 0.1 K/UL
LYMPHOCYTES # BLD: 6.8 K/UL (ref 1.1–4.5)
LYMPHOCYTES NFR BLD: 45 % (ref 20–40)
MCH RBC QN AUTO: 29.2 PG (ref 27–31)
MCHC RBC AUTO-ENTMCNC: 32.7 G/DL (ref 33–37)
MCV RBC AUTO: 89.2 FL (ref 81–99)
MONOCYTES # BLD: 1.2 K/UL (ref 0–0.9)
MONOCYTES NFR BLD: 8 % (ref 0–10)
NEUTROPHILS # BLD: 6.9 K/UL (ref 1.5–7.5)
NEUTS SEG NFR BLD: 46 % (ref 50–65)
PLATELET # BLD AUTO: 635 K/UL (ref 130–400)
PLATELET SLIDE REVIEW: ABNORMAL
PMV BLD AUTO: 9.8 FL (ref 9.4–12.3)
POTASSIUM SERPL-SCNC: 3.6 MMOL/L (ref 3.5–5)
RBC # BLD AUTO: 4.63 M/UL (ref 4.2–5.4)
SODIUM SERPL-SCNC: 143 MMOL/L (ref 136–145)
WBC # BLD AUTO: 15 K/UL (ref 4.8–10.8)

## 2025-07-27 PROCEDURE — 85025 COMPLETE CBC W/AUTO DIFF WBC: CPT

## 2025-07-27 PROCEDURE — 80048 BASIC METABOLIC PNL TOTAL CA: CPT

## 2025-07-27 PROCEDURE — G0378 HOSPITAL OBSERVATION PER HR: HCPCS

## 2025-07-27 PROCEDURE — 36415 COLL VENOUS BLD VENIPUNCTURE: CPT

## 2025-07-27 PROCEDURE — 94760 N-INVAS EAR/PLS OXIMETRY 1: CPT

## 2025-07-27 PROCEDURE — 93010 ELECTROCARDIOGRAM REPORT: CPT | Performed by: INTERNAL MEDICINE

## 2025-07-27 PROCEDURE — 2500000003 HC RX 250 WO HCPCS

## 2025-07-27 PROCEDURE — 96372 THER/PROPH/DIAG INJ SC/IM: CPT

## 2025-07-27 PROCEDURE — 6360000002 HC RX W HCPCS

## 2025-07-27 PROCEDURE — 6370000000 HC RX 637 (ALT 250 FOR IP)

## 2025-07-27 RX ADMIN — AMLODIPINE BESYLATE 5 MG: 5 TABLET ORAL at 09:16

## 2025-07-27 RX ADMIN — SODIUM CHLORIDE, PRESERVATIVE FREE 10 ML: 5 INJECTION INTRAVENOUS at 09:17

## 2025-07-27 RX ADMIN — MELOXICAM 15 MG: 7.5 TABLET ORAL at 09:16

## 2025-07-27 RX ADMIN — PANTOPRAZOLE SODIUM 40 MG: 40 TABLET, DELAYED RELEASE ORAL at 05:21

## 2025-07-27 RX ADMIN — TOPIRAMATE 50 MG: 50 TABLET, FILM COATED ORAL at 09:16

## 2025-07-27 RX ADMIN — VENLAFAXINE HYDROCHLORIDE 75 MG: 37.5 CAPSULE, EXTENDED RELEASE ORAL at 09:16

## 2025-07-27 RX ADMIN — BUPROPION HYDROCHLORIDE 300 MG: 150 TABLET, EXTENDED RELEASE ORAL at 09:16

## 2025-07-27 RX ADMIN — PREGABALIN 100 MG: 50 CAPSULE ORAL at 09:16

## 2025-07-27 RX ADMIN — METOPROLOL SUCCINATE 50 MG: 50 TABLET, EXTENDED RELEASE ORAL at 09:16

## 2025-07-27 RX ADMIN — ENOXAPARIN SODIUM 30 MG: 100 INJECTION SUBCUTANEOUS at 09:16

## 2025-07-27 RX ADMIN — ACYCLOVIR 800 MG: 800 TABLET ORAL at 09:16

## 2025-07-27 NOTE — PLAN OF CARE
Problem: Discharge Planning  Goal: Discharge to home or other facility with appropriate resources  Outcome: Adequate for Discharge     Problem: ABCDS Injury Assessment  Goal: Absence of physical injury  Outcome: Adequate for Discharge     Problem: Safety - Adult  Goal: Free from fall injury  Outcome: Adequate for Discharge     Problem: Confusion  Goal: Confusion, delirium, dementia, or psychosis is improved or at baseline  Description: INTERVENTIONS:  1. Assess for possible contributors to thought disturbance, including medications, impaired vision or hearing, underlying metabolic abnormalities, dehydration, psychiatric diagnoses, and notify attending LIP  2. [place] high risk fall precautions, as indicated  3. Provide frequent short contacts to provide reality reorientation, refocusing and direction  4. Decrease environmental stimuli, including noise as appropriate  5. Monitor and intervene to maintain adequate nutrition, hydration, elimination, sleep and activity  6. If unable to ensure safety without constant attention obtain sitter and review sitter guidelines with assigned personnel  7. Initiate Psychosocial CNS and Spiritual Care consult, as indicated  Outcome: Adequate for Discharge

## 2025-07-27 NOTE — PLAN OF CARE
Problem: Discharge Planning  Goal: Discharge to home or other facility with appropriate resources  7/26/2025 2218 by Renata Londono RN  Outcome: Progressing  7/26/2025 1806 by Pretty Thompson RN  Outcome: Progressing     Problem: ABCDS Injury Assessment  Goal: Absence of physical injury  7/26/2025 2218 by Renata Londono RN  Outcome: Progressing  7/26/2025 1806 by Pretty Thompson RN  Outcome: Progressing     Problem: Safety - Adult  Goal: Free from fall injury  7/26/2025 2218 by Renata Londono RN  Outcome: Progressing  7/26/2025 1806 by Pretty Thompson RN  Outcome: Progressing

## 2025-07-27 NOTE — DISCHARGE SUMMARY
[facility]    Discharge Summary      Evelyn Moore  :  1975  MRN:  [id number]    Admit date:  2025  Discharge date:    2025    Discharging Physician:  Dr. Villarreal    Advance Directive: Full Code    Consults: None    Primary Care Physician:  OMAR Dhillon, DO    Discharge Diagnoses:  Principal Problem:    Altered mental status  Active Problems:    Back pain with left-sided radiculopathy    Hypertension    Anxiety and depression    Herpes labialis without complication    Headache  Resolved Problems:    * No resolved hospital problems. *      Portions of this note have been copied forward, however, changed to reflect the most current clinical status of this patient.    Hospital Course:    The patient is a 49 y.o. female with anxiety and depression, cirrhosis, fibromyalgia, GERD, HTN, RLS comes to ED on 2025 for further evaluation of altered mental status. Patient states that she has been having confusion, auditory and visual hallucinations, and fatigue since the prior night before.  Patient is on multiple medications that can cause confusion and have sedating effects. She is also on Endocet as well for pain and was given narcan in the ED with some improvement in mental status. Patient was alert and oriented during time of admission.  Patient denied any urinary fevers. Denied fever, chills, nausea, vomiting, abdominal pain, shortness of breath, and chest pain.  ED work up revealed CXR with no acute radiographic abnormality; CT head with no acute intracranial abnormality; CT cervical spine with loss of normal cervical lordosis suggesting paraspinal muscle spasm with no acute findings; UDS positive for opiates and TCAs; UA unremarkable for UTI; WBC 16.3, H&H 13.5/41.2, CMP unremarkable.  Patient was admitted to hospitalist service under observation.  Patient reports that she has been also taking benadryl on top of her several other medications because her knee has been itching.

## 2025-07-28 ENCOUNTER — TELEPHONE (OUTPATIENT)
Age: 50
End: 2025-07-28

## 2025-07-28 RX ORDER — CYCLOBENZAPRINE HCL 10 MG
10 TABLET ORAL 3 TIMES DAILY PRN
Qty: 270 TABLET | Refills: 1 | Status: SHIPPED | OUTPATIENT
Start: 2025-07-28

## 2025-07-28 NOTE — TELEPHONE ENCOUNTER
Care Transitions Initial Follow Up Call    Outreach made within 2 business days of discharge: Yes    Patient: Evelyn Moore Patient : 1975   MRN: [id number]  Reason for Admission: altered mental status  Discharge Date: 25       Spoke with: Evelyn    Discharge department/facility: [facility] Interactive Patient Contact:  Was patient able to fill all prescriptions: Yes  Was patient instructed to bring all medications to the follow-up visit: Yes  Is patient taking all medications as directed in the discharge summary? Yes  Does patient understand their discharge instructions: Yes  Does patient have questions or concerns that need addressed prior to 7-14 day follow up office visit: no    Additional needs identified to be addressed with provider  No needs identified        Scheduled appointment with PCP within 7-14 days    Follow Up  Future Appointments   Date Time Provider Department Center   2025  3:00 PM Alex Carmona MD [facility]   2025 10:30 AM Natalio Quiñones DO [facility]   2025 11:00 AM Lali Hernandez APRN - CNP [facility]   10/13/2025 11:00 AM Lali Hernandez APRN - CNP [facility]       Babak Cabello MA

## 2025-07-29 RX ORDER — COLCHICINE 0.6 MG/1
0.6 TABLET ORAL DAILY
Qty: 30 TABLET | Refills: 11 | Status: SHIPPED | OUTPATIENT
Start: 2025-07-29

## 2025-07-29 RX ORDER — OMEPRAZOLE 40 MG/1
40 CAPSULE, DELAYED RELEASE ORAL DAILY
Qty: 90 CAPSULE | Refills: 3 | Status: SHIPPED | OUTPATIENT
Start: 2025-07-29

## 2025-07-29 NOTE — TELEPHONE ENCOUNTER
Received fax from pharmacy requesting refill on pts medication(s). Pt was last seen in office on 6/16/2025  and has a follow up scheduled for 7/30/2025. Will send request to  Dr. Dhillon  for authorization.     Requested Prescriptions     Pending Prescriptions Disp Refills    omeprazole (PRILOSEC) 40 MG delayed release capsule [Pharmacy Med Name: OMEPRAZOLE 40MG CAPSULES] 90 capsule 3     Sig: TAKE 1 CAPSULE BY MOUTH DAILY    colchicine (COLCRYS) 0.6 MG tablet [Pharmacy Med Name: COLCHICINE 0.6MG TABLETS] 30 tablet 11     Sig: TAKE 1 TABLET BY MOUTH DAILY

## 2025-07-30 ENCOUNTER — OFFICE VISIT (OUTPATIENT)
Age: 50
End: 2025-07-30

## 2025-07-30 VITALS
WEIGHT: 293 LBS | HEART RATE: 88 BPM | OXYGEN SATURATION: 95 % | SYSTOLIC BLOOD PRESSURE: 122 MMHG | DIASTOLIC BLOOD PRESSURE: 76 MMHG | HEIGHT: 66 IN | BODY MASS INDEX: 47.09 KG/M2 | TEMPERATURE: 98.2 F

## 2025-07-30 DIAGNOSIS — M54.16 LUMBAR RADICULOPATHY: ICD-10-CM

## 2025-07-30 DIAGNOSIS — E78.2 MIXED HYPERLIPIDEMIA: ICD-10-CM

## 2025-07-30 DIAGNOSIS — I85.00 ESOPHAGEAL VARICES WITHOUT BLEEDING, UNSPECIFIED ESOPHAGEAL VARICES TYPE (HCC): ICD-10-CM

## 2025-07-30 DIAGNOSIS — G89.29 OTHER CHRONIC PAIN: ICD-10-CM

## 2025-07-30 DIAGNOSIS — R40.4 TRANSIENT ALTERATION OF AWARENESS: ICD-10-CM

## 2025-07-30 DIAGNOSIS — Z09 HOSPITAL DISCHARGE FOLLOW-UP: Primary | ICD-10-CM

## 2025-07-30 DIAGNOSIS — R44.1 VISUAL HALLUCINATIONS: ICD-10-CM

## 2025-07-30 DIAGNOSIS — E11.42 TYPE 2 DIABETES MELLITUS WITH DIABETIC POLYNEUROPATHY, WITHOUT LONG-TERM CURRENT USE OF INSULIN (HCC): ICD-10-CM

## 2025-07-30 DIAGNOSIS — M79.7 FIBROMYALGIA: ICD-10-CM

## 2025-07-30 DIAGNOSIS — R44.0 AUDITORY HALLUCINATIONS: ICD-10-CM

## 2025-07-30 DIAGNOSIS — I10 PRIMARY HYPERTENSION: ICD-10-CM

## 2025-07-30 RX ORDER — PREGABALIN 100 MG/1
100 CAPSULE ORAL 3 TIMES DAILY
Qty: 90 CAPSULE | Refills: 5 | Status: SHIPPED | OUTPATIENT
Start: 2025-07-30 | End: 2026-01-26

## 2025-07-30 RX ORDER — DIPHENHYDRAMINE HCL 50 MG
50 CAPSULE ORAL
COMMUNITY

## 2025-07-30 SDOH — ECONOMIC STABILITY: FOOD INSECURITY: WITHIN THE PAST 12 MONTHS, YOU WORRIED THAT YOUR FOOD WOULD RUN OUT BEFORE YOU GOT MONEY TO BUY MORE.: NEVER TRUE

## 2025-07-30 SDOH — ECONOMIC STABILITY: FOOD INSECURITY: WITHIN THE PAST 12 MONTHS, THE FOOD YOU BOUGHT JUST DIDN'T LAST AND YOU DIDN'T HAVE MONEY TO GET MORE.: NEVER TRUE

## 2025-07-30 ASSESSMENT — PATIENT HEALTH QUESTIONNAIRE - PHQ9
SUM OF ALL RESPONSES TO PHQ QUESTIONS 1-9: 0
2. FEELING DOWN, DEPRESSED OR HOPELESS: NOT AT ALL
SUM OF ALL RESPONSES TO PHQ QUESTIONS 1-9: 0
1. LITTLE INTEREST OR PLEASURE IN DOING THINGS: NOT AT ALL

## 2025-07-30 ASSESSMENT — ENCOUNTER SYMPTOMS
VOMITING: 0
SHORTNESS OF BREATH: 1
SINUS PAIN: 0
NAUSEA: 0
RHINORRHEA: 0
SINUS PRESSURE: 0
BACK PAIN: 1
DIARRHEA: 0

## 2025-07-30 NOTE — PROGRESS NOTES
Medications        These medications [facility] to [id number] - [place], [place] - [address] - P [phone] - F [phone] [address] [facility] [id number]      Phone: [phone]   pregabalin 100 MG capsule           Medications marked \"taking\" at this time  Outpatient Medications Marked as Taking for the 7/30/25 encounter (Office Visit) with OMAR Dhillon,    Medication Sig Dispense Refill    diphenhydrAMINE (BENADRYL) 50 MG capsule Take 1 capsule by mouth nightly      pregabalin (LYRICA) 100 MG capsule Take 1 capsule by mouth in the morning, at noon, and at bedtime for 180 days. Max Daily Amount: 300 mg 90 capsule 5    omeprazole (PRILOSEC) 40 MG delayed release capsule TAKE 1 CAPSULE BY MOUTH DAILY 90 capsule 3    colchicine (COLCRYS) 0.6 MG tablet TAKE 1 TABLET BY MOUTH DAILY 30 tablet 11    cyclobenzaprine (FLEXERIL) 10 MG tablet TAKE 1 TABLET BY MOUTH THREE TIMES DAILY AS NEEDED FOR MUSCLE SPASMS 270 tablet 1    oxyCODONE-acetaminophen (ENDOCET) 7.5-325 MG per tablet Take 1 tablet by mouth every 8 hours as needed for Pain for up to 30 days. May fill 07/20/25 Max Daily Amount: 3 tablets 90 tablet 0    buPROPion (WELLBUTRIN XL) 300 MG extended release tablet Take 1 tablet by mouth in the morning and at bedtime 180 tablet 3    amLODIPine (NORVASC) 5 MG tablet Take 1 tablet by mouth daily 90 tablet 3    nortriptyline (PAMELOR) 25 MG capsule TAKE 2 CAPSULES BY MOUTH EVERY NIGHT 180 capsule 3    ondansetron (ZOFRAN-ODT) 8 MG TBDP disintegrating tablet Take 1 tablet by mouth every 8 hours as needed for Nausea or Vomiting 15 tablet 5    Evolocumab (REPATHA) SOSY syringe INJECT 1 ML INTO THE SKIN EVERY 14 DAYS 2 mL 11    desvenlafaxine succinate (PRISTIQ) 50 MG TB24 extended release tablet TAKE 1 TABLET BY MOUTH DAILY 90 tablet 3    metoprolol succinate (TOPROL XL) 50 MG extended release tablet Take 1 tablet by mouth in the morning and at bedtime 180 tablet 3    VRAYLAR 3 MG

## 2025-08-08 ENCOUNTER — HOSPITAL ENCOUNTER (INPATIENT)
Facility: HOSPITAL | Age: 50
LOS: 3 days | Discharge: HOME | DRG: 880 | End: 2025-08-11
Attending: INTERNAL MEDICINE | Admitting: INTERNAL MEDICINE
Payer: MEDICARE

## 2025-08-08 ENCOUNTER — APPOINTMENT (OUTPATIENT)
Dept: CT IMAGING | Facility: HOSPITAL | Age: 50
DRG: 880 | End: 2025-08-08
Payer: MEDICARE

## 2025-08-08 ENCOUNTER — APPOINTMENT (OUTPATIENT)
Dept: GENERAL RADIOLOGY | Facility: HOSPITAL | Age: 50
DRG: 880 | End: 2025-08-08
Payer: MEDICARE

## 2025-08-08 DIAGNOSIS — R41.82 ALTERED MENTAL STATUS, UNSPECIFIED ALTERED MENTAL STATUS TYPE: Primary | ICD-10-CM

## 2025-08-08 DIAGNOSIS — M79.7 FIBROMYALGIA: ICD-10-CM

## 2025-08-08 DIAGNOSIS — Z79.899 POLYPHARMACY: ICD-10-CM

## 2025-08-08 PROBLEM — R44.3 HALLUCINATIONS: Status: ACTIVE | Noted: 2025-08-08

## 2025-08-08 PROBLEM — Z86.69 HISTORY OF MIGRAINE: Status: ACTIVE | Noted: 2025-08-08

## 2025-08-08 PROBLEM — D72.829 LEUKOCYTOSIS: Status: ACTIVE | Noted: 2025-08-08

## 2025-08-08 LAB
ALBUMIN SERPL-MCNC: 3.6 G/DL (ref 3.5–5.2)
ALBUMIN/GLOB SERPL: 1.2 G/DL
ALP SERPL-CCNC: 123 U/L (ref 39–117)
ALT SERPL W P-5'-P-CCNC: 18 U/L (ref 1–33)
AMMONIA BLD-SCNC: 18 UMOL/L (ref 11–51)
AMPHET+METHAMPHET UR QL: NEGATIVE
AMPHETAMINES UR QL: NEGATIVE
ANION GAP SERPL CALCULATED.3IONS-SCNC: 12 MMOL/L (ref 5–15)
ANISOCYTOSIS BLD QL: ABNORMAL
APTT PPP: 30 SECONDS (ref 24.5–36)
AST SERPL-CCNC: 14 U/L (ref 1–32)
BARBITURATES UR QL SCN: NEGATIVE
BASOPHILS # BLD MANUAL: 0 10*3/MM3 (ref 0–0.2)
BASOPHILS NFR BLD MANUAL: 0 % (ref 0–1.5)
BENZODIAZ UR QL SCN: NEGATIVE
BILIRUB SERPL-MCNC: 0.3 MG/DL (ref 0–1.2)
BILIRUB UR QL STRIP: NEGATIVE
BUN SERPL-MCNC: 7.2 MG/DL (ref 6–20)
BUN/CREAT SERPL: 9 (ref 7–25)
BUPRENORPHINE SERPL-MCNC: NEGATIVE NG/ML
BURR CELLS BLD QL SMEAR: ABNORMAL
CALCIUM SPEC-SCNC: 8.9 MG/DL (ref 8.6–10.5)
CANNABINOIDS SERPL QL: NEGATIVE
CHLORIDE SERPL-SCNC: 109 MMOL/L (ref 98–107)
CK SERPL-CCNC: 70 U/L (ref 20–180)
CLARITY UR: CLEAR
CO2 SERPL-SCNC: 24 MMOL/L (ref 22–29)
COCAINE UR QL: NEGATIVE
COLOR UR: YELLOW
CREAT SERPL-MCNC: 0.8 MG/DL (ref 0.57–1)
CRP SERPL-MCNC: 4.24 MG/DL (ref 0–0.5)
DEPRECATED RDW RBC AUTO: 46.2 FL (ref 37–54)
EGFRCR SERPLBLD CKD-EPI 2021: 89.9 ML/MIN/1.73
ELLIPTOCYTES BLD QL SMEAR: ABNORMAL
EOSINOPHIL # BLD MANUAL: 0 10*3/MM3 (ref 0–0.4)
EOSINOPHIL NFR BLD MANUAL: 0 % (ref 0.3–6.2)
ERYTHROCYTE [DISTWIDTH] IN BLOOD BY AUTOMATED COUNT: 14.4 % (ref 12.3–15.4)
ETHANOL UR QL: <0.01 %
FENTANYL UR-MCNC: NEGATIVE NG/ML
GEN 5 1HR TROPONIN T REFLEX: 8 NG/L
GLOBULIN UR ELPH-MCNC: 3.1 GM/DL
GLUCOSE BLDC GLUCOMTR-MCNC: 93 MG/DL (ref 70–130)
GLUCOSE SERPL-MCNC: 103 MG/DL (ref 65–99)
GLUCOSE UR STRIP-MCNC: NEGATIVE MG/DL
HCT VFR BLD AUTO: 38.3 % (ref 34–46.6)
HGB BLD-MCNC: 12.9 G/DL (ref 12–15.9)
HGB UR QL STRIP.AUTO: NEGATIVE
INR PPP: 0.95 (ref 0.91–1.09)
KETONES UR QL STRIP: NEGATIVE
LEUKOCYTE ESTERASE UR QL STRIP.AUTO: NEGATIVE
LIPASE SERPL-CCNC: 15 U/L (ref 13–60)
LYMPHOCYTES # BLD MANUAL: 4.36 10*3/MM3 (ref 0.7–3.1)
LYMPHOCYTES NFR BLD MANUAL: 6.1 % (ref 5–12)
MAGNESIUM SERPL-MCNC: 1.9 MG/DL (ref 1.6–2.6)
MCH RBC QN AUTO: 29.4 PG (ref 26.6–33)
MCHC RBC AUTO-ENTMCNC: 33.7 G/DL (ref 31.5–35.7)
MCV RBC AUTO: 87.2 FL (ref 79–97)
METHADONE UR QL SCN: NEGATIVE
MONOCYTES # BLD: 0.94 10*3/MM3 (ref 0.1–0.9)
NEUTROPHILS # BLD AUTO: 10.11 10*3/MM3 (ref 1.7–7)
NEUTROPHILS NFR BLD MANUAL: 64.6 % (ref 42.7–76)
NEUTS BAND NFR BLD MANUAL: 1 % (ref 0–5)
NITRITE UR QL STRIP: NEGATIVE
NT-PROBNP SERPL-MCNC: 195.1 PG/ML (ref 0–900)
OPIATES UR QL: NEGATIVE
OXYCODONE UR QL SCN: NEGATIVE
PCP UR QL SCN: NEGATIVE
PH UR STRIP.AUTO: 6 [PH] (ref 5–8)
PLATELET # BLD AUTO: 632 10*3/MM3 (ref 140–450)
PMV BLD AUTO: 10 FL (ref 6–12)
POIKILOCYTOSIS BLD QL SMEAR: ABNORMAL
POLYCHROMASIA BLD QL SMEAR: ABNORMAL
POTASSIUM SERPL-SCNC: 3.5 MMOL/L (ref 3.5–5.2)
PROCALCITONIN SERPL-MCNC: 0.17 NG/ML (ref 0–0.25)
PROT SERPL-MCNC: 6.7 G/DL (ref 6–8.5)
PROT UR QL STRIP: NEGATIVE
PROTHROMBIN TIME: 13.1 SECONDS (ref 11.8–14.8)
RBC # BLD AUTO: 4.39 10*6/MM3 (ref 3.77–5.28)
SMALL PLATELETS BLD QL SMEAR: ABNORMAL
SODIUM SERPL-SCNC: 145 MMOL/L (ref 136–145)
SP GR UR STRIP: 1.01 (ref 1–1.03)
TRICYCLICS UR QL SCN: POSITIVE
TROPONIN T NUMERIC DELTA: 1 NG/L
TROPONIN T SERPL HS-MCNC: 7 NG/L
TSH SERPL DL<=0.05 MIU/L-ACNC: 1.88 UIU/ML (ref 0.27–4.2)
UROBILINOGEN UR QL STRIP: NORMAL
VARIANT LYMPHS NFR BLD MANUAL: 1 % (ref 0–5)
VARIANT LYMPHS NFR BLD MANUAL: 27.3 % (ref 19.6–45.3)
WBC MORPH BLD: NORMAL
WBC NRBC COR # BLD AUTO: 15.4 10*3/MM3 (ref 3.4–10.8)

## 2025-08-08 PROCEDURE — 83880 ASSAY OF NATRIURETIC PEPTIDE: CPT | Performed by: NURSE PRACTITIONER

## 2025-08-08 PROCEDURE — 85007 BL SMEAR W/DIFF WBC COUNT: CPT | Performed by: NURSE PRACTITIONER

## 2025-08-08 PROCEDURE — 81003 URINALYSIS AUTO W/O SCOPE: CPT | Performed by: NURSE PRACTITIONER

## 2025-08-08 PROCEDURE — 85730 THROMBOPLASTIN TIME PARTIAL: CPT | Performed by: NURSE PRACTITIONER

## 2025-08-08 PROCEDURE — 82948 REAGENT STRIP/BLOOD GLUCOSE: CPT

## 2025-08-08 PROCEDURE — 99285 EMERGENCY DEPT VISIT HI MDM: CPT

## 2025-08-08 PROCEDURE — 85025 COMPLETE CBC W/AUTO DIFF WBC: CPT | Performed by: NURSE PRACTITIONER

## 2025-08-08 PROCEDURE — 83690 ASSAY OF LIPASE: CPT | Performed by: NURSE PRACTITIONER

## 2025-08-08 PROCEDURE — 93005 ELECTROCARDIOGRAM TRACING: CPT | Performed by: NURSE PRACTITIONER

## 2025-08-08 PROCEDURE — 82550 ASSAY OF CK (CPK): CPT | Performed by: NURSE PRACTITIONER

## 2025-08-08 PROCEDURE — P9612 CATHETERIZE FOR URINE SPEC: HCPCS

## 2025-08-08 PROCEDURE — 84443 ASSAY THYROID STIM HORMONE: CPT | Performed by: NURSE PRACTITIONER

## 2025-08-08 PROCEDURE — 85610 PROTHROMBIN TIME: CPT | Performed by: NURSE PRACTITIONER

## 2025-08-08 PROCEDURE — 82140 ASSAY OF AMMONIA: CPT | Performed by: NURSE PRACTITIONER

## 2025-08-08 PROCEDURE — 25010000002 HEPARIN LOCK FLUSH PER 10 UNITS: Performed by: EMERGENCY MEDICINE

## 2025-08-08 PROCEDURE — 70450 CT HEAD/BRAIN W/O DYE: CPT

## 2025-08-08 PROCEDURE — 93010 ELECTROCARDIOGRAM REPORT: CPT | Performed by: INTERNAL MEDICINE

## 2025-08-08 PROCEDURE — 94799 UNLISTED PULMONARY SVC/PX: CPT

## 2025-08-08 PROCEDURE — 71045 X-RAY EXAM CHEST 1 VIEW: CPT

## 2025-08-08 PROCEDURE — 82077 ASSAY SPEC XCP UR&BREATH IA: CPT | Performed by: NURSE PRACTITIONER

## 2025-08-08 PROCEDURE — 83735 ASSAY OF MAGNESIUM: CPT | Performed by: NURSE PRACTITIONER

## 2025-08-08 PROCEDURE — 80307 DRUG TEST PRSMV CHEM ANLYZR: CPT | Performed by: NURSE PRACTITIONER

## 2025-08-08 PROCEDURE — 86140 C-REACTIVE PROTEIN: CPT | Performed by: INTERNAL MEDICINE

## 2025-08-08 PROCEDURE — 80053 COMPREHEN METABOLIC PANEL: CPT | Performed by: NURSE PRACTITIONER

## 2025-08-08 PROCEDURE — 84484 ASSAY OF TROPONIN QUANT: CPT | Performed by: NURSE PRACTITIONER

## 2025-08-08 PROCEDURE — 84145 PROCALCITONIN (PCT): CPT | Performed by: INTERNAL MEDICINE

## 2025-08-08 RX ORDER — BISACODYL 10 MG
10 SUPPOSITORY, RECTAL RECTAL DAILY PRN
Status: DISCONTINUED | OUTPATIENT
Start: 2025-08-08 | End: 2025-08-11 | Stop reason: HOSPADM

## 2025-08-08 RX ORDER — SODIUM CHLORIDE 0.9 % (FLUSH) 0.9 %
10 SYRINGE (ML) INJECTION AS NEEDED
Status: DISCONTINUED | OUTPATIENT
Start: 2025-08-08 | End: 2025-08-11 | Stop reason: HOSPADM

## 2025-08-08 RX ORDER — PANTOPRAZOLE SODIUM 40 MG/1
40 TABLET, DELAYED RELEASE ORAL
Status: DISCONTINUED | OUTPATIENT
Start: 2025-08-09 | End: 2025-08-11 | Stop reason: HOSPADM

## 2025-08-08 RX ORDER — AMOXICILLIN 250 MG
2 CAPSULE ORAL 2 TIMES DAILY PRN
Status: DISCONTINUED | OUTPATIENT
Start: 2025-08-08 | End: 2025-08-11 | Stop reason: HOSPADM

## 2025-08-08 RX ORDER — IPRATROPIUM BROMIDE AND ALBUTEROL SULFATE 2.5; .5 MG/3ML; MG/3ML
3 SOLUTION RESPIRATORY (INHALATION) EVERY 4 HOURS PRN
Status: DISCONTINUED | OUTPATIENT
Start: 2025-08-08 | End: 2025-08-11 | Stop reason: HOSPADM

## 2025-08-08 RX ORDER — ONDANSETRON 2 MG/ML
4 INJECTION INTRAMUSCULAR; INTRAVENOUS EVERY 6 HOURS PRN
Status: DISCONTINUED | OUTPATIENT
Start: 2025-08-08 | End: 2025-08-11 | Stop reason: HOSPADM

## 2025-08-08 RX ORDER — METOPROLOL SUCCINATE 50 MG/1
50 TABLET, EXTENDED RELEASE ORAL
Status: DISCONTINUED | OUTPATIENT
Start: 2025-08-08 | End: 2025-08-11 | Stop reason: HOSPADM

## 2025-08-08 RX ORDER — SODIUM CHLORIDE 0.9 % (FLUSH) 0.9 %
10 SYRINGE (ML) INJECTION EVERY 12 HOURS SCHEDULED
Status: DISCONTINUED | OUTPATIENT
Start: 2025-08-08 | End: 2025-08-11 | Stop reason: HOSPADM

## 2025-08-08 RX ORDER — TOPIRAMATE 25 MG/1
50 TABLET, FILM COATED ORAL 2 TIMES DAILY
Status: DISCONTINUED | OUTPATIENT
Start: 2025-08-08 | End: 2025-08-11 | Stop reason: HOSPADM

## 2025-08-08 RX ORDER — PREGABALIN 100 MG/1
100 CAPSULE ORAL EVERY 12 HOURS SCHEDULED
Status: DISCONTINUED | OUTPATIENT
Start: 2025-08-08 | End: 2025-08-11 | Stop reason: HOSPADM

## 2025-08-08 RX ORDER — ACETAMINOPHEN 325 MG/1
650 TABLET ORAL EVERY 6 HOURS PRN
Status: DISCONTINUED | OUTPATIENT
Start: 2025-08-08 | End: 2025-08-11 | Stop reason: HOSPADM

## 2025-08-08 RX ORDER — POTASSIUM CHLORIDE 1500 MG/1
40 TABLET, EXTENDED RELEASE ORAL ONCE
Status: COMPLETED | OUTPATIENT
Start: 2025-08-08 | End: 2025-08-08

## 2025-08-08 RX ORDER — BUPROPION HYDROCHLORIDE 150 MG/1
150 TABLET, EXTENDED RELEASE ORAL 2 TIMES DAILY
Status: DISCONTINUED | OUTPATIENT
Start: 2025-08-08 | End: 2025-08-11 | Stop reason: HOSPADM

## 2025-08-08 RX ORDER — SODIUM CHLORIDE 9 MG/ML
40 INJECTION, SOLUTION INTRAVENOUS AS NEEDED
Status: DISCONTINUED | OUTPATIENT
Start: 2025-08-08 | End: 2025-08-11 | Stop reason: HOSPADM

## 2025-08-08 RX ORDER — OXYCODONE AND ACETAMINOPHEN 7.5; 325 MG/1; MG/1
1 TABLET ORAL EVERY 6 HOURS PRN
Status: DISCONTINUED | OUTPATIENT
Start: 2025-08-08 | End: 2025-08-11 | Stop reason: HOSPADM

## 2025-08-08 RX ORDER — DESVENLAFAXINE 50 MG/1
50 TABLET, FILM COATED, EXTENDED RELEASE ORAL DAILY
Status: DISCONTINUED | OUTPATIENT
Start: 2025-08-08 | End: 2025-08-11 | Stop reason: HOSPADM

## 2025-08-08 RX ORDER — POLYETHYLENE GLYCOL 3350 17 G/17G
17 POWDER, FOR SOLUTION ORAL DAILY PRN
Status: DISCONTINUED | OUTPATIENT
Start: 2025-08-08 | End: 2025-08-11 | Stop reason: HOSPADM

## 2025-08-08 RX ORDER — COLCHICINE 0.6 MG/1
0.6 TABLET ORAL DAILY
Status: DISCONTINUED | OUTPATIENT
Start: 2025-08-08 | End: 2025-08-11 | Stop reason: HOSPADM

## 2025-08-08 RX ORDER — ALUMINA, MAGNESIA, AND SIMETHICONE 2400; 2400; 240 MG/30ML; MG/30ML; MG/30ML
15 SUSPENSION ORAL EVERY 6 HOURS PRN
Status: DISCONTINUED | OUTPATIENT
Start: 2025-08-08 | End: 2025-08-11 | Stop reason: HOSPADM

## 2025-08-08 RX ORDER — HEPARIN SODIUM (PORCINE) LOCK FLUSH IV SOLN 100 UNIT/ML 100 UNIT/ML
5 SOLUTION INTRAVENOUS AS NEEDED
Status: DISCONTINUED | OUTPATIENT
Start: 2025-08-08 | End: 2025-08-11 | Stop reason: HOSPADM

## 2025-08-08 RX ORDER — BISACODYL 5 MG/1
5 TABLET, DELAYED RELEASE ORAL DAILY PRN
Status: DISCONTINUED | OUTPATIENT
Start: 2025-08-08 | End: 2025-08-11 | Stop reason: HOSPADM

## 2025-08-08 RX ORDER — NAPROXEN 250 MG/1
250 TABLET ORAL 2 TIMES DAILY PRN
Status: DISCONTINUED | OUTPATIENT
Start: 2025-08-08 | End: 2025-08-11 | Stop reason: HOSPADM

## 2025-08-08 RX ORDER — ROSUVASTATIN CALCIUM 5 MG/1
5 TABLET, COATED ORAL DAILY
Status: DISCONTINUED | OUTPATIENT
Start: 2025-08-08 | End: 2025-08-09

## 2025-08-08 RX ADMIN — METFORMIN HYDROCHLORIDE 1000 MG: 500 TABLET ORAL at 20:38

## 2025-08-08 RX ADMIN — TOPIRAMATE 50 MG: 25 TABLET, FILM COATED ORAL at 20:37

## 2025-08-08 RX ADMIN — DESVENLAFAXINE 50 MG: 50 TABLET, EXTENDED RELEASE ORAL at 20:38

## 2025-08-08 RX ADMIN — Medication 10 ML: at 20:39

## 2025-08-08 RX ADMIN — ROSUVASTATIN CALCIUM 5 MG: 5 TABLET, FILM COATED ORAL at 20:38

## 2025-08-08 RX ADMIN — PREGABALIN 100 MG: 100 CAPSULE ORAL at 20:37

## 2025-08-08 RX ADMIN — POTASSIUM CHLORIDE 40 MEQ: 1500 TABLET, EXTENDED RELEASE ORAL at 20:38

## 2025-08-08 RX ADMIN — CARIPRAZINE 3 MG: 1.5 CAPSULE, GELATIN COATED ORAL at 20:37

## 2025-08-08 RX ADMIN — COLCHICINE 0.6 MG: 0.6 TABLET ORAL at 20:38

## 2025-08-08 RX ADMIN — METOPROLOL SUCCINATE 50 MG: 50 TABLET, EXTENDED RELEASE ORAL at 20:37

## 2025-08-08 RX ADMIN — BUPROPION HYDROCHLORIDE 150 MG: 150 TABLET, FILM COATED, EXTENDED RELEASE ORAL at 20:37

## 2025-08-08 RX ADMIN — HEPARIN 500 UNITS: 100 SYRINGE at 13:30

## 2025-08-09 ENCOUNTER — APPOINTMENT (OUTPATIENT)
Dept: MRI IMAGING | Facility: HOSPITAL | Age: 50
DRG: 880 | End: 2025-08-09
Payer: MEDICARE

## 2025-08-09 ENCOUNTER — APPOINTMENT (OUTPATIENT)
Dept: NEUROLOGY | Facility: HOSPITAL | Age: 50
DRG: 880 | End: 2025-08-09
Payer: MEDICARE

## 2025-08-09 PROBLEM — R29.6 FALLS: Status: ACTIVE | Noted: 2025-08-09

## 2025-08-09 PROBLEM — D75.839 THROMBOCYTOSIS: Status: ACTIVE | Noted: 2025-08-09

## 2025-08-09 PROBLEM — Z90.81 H/O SPLENECTOMY: Status: ACTIVE | Noted: 2025-08-09

## 2025-08-09 PROBLEM — R41.3 MEMORY CHANGES: Status: ACTIVE | Noted: 2025-08-09

## 2025-08-09 LAB
ADV 40+41 DNA STL QL NAA+NON-PROBE: NOT DETECTED
ALBUMIN SERPL-MCNC: 3.2 G/DL (ref 3.5–5.2)
ALBUMIN/GLOB SERPL: 1.1 G/DL
ALP SERPL-CCNC: 112 U/L (ref 39–117)
ALT SERPL W P-5'-P-CCNC: 18 U/L (ref 1–33)
ANION GAP SERPL CALCULATED.3IONS-SCNC: 11 MMOL/L (ref 5–15)
ANISOCYTOSIS BLD QL: ABNORMAL
AST SERPL-CCNC: 14 U/L (ref 1–32)
ASTRO TYP 1-8 RNA STL QL NAA+NON-PROBE: NOT DETECTED
B PARAPERT DNA SPEC QL NAA+PROBE: NOT DETECTED
B PERT DNA SPEC QL NAA+PROBE: NOT DETECTED
BASOPHILS # BLD MANUAL: 0.14 10*3/MM3 (ref 0–0.2)
BASOPHILS NFR BLD MANUAL: 1 % (ref 0–1.5)
BILIRUB SERPL-MCNC: 0.2 MG/DL (ref 0–1.2)
BUN SERPL-MCNC: 8.4 MG/DL (ref 6–20)
BUN/CREAT SERPL: 11.2 (ref 7–25)
C CAYETANENSIS DNA STL QL NAA+NON-PROBE: NOT DETECTED
C COLI+JEJ+UPSA DNA STL QL NAA+NON-PROBE: DETECTED
C PNEUM DNA NPH QL NAA+NON-PROBE: NOT DETECTED
CALCIUM SPEC-SCNC: 8.9 MG/DL (ref 8.6–10.5)
CHLORIDE SERPL-SCNC: 108 MMOL/L (ref 98–107)
CO2 SERPL-SCNC: 23 MMOL/L (ref 22–29)
CREAT SERPL-MCNC: 0.75 MG/DL (ref 0.57–1)
CRYPTOSP DNA STL QL NAA+NON-PROBE: NOT DETECTED
DEPRECATED RDW RBC AUTO: 46.1 FL (ref 37–54)
E HISTOLYT DNA STL QL NAA+NON-PROBE: NOT DETECTED
EAEC PAA PLAS AGGR+AATA ST NAA+NON-PRB: NOT DETECTED
EC STX1+STX2 GENES STL QL NAA+NON-PROBE: NOT DETECTED
EGFRCR SERPLBLD CKD-EPI 2021: 97.1 ML/MIN/1.73
ELLIPTOCYTES BLD QL SMEAR: ABNORMAL
EOSINOPHIL # BLD MANUAL: 0.14 10*3/MM3 (ref 0–0.4)
EOSINOPHIL NFR BLD MANUAL: 1 % (ref 0.3–6.2)
EPEC EAE GENE STL QL NAA+NON-PROBE: DETECTED
ERYTHROCYTE [DISTWIDTH] IN BLOOD BY AUTOMATED COUNT: 14.3 % (ref 12.3–15.4)
ETEC LTA+ST1A+ST1B TOX ST NAA+NON-PROBE: DETECTED
FERRITIN SERPL-MCNC: 68.25 NG/ML (ref 13–150)
FLUAV SUBTYP SPEC NAA+PROBE: NOT DETECTED
FLUBV RNA NPH QL NAA+NON-PROBE: NOT DETECTED
G LAMBLIA DNA STL QL NAA+NON-PROBE: NOT DETECTED
GLOBULIN UR ELPH-MCNC: 2.8 GM/DL
GLUCOSE SERPL-MCNC: 86 MG/DL (ref 65–99)
HADV DNA SPEC NAA+PROBE: NOT DETECTED
HCOV 229E RNA SPEC QL NAA+PROBE: NOT DETECTED
HCOV HKU1 RNA SPEC QL NAA+PROBE: NOT DETECTED
HCOV NL63 RNA SPEC QL NAA+PROBE: NOT DETECTED
HCOV OC43 RNA SPEC QL NAA+PROBE: NOT DETECTED
HCT VFR BLD AUTO: 38.5 % (ref 34–46.6)
HGB BLD-MCNC: 12.7 G/DL (ref 12–15.9)
HMPV RNA NPH QL NAA+NON-PROBE: NOT DETECTED
HPIV1 RNA ISLT QL NAA+PROBE: NOT DETECTED
HPIV2 RNA SPEC QL NAA+PROBE: NOT DETECTED
HPIV3 RNA NPH QL NAA+PROBE: NOT DETECTED
HPIV4 P GENE NPH QL NAA+PROBE: NOT DETECTED
LYMPHOCYTES # BLD MANUAL: 4.59 10*3/MM3 (ref 0.7–3.1)
LYMPHOCYTES NFR BLD MANUAL: 3 % (ref 5–12)
M PNEUMO IGG SER IA-ACNC: NOT DETECTED
MCH RBC QN AUTO: 29 PG (ref 26.6–33)
MCHC RBC AUTO-ENTMCNC: 33 G/DL (ref 31.5–35.7)
MCV RBC AUTO: 87.9 FL (ref 79–97)
MONOCYTES # BLD: 0.43 10*3/MM3 (ref 0.1–0.9)
NEUTROPHILS # BLD AUTO: 9.03 10*3/MM3 (ref 1.7–7)
NEUTROPHILS NFR BLD MANUAL: 62 % (ref 42.7–76)
NEUTS BAND NFR BLD MANUAL: 1 % (ref 0–5)
NOROVIRUS GI+II RNA STL QL NAA+NON-PROBE: NOT DETECTED
NRBC BLD AUTO-RTO: 0 /100 WBC (ref 0–0.2)
OVALOCYTES BLD QL SMEAR: ABNORMAL
P SHIGELLOIDES DNA STL QL NAA+NON-PROBE: NOT DETECTED
PLATELET # BLD AUTO: 603 10*3/MM3 (ref 140–450)
PMV BLD AUTO: 9.6 FL (ref 6–12)
POIKILOCYTOSIS BLD QL SMEAR: ABNORMAL
POLYCHROMASIA BLD QL SMEAR: ABNORMAL
POTASSIUM SERPL-SCNC: 3.7 MMOL/L (ref 3.5–5.2)
PROT SERPL-MCNC: 6 G/DL (ref 6–8.5)
RBC # BLD AUTO: 4.38 10*6/MM3 (ref 3.77–5.28)
RHINOVIRUS RNA SPEC NAA+PROBE: NOT DETECTED
RSV RNA NPH QL NAA+NON-PROBE: NOT DETECTED
RVA RNA STL QL NAA+NON-PROBE: NOT DETECTED
S ENT+BONG DNA STL QL NAA+NON-PROBE: NOT DETECTED
SAPO I+II+IV+V RNA STL QL NAA+NON-PROBE: NOT DETECTED
SARS-COV-2 RNA RESP QL NAA+PROBE: NOT DETECTED
SHIGELLA SP+EIEC IPAH ST NAA+NON-PROBE: NOT DETECTED
SMALL PLATELETS BLD QL SMEAR: ABNORMAL
SODIUM SERPL-SCNC: 142 MMOL/L (ref 136–145)
V CHOL+PARA+VUL DNA STL QL NAA+NON-PROBE: NOT DETECTED
V CHOLERAE DNA STL QL NAA+NON-PROBE: NOT DETECTED
VARIANT LYMPHS NFR BLD MANUAL: 2 % (ref 0–5)
VARIANT LYMPHS NFR BLD MANUAL: 30 % (ref 19.6–45.3)
VIT B12 BLD-MCNC: 495 PG/ML (ref 211–946)
WBC MORPH BLD: NORMAL
WBC NRBC COR # BLD AUTO: 14.33 10*3/MM3 (ref 3.4–10.8)
Y ENTEROCOL DNA STL QL NAA+NON-PROBE: NOT DETECTED

## 2025-08-09 PROCEDURE — 25510000001 GADOPICLENOL 0.5 MMOL/ML SOLUTION: Performed by: INTERNAL MEDICINE

## 2025-08-09 PROCEDURE — 94799 UNLISTED PULMONARY SVC/PX: CPT

## 2025-08-09 PROCEDURE — 82607 VITAMIN B-12: CPT | Performed by: INTERNAL MEDICINE

## 2025-08-09 PROCEDURE — 80053 COMPREHEN METABOLIC PANEL: CPT | Performed by: INTERNAL MEDICINE

## 2025-08-09 PROCEDURE — 25010000002 DIAZEPAM PER 5 MG: Performed by: INTERNAL MEDICINE

## 2025-08-09 PROCEDURE — 0202U NFCT DS 22 TRGT SARS-COV-2: CPT | Performed by: INTERNAL MEDICINE

## 2025-08-09 PROCEDURE — 94664 DEMO&/EVAL PT USE INHALER: CPT

## 2025-08-09 PROCEDURE — 25010000002 THIAMINE PER 100 MG: Performed by: PSYCHIATRY & NEUROLOGY

## 2025-08-09 PROCEDURE — 85025 COMPLETE CBC W/AUTO DIFF WBC: CPT | Performed by: INTERNAL MEDICINE

## 2025-08-09 PROCEDURE — 70553 MRI BRAIN STEM W/O & W/DYE: CPT

## 2025-08-09 PROCEDURE — 85060 BLOOD SMEAR INTERPRETATION: CPT | Performed by: INTERNAL MEDICINE

## 2025-08-09 PROCEDURE — 82728 ASSAY OF FERRITIN: CPT | Performed by: INTERNAL MEDICINE

## 2025-08-09 PROCEDURE — 36415 COLL VENOUS BLD VENIPUNCTURE: CPT | Performed by: INTERNAL MEDICINE

## 2025-08-09 PROCEDURE — 83090 ASSAY OF HOMOCYSTEINE: CPT | Performed by: PSYCHIATRY & NEUROLOGY

## 2025-08-09 PROCEDURE — 87507 IADNA-DNA/RNA PROBE TQ 12-25: CPT | Performed by: INTERNAL MEDICINE

## 2025-08-09 PROCEDURE — A9573 GADOPICLENOL 0.5 MMOL/ML SOLUTION: HCPCS | Performed by: INTERNAL MEDICINE

## 2025-08-09 PROCEDURE — 83921 ORGANIC ACID SINGLE QUANT: CPT | Performed by: PSYCHIATRY & NEUROLOGY

## 2025-08-09 PROCEDURE — 85007 BL SMEAR W/DIFF WBC COUNT: CPT | Performed by: INTERNAL MEDICINE

## 2025-08-09 PROCEDURE — 99221 1ST HOSP IP/OBS SF/LOW 40: CPT | Performed by: PSYCHIATRY & NEUROLOGY

## 2025-08-09 RX ORDER — GUAIFENESIN/DEXTROMETHORPHAN 100-10MG/5
5 SYRUP ORAL EVERY 4 HOURS PRN
Status: DISCONTINUED | OUTPATIENT
Start: 2025-08-09 | End: 2025-08-11 | Stop reason: HOSPADM

## 2025-08-09 RX ORDER — CYCLOBENZAPRINE HCL 10 MG
10 TABLET ORAL 3 TIMES DAILY PRN
COMMUNITY
End: 2025-08-11 | Stop reason: HOSPADM

## 2025-08-09 RX ORDER — EVOLOCUMAB 140 MG/ML
140 INJECTION, SOLUTION SUBCUTANEOUS
COMMUNITY

## 2025-08-09 RX ORDER — GUAIFENESIN 600 MG/1
1200 TABLET, EXTENDED RELEASE ORAL EVERY 12 HOURS SCHEDULED
Status: DISCONTINUED | OUTPATIENT
Start: 2025-08-09 | End: 2025-08-11 | Stop reason: HOSPADM

## 2025-08-09 RX ORDER — BUPROPION HYDROCHLORIDE 300 MG/1
300 TABLET ORAL 2 TIMES DAILY
COMMUNITY

## 2025-08-09 RX ORDER — DIAZEPAM 10 MG/2ML
5 INJECTION, SOLUTION INTRAMUSCULAR; INTRAVENOUS ONCE
Status: COMPLETED | OUTPATIENT
Start: 2025-08-09 | End: 2025-08-09

## 2025-08-09 RX ORDER — POTASSIUM CHLORIDE 1500 MG/1
20 TABLET, EXTENDED RELEASE ORAL ONCE
Status: COMPLETED | OUTPATIENT
Start: 2025-08-09 | End: 2025-08-09

## 2025-08-09 RX ADMIN — TOPIRAMATE 50 MG: 25 TABLET, FILM COATED ORAL at 20:48

## 2025-08-09 RX ADMIN — IPRATROPIUM BROMIDE AND ALBUTEROL SULFATE 3 ML: .5; 3 SOLUTION RESPIRATORY (INHALATION) at 10:21

## 2025-08-09 RX ADMIN — METOPROLOL SUCCINATE 50 MG: 50 TABLET, EXTENDED RELEASE ORAL at 08:57

## 2025-08-09 RX ADMIN — BUPROPION HYDROCHLORIDE 150 MG: 150 TABLET, FILM COATED, EXTENDED RELEASE ORAL at 20:48

## 2025-08-09 RX ADMIN — GUAIFENESIN 1200 MG: 600 TABLET, EXTENDED RELEASE ORAL at 09:02

## 2025-08-09 RX ADMIN — BUPROPION HYDROCHLORIDE 150 MG: 150 TABLET, FILM COATED, EXTENDED RELEASE ORAL at 08:57

## 2025-08-09 RX ADMIN — DIAZEPAM 5 MG: 5 INJECTION, SOLUTION INTRAMUSCULAR; INTRAVENOUS at 13:00

## 2025-08-09 RX ADMIN — PREGABALIN 100 MG: 100 CAPSULE ORAL at 20:48

## 2025-08-09 RX ADMIN — PREGABALIN 100 MG: 100 CAPSULE ORAL at 08:57

## 2025-08-09 RX ADMIN — SODIUM CHLORIDE 500 MG: 9 INJECTION, SOLUTION INTRAVENOUS at 20:48

## 2025-08-09 RX ADMIN — GUAIFENESIN 1200 MG: 600 TABLET, EXTENDED RELEASE ORAL at 20:48

## 2025-08-09 RX ADMIN — Medication 10 ML: at 09:03

## 2025-08-09 RX ADMIN — COLCHICINE 0.6 MG: 0.6 TABLET ORAL at 08:57

## 2025-08-09 RX ADMIN — SODIUM CHLORIDE 500 MG: 9 INJECTION, SOLUTION INTRAVENOUS at 18:02

## 2025-08-09 RX ADMIN — DESVENLAFAXINE 50 MG: 50 TABLET, EXTENDED RELEASE ORAL at 08:57

## 2025-08-09 RX ADMIN — METFORMIN HYDROCHLORIDE 1000 MG: 500 TABLET ORAL at 08:57

## 2025-08-09 RX ADMIN — METFORMIN HYDROCHLORIDE 1000 MG: 500 TABLET ORAL at 18:02

## 2025-08-09 RX ADMIN — CARIPRAZINE 3 MG: 1.5 CAPSULE, GELATIN COATED ORAL at 08:57

## 2025-08-09 RX ADMIN — Medication 10 ML: at 20:48

## 2025-08-09 RX ADMIN — ROSUVASTATIN CALCIUM 5 MG: 5 TABLET, FILM COATED ORAL at 08:57

## 2025-08-09 RX ADMIN — POTASSIUM CHLORIDE 20 MEQ: 1500 TABLET, EXTENDED RELEASE ORAL at 09:02

## 2025-08-09 RX ADMIN — PANTOPRAZOLE SODIUM 40 MG: 40 TABLET, DELAYED RELEASE ORAL at 05:54

## 2025-08-09 RX ADMIN — TOPIRAMATE 50 MG: 25 TABLET, FILM COATED ORAL at 08:57

## 2025-08-09 RX ADMIN — GADOPICLENOL 10 ML: 485.1 INJECTION INTRAVENOUS at 14:00

## 2025-08-10 ENCOUNTER — APPOINTMENT (OUTPATIENT)
Dept: NEUROLOGY | Facility: HOSPITAL | Age: 50
DRG: 880 | End: 2025-08-10
Payer: MEDICARE

## 2025-08-10 PROBLEM — A04.5 CAMPYLOBACTER GASTROENTERITIS: Status: ACTIVE | Noted: 2025-08-10

## 2025-08-10 LAB
CERULOPLASMIN SERPL-MCNC: 29 MG/DL (ref 19–39)
HCYS SERPL-MCNC: 13.1 UMOL/L (ref 0–15)

## 2025-08-10 PROCEDURE — 25010000002 THIAMINE PER 100 MG: Performed by: PSYCHIATRY & NEUROLOGY

## 2025-08-10 PROCEDURE — 97165 OT EVAL LOW COMPLEX 30 MIN: CPT

## 2025-08-10 PROCEDURE — 82390 ASSAY OF CERULOPLASMIN: CPT | Performed by: PSYCHIATRY & NEUROLOGY

## 2025-08-10 PROCEDURE — 82525 ASSAY OF COPPER: CPT | Performed by: PSYCHIATRY & NEUROLOGY

## 2025-08-10 PROCEDURE — 99232 SBSQ HOSP IP/OBS MODERATE 35: CPT | Performed by: PSYCHIATRY & NEUROLOGY

## 2025-08-10 PROCEDURE — 95819 EEG AWAKE AND ASLEEP: CPT | Performed by: PSYCHIATRY & NEUROLOGY

## 2025-08-10 PROCEDURE — 97161 PT EVAL LOW COMPLEX 20 MIN: CPT

## 2025-08-10 PROCEDURE — 95819 EEG AWAKE AND ASLEEP: CPT

## 2025-08-10 RX ORDER — AZITHROMYCIN 250 MG/1
500 TABLET, FILM COATED ORAL
Status: DISCONTINUED | OUTPATIENT
Start: 2025-08-10 | End: 2025-08-10

## 2025-08-10 RX ORDER — CIPROFLOXACIN 500 MG/1
750 TABLET, FILM COATED ORAL EVERY 12 HOURS SCHEDULED
Status: DISCONTINUED | OUTPATIENT
Start: 2025-08-10 | End: 2025-08-11 | Stop reason: HOSPADM

## 2025-08-10 RX ADMIN — CIPROFLOXACIN 750 MG: 500 TABLET ORAL at 11:34

## 2025-08-10 RX ADMIN — SODIUM CHLORIDE 500 MG: 9 INJECTION, SOLUTION INTRAVENOUS at 15:11

## 2025-08-10 RX ADMIN — METOPROLOL SUCCINATE 50 MG: 50 TABLET, EXTENDED RELEASE ORAL at 08:01

## 2025-08-10 RX ADMIN — GUAIFENESIN 1200 MG: 600 TABLET, EXTENDED RELEASE ORAL at 20:37

## 2025-08-10 RX ADMIN — Medication 10 ML: at 20:39

## 2025-08-10 RX ADMIN — SODIUM CHLORIDE 500 MG: 9 INJECTION, SOLUTION INTRAVENOUS at 08:52

## 2025-08-10 RX ADMIN — BUPROPION HYDROCHLORIDE 150 MG: 150 TABLET, FILM COATED, EXTENDED RELEASE ORAL at 08:01

## 2025-08-10 RX ADMIN — BUPROPION HYDROCHLORIDE 150 MG: 150 TABLET, FILM COATED, EXTENDED RELEASE ORAL at 20:37

## 2025-08-10 RX ADMIN — Medication 10 ML: at 08:02

## 2025-08-10 RX ADMIN — CIPROFLOXACIN 750 MG: 500 TABLET ORAL at 20:36

## 2025-08-10 RX ADMIN — TOPIRAMATE 50 MG: 25 TABLET, FILM COATED ORAL at 08:01

## 2025-08-10 RX ADMIN — SODIUM CHLORIDE 500 MG: 9 INJECTION, SOLUTION INTRAVENOUS at 20:39

## 2025-08-10 RX ADMIN — METFORMIN HYDROCHLORIDE 1000 MG: 500 TABLET ORAL at 17:55

## 2025-08-10 RX ADMIN — PANTOPRAZOLE SODIUM 40 MG: 40 TABLET, DELAYED RELEASE ORAL at 06:15

## 2025-08-10 RX ADMIN — TOPIRAMATE 50 MG: 25 TABLET, FILM COATED ORAL at 20:36

## 2025-08-10 RX ADMIN — GUAIFENESIN 1200 MG: 600 TABLET, EXTENDED RELEASE ORAL at 08:01

## 2025-08-10 RX ADMIN — CARIPRAZINE 3 MG: 1.5 CAPSULE, GELATIN COATED ORAL at 08:01

## 2025-08-10 RX ADMIN — PREGABALIN 100 MG: 100 CAPSULE ORAL at 08:01

## 2025-08-10 RX ADMIN — PREGABALIN 100 MG: 100 CAPSULE ORAL at 20:37

## 2025-08-10 RX ADMIN — METFORMIN HYDROCHLORIDE 1000 MG: 500 TABLET ORAL at 08:01

## 2025-08-10 RX ADMIN — DESVENLAFAXINE 50 MG: 50 TABLET, EXTENDED RELEASE ORAL at 08:01

## 2025-08-10 RX ADMIN — COLCHICINE 0.6 MG: 0.6 TABLET ORAL at 08:01

## 2025-08-11 ENCOUNTER — READMISSION MANAGEMENT (OUTPATIENT)
Dept: CALL CENTER | Facility: HOSPITAL | Age: 50
End: 2025-08-11
Payer: MEDICARE

## 2025-08-11 VITALS
WEIGHT: 293 LBS | DIASTOLIC BLOOD PRESSURE: 71 MMHG | OXYGEN SATURATION: 92 % | HEIGHT: 66 IN | BODY MASS INDEX: 47.09 KG/M2 | HEART RATE: 105 BPM | TEMPERATURE: 98.4 F | SYSTOLIC BLOOD PRESSURE: 124 MMHG | RESPIRATION RATE: 18 BRPM

## 2025-08-11 DIAGNOSIS — G43.811 OTHER MIGRAINE WITH STATUS MIGRAINOSUS, INTRACTABLE: ICD-10-CM

## 2025-08-11 LAB
ACANTHOCYTES BLD QL SMEAR: ABNORMAL
ANISOCYTOSIS BLD QL: ABNORMAL
BASOPHILS # BLD MANUAL: 0 10*3/MM3 (ref 0–0.2)
BASOPHILS NFR BLD MANUAL: 0 % (ref 0–1.5)
CYTOLOGIST CVX/VAG CYTO: NORMAL
DEPRECATED RDW RBC AUTO: 44.6 FL (ref 37–54)
ELLIPTOCYTES BLD QL SMEAR: ABNORMAL
EOSINOPHIL # BLD MANUAL: 0.16 10*3/MM3 (ref 0–0.4)
EOSINOPHIL NFR BLD MANUAL: 1 % (ref 0.3–6.2)
ERYTHROCYTE [DISTWIDTH] IN BLOOD BY AUTOMATED COUNT: 13.9 % (ref 12.3–15.4)
HCT VFR BLD AUTO: 39.7 % (ref 34–46.6)
HGB BLD-MCNC: 12.8 G/DL (ref 12–15.9)
LYMPHOCYTES # BLD MANUAL: 5.26 10*3/MM3 (ref 0.7–3.1)
LYMPHOCYTES NFR BLD MANUAL: 4 % (ref 5–12)
MCH RBC QN AUTO: 28.4 PG (ref 26.6–33)
MCHC RBC AUTO-ENTMCNC: 32.2 G/DL (ref 31.5–35.7)
MCV RBC AUTO: 88 FL (ref 79–97)
METAMYELOCYTES NFR BLD MANUAL: 1 % (ref 0–0)
MICROCYTES BLD QL: ABNORMAL
MONOCYTES # BLD: 0.65 10*3/MM3 (ref 0.1–0.9)
NEUTROPHILS # BLD AUTO: 10.03 10*3/MM3 (ref 1.7–7)
NEUTROPHILS NFR BLD MANUAL: 61.6 % (ref 42.7–76)
PATH INTERP BLD-IMP: NORMAL
PLATELET # BLD AUTO: 628 10*3/MM3 (ref 140–450)
PMV BLD AUTO: 9.3 FL (ref 6–12)
POIKILOCYTOSIS BLD QL SMEAR: ABNORMAL
POLYCHROMASIA BLD QL SMEAR: ABNORMAL
RBC # BLD AUTO: 4.51 10*6/MM3 (ref 3.77–5.28)
SMALL PLATELETS BLD QL SMEAR: ABNORMAL
VARIANT LYMPHS NFR BLD MANUAL: 2 % (ref 0–5)
VARIANT LYMPHS NFR BLD MANUAL: 30.3 % (ref 19.6–45.3)
WBC MORPH BLD: NORMAL
WBC NRBC COR # BLD AUTO: 16.29 10*3/MM3 (ref 3.4–10.8)

## 2025-08-11 PROCEDURE — 85007 BL SMEAR W/DIFF WBC COUNT: CPT | Performed by: INTERNAL MEDICINE

## 2025-08-11 PROCEDURE — 25010000002 ONDANSETRON PER 1 MG: Performed by: INTERNAL MEDICINE

## 2025-08-11 PROCEDURE — 85025 COMPLETE CBC W/AUTO DIFF WBC: CPT | Performed by: INTERNAL MEDICINE

## 2025-08-11 PROCEDURE — 25010000002 THIAMINE PER 100 MG: Performed by: PSYCHIATRY & NEUROLOGY

## 2025-08-11 RX ORDER — PREGABALIN 100 MG/1
100 CAPSULE ORAL EVERY 12 HOURS SCHEDULED
Start: 2025-08-11

## 2025-08-11 RX ORDER — CARBIDOPA AND LEVODOPA 25; 100 MG/1; MG/1
1 TABLET ORAL NIGHTLY
Qty: 90 TABLET | Refills: 3 | Status: SHIPPED | OUTPATIENT
Start: 2025-08-11

## 2025-08-11 RX ORDER — CIPROFLOXACIN 750 MG/1
750 TABLET, FILM COATED ORAL EVERY 12 HOURS SCHEDULED
Qty: 3 TABLET | Refills: 0 | Status: SHIPPED | OUTPATIENT
Start: 2025-08-11 | End: 2025-08-13

## 2025-08-11 RX ORDER — GUAIFENESIN 600 MG/1
1200 TABLET, EXTENDED RELEASE ORAL EVERY 12 HOURS SCHEDULED
Qty: 20 TABLET | Refills: 0 | Status: SHIPPED | OUTPATIENT
Start: 2025-08-11 | End: 2025-08-16

## 2025-08-11 RX ORDER — TOPIRAMATE 50 MG/1
50 TABLET, FILM COATED ORAL 2 TIMES DAILY
Qty: 180 TABLET | Refills: 3 | Status: SHIPPED | OUTPATIENT
Start: 2025-08-11

## 2025-08-11 RX ADMIN — DESVENLAFAXINE 50 MG: 50 TABLET, EXTENDED RELEASE ORAL at 08:18

## 2025-08-11 RX ADMIN — CARIPRAZINE 3 MG: 1.5 CAPSULE, GELATIN COATED ORAL at 08:18

## 2025-08-11 RX ADMIN — SODIUM CHLORIDE 500 MG: 9 INJECTION, SOLUTION INTRAVENOUS at 15:11

## 2025-08-11 RX ADMIN — TOPIRAMATE 50 MG: 25 TABLET, FILM COATED ORAL at 08:18

## 2025-08-11 RX ADMIN — ONDANSETRON 4 MG: 2 INJECTION INTRAMUSCULAR; INTRAVENOUS at 09:08

## 2025-08-11 RX ADMIN — GUAIFENESIN 1200 MG: 600 TABLET, EXTENDED RELEASE ORAL at 08:18

## 2025-08-11 RX ADMIN — Medication 10 ML: at 08:19

## 2025-08-11 RX ADMIN — METOPROLOL SUCCINATE 50 MG: 50 TABLET, EXTENDED RELEASE ORAL at 08:18

## 2025-08-11 RX ADMIN — BUPROPION HYDROCHLORIDE 150 MG: 150 TABLET, FILM COATED, EXTENDED RELEASE ORAL at 08:18

## 2025-08-11 RX ADMIN — COLCHICINE 0.6 MG: 0.6 TABLET ORAL at 08:18

## 2025-08-11 RX ADMIN — SODIUM CHLORIDE 500 MG: 9 INJECTION, SOLUTION INTRAVENOUS at 08:20

## 2025-08-11 RX ADMIN — PREGABALIN 100 MG: 100 CAPSULE ORAL at 08:17

## 2025-08-11 RX ADMIN — PANTOPRAZOLE SODIUM 40 MG: 40 TABLET, DELAYED RELEASE ORAL at 06:00

## 2025-08-11 RX ADMIN — METFORMIN HYDROCHLORIDE 1000 MG: 500 TABLET ORAL at 08:19

## 2025-08-11 RX ADMIN — CIPROFLOXACIN 750 MG: 500 TABLET ORAL at 08:18

## 2025-08-12 ENCOUNTER — TELEPHONE (OUTPATIENT)
Age: 50
End: 2025-08-12

## 2025-08-12 LAB
QT INTERVAL: 398 MS
QTC INTERVAL: 453 MS

## 2025-08-13 ENCOUNTER — READMISSION MANAGEMENT (OUTPATIENT)
Dept: CALL CENTER | Facility: HOSPITAL | Age: 50
End: 2025-08-13
Payer: MEDICARE

## 2025-08-13 ENCOUNTER — TELEPHONE (OUTPATIENT)
Age: 50
End: 2025-08-13

## 2025-08-14 LAB — COPPER SERPL-MCNC: 92 UG/DL (ref 80–158)

## 2025-08-19 ENCOUNTER — HOSPITAL ENCOUNTER (OUTPATIENT)
Dept: PAIN MANAGEMENT | Age: 50
Discharge: HOME | End: 2025-08-19
Payer: MEDICARE

## 2025-08-19 VITALS
RESPIRATION RATE: 18 BRPM | DIASTOLIC BLOOD PRESSURE: 73 MMHG | SYSTOLIC BLOOD PRESSURE: 153 MMHG | OXYGEN SATURATION: 97 % | HEART RATE: 83 BPM | TEMPERATURE: 96.8 F

## 2025-08-19 DIAGNOSIS — M54.10 BACK PAIN WITH LEFT-SIDED RADICULOPATHY: Chronic | ICD-10-CM

## 2025-08-19 DIAGNOSIS — R52 PAIN MANAGEMENT: ICD-10-CM

## 2025-08-19 LAB — METHYLMALONATE SERPL-SCNC: 167 NMOL/L (ref 0–378)

## 2025-08-19 PROCEDURE — 2500000003 HC RX 250 WO HCPCS

## 2025-08-19 PROCEDURE — 6360000002 HC RX W HCPCS

## 2025-08-19 PROCEDURE — 62323 NJX INTERLAMINAR LMBR/SAC: CPT

## 2025-08-19 RX ORDER — OXYCODONE AND ACETAMINOPHEN 7.5; 325 MG/1; MG/1
1 TABLET ORAL EVERY 8 HOURS PRN
Qty: 90 TABLET | Refills: 0 | Status: SHIPPED | OUTPATIENT
Start: 2025-08-20 | End: 2025-09-19

## 2025-08-19 RX ORDER — SODIUM CHLORIDE 9 MG/ML
5 INJECTION, SOLUTION INTRAMUSCULAR; INTRAVENOUS; SUBCUTANEOUS ONCE
Status: DISCONTINUED | OUTPATIENT
Start: 2025-08-19 | End: 2025-08-21 | Stop reason: HOSPADM

## 2025-08-19 RX ORDER — DEXAMETHASONE SODIUM PHOSPHATE 10 MG/ML
20 INJECTION, SOLUTION INTRAMUSCULAR; INTRAVENOUS ONCE
Status: DISCONTINUED | OUTPATIENT
Start: 2025-08-19 | End: 2025-08-21 | Stop reason: HOSPADM

## 2025-08-19 RX ORDER — LIDOCAINE HYDROCHLORIDE 10 MG/ML
5 INJECTION, SOLUTION EPIDURAL; INFILTRATION; INTRACAUDAL; PERINEURAL ONCE
Status: DISCONTINUED | OUTPATIENT
Start: 2025-08-19 | End: 2025-08-21 | Stop reason: HOSPADM

## 2025-08-19 ASSESSMENT — PAIN - FUNCTIONAL ASSESSMENT: PAIN_FUNCTIONAL_ASSESSMENT: 0-10

## 2025-08-25 ENCOUNTER — TELEPHONE (OUTPATIENT)
Dept: PAIN MANAGEMENT | Age: 50
End: 2025-08-25

## (undated) DEVICE — BANDAGE,GAUZE,BULKEE II,4.5"X4.1YD,STRL: Brand: MEDLINE

## (undated) DEVICE — FLTR PLUMEPORT LAP W/CONN STRL

## (undated) DEVICE — TBG SMPL FLTR LINE NASL 02/C02 A/ BX/100

## (undated) DEVICE — GOWN,NON-REINFORCED,SIRUS,SET IN SLV,XL: Brand: MEDLINE

## (undated) DEVICE — ENDOPATH XCEL WITH OPTIVIEW TECHNOLOGY UNIVERSAL TROCAR STABILITY SLEEVES: Brand: ENDOPATH XCEL OPTIVIEW

## (undated) DEVICE — 3M™ STERI-STRIP™ REINFORCED ADHESIVE SKIN CLOSURES, R1546, 1/4 IN X 4 IN (6 MM X 100 MM), 10 STRIPS/ENVELOPE: Brand: 3M™ STERI-STRIP™

## (undated) DEVICE — 3M™ TEGADERM™ TRANSPARENT FILM DRESSING FRAME STYLE, 1626, 4 IN X 4-3/4 IN (10 CM X 12 CM), 50/CT 4CT/CASE: Brand: 3M™ TEGADERM™

## (undated) DEVICE — NDL HYPO PRECISIONGLIDE REG 22G 1 1/2

## (undated) DEVICE — THREE QUARTER SHEET: Brand: CONVERTORS

## (undated) DEVICE — MAJOR BSIN SETUP PK

## (undated) DEVICE — MASK VENTILATOR MED AD SUPERNOVA ET

## (undated) DEVICE — FORCEPS BX L240CM JAW DIA2.8MM L CAP W/ NDL MIC MESH TOOTH

## (undated) DEVICE — BNDR ABD 4PANEL12IN 30TO45IN

## (undated) DEVICE — ENDO KIT,LOURDES HOSPITAL: Brand: MEDLINE INDUSTRIES, INC.

## (undated) DEVICE — ENCORE® LATEX MICRO SIZE 6.5, STERILE LATEX POWDER-FREE SURGICAL GLOVE: Brand: ENCORE

## (undated) DEVICE — MINI ENDOCUT SCISSOR TIP, DISPOSABLE: Brand: RENEW

## (undated) DEVICE — SUTURE VCRL SZ 3-0 L27IN ABSRB UD L26MM SH 1/2 CIR J416H

## (undated) DEVICE — STERILE POLYISOPRENE POWDER-FREE SURGICAL GLOVES: Brand: PROTEXIS

## (undated) DEVICE — VISUALIZATION SYSTEM: Brand: CLEARIFY

## (undated) DEVICE — GLV SURG TRIUMPH GREEN W/ALOE PF LTX 8.5 STRL

## (undated) DEVICE — MAT PREVALON MOBL TRANSFR AIR W/PAD 39X80IN

## (undated) DEVICE — GLV SURG TRIUMPH ORTHO W/ALOE PF LTX 7.0

## (undated) DEVICE — HARMONIC ACE +7 LAPAROSCOPIC SHEARS ADVANCED HEMOSTASIS 5MM DIAMETER 45CM SHAFT LENGTH  FOR USE WITH GRAY HAND PIECE ONLY: Brand: HARMONIC ACE

## (undated) DEVICE — STANDARD SURGICAL GOWN, L: Brand: CONVERTORS

## (undated) DEVICE — ENDOPATH XCEL BLADELESS TROCARS WITH STABILITY SLEEVES: Brand: ENDOPATH XCEL

## (undated) DEVICE — 2, DISPOSABLE SUCTION/IRRIGATOR WITHOUT DISPOSABLE TIP: Brand: STRYKEFLOW

## (undated) DEVICE — SUT MNCRYL 4/0 RB1 27IN UD MCP214H

## (undated) DEVICE — VISIGI 3D®  CALIBRATION SYSTEM  SIZE 40FR STD W/ BULB: Brand: BOEHRINGER® VISIGI 3D™ SLEEVE GASTRECTOMY CALIBRATION SYSTEM, SIZE 40FR W/BULB

## (undated) DEVICE — SKIN AFFIX SURG ADHESIVE 72/CS 0.55ML: Brand: MEDLINE

## (undated) DEVICE — PAD LAP CHOLE: Brand: MEDLINE INDUSTRIES, INC.

## (undated) DEVICE — SUTURE VCRL SZ 4-0 L18IN ABSRB UD L19MM PS-2 3/8 CIR PRIM J496H

## (undated) DEVICE — ADHESIVE LIQ MASTISOL ST

## (undated) DEVICE — ENCORE® LATEX MICRO SIZE 6, STERILE LATEX POWDER-FREE SURGICAL GLOVE: Brand: ENCORE

## (undated) DEVICE — Device: Brand: DEFENDO AIR/WATER/SUCTION AND BIOPSY VALVE

## (undated) DEVICE — ENDOPATH XCEL WITH OPTIVIEW TECHNOLOGY BLADELESS TROCARS WITH STABILITY SLEEVES: Brand: ENDOPATH XCEL OPTIVIEW

## (undated) DEVICE — 9165 UNIVERSAL PATIENT PLATE: Brand: 3M™

## (undated) DEVICE — PK TURNOVER RM ADV

## (undated) DEVICE — YANKAUER,BULB TIP WITH VENT: Brand: ARGYLE

## (undated) DEVICE — STERILE LATEX POWDER FREE SURGICAL GLOVES WITH HYDROGEL COATING: Brand: PROTEXIS

## (undated) DEVICE — SYR LL TP 10ML STRL

## (undated) DEVICE — ECHELON FLEX POWERED PLUS LONG ARTICULATING ENDOSCOPIC LINEAR CUTTER, 60MM: Brand: ECHELON FLEX

## (undated) DEVICE — GOWN,NON-REINFORCED,SIRUS,SET IN SLV,XXL: Brand: MEDLINE

## (undated) DEVICE — TOWEL,OR,DSP,ST,BLUE,DLX,10/PK,8PK/CS: Brand: MEDLINE

## (undated) DEVICE — BNDR ABD 4PANEL 12IN 46 TO 62IN

## (undated) DEVICE — C-ARM: Brand: UNBRANDED

## (undated) DEVICE — SPONGE GZ W4XL4IN RAYON POLY FILL CVR W/ NONWOVEN FAB

## (undated) DEVICE — AIRLIFE™ NASAL OXYGEN CANNULA CURVED, NONFLARED TIP, WITH 7' FEET (2.1 M) CRUSH RESISTANT TUBING, OVER-THE-EAR STYLE: Brand: AIRLIFE™

## (undated) DEVICE — CHLORAPREP 26ML ORANGE

## (undated) DEVICE — MASK,OXYGEN,MED CONC,ADLT,7' TUB, UC: Brand: PENDING

## (undated) DEVICE — SUT VIC 0 SUTUPAK TIES 18IN J906G

## (undated) DEVICE — THE CHANNEL CLEANING BRUSH IS A NYLON FLEXI BRUSH ATTACHED TO A FLEXIBLE PLASTIC SHEATH DESIGNED TO SAFELY REMOVE DEBRIS FROM FLEXIBLE ENDOSCOPES.

## (undated) DEVICE — GLV SURG BIOGEL LTX PF 8

## (undated) DEVICE — SUTURE VCRL SZ 2-0 L27IN ABSRB UD L26MM SH 1/2 CIR J417H

## (undated) DEVICE — SENSR O2 OXIMAX FNGR A/ 18IN NONSTR

## (undated) DEVICE — APPL CHLORAPREP W/TINT 26ML ORNG

## (undated) DEVICE — SYS CLOSE PORTII CARTR/THOMASN XL

## (undated) DEVICE — CARTRDG SUT PROXISURETM VIC COAT SZ2/0